# Patient Record
Sex: MALE | Race: BLACK OR AFRICAN AMERICAN | NOT HISPANIC OR LATINO | Employment: OTHER | URBAN - METROPOLITAN AREA
[De-identification: names, ages, dates, MRNs, and addresses within clinical notes are randomized per-mention and may not be internally consistent; named-entity substitution may affect disease eponyms.]

---

## 2017-09-15 ENCOUNTER — HISTORICAL (OUTPATIENT)
Dept: ADMINISTRATIVE | Facility: HOSPITAL | Age: 67
End: 2017-09-15

## 2017-09-15 LAB
ALBUMIN SERPL-MCNC: 4.2 G/DL (ref 3.1–4.7)
ALP SERPL-CCNC: 65 IU/L (ref 40–104)
ALT (SGPT): 25 IU/L (ref 3–33)
AST SERPL-CCNC: 30 IU/L (ref 10–40)
BASOPHILS NFR BLD: 0.1 K/UL (ref 0–0.2)
BASOPHILS NFR BLD: 0.9 %
BILIRUB SERPL-MCNC: 1 MG/DL (ref 0.3–1)
BUN SERPL-MCNC: 18 MG/DL (ref 8–20)
CALCIUM SERPL-MCNC: 9 MG/DL (ref 7.7–10.4)
CHLORIDE: 103 MMOL/L (ref 98–110)
CO2 SERPL-SCNC: 24.1 MMOL/L (ref 22.8–31.6)
CREATININE: 1.17 MG/DL (ref 0.6–1.4)
EOSINOPHIL NFR BLD: 0.4 K/UL (ref 0–0.7)
EOSINOPHIL NFR BLD: 4.7 %
ERYTHROCYTE [DISTWIDTH] IN BLOOD BY AUTOMATED COUNT: 14.4 % (ref 12.5–14.5)
GLUCOSE: 93 MG/DL (ref 70–99)
GRAN #: 5.9 K/UL (ref 1.4–6.5)
GRAN%: 62.3 %
HBA1C MFR BLD: 6.7 % (ref 3.1–6.5)
HCT VFR BLD AUTO: 35.2 % (ref 39–55)
HGB BLD-MCNC: 11.7 G/DL (ref 14–16)
IMMATURE GRANS (ABS): 0 K/UL (ref 0–1)
IMMATURE GRANULOCYTES: 0.4 %
LYMPH #: 2.2 K/UL (ref 1.2–3.4)
LYMPH%: 23.6 %
MCH RBC QN AUTO: 29.5 PG (ref 25–35)
MCHC RBC AUTO-ENTMCNC: 33.2 G/DL (ref 31–36)
MCV RBC AUTO: 88.7 FL (ref 80–100)
MONO #: 0.8 K/UL (ref 0.1–0.6)
MONO%: 8.1 %
NUCLEATED RBCS: 0 %
NUCLEATED RED BLOOD CELLS: 0 /100 WBC
PERFORMED BY:: ABNORMAL
PLATELET # BLD AUTO: 204 K/UL (ref 140–440)
PMV BLD AUTO: 10.5 FL (ref 8.8–12.7)
POTASSIUM SERPL-SCNC: 4.2 MMOL/L (ref 3.5–5)
PROT SERPL-MCNC: 8.4 G/DL (ref 6–8.2)
RBC # BLD AUTO: 3.97 M/UL (ref 4.3–5.9)
SODIUM: 136 MMOL/L (ref 134–144)
WBC # BLD: 9.4 K/UL (ref 5–10)

## 2017-09-27 LAB
BASOPHILS NFR BLD: 0.1 K/UL (ref 0–0.2)
BASOPHILS NFR BLD: 0.8 %
EOSINOPHIL NFR BLD: 0.3 K/UL (ref 0–0.7)
EOSINOPHIL NFR BLD: 4.2 %
ERYTHROCYTE [DISTWIDTH] IN BLOOD BY AUTOMATED COUNT: 14.3 % (ref 12.5–14.5)
GRAN #: 5.3 K/UL (ref 1.4–6.5)
GRAN%: 67.2 %
HCT VFR BLD AUTO: 30.5 % (ref 39–55)
HGB BLD-MCNC: 10.4 G/DL (ref 14–16)
IMMATURE GRANS (ABS): 0 K/UL (ref 0–1)
IMMATURE GRANULOCYTES: 0.3 %
LYMPH #: 1.6 K/UL (ref 1.2–3.4)
LYMPH%: 20.3 %
MCH RBC QN AUTO: 29.8 PG (ref 25–35)
MCHC RBC AUTO-ENTMCNC: 34.1 G/DL (ref 31–36)
MCV RBC AUTO: 87.4 FL (ref 80–100)
MONO #: 0.6 K/UL (ref 0.1–0.6)
MONO%: 7.2 %
NUCLEATED RBCS: 0 %
NUCLEATED RED BLOOD CELLS: 0 /100 WBC
PERFORMED BY:: ABNORMAL
PLATELET # BLD AUTO: 186 K/UL (ref 140–440)
PMV BLD AUTO: 9.8 FL (ref 8.8–12.7)
RBC # BLD AUTO: 3.49 M/UL (ref 4.3–5.9)
WBC # BLD: 7.9 K/UL (ref 5–10)

## 2017-09-28 LAB
ALBUMIN SERPL-MCNC: 3.5 G/DL (ref 2.9–4.4)
ALBUMIN/GLOB SERPL ELPH: 1.1 {RATIO} (ref 0.7–1.7)
ALPHA 1 GLOBULIN/PROTEIN TOTAL: 0.3 G/DL (ref 0–0.4)
ALPHA 2 GLOBULIN/PROTEIN TOTAL: 0.6 G/DL (ref 0.4–1)
B-GLOBULIN FLD ELPH-MCNC: 1 G/DL (ref 0.7–1.3)
GAMMA GLOB FLD ELPH-MCNC: 1.5 G/DL (ref 0.4–1.8)
GLOBULIN SER CALC-MCNC: 3.3 G/DL (ref 2.2–3.9)
KAPPA FREE LIGHT CHAINS: 37.5 MG/L (ref 3.3–19.4)
KAPPA/LAMBDA RATIO, S: 1.05 (ref 0.26–1.65)
LAMBDA FREE LIGHT CHAINS: 35.6 MG/L (ref 5.7–26.3)
Lab: NORMAL
M PROTEIN MFR UR ELPH: NORMAL G/DL
PROT SERPL-MCNC: 6.8 G/DL (ref 6–8.5)

## 2017-12-18 RX ORDER — POTASSIUM CHLORIDE 20 MEQ/1
20 TABLET, EXTENDED RELEASE ORAL DAILY
Qty: 90 TABLET | Refills: 0 | Status: SHIPPED | OUTPATIENT
Start: 2017-12-18 | End: 2018-08-25 | Stop reason: SDUPTHER

## 2017-12-18 RX ORDER — AMLODIPINE AND BENAZEPRIL HYDROCHLORIDE 5; 20 MG/1; MG/1
CAPSULE ORAL
Refills: 2 | COMMUNITY
Start: 2017-12-13 | End: 2018-03-07 | Stop reason: SDUPTHER

## 2017-12-18 RX ORDER — AZELASTINE 1 MG/ML
1 SPRAY, METERED NASAL 2 TIMES DAILY PRN
Refills: 0 | COMMUNITY
Start: 2017-11-28 | End: 2021-04-20 | Stop reason: SDUPTHER

## 2017-12-18 RX ORDER — METFORMIN HYDROCHLORIDE 1000 MG/1
1000 TABLET ORAL 2 TIMES DAILY WITH MEALS
Refills: 1 | COMMUNITY
Start: 2017-12-13 | End: 2018-06-11 | Stop reason: SDUPTHER

## 2017-12-18 RX ORDER — ALBUTEROL SULFATE 90 UG/1
AEROSOL, METERED RESPIRATORY (INHALATION)
Refills: 0 | COMMUNITY
Start: 2017-11-28 | End: 2018-11-21 | Stop reason: SDUPTHER

## 2017-12-18 RX ORDER — POTASSIUM CHLORIDE 20 MEQ/1
20 TABLET, EXTENDED RELEASE ORAL DAILY
COMMUNITY
Start: 2017-09-12 | End: 2017-12-18 | Stop reason: SDUPTHER

## 2017-12-18 RX ORDER — MONTELUKAST SODIUM 10 MG/1
10 TABLET ORAL NIGHTLY PRN
Refills: 0 | COMMUNITY
Start: 2017-12-08 | End: 2020-09-16

## 2017-12-18 NOTE — TELEPHONE ENCOUNTER
----- Message from Autumn Soni sent at 12/18/2017 10:31 AM CST -----  Contact: self  Pharmacy would not fill his potassium cl er.  Told him he had to speak to our office.  Has an appt in march.  Please refill as the patient is out.

## 2018-01-25 RX ORDER — MELOXICAM 7.5 MG/1
7.5 TABLET ORAL 2 TIMES DAILY
Qty: 180 TABLET | Refills: 1 | Status: SHIPPED | OUTPATIENT
Start: 2018-01-25 | End: 2018-09-26 | Stop reason: SDUPTHER

## 2018-01-25 RX ORDER — MELOXICAM 7.5 MG/1
7.5 TABLET ORAL 2 TIMES DAILY
COMMUNITY
Start: 2018-01-18 | End: 2018-01-25 | Stop reason: SDUPTHER

## 2018-01-25 NOTE — TELEPHONE ENCOUNTER
I do not see a code to use for this patient in regards as to why he is taking meloxicam. Please advise in regards to that.    Thank you!

## 2018-03-07 ENCOUNTER — OFFICE VISIT (OUTPATIENT)
Dept: INTERNAL MEDICINE | Facility: CLINIC | Age: 68
End: 2018-03-07
Payer: MEDICARE

## 2018-03-07 VITALS
HEART RATE: 74 BPM | RESPIRATION RATE: 20 BRPM | BODY MASS INDEX: 24.79 KG/M2 | TEMPERATURE: 98 F | WEIGHT: 183 LBS | SYSTOLIC BLOOD PRESSURE: 142 MMHG | OXYGEN SATURATION: 98 % | HEIGHT: 72 IN | DIASTOLIC BLOOD PRESSURE: 76 MMHG

## 2018-03-07 DIAGNOSIS — J45.909 BRONCHITIS WITH ASTHMA, SUBACUTE: ICD-10-CM

## 2018-03-07 DIAGNOSIS — J20.9 BRONCHITIS WITH ASTHMA, SUBACUTE: ICD-10-CM

## 2018-03-07 DIAGNOSIS — I10 ESSENTIAL HYPERTENSION: ICD-10-CM

## 2018-03-07 DIAGNOSIS — E11.65 TYPE 2 DIABETES MELLITUS WITH HYPERGLYCEMIA, WITHOUT LONG-TERM CURRENT USE OF INSULIN: Primary | ICD-10-CM

## 2018-03-07 DIAGNOSIS — I25.10 CORONARY ARTERY DISEASE, ANGINA PRESENCE UNSPECIFIED, UNSPECIFIED VESSEL OR LESION TYPE, UNSPECIFIED WHETHER NATIVE OR TRANSPLANTED HEART: ICD-10-CM

## 2018-03-07 DIAGNOSIS — N52.9 ERECTILE DYSFUNCTION, UNSPECIFIED ERECTILE DYSFUNCTION TYPE: ICD-10-CM

## 2018-03-07 DIAGNOSIS — Z12.11 SCREEN FOR COLON CANCER: ICD-10-CM

## 2018-03-07 DIAGNOSIS — Z12.5 SCREENING FOR PROSTATE CANCER: ICD-10-CM

## 2018-03-07 DIAGNOSIS — R97.20 ELEVATED PSA: ICD-10-CM

## 2018-03-07 DIAGNOSIS — E78.2 MIXED HYPERLIPIDEMIA: ICD-10-CM

## 2018-03-07 DIAGNOSIS — Z95.1 S/P CABG (CORONARY ARTERY BYPASS GRAFT): ICD-10-CM

## 2018-03-07 PROCEDURE — 99214 OFFICE O/P EST MOD 30 MIN: CPT | Mod: ,,, | Performed by: INTERNAL MEDICINE

## 2018-03-07 RX ORDER — SILDENAFIL 100 MG/1
100 TABLET, FILM COATED ORAL
Qty: 5 TABLET | Refills: 0 | Status: SHIPPED | OUTPATIENT
Start: 2018-03-07 | End: 2018-05-16

## 2018-03-07 RX ORDER — AMLODIPINE AND BENAZEPRIL HYDROCHLORIDE 5; 20 MG/1; MG/1
CAPSULE ORAL
Qty: 90 CAPSULE | Refills: 1 | Status: SHIPPED | OUTPATIENT
Start: 2018-03-07 | End: 2018-08-25 | Stop reason: SDUPTHER

## 2018-03-07 RX ORDER — DOXYCYCLINE 100 MG/1
100 CAPSULE ORAL 2 TIMES DAILY
Qty: 14 CAPSULE | Refills: 0 | Status: SHIPPED | OUTPATIENT
Start: 2018-03-07 | End: 2018-04-24

## 2018-03-07 RX ORDER — FLUTICASONE FUROATE AND VILANTEROL 100; 25 UG/1; UG/1
1 POWDER RESPIRATORY (INHALATION) DAILY
Qty: 2 EACH | COMMUNITY
Start: 2018-03-07 | End: 2020-09-16 | Stop reason: SDUPTHER

## 2018-03-07 RX ORDER — HYDROCODONE POLISTIREX AND CHLORPHENIRAMINE POLISTIREX 10; 8 MG/5ML; MG/5ML
5 SUSPENSION, EXTENDED RELEASE ORAL EVERY 12 HOURS PRN
Qty: 100 ML | Refills: 0 | Status: SHIPPED | OUTPATIENT
Start: 2018-03-07 | End: 2018-03-21 | Stop reason: SDUPTHER

## 2018-03-07 RX ORDER — LISINOPRIL AND HYDROCHLOROTHIAZIDE 10; 12.5 MG/1; MG/1
1 TABLET ORAL DAILY
COMMUNITY
Start: 2018-02-22 | End: 2018-03-07

## 2018-03-07 NOTE — PATIENT INSTRUCTIONS
Prostate-Specific Antigen (PSA)  Does this test have other names?  PSA  What is this test?  This test measures the level of prostate-specific antigen (PSA) in your blood.  The cells of the prostate gland make the protein called PSA. Men normally have low levels of PSA. If your PSA levels start to rise, it could mean you have prostate cancer, benign prostate conditions, inflammation, or an infection.  PSA testing is controversial because the U.S. Preventative Services Task Force discourages men who don't have any symptoms of prostate cancer from being screened. The task force says that PSA test results can lead to treating small cancers that would never become life-threatening.  But the American Cancer Society believes men should be told the risks and benefits of PSA testing and allowed to make their own decision about if and when to be screened.  The American Urologic Society says that PSA screening is most important between the ages of 55 and 69. If you have a brother or father with prostate cancer or you are , you should start testing at age 40.   Why do I need this test?  You may have this test if you are 50 or older and your healthcare provider wants to screen you for prostate cancer. Some providers recommend screening at age 40 or 45 if you have a family history of prostate cancer or other risk factors.  You may also have this test if you have already been diagnosed with prostate cancer, so that your healthcare provider can monitor your treatment and see whether your cancer has come back.  What other tests might I have along with this test?  Your healthcare provider may also do a digital rectal exam (NEHAL). A NEHAL is a physical exam of the prostate, not a lab test. For the exam, your provider will place a gloved finger in your rectum and feel the prostate to check for any bumps or abnormal areas. The FDA recommends that a NEHAL be done along with a PSA test.  What do my test results mean?  Many  things may affect your lab test results. These include the method each lab uses to do the test. Even if your test results are different from the normal value, you may not have a problem. To learn what the results mean for you, talk with your healthcare provider.  Results are given in nanograms per milliliter ng/mL. Normal results are below 4.0 ng/mL. A rising PSA may mean that you have cancer. But the PSA results alone won't tell your healthcare provider whether it's cancer or a benign prostate condition. If your provider suspects cancer, he or she will likely suggest that you have a biopsy of the prostate to make the diagnosis.  How is this test done?  The test requires a blood sample, which is drawn through a needle from a vein in your arm.  Does this test pose any risks?  Taking a blood sample with a needle carries risks that include bleeding, infection, bruising, or feeling dizzy. When the needle pricks your arm, you may feel a slight stinging sensation or pain. Afterward, the site may be slightly sore.  What might affect my test results?  An infection can affect your results, as can certain medicines. Riding a bicycle and ejaculation may also affect your results.  How do I get ready for this test?  You may need to abstain from sex and not ride a bicycle within 1 to 2 days of the test. In addition, be sure your healthcare provider knows about all medicines, herbs, vitamins, and supplements you are taking. This includes medicines that don't need a prescription and any illicit drugs you may use.     © 8725-6050 [employer]. [address] [place] [address], [place] [address]. All rights reserved. This information is not intended as a substitute for professional medical care. Always follow your healthcare professional's instructions.        PSA Test     PSA is a simple blood test.   The prostate specific antigen (PSA) test is a blood test. It can help find prostate cancer. PSA is a substance in semen. Its made  by the prostate. It is normal for some PSA to leak from the prostate into the bloodstream. But sometimes, more than a normal amount of PSA gets into the blood. The PSA test measures the amount of PSA in the blood. If the test shows high blood level of PSA, other tests are needed to help find the cause.  Possible causes of increased PSA  Several things can cause extra PSA to enter the blood, such as:  · Prostate cancer  · Prostate infection (prostatitis)  · Enlarged prostate not due to cancer (benign prostatic hyperplasia, or BPH)  · Prostate massage  · Prostate biopsy  Why a PSA test is done  A PSA test can be done to check for prostate cancer. But the PSA test by itself cant tell for sure whether or not a man has prostate cancer. And not all health care providers agree if men should have PSA tests to screen for prostate cancer. The American Cancer Society and many other organizations advise men talk with their health care provider about if prostate cancer screening is right for them. Talk with your health care provider about the PSA test beginning around age 50, or earlier if youre at higher risk.  A PSA test may also be done if a problem is found during a routine prostate exam. And it may be done if you have symptoms that suggest that you have a prostate problem. You may need a PSA if you have symptoms such as:  · Needing to urinate more often  · Waking often to urinate at night  · Having to strain when urinating  · Seeing blood in your urine  · Having pain when urinating  How a PSA test is done  Before a PSA test, you may have a digital rectal exam (NEHAL) of the prostate. For this exam, the health care provider inserts a lubricated, gloved finger into the rectum to feel the prostate. Then youll be sent to have your blood drawn for the PSA test. The test may be done in the health care providers office. Or it may be at a lab, clinic, or hospital. Blood is taken from your arm and sent to a lab to be  tested.  Getting your results  The time it takes to get your test results varies. Ask your health care provider when you can expect your results. You and your health care provider will discuss the results. A normal range for your PSA depends on a number of factors. These include your age and the size of your prostate. They also include your risk factors for cancer, your symptoms, and the results of any previous PSA tests youve had. All of these things are taken into account when your PSA tests numbers are assessed.  If you're at higher risk for prostate cancer  If you are -American or have a family history of prostate cancer, talk with your health care provider about PSA tests by age 40 to 45.   Date Last Reviewed: 3/24/2015  © 3745-5013 [employer]. [address] [place] [address], [place] [address]. All rights reserved. This information is not intended as a substitute for professional medical care. Always follow your healthcare professional's instructions.

## 2018-03-07 NOTE — PROGRESS NOTES
SUBJECTIVE:    Patient ID: Baldo Carney is a 67 y.o. male.    Chief Complaint: Follow-up (chest congestion, pt went to urgent care about 2 weeks ago and was given injections,antibiotics, and inhailer)    HPI     Pt in for recheck-4 days ago he caught a cold with productive cough and wheezing  In the chest-and shortness of breath-he had asthma as a child and outgrew it and came back to [place] and began again-He was treated but sx not gone-got two shots and a cough rx and an inhaler-cough still but it is dry-mucous at night greenish at times-he was on an antibiotic-sounds like zithromax    His old labs showed an elevated PSA but when he went there he could not afford the charge and left-PSA was 11.9--H/H was a little low but it was consistent with earlier one-A1C had come down from 7.5 to 6.7--blood sugars are in low 100's      He complains of erectile dysfunction -difficulty with hard erection    Past Medical History:   Diagnosis Date    Asthma     Cardiomyopathy 10/21/2014    Hyperlipidemia     Hypertension      Social History     Social History    Marital status:      Spouse name: N/A    Number of children: N/A    Years of education: N/A     Occupational History    Not on file.     Social History Main Topics    Smoking status: Former Smoker    Smokeless tobacco: Never Used    Alcohol use Yes      Comment: social    Drug use: No    Sexual activity: Not on file     Other Topics Concern    Not on file     Social History Narrative    No narrative on file     Past Surgical History:   Procedure Laterality Date    CARDIAC CATHETERIZATION      CARDIAC VALVE SURGERY      CORONARY ARTERY BYPASS GRAFT      SHOULDER ARTHROSCOPY  1985     Family History   Problem Relation Age of Onset    No Known Problems Mother     Diabetes Father     Hypertension Father     Heart attack Father     Heart disease Father     Diabetes Sister     Heart disease Brother     Diabetes Brother     No Known Problems  Maternal Grandmother     No Known Problems Maternal Grandfather     No Known Problems Paternal Grandmother     No Known Problems Paternal Grandfather     No Known Problems Maternal Aunt     No Known Problems Maternal Uncle     No Known Problems Paternal Aunt     No Known Problems Paternal Uncle     Anemia Neg Hx     Arrhythmia Neg Hx     Asthma Neg Hx     Clotting disorder Neg Hx     Fainting Neg Hx     Heart failure Neg Hx     Hyperlipidemia Neg Hx        Review of Systems   Constitutional: Negative for appetite change, chills, diaphoresis, fatigue, fever and unexpected weight change (4 pounds loss during flu).   HENT: Negative for congestion, ear pain, hearing loss, nosebleeds, postnasal drip, sinus pain, sinus pressure, sneezing, sore throat, tinnitus and trouble swallowing.    Eyes: Negative for photophobia, pain and visual disturbance.   Respiratory: Positive for cough, shortness of breath and wheezing. Negative for apnea, choking and chest tightness.    Cardiovascular: Negative for chest pain, palpitations and leg swelling.   Gastrointestinal: Negative for abdominal distention, abdominal pain, blood in stool, constipation, diarrhea, nausea and vomiting.   Endocrine: Negative for cold intolerance, heat intolerance, polydipsia and polyuria.   Genitourinary: Negative for difficulty urinating, dysuria, flank pain, frequency, hematuria and urgency.   Musculoskeletal: Negative for arthralgias, back pain, joint swelling, myalgias, neck pain and neck stiffness.   Skin: Negative for pallor and rash.   Allergic/Immunologic: Negative for environmental allergies and food allergies.   Neurological: Negative for dizziness, tremors, seizures, syncope, speech difficulty, weakness, light-headedness, numbness and headaches.   Hematological: Does not bruise/bleed easily.   Psychiatric/Behavioral: Positive for sleep disturbance (cough and chest congestion). Negative for agitation, confusion, decreased concentration  and suicidal ideas. The patient is not nervous/anxious.           Objective:      Physical Exam   Constitutional: He is oriented to person, place, and time. He appears well-developed and well-nourished. He is cooperative. No distress.   HENT:   Head: Normocephalic and atraumatic.   Nose: Nose normal.   Mouth/Throat: Uvula is midline, oropharynx is clear and moist and mucous membranes are normal.   Eyes: Conjunctivae, EOM and lids are normal. Pupils are equal, round, and reactive to light. Right pupil is round and reactive. Left pupil is round and reactive.   Neck: Normal range of motion. Neck supple. Carotid bruit is not present. No thyromegaly present.   Cardiovascular: Normal rate, regular rhythm, normal heart sounds and intact distal pulses.    Pulses:       Dorsalis pedis pulses are 2+ on the right side, and 2+ on the left side.        Posterior tibial pulses are 2+ on the right side, and 2+ on the left side.   1+ edema in the lower legs   Pulmonary/Chest: Effort normal and breath sounds normal.   Abdominal: Soft. Bowel sounds are normal. He exhibits no mass. There is no hepatosplenomegaly. There is no tenderness. There is no rigidity. No hernia.   Genitourinary: Prostate is enlarged.   Genitourinary Comments:  No firmness-soft but non tender   Musculoskeletal: Normal range of motion.   Lymphadenopathy:     He has no cervical adenopathy.     He has no axillary adenopathy.   Neurological: He is alert and oriented to person, place, and time.   Skin: Skin is warm and dry. No cyanosis. Nails show no clubbing.   Psychiatric: He has a normal mood and affect. His speech is normal.   Nursing note and vitals reviewed.          Assessment:       1. Type 2 diabetes mellitus with hyperglycemia, without long-term current use of insulin    2. Essential hypertension    3. Mixed hyperlipidemia    4. Coronary artery disease, angina presence unspecified, unspecified vessel or lesion type, unspecified whether native or  transplanted heart    5. S/P CABG (coronary artery bypass graft)    6. Screen for colon cancer    7. Screening for prostate cancer    8. Erectile dysfunction, unspecified erectile dysfunction type    9. Bronchitis with asthma, subacute    10. Elevated PSA         Plan:           Type 2 diabetes mellitus with hyperglycemia, without long-term current use of insulin  -     Microalbumin/creatinine urine ratio  -     Comprehensive metabolic panel; Future; Expected date: 10/07/2018  -     POCT Urinalysis, Dipstick, Automated, W/O Scope  -     Hemoglobin A1c; Future; Expected date: 04/05/2018    Essential hypertension  -     Comprehensive metabolic panel; Future; Expected date: 10/07/2018  -     CBC auto differential; Future; Expected date: 10/07/2018  -     POCT Urinalysis, Dipstick, Automated, W/O Scope    Mixed hyperlipidemia  -     Comprehensive metabolic panel; Future; Expected date: 10/07/2018  -     Lipid panel; Future; Expected date: 10/07/2018    Coronary artery disease, angina presence unspecified, unspecified vessel or lesion type, unspecified whether native or transplanted heart  -     Comprehensive metabolic panel; Future; Expected date: 10/07/2018  -     Lipid panel; Future; Expected date: 10/07/2018    S/P CABG (coronary artery bypass graft)  -     Comprehensive metabolic panel; Future; Expected date: 10/07/2018  -     Lipid panel; Future; Expected date: 10/07/2018    Screen for colon cancer  -     Occult blood x 1, stool; Future; Expected date: 10/07/2018    Screening for prostate cancer  -     PSA, Screening; Future; Expected date: 04/07/2018    Erectile dysfunction, unspecified erectile dysfunction type    Bronchitis with asthma, subacute    Elevated PSA    Other orders  -     Cancel: Hemoglobin A1C, POCT  -     amlodipine-benazepril 5-20 mg (LOTREL) 5-20 mg per capsule; TAKE 1 CAPSULE EVERYDAY AT BEDTIME  Dispense: 90 capsule; Refill: 1  -     doxycycline (MONODOX) 100 MG capsule; Take 1 capsule (100 mg  total) by mouth 2 (two) times daily.  Dispense: 14 capsule; Refill: 0  -     fluticasone-vilanterol (BREO ELLIPTA) 100-25 mcg/dose diskus inhaler; Inhale 1 puff into the lungs once daily. Controller  Dispense: 2 each; Refill: inh  -     sildenafil (VIAGRA) 100 MG tablet; Take 1 tablet (100 mg total) by mouth as needed.  Dispense: 5 tablet; Refill: 0  -     hydrocodone-chlorpheniramine (TUSSIONEX) 10-8 mg/5 mL suspension; Take 5 mLs by mouth every 12 (twelve) hours as needed for Cough.  Dispense: 100 mL; Refill: 0

## 2018-03-21 ENCOUNTER — OFFICE VISIT (OUTPATIENT)
Dept: INTERNAL MEDICINE | Facility: CLINIC | Age: 68
End: 2018-03-21
Payer: MEDICARE

## 2018-03-21 VITALS
SYSTOLIC BLOOD PRESSURE: 136 MMHG | DIASTOLIC BLOOD PRESSURE: 78 MMHG | RESPIRATION RATE: 16 BRPM | TEMPERATURE: 97 F | HEART RATE: 80 BPM | WEIGHT: 185.63 LBS | OXYGEN SATURATION: 99 % | BODY MASS INDEX: 25.14 KG/M2 | HEIGHT: 72 IN

## 2018-03-21 DIAGNOSIS — Z95.2 S/P AVR (AORTIC VALVE REPLACEMENT): ICD-10-CM

## 2018-03-21 DIAGNOSIS — R35.1 NOCTURIA: ICD-10-CM

## 2018-03-21 DIAGNOSIS — I10 ESSENTIAL HYPERTENSION: ICD-10-CM

## 2018-03-21 DIAGNOSIS — N52.9 ERECTILE DYSFUNCTION, UNSPECIFIED ERECTILE DYSFUNCTION TYPE: ICD-10-CM

## 2018-03-21 DIAGNOSIS — R39.11 URINARY HESITANCY: ICD-10-CM

## 2018-03-21 DIAGNOSIS — R39.12 WEAK URINARY STREAM: ICD-10-CM

## 2018-03-21 DIAGNOSIS — Z12.11 SCREEN FOR COLON CANCER: ICD-10-CM

## 2018-03-21 DIAGNOSIS — E11.9 TYPE 2 DIABETES MELLITUS WITHOUT COMPLICATION, WITHOUT LONG-TERM CURRENT USE OF INSULIN: ICD-10-CM

## 2018-03-21 DIAGNOSIS — E78.2 MIXED HYPERLIPIDEMIA: ICD-10-CM

## 2018-03-21 DIAGNOSIS — R05.9 COUGH: Primary | ICD-10-CM

## 2018-03-21 DIAGNOSIS — Z13.6 SCREENING FOR AAA (ABDOMINAL AORTIC ANEURYSM): ICD-10-CM

## 2018-03-21 DIAGNOSIS — I25.810 CORONARY ARTERY DISEASE INVOLVING CORONARY BYPASS GRAFT OF NATIVE HEART WITHOUT ANGINA PECTORIS: ICD-10-CM

## 2018-03-21 DIAGNOSIS — Z11.59 NEED FOR HEPATITIS C SCREENING TEST: ICD-10-CM

## 2018-03-21 PROCEDURE — 3075F SYST BP GE 130 - 139MM HG: CPT | Mod: ,,, | Performed by: NURSE PRACTITIONER

## 2018-03-21 PROCEDURE — 3044F HG A1C LEVEL LT 7.0%: CPT | Mod: ,,, | Performed by: NURSE PRACTITIONER

## 2018-03-21 PROCEDURE — 99214 OFFICE O/P EST MOD 30 MIN: CPT | Mod: ,,, | Performed by: NURSE PRACTITIONER

## 2018-03-21 PROCEDURE — 3078F DIAST BP <80 MM HG: CPT | Mod: ,,, | Performed by: NURSE PRACTITIONER

## 2018-03-21 RX ORDER — HYDROCODONE POLISTIREX AND CHLORPHENIRAMINE POLISTIREX 10; 8 MG/5ML; MG/5ML
5 SUSPENSION, EXTENDED RELEASE ORAL EVERY 12 HOURS PRN
Qty: 100 ML | Refills: 0 | Status: SHIPPED | OUTPATIENT
Start: 2018-03-21 | End: 2018-04-24

## 2018-03-21 NOTE — PROGRESS NOTES
SUBJECTIVE:      Patient ID: Baldo Carney is a 67 y.o. male.    Chief Complaint: Follow-up (DID NOT HAVE LAB WORK DRAWN, STILL COMPLAINING OF CHEST COUGHING AND WHEEZING.)    In for follow up, states he didn't know he was supposed to have labs done prior to this visit, will get them done tomorrow    Saw Dr. Murillo 3/7/18 after a visit to  for cough & cold symptoms, was prescribed cough medicine, ABX, inhalers, still c/o continued cough, worse with lying down, non-productive now, wheezing, chest soreness from coughing, SOB. States he had asthma as a child and outgrew it. When he returned to [place] from [place] a few years ago he started having breathing trouble again.    Saw Dr. Duffy last year for eye exam  States his erectile dysfunction is ongoing, as he can't afford the sildenafil.  Was seeing Dr. Rivas for cardiology, had to stop when he first got on Medicaid due to expense, needs to establish with cardiologist due to cardiac history of bypass and valve replacement    Referral needed for colonoscopy      Erectile Dysfunction   The current episode started more than 1 year ago. The problem is unchanged. The nature of his difficulty is maintaining erection and achieving erection. Non-physiologic factors contributing to erectile dysfunction are a decreased libido. He reports no anxiety or performance anxiety. He reports his erection duration to be less than 1 minute. Irritative symptoms include frequency, nocturia and urgency. Obstructive symptoms include dribbling and a weak stream. Obstructive symptoms do not include incomplete emptying, an intermittent stream, a slower stream or straining. Associated symptoms include chills (occasional at night) and hesitancy. Pertinent negatives include no dysuria, genital pain, hematuria or inability to urinate. The symptoms are aggravated by poor sleep. Past treatments include sildenafil. The treatment provided moderate relief. He has been using treatment for 2 or more years.  Risk factors include chronic cardiac disease, diabetes mellitus and hypertension.       Past Surgical History:   Procedure Laterality Date    CARDIAC CATHETERIZATION      CARDIAC VALVE SURGERY      CORONARY ARTERY BYPASS GRAFT      SHOULDER ARTHROSCOPY  1985     Family History   Problem Relation Age of Onset    No Known Problems Mother     Diabetes Father     Hypertension Father     Heart attack Father     Heart disease Father     Diabetes Sister     Heart disease Brother     Diabetes Brother     No Known Problems Maternal Grandmother     No Known Problems Maternal Grandfather     No Known Problems Paternal Grandmother     No Known Problems Paternal Grandfather     No Known Problems Maternal Aunt     No Known Problems Maternal Uncle     No Known Problems Paternal Aunt     No Known Problems Paternal Uncle     Anemia Neg Hx     Arrhythmia Neg Hx     Asthma Neg Hx     Clotting disorder Neg Hx     Fainting Neg Hx     Heart failure Neg Hx     Hyperlipidemia Neg Hx       Social History     Social History    Marital status:      Spouse name: N/A    Number of children: N/A    Years of education: N/A     Social History Main Topics    Smoking status: Former Smoker    Smokeless tobacco: Never Used    Alcohol use Yes      Comment: social    Drug use: No    Sexual activity: Not Asked     Other Topics Concern    None     Social History Narrative    None     Current Outpatient Prescriptions   Medication Sig Dispense Refill    amlodipine-benazepril 5-20 mg (LOTREL) 5-20 mg per capsule TAKE 1 CAPSULE EVERYDAY AT BEDTIME 90 capsule 1    aspirin (ECOTRIN) 325 MG EC tablet Take 1 tablet (325 mg total) by mouth once daily. 30 tablet 4    atorvastatin (LIPITOR) 80 MG tablet Take 80 mg by mouth once daily.  11    azelastine (ASTELIN) 137 mcg (0.1 %) nasal spray PLACE 1 SPRAY INTO EACH NOSTRL 2 X A DAY  0    meloxicam (MOBIC) 7.5 MG tablet Take 1 tablet (7.5 mg total) by mouth 2 (two) times  daily. 180 tablet 1    metFORMIN (GLUCOPHAGE) 1000 MG tablet Take 1,000 mg by mouth 2 (two) times daily with meals.  1    montelukast (SINGULAIR) 10 mg tablet Take 10 mg by mouth every evening.  0    potassium chloride SA (K-DUR,KLOR-CON) 20 MEQ tablet Take 1 tablet (20 mEq total) by mouth once daily. 90 tablet 0    sildenafil (VIAGRA) 100 MG tablet Take 1 tablet (100 mg total) by mouth as needed. 5 tablet 0    sitagliptin (JANUVIA) 100 MG Tab Take 1 tablet (100 mg total) by mouth once daily. 30 tablet 3    VENTOLIN HFA 90 mcg/actuation inhaler INHALE 2 PUFFS BY MOUTH EVERY 4-6HRS AS NEEDED  0    doxycycline (MONODOX) 100 MG capsule Take 1 capsule (100 mg total) by mouth 2 (two) times daily. 14 capsule 0    fluticasone-vilanterol (BREO ELLIPTA) 100-25 mcg/dose diskus inhaler Inhale 1 puff into the lungs once daily. Controller 2 each inh    hydrocodone-chlorpheniramine (TUSSIONEX) 10-8 mg/5 mL suspension Take 5 mLs by mouth every 12 (twelve) hours as needed for Cough. 100 mL 0     No current facility-administered medications for this visit.      Review of patient's allergies indicates:  No Known Allergies   Past Medical History:   Diagnosis Date    Asthma     Cardiomyopathy 10/21/2014    Hyperlipidemia     Hypertension      Past Surgical History:   Procedure Laterality Date    CARDIAC CATHETERIZATION      CARDIAC VALVE SURGERY      CORONARY ARTERY BYPASS GRAFT      SHOULDER ARTHROSCOPY  1985       Review of Systems   Constitutional: Positive for appetite change (hasn't felt like eating since he got sick beginning of this month, states food tastes different.), chills (occasional at night), fatigue (recently with illness) and unexpected weight change (loss). Negative for activity change and fever.   HENT: Positive for congestion, postnasal drip, rhinorrhea, sneezing and tinnitus. Negative for ear discharge, ear pain, facial swelling, hearing loss, sinus pain, sinus pressure and sore throat.         Ear  fullness/pressure   Eyes: Negative for photophobia, pain, discharge and itching.   Respiratory: Positive for cough, shortness of breath and wheezing. Negative for chest tightness.    Cardiovascular: Negative for chest pain, palpitations and leg swelling.   Gastrointestinal: Negative for abdominal distention, abdominal pain, blood in stool, constipation, diarrhea, nausea and vomiting.   Endocrine: Negative for cold intolerance, heat intolerance, polydipsia and polyuria.   Genitourinary: Positive for decreased libido, frequency, hesitancy, nocturia and urgency. Negative for decreased urine volume, difficulty urinating, dysuria, flank pain, hematuria, incomplete emptying and penile pain.        Occasional urinary hesitancy, weak stream at times, wakes about every 2 hours at night to urinate, post-void dribbling, notices he is urinating more frequently, has urgency at times    ED - was taking 100 mg sildenafil in [place] at no cost with his insurance prior to [facility], too expensive now   Musculoskeletal: Positive for myalgias. Negative for arthralgias, back pain, joint swelling and neck pain.        Soreness in chest area from cough   Skin: Negative for pallor and rash.   Allergic/Immunologic: Negative for environmental allergies and food allergies.   Neurological: Negative for dizziness, weakness, light-headedness and headaches.   Hematological: Does not bruise/bleed easily.   Psychiatric/Behavioral: Positive for sleep disturbance (cough & urinating). Negative for confusion and decreased concentration. The patient is not nervous/anxious.       OBJECTIVE:      Vitals:    03/21/18 1255   BP: 136/78   Pulse: 80   Resp: 16   Temp: 97.3 °F (36.3 °C)   TempSrc: Skin   SpO2: 99%   Weight: 84.2 kg (185 lb 9.6 oz)   Height: 6' (1.829 m)     Physical Exam   Constitutional: He is oriented to person, place, and time. He appears well-developed and well-nourished. He is cooperative. No distress.   HENT:   Head: Normocephalic and  atraumatic.   Right Ear: Tympanic membrane, external ear and ear canal normal. Tympanic membrane is not injected. No middle ear effusion.   Left Ear: Tympanic membrane, external ear and ear canal normal. Tympanic membrane is not injected.  No middle ear effusion.   Nose: Nose normal. No mucosal edema or rhinorrhea. Right sinus exhibits no maxillary sinus tenderness and no frontal sinus tenderness. Left sinus exhibits no maxillary sinus tenderness and no frontal sinus tenderness.   Mouth/Throat: Uvula is midline, oropharynx is clear and moist and mucous membranes are normal. No oropharyngeal exudate.   Eyes: Conjunctivae, EOM and lids are normal. Pupils are equal, round, and reactive to light. Right eye exhibits no discharge. Left eye exhibits no discharge. Right pupil is round and reactive. Left pupil is round and reactive.   Neck: Trachea normal. Neck supple. No JVD present. No tracheal tenderness present. No tracheal deviation present.   Cardiovascular: Normal rate, regular rhythm, S1 normal, S2 normal, normal heart sounds and intact distal pulses.  Exam reveals no gallop and no friction rub.    No murmur heard.  Pulses:       Radial pulses are 2+ on the right side, and 2+ on the left side.   Pulmonary/Chest: Effort normal and breath sounds normal. No stridor. No respiratory distress. He has no decreased breath sounds. He has no wheezes. He has no rhonchi. He has no rales. He exhibits no tenderness.   Abdominal: Soft. Normal appearance and bowel sounds are normal. He exhibits no distension and no abdominal bruit. There is no tenderness. There is no rigidity and no guarding.   Genitourinary: Prostate normal.   Musculoskeletal: Normal range of motion. He exhibits no edema.   Lymphadenopathy:     He has no cervical adenopathy.     He has no axillary adenopathy.   Neurological: He is alert and oriented to person, place, and time. He has normal strength. Coordination and gait normal. GCS eye subscore is 4. GCS verbal  subscore is 5. GCS motor subscore is 6.   Skin: Skin is warm and dry. Capillary refill takes less than 2 seconds. No rash noted. No erythema.   Psychiatric: He has a normal mood and affect. His speech is normal and behavior is normal. Judgment and thought content normal. Cognition and memory are normal.   Vitals reviewed.     Assessment:       1. Cough    2. Type 2 diabetes mellitus without complication, without long-term current use of insulin    3. Essential hypertension    4. Mixed hyperlipidemia    5. Coronary artery disease involving coronary bypass graft of native heart without angina pectoris    6. S/P AVR (aortic valve replacement)    7. Erectile dysfunction, unspecified erectile dysfunction type    8. Urinary hesitancy    9. Nocturia    10. Weak urinary stream    11. Screen for colon cancer    12. Need for hepatitis C screening test    13. Screening for AAA (abdominal aortic aneurysm)        Plan:       Cough  -     hydrocodone-chlorpheniramine (TUSSIONEX) 10-8 mg/5 mL suspension; Take 5 mLs by mouth every 12 (twelve) hours as needed for Cough.  Dispense: 100 mL; Refill: 0    Type 2 diabetes mellitus without complication, without long-term current use of insulin   -on meds   -awaiting results of CMP & A1C    Essential hypertension   -stable on meds    Mixed hyperlipidemia   -stable on meds    Coronary artery disease involving coronary bypass graft of native heart without angina pectoris  S/P AVR (aortic valve replacement)  -     Ambulatory referral to Cardiology  - was seeing Dr. Rivas, needs to re-establish    Erectile dysfunction, unspecified erectile dysfunction type  Urinary hesitancy  Nocturia  Weak urinary stream  -     Ambulatory referral to Urology    Screen for colon cancer  -     Ambulatory referral to Gastroenterology    Need for hepatitis C screening test  -     Hepatitis C antibody; Future; Expected date: 03/22/2018    Screening for AAA (abdominal aortic aneurysm)  -     US Abdominal Aorta;  Future; Expected date: 03/22/2018    Will FU re: labs    Follow-up in about 4 weeks (around 4/18/2018) for labs, referrals.      3/21/2018 KAMERON Rodriguez, FNP-C

## 2018-03-28 LAB
ALBUMIN SERPL-MCNC: 3.5 G/DL (ref 3.1–4.7)
ALP SERPL-CCNC: 64 IU/L (ref 40–104)
ALT (SGPT): 37 IU/L (ref 3–33)
AST SERPL-CCNC: 41 IU/L (ref 10–40)
BASOPHILS NFR BLD: 0.1 K/UL (ref 0–0.2)
BASOPHILS NFR BLD: 0.6 %
BILIRUB SERPL-MCNC: 1.1 MG/DL (ref 0.3–1)
BUN SERPL-MCNC: 17 MG/DL (ref 8–20)
CALCIUM SERPL-MCNC: 9 MG/DL (ref 7.7–10.4)
CHLORIDE: 105 MMOL/L (ref 98–110)
CO2 SERPL-SCNC: 23.2 MMOL/L (ref 22.8–31.6)
COMPLEXED PSA SERPL-MCNC: 36.9 NG/ML (ref 0–3)
CREATININE RANDOM URINE: 48 MG/DL
CREATININE: 1.14 MG/DL (ref 0.6–1.4)
EOSINOPHIL NFR BLD: 0.1 K/UL (ref 0–0.7)
EOSINOPHIL NFR BLD: 1.8 %
ERYTHROCYTE [DISTWIDTH] IN BLOOD BY AUTOMATED COUNT: 17.3 % (ref 11.7–14.9)
GLUCOSE: 97 MG/DL (ref 70–99)
GRAN #: 5.5 K/UL (ref 1.4–6.5)
GRAN%: 70.4 %
HBA1C MFR BLD: 6 % (ref 3.1–6.5)
HCT VFR BLD AUTO: 34.1 % (ref 39–55)
HGB BLD-MCNC: 11.1 G/DL (ref 14–16)
IMMATURE GRANS (ABS): 0 K/UL (ref 0–1)
IMMATURE GRANULOCYTES: 0.3 %
LYMPH #: 1.4 K/UL (ref 1.2–3.4)
LYMPH%: 18.3 %
MCH RBC QN AUTO: 28.1 PG (ref 25–35)
MCHC RBC AUTO-ENTMCNC: 32.6 G/DL (ref 31–36)
MCV RBC AUTO: 86.3 FL (ref 80–100)
MICROALBUM.,U,RANDOM: 698.4 MCG/ML (ref 0–19.9)
MICROALBUMIN/CREATININE RATIO: 1447 (ref 0–30)
MONO #: 0.7 K/UL (ref 0.1–0.6)
MONO%: 8.6 %
NUCLEATED RBCS: 0 %
PLATELET # BLD AUTO: 241 K/UL (ref 140–440)
PMV BLD AUTO: 11.9 FL (ref 8.8–12.7)
POTASSIUM SERPL-SCNC: 4.2 MMOL/L (ref 3.5–5)
PROT SERPL-MCNC: 6.7 G/DL (ref 6–8.2)
RBC # BLD AUTO: 3.95 M/UL (ref 4.3–5.9)
SODIUM: 137 MMOL/L (ref 134–144)
WBC # BLD AUTO: 7.8 K/UL (ref 5–10)

## 2018-03-29 ENCOUNTER — TELEPHONE (OUTPATIENT)
Dept: INTERNAL MEDICINE | Facility: CLINIC | Age: 68
End: 2018-03-29

## 2018-03-29 DIAGNOSIS — D64.9 ANEMIA, UNSPECIFIED TYPE: Primary | ICD-10-CM

## 2018-03-29 LAB — HCV AB SERPL QL IA: <0.1 S/CO RATIO (ref 0–0.9)

## 2018-03-29 NOTE — TELEPHONE ENCOUNTER
PSA - elevated, needs to see urology, consult placed at last visit, please FU with pt to make sure he schedules appt    CBC - RBC, Hgb & Hct low. Orders for iron, TIBC, & ferritin levels to be done now. Will FU re:results of new labs.    Microalbumin/creatinine urine ratio - elevated, already on ACEI, will monitor    CMP - mildly elevated liver enzymes, will monitor    eGFR - WNL    A1C - WNL    Lipid panel - WNL

## 2018-04-05 ENCOUNTER — TELEPHONE (OUTPATIENT)
Dept: INTERNAL MEDICINE | Facility: CLINIC | Age: 68
End: 2018-04-05

## 2018-04-05 RX ORDER — FUROSEMIDE 20 MG/1
20 TABLET ORAL DAILY
Qty: 30 TABLET | Refills: 2 | Status: SHIPPED | OUTPATIENT
Start: 2018-04-05 | End: 2018-05-22 | Stop reason: SDUPTHER

## 2018-04-05 NOTE — TELEPHONE ENCOUNTER
decrease meloxicam to once a day and start lasix 20 mg daily until edema clears then only as needed

## 2018-04-05 NOTE — TELEPHONE ENCOUNTER
----- Message from Alba Chawla sent at 4/4/2018  1:28 PM CDT -----  Contact: Baldo  Pt states both of his feet have been swollen for a few days and would like a rx

## 2018-04-24 ENCOUNTER — OFFICE VISIT (OUTPATIENT)
Dept: INTERNAL MEDICINE | Facility: CLINIC | Age: 68
End: 2018-04-24
Payer: MEDICARE

## 2018-04-24 VITALS
SYSTOLIC BLOOD PRESSURE: 146 MMHG | BODY MASS INDEX: 24.68 KG/M2 | HEIGHT: 72 IN | HEART RATE: 92 BPM | OXYGEN SATURATION: 99 % | TEMPERATURE: 97 F | WEIGHT: 182.19 LBS | RESPIRATION RATE: 18 BRPM | DIASTOLIC BLOOD PRESSURE: 88 MMHG

## 2018-04-24 DIAGNOSIS — R97.20 ELEVATED PSA, GREATER THAN OR EQUAL TO 20 NG/ML: ICD-10-CM

## 2018-04-24 DIAGNOSIS — E11.9 TYPE 2 DIABETES MELLITUS WITHOUT COMPLICATION, WITHOUT LONG-TERM CURRENT USE OF INSULIN: ICD-10-CM

## 2018-04-24 DIAGNOSIS — N52.9 ERECTILE DYSFUNCTION, UNSPECIFIED ERECTILE DYSFUNCTION TYPE: ICD-10-CM

## 2018-04-24 DIAGNOSIS — Z95.2 S/P AVR (AORTIC VALVE REPLACEMENT): ICD-10-CM

## 2018-04-24 DIAGNOSIS — R05.3 COUGH, PERSISTENT: ICD-10-CM

## 2018-04-24 DIAGNOSIS — I25.810 CORONARY ARTERY DISEASE INVOLVING CORONARY BYPASS GRAFT OF NATIVE HEART WITHOUT ANGINA PECTORIS: ICD-10-CM

## 2018-04-24 DIAGNOSIS — Z87.898 HISTORY OF PERIPHERAL EDEMA: ICD-10-CM

## 2018-04-24 DIAGNOSIS — Z95.1 S/P CABG (CORONARY ARTERY BYPASS GRAFT): ICD-10-CM

## 2018-04-24 DIAGNOSIS — I10 ESSENTIAL HYPERTENSION: Primary | ICD-10-CM

## 2018-04-24 DIAGNOSIS — R06.01 ORTHOPNEA: ICD-10-CM

## 2018-04-24 PROCEDURE — 3079F DIAST BP 80-89 MM HG: CPT | Mod: ,,, | Performed by: NURSE PRACTITIONER

## 2018-04-24 PROCEDURE — 99214 OFFICE O/P EST MOD 30 MIN: CPT | Mod: ,,, | Performed by: NURSE PRACTITIONER

## 2018-04-24 PROCEDURE — 3077F SYST BP >= 140 MM HG: CPT | Mod: ,,, | Performed by: NURSE PRACTITIONER

## 2018-04-24 PROCEDURE — 3044F HG A1C LEVEL LT 7.0%: CPT | Mod: ,,, | Performed by: NURSE PRACTITIONER

## 2018-04-24 NOTE — PROGRESS NOTES
SUBJECTIVE:      Patient ID: Baldo Carney is a 67 y.o. male.    Chief Complaint: Cough (still coughing, needs cardiology referral changed to different MD due to balance, still has to have labs drawn.)    Follow-up for ongoing cough, labs, referalls    States he missed yesterday's appt as he didn't get a reminder call, called into office end of March for leg swelling, states since his respiratory illness the end of February he can't get rid of his cough, cough medicine has helped somewhat, still using rescue inhaler every few days, cough & SOB worse with exertion, states he has to sleep propped up on 3-4 pillows at night due to SOB, unintentional 10 lb weight loss in last 3.5 years, needs to get iron studies completed, states he tried to re-establish with Dr. Rivas & Dr. Heath - was told he had outstanding balances which he was unable to pay at that time, no visit was scheduled    Leg swelling & cough could be related to amlodipine-benazepril, orthopnea is concerning in combination with BLE edema & cough - possible CHF, HO CABG & AVR    Upcoming appts: 5/24 with Uro, 8/16 with GI    BP elevated at this visit x 3 readings - admits to not taking HTN med this AM      Cough   This is a chronic problem. The current episode started more than 1 month ago (since end of February). The problem has been unchanged. The problem occurs every few hours. The cough is non-productive. Associated symptoms include shortness of breath, weight loss and wheezing. Pertinent negatives include no chest pain, chills, ear congestion, ear pain, fever, headaches, heartburn, hemoptysis, myalgias, nasal congestion, postnasal drip, rash, rhinorrhea, sore throat or sweats. The symptoms are aggravated by lying down and exercise. He has tried a beta-agonist inhaler, prescription cough suppressant, leukotriene antagonists and body position changes for the symptoms. The treatment provided mild relief. His past medical history is significant for  asthma, environmental allergies and pneumonia.       Past Surgical History:   Procedure Laterality Date    CARDIAC CATHETERIZATION      CARDIAC VALVE SURGERY      CORONARY ARTERY BYPASS GRAFT      SHOULDER ARTHROSCOPY  1985     Family History   Problem Relation Age of Onset    No Known Problems Mother     Diabetes Father     Hypertension Father     Heart attack Father     Heart disease Father     Diabetes Sister     Heart disease Brother     Diabetes Brother     No Known Problems Maternal Grandmother     No Known Problems Maternal Grandfather     No Known Problems Paternal Grandmother     No Known Problems Paternal Grandfather     No Known Problems Maternal Aunt     No Known Problems Maternal Uncle     No Known Problems Paternal Aunt     No Known Problems Paternal Uncle     Anemia Neg Hx     Arrhythmia Neg Hx     Asthma Neg Hx     Clotting disorder Neg Hx     Fainting Neg Hx     Heart failure Neg Hx     Hyperlipidemia Neg Hx       Social History     Social History    Marital status:      Spouse name: N/A    Number of children: N/A    Years of education: N/A     Social History Main Topics    Smoking status: Former Smoker    Smokeless tobacco: Never Used    Alcohol use Yes      Comment: social    Drug use: No    Sexual activity: Not Asked     Other Topics Concern    None     Social History Narrative    None     Current Outpatient Prescriptions   Medication Sig Dispense Refill    amlodipine-benazepril 5-20 mg (LOTREL) 5-20 mg per capsule TAKE 1 CAPSULE EVERYDAY AT BEDTIME 90 capsule 1    aspirin (ECOTRIN) 325 MG EC tablet Take 1 tablet (325 mg total) by mouth once daily. 30 tablet 4    atorvastatin (LIPITOR) 80 MG tablet Take 80 mg by mouth once daily.  11    azelastine (ASTELIN) 137 mcg (0.1 %) nasal spray PLACE 1 SPRAY INTO EACH NOSTRL 2 X A DAY  0    fluticasone-vilanterol (BREO ELLIPTA) 100-25 mcg/dose diskus inhaler Inhale 1 puff into the lungs once daily.  Controller 2 each inh    furosemide (LASIX) 20 MG tablet Take 1 tablet (20 mg total) by mouth once daily. 30 tablet 2    meloxicam (MOBIC) 7.5 MG tablet Take 1 tablet (7.5 mg total) by mouth 2 (two) times daily. 180 tablet 1    metFORMIN (GLUCOPHAGE) 1000 MG tablet Take 1,000 mg by mouth 2 (two) times daily with meals.  1    montelukast (SINGULAIR) 10 mg tablet Take 10 mg by mouth every evening.  0    potassium chloride SA (K-DUR,KLOR-CON) 20 MEQ tablet Take 1 tablet (20 mEq total) by mouth once daily. 90 tablet 0    sitagliptin (JANUVIA) 100 MG Tab Take 1 tablet (100 mg total) by mouth once daily. 30 tablet 3    VENTOLIN HFA 90 mcg/actuation inhaler INHALE 2 PUFFS BY MOUTH EVERY 4-6HRS AS NEEDED  0    sildenafil (VIAGRA) 100 MG tablet Take 1 tablet (100 mg total) by mouth as needed. 5 tablet 0     No current facility-administered medications for this visit.      Review of patient's allergies indicates:  No Known Allergies   Past Medical History:   Diagnosis Date    Asthma     Cardiomyopathy 10/21/2014    Hyperlipidemia     Hypertension      Past Surgical History:   Procedure Laterality Date    CARDIAC CATHETERIZATION      CARDIAC VALVE SURGERY      CORONARY ARTERY BYPASS GRAFT      SHOULDER ARTHROSCOPY  1985       Review of Systems   Constitutional: Positive for weight loss. Negative for activity change, appetite change, chills, fatigue and fever.   HENT: Negative for congestion, ear pain, hearing loss, postnasal drip, rhinorrhea, sinus pain, sinus pressure, sneezing and sore throat.    Eyes: Negative for photophobia and pain.   Respiratory: Positive for shortness of breath and wheezing. Negative for cough, hemoptysis and chest tightness.    Cardiovascular: Negative for chest pain, palpitations and leg swelling.        States he didn't take HTN med this AM   Gastrointestinal: Negative for abdominal distention, abdominal pain, blood in stool, constipation, diarrhea, heartburn, nausea and vomiting.    Endocrine: Negative for cold intolerance, heat intolerance, polydipsia and polyuria.   Genitourinary: Positive for difficulty urinating, frequency and urgency. Negative for dysuria, flank pain and hematuria.        Post-void dribbling, weak stream, hesitancy, nocturia, ED   Musculoskeletal: Negative for arthralgias, back pain, joint swelling, myalgias and neck pain.   Skin: Negative for pallor and rash.   Allergic/Immunologic: Positive for environmental allergies. Negative for food allergies.   Neurological: Negative for dizziness, weakness, light-headedness and headaches.   Hematological: Does not bruise/bleed easily.   Psychiatric/Behavioral: Positive for sleep disturbance. Negative for confusion and decreased concentration. The patient is not nervous/anxious.       OBJECTIVE:      Vitals:    04/24/18 0947 04/24/18 0953 04/24/18 0954   BP: (!) 144/90 (!) 140/94 (!) 146/88   Pulse: 92     Resp: 18     Temp: 97.2 °F (36.2 °C)     SpO2: 99%     Weight: 82.6 kg (182 lb 3.2 oz)     Height: 6' (1.829 m)       Physical Exam   Constitutional: He is oriented to person, place, and time. He appears well-developed and well-nourished. He is cooperative. No distress.   BP elevated   HENT:   Head: Normocephalic and atraumatic.   Nose: Nose normal. No rhinorrhea.   Mouth/Throat: Mucous membranes are normal.   Eyes: Conjunctivae, EOM and lids are normal. Right eye exhibits no discharge. Left eye exhibits no discharge. Right conjunctiva is not injected. Left conjunctiva is not injected. Right pupil is round and reactive. Left pupil is round and reactive.   Neck: Normal range of motion. Neck supple. No JVD present. Carotid bruit is not present. No tracheal deviation present.   Cardiovascular: Normal rate, regular rhythm, normal heart sounds and intact distal pulses.  Exam reveals no gallop and no friction rub.    No murmur heard.  Pulses:       Radial pulses are 2+ on the right side, and 2+ on the left side.        Posterior  tibial pulses are 2+ on the right side, and 2+ on the left side.   EKG - SR with PACs, possible L atrial enlargement, L axis deviation, R BBB, inferior & anterolateral infarct (age undetermined)   Pulmonary/Chest: Effort normal. No stridor. No respiratory distress. He has decreased breath sounds in the right lower field and the left lower field. He has no wheezes. He has no rhonchi. He has no rales.   Abdominal: Soft. Normal appearance and bowel sounds are normal. He exhibits no distension and no abdominal bruit. There is no hepatosplenomegaly. There is no tenderness. There is no rigidity and no guarding.   Musculoskeletal: Normal range of motion. He exhibits no edema.   Lymphadenopathy:     He has no cervical adenopathy.   Neurological: He is alert and oriented to person, place, and time. He has normal strength. He displays a negative Romberg sign. Coordination and gait normal. GCS eye subscore is 4. GCS verbal subscore is 5. GCS motor subscore is 6.   Skin: Skin is warm and dry. Capillary refill takes less than 2 seconds. No rash noted.   Psychiatric: He has a normal mood and affect. His speech is normal and behavior is normal. Judgment and thought content normal. Cognition and memory are normal. He is attentive.   Vitals reviewed.     Assessment:       1. Essential hypertension    2. Cough, persistent    3. Orthopnea    4. History of peripheral edema    5. S/P AVR (aortic valve replacement)    6. S/P CABG (coronary artery bypass graft)    7. Coronary artery disease involving coronary bypass graft of native heart without angina pectoris    8. Erectile dysfunction, unspecified erectile dysfunction type    9. Elevated PSA, greater than or equal to 20 ng/ml    10. Type 2 diabetes mellitus without complication, without long-term current use of insulin        Plan:       Essential hypertension  -     X-Ray Chest PA And Lateral  -     2D Echo Only; Future  -     Ambulatory referral to Cardiology    Cough,  persistent  Orthopnea  History of peripheral edema  -     IN OFFICE EKG 12-LEAD (to [facility])  -     X-Ray Chest PA And Lateral  -     2D Echo Only; Future  -     Ambulatory referral to Cardiology    S/P AVR (aortic valve replacement)  S/P CABG (coronary artery bypass graft)  Coronary artery disease involving coronary bypass graft of native heart without angina pectoris  -     Ambulatory referral to Cardiology    Erectile dysfunction, unspecified erectile dysfunction type  Elevated PSA, greater than or equal to 20 ng/ml   -[facility]    Type 2 diabetes mellitus without complication, without long-term current use of insulin  -     Ambulatory referral to Ophthalmology        Follow-up in about 4 weeks (around 5/22/2018) for check up.      4/25/2018 Millie Hayes, KAMERON, FNP-C

## 2018-04-25 NOTE — PATIENT INSTRUCTIONS

## 2018-05-09 ENCOUNTER — TELEPHONE (OUTPATIENT)
Dept: INTERNAL MEDICINE | Facility: CLINIC | Age: 68
End: 2018-05-09

## 2018-05-09 DIAGNOSIS — J18.1 LUNG CONSOLIDATION: Primary | ICD-10-CM

## 2018-05-09 DIAGNOSIS — J98.11 ATELECTASIS: ICD-10-CM

## 2018-05-09 NOTE — TELEPHONE ENCOUNTER
Consolidation & atelectasis on 4/24/18 CXR.    CT ordered & referral to pulmonology. Needs creatinine drawn prior to imaging. No food or drink 4 hours before exam.     Needs to be off metformin for 48 hours following contrast.

## 2018-05-09 NOTE — TELEPHONE ENCOUNTER
Spoke to patient and wife.  Gave telephone number for [facility] to schedule CT & lab, Dr. Harrington's telephone # for appt with pulmonology

## 2018-05-16 ENCOUNTER — OFFICE VISIT (OUTPATIENT)
Dept: OPTOMETRY | Facility: CLINIC | Age: 68
End: 2018-05-16
Payer: MEDICARE

## 2018-05-16 DIAGNOSIS — H52.7 REFRACTIVE ERROR: ICD-10-CM

## 2018-05-16 DIAGNOSIS — E11.3293 MILD NONPROLIFERATIVE DIABETIC RETINOPATHY OF BOTH EYES WITHOUT MACULAR EDEMA ASSOCIATED WITH TYPE 2 DIABETES MELLITUS: Primary | ICD-10-CM

## 2018-05-16 DIAGNOSIS — H25.13 NUCLEAR SCLEROSIS, BILATERAL: ICD-10-CM

## 2018-05-16 PROCEDURE — 92004 COMPRE OPH EXAM NEW PT 1/>: CPT | Mod: S$GLB,,, | Performed by: OPTOMETRIST

## 2018-05-16 PROCEDURE — 92250 FUNDUS PHOTOGRAPHY W/I&R: CPT | Mod: S$GLB,,, | Performed by: OPTOMETRIST

## 2018-05-16 PROCEDURE — 99999 PR PBB SHADOW E&M-EST. PATIENT-LVL II: CPT | Mod: PBBFAC,,, | Performed by: OPTOMETRIST

## 2018-05-16 NOTE — LETTER
May 16, 2018      Sunshine Murillo MD  [address] [facility]  [address]  [place] [address]           [facility] - Optometry  [address] [place] [address] Drive [address]  [place] [address]  Phone: [phone]          Patient: Baldo Carney   MR Number: [id number]   YOB: 1950   Date of Visit: 5/16/2018       Dear Dr. Sunshine Murillo:    Thank you for referring Baldo Carney to me for evaluation. Attached you will find relevant portions of my assessment and plan of care.    If you have questions, please do not hesitate to call me. I look forward to following Baldo Carney along with you.    Sincerely,    Kike Tijerina, OD    Enclosure  CC:  No Recipients    If you would like to receive this communication electronically, please contact [email or web address] or [phone] to request more information on [employer] Link access.    For providers and/or their staff who would like to refer a patient to [facility], please contact us through our one-stop-shop provider referral line, Yolanda Gay, at [phone].    If you feel you have received this communication in error or would no longer like to receive these types of communications, please e-mail [email or web address]

## 2018-05-16 NOTE — PROGRESS NOTES
HPI     Presenting Complaint: Pt here today for yearly diabetic eye exam.    Hemoglobin A1C       Date                     Value               Ref Range             Status                03/28/2018               6.0                 3.1 - 6.5 %                                 09/15/2017               6.7 (H)             3.1 - 6.5 %                                 10/30/2014               8.3 (H)             4.5 - 6.2 %           Final            ----------    Pt states distance vision has been stable. Pt uses OTC readers for small   print.     (+) No hx of eye sx or eye dx    (-) headaches  (-) diplopia   (-) flashes / (-) floaters      Last edited by Kike Tijerina, OD on 5/16/2018  8:41 AM. (History)            Assessment /Plan     For exam results, see Encounter Report.    Mild nonproliferative diabetic retinopathy of both eyes without macular edema associated with type 2 diabetes mellitus  -     Color Fundus Photography - OU - Both Eyes    Nuclear sclerosis, bilateral    Refractive error      Mild nonproliferative diabetic retinopathy of both eyes, no CSME. Photos in office today. Discussed possible ocular affects of uncontrolled blood sugar with patient. Recommended continued strong blood sugar control and continued care with PCP. Return in 6 months for DM DFE, sooner as needed.     Drance heme inferior temporal optic nerve OD, normal C:D ratio, normal IOP, neg fam hx of glc.     Mild cataracts in both eyes. Discussed possible ocular affects of cataracts. Acceptable BCVA OU. Discussed treatment options. Surgery not recommended at this time. Monitor yearly.     Patient doing well with uncorrected distance vision and OTC readers prn for near. Denies refraction. Return as needed for spec Rx.      RTC in 6 months for DM DFE, or sooner prn.                     PATIENTS WIFE STATES SHE BELIEVES HIS BLOOD SUGAR WAS VERY LOW WHICH LED TO HIS FALL. PATIENT HAS BRUISE ON THE BACK OF HIS RIGHT HEAD WITH MINOR SWELLING. STATES HIS HEAD HURTS, AND DENIES ANY HEADACHE. PATIENT DOESN'T REMEMBER WHAT HAPPENED.      Dang Armendariz RN  03/06/17 0032

## 2018-05-21 ENCOUNTER — TELEPHONE (OUTPATIENT)
Dept: INTERNAL MEDICINE | Facility: CLINIC | Age: 68
End: 2018-05-21

## 2018-05-22 ENCOUNTER — OFFICE VISIT (OUTPATIENT)
Dept: INTERNAL MEDICINE | Facility: CLINIC | Age: 68
End: 2018-05-22
Payer: MEDICARE

## 2018-05-22 VITALS
RESPIRATION RATE: 18 BRPM | HEART RATE: 85 BPM | HEIGHT: 72 IN | TEMPERATURE: 98 F | OXYGEN SATURATION: 99 % | BODY MASS INDEX: 24.24 KG/M2 | SYSTOLIC BLOOD PRESSURE: 138 MMHG | WEIGHT: 179 LBS | DIASTOLIC BLOOD PRESSURE: 76 MMHG

## 2018-05-22 DIAGNOSIS — R97.20 ELEVATED PSA, GREATER THAN OR EQUAL TO 20 NG/ML: ICD-10-CM

## 2018-05-22 DIAGNOSIS — R06.83 SNORES: ICD-10-CM

## 2018-05-22 DIAGNOSIS — R60.0 BILATERAL LOWER EXTREMITY EDEMA: Primary | ICD-10-CM

## 2018-05-22 DIAGNOSIS — G47.8 AWAKENS FROM SLEEP AT NIGHT: ICD-10-CM

## 2018-05-22 DIAGNOSIS — Z23 NEED FOR PNEUMOCOCCAL VACCINATION: ICD-10-CM

## 2018-05-22 DIAGNOSIS — R06.01 ORTHOPNEA: ICD-10-CM

## 2018-05-22 DIAGNOSIS — I10 ESSENTIAL HYPERTENSION: ICD-10-CM

## 2018-05-22 DIAGNOSIS — E11.9 TYPE 2 DIABETES MELLITUS WITHOUT COMPLICATION, WITHOUT LONG-TERM CURRENT USE OF INSULIN: ICD-10-CM

## 2018-05-22 PROCEDURE — 3044F HG A1C LEVEL LT 7.0%: CPT | Mod: ,,, | Performed by: NURSE PRACTITIONER

## 2018-05-22 PROCEDURE — 99214 OFFICE O/P EST MOD 30 MIN: CPT | Mod: ,,, | Performed by: NURSE PRACTITIONER

## 2018-05-22 PROCEDURE — 3078F DIAST BP <80 MM HG: CPT | Mod: ,,, | Performed by: NURSE PRACTITIONER

## 2018-05-22 PROCEDURE — 3075F SYST BP GE 130 - 139MM HG: CPT | Mod: ,,, | Performed by: NURSE PRACTITIONER

## 2018-05-22 RX ORDER — FUROSEMIDE 20 MG/1
20 TABLET ORAL 2 TIMES DAILY
Qty: 60 TABLET | Refills: 1 | Status: SHIPPED | OUTPATIENT
Start: 2018-05-22 | End: 2018-07-14 | Stop reason: SDUPTHER

## 2018-05-22 NOTE — PROGRESS NOTES
SUBJECTIVE:      Patient ID: Baldo Carney is a 67 y.o. male.    Chief Complaint: Follow-up (was in hospital 5/4/18, has not had sleep study per patient., wants to discuss increasing lasix)    FU for cough & referrals to pulm/cardiology/uro    [facility] hospital stay 5/3-5/4 for SOB - BNP 2080, diagnosed with CHF, severe L ventricle dysfunction, seen by Dr. Leonard during stay, rec'd IV diuretics, breathing improved by d/c, JOSE MARTIN outpatient with Aisha s/p discharge (record requested), wanted repeat aortic valve doppler study (unsure if completed). [facility] records reviewed & med reconciliation completed this visit.     stil c/o trouble breathing while lying down at night, BLE edema, sleeps on 3 pillows, worse when he lies on either side compared to back lying, has to get in recliner for a while before returning to bed    States BS running in low 100s first AM reading - this AM     C/o snoring & apnea per wife, multiple nightly wakenings, ongoing for several years, worsening as of late    Several upcoming specialty appts - pulmonology 5/31 (Len), urology (Oziel) 5/24, GI (Andrea) 8/16, optometry (Breezy) 10/18. Spoke with Aisha's office (stated pt never FU - appt made 5/30)        Past Surgical History:   Procedure Laterality Date    CARDIAC CATHETERIZATION      CARDIAC VALVE SURGERY      CORONARY ARTERY BYPASS GRAFT      SHOULDER ARTHROSCOPY  1985     Family History   Problem Relation Age of Onset    No Known Problems Mother     Diabetes Father     Hypertension Father     Heart attack Father     Heart disease Father     Diabetes Sister     Heart disease Brother     Diabetes Brother     No Known Problems Maternal Grandmother     No Known Problems Maternal Grandfather     No Known Problems Paternal Grandmother     No Known Problems Paternal Grandfather     No Known Problems Maternal Aunt     No Known Problems Maternal Uncle     No Known Problems Paternal Aunt     No Known Problems Paternal Uncle      Anemia Neg Hx     Arrhythmia Neg Hx     Asthma Neg Hx     Clotting disorder Neg Hx     Fainting Neg Hx     Heart failure Neg Hx     Hyperlipidemia Neg Hx     Glaucoma Neg Hx       Social History     Social History    Marital status:      Spouse name: N/A    Number of children: N/A    Years of education: N/A     Social History Main Topics    Smoking status: Former Smoker    Smokeless tobacco: Never Used    Alcohol use Yes      Comment: social    Drug use: No    Sexual activity: Not Asked     Other Topics Concern    None     Social History Narrative    None     Current Outpatient Prescriptions   Medication Sig Dispense Refill    amlodipine-benazepril 5-20 mg (LOTREL) 5-20 mg per capsule TAKE 1 CAPSULE EVERYDAY AT BEDTIME 90 capsule 1    aspirin (ECOTRIN) 325 MG EC tablet Take 1 tablet (325 mg total) by mouth once daily. 30 tablet 4    atorvastatin (LIPITOR) 80 MG tablet Take 80 mg by mouth once daily.  11    azelastine (ASTELIN) 137 mcg (0.1 %) nasal spray PLACE 1 SPRAY INTO EACH NOSTRL 2 X A DAY  0    fluticasone-vilanterol (BREO ELLIPTA) 100-25 mcg/dose diskus inhaler Inhale 1 puff into the lungs once daily. Controller 2 each inh    furosemide (LASIX) 20 MG tablet Take 1 tablet (20 mg total) by mouth 2 (two) times daily. 60 tablet 1    metFORMIN (GLUCOPHAGE) 1000 MG tablet Take 1,000 mg by mouth 2 (two) times daily with meals.  1    montelukast (SINGULAIR) 10 mg tablet Take 10 mg by mouth every evening.  0    potassium chloride SA (K-DUR,KLOR-CON) 20 MEQ tablet Take 1 tablet (20 mEq total) by mouth once daily. 90 tablet 0    sitagliptin (JANUVIA) 100 MG Tab Take 1 tablet (100 mg total) by mouth once daily. 30 tablet 3    VENTOLIN HFA 90 mcg/actuation inhaler INHALE 2 PUFFS BY MOUTH EVERY 4-6HRS AS NEEDED  0     No current facility-administered medications for this visit.      Review of patient's allergies indicates:  No Known Allergies   Past Medical History:   Diagnosis Date     Asthma     Cardiomyopathy 10/21/2014    Diabetes mellitus     Hyperlipidemia     Hypertension      Past Surgical History:   Procedure Laterality Date    CARDIAC CATHETERIZATION      CARDIAC VALVE SURGERY      CORONARY ARTERY BYPASS GRAFT      SHOULDER ARTHROSCOPY  1985       Review of Systems   Constitutional: Negative for activity change, appetite change, fatigue and fever.   HENT: Negative for congestion, ear pain, hearing loss, postnasal drip, sinus pain, sinus pressure, sneezing and sore throat.    Eyes: Negative for photophobia and pain.   Respiratory: Positive for cough (occasional). Negative for chest tightness, shortness of breath and wheezing.    Cardiovascular: Positive for leg swelling. Negative for chest pain.        Orthopnea   Gastrointestinal: Negative for abdominal distention, abdominal pain, blood in stool, constipation, diarrhea, nausea and vomiting.   Endocrine: Negative for cold intolerance, heat intolerance, polydipsia and polyuria.   Genitourinary: Positive for difficulty urinating, frequency and urgency. Negative for dysuria, flank pain and hematuria.   Musculoskeletal: Negative for arthralgias, back pain, joint swelling, myalgias and neck pain.   Skin: Negative for pallor and rash.   Allergic/Immunologic: Negative for environmental allergies and food allergies.   Neurological: Negative for dizziness, weakness, light-headedness and headaches.   Hematological: Does not bruise/bleed easily.   Psychiatric/Behavioral: Positive for sleep disturbance. Negative for confusion and decreased concentration. The patient is not nervous/anxious.       OBJECTIVE:      Vitals:    05/22/18 0957 05/22/18 1001   BP: (!) 144/84 138/76   Pulse: 85    Resp: 18    Temp: 98.3 °F (36.8 °C)    TempSrc: Oral    SpO2: 99%    Weight: 81.2 kg (179 lb)    Height: 6' (1.829 m)      Physical Exam   Constitutional: He is oriented to person, place, and time. Vital signs are normal. He appears well-developed and  well-nourished. No distress.   HENT:   Head: Normocephalic and atraumatic.   Right Ear: Hearing normal.   Left Ear: Hearing normal.   Nose: Nose normal. No rhinorrhea.   Mouth/Throat: Mucous membranes are normal.   Eyes: Conjunctivae and lids are normal. Pupils are equal, round, and reactive to light. Right eye exhibits no discharge. Left eye exhibits no discharge. Right conjunctiva is not injected. Left conjunctiva is not injected. Right pupil is round and reactive. Left pupil is round and reactive. Pupils are equal.   Neck: Trachea normal and normal range of motion. Neck supple. No JVD present. No tracheal deviation present. No thyromegaly present.   Cardiovascular: Normal rate, regular rhythm, normal heart sounds and intact distal pulses.  Exam reveals no gallop and no friction rub.    No murmur heard.  Pulses:       Radial pulses are 2+ on the right side, and 2+ on the left side.   Pulmonary/Chest: Effort normal and breath sounds normal. No stridor. No respiratory distress. He has no decreased breath sounds. He has no wheezes. He has no rhonchi. He has no rales.   Abdominal: Soft. Bowel sounds are normal. He exhibits no distension. There is no tenderness. There is no rigidity and no guarding.   Musculoskeletal: Normal range of motion. He exhibits edema (+2/4 pitting BLE).   Lymphadenopathy:     He has no cervical adenopathy.   Neurological: He is alert and oriented to person, place, and time. He has normal strength. He displays no atrophy. He displays a negative Romberg sign. Coordination and gait normal.   Skin: Skin is warm and dry. Capillary refill takes less than 2 seconds. No lesion and no rash noted. No cyanosis. No pallor.   Psychiatric: He has a normal mood and affect. His speech is normal and behavior is normal. Judgment and thought content normal. Cognition and memory are normal. He is attentive.      Assessment:       1. Bilateral lower extremity edema    2. Essential hypertension    3. Elevated PSA,  greater than or equal to 20 ng/ml    4. Type 2 diabetes mellitus without complication, without long-term current use of insulin    5. Orthopnea    6. Awakens from sleep at night    7. Snores    8. Need for pneumococcal vaccination        Plan:       Bilateral lower extremity edema  -     furosemide (LASIX) 20 MG tablet; Take 1 tablet (20 mg total) by mouth 2 (two) times daily.  Dispense: 60 tablet; Refill: 1    Essential hypertension   -stable    Elevated PSA, greater than or equal to 20 ng/ml   -upcoming referral to uro    Type 2 diabetes mellitus without complication, without long-term current use of insulin   -stable    Orthopnea  Awakens from sleep at night  Snores  -     Ambulatory consult to Sleep Disorders    Need for pneumococcal vaccination  -     pneumococcal vaccine injection 0.5 mL; Inject 0.5 mLs into the muscle vaccine x 1 dose for Meets Vaccination Criteria.    Spoke to Dr. Leonard's office to schedule upcoming appt & request JOSE MARTIN result. Spoke to Dr. Harrington's office to confirm upcoming appt.    Follow-up in about 4 weeks (around 6/19/2018) for cardio/pulm recheck.      5/22/2018 Millie Hayes, APRN, FNP-C

## 2018-05-22 NOTE — PATIENT INSTRUCTIONS

## 2018-05-24 ENCOUNTER — OFFICE VISIT (OUTPATIENT)
Dept: UROLOGY | Facility: CLINIC | Age: 68
End: 2018-05-24
Payer: MEDICARE

## 2018-05-24 VITALS
BODY MASS INDEX: 24.42 KG/M2 | HEART RATE: 88 BPM | SYSTOLIC BLOOD PRESSURE: 122 MMHG | DIASTOLIC BLOOD PRESSURE: 74 MMHG | RESPIRATION RATE: 18 BRPM | HEIGHT: 72 IN | WEIGHT: 180.31 LBS

## 2018-05-24 DIAGNOSIS — N40.0 BPH WITH ELEVATED PSA: Primary | ICD-10-CM

## 2018-05-24 DIAGNOSIS — R39.15 URINARY URGENCY: ICD-10-CM

## 2018-05-24 DIAGNOSIS — R97.20 BPH WITH ELEVATED PSA: Primary | ICD-10-CM

## 2018-05-24 DIAGNOSIS — R35.0 URINARY FREQUENCY: ICD-10-CM

## 2018-05-24 LAB
BILIRUB SERPL-MCNC: ABNORMAL MG/DL
BLOOD URINE, POC: ABNORMAL
COLOR, POC UA: CLEAR
GLUCOSE UR QL STRIP: ABNORMAL
KETONES UR QL STRIP: ABNORMAL
LEUKOCYTE ESTERASE URINE, POC: ABNORMAL
NITRITE, POC UA: ABNORMAL
PH, POC UA: 5
POC RESIDUAL URINE VOLUME: 178 ML (ref 0–100)
PROTEIN, POC: 500
SPECIFIC GRAVITY, POC UA: 1.01
UROBILINOGEN, POC UA: 4

## 2018-05-24 PROCEDURE — 81001 URINALYSIS AUTO W/SCOPE: CPT | Mod: S$GLB,,, | Performed by: NURSE PRACTITIONER

## 2018-05-24 PROCEDURE — 51798 US URINE CAPACITY MEASURE: CPT | Mod: S$GLB,,, | Performed by: NURSE PRACTITIONER

## 2018-05-24 PROCEDURE — 99999 PR PBB SHADOW E&M-EST. PATIENT-LVL IV: CPT | Mod: PBBFAC,,, | Performed by: NURSE PRACTITIONER

## 2018-05-24 PROCEDURE — 3078F DIAST BP <80 MM HG: CPT | Mod: CPTII,S$GLB,, | Performed by: NURSE PRACTITIONER

## 2018-05-24 PROCEDURE — 99204 OFFICE O/P NEW MOD 45 MIN: CPT | Mod: 25,S$GLB,, | Performed by: NURSE PRACTITIONER

## 2018-05-24 PROCEDURE — 3074F SYST BP LT 130 MM HG: CPT | Mod: CPTII,S$GLB,, | Performed by: NURSE PRACTITIONER

## 2018-05-24 RX ORDER — TAMSULOSIN HYDROCHLORIDE 0.4 MG/1
0.4 CAPSULE ORAL DAILY
Qty: 30 CAPSULE | Refills: 3 | Status: SHIPPED | OUTPATIENT
Start: 2018-05-24 | End: 2018-09-10 | Stop reason: SDUPTHER

## 2018-05-24 RX ORDER — SULFAMETHOXAZOLE AND TRIMETHOPRIM 800; 160 MG/1; MG/1
1 TABLET ORAL 2 TIMES DAILY
Qty: 42 TABLET | Refills: 0 | Status: SHIPPED | OUTPATIENT
Start: 2018-05-24 | End: 2018-07-17 | Stop reason: SDUPTHER

## 2018-05-24 NOTE — LETTER
May 24, 2018      Millie Hayes, NP  [address] [facility]  [address]  [facility]  [place] [address]           [place] - Urology  [address] [place] [address] Dr. Vo  [place] [address]  Phone: [phone]  Fax: [phone]          Patient: Baldo Carney   MR Number: [id number]   YOB: 1950   Date of Visit: 5/24/2018       Dear Millie Hayes:    Thank you for referring Baldo Carney to me for evaluation. Attached you will find relevant portions of my assessment and plan of care.    If you have questions, please do not hesitate to call me. I look forward to following Baldo Carney along with you.    Sincerely,    Miriam Ludwig, [employer]    Enclosure  CC:  No Recipients    If you would like to receive this communication electronically, please contact [email or web address] or [phone] to request more information on [employer] Link access.    For providers and/or their staff who would like to refer a patient to [facility], please contact us through our one-stop-shop provider referral line, [facility], at [phone].    If you feel you have received this communication in error or would no longer like to receive these types of communications, please e-mail [email or web address]

## 2018-05-24 NOTE — PROGRESS NOTES
[facility] Note  Staff: ZAC MolinaC    PCP: Millie Hayes NP    Chief Complaint: Elevated PSA level, BPH with LUTS    Subjective:        HPI: Baldo Carney is a 67 y.o. male NEW PATIENT to Urology clinic today presents for further evaluation of elevated PSA level of 36.9.  Pt saw his PCP and was c/o LUTS-urinary frequency, urgency and weak urine stream.  Pt has never seen a Urologist in the past or had problems up until a few months ago.    Questions asked the pt during office visit today:  Urgency: Yes, urge incontinence? None  NTF: 4-5x night, DTF: None  Dysuria: No  Gross Hematuria:No  Straining:No, Hesistancy:No, Intermittency:No, Weak stream:Yes - for few months    Last PSA Screening:   Lab Results   Component Value Date    PSA 36.9 (H) 03/28/2018    PSADIAG 13.9 (H) 09/30/2014     REVIEW OF SYSTEMS:  Review of Systems   Constitutional: Negative for chills, diaphoresis, fever and weight loss.   HENT: Negative for congestion, hearing loss, nosebleeds and sore throat.    Eyes: Negative for blurred vision and pain.   Respiratory: Negative for cough and wheezing.         Asthma   Cardiovascular: Negative for chest pain, palpitations and leg swelling.        Cardiomyopathy   Gastrointestinal: Negative for abdominal pain, heartburn, nausea and vomiting.   Genitourinary: Positive for frequency and urgency. Negative for dysuria, flank pain and hematuria.        +Elevated PSA level.   Musculoskeletal: Negative for back pain, joint pain, myalgias and neck pain.   Skin: Negative for itching and rash.   Neurological: Negative for dizziness, tremors, sensory change, seizures, loss of consciousness, weakness and headaches.   Endo/Heme/Allergies: Does not bruise/bleed easily.        Diabetes Mellitus   Psychiatric/Behavioral: Negative for depression and suicidal ideas. The patient is not nervous/anxious.      Physical Exam    PMHx:  Past Medical History:   Diagnosis Date    Asthma      "Cardiomyopathy 10/21/2014    Diabetes mellitus     Hyperlipidemia     Hypertension      Kidney stones: No  Cataracts? None    PSHx:  Past Surgical History:   Procedure Laterality Date    CARDIAC CATHETERIZATION      CARDIAC VALVE SURGERY      CORONARY ARTERY BYPASS GRAFT      SHOULDER ARTHROSCOPY  1985     Stents/Valves/Foreign Bodies: Yes - 2014 got a "pig valve"  Cardiac Evaluation: Yes - Was Dr. Rivas but now     Screening Studies  Colonoscopy: Never had done.    Fam Hx:   malignancies: No    kidney stones: No     Soc Hx:  No tobacco products    Allergies:  Patient has no known allergies.    Medications: reviewed   Anticoagulation: Yes - ASA 81 mg one tablet daily    Objective:     Vitals:    05/24/18 1315   BP: 122/74   Pulse: 88   Resp: 18     General:WDWN in NAD  Eyes: PERRLA, normal conjunctiva  Respiratory: no increased work on breathing, clear to auscultation  Cardiovascular: regular rate and rhythm. No obvious extremity edema.  GI: palpation of masses. No tenderness. No hepatosplenomegaly to palpation.  Musculoskeletal: normal range of motion of bilateral upper extremities. Normal muscle strength and tone.  Skin: no obvious rashes or lesions. No tightening of skin noted.  Neurologic: CN grossly normal. Normal sensation.   Psychiatric: awake, alert and oriented x 3. Mood and affect normal. Cooperative.    :  Inspection of anus and perineum normal  +Enlarged and dense prostate gland-no nodules, masses, slight tenderness verbalized from pt during exam.  SV not palpable. Normal sphincter tone.   PVR by bladder scan performed by MA today: 178 mL*    LABS REVIEW:  UA today:  Color, UA clear    Spec Grav UA 1.015    pH, UA 5.0    WBC, UA neg    Nitrite, UA neg    Protein 500    Glucose, UA neg    Ketones, UA neg    Urobilinogen, UA 4    Bilirubin neg    Blood, UA trace      Cr:   Lab Results   Component Value Date    CREATININE 1.14 03/28/2018     Assessment:       1. BPH with elevated PSA    2. Urinary " urgency    3. Urinary frequency          Plan:   BPH with elevated PSA level, prostatitis?:    1.  Tamsulosin 0.4 mg one tablet daily prescribed to pt today.  2.  Bactrim -160 mg 1 po BID x 21 days prescribed to pt today.  3.  Pt will repeat PSA level with total and free after finishing course of antibiotics.    F/u with me in 4-6 weeks.    [facility]: Pending    Miriam Ludwig, TEDP-C

## 2018-05-24 NOTE — PATIENT INSTRUCTIONS
Prostate Problems and Related Urinary Symptoms    Many men have problems with the prostate at some time in their lives. The prostate gland is part of the male reproductive system. Its located just below the bladder. The prostate surrounds the urethra (the tube that carries urine and semen out of the body). The main function of the prostate gland is to add fluid to the semen. When problems occur in the prostate, the bladder and urethra are often affected as well. The most common prostate problems are described below.  BPH  BPH (benign prostatic hyperplasia) develops when changing hormone levels cause the prostate to grow larger. This often starts around age 50. Excess tissue can block the urethra, making it harder for urine to flow. The enlarged prostate can also press on the bladder, so you may need to urinate more often. Other symptoms include straining during urination, a weak urine stream, urinating more at night, incontinence, dribbling at the end of urination, and feeling that the bladder isnt emptying all the way. Note that BPH is not cancer and does not cause cancer.  How BPH affects the bladder  Pushing to urinate through a narrowed urethra can cause the bladder walls to thicken or stretch out of shape. A stretched bladder may have problems emptying all the way. If urine stays in the bladder longer than it should, you may develop an infection or bladder stones. Also, the kidneys cant drain properly into a bladder that doesnt empty completely. This can lead to kidney failure. Pressure from urine buildup can also cause leaking of urine (called overflow incontinence).  Other prostate problems  · Prostatitis is an infection or inflammation that causes the prostate to become painful and swollen. The swelling narrows the urethra and can block the bladder neck. Prostatitis can cause a burning sensation during urination. You may also feel pressure or pain in the genital area. In some cases, prostatitis can  cause fever and chills, and can make you very sick.  · Cancer occurs when abnormal cells form a tumor (a lump of cells that grow uncontrolled). Some tumors can be felt during a physical exam, others cant. Prostate cancer often causes no symptoms at all, especially in its early stages. Prostate symptoms are more likely to be caused by a problem that is NOT cancer.   Date Last Reviewed: 1/1/2017 © 2000-2017 [employer]. [address] [place] [address], [place] [address]. All rights reserved. This information is not intended as a substitute for professional medical care. Always follow your healthcare professional's instructions.        BPH (Enlarged Prostate)  The prostate is a gland at the base of the bladder. As some men get older, the prostate may get bigger in size. This problem is called benign prostatic hyperplasia (BPH). BPH puts pressure on the urethra. This is the tube that carries urine from the bladder to the penis. It may interfere with the flow of urine. It may also keep the bladder from emptying fully.    Symptoms of BPH include trouble starting urination and feeling as though the bladder isnt emptying all the way. It also includes a weak urine stream, dribbling and leaking of urine, and frequent and urgent urination (especially at night). BPH can increase the risk of urinary infections. It can also block off urine flow completely. If this occurs, a thin tube (catheter) may be passed into the bladder to help drain urine.  If symptoms are mild, no treatment may be needed right now. If symptoms are more severe, treatment is likely needed. The goal of treatment is to improve urine flow and reduce symptoms. Treatments can include medicine and procedures. Your healthcare provider will discuss treatment options with you as needed.  Home care  The following guidelines will help you care for yourself at home:  · Urinate as soon as you feel the urge. Don't try to hold your urine.  · Don't limit your fluid  intake during the day. Drink 6 to 8 glasses of water or liquids a day. This prevents bacteria from building up in the bladder.  · Avoid drinking fluids after dinner to help reduce urination during the night.  · Avoid medicines that can worsen your symptoms. These include certain cold and allergy medicines and antidepressants. Diuretics used for high blood pressure can also worsen symptoms. Talk to your doctor about the medicines you take. Other choices may work better for you.  Prostate cancer screening  BPH does not increase the risk of prostate cancer. But because prostate cancer is a common cancer in men, screening is sometimes recommended. This may help detect the cancer in its early stages when treatment is most effective. Factors that can increase the risk of prostate cancer include being -American or having a father or brother who had prostate cancer. A high-fat diet may also increase the risk of prostate cancer. Talk to your healthcare provider to see whether you should be screened for prostate cancer.  Follow-up care  Follow up with your healthcare provider, or as advised  To learn more, go to:  · National Kidney & Urologic Diseases Information Clearinghouse  kidney.niddk.nih.gov, 530.860.3052  When to seek medical advice  Call your healthcare provider right away if any of these occur:  · Fever of 100.4°F (38.0°C) or higher, or as advised  · Unable to pass urine for 8 hours  · Increasing pressure or pain in your bladder (lower abdomen)  · Blood in the urine  · Increasing low back pain, not related to injury  · Symptoms of urinary infection (increased urge to urinate, burning when passing urine, foul-smelling urine)  Date Last Reviewed: 7/1/2016 © 2000-2017 [employer], [employer] [address] [employer] [address]. [address] [place] [address], [place] [address]. All rights reserved. This information is not intended as a substitute for professional medical care. Always follow your healthcare professional's instructions.

## 2018-05-30 ENCOUNTER — TELEPHONE (OUTPATIENT)
Dept: INTERNAL MEDICINE | Facility: CLINIC | Age: 68
End: 2018-05-30

## 2018-05-30 NOTE — TELEPHONE ENCOUNTER
PATIENT IS REQUESTING LISINOPRIL-HCTZ 10-12.5MG #90 LAST FILLED ON 2/22/18 WITH ONE REFILL M=BY DR TELLO NOT IN MEDICATION LIST.

## 2018-06-03 RX ORDER — LISINOPRIL AND HYDROCHLOROTHIAZIDE 10; 12.5 MG/1; MG/1
1 TABLET ORAL DAILY
Qty: 90 TABLET | Refills: 1 | Status: SHIPPED | OUTPATIENT
Start: 2018-06-03 | End: 2018-06-25 | Stop reason: SDDI

## 2018-06-06 NOTE — TELEPHONE ENCOUNTER
Joint Pain: Care Instructions  Your Care Instructions  Many people have small aches and pains from overuse or injury to muscles and joints. Joint injuries often happen during sports or recreation, work tasks, or projects around the home. An overuse injury can happen when you put too much stress on a joint or when you do an activity that stresses the joint over and over, such as using the computer or rowing a boat. You can take action at home to help your muscles and joints get better. You should feel better in 1 to 2 weeks, but it can take 3 months or more to heal completely. Follow-up care is a key part of your treatment and safety. Be sure to make and go to all appointments, and call your doctor if you are having problems. It's also a good idea to know your test results and keep a list of the medicines you take. How can you care for yourself at home? · Do not put weight on the injured joint for at least a day or two. · For the first day or two after an injury, do not take hot showers or baths, and do not use hot packs. The heat could make swelling worse. · Put ice or a cold pack on the sore joint for 10 to 20 minutes at a time. Try to do this every 1 to 2 hours for the next 3 days (when you are awake) or until the swelling goes down. Put a thin cloth between the ice and your skin. · Wrap the injury in an elastic bandage. Do not wrap it too tightly because this can cause more swelling. · Prop up the sore joint on a pillow when you ice it or anytime you sit or lie down during the next 3 days. Try to keep it above the level of your heart. This will help reduce swelling. · Take an over-the-counter pain medicine, such as acetaminophen (Tylenol), ibuprofen (Advil, Motrin), or naproxen (Aleve). Read and follow all instructions on the label. · After 1 or 2 days of rest, begin moving the joint gently.  While the joint is still healing, you can begin to exercise using activities that do not strain or hurt the MARILEE    painful joint. When should you call for help? Call your doctor now or seek immediate medical care if:  · You have signs of infection, such as:  ¨ Increased pain, swelling, warmth, and redness. ¨ Red streaks leading from the joint. ¨ A fever. Watch closely for changes in your health, and be sure to contact your doctor if:  · Your movement or symptoms are not getting better after 1 to 2 weeks of home treatment. Where can you learn more? Go to http://dio-mick.info/. Enter P205 in the search box to learn more about \"Joint Pain: Care Instructions. \"  Current as of: March 21, 2017  Content Version: 11.3  © 3163-7424 [employer]. Care instructions adapted under license by [employer] (which disclaims liability or warranty for this information). If you have questions about a medical condition or this instruction, always ask your healthcare professional. [address] any warranty or liability for your use of this information.

## 2018-06-12 RX ORDER — METFORMIN HYDROCHLORIDE 1000 MG/1
1000 TABLET ORAL 2 TIMES DAILY WITH MEALS
Qty: 180 TABLET | Refills: 1 | Status: SHIPPED | OUTPATIENT
Start: 2018-06-12 | End: 2018-11-27 | Stop reason: SDUPTHER

## 2018-06-13 LAB
% SATURATION: 13 %
CREATININE: 1.52 MG/DL (ref 0.6–1.4)
FERRITIN SERPL-MCNC: 96 NG/ML (ref 37–201)
IRON: 45 MCG/DL (ref 32–176)
TOTAL IRON BINDING CAPACITY: 342 MCG/DL (ref 177–435)

## 2018-06-14 ENCOUNTER — TELEPHONE (OUTPATIENT)
Dept: INTERNAL MEDICINE | Facility: CLINIC | Age: 68
End: 2018-06-14

## 2018-06-14 DIAGNOSIS — N28.9 FUNCTION KIDNEY DECREASED: Primary | ICD-10-CM

## 2018-06-25 ENCOUNTER — OFFICE VISIT (OUTPATIENT)
Dept: INTERNAL MEDICINE | Facility: CLINIC | Age: 68
End: 2018-06-25
Payer: MEDICARE

## 2018-06-25 VITALS
OXYGEN SATURATION: 99 % | HEIGHT: 72 IN | SYSTOLIC BLOOD PRESSURE: 122 MMHG | RESPIRATION RATE: 19 BRPM | TEMPERATURE: 97 F | HEART RATE: 81 BPM | BODY MASS INDEX: 23.57 KG/M2 | DIASTOLIC BLOOD PRESSURE: 62 MMHG | WEIGHT: 174 LBS

## 2018-06-25 DIAGNOSIS — E11.9 TYPE 2 DIABETES MELLITUS WITHOUT COMPLICATION, WITHOUT LONG-TERM CURRENT USE OF INSULIN: ICD-10-CM

## 2018-06-25 DIAGNOSIS — R60.0 BILATERAL LOWER EXTREMITY EDEMA: ICD-10-CM

## 2018-06-25 DIAGNOSIS — I10 ESSENTIAL HYPERTENSION: Primary | ICD-10-CM

## 2018-06-25 PROCEDURE — 99213 OFFICE O/P EST LOW 20 MIN: CPT | Mod: ,,, | Performed by: NURSE PRACTITIONER

## 2018-06-25 PROCEDURE — 3074F SYST BP LT 130 MM HG: CPT | Mod: ,,, | Performed by: NURSE PRACTITIONER

## 2018-06-25 PROCEDURE — 3078F DIAST BP <80 MM HG: CPT | Mod: ,,, | Performed by: NURSE PRACTITIONER

## 2018-06-25 PROCEDURE — 3044F HG A1C LEVEL LT 7.0%: CPT | Mod: ,,, | Performed by: NURSE PRACTITIONER

## 2018-06-25 RX ORDER — SULFAMETHOXAZOLE AND TRIMETHOPRIM 800; 160 MG/1; MG/1
1 TABLET ORAL 2 TIMES DAILY
COMMUNITY
Start: 2018-05-24 | End: 2018-07-05

## 2018-06-25 NOTE — PATIENT INSTRUCTIONS
Aerobic Exercise for a Healthy Heart  Exercise is a lot more than an energy booster and a stress reliever. It also strengthens your heart muscle, lowers your blood pressure and cholesterol, and burns calories. It can also improve your resting muscle tone, and your mood.     Remember, some activity is better than none.    Choose an aerobic activity  Choose an activity that makes your heart and lungs work harder than they do when you rest or walk normally. This aerobic exercise can improve the way your heart and other muscles use oxygen. Make it fun by exercising with a friend and choosing an activity you enjoy. Here are some ideas:  · Walking  · Swimming  · Bicycling  · Stair climbing  · Dancing  · Jogging  · Gardening  Exercise regularly  If you havent been exercising regularly,  get your doctors OK first. Then start slowly.  Here are some tips:  · Begin exercising 3 times a week for 5 to 10 minutes at a time.  · When you feel comfortable, add a few minutes each session.  · Slowly build up to exercising 3 to 4 times each week. Each session should last for 40 minutes, on average, and involve moderate- to vigorous-intensity physical activity.  · If you have been given nitroglycerin, be sure to carry it when you exercise.  · If you get chest pain (angina) when youre exercising, stop what youre doing, take your nitroglycerin, and call your doctor.  Date Last Reviewed: 6/2/2016  © 6242-2580 [employer]. [address] [place] [address]. All rights reserved. This information is not intended as a substitute for professional medical care. Always follow your healthcare professional's instructions.        Eating Heart-Healthy Foods  Eating has a big impact on your heart health. In fact, eating healthier can improve several of your heart risks at once. For instance, it helps you manage weight, cholesterol, and blood pressure. Here are ideas to help you make heart-healthy changes without giving up  all the foods and flavors you love.  Getting started  · Talk with your health care provider about eating plans, such as the DASH or Mediterranean diet. You may also be referred to a dietitian.  · Change a few things at a time. Give yourself time to get used to a few eating changes before adding more.  · Work to create a tasty, healthy eating plan that you can stick to for the rest of your life.    Goals for healthy eating  Below are some tips to improve your eating habits:  · Limit saturated fats and trans fats. Saturated fats raise your levels of cholesterol, so keep these fats to a minimum. They are found in foods such as fatty meats, whole milk, cheese, and palm and coconut oils. Avoid trans fats because they lower good cholesterol as well as raise bad cholesterol. Trans fats are most often found in processed foods.  · Reduce sodium (salt) intake. Eating too much salt may increase your blood pressure. Limit your sodium intake to 2,300 milligrams (mg) per day, or less if your health care provider recommends it. Dining out less often and eating fewer processed foods are two great ways to decrease the amount of salt you consume.  · Managing calories. A calorie is a unit of energy. Your body burns calories for fuel, but if you eat more calories than your body burns, the extras are stored as fat. Your health care provider can help you create a diet plan to manage your calories. This will likely include eating healthier foods as well as exercising regularly. To help you track your progress, keep a diary to record what you eat and how often you exercise.  Choose the right foods  Aim to make these foods staples of your diet. If you have diabetes, you may have different recommendations than what is listed here:  · Fruits and vegetable provide plenty of nutrients without a lot of calories. At meals, fill half your plate with these foods. Split the other half of your plate between whole grains and lean protein.  · Whole  grains are high in fiber and rich in vitamins and nutrients. Good choices include whole-wheat bread, pasta, and brown rice.  · Lean proteins give you nutrition with less fat. Good choices include fish, skinless chicken, and beans.  · Low-fat or nonfat dairy provides nutrients without a lot of fat. Try low-fat or nonfat milk, cheese, or yogurt.  · Healthy fats can be good for you in small amounts. These are unsaturated fats, such as olive oil, nuts, and fish. Try to have at least 2 servings per week of fatty fish such as salmon, sardines, mackerel, rainbow trout, and albacore tuna. These contain omega-3 fatty acids, which are good for your heart. Flaxseed is another source of a heart-healthy fat.  More on heart healthy eating    Read food labels  Healthy eating starts at the grocery store. Be sure to pay attention to food labels on packaged foods. Look for products that are high in fiber and protein, and low in saturated fat, cholesterol, and sodium. Avoid products that contain trans fat. And pay close attention to serving size. For instance, if you plan to eat two servings, double all the numbers on the label.  Prepare food right  A key part of healthy cooking is cutting down on added fat and salt. Look on the internet for lower-fat, lower-sodium recipes. Also, try these tips:  · Remove fat from meat and skin from poultry before cooking.  · Skim fat from the surface of soups and sauces.  · Broil, boil, bake, steam, grill, and microwave food without added fats.  · Choose ingredients that spice up your food without adding calories, fat, or sodium. Try these items: horseradish, hot sauce, lemon, mustard, nonfat salad dressings, and vinegar. For salt-free herbs and spices, try basil, cilantro, cinnamon, pepper, and rosemary.  Date Last Reviewed: 6/25/2015  © 3115-4710 [employer]. [address] [place] [address], [place] [address]. All rights reserved. This information is not intended as a substitute for  professional medical care. Always follow your healthcare professional's instructions.

## 2018-06-25 NOTE — PROGRESS NOTES
SUBJECTIVE:      Patient ID: Baldo Carney is a 67 y.o. male.    Chief Complaint: Follow-up (edema, htn - has been taking lotrel, has not been taking lisinopril-hctz (5/30 tel enc))    Here for BP FU - states edema BLE has resolved, rare chest tightness relieved with inhaler, mistakenly picked up old BP meds, reviewed current med list    Discussed outstanding health maintenance - colonoscopy scheduled with Dr. Mendez 8/16    Hypertension   This is a chronic problem. The current episode started more than 1 year ago. The problem is controlled. Pertinent negatives include no anxiety, blurred vision, chest pain, headaches, malaise/fatigue, neck pain, orthopnea, palpitations, peripheral edema, PND, shortness of breath or sweats. Risk factors for coronary artery disease include male gender, sedentary lifestyle, diabetes mellitus, dyslipidemia and family history. Past treatments include calcium channel blockers and ACE inhibitors. The current treatment provides moderate improvement. Compliance problems include diet and exercise.  Hypertensive end-organ damage includes CAD/MI.       Past Surgical History:   Procedure Laterality Date    CARDIAC CATHETERIZATION      CARDIAC VALVE SURGERY      CORONARY ARTERY BYPASS GRAFT      SHOULDER ARTHROSCOPY  1985     Family History   Problem Relation Age of Onset    No Known Problems Mother     Diabetes Father     Hypertension Father     Heart attack Father     Heart disease Father     Diabetes Sister     Heart disease Brother     Diabetes Brother     No Known Problems Maternal Grandmother     No Known Problems Maternal Grandfather     No Known Problems Paternal Grandmother     No Known Problems Paternal Grandfather     No Known Problems Maternal Aunt     No Known Problems Maternal Uncle     No Known Problems Paternal Aunt     No Known Problems Paternal Uncle     Anemia Neg Hx     Arrhythmia Neg Hx     Asthma Neg Hx     Clotting disorder Neg Hx      Fainting Neg Hx     Heart failure Neg Hx     Hyperlipidemia Neg Hx     Glaucoma Neg Hx       Social History     Social History    Marital status:      Spouse name: N/A    Number of children: N/A    Years of education: N/A     Social History Main Topics    Smoking status: Former Smoker    Smokeless tobacco: Never Used    Alcohol use Yes      Comment: social    Drug use: No    Sexual activity: Not Asked     Other Topics Concern    None     Social History Narrative    None     Current Outpatient Prescriptions   Medication Sig Dispense Refill    amlodipine-benazepril 5-20 mg (LOTREL) 5-20 mg per capsule TAKE 1 CAPSULE EVERYDAY AT BEDTIME 90 capsule 1    aspirin (ECOTRIN) 325 MG EC tablet Take 1 tablet (325 mg total) by mouth once daily. 30 tablet 4    atorvastatin (LIPITOR) 80 MG tablet Take 80 mg by mouth once daily.  11    azelastine (ASTELIN) 137 mcg (0.1 %) nasal spray PLACE 1 SPRAY INTO EACH NOSTRL 2 X A DAY  0    fluticasone-vilanterol (BREO ELLIPTA) 100-25 mcg/dose diskus inhaler Inhale 1 puff into the lungs once daily. Controller 2 each inh    furosemide (LASIX) 20 MG tablet Take 1 tablet (20 mg total) by mouth 2 (two) times daily. 60 tablet 1    metFORMIN (GLUCOPHAGE) 1000 MG tablet Take 1 tablet (1,000 mg total) by mouth 2 (two) times daily with meals. 180 tablet 1    montelukast (SINGULAIR) 10 mg tablet Take 10 mg by mouth every evening.  0    potassium chloride SA (K-DUR,KLOR-CON) 20 MEQ tablet Take 1 tablet (20 mEq total) by mouth once daily. 90 tablet 0    SITagliptin (JANUVIA) 100 MG Tab Take 1 tablet (100 mg total) by mouth once daily. 90 tablet 1    sulfamethoxazole-trimethoprim 800-160mg (BACTRIM DS) 800-160 mg Tab Take 1 tablet by mouth 2 (two) times daily.      tamsulosin (FLOMAX) 0.4 mg Cp24 Take 1 capsule (0.4 mg total) by mouth once daily. Take at bedtime 30 capsule 3    VENTOLIN HFA 90 mcg/actuation inhaler INHALE 2 PUFFS BY MOUTH EVERY 4-6HRS AS NEEDED  0     No  current facility-administered medications for this visit.      Review of patient's allergies indicates:  No Known Allergies   Past Medical History:   Diagnosis Date    Asthma     Cardiomyopathy 10/21/2014    Diabetes mellitus     Hyperlipidemia     Hypertension      Past Surgical History:   Procedure Laterality Date    CARDIAC CATHETERIZATION      CARDIAC VALVE SURGERY      CORONARY ARTERY BYPASS GRAFT      SHOULDER ARTHROSCOPY  1985       Review of Systems   Constitutional: Negative for activity change, appetite change, fatigue, fever and malaise/fatigue.   HENT: Negative for congestion, ear pain, hearing loss, postnasal drip, sinus pain, sinus pressure, sneezing and sore throat.    Eyes: Negative for blurred vision, photophobia and pain.   Respiratory: Positive for chest tightness (took inhaler last night with relief). Negative for cough, shortness of breath and wheezing.    Cardiovascular: Negative for chest pain, palpitations, orthopnea, leg swelling and PND.   Gastrointestinal: Negative for abdominal distention, abdominal pain, blood in stool, constipation, diarrhea, nausea and vomiting.   Endocrine: Negative for cold intolerance, heat intolerance, polydipsia and polyuria.   Genitourinary: Negative for difficulty urinating, dysuria, flank pain, frequency, hematuria and urgency.   Musculoskeletal: Negative for arthralgias, back pain, joint swelling, myalgias and neck pain.   Skin: Negative for pallor and rash.   Allergic/Immunologic: Negative for environmental allergies and food allergies.   Neurological: Negative for dizziness, weakness, light-headedness and headaches.   Hematological: Does not bruise/bleed easily.   Psychiatric/Behavioral: Negative for confusion, decreased concentration and sleep disturbance. The patient is not nervous/anxious.       OBJECTIVE:      Vitals:    06/25/18 0918   BP: 122/62   Pulse: 81   Resp: 19   Temp: 97.3 °F (36.3 °C)   SpO2: 99%   Weight: 78.9 kg (174 lb)   Height:  6' (1.829 m)     Physical Exam   Constitutional: He is oriented to person, place, and time. Vital signs are normal. He appears well-developed and well-nourished. No distress.   HENT:   Head: Normocephalic and atraumatic.   Right Ear: Hearing normal.   Left Ear: Hearing normal.   Nose: Nose normal. No rhinorrhea.   Mouth/Throat: Mucous membranes are normal.   Eyes: Conjunctivae and lids are normal. Pupils are equal, round, and reactive to light. Right eye exhibits no discharge. Left eye exhibits no discharge. Right conjunctiva is not injected. Left conjunctiva is not injected. Right pupil is round and reactive. Left pupil is round and reactive. Pupils are equal.   Neck: Trachea normal and normal range of motion. Neck supple. No JVD present. No tracheal deviation present. No thyromegaly present.   Cardiovascular: Normal rate, regular rhythm, normal heart sounds and intact distal pulses.  Exam reveals no gallop and no friction rub.    No murmur heard.  Pulses:       Radial pulses are 2+ on the right side, and 2+ on the left side.   Pulmonary/Chest: Effort normal and breath sounds normal. No stridor. No respiratory distress. He has no decreased breath sounds. He has no wheezes. He has no rhonchi. He has no rales.   Abdominal: Soft. Bowel sounds are normal. He exhibits no distension. There is no tenderness. There is no rigidity and no guarding.   Musculoskeletal: Normal range of motion. He exhibits no edema.   Lymphadenopathy:     He has no cervical adenopathy.   Neurological: He is alert and oriented to person, place, and time. He has normal strength. He displays no atrophy. He displays a negative Romberg sign. Coordination and gait normal.   Skin: Skin is warm and dry. Capillary refill takes less than 2 seconds. No lesion and no rash noted. No cyanosis. No pallor.   Psychiatric: He has a normal mood and affect. His speech is normal and behavior is normal. Judgment and thought content normal. Cognition and memory are  normal. He is attentive.   Nursing note and vitals reviewed.     Assessment:       1. Essential hypertension    2. Bilateral lower extremity edema    3. Type 2 diabetes mellitus without complication, without long-term current use of insulin        Plan:       Essential hypertension   -stable on meds    Bilateral lower extremity edema   -resolved    Type 2 diabetes mellitus without complication, without long-term current use of insulin   -stable    Follow-up in about 6 months (around 12/25/2018) for routine.      6/25/2018 Millie Hayes, KAMERON, FNP-C

## 2018-07-14 DIAGNOSIS — R60.0 BILATERAL LOWER EXTREMITY EDEMA: ICD-10-CM

## 2018-07-15 RX ORDER — FUROSEMIDE 20 MG/1
TABLET ORAL
Qty: 60 TABLET | Refills: 1 | Status: SHIPPED | OUTPATIENT
Start: 2018-07-15 | End: 2018-09-10 | Stop reason: SDUPTHER

## 2018-07-17 DIAGNOSIS — R39.15 URINARY URGENCY: ICD-10-CM

## 2018-07-17 DIAGNOSIS — R97.20 BPH WITH ELEVATED PSA: ICD-10-CM

## 2018-07-17 DIAGNOSIS — R35.0 URINARY FREQUENCY: ICD-10-CM

## 2018-07-17 DIAGNOSIS — N40.0 BPH WITH ELEVATED PSA: ICD-10-CM

## 2018-07-18 RX ORDER — SULFAMETHOXAZOLE AND TRIMETHOPRIM 800; 160 MG/1; MG/1
TABLET ORAL
Qty: 42 TABLET | Refills: 0 | Status: SHIPPED | OUTPATIENT
Start: 2018-07-18 | End: 2019-04-09

## 2018-07-31 DIAGNOSIS — E11.9 TYPE 2 DIABETES MELLITUS WITHOUT COMPLICATION, WITHOUT LONG-TERM CURRENT USE OF INSULIN: Primary | ICD-10-CM

## 2018-07-31 NOTE — TELEPHONE ENCOUNTER
----- Message from Alba Chawla sent at 7/31/2018  1:37 PM CDT -----  Contact: Baldo Multani is requesting a refill of prodroberto dang glucose test strips sent to [facility] at [address]

## 2018-08-02 RX ORDER — ATORVASTATIN CALCIUM 80 MG/1
TABLET, FILM COATED ORAL
Qty: 90 TABLET | Refills: 1 | Status: SHIPPED | OUTPATIENT
Start: 2018-08-02 | End: 2019-01-27 | Stop reason: SDUPTHER

## 2018-08-16 ENCOUNTER — OFFICE VISIT (OUTPATIENT)
Dept: GASTROENTEROLOGY | Facility: CLINIC | Age: 68
End: 2018-08-16
Payer: MEDICARE

## 2018-08-16 VITALS
RESPIRATION RATE: 16 BRPM | HEART RATE: 73 BPM | WEIGHT: 177 LBS | TEMPERATURE: 97 F | DIASTOLIC BLOOD PRESSURE: 74 MMHG | BODY MASS INDEX: 23.98 KG/M2 | SYSTOLIC BLOOD PRESSURE: 132 MMHG | HEIGHT: 72 IN

## 2018-08-16 DIAGNOSIS — Z86.010 PERSONAL HISTORY OF COLONIC POLYPS: Primary | ICD-10-CM

## 2018-08-16 DIAGNOSIS — R97.20 ELEVATED PSA, GREATER THAN OR EQUAL TO 20 NG/ML: ICD-10-CM

## 2018-08-16 DIAGNOSIS — Z95.1 S/P CABG (CORONARY ARTERY BYPASS GRAFT): ICD-10-CM

## 2018-08-16 DIAGNOSIS — E11.9 TYPE 2 DIABETES MELLITUS WITHOUT COMPLICATION, WITHOUT LONG-TERM CURRENT USE OF INSULIN: ICD-10-CM

## 2018-08-16 DIAGNOSIS — Z95.2 S/P AVR (AORTIC VALVE REPLACEMENT): ICD-10-CM

## 2018-08-16 DIAGNOSIS — I25.810 CORONARY ARTERY DISEASE INVOLVING CORONARY BYPASS GRAFT OF NATIVE HEART WITHOUT ANGINA PECTORIS: ICD-10-CM

## 2018-08-16 DIAGNOSIS — E78.2 MIXED HYPERLIPIDEMIA: ICD-10-CM

## 2018-08-16 DIAGNOSIS — I10 ESSENTIAL HYPERTENSION: ICD-10-CM

## 2018-08-16 PROCEDURE — 3075F SYST BP GE 130 - 139MM HG: CPT | Mod: ,,, | Performed by: INTERNAL MEDICINE

## 2018-08-16 PROCEDURE — 99205 OFFICE O/P NEW HI 60 MIN: CPT | Mod: ,,, | Performed by: INTERNAL MEDICINE

## 2018-08-16 PROCEDURE — 3044F HG A1C LEVEL LT 7.0%: CPT | Mod: ,,, | Performed by: INTERNAL MEDICINE

## 2018-08-16 PROCEDURE — 3078F DIAST BP <80 MM HG: CPT | Mod: ,,, | Performed by: INTERNAL MEDICINE

## 2018-08-16 RX ORDER — POLYETHYLENE GLYCOL 3350, SODIUM SULFATE ANHYDROUS, SODIUM BICARBONATE, SODIUM CHLORIDE, POTASSIUM CHLORIDE 236; 22.74; 6.74; 5.86; 2.97 G/4L; G/4L; G/4L; G/4L; G/4L
4 POWDER, FOR SOLUTION ORAL ONCE
Qty: 1 BOTTLE | Refills: 0 | Status: SHIPPED | OUTPATIENT
Start: 2018-08-16 | End: 2018-08-16

## 2018-08-16 RX ORDER — MELOXICAM 7.5 MG/1
7.5 TABLET ORAL 2 TIMES DAILY
COMMUNITY
End: 2018-09-26 | Stop reason: DRUGHIGH

## 2018-08-16 NOTE — LETTER
August 25, 2018      Millie Hayes, KAMERON,FNP-C  [address]  [address]  [facility]  [place] [address]           [facility]  [address]  [place] [address]  Phone: [phone]  Fax: [phone]          Patient: Baldo Carney   MR Number: [id number]   YOB: 1950   Date of Visit: 8/16/2018       Dear Millie Hayes:    Thank you for referring Baldo Carney to me for evaluation. Attached you will find relevant portions of my assessment and plan of care.    If you have questions, please do not hesitate to call me. I look forward to following Baldo Carney along with you.    Sincerely,    Hannah Mendez MD    Enclosure  CC:  No Recipients    If you would like to receive this communication electronically, please contact [email or web address] or [phone] to request more information on [facility] access.    For providers and/or their staff who would like to refer a patient to [facility], please contact us through our one-stop-shop provider referral line, [facility], at [phone].    If you feel you have received this communication in error or would no longer like to receive these types of communications, please e-mail [email or web address]

## 2018-08-16 NOTE — PROGRESS NOTES
[facility] New Patient Visit    Subjective:           PCP: Sunshine Murillo    Referring Provider: Millie Hayes AP*    Chief Complaint: Colonoscopy       HPI:  Baldo Carney is a 67 y.o. male here for evaluation of colonoscopy. Patient does have history of polyps of the colon in 2013, the old records of which are not available despite repeated attempts. He has history of aortic valve replacement, CABG, CAD,essential hypertension, and had 2 diabetes mellitus. Patient will be scheduled for a colonoscopy. The benefits and risks of procedure have been explained to patient in the great detail.    ROS:   Constitutional: No fevers, chills, weight loss  ENT: No allergies, sore throat, rhinorrhea  CV: No chest pain, palpitations, edema  Pulm: No cough, shortness of breath, wheezing  Ophtho: No vision changes  GI: No blood in stools, change in bowel habits, nausea/vomiting  Denies alternating diarrhea/constipation.   Derm: No rash  Heme: No easy bruising or lymphadenopathy  MSK: No arthralgias or myalgias  : No dysuria, hematuria, frequency, polyuria, or flank tenderness  Endo: No hot or cold intolerance  Neuro: No syncope or seizure, or dizziness  Psych: No hallucination, depression or suicidal ideation      Medical History:  has a past medical history of Asthma, Cardiomyopathy (10/21/2014), Diabetes mellitus, Hyperlipidemia, and Hypertension.      Surgical History:  has a past surgical history that includes Cardiac catheterization; Shoulder arthroscopy (1985); Cardiac valuve replacement; Coronary artery bypass graft; Breast surgery; Eye surgery; REPLACEMENT-VALVE-AORTIC (N/A, 11/3/2014); and AORTOCORONARY BYPASS-CABG (N/A, 11/3/2014).    Family History:   Family History   Problem Relation Age of Onset    No Known Problems Mother     Diabetes Father     Hypertension Father     Heart attack Father     Heart disease Father     Diabetes Sister     Heart disease Brother     Diabetes Brother     No  Known Problems Maternal Grandmother     No Known Problems Maternal Grandfather     No Known Problems Paternal Grandmother     No Known Problems Paternal Grandfather     No Known Problems Maternal Aunt     No Known Problems Maternal Uncle     No Known Problems Paternal Aunt     No Known Problems Paternal Uncle     Anemia Neg Hx     Arrhythmia Neg Hx     Asthma Neg Hx     Clotting disorder Neg Hx     Fainting Neg Hx     Heart failure Neg Hx     Hyperlipidemia Neg Hx     Glaucoma Neg Hx        Social History:   Social History     Tobacco Use    Smoking status: Never Smoker    Smokeless tobacco: Never Used   Substance Use Topics    Alcohol use: No     Frequency: Never     Comment: social    Drug use: No       The patient's social and family histories were reviewed by me and updated in the appropriate section of the electronic medical record.    Allergies: Review of patient's allergies indicates:  No Known Allergies    Medications:   Current Outpatient Medications   Medication Sig Dispense Refill    amlodipine-benazepril 5-20 mg (LOTREL) 5-20 mg per capsule TAKE 1 CAPSULE EVERYDAY AT BEDTIME 90 capsule 1    aspirin (ECOTRIN) 325 MG EC tablet Take 1 tablet (325 mg total) by mouth once daily. 30 tablet 4    atorvastatin (LIPITOR) 80 MG tablet TAKE 1 TABLET BY MOUTH ONCE A DAY 90 tablet 1    azelastine (ASTELIN) 137 mcg (0.1 %) nasal spray PLACE 1 SPRAY INTO EACH NOSTRL 2 X A DAY  0    blood sugar diagnostic (PRODIGY NO CODING) Strp 1 strip by Misc.(Non-Drug; Combo Route) route as needed. 100 each 2    fluticasone-vilanterol (BREO ELLIPTA) 100-25 mcg/dose diskus inhaler Inhale 1 puff into the lungs once daily. Controller 2 each inh    furosemide (LASIX) 20 MG tablet TAKE 1 TABLET BY MOUTH TWICE A DAY 60 tablet 1    meloxicam (MOBIC) 7.5 MG tablet Take 7.5 mg by mouth 2 (two) times daily.      metFORMIN (GLUCOPHAGE) 1000 MG tablet Take 1 tablet (1,000 mg total) by mouth 2 (two) times daily with  meals. 180 tablet 1    montelukast (SINGULAIR) 10 mg tablet Take 10 mg by mouth every evening.  0    potassium chloride SA (K-DUR,KLOR-CON) 20 MEQ tablet Take 1 tablet (20 mEq total) by mouth once daily. 90 tablet 0    SITagliptin (JANUVIA) 100 MG Tab Take 1 tablet (100 mg total) by mouth once daily. 90 tablet 1    sulfamethoxazole-trimethoprim 800-160mg (BACTRIM DS) 800-160 mg Tab TAKE 1 TABLET BY MOUTH TWICE A DAY 42 tablet 0    tamsulosin (FLOMAX) 0.4 mg Cp24 Take 1 capsule (0.4 mg total) by mouth once daily. Take at bedtime 30 capsule 3    VENTOLIN HFA 90 mcg/actuation inhaler INHALE 2 PUFFS BY MOUTH EVERY 4-6HRS AS NEEDED  0     No current facility-administered medications for this visit.      All medications and past history have been reviewed.        Objective:        Vital Signs:  Blood pressure 132/74, pulse 73, temperature 97.2 °F (36.2 °C), resp. rate 16, height 6' (1.829 m), weight 80.3 kg (177 lb). Body mass index is 24.01 kg/m².    Physical Exam:   General Appearance: Well appearing in no acute distress, well developed, well                 nourished  Head: Normocephalic, without obvious abnormality  Eyes:  Pupils equal, round and reactive to light  Throat: Lips, mucosa, and tongue normal; teeth and gums normal  Lungs: CTA bilaterally in anterior and posterior fields, no wheezes, no crackles  Heart:  Regular rate and rhythm, no murmurs heard  Abdomen: Soft, non tender, non distended with positive bowel sounds in all four quadrants. No hepatosplenomegaly, ascites, or mass. Negative for succusion splash  : male   Extremities: No cyanosis, edema or deformity  Skin: No rash  Neurologic: Normal exam      Labs/Imaging:  All lab results and imaging results have been reviewed and discussed with the patient.    Assessment/Plan:    Assessment:       1. Personal history of colonic polyps    2. Type 2 diabetes mellitus without complication, without long-term current use of insulin    3. Mixed  hyperlipidemia    4. Coronary artery disease involving coronary bypass graft of native heart without angina pectoris    5. S/P CABG (coronary artery bypass graft)    6. S/P AVR (aortic valve replacement)    7. Essential hypertension    8. Elevated PSA, greater than or equal to 20 ng/ml        Plan:       Baldo was seen today for colonoscopy.    Diagnoses and all orders for this visit:    Personal history of colonic polyps    Type 2 diabetes mellitus without complication, without long-term current use of insulin    Mixed hyperlipidemia    Coronary artery disease involving coronary bypass graft of native heart without angina pectoris    S/P CABG (coronary artery bypass graft)    S/P AVR (aortic valve replacement)    Essential hypertension    Elevated PSA, greater than or equal to 20 ng/ml        See HPI    No Follow-up on file.    The plan was discussed with the patient and all questions/concerns have been answered to the patient's satisfaction.    CC: Millie Hayes AP*    Electronically signed by Hannah Mendez MD    This note was dictated using voice recognition software and may contain grammatical errors.

## 2018-08-27 RX ORDER — POTASSIUM CHLORIDE 20 MEQ/1
TABLET, EXTENDED RELEASE ORAL
Qty: 90 TABLET | Refills: 1 | Status: SHIPPED | OUTPATIENT
Start: 2018-08-27 | End: 2018-11-21 | Stop reason: SDUPTHER

## 2018-08-27 RX ORDER — AMLODIPINE AND BENAZEPRIL HYDROCHLORIDE 5; 20 MG/1; MG/1
CAPSULE ORAL
Qty: 90 CAPSULE | Refills: 1 | Status: SHIPPED | OUTPATIENT
Start: 2018-08-27 | End: 2018-11-27 | Stop reason: SDUPTHER

## 2018-09-10 DIAGNOSIS — R60.0 BILATERAL LOWER EXTREMITY EDEMA: ICD-10-CM

## 2018-09-10 RX ORDER — FUROSEMIDE 20 MG/1
TABLET ORAL
Qty: 60 TABLET | Refills: 1 | Status: SHIPPED | OUTPATIENT
Start: 2018-09-10 | End: 2018-11-05 | Stop reason: SDUPTHER

## 2018-09-10 RX ORDER — TAMSULOSIN HYDROCHLORIDE 0.4 MG/1
0.4 CAPSULE ORAL DAILY
Qty: 30 CAPSULE | Refills: 3 | Status: SHIPPED | OUTPATIENT
Start: 2018-09-10 | End: 2018-11-27 | Stop reason: SDUPTHER

## 2018-09-26 RX ORDER — MELOXICAM 7.5 MG/1
TABLET ORAL
Qty: 90 TABLET | Refills: 0 | Status: SHIPPED | OUTPATIENT
Start: 2018-09-26 | End: 2018-11-27 | Stop reason: SDUPTHER

## 2018-10-25 ENCOUNTER — TELEPHONE (OUTPATIENT)
Dept: FAMILY MEDICINE | Facility: CLINIC | Age: 68
End: 2018-10-25

## 2018-10-25 DIAGNOSIS — E11.9 TYPE 2 DIABETES MELLITUS WITHOUT COMPLICATION, WITHOUT LONG-TERM CURRENT USE OF INSULIN: Primary | ICD-10-CM

## 2018-10-25 RX ORDER — LANCETS
EACH MISCELLANEOUS
Qty: 200 EACH | Refills: 1 | Status: SHIPPED | OUTPATIENT
Start: 2018-10-25 | End: 2018-11-21 | Stop reason: SDUPTHER

## 2018-10-25 RX ORDER — INSULIN PUMP SYRINGE, 3 ML
EACH MISCELLANEOUS
Qty: 1 EACH | Refills: 0 | Status: SHIPPED | OUTPATIENT
Start: 2018-10-25 | End: 2022-01-05

## 2018-10-25 NOTE — TELEPHONE ENCOUNTER
PRODIGY TALKING METER NOT COVERED BY INSURANCE DUE TO NO VISION PROBLEMS.  NEEDS NEW METER AND SUPPLIES SENT.

## 2018-10-30 ENCOUNTER — OFFICE VISIT (OUTPATIENT)
Dept: OPTOMETRY | Facility: CLINIC | Age: 68
End: 2018-10-30
Payer: MEDICARE

## 2018-10-30 DIAGNOSIS — E11.3293 MILD NONPROLIFERATIVE DIABETIC RETINOPATHY OF BOTH EYES WITHOUT MACULAR EDEMA ASSOCIATED WITH TYPE 2 DIABETES MELLITUS: Primary | ICD-10-CM

## 2018-10-30 PROCEDURE — 99999 PR PBB SHADOW E&M-EST. PATIENT-LVL II: CPT | Mod: PBBFAC,,, | Performed by: OPTOMETRIST

## 2018-10-30 PROCEDURE — 92014 COMPRE OPH EXAM EST PT 1/>: CPT | Mod: S$PBB,,, | Performed by: OPTOMETRIST

## 2018-10-30 PROCEDURE — 99212 OFFICE O/P EST SF 10 MIN: CPT | Mod: PBBFAC,PO | Performed by: OPTOMETRIST

## 2018-10-30 NOTE — PROGRESS NOTES
HPI     Presenting Complaint: Pt here toady for 6 month diabetic eye exam.    Hemoglobin A1C       Date                     Value               Ref Range             Status                03/28/2018               6.0                 3.1 - 6.5 %                                 09/15/2017               6.7 (H)             3.1 - 6.5 %                                 10/30/2014               8.3 (H)             4.5 - 6.2 %           Final            ----------    Pt sates distance vision has been stable. Pt uses OTC readers for small   print.     Ophthalmic medication / drops: None    (-) headaches  (-) diplopia   (-) flashes / (-) floaters      Last edited by Kike Tijerina, OD on 10/30/2018  8:39 AM. (History)            Assessment /Plan     For exam results, see Encounter Report.    Mild nonproliferative diabetic retinopathy of both eyes without macular edema associated with type 2 diabetes mellitus      Mild NPDR OU, no CSME. Improved from previous. No drance heme today. Small dot hemes OD, 1 dot heme OS. Discussed possible ocular affects of uncontrolled blood sugar with patient. Recommended continued strong blood sugar control and continued care with PCP. Return in 6 months for DM DFE, sooner prn.

## 2018-11-05 DIAGNOSIS — R60.0 BILATERAL LOWER EXTREMITY EDEMA: ICD-10-CM

## 2018-11-05 RX ORDER — FUROSEMIDE 20 MG/1
TABLET ORAL
Qty: 60 TABLET | Refills: 1 | Status: SHIPPED | OUTPATIENT
Start: 2018-11-05 | End: 2018-11-21 | Stop reason: SDUPTHER

## 2018-11-21 DIAGNOSIS — E11.9 TYPE 2 DIABETES MELLITUS WITHOUT COMPLICATION, WITHOUT LONG-TERM CURRENT USE OF INSULIN: ICD-10-CM

## 2018-11-21 DIAGNOSIS — R60.0 BILATERAL LOWER EXTREMITY EDEMA: ICD-10-CM

## 2018-11-21 RX ORDER — FUROSEMIDE 20 MG/1
20 TABLET ORAL 2 TIMES DAILY
Qty: 60 TABLET | Refills: 1 | Status: SHIPPED | OUTPATIENT
Start: 2018-11-21 | End: 2018-11-27 | Stop reason: SDUPTHER

## 2018-11-21 RX ORDER — ALBUTEROL SULFATE 90 UG/1
2 AEROSOL, METERED RESPIRATORY (INHALATION) EVERY 6 HOURS PRN
Qty: 1 INHALER | Refills: 3 | Status: SHIPPED | OUTPATIENT
Start: 2018-11-21 | End: 2019-02-25 | Stop reason: SDUPTHER

## 2018-11-21 RX ORDER — LANCETS
EACH MISCELLANEOUS
Qty: 200 EACH | Refills: 1 | Status: SHIPPED | OUTPATIENT
Start: 2018-11-21 | End: 2019-04-15

## 2018-11-21 RX ORDER — POTASSIUM CHLORIDE 20 MEQ/1
20 TABLET, EXTENDED RELEASE ORAL DAILY
Qty: 90 TABLET | Refills: 1 | Status: SHIPPED | OUTPATIENT
Start: 2018-11-21 | End: 2018-11-27 | Stop reason: SDUPTHER

## 2018-11-27 DIAGNOSIS — R60.0 BILATERAL LOWER EXTREMITY EDEMA: ICD-10-CM

## 2018-11-27 RX ORDER — AMLODIPINE AND BENAZEPRIL HYDROCHLORIDE 5; 20 MG/1; MG/1
CAPSULE ORAL
Qty: 90 CAPSULE | Refills: 1 | Status: SHIPPED | OUTPATIENT
Start: 2018-11-27 | End: 2019-10-10 | Stop reason: SDUPTHER

## 2018-11-27 RX ORDER — MELOXICAM 7.5 MG/1
TABLET ORAL
Qty: 90 TABLET | Refills: 0 | Status: SHIPPED | OUTPATIENT
Start: 2018-11-27 | End: 2019-01-31 | Stop reason: SDUPTHER

## 2018-11-27 RX ORDER — METFORMIN HYDROCHLORIDE 1000 MG/1
1000 TABLET ORAL 2 TIMES DAILY WITH MEALS
Qty: 180 TABLET | Refills: 1 | Status: SHIPPED | OUTPATIENT
Start: 2018-11-27 | End: 2019-04-15 | Stop reason: SDUPTHER

## 2018-11-27 RX ORDER — POTASSIUM CHLORIDE 20 MEQ/1
20 TABLET, EXTENDED RELEASE ORAL DAILY
Qty: 90 TABLET | Refills: 1 | Status: SHIPPED | OUTPATIENT
Start: 2018-11-27 | End: 2020-03-12 | Stop reason: SDUPTHER

## 2018-11-27 RX ORDER — FUROSEMIDE 20 MG/1
20 TABLET ORAL 2 TIMES DAILY
Qty: 60 TABLET | Refills: 1 | Status: SHIPPED | OUTPATIENT
Start: 2018-11-27 | End: 2019-02-18 | Stop reason: SDUPTHER

## 2018-11-27 RX ORDER — TAMSULOSIN HYDROCHLORIDE 0.4 MG/1
0.4 CAPSULE ORAL DAILY
Qty: 30 CAPSULE | Refills: 3 | Status: SHIPPED | OUTPATIENT
Start: 2018-11-27 | End: 2019-01-31 | Stop reason: SDUPTHER

## 2019-01-07 ENCOUNTER — OFFICE VISIT (OUTPATIENT)
Dept: FAMILY MEDICINE | Facility: CLINIC | Age: 69
End: 2019-01-07
Payer: MEDICARE

## 2019-01-07 VITALS
DIASTOLIC BLOOD PRESSURE: 66 MMHG | HEIGHT: 72 IN | RESPIRATION RATE: 14 BRPM | TEMPERATURE: 98 F | WEIGHT: 175.31 LBS | HEART RATE: 77 BPM | OXYGEN SATURATION: 98 % | SYSTOLIC BLOOD PRESSURE: 124 MMHG | BODY MASS INDEX: 23.75 KG/M2

## 2019-01-07 DIAGNOSIS — I10 ESSENTIAL HYPERTENSION: Primary | ICD-10-CM

## 2019-01-07 DIAGNOSIS — E11.9 TYPE 2 DIABETES MELLITUS WITHOUT COMPLICATION, WITHOUT LONG-TERM CURRENT USE OF INSULIN: ICD-10-CM

## 2019-01-07 DIAGNOSIS — Z12.5 PROSTATE CANCER SCREENING: ICD-10-CM

## 2019-01-07 DIAGNOSIS — B35.1 TOENAIL FUNGUS: ICD-10-CM

## 2019-01-07 LAB — HBA1C MFR BLD: 5.8 %

## 2019-01-07 PROCEDURE — 3078F DIAST BP <80 MM HG: CPT | Mod: ,,, | Performed by: NURSE PRACTITIONER

## 2019-01-07 PROCEDURE — 1101F PT FALLS ASSESS-DOCD LE1/YR: CPT | Mod: ,,, | Performed by: NURSE PRACTITIONER

## 2019-01-07 PROCEDURE — 3074F SYST BP LT 130 MM HG: CPT | Mod: ,,, | Performed by: NURSE PRACTITIONER

## 2019-01-07 PROCEDURE — 83036 POCT HEMOGLOBIN A1C: ICD-10-PCS | Mod: QW,,, | Performed by: NURSE PRACTITIONER

## 2019-01-07 PROCEDURE — 3074F PR MOST RECENT SYSTOLIC BLOOD PRESSURE < 130 MM HG: ICD-10-PCS | Mod: ,,, | Performed by: NURSE PRACTITIONER

## 2019-01-07 PROCEDURE — 99214 PR OFFICE/OUTPT VISIT, EST, LEVL IV, 30-39 MIN: ICD-10-PCS | Mod: ,,, | Performed by: NURSE PRACTITIONER

## 2019-01-07 PROCEDURE — 1101F PR PT FALLS ASSESS DOC 0-1 FALLS W/OUT INJ PAST YR: ICD-10-PCS | Mod: ,,, | Performed by: NURSE PRACTITIONER

## 2019-01-07 PROCEDURE — 3044F PR MOST RECENT HEMOGLOBIN A1C LEVEL <7.0%: ICD-10-PCS | Mod: ,,, | Performed by: NURSE PRACTITIONER

## 2019-01-07 PROCEDURE — 83036 HEMOGLOBIN GLYCOSYLATED A1C: CPT | Mod: QW,,, | Performed by: NURSE PRACTITIONER

## 2019-01-07 PROCEDURE — 3078F PR MOST RECENT DIASTOLIC BLOOD PRESSURE < 80 MM HG: ICD-10-PCS | Mod: ,,, | Performed by: NURSE PRACTITIONER

## 2019-01-07 PROCEDURE — 99214 OFFICE O/P EST MOD 30 MIN: CPT | Mod: ,,, | Performed by: NURSE PRACTITIONER

## 2019-01-07 PROCEDURE — 3044F HG A1C LEVEL LT 7.0%: CPT | Mod: ,,, | Performed by: NURSE PRACTITIONER

## 2019-01-07 RX ORDER — FLUTICASONE FUROATE 100 UG/1
1 POWDER RESPIRATORY (INHALATION) DAILY PRN
Refills: 6 | COMMUNITY
Start: 2018-11-10 | End: 2023-02-22

## 2019-01-07 NOTE — PATIENT INSTRUCTIONS
Diabetes: Activity Tips    Being more active can help you manage your diabetes. The tips on this sheet can help you get the most from your exercise. They can also help you stay safe.  Staying Active  Its important for adults to spend less time sitting and being inactive. This is especially true if you have type 2 diabetes. When you are sitting for long periods of time, get up for short sessions of light activity every 30 minutes.  You should aim for at least 150 minutes a week of exercise or physical activity. Dont let more than 2 days go by without being active.  Benefit from briskness  Brisk activity gets your heart beating faster. This can help you increase your fitness, lose extra weight, and manage your blood sugar level. Try brisk walking. Or, if you have foot or leg problems, you can try swimming or bike riding. You can break up your exercise into chunks throughout the day. Work up to at least 30 minutes of steady, brisk exercise on most days.  Warm up and cool down  Warming up and cooling down reduce your risk of injury. They also help limit muscle soreness. Do a mild version of your activity for 5 minutes before and after your routine. You can also learn stretches that will help keep your muscles loose. Your healthcare provider may show you good ways to warm up and stretch.  Do the talk-sing test  The talk-sing test is a simple way to tell how hard youre exercising. If you can talk while exercising, youre in a safe range. If youre out of breath, slow down. If you can carry a tune, its time to  the pace. Walk up a hill. Increase the resistance on your stationary bike. Or swim faster.  What about eating?  You may be told to plan your exercise for 1 to 2 hours after a meal. In most cases, you dont need to eat while being active. If you take insulin or medicine that can cause low blood sugar, test your blood sugar before exercising. And carry a fast-acting sugar that will raise your blood sugar  level quickly. This includes glucose tablets or hard candy. Use it if you feel low blood sugar symptoms.  Safety tips  These tips can help you stay safe as you become fit:  · Exercise with a friend or carry a cell phone if you have one.  · Carry or wear identification, such as a necklace or bracelet, that says you have diabetes.  · Use the proper footwear and safety equipment for your activity.  · Drink water before, during, and after exercise.  · Dress properly for the weather.  · Dont exercise in very hot or very cold weather.  · Dont exercise if you are sick.  · If you are instructed to do so, test your blood sugar before and after you exercise. Have a small carbohydrate snack if your blood sugar is low before you start exercising.   When to stop exercising and call your healthcare provider  Stop exercising and call your healthcare provider right away if you notice any of the following:  · Pain, pressure, tightness, or heaviness in the chest  · Pain or heaviness in the neck, shoulders, back, arms, legs, or feet  · Unusual shortness of breath  · Dizziness or lightheadedness  · Unusually rapid or slow pulse  · Increased joint or muscle pain  · Nausea or vomiting  Date Last Reviewed: 5/1/2016  © 2624-3425 [employer]. [address] [place] [address], [place] [address]. All rights reserved. This information is not intended as a substitute for professional medical care. Always follow your healthcare professional's instructions.

## 2019-01-07 NOTE — PROGRESS NOTES
SUBJECTIVE:      Patient ID: Baldo Carney is a 68 y.o. male.    Chief Complaint: No chief complaint on file.    Hypertension   This is a chronic problem. The current episode started more than 1 year ago. The problem is controlled. Pertinent negatives include no anxiety, blurred vision, chest pain, headaches, malaise/fatigue, neck pain, orthopnea, palpitations, peripheral edema, PND, shortness of breath or sweats. Risk factors for coronary artery disease include male gender, sedentary lifestyle, diabetes mellitus, dyslipidemia and family history. Past treatments include calcium channel blockers and ACE inhibitors. The current treatment provides moderate improvement. Compliance problems include diet and exercise.  Hypertensive end-organ damage includes CAD/MI.     Hypertension   This is a chronic problem. The current episode started more than 1 year ago. The problem is controlled. Associated symptoms include peripheral edema (rare). Pertinent negatives include no chest pain, headaches, neck pain or shortness of breath. Agents associated with hypertension include NSAIDs. Risk factors for coronary artery disease include male gender, sedentary lifestyle, diabetes mellitus, dyslipidemia and family history. Past treatments include calcium channel blockers and ACE inhibitors. The current treatment provides moderate improvement. Compliance problems include exercise.  Hypertensive end-organ damage includes CAD/MI.       Past Surgical History:   Procedure Laterality Date    AORTOCORONARY BYPASS-CABG N/A 11/3/2014    [facility] at [facility] OR 2ND FLR    BREAST SURGERY      CARDIAC CATHETERIZATION      CARDIAC VALVE SURGERY      CORONARY ARTERY BYPASS GRAFT      EYE SURGERY      REPLACEMENT-VALVE-AORTIC N/A 11/3/2014    [facility] OR 2ND [facility]LR    SHOULDER ARTHROSCOPY  1985     Family History   Problem Relation Age of Onset    No Known Problems Mother      Diabetes Father     Hypertension Father     Heart attack Father     Heart disease Father     Diabetes Sister     Heart disease Brother     Diabetes Brother     No Known Problems Maternal Grandmother     No Known Problems Maternal Grandfather     No Known Problems Paternal Grandmother     No Known Problems Paternal Grandfather     No Known Problems Maternal Aunt     No Known Problems Maternal Uncle     No Known Problems Paternal Aunt     No Known Problems Paternal Uncle     Anemia Neg Hx     Arrhythmia Neg Hx     Asthma Neg Hx     Clotting disorder Neg Hx     Fainting Neg Hx     Heart failure Neg Hx     Hyperlipidemia Neg Hx     Glaucoma Neg Hx       Social History     Socioeconomic History    Marital status:      Spouse name: None    Number of children: None    Years of education: None    Highest education level: None   Social Needs    Financial resource strain: None    Food insecurity - worry: None    Food insecurity - inability: None    Transportation needs - medical: None    Transportation needs - non-medical: None   Occupational History    None   Tobacco Use    Smoking status: Never Smoker    Smokeless tobacco: Never Used   Substance and Sexual Activity    Alcohol use: No     Frequency: Never     Comment: social    Drug use: No    Sexual activity: No   Other Topics Concern    None   Social History Narrative    None     Current Outpatient Medications   Medication Sig Dispense Refill    amlodipine-benazepril 5-20 mg (LOTREL) 5-20 mg per capsule TAKE 1 CAPSULE BY MOUTH EVERY NIGHT AT BEDTIME 90 capsule 1    ARNUITY ELLIPTA 100 mcg/actuation DsDv INHALE ONE BLISTER DAILY, RINSE MOUTH AFTER USE  6    aspirin (ECOTRIN) 325 MG EC tablet Take 1 tablet (325 mg total) by mouth once daily. 30 tablet 4    atorvastatin (LIPITOR) 80 MG tablet TAKE 1 TABLET BY MOUTH ONCE A DAY 90 tablet 1    azelastine (ASTELIN) 137 mcg (0.1 %) nasal spray PLACE 1 SPRAY INTO EACH NOSTRL 2 X A  DAY  0    blood sugar diagnostic Strp USE BRAND COVERED BY INSURANCE AND COMPATIBLE WITH METER TO CHECK GLUCOSE 2X  strip 1    blood-glucose meter kit USE BRAND AS COVERED BY INSURANCE AND COMPATIBLE WITH STRIPS TO CHECK GLUCOSE 2X DAY 1 each 0    fluticasone-vilanterol (BREO ELLIPTA) 100-25 mcg/dose diskus inhaler Inhale 1 puff into the lungs once daily. Controller 2 each inh    furosemide (LASIX) 20 MG tablet Take 1 tablet (20 mg total) by mouth 2 (two) times daily. 60 tablet 1    lancets Misc USE BRAND COVERED BY INSURANCE TO CHECK GLUCOSE 2X  each 1    meloxicam (MOBIC) 7.5 MG tablet Take ONE daily 90 tablet 0    metFORMIN (GLUCOPHAGE) 1000 MG tablet Take 1 tablet (1,000 mg total) by mouth 2 (two) times daily with meals. 180 tablet 1    montelukast (SINGULAIR) 10 mg tablet Take 10 mg by mouth every evening.  0    potassium chloride SA (K-DUR,KLOR-CON) 20 MEQ tablet Take 1 tablet (20 mEq total) by mouth once daily. 90 tablet 1    SITagliptin (JANUVIA) 100 MG Tab Take 1 tablet (100 mg total) by mouth once daily. 90 tablet 1    sulfamethoxazole-trimethoprim 800-160mg (BACTRIM DS) 800-160 mg Tab TAKE 1 TABLET BY MOUTH TWICE A DAY 42 tablet 0    tamsulosin (FLOMAX) 0.4 mg Cap Take 1 capsule (0.4 mg total) by mouth once daily. Take at bedtime 30 capsule 3    VENTOLIN HFA 90 mcg/actuation inhaler Inhale 2 puffs into the lungs every 6 (six) hours as needed for Wheezing. Rescue 1 Inhaler 3     No current facility-administered medications for this visit.      Review of patient's allergies indicates:  No Known Allergies   Past Medical History:   Diagnosis Date    Asthma     Cardiomyopathy 10/21/2014    Diabetes mellitus     Hyperlipidemia     Hypertension      Past Surgical History:   Procedure Laterality Date    AORTOCORONARY BYPASS-CABG N/A 11/3/2014    [facility] at [facility] OR [address] [place] [address]    BREAST SURGERY      CARDIAC CATHETERIZATION      CARDIAC VALVE SURGERY       CORONARY ARTERY BYPASS GRAFT      EYE SURGERY      REPLACEMENT-VALVE-AORTIC N/A 11/3/2014    [facility] at [facility] OR [address] [place] [address]    SHOULDER ARTHROSCOPY  1985       Review of Systems   Constitutional: Negative for activity change, appetite change, fatigue and fever.   HENT: Negative for congestion, ear pain, hearing loss, postnasal drip, sinus pressure, sinus pain, sneezing and sore throat.    Eyes: Negative for photophobia and pain.   Respiratory: Negative for cough, chest tightness, shortness of breath and wheezing.    Cardiovascular: Negative for chest pain and leg swelling.   Gastrointestinal: Negative for abdominal distention, abdominal pain, blood in stool, constipation, diarrhea, nausea and vomiting.   Endocrine: Negative for cold intolerance, heat intolerance, polydipsia and polyuria.   Genitourinary: Negative for difficulty urinating, dysuria, flank pain, frequency, hematuria and urgency.   Musculoskeletal: Negative for arthralgias, back pain, joint swelling, myalgias and neck pain.   Skin: Negative for pallor and rash.   Allergic/Immunologic: Negative for environmental allergies and food allergies.   Neurological: Negative for dizziness, weakness, light-headedness and headaches.   Hematological: Does not bruise/bleed easily.   Psychiatric/Behavioral: Negative for confusion, decreased concentration and sleep disturbance. The patient is not nervous/anxious.       OBJECTIVE:      Vitals:    01/07/19 0905   BP: 124/66   Pulse: 77   Resp: 14   Temp: 98.4 °F (36.9 °C)   SpO2: 98%   Weight: 79.5 kg (175 lb 4.8 oz)   Height: 6' (1.829 m)     Physical Exam   Constitutional: He is oriented to person, place, and time. Vital signs are normal. He appears well-developed and well-nourished. No distress.   HENT:   Head: Normocephalic and atraumatic.   Right Ear: Hearing normal.   Left Ear: Hearing normal.   Nose: Nose normal. No rhinorrhea.   Mouth/Throat: Mucous membranes are normal.   Eyes:  Conjunctivae and lids are normal. Pupils are equal, round, and reactive to light. Right eye exhibits no discharge. Left eye exhibits no discharge. Right conjunctiva is not injected. Left conjunctiva is not injected. Right pupil is round and reactive. Left pupil is round and reactive. Pupils are equal.   Neck: Trachea normal and normal range of motion. Neck supple. No JVD present. No tracheal deviation present. No thyromegaly present.   Cardiovascular: Normal rate, regular rhythm, normal heart sounds and intact distal pulses. Exam reveals no gallop and no friction rub.   No murmur heard.  Pulses:       Radial pulses are 2+ on the right side, and 2+ on the left side.   Pulmonary/Chest: Effort normal and breath sounds normal. No stridor. No respiratory distress. He has no decreased breath sounds. He has no wheezes. He has no rhonchi. He has no rales.   Abdominal: Soft. Bowel sounds are normal. He exhibits no distension. There is no tenderness. There is no rigidity and no guarding.   Musculoskeletal: Normal range of motion. He exhibits no edema.   Lymphadenopathy:     He has no cervical adenopathy.   Neurological: He is alert and oriented to person, place, and time. He has normal strength. He displays no atrophy. He displays a negative Romberg sign. Coordination and gait normal.   Skin: Skin is warm and dry. Capillary refill takes less than 2 seconds. No lesion and no rash noted. No cyanosis. No pallor.   R 2nd toenail fungus & overgrowth to underside of toe   Psychiatric: He has a normal mood and affect. His speech is normal and behavior is normal. Judgment and thought content normal. Cognition and memory are normal. He is attentive.   Nursing note and vitals reviewed.     Assessment:       1. Essential hypertension    2. Type 2 diabetes mellitus without complication, without long-term current use of insulin    3. Toenail fungus    4. Prostate cancer screening        Plan:       Essential hypertension  -     CBC auto  differential; Future; Expected date: 04/07/2019  -     Comprehensive metabolic panel; Future; Expected date: 04/07/2019  -     Lipid panel; Future; Expected date: 04/07/2019    Type 2 diabetes mellitus without complication, without long-term current use of insulin  -     POCT HEMOGLOBIN A1C (5.8)  -     Comprehensive metabolic panel; Future; Expected date: 04/07/2019  -     Hemoglobin A1c; Future; Expected date: 04/07/2019  -     Microalbumin/creatinine urine ratio; Future; Expected date: 04/07/2019  -     Ambulatory referral to Podiatry    Toenail fungus  -     Ambulatory referral to Podiatry    Prostate cancer screening  -     PSA, Screening; Future; Expected date: 04/07/2019            Follow-up in about 3 months (around 4/7/2019) for DM & HTN lab review.      1/9/2019 KAMERON Rodriguez, FNP-C

## 2019-01-08 ENCOUNTER — TELEPHONE (OUTPATIENT)
Dept: PODIATRY | Facility: CLINIC | Age: 69
End: 2019-01-08

## 2019-01-08 NOTE — TELEPHONE ENCOUNTER
----- Message from Marilou Alvarez MA sent at 1/7/2019  1:36 PM CST -----      ----- Message -----  From: Lillian Medellin  Sent: 1/7/2019   1:03 PM  To: Artem Chapman Staff Pool    Referral from PATRICIO Nelson 1/7/19

## 2019-01-10 LAB
ALBUMIN SERPL-MCNC: 3.8 G/DL (ref 3.1–4.7)
ALP SERPL-CCNC: 81 IU/L (ref 40–104)
ALT (SGPT): 15 IU/L (ref 3–33)
AST SERPL-CCNC: 22 IU/L (ref 10–40)
BASOPHILS NFR BLD: 0.1 K/UL (ref 0–0.2)
BASOPHILS NFR BLD: 0.7 %
BILIRUB SERPL-MCNC: 1.3 MG/DL (ref 0.3–1)
BUN SERPL-MCNC: 18 MG/DL (ref 8–20)
CALCIUM SERPL-MCNC: 9.2 MG/DL (ref 7.7–10.4)
CHLORIDE: 105 MMOL/L (ref 98–110)
CO2 SERPL-SCNC: 25.9 MMOL/L (ref 22.8–31.6)
COMPLEXED PSA SERPL-MCNC: 16.7 NG/ML (ref 0–3)
CREATININE: 1.36 MG/DL (ref 0.6–1.4)
EOSINOPHIL NFR BLD: 0.2 K/UL (ref 0–0.7)
EOSINOPHIL NFR BLD: 2.1 %
ERYTHROCYTE [DISTWIDTH] IN BLOOD BY AUTOMATED COUNT: 15 % (ref 11.7–14.9)
GLUCOSE: 93 MG/DL (ref 70–99)
GRAN #: 5 K/UL (ref 1.4–6.5)
GRAN%: 71.6 %
HBA1C MFR BLD: 5.9 % (ref 3.1–6.5)
HCT VFR BLD AUTO: 32.1 % (ref 39–55)
HGB BLD-MCNC: 10.2 G/DL (ref 14–16)
IMMATURE GRANS (ABS): 0 K/UL (ref 0–1)
IMMATURE GRANULOCYTES: 0.4 %
LYMPH #: 1.1 K/UL (ref 1.2–3.4)
LYMPH%: 15.9 %
MCH RBC QN AUTO: 28.7 PG (ref 25–35)
MCHC RBC AUTO-ENTMCNC: 31.8 G/DL (ref 31–36)
MCV RBC AUTO: 90.2 FL (ref 80–100)
MONO #: 0.7 K/UL (ref 0.1–0.6)
MONO%: 9.3 %
NUCLEATED RBCS: 0 %
PLATELET # BLD AUTO: 170 K/UL (ref 140–440)
PMV BLD AUTO: 11.6 FL (ref 8.8–12.7)
POTASSIUM SERPL-SCNC: 4.4 MMOL/L (ref 3.5–5)
PROT SERPL-MCNC: 7.5 G/DL (ref 6–8.2)
RBC # BLD AUTO: 3.56 M/UL (ref 4.3–5.9)
SODIUM: 141 MMOL/L (ref 134–144)
WBC # BLD AUTO: 7 K/UL (ref 5–10)

## 2019-01-27 RX ORDER — ATORVASTATIN CALCIUM 80 MG/1
TABLET, FILM COATED ORAL
Qty: 90 TABLET | Refills: 1 | Status: SHIPPED | OUTPATIENT
Start: 2019-01-27 | End: 2019-10-27 | Stop reason: SDUPTHER

## 2019-01-29 ENCOUNTER — OFFICE VISIT (OUTPATIENT)
Dept: PODIATRY | Facility: CLINIC | Age: 69
End: 2019-01-29
Payer: MEDICARE

## 2019-01-29 VITALS
BODY MASS INDEX: 23.7 KG/M2 | SYSTOLIC BLOOD PRESSURE: 136 MMHG | DIASTOLIC BLOOD PRESSURE: 81 MMHG | HEIGHT: 72 IN | TEMPERATURE: 98 F | WEIGHT: 175 LBS | HEART RATE: 76 BPM

## 2019-01-29 DIAGNOSIS — E11.9 TYPE 2 DIABETES MELLITUS WITHOUT COMPLICATION, WITHOUT LONG-TERM CURRENT USE OF INSULIN: Primary | ICD-10-CM

## 2019-01-29 PROCEDURE — 3079F DIAST BP 80-89 MM HG: CPT | Mod: ,,, | Performed by: PODIATRIST

## 2019-01-29 PROCEDURE — 3075F PR MOST RECENT SYSTOLIC BLOOD PRESS GE 130-139MM HG: ICD-10-PCS | Mod: ,,, | Performed by: PODIATRIST

## 2019-01-29 PROCEDURE — 3044F HG A1C LEVEL LT 7.0%: CPT | Mod: ,,, | Performed by: PODIATRIST

## 2019-01-29 PROCEDURE — 1101F PR PT FALLS ASSESS DOC 0-1 FALLS W/OUT INJ PAST YR: ICD-10-PCS | Mod: ,,, | Performed by: PODIATRIST

## 2019-01-29 PROCEDURE — 99204 OFFICE O/P NEW MOD 45 MIN: CPT | Mod: ,,, | Performed by: PODIATRIST

## 2019-01-29 PROCEDURE — 3075F SYST BP GE 130 - 139MM HG: CPT | Mod: ,,, | Performed by: PODIATRIST

## 2019-01-29 PROCEDURE — 1101F PT FALLS ASSESS-DOCD LE1/YR: CPT | Mod: ,,, | Performed by: PODIATRIST

## 2019-01-29 PROCEDURE — 3079F PR MOST RECENT DIASTOLIC BLOOD PRESSURE 80-89 MM HG: ICD-10-PCS | Mod: ,,, | Performed by: PODIATRIST

## 2019-01-29 PROCEDURE — 3044F PR MOST RECENT HEMOGLOBIN A1C LEVEL <7.0%: ICD-10-PCS | Mod: ,,, | Performed by: PODIATRIST

## 2019-01-29 PROCEDURE — 99204 PR OFFICE/OUTPT VISIT, NEW, LEVL IV, 45-59 MIN: ICD-10-PCS | Mod: ,,, | Performed by: PODIATRIST

## 2019-01-29 NOTE — PROGRESS NOTES
[address] [facility] [address]  [place] [address]  Phone: [phone]   [phone]    Patient's PCP:Sunshine Murillo MD  Referring Provider: Millie Hayes    Subjective:      Chief Complaint:: Nail Problem (Right 2nd nail)    JUAN Carney is a 68 y.o. male who presents with a complaint of black spot on right 2nd toenail lasting for about 1 year. Onset of the symptoms was random.  Current symptoms include discoloration,nail is curving in.  Symptoms have stayed the same.Treatment to date have included pedicures. Patients rates pain 0/10 on pain scale.    Blood Sugar: 98   Hemoglobin A1c: 5.9    Vitals:    01/29/19 0848   BP: 136/81   Pulse: 76   Temp: 97.7 °F (36.5 °C)     Shoe Size: 11.5    Past Surgical History:   Procedure Laterality Date    AORTOCORONARY BYPASS-CABG N/A 11/3/2014    Performed by [facility]ian [facility]claughlin [facility] at [facility] OR [address] [place] [address]    BREAST SURGERY      CARDIAC CATHETERIZATION      CARDIAC VALVE SURGERY      CORONARY ARTERY BYPASS GRAFT      EYE SURGERY      REPLACEMENT-VALVE-AORTIC N/A 11/3/2014    Performed by [facility]ian [facility]claughlin MD at [facility] OR [address] [place] [address]    SHOULDER ARTHROSCOPY  1985     Past Medical History:   Diagnosis Date    Asthma     Cardiomyopathy 10/21/2014    Diabetes mellitus     Hyperlipidemia     Hypertension      Family History   Problem Relation Age of Onset    No Known Problems Mother     Diabetes Father     Hypertension Father     Heart attack Father     Heart disease Father     Diabetes Sister     Heart disease Brother     Diabetes Brother     No Known Problems Maternal Grandmother     No Known Problems Maternal Grandfather     No Known Problems Paternal Grandmother     No Known Problems Paternal Grandfather     No Known Problems Maternal Aunt     No Known Problems Maternal Uncle     No Known Problems Paternal Aunt     No Known Problems Paternal Uncle     Anemia Neg Hx     Arrhythmia Neg Hx     Asthma Neg Hx     Clotting disorder Neg  Hx     Fainting Neg Hx     Heart failure Neg Hx     Hyperlipidemia Neg Hx     Glaucoma Neg Hx         Social History:   Marital Status:   Alcohol History:  reports that he does not drink alcohol.  Tobacco History:  reports that  has never smoked. he has never used smokeless tobacco.  Drug History:  reports that he does not use drugs.    Review of patient's allergies indicates:  No Known Allergies    Current Outpatient Medications   Medication Sig Dispense Refill    amlodipine-benazepril 5-20 mg (LOTREL) 5-20 mg per capsule TAKE 1 CAPSULE BY MOUTH EVERY NIGHT AT BEDTIME 90 capsule 1    ARNUITY ELLIPTA 100 mcg/actuation DsDv INHALE ONE BLISTER DAILY, RINSE MOUTH AFTER USE  6    aspirin (ECOTRIN) 325 MG EC tablet Take 1 tablet (325 mg total) by mouth once daily. 30 tablet 4    atorvastatin (LIPITOR) 80 MG tablet TAKE 1 TABLET BY MOUTH ONCE A DAY 90 tablet 1    azelastine (ASTELIN) 137 mcg (0.1 %) nasal spray PLACE 1 SPRAY INTO EACH NOSTRL 2 X A DAY  0    blood sugar diagnostic Strp USE BRAND COVERED BY INSURANCE AND COMPATIBLE WITH METER TO CHECK GLUCOSE 2X  strip 1    blood-glucose meter kit USE BRAND AS COVERED BY INSURANCE AND COMPATIBLE WITH STRIPS TO CHECK GLUCOSE 2X DAY 1 each 0    fluticasone-vilanterol (BREO ELLIPTA) 100-25 mcg/dose diskus inhaler Inhale 1 puff into the lungs once daily. Controller 2 each inh    furosemide (LASIX) 20 MG tablet Take 1 tablet (20 mg total) by mouth 2 (two) times daily. 60 tablet 1    lancets Misc USE BRAND COVERED BY INSURANCE TO CHECK GLUCOSE 2X  each 1    meloxicam (MOBIC) 7.5 MG tablet Take ONE daily 90 tablet 0    metFORMIN (GLUCOPHAGE) 1000 MG tablet Take 1 tablet (1,000 mg total) by mouth 2 (two) times daily with meals. 180 tablet 1    montelukast (SINGULAIR) 10 mg tablet Take 10 mg by mouth every evening.  0    potassium chloride SA (K-DUR,KLOR-CON) 20 MEQ tablet Take 1 tablet (20 mEq total) by mouth once daily. 90 tablet 1     SITagliptin (JANUVIA) 100 MG Tab Take 1 tablet (100 mg total) by mouth once daily. 90 tablet 1    sulfamethoxazole-trimethoprim 800-160mg (BACTRIM DS) 800-160 mg Tab TAKE 1 TABLET BY MOUTH TWICE A DAY 42 tablet 0    tamsulosin (FLOMAX) 0.4 mg Cap Take 1 capsule (0.4 mg total) by mouth once daily. Take at bedtime 30 capsule 3    VENTOLIN HFA 90 mcg/actuation inhaler Inhale 2 puffs into the lungs every 6 (six) hours as needed for Wheezing. Rescue 1 Inhaler 3     No current facility-administered medications for this visit.        Review of Systems   Constitutional: Negative for chills, fatigue, fever and unexpected weight change.   HENT: Negative for hearing loss and trouble swallowing.    Eyes: Negative for photophobia and visual disturbance.   Respiratory: Negative for cough, shortness of breath and wheezing.    Cardiovascular: Negative for chest pain, palpitations and leg swelling.   Gastrointestinal: Negative for abdominal pain and nausea.   Genitourinary: Negative for dysuria and frequency.   Musculoskeletal: Positive for back pain. Negative for arthralgias and joint swelling.   Skin: Negative for rash.   Neurological: Negative for tremors, seizures, weakness, numbness and headaches.   Hematological: Bruises/bleeds easily.         Objective:        Physical Exam:   Foot Exam    General  General Appearance: appears stated age and healthy   Orientation: alert and oriented to person, place, and time   Affect: appropriate   Gait: unimpaired       Right Foot/Ankle     Inspection and Palpation  Ecchymosis: none  Tenderness: none   Swelling: none   Arch: normal  Hammertoes: absent  Claw Toes: absent  Hallux valgus: no  Hallux limitus: no  Skin Exam: skin intact;     Neurovascular  Dorsalis pedis: 1+  Posterior tibial: 2+  Saphenous nerve sensation: normal  Tibial nerve sensation: normal  Superficial peroneal nerve sensation: normal  Deep peroneal nerve sensation: normal  Sural nerve sensation: normal  Achilles reflex:  2+  Babinski reflex: 2+    Muscle Strength  Ankle dorsiflexion: 5  Ankle plantar flexion: 5  Ankle inversion: 5  Ankle eversion: 5  Great toe extension: 5  Great toe flexion: 5    Range of Motion    Passive  Ankle dorsiflexion: 5    Active  Ankle dorsiflexion: 5    Comments  On the second toe there is a fungus toenail with no signs of infection or ulceration. The second toe is 4-5 mm longer than the first toe.    Left Foot/Ankle      Inspection and Palpation  Ecchymosis: none  Tenderness: none   Swelling: none   Arch: normal  Hammertoes: absent  Claw toes: absent  Hallux valgus: no  Hallux limitus: no  Skin Exam: skin intact;     Neurovascular  Dorsalis pedis: 1+  Posterior tibial: 2+  Saphenous nerve sensation: normal  Tibial nerve sensation: normal  Superficial peroneal nerve sensation: normal  Deep peroneal nerve sensation: normal  Sural nerve sensation: normal  Achilles reflex: 2+  Babinski reflex: 2+    Muscle Strength  Ankle dorsiflexion: 5  Ankle plantar flexion: 5  Ankle inversion: 5  Ankle eversion: 5  Great toe extension: 5  Great toe flexion: 5    Range of Motion    Passive  Ankle dorsiflexion: 5    Active  Ankle dorsiflexion: 5            Imaging:            Assessment:       1. Type 2 diabetes mellitus without complication, without long-term current use of insulin      Plan:   Type 2 diabetes mellitus without complication, without long-term current use of insulin    Counseled patient on the aspects of diabetes and how it pertains to the feet.  I explained the importance of proper diabetic foot care and how it is essential for the health of their feet.  No Follow-up on file.   Counseling/Education:  I provided patient education verbally regarding:   The aspects of diabetes and how it pertains to the feet. I explained the importance of proper diabetic foot care and how it is essential for the health of their feet.    I discussed the importance of knowing their Hemoglobin A1c and that the level needs to be  as close to 6 as possible. I discussed the increase complications of high blood sugar including stroke, blindness, heart attack, kidney failure and loss of limb secondary to neuropathy and PVD.                           Procedures - None    Counseling:     I provided patient education verbally regarding:   Patient diagnosis, treatment options, as well as alternatives, risks, and benefits.     This note was created using Dragon voice recognition software that occasionally misinterpreted phrases or words.

## 2019-01-29 NOTE — LETTER
January 29, 2019      Millie Hayes, KAMERON,FNP-C  [address] [facility]  [place] [address]           [facility]  [address]  [place] [address]  Phone: [phone]  Fax: [phone]          Patient: Baldo Carney   MR Number: [id number]   YOB: 1950   Date of Visit: 1/29/2019       Dear Millie Hayes:    Thank you for referring Baldo Carney to me for evaluation. Attached you will find relevant portions of my assessment and plan of care.    If you have questions, please do not hesitate to call me. I look forward to following Baldo Carney along with you.    Sincerely,    Artem Chapman, OBEY    Enclosure  CC:  No Recipients    If you would like to receive this communication electronically, please contact [email or web address] or [phone] to request more information on [facility] access.    For providers and/or their staff who would like to refer a patient to [facility], please contact us through our one-stop-shop provider referral line, [facility], at [phone].    If you feel you have received this communication in error or would no longer like to receive these types of communications, please e-mail [email or web address]

## 2019-01-31 RX ORDER — TAMSULOSIN HYDROCHLORIDE 0.4 MG/1
CAPSULE ORAL
Qty: 90 CAPSULE | Refills: 3 | Status: SHIPPED | OUTPATIENT
Start: 2019-01-31 | End: 2019-04-09 | Stop reason: SDUPTHER

## 2019-01-31 RX ORDER — MELOXICAM 7.5 MG/1
TABLET ORAL
Qty: 90 TABLET | Refills: 0 | Status: SHIPPED | OUTPATIENT
Start: 2019-01-31 | End: 2019-04-10 | Stop reason: SDUPTHER

## 2019-02-18 DIAGNOSIS — R60.0 BILATERAL LOWER EXTREMITY EDEMA: ICD-10-CM

## 2019-02-18 RX ORDER — FUROSEMIDE 20 MG/1
TABLET ORAL
Qty: 120 TABLET | Refills: 1 | Status: SHIPPED | OUTPATIENT
Start: 2019-02-18 | End: 2019-04-22 | Stop reason: SDUPTHER

## 2019-02-25 RX ORDER — ALBUTEROL SULFATE 90 UG/1
AEROSOL, METERED RESPIRATORY (INHALATION)
Qty: 18 G | Refills: 3 | Status: SHIPPED | OUTPATIENT
Start: 2019-02-25 | End: 2020-09-16 | Stop reason: SDUPTHER

## 2019-04-09 ENCOUNTER — OFFICE VISIT (OUTPATIENT)
Dept: FAMILY MEDICINE | Facility: CLINIC | Age: 69
End: 2019-04-09
Payer: MEDICARE

## 2019-04-09 VITALS
HEART RATE: 55 BPM | OXYGEN SATURATION: 99 % | WEIGHT: 172.63 LBS | HEIGHT: 72 IN | DIASTOLIC BLOOD PRESSURE: 68 MMHG | RESPIRATION RATE: 14 BRPM | TEMPERATURE: 98 F | BODY MASS INDEX: 23.38 KG/M2 | SYSTOLIC BLOOD PRESSURE: 138 MMHG

## 2019-04-09 DIAGNOSIS — I10 ESSENTIAL HYPERTENSION: Primary | ICD-10-CM

## 2019-04-09 DIAGNOSIS — E11.9 TYPE 2 DIABETES MELLITUS WITHOUT COMPLICATION, WITHOUT LONG-TERM CURRENT USE OF INSULIN: ICD-10-CM

## 2019-04-09 DIAGNOSIS — R97.20 ELEVATED PSA, BETWEEN 10 AND LESS THAN 20 NG/ML: ICD-10-CM

## 2019-04-09 DIAGNOSIS — Z12.11 COLON CANCER SCREENING: ICD-10-CM

## 2019-04-09 DIAGNOSIS — G62.9 PERIPHERAL POLYNEUROPATHY: ICD-10-CM

## 2019-04-09 DIAGNOSIS — E78.2 MIXED HYPERLIPIDEMIA: ICD-10-CM

## 2019-04-09 PROCEDURE — 1101F PR PT FALLS ASSESS DOC 0-1 FALLS W/OUT INJ PAST YR: ICD-10-PCS | Mod: ,,, | Performed by: NURSE PRACTITIONER

## 2019-04-09 PROCEDURE — 3075F SYST BP GE 130 - 139MM HG: CPT | Mod: ,,, | Performed by: NURSE PRACTITIONER

## 2019-04-09 PROCEDURE — 99214 OFFICE O/P EST MOD 30 MIN: CPT | Mod: ,,, | Performed by: NURSE PRACTITIONER

## 2019-04-09 PROCEDURE — 3078F DIAST BP <80 MM HG: CPT | Mod: ,,, | Performed by: NURSE PRACTITIONER

## 2019-04-09 PROCEDURE — 3078F PR MOST RECENT DIASTOLIC BLOOD PRESSURE < 80 MM HG: ICD-10-PCS | Mod: ,,, | Performed by: NURSE PRACTITIONER

## 2019-04-09 PROCEDURE — 3044F HG A1C LEVEL LT 7.0%: CPT | Mod: ,,, | Performed by: NURSE PRACTITIONER

## 2019-04-09 PROCEDURE — 3044F PR MOST RECENT HEMOGLOBIN A1C LEVEL <7.0%: ICD-10-PCS | Mod: ,,, | Performed by: NURSE PRACTITIONER

## 2019-04-09 PROCEDURE — 1101F PT FALLS ASSESS-DOCD LE1/YR: CPT | Mod: ,,, | Performed by: NURSE PRACTITIONER

## 2019-04-09 PROCEDURE — 99214 PR OFFICE/OUTPT VISIT, EST, LEVL IV, 30-39 MIN: ICD-10-PCS | Mod: ,,, | Performed by: NURSE PRACTITIONER

## 2019-04-09 PROCEDURE — 3075F PR MOST RECENT SYSTOLIC BLOOD PRESS GE 130-139MM HG: ICD-10-PCS | Mod: ,,, | Performed by: NURSE PRACTITIONER

## 2019-04-09 RX ORDER — GABAPENTIN 300 MG/1
300 CAPSULE ORAL NIGHTLY
Qty: 90 CAPSULE | Refills: 3 | Status: SHIPPED | OUTPATIENT
Start: 2019-04-09 | End: 2020-05-13

## 2019-04-09 RX ORDER — TAMSULOSIN HYDROCHLORIDE 0.4 MG/1
0.8 CAPSULE ORAL DAILY
Qty: 180 CAPSULE | Refills: 1 | Status: SHIPPED | OUTPATIENT
Start: 2019-04-09 | End: 2020-01-13 | Stop reason: ALTCHOICE

## 2019-04-11 PROBLEM — G62.9 PERIPHERAL POLYNEUROPATHY: Status: ACTIVE | Noted: 2019-04-11

## 2019-04-11 RX ORDER — MELOXICAM 7.5 MG/1
TABLET ORAL
Qty: 90 TABLET | Refills: 0 | Status: SHIPPED | OUTPATIENT
Start: 2019-04-11 | End: 2020-01-13 | Stop reason: ALTCHOICE

## 2019-04-11 NOTE — PROGRESS NOTES
SUBJECTIVE:      Patient ID: Baldo Carney is a 68 y.o. male.    Chief Complaint: Discuss labs    F/U for lab review - mild anemia, PSA 16 (down from 36), elev total bili, A1C 5.9    Discussed outstanding health maintenance    Hypertension   This is a chronic problem. The current episode started more than 1 year ago. The problem is controlled. Associated symptoms include peripheral edema (rare). Pertinent negatives include no chest pain, headaches, neck pain or shortness of breath. Agents associated with hypertension include NSAIDs. Risk factors for coronary artery disease include male gender, sedentary lifestyle, diabetes mellitus, dyslipidemia and family history. Past treatments include calcium channel blockers, ACE inhibitors and diuretics. The current treatment provides mild improvement. Compliance problems include exercise.  Hypertensive end-organ damage includes CAD/MI. Identifiable causes of hypertension include a hypertension causing med.   Diabetes   He presents for his follow-up diabetic visit. He has type 2 diabetes mellitus. No MedicAlert identification noted. His disease course has been stable. Pertinent negatives for hypoglycemia include no confusion, dizziness, headaches, nervousness/anxiousness or pallor. Associated symptoms include foot paresthesias. Pertinent negatives for diabetes include no chest pain, no fatigue, no polydipsia, no polyuria and no weakness. There are no hypoglycemic complications. Symptoms are stable. Diabetic complications include peripheral neuropathy. Risk factors for coronary artery disease include diabetes mellitus, dyslipidemia, family history, hypertension, male sex and sedentary lifestyle. Current diabetic treatment includes oral agent (dual therapy). He is compliant with treatment all of the time. His weight is fluctuating minimally. He is following a generally healthy diet. He has not had a previous visit with a dietitian. He rarely participates in exercise.  An ACE inhibitor/angiotensin II receptor blocker is being taken.   Hyperlipidemia   This is a chronic problem. The current episode started more than 1 year ago. The problem is controlled. Recent lipid tests were reviewed and are normal. Exacerbating diseases include diabetes. Pertinent negatives include no chest pain, myalgias or shortness of breath. Current antihyperlipidemic treatment includes statins. The current treatment provides moderate improvement of lipids. Compliance problems include adherence to exercise.  Risk factors for coronary artery disease include diabetes mellitus, dyslipidemia, family history, hypertension, male sex and a sedentary lifestyle.   Urinary Frequency    This is a chronic problem. The current episode started more than 1 month ago. The problem occurs intermittently. The problem has been gradually worsening. The patient is experiencing no pain. There has been no fever. Associated symptoms include frequency. Pertinent negatives include no flank pain, hematuria, nausea, urgency, vomiting, constipation or rash. Treatments tried: flomax. The treatment provided moderate relief. His past medical history is significant for diabetes mellitus and hypertension. BPH       Past Surgical History:   Procedure Laterality Date    AORTOCORONARY BYPASS-CABG N/A 11/3/2014    Per[facility] by [facility] at [facility] OR 2ND FLR    BREAST SURGERY      CARDIAC CATHETERIZATION      CARDIAC VALVE SURGERY      CORONARY ARTERY BYPASS GRAFT      EYE SURGERY      REPLACEMENT-VALVE-AORTIC N/A 11/3/2014    Performed by [facility]aughlin [facility] at [facility] OR 2ND FLR    SHOULDER ARTHROSCOPY  1985     Family History   Problem Relation Age of Onset    No Known Problems Mother     Diabetes Father     Hypertension Father     Heart attack Father     Heart disease Father     Diabetes Sister     Heart disease Brother     Diabetes Brother     No Known Problems Maternal Grandmother     No Known Problems Maternal  Grandfather     No Known Problems Paternal Grandmother     No Known Problems Paternal Grandfather     No Known Problems Maternal Aunt     No Known Problems Maternal Uncle     No Known Problems Paternal Aunt     No Known Problems Paternal Uncle     Anemia Neg Hx     Arrhythmia Neg Hx     Asthma Neg Hx     Clotting disorder Neg Hx     Fainting Neg Hx     Heart failure Neg Hx     Hyperlipidemia Neg Hx     Glaucoma Neg Hx       Social History     Socioeconomic History    Marital status:      Spouse name: Not on file    Number of children: Not on file    Years of education: Not on file    Highest education level: Not on file   Occupational History    Not on file   Social Needs    Financial resource strain: Not on file    Food insecurity:     Worry: Not on file     Inability: Not on file    Transportation needs:     Medical: Not on file     Non-medical: Not on file   Tobacco Use    Smoking status: Never Smoker    Smokeless tobacco: Never Used   Substance and Sexual Activity    Alcohol use: No     Frequency: Never     Comment: social    Drug use: No    Sexual activity: Never   Lifestyle    Physical activity:     Days per week: Not on file     Minutes per session: Not on file    Stress: Not on file   Relationships    Social connections:     Talks on phone: Not on file     Gets together: Not on file     Attends Hindu service: Not on file     Active member of club or organization: Not on file     Attends meetings of clubs or organizations: Not on file     Relationship status: Not on file   Other Topics Concern    Not on file   Social History Narrative    Not on file     Current Outpatient Medications   Medication Sig Dispense Refill    amlodipine-benazepril 5-20 mg (LOTREL) 5-20 mg per capsule TAKE 1 CAPSULE BY MOUTH EVERY NIGHT AT BEDTIME 90 capsule 1    ARNUITY ELLIPTA 100 mcg/actuation DsDv INHALE ONE BLISTER DAILY, RINSE MOUTH AFTER USE  6    aspirin (ECOTRIN) 325 MG EC tablet  Take 1 tablet (325 mg total) by mouth once daily. 30 tablet 4    atorvastatin (LIPITOR) 80 MG tablet TAKE 1 TABLET BY MOUTH ONCE A DAY 90 tablet 1    azelastine (ASTELIN) 137 mcg (0.1 %) nasal spray PLACE 1 SPRAY INTO EACH NOSTRL 2 X A DAY  0    blood sugar diagnostic Strp USE BRAND COVERED BY INSURANCE AND COMPATIBLE WITH METER TO CHECK GLUCOSE 2X  strip 1    blood-glucose meter kit USE BRAND AS COVERED BY INSURANCE AND COMPATIBLE WITH STRIPS TO CHECK GLUCOSE 2X DAY 1 each 0    fluticasone-vilanterol (BREO ELLIPTA) 100-25 mcg/dose diskus inhaler Inhale 1 puff into the lungs once daily. Controller 2 each inh    furosemide (LASIX) 20 MG tablet TAKE 1 TABLET TWICE DAILY 120 tablet 1    lancets Misc USE BRAND COVERED BY INSURANCE TO CHECK GLUCOSE 2X  each 1    metFORMIN (GLUCOPHAGE) 1000 MG tablet Take 1 tablet (1,000 mg total) by mouth 2 (two) times daily with meals. 180 tablet 1    montelukast (SINGULAIR) 10 mg tablet Take 10 mg by mouth every evening.  0    potassium chloride SA (K-DUR,KLOR-CON) 20 MEQ tablet Take 1 tablet (20 mEq total) by mouth once daily. 90 tablet 1    SITagliptin (JANUVIA) 100 MG Tab Take 1 tablet (100 mg total) by mouth once daily. 90 tablet 1    tamsulosin (FLOMAX) 0.4 mg Cap Take 2 capsules (0.8 mg total) by mouth once daily. 180 capsule 1    VENTOLIN HFA 90 mcg/actuation inhaler INHALE 2 PUFFS EVERY 6 HOURS AS NEEDED FOR WHEEZING  (RESCUE) 18 g 3    gabapentin (NEURONTIN) 300 MG capsule Take 1 capsule (300 mg total) by mouth every evening. 90 capsule 3    meloxicam (MOBIC) 7.5 MG tablet TAKE 1 TABLET EVERY DAY 90 tablet 0     No current facility-administered medications for this visit.      Review of patient's allergies indicates:  No Known Allergies   Past Medical History:   Diagnosis Date    Asthma     Cardiomyopathy 10/21/2014    Diabetes mellitus     Hyperlipidemia     Hypertension      Past Surgical History:   Procedure Laterality Date     AORTOCORONARY BYPASS-CABG N/A 11/3/2014    [facility]    BREAST SURGERY      CARDIAC CATHETERIZATION      CARDIAC VALVE SURGERY      CORONARY ARTERY BYPASS GRAFT      EYE SURGERY      REPLACEMENT-VALVE-AORTIC N/A 11/3/2014    Performed by [facility]ian Mclaughlin MD at [facility] OR 2ND FLR    SHOULDER ARTHROSCOPY  1985       Review of Systems   Constitutional: Negative for activity change, appetite change, fatigue and fever.   HENT: Negative for congestion, ear pain, hearing loss, postnasal drip, sinus pressure, sinus pain, sneezing and sore throat.    Eyes: Negative for photophobia and pain.   Respiratory: Negative for cough, chest tightness, shortness of breath and wheezing.    Cardiovascular: Negative for chest pain and leg swelling.   Gastrointestinal: Negative for abdominal distention, abdominal pain, blood in stool, constipation, diarrhea, nausea and vomiting.   Endocrine: Negative for cold intolerance, heat intolerance, polydipsia and polyuria.   Genitourinary: Positive for frequency. Negative for difficulty urinating, dysuria, flank pain, hematuria and urgency.        Nocturia   Musculoskeletal: Negative for arthralgias, back pain, joint swelling, myalgias and neck pain.   Skin: Negative for pallor and rash.   Allergic/Immunologic: Negative for environmental allergies and food allergies.   Neurological: Negative for dizziness, weakness, light-headedness and headaches.        BLE nueuropathy   Hematological: Does not bruise/bleed easily.   Psychiatric/Behavioral: Negative for confusion, decreased concentration and sleep disturbance. The patient is not nervous/anxious.       OBJECTIVE:      Vitals:    04/09/19 0855   BP: 138/68   Pulse: (!) 55   Resp: 14   Temp: 98.4 °F (36.9 °C)   SpO2: 99%   Weight: 78.3 kg (172 lb 9.6 oz)   Height: 6' (1.829 m)     Physical Exam   Constitutional: He is oriented to person, place, and time. Vital signs are normal. He appears  well-developed and well-nourished. No distress.   HENT:   Head: Normocephalic and atraumatic.   Right Ear: Hearing normal.   Left Ear: Hearing normal.   Nose: Nose normal. No rhinorrhea.   Mouth/Throat: Mucous membranes are normal.   Eyes: Pupils are equal, round, and reactive to light. Conjunctivae and lids are normal. Right eye exhibits no discharge. Left eye exhibits no discharge. Right conjunctiva is not injected. Left conjunctiva is not injected. Right pupil is round and reactive. Left pupil is round and reactive. Pupils are equal.   Neck: Trachea normal and normal range of motion. Neck supple. No JVD present. No tracheal deviation present. No thyromegaly present.   Cardiovascular: Regular rhythm, normal heart sounds and intact distal pulses. Bradycardia present. Exam reveals no gallop and no friction rub.   No murmur heard.  Pulses:       Radial pulses are 2+ on the right side, and 2+ on the left side.   Pulmonary/Chest: Effort normal and breath sounds normal. No stridor. No respiratory distress. He has no decreased breath sounds. He has no wheezes. He has no rhonchi. He has no rales.   Abdominal: Soft. Bowel sounds are normal. He exhibits no distension. There is no tenderness. There is no rigidity and no guarding.   Musculoskeletal: Normal range of motion. He exhibits no edema.   Lymphadenopathy:     He has no cervical adenopathy.   Neurological: He is alert and oriented to person, place, and time. He has normal strength. He displays no atrophy. He displays a negative Romberg sign. Coordination and gait normal.   Skin: Skin is warm and dry. Capillary refill takes less than 2 seconds. No lesion and no rash noted. No cyanosis. No pallor.   Psychiatric: He has a normal mood and affect. His speech is normal and behavior is normal. Judgment and thought content normal. Cognition and memory are normal. He is attentive.   Nursing note and vitals reviewed.     Assessment:       1. Essential hypertension    2. Type 2  diabetes mellitus without complication, without long-term current use of insulin    3. Mixed hyperlipidemia    4. Elevated PSA, between 10 and less than 20 ng/ml    5. Peripheral polyneuropathy    6. Colon cancer screening        Plan:       Essential hypertension   - stable on meds    Type 2 diabetes mellitus without complication, without long-term current use of insulin   - A1C shows good control   - worsening BLE neuropathy    Mixed hyperlipidemia   - stable on med    Elevated PSA, between 10 and less than 20 ng/ml  -     tamsulosin (FLOMAX) 0.4 mg Cap; Take 2 capsules (0.8 mg total) by mouth once daily.  Dispense: 180 capsule; Refill: 1 (doubled dose for now until uro eval)  -     Ambulatory referral to Urology (needs eval of worsening BPH symptoms)    Peripheral polyneuropathy  -     gabapentin (NEURONTIN) 300 MG capsule; Take 1 capsule (300 mg total) by mouth every evening.  Dispense: 90 capsule; Refill: 3    Colon cancer screening  -     Ambulatory referral to Gastroenterology        Follow up in about 3 months (around 7/9/2019) for DM recheck.      4/13/2019 Millie Hayes, KAMERON, FNP-C

## 2019-04-15 RX ORDER — LANCETS 28 GAUGE
EACH MISCELLANEOUS
Qty: 100 EACH | Refills: 1 | Status: ON HOLD | OUTPATIENT
Start: 2019-04-15 | End: 2020-08-31 | Stop reason: HOSPADM

## 2019-04-15 RX ORDER — SITAGLIPTIN 100 MG/1
TABLET, FILM COATED ORAL
Qty: 90 TABLET | Refills: 1 | Status: SHIPPED | OUTPATIENT
Start: 2019-04-15 | End: 2019-10-10

## 2019-04-15 RX ORDER — METFORMIN HYDROCHLORIDE 1000 MG/1
TABLET ORAL
Qty: 180 TABLET | Refills: 1 | Status: SHIPPED | OUTPATIENT
Start: 2019-04-15 | End: 2019-10-10 | Stop reason: SDUPTHER

## 2019-04-22 DIAGNOSIS — R60.0 BILATERAL LOWER EXTREMITY EDEMA: ICD-10-CM

## 2019-04-22 RX ORDER — FUROSEMIDE 20 MG/1
TABLET ORAL
Qty: 180 TABLET | Refills: 1 | Status: ON HOLD | OUTPATIENT
Start: 2019-04-22 | End: 2020-08-31 | Stop reason: SDUPTHER

## 2019-05-02 ENCOUNTER — TELEPHONE (OUTPATIENT)
Dept: UROLOGY | Facility: CLINIC | Age: 69
End: 2019-05-02

## 2019-05-02 NOTE — TELEPHONE ENCOUNTER
----- Message from Spencer Nguyen MD sent at 5/1/2019 11:16 PM CDT -----  Regarding: routine ep by end of month  Referral placed to me by np faucheaux on 4/9  Is ep of clinic - seen anyi  Book in to routine f/u appt before end of may

## 2019-05-02 NOTE — TELEPHONE ENCOUNTER
Spoke w pt he was informed of referral received from [facility] office. Pt was unsure of what the referral was in reference to he was informed that he was referred for elevated PSA pt voiced ok/understanding appt scheduled 5/21 appt slipped mailed to home.

## 2019-05-22 ENCOUNTER — TELEPHONE (OUTPATIENT)
Dept: UROLOGY | Facility: CLINIC | Age: 69
End: 2019-05-22

## 2019-05-22 NOTE — TELEPHONE ENCOUNTER
Spoke w pt regarding past urological history pt stated that he really doesn't remember if he has seen a urologist in the past or not pt states that Dr Murillo and her assistant are usually the ones checking his psa lab.

## 2019-05-24 ENCOUNTER — OFFICE VISIT (OUTPATIENT)
Dept: UROLOGY | Facility: CLINIC | Age: 69
End: 2019-05-24
Payer: MEDICARE

## 2019-05-24 VITALS
RESPIRATION RATE: 18 BRPM | SYSTOLIC BLOOD PRESSURE: 141 MMHG | BODY MASS INDEX: 21.5 KG/M2 | HEART RATE: 76 BPM | DIASTOLIC BLOOD PRESSURE: 71 MMHG | WEIGHT: 158.75 LBS | HEIGHT: 72 IN

## 2019-05-24 DIAGNOSIS — N40.1 BPH WITH OBSTRUCTION/LOWER URINARY TRACT SYMPTOMS: ICD-10-CM

## 2019-05-24 DIAGNOSIS — R97.20 ELEVATED PSA: Primary | ICD-10-CM

## 2019-05-24 DIAGNOSIS — N13.8 BPH WITH OBSTRUCTION/LOWER URINARY TRACT SYMPTOMS: ICD-10-CM

## 2019-05-24 LAB
BILIRUB SERPL-MCNC: ABNORMAL MG/DL
BLOOD URINE, POC: ABNORMAL
COLOR, POC UA: YELLOW
GLUCOSE UR QL STRIP: ABNORMAL
KETONES UR QL STRIP: ABNORMAL
LEUKOCYTE ESTERASE URINE, POC: ABNORMAL
NITRITE, POC UA: ABNORMAL
PH, POC UA: 5.5
PROTEIN, POC: 30
SPECIFIC GRAVITY, POC UA: 1.01
UROBILINOGEN, POC UA: 0.2

## 2019-05-24 PROCEDURE — 81002 POCT URINE DIPSTICK WITHOUT MICROSCOPE: ICD-10-PCS | Mod: S$GLB,,, | Performed by: UROLOGY

## 2019-05-24 PROCEDURE — 99999 PR PBB SHADOW E&M-EST. PATIENT-LVL V: ICD-10-PCS | Mod: PBBFAC,,, | Performed by: UROLOGY

## 2019-05-24 PROCEDURE — 99215 PR OFFICE/OUTPT VISIT, EST, LEVL V, 40-54 MIN: ICD-10-PCS | Mod: 25,S$GLB,, | Performed by: UROLOGY

## 2019-05-24 PROCEDURE — 99999 PR PBB SHADOW E&M-EST. PATIENT-LVL V: CPT | Mod: PBBFAC,,, | Performed by: UROLOGY

## 2019-05-24 PROCEDURE — 81002 URINALYSIS NONAUTO W/O SCOPE: CPT | Mod: S$GLB,,, | Performed by: UROLOGY

## 2019-05-24 PROCEDURE — 99215 OFFICE O/P EST HI 40 MIN: CPT | Mod: 25,S$GLB,, | Performed by: UROLOGY

## 2019-05-24 NOTE — Clinical Note
Cysto/prostate biopsy on 6/25This was gentleman with memory problems that wrap up note indicated discussing everything slow and clear and in writingAlso, patrick rxed cipro at time of check out and was sent to mail order pharmacy on file closing encounter.Should call pt for local pharmacy, send cipro 500mg po bid x3 days to local pharmacy, and when calling pt remind of use starting day prior to bx.Also need to fu on cards clear to hold his asa from dr barajas

## 2019-05-24 NOTE — PATIENT INSTRUCTIONS
Cystoscopy    Cystoscopy is a procedure that lets your doctor look directly inside your urethra and bladder. It can be used to:  · Help diagnose a problem with your urethra, bladder, or kidneys.  · Take a sample (biopsy) of bladder or urethral tissue.  Based on the findings, your doctor may recommend other tests or treatments.  What is a cystoscope?  A cystoscope is a telescope-like instrument that contains lenses and fiberoptics (small glass wires that make bright light). The cystoscope is flexible to bend around curves in the urethra. The doctor may look directly into the cystoscope, or project the image onto a monitor.  Getting ready  · Ask your doctor if you should stop taking any medicines before the procedure. - ASPIRIN  · Follow any other instructions your doctor gives you.  Tell your doctor before the exam if you:  · Take any medicines, such as aspirin or blood thinners  · Have allergies to any medicines  · Are pregnant   The procedure  Cystoscopy is done in the doctors office, surgery center, or hospital. The doctor and a nurse are present during the procedure. It takes only a few minutes, longer if a biopsy, X-ray, or treatment needs to be done.  During the procedure:  · You lie on an exam table on your back, knees bent and legs apart. You are covered with a drape.  · Your urethra and the area around it are washed. Anesthetic jelly will be applied to numb the urethra. Other pain medicine is usually not needed.   · The cystoscope is inserted. A sterile fluid is put into the bladder to expand it. You may feel pressure from this fluid.  · When the procedure is done, the cystoscope is removed.  After the procedure   Once youre home:  · Drink plenty of fluids.  · You may have burning or light bleeding when you urinate--this is normal.  · Medicines may be prescribed to ease any discomfort or prevent infection. Take these as directed.  · Call your doctor if you have heavy bleeding or blood clots, burning that  lasts more than a day, a fever over 100°F  (38° C), or trouble urinating.  Date Last Reviewed: 1/1/2017 © 2000-2017 [employer]. [address] [place] [address], [place] [address]. All rights reserved. This information is not intended as a substitute for professional medical care. Always follow your healthcare professional's instructions.        Transrectal Ultrasound and Biopsy    A transrectal ultrasound is an imaging test. It uses sound waves to create pictures of a mans prostate gland. Your prostate gland is in front of your rectum. For this test, a special probe (transducer) is placed directly into your rectum. During the test, tissue samples (a biopsy) may also be taken. The test is done by a specially trained technologist called a sonographer.  Getting ready for your test  · You may be asked to clear your bowel before the test. This will be done by injecting liquid into your rectum (an enema). .  · Tell your health care provider about any medicines, herbs, or supplements you are taking. This includes any over-the-counter medicines such as aspirin or ibuprofen. STOP mobic and aspirin 1 week prior and any other bloodthinners - see list  · Answer any questions your provider has about your medical history. This will help your provider tailor the test to your health needs.  During your test  · You may be asked to change into a gown. You will then lie on your side on an exam table, with your knees bent.  · The test is done with a hand-held probe. This is a short, slender heriberto. It has a sterile, disposable cover. It is also greased (lubricated) with some gel. It is then gently placed inside your rectum.  · You will feel pressure from the probe. If you feel pain, let your provider know.  · If a biopsy is taken, it is done using a small probe with a very tiny needle on the end. This needle enters your prostate and removes several tiny samples of tissue. These samples are then sent to a lab to be examined. Any  mild pain from the biopsy is usually minor.   · Local anesthesia will be used to numb prostate before biopsy needle  After your test  Before leaving, you may need to wait for a short time while the images are reviewed. In most cases, you can go back to your normal routine after the test. If you had a biopsy, you may see some blood in your urine, sperm, or stool. This is normal. Your health care provider will let you know when your test results are ready.  In some cases, a diagnosis cant be made from the tissue sample that was taken. If this happens, your provider will talk with you about whether you may need another biopsy. Or you may need a different procedure.  When to call your health care provider  Call your provider if you have:  · Very bloody urine or stool  · A fever lasting 24 to 48 hours  · Any other symptoms that your provider asks you to report, based on your medical condition   Date Last Reviewed: 6/19/2015  © 6764-1244 [employer]. [address] [place] [address], [place] [address]. All rights reserved. This information is not intended as a substitute for professional medical care. Always follow your healthcare professional's instructions.

## 2019-05-24 NOTE — PROGRESS NOTES
"[facility] Urology New Patient/H&P:    Baldo Carney is a 68 y.o. male who presents for evaluation of elevated psa at referral of FRANCOIS Hayes.    She last saw him in April 2019 HTN, HLD, and the following regarding urinary frequency:  This is a chronic problem. The current episode started more than 1 month ago. The problem occurs intermittently. The problem has been gradually worsening. The patient is experiencing no pain. There has been no fever. Associated symptoms include frequency. Pertinent negatives include no flank pain, hematuria, nausea, urgency, vomiting, constipation or rash. Treatments tried: flomax. The treatment provided moderate relief. His past medical history is significant for diabetes mellitus and hypertension. BPH   PSA noted to be 16.7 on 1/10/19, previously 36.9 on 3/28/18, and 13.9 in 12/2014    He does not recall any prior urologic workup for PSA elevation or any previous visits to urologic provider.    On chart review, however, he had workup in 2014 by Dr. Edwards and was last evaluated in 2018 by FRANCOIS Ludwig. He does not recall. Reports memory issues.  In 2014 visit with Dr Edwards noted:  Past psa elevations, and per Dr Hernandez's note his PSA is 27. Reported pending "3 heart valves and a stent". Had been on abx in the past but not sure why. Poor historian, Sounds like he was going to have a biopsy in [facility] a few months ago but patient canceled procedure.   He then had prostate biopsy planned 10/21/14 but was canceled due to severe blood pressure elevations and was advised to see primary care provider and return for biopsy but never did.  He did then see FRANCOIS Ludwig on 5/24/18 when psa reached 36.9 and c/o LUTS-urinary frequency, urgency and weak urine stream and NTF x4-5.   A postvoid residual at that time was found to be 178 cc.  And prostate was found to be enlarged and mildly tender and he was treated with antibiotics empirically as if he had prostatitis.  He was given 21 days of " Bactrim and started on Flomax 0.4 mg daily.    He reports AUA symptom score of 14/3 (3:  Frequency, straining, sleeping; 2:  Emptying; 1:  Intermittency, weak stream).  He reports the medications helped his symptoms 3/10.  He may have had a biopsy before at [place] in [facility] and believes prior PSA history would be located there.  Urinalysis dipstick is negative except for 30 of protein.  Postvoid residual by bladder scan is 116 cc.  He reports previously seeing Dr. Murillo and notes that she and another doctor also checked PSA and set it was high and he was given medication for it.  He asks today Am I on meds for my PSA ?  Very poor historian.   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  Denies dysuria, hematuria.  Denies perirectal, perineal, ejaculatory, testicular pain.    Past Medical History:   Diagnosis Date    Asthma     Cardiomyopathy 10/21/2014    Diabetes mellitus     Hyperlipidemia     Hypertension        Past Surgical History:   Procedure Laterality Date    AORTOCORONARY BYPASS-CABG N/A 11/3/2014    Performed by Fabian Mclaughlin MD at [facility] OR [address] [place] [address]    BREAST SURGERY      CARDIAC CATHETERIZATION      CARDIAC VALVE SURGERY      CORONARY ARTERY BYPASS GRAFT      EYE SURGERY      REPLACEMENT-VALVE-AORTIC N/A 11/3/2014    Performed by Fabian Mclaughlin MD at [facility] OR [address] [place] [address]    SHOULDER ARTHROSCOPY  1985       Family History   Problem Relation Age of Onset    No Known Problems Mother     Diabetes Father     Hypertension Father     Heart attack Father     Heart disease Father     Diabetes Sister     Heart disease Brother     Diabetes Brother        Social History     Socioeconomic History    Marital status:      Spouse name: Not on file    Number of children: Not on file    Years of education: Not on file    Highest education level: Not on file   Occupational History    Not on file   Social Needs    Financial  resource strain: Not on file    Food insecurity:     Worry: Not on file     Inability: Not on file    Transportation needs:     Medical: Not on file     Non-medical: Not on file   Tobacco Use    Smoking status: Never Smoker    Smokeless tobacco: Never Used   Substance and Sexual Activity    Alcohol use: No     Frequency: Never     Comment: social    Drug use: No    Sexual activity: Never   Lifestyle    Physical activity:     Days per week: Not on file     Minutes per session: Not on file    Stress: Not on file   Relationships    Social connections:     Talks on phone: Not on file     Gets together: Not on file     Attends Yarsanism service: Not on file     Active member of club or organization: Not on file     Attends meetings of clubs or organizations: Not on file     Relationship status: Not on file   Other Topics Concern    Not on file   Social History Narrative    Not on file       Review of patient's allergies indicates:  No Known Allergies    Medications Reviewed: see MAR    ROS:    Constitutional: denies fevers, chills, night sweats, fatigue, malaise  Respiratory: negative for cough, shortness of breath, wheezing, dyspnea.  Cardiovascular: + for high blood pressure, negative for chest pain, varicose veins, ankle swelling, palpitations, syncope.  GI: negative for abdominal pain, heartburn, indigestion, nausea, vomiting, constipation, diarrhea, blood in stool.   Urology: as noted above in HPI  Endocrinology: negative for cold intolerance, excessive thirst, not feeling tired/sluggish, no heat intolerance.   Hematology/Lymph: negative for easy bleeding, easy bruising, swollen glands.  Musculoskeletal: negative for back pain, joint pain, joint swelling, neck pain.  Allergy-Immunology: negative for seasonal allergies, negative for unusual infections.   Skin: negative for boils, breast lumps, hives, itching, rash.   Neurology: negative for, dizziness, headache, tingling/numbness, tremors.   Psych:  satisfied with life; negative for, anxiety, depression, suicidal thoughts.     PHYSICAL EXAM:    Vitals:    05/24/19 1033   BP: (!) 141/71   Pulse: 76   Resp: 18     Body mass index is 21.53 kg/m². Weight: 72 kg (158 lb 11.7 oz) Height: 6' (182.9 cm)       General: Alert, cooperative, no distress, appears stated age  Head: Normocephalic, without obvious abnormality, atraumatic  Neck: no masses, no thyromegaly, no lymphadenopathy  Eyes: PERRL, conjunctiva/corneas clear  Lungs: Respirations unlabored, normal effort, no accessory muscle use  CV: Warm and well perfused extremities  Abdomen: Soft, non-tender, no CVA tenderness, no hepatosplenomegaly  Penis: phallus normal, well cared for, no plaques or lesions.   Scrotum: no cysts, no lesions, no rash, no hydrocele.   Epididymes: normal, nontender, symmetrical, no masses or cysts.   Testes: normal, both descended, no masses.   Urethra: palpably normal with orthotopic meatus of normal size    NEHAL: normal sphincter tone, no masses, no hemmorrhoids   PROSTATE: 40g, no nodules, non-tender, symmetrical.   Extremities: Extremities normal, atraumatic, no cyanosis or edema  Skin: Normal color, texture, and turgor, no rashes or lesions  Psych: Appropriate, well oriented, normal affect, normal mood  Neuro: Non-focal    Lab Results   Component Value Date    PSA 16.7 (H) 01/10/2019    PSA 36.9 (H) 03/28/2018       LABS:    Recent Results (from the past 336 hour(s))   POCT URINE DIPSTICK WITHOUT MICROSCOPE    Collection Time: 05/24/19 10:35 AM   Result Value Ref Range    Color, UA yellow     Spec Grav UA 1.010     pH, UA 5.5     WBC, UA neg     Nitrite, UA neg     Protein 30     Glucose, UA neg     Ketones, UA neg     Urobilinogen, UA 0.2     Bilirubin neg     Blood, UA neg          Assessment/Diagnosis:    1. Elevated PSA  POCT URINE DIPSTICK WITHOUT MICROSCOPE    Case Request Operating Room: BIOPSY, PROSTATE, RECTAL APPROACH, WITH US GUIDANCE, CYSTOSCOPY   2. BPH with  obstruction/lower urinary tract symptoms  Case Request Operating Room: BIOPSY, PROSTATE, RECTAL APPROACH, WITH US GUIDANCE, CYSTOSCOPY       Plans:  I had a long discussion with the patient regarding the natural history of cancer in men as well as when diagnostics are indicated. We also discussed differential for elevated psa which also includes benign enlargement and prostatitis.  We did discuss that an elevated PSA is considered a PSA greater than 4 because statistically 20% of people in this value range are found to have prostate cancer, however we also discussed a bit about PSA velocity and trends and age specific psa elevations. Given his significant persistent PSA elevation, now in the absence of any lower tract infectious symptoms, I therefore recommended prostate biopsy to evaluate for underlying malignancy.  We discussed biopsy and in detail, including 1% risk of infectious complications including sepsis but that it is an otherwise safe diagnostic procedure with expected hematuria hematospermia after.      I went over the details of a transrectal ultrasound-guided biopsy of the prostate, and described the technique in detail.   The patient will be given local injection anesthetic to block the prostate so as to minimize any pain. 12-14 biopsy specimens will be taken. These will be sent for histopathology analysis.   Complications include bleeding, fever and chills. He was also instructed to watch for any signs of fever. If he does have any fever or chills after, he was advised to come to the emergency room right away for intravenous antibiotics and possible admission to the hospital. He is to refrain from any strenuous activity including sexual activity for the next 72 hours after biopsy. He was also advised that he may have blood while urinating, during bowel movements as well as during ejaculations. He was given a prebiopsy/postbiopsy instruction sheet was reminding him to avoid aspirin and blood thinners  for 7 days prior, take the Rxed antibiotics the day before, day of, day after biopsy, and perform a fleet enema at home morning of biopsy. All questions he had were answered in detail.     As well, given his long history of BPH with persistent moderate symptoms despite alpha blockers, and persistently documented incomplete emptying including today with a postvoid residual of 116 cc, I did advise concurrent cystoscopic lower tract evaluation to help determine level of obstruction and guide further recommendations of management of prostate depending on pathology of biopsy.  Procedure in detail diagnostic flexible cystourethroscopy was described to the patient including local instillation of xylocaine jelly per urethra and all questions answered.  He is agreeable to this procedure as well.     Cysto and TRUS/bx scheduled at [facility] on 6/25/19  Will attempt to get records from [facility].  Should continue Flomax in the interim.  All instructions and details of procedures were provided in riding and explicitly reviewed with the patient given his known memory issues.  As well, given his cardiac history, we will seek clearance from Dr. Leonard to hold aspirin prior    45 mins spent in encounter, over half in counseling

## 2019-06-01 DIAGNOSIS — R97.20 ELEVATED PSA: Primary | ICD-10-CM

## 2019-06-01 RX ORDER — LIDOCAINE HYDROCHLORIDE 20 MG/ML
JELLY TOPICAL ONCE
Status: CANCELLED | OUTPATIENT
Start: 2019-06-01 | End: 2019-06-01

## 2019-06-01 RX ORDER — GENTAMICIN SULFATE 40 MG/ML
160 INJECTION, SOLUTION INTRAMUSCULAR; INTRAVENOUS ONCE
Status: CANCELLED | OUTPATIENT
Start: 2019-06-25

## 2019-06-01 RX ORDER — LIDOCAINE HYDROCHLORIDE 10 MG/ML
10 INJECTION, SOLUTION EPIDURAL; INFILTRATION; INTRACAUDAL; PERINEURAL ONCE
Status: CANCELLED | OUTPATIENT
Start: 2019-06-01 | End: 2019-06-01

## 2019-06-01 RX ORDER — CIPROFLOXACIN 500 MG/1
500 TABLET ORAL 2 TIMES DAILY
Qty: 6 TABLET | Refills: 0 | Status: SHIPPED | OUTPATIENT
Start: 2019-06-01 | End: 2019-06-09 | Stop reason: SDUPTHER

## 2019-06-09 RX ORDER — CIPROFLOXACIN 500 MG/1
500 TABLET ORAL 2 TIMES DAILY
Qty: 6 TABLET | Refills: 0 | Status: ON HOLD | OUTPATIENT
Start: 2019-06-09 | End: 2019-07-30 | Stop reason: SDUPTHER

## 2019-06-24 ENCOUNTER — TELEPHONE (OUTPATIENT)
Dept: UROLOGY | Facility: CLINIC | Age: 69
End: 2019-06-24

## 2019-06-24 ENCOUNTER — TELEPHONE (OUTPATIENT)
Dept: SURGERY | Facility: AMBULARY SURGERY CENTER | Age: 69
End: 2019-06-24

## 2019-06-24 NOTE — TELEPHONE ENCOUNTER
Patient didn't stop his aspirin 325 mg.  I will move him to July 30th.  Dr Nguyen wants him to come in about a week Prior to review the instructions for the prostate again.  Please reschedule his follow up.  Thanks

## 2019-07-10 ENCOUNTER — OFFICE VISIT (OUTPATIENT)
Dept: FAMILY MEDICINE | Facility: CLINIC | Age: 69
End: 2019-07-10
Payer: MEDICARE

## 2019-07-10 VITALS
HEIGHT: 72 IN | DIASTOLIC BLOOD PRESSURE: 66 MMHG | HEART RATE: 68 BPM | OXYGEN SATURATION: 98 % | BODY MASS INDEX: 23.5 KG/M2 | SYSTOLIC BLOOD PRESSURE: 130 MMHG | WEIGHT: 173.5 LBS | RESPIRATION RATE: 16 BRPM

## 2019-07-10 DIAGNOSIS — E11.9 TYPE 2 DIABETES MELLITUS WITHOUT COMPLICATION, WITHOUT LONG-TERM CURRENT USE OF INSULIN: ICD-10-CM

## 2019-07-10 DIAGNOSIS — D64.9 ANEMIA, UNSPECIFIED TYPE: Primary | ICD-10-CM

## 2019-07-10 DIAGNOSIS — R17 SERUM TOTAL BILIRUBIN ELEVATED: ICD-10-CM

## 2019-07-10 PROCEDURE — 99214 OFFICE O/P EST MOD 30 MIN: CPT | Mod: ,,, | Performed by: NURSE PRACTITIONER

## 2019-07-10 PROCEDURE — 99214 PR OFFICE/OUTPT VISIT, EST, LEVL IV, 30-39 MIN: ICD-10-PCS | Mod: ,,, | Performed by: NURSE PRACTITIONER

## 2019-07-10 NOTE — PROGRESS NOTES
SUBJECTIVE:      Patient ID: Baldo Carney is a 68 y.o. male.    Chief Complaint: No chief complaint on file.    F/U for DM recheck - will redraw some abnormal labs from January r/t mild anemia & elev total bili in addition to A1c & urine microalbumin    Scheduled for prostate biopsy 7/30 with Dr. Nguyen, knows to stop metformin & ASA at least 1 week prior    Hypertension   This is a chronic problem. The current episode started more than 1 year ago. The problem is controlled. Pertinent negatives include no chest pain, headaches, neck pain, peripheral edema or shortness of breath. Agents associated with hypertension include NSAIDs. Risk factors for coronary artery disease include male gender, sedentary lifestyle, diabetes mellitus, dyslipidemia and family history. Past treatments include calcium channel blockers, ACE inhibitors and diuretics. The current treatment provides mild improvement. Compliance problems include exercise.  Hypertensive end-organ damage includes CAD/MI. Identifiable causes of hypertension include a hypertension causing med.   Diabetes   He presents for his follow-up diabetic visit. He has type 2 diabetes mellitus. No MedicAlert identification noted. His disease course has been stable. Pertinent negatives for hypoglycemia include no confusion, dizziness, headaches, nervousness/anxiousness or pallor. Associated symptoms include fatigue and foot paresthesias. Pertinent negatives for diabetes include no chest pain, no polydipsia, no polyuria and no weakness. There are no hypoglycemic complications. Symptoms are stable. Diabetic complications include peripheral neuropathy. Risk factors for coronary artery disease include diabetes mellitus, dyslipidemia, family history, hypertension, male sex and sedentary lifestyle. Current diabetic treatment includes oral agent (dual therapy). He is compliant with treatment all of the time. His weight is fluctuating minimally. He is following a generally  healthy diet. He has not had a previous visit with a dietitian. He rarely participates in exercise. An ACE inhibitor/angiotensin II receptor blocker is being taken.   Hyperlipidemia   This is a chronic problem. The current episode started more than 1 year ago. The problem is controlled. Recent lipid tests were reviewed and are normal. Exacerbating diseases include diabetes. Pertinent negatives include no chest pain, myalgias or shortness of breath. Current antihyperlipidemic treatment includes statins. The current treatment provides moderate improvement of lipids. Compliance problems include adherence to exercise.  Risk factors for coronary artery disease include diabetes mellitus, dyslipidemia, family history, hypertension, male sex and a sedentary lifestyle.   Fatigue   This is a new problem. The current episode started more than 1 month ago. The problem occurs daily. The problem has been unchanged. Associated symptoms include fatigue. Pertinent negatives include no abdominal pain, arthralgias, chest pain, congestion, coughing, fever, headaches, joint swelling, myalgias, nausea, neck pain, rash, sore throat, vomiting or weakness. He has tried nothing for the symptoms.       Past Surgical History:   Procedure Laterality Date    AORTOCORONARY BYPASS-CABG N/A 11/3/2014    Performed by [facility] at [facility] OR 2ND FLR    BREAST SURGERY      CARDIAC CATHETERIZATION      CARDIAC VALVE SURGERY      CORONARY ARTERY BYPASS GRAFT      EYE SURGERY      REPLACEMENT-VALVE-AORTIC N/A 11/3/2014    Performed by [facility]claughlin [facility] at [facility] OR 2ND FLR    SHOULDER ARTHROSCOPY  1985     Family History   Problem Relation Age of Onset    No Known Problems Mother     Diabetes Father     Hypertension Father     Heart attack Father     Heart disease Father     Diabetes Sister     Heart disease Brother     Diabetes Brother     No Known Problems Maternal Grandmother     No Known Problems Maternal Grandfather      No Known Problems Paternal Grandmother     No Known Problems Paternal Grandfather     No Known Problems Maternal Aunt     No Known Problems Maternal Uncle     No Known Problems Paternal Aunt     No Known Problems Paternal Uncle     Anemia Neg Hx     Arrhythmia Neg Hx     Asthma Neg Hx     Clotting disorder Neg Hx     Fainting Neg Hx     Heart failure Neg Hx     Hyperlipidemia Neg Hx     Glaucoma Neg Hx       Social History     Socioeconomic History    Marital status:      Spouse name: Not on file    Number of children: Not on file    Years of education: Not on file    Highest education level: Not on file   Occupational History    Not on file   Social Needs    Financial resource strain: Not on file    Food insecurity:     Worry: Not on file     Inability: Not on file    Transportation needs:     Medical: Not on file     Non-medical: Not on file   Tobacco Use    Smoking status: Never Smoker    Smokeless tobacco: Never Used   Substance and Sexual Activity    Alcohol use: No     Frequency: Never     Comment: social    Drug use: No    Sexual activity: Never   Lifestyle    Physical activity:     Days per week: Not on file     Minutes per session: Not on file    Stress: Not on file   Relationships    Social connections:     Talks on phone: Not on file     Gets together: Not on file     Attends Hindu service: Not on file     Active member of club or organization: Not on file     Attends meetings of clubs or organizations: Not on file     Relationship status: Not on file   Other Topics Concern    Not on file   Social History Narrative    Not on file     Current Outpatient Medications   Medication Sig Dispense Refill    amlodipine-benazepril 5-20 mg (LOTREL) 5-20 mg per capsule TAKE 1 CAPSULE BY MOUTH EVERY NIGHT AT BEDTIME 90 capsule 1    ARNUITY ELLIPTA 100 mcg/actuation DsDv INHALE ONE BLISTER DAILY, RINSE MOUTH AFTER USE  6    aspirin (ECOTRIN) 325 MG EC tablet Take 1  tablet (325 mg total) by mouth once daily. 30 tablet 4    atorvastatin (LIPITOR) 80 MG tablet TAKE 1 TABLET BY MOUTH ONCE A DAY 90 tablet 1    azelastine (ASTELIN) 137 mcg (0.1 %) nasal spray PLACE 1 SPRAY INTO EACH NOSTRL 2 X A DAY  0    blood sugar diagnostic Strp USE BRAND COVERED BY INSURANCE AND COMPATIBLE WITH METER TO CHECK GLUCOSE 2X  strip 1    blood-glucose meter kit USE BRAND AS COVERED BY INSURANCE AND COMPATIBLE WITH STRIPS TO CHECK GLUCOSE 2X DAY 1 each 0    ciprofloxacin HCl (CIPRO) 500 MG tablet Take 1 tablet (500 mg total) by mouth 2 (two) times daily. 6 tablet 0    fluticasone-vilanterol (BREO ELLIPTA) 100-25 mcg/dose diskus inhaler Inhale 1 puff into the lungs once daily. Controller 2 each inh    furosemide (LASIX) 20 MG tablet TAKE 1 TABLET TWICE DAILY 180 tablet 1    gabapentin (NEURONTIN) 300 MG capsule Take 1 capsule (300 mg total) by mouth every evening. 90 capsule 3    JANUVIA 100 mg Tab TAKE 1 TABLET ONE TIME DAILY 90 tablet 1    meloxicam (MOBIC) 7.5 MG tablet TAKE 1 TABLET EVERY DAY 90 tablet 0    metFORMIN (GLUCOPHAGE) 1000 MG tablet TAKE 1 TABLET TWICE DAILY WITH MEALS 180 tablet 1    montelukast (SINGULAIR) 10 mg tablet Take 10 mg by mouth every evening.  0    potassium chloride SA (K-DUR,KLOR-CON) 20 MEQ tablet Take 1 tablet (20 mEq total) by mouth once daily. 90 tablet 1    PRODIGY TWIST TOP LANCET 28 gauge Misc CHECK  GLUCOSE TWICE DAILY 100 each 1    tamsulosin (FLOMAX) 0.4 mg Cap Take 2 capsules (0.8 mg total) by mouth once daily. 180 capsule 1    VENTOLIN HFA 90 mcg/actuation inhaler INHALE 2 PUFFS EVERY 6 HOURS AS NEEDED FOR WHEEZING  (RESCUE) 18 g 3     No current facility-administered medications for this visit.      Review of patient's allergies indicates:  No Known Allergies   Past Medical History:   Diagnosis Date    Asthma     Cardiomyopathy 10/21/2014    Diabetes mellitus     Hyperlipidemia     Hypertension      Past Surgical History:    Procedure Laterality Date    AORTOCORONARY BYPASS-CABG N/A 11/3/2014    [facility] at [facility] 2ND FLR    BREAST SURGERY      CARDIAC CATHETERIZATION      CARDIAC VALVE SURGERY      CORONARY ARTERY BYPASS GRAFT      EYE SURGERY      REPLACEMENT-VALVE-AORTIC N/A 11/3/2014    Performed by [facility] at [facility] OR 2ND FLR    SHOULDER ARTHROSCOPY  1985       Review of Systems   Constitutional: Positive for fatigue. Negative for activity change, appetite change and fever.   HENT: Negative for congestion, ear pain, hearing loss, postnasal drip, sinus pressure, sinus pain, sneezing and sore throat.    Eyes: Negative for photophobia and pain.   Respiratory: Negative for cough, chest tightness, shortness of breath and wheezing.    Cardiovascular: Negative for chest pain and leg swelling.   Gastrointestinal: Negative for abdominal distention, abdominal pain, blood in stool, constipation, diarrhea, nausea and vomiting.   Endocrine: Negative for cold intolerance, heat intolerance, polydipsia and polyuria.   Genitourinary: Positive for frequency. Negative for difficulty urinating, dysuria, flank pain, hematuria and urgency.        Nocturia   Musculoskeletal: Negative for arthralgias, back pain, joint swelling, myalgias and neck pain.   Skin: Negative for pallor and rash.   Allergic/Immunologic: Negative for environmental allergies and food allergies.   Neurological: Negative for dizziness, weakness, light-headedness and headaches.        BLE neuropathy   Hematological: Does not bruise/bleed easily.   Psychiatric/Behavioral: Negative for confusion, decreased concentration and sleep disturbance. The patient is not nervous/anxious.       OBJECTIVE:      Vitals:    07/10/19 0911   BP: 130/66   Pulse: 68   Resp: 16   SpO2: 98%   Weight: 78.7 kg (173 lb 8 oz)   Height: 6' (1.829 m)     Physical Exam   Constitutional: He is oriented to person, place, and time. Vital signs are normal. He  appears well-developed and well-nourished. No distress.   HENT:   Head: Normocephalic and atraumatic.   Right Ear: Hearing normal.   Left Ear: Hearing normal.   Nose: Nose normal. No rhinorrhea.   Mouth/Throat: Mucous membranes are normal.   Eyes: Pupils are equal, round, and reactive to light. Conjunctivae and lids are normal. Right eye exhibits no discharge. Left eye exhibits no discharge. Right conjunctiva is not injected. Left conjunctiva is not injected. Right pupil is round and reactive. Left pupil is round and reactive. Pupils are equal.   Neck: Trachea normal and normal range of motion. Neck supple. No JVD present. No tracheal deviation present. No thyromegaly present.   Cardiovascular: Normal rate, regular rhythm, normal heart sounds and intact distal pulses. Exam reveals no gallop and no friction rub.   No murmur heard.  Pulses:       Radial pulses are 2+ on the right side, and 2+ on the left side.        Dorsalis pedis pulses are 2+ on the right side, and 2+ on the left side.        Posterior tibial pulses are 2+ on the right side, and 2+ on the left side.   Pulmonary/Chest: Effort normal and breath sounds normal. No stridor. No respiratory distress. He has no decreased breath sounds. He has no wheezes. He has no rhonchi. He has no rales.   Abdominal: Soft. Bowel sounds are normal. He exhibits no distension. There is no tenderness. There is no rigidity and no guarding.   Musculoskeletal: Normal range of motion. He exhibits no edema.        Right foot: There is normal range of motion and no deformity.        Left foot: There is normal range of motion and no deformity.   Feet:   Right Foot:   Protective Sensation: 8 sites tested. 8 sites sensed.   Skin Integrity: Negative for ulcer, blister, skin breakdown, erythema, warmth, callus or dry skin.   Left Foot:   Protective Sensation: 8 sites tested. 8 sites sensed.   Skin Integrity: Negative for ulcer, blister, skin breakdown, erythema, warmth, callus or dry  skin.   Lymphadenopathy:     He has no cervical adenopathy.   Neurological: He is alert and oriented to person, place, and time. He has normal strength. He displays no atrophy. He displays a negative Romberg sign. Coordination and gait normal.   Skin: Skin is warm and dry. Capillary refill takes less than 2 seconds. No lesion and no rash noted. No cyanosis. No pallor.   Hardened, thick yellow toenails on several toes cira   Psychiatric: He has a normal mood and affect. His speech is normal and behavior is normal. Judgment and thought content normal. Cognition and memory are normal. He is attentive.   Nursing note and vitals reviewed.     Assessment:       1. Anemia, unspecified type    2. Serum total bilirubin elevated    3. Type 2 diabetes mellitus without complication, without long-term current use of insulin        Plan:       Anemia, unspecified type  -     CBC auto differential; Future; Expected date: 07/10/2019  -     Ferritin; Future; Expected date: 07/10/2019  -     Iron and TIBC; Future; Expected date: 07/10/2019    Serum total bilirubin elevated  -     Bilirubin, total; Future; Expected date: 07/10/2019    Type 2 diabetes mellitus without complication, without long-term current use of insulin  -     Microalbumin/creatinine urine ratio; Future; Expected date: 07/10/2019  -     Hemoglobin A1c; Future; Expected date: 07/10/2019        - stable on meds        Follow up in about 4 years (around 3/10/2023) for HTN recheck.      7/10/2019 KAMERON Rodriguez, FNP-C

## 2019-07-17 ENCOUNTER — TELEPHONE (OUTPATIENT)
Dept: UROLOGY | Facility: CLINIC | Age: 69
End: 2019-07-17

## 2019-07-17 LAB
% SATURATION: 15 %
BASOPHILS NFR BLD: 0.1 K/UL (ref 0–0.2)
BASOPHILS NFR BLD: 0.6 %
BILIRUB SERPL-MCNC: 0.7 MG/DL (ref 0.3–1)
CREATININE RANDOM URINE: 81 MG/DL
EOSINOPHIL NFR BLD: 0.1 K/UL (ref 0–0.7)
EOSINOPHIL NFR BLD: 1.1 %
ERYTHROCYTE [DISTWIDTH] IN BLOOD BY AUTOMATED COUNT: 16 % (ref 11.7–14.9)
FERRITIN SERPL-MCNC: 70 NG/ML (ref 37–201)
GRAN #: 7.2 K/UL (ref 1.4–6.5)
GRAN%: 75 %
HBA1C MFR BLD: 6.4 % (ref 3.1–6.5)
HCT VFR BLD AUTO: 34.3 % (ref 39–55)
HGB BLD-MCNC: 11.2 G/DL (ref 14–16)
IMMATURE GRANS (ABS): 0 K/UL (ref 0–1)
IMMATURE GRANULOCYTES: 0.4 %
IRON: 46 MCG/DL (ref 32–176)
LYMPH #: 1.4 K/UL (ref 1.2–3.4)
LYMPH%: 14.5 %
MCH RBC QN AUTO: 29.6 PG (ref 25–35)
MCHC RBC AUTO-ENTMCNC: 32.7 G/DL (ref 31–36)
MCV RBC AUTO: 90.5 FL (ref 80–100)
MICROALBUM.,U,RANDOM: 725.3 MCG/ML (ref 0–19.9)
MICROALBUMIN/CREATININE RATIO: 895 (ref 0–30)
MONO #: 0.8 K/UL (ref 0.1–0.6)
MONO%: 8.4 %
NUCLEATED RBCS: 0 %
PLATELET # BLD AUTO: 175 K/UL (ref 140–440)
PMV BLD AUTO: 12 FL (ref 8.8–12.7)
RBC # BLD AUTO: 3.79 M/UL (ref 4.3–5.9)
TOTAL IRON BINDING CAPACITY: 312 MCG/DL (ref 177–435)
WBC # BLD AUTO: 9.6 K/UL (ref 5–10)

## 2019-07-17 NOTE — TELEPHONE ENCOUNTER
Patient came into the office today.    I reviewed the prostate bx instructions with him and gave f/u appt slip.  Patient aware to stop blood thinners (ASA and meloxicam) 7 days prior to procedure.

## 2019-07-17 NOTE — TELEPHONE ENCOUNTER
----- Message from Anny Carlson LPN sent at 2019  2:41 PM CDT -----  Regardin/19- remind of NV  Remind patient to come for NV

## 2019-07-30 ENCOUNTER — TELEPHONE (OUTPATIENT)
Dept: FAMILY MEDICINE | Facility: CLINIC | Age: 69
End: 2019-07-30

## 2019-07-30 ENCOUNTER — HOSPITAL ENCOUNTER (OUTPATIENT)
Facility: AMBULARY SURGERY CENTER | Age: 69
Discharge: HOME | End: 2019-07-30
Attending: UROLOGY | Admitting: UROLOGY
Payer: MEDICARE

## 2019-07-30 DIAGNOSIS — R97.20 ELEVATED PSA: ICD-10-CM

## 2019-07-30 PROCEDURE — 52000 PR CYSTOURETHROSCOPY: ICD-10-PCS | Mod: ,,, | Performed by: UROLOGY

## 2019-07-30 PROCEDURE — 76872 US TRANSRECTAL: CPT | Mod: 73 | Performed by: UROLOGY

## 2019-07-30 PROCEDURE — 76872 PR US TRANSRECTAL: ICD-10-PCS | Mod: 26,53,, | Performed by: UROLOGY

## 2019-07-30 PROCEDURE — 52000 CYSTOURETHROSCOPY: CPT | Mod: ,,, | Performed by: UROLOGY

## 2019-07-30 PROCEDURE — 52000 CYSTOURETHROSCOPY: CPT | Performed by: UROLOGY

## 2019-07-30 PROCEDURE — 76872 US TRANSRECTAL: CPT | Mod: 26,53,, | Performed by: UROLOGY

## 2019-07-30 RX ORDER — CIPROFLOXACIN 500 MG/1
500 TABLET ORAL 2 TIMES DAILY
Qty: 6 TABLET | Refills: 0 | Status: SHIPPED | OUTPATIENT
Start: 2019-07-30 | End: 2020-01-13 | Stop reason: ALTCHOICE

## 2019-07-30 RX ORDER — WATER 1 ML/ML
IRRIGANT IRRIGATION
Status: DISCONTINUED | OUTPATIENT
Start: 2019-07-30 | End: 2019-07-30 | Stop reason: HOSPADM

## 2019-07-30 RX ORDER — LIDOCAINE HYDROCHLORIDE 20 MG/ML
JELLY TOPICAL
Status: DISCONTINUED | OUTPATIENT
Start: 2019-07-30 | End: 2019-07-30 | Stop reason: HOSPADM

## 2019-07-30 RX ORDER — LIDOCAINE HYDROCHLORIDE 10 MG/ML
INJECTION INFILTRATION; PERINEURAL
Status: DISCONTINUED | OUTPATIENT
Start: 2019-07-30 | End: 2019-07-30 | Stop reason: HOSPADM

## 2019-07-30 RX ORDER — GENTAMICIN SULFATE 40 MG/ML
160 INJECTION, SOLUTION INTRAMUSCULAR; INTRAVENOUS ONCE
Status: COMPLETED | OUTPATIENT
Start: 2019-07-30 | End: 2019-07-30

## 2019-07-30 RX ORDER — LIDOCAINE HYDROCHLORIDE 20 MG/ML
JELLY TOPICAL ONCE
Status: DISCONTINUED | OUTPATIENT
Start: 2019-07-30 | End: 2019-07-30 | Stop reason: HOSPADM

## 2019-07-30 RX ORDER — LIDOCAINE HYDROCHLORIDE 10 MG/ML
10 INJECTION, SOLUTION EPIDURAL; INFILTRATION; INTRACAUDAL; PERINEURAL ONCE
Status: DISCONTINUED | OUTPATIENT
Start: 2019-07-30 | End: 2019-07-30 | Stop reason: HOSPADM

## 2019-07-30 RX ADMIN — GENTAMICIN SULFATE 160 MG: 40 INJECTION, SOLUTION INTRAMUSCULAR; INTRAVENOUS at 12:07

## 2019-07-30 NOTE — OP NOTE
[facility] Operative/Brief Discharge Note     Date: 7/30/19     Staff Surgeon: Spencer Nguyen MD     Pre-Op Diagnosis:   1. Elevated psa  2. BPH with LUTS     Post-Op Diagnosis: same     Procedure(s) Performed:   Cystoscopy (flexible)     INDICATION FOR PROCEDURE:   67 yo M with history of PSA elevations in the past (13.9 2014, 36.9 2018) and most recently 16.7 Jan 2019.  He did not recall past urologic evaluation though he did see our nurse practitioner in 2014 noting PSA came down from 29 to 13.9, and followed up with her when it was 36.9 and he had moderate obstructive lower urinary tract symptoms and a PVR of 178 cc.  After further discussion he may have been worked up in [place] at [place] in the past as well and possibly had a prostate biopsy but he has difficulty remembering.  On my evaluation he had an AUA symptom score 14/3, and a postvoid residual of 116 cc. Here for prostate biopsy and cystoscopy     ANESTHESIA: Local - urojet 2% xylocaine per urethra     PSA: 16.7     CYSTO FINDINGS:   Significant trilobar obstruction with significant intravesical extension with multilobed median lobe occupying majority of bladder lumen with kissing lateral lobe obstruction. Moderate bladder trabeculations.     CONFIRMED PATIENT TOOK ANTIBIOTICS: Yes     CONFIRMED PATIENT NOT TAKING ASPIRIN OR ANTICOAGULANTS: Yes     CONFIRMED PATIENT USED ENEMA: Yes     PROCEDURE IN DETAIL:  After informed consent, the patient was prepped and drapped in standard  cystoscopic fashion and 2% xylocaine jelly was instilled into the urethra. 80mg gentamicin injected IM.     First, a flexible cystoscope was passed into the bladder via the urethra.   Anterior urethra appeared normal without any lesions or narrowings.   The prostatic urethra demonstrated findings as above with significant enlargement with obstruction and significant obstructing intravesical middle lobe   The bladder was then systematically inspected. The ureteral  orifices were  in the orthotopic position bilaterally on the trigone, partially seen on retroflexion. The bladder mucosa, including the lateral walls, posterior wall, and dome were normal in appearance and free of any lesions or tumors. Did have moderate trabeculations. Majority of bladder lumen filled by middle lobe.      Patient voided into urinal, and was then turned to the left lateral position and TRUS probe attempted to pas into rectum.   Patient had extreme poor tolerance and ultrasound was not able to be successfully passed into the rectum.  A gloved finger was used to check for any strictures or stenosis and was able to passed to the prostate, there was significant discomfort and was noted to be quite deep.  A repeat attempt with the probe was again unsuccessful due to patient's poor tolerance and inability to pass probe into rectum so procedure was aborted at this time.     CONDITION: Stable    Disposition:   Patient was unable to tolerate transrectal ultrasound probe and therefore prostate biopsy was not performed nor do I have accurate volumetric measurement of his prostate.  He does have significant trilobar obstruction with significant intravesical median lobe occupying the majority of his bladder lumen with significant obstruction within this lobe as well as within the prostatic urethra of the lateral lobes.  Given the significant elevations in his PSA we did discuss it is prudent to perform prostate biopsy and rule out any malignancy before proceeding with any BPH procedures.   Prostate biopsies been scheduled in the operating room with anesthesia on 8/8/19.  I did advise that he will need to be cardiac cleared for anesthesia by his cardiologist, Dr. Leonard, whom he has seen recently in will therefore request clearance to proceed with anesthesia in any other preoperative recommendations.   I did again review all pre prostate biopsy instructions which he and his wife had with them and were addendd with  notes about holding aspirin and meloxicam 1 week prior to the procedure.   A new prescription for Cipro has been provided so he has the appropriate course of thony procedural antibiotics.    Discharge home today status post uncomplicated procedure as above  Diet - resume home diet  Follow up: 8/8/19 in OR for prostate biopsy  Instructions: follow prostate biopsy instruction sheet prior  Meds:     Medication List      CONTINUE taking these medications    amlodipine-benazepril 5-20 mg 5-20 mg per capsule  Commonly known as:  LOTREL  TAKE 1 CAPSULE BY MOUTH EVERY NIGHT AT BEDTIME     ARNUITY ELLIPTA 100 mcg/actuation Dsdv  Generic drug:  fluticasone furoate     aspirin 325 MG EC tablet  Commonly known as:  ECOTRIN  Take 1 tablet (325 mg total) by mouth once daily.     atorvastatin 80 MG tablet  Commonly known as:  LIPITOR  TAKE 1 TABLET BY MOUTH ONCE A DAY     azelastine 137 mcg (0.1 %) nasal spray  Commonly known as:  ASTELIN     blood sugar diagnostic Strp  USE BRAND COVERED BY INSURANCE AND COMPATIBLE WITH METER TO CHECK GLUCOSE 2X DAY     blood-glucose meter kit  USE BRAND AS COVERED BY INSURANCE AND COMPATIBLE WITH STRIPS TO CHECK GLUCOSE 2X DAY     ciprofloxacin HCl 500 MG tablet  Commonly known as:  CIPRO  Take 1 tablet (500 mg total) by mouth 2 (two) times daily.     fluticasone furoate-vilanterol 100-25 mcg/dose diskus inhaler  Commonly known as:  BREO ELLIPTA  Inhale 1 puff into the lungs once daily. Controller     furosemide 20 MG tablet  Commonly known as:  LASIX  TAKE 1 TABLET TWICE DAILY     gabapentin 300 MG capsule  Commonly known as:  NEURONTIN  Take 1 capsule (300 mg total) by mouth every evening.     JANUVIA 100 MG Tab  Generic drug:  SITagliptin  TAKE 1 TABLET ONE TIME DAILY     meloxicam 7.5 MG tablet  Commonly known as:  MOBIC  TAKE 1 TABLET EVERY DAY     metFORMIN 1000 MG tablet  Commonly known as:  GLUCOPHAGE  TAKE 1 TABLET TWICE DAILY WITH MEALS     montelukast 10 mg tablet  Commonly known as:   SINGULAIR     potassium chloride SA 20 MEQ tablet  Commonly known as:  K-DUR,KLOR-CON  Take 1 tablet (20 mEq total) by mouth once daily.     PRODIGY TWIST TOP LANCET 28 gauge Misc  Generic drug:  lancets  CHECK  GLUCOSE TWICE DAILY     tamsulosin 0.4 mg Cap  Commonly known as:  FLOMAX  Take 2 capsules (0.8 mg total) by mouth once daily.     VENTOLIN HFA 90 mcg/actuation inhaler  Generic drug:  albuterol  INHALE 2 PUFFS EVERY 6 HOURS AS NEEDED FOR WHEEZING  (RESCUE)           Where to Get Your Medications      These medications were sent to [facility] - [place], [place] - [address] [facility] [facility] [place]  [address] at [address] [facility] [place], [facility] [address]    Phone:  [phone]   · ciprofloxacin HCl 500 MG tablet

## 2019-07-30 NOTE — PLAN OF CARE
Stable, states ready to go home, michael po fluids, voided, ambulated to car with RN and wife to self care

## 2019-07-30 NOTE — H&P (VIEW-ONLY)
"[facility] Urology New Patient/H&P:     Baldo Carney is a 68 y.o. male who presents for evaluation of elevated psa at referral of FRANCOIS Hayes.     She last saw him in April 2019 HTN, HLD, and the following regarding urinary frequency:  This is a chronic problem. The current episode started more than 1 month ago. The problem occurs intermittently. The problem has been gradually worsening. The patient is experiencing no pain. There has been no fever. Associated symptoms include frequency. Pertinent negatives include no flank pain, hematuria, nausea, urgency, vomiting, constipation or rash. Treatments tried: flomax. The treatment provided moderate relief. His past medical history is significant for diabetes mellitus and hypertension. BPH   PSA noted to be 16.7 on 1/10/19, previously 36.9 on 3/28/18, and 13.9 in 12/2014     He does not recall any prior urologic workup for PSA elevation or any previous visits to urologic provider.    On chart review, however, he had workup in 2014 by Dr. Edwards and was last evaluated in 2018 by FRANCOIS Ludwig. He does not recall. Reports memory issues.  In 2014 visit with Dr Edwards noted:  Past psa elevations, and per Dr Hernandez's note his PSA is 27. Reported pending "3 heart valves and a stent". Had been on abx in the past but not sure why. Poor historian, Sounds like he was going to have a biopsy in [facility] a few months ago but patient canceled procedure.   He then had prostate biopsy planned 10/21/14 but was canceled due to severe blood pressure elevations and was advised to see primary care provider and return for biopsy but never did.  He did then see FRANCOIS Ludwig on 5/24/18 when psa reached 36.9 and c/o LUTS-urinary frequency, urgency and weak urine stream and NTF x4-5.   A postvoid residual at that time was found to be 178 cc.  And prostate was found to be enlarged and mildly tender and he was treated with antibiotics empirically as if he had prostatitis.  He was given 21 days of " Bactrim and started on Flomax 0.4 mg daily.     He reports AUA symptom score of 14/3 (3:  Frequency, straining, sleeping; 2:  Emptying; 1:  Intermittency, weak stream).  He reports the medications helped his symptoms 3/10.  He may have had a biopsy before at [facility] in [facility] and believes prior PSA history would be located there.  Urinalysis dipstick is negative except for 30 of protein.  Postvoid residual by bladder scan is 116 cc.  He reports previously seeing Dr. Murillo and notes that she and another doctor also checked PSA and set it was high and he was given medication for it.  He asks today Am I on meds for my PSA ?  Very poor historian.   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  Denies dysuria, hematuria.  Denies perirectal, perineal, ejaculatory, testicular pain.          Past Medical History:   Diagnosis Date    Asthma      Cardiomyopathy 10/21/2014    Diabetes mellitus      Hyperlipidemia      Hypertension                 Past Surgical History:   Procedure Laterality Date    AORTOCORONARY BYPASS-CABG N/A 11/3/2014     [facility]    BREAST SURGERY        CARDIAC CATHETERIZATION        CARDIAC VALVE SURGERY        CORONARY ARTERY BYPASS GRAFT        EYE SURGERY        REPLACEMENT-VALVE-AORTIC N/A 11/3/2014     Performed by [facility]ian [facility]claughlin [facility] at [facility] OR [address] [place] [address]    SHOULDER ARTHROSCOPY   1985               Family History   Problem Relation Age of Onset    No Known Problems Mother      Diabetes Father      Hypertension Father      Heart attack Father      Heart disease Father      Diabetes Sister      Heart disease Brother      Diabetes Brother           Social History               Socioeconomic History    Marital status:        Spouse name: Not on file    Number of children: Not on file    Years of education: Not on file    Highest education level: Not on file    Occupational History    Not on file   Social Needs    Financial resource strain: Not on file    Food insecurity:       Worry: Not on file       Inability: Not on file    Transportation needs:       Medical: Not on file       Non-medical: Not on file   Tobacco Use    Smoking status: Never Smoker    Smokeless tobacco: Never Used   Substance and Sexual Activity    Alcohol use: No       Frequency: Never       Comment: social    Drug use: No    Sexual activity: Never   Lifestyle    Physical activity:       Days per week: Not on file       Minutes per session: Not on file    Stress: Not on file   Relationships    Social connections:       Talks on phone: Not on file       Gets together: Not on file       Attends Sikh service: Not on file       Active member of club or organization: Not on file       Attends meetings of clubs or organizations: Not on file       Relationship status: Not on file   Other Topics Concern    Not on file   Social History Narrative    Not on file            Review of patient's allergies indicates:  No Known Allergies     Medications Reviewed: see MAR     ROS:     Constitutional: denies fevers, chills, night sweats, fatigue, malaise  Respiratory: negative for cough, shortness of breath, wheezing, dyspnea.  Cardiovascular: + for high blood pressure, negative for chest pain, varicose veins, ankle swelling, palpitations, syncope.  GI: negative for abdominal pain, heartburn, indigestion, nausea, vomiting, constipation, diarrhea, blood in stool.   Urology: as noted above in HPI  Endocrinology: negative for cold intolerance, excessive thirst, not feeling tired/sluggish, no heat intolerance.   Hematology/Lymph: negative for easy bleeding, easy bruising, swollen glands.  Musculoskeletal: negative for back pain, joint pain, joint swelling, neck pain.  Allergy-Immunology: negative for seasonal allergies, negative for unusual infections.   Skin: negative for boils, breast lumps, hives,  itching, rash.   Neurology: negative for, dizziness, headache, tingling/numbness, tremors.   Psych: satisfied with life; negative for, anxiety, depression, suicidal thoughts.      PHYSICAL EXAM:         Vitals:     05/24/19 1033   BP: (!) 141/71   Pulse: 76   Resp: 18      Body mass index is 21.53 kg/m². Weight: 72 kg (158 lb 11.7 oz) Height: 6' (182.9 cm)         General: Alert, cooperative, no distress, appears stated age  Head: Normocephalic, without obvious abnormality, atraumatic  Neck: no masses, no thyromegaly, no lymphadenopathy  Eyes: PERRL, conjunctiva/corneas clear  Lungs: Respirations unlabored, normal effort, no accessory muscle use  CV: Warm and well perfused extremities  Abdomen: Soft, non-tender, no CVA tenderness, no hepatosplenomegaly  Penis: phallus normal, well cared for, no plaques or lesions.   Scrotum: no cysts, no lesions, no rash, no hydrocele.   Epididymes: normal, nontender, symmetrical, no masses or cysts.   Testes: normal, both descended, no masses.   Urethra: palpably normal with orthotopic meatus of normal size    NEHAL: normal sphincter tone, no masses, no hemmorrhoids   PROSTATE: 40g, no nodules, non-tender, symmetrical.   Extremities: Extremities normal, atraumatic, no cyanosis or edema  Skin: Normal color, texture, and turgor, no rashes or lesions  Psych: Appropriate, well oriented, normal affect, normal mood  Neuro: Non-focal           Lab Results   Component Value Date     PSA 16.7 (H) 01/10/2019     PSA 36.9 (H) 03/28/2018         LABS:     Recent Results         Recent Results (from the past 336 hour(s))   POCT URINE DIPSTICK WITHOUT MICROSCOPE     Collection Time: 05/24/19 10:35 AM   Result Value Ref Range     Color, UA yellow       Spec Grav UA 1.010       pH, UA 5.5       WBC, UA neg       Nitrite, UA neg       Protein 30       Glucose, UA neg       Ketones, UA neg       Urobilinogen, UA 0.2       Bilirubin neg       Blood, UA neg                 Assessment/Diagnosis:     1.  Elevated PSA  POCT URINE DIPSTICK WITHOUT MICROSCOPE     Case Request Operating Room: BIOPSY, PROSTATE, RECTAL APPROACH, WITH US GUIDANCE, CYSTOSCOPY   2. BPH with obstruction/lower urinary tract symptoms  Case Request Operating Room: BIOPSY, PROSTATE, RECTAL APPROACH, WITH US GUIDANCE, CYSTOSCOPY         Plans:  I had a long discussion with the patient regarding the natural history of cancer in men as well as when diagnostics are indicated. We also discussed differential for elevated psa which also includes benign enlargement and prostatitis.  We did discuss that an elevated PSA is considered a PSA greater than 4 because statistically 20% of people in this value range are found to have prostate cancer, however we also discussed a bit about PSA velocity and trends and age specific psa elevations. Given his significant persistent PSA elevation, now in the absence of any lower tract infectious symptoms, I therefore recommended prostate biopsy to evaluate for underlying malignancy.  We discussed biopsy and in detail, including 1% risk of infectious complications including sepsis but that it is an otherwise safe diagnostic procedure with expected hematuria hematospermia after.      I went over the details of a transrectal ultrasound-guided biopsy of the prostate, and described the technique in detail.   The patient will be given local injection anesthetic to block the prostate so as to minimize any pain. 12-14 biopsy specimens will be taken. These will be sent for histopathology analysis.   Complications include bleeding, fever and chills. He was also instructed to watch for any signs of fever. If he does have any fever or chills after, he was advised to come to the emergency room right away for intravenous antibiotics and possible admission to the hospital. He is to refrain from any strenuous activity including sexual activity for the next 72 hours after biopsy. He was also advised that he may have blood while  urinating, during bowel movements as well as during ejaculations. He was given a prebiopsy/postbiopsy instruction sheet was reminding him to avoid aspirin and blood thinners for 7 days prior, take the Rxed antibiotics the day before, day of, day after biopsy, and perform a fleet enema at home morning of biopsy. All questions he had were answered in detail.      As well, given his long history of BPH with persistent moderate symptoms despite alpha blockers, and persistently documented incomplete emptying including today with a postvoid residual of 116 cc, I did advise concurrent cystoscopic lower tract evaluation to help determine level of obstruction and guide further recommendations of management of prostate depending on pathology of biopsy.  Procedure in detail diagnostic flexible cystourethroscopy was described to the patient including local instillation of xylocaine jelly per urethra and all questions answered.  He is agreeable to this procedure as well.     Cysto and TRUS/bx scheduled at [facility] on 6/25/19  Will attempt to get records from [facility].  Should continue Flomax in the interim.  All instructions and details of procedures were provided in riding and explicitly reviewed with the patient given his known memory issues.  As well, given his cardiac history, we will seek clearance from Dr. Leonard to hold aspirin prior     45 mins spent in encounter, over half in counseling      Pt is acceptable candidate for procedure at [facility]

## 2019-07-30 NOTE — TELEPHONE ENCOUNTER
----- Message from KAMERON Ann,FNP-C sent at 7/30/2019  8:32 AM CDT -----  Will discuss all labs at next visit - anemia improved, spilling protein into urine (be sure he's taking Lotrel for BP as it has benazepril which can protect kidneys)

## 2019-07-30 NOTE — H&P
"[facility] Urology New Patient/H&P:     Baldo Carney is a 68 y.o. male who presents for evaluation of elevated psa at referral of FRANCOIS Hayes.     She last saw him in April 2019 HTN, HLD, and the following regarding urinary frequency:  This is a chronic problem. The current episode started more than 1 month ago. The problem occurs intermittently. The problem has been gradually worsening. The patient is experiencing no pain. There has been no fever. Associated symptoms include frequency. Pertinent negatives include no flank pain, hematuria, nausea, urgency, vomiting, constipation or rash. Treatments tried: flomax. The treatment provided moderate relief. His past medical history is significant for diabetes mellitus and hypertension. BPH   PSA noted to be 16.7 on 1/10/19, previously 36.9 on 3/28/18, and 13.9 in 12/2014     He does not recall any prior urologic workup for PSA elevation or any previous visits to urologic provider.    On chart review, however, he had workup in 2014 by Dr. Edwards and was last evaluated in 2018 by FRANCOIS Ludwig. He does not recall. Reports memory issues.  In 2014 visit with Dr Edwards noted:  Past psa elevations, and per Dr Hernandez's note his PSA is 27. Reported pending "3 heart valves and a stent". Had been on abx in the past but not sure why. Poor historian, Sounds like he was going to have a biopsy in [facility] a few months ago but patient canceled procedure.   He then had prostate biopsy planned 10/21/14 but was canceled due to severe blood pressure elevations and was advised to see primary care provider and return for biopsy but never did.  He did then see FRANCOIS Ludwig on 5/24/18 when psa reached 36.9 and c/o LUTS-urinary frequency, urgency and weak urine stream and NTF x4-5.   A postvoid residual at that time was found to be 178 cc.  And prostate was found to be enlarged and mildly tender and he was treated with antibiotics empirically as if he had prostatitis.  He was given 21 days of " Bactrim and started on Flomax 0.4 mg daily.     He reports AUA symptom score of 14/3 (3:  Frequency, straining, sleeping; 2:  Emptying; 1:  Intermittency, weak stream).  He reports the medications helped his symptoms 3/10.  He may have had a biopsy before at [facility] in [facility] and believes prior PSA history would be located there.  Urinalysis dipstick is negative except for 30 of protein.  Postvoid residual by bladder scan is 116 cc.  He reports previously seeing Dr. Murillo and notes that she and another doctor also checked PSA and set it was high and he was given medication for it.  He asks today Am I on meds for my PSA ?  Very poor historian.   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  Denies dysuria, hematuria.  Denies perirectal, perineal, ejaculatory, testicular pain.          Past Medical History:   Diagnosis Date    Asthma      Cardiomyopathy 10/21/2014    Diabetes mellitus      Hyperlipidemia      Hypertension                 Past Surgical History:   Procedure Laterality Date    AORTOCORONARY BYPASS-CABG N/A 11/3/2014     [facility]    BREAST SURGERY        CARDIAC CATHETERIZATION        CARDIAC VALVE SURGERY        CORONARY ARTERY BYPASS GRAFT        EYE SURGERY        REPLACEMENT-VALVE-AORTIC N/A 11/3/2014     Performed by Fabian Mclaughlin MD at [facility] OR [address] [place] [address]    SHOULDER ARTHROSCOPY   1985               Family History   Problem Relation Age of Onset    No Known Problems Mother      Diabetes Father      Hypertension Father      Heart attack Father      Heart disease Father      Diabetes Sister      Heart disease Brother      Diabetes Brother           Social History               Socioeconomic History    Marital status:        Spouse name: Not on file    Number of children: Not on file    Years of education: Not on file    Highest education level: Not on file    Occupational History    Not on file   Social Needs    Financial resource strain: Not on file    Food insecurity:       Worry: Not on file       Inability: Not on file    Transportation needs:       Medical: Not on file       Non-medical: Not on file   Tobacco Use    Smoking status: Never Smoker    Smokeless tobacco: Never Used   Substance and Sexual Activity    Alcohol use: No       Frequency: Never       Comment: social    Drug use: No    Sexual activity: Never   Lifestyle    Physical activity:       Days per week: Not on file       Minutes per session: Not on file    Stress: Not on file   Relationships    Social connections:       Talks on phone: Not on file       Gets together: Not on file       Attends Denominational service: Not on file       Active member of club or organization: Not on file       Attends meetings of clubs or organizations: Not on file       Relationship status: Not on file   Other Topics Concern    Not on file   Social History Narrative    Not on file            Review of patient's allergies indicates:  No Known Allergies     Medications Reviewed: see MAR     ROS:     Constitutional: denies fevers, chills, night sweats, fatigue, malaise  Respiratory: negative for cough, shortness of breath, wheezing, dyspnea.  Cardiovascular: + for high blood pressure, negative for chest pain, varicose veins, ankle swelling, palpitations, syncope.  GI: negative for abdominal pain, heartburn, indigestion, nausea, vomiting, constipation, diarrhea, blood in stool.   Urology: as noted above in HPI  Endocrinology: negative for cold intolerance, excessive thirst, not feeling tired/sluggish, no heat intolerance.   Hematology/Lymph: negative for easy bleeding, easy bruising, swollen glands.  Musculoskeletal: negative for back pain, joint pain, joint swelling, neck pain.  Allergy-Immunology: negative for seasonal allergies, negative for unusual infections.   Skin: negative for boils, breast lumps, hives,  itching, rash.   Neurology: negative for, dizziness, headache, tingling/numbness, tremors.   Psych: satisfied with life; negative for, anxiety, depression, suicidal thoughts.      PHYSICAL EXAM:         Vitals:     05/24/19 1033   BP: (!) 141/71   Pulse: 76   Resp: 18      Body mass index is 21.53 kg/m². Weight: 72 kg (158 lb 11.7 oz) Height: 6' (182.9 cm)         General: Alert, cooperative, no distress, appears stated age  Head: Normocephalic, without obvious abnormality, atraumatic  Neck: no masses, no thyromegaly, no lymphadenopathy  Eyes: PERRL, conjunctiva/corneas clear  Lungs: Respirations unlabored, normal effort, no accessory muscle use  CV: Warm and well perfused extremities  Abdomen: Soft, non-tender, no CVA tenderness, no hepatosplenomegaly  Penis: phallus normal, well cared for, no plaques or lesions.   Scrotum: no cysts, no lesions, no rash, no hydrocele.   Epididymes: normal, nontender, symmetrical, no masses or cysts.   Testes: normal, both descended, no masses.   Urethra: palpably normal with orthotopic meatus of normal size    NEHAL: normal sphincter tone, no masses, no hemmorrhoids   PROSTATE: 40g, no nodules, non-tender, symmetrical.   Extremities: Extremities normal, atraumatic, no cyanosis or edema  Skin: Normal color, texture, and turgor, no rashes or lesions  Psych: Appropriate, well oriented, normal affect, normal mood  Neuro: Non-focal           Lab Results   Component Value Date     PSA 16.7 (H) 01/10/2019     PSA 36.9 (H) 03/28/2018         LABS:     Recent Results         Recent Results (from the past 336 hour(s))   POCT URINE DIPSTICK WITHOUT MICROSCOPE     Collection Time: 05/24/19 10:35 AM   Result Value Ref Range     Color, UA yellow       Spec Grav UA 1.010       pH, UA 5.5       WBC, UA neg       Nitrite, UA neg       Protein 30       Glucose, UA neg       Ketones, UA neg       Urobilinogen, UA 0.2       Bilirubin neg       Blood, UA neg                 Assessment/Diagnosis:     1.  Elevated PSA  POCT URINE DIPSTICK WITHOUT MICROSCOPE     Case Request Operating Room: BIOPSY, PROSTATE, RECTAL APPROACH, WITH US GUIDANCE, CYSTOSCOPY   2. BPH with obstruction/lower urinary tract symptoms  Case Request Operating Room: BIOPSY, PROSTATE, RECTAL APPROACH, WITH US GUIDANCE, CYSTOSCOPY         Plans:  I had a long discussion with the patient regarding the natural history of cancer in men as well as when diagnostics are indicated. We also discussed differential for elevated psa which also includes benign enlargement and prostatitis.  We did discuss that an elevated PSA is considered a PSA greater than 4 because statistically 20% of people in this value range are found to have prostate cancer, however we also discussed a bit about PSA velocity and trends and age specific psa elevations. Given his significant persistent PSA elevation, now in the absence of any lower tract infectious symptoms, I therefore recommended prostate biopsy to evaluate for underlying malignancy.  We discussed biopsy and in detail, including 1% risk of infectious complications including sepsis but that it is an otherwise safe diagnostic procedure with expected hematuria hematospermia after.      I went over the details of a transrectal ultrasound-guided biopsy of the prostate, and described the technique in detail.   The patient will be given local injection anesthetic to block the prostate so as to minimize any pain. 12-14 biopsy specimens will be taken. These will be sent for histopathology analysis.   Complications include bleeding, fever and chills. He was also instructed to watch for any signs of fever. If he does have any fever or chills after, he was advised to come to the emergency room right away for intravenous antibiotics and possible admission to the hospital. He is to refrain from any strenuous activity including sexual activity for the next 72 hours after biopsy. He was also advised that he may have blood while  urinating, during bowel movements as well as during ejaculations. He was given a prebiopsy/postbiopsy instruction sheet was reminding him to avoid aspirin and blood thinners for 7 days prior, take the Rxed antibiotics the day before, day of, day after biopsy, and perform a fleet enema at home morning of biopsy. All questions he had were answered in detail.      As well, given his long history of BPH with persistent moderate symptoms despite alpha blockers, and persistently documented incomplete emptying including today with a postvoid residual of 116 cc, I did advise concurrent cystoscopic lower tract evaluation to help determine level of obstruction and guide further recommendations of management of prostate depending on pathology of biopsy.  Procedure in detail diagnostic flexible cystourethroscopy was described to the patient including local instillation of xylocaine jelly per urethra and all questions answered.  He is agreeable to this procedure as well.     Cysto and TRUS/bx scheduled at [facility] on 6/25/19  Will attempt to get records from [facility].  Should continue Flomax in the interim.  All instructions and details of procedures were provided in riding and explicitly reviewed with the patient given his known memory issues.  As well, given his cardiac history, we will seek clearance from Dr. Leonard to hold aspirin prior     45 mins spent in encounter, over half in counseling      Pt is acceptable candidate for procedure at [facility]

## 2019-07-31 VITALS
RESPIRATION RATE: 18 BRPM | SYSTOLIC BLOOD PRESSURE: 147 MMHG | OXYGEN SATURATION: 100 % | DIASTOLIC BLOOD PRESSURE: 76 MMHG | HEIGHT: 72 IN | BODY MASS INDEX: 21.5 KG/M2 | HEART RATE: 58 BPM | TEMPERATURE: 98 F | WEIGHT: 158.75 LBS

## 2019-08-01 ENCOUNTER — TELEPHONE (OUTPATIENT)
Dept: UROLOGY | Facility: CLINIC | Age: 69
End: 2019-08-01

## 2019-08-01 DIAGNOSIS — R97.20 ELEVATED PSA: Primary | ICD-10-CM

## 2019-08-01 NOTE — TELEPHONE ENCOUNTER
----- Message from Halina Medrano sent at 8/1/2019 11:12 AM CDT -----  Contact: pt  Pt calling wanting to know what he has to take/get for procedure on 8/8/19....[phone]  
Spoke with patient reviewed biopsy instructions. Patient has antibiotics six tablets. Advised patient to start taking antibiotics the day before the procedure twice a day AM/PM. 1 fleet enema at home the morning of procedure. Patient verbally voiced understanding.  
no

## 2019-08-07 ENCOUNTER — ANESTHESIA EVENT (OUTPATIENT)
Dept: SURGERY | Facility: HOSPITAL | Age: 69
End: 2019-08-07
Payer: MEDICARE

## 2019-08-07 ENCOUNTER — TELEPHONE (OUTPATIENT)
Dept: UROLOGY | Facility: CLINIC | Age: 69
End: 2019-08-07

## 2019-08-07 NOTE — TELEPHONE ENCOUNTER
Clearance received from  to hold aspirin 5-7 days before prostate biopsy and restart 2-3 days after. Also received EKG from  last dated 5/3/2018.    Spoke with patient he states he has held aspirin and mobic for 7days.   Patient states he took cipro this morning and will take another this afternoon and again tomorrow morning with small sip of water. Informed patient to administer fleet enema before leaving home tomorrow. Patient verbally voiced understanding of all directions.

## 2019-08-07 NOTE — TELEPHONE ENCOUNTER
Patient having prostate biopsy under anesthesia in OR tomorrow.     Please make sure clearance has been received as below from Dr Leonard, with most recent EKG, and PAT has on his chat    I did advise that he will need to be cardiac cleared for anesthesia by his cardiologist, Dr. Leonard, whom he has seen recently in will therefore request clearance to proceed with anesthesia in any other preoperative recommendations.     Please ALSO call patient and confirm the following, and document answer/response to each  (as was r/s-ed at [facility] a few times for instructions)    1. He has held aspirin and mobic x1 week  2. He has 6 pills of cipro and should take one this morning, then this evening, then in AM with small sip of water (along with whatever other morning meds he was instructed to take from preop)  3. Aside from this sip with morning meds should have NO food or drink (inlcuding water) after midnight tonight  4. He should still do fleet enema prior to leaving house tomorrow    - he will get a call later with arrival time from surgery dept

## 2019-08-08 ENCOUNTER — HOSPITAL ENCOUNTER (OUTPATIENT)
Facility: HOSPITAL | Age: 69
Discharge: HOME | End: 2019-08-08
Attending: UROLOGY | Admitting: UROLOGY
Payer: MEDICARE

## 2019-08-08 ENCOUNTER — ANESTHESIA (OUTPATIENT)
Dept: SURGERY | Facility: HOSPITAL | Age: 69
End: 2019-08-08
Payer: MEDICARE

## 2019-08-08 VITALS
TEMPERATURE: 98 F | BODY MASS INDEX: 24.38 KG/M2 | OXYGEN SATURATION: 98 % | SYSTOLIC BLOOD PRESSURE: 182 MMHG | HEIGHT: 72 IN | DIASTOLIC BLOOD PRESSURE: 87 MMHG | HEART RATE: 70 BPM | RESPIRATION RATE: 18 BRPM | WEIGHT: 180 LBS

## 2019-08-08 DIAGNOSIS — R97.20 ELEVATED PSA: Primary | ICD-10-CM

## 2019-08-08 LAB
ANION GAP SERPL CALC-SCNC: 8 MMOL/L (ref 8–16)
BASOPHILS # BLD AUTO: 0.05 K/UL (ref 0–0.2)
BASOPHILS NFR BLD: 0.6 % (ref 0–1.9)
BUN SERPL-MCNC: 19 MG/DL (ref 8–23)
CALCIUM SERPL-MCNC: 10 MG/DL (ref 8.7–10.5)
CHLORIDE SERPL-SCNC: 104 MMOL/L (ref 95–110)
CO2 SERPL-SCNC: 25 MMOL/L (ref 23–29)
CREAT SERPL-MCNC: 1.3 MG/DL (ref 0.5–1.4)
DIFFERENTIAL METHOD: ABNORMAL
EOSINOPHIL # BLD AUTO: 0.2 K/UL (ref 0–0.5)
EOSINOPHIL NFR BLD: 2.9 % (ref 0–8)
ERYTHROCYTE [DISTWIDTH] IN BLOOD BY AUTOMATED COUNT: 14.7 % (ref 11.5–14.5)
EST. GFR  (AFRICAN AMERICAN): >60 ML/MIN/1.73 M^2
EST. GFR  (NON AFRICAN AMERICAN): 56 ML/MIN/1.73 M^2
GLUCOSE SERPL-MCNC: 97 MG/DL (ref 70–110)
HCT VFR BLD AUTO: 35.2 % (ref 40–54)
HGB BLD-MCNC: 11.6 G/DL (ref 14–18)
IMM GRANULOCYTES # BLD AUTO: 0.02 K/UL (ref 0–0.04)
INR PPP: 1.1 (ref 0.8–1.2)
LYMPHOCYTES # BLD AUTO: 1.7 K/UL (ref 1–4.8)
LYMPHOCYTES NFR BLD: 21.1 % (ref 18–48)
MCH RBC QN AUTO: 29 PG (ref 27–31)
MCHC RBC AUTO-ENTMCNC: 33 G/DL (ref 32–36)
MCV RBC AUTO: 88 FL (ref 82–98)
MONOCYTES # BLD AUTO: 0.6 K/UL (ref 0.3–1)
MONOCYTES NFR BLD: 7.4 % (ref 4–15)
NEUTROPHILS # BLD AUTO: 5.3 K/UL (ref 1.8–7.7)
NEUTROPHILS NFR BLD: 67.7 % (ref 38–73)
NRBC BLD-RTO: 0 /100 WBC
PLATELET # BLD AUTO: 199 K/UL (ref 150–350)
PMV BLD AUTO: 11.1 FL (ref 9.2–12.9)
POTASSIUM SERPL-SCNC: 4 MMOL/L (ref 3.5–5.1)
PROTHROMBIN TIME: 11.2 SEC (ref 9–12.5)
RBC # BLD AUTO: 4 M/UL (ref 4.6–6.2)
SODIUM SERPL-SCNC: 137 MMOL/L (ref 136–145)
WBC # BLD AUTO: 7.85 K/UL (ref 3.9–12.7)

## 2019-08-08 PROCEDURE — 88305 TISSUE SPECIMEN TO PATHOLOGY - SURGERY: ICD-10-PCS | Mod: 26,,, | Performed by: PATHOLOGY

## 2019-08-08 PROCEDURE — 76942 ECHO GUIDE FOR BIOPSY: CPT | Mod: 26,59,, | Performed by: UROLOGY

## 2019-08-08 PROCEDURE — 85025 COMPLETE CBC W/AUTO DIFF WBC: CPT

## 2019-08-08 PROCEDURE — 25000003 PHARM REV CODE 250: Performed by: ANESTHESIOLOGY

## 2019-08-08 PROCEDURE — 55700 PR BIOPSY OF PROSTATE,NEEDLE/PUNCH: ICD-10-PCS | Mod: ,,, | Performed by: UROLOGY

## 2019-08-08 PROCEDURE — 36415 COLL VENOUS BLD VENIPUNCTURE: CPT

## 2019-08-08 PROCEDURE — 63600175 PHARM REV CODE 636 W HCPCS: Performed by: ANESTHESIOLOGY

## 2019-08-08 PROCEDURE — D9220A PRA ANESTHESIA: Mod: CRNA,,, | Performed by: NURSE ANESTHETIST, CERTIFIED REGISTERED

## 2019-08-08 PROCEDURE — 63600175 PHARM REV CODE 636 W HCPCS: Performed by: UROLOGY

## 2019-08-08 PROCEDURE — D9220A PRA ANESTHESIA: Mod: ANES,,, | Performed by: ANESTHESIOLOGY

## 2019-08-08 PROCEDURE — 99900103 DSU ONLY-NO CHARGE-INITIAL HR (STAT): Performed by: UROLOGY

## 2019-08-08 PROCEDURE — D9220A PRA ANESTHESIA: ICD-10-PCS | Mod: ANES,,, | Performed by: ANESTHESIOLOGY

## 2019-08-08 PROCEDURE — 25000003 PHARM REV CODE 250: Performed by: NURSE ANESTHETIST, CERTIFIED REGISTERED

## 2019-08-08 PROCEDURE — 71000015 HC POSTOP RECOV 1ST HR: Performed by: UROLOGY

## 2019-08-08 PROCEDURE — 71000033 HC RECOVERY, INTIAL HOUR: Performed by: UROLOGY

## 2019-08-08 PROCEDURE — 37000008 HC ANESTHESIA 1ST 15 MINUTES: Performed by: UROLOGY

## 2019-08-08 PROCEDURE — 76872 US TRANSRECTAL: CPT | Mod: 26,,, | Performed by: UROLOGY

## 2019-08-08 PROCEDURE — 80048 BASIC METABOLIC PNL TOTAL CA: CPT

## 2019-08-08 PROCEDURE — 76942 PR U/S GUIDANCE FOR NEEDLE GUIDANCE: ICD-10-PCS | Mod: 26,59,, | Performed by: UROLOGY

## 2019-08-08 PROCEDURE — 63600175 PHARM REV CODE 636 W HCPCS: Performed by: NURSE ANESTHETIST, CERTIFIED REGISTERED

## 2019-08-08 PROCEDURE — 76872 PR US TRANSRECTAL: ICD-10-PCS | Mod: 26,,, | Performed by: UROLOGY

## 2019-08-08 PROCEDURE — 27200651 HC AIRWAY, LMA: Performed by: NURSE ANESTHETIST, CERTIFIED REGISTERED

## 2019-08-08 PROCEDURE — 36000705 HC OR TIME LEV I EA ADD 15 MIN: Performed by: UROLOGY

## 2019-08-08 PROCEDURE — 37000009 HC ANESTHESIA EA ADD 15 MINS: Performed by: UROLOGY

## 2019-08-08 PROCEDURE — D9220A PRA ANESTHESIA: ICD-10-PCS | Mod: CRNA,,, | Performed by: NURSE ANESTHETIST, CERTIFIED REGISTERED

## 2019-08-08 PROCEDURE — 99900104 DSU ONLY-NO CHARGE-EA ADD'L HR (STAT): Performed by: UROLOGY

## 2019-08-08 PROCEDURE — 85610 PROTHROMBIN TIME: CPT

## 2019-08-08 PROCEDURE — 88305 TISSUE EXAM BY PATHOLOGIST: CPT | Mod: 26,,, | Performed by: PATHOLOGY

## 2019-08-08 PROCEDURE — 36000704 HC OR TIME LEV I 1ST 15 MIN: Performed by: UROLOGY

## 2019-08-08 PROCEDURE — 88305 TISSUE EXAM BY PATHOLOGIST: CPT | Mod: 59 | Performed by: PATHOLOGY

## 2019-08-08 PROCEDURE — 55700 PR BIOPSY OF PROSTATE,NEEDLE/PUNCH: CPT | Mod: ,,, | Performed by: UROLOGY

## 2019-08-08 RX ORDER — SODIUM CHLORIDE, SODIUM LACTATE, POTASSIUM CHLORIDE, CALCIUM CHLORIDE 600; 310; 30; 20 MG/100ML; MG/100ML; MG/100ML; MG/100ML
INJECTION, SOLUTION INTRAVENOUS CONTINUOUS
Status: DISCONTINUED | OUTPATIENT
Start: 2019-08-08 | End: 2019-08-08 | Stop reason: HOSPADM

## 2019-08-08 RX ORDER — ONDANSETRON HYDROCHLORIDE 2 MG/ML
INJECTION, SOLUTION INTRAMUSCULAR; INTRAVENOUS
Status: DISCONTINUED | OUTPATIENT
Start: 2019-08-08 | End: 2019-08-08

## 2019-08-08 RX ORDER — DEXAMETHASONE SODIUM PHOSPHATE 4 MG/ML
INJECTION, SOLUTION INTRA-ARTICULAR; INTRALESIONAL; INTRAMUSCULAR; INTRAVENOUS; SOFT TISSUE
Status: DISCONTINUED | OUTPATIENT
Start: 2019-08-08 | End: 2019-08-08

## 2019-08-08 RX ORDER — HYDRALAZINE HYDROCHLORIDE 20 MG/ML
5 INJECTION INTRAMUSCULAR; INTRAVENOUS ONCE
Status: COMPLETED | OUTPATIENT
Start: 2019-08-08 | End: 2019-08-08

## 2019-08-08 RX ORDER — FENTANYL CITRATE 50 UG/ML
25 INJECTION, SOLUTION INTRAMUSCULAR; INTRAVENOUS EVERY 5 MIN PRN
Status: DISCONTINUED | OUTPATIENT
Start: 2019-08-08 | End: 2019-08-08 | Stop reason: HOSPADM

## 2019-08-08 RX ORDER — LIDOCAINE HCL/PF 100 MG/5ML
SYRINGE (ML) INTRAVENOUS
Status: DISCONTINUED | OUTPATIENT
Start: 2019-08-08 | End: 2019-08-08

## 2019-08-08 RX ORDER — LIDOCAINE HYDROCHLORIDE 10 MG/ML
0.5 INJECTION, SOLUTION EPIDURAL; INFILTRATION; INTRACAUDAL; PERINEURAL ONCE
Status: COMPLETED | OUTPATIENT
Start: 2019-08-08 | End: 2019-08-08

## 2019-08-08 RX ORDER — MIDAZOLAM HYDROCHLORIDE 1 MG/ML
INJECTION, SOLUTION INTRAMUSCULAR; INTRAVENOUS
Status: DISCONTINUED | OUTPATIENT
Start: 2019-08-08 | End: 2019-08-08

## 2019-08-08 RX ORDER — GENTAMICIN SULFATE 80 MG/100ML
80 INJECTION, SOLUTION INTRAVENOUS
Status: COMPLETED | OUTPATIENT
Start: 2019-08-08 | End: 2019-08-08

## 2019-08-08 RX ORDER — PHENYLEPHRINE HYDROCHLORIDE 10 MG/ML
INJECTION INTRAVENOUS
Status: DISCONTINUED | OUTPATIENT
Start: 2019-08-08 | End: 2019-08-08

## 2019-08-08 RX ORDER — FENTANYL CITRATE 50 UG/ML
INJECTION, SOLUTION INTRAMUSCULAR; INTRAVENOUS
Status: DISCONTINUED | OUTPATIENT
Start: 2019-08-08 | End: 2019-08-08

## 2019-08-08 RX ORDER — OXYCODONE HYDROCHLORIDE 5 MG/1
5 TABLET ORAL
Status: DISCONTINUED | OUTPATIENT
Start: 2019-08-08 | End: 2019-08-08 | Stop reason: HOSPADM

## 2019-08-08 RX ORDER — PROPOFOL 10 MG/ML
VIAL (ML) INTRAVENOUS
Status: DISCONTINUED | OUTPATIENT
Start: 2019-08-08 | End: 2019-08-08

## 2019-08-08 RX ORDER — ETOMIDATE 2 MG/ML
INJECTION INTRAVENOUS
Status: DISCONTINUED | OUTPATIENT
Start: 2019-08-08 | End: 2019-08-08

## 2019-08-08 RX ADMIN — CEFTRIAXONE 1 G: 1 INJECTION, SOLUTION INTRAVENOUS at 06:08

## 2019-08-08 RX ADMIN — ONDANSETRON 4 MG: 2 INJECTION, SOLUTION INTRAMUSCULAR; INTRAVENOUS at 06:08

## 2019-08-08 RX ADMIN — FENTANYL CITRATE 50 MCG: 50 INJECTION, SOLUTION INTRAMUSCULAR; INTRAVENOUS at 06:08

## 2019-08-08 RX ADMIN — PHENYLEPHRINE HYDROCHLORIDE 100 MCG: 10 INJECTION INTRAVENOUS at 06:08

## 2019-08-08 RX ADMIN — GENTAMICIN SULFATE 80 MG: 80 INJECTION, SOLUTION INTRAVENOUS at 03:08

## 2019-08-08 RX ADMIN — DEXAMETHASONE SODIUM PHOSPHATE 4 MG: 4 INJECTION, SOLUTION INTRAMUSCULAR; INTRAVENOUS at 06:08

## 2019-08-08 RX ADMIN — LIDOCAINE HYDROCHLORIDE: 10 INJECTION, SOLUTION EPIDURAL; INFILTRATION; INTRACAUDAL; PERINEURAL at 03:08

## 2019-08-08 RX ADMIN — MIDAZOLAM 1 MG: 1 INJECTION INTRAMUSCULAR; INTRAVENOUS at 06:08

## 2019-08-08 RX ADMIN — SODIUM CHLORIDE, SODIUM LACTATE, POTASSIUM CHLORIDE, AND CALCIUM CHLORIDE: .6; .31; .03; .02 INJECTION, SOLUTION INTRAVENOUS at 03:08

## 2019-08-08 RX ADMIN — PROPOFOL 80 MG: 10 INJECTION, EMULSION INTRAVENOUS at 06:08

## 2019-08-08 RX ADMIN — HYDRALAZINE HYDROCHLORIDE 5 MG: 20 INJECTION INTRAMUSCULAR; INTRAVENOUS at 07:08

## 2019-08-08 RX ADMIN — ETOMIDATE 8 MG: 2 INJECTION, SOLUTION INTRAVENOUS at 06:08

## 2019-08-08 RX ADMIN — LIDOCAINE HYDROCHLORIDE 100 MG: 20 INJECTION, SOLUTION INTRAVENOUS at 06:08

## 2019-08-08 NOTE — OP NOTE
[facility] Operative/Brief Discharge Note     Date: 8/8/19     Staff Surgeon: Spencer Nguyen MD     Pre-Op Diagnosis:   Elevated psa     Post-Op Diagnosis: same     Procedure(s) Performed:   Transrectal ultrasound guided prostate needle biopsy     INDICATION FOR PROCEDURE:   Elevated psa     ANESTHESIA: general LMA     PSA: 16.7      TRUS VOLUME: 180cm3  (W 73mm, H 59.5mm, L 79.1mm)     EBL: Minimal     SPECIMEN:   14 Prostate Biopsy Cores: right and left base, apex, and mid - medial and lateral of each  and bilateral transition zones     ULTRASOUND FINDINGS:   significant median lobe intravesical extension with mild PVR, scatterred hypodensities though more discreet at RA and LB medial, normal SVs     CONFIRMED PATIENT TOOK ANTIBIOTICS: Yes     CONFIRMED PATIENT NOT TAKING ASPIRIN OR ANTICOAGULANTS: Yes     CONFIRMED PATIENT USED ENEMA: Yes     PROCEDURE IN DETAIL:  After informed consent, the patient was placed in the left lateral position and TRUS probe inserted into rectum. Ultrasound measurements taken as above and ultrasound of prostate performed with findings as above. Saggital image captured.     Approximately 10cc of 1% lidocaine injected bilaterally in thony-prostatic block fashion, as well as at the apex.   14 core biopies taken in a sextant fashion, as described in specimens as above, ultrasound guided.  Standard 12 core biopsy template, with the addition of transition zones.    Pt tolerated procedure well without complication     CONDITION: Stable     DISHARGE:  Status post uncomplicated outpatient procedure as above.   Disposition: Home.  He will follow up in 2 weeks for biopsy results.    Resume regular diet  FU 2 weeks for biopsy results  Return to ER if temp >101, uncontrollable urethral or rectal bleeding, or inability to urinate/urinary retention  No sex/ejaculation x3 days, no riding mowers/tractors/bikes x2-4 weeks  Hold asa/bloodthinners x 3 days  Drink plenty of water may see blood in  urine  Follow instructions of biopsy sheet

## 2019-08-08 NOTE — TRANSFER OF CARE
Anesthesia Transfer of Care Note    Patient: Baldo Carney    Procedure(s) Performed: Procedure(s) (LRB):  BIOPSY, PROSTATE (N/A)    Patient location: PACU    Anesthesia Type: general    Transport from OR: Transported from OR on 2-3 L/min O2 by NC with adequate spontaneous ventilation    Post pain: adequate analgesia    Post assessment: tolerated procedure well and no apparent anesthetic complications    Post vital signs: stable    Level of consciousness: responds to stimulation and sedated    Nausea/Vomiting: no nausea/vomiting    Complications: none    Transfer of care protocol was followed      Last vitals:   Visit Vitals  BP (!) 180/88   Pulse 66   Temp 36.3 °C (97.3 °F) (Skin)   Resp 18   Ht 6' (1.829 m)   Wt 81.6 kg (180 lb)   SpO2 99%   BMI 24.41 kg/m²

## 2019-08-08 NOTE — ANESTHESIA PREPROCEDURE EVALUATION
08/08/2019  Baldo Carney is a 68 y.o., male.    Anesthesia Evaluation    I have reviewed the Patient Summary Reports.    I have reviewed the Nursing Notes.   I have reviewed the Medications.     Review of Systems  Anesthesia Hx:  Denies Family Hx of Anesthesia complications.   Denies Personal Hx of Anesthesia complications.   Cardiovascular:   Hypertension Valvular problems/Murmurs, AS CAD asymptomatic CABG/stent  ECG has been reviewed. EF 40% pre-AVR/CABG, porcine AVR in 2014   Pulmonary:   Asthma moderate    Neurological:   Peripheral Neuropathy    Endocrine:   Diabetes, type 2        Physical Exam  General:  Well nourished    Airway/Jaw/Neck:  Airway Findings: Mouth Opening: Normal Tongue: Normal  General Airway Assessment: Adult  Mallampati: II  TM Distance: Normal, at least 6 cm  Jaw/Neck Findings:  Neck ROM: Normal ROM      Dental:  Dental Findings: In tact   Chest/Lungs:  Chest/Lungs Findings: Clear to auscultation, Normal Respiratory Rate     Heart/Vascular:  Heart Findings: Rate: Normal  Rhythm: Regular Rhythm             Anesthesia Plan  Type of Anesthesia, risks & benefits discussed:  Anesthesia Type:  general  Patient's Preference:   Intra-op Monitoring Plan: standard ASA monitors  Intra-op Monitoring Plan Comments:   Post Op Pain Control Plan:   Post Op Pain Control Plan Comments:   Induction:   IV  Beta Blocker:  Patient is not currently on a Beta-Blocker (No further documentation required).       Informed Consent: Patient understands risks and agrees with Anesthesia plan.  Questions answered. Anesthesia consent signed with patient.  ASA Score: 3     Day of Surgery Review of History & Physical:    H&P update referred to the surgeon.         Ready For Surgery From Anesthesia Perspective.

## 2019-08-09 NOTE — DISCHARGE INSTRUCTIONS
"See accompanied Prostate Biopsy instruction sheet.    Discharge Instructions: After Your Surgery/Procedure  Youve just had surgery. During surgery you were given medicine called anesthesia to keep you relaxed and free of pain. After surgery you may have some pain or nausea. This is common. Here are some tips for feeling better and getting well after surgery.     Stay on schedule with your medication.   Going home  Your doctor or nurse will show you how to take care of yourself when you go home. He or she will also answer your questions. Have an adult family member or friend drive you home.      For your safety we recommend these precaution for the first 24 hours after your procedure:  · Do not drive or use heavy equipment.  · Do not make important decisions or sign legal papers.  · Do not drink alcohol.  · Have someone stay with you, if needed. He or she can watch for problems and help keep you safe.  · Your concentration, balance, coordination, and judgement may be impaired for many hours after anesthesia.  Use caution when ambulating or standing up.     · You may feel weak and "washed out" after anesthesia and surgery.      Subtle residual effects of general anesthesia or sedation with regional / local anesthesia can last more than 24 hours.  Rest for the remainder of the day or longer if your Doctor/Surgeon has advised you to do so.  Although you may feel normal within the first 24 hours, your reflexes and mental ability may be impaired without you realizing it.  You may feel dizzy, lightheaded or sleepy for 24 hours or longer.      Be sure to go to all follow-up visits with your doctor. And rest after your surgery for as long as your doctor tells you to.  Coping with pain  If you have pain after surgery, pain medicine will help you feel better. Take it as told, before pain becomes severe. Also, ask your doctor or pharmacist about other ways to control pain. This might be with heat, ice, or relaxation. And " follow any other instructions your surgeon or nurse gives you.  Tips for taking pain medicine  To get the best relief possible, remember these points:  · Pain medicines can upset your stomach. Taking them with a little food may help.  · Most pain relievers taken by mouth need at least 20 to 30 minutes to start to work.  · Taking medicine on a schedule can help you remember to take it. Try to time your medicine so that you can take it before starting an activity. This might be before you get dressed, go for a walk, or sit down for dinner.  · Constipation is a common side effect of pain medicines. Call your doctor before taking any medicines such as laxatives or stool softeners to help ease constipation. Also ask if you should skip any foods. Drinking lots of fluids and eating foods such as fruits and vegetables that are high in fiber can also help. Remember, do not take laxatives unless your surgeon has prescribed them.  · Drinking alcohol and taking pain medicine can cause dizziness and slow your breathing. It can even be deadly. Do not drink alcohol while taking pain medicine.  · Pain medicine can make you react more slowly to things. Do not drive or run machinery while taking pain medicine.  Your health care provider may tell you to take acetaminophen to help ease your pain. Ask him or her how much you are supposed to take each day. Acetaminophen or other pain relievers may interact with your prescription medicines or other over-the-counter (OTC) drugs. Some prescription medicines have acetaminophen and other ingredients. Using both prescription and OTC acetaminophen for pain can cause you to overdose. Read the labels on your OTC medicines with care. This will help you to clearly know the list of ingredients, how much to take, and any warnings. It may also help you not take too much acetaminophen. If you have questions or do not understand the information, ask your pharmacist or health care provider to explain it  to you before you take the OTC medicine.  Managing nausea  Some people have an upset stomach after surgery. This is often because of anesthesia, pain, or pain medicine, or the stress of surgery. These tips will help you handle nausea and eat healthy foods as you get better. If you were on a special food plan before surgery, ask your doctor if you should follow it while you get better. These tips may help:  · Do not push yourself to eat. Your body will tell you when to eat and how much.  · Start off with clear liquids and soup. They are easier to digest.  · Next try semi-solid foods, such as mashed potatoes, applesauce, and gelatin, as you feel ready.  · Slowly move to solid foods. Dont eat fatty, rich, or spicy foods at first.  · Do not force yourself to have 3 large meals a day. Instead eat smaller amounts more often.  · Take pain medicines with a small amount of solid food, such as crackers or toast, to avoid nausea.     Call your surgeon if  · You still have pain an hour after taking medicine. The medicine may not be strong enough.  · You feel too sleepy, dizzy, or groggy. The medicine may be too strong.  · You have side effects like nausea, vomiting, or skin changes, such as rash, itching, or hives.       If you have obstructive sleep apnea  You were given anesthesia medicine during surgery to keep you comfortable and free of pain. After surgery, you may have more apnea spells because of this medicine and other medicines you were given. The spells may last longer than usual.   At home:  · Keep using the continuous positive airway pressure (CPAP) device when you sleep. Unless your health care provider tells you not to, use it when you sleep, day or night. CPAP is a common device used to treat obstructive sleep apnea.  · Talk with your provider before taking any pain medicine, muscle relaxants, or sedatives. Your provider will tell you about the possible dangers of taking these medicines.  © 2481-9775 [employer]  [employer]. [address] [place] [id number]. All rights reserved. This information is not intended as a substitute for professional medical care. Always follow your healthcare professional's instructions.    Post op instructions for prevention of DVT  What is deep vein thrombosis?  Deep vein thrombosis (DVT) is the medical term for blood clots in the deep veins of the leg.  These blood clots can be dangerous.  A DVT can block a blood vessel and keep blood from getting where it needs to go.  Another problem is that the clot can travel to other parts of the body such as the lungs.  A clot that travels to the lungs is called a pulmonary embolus (PE) and can cause serious problems with breathing which can lead to death.  Am I at risk for DVT/PE?  If you are not very active, you are at risk of DVT.  Anyone confined to bed, sitting for long periods of time, recovering from surgery, etc. increases the risk of DVT.  Other risk factors are cancer diagnosis, certain medications, estrogen replacement in any form,older age, obesity, pregnancy, smoking, history of clotting disorders, and dehydration.  How will I know if I have a DVT?   Swelling in the lower leg   Pain   Warmth, redness, hardness or bulging of the vein  If you have any of these symptoms, call your doctors office right away.  Some people will not have any symptoms until the clot moves to the lungs.  What are the symptoms of a PE?   Panting, shortness of breath, or trouble breathing   Sharp, knife-like chest pain when you breathe   Coughing or coughing up blood   Rapid heartbeat  If you have any of these symptoms or get worse quickly, call 911 for emergency treatment.  How can I prevent a DVT?   Avoid long periods of inactivity and dont cross your legs--get up and walk around every hour or so.   Stay active--walking after surgery is highly encouraged.  This means you should get out of the house and walk in the neighborhood.  Going up  and down stairs will not impair healing (unless advised against such activity by your doctor).     Drink plenty of noncaffeinated, nonalcoholic fluids each day to prevent dehydration.   Wear special support stockings, if they have been advised by your doctor.   If you travel, stop at least once an hour and walk around.   Avoid smoking (assistance with stopping is available through your healthcare provider)  Always notify your doctor if you are not able to follow the post operative instructions that are given to you at the time of discharge.  It may be necessary to prescribe one of the medications available to prevent DVT.    We hope your stay was comfortable as you heal now, mend and rest.    For we have enjoyed taking care of you by giving your our best.    And as you get better, by regaining your health and strength;   We count it as a privilege to have served you and hope your time at [facility] was well spent.      Thank  You!!!

## 2019-08-09 NOTE — PLAN OF CARE
Pt tolertatin po and voided pink urine without difficulty.  VSS, Blood pressure to preop paramaters.  Denies pain, states that he is ready for discharge

## 2019-08-09 NOTE — ANESTHESIA POSTPROCEDURE EVALUATION
Anesthesia Post Evaluation    Patient: Baldo Carney    Procedure(s) Performed: Procedure(s) (LRB):  BIOPSY, PROSTATE (N/A)    Final Anesthesia Type: general  Patient location during evaluation: PACU  Patient participation: Yes- Able to Participate  Level of consciousness: awake and alert  Post-procedure vital signs: reviewed and stable  Pain management: adequate  Airway patency: patent  PONV status at discharge: No PONV  Anesthetic complications: no      Cardiovascular status: blood pressure returned to baseline  Respiratory status: unassisted  Hydration status: euvolemic  Follow-up not needed.          Vitals Value Taken Time   /72 8/8/2019  6:58 PM   Temp 36.7 °C (98.1 °F) 8/8/2019  6:45 PM   Pulse 60 8/8/2019  7:00 PM   Resp 16 8/8/2019  7:00 PM   SpO2 99 % 8/8/2019  7:00 PM   Vitals shown include unvalidated device data.      No case tracking events are documented in the log.      Pain/[place] Score: No data recorded

## 2019-08-25 NOTE — PROGRESS NOTES
[facility] Progress Note    Baldo Carney is a 68 y.o. male who presents for  follow up of elevated psa and BPH    He was referred by FRANCOIS Hayes for eval of urinary frequency with elevated psa. Also has DM/HTN.  PSA noted to be 16.7 on 1/10/19, previously 36.9 on 3/28/18, and 13.9 in 12/2014  Issues with memory, poor historian, did not recall prior urologic eval  2014 Dr Edwards: Past psa elevations, and per Dr Hernandez's note his PSA is 27. Had been on abx in the past but not sure why. Sounds like he was going to have a biopsy in [facility] a few months ago but patient canceled procedure. He then had prostate biopsy planned 10/21/14 but was canceled due to severe blood pressure elevations and was advised to see primary care provider and return for biopsy but never did. He did then see NP ALEIDA'Courtney on 5/24/18 when psa reached 36.9 and c/o LUTS-urinary frequency, urgency and weak urine stream and NTF x4-5. PVR 178cc.  He was given 21 days of Bactrim and started on Flomax 0.4 mg daily.    On my recent eval, had AUA SS 14/3 (3:  Frequency, straining, sleeping; 2:  Emptying; 1:  Intermittency, weak stream). . 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 No s/s UTI/prostatitis  Cysto 7/30/19: Significant trilobar obstruction with significant intravesical extension with multilobed median lobe occupying majority of bladder lumen with kissing lateral lobe obstruction. Moderate bladder trabeculations - couldn't tolerate passage of TRUS probe so taken to OR 8/8/19 for TRUS/prostate biopsy. TRUS volume 180cm3 (W 73mm, H 59.5mm, L 79.1mm)  Increased flomax to 0.8mg daily  PATHOLOGY: 14 cores BPH    He returns today noting that he did well after biopsy with only minimal discomfort, and minimal bleeding.  No fevers, no chills.  Since doubling his Flomax he has had slight improvement in his obstructive lower urinary tract  symptoms   AUA symptom score 10-11/3 (a 3:  Emptying; 2-3:  Sleeping; 2:  Frequency, weak stream; 1:  Intermittency)  Postvoid residual by bladder scan is 90 cc  Returns today to discuss pathology next steps      ROS: A comprehensive 10 system review was performed and is negative except as noted above in HPI    PHYSICAL EXAM:    Vitals:    08/26/19 1526   BP: (!) 157/83   Pulse: 66   Resp: 18     Body mass index is 23.26 kg/m². Weight: 77.8 kg (171 lb 8.3 oz) Height: 6' (182.9 cm)       General: Alert, cooperative, no distress, appears stated age   Head: Normocephalic, without obvious abnormality, atraumatic   Eyes: PERRL, conjunctiva/corneas clear   Lungs: Respirations unlabored   Heart: Warm and well perfused   Abdomen: soft NT ND  Extremities: Extremities normal, atraumatic, no cyanosis or edema   Skin: Skin color, texture, turgor normal, no rashes or lesions   Psych: Appropriate   Neurologic: Non-focal       Recent Results (from the past 336 hour(s))   POCT URINE DIPSTICK WITHOUT MICROSCOPE    Collection Time: 08/26/19  3:35 PM   Result Value Ref Range    Color, UA yellow     Spec Grav UA 1.005     pH, UA 5     WBC, UA neg     Nitrite, UA neg     Protein 100     Glucose, UA neg     Ketones, UA neg     Urobilinogen, UA 1.0     Bilirubin neg     Blood, UA small    POCT Bladder Scan    Collection Time: 08/26/19  4:21 PM   Result Value Ref Range    POC Residual Urine Volume 90 0 - 100 mL       ASSESSMENT   1. BPH with obstruction/lower urinary tract symptoms  POCT URINE DIPSTICK WITHOUT MICROSCOPE    POCT Bladder Scan   2. Elevated PSA  POCT URINE DIPSTICK WITHOUT MICROSCOPE       Plan    His prostate biopsy pathology is negative, and we did discuss that his PSA elevation to 16.7 actually represents a relatively normal PSA density with a prostate volume of 180 g.  We did discussion options for BPH management with significant gland of this size.    First, as his LUTS mildly improved, and his emptying improved on  increased dose of alpha blocker, we did discuss dual medical therapy with continued Flomax 0.8 mg with the addition of finasteride daily.  We did discuss gland shrinkage can be up to 20% in usually does not include the intravesical portion, and though this may control his LUTS, will still have persistent obstruction of which is bladders artery showing signs of.  Reasonable to start in this fashion and see how he does, though discussed recommendation for intervention.  He does have significant intravesical extension, and with 8 cm prostatic urethral length and gland of this size, transurethral resections or laser vaporization and are and option, though higher risk and less likely to be successful, and more likely to need a second-stage procedure.    Given the significant size of the gland as well as the significant amount of intravesical extension seen on cystoscopy, we did discuss the utility of simple/suprapubic prostatectomy, and the procedure was described in general.  We did also discuss open and minimally invasive/robotic approaches to this procedure.  He is interested in minimally invasive robotic simple prostatectomy.  We did briefly discuss that though his biopsy was negative, given significant gland size that there may be an incidental finding of malignancy in any of procedure as above, which he is familiar with and understands.  At this time, he would like to start finasteride in addition to his increased dose of Flomax, and have consultation with surgeon who performs min invasive suprapubic prostatectomy, so will arrange consultation.  We did also briefly discussed his ED, and given his cardiac history, would ask that Dr. Leonard provide cardiac clearance to proceed with medical treatment of ED.  30 min spent in encounter, and all questions patient and his wife had were answered in detail and they are agreeable to treatment plan.  Will follow up pending consultation at [facility]

## 2019-08-26 ENCOUNTER — OFFICE VISIT (OUTPATIENT)
Dept: UROLOGY | Facility: CLINIC | Age: 69
End: 2019-08-26
Payer: MEDICARE

## 2019-08-26 VITALS
DIASTOLIC BLOOD PRESSURE: 83 MMHG | HEART RATE: 66 BPM | BODY MASS INDEX: 23.23 KG/M2 | SYSTOLIC BLOOD PRESSURE: 157 MMHG | WEIGHT: 171.5 LBS | HEIGHT: 72 IN | RESPIRATION RATE: 18 BRPM

## 2019-08-26 DIAGNOSIS — N13.8 BPH WITH OBSTRUCTION/LOWER URINARY TRACT SYMPTOMS: Primary | ICD-10-CM

## 2019-08-26 DIAGNOSIS — R97.20 ELEVATED PSA: ICD-10-CM

## 2019-08-26 DIAGNOSIS — N40.1 BPH WITH OBSTRUCTION/LOWER URINARY TRACT SYMPTOMS: Primary | ICD-10-CM

## 2019-08-26 LAB
BILIRUB SERPL-MCNC: ABNORMAL MG/DL
BLOOD URINE, POC: ABNORMAL
COLOR, POC UA: YELLOW
GLUCOSE UR QL STRIP: ABNORMAL
KETONES UR QL STRIP: ABNORMAL
LEUKOCYTE ESTERASE URINE, POC: ABNORMAL
NITRITE, POC UA: ABNORMAL
PH, POC UA: 5
POC RESIDUAL URINE VOLUME: 90 ML (ref 0–100)
PROTEIN, POC: 100
SPECIFIC GRAVITY, POC UA: 1
UROBILINOGEN, POC UA: 1

## 2019-08-26 PROCEDURE — 51798 POCT BLADDER SCAN: ICD-10-PCS | Mod: S$GLB,,, | Performed by: UROLOGY

## 2019-08-26 PROCEDURE — 99214 PR OFFICE/OUTPT VISIT, EST, LEVL IV, 30-39 MIN: ICD-10-PCS | Mod: 25,S$GLB,, | Performed by: UROLOGY

## 2019-08-26 PROCEDURE — 99214 OFFICE O/P EST MOD 30 MIN: CPT | Mod: 25,S$GLB,, | Performed by: UROLOGY

## 2019-08-26 PROCEDURE — 99999 PR PBB SHADOW E&M-EST. PATIENT-LVL III: CPT | Mod: PBBFAC,,, | Performed by: UROLOGY

## 2019-08-26 PROCEDURE — 81002 URINALYSIS NONAUTO W/O SCOPE: CPT | Mod: S$GLB,,, | Performed by: UROLOGY

## 2019-08-26 PROCEDURE — 99999 PR PBB SHADOW E&M-EST. PATIENT-LVL III: ICD-10-PCS | Mod: PBBFAC,,, | Performed by: UROLOGY

## 2019-08-26 PROCEDURE — 81002 POCT URINE DIPSTICK WITHOUT MICROSCOPE: ICD-10-PCS | Mod: S$GLB,,, | Performed by: UROLOGY

## 2019-08-26 PROCEDURE — 51798 US URINE CAPACITY MEASURE: CPT | Mod: S$GLB,,, | Performed by: UROLOGY

## 2019-08-26 RX ORDER — FINASTERIDE 5 MG/1
5 TABLET, FILM COATED ORAL DAILY
Qty: 30 TABLET | Refills: 11 | Status: SHIPPED | OUTPATIENT
Start: 2019-08-26 | End: 2020-05-27 | Stop reason: SDUPTHER

## 2019-08-29 ENCOUNTER — TELEPHONE (OUTPATIENT)
Dept: UROLOGY | Facility: CLINIC | Age: 69
End: 2019-08-29

## 2019-09-05 NOTE — PATIENT INSTRUCTIONS
Understanding Erectile Dysfunction    Erectile dysfunction (ED) is a problem getting an erection firm enough or keeping it long enough for intercourse. The problem can happen to any man at any age. But health problems that can lead to ED become more common as a man ages. Up to half of men over age 40 experience ED at some point.  Causes of ED  ED can have many causes. Most are physical. Some are emotional issues. Often, a combination of causes is involved. Causes of ED may include:  · Medical conditions such as diabetes or depression  · Smoking tobacco or marijuana  · Drinking too much alcohol  · Side effects of medications  · Injury to nerves or blood vessels  · Emotional issues such as stress or relationship problems  ED can be treated  Prescription medications for ED are available. They help many men who try them. Depending upon the cause of the ED, though, medications may not be enough. In these cases, other treatment options are available. These include erectile aids and surgery. Your health care provider can tell you more about the treatment that is right for you. And new treatments for ED are being studied. No matter what the treatment you decide on, stay in touch with your doctor. If your symptoms persist, he or she may be able to adjust your current treatment or try something new.  Date Last Reviewed: 1/1/2017  © 1736-2791 [employer]. [address] [place] [address], [place] [address]. All rights reserved. This information is not intended as a substitute for professional medical care. Always follow your healthcare professional's instructions.        Oral Medicines for Erectile Dysfunction  You can use prescription oral medicine for ED. They often work well. But, like all medicines, they can have side effects. Also, you cant use them if you have certain health problems or take certain other medicines. Talk with your doctor about oral ED medicine. Ask whether it is right for you.  Types of oral ED  There is a starter pack for contrary that goes from one pill at night to 2 tabs twice a day over 4 weeks she should start on that starter pack and then give her 2 refills and then an appointment with me or her primary Kaushik Hook in 3 months medicines  There are three types of prescription oral ED medicines. Each one increases blood flow to the penis. When the penis is stimulated, an erection results. The types are:  · Sildenafil citrate   · Tadalafil   · Vardenafil  · Avanfil  What oral ED medicines dont do  There are some things ED medicines cant do.  · They dont cure the cause of your ED. Without the medicine, youll still have trouble getting an erection.  · They cant produce an erection without sexual stimulation.  · They wont increase sexual desire. They also wont solve any other sexual issues. Psychological, emotional, or relationship issues will not be fixed.  Taking oral ED medicines safely  · Do not combine ED medicines with other treatments unless your doctor tells you to.  · Dont take ED medicines more often or in larger doses than prescribed.  · Tell your doctor your health history. Mention all medicines you take. This includes over-the-counter drugs, supplements, and herbs.  · Ask your doctor about whether you can drink alcohol while taking ED medication.  Possible side effects of oral ED medicines  · Headache  · Facial flushing  · Runny or stuffy nose  · Indigestion  · Distortion of your color vision for a short time  · Sudden vision loss or hearing loss (rare)  · Dizziness  Risks of oral ED medicines  · Do not take ED medicines if you take medicines containing nitrates. The combination may drop your blood pressure to a dangerous level. Nitrates include nitroglycerin (a drug for angina or chest pain). They are also in poppers, an inhaled recreational drug. If youre not sure whether youre taking nitrates, ask your doctor or pharmacist.  · Medicines called alpha-blockers can interact with ED medicines. They can cause a sudden drop in blood pressure. Alpha-blockers are a common treatment for prostate problems. They also treat high blood pressure. Be sure your doctor knows if you take these medicines.  · If youve had a heart  attack or have heart disease and you have not had sex for a while, talk to your doctor. Having sex again can put extra strain on your heart. Your doctor can confirm that your heart is healthy enough for sex.  · It is rare, but some men taking ED medicines have had sudden vision loss. This may be more likely if other health problems are present. These include high blood pressure and diabetes. Ask your doctor if you are at risk for this type of vision loss.  · In rare cases, an erection may last too long. This can badly damage the blood vessels in your penis. If an erection lasts longer than 4 hours, go to the emergency room right away.   Date Last Reviewed: 1/1/2017  © 4824-0875 [employer]. [address] [place] [address], [place] [address]. All rights reserved. This information is not intended as a substitute for professional medical care. Always follow your healthcare professional's instructions.

## 2019-09-11 ENCOUNTER — OFFICE VISIT (OUTPATIENT)
Dept: UROLOGY | Facility: CLINIC | Age: 69
End: 2019-09-11
Payer: MEDICARE

## 2019-09-11 VITALS
WEIGHT: 171.94 LBS | HEART RATE: 67 BPM | BODY MASS INDEX: 23.32 KG/M2 | DIASTOLIC BLOOD PRESSURE: 74 MMHG | SYSTOLIC BLOOD PRESSURE: 164 MMHG

## 2019-09-11 DIAGNOSIS — N13.8 BPH WITH OBSTRUCTION/LOWER URINARY TRACT SYMPTOMS: Primary | ICD-10-CM

## 2019-09-11 DIAGNOSIS — N40.1 BPH WITH OBSTRUCTION/LOWER URINARY TRACT SYMPTOMS: Primary | ICD-10-CM

## 2019-09-11 PROCEDURE — 99999 PR PBB SHADOW E&M-EST. PATIENT-LVL III: CPT | Mod: PBBFAC,,, | Performed by: UROLOGY

## 2019-09-11 PROCEDURE — 99213 PR OFFICE/OUTPT VISIT, EST, LEVL III, 20-29 MIN: ICD-10-PCS | Mod: S$GLB,,, | Performed by: UROLOGY

## 2019-09-11 PROCEDURE — 99213 OFFICE O/P EST LOW 20 MIN: CPT | Mod: S$GLB,,, | Performed by: UROLOGY

## 2019-09-11 PROCEDURE — 99999 PR PBB SHADOW E&M-EST. PATIENT-LVL III: ICD-10-PCS | Mod: PBBFAC,,, | Performed by: UROLOGY

## 2019-09-11 NOTE — PROGRESS NOTES
Subjective:      Patient ID: Baldo Carney is a 68 y.o. male.    Chief Complaint: Follow-up    HPI  Patient is a 68 y.o. male who is established to our clinic and was initially referred by oren Nguyen for evaluation of BPH.     Patient with very large prostate, but he is not significantly bothered by this.  No hydro.  No bladder stones.  Not requiring nguyen catheter.  No renal faliure.  No recurrent UTI or gross hematuria.           Lab Results   Component Value Date    PSA 16.7 (H) 01/10/2019    PSA 36.9 (H) 03/28/2018    PSADIAG 13.9 (H) 09/30/2014         Review of Systems  All other systems reviewed and negative except pertinent positives noted in HPI.    Objective:     Physical Exam   Constitutional: He is oriented to person, place, and time. He appears well-developed and well-nourished. No distress.   HENT:   Head: Normocephalic and atraumatic.   Eyes: No scleral icterus.   Neck: No tracheal deviation present.   Pulmonary/Chest: Effort normal. No respiratory distress.   Neurological: He is alert and oriented to person, place, and time.   Psychiatric: He has a normal mood and affect. His behavior is normal. Judgment and thought content normal.     Assessment:     1. BPH with obstruction/lower urinary tract symptoms      Plan:     1. BPH with obstruction/lower urinary tract symptoms        No orders of the defined types were placed in this encounter.    1. BPH:  -discussed extensively surgical options and indications for BPH.  Patient is not interested in treatment at this time.  Can f/u with Dr. Nguyen.  If he changes his mind, would be happy to perform robotic simple prostatectomy.

## 2019-10-10 ENCOUNTER — OFFICE VISIT (OUTPATIENT)
Dept: FAMILY MEDICINE | Facility: CLINIC | Age: 69
End: 2019-10-10
Payer: MEDICARE

## 2019-10-10 VITALS
RESPIRATION RATE: 16 BRPM | HEART RATE: 84 BPM | SYSTOLIC BLOOD PRESSURE: 112 MMHG | DIASTOLIC BLOOD PRESSURE: 62 MMHG | WEIGHT: 173.63 LBS | BODY MASS INDEX: 23.52 KG/M2 | OXYGEN SATURATION: 98 % | HEIGHT: 72 IN

## 2019-10-10 DIAGNOSIS — Z12.5 PROSTATE CANCER SCREENING: ICD-10-CM

## 2019-10-10 DIAGNOSIS — E11.9 TYPE 2 DIABETES MELLITUS WITHOUT COMPLICATION, WITHOUT LONG-TERM CURRENT USE OF INSULIN: ICD-10-CM

## 2019-10-10 DIAGNOSIS — I10 ESSENTIAL HYPERTENSION: Primary | ICD-10-CM

## 2019-10-10 DIAGNOSIS — E78.2 MIXED HYPERLIPIDEMIA: ICD-10-CM

## 2019-10-10 PROCEDURE — 99214 OFFICE O/P EST MOD 30 MIN: CPT | Mod: S$GLB,,, | Performed by: NURSE PRACTITIONER

## 2019-10-10 PROCEDURE — 99214 PR OFFICE/OUTPT VISIT, EST, LEVL IV, 30-39 MIN: ICD-10-PCS | Mod: S$GLB,,, | Performed by: NURSE PRACTITIONER

## 2019-10-10 RX ORDER — METFORMIN HYDROCHLORIDE 1000 MG/1
1000 TABLET ORAL 2 TIMES DAILY WITH MEALS
Qty: 180 TABLET | Refills: 1 | Status: SHIPPED | OUTPATIENT
Start: 2019-10-10 | End: 2020-04-02

## 2019-10-10 RX ORDER — AMLODIPINE AND BENAZEPRIL HYDROCHLORIDE 5; 20 MG/1; MG/1
CAPSULE ORAL
Qty: 90 CAPSULE | Refills: 1 | Status: SHIPPED | OUTPATIENT
Start: 2019-10-10 | End: 2020-04-02

## 2019-10-16 ENCOUNTER — LAB VISIT (OUTPATIENT)
Dept: LAB | Facility: HOSPITAL | Age: 69
End: 2019-10-16
Attending: UROLOGY
Payer: MEDICARE

## 2019-10-16 DIAGNOSIS — E78.2 MIXED HYPERLIPIDEMIA: ICD-10-CM

## 2019-10-16 DIAGNOSIS — I10 ESSENTIAL HYPERTENSION: ICD-10-CM

## 2019-10-16 DIAGNOSIS — E11.9 TYPE 2 DIABETES MELLITUS WITHOUT COMPLICATION, WITHOUT LONG-TERM CURRENT USE OF INSULIN: ICD-10-CM

## 2019-10-16 DIAGNOSIS — Z12.5 PROSTATE CANCER SCREENING: ICD-10-CM

## 2019-10-16 DIAGNOSIS — R97.20 ELEVATED PSA: ICD-10-CM

## 2019-10-16 LAB
ALBUMIN SERPL BCP-MCNC: 4.1 G/DL (ref 3.5–5.2)
ALP SERPL-CCNC: 67 U/L (ref 55–135)
ALT SERPL W/O P-5'-P-CCNC: 19 U/L (ref 10–44)
ANION GAP SERPL CALC-SCNC: 10 MMOL/L (ref 8–16)
ANION GAP SERPL CALC-SCNC: 10 MMOL/L (ref 8–16)
AST SERPL-CCNC: 22 U/L (ref 10–40)
BASOPHILS # BLD AUTO: 0.09 K/UL (ref 0–0.2)
BASOPHILS # BLD AUTO: 0.09 K/UL (ref 0–0.2)
BASOPHILS NFR BLD: 1.1 % (ref 0–1.9)
BASOPHILS NFR BLD: 1.1 % (ref 0–1.9)
BILIRUB SERPL-MCNC: 0.7 MG/DL (ref 0.1–1)
BUN SERPL-MCNC: 19 MG/DL (ref 8–23)
BUN SERPL-MCNC: 19 MG/DL (ref 8–23)
CALCIUM SERPL-MCNC: 9.4 MG/DL (ref 8.7–10.5)
CALCIUM SERPL-MCNC: 9.4 MG/DL (ref 8.7–10.5)
CHLORIDE SERPL-SCNC: 100 MMOL/L (ref 95–110)
CHLORIDE SERPL-SCNC: 100 MMOL/L (ref 95–110)
CHOLEST SERPL-MCNC: 199 MG/DL (ref 120–199)
CHOLEST/HDLC SERPL: 3.1 {RATIO} (ref 2–5)
CO2 SERPL-SCNC: 27 MMOL/L (ref 23–29)
CO2 SERPL-SCNC: 27 MMOL/L (ref 23–29)
COMPLEXED PSA SERPL-MCNC: 18.2 NG/ML (ref 0–4)
CREAT SERPL-MCNC: 1.4 MG/DL (ref 0.5–1.4)
CREAT SERPL-MCNC: 1.4 MG/DL (ref 0.5–1.4)
DIFFERENTIAL METHOD: ABNORMAL
DIFFERENTIAL METHOD: ABNORMAL
EOSINOPHIL # BLD AUTO: 0.2 K/UL (ref 0–0.5)
EOSINOPHIL # BLD AUTO: 0.2 K/UL (ref 0–0.5)
EOSINOPHIL NFR BLD: 2.4 % (ref 0–8)
EOSINOPHIL NFR BLD: 2.4 % (ref 0–8)
ERYTHROCYTE [DISTWIDTH] IN BLOOD BY AUTOMATED COUNT: 14.6 % (ref 11.5–14.5)
ERYTHROCYTE [DISTWIDTH] IN BLOOD BY AUTOMATED COUNT: 14.6 % (ref 11.5–14.5)
EST. GFR  (AFRICAN AMERICAN): 58.8 ML/MIN/1.73 M^2
EST. GFR  (AFRICAN AMERICAN): 58.8 ML/MIN/1.73 M^2
EST. GFR  (NON AFRICAN AMERICAN): 50.9 ML/MIN/1.73 M^2
EST. GFR  (NON AFRICAN AMERICAN): 50.9 ML/MIN/1.73 M^2
ESTIMATED AVG GLUCOSE: 128 MG/DL (ref 68–131)
GLUCOSE SERPL-MCNC: 125 MG/DL (ref 70–110)
GLUCOSE SERPL-MCNC: 125 MG/DL (ref 70–110)
HBA1C MFR BLD HPLC: 6.1 % (ref 4.5–6.2)
HCT VFR BLD AUTO: 37.5 % (ref 40–54)
HCT VFR BLD AUTO: 37.5 % (ref 40–54)
HDLC SERPL-MCNC: 65 MG/DL (ref 40–75)
HDLC SERPL: 32.7 % (ref 20–50)
HGB BLD-MCNC: 12.3 G/DL (ref 14–18)
HGB BLD-MCNC: 12.3 G/DL (ref 14–18)
IMM GRANULOCYTES # BLD AUTO: 0.03 K/UL (ref 0–0.04)
IMM GRANULOCYTES # BLD AUTO: 0.03 K/UL (ref 0–0.04)
IMM GRANULOCYTES NFR BLD AUTO: 0.4 % (ref 0–0.5)
IMM GRANULOCYTES NFR BLD AUTO: 0.4 % (ref 0–0.5)
LDLC SERPL CALC-MCNC: 106.2 MG/DL (ref 63–159)
LYMPHOCYTES # BLD AUTO: 2 K/UL (ref 1–4.8)
LYMPHOCYTES # BLD AUTO: 2 K/UL (ref 1–4.8)
LYMPHOCYTES NFR BLD: 25.1 % (ref 18–48)
LYMPHOCYTES NFR BLD: 25.1 % (ref 18–48)
MCH RBC QN AUTO: 29.9 PG (ref 27–31)
MCH RBC QN AUTO: 29.9 PG (ref 27–31)
MCHC RBC AUTO-ENTMCNC: 32.8 G/DL (ref 32–36)
MCHC RBC AUTO-ENTMCNC: 32.8 G/DL (ref 32–36)
MCV RBC AUTO: 91 FL (ref 82–98)
MCV RBC AUTO: 91 FL (ref 82–98)
MONOCYTES # BLD AUTO: 0.6 K/UL (ref 0.3–1)
MONOCYTES # BLD AUTO: 0.6 K/UL (ref 0.3–1)
MONOCYTES NFR BLD: 8.2 % (ref 4–15)
MONOCYTES NFR BLD: 8.2 % (ref 4–15)
NEUTROPHILS # BLD AUTO: 4.9 K/UL (ref 1.8–7.7)
NEUTROPHILS # BLD AUTO: 4.9 K/UL (ref 1.8–7.7)
NEUTROPHILS NFR BLD: 62.8 % (ref 38–73)
NEUTROPHILS NFR BLD: 62.8 % (ref 38–73)
NONHDLC SERPL-MCNC: 134 MG/DL
NRBC BLD-RTO: 0 /100 WBC
NRBC BLD-RTO: 0 /100 WBC
PLATELET # BLD AUTO: 205 K/UL (ref 150–350)
PLATELET # BLD AUTO: 205 K/UL (ref 150–350)
PMV BLD AUTO: 11 FL (ref 9.2–12.9)
PMV BLD AUTO: 11 FL (ref 9.2–12.9)
POTASSIUM SERPL-SCNC: 4.5 MMOL/L (ref 3.5–5.1)
POTASSIUM SERPL-SCNC: 4.5 MMOL/L (ref 3.5–5.1)
PROT SERPL-MCNC: 8.5 G/DL (ref 6–8.4)
RBC # BLD AUTO: 4.12 M/UL (ref 4.6–6.2)
RBC # BLD AUTO: 4.12 M/UL (ref 4.6–6.2)
SODIUM SERPL-SCNC: 137 MMOL/L (ref 136–145)
SODIUM SERPL-SCNC: 137 MMOL/L (ref 136–145)
TRIGL SERPL-MCNC: 139 MG/DL (ref 30–150)
WBC # BLD AUTO: 7.84 K/UL (ref 3.9–12.7)
WBC # BLD AUTO: 7.84 K/UL (ref 3.9–12.7)

## 2019-10-16 PROCEDURE — 84153 ASSAY OF PSA TOTAL: CPT

## 2019-10-16 PROCEDURE — 85025 COMPLETE CBC W/AUTO DIFF WBC: CPT

## 2019-10-16 PROCEDURE — 80053 COMPREHEN METABOLIC PANEL: CPT

## 2019-10-16 PROCEDURE — 80061 LIPID PANEL: CPT

## 2019-10-16 PROCEDURE — 36415 COLL VENOUS BLD VENIPUNCTURE: CPT

## 2019-10-16 PROCEDURE — 83036 HEMOGLOBIN GLYCOSYLATED A1C: CPT

## 2019-10-27 RX ORDER — ATORVASTATIN CALCIUM 80 MG/1
TABLET, FILM COATED ORAL
Qty: 90 TABLET | Refills: 1 | Status: SHIPPED | OUTPATIENT
Start: 2019-10-27 | End: 2020-04-27

## 2019-11-07 ENCOUNTER — TELEPHONE (OUTPATIENT)
Dept: FAMILY MEDICINE | Facility: CLINIC | Age: 69
End: 2019-11-07

## 2019-11-08 NOTE — TELEPHONE ENCOUNTER
Anemia slightly improved from 3 months ago.    LDL still remains slightly elevated.  Be sure he is taking his atorvastatin daily and watching carb/sugar/saturated fat intake.    Diabetes appears well controlled with an A1c of 6.1.    PSA remains elevated, but patient is following with Urology.    Kidney function is slightly decreased.  Be sure he is staying well hydrated with water throughout the day.  If not, please increase water intake to at least 64 oz daily.

## 2020-01-13 ENCOUNTER — TELEPHONE (OUTPATIENT)
Dept: FAMILY MEDICINE | Facility: CLINIC | Age: 70
End: 2020-01-13

## 2020-01-13 ENCOUNTER — OFFICE VISIT (OUTPATIENT)
Dept: FAMILY MEDICINE | Facility: CLINIC | Age: 70
End: 2020-01-13
Payer: COMMERCIAL

## 2020-01-13 VITALS
WEIGHT: 180 LBS | OXYGEN SATURATION: 98 % | HEART RATE: 81 BPM | BODY MASS INDEX: 24.38 KG/M2 | RESPIRATION RATE: 18 BRPM | SYSTOLIC BLOOD PRESSURE: 130 MMHG | DIASTOLIC BLOOD PRESSURE: 78 MMHG | HEIGHT: 72 IN

## 2020-01-13 DIAGNOSIS — M25.521 RIGHT ELBOW PAIN: Primary | ICD-10-CM

## 2020-01-13 DIAGNOSIS — E11.9 TYPE 2 DIABETES MELLITUS WITHOUT COMPLICATION, WITHOUT LONG-TERM CURRENT USE OF INSULIN: Primary | ICD-10-CM

## 2020-01-13 DIAGNOSIS — M25.521 RIGHT ELBOW PAIN: ICD-10-CM

## 2020-01-13 LAB — HBA1C MFR BLD: 6.2 %

## 2020-01-13 PROCEDURE — 1170F FXNL STATUS ASSESSED: CPT | Mod: S$GLB,,, | Performed by: NURSE PRACTITIONER

## 2020-01-13 PROCEDURE — 1125F AMNT PAIN NOTED PAIN PRSNT: CPT | Mod: S$GLB,,, | Performed by: NURSE PRACTITIONER

## 2020-01-13 PROCEDURE — 99214 OFFICE O/P EST MOD 30 MIN: CPT | Mod: S$GLB,,, | Performed by: NURSE PRACTITIONER

## 2020-01-13 PROCEDURE — 1170F PR FUNCTIONAL STATUS ASSESSED: ICD-10-PCS | Mod: S$GLB,,, | Performed by: NURSE PRACTITIONER

## 2020-01-13 PROCEDURE — 99214 PR OFFICE/OUTPT VISIT, EST, LEVL IV, 30-39 MIN: ICD-10-PCS | Mod: S$GLB,,, | Performed by: NURSE PRACTITIONER

## 2020-01-13 PROCEDURE — 1159F MED LIST DOCD IN RCRD: CPT | Mod: S$GLB,,, | Performed by: NURSE PRACTITIONER

## 2020-01-13 PROCEDURE — 1159F PR MEDICATION LIST DOCUMENTED IN MEDICAL RECORD: ICD-10-PCS | Mod: S$GLB,,, | Performed by: NURSE PRACTITIONER

## 2020-01-13 PROCEDURE — 1125F PR PAIN SEVERITY QUANTIFIED, PAIN PRESENT: ICD-10-PCS | Mod: S$GLB,,, | Performed by: NURSE PRACTITIONER

## 2020-01-13 PROCEDURE — 83036 POCT HEMOGLOBIN A1C: ICD-10-PCS | Mod: QW,S$GLB,, | Performed by: NURSE PRACTITIONER

## 2020-01-13 PROCEDURE — 83036 HEMOGLOBIN GLYCOSYLATED A1C: CPT | Mod: QW,S$GLB,, | Performed by: NURSE PRACTITIONER

## 2020-01-13 NOTE — PROGRESS NOTES
SUBJECTIVE:      Patient ID: Baldo Carney is a 69 y.o. male.    Chief Complaint: Follow-up and Diabetes    F/U for DM recheck & lab review - today's A1c 6.2 from 6.1 in October, total cholesterol 199, trigs 139, HDL 65, , PSA increased from 16.7 January 2019 to 18.2 October 2019 (following with Dr. Nguyen for same)    Hypertension   This is a chronic problem. The current episode started more than 1 year ago. The problem is controlled. Pertinent negatives include no chest pain, headaches, neck pain, peripheral edema or shortness of breath. Risk factors for coronary artery disease include male gender, sedentary lifestyle, diabetes mellitus, dyslipidemia and family history. Past treatments include calcium channel blockers, ACE inhibitors and diuretics. The current treatment provides mild improvement. Compliance problems include exercise.  Hypertensive end-organ damage includes CAD/MI. Identifiable causes of hypertension include a hypertension causing med.   Diabetes   He presents for his follow-up diabetic visit. He has type 2 diabetes mellitus. No MedicAlert identification noted. His disease course has been stable. Pertinent negatives for hypoglycemia include no confusion, dizziness, headaches, nervousness/anxiousness or pallor. Associated symptoms include fatigue and foot paresthesias. Pertinent negatives for diabetes include no chest pain, no polydipsia, no polyuria and no weakness. There are no hypoglycemic complications. Symptoms are stable. Diabetic complications include peripheral neuropathy. Risk factors for coronary artery disease include diabetes mellitus, dyslipidemia, family history, hypertension, male sex and sedentary lifestyle. Current diabetic treatment includes oral agent (dual therapy). He is compliant with treatment most of the time (Not taking Jardiance regularly D/T cost). His weight is fluctuating minimally. He is following a generally healthy diet. He has not had a previous  visit with a dietitian. He rarely participates in exercise. An ACE inhibitor/angiotensin II receptor blocker is being taken. Eye exam is not current.   Hyperlipidemia   This is a chronic problem. The current episode started more than 1 year ago. The problem is controlled. Recent lipid tests were reviewed and are normal (LDL minimally elevated). Exacerbating diseases include diabetes. Factors aggravating his hyperlipidemia include fatty foods. Pertinent negatives include no chest pain, myalgias or shortness of breath. Current antihyperlipidemic treatment includes statins. Compliance problems include adherence to exercise.  Risk factors for coronary artery disease include diabetes mellitus, dyslipidemia, family history, hypertension, male sex and a sedentary lifestyle.   Elbow Injury   This is a new problem. The current episode started more than 1 month ago. The problem occurs constantly. The problem has been unchanged. Associated symptoms include arthralgias (R elbow 7/10), fatigue and joint swelling (R elbow). Pertinent negatives include no abdominal pain, chest pain, congestion, coughing, fever, headaches, myalgias, nausea, neck pain, rash, sore throat, vomiting or weakness. The symptoms are aggravated by bending and exertion. He has tried rest for the symptoms. The treatment provided moderate relief.       Past Surgical History:   Procedure Laterality Date    BREAST SURGERY      CARDIAC CATHETERIZATION      CARDIAC VALVE SURGERY      CORONARY ARTERY BYPASS GRAFT      CYSTOSCOPY N/A 7/30/2019    Procedure: CYSTOSCOPY;  Surgeon: Spencer Nguyen MD;  Location: [facility] OR;  Service: Urology;  Laterality: N/A;    EYE SURGERY      SHOULDER ARTHROSCOPY  1985    TRANSRECTAL BIOPSY OF PROSTATE WITH ULTRASOUND GUIDANCE N/A 7/30/2019    Procedure: BIOPSY, PROSTATE, RECTAL APPROACH, WITH US GUIDANCE;  Surgeon: Spencer Nguyen MD;  Location: [facility] OR;  Service: Urology;  Laterality: N/A;  procedure not performed, pt  unable to tolerate    TRANSRECTAL BIOPSY OF PROSTATE WITH ULTRASOUND GUIDANCE N/A 2019    Procedure: BIOPSY, PROSTATE, RECTAL APPROACH, WITH US GUIDANCE;  Surgeon: Spencer Nguyen MD;  Location: [facility];  Service: Urology;  Laterality: N/A;     Family History   Problem Relation Age of Onset    No Known Problems Mother     Diabetes Father     Hypertension Father     Heart attack Father     Heart disease Father     Diabetes Sister     Heart disease Brother     Diabetes Brother     No Known Problems Maternal Grandmother     No Known Problems Maternal Grandfather     No Known Problems Paternal Grandmother     No Known Problems Paternal Grandfather     No Known Problems Maternal Aunt     No Known Problems Maternal Uncle     No Known Problems Paternal Aunt     No Known Problems Paternal Uncle     Anemia Neg Hx     Arrhythmia Neg Hx     Asthma Neg Hx     Clotting disorder Neg Hx     Fainting Neg Hx     Heart failure Neg Hx     Hyperlipidemia Neg Hx     Glaucoma Neg Hx       Social History     Socioeconomic History    Marital status:      Spouse name: Not on file    Number of children: Not on file    Years of education: Not on file    Highest education level: Not on file   Occupational History    Not on file   Social Needs    Financial resource strain: Not on file    Food insecurity:     Worry: Not on file     Inability: Not on file    Transportation needs:     Medical: Not on file     Non-medical: Not on file   Tobacco Use    Smoking status: Former Smoker     Last attempt to quit: 2019     Years since quittin.5    Smokeless tobacco: Never Used   Substance and Sexual Activity    Alcohol use: Yes     Alcohol/week: 3.0 standard drinks     Types: 3 Cans of beer per week     Frequency: Never     Comment: social    Drug use: No    Sexual activity: Never   Lifestyle    Physical activity:     Days per week: Not on file     Minutes per session: Not on file    Stress:  Not on file   Relationships    Social connections:     Talks on phone: Not on file     Gets together: Not on file     Attends Che[employer] service: Not on file     Active member of club or organization: Not on file     Attends meetings of clubs or organizations: Not on file     Relationship status: Not on file   Other Topics Concern    Not on file   Social History Narrative    Not on file     Current Outpatient Medications   Medication Sig Dispense Refill    amlodipine-benazepril 5-20 mg (LOTREL) 5-20 mg per capsule TAKE 1 CAPSULE BY MOUTH EVERY NIGHT AT BEDTIME 90 capsule 1    ARNUITY ELLIPTA 100 mcg/actuation DsDv INHALE ONE BLISTER DAILY, RINSE MOUTH AFTER USE  6    aspirin (ECOTRIN) 325 MG EC tablet Take 1 tablet (325 mg total) by mouth once daily. 30 tablet 4    atorvastatin (LIPITOR) 80 MG tablet TAKE 1 TABLET BY MOUTH ONCE A DAY 90 tablet 1    azelastine (ASTELIN) 137 mcg (0.1 %) nasal spray PLACE 1 SPRAY INTO EACH NOSTRL 2 X A DAY  0    blood sugar diagnostic Strp USE BRAND COVERED BY INSURANCE AND COMPATIBLE WITH METER TO CHECK GLUCOSE 2X  strip 1    blood-glucose meter kit USE BRAND AS COVERED BY INSURANCE AND COMPATIBLE WITH STRIPS TO CHECK GLUCOSE 2X DAY 1 each 0    empagliflozin (JARDIANCE) 25 mg Tab Take 1 tablet by mouth once daily. 90 tablet 1    finasteride (PROSCAR) 5 mg tablet Take 1 tablet (5 mg total) by mouth once daily. 30 tablet 11    fluticasone-vilanterol (BREO ELLIPTA) 100-25 mcg/dose diskus inhaler Inhale 1 puff into the lungs once daily. Controller 2 each inh    furosemide (LASIX) 20 MG tablet TAKE 1 TABLET TWICE DAILY 180 tablet 1    gabapentin (NEURONTIN) 300 MG capsule Take 1 capsule (300 mg total) by mouth every evening. 90 capsule 3    metFORMIN (GLUCOPHAGE) 1000 MG tablet Take 1 tablet (1,000 mg total) by mouth 2 (two) times daily with meals. 180 tablet 1    montelukast (SINGULAIR) 10 mg tablet Take 10 mg by mouth every evening.  0    potassium chloride SA  (K-DUR,KLOR-CON) 20 MEQ tablet Take 1 tablet (20 mEq total) by mouth once daily. 90 tablet 1    PRODIGY TWIST TOP LANCET 28 gauge Misc CHECK  GLUCOSE TWICE DAILY 100 each 1    VENTOLIN HFA 90 mcg/actuation inhaler INHALE 2 PUFFS EVERY 6 HOURS AS NEEDED FOR WHEEZING  (RESCUE) 18 g 3     No current facility-administered medications for this visit.      Review of patient's allergies indicates:  No Known Allergies   Past Medical History:   Diagnosis Date    Asthma     Cardiomyopathy 10/21/2014    Diabetes mellitus     Hyperlipidemia     Hypertension      Past Surgical History:   Procedure Laterality Date    BREAST SURGERY      CARDIAC CATHETERIZATION      CARDIAC VALVE SURGERY      CORONARY ARTERY BYPASS GRAFT      CYSTOSCOPY N/A 7/30/2019    Procedure: CYSTOSCOPY;  Surgeon: Spencer Nguyen MD;  Location: [facility] OR;  Service: Urology;  Laterality: N/A;    EYE SURGERY      SHOULDER ARTHROSCOPY  1985    TRANSRECTAL BIOPSY OF PROSTATE WITH ULTRASOUND GUIDANCE N/A 7/30/2019    Procedure: BIOPSY, PROSTATE, RECTAL APPROACH, WITH US GUIDANCE;  Surgeon: Spencer Nguyen MD;  Location: [facility] OR;  Service: Urology;  Laterality: N/A;  procedure not performed, pt unable to tolerate    TRANSRECTAL BIOPSY OF PROSTATE WITH ULTRASOUND GUIDANCE N/A 8/8/2019    Procedure: BIOPSY, PROSTATE, RECTAL APPROACH, WITH US GUIDANCE;  Surgeon: Spencer Nguyen MD;  Location: [facility] OR;  Service: Urology;  Laterality: N/A;       Review of Systems   Constitutional: Positive for fatigue. Negative for activity change, appetite change and fever.   HENT: Negative for congestion, ear pain, hearing loss, postnasal drip, sinus pressure, sinus pain, sneezing and sore throat.    Eyes: Negative for photophobia and pain.   Respiratory: Negative for cough, chest tightness, shortness of breath and wheezing.    Cardiovascular: Negative for chest pain and leg swelling.   Gastrointestinal: Negative for abdominal distention, abdominal pain, blood  in stool, constipation, diarrhea, nausea and vomiting.   Endocrine: Negative for cold intolerance, heat intolerance, polydipsia and polyuria.   Genitourinary: Negative for difficulty urinating, dysuria, flank pain, frequency, hematuria and urgency.   Musculoskeletal: Positive for arthralgias (R elbow 7/10) and joint swelling (R elbow). Negative for back pain, myalgias and neck pain.   Skin: Negative for pallor and rash.   Allergic/Immunologic: Positive for environmental allergies. Negative for food allergies.   Neurological: Negative for dizziness, weakness, light-headedness and headaches.   Hematological: Does not bruise/bleed easily.   Psychiatric/Behavioral: Negative for confusion, decreased concentration and sleep disturbance. The patient is not nervous/anxious.       OBJECTIVE:      Vitals:    01/13/20 0928   BP: 130/78   Pulse: 81   Resp: 18   SpO2: 98%   Weight: 81.6 kg (180 lb)   Height: 6' (1.829 m)     Physical Exam   Constitutional: He is oriented to person, place, and time. Vital signs are normal. He appears well-developed and well-nourished. No distress.   HENT:   Head: Normocephalic and atraumatic.   Right Ear: Hearing normal.   Left Ear: Hearing normal.   Nose: Nose normal. No rhinorrhea.   Mouth/Throat: Mucous membranes are normal.   Eyes: Pupils are equal, round, and reactive to light. Conjunctivae and lids are normal. Right eye exhibits no discharge. Left eye exhibits no discharge. Right conjunctiva is not injected. Left conjunctiva is not injected. Right pupil is round and reactive. Left pupil is round and reactive. Pupils are equal.   Neck: Trachea normal and normal range of motion. Neck supple. No JVD present. No tracheal deviation present. No thyromegaly present.   Cardiovascular: Normal rate, regular rhythm, normal heart sounds and intact distal pulses. Exam reveals no gallop and no friction rub.   No murmur heard.  Pulses:       Radial pulses are 2+ on the right side, and 2+ on the left side.    Pulmonary/Chest: Effort normal and breath sounds normal. No stridor. No respiratory distress. He has no decreased breath sounds. He has no wheezes. He has no rhonchi. He has no rales.   Abdominal: Soft. Bowel sounds are normal. He exhibits no distension. There is no tenderness. There is no rigidity and no guarding.   Musculoskeletal: Normal range of motion. He exhibits no edema.        Right elbow: He exhibits deformity (pea-sized lump over lateral elbow, TTP). Tenderness found. Lateral epicondyle tenderness noted.   Lymphadenopathy:     He has no cervical adenopathy.   Neurological: He is alert and oriented to person, place, and time. He has normal strength. He displays no atrophy. He displays a negative Romberg sign. Coordination and gait normal.   Skin: Skin is warm and dry. Capillary refill takes less than 2 seconds. No lesion and no rash noted. No cyanosis. No pallor.   Psychiatric: He has a normal mood and affect. His speech is normal and behavior is normal. Judgment and thought content normal. Cognition and memory are normal. He is attentive.   Nursing note and vitals reviewed.     Assessment:       1. Type 2 diabetes mellitus without complication, without long-term current use of insulin    2. Right elbow pain        Plan:       Type 2 diabetes mellitus without complication, without long-term current use of insulin  -     POCT HEMOGLOBIN A1C  - encouraged to take Jardiance more consistently    Right elbow pain  -     X-Ray Arthrogram Elbow Right; Future; Expected date: 01/13/2020  - CT ordered in separate encounter  - use NSAIDs PRN for swelling/discomfort  - rest arm as much as possible        Follow up in about 4 months (around 5/13/2020) for DM recheck.      1/14/2020 Millie Hayes, KAMERON, FNP-C

## 2020-01-13 NOTE — TELEPHONE ENCOUNTER
----- Message from Bhavin Martinez sent at 1/13/2020 12:19 PM CST -----  Please put in an order for CT or MRI for pt to follow the Arthrogram ordered.  Per radiology, we cannot schedule just and XRAY Arthrogram, it needs to have either CT or MRI along with it.  Thanks!!

## 2020-01-13 NOTE — TELEPHONE ENCOUNTER
Per radiology. Have to have a CT of right elbow to go with the XRAY. Cannot do xray alone.      CT of right elbow pended below.    May need to open encounter to see order.

## 2020-01-23 ENCOUNTER — TELEPHONE (OUTPATIENT)
Dept: FAMILY MEDICINE | Facility: CLINIC | Age: 70
End: 2020-01-23

## 2020-01-23 ENCOUNTER — HOSPITAL ENCOUNTER (OUTPATIENT)
Dept: RADIOLOGY | Facility: HOSPITAL | Age: 70
Discharge: HOME | End: 2020-01-23
Attending: NURSE PRACTITIONER
Payer: COMMERCIAL

## 2020-01-23 DIAGNOSIS — M25.521 RIGHT ELBOW PAIN: Primary | ICD-10-CM

## 2020-01-23 DIAGNOSIS — M25.521 RIGHT ELBOW PAIN: ICD-10-CM

## 2020-01-23 PROCEDURE — 73070 X-RAY EXAM OF ELBOW: CPT | Mod: TC,PO,RT

## 2020-01-29 ENCOUNTER — TELEPHONE (OUTPATIENT)
Dept: FAMILY MEDICINE | Facility: CLINIC | Age: 70
End: 2020-01-29

## 2020-01-30 ENCOUNTER — CLINICAL SUPPORT (OUTPATIENT)
Dept: URGENT CARE | Facility: CLINIC | Age: 70
End: 2020-01-30
Payer: COMMERCIAL

## 2020-01-30 VITALS
HEART RATE: 85 BPM | RESPIRATION RATE: 22 BRPM | SYSTOLIC BLOOD PRESSURE: 176 MMHG | BODY MASS INDEX: 24.24 KG/M2 | DIASTOLIC BLOOD PRESSURE: 97 MMHG | OXYGEN SATURATION: 99 % | HEIGHT: 72 IN | TEMPERATURE: 97 F | WEIGHT: 179 LBS

## 2020-01-30 DIAGNOSIS — J45.909 ASTHMA, UNSPECIFIED ASTHMA SEVERITY, UNSPECIFIED WHETHER COMPLICATED, UNSPECIFIED WHETHER PERSISTENT: Primary | ICD-10-CM

## 2020-01-30 DIAGNOSIS — J98.01 COUGH DUE TO BRONCHOSPASM: ICD-10-CM

## 2020-01-30 DIAGNOSIS — R06.2 WHEEZING: ICD-10-CM

## 2020-01-30 PROCEDURE — 94640 AIRWAY INHALATION TREATMENT: CPT | Mod: S$GLB,,, | Performed by: NURSE PRACTITIONER

## 2020-01-30 PROCEDURE — 99204 PR OFFICE/OUTPT VISIT, NEW, LEVL IV, 45-59 MIN: ICD-10-PCS | Mod: 25,S$GLB,, | Performed by: NURSE PRACTITIONER

## 2020-01-30 PROCEDURE — 99204 OFFICE O/P NEW MOD 45 MIN: CPT | Mod: 25,S$GLB,, | Performed by: NURSE PRACTITIONER

## 2020-01-30 PROCEDURE — 94640 PR INHAL RX, AIRWAY OBST/DX SPUTUM INDUCT: ICD-10-PCS | Mod: S$GLB,,, | Performed by: NURSE PRACTITIONER

## 2020-01-30 RX ORDER — ALBUTEROL SULFATE 1.25 MG/3ML
1.25 SOLUTION RESPIRATORY (INHALATION) EVERY 6 HOURS PRN
Qty: 1 BOX | Refills: 0 | Status: SHIPPED | OUTPATIENT
Start: 2020-01-30 | End: 2021-01-29

## 2020-01-30 RX ORDER — IPRATROPIUM BROMIDE 0.5 MG/2.5ML
0.5 SOLUTION RESPIRATORY (INHALATION)
Status: COMPLETED | OUTPATIENT
Start: 2020-01-30 | End: 2020-01-30

## 2020-01-30 RX ORDER — FLUTICASONE PROPIONATE 50 MCG
2 SPRAY, SUSPENSION (ML) NASAL DAILY
Qty: 16 G | Refills: 0 | Status: SHIPPED | OUTPATIENT
Start: 2020-01-30 | End: 2020-09-16

## 2020-01-30 RX ORDER — CETIRIZINE HYDROCHLORIDE 10 MG/1
10 TABLET ORAL DAILY
Qty: 90 TABLET | Refills: 0 | Status: SHIPPED | OUTPATIENT
Start: 2020-01-30 | End: 2020-09-16

## 2020-01-30 RX ORDER — ALBUTEROL SULFATE 0.83 MG/ML
2.5 SOLUTION RESPIRATORY (INHALATION)
Status: COMPLETED | OUTPATIENT
Start: 2020-01-30 | End: 2020-01-30

## 2020-01-30 RX ADMIN — IPRATROPIUM BROMIDE 0.5 MG: 0.5 SOLUTION RESPIRATORY (INHALATION) at 09:01

## 2020-01-30 RX ADMIN — ALBUTEROL SULFATE 2.5 MG: 0.83 SOLUTION RESPIRATORY (INHALATION) at 09:01

## 2020-01-30 NOTE — PROGRESS NOTES
Subjective:       Patient ID: Baldo Carney is a 69 y.o. male.    Vitals:  vitals were not taken for this visit.     Chief Complaint: Asthma    Pt states he is out of his albuterol to do his breathing treatments.  He is complaining of a cough and some shortness of breath noted. He has a history of asthma and uses a daily inhaler.     Asthma   He complains of cough, difficulty breathing, shortness of breath and wheezing. There is no sputum production. This is a new problem. The current episode started in the past 7 days. The cough is non-productive (productive some times). Associated symptoms include PND and postnasal drip. Pertinent negatives include no chest pain, dyspnea on exertion, fever, headaches, heartburn, sneezing or sore throat. His symptoms are alleviated by beta-agonist. He reports moderate improvement on treatment. His past medical history is significant for asthma.       Constitution: Negative for chills, fatigue and fever.   HENT: Positive for postnasal drip. Negative for congestion and sore throat.    Cardiovascular: Negative for chest pain and sob on exertion.   Respiratory: Positive for cough, shortness of breath, wheezing and asthma. Negative for sputum production.    Gastrointestinal: Negative for heartburn.   Allergic/Immunologic: Positive for asthma. Negative for sneezing.   Neurological: Negative for dizziness, light-headedness and headaches.       Objective:      Physical Exam   Constitutional: He is oriented to person, place, and time. He appears well-developed and well-nourished. He is cooperative.  Non-toxic appearance. He does not have a sickly appearance. He does not appear ill. No distress.   HENT:   Head: Normocephalic and atraumatic.   Right Ear: Hearing, external ear and ear canal normal. A middle ear effusion is present.   Left Ear: Hearing, external ear and ear canal normal. A middle ear effusion is present.   Nose: Mucosal edema present. No rhinorrhea or nasal deformity. No  epistaxis. Right sinus exhibits no maxillary sinus tenderness and no frontal sinus tenderness. Left sinus exhibits no maxillary sinus tenderness and no frontal sinus tenderness.   Mouth/Throat: Uvula is midline, oropharynx is clear and moist and mucous membranes are normal. No trismus in the jaw. Normal dentition. No uvula swelling. Cobblestoning present. No oropharyngeal exudate, posterior oropharyngeal edema or posterior oropharyngeal erythema.   Eyes: Conjunctivae and lids are normal. No scleral icterus.   Neck: Trachea normal, full passive range of motion without pain and phonation normal. Neck supple. No neck rigidity. No edema and no erythema present.   Cardiovascular: Normal rate, regular rhythm, normal heart sounds, intact distal pulses and normal pulses.   Pulmonary/Chest: Effort normal. No stridor. No respiratory distress. He has no decreased breath sounds. He has wheezes in the right middle field. He has no rhonchi.   Abdominal: Normal appearance.   Musculoskeletal: Normal range of motion. He exhibits no edema or deformity.   Lymphadenopathy:     He has no cervical adenopathy.   Neurological: He is alert and oriented to person, place, and time. He exhibits normal muscle tone. Coordination normal.   Skin: Skin is warm, dry, intact, not diaphoretic and not pale.   Psychiatric: He has a normal mood and affect. His speech is normal and behavior is normal. Judgment and thought content normal. Cognition and memory are normal.   Nursing note and vitals reviewed.        Assessment:       No diagnosis found.    Plan:         There are no diagnoses linked to this encounter.

## 2020-02-16 NOTE — DISCHARGE INSTRUCTIONS
Before leaving, please make sure you have all your personal belongings such as glasses, purses, wallets, keys, cell phones, jewelry, jackets etc After your prostate biopsy         After the procedure    · Drink plenty of fluids.  · You may have burning or light bleeding when you urinate--this is normal.  · Medications may be prescribed to ease any discomfort or prevent infection. Take these as directed.  · Call your doctor if you have heavy bleeding or blood clots, burning that lasts more than a day, a fever over 100°F  (38° C), or trouble urinating.           Spontaneous, unlabored and symmetrical

## 2020-03-12 DIAGNOSIS — M77.8 ENTHESOPATHY OF ELBOW: ICD-10-CM

## 2020-03-12 DIAGNOSIS — I10 ESSENTIAL HYPERTENSION: Primary | ICD-10-CM

## 2020-03-12 RX ORDER — POTASSIUM CHLORIDE 20 MEQ/1
20 TABLET, EXTENDED RELEASE ORAL DAILY
Qty: 90 TABLET | Refills: 1 | Status: SHIPPED | OUTPATIENT
Start: 2020-03-12 | End: 2020-05-13 | Stop reason: SDUPTHER

## 2020-03-12 NOTE — TELEPHONE ENCOUNTER
Not sure why the follow-up on his elbow wasn't handled back in January, when I made a note for him to be called. Please see chart note below...    Notes recorded by KAMERON Ann FNP-LINA on 1/28/2020 at 9:56 AM CST  Please call the patient regarding his abnormal result - right elbow shows an enthesophyte which is responsible for his pain.  Please pend a referral to Dr. Cho for further evaluation and possible localized steroid shot.    I have taken care of the ortho referral in this encounter, since it wasn't done prior & handled the refill as well. Please call patient to give him ortho contact info.

## 2020-03-16 ENCOUNTER — TELEPHONE (OUTPATIENT)
Dept: UROLOGY | Facility: CLINIC | Age: 70
End: 2020-03-16

## 2020-03-16 NOTE — TELEPHONE ENCOUNTER
Called patient to reschedule his appointment at this time. He states that he has no current symptoms or problems and feels just fine aside from some elbow pain. Patient states that he can wait to have an appointment later. Appt rescheduled and mailed out per his request.

## 2020-03-23 ENCOUNTER — TELEPHONE (OUTPATIENT)
Dept: UROLOGY | Facility: CLINIC | Age: 70
End: 2020-03-23

## 2020-03-31 ENCOUNTER — OFFICE VISIT (OUTPATIENT)
Dept: ORTHOPEDICS | Facility: CLINIC | Age: 70
End: 2020-03-31
Payer: COMMERCIAL

## 2020-03-31 VITALS
BODY MASS INDEX: 24.41 KG/M2 | SYSTOLIC BLOOD PRESSURE: 123 MMHG | WEIGHT: 180 LBS | HEART RATE: 66 BPM | DIASTOLIC BLOOD PRESSURE: 72 MMHG

## 2020-03-31 DIAGNOSIS — M77.11 LATERAL EPICONDYLITIS OF RIGHT ELBOW: Primary | ICD-10-CM

## 2020-03-31 PROCEDURE — 20551 NJX 1 TENDON ORIGIN/INSJ: CPT | Mod: RT,S$GLB,, | Performed by: ORTHOPAEDIC SURGERY

## 2020-03-31 PROCEDURE — 99203 OFFICE O/P NEW LOW 30 MIN: CPT | Mod: 25,S$GLB,, | Performed by: ORTHOPAEDIC SURGERY

## 2020-03-31 PROCEDURE — 20551 TENDON ORIGIN: R ELBOW: ICD-10-PCS | Mod: RT,S$GLB,, | Performed by: ORTHOPAEDIC SURGERY

## 2020-03-31 PROCEDURE — 99203 PR OFFICE/OUTPT VISIT, NEW, LEVL III, 30-44 MIN: ICD-10-PCS | Mod: 25,S$GLB,, | Performed by: ORTHOPAEDIC SURGERY

## 2020-03-31 RX ORDER — METHYLPREDNISOLONE ACETATE 40 MG/ML
40 INJECTION, SUSPENSION INTRA-ARTICULAR; INTRALESIONAL; INTRAMUSCULAR; SOFT TISSUE
Status: DISCONTINUED | OUTPATIENT
Start: 2020-03-31 | End: 2020-03-31 | Stop reason: HOSPADM

## 2020-03-31 RX ORDER — HYDROCODONE BITARTRATE AND ACETAMINOPHEN 7.5; 325 MG/1; MG/1
TABLET ORAL
COMMUNITY
Start: 2020-01-31 | End: 2020-05-12

## 2020-03-31 RX ORDER — AMOXICILLIN 500 MG/1
CAPSULE ORAL
COMMUNITY
Start: 2020-01-27 | End: 2020-05-12

## 2020-03-31 RX ADMIN — METHYLPREDNISOLONE ACETATE 40 MG: 40 INJECTION, SUSPENSION INTRA-ARTICULAR; INTRALESIONAL; INTRAMUSCULAR; SOFT TISSUE at 09:03

## 2020-03-31 NOTE — PROGRESS NOTES
[facility]    Subjective:     Chief Complaint:   Chief Complaint   Patient presents with    Right Elbow - Pain     Pain going on for 5-6 weeks no trauma, aching like toothache, not radiating, no tx tried, taking Norco not helping, on blood thinners, no blood clots & is a diabetic.       Past Medical History:   Diagnosis Date    Asthma     Cardiomyopathy 10/21/2014    Diabetes mellitus     Hyperlipidemia     Hypertension        Past Surgical History:   Procedure Laterality Date    BREAST SURGERY      CARDIAC CATHETERIZATION      CARDIAC VALVE SURGERY      CORONARY ARTERY BYPASS GRAFT      CYSTOSCOPY N/A 7/30/2019    Procedure: CYSTOSCOPY;  Surgeon: Spencer Nguyen MD;  Location: [facility] OR;  Service: Urology;  Laterality: N/A;    EYE SURGERY      SHOULDER ARTHROSCOPY  1985    TRANSRECTAL BIOPSY OF PROSTATE WITH ULTRASOUND GUIDANCE N/A 7/30/2019    Procedure: BIOPSY, PROSTATE, RECTAL APPROACH, WITH US GUIDANCE;  Surgeon: Spencer Nguyen MD;  Location: [facility] OR;  Service: Urology;  Laterality: N/A;  procedure not performed, pt unable to tolerate    TRANSRECTAL BIOPSY OF PROSTATE WITH ULTRASOUND GUIDANCE N/A 8/8/2019    Procedure: BIOPSY, PROSTATE, RECTAL APPROACH, WITH US GUIDANCE;  Surgeon: Spencer Nguyen MD;  Location: [facility] OR;  Service: Urology;  Laterality: N/A;       Current Outpatient Medications   Medication Sig    albuterol (ACCUNEB) 1.25 mg/3 mL Nebu Take 3 mLs (1.25 mg total) by nebulization every 6 (six) hours as needed. Rescue    amlodipine-benazepril 5-20 mg (LOTREL) 5-20 mg per capsule TAKE 1 CAPSULE BY MOUTH EVERY NIGHT AT BEDTIME    amoxicillin (AMOXIL) 500 MG capsule     ARNUITY ELLIPTA 100 mcg/actuation DsDv INHALE ONE BLISTER DAILY, RINSE MOUTH AFTER USE    aspirin (ECOTRIN) 325 MG EC tablet Take 1 tablet (325 mg total) by mouth once daily.    atorvastatin (LIPITOR) 80 MG tablet TAKE 1 TABLET BY MOUTH ONCE A DAY    azelastine (ASTELIN) 137 mcg (0.1 %) nasal  spray PLACE 1 SPRAY INTO EACH NOSTRL 2 X A DAY    blood sugar diagnostic Strp USE BRAND COVERED BY INSURANCE AND COMPATIBLE WITH METER TO CHECK GLUCOSE 2X DAY    cetirizine (ZYRTEC) 10 MG tablet Take 1 tablet (10 mg total) by mouth once daily.    empagliflozin (JARDIANCE) 25 mg Tab Take 1 tablet by mouth once daily.    finasteride (PROSCAR) 5 mg tablet Take 1 tablet (5 mg total) by mouth once daily.    fluticasone propionate (FLONASE) 50 mcg/actuation nasal spray 2 sprays (100 mcg total) by Each Nostril route once daily.    fluticasone-vilanterol (BREO ELLIPTA) 100-25 mcg/dose diskus inhaler Inhale 1 puff into the lungs once daily. Controller    furosemide (LASIX) 20 MG tablet TAKE 1 TABLET TWICE DAILY    gabapentin (NEURONTIN) 300 MG capsule Take 1 capsule (300 mg total) by mouth every evening.    HYDROcodone-acetaminophen (NORCO) 7.5-325 mg per tablet     metFORMIN (GLUCOPHAGE) 1000 MG tablet Take 1 tablet (1,000 mg total) by mouth 2 (two) times daily with meals.    montelukast (SINGULAIR) 10 mg tablet Take 10 mg by mouth every evening.    potassium chloride SA (K-DUR,KLOR-CON) 20 MEQ tablet Take 1 tablet (20 mEq total) by mouth once daily.    PRODIGY TWIST TOP LANCET 28 gauge Misc CHECK  GLUCOSE TWICE DAILY    VENTOLIN HFA 90 mcg/actuation inhaler INHALE 2 PUFFS EVERY 6 HOURS AS NEEDED FOR WHEEZING  (RESCUE)    blood-glucose meter kit USE BRAND AS COVERED BY INSURANCE AND COMPATIBLE WITH STRIPS TO CHECK GLUCOSE 2X DAY     No current facility-administered medications for this visit.        Review of patient's allergies indicates:  No Known Allergies    Family History   Problem Relation Age of Onset    No Known Problems Mother     Diabetes Father     Hypertension Father     Heart attack Father     Heart disease Father     Diabetes Sister     Heart disease Brother     Diabetes Brother     No Known Problems Maternal Grandmother     No Known Problems Maternal Grandfather     No Known Problems  Paternal Grandmother     No Known Problems Paternal Grandfather     No Known Problems Maternal Aunt     No Known Problems Maternal Uncle     No Known Problems Paternal Aunt     No Known Problems Paternal Uncle     Anemia Neg Hx     Arrhythmia Neg Hx     Asthma Neg Hx     Clotting disorder Neg Hx     Fainting Neg Hx     Heart failure Neg Hx     Hyperlipidemia Neg Hx     Glaucoma Neg Hx        Social History     Socioeconomic History    Marital status:      Spouse name: Not on file    Number of children: Not on file    Years of education: Not on file    Highest education level: Not on file   Occupational History    Not on file   Social Needs    Financial resource strain: Not on file    Food insecurity:     Worry: Not on file     Inability: Not on file    Transportation needs:     Medical: Not on file     Non-medical: Not on file   Tobacco Use    Smoking status: Former Smoker     Last attempt to quit: 2019     Years since quittin.7    Smokeless tobacco: Never Used   Substance and Sexual Activity    Alcohol use: Yes     Alcohol/week: 3.0 standard drinks     Types: 3 Cans of beer per week     Frequency: Never     Comment: social    Drug use: No    Sexual activity: Never   Lifestyle    Physical activity:     Days per week: Not on file     Minutes per session: Not on file    Stress: Not on file   Relationships    Social connections:     Talks on phone: Not on file     Gets together: Not on file     Attends [employer] service: Not on file     Active member of club or organization: Not on file     Attends meetings of clubs or organizations: Not on file     Relationship status: Not on file   Other Topics Concern    Not on file   Social History Narrative    Not on file       History of present illness:  Patient comes in today for the right elbow.  He has had right elbow pain for several weeks.  He is not sure how it started.  He denies any trauma.  The pain is over the lateral  epicondylar region.  Denies any neck pain.      Review of Systems:    Constitution: Negative for chills, fever, and sweats.  Negative for unexplained weight loss.    HENT:  Negative for headaches and blurry vision.    Cardiovascular:Negative for chest pain or irregular heart beat. Negative for hypertension.    Respiratory:  Negative for cough and shortness of breath.    Gastrointestinal: Negative for abdominal pain, heartburn, melena, nausea, and vomitting.    Genitourinary:  Negative bladder incontinence and dysuria.    Musculoskeletal:  See HPI for details.     Neurological: Negative for numbness.    Psychiatric/Behavioral: Negative for depression.  The patient is not nervous/anxious.      Endocrine: Negative for polyuria    Hematologic/Lymphatic: Negative for bleeding problem.  Does not bruise/bleed easily.    Skin: Negative for poor would healing and rash    Objective:      Physical Examination:    Vital Signs:    Vitals:    03/31/20 0931   BP: 123/72   Pulse: 66       Body mass index is 24.41 kg/m².    This a well-developed, well nourished patient in no acute distress.  They are alert and oriented and cooperative to examination.        Patient has full range of motion of the right elbow.  Tender over the lateral epicondylar region.  Pain with resisted extension of the fingers and wrist.  No pain with flexion of the fingers and wrist.  For full range of motion of the right elbow without difficulty.  Full range of motion of the cervical spine without difficulty.  Spurling sign is negative  Pertinent New Results:    XRAY Report / Interpretation:   AP and lateral of the right elbow demonstrates normal bony anatomy.    Assessment/Plan:    Lateral epicondylitis right elbow.  I injected with Depo-Medrol and lidocaine.  I started him on a strengthening program.  Follow-up in 6 weeks    This note was created using Dragon voice recognition software that occasionally misinterpreted phrases or words.

## 2020-03-31 NOTE — LETTER
March 31, 2020      Millie Hayes, KAMERON,FNP-C  [address] [facility] [address]           [facility]  [address]   [facility] [address]  Phone: [phone]  Fax: [phone]          Patient: Baldo Carney   MR Number: [id number]   YOB: 1950   Date of Visit: 3/31/2020       Dear Millie Hayes:    Thank you for referring Baldo Carney to me for evaluation. Attached you will find relevant portions of my assessment and plan of care.    If you have questions, please do not hesitate to call me. I look forward to following Baldo Carney along with you.    Sincerely,    Joseph Cho MD    Enclosure  CC:  No Recipients    If you would like to receive this communication electronically, please contact [email or web address] or [phone] to request more information on [employer] access.    For providers and/or their staff who would like to refer a patient to [facility], please contact us through our one-stop-shop provider referral line, [facility], at [phone].    If you feel you have received this communication in error or would no longer like to receive these types of communications, please e-mail [email or web address]

## 2020-03-31 NOTE — PROCEDURES
Tendon Origin: R elbow  Date/Time: 3/31/2020 9:30 AM  Performed by: Joseph Cho MD  Authorized by: Joseph Cho MD     Consent Done?:  Yes (Verbal)  Timeout: prior to procedure the correct patient, procedure, and site was verified    Indications:  Pain  Site marked: the procedure site was marked    Timeout: prior to procedure the correct patient, procedure, and site was verified    Location:  Elbow  Site:  R elbow  Prep: patient was prepped and draped in usual sterile fashion    Needle size:  25 G  Medications:  40 mg methylPREDNISolone acetate 40 mg/mL  Patient tolerance:  Patient tolerated the procedure well with no immediate complications

## 2020-04-02 DIAGNOSIS — E11.9 TYPE 2 DIABETES MELLITUS WITHOUT COMPLICATION, WITHOUT LONG-TERM CURRENT USE OF INSULIN: ICD-10-CM

## 2020-04-02 DIAGNOSIS — I10 ESSENTIAL HYPERTENSION: ICD-10-CM

## 2020-04-02 RX ORDER — AMLODIPINE AND BENAZEPRIL HYDROCHLORIDE 5; 20 MG/1; MG/1
CAPSULE ORAL
Qty: 90 CAPSULE | Refills: 1 | Status: SHIPPED | OUTPATIENT
Start: 2020-04-02 | End: 2020-09-16 | Stop reason: SDUPTHER

## 2020-04-02 RX ORDER — METFORMIN HYDROCHLORIDE 1000 MG/1
TABLET ORAL
Qty: 180 TABLET | Refills: 1 | Status: SHIPPED | OUTPATIENT
Start: 2020-04-02 | End: 2020-09-16 | Stop reason: SDUPTHER

## 2020-04-27 RX ORDER — ATORVASTATIN CALCIUM 80 MG/1
TABLET, FILM COATED ORAL
Qty: 90 TABLET | Refills: 1 | Status: SHIPPED | OUTPATIENT
Start: 2020-04-27 | End: 2020-11-11 | Stop reason: SDUPTHER

## 2020-05-12 ENCOUNTER — OFFICE VISIT (OUTPATIENT)
Dept: ORTHOPEDICS | Facility: CLINIC | Age: 70
End: 2020-05-12
Payer: COMMERCIAL

## 2020-05-12 VITALS — BODY MASS INDEX: 23.33 KG/M2 | DIASTOLIC BLOOD PRESSURE: 68 MMHG | WEIGHT: 172 LBS | SYSTOLIC BLOOD PRESSURE: 142 MMHG

## 2020-05-12 DIAGNOSIS — M77.11 LATERAL EPICONDYLITIS OF RIGHT ELBOW: Primary | ICD-10-CM

## 2020-05-12 PROCEDURE — 99213 PR OFFICE/OUTPT VISIT, EST, LEVL III, 20-29 MIN: ICD-10-PCS | Mod: 25,S$GLB,, | Performed by: ORTHOPAEDIC SURGERY

## 2020-05-12 PROCEDURE — 99213 OFFICE O/P EST LOW 20 MIN: CPT | Mod: 25,S$GLB,, | Performed by: ORTHOPAEDIC SURGERY

## 2020-05-12 PROCEDURE — 20551 NJX 1 TENDON ORIGIN/INSJ: CPT | Mod: RT,S$GLB,, | Performed by: ORTHOPAEDIC SURGERY

## 2020-05-12 PROCEDURE — 20551 TENDON ORIGIN: R ELBOW: ICD-10-PCS | Mod: RT,S$GLB,, | Performed by: ORTHOPAEDIC SURGERY

## 2020-05-12 RX ORDER — METHYLPREDNISOLONE ACETATE 40 MG/ML
40 INJECTION, SUSPENSION INTRA-ARTICULAR; INTRALESIONAL; INTRAMUSCULAR; SOFT TISSUE
Status: DISCONTINUED | OUTPATIENT
Start: 2020-05-12 | End: 2020-05-12 | Stop reason: HOSPADM

## 2020-05-12 RX ADMIN — METHYLPREDNISOLONE ACETATE 40 MG: 40 INJECTION, SUSPENSION INTRA-ARTICULAR; INTRALESIONAL; INTRAMUSCULAR; SOFT TISSUE at 11:05

## 2020-05-12 NOTE — PROCEDURES
Tendon Origin: R elbow  Date/Time: 5/12/2020 11:00 AM  Performed by: Joseph Cho MD  Authorized by: Joseph Cho MD     Consent Done?:  Yes (Verbal)  Timeout: prior to procedure the correct patient, procedure, and site was verified    Indications:  Pain  Site marked: the procedure site was marked    Timeout: prior to procedure the correct patient, procedure, and site was verified    Location:  Elbow  Site:  R elbow  Prep: patient was prepped and draped in usual sterile fashion    Needle size:  25 G  Medications:  40 mg methylPREDNISolone acetate 40 mg/mL  Patient tolerance:  Patient tolerated the procedure well with no immediate complications

## 2020-05-12 NOTE — PROGRESS NOTES
[facility]    Subjective:     Chief Complaint:   Chief Complaint   Patient presents with    Right Elbow - Pain     F/U from  3.31.20 for RT elbow inj.  Injection helped 3-4 days but it is hurting again       Past Medical History:   Diagnosis Date    Asthma     Cardiomyopathy 10/21/2014    Diabetes mellitus     Hyperlipidemia     Hypertension        Past Surgical History:   Procedure Laterality Date    BREAST SURGERY      CARDIAC CATHETERIZATION      CARDIAC VALVE SURGERY      CORONARY ARTERY BYPASS GRAFT      CYSTOSCOPY N/A 7/30/2019    Procedure: CYSTOSCOPY;  Surgeon: Spencer Nguyen MD;  Location: [facility] OR;  Service: Urology;  Laterality: N/A;    EYE SURGERY      SHOULDER ARTHROSCOPY  1985    TRANSRECTAL BIOPSY OF PROSTATE WITH ULTRASOUND GUIDANCE N/A 7/30/2019    Procedure: BIOPSY, PROSTATE, RECTAL APPROACH, WITH US GUIDANCE;  Surgeon: Spencer Nguyen MD;  Location: [facility] OR;  Service: Urology;  Laterality: N/A;  procedure not performed, pt unable to tolerate    TRANSRECTAL BIOPSY OF PROSTATE WITH ULTRASOUND GUIDANCE N/A 8/8/2019    Procedure: BIOPSY, PROSTATE, RECTAL APPROACH, WITH US GUIDANCE;  Surgeon: Spencer Nguyen MD;  Location: [facility] OR;  Service: Urology;  Laterality: N/A;       Current Outpatient Medications   Medication Sig    albuterol (ACCUNEB) 1.25 mg/3 mL Nebu Take 3 mLs (1.25 mg total) by nebulization every 6 (six) hours as needed. Rescue    amlodipine-benazepril 5-20 mg (LOTREL) 5-20 mg per capsule TAKE 1 CAPSULE BY MOUTH EVERY NIGHT AT BEDTIME    ARNUITY ELLIPTA 100 mcg/actuation DsDv INHALE ONE BLISTER DAILY, RINSE MOUTH AFTER USE    aspirin (ECOTRIN) 325 MG EC tablet Take 1 tablet (325 mg total) by mouth once daily.    atorvastatin (LIPITOR) 80 MG tablet TAKE 1 TABLET BY MOUTH ONCE A DAY    azelastine (ASTELIN) 137 mcg (0.1 %) nasal spray PLACE 1 SPRAY INTO EACH NOSTRL 2 X A DAY    blood sugar diagnostic Strp USE BRAND COVERED BY INSURANCE AND COMPATIBLE  WITH METER TO CHECK GLUCOSE 2X DAY    cetirizine (ZYRTEC) 10 MG tablet Take 1 tablet (10 mg total) by mouth once daily.    empagliflozin (JARDIANCE) 25 mg Tab Take 1 tablet by mouth once daily.    finasteride (PROSCAR) 5 mg tablet Take 1 tablet (5 mg total) by mouth once daily.    fluticasone propionate (FLONASE) 50 mcg/actuation nasal spray 2 sprays (100 mcg total) by Each Nostril route once daily.    fluticasone-vilanterol (BREO ELLIPTA) 100-25 mcg/dose diskus inhaler Inhale 1 puff into the lungs once daily. Controller    furosemide (LASIX) 20 MG tablet TAKE 1 TABLET TWICE DAILY    metFORMIN (GLUCOPHAGE) 1000 MG tablet TAKE 1 TABLET BY MOUTH TWICE A DAY WITH MEALS    montelukast (SINGULAIR) 10 mg tablet Take 10 mg by mouth every evening.    potassium chloride SA (K-DUR,KLOR-CON) 20 MEQ tablet Take 1 tablet (20 mEq total) by mouth once daily.    PRODIGY TWIST TOP LANCET 28 gauge Misc CHECK  GLUCOSE TWICE DAILY    VENTOLIN HFA 90 mcg/actuation inhaler INHALE 2 PUFFS EVERY 6 HOURS AS NEEDED FOR WHEEZING  (RESCUE)    blood-glucose meter kit USE BRAND AS COVERED BY INSURANCE AND COMPATIBLE WITH STRIPS TO CHECK GLUCOSE 2X DAY    gabapentin (NEURONTIN) 300 MG capsule Take 1 capsule (300 mg total) by mouth every evening.     No current facility-administered medications for this visit.        Review of patient's allergies indicates:  No Known Allergies    Family History   Problem Relation Age of Onset    No Known Problems Mother     Diabetes Father     Hypertension Father     Heart attack Father     Heart disease Father     Diabetes Sister     Heart disease Brother     Diabetes Brother     No Known Problems Maternal Grandmother     No Known Problems Maternal Grandfather     No Known Problems Paternal Grandmother     No Known Problems Paternal Grandfather     No Known Problems Maternal Aunt     No Known Problems Maternal Uncle     No Known Problems Paternal Aunt     No Known Problems Paternal  Uncle     Anemia Neg Hx     Arrhythmia Neg Hx     Asthma Neg Hx     Clotting disorder Neg Hx     Fainting Neg Hx     Heart failure Neg Hx     Hyperlipidemia Neg Hx     Glaucoma Neg Hx        Social History     Socioeconomic History    Marital status:      Spouse name: Not on file    Number of children: Not on file    Years of education: Not on file    Highest education level: Not on file   Occupational History    Not on file   Social Needs    Financial resource strain: Not on file    Food insecurity:     Worry: Not on file     Inability: Not on file    Transportation needs:     Medical: Not on file     Non-medical: Not on file   Tobacco Use    Smoking status: Former Smoker     Last attempt to quit: 2019     Years since quittin.8    Smokeless tobacco: Never Used   Substance and Sexual Activity    Alcohol use: Yes     Alcohol/week: 3.0 standard drinks     Types: 3 Cans of beer per week     Frequency: Never     Comment: social    Drug use: No    Sexual activity: Never   Lifestyle    Physical activity:     Days per week: Not on file     Minutes per session: Not on file    Stress: Not on file   Relationships    Social connections:     Talks on phone: Not on file     Gets together: Not on file     Attends [employer] service: Not on file     Active member of club or organization: Not on file     Attends meetings of clubs or organizations: Not on file     Relationship status: Not on file   Other Topics Concern    Not on file   Social History Narrative    Not on file       History of present illness:  Patient comes in today for the right elbow.  He continues to have elbow pain around the lateral epicondylar region.  He has had some relief from the Depo-Medrol injection but it is really back to where was.      Review of Systems:    Constitution: Negative for chills, fever, and sweats.  Negative for unexplained weight loss.    HENT:  Negative for headaches and blurry  vision.    Cardiovascular:Negative for chest pain or irregular heart beat. Negative for hypertension.    Respiratory:  Negative for cough and shortness of breath.    Gastrointestinal: Negative for abdominal pain, heartburn, melena, nausea, and vomitting.    Genitourinary:  Negative bladder incontinence and dysuria.    Musculoskeletal:  See HPI for details.     Neurological: Negative for numbness.    Psychiatric/Behavioral: Negative for depression.  The patient is not nervous/anxious.      Endocrine: Negative for polyuria    Hematologic/Lymphatic: Negative for bleeding problem.  Does not bruise/bleed easily.    Skin: Negative for poor would healing and rash    Objective:      Physical Examination:    Vital Signs:    Vitals:    05/12/20 1040   BP: (!) 142/68       Body mass index is 23.33 kg/m².    This a well-developed, well nourished patient in no acute distress.  They are alert and oriented and cooperative to examination.        Patient has full range of motion of the right elbow.  Tender over the lateral epicondylar region.  Pain with extension of the fingers and wrist.  No pain with flexion of the fingers and wrist.  Full range of motion of the left elbow without difficulty.  Spurling sign is negative.  Pertinent New Results:    XRAY Report / Interpretation:       Assessment/Plan:      Lateral epicondylitis recurrent.  I injected again with Depo-Medrol and lidocaine.  I started him on physical therapy.  He will      This note was created using Dragon voice recognition software that occasionally misinterpreted phrases or words.

## 2020-05-13 ENCOUNTER — OFFICE VISIT (OUTPATIENT)
Dept: FAMILY MEDICINE | Facility: CLINIC | Age: 70
End: 2020-05-13
Payer: COMMERCIAL

## 2020-05-13 VITALS
HEART RATE: 72 BPM | HEIGHT: 72 IN | RESPIRATION RATE: 16 BRPM | BODY MASS INDEX: 23.84 KG/M2 | OXYGEN SATURATION: 98 % | WEIGHT: 176 LBS | DIASTOLIC BLOOD PRESSURE: 70 MMHG | TEMPERATURE: 98 F | SYSTOLIC BLOOD PRESSURE: 130 MMHG

## 2020-05-13 DIAGNOSIS — E78.2 MIXED HYPERLIPIDEMIA: ICD-10-CM

## 2020-05-13 DIAGNOSIS — E11.9 TYPE 2 DIABETES MELLITUS WITHOUT COMPLICATION, WITHOUT LONG-TERM CURRENT USE OF INSULIN: Primary | ICD-10-CM

## 2020-05-13 DIAGNOSIS — I10 ESSENTIAL HYPERTENSION: ICD-10-CM

## 2020-05-13 DIAGNOSIS — M15.9 GENERALIZED OA: ICD-10-CM

## 2020-05-13 DIAGNOSIS — R97.20 ELEVATED PSA: ICD-10-CM

## 2020-05-13 LAB — HBA1C MFR BLD: 6.2 %

## 2020-05-13 PROCEDURE — 83036 HEMOGLOBIN GLYCOSYLATED A1C: CPT | Mod: QW,S$GLB,, | Performed by: NURSE PRACTITIONER

## 2020-05-13 PROCEDURE — 83036 POCT HEMOGLOBIN A1C: ICD-10-PCS | Mod: QW,S$GLB,, | Performed by: NURSE PRACTITIONER

## 2020-05-13 PROCEDURE — 99214 OFFICE O/P EST MOD 30 MIN: CPT | Mod: S$GLB,,, | Performed by: NURSE PRACTITIONER

## 2020-05-13 PROCEDURE — 99214 PR OFFICE/OUTPT VISIT, EST, LEVL IV, 30-39 MIN: ICD-10-PCS | Mod: S$GLB,,, | Performed by: NURSE PRACTITIONER

## 2020-05-13 RX ORDER — MELOXICAM 7.5 MG/1
7.5 TABLET ORAL DAILY
Qty: 90 TABLET | Refills: 3 | Status: ON HOLD | OUTPATIENT
Start: 2020-05-13 | End: 2020-08-31 | Stop reason: HOSPADM

## 2020-05-13 RX ORDER — POTASSIUM CHLORIDE 750 MG/1
10 TABLET, EXTENDED RELEASE ORAL DAILY
Qty: 30 TABLET | Refills: 5 | Status: ON HOLD | OUTPATIENT
Start: 2020-05-13 | End: 2020-08-31 | Stop reason: HOSPADM

## 2020-05-13 NOTE — PROGRESS NOTES
SUBJECTIVE:      Patient ID: Baldo Carney is a 69 y.o. male.    Chief Complaint: Follow-up (4 month f/u) and Diabetes    F/U for DM recheck    Discussed outstanding health maintenance     Diabetes   He presents for his follow-up diabetic visit. He has type 2 diabetes mellitus. No MedicAlert identification noted. His disease course has been stable. Pertinent negatives for hypoglycemia include no confusion, dizziness, headaches, nervousness/anxiousness or pallor. Associated symptoms include fatigue and foot paresthesias. Pertinent negatives for diabetes include no chest pain, no polydipsia, no polyuria and no weakness. There are no hypoglycemic complications. Symptoms are stable. Diabetic complications include peripheral neuropathy. Risk factors for coronary artery disease include diabetes mellitus, dyslipidemia, family history, hypertension, male sex, sedentary lifestyle and stress. Current diabetic treatment includes oral agent (dual therapy). He is compliant with treatment most of the time (Not taking Jardiance regularly D/T cost). His weight is fluctuating minimally. He is following a generally healthy diet. He has not had a previous visit with a dietitian. He rarely participates in exercise. An ACE inhibitor/angiotensin II receptor blocker is being taken. Eye exam is not current.   Hypertension   This is a chronic problem. The current episode started more than 1 year ago. The problem is controlled. Pertinent negatives include no chest pain, headaches, neck pain, peripheral edema or shortness of breath. Risk factors for coronary artery disease include male gender, sedentary lifestyle, diabetes mellitus, dyslipidemia, family history and stress. Past treatments include calcium channel blockers, ACE inhibitors and diuretics. The current treatment provides mild improvement. Compliance problems include exercise.  Hypertensive end-organ damage includes CAD/MI. Identifiable causes of hypertension include a  hypertension causing med.   Arthritis   Presents for follow-up visit. He complains of pain and stiffness. He reports no joint swelling. Affected location: generalized. His pain is at a severity of 6/10. Associated symptoms include fatigue and pain while resting. Pertinent negatives include no diarrhea, dysuria, fever or rash. Compliance with medications is %.       Past Surgical History:   Procedure Laterality Date    BREAST SURGERY      CARDIAC CATHETERIZATION      CARDIAC VALVE SURGERY      CORONARY ARTERY BYPASS GRAFT      CYSTOSCOPY N/A 7/30/2019    Procedure: CYSTOSCOPY;  Surgeon: Spencer Nguyen MD;  Location: [facility] OR;  Service: Urology;  Laterality: N/A;    EYE SURGERY      SHOULDER ARTHROSCOPY  1985    TRANSRECTAL BIOPSY OF PROSTATE WITH ULTRASOUND GUIDANCE N/A 7/30/2019    Procedure: BIOPSY, PROSTATE, RECTAL APPROACH, WITH US GUIDANCE;  Surgeon: Spencer Nguyen MD;  Location: [facility] OR;  Service: Urology;  Laterality: N/A;  procedure not performed, pt unable to tolerate    TRANSRECTAL BIOPSY OF PROSTATE WITH ULTRASOUND GUIDANCE N/A 8/8/2019    Procedure: BIOPSY, PROSTATE, RECTAL APPROACH, WITH US GUIDANCE;  Surgeon: Spencer Nguyen MD;  Location: [facility] OR;  Service: Urology;  Laterality: N/A;     Family History   Problem Relation Age of Onset    No Known Problems Mother     Diabetes Father     Hypertension Father     Heart attack Father     Heart disease Father     Diabetes Sister     Heart disease Brother     Diabetes Brother     No Known Problems Maternal Grandmother     No Known Problems Maternal Grandfather     No Known Problems Paternal Grandmother     No Known Problems Paternal Grandfather     No Known Problems Maternal Aunt     No Known Problems Maternal Uncle     No Known Problems Paternal Aunt     No Known Problems Paternal Uncle     Anemia Neg Hx     Arrhythmia Neg Hx     Asthma Neg Hx     Clotting disorder Neg Hx     Fainting Neg Hx     Heart failure Neg  Hx     Hyperlipidemia Neg Hx     Glaucoma Neg Hx       Social History     Socioeconomic History    Marital status:      Spouse name: Not on file    Number of children: Not on file    Years of education: Not on file    Highest education level: Not on file   Occupational History    Not on file   Social Needs    Financial resource strain: Not on file    Food insecurity:     Worry: Not on file     Inability: Not on file    Transportation needs:     Medical: Not on file     Non-medical: Not on file   Tobacco Use    Smoking status: Former Smoker     Last attempt to quit: 2019     Years since quittin.9    Smokeless tobacco: Never Used   Substance and Sexual Activity    Alcohol use: Yes     Alcohol/week: 3.0 standard drinks     Types: 3 Cans of beer per week     Frequency: Never     Comment: social    Drug use: No    Sexual activity: Never   Lifestyle    Physical activity:     Days per week: Not on file     Minutes per session: Not on file    Stress: Not on file   Relationships    Social connections:     Talks on phone: Not on file     Gets together: Not on file     Attends [employer] service: Not on file     Active member of club or organization: Not on file     Attends meetings of clubs or organizations: Not on file     Relationship status: Not on file   Other Topics Concern    Not on file   Social History Narrative    Not on file     Current Outpatient Medications   Medication Sig Dispense Refill    albuterol (ACCUNEB) 1.25 mg/3 mL Nebu Take 3 mLs (1.25 mg total) by nebulization every 6 (six) hours as needed. Rescue 1 Box 0    amlodipine-benazepril 5-20 mg (LOTREL) 5-20 mg per capsule TAKE 1 CAPSULE BY MOUTH EVERY NIGHT AT BEDTIME 90 capsule 1    ARNUITY ELLIPTA 100 mcg/actuation DsDv INHALE ONE BLISTER DAILY, RINSE MOUTH AFTER USE  6    aspirin (ECOTRIN) 325 MG EC tablet Take 1 tablet (325 mg total) by mouth once daily. 30 tablet 4    atorvastatin (LIPITOR) 80 MG tablet TAKE 1  TABLET BY MOUTH ONCE A DAY 90 tablet 1    azelastine (ASTELIN) 137 mcg (0.1 %) nasal spray PLACE 1 SPRAY INTO EACH NOSTRL 2 X A DAY  0    blood sugar diagnostic Strp USE BRAND COVERED BY INSURANCE AND COMPATIBLE WITH METER TO CHECK GLUCOSE 2X  strip 1    cetirizine (ZYRTEC) 10 MG tablet Take 1 tablet (10 mg total) by mouth once daily. 90 tablet 0    empagliflozin (JARDIANCE) 25 mg Tab Take 1 tablet by mouth once daily. 90 tablet 1    finasteride (PROSCAR) 5 mg tablet Take 1 tablet (5 mg total) by mouth once daily. 30 tablet 11    fluticasone propionate (FLONASE) 50 mcg/actuation nasal spray 2 sprays (100 mcg total) by Each Nostril route once daily. 16 g 0    fluticasone-vilanterol (BREO ELLIPTA) 100-25 mcg/dose diskus inhaler Inhale 1 puff into the lungs once daily. Controller 2 each inh    furosemide (LASIX) 20 MG tablet TAKE 1 TABLET TWICE DAILY 180 tablet 1    metFORMIN (GLUCOPHAGE) 1000 MG tablet TAKE 1 TABLET BY MOUTH TWICE A DAY WITH MEALS 180 tablet 1    potassium chloride SA (K-DUR,KLOR-CON) 10 MEQ tablet Take 1 tablet (10 mEq total) by mouth once daily. 30 tablet 5    PRODIGY TWIST TOP LANCET 28 gauge Misc CHECK  GLUCOSE TWICE DAILY 100 each 1    VENTOLIN HFA 90 mcg/actuation inhaler INHALE 2 PUFFS EVERY 6 HOURS AS NEEDED FOR WHEEZING  (RESCUE) 18 g 3    blood-glucose meter kit USE BRAND AS COVERED BY INSURANCE AND COMPATIBLE WITH STRIPS TO CHECK GLUCOSE 2X DAY 1 each 0    meloxicam (MOBIC) 7.5 MG tablet Take 1 tablet (7.5 mg total) by mouth once daily. 90 tablet 3    montelukast (SINGULAIR) 10 mg tablet Take 10 mg by mouth every evening.  0     No current facility-administered medications for this visit.      Review of patient's allergies indicates:  No Known Allergies   Past Medical History:   Diagnosis Date    Asthma     Cardiomyopathy 10/21/2014    Diabetes mellitus     Hyperlipidemia     Hypertension      Past Surgical History:   Procedure Laterality Date    BREAST SURGERY       CARDIAC CATHETERIZATION      CARDIAC VALVE SURGERY      CORONARY ARTERY BYPASS GRAFT      CYSTOSCOPY N/A 7/30/2019    Procedure: CYSTOSCOPY;  Surgeon: Spencer Nguyen MD;  Location: [facility] OR;  Service: Urology;  Laterality: N/A;    EYE SURGERY      SHOULDER ARTHROSCOPY  1985    TRANSRECTAL BIOPSY OF PROSTATE WITH ULTRASOUND GUIDANCE N/A 7/30/2019    Procedure: BIOPSY, PROSTATE, RECTAL APPROACH, WITH US GUIDANCE;  Surgeon: Spencer Nguyen MD;  Location: [facility] OR;  Service: Urology;  Laterality: N/A;  procedure not performed, pt unable to tolerate    TRANSRECTAL BIOPSY OF PROSTATE WITH ULTRASOUND GUIDANCE N/A 8/8/2019    Procedure: BIOPSY, PROSTATE, RECTAL APPROACH, WITH US GUIDANCE;  Surgeon: Spencer Nguyen MD;  Location: [facility] OR;  Service: Urology;  Laterality: N/A;       Review of Systems   Constitutional: Positive for fatigue. Negative for activity change, appetite change and fever.   HENT: Negative for congestion, ear pain, hearing loss, postnasal drip, sinus pressure, sinus pain, sneezing and sore throat.    Eyes: Negative for photophobia and pain.   Respiratory: Negative for cough, chest tightness, shortness of breath and wheezing.    Cardiovascular: Negative for chest pain and leg swelling.   Gastrointestinal: Negative for abdominal distention, abdominal pain, blood in stool, constipation, diarrhea, nausea and vomiting.   Endocrine: Negative for cold intolerance, heat intolerance, polydipsia and polyuria.   Genitourinary: Negative for difficulty urinating, dysuria, flank pain, frequency, hematuria and urgency.   Musculoskeletal: Positive for arthralgias (following with Dr. Cho for R elbow injections), arthritis and stiffness. Negative for back pain, joint swelling, myalgias and neck pain.   Skin: Negative for pallor and rash.   Allergic/Immunologic: Positive for environmental allergies. Negative for food allergies.   Neurological: Negative for dizziness, weakness, light-headedness and  headaches.   Hematological: Does not bruise/bleed easily.   Psychiatric/Behavioral: Negative for confusion, decreased concentration and sleep disturbance. The patient is not nervous/anxious.       OBJECTIVE:      Vitals:    05/13/20 0944   BP: 130/70   BP Location: Left arm   Patient Position: Sitting   Pulse: 72   Resp: 16   Temp: 97.7 °F (36.5 °C)   SpO2: 98%   Weight: 79.8 kg (176 lb)   Height: 6' (1.829 m)     Physical Exam   Constitutional: He is oriented to person, place, and time. Vital signs are normal. He appears well-developed and well-nourished. No distress.   HENT:   Head: Normocephalic and atraumatic.   Right Ear: Hearing normal.   Left Ear: Hearing normal.   Nose: Nose normal. No rhinorrhea.   Mouth/Throat: Mucous membranes are normal.   Eyes: Pupils are equal, round, and reactive to light. Conjunctivae and lids are normal. Right eye exhibits no discharge. Left eye exhibits no discharge. Right conjunctiva is not injected. Left conjunctiva is not injected. Right pupil is round and reactive. Left pupil is round and reactive. Pupils are equal.   Neck: Trachea normal and normal range of motion. Neck supple. No JVD present. No tracheal deviation present. No thyromegaly present.   Cardiovascular: Normal rate, regular rhythm, normal heart sounds and intact distal pulses. Exam reveals no gallop and no friction rub.   No murmur heard.  Pulses:       Radial pulses are 2+ on the right side, and 2+ on the left side.   Pulmonary/Chest: Effort normal and breath sounds normal. No stridor. No respiratory distress. He has no decreased breath sounds. He has no wheezes. He has no rhonchi. He has no rales.   Abdominal: Soft. Bowel sounds are normal. He exhibits no distension. There is no tenderness. There is no rigidity and no guarding.   Musculoskeletal: Normal range of motion. He exhibits no edema.        Right elbow: He exhibits deformity (pea-sized lump over lateral elbow, TTP). Tenderness found. Lateral epicondyle  tenderness noted.   Lymphadenopathy:     He has no cervical adenopathy.   Neurological: He is alert and oriented to person, place, and time. He has normal strength. He displays no atrophy. He displays a negative Romberg sign. Coordination and gait normal.   Skin: Skin is warm and dry. Capillary refill takes less than 2 seconds. No lesion and no rash noted. No cyanosis. No pallor.   Psychiatric: He has a normal mood and affect. His speech is normal and behavior is normal. Judgment and thought content normal. Cognition and memory are normal. He is attentive.   Nursing note and vitals reviewed.     Assessment:       1. Type 2 diabetes mellitus without complication, without long-term current use of insulin    2. Essential hypertension    3. Generalized OA    4. Mixed hyperlipidemia    5. Elevated PSA        Plan:       Type 2 diabetes mellitus without complication, without long-term current use of insulin  -     Hemoglobin A1C, POCT  -     Comprehensive metabolic panel; Future; Expected date: 05/13/2020  -     Microalbumin/creatinine urine ratio; Future; Expected date: 05/13/2020  -     Hemoglobin A1C; Future; Expected date: 05/13/2020    Essential hypertension  -     potassium chloride SA (K-DUR,KLOR-CON) 10 MEQ tablet; Take 1 tablet (10 mEq total) by mouth once daily.  Dispense: 30 tablet; Refill: 5  -     CBC auto differential; Future; Expected date: 05/20/2020  -     Comprehensive metabolic panel; Future; Expected date: 05/13/2020  -     Lipid Panel; Future; Expected date: 05/13/2020    Generalized OA  -     meloxicam (MOBIC) 7.5 MG tablet; Take 1 tablet (7.5 mg total) by mouth once daily.  Dispense: 90 tablet; Refill: 3    Mixed hyperlipidemia  -     Comprehensive metabolic panel; Future; Expected date: 05/13/2020  -     Lipid Panel; Future; Expected date: 05/13/2020    Elevated PSA  -     PSA, Screening; Future; Expected date: 05/13/2020        Follow up in about 6 months (around 11/13/2020) for DM recheck.       5/13/2020 KAMERON Rodriguez, FNP-C

## 2020-05-18 ENCOUNTER — TELEPHONE (OUTPATIENT)
Dept: ORTHOPEDICS | Facility: CLINIC | Age: 70
End: 2020-05-18

## 2020-05-18 DIAGNOSIS — M77.11 LATERAL EPICONDYLITIS, RIGHT ELBOW: Primary | ICD-10-CM

## 2020-05-26 NOTE — PROGRESS NOTES
[facility] Note  Staff: ANNIE Molina    PCP: Millie Hayes    Chief Complaint: BPH, routine recheck    Subjective:        HPI: Baldo Carney is a 69 y.o. male presents today for routine recheck.  Pt has hx of BPH with elevated PSA levels.    This pt [facility]/2019 as a referral by Dr. Spencer Nguyen for BPH, for possible robotic simple prostatectomy.    Patient is a 69 y.o. male with very large prostate, but he is not significantly bothered by this.  No hydro.  No bladder stones.  Not requiring nguyen catheter.  No renal faliure.  No recurrent UTI or gross hematuria at this time.    AUA SS Today:  4/2  PVR by bladder scan performed by MA today:  96 mL  *Pt was unable to urinate during ov today for urine sample.    Current  meds:  Finasteride 5 mg one tablet daily pt tolerates with no problems at this time.    Last PSA Screening:   Lab Results   Component Value Date    PSA 18.2 (H) 10/16/2019    PSA 16.7 (H) 01/10/2019    PSA 36.9 (H) 03/28/2018    PSADIAG 13.9 (H) 09/30/2014     REVIEW OF SYSTEMS:  A comprehensive 10 system review was performed and is negative except as noted above in HPI    PMHx:  Past Medical History:   Diagnosis Date    Asthma     Cardiomyopathy 10/21/2014    Diabetes mellitus     Hyperlipidemia     Hypertension      PSHx:  Past Surgical History:   Procedure Laterality Date    BREAST SURGERY      CARDIAC CATHETERIZATION      CARDIAC VALVE SURGERY      CORONARY ARTERY BYPASS GRAFT      CYSTOSCOPY N/A 7/30/2019    Procedure: CYSTOSCOPY;  Surgeon: Spencer Nguyen MD;  [facility]  Service: Urology;  Laterality: N/A;    EYE SURGERY      SHOULDER ARTHROSCOPY  1985    TRANSRECTAL BIOPSY OF PROSTATE WITH ULTRASOUND GUIDANCE N/A 7/30/2019    Procedure: BIOPSY, PROSTATE, RECTAL APPROACH, WITH US GUIDANCE;  Surgeon: Spencer Nguyen MD;  [facility];  Service: Urology;   Laterality: N/A;  procedure not performed, pt unable to tolerate    TRANSRECTAL BIOPSY OF PROSTATE WITH ULTRASOUND GUIDANCE N/A 8/8/2019    Procedure: BIOPSY, PROSTATE, RECTAL APPROACH, WITH US GUIDANCE;  Surgeon: Spencer Nguyen MD;  Location: [facility];  Service: Urology;  Laterality: N/A;     Allergies:  Patient has no known allergies.    Medications: reviewed   Anticoagulation: Yes - Mobic 7.5 mg daily, Ecotrin 325 mg daily    Objective:     Vitals:    05/27/20 1452   BP: 136/67   Pulse: 68   Resp: 18     General:WDWN in NAD  Eyes: PERRLA, normal conjunctiva  Respiratory: no increased work on breathing, clear to auscultation  Cardiovascular: regular rate and rhythm. No obvious extremity edema.  GI: palpation of masses. No tenderness. No hepatosplenomegaly to palpation.  Musculoskeletal: normal range of motion of bilateral upper extremities. Normal muscle strength and tone.  Skin: no obvious rashes or lesions. No tightening of skin noted.  Neurologic: CN grossly normal. Normal sensation.   Psychiatric: awake, alert and oriented x 3. Mood and affect normal. Cooperative.    :  Inspection of anus and perineum normal  NEHAL: +++Enlarged but smooth prostate gland without masses, tenderness. SV not palpable. Normal sphincter tone. No hemhorroids.  No bilateral inguinal hernias noted     Assessment:       1. BPH with obstruction/lower urinary tract symptoms    2. Elevated PSA          Plan:   BPH with hx of elevated PSA levels and LUTS:    PSA level already ordered per PCP office, due in October of this year.  Pt doing well, no further treatment at this time, per pt's choice.    F/u in one year or sooner if symptoms worsen.    [facility]: Declined    Miriam Ludwig, FNP-C

## 2020-05-27 ENCOUNTER — OFFICE VISIT (OUTPATIENT)
Dept: UROLOGY | Facility: CLINIC | Age: 70
End: 2020-05-27
Payer: COMMERCIAL

## 2020-05-27 VITALS
WEIGHT: 176.38 LBS | RESPIRATION RATE: 18 BRPM | SYSTOLIC BLOOD PRESSURE: 136 MMHG | DIASTOLIC BLOOD PRESSURE: 67 MMHG | HEIGHT: 72 IN | HEART RATE: 68 BPM | BODY MASS INDEX: 23.89 KG/M2

## 2020-05-27 DIAGNOSIS — R97.20 ELEVATED PSA: ICD-10-CM

## 2020-05-27 DIAGNOSIS — N40.1 BPH WITH OBSTRUCTION/LOWER URINARY TRACT SYMPTOMS: Primary | ICD-10-CM

## 2020-05-27 DIAGNOSIS — N13.8 BPH WITH OBSTRUCTION/LOWER URINARY TRACT SYMPTOMS: Primary | ICD-10-CM

## 2020-05-27 LAB — POC RESIDUAL URINE VOLUME: 96 ML (ref 0–100)

## 2020-05-27 PROCEDURE — 99999 PR PBB SHADOW E&M-EST. PATIENT-LVL V: CPT | Mod: PBBFAC,,, | Performed by: NURSE PRACTITIONER

## 2020-05-27 PROCEDURE — 99999 PR PBB SHADOW E&M-EST. PATIENT-LVL V: ICD-10-PCS | Mod: PBBFAC,,, | Performed by: NURSE PRACTITIONER

## 2020-05-27 PROCEDURE — 99214 PR OFFICE/OUTPT VISIT, EST, LEVL IV, 30-39 MIN: ICD-10-PCS | Mod: 25,S$GLB,, | Performed by: NURSE PRACTITIONER

## 2020-05-27 PROCEDURE — 51798 POCT BLADDER SCAN: ICD-10-PCS | Mod: S$GLB,,, | Performed by: NURSE PRACTITIONER

## 2020-05-27 PROCEDURE — 99214 OFFICE O/P EST MOD 30 MIN: CPT | Mod: 25,S$GLB,, | Performed by: NURSE PRACTITIONER

## 2020-05-27 PROCEDURE — 51798 US URINE CAPACITY MEASURE: CPT | Mod: S$GLB,,, | Performed by: NURSE PRACTITIONER

## 2020-05-27 RX ORDER — FINASTERIDE 5 MG/1
5 TABLET, FILM COATED ORAL DAILY
Qty: 90 TABLET | Refills: 3 | Status: SHIPPED | OUTPATIENT
Start: 2020-05-27 | End: 2020-11-11 | Stop reason: SDUPTHER

## 2020-05-27 NOTE — PATIENT INSTRUCTIONS
BPH (Enlarged Prostate)  The prostate is a gland at the base of the bladder. As some men get older, the prostate may get bigger in size. This problem is called benign prostatic hyperplasia (BPH). BPH puts pressure on the urethra. This is the tube that carries urine from the bladder to the penis. It may interfere with the flow of urine. It may also keep the bladder from emptying fully.    Symptoms of BPH include trouble starting urination and feeling as though the bladder isnt emptying all the way. It also includes a weak urine stream, dribbling and leaking of urine, and frequent and urgent urination (especially at night). BPH can increase the risk of urinary infections. It can also block off urine flow completely. If this occurs, a thin tube (catheter) may be passed into the bladder to help drain urine.  If symptoms are mild, no treatment may be needed right now. If symptoms are more severe, treatment is likely needed. The goal of treatment is to improve urine flow and reduce symptoms. Treatments can include medicine and procedures. Your healthcare provider will discuss treatment options with you as needed.  Home care  The following guidelines will help you care for yourself at home:  · Urinate as soon as you feel the urge. Don't try to hold your urine.  · Don't limit your fluid intake during the day. Drink 6 to 8 glasses of water or liquids a day. This prevents bacteria from building up in the bladder.  · Avoid drinking fluids after dinner to help reduce urination during the night.  · Avoid medicines that can worsen your symptoms. These include certain cold and allergy medicines and antidepressants. Diuretics used for high blood pressure can also worsen symptoms. Talk to your doctor about the medicines you take. Other choices may work better for you.  Prostate cancer screening  BPH does not increase the risk of prostate cancer. But because prostate cancer is a common cancer in men, screening is sometimes  recommended. This may help detect the cancer in its early stages when treatment is most effective. Factors that can increase the risk of prostate cancer include being -American or having a father or brother who had prostate cancer. A high-fat diet may also increase the risk of prostate cancer. Talk to your healthcare provider to see whether you should be screened for prostate cancer.  Follow-up care  Follow up with your healthcare provider, or as advised  To learn more, go to:  · National Kidney & Urologic Diseases Information Clearinghouse  kidney.niddk.nih.gov, 720.911.8270  When to seek medical advice  Call your healthcare provider right away if any of these occur:  · Fever of 100.4°F (38.0°C) or higher, or as advised  · Unable to pass urine for 8 hours  · Increasing pressure or pain in your bladder (lower abdomen)  · Blood in the urine  · Increasing low back pain, not related to injury  · Symptoms of urinary infection (increased urge to urinate, burning when passing urine, foul-smelling urine)  Date Last Reviewed: 7/1/2016 © 2000-2017 [employer]. [address] [place] [address], [place] [address]. All rights reserved. This information is not intended as a substitute for professional medical care. Always follow your healthcare professional's instructions.

## 2020-08-28 ENCOUNTER — CLINICAL SUPPORT (OUTPATIENT)
Dept: CARDIOLOGY | Facility: HOSPITAL | Age: 70
DRG: 177 | End: 2020-08-28
Attending: INTERNAL MEDICINE
Payer: MEDICARE

## 2020-08-28 ENCOUNTER — HOSPITAL ENCOUNTER (INPATIENT)
Facility: HOSPITAL | Age: 70
LOS: 3 days | Discharge: HOME | DRG: 177 | End: 2020-08-31
Attending: EMERGENCY MEDICINE | Admitting: INTERNAL MEDICINE
Payer: MEDICARE

## 2020-08-28 VITALS — BODY MASS INDEX: 24.24 KG/M2 | HEIGHT: 72 IN | WEIGHT: 179 LBS

## 2020-08-28 DIAGNOSIS — U07.1 COVID-19 VIRUS DETECTED: ICD-10-CM

## 2020-08-28 DIAGNOSIS — Z20.822 SUSPECTED COVID-19 VIRUS INFECTION: ICD-10-CM

## 2020-08-28 DIAGNOSIS — R94.31 QT PROLONGATION: ICD-10-CM

## 2020-08-28 DIAGNOSIS — R60.0 BILATERAL LOWER EXTREMITY EDEMA: ICD-10-CM

## 2020-08-28 DIAGNOSIS — I42.9 CARDIOMYOPATHY, UNSPECIFIED TYPE: ICD-10-CM

## 2020-08-28 DIAGNOSIS — N17.9 AKI (ACUTE KIDNEY INJURY): ICD-10-CM

## 2020-08-28 DIAGNOSIS — I50.9 CHF (CONGESTIVE HEART FAILURE): ICD-10-CM

## 2020-08-28 DIAGNOSIS — E11.9 TYPE 2 DIABETES MELLITUS WITHOUT COMPLICATION, WITHOUT LONG-TERM CURRENT USE OF INSULIN: ICD-10-CM

## 2020-08-28 PROBLEM — J12.82 PNEUMONIA DUE TO COVID-19 VIRUS: Status: ACTIVE | Noted: 2020-08-28

## 2020-08-28 LAB
ABO + RH BLD: NORMAL
ALBUMIN SERPL BCP-MCNC: 3.4 G/DL (ref 3.5–5.2)
ALLENS TEST: ABNORMAL
ALP SERPL-CCNC: 60 U/L (ref 55–135)
ALT SERPL W/O P-5'-P-CCNC: 18 U/L (ref 10–44)
ANION GAP SERPL CALC-SCNC: 12 MMOL/L (ref 8–16)
AST SERPL-CCNC: 36 U/L (ref 10–40)
BASOPHILS # BLD AUTO: 0.02 K/UL (ref 0–0.2)
BASOPHILS NFR BLD: 0.2 % (ref 0–1.9)
BILIRUB SERPL-MCNC: 1.1 MG/DL (ref 0.1–1)
BLD GP AB SCN CELLS X3 SERPL QL: NORMAL
BNP SERPL-MCNC: 1471 PG/ML (ref 0–99)
BUN SERPL-MCNC: 51 MG/DL (ref 8–23)
CALCIUM SERPL-MCNC: 8.6 MG/DL (ref 8.7–10.5)
CHLORIDE SERPL-SCNC: 103 MMOL/L (ref 95–110)
CK SERPL-CCNC: 213 U/L (ref 20–200)
CO2 SERPL-SCNC: 23 MMOL/L (ref 23–29)
CREAT SERPL-MCNC: 2.3 MG/DL (ref 0.5–1.4)
CRP SERPL-MCNC: 3.27 MG/DL
DELSYS: ABNORMAL
DIFFERENTIAL METHOD: ABNORMAL
EOSINOPHIL # BLD AUTO: 0 K/UL (ref 0–0.5)
EOSINOPHIL NFR BLD: 0 % (ref 0–8)
ERYTHROCYTE [DISTWIDTH] IN BLOOD BY AUTOMATED COUNT: 14.1 % (ref 11.5–14.5)
EST. GFR  (AFRICAN AMERICAN): 32.3 ML/MIN/1.73 M^2
EST. GFR  (NON AFRICAN AMERICAN): 27.9 ML/MIN/1.73 M^2
FERRITIN SERPL-MCNC: 608 NG/ML (ref 20–300)
GLUCOSE SERPL-MCNC: 110 MG/DL (ref 70–110)
GLUCOSE SERPL-MCNC: 118 MG/DL (ref 70–110)
GLUCOSE SERPL-MCNC: 132 MG/DL (ref 70–110)
HCO3 UR-SCNC: 18.2 MMOL/L (ref 24–28)
HCT VFR BLD AUTO: 37.3 % (ref 40–54)
HGB BLD-MCNC: 12.3 G/DL (ref 14–18)
IMM GRANULOCYTES # BLD AUTO: 0.06 K/UL (ref 0–0.04)
IMM GRANULOCYTES NFR BLD AUTO: 0.6 % (ref 0–0.5)
LACTATE SERPL-SCNC: 1.7 MMOL/L (ref 0.5–1.9)
LDH SERPL L TO P-CCNC: 352 U/L (ref 110–260)
LYMPHOCYTES # BLD AUTO: 0.8 K/UL (ref 1–4.8)
LYMPHOCYTES NFR BLD: 8.2 % (ref 18–48)
MCH RBC QN AUTO: 29.5 PG (ref 27–31)
MCHC RBC AUTO-ENTMCNC: 33 G/DL (ref 32–36)
MCV RBC AUTO: 89 FL (ref 82–98)
MONOCYTES # BLD AUTO: 0.7 K/UL (ref 0.3–1)
MONOCYTES NFR BLD: 6.6 % (ref 4–15)
NEUTROPHILS # BLD AUTO: 8.3 K/UL (ref 1.8–7.7)
NEUTROPHILS NFR BLD: 84.4 % (ref 38–73)
NRBC BLD-RTO: 0 /100 WBC
PCO2 BLDA: 26.4 MMHG (ref 35–45)
PH SMN: 7.45 [PH] (ref 7.35–7.45)
PLATELET # BLD AUTO: 206 K/UL (ref 150–350)
PMV BLD AUTO: 11 FL (ref 9.2–12.9)
PO2 BLDA: 94 MMHG (ref 80–100)
POC BE: -6 MMOL/L
POC SATURATED O2: 98 % (ref 95–100)
POC TCO2: 19 MMOL/L (ref 23–27)
POTASSIUM SERPL-SCNC: 4 MMOL/L (ref 3.5–5.1)
PROCALCITONIN SERPL IA-MCNC: 0.17 NG/ML (ref 0–0.5)
PROT SERPL-MCNC: 7.7 G/DL (ref 6–8.4)
RBC # BLD AUTO: 4.17 M/UL (ref 4.6–6.2)
SAMPLE: ABNORMAL
SARS-COV-2 RDRP RESP QL NAA+PROBE: POSITIVE
SITE: ABNORMAL
SODIUM SERPL-SCNC: 138 MMOL/L (ref 136–145)
TROPONIN I SERPL DL<=0.01 NG/ML-MCNC: 0.05 NG/ML
TROPONIN I SERPL DL<=0.01 NG/ML-MCNC: 0.06 NG/ML
TROPONIN I SERPL DL<=0.01 NG/ML-MCNC: 0.07 NG/ML
WBC # BLD AUTO: 9.78 K/UL (ref 3.9–12.7)

## 2020-08-28 PROCEDURE — 99285 EMERGENCY DEPT VISIT HI MDM: CPT | Mod: 25

## 2020-08-28 PROCEDURE — 99900035 HC TECH TIME PER 15 MIN (STAT)

## 2020-08-28 PROCEDURE — 83605 ASSAY OF LACTIC ACID: CPT

## 2020-08-28 PROCEDURE — 83615 LACTATE (LD) (LDH) ENZYME: CPT

## 2020-08-28 PROCEDURE — 83880 ASSAY OF NATRIURETIC PEPTIDE: CPT

## 2020-08-28 PROCEDURE — 93306 TTE W/DOPPLER COMPLETE: CPT

## 2020-08-28 PROCEDURE — 93005 ELECTROCARDIOGRAM TRACING: CPT | Performed by: INTERNAL MEDICINE

## 2020-08-28 PROCEDURE — 82728 ASSAY OF FERRITIN: CPT

## 2020-08-28 PROCEDURE — 82962 GLUCOSE BLOOD TEST: CPT

## 2020-08-28 PROCEDURE — 36600 WITHDRAWAL OF ARTERIAL BLOOD: CPT

## 2020-08-28 PROCEDURE — 21400001 HC TELEMETRY ROOM

## 2020-08-28 PROCEDURE — 85025 COMPLETE CBC W/AUTO DIFF WBC: CPT

## 2020-08-28 PROCEDURE — 99223 1ST HOSP IP/OBS HIGH 75: CPT | Mod: ,,, | Performed by: INTERNAL MEDICINE

## 2020-08-28 PROCEDURE — 36415 COLL VENOUS BLD VENIPUNCTURE: CPT

## 2020-08-28 PROCEDURE — 82550 ASSAY OF CK (CPK): CPT

## 2020-08-28 PROCEDURE — 80053 COMPREHEN METABOLIC PANEL: CPT

## 2020-08-28 PROCEDURE — 63600175 PHARM REV CODE 636 W HCPCS: Performed by: INTERNAL MEDICINE

## 2020-08-28 PROCEDURE — U0002 COVID-19 LAB TEST NON-CDC: HCPCS

## 2020-08-28 PROCEDURE — 84484 ASSAY OF TROPONIN QUANT: CPT | Mod: 91

## 2020-08-28 PROCEDURE — 96367 TX/PROPH/DG ADDL SEQ IV INF: CPT

## 2020-08-28 PROCEDURE — 96365 THER/PROPH/DIAG IV INF INIT: CPT

## 2020-08-28 PROCEDURE — 96375 TX/PRO/DX INJ NEW DRUG ADDON: CPT

## 2020-08-28 PROCEDURE — 63600175 PHARM REV CODE 636 W HCPCS: Performed by: EMERGENCY MEDICINE

## 2020-08-28 PROCEDURE — 25000003 PHARM REV CODE 250: Performed by: EMERGENCY MEDICINE

## 2020-08-28 PROCEDURE — 84484 ASSAY OF TROPONIN QUANT: CPT

## 2020-08-28 PROCEDURE — 86140 C-REACTIVE PROTEIN: CPT

## 2020-08-28 PROCEDURE — 84145 PROCALCITONIN (PCT): CPT

## 2020-08-28 PROCEDURE — 83036 HEMOGLOBIN GLYCOSYLATED A1C: CPT

## 2020-08-28 PROCEDURE — 87040 BLOOD CULTURE FOR BACTERIA: CPT | Mod: 59

## 2020-08-28 PROCEDURE — 86901 BLOOD TYPING SEROLOGIC RH(D): CPT

## 2020-08-28 PROCEDURE — 99223 PR INITIAL HOSPITAL CARE,LEVL III: ICD-10-PCS | Mod: ,,, | Performed by: INTERNAL MEDICINE

## 2020-08-28 PROCEDURE — 82803 BLOOD GASES ANY COMBINATION: CPT

## 2020-08-28 RX ORDER — ALBUTEROL SULFATE 90 UG/1
2 AEROSOL, METERED RESPIRATORY (INHALATION) EVERY 8 HOURS
Status: CANCELLED | OUTPATIENT
Start: 2020-08-28

## 2020-08-28 RX ORDER — ASPIRIN 325 MG
325 TABLET ORAL
Status: COMPLETED | OUTPATIENT
Start: 2020-08-28 | End: 2020-08-28

## 2020-08-28 RX ORDER — FUROSEMIDE 10 MG/ML
20 INJECTION INTRAMUSCULAR; INTRAVENOUS DAILY
Status: CANCELLED | OUTPATIENT
Start: 2020-08-28

## 2020-08-28 RX ORDER — ASPIRIN 325 MG
325 TABLET, DELAYED RELEASE (ENTERIC COATED) ORAL DAILY
Status: CANCELLED | OUTPATIENT
Start: 2020-08-28

## 2020-08-28 RX ORDER — ENOXAPARIN SODIUM 100 MG/ML
40 INJECTION SUBCUTANEOUS
Status: DISCONTINUED | OUTPATIENT
Start: 2020-08-28 | End: 2020-08-29

## 2020-08-28 RX ORDER — LANOLIN ALCOHOL/MO/W.PET/CERES
800 CREAM (GRAM) TOPICAL
Status: DISCONTINUED | OUTPATIENT
Start: 2020-08-28 | End: 2020-08-31 | Stop reason: HOSPADM

## 2020-08-28 RX ORDER — POTASSIUM CHLORIDE 20 MEQ/1
40 TABLET, EXTENDED RELEASE ORAL
Status: DISCONTINUED | OUTPATIENT
Start: 2020-08-28 | End: 2020-08-31 | Stop reason: HOSPADM

## 2020-08-28 RX ORDER — POTASSIUM CHLORIDE 7.45 MG/ML
40 INJECTION INTRAVENOUS
Status: DISCONTINUED | OUTPATIENT
Start: 2020-08-28 | End: 2020-08-31 | Stop reason: HOSPADM

## 2020-08-28 RX ORDER — MAGNESIUM SULFATE HEPTAHYDRATE 40 MG/ML
2 INJECTION, SOLUTION INTRAVENOUS
Status: DISCONTINUED | OUTPATIENT
Start: 2020-08-28 | End: 2020-08-31 | Stop reason: HOSPADM

## 2020-08-28 RX ORDER — MAGNESIUM SULFATE HEPTAHYDRATE 40 MG/ML
4 INJECTION, SOLUTION INTRAVENOUS
Status: DISCONTINUED | OUTPATIENT
Start: 2020-08-28 | End: 2020-08-31 | Stop reason: HOSPADM

## 2020-08-28 RX ORDER — POTASSIUM CHLORIDE 20 MEQ/1
20 TABLET, EXTENDED RELEASE ORAL
Status: DISCONTINUED | OUTPATIENT
Start: 2020-08-28 | End: 2020-08-31 | Stop reason: HOSPADM

## 2020-08-28 RX ORDER — ATORVASTATIN CALCIUM 40 MG/1
80 TABLET, FILM COATED ORAL DAILY
Status: CANCELLED | OUTPATIENT
Start: 2020-08-28

## 2020-08-28 RX ORDER — FINASTERIDE 5 MG/1
5 TABLET, FILM COATED ORAL DAILY
Status: CANCELLED | OUTPATIENT
Start: 2020-08-28

## 2020-08-28 RX ORDER — POTASSIUM CHLORIDE 7.45 MG/ML
20 INJECTION INTRAVENOUS
Status: DISCONTINUED | OUTPATIENT
Start: 2020-08-28 | End: 2020-08-31 | Stop reason: HOSPADM

## 2020-08-28 RX ORDER — FLUTICASONE FUROATE AND VILANTEROL 100; 25 UG/1; UG/1
1 POWDER RESPIRATORY (INHALATION) DAILY PRN
Status: CANCELLED | OUTPATIENT
Start: 2020-08-28

## 2020-08-28 RX ORDER — SODIUM CHLORIDE 0.9 % (FLUSH) 0.9 %
10 SYRINGE (ML) INJECTION
Status: CANCELLED | OUTPATIENT
Start: 2020-08-28

## 2020-08-28 RX ORDER — IBUPROFEN 200 MG
16 TABLET ORAL
Status: DISCONTINUED | OUTPATIENT
Start: 2020-08-28 | End: 2020-08-31 | Stop reason: HOSPADM

## 2020-08-28 RX ORDER — AMLODIPINE BESYLATE 5 MG/1
5 TABLET ORAL DAILY
Status: CANCELLED | OUTPATIENT
Start: 2020-08-28

## 2020-08-28 RX ORDER — ACETAMINOPHEN 325 MG/1
650 TABLET ORAL EVERY 6 HOURS PRN
Status: DISCONTINUED | OUTPATIENT
Start: 2020-08-29 | End: 2020-08-31 | Stop reason: HOSPADM

## 2020-08-28 RX ORDER — GLUCAGON 1 MG
1 KIT INJECTION
Status: DISCONTINUED | OUTPATIENT
Start: 2020-08-28 | End: 2020-08-31 | Stop reason: HOSPADM

## 2020-08-28 RX ORDER — IBUPROFEN 200 MG
24 TABLET ORAL
Status: DISCONTINUED | OUTPATIENT
Start: 2020-08-28 | End: 2020-08-31 | Stop reason: HOSPADM

## 2020-08-28 RX ORDER — MAGNESIUM SULFATE 1 G/100ML
1 INJECTION INTRAVENOUS
Status: DISCONTINUED | OUTPATIENT
Start: 2020-08-28 | End: 2020-08-31 | Stop reason: HOSPADM

## 2020-08-28 RX ORDER — INSULIN ASPART 100 [IU]/ML
1-10 INJECTION, SOLUTION INTRAVENOUS; SUBCUTANEOUS
Status: CANCELLED | OUTPATIENT
Start: 2020-08-28

## 2020-08-28 RX ORDER — IBUPROFEN 200 MG
24 TABLET ORAL
Status: CANCELLED | OUTPATIENT
Start: 2020-08-28

## 2020-08-28 RX ORDER — GLUCAGON 1 MG
1 KIT INJECTION
Status: CANCELLED | OUTPATIENT
Start: 2020-08-28

## 2020-08-28 RX ORDER — INSULIN ASPART 100 [IU]/ML
1-10 INJECTION, SOLUTION INTRAVENOUS; SUBCUTANEOUS
Status: DISCONTINUED | OUTPATIENT
Start: 2020-08-28 | End: 2020-08-31 | Stop reason: HOSPADM

## 2020-08-28 RX ORDER — FUROSEMIDE 10 MG/ML
20 INJECTION INTRAMUSCULAR; INTRAVENOUS DAILY
Status: DISCONTINUED | OUTPATIENT
Start: 2020-08-28 | End: 2020-08-29

## 2020-08-28 RX ORDER — ACETAMINOPHEN 325 MG/1
650 TABLET ORAL EVERY 4 HOURS PRN
Status: CANCELLED | OUTPATIENT
Start: 2020-08-28

## 2020-08-28 RX ORDER — IBUPROFEN 200 MG
16 TABLET ORAL
Status: CANCELLED | OUTPATIENT
Start: 2020-08-28

## 2020-08-28 RX ADMIN — CEFTRIAXONE 2 G: 2 INJECTION, SOLUTION INTRAVENOUS at 12:08

## 2020-08-28 RX ADMIN — AZITHROMYCIN MONOHYDRATE 500 MG: 500 INJECTION, POWDER, LYOPHILIZED, FOR SOLUTION INTRAVENOUS at 01:08

## 2020-08-28 RX ADMIN — FUROSEMIDE 20 MG: 10 INJECTION, SOLUTION INTRAMUSCULAR; INTRAVENOUS at 03:08

## 2020-08-28 RX ADMIN — ASPIRIN 325 MG ORAL TABLET 325 MG: 325 PILL ORAL at 12:08

## 2020-08-28 RX ADMIN — ENOXAPARIN SODIUM 40 MG: 100 INJECTION SUBCUTANEOUS at 07:08

## 2020-08-28 NOTE — SUBJECTIVE & OBJECTIVE
Past Medical History:   Diagnosis Date    Asthma     Cardiomyopathy 10/21/2014    Diabetes mellitus     Hyperlipidemia     Hypertension        Past Surgical History:   Procedure Laterality Date    BREAST SURGERY      CARDIAC CATHETERIZATION      CARDIAC VALVE SURGERY      CORONARY ARTERY BYPASS GRAFT      CYSTOSCOPY N/A 7/30/2019    Procedure: CYSTOSCOPY;  Surgeon: Spencer Nguyen MD;  Location: [facility];  Service: Urology;  Laterality: N/A;    EYE SURGERY      SHOULDER ARTHROSCOPY  1985    TRANSRECTAL BIOPSY OF PROSTATE WITH ULTRASOUND GUIDANCE N/A 7/30/2019    Procedure: BIOPSY, PROSTATE, RECTAL APPROACH, WITH US GUIDANCE;  Surgeon: Spencer Nguyen MD;  Location: [facility] OR;  Service: Urology;  Laterality: N/A;  procedure not performed, pt unable to tolerate    TRANSRECTAL BIOPSY OF PROSTATE WITH ULTRASOUND GUIDANCE N/A 8/8/2019    Procedure: BIOPSY, PROSTATE, RECTAL APPROACH, WITH US GUIDANCE;  Surgeon: Spencer Nguyen MD;  Location: [facility] OR;  Service: Urology;  Laterality: N/A;       Review of patient's allergies indicates:  No Known Allergies    No current facility-administered medications on file prior to encounter.      Current Outpatient Medications on File Prior to Encounter   Medication Sig    albuterol (ACCUNEB) 1.25 mg/3 mL Nebu Take 3 mLs (1.25 mg total) by nebulization every 6 (six) hours as needed. Rescue    amlodipine-benazepril 5-20 mg (LOTREL) 5-20 mg per capsule TAKE 1 CAPSULE BY MOUTH EVERY NIGHT AT BEDTIME (Patient taking differently: Take 1 capsule by mouth every evening. )    ARNUITY ELLIPTA 100 mcg/actuation DsDv Use as directed 1 puff in the mouth or throat daily as needed.     aspirin (ECOTRIN) 325 MG EC tablet Take 1 tablet (325 mg total) by mouth once daily.    atorvastatin (LIPITOR) 80 MG tablet TAKE 1 TABLET BY MOUTH ONCE A DAY (Patient taking differently: Take 80 mg by mouth once daily. )    azelastine (ASTELIN) 137 mcg (0.1 %) nasal spray 1 spray by Nasal  route 2 (two) times daily as needed.     cetirizine (ZYRTEC) 10 MG tablet Take 1 tablet (10 mg total) by mouth once daily. (Patient taking differently: Take 10 mg by mouth daily as needed. )    empagliflozin (JARDIANCE) 25 mg Tab Take 1 tablet by mouth once daily. (Patient taking differently: Take 25 mg by mouth once daily. )    finasteride (PROSCAR) 5 mg tablet Take 1 tablet (5 mg total) by mouth once daily.    fluticasone propionate (FLONASE) 50 mcg/actuation nasal spray 2 sprays (100 mcg total) by Each Nostril route once daily. (Patient taking differently: 2 sprays by Each Nostril route daily as needed. )    fluticasone-vilanterol (BREO ELLIPTA) 100-25 mcg/dose diskus inhaler Inhale 1 puff into the lungs once daily. Controller (Patient taking differently: Inhale 1 puff into the lungs daily as needed. Controller)    furosemide (LASIX) 20 MG tablet TAKE 1 TABLET TWICE DAILY (Patient taking differently: Take 20 mg by mouth 2 (two) times a day. )    meloxicam (MOBIC) 7.5 MG tablet Take 1 tablet (7.5 mg total) by mouth once daily. (Patient taking differently: Take 7.5 mg by mouth daily as needed. )    metFORMIN (GLUCOPHAGE) 1000 MG tablet TAKE 1 TABLET BY MOUTH TWICE A DAY WITH MEALS (Patient taking differently: Take 1,000 mg by mouth 2 (two) times a day. )    montelukast (SINGULAIR) 10 mg tablet Take 10 mg by mouth nightly as needed.     potassium chloride SA (K-DUR,KLOR-CON) 10 MEQ tablet Take 1 tablet (10 mEq total) by mouth once daily.    VENTOLIN HFA 90 mcg/actuation inhaler INHALE 2 PUFFS EVERY 6 HOURS AS NEEDED FOR WHEEZING  (RESCUE) (Patient taking differently: Inhale 2 puffs into the lungs every 6 (six) hours as needed. )    blood sugar diagnostic Strp USE BRAND COVERED BY INSURANCE AND COMPATIBLE WITH METER TO CHECK GLUCOSE 2X DAY    blood-glucose meter kit USE BRAND AS COVERED BY INSURANCE AND COMPATIBLE WITH STRIPS TO CHECK GLUCOSE 2X DAY    PRODIGY TWIST TOP LANCET 28 gauge Misc CHECK   GLUCOSE TWICE DAILY     Family History     Problem Relation (Age of Onset)    Diabetes Father, Sister, Brother    Heart attack Father    Heart disease Father, Brother    Hypertension Father    No Known Problems Mother, Maternal Grandmother, Maternal Grandfather, Paternal Grandmother, Paternal Grandfather, Maternal Aunt, Maternal Uncle, Paternal Aunt, Paternal Uncle        Tobacco Use    Smoking status: Former Smoker     Quit date: 2019     Years since quittin.1    Smokeless tobacco: Never Used   Substance and Sexual Activity    Alcohol use: Yes     Alcohol/week: 3.0 standard drinks     Types: 3 Cans of beer per week     Frequency: Never     Comment: social    Drug use: No    Sexual activity: Never     Review of Systems   Constitutional: Negative for activity change, chills and diaphoresis.   HENT: Negative for congestion.    Eyes: Negative for discharge.   Respiratory: Negative for chest tightness.    Cardiovascular: Negative for chest pain.   Gastrointestinal: Negative for abdominal distention.   Genitourinary: Negative for difficulty urinating.   Musculoskeletal: Negative for arthralgias.   Neurological: Negative for dizziness.   Hematological: Negative for adenopathy.   Psychiatric/Behavioral: Negative for agitation.     Objective:     Vital Signs (Most Recent):  Temp: 99 °F (37.2 °C) (20 1400)  Pulse: 60 (20 1400)  Resp: 19 (20 1400)  BP: (!) 150/79 (20 1400)  SpO2: 99 % (20 1400) Vital Signs (24h Range):  Temp:  [98.3 °F (36.8 °C)-99 °F (37.2 °C)] 99 °F (37.2 °C)  Pulse:  [58-66] 60  Resp:  [17-19] 19  SpO2:  [97 %-100 %] 99 %  BP: (120-183)/(65-83) 150/79     Weight: 81.2 kg (179 lb)  Body mass index is 24.28 kg/m².    Physical Exam  Constitutional:       General: He is not in acute distress.     Appearance: He is well-developed.   HENT:      Head: Normocephalic.   Neck:      Musculoskeletal: Neck supple.   Cardiovascular:      Rate and Rhythm: Normal rate and  regular rhythm.   Pulmonary:      Effort: No respiratory distress.      Breath sounds: Rales present.   Abdominal:      General: There is no distension.      Tenderness: There is no abdominal tenderness.   Musculoskeletal: Normal range of motion.   Skin:     Findings: No rash.   Neurological:      General: No focal deficit present.      Mental Status: He is alert and oriented to person, place, and time.   Psychiatric:         Mood and Affect: Mood normal.             Significant Labs:   CBC:   Recent Labs   Lab 08/28/20  1139   WBC 9.78   HGB 12.3*   HCT 37.3*        CMP:   Recent Labs   Lab 08/28/20  1139      K 4.0      CO2 23   *   BUN 51*   CREATININE 2.3*   CALCIUM 8.6*   PROT 7.7   ALBUMIN 3.4*   BILITOT 1.1*   ALKPHOS 60   AST 36   ALT 18   ANIONGAP 12   EGFRNONAA 27.9*     Cardiac Markers:   Recent Labs   Lab 08/28/20  1139   BNP 1,471*       Significant Imaging: I have reviewed all pertinent imaging results/findings within the past 24 hours.     CXR today  FINDINGS:  Small faint interstitial opacity at the right costophrenic sulcus.  Costophrenic angles are seen without effusion. No pneumothorax is  identified. The heart is normal in size. Median sternotomy wires are  unchanged.  Osseous structures show degenerative disc disease and  degenerative changes in the shoulders. The visualized upper abdomen is  unremarkable.     IMPRESSION:  Interstitial opacities right lung base suggesting mild atypical  infection/pneumonia given the clinical history (and less likely  atelectasis, edema).

## 2020-08-28 NOTE — ASSESSMENT & PLAN NOTE
With previous ejection fraction about 40-50%    Plan   Repeat echo since patient BNP severely elevated

## 2020-08-28 NOTE — CONSULTS
Consult Note  Infectious Disease    Reason for Consult:  COVID    HPI: Baldo Carney is a   69 y.o. male with a complex medical history presented to the emergency room today with general fatigue, weakness,   poor oral intake due to anosmia and ageusia of 2 weeks duration. He continued to take his oral diuretics despite his decreased oral intake. He was described to have bronchospasm, elevated BNP(1471), elevated BUN of 51, elevated creatinine 2.3 (baseline 1.4) normal oxygen sat, temp 99. Because of elevated BNP, lasix was ordered. Because he was not hypoxemic (98% on room air), dexamethasone was not prescribed. He was tested and found to have a positive COVID pcr. He does relate exposure to his daughter in law at a birthday party, 2 weeks ago, who was subsequently diagnosed with COVID.His symptoms began 1 day later.   White blood cells were normal.  ABG was 7.44/26/94/18.  Chest x-ray has prominent perihilar markings but the faintest of infiltrates in the right base.  He was given azithromycin and Rocephin in the emergency room    COVID 19:8/28  Procalcitonin:  0.17  Troponin:  Normal  BNP :  1471  D-dimer:  Pending  LDH:  352  Triglycerides:  Ferritin:  608  azithromycin :   Remdesivir :  Convalescent plasma:  Blood type:  Pending, but previously measured as A negative  IL-6:  Tocilizumab:  Recent Labs   Lab 08/28/20  1139   CRP 3.27*         Review of patient's allergies indicates:  No Known Allergies  Past Medical History:   Diagnosis Date    Asthma     Cardiomyopathy 10/21/2014    Diabetes mellitus     Hyperlipidemia     Hypertension    history of aortic stenosis and cardiomyopathy, s/p bovine aortic tory 2014  CAD, s/p CABG  BPH , elevated PSA    Past Surgical History:   Procedure Laterality Date    BREAST SURGERY      CARDIAC CATHETERIZATION      CARDIAC VALVE SURGERY      CORONARY ARTERY BYPASS GRAFT      CYSTOSCOPY N/A 7/30/2019    Procedure: CYSTOSCOPY;  Surgeon: Spencer Nguyen MD;   Location: [facility] OR;  Service: Urology;  Laterality: N/A;    EYE SURGERY      SHOULDER ARTHROSCOPY  1985    TRANSRECTAL BIOPSY OF PROSTATE WITH ULTRASOUND GUIDANCE N/A 2019    Procedure: BIOPSY, PROSTATE, RECTAL APPROACH, WITH US GUIDANCE;  Surgeon: Spencer Nguyen MD;  Location: [facility] OR;  Service: Urology;  Laterality: N/A;  procedure not performed, pt unable to tolerate    TRANSRECTAL BIOPSY OF PROSTATE WITH ULTRASOUND GUIDANCE N/A 2019    Procedure: BIOPSY, PROSTATE, RECTAL APPROACH, WITH US GUIDANCE;  Surgeon: Spencer Nguyen MD;  Location: [facility] OR;  Service: Urology;  Laterality: N/A;     Social History     Socioeconomic History    Marital status:      Spouse name: Not on file    Number of children: Not on file    Years of education: Not on file    Highest education level: Not on file   Occupational History    Not on file   Social Needs    Financial resource strain: Not on file    Food insecurity     Worry: Not on file     Inability: Not on file    Transportation needs     Medical: Not on file     Non-medical: Not on file   Tobacco Use    Smoking status: Former Smoker     Quit date: 2019     Years since quittin.1    Smokeless tobacco: Never Used   Substance and Sexual Activity    Alcohol use: Yes     Alcohol/week: 3.0 standard drinks     Types: 3 Cans of beer per week     Frequency: Never     Comment: social    Drug use: No    Sexual activity: Never   Lifestyle    Physical activity     Days per week: Not on file     Minutes per session: Not on file    Stress: Not on file   Relationships    Social connections     Talks on phone: Not on file     Gets together: Not on file     Attends [employer] service: Not on file     Active member of club or organization: Not on file     Attends meetings of clubs or organizations: Not on file     Relationship status: Not on file   Other Topics Concern    Not on file   Social History Narrative    Not on file     Family History    Problem Relation Age of Onset    No Known Problems Mother     Diabetes Father     Hypertension Father     Heart attack Father     Heart disease Father     Diabetes Sister     Heart disease Brother     Diabetes Brother     No Known Problems Maternal Grandmother     No Known Problems Maternal Grandfather     No Known Problems Paternal Grandmother     No Known Problems Paternal Grandfather     No Known Problems Maternal Aunt     No Known Problems Maternal Uncle     No Known Problems Paternal Aunt     No Known Problems Paternal Uncle     Anemia Neg Hx     Arrhythmia Neg Hx     Asthma Neg Hx     Clotting disorder Neg Hx     Fainting Neg Hx     Heart failure Neg Hx     Hyperlipidemia Neg Hx     Glaucoma Neg Hx        Pertinent medications noted:     Review of Systems:   No chills, fever, sweats,    No change in vision, loss of vision or diplopia  No sinus congestion, purulent nasal discharge, post nasal drip or facial pain  No pain in mouth or throat. No problems with teeth, gums.  No chest pain, palpitations, syncope  No cough, sputum production, shortness of breath, dyspnea on exertion, pleurisy, hemoptysis  No nausea, vomiting, but had very mild diarrhea, no focal abd pain,  No dysphagia, odynophagia  No dysuria, hematuria,       No swelling of joints, redness of joints, injuries, or new focal pain  No unusual headaches, dizziness, vertigo, numbness, paresthesias, neuropathy, falls  No anxiety, depression, substance abuse, sleep disturbance  Diabetes with most recent hemoglobin A1c of 6.2%  No bleeding, lymphadenopathy, anemia, malignancy, unusual bruising  No new rashes, lesions, or wounds  No TB exposure  No recent/prior steroids  Outdoor activities:  Travel:   Implants: bioprosthetic AV  Antibiotic History:     EXAM & DIAGNOSTICS REVIEWED:   Vitals:     Temp:  [98.3 °F (36.8 °C)-99.5 °F (37.5 °C)]   Temp: 99.5 °F (37.5 °C) (08/28/20 1547)  Pulse: (!) 57 (08/28/20 1700)  Resp: (!) 24  (08/28/20 1700)  BP: 119/70 (08/28/20 1700)  SpO2: 98 % (08/28/20 1700)    Intake/Output Summary (Last 24 hours) at 8/28/2020 1711  Last data filed at 8/28/2020 1459  Gross per 24 hour   Intake 300 ml   Output --   Net 300 ml       General:  In NAD. Alert and attentive, cooperative, comfortable, completely non toxic  Eyes:  Anicteric, PERRL, EOMI  ENT:  No ulcers, exudates, thrush, nares patent, dentition is  Neck:  supple, no masses or adenopathy appreciated  Lungs: Clear, no consolidation, rales, wheezes, rub (perhaps extremely faint crackles RLL)  Heart:  RRR, no gallop with 1/6 systolic aortic murmur, no AR  Abd:  Soft, NT, ND, normal BS, no masses or organomegaly appreciated.  :  Voids , urine clear, no flank tenderness  Musc:  Joints without effusion, swelling, erythema, synovitis, muscle wasting.   Skin:  No rashes. No palmar or plantar lesions. No subungual petechiae  Wound:   Neuro:              Alert, attentive, speech fluent, face symmetric, moves all extremities, no focal weakness. Ambulatory  Psych:  Calm, cooperative  Lymphatic:     No cervical, supraclavicular, axillary, or  nodes  Extrem: No edema, erythema, phlebitis, cellulitis, warm and well perfused  VAD:       Isolation:  COVID    Lines/Tubes/Drains:    General Labs reviewed:  Recent Labs   Lab 08/28/20  1139   WBC 9.78   HGB 12.3*   HCT 37.3*          Recent Labs   Lab 08/28/20  1139      K 4.0      CO2 23   BUN 51*   CREATININE 2.3*   CALCIUM 8.6*   PROT 7.7   BILITOT 1.1*   ALKPHOS 60   ALT 18   AST 36           Micro:  Microbiology Results (last 7 days)     Procedure Component Value Units Date/Time    Blood culture x two cultures. Draw prior to antibiotics. [[id number]] Collected: 08/28/20 1135    Order Status: Sent Specimen: Blood from Peripheral, Antecubital, Left Updated: 08/28/20 1148    Blood culture x two cultures. Draw prior to antibiotics. [[id number]] Collected: 08/28/20 1125    Order Status: Sent Specimen: Blood  "from Peripheral, Hand, Right Updated: 08/28/20 1148        Imaging Reviewed:   CXR      Cardiology:    IMPRESSION & PLAN   1. COVID positive   Anosmia/ageusia   Extremely subtle RLL abnormality which may not be new, comparing imaging from 5/15/18    2. Acute kidney injury, likely volume depleted, with  BNP greater than 1000  3. COPD, cardiomyopathy, diabetes, hypertension, aortic valve replacement(bovine, 2014)      Recommendations:  At this time his only indication for COVID treatment would be plasma, because he is   "at increased risk of severe disease"  - given that he is receiving diuretics for elevated BNP, I would defer plasma for today.  -we will continue to assess daily and follow with you  - would trend ferritin, CRP, D-dimer, LDH, troponin daily    Medical Decision Making during this encounter was  [_] Low Complexity  [_] Moderate Complexity  [ x] High Complexity      "

## 2020-08-28 NOTE — ASSESSMENT & PLAN NOTE
Patient was on Lasix 20 mg p.o. start IV Lasix equivalent dose  Repeat echocardiogram  Close monitor the volume status  Consult cardiology

## 2020-08-28 NOTE — ASSESSMENT & PLAN NOTE
Plan     Patient does not have hypoxia at this time and will hold  back Decadron  Close monitoring of oxygen level  Antibiotics started in emergency room and continued  Monitor inflammatory markers  Consult ID  Advised to call family members and get tested for COVID-19  Type and screen  Because of complicated case, consult Infectious Disease  Follow cultures  Procalcitonin

## 2020-08-28 NOTE — H&P
[facility] Medicine  History & Physical    Patient Name: Baldo Carney  MRN: [id number]  Admission Date: 8/28/2020  Attending Physician: Yasmin Travis MD   Primary Care Provider: KAMERON oRdriguez FNP-C         Patient information was obtained from patient and ER records.     Subjective:     Principal Problem:<principal problem not specified>    Chief Complaint:   Chief Complaint   Patient presents with    Nausea     x 3 weeks. decrease of taste. no fever        HPI: 69-year-old male with past medical history of Asthma/copd, Cardiomyopathy (10/21/2014), Diabetes mellitus, Hyperlipidemia, and Hypertension, status post aortic valve repair came with general fatigue, weakness and  shortness of breath.  he was not able to eat and drink because he lost the taste and smell about a week or 2 ago.  His daughter in-law or had a birthday party and later a lot people are positive for COVID including this patient.  Chest x-ray was done with diffuse obesity possible COVID pneumonia.  Also found to severely elevated BNP more than 1000 with acute renal failure which is new.  He was last seen by cardiologist Dr. Leonard about 6 months ago and was doing well and also he saw primary care doctor and doing well.  On examination he does not have  hypoxia or acute shortness of breath and does not seem to be in volume overload.  On examination no leg swelling, no abdominal tenderness and denied having chest pain, orthopnea, production of sputum and no fever.  Lung examination with generally clear lungs but rales and expiratory wheezing bilateral lungs at the bases.  His inflammatory markers are elevated with elevated ferritin, CRP and D-dimer.  His last echocardiogram 2014 with aortic stenosis 0.96 centimeters sq before the procedure and heart failure ejection fraction 40-50%.  No new echo seen since then.    Past Medical History:   Diagnosis Date    Asthma     Cardiomyopathy 10/21/2014    Diabetes  mellitus     Hyperlipidemia     Hypertension        Past Surgical History:   Procedure Laterality Date    BREAST SURGERY      CARDIAC CATHETERIZATION      CARDIAC VALVE SURGERY      CORONARY ARTERY BYPASS GRAFT      CYSTOSCOPY N/A 7/30/2019    Procedure: CYSTOSCOPY;  Surgeon: Spencer Nguyen MD;  Location: [facility] OR;  Service: Urology;  Laterality: N/A;    EYE SURGERY      SHOULDER ARTHROSCOPY  1985    TRANSRECTAL BIOPSY OF PROSTATE WITH ULTRASOUND GUIDANCE N/A 7/30/2019    Procedure: BIOPSY, PROSTATE, RECTAL APPROACH, WITH US GUIDANCE;  Surgeon: Spencer Nguyen MD;  Location: [facility] OR;  Service: Urology;  Laterality: N/A;  procedure not performed, pt unable to tolerate    TRANSRECTAL BIOPSY OF PROSTATE WITH ULTRASOUND GUIDANCE N/A 8/8/2019    Procedure: BIOPSY, PROSTATE, RECTAL APPROACH, WITH US GUIDANCE;  Surgeon: Spencer Nguyen MD;  Location: [facility] OR;  Service: Urology;  Laterality: N/A;       Review of patient's allergies indicates:  No Known Allergies    No current facility-administered medications on file prior to encounter.      Current Outpatient Medications on File Prior to Encounter   Medication Sig    albuterol (ACCUNEB) 1.25 mg/3 mL Nebu Take 3 mLs (1.25 mg total) by nebulization every 6 (six) hours as needed. Rescue    amlodipine-benazepril 5-20 mg (LOTREL) 5-20 mg per capsule TAKE 1 CAPSULE BY MOUTH EVERY NIGHT AT BEDTIME (Patient taking differently: Take 1 capsule by mouth every evening. )    ARNUITY ELLIPTA 100 mcg/actuation DsDv Use as directed 1 puff in the mouth or throat daily as needed.     aspirin (ECOTRIN) 325 MG EC tablet Take 1 tablet (325 mg total) by mouth once daily.    atorvastatin (LIPITOR) 80 MG tablet TAKE 1 TABLET BY MOUTH ONCE A DAY (Patient taking differently: Take 80 mg by mouth once daily. )    azelastine (ASTELIN) 137 mcg (0.1 %) nasal spray 1 spray by Nasal route 2 (two) times daily as needed.     cetirizine (ZYRTEC) 10 MG tablet Take 1 tablet (10 mg  total) by mouth once daily. (Patient taking differently: Take 10 mg by mouth daily as needed. )    empagliflozin (JARDIANCE) 25 mg Tab Take 1 tablet by mouth once daily. (Patient taking differently: Take 25 mg by mouth once daily. )    finasteride (PROSCAR) 5 mg tablet Take 1 tablet (5 mg total) by mouth once daily.    fluticasone propionate (FLONASE) 50 mcg/actuation nasal spray 2 sprays (100 mcg total) by Each Nostril route once daily. (Patient taking differently: 2 sprays by Each Nostril route daily as needed. )    fluticasone-vilanterol (BREO ELLIPTA) 100-25 mcg/dose diskus inhaler Inhale 1 puff into the lungs once daily. Controller (Patient taking differently: Inhale 1 puff into the lungs daily as needed. Controller)    furosemide (LASIX) 20 MG tablet TAKE 1 TABLET TWICE DAILY (Patient taking differently: Take 20 mg by mouth 2 (two) times a day. )    meloxicam (MOBIC) 7.5 MG tablet Take 1 tablet (7.5 mg total) by mouth once daily. (Patient taking differently: Take 7.5 mg by mouth daily as needed. )    metFORMIN (GLUCOPHAGE) 1000 MG tablet TAKE 1 TABLET BY MOUTH TWICE A DAY WITH MEALS (Patient taking differently: Take 1,000 mg by mouth 2 (two) times a day. )    montelukast (SINGULAIR) 10 mg tablet Take 10 mg by mouth nightly as needed.     potassium chloride SA (K-DUR,KLOR-CON) 10 MEQ tablet Take 1 tablet (10 mEq total) by mouth once daily.    VENTOLIN HFA 90 mcg/actuation inhaler INHALE 2 PUFFS EVERY 6 HOURS AS NEEDED FOR WHEEZING  (RESCUE) (Patient taking differently: Inhale 2 puffs into the lungs every 6 (six) hours as needed. )    blood sugar diagnostic Strp USE BRAND COVERED BY INSURANCE AND COMPATIBLE WITH METER TO CHECK GLUCOSE 2X DAY    blood-glucose meter kit USE BRAND AS COVERED BY INSURANCE AND COMPATIBLE WITH STRIPS TO CHECK GLUCOSE 2X DAY    PRODIGY TWIST TOP LANCET 28 gauge Misc CHECK  GLUCOSE TWICE DAILY     Family History     Problem Relation (Age of Onset)    Diabetes Father,  Sister, Brother    Heart attack Father    Heart disease Father, Brother    Hypertension Father    No Known Problems Mother, Maternal Grandmother, Maternal Grandfather, Paternal Grandmother, Paternal Grandfather, Maternal Aunt, Maternal Uncle, Paternal Aunt, Paternal Uncle        Tobacco Use    Smoking status: Former Smoker     Quit date: 2019     Years since quittin.1    Smokeless tobacco: Never Used   Substance and Sexual Activity    Alcohol use: Yes     Alcohol/week: 3.0 standard drinks     Types: 3 Cans of beer per week     Frequency: Never     Comment: social    Drug use: No    Sexual activity: Never     Review of Systems   Constitutional: Negative for activity change, chills and diaphoresis.   HENT: Negative for congestion.    Eyes: Negative for discharge.   Respiratory: Negative for chest tightness.    Cardiovascular: Negative for chest pain.   Gastrointestinal: Negative for abdominal distention.   Genitourinary: Negative for difficulty urinating.   Musculoskeletal: Negative for arthralgias.   Neurological: Negative for dizziness.   Hematological: Negative for adenopathy.   Psychiatric/Behavioral: Negative for agitation.     Objective:     Vital Signs (Most Recent):  Temp: 99 °F (37.2 °C) (20 1400)  Pulse: 60 (20 1400)  Resp: 19 (20 1400)  BP: (!) 150/79 (20 1400)  SpO2: 99 % (20 1400) Vital Signs (24h Range):  Temp:  [98.3 °F (36.8 °C)-99 °F (37.2 °C)] 99 °F (37.2 °C)  Pulse:  [58-66] 60  Resp:  [17-19] 19  SpO2:  [97 %-100 %] 99 %  BP: (120-183)/(65-83) 150/79     Weight: 81.2 kg (179 lb)  Body mass index is 24.28 kg/m².    Physical Exam  Constitutional:       General: He is not in acute distress.     Appearance: He is well-developed.   HENT:      Head: Normocephalic.   Neck:      Musculoskeletal: Neck supple.   Cardiovascular:      Rate and Rhythm: Normal rate and regular rhythm.   Pulmonary:      Effort: No respiratory distress.      Breath sounds: Rales present.    Abdominal:      General: There is no distension.      Tenderness: There is no abdominal tenderness.   Musculoskeletal: Normal range of motion.   Skin:     Findings: No rash.   Neurological:      General: No focal deficit present.      Mental Status: He is alert and oriented to person, place, and time.   Psychiatric:         Mood and Affect: Mood normal.             Significant Labs:   CBC:   Recent Labs   Lab 08/28/20  1139   WBC 9.78   HGB 12.3*   HCT 37.3*        CMP:   Recent Labs   Lab 08/28/20  1139      K 4.0      CO2 23   *   BUN 51*   CREATININE 2.3*   CALCIUM 8.6*   PROT 7.7   ALBUMIN 3.4*   BILITOT 1.1*   ALKPHOS 60   AST 36   ALT 18   ANIONGAP 12   EGFRNONAA 27.9*     Cardiac Markers:   Recent Labs   Lab 08/28/20  1139   BNP 1,471*       Significant Imaging: I have reviewed all pertinent imaging results/findings within the past 24 hours.     CXR today  FINDINGS:  Small faint interstitial opacity at the right costophrenic sulcus.  Costophrenic angles are seen without effusion. No pneumothorax is  identified. The heart is normal in size. Median sternotomy wires are  unchanged.  Osseous structures show degenerative disc disease and  degenerative changes in the shoulders. The visualized upper abdomen is  unremarkable.     IMPRESSION:  Interstitial opacities right lung base suggesting mild atypical  infection/pneumonia given the clinical history (and less likely  atelectasis, edema).         Assessment/Plan:     Acute exacerbation of CHF (congestive heart failure)  Patient was on Lasix 20 mg p.o. start IV Lasix equivalent dose  Repeat echocardiogram  Close monitor the volume status  Consult cardiology  Close monitor renal function given ERICA      Pneumonia due to COVID-19 virus    Plan     Patient does not have hypoxia at this time and will hold  back Decadron  Close monitoring of oxygen level  Antibiotics started in emergency room and continued  Monitor inflammatory markers  Advised  to call family members and get tested for COVID-19  Type and screen  Because of complicated case, consult Infectious Disease  Follow cultures  Procalcitonin      S/P CABG (coronary artery bypass graft)  aware      S/P AVR (aortic valve replacement)  aware      CAD (coronary artery disease)  Aware. No chest pain , no ischemic changes in EKG   Stable . Home meds  Will trend cardiac enzymes       Cardiomyopathy  With previous ejection fraction about 40-50%    Plan   Repeat echo since patient BNP severely elevated      Aortic stenosis, severe  S/p AVR  Aware        ERICA  Possible secondary to CHF /unable to give much fluid   Close monitor         Type 2 diabetes mellitus without complication, without long-term current use of insulin  Stable . Home meds      Hyperlipidemia  Stable . Home meds      Hypertension  Stable . Home meds        VTE Risk Mitigation (From admission, onward)    None             Seng Alejandra MD  Department of Hospital Medicine   [facility]

## 2020-08-28 NOTE — ED NOTES
Pt denies any headache, abdominal pain, nausea, vomiting. States just loss taste several weeks ago.

## 2020-08-28 NOTE — HPI
HPI as per MD Alejandra:  69-year-old male with past medical history of Asthma/copd, Cardiomyopathy (10/21/2014), Diabetes mellitus, Hyperlipidemia, and Hypertension, status post aortic valve repair came with general fatigue, weakness and  shortness of breath.  he was not able to eat and drink because he lost the taste and smell about a week or 2 ago.  His daughter in-law or had a birthday party and later a lot people are positive for COVID including this patient.  Chest x-ray was done with diffuse obesity possible COVID pneumonia.  Also found to severely elevated BNP more than 1000 with acute renal failure which is new.  He was last seen by cardiologist Dr. Leonard about 6 months ago and was doing well and also he saw primary care doctor and doing well.  On examination he does not have  hypoxia or acute shortness of breath and does not seem to be in volume overload.  On examination no leg swelling, no abdominal tenderness and denied having chest pain, orthopnea, production of sputum and no fever.  Lung examination with generally clear lungs but rales and expiratory wheezing bilateral lungs at the bases.  His inflammatory markers are elevated with elevated ferritin, CRP and D-dimer.  His last echocardiogram 2014 with aortic stenosis 0.96 centimeters sq before the procedure and heart failure ejection fraction 40-50%.  No new echo seen since then.

## 2020-08-29 PROBLEM — J44.9 COPD (CHRONIC OBSTRUCTIVE PULMONARY DISEASE): Chronic | Status: ACTIVE | Noted: 2020-08-29

## 2020-08-29 PROBLEM — U07.1 PNEUMONIA DUE TO COVID-19 VIRUS: Status: RESOLVED | Noted: 2020-08-28 | Resolved: 2020-08-29

## 2020-08-29 PROBLEM — D64.9 ANEMIA: Chronic | Status: ACTIVE | Noted: 2020-08-29

## 2020-08-29 PROBLEM — D72.810 LYMPHOPENIA: Status: ACTIVE | Noted: 2020-08-29

## 2020-08-29 PROBLEM — I50.22 CHRONIC SYSTOLIC HEART FAILURE: Chronic | Status: ACTIVE | Noted: 2020-08-29

## 2020-08-29 PROBLEM — J12.82 PNEUMONIA DUE TO COVID-19 VIRUS: Status: RESOLVED | Noted: 2020-08-28 | Resolved: 2020-08-29

## 2020-08-29 PROBLEM — R79.89 TROPONIN I ABOVE REFERENCE RANGE: Status: ACTIVE | Noted: 2020-08-29

## 2020-08-29 PROBLEM — N40.0 BPH (BENIGN PROSTATIC HYPERPLASIA): Chronic | Status: ACTIVE | Noted: 2020-08-29

## 2020-08-29 PROBLEM — I50.9 ACUTE EXACERBATION OF CHF (CONGESTIVE HEART FAILURE): Status: RESOLVED | Noted: 2020-08-28 | Resolved: 2020-08-29

## 2020-08-29 LAB
ANION GAP SERPL CALC-SCNC: 12 MMOL/L (ref 8–16)
AORTIC ROOT ANNULUS: 3.12 CM
AORTIC VALVE CUSP SEPERATION: 1.04 CM
AV INDEX (PROSTH): 0.35
AV MEAN GRADIENT: 15 MMHG
AV PEAK GRADIENT: 26 MMHG
AV VALVE AREA: 1.64 CM2
AV VELOCITY RATIO: 27.99
BACTERIA #/AREA URNS HPF: NEGATIVE /HPF
BASOPHILS # BLD AUTO: 0.03 K/UL (ref 0–0.2)
BASOPHILS NFR BLD: 0.4 % (ref 0–1.9)
BILIRUB UR QL STRIP: NEGATIVE
BLD PROD TYP BPU: NORMAL
BLD PROD TYP BPU: NORMAL
BLOOD UNIT EXPIRATION DATE: NORMAL
BLOOD UNIT EXPIRATION DATE: NORMAL
BLOOD UNIT TYPE CODE: 600
BLOOD UNIT TYPE CODE: 600
BLOOD UNIT TYPE: NORMAL
BLOOD UNIT TYPE: NORMAL
BSA FOR ECHO PROCEDURE: 2.03 M2
BUN SERPL-MCNC: 51 MG/DL (ref 8–23)
CALCIUM SERPL-MCNC: 8.3 MG/DL (ref 8.7–10.5)
CHLORIDE SERPL-SCNC: 103 MMOL/L (ref 95–110)
CK SERPL-CCNC: 208 U/L (ref 20–200)
CLARITY UR: CLEAR
CO2 SERPL-SCNC: 20 MMOL/L (ref 23–29)
CODING SYSTEM: NORMAL
CODING SYSTEM: NORMAL
COLOR UR: YELLOW
CREAT SERPL-MCNC: 2.2 MG/DL (ref 0.5–1.4)
CRP SERPL-MCNC: 3.11 MG/DL
CV ECHO LV RWT: 0.34 CM
D DIMER PPP IA.FEU-MCNC: 1.72 UG/ML FEU
DIFFERENTIAL METHOD: ABNORMAL
DISPENSE STATUS: NORMAL
DISPENSE STATUS: NORMAL
DOP CALC AO PEAK VEL: 2.55 M/S
DOP CALC AO VTI: 45.76 CM
DOP CALC LVOT AREA: 4.7 CM2
DOP CALC LVOT DIAMETER: 2.44 CM
DOP CALC LVOT PEAK VEL: 71.38 M/S
DOP CALC LVOT STROKE VOLUME: 75.01 CM3
DOP CALCLVOT PEAK VEL VTI: 16.05 CM
E WAVE DECELERATION TIME: 326.46 MSEC
E/A RATIO: 0.85
E/E' RATIO: 21.11 M/S
ECHO LV POSTERIOR WALL: 0.97 CM (ref 0.6–1.1)
EOSINOPHIL # BLD AUTO: 0 K/UL (ref 0–0.5)
EOSINOPHIL NFR BLD: 0.1 % (ref 0–8)
ERYTHROCYTE [DISTWIDTH] IN BLOOD BY AUTOMATED COUNT: 14.1 % (ref 11.5–14.5)
EST. GFR  (AFRICAN AMERICAN): 34.1 ML/MIN/1.73 M^2
EST. GFR  (NON AFRICAN AMERICAN): 29.5 ML/MIN/1.73 M^2
ESTIMATED AVG GLUCOSE: 143 MG/DL (ref 68–131)
FERRITIN SERPL-MCNC: 497 NG/ML (ref 20–300)
FRACTIONAL SHORTENING: 15 % (ref 28–44)
GLUCOSE SERPL-MCNC: 108 MG/DL (ref 70–110)
GLUCOSE SERPL-MCNC: 182 MG/DL (ref 70–110)
GLUCOSE SERPL-MCNC: 88 MG/DL (ref 70–110)
GLUCOSE SERPL-MCNC: 93 MG/DL (ref 70–110)
GLUCOSE SERPL-MCNC: 94 MG/DL (ref 70–110)
GLUCOSE SERPL-MCNC: 99 MG/DL (ref 70–110)
GLUCOSE UR QL STRIP: NEGATIVE
HBA1C MFR BLD HPLC: 6.6 % (ref 4.5–6.2)
HCT VFR BLD AUTO: 36.6 % (ref 40–54)
HGB BLD-MCNC: 12.1 G/DL (ref 14–18)
HGB UR QL STRIP: ABNORMAL
HYALINE CASTS #/AREA URNS LPF: 7 /LPF
IMM GRANULOCYTES # BLD AUTO: 0.07 K/UL (ref 0–0.04)
IMM GRANULOCYTES NFR BLD AUTO: 0.9 % (ref 0–0.5)
INTERVENTRICULAR SEPTUM: 0.97 CM (ref 0.6–1.1)
IVRT: 120.17 MSEC
KETONES UR QL STRIP: NEGATIVE
LDH SERPL L TO P-CCNC: 357 U/L (ref 110–260)
LEFT ATRIUM SIZE: 4.48 CM
LEFT INTERNAL DIMENSION IN SYSTOLE: 4.83 CM (ref 2.1–4)
LEFT VENTRICLE MASS INDEX: 106 G/M2
LEFT VENTRICULAR INTERNAL DIMENSION IN DIASTOLE: 5.68 CM (ref 3.5–6)
LEFT VENTRICULAR MASS: 216.25 G
LEUKOCYTE ESTERASE UR QL STRIP: NEGATIVE
LV LATERAL E/E' RATIO: 23.75 M/S
LV SEPTAL E/E' RATIO: 19 M/S
LYMPHOCYTES # BLD AUTO: 0.8 K/UL (ref 1–4.8)
LYMPHOCYTES NFR BLD: 10.2 % (ref 18–48)
MCH RBC QN AUTO: 30 PG (ref 27–31)
MCHC RBC AUTO-ENTMCNC: 33.1 G/DL (ref 32–36)
MCV RBC AUTO: 91 FL (ref 82–98)
MICROSCOPIC COMMENT: ABNORMAL
MONOCYTES # BLD AUTO: 0.6 K/UL (ref 0.3–1)
MONOCYTES NFR BLD: 6.9 % (ref 4–15)
MV PEAK A VEL: 1.12 M/S
MV PEAK E VEL: 0.95 M/S
NEUTROPHILS # BLD AUTO: 6.7 K/UL (ref 1.8–7.7)
NEUTROPHILS NFR BLD: 81.5 % (ref 38–73)
NITRITE UR QL STRIP: NEGATIVE
NRBC BLD-RTO: 0 /100 WBC
PH UR STRIP: 6 [PH] (ref 5–8)
PISA TR MAX VEL: 2.02 M/S
PLATELET # BLD AUTO: 224 K/UL (ref 150–350)
PMV BLD AUTO: 10.9 FL (ref 9.2–12.9)
POTASSIUM SERPL-SCNC: 3.9 MMOL/L (ref 3.5–5.1)
PROCALCITONIN SERPL IA-MCNC: 0.13 NG/ML (ref 0–0.5)
PROT UR QL STRIP: ABNORMAL
PV PEAK VELOCITY: 76.13 CM/S
RA PRESSURE: 3 MMHG
RBC # BLD AUTO: 4.04 M/UL (ref 4.6–6.2)
RBC #/AREA URNS HPF: 5 /HPF (ref 0–4)
RIGHT VENTRICULAR END-DIASTOLIC DIMENSION: 215 CM
SODIUM SERPL-SCNC: 135 MMOL/L (ref 136–145)
SP GR UR STRIP: 1.01 (ref 1–1.03)
SQUAMOUS #/AREA URNS HPF: 1 /HPF
TDI LATERAL: 0.04 M/S
TDI SEPTAL: 0.05 M/S
TDI: 0.05 M/S
TR MAX PG: 16 MMHG
TV REST PULMONARY ARTERY PRESSURE: 19 MMHG
UNIT NUMBER: NORMAL
UNIT NUMBER: NORMAL
URN SPEC COLLECT METH UR: ABNORMAL
UROBILINOGEN UR STRIP-ACNC: NEGATIVE EU/DL
WBC # BLD AUTO: 8.23 K/UL (ref 3.9–12.7)
WBC #/AREA URNS HPF: 2 /HPF (ref 0–5)

## 2020-08-29 PROCEDURE — 99233 PR SUBSEQUENT HOSPITAL CARE,LEVL III: ICD-10-PCS | Mod: ,,, | Performed by: INTERNAL MEDICINE

## 2020-08-29 PROCEDURE — 36415 COLL VENOUS BLD VENIPUNCTURE: CPT

## 2020-08-29 PROCEDURE — 80048 BASIC METABOLIC PNL TOTAL CA: CPT

## 2020-08-29 PROCEDURE — P9017 PLASMA 1 DONOR FRZ W/IN 8 HR: HCPCS | Mod: 91

## 2020-08-29 PROCEDURE — 25000003 PHARM REV CODE 250: Performed by: INTERNAL MEDICINE

## 2020-08-29 PROCEDURE — 99900035 HC TECH TIME PER 15 MIN (STAT)

## 2020-08-29 PROCEDURE — 82728 ASSAY OF FERRITIN: CPT

## 2020-08-29 PROCEDURE — 94640 AIRWAY INHALATION TREATMENT: CPT

## 2020-08-29 PROCEDURE — 63600175 PHARM REV CODE 636 W HCPCS: Performed by: INTERNAL MEDICINE

## 2020-08-29 PROCEDURE — 85025 COMPLETE CBC W/AUTO DIFF WBC: CPT

## 2020-08-29 PROCEDURE — 83615 LACTATE (LD) (LDH) ENZYME: CPT

## 2020-08-29 PROCEDURE — 25000242 PHARM REV CODE 250 ALT 637 W/ HCPCS: Performed by: INTERNAL MEDICINE

## 2020-08-29 PROCEDURE — 82550 ASSAY OF CK (CPK): CPT

## 2020-08-29 PROCEDURE — 25000003 PHARM REV CODE 250: Performed by: SPECIALIST

## 2020-08-29 PROCEDURE — 84145 PROCALCITONIN (PCT): CPT

## 2020-08-29 PROCEDURE — 25000003 PHARM REV CODE 250: Performed by: FAMILY MEDICINE

## 2020-08-29 PROCEDURE — 94761 N-INVAS EAR/PLS OXIMETRY MLT: CPT

## 2020-08-29 PROCEDURE — 81001 URINALYSIS AUTO W/SCOPE: CPT

## 2020-08-29 PROCEDURE — 94799 UNLISTED PULMONARY SVC/PX: CPT

## 2020-08-29 PROCEDURE — 99233 SBSQ HOSP IP/OBS HIGH 50: CPT | Mod: ,,, | Performed by: INTERNAL MEDICINE

## 2020-08-29 PROCEDURE — 85379 FIBRIN DEGRADATION QUANT: CPT

## 2020-08-29 PROCEDURE — 21400001 HC TELEMETRY ROOM

## 2020-08-29 PROCEDURE — 86140 C-REACTIVE PROTEIN: CPT

## 2020-08-29 RX ORDER — ASPIRIN 325 MG
325 TABLET, DELAYED RELEASE (ENTERIC COATED) ORAL DAILY
Status: DISCONTINUED | OUTPATIENT
Start: 2020-08-29 | End: 2020-08-31 | Stop reason: HOSPADM

## 2020-08-29 RX ORDER — SODIUM CHLORIDE, SODIUM LACTATE, POTASSIUM CHLORIDE, CALCIUM CHLORIDE 600; 310; 30; 20 MG/100ML; MG/100ML; MG/100ML; MG/100ML
INJECTION, SOLUTION INTRAVENOUS CONTINUOUS
Status: DISCONTINUED | OUTPATIENT
Start: 2020-08-29 | End: 2020-08-29

## 2020-08-29 RX ORDER — MONTELUKAST SODIUM 10 MG/1
10 TABLET ORAL NIGHTLY
Status: DISCONTINUED | OUTPATIENT
Start: 2020-08-29 | End: 2020-08-31 | Stop reason: HOSPADM

## 2020-08-29 RX ORDER — ENOXAPARIN SODIUM 100 MG/ML
40 INJECTION SUBCUTANEOUS
Status: DISCONTINUED | OUTPATIENT
Start: 2020-08-29 | End: 2020-08-29

## 2020-08-29 RX ORDER — SODIUM CHLORIDE 9 MG/ML
INJECTION, SOLUTION INTRAVENOUS CONTINUOUS
Status: ACTIVE | OUTPATIENT
Start: 2020-08-29 | End: 2020-08-30

## 2020-08-29 RX ORDER — ATORVASTATIN CALCIUM 40 MG/1
80 TABLET, FILM COATED ORAL NIGHTLY
Status: DISCONTINUED | OUTPATIENT
Start: 2020-08-29 | End: 2020-08-31 | Stop reason: HOSPADM

## 2020-08-29 RX ORDER — HEPARIN SODIUM 5000 [USP'U]/ML
5000 INJECTION, SOLUTION INTRAVENOUS; SUBCUTANEOUS EVERY 8 HOURS
Status: DISCONTINUED | OUTPATIENT
Start: 2020-08-29 | End: 2020-08-31 | Stop reason: HOSPADM

## 2020-08-29 RX ORDER — AMLODIPINE BESYLATE 5 MG/1
5 TABLET ORAL DAILY
Status: DISCONTINUED | OUTPATIENT
Start: 2020-08-29 | End: 2020-08-31 | Stop reason: HOSPADM

## 2020-08-29 RX ORDER — FLUTICASONE FUROATE AND VILANTEROL 100; 25 UG/1; UG/1
1 POWDER RESPIRATORY (INHALATION) DAILY
Status: DISCONTINUED | OUTPATIENT
Start: 2020-08-29 | End: 2020-08-31 | Stop reason: HOSPADM

## 2020-08-29 RX ORDER — ALBUTEROL SULFATE 90 UG/1
2 AEROSOL, METERED RESPIRATORY (INHALATION) EVERY 8 HOURS
Status: DISCONTINUED | OUTPATIENT
Start: 2020-08-29 | End: 2020-08-31 | Stop reason: HOSPADM

## 2020-08-29 RX ORDER — HYDROCODONE BITARTRATE AND ACETAMINOPHEN 500; 5 MG/1; MG/1
TABLET ORAL
Status: DISCONTINUED | OUTPATIENT
Start: 2020-08-29 | End: 2020-08-30

## 2020-08-29 RX ORDER — FINASTERIDE 5 MG/1
5 TABLET, FILM COATED ORAL DAILY
Status: DISCONTINUED | OUTPATIENT
Start: 2020-08-29 | End: 2020-08-31 | Stop reason: HOSPADM

## 2020-08-29 RX ADMIN — ENOXAPARIN SODIUM 40 MG: 100 INJECTION SUBCUTANEOUS at 11:08

## 2020-08-29 RX ADMIN — AMLODIPINE BESYLATE 5 MG: 5 TABLET ORAL at 02:08

## 2020-08-29 RX ADMIN — FUROSEMIDE 20 MG: 10 INJECTION, SOLUTION INTRAMUSCULAR; INTRAVENOUS at 08:08

## 2020-08-29 RX ADMIN — SODIUM CHLORIDE: 0.9 INJECTION, SOLUTION INTRAVENOUS at 08:08

## 2020-08-29 RX ADMIN — ACETAMINOPHEN 650 MG: 325 TABLET, FILM COATED ORAL at 12:08

## 2020-08-29 RX ADMIN — ATORVASTATIN CALCIUM 80 MG: 40 TABLET, FILM COATED ORAL at 08:08

## 2020-08-29 RX ADMIN — ASPIRIN 325 MG: 325 TABLET, COATED ORAL at 02:08

## 2020-08-29 RX ADMIN — MONTELUKAST 10 MG: 10 TABLET, FILM COATED ORAL at 08:08

## 2020-08-29 RX ADMIN — ALBUTEROL SULFATE 2 PUFF: 90 AEROSOL, METERED RESPIRATORY (INHALATION) at 10:08

## 2020-08-29 RX ADMIN — HEPARIN SODIUM 5000 UNITS: 5000 INJECTION INTRAVENOUS; SUBCUTANEOUS at 02:08

## 2020-08-29 RX ADMIN — FLUTICASONE FUROATE AND VILANTEROL TRIFENATATE 1 PUFF: 100; 25 POWDER RESPIRATORY (INHALATION) at 10:08

## 2020-08-29 RX ADMIN — ALBUTEROL SULFATE 2 PUFF: 90 AEROSOL, METERED RESPIRATORY (INHALATION) at 03:08

## 2020-08-29 RX ADMIN — FINASTERIDE 5 MG: 5 TABLET, FILM COATED ORAL at 02:08

## 2020-08-29 RX ADMIN — HEPARIN SODIUM 5000 UNITS: 5000 INJECTION INTRAVENOUS; SUBCUTANEOUS at 08:08

## 2020-08-29 NOTE — SUBJECTIVE & OBJECTIVE
Interval History:  Patient reports over the preceding weeks low appetite with loss of sense of taste and smell.  States a relative recently had a birthday party, attended [employer] without a mass, feels this is how he got COVID-19.  Continues to deny any shortness of breath, productive cough, nausea, diarrhea.  States he is trying to eat more today.  States his cardiologist in the past was Dr. Leonard.  States he has been urinating well.  T-max last 24 hours 100.6, saturating well on room air.  Labs with lymphopenia, hemoglobin 12.3, creatinine 2.3, D-dimer 1.72, ferritin 608, CRP 3.27, BNP 1 471, troponin 0.063.  Chest x-ray with faint interstitial opacity right lung base.  EKG sinus rhythm.  Echo with EF of 31-38% with grade 1 diastolic.  Discussed plan of care with patient.  Discussed with Infectious Disease as well as nursing.  Called and updated patient's wife.      Review of Systems   Constitutional: Positive for appetite change. Negative for chills and fever.   HENT: Negative for trouble swallowing.    Respiratory: Negative for cough and shortness of breath.    Cardiovascular: Negative for chest pain.   Gastrointestinal: Negative for abdominal pain, diarrhea, nausea and vomiting.   Genitourinary: Negative for difficulty urinating.   Musculoskeletal: Negative for back pain.   Skin: Negative for wound.   Allergic/Immunologic: Negative for immunocompromised state.   Neurological: Positive for weakness.   Psychiatric/Behavioral: Negative for confusion.     Objective:     Vital Signs (Most Recent):  Temp: 98.4 °F (36.9 °C) (08/29/20 1053)  Pulse: 64 (08/29/20 1053)  Resp: 18 (08/29/20 1053)  BP: (!) 156/74 (08/29/20 1053)  SpO2: 97 % (08/29/20 1053) Vital Signs (24h Range):  Temp:  [98.4 °F (36.9 °C)-100.6 °F (38.1 °C)] 98.4 °F (36.9 °C)  Pulse:  [57-72] 64  Resp:  [18-24] 18  SpO2:  [95 %-100 %] 97 %  BP: (119-167)/(67-90) 156/74     Weight: 73.2 kg (161 lb 6 oz)  Body mass index is 21.89 kg/m².    Intake/Output  Summary (Last 24 hours) at 8/29/2020 1209  Last data filed at 8/29/2020 1053  Gross per 24 hour   Intake 300 ml   Output 800 ml   Net -500 ml      Physical Exam  Vitals signs and nursing note reviewed.   Constitutional:       General: He is not in acute distress.     Appearance: Normal appearance. He is normal weight. He is not ill-appearing, toxic-appearing or diaphoretic.   HENT:      Head: Normocephalic and atraumatic.      Mouth/Throat:      Mouth: Mucous membranes are moist.   Eyes:      General:         Right eye: No discharge.         Left eye: No discharge.   Neck:      Musculoskeletal: Neck supple.   Cardiovascular:      Rate and Rhythm: Normal rate and regular rhythm.      Heart sounds: Murmur present.      Comments: No lower extremity edema.  Palpable peripheral pulses  Pulmonary:      Effort: No respiratory distress.      Breath sounds: Normal breath sounds. No stridor. No wheezing or rhonchi.      Comments: Not on supplemental oxygen.  Speaking in full sentences.  Not using accessory muscles of respiration.  Abdominal:      General: Bowel sounds are normal. There is no distension.      Palpations: Abdomen is soft.      Tenderness: There is no abdominal tenderness. There is no guarding.   Genitourinary:     Comments: No suprapubic fullness or tenderness  Neurological:      General: No focal deficit present.      Mental Status: He is oriented to person, place, and time.   Psychiatric:         Mood and Affect: Mood normal.         Significant Labs:   Blood Culture:   Recent Labs   Lab 08/28/20  1125 08/28/20  1135   LABBLOO No Growth to date No Growth to date     BMP:   Recent Labs   Lab 08/28/20  1139   *      K 4.0      CO2 23   BUN 51*   CREATININE 2.3*   CALCIUM 8.6*     CBC:   Recent Labs   Lab 08/28/20  1139   WBC 9.78   HGB 12.3*   HCT 37.3*        CMP:   Recent Labs   Lab 08/28/20  1139      K 4.0      CO2 23   *   BUN 51*   CREATININE 2.3*   CALCIUM  8.6*   PROT 7.7   ALBUMIN 3.4*   BILITOT 1.1*   ALKPHOS 60   AST 36   ALT 18   ANIONGAP 12   EGFRNONAA 27.9*     Cardiac Markers:   Recent Labs   Lab 08/28/20  1139   BNP 1,471*     Lactic Acid:   Recent Labs   Lab 08/28/20  1139   LACTATE 1.7     Magnesium: No results for input(s): MG in the last 48 hours.  POCT Glucose: No results for input(s): POCTGLUCOSE in the last 48 hours.  Troponin:   Recent Labs   Lab 08/28/20  1139 08/28/20  1647 08/28/20  2046   TROPONINI 0.050* 0.065* 0.063*     TSH: No results for input(s): TSH in the last 4320 hours.  Urine Culture: No results for input(s): LABURIN in the last 48 hours.  Urine Studies: No results for input(s): COLORU, APPEARANCEUA, PHUR, SPECGRAV, PROTEINUA, GLUCUA, KETONESU, BILIRUBINUA, OCCULTUA, NITRITE, UROBILINOGEN, LEUKOCYTESUR, RBCUA, WBCUA, BACTERIA, SQUAMEPITHEL, HYALINECASTS in the last 48 hours.    Invalid input(s): WRIGHTSUR  All pertinent labs within the past 24 hours have been reviewed.    Significant Imaging: I have reviewed all pertinent imaging results/findings within the past 24 hours.     X-ray Chest Ap Portable    Result Date: 8/28/2020  CLINICAL HISTORY: 69 years (1950) Male covid covid TECHNIQUE: Portable AP radiograph the chest. COMPARISON: Most recent radiograph from May 3, 2018 FINDINGS: Small faint interstitial opacity at the right costophrenic sulcus. Costophrenic angles are seen without effusion. No pneumothorax is identified. The heart is normal in size. Median sternotomy wires are unchanged.  Osseous structures show degenerative disc disease and degenerative changes in the shoulders. The visualized upper abdomen is unremarkable. IMPRESSION: Interstitial opacities right lung base suggesting mild atypical infection/pneumonia given the clinical history (and less likely atelectasis, edema). . Electronically Signed by CLARK Gonzalez on 8/28/2020 11:27 AM  ECG Results          EKG 12-lead (Final result)  Result time 08/28/20  13:28:36    Final result by Interface, Lab In [facility] (08/28/20 13:28:36)                 Narrative:    Test Reason : R68.89,    Vent. Rate : 061 BPM     Atrial Rate : 061 BPM     P-R Int : 208 ms          QRS Dur : 152 ms      QT Int : 482 ms       P-R-T Axes : 048 270 097 degrees     QTc Int : 485 ms    Sinus rhythm with Premature atrial complexes  Left axis deviation  Nonspecific intraventricular block  Lateral infarct (cited on or before 03-NOV-2014)  Inferior infarct (cited on or before 03-NOV-2014)  Abnormal ECG  When compared with ECG of 25-NOV-2014 08:53,  Premature atrial complexes are now Present  Questionable change in QRS duration  Questionable change in initial forces of Lateral leads  Questionable change in initial forces of Inferior leads  Confirmed by Sunday Saha MD ([id number]) on 8/28/2020 1:28:26 PM    Referred By: [email or web address]   SELF           Confirmed By:Sunday Saha MD                            Echocardiogram  · The estimated PA systolic pressure is 19 mmHg.  · Moderately decreased left ventricular systolic function. The estimated ejection fraction is 31 - 38 %.  · Grade I (mild) left ventricular diastolic dysfunction consistent with impaired relaxation.  · Mild left ventricular enlargement.  · Mildly to moderately reduced right ventricular systolic function.  · Mild left atrial enlargement.  · There is a bioprosthetic aortic valve present. Peak velocity 2.55 M/S; peak gradient 26 mm Hg, mean gradient 1mm Hg  · Mild mitral regurgitation.  · Local segmental wall motion abnormalities. Global left ventricular hypokinesia  · No evidence of endocarditis

## 2020-08-29 NOTE — ASSESSMENT & PLAN NOTE
Echo with EF of 31-38% with grade 1 diastolic.  Elevated BMP however seems baseline chronically elevated.  Clinically appears to be dehydrated versus near euvolemic.  Discontinue IV diuresis.  Monitoring.

## 2020-08-29 NOTE — ASSESSMENT & PLAN NOTE
Type 2 non-insulin-dependent diabetes mellitus, last HbA1c 6.6% on admission.  Continue to hold oral hypoglycemics.  Low-dose insulin sliding scale t.i.d. with meals and Accu-Cheks.  As needed hypoglycemic measures

## 2020-08-29 NOTE — ASSESSMENT & PLAN NOTE
Not well controlled.  Restart amlodipine 5 mg daily.  Continue to hold benazepril given kidney dysfunction.  Monitor and adjust as needed

## 2020-08-29 NOTE — ASSESSMENT & PLAN NOTE
Since admission labs with BUN/creatinine 51/2.3 in the setting of reduced oral intake.  As per chart review previous baseline serum creatinine around 1.3.  Known history of significant BPH with enlarged prostate, denies any current urinary symptoms.  On admission impression of decompensated heart failure and he was started on IV diuresis however today he appears clinically euvolemic.  Check bladder scan to ensure patient is not retaining and then bladder scan p.r.n.  Check UA with reflex urine culture given fever and acute kidney injury  Discontinue IV Lasix  Continue to hold benazepril, Mobic and oral hypoglycemics  Ordered stat BMP now and then daily  Renally dosing all medications and avoid nephrotoxin drugs  Close monitoring of BMP

## 2020-08-29 NOTE — ASSESSMENT & PLAN NOTE
Echocardiogram with EF of 31-38% with grade 1 diastolic dysfunction.  Ischemic cardiomyopathy.  Seen in the past by Dr. Leonard

## 2020-08-29 NOTE — PROGRESS NOTES
I discussed the Emergency Use Authorization by FDA for COVID convalescent plasma, provided the FDA approved patient fact sheet , answered her questions and acquired her consent on an [facility] blood consent form  .

## 2020-08-29 NOTE — PROGRESS NOTES
Pending sale to [facility]  Progress Note    Patient Name: Baldo Carney  MRN: [id number]  Patient Class: IP- Inpatient   Admission Date: 8/28/2020  Length of Stay: 1 days  Attending Physician: Tiffanie Zavala MD  Primary Care Provider: KAMERON Rodriguez,FNP-C        Subjective:     Principal Problem:ERICA (acute kidney injury)        HPI:  HPI as per MD Thant:  69-year-old male with past medical history of Asthma/copd, Cardiomyopathy (10/21/2014), Diabetes mellitus, Hyperlipidemia, and Hypertension, status post aortic valve repair came with general fatigue, weakness and  shortness of breath.  he was not able to eat and drink because he lost the taste and smell about a week or 2 ago.  His daughter in-law or had a birthday party and later a lot people are positive for COVID including this patient.  Chest x-ray was done with diffuse obesity possible COVID pneumonia.  Also found to severely elevated BNP more than 1000 with acute renal failure which is new.  He was last seen by cardiologist Dr. Leonard about 6 months ago and was doing well and also he saw primary care doctor and doing well.  On examination he does not have  hypoxia or acute shortness of breath and does not seem to be in volume overload.  On examination no leg swelling, no abdominal tenderness and denied having chest pain, orthopnea, production of sputum and no fever.  Lung examination with generally clear lungs but rales and expiratory wheezing bilateral lungs at the bases.  His inflammatory markers are elevated with elevated ferritin, CRP and D-dimer.  His last echocardiogram 2014 with aortic stenosis 0.96 centimeters sq before the procedure and heart failure ejection fraction 40-50%.  No new echo seen since then.    Overview/Hospital Course:  I, Dr Zavala, assumed care of this patient on 8/29/20.  Patient presented with fatigue, weakness, decreased oral intake with anosmia and ageusia.  He states he relative recently was  also tested positive for COVID-19 and believes this is how he acquired COVID-19.  Significant comorbidities include coronary artery disease, COPD, not on home oxygen, non-insulin-dependent diabetes, hypertension, cardiomyopathy with EF of 31-38%, history of AVR, BPH with significantly enlarged prostate, followed by Dr. Nguyen.  On presentation blood pressure was not well controlled, saturating well on room air, elevated inflammatory markers with creatinine 2.3 which is higher than his baseline (creatinine 1.4), elevated BNP, he received Rocephin and azithromycin in the ED, impression of decompensated heart failure and he was admitted with COVID-19 isolation with IV diuresis.  On 08/29 no respiratory symptoms, saturating well on room air, no labs this morning (ordered stat), discussed with Infectious Disease, ordered convalescent plasma.    Interval History:  Patient reports over the preceding weeks low appetite with loss of sense of taste and smell.  States a relative recently had a birthday party, attended [employer] without a mass, feels this is how he got COVID-19.  Continues to deny any shortness of breath, productive cough, nausea, diarrhea.  States he is trying to eat more today.  States his cardiologist in the past was Dr. Leonard.  States he has been urinating well.  T-max last 24 hours 100.6, saturating well on room air.  Labs with lymphopenia, hemoglobin 12.3, creatinine 2.3, D-dimer 1.72, ferritin 608, CRP 3.27, BNP 1 471, troponin 0.063.  Chest x-ray with faint interstitial opacity right lung base.  EKG sinus rhythm.  Echo with EF of 31-38% with grade 1 diastolic.  Discussed plan of care with patient.  Discussed with Infectious Disease as well as nursing.  Called and updated patient's wife.      Review of Systems   Constitutional: Positive for appetite change. Negative for chills and fever.   HENT: Negative for trouble swallowing.    Respiratory: Negative for cough and shortness of breath.    Cardiovascular:  Negative for chest pain.   Gastrointestinal: Negative for abdominal pain, diarrhea, nausea and vomiting.   Genitourinary: Negative for difficulty urinating.   Musculoskeletal: Negative for back pain.   Skin: Negative for wound.   Allergic/Immunologic: Negative for immunocompromised state.   Neurological: Positive for weakness.   Psychiatric/Behavioral: Negative for confusion.     Objective:     Vital Signs (Most Recent):  Temp: 98.4 °F (36.9 °C) (08/29/20 1053)  Pulse: 64 (08/29/20 1053)  Resp: 18 (08/29/20 1053)  BP: (!) 156/74 (08/29/20 1053)  SpO2: 97 % (08/29/20 1053) Vital Signs (24h Range):  Temp:  [98.4 °F (36.9 °C)-100.6 °F (38.1 °C)] 98.4 °F (36.9 °C)  Pulse:  [57-72] 64  Resp:  [18-24] 18  SpO2:  [95 %-100 %] 97 %  BP: (119-167)/(67-90) 156/74     Weight: 73.2 kg (161 lb 6 oz)  Body mass index is 21.89 kg/m².    Intake/Output Summary (Last 24 hours) at 8/29/2020 1209  Last data filed at 8/29/2020 1053  Gross per 24 hour   Intake 300 ml   Output 800 ml   Net -500 ml      Physical Exam  Vitals signs and nursing note reviewed.   Constitutional:       General: He is not in acute distress.     Appearance: Normal appearance. He is normal weight. He is not ill-appearing, toxic-appearing or diaphoretic.   HENT:      Head: Normocephalic and atraumatic.      Mouth/Throat:      Mouth: Mucous membranes are moist.   Eyes:      General:         Right eye: No discharge.         Left eye: No discharge.   Neck:      Musculoskeletal: Neck supple.   Cardiovascular:      Rate and Rhythm: Normal rate and regular rhythm.      Heart sounds: Murmur present.      Comments: No lower extremity edema.  Palpable peripheral pulses  Pulmonary:      Effort: No respiratory distress.      Breath sounds: Normal breath sounds. No stridor. No wheezing or rhonchi.      Comments: Not on supplemental oxygen.  Speaking in full sentences.  Not using accessory muscles of respiration.  Abdominal:      General: Bowel sounds are normal. There is no  distension.      Palpations: Abdomen is soft.      Tenderness: There is no abdominal tenderness. There is no guarding.   Genitourinary:     Comments: No suprapubic fullness or tenderness  Neurological:      General: No focal deficit present.      Mental Status: He is oriented to person, place, and time.   Psychiatric:         Mood and Affect: Mood normal.         Significant Labs:   Blood Culture:   Recent Labs   Lab 08/28/20  1125 08/28/20  1135   LABBLOO No Growth to date No Growth to date     BMP:   Recent Labs   Lab 08/28/20  1139   *      K 4.0      CO2 23   BUN 51*   CREATININE 2.3*   CALCIUM 8.6*     CBC:   Recent Labs   Lab 08/28/20  1139   WBC 9.78   HGB 12.3*   HCT 37.3*        CMP:   Recent Labs   Lab 08/28/20  1139      K 4.0      CO2 23   *   BUN 51*   CREATININE 2.3*   CALCIUM 8.6*   PROT 7.7   ALBUMIN 3.4*   BILITOT 1.1*   ALKPHOS 60   AST 36   ALT 18   ANIONGAP 12   EGFRNONAA 27.9*     Cardiac Markers:   Recent Labs   Lab 08/28/20  1139   BNP 1,471*     Lactic Acid:   Recent Labs   Lab 08/28/20  1139   LACTATE 1.7     Magnesium: No results for input(s): MG in the last 48 hours.  POCT Glucose: No results for input(s): POCTGLUCOSE in the last 48 hours.  Troponin:   Recent Labs   Lab 08/28/20  1139 08/28/20  1647 08/28/20  2046   TROPONINI 0.050* 0.065* 0.063*     TSH: No results for input(s): TSH in the last 4320 hours.  Urine Culture: No results for input(s): LABURIN in the last 48 hours.  Urine Studies: No results for input(s): COLORU, APPEARANCEUA, PHUR, SPECGRAV, PROTEINUA, GLUCUA, KETONESU, BILIRUBINUA, OCCULTUA, NITRITE, UROBILINOGEN, LEUKOCYTESUR, RBCUA, WBCUA, BACTERIA, SQUAMEPITHEL, HYALINECASTS in the last 48 hours.    Invalid input(s): WRIGHTSUR  All pertinent labs within the past 24 hours have been reviewed.    Significant Imaging: I have reviewed all pertinent imaging results/findings within the past 24 hours.     X-ray Chest Ap  Portable    Result Date: 8/28/2020  CLINICAL HISTORY: 69 years (1950) Male covid covid TECHNIQUE: Portable AP radiograph the chest. COMPARISON: Most recent radiograph from May 3, 2018 FINDINGS: Small faint interstitial opacity at the right costophrenic sulcus. Costophrenic angles are seen without effusion. No pneumothorax is identified. The heart is normal in size. Median sternotomy wires are unchanged.  Osseous structures show degenerative disc disease and degenerative changes in the shoulders. The visualized upper abdomen is unremarkable. IMPRESSION: Interstitial opacities right lung base suggesting mild atypical infection/pneumonia given the clinical history (and less likely atelectasis, edema). . Electronically Signed by CLARK Gonzalez on 8/28/2020 11:27 AM  ECG Results          EKG 12-lead (Final result)  Result time 08/28/20 13:28:36    Final result by Interface, Lab In [facility] (08/28/20 13:28:36)                 Narrative:    Test Reason : R68.89,    Vent. Rate : 061 BPM     Atrial Rate : 061 BPM     P-R Int : 208 ms          QRS Dur : 152 ms      QT Int : 482 ms       P-R-T Axes : 048 270 097 degrees     QTc Int : 485 ms    Sinus rhythm with Premature atrial complexes  Left axis deviation  Nonspecific intraventricular block  Lateral infarct (cited on or before 03-NOV-2014)  Inferior infarct (cited on or before 03-NOV-2014)  Abnormal ECG  When compared with ECG of 25-NOV-2014 08:53,  Premature atrial complexes are now Present  Questionable change in QRS duration  Questionable change in initial forces of Lateral leads  Questionable change in initial forces of Inferior leads  Confirmed by Sunday Saha MD ([id number]) on 8/28/2020 1:28:26 PM    Referred By: [email or web address]   SELF           Confirmed By:Sunday Saha MD                            Echocardiogram  · The estimated PA systolic pressure is 19 mmHg.  · Moderately decreased left ventricular systolic function. The estimated ejection fraction  is 31 - 38 %.  · Grade I (mild) left ventricular diastolic dysfunction consistent with impaired relaxation.  · Mild left ventricular enlargement.  · Mildly to moderately reduced right ventricular systolic function.  · Mild left atrial enlargement.  · There is a bioprosthetic aortic valve present. Peak velocity 2.55 M/S; peak gradient 26 mm Hg, mean gradient 1mm Hg  · Mild mitral regurgitation.  · Local segmental wall motion abnormalities. Global left ventricular hypokinesia  · No evidence of endocarditis      Assessment/Plan:      * ERICA on CKD III  Since admission labs with BUN/creatinine 51/2.3 in the setting of reduced oral intake.  As per chart review previous baseline serum creatinine around 1.3.  Known history of significant BPH with enlarged prostate, denies any current urinary symptoms.  On admission impression of decompensated heart failure and he was started on IV diuresis however today he appears clinically euvolemic.  Check bladder scan to ensure patient is not retaining and then bladder scan p.r.n.  Check UA with reflex urine culture given fever and acute kidney injury  Discontinue IV Lasix  Continue to hold benazepril, Mobic and oral hypoglycemics  Ordered stat BMP now and then daily  Renally dosing all medications and avoid nephrotoxin drugs  Close monitoring of BMP      COVID-19 virus detected  COVID-19 positive.  Respiratory status appears to be stable, faint interstitial opacity right lung base, breathing comfortably on room air.  History of cardiomyopathy with COPD.  Ferritin 608--497  D-dimer 1.72  CRP 3.27--3.11  Discussed with Infectious Disease, convalescent plasma ordered 8/2 9  Not hypoxic and therefore not a candidate for dexamethasone  Trending inflammatory markers  Change heparin 5000 units Q 8 given kidney dysfunction, monitor closely and adjust as needed  Continue isolation precautions  Appreciate infectious disease input      Troponin I above reference range  Elevated troponin to 0.063.   EKG reviewed, no ST elevation.  Possibly in the setting of acute kidney injury.  Continue to monitor clinically.  Repeat 12 lead EKG in a.m..      Lymphopenia  Likely due to COVID-19 infection      Hypertension  Not well controlled.  Restart amlodipine 5 mg daily.  Continue to hold benazepril given kidney dysfunction.  Monitor and adjust as needed      Anemia  Monitoring      COPD (chronic obstructive pulmonary disease)  No evidence of acute exacerbation.  Resume home Breo with albuterol.      BPH (benign prostatic hyperplasia)  History of BPH with enlarged prostate and elevated PSA, followed by Dr. Nguyen.  Now with superimposed acute kidney injury.  Denies any current urinary symptoms.  Check bladder scan.  Restart home finasteride.      Chronic systolic heart failure  Echo with EF of 31-38% with grade 1 diastolic.  Elevated BMP however seems baseline chronically elevated.  Clinically appears to be dehydrated versus near euvolemic.  Discontinue IV diuresis.  Monitoring.      S/P CABG (coronary artery bypass graft)        S/P AVR (aortic valve replacement)        CAD (coronary artery disease)  Monitoring      Cardiomyopathy  Echocardiogram with EF of 31-38% with grade 1 diastolic dysfunction.  Ischemic cardiomyopathy.  Seen in the past by Dr. Leonard    Aortic stenosis status post AVR  Murmur heard on examination      Type 2 diabetes mellitus without complication, without long-term current use of insulin  Type 2 non-insulin-dependent diabetes mellitus, last HbA1c 6.6% on admission.  Continue to hold oral hypoglycemics.  Low-dose insulin sliding scale t.i.d. with meals and Accu-Cheks.  As needed hypoglycemic measures      Hyperlipidemia  Home Lipitor restarted        VTE Risk Mitigation (From admission, onward)         Ordered     heparin (porcine) injection 5,000 Units  Every 8 hours      08/29/20 1213                Discharge Planning   PAM:      Code Status: Not on file   Is the patient medically ready for  discharge?:     Reason for patient still in hospital (select all that apply): Treatment                     Tiffanie Zavala MD  Department of Hospital Medicine   formerly [facility]

## 2020-08-29 NOTE — ASSESSMENT & PLAN NOTE
COVID-19 positive.  Respiratory status appears to be stable, faint interstitial opacity right lung base, breathing comfortably on room air.  History of cardiomyopathy with COPD.  Ferritin 608--497  D-dimer 1.72  CRP 3.27--3.11  Discussed with Infectious Disease, convalescent plasma ordered 8/2 9  Not hypoxic and therefore not a candidate for dexamethasone  Trending inflammatory markers  Change heparin 5000 units Q 8 given kidney dysfunction, monitor closely and adjust as needed  Continue isolation precautions  Appreciate infectious disease input

## 2020-08-29 NOTE — ASSESSMENT & PLAN NOTE
History of BPH with enlarged prostate and elevated PSA, followed by Dr. Nguyen.  Now with superimposed acute kidney injury.  Denies any current urinary symptoms.  Check bladder scan.  Restart home finasteride.

## 2020-08-29 NOTE — ASSESSMENT & PLAN NOTE
Elevated troponin to 0.063.  EKG reviewed, no ST elevation.  Possibly in the setting of acute kidney injury.  Continue to monitor clinically.  Repeat 12 lead EKG in a.m..

## 2020-08-29 NOTE — HOSPITAL COURSE
I, Dr Zavala, assumed care of this patient on 8/29/20.  Patient presented with fatigue, weakness, decreased oral intake with anosmia and ageusia.  He states his relative recently was also tested positive for COVID-19 and believes this is how he acquired COVID-19.  Significant comorbidities include coronary artery disease, COPD, not on home oxygen, non-insulin-dependent diabetes, hypertension, cardiomyopathy with EF of 31-38%, history of AVR, BPH with significantly enlarged prostate, followed by Dr. Nguyen.  On presentation blood pressure was not well controlled, saturating well on room air, elevated inflammatory markers with creatinine 2.3 which is higher than his baseline (creatinine 1.4), elevated BNP, he received Rocephin and azithromycin in the ED, impression of decompensated heart failure and he was admitted with COVID-19 isolation with IV diuresis.  On 08/29 no respiratory symptoms, saturating well on room air, no labs this morning (ordered stat), discussed with Infectious Disease, ordered convalescent plasma.  Impression of patient likely dehydrated rather than volume overloaded, diuresis discontinued, trial of IV fluid hydration in keeping with Cardiology recommendations. On 08/30, respiratory status stable, renal function still elevated with creatinine 2, ultrasound as outlined with lobulated abnormality seen in the bladder, patient denies any difficulty with urination or urinary symptoms, appetite slowly improving.  Eager for discharge home.  On 8/31, stable from respiratory standpoint, seen by infectious disease and cleared for discharge, recommendations for Eliquis for DVT prophylaxis, serum creatinine down to 1.8, appetite continues to slowly increased, communicated with Dr. Nguyen about laboratory and radiological findings, agreed with current plan of care without any further recommendations, appears medically stable for discharge in patient states he is ready to go.  Discharge plan including  medications and discontinuation of meloxicam, holding diuresis for few days, Eliquis, isolation precautions, monitoring O2 sats if possible, follow-up as well as return precautions were explained.  Also called and discussed discharge plan of care with wife over the phone, all questions answered.    Discharge examination  Ambulating in room, no apparent distress, common cooperative  Alert and oriented x3, not on supplemental oxygen with good air entry  Regular heart rhythm, no lower extremity edema

## 2020-08-29 NOTE — ED PROVIDER NOTES
Encounter Date: 8/28/2020   Time: 10:00 am       History     Chief Complaint   Patient presents with    Nausea     x 3 weeks. decrease of taste. no fever     This is a 69-year-old male who presents complaining of a decreased sense of taste and smell that have been present over the past 1 or 2 weeks.  He has had a decreased oral intake because of the lack of ability to taste or smell.  He denies fever, chills, chest pain, coughing or shortness of breath.  He denies any weakness dizziness or lightheadedness but has had some fatigue and malaise.  He denies any abdominal pain nausea vomiting or diarrhea.  Triage note reported nausea but he states that he does not feel nauseated he actually just is not as hungry is normal.  He denies any vomiting.  He denies any focal weakness numbness tingling or paresthesias.  He denies any headaches.  He states he is urinating normally.  He denies any lower extremity pain or swelling.  He feels well otherwise and denies any other problems or complaints.        Review of patient's allergies indicates:  No Known Allergies  Past Medical History:   Diagnosis Date    Asthma     Cardiomyopathy 10/21/2014    Diabetes mellitus     Hyperlipidemia     Hypertension      Past Surgical History:   Procedure Laterality Date    BREAST SURGERY      CARDIAC CATHETERIZATION      CARDIAC VALVE SURGERY      CORONARY ARTERY BYPASS GRAFT      CYSTOSCOPY N/A 7/30/2019    Procedure: CYSTOSCOPY;  Surgeon: Spencer Nguyen MD;  Location: [facility] OR;  Service: Urology;  Laterality: N/A;    EYE SURGERY      SHOULDER ARTHROSCOPY  1985    TRANSRECTAL BIOPSY OF PROSTATE WITH ULTRASOUND GUIDANCE N/A 7/30/2019    Procedure: BIOPSY, PROSTATE, RECTAL APPROACH, WITH US GUIDANCE;  Surgeon: Spencer Nguyen MD;  Location: [facility] OR;  Service: Urology;  Laterality: N/A;  procedure not performed, pt unable to tolerate    TRANSRECTAL BIOPSY OF PROSTATE WITH ULTRASOUND GUIDANCE N/A 8/8/2019    Procedure: BIOPSY,  PROSTATE, RECTAL APPROACH, WITH US GUIDANCE;  Surgeon: Spencer Nguyen MD;  Location: [facility];  Service: Urology;  Laterality: N/A;     Family History   Problem Relation Age of Onset    No Known Problems Mother     Diabetes Father     Hypertension Father     Heart attack Father     Heart disease Father     Diabetes Sister     Heart disease Brother     Diabetes Brother     No Known Problems Maternal Grandmother     No Known Problems Maternal Grandfather     No Known Problems Paternal Grandmother     No Known Problems Paternal Grandfather     No Known Problems Maternal Aunt     No Known Problems Maternal Uncle     No Known Problems Paternal Aunt     No Known Problems Paternal Uncle     Anemia Neg Hx     Arrhythmia Neg Hx     Asthma Neg Hx     Clotting disorder Neg Hx     Fainting Neg Hx     Heart failure Neg Hx     Hyperlipidemia Neg Hx     Glaucoma Neg Hx      Social History     Tobacco Use    Smoking status: Former Smoker     Quit date: 2019     Years since quittin.1    Smokeless tobacco: Never Used   Substance Use Topics    Alcohol use: Yes     Alcohol/week: 3.0 standard drinks     Types: 3 Cans of beer per week     Frequency: Never     Comment: social    Drug use: No     Review of Systems   Constitutional: Positive for fatigue. Negative for activity change, appetite change, chills, diaphoresis, fever and unexpected weight change.   HENT: Negative.  Negative for congestion, dental problem, ear pain, nosebleeds, postnasal drip, rhinorrhea, sinus pressure, sinus pain, sore throat, tinnitus, trouble swallowing and voice change.    Eyes: Negative.  Negative for pain and visual disturbance.   Respiratory: Negative.  Negative for cough, chest tightness, shortness of breath and wheezing.    Cardiovascular: Negative.  Negative for chest pain, palpitations and leg swelling.   Gastrointestinal: Negative.  Negative for abdominal distention, abdominal pain, anal bleeding, blood in stool,  constipation, diarrhea, nausea, rectal pain and vomiting.   Endocrine: Negative.    Genitourinary: Negative.  Negative for difficulty urinating, dysuria, flank pain, frequency, penile pain, scrotal swelling, testicular pain and urgency.   Musculoskeletal: Negative.  Negative for arthralgias, back pain, gait problem, joint swelling, myalgias, neck pain and neck stiffness.   Skin: Negative.  Negative for color change, pallor and rash.   Neurological: Negative.  Negative for dizziness, seizures, syncope, facial asymmetry, speech difficulty, weakness, light-headedness, numbness and headaches.   Hematological: Negative.  Does not bruise/bleed easily.   Psychiatric/Behavioral: Negative.  Negative for confusion.   All other systems reviewed and are negative.      Physical Exam     Initial Vitals [08/28/20 0852]   BP Pulse Resp Temp SpO2   120/65 66 17 98.3 °F (36.8 °C) 98 %      MAP       --         Physical Exam    Nursing note and vitals reviewed.  Constitutional: He appears well-developed and well-nourished. He is active.  Non-toxic appearance. He does not have a sickly appearance. He does not appear ill. No distress.   HENT:   Head: Normocephalic and atraumatic.   Nose: Nose normal.   Mouth/Throat: Oropharynx is clear and moist.   Eyes: Conjunctivae, EOM and lids are normal. Pupils are equal, round, and reactive to light.   Neck: Normal range of motion and full passive range of motion without pain. Neck supple. No thyromegaly present. No spinous process tenderness and no muscular tenderness present. No tracheal deviation, no edema, no erythema and normal range of motion present. No neck rigidity. No JVD present.   Cardiovascular: Normal rate, regular rhythm, normal heart sounds, intact distal pulses and normal pulses. Exam reveals no gallop and no friction rub.    No murmur heard.  Pulmonary/Chest: Effort normal and breath sounds normal. No stridor. No respiratory distress. He has no wheezes. He has no rhonchi. He has  no rales.   Abdominal: Soft. Normal appearance and bowel sounds are normal. He exhibits no distension and no mass. There is no abdominal tenderness. There is no rebound and no guarding. No hernia.   Musculoskeletal: No tenderness.      Cervical back: Normal. He exhibits normal range of motion, no tenderness, no bony tenderness and no swelling.      Thoracic back: He exhibits normal range of motion, no tenderness, no bony tenderness and no swelling.      Lumbar back: Normal. He exhibits normal range of motion, no tenderness, no bony tenderness, no swelling and no edema.      Comments: Pulses are 2+ throughout, cap refill is less than 2 sec throughout, no edema noted, extremities are nontender throughout with full range of motion   Lymphadenopathy:     He has no cervical adenopathy.   Neurological: He is alert and oriented to person, place, and time. He has normal strength. No cranial nerve deficit or sensory deficit. Coordination normal.   Skin: Skin is warm and dry. Capillary refill takes less than 2 seconds. No ecchymosis, no petechiae and no rash noted. No cyanosis or erythema. No pallor.   Psychiatric: He has a normal mood and affect. His speech is normal and behavior is normal. Judgment and thought content normal. Cognition and memory are normal.         ED Course   Procedures  Labs Reviewed   SARS-COV-2 RNA AMPLIFICATION, QUAL - Abnormal; Notable for the following components:       Result Value    SARS-CoV-2 RNA, Amplification, Qual Positive (*)     All other components within normal limits   CBC W/ AUTO DIFFERENTIAL - Abnormal; Notable for the following components:    RBC 4.17 (*)     Hemoglobin 12.3 (*)     Hematocrit 37.3 (*)     Immature Granulocytes 0.6 (*)     Gran # (ANC) 8.3 (*)     Immature Grans (Abs) 0.06 (*)     Lymph # 0.8 (*)     Gran% 84.4 (*)     Lymph% 8.2 (*)     All other components within normal limits   COMPREHENSIVE METABOLIC PANEL - Abnormal; Notable for the following components:     Glucose 118 (*)     BUN, Bld 51 (*)     Creatinine 2.3 (*)     Calcium 8.6 (*)     Albumin 3.4 (*)     Total Bilirubin 1.1 (*)     eGFR if  32.3 (*)     eGFR if non  27.9 (*)     All other components within normal limits   C-REACTIVE PROTEIN - Abnormal; Notable for the following components:    CRP 3.27 (*)     All other components within normal limits   FERRITIN - Abnormal; Notable for the following components:    Ferritin 608 (*)     All other components within normal limits   LACTATE DEHYDROGENASE - Abnormal; Notable for the following components:     (*)     All other components within normal limits   CK - Abnormal; Notable for the following components:     (*)     All other components within normal limits   TROPONIN I - Abnormal; Notable for the following components:    Troponin I 0.050 (*)     All other components within normal limits   B-TYPE NATRIURETIC PEPTIDE - Abnormal; Notable for the following components:    BNP 1,471 (*)     All other components within normal limits   ISTAT PROCEDURE - Abnormal; Notable for the following components:    POC PCO2 26.4 (*)     POC HCO3 18.2 (*)     POC TCO2 19 (*)     All other components within normal limits   POCT GLUCOSE - Abnormal; Notable for the following components:    POC Glucose 132 (*)     All other components within normal limits   LACTIC ACID, PLASMA   PROCALCITONIN   POCT GLUCOSE MONITORING CONTINUOUS        ECG Results          EKG 12-lead (Final result)  Result time 08/28/20 13:28:36    Final result by Interface, Lab In [facility] (08/28/20 13:28:36)                 Narrative:    Test Reason : R68.89,    Vent. Rate : 061 BPM     Atrial Rate : 061 BPM     P-R Int : 208 ms          QRS Dur : 152 ms      QT Int : 482 ms       P-R-T Axes : 048 270 097 degrees     QTc Int : 485 ms    Sinus rhythm with Premature atrial complexes  Left axis deviation  Nonspecific intraventricular block  Lateral infarct (cited on or before  03-NOV-2014)  Inferior infarct (cited on or before 03-NOV-2014)  Abnormal ECG  When compared with ECG of 25-NOV-2014 08:53,  Premature atrial complexes are now Present  Questionable change in QRS duration  Questionable change in initial forces of Lateral leads  Questionable change in initial forces of Inferior leads  Confirmed by Sunday Saha MD ([id number]) on 8/28/2020 1:28:26 PM    Referred By: [email or web address]   SELF           Confirmed By:Sunday Saha MD                            Imaging Results          X-Ray Chest AP Portable (Final result)  Result time 08/28/20 11:24:20    Final result by Artem Tinajero MD (08/28/20 11:24:20)                 Narrative:    CLINICAL HISTORY:  69 years (1950) Male covid covid    TECHNIQUE:  Portable AP radiograph the chest.    COMPARISON:  Most recent radiograph from May 3, 2018    FINDINGS:  Small faint interstitial opacity at the right costophrenic sulcus.  Costophrenic angles are seen without effusion. No pneumothorax is  identified. The heart is normal in size. Median sternotomy wires are  unchanged.  Osseous structures show degenerative disc disease and  degenerative changes in the shoulders. The visualized upper abdomen is  unremarkable.    IMPRESSION:  Interstitial opacities right lung base suggesting mild atypical  infection/pneumonia given the clinical history (and less likely  atelectasis, edema).                  .            Electronically Signed by CLARK Gonzalez on 8/28/2020 11:27 AM                               Medical Decision Making:   Clinical Tests:   Lab Tests: Reviewed  Radiological Study: Reviewed  Medical Tests: Reviewed  ED Management:  Patient is well-appearing however has multiple lab abnormalities.  BNP is elevated although so are BUN creatinine but clinically correlating patient appears euvolemic.  He does not appear dehydrated and lungs are clear with no crackles or rales.  Pulse ox is 96% on room air.  Patient is COVID-19  positive.  X-ray demonstrates opacities consistent with possible infiltrate.  He is well-appearing and in no acute distress.  He is relatively asymptomatic other than loss of sense of taste and smell.  Secondary to multiple lab abnormalities I have discussed the case with the hospitalist and the patient will be admitted for further care.  Troponin is elevated although he denies any chest pain and EKG is negative for evidence of acute ischemia.  I have discussed the case with the hospitalist physician who has assumed care of the patient                                 Clinical Impression:       ICD-10-CM ICD-9-CM   1. Suspected Covid-19 Virus Infection  R68.89    2. COVID-19 virus detected  U07.1    3. CHF (congestive heart failure)  I50.9 428.0   4. ERICA (acute kidney injury)  N17.9 584.9   5. Cardiomyopathy, unspecified type  I42.9 425.4   6. Type 2 diabetes mellitus without complication, without long-term current use of insulin  E11.9 250.00   7. QT prolongation  R94.31 794.31             ED Disposition Condition    Admit                           Yasmin Travis MD  08/29/20 1217

## 2020-08-29 NOTE — CONSULTS
Formerly [facility], [facility]  Cardiology consultation  08/29/2020       Patient Name: Baldo Carney  MRN: [id number]  Admission Date: 8/28/2020  Attending Physician: Yasmin Travis MD   Primary Care Provider: KAMERON Rodriguez,TEDP-C           Patient information was obtained from patient and ER records.      Subjective:      Principal Problem:<principal problem not specified>     Chief Complaint:        Chief Complaint   Patient presents with    Nausea       x 3 weeks. decrease of taste. no fever         HPI: 69-year-old male with past medical history of Asthma/copd, Cardiomyopathy (10/21/2014), Diabetes mellitus, Hyperlipidemia, and Hypertension, status post aortic valve repair and SVG to the distal RCA in 2014, came with general fatigue, weakness and  shortness of breath.  he was not able to eat and drink because he lost the taste and smell about a week or 2 ago.  His daughter in-law or had a birthday party and later a lot people are positive for COVID including this patient.  Chest x-ray was done with diffuse obesity possible COVID pneumonia.  Also found to severely elevated BNP more than 1000 with acute renal failure which is new.  He was last seen by cardiologist Dr. Leonard about 6 months ago and was doing well and also he saw primary care doctor and doing well.  BUN creatinine on 10/16/2019 when 19 and 1.4 respectively.  The patient was admitted in May 2018 with CHF.  BUN creatinine were 16 and 1.18 respectively.  Echocardiogram 05/04/2018 revealed a peak velocity of 2.67 M/S with a peak gradient of 28 and mean gradient of 16 mmHg across aortic valve.  There was no aortic regurgitation.  LV dimensions a month earlier where 54/46 mm, normal wall thickness, EF 38 %, left atrium 51 mm.  There was mild-to-moderate mitral regurgitation.  Prior to AVR in 2014 EF was 40-45% with LV dimensions  50/37 mm.  The apex was hypokinetic.  Peak velocity was 3.25 M/S with a peak gradient of 42 mmHg and  mean gradient of 24 mmHg.  There was mild-to-moderate aortic regurgitation.  His inflammatory markers are elevated with elevated ferritin, CRP and D-dimer.  His last echocardiogram 2014 with aortic stenosis 0.96 centimeters sq before the procedure and heart failure ejection fraction 40-50%.  No new echo seen since then.          Past Medical History:   Diagnosis Date    Asthma      Cardiomyopathy 10/21/2014    Diabetes mellitus      Hyperlipidemia      Hypertension                 Past Surgical History:   Procedure Laterality Date    BREAST SURGERY        CARDIAC CATHETERIZATION        CARDIAC VALVE SURGERY        CORONARY ARTERY BYPASS GRAFT        CYSTOSCOPY N/A 7/30/2019     Procedure: CYSTOSCOPY;  Surgeon: Spencer Nguyen MD;  Location: [facility] OR;  Service: Urology;  Laterality: N/A;    EYE SURGERY        SHOULDER ARTHROSCOPY   1985    TRANSRECTAL BIOPSY OF PROSTATE WITH ULTRASOUND GUIDANCE N/A 7/30/2019     Procedure: BIOPSY, PROSTATE, RECTAL APPROACH, WITH US GUIDANCE;  Surgeon: Spencer Nguyen MD;  Location: [facility] OR;  Service: Urology;  Laterality: N/A;  procedure not performed, pt unable to tolerate    TRANSRECTAL BIOPSY OF PROSTATE WITH ULTRASOUND GUIDANCE N/A 8/8/2019     Procedure: BIOPSY, PROSTATE, RECTAL APPROACH, WITH US GUIDANCE;  Surgeon: Spencer Nguyen MD;  Location: [facility] OR;  Service: Urology;  Laterality: N/A;         Review of patient's allergies indicates:  No Known Allergies     No current facility-administered medications on file prior to encounter.       Current Outpatient Medications on File Prior to Encounter   Medication Sig    albuterol (ACCUNEB) 1.25 mg/3 mL Nebu Take 3 mLs (1.25 mg total) by nebulization every 6 (six) hours as needed. Rescue    amlodipine-benazepril 5-20 mg (LOTREL) 5-20 mg per capsule TAKE 1 CAPSULE BY MOUTH EVERY NIGHT AT BEDTIME (Patient taking differently: Take 1 capsule by mouth every evening. )    ARNUITY ELLIPTA 100 mcg/actuation DsDv Use  as directed 1 puff in the mouth or throat daily as needed.     aspirin (ECOTRIN) 325 MG EC tablet Take 1 tablet (325 mg total) by mouth once daily.    atorvastatin (LIPITOR) 80 MG tablet TAKE 1 TABLET BY MOUTH ONCE A DAY (Patient taking differently: Take 80 mg by mouth once daily. )    azelastine (ASTELIN) 137 mcg (0.1 %) nasal spray 1 spray by Nasal route 2 (two) times daily as needed.     cetirizine (ZYRTEC) 10 MG tablet Take 1 tablet (10 mg total) by mouth once daily. (Patient taking differently: Take 10 mg by mouth daily as needed. )    empagliflozin (JARDIANCE) 25 mg Tab Take 1 tablet by mouth once daily. (Patient taking differently: Take 25 mg by mouth once daily. )    finasteride (PROSCAR) 5 mg tablet Take 1 tablet (5 mg total) by mouth once daily.    fluticasone propionate (FLONASE) 50 mcg/actuation nasal spray 2 sprays (100 mcg total) by Each Nostril route once daily. (Patient taking differently: 2 sprays by Each Nostril route daily as needed. )    fluticasone-vilanterol (BREO ELLIPTA) 100-25 mcg/dose diskus inhaler Inhale 1 puff into the lungs once daily. Controller (Patient taking differently: Inhale 1 puff into the lungs daily as needed. Controller)    furosemide (LASIX) 20 MG tablet TAKE 1 TABLET TWICE DAILY (Patient taking differently: Take 20 mg by mouth 2 (two) times a day. )    meloxicam (MOBIC) 7.5 MG tablet Take 1 tablet (7.5 mg total) by mouth once daily. (Patient taking differently: Take 7.5 mg by mouth daily as needed. )    metFORMIN (GLUCOPHAGE) 1000 MG tablet TAKE 1 TABLET BY MOUTH TWICE A DAY WITH MEALS (Patient taking differently: Take 1,000 mg by mouth 2 (two) times a day. )    montelukast (SINGULAIR) 10 mg tablet Take 10 mg by mouth nightly as needed.     potassium chloride SA (K-DUR,KLOR-CON) 10 MEQ tablet Take 1 tablet (10 mEq total) by mouth once daily.    VENTOLIN HFA 90 mcg/actuation inhaler INHALE 2 PUFFS EVERY 6 HOURS AS NEEDED FOR WHEEZING  (RESCUE) (Patient taking  differently: Inhale 2 puffs into the lungs every 6 (six) hours as needed. )    blood sugar diagnostic Strp USE BRAND COVERED BY INSURANCE AND COMPATIBLE WITH METER TO CHECK GLUCOSE 2X DAY    blood-glucose meter kit USE BRAND AS COVERED BY INSURANCE AND COMPATIBLE WITH STRIPS TO CHECK GLUCOSE 2X DAY    PRODIGY TWIST TOP LANCET 28 gauge Misc CHECK  GLUCOSE TWICE DAILY           Family History      Problem Relation (Age of Onset)     Diabetes Father, Sister, Brother     Heart attack Father     Heart disease Father, Brother     Hypertension Father     No Known Problems Mother, Maternal Grandmother, Maternal Grandfather, Paternal Grandmother, Paternal Grandfather, Maternal Aunt, Maternal Uncle, Paternal Aunt, Paternal Uncle                Tobacco Use    Smoking status: Former Smoker       Quit date: 2019       Years since quittin.1    Smokeless tobacco: Never Used   Substance and Sexual Activity    Alcohol use: Yes       Alcohol/week: 3.0 standard drinks       Types: 3 Cans of beer per week       Frequency: Never       Comment: social    Drug use: No    Sexual activity: Never      Review of Systems   Constitutional: Negative for activity change, chills and diaphoresis.   HENT: Negative for congestion.    Eyes: Negative for discharge.   Respiratory: Negative for chest tightness.    Cardiovascular: Negative for chest pain.   Gastrointestinal: Negative for abdominal distention.   Genitourinary: Negative for difficulty urinating.   Musculoskeletal: Negative for arthralgias.   Neurological: Negative for dizziness.   Hematological: Negative for adenopathy.   Psychiatric/Behavioral: Negative for agitation.      Objective:      Vital Signs (Most Recent):  Temp: 99 °F (37.2 °C) (20 1400)  Pulse: 60 (20 1400)  Resp: 19 (20 1400)  BP: (!) 150/79 (20 1400)  SpO2: 99 % (20 1400) Vital Signs (24h Range):  Temp:  [98.3 °F (36.8 °C)-99 °F (37.2 °C)] 99 °F (37.2 °C)  Pulse:  [58-66]  60  Resp:  [17-19] 19  SpO2:  [97 %-100 %] 99 %  BP: (120-183)/(65-83) 150/79      Weight: 81.2 kg (179 lb)  Body mass index is 24.28 kg/m².     Physical Exam  Constitutional:       General: He is not in acute distress.     Appearance: He is well-developed.   HENT:      Head: Normocephalic.  Peter in fair repair.  Neck:      Musculoskeletal: Neck supple.   Cardiovascular:      Rate and Rhythm: Normal rate and regular rhythm.  No no murmur gallop or rub.  Pulmonary:      Effort: No respiratory distress.      Breath sounds:  No rales or rhonchi.    Abdominal:      General: There is no distension.      Tenderness: There is no abdominal tenderness.   Musculoskeletal: Normal range of motion.   Skin:     Findings: No rash.   Neurological:      General: No focal deficit present.      Mental Status: He is alert and oriented to person, place, and time.   Psychiatric:         Mood and Affect: Mood normal.               Significant Labs:   CBC:       Recent Labs   Lab 08/28/20  1139   WBC 9.78   HGB 12.3*   HCT 37.3*         CMP:       Recent Labs   Lab 08/28/20  1139      K 4.0      CO2 23   *   BUN 51*   CREATININE 2.3*   CALCIUM 8.6*   PROT 7.7   ALBUMIN 3.4*   BILITOT 1.1*   ALKPHOS 60   AST 36   ALT 18   ANIONGAP 12   EGFRNONAA 27.9*      Cardiac Markers:       Recent Labs   Lab 08/28/20  1139   BNP 1,471*                Conclusion echocardiogram 08/28/2020:    · The estimated PA systolic pressure is 19 mmHg.  · Moderately decreased left ventricular systolic function. The estimated ejection fraction is 31 - 38 %.  · Grade I (mild) left ventricular diastolic dysfunction consistent with impaired relaxation.  · Mild left ventricular enlargement.  · Mildly to moderately reduced right ventricular systolic function.  · Mild left atrial enlargement.  · There is a bioprosthetic aortic valve present. Peak velocity 2.55 M/S; peak gradient 26 mm Hg, mean gradient 1mm Hg  · Mild mitral  regurgitation.  · Local segmental wall motion abnormalities. Global left ventricular hypokinesia  · No evidence of endocarditis      Study Details    A complete echo was performed using complete 2D, color flow Doppler and spectral Doppler. This was a portable study performed at the patient's bedside.   Echocardiography Findings    Left Ventricle Moderate decreased ejection fraction at 31%.  Mild left ventricular enlargement. Normal wall thickness observed. Grade I (mild) left ventricular diastolic dysfunction consistent with impaired relaxation. Normal left atrial pressure. There are segmental wall motion abnormalities (see wall scoring diagram).   Right Ventricle Normal cavity size. The systolic function is mildly to moderately reduced.   Left Atrium There is mild left atrial enlargement.   Aortic Valve No regurgitation. There is a bioprosthetic valve present.   Mitral Valve The following structural abnormalities were observed: leaflets appear myxomatous. There is moderate mitral annular calcification. Mild regurgitation.   Tricuspid Valve Trace regurgitation. The estimated PA systolic pressure is 19 mmHg.   Pericardium No pericardial effusion.   2D Measurements    Dimensions   LVIDD 5.68 cm      LVIDS 4.83 cm      IVS 0.97 cm      PW 0.97 cm      FS 15 %      LA size 4.48 cm      LV mass 216.25 g       Dimensions   RVDD 215 cm       Aortic Root - End Diastolic   Ao root annulus 3.12 cm           Doppler Measurements - Diastolic Filling    Diastolic Filling   E/A ratio 0.85       IVRT 120.17 msec      Mean e' 0.05 m/s       E wave decelartion time 326.46 msec      E/E' ratio 21.11 m/s         Doppler Measurements - Aortic Valve    Stenosis   LVOT diameter 2.44 cm      LVOT area 4.7 cm2      LVOT peak asa 71.38 m/s      LVOT peak VTI 16.05 cm      Ao peak asa 2.55 m/s      Ao VTI 45.76 cm      AV valve area 1.64 cm2      AV mean gradient 15 mmHg      AV peak gradient 26 mmHg      AV Velocity Ratio 27.99       AV  index (prosthetic) 0.35             Doppler Measurements - Mitral Valve    PISA-MS   MV Peak E Efrain 0.95 m/s             Doppler Measurements - Shunt Ratio    Shunt Ratio   LVOT stroke volume 75.01 cm3          Doppler Measurements - Tricuspid Valve    PISA   TR Max Efrain 2.02 m/s       Stress Evaluation   TV rest pulmonary artery pressure 19 mmHg           Wall Scoring    Resting Score Index: 2.00              The following segments are hypokinetic: mid anterolateral, apical inferior and apex.  Other segments could not be evaluated.                        Assessment/Plan:      Acute exacerbation of CHF (congestive heart failure)  Patient was on Lasix 20 mg p.o. start IV Lasix equivalent dose  Repeat echocardiogram  Close monitor the volume status  Consult cardiology  Close monitor renal function given ERICA        Pneumonia due to COVID-19 virus     Plan      Patient does not have hypoxia at this time and will hold  back Decadron  Close monitoring of oxygen level  Antibiotics started in emergency room and continued  Monitor inflammatory markers  Advised to call family members and get tested for COVID-19  Type and screen  Because of complicated case, consult Infectious Disease  Follow cultures  Procalcitonin        S/P CABG (coronary artery bypass graft)  aware        S/P AVR (aortic valve replacement)  aware        CAD (coronary artery disease); SVG to the distal RCA 2014  Aware. No chest pain , no ischemic changes in EKG   Stable . Home meds  Will trend cardiac enzymes         Cardiomyopathy  With previous ejection fraction about 40-50%             Bioprosthetic AVR for severe aortic stenosis 2014  S/p AVR  Aware           ERICA  Possible secondary to CHF /unable to give much fluid .  Suspec renal failure secondary to meloxicam.  Close monitor   Will start patient on intravenous fluids judiciously and assess renal function.  The patient clinically is not in CHF; BNP however is elevated.  The patient is on Lasix 20 mg  b.i.d. and empagliflozin 25 mg daily as outpatient.           Type 2 diabetes mellitus without complication, without long-term current use of insulin  Stable . Home meds        Hyperlipidemia  Stable . Home meds        Hypertension  Stable . Home meds               VTE Risk Mitigation (From admission, onward)     None        Monitor renal function closely and watch for CHF.        SYD Shipman MD formerly [facility]  Cardiology  [facility]      Revision History

## 2020-08-30 LAB
ANION GAP SERPL CALC-SCNC: 10 MMOL/L (ref 8–16)
BASOPHILS # BLD AUTO: 0.02 K/UL (ref 0–0.2)
BASOPHILS NFR BLD: 0.2 % (ref 0–1.9)
BNP SERPL-MCNC: 1157 PG/ML (ref 0–99)
BUN SERPL-MCNC: 52 MG/DL (ref 8–23)
CALCIUM SERPL-MCNC: 7.7 MG/DL (ref 8.7–10.5)
CHLORIDE SERPL-SCNC: 106 MMOL/L (ref 95–110)
CO2 SERPL-SCNC: 20 MMOL/L (ref 23–29)
CREAT SERPL-MCNC: 2 MG/DL (ref 0.5–1.4)
CRP SERPL-MCNC: 3.15 MG/DL
D DIMER PPP IA.FEU-MCNC: 1.39 UG/ML FEU
DIFFERENTIAL METHOD: ABNORMAL
EOSINOPHIL # BLD AUTO: 0 K/UL (ref 0–0.5)
EOSINOPHIL NFR BLD: 0.2 % (ref 0–8)
ERYTHROCYTE [DISTWIDTH] IN BLOOD BY AUTOMATED COUNT: 14 % (ref 11.5–14.5)
EST. GFR  (AFRICAN AMERICAN): 38.2 ML/MIN/1.73 M^2
EST. GFR  (NON AFRICAN AMERICAN): 33.1 ML/MIN/1.73 M^2
FERRITIN SERPL-MCNC: 623 NG/ML (ref 20–300)
GLUCOSE SERPL-MCNC: 77 MG/DL (ref 70–110)
GLUCOSE SERPL-MCNC: 82 MG/DL (ref 70–110)
GLUCOSE SERPL-MCNC: 86 MG/DL (ref 70–110)
HCT VFR BLD AUTO: 32 % (ref 40–54)
HGB BLD-MCNC: 10.7 G/DL (ref 14–18)
IMM GRANULOCYTES # BLD AUTO: 0.06 K/UL (ref 0–0.04)
IMM GRANULOCYTES NFR BLD AUTO: 0.7 % (ref 0–0.5)
LYMPHOCYTES # BLD AUTO: 0.9 K/UL (ref 1–4.8)
LYMPHOCYTES NFR BLD: 11 % (ref 18–48)
MCH RBC QN AUTO: 30.1 PG (ref 27–31)
MCHC RBC AUTO-ENTMCNC: 33.4 G/DL (ref 32–36)
MCV RBC AUTO: 90 FL (ref 82–98)
MONOCYTES # BLD AUTO: 0.7 K/UL (ref 0.3–1)
MONOCYTES NFR BLD: 8.3 % (ref 4–15)
NEUTROPHILS # BLD AUTO: 6.8 K/UL (ref 1.8–7.7)
NEUTROPHILS NFR BLD: 79.6 % (ref 38–73)
NRBC BLD-RTO: 0 /100 WBC
PLATELET # BLD AUTO: 222 K/UL (ref 150–350)
PMV BLD AUTO: 10.8 FL (ref 9.2–12.9)
POTASSIUM SERPL-SCNC: 3.7 MMOL/L (ref 3.5–5.1)
RBC # BLD AUTO: 3.56 M/UL (ref 4.6–6.2)
SODIUM SERPL-SCNC: 136 MMOL/L (ref 136–145)
WBC # BLD AUTO: 8.51 K/UL (ref 3.9–12.7)

## 2020-08-30 PROCEDURE — 80048 BASIC METABOLIC PNL TOTAL CA: CPT

## 2020-08-30 PROCEDURE — 94640 AIRWAY INHALATION TREATMENT: CPT

## 2020-08-30 PROCEDURE — 85379 FIBRIN DEGRADATION QUANT: CPT

## 2020-08-30 PROCEDURE — 86140 C-REACTIVE PROTEIN: CPT

## 2020-08-30 PROCEDURE — 85025 COMPLETE CBC W/AUTO DIFF WBC: CPT

## 2020-08-30 PROCEDURE — 36415 COLL VENOUS BLD VENIPUNCTURE: CPT

## 2020-08-30 PROCEDURE — 99900035 HC TECH TIME PER 15 MIN (STAT)

## 2020-08-30 PROCEDURE — 25000003 PHARM REV CODE 250: Performed by: SPECIALIST

## 2020-08-30 PROCEDURE — 93005 ELECTROCARDIOGRAM TRACING: CPT | Performed by: INTERNAL MEDICINE

## 2020-08-30 PROCEDURE — 94761 N-INVAS EAR/PLS OXIMETRY MLT: CPT

## 2020-08-30 PROCEDURE — 21400001 HC TELEMETRY ROOM

## 2020-08-30 PROCEDURE — 99231 SBSQ HOSP IP/OBS SF/LOW 25: CPT | Mod: ,,, | Performed by: INTERNAL MEDICINE

## 2020-08-30 PROCEDURE — 63600175 PHARM REV CODE 636 W HCPCS: Performed by: INTERNAL MEDICINE

## 2020-08-30 PROCEDURE — 99231 PR SUBSEQUENT HOSPITAL CARE,LEVL I: ICD-10-PCS | Mod: ,,, | Performed by: INTERNAL MEDICINE

## 2020-08-30 PROCEDURE — 82728 ASSAY OF FERRITIN: CPT

## 2020-08-30 PROCEDURE — 83880 ASSAY OF NATRIURETIC PEPTIDE: CPT

## 2020-08-30 PROCEDURE — 25000003 PHARM REV CODE 250: Performed by: INTERNAL MEDICINE

## 2020-08-30 PROCEDURE — 94799 UNLISTED PULMONARY SVC/PX: CPT

## 2020-08-30 RX ORDER — SODIUM CHLORIDE 9 MG/ML
INJECTION, SOLUTION INTRAVENOUS CONTINUOUS
Status: ACTIVE | OUTPATIENT
Start: 2020-08-30 | End: 2020-08-31

## 2020-08-30 RX ADMIN — HEPARIN SODIUM 5000 UNITS: 5000 INJECTION INTRAVENOUS; SUBCUTANEOUS at 04:08

## 2020-08-30 RX ADMIN — AMLODIPINE BESYLATE 5 MG: 5 TABLET ORAL at 09:08

## 2020-08-30 RX ADMIN — ALBUTEROL SULFATE 2 PUFF: 90 AEROSOL, METERED RESPIRATORY (INHALATION) at 03:08

## 2020-08-30 RX ADMIN — HEPARIN SODIUM 5000 UNITS: 5000 INJECTION INTRAVENOUS; SUBCUTANEOUS at 01:08

## 2020-08-30 RX ADMIN — SODIUM CHLORIDE: 0.9 INJECTION, SOLUTION INTRAVENOUS at 05:08

## 2020-08-30 RX ADMIN — ATORVASTATIN CALCIUM 80 MG: 40 TABLET, FILM COATED ORAL at 08:08

## 2020-08-30 RX ADMIN — FLUTICASONE FUROATE AND VILANTEROL TRIFENATATE 1 PUFF: 100; 25 POWDER RESPIRATORY (INHALATION) at 07:08

## 2020-08-30 RX ADMIN — ALBUTEROL SULFATE 2 PUFF: 90 AEROSOL, METERED RESPIRATORY (INHALATION) at 07:08

## 2020-08-30 RX ADMIN — ALBUTEROL SULFATE 2 PUFF: 90 AEROSOL, METERED RESPIRATORY (INHALATION) at 11:08

## 2020-08-30 RX ADMIN — MONTELUKAST 10 MG: 10 TABLET, FILM COATED ORAL at 08:08

## 2020-08-30 RX ADMIN — HEPARIN SODIUM 5000 UNITS: 5000 INJECTION INTRAVENOUS; SUBCUTANEOUS at 10:08

## 2020-08-30 RX ADMIN — FINASTERIDE 5 MG: 5 TABLET, FILM COATED ORAL at 09:08

## 2020-08-30 RX ADMIN — ASPIRIN 325 MG: 325 TABLET, COATED ORAL at 09:08

## 2020-08-30 NOTE — PROGRESS NOTES
Progress Note  Cardiology    Admit Date: 8/28/2020   LOS: 2 days     Follow-up For:  AVR, single-vessel CABG ; depressed LVEF; acute renal failure    Scheduled Meds:   albuterol  2 puff Inhalation Q8H    amLODIPine  5 mg Oral Daily    aspirin  325 mg Oral Daily    atorvastatin  80 mg Oral QHS    finasteride  5 mg Oral Daily    fluticasone furoate-vilanteroL  1 puff Inhalation Daily    heparin (porcine)  5,000 Units Subcutaneous Q8H    montelukast  10 mg Oral QHS     Continuous Infusions:  PRN Meds:acetaminophen, calcium chloride IVPB, calcium chloride IVPB, calcium chloride IVPB, dextrose 50%, dextrose 50%, glucagon (human recombinant), glucose, glucose, insulin aspart U-100, magnesium oxide, magnesium sulfate IVPB, magnesium sulfate IVPB, magnesium sulfate IVPB, magnesium sulfate IVPB, potassium chloride in water, potassium chloride in water, potassium chloride in water, potassium chloride in water, potassium chloride, potassium chloride, potassium chloride, potassium chloride, sodium phosphate IVPB, sodium phosphate IVPB, sodium phosphate IVPB, sodium phosphate IVPB, sodium phosphate IVPB    Review of patient's allergies indicates:  No Known Allergies    SUBJECTIVE:     Interval History: Patient has no complaint of chest pain tightness or shortness of breath.  No PND or orthopnea.    Review of Systems  Respiratory: negative for cough, hemoptysis, sputum and wheezing  Cardiovascular: negative for chest pressure/discomfort, dyspnea, orthopnea, palpitations and paroxysmal nocturnal dyspnea    OBJECTIVE:     Vital Signs (Most Recent)  Temp: 100 °F (37.8 °C) (08/30/20 1100)  Pulse: 61 (08/30/20 1503)  Resp: 18 (08/30/20 1503)  BP: (!) 144/79 (08/30/20 1100)  SpO2: 98 % (08/30/20 1503)    Vital Signs Range (Last 24H):  Temp:  [97.4 °F (36.3 °C)-100.5 °F (38.1 °C)]   Pulse:  [61-78]   Resp:  [15-20]   BP: (130-155)/(66-79)   SpO2:  [95 %-98 %]       Physical Exam:  Neck: no carotid bruit, no JVD and supple,  symmetrical, trachea midline  Lungs: clear to auscultation bilaterally, normal respiratory effort  Heart: regular rate and rhythm, S1, S2 normal, no murmur, click, rub or gallop  Abdomen: soft, non-tender; bowel sounds normal; no masses,  no organomegaly  Extremities: Extremities normal, atraumatic, no cyanosis, clubbing, or edema    Recent Results (from the past 24 hour(s))   POCT glucose    Collection Time: 08/29/20  4:10 PM   Result Value Ref Range    POC Glucose 88 70 - 110   POCT glucose    Collection Time: 08/29/20  8:15 PM   Result Value Ref Range    POC Glucose 99 70 - 110   Ferritin    Collection Time: 08/30/20  5:03 AM   Result Value Ref Range    Ferritin 623 (H) 20.0 - 300.0 ng/mL   C-Reactive Protein    Collection Time: 08/30/20  5:03 AM   Result Value Ref Range    CRP 3.15 (H) <0.76 mg/dL   D dimer, quantitative    Collection Time: 08/30/20  5:03 AM   Result Value Ref Range    D-Dimer 1.39 (H) <0.50 ug/mL FEU   Brain natriuretic peptide    Collection Time: 08/30/20  5:04 AM   Result Value Ref Range    BNP 1,157 (H) 0 - 99 pg/mL   Basic metabolic panel    Collection Time: 08/30/20  7:04 AM   Result Value Ref Range    Sodium 136 136 - 145 mmol/L    Potassium 3.7 3.5 - 5.1 mmol/L    Chloride 106 95 - 110 mmol/L    CO2 20 (L) 23 - 29 mmol/L    Glucose 82 70 - 110 mg/dL    BUN, Bld 52 (H) 8 - 23 mg/dL    Creatinine 2.0 (H) 0.5 - 1.4 mg/dL    Calcium 7.7 (L) 8.7 - 10.5 mg/dL    Anion Gap 10 8 - 16 mmol/L    eGFR if African American 38.2 (A) >60 mL/min/1.73 m^2    eGFR if non  33.1 (A) >60 mL/min/1.73 m^2   CBC auto differential    Collection Time: 08/30/20  7:04 AM   Result Value Ref Range    WBC 8.51 3.90 - 12.70 K/uL    RBC 3.56 (L) 4.60 - 6.20 M/uL    Hemoglobin 10.7 (L) 14.0 - 18.0 g/dL    Hematocrit 32.0 (L) 40.0 - 54.0 %    Mean Corpuscular Volume 90 82 - 98 fL    Mean Corpuscular Hemoglobin 30.1 27.0 - 31.0 pg    Mean Corpuscular Hemoglobin Conc 33.4 32.0 - 36.0 g/dL    RDW 14.0 11.5 -  14.5 %    Platelets 222 150 - 350 K/uL    MPV 10.8 9.2 - 12.9 fL    Immature Granulocytes 0.7 (H) 0.0 - 0.5 %    Gran # (ANC) 6.8 1.8 - 7.7 K/uL    Immature Grans (Abs) 0.06 (H) 0.00 - 0.04 K/uL    Lymph # 0.9 (L) 1.0 - 4.8 K/uL    Mono # 0.7 0.3 - 1.0 K/uL    Eos # 0.0 0.0 - 0.5 K/uL    Baso # 0.02 0.00 - 0.20 K/uL    nRBC 0 0 /100 WBC    Gran% 79.6 (H) 38.0 - 73.0 %    Lymph% 11.0 (L) 18.0 - 48.0 %    Mono% 8.3 4.0 - 15.0 %    Eosinophil% 0.2 0.0 - 8.0 %    Basophil% 0.2 0.0 - 1.9 %    Differential Method Automated    POCT glucose    Collection Time: 08/30/20  9:16 AM   Result Value Ref Range    POC Glucose 86 70 - 110   POCT glucose    Collection Time: 08/30/20 11:55 AM   Result Value Ref Range    POC Glucose 82 70 - 110       Diagnostic Results:  Labs: Reviewed  ECG: Reviewed    ASSESSMENT/PLAN:     The patient tolerated intravenous saline overnight.  No significant change in BUN or creatinine.  Will administer saline 1 L and reassess in the morning.  ARF may be secondary to meloxicam use.  He was advised to stop NSAID by Dr. MARQUES; fortunately he has continued

## 2020-08-30 NOTE — ASSESSMENT & PLAN NOTE
History of BPH with enlarged prostate and elevated PSA, followed by Dr. Nguyen.  Now with superimposed acute kidney injury.  Denies any current urinary symptoms.  Checking renal scan.  P.r.n. bladder scan

## 2020-08-30 NOTE — ASSESSMENT & PLAN NOTE
Echocardiogram with EF of 31-38% with grade 1 diastolic dysfunction.  Ischemic cardiomyopathy.  Dr. Shipman following.

## 2020-08-30 NOTE — ASSESSMENT & PLAN NOTE
Admission labs with BUN/creatinine 51/2.3 in the setting of reduced oral intake.  Today BUN/creatinine 52/2 despite trial of IV fluids.  As per chart review previous baseline serum creatinine around 1.3.  Known history of significant BPH with enlarged prostate, denies any current urinary symptoms.  On admission impression of decompensated heart failure and he was started on IV diuresis however today he appears clinically euvolemic.  Check renal ultrasound  P.r.n. bladder scan  Discontinue IV Lasix  Continue to hold benazepril, Mobic and oral hypoglycemics  Daily BMP  Renally dosing all medications and avoid nephrotoxin drugs  Further plan as per clinical course

## 2020-08-30 NOTE — SUBJECTIVE & OBJECTIVE
Interval History:  Patient continues to decline any complaint.  Has been ambulating in his room.  States still with low appetite, sense of taste has not yet returned, sense of smell slightly better, states he has been trying to push fluids, still not eating much of his food.  Continues to deny any shortness of breath.  Continues to deny any difficulty with urination.  Did have fever overnight with T-max 100.5°.  Remains on room air.  Labs with BUN/creatinine 52/2, ferritin 623, CRP 3.15, D-dimer 1.39.  EKG sinus rhythm with first-degree.  Discussed with nursing.     Review of Systems   Constitutional: Positive for appetite change, chills and fever.   HENT:        Sense of smell slowly returning, still lack of sense of taste   Respiratory: Negative for cough and shortness of breath.    Gastrointestinal: Negative for abdominal pain, diarrhea, nausea and vomiting.   Genitourinary: Negative for difficulty urinating.   Neurological: Negative for weakness.   Psychiatric/Behavioral: Negative for confusion.     Objective:     Vital Signs (Most Recent):  Temp: 100 °F (37.8 °C) (08/30/20 1100)  Pulse: 73 (08/30/20 1314)  Resp: 18 (08/30/20 1314)  BP: (!) 144/79 (08/30/20 1100)  SpO2: 98 % (08/30/20 1314) Vital Signs (24h Range):  Temp:  [97.4 °F (36.3 °C)-100.5 °F (38.1 °C)] 100 °F (37.8 °C)  Pulse:  [63-78] 73  Resp:  [15-20] 18  SpO2:  [95 %-98 %] 98 %  BP: (130-160)/(66-79) 144/79     Weight: 73.2 kg (161 lb 6 oz)  Body mass index is 21.89 kg/m².    Intake/Output Summary (Last 24 hours) at 8/30/2020 1406  Last data filed at 8/30/2020 1300  Gross per 24 hour   Intake 1639 ml   Output 700 ml   Net 939 ml      Physical Exam  Vitals signs and nursing note reviewed.   Constitutional:       General: He is not in acute distress.     Appearance: Normal appearance. He is normal weight. He is not ill-appearing, toxic-appearing or diaphoretic.   HENT:      Head: Normocephalic and atraumatic.      Mouth/Throat:      Mouth: Mucous  membranes are moist.   Eyes:      General:         Right eye: No discharge.         Left eye: No discharge.   Neck:      Musculoskeletal: Neck supple.   Cardiovascular:      Rate and Rhythm: Normal rate and regular rhythm.      Heart sounds: Murmur present.      Comments: No lower extremity edema.  Palpable peripheral pulses  Pulmonary:      Effort: No respiratory distress.      Breath sounds: Normal breath sounds. No stridor. No wheezing or rhonchi.      Comments: Not on supplemental oxygen.  Speaking in full sentences.  Not using accessory muscles of respiration.  Diminished breath sounds however clear.  Abdominal:      General: Bowel sounds are normal. There is no distension.      Palpations: Abdomen is soft.      Tenderness: There is no abdominal tenderness. There is no guarding.   Genitourinary:     Comments: No suprapubic fullness or tenderness  Neurological:      General: No focal deficit present.      Mental Status: He is oriented to person, place, and time.   Psychiatric:         Mood and Affect: Mood normal.         Significant Labs:   Bilirubin:   Recent Labs   Lab 08/28/20  1139   BILITOT 1.1*     Blood Culture: No results for input(s): LABBLOO in the last 48 hours.  BMP:   Recent Labs   Lab 08/30/20  0704   GLU 82      K 3.7      CO2 20*   BUN 52*   CREATININE 2.0*   CALCIUM 7.7*     CBC:   Recent Labs   Lab 08/29/20  1222 08/30/20  0704   WBC 8.23 8.51   HGB 12.1* 10.7*   HCT 36.6* 32.0*    222     CMP:   Recent Labs   Lab 08/29/20  1222 08/30/20  0704   * 136   K 3.9 3.7    106   CO2 20* 20*   GLU 94 82   BUN 51* 52*   CREATININE 2.2* 2.0*   CALCIUM 8.3* 7.7*   ANIONGAP 12 10   EGFRNONAA 29.5* 33.1*     Cardiac Markers:   Recent Labs   Lab 08/30/20  0504   BNP 1,157*     Lactic Acid: No results for input(s): LACTATE in the last 48 hours.  POCT Glucose: No results for input(s): POCTGLUCOSE in the last 48 hours.  Prealbumin: No results for input(s): PREALBUMIN in the last  48 hours.  Respiratory Culture: No results for input(s): GSRESP, RESPIRATORYC in the last 48 hours.  TSH: No results for input(s): TSH in the last 4320 hours.  All pertinent labs within the past 24 hours have been reviewed.    Significant Imaging: I have reviewed all pertinent imaging results/findings within the past 24 hours.

## 2020-08-30 NOTE — PLAN OF CARE
08/30/20 0714   Patient Assessment/Suction   Level of Consciousness (AVPU) alert   Respiratory Effort Unlabored   All Lung Fields Breath Sounds   (fairly clear, dimiinshed)   Cough Type nonproductive   PRE-TX-O2   O2 Device (Oxygen Therapy) room air   SpO2 97 %   Pulse Oximetry Type Continuous   $ Pulse Oximetry - Multiple Charge Pulse Oximetry - Multiple   Pulse 63   Resp 18   Inhaler   $ Inhaler Charges MDI (Metered Dose Inahler) Treatment;Given With Spacer   Respiratory Treatment Status (Inhaler) given   Treatment Route (Inhaler) spacer/holding chamber   Patient Position (Inhaler) semi-Cheng's   Signs of Intolerance (Inhaler) none   Respiratory Evaluation   $ Care Plan Tech Time 15 min

## 2020-08-30 NOTE — PLAN OF CARE
Plan of care discussed with patient. Patient is free of fall/trauma/injury. Denies CP, SOB, or pain/discomfort. NS at 75 ml/hr for 1L. All questions addressed. Will continue to monitor.

## 2020-08-30 NOTE — ASSESSMENT & PLAN NOTE
COVID-19 positive.  Respiratory status appears to be stable, faint interstitial opacity right lung base, breathing comfortably on room air.  History of cardiomyopathy with COPD.  Ferritin [phone]  D-dimer 1.72--1.39  CRP 3.27--3.11--3.15  Discussed with Infectious Disease, convalescent plasma ordered 8/29  Not hypoxic and therefore not a candidate for dexamethasone  Trending inflammatory markers  Continue heparin for DVT prophylaxis  Continue isolation precautions  Appreciate infectious disease input

## 2020-08-30 NOTE — ASSESSMENT & PLAN NOTE
Echo with EF of 31-38% with grade 1 diastolic.  Elevated BMP however seems baseline chronically elevated.  Clinically appears to be dehydrated versus near euvolemic.  Holding IV diuresis/Lasix.  Monitoring

## 2020-08-30 NOTE — CARE UPDATE
08/29/20 2220   Patient Assessment/Suction   Level of Consciousness (AVPU) alert   Respiratory Effort Unlabored   Expansion/Accessory Muscles/Retractions no use of accessory muscles   All Lung Fields Breath Sounds diminished   Rhythm/Pattern, Respiratory no shortness of breath reported   Cough Frequency no cough   PRE-TX-O2   O2 Device (Oxygen Therapy) room air   SpO2 98 %   Pulse Oximetry Type Continuous   $ Pulse Oximetry - Multiple Charge Pulse Oximetry - Multiple   Pulse 77   Resp 18   Positioning   Head of Bed (HOB) HOB elevated   Inhaler   $ Inhaler Charges MDI (Metered Dose Inahler) Treatment   Daily Review of Necessity (Inhaler) completed   Respiratory Treatment Status (Inhaler) given   Treatment Route (Inhaler) spacer/holding chamber   Patient Position (Inhaler) Cheng's   Post Treatment Assessment (Inhaler) breath sounds unchanged   Signs of Intolerance (Inhaler) none   Breath Sounds Post-Respiratory Treatment   Throughout All Fields Post-Treatment All Fields   Throughout All Fields Post-Treatment no change   Post-treatment Heart Rate (beats/min) 75   Post-treatment Resp Rate (breaths/min) 20   Respiratory Evaluation   $ Care Plan Tech Time 15 min

## 2020-08-30 NOTE — PROGRESS NOTES
[facility]  Progress Note    Patient Name: Baldo Carney  MRN: [id number]  Patient Class: IP- Inpatient   Admission Date: 8/28/2020  Length of Stay: 2 days  Attending Physician: Tiffanie Zavala MD  Primary Care Provider: KAMERON Rodriguez,FNP-C        Subjective:     Principal Problem:ERICA (acute kidney injury)        HPI:  HPI as per MD Thant:  69-year-old male with past medical history of Asthma/copd, Cardiomyopathy (10/21/2014), Diabetes mellitus, Hyperlipidemia, and Hypertension, status post aortic valve repair came with general fatigue, weakness and  shortness of breath.  he was not able to eat and drink because he lost the taste and smell about a week or 2 ago.  His daughter in-law or had a birthday party and later a lot people are positive for COVID including this patient.  Chest x-ray was done with diffuse obesity possible COVID pneumonia.  Also found to severely elevated BNP more than 1000 with acute renal failure which is new.  He was last seen by cardiologist Dr. Leonard about 6 months ago and was doing well and also he saw primary care doctor and doing well.  On examination he does not have  hypoxia or acute shortness of breath and does not seem to be in volume overload.  On examination no leg swelling, no abdominal tenderness and denied having chest pain, orthopnea, production of sputum and no fever.  Lung examination with generally clear lungs but rales and expiratory wheezing bilateral lungs at the bases.  His inflammatory markers are elevated with elevated ferritin, CRP and D-dimer.  His last echocardiogram 2014 with aortic stenosis 0.96 centimeters sq before the procedure and heart failure ejection fraction 40-50%.  No new echo seen since then.    Overview/Hospital Course:  I, Dr Zavala, assumed care of this patient on 8/29/20.  Patient presented with fatigue, weakness, decreased oral intake with anosmia and ageusia.  He states he relative recently was  also tested positive for COVID-19 and believes this is how he acquired COVID-19.  Significant comorbidities include coronary artery disease, COPD, not on home oxygen, non-insulin-dependent diabetes, hypertension, cardiomyopathy with EF of 31-38%, history of AVR, BPH with significantly enlarged prostate, followed by Dr. Nguyen.  On presentation blood pressure was not well controlled, saturating well on room air, elevated inflammatory markers with creatinine 2.3 which is higher than his baseline (creatinine 1.4), elevated BNP, he received Rocephin and azithromycin in the ED, impression of decompensated heart failure and he was admitted with COVID-19 isolation with IV diuresis.  On 08/29 no respiratory symptoms, saturating well on room air, no labs this morning (ordered stat), discussed with Infectious Disease, ordered convalescent plasma.  On 08/30, respiratory status stable, renal function still elevated with creatinine 2 despite trial of IV fluids.    Interval History:  Patient continues to decline any complaint.  Has been ambulating in his room.  States still with low appetite, sense of taste has not yet returned, sense of smell slightly better, states he has been trying to push fluids, still not eating much of his food.  Continues to deny any shortness of breath.  Continues to deny any difficulty with urination.  Did have fever overnight with T-max 100.5°.  Remains on room air.  Labs with BUN/creatinine 52/2, ferritin 623, CRP 3.15, D-dimer 1.39.  EKG sinus rhythm with first-degree.  Discussed with nursing.     Review of Systems   Constitutional: Positive for appetite change, chills and fever.   HENT:        Sense of smell slowly returning, still lack of sense of taste   Respiratory: Negative for cough and shortness of breath.    Gastrointestinal: Negative for abdominal pain, diarrhea, nausea and vomiting.   Genitourinary: Negative for difficulty urinating.   Neurological: Negative for weakness.    Psychiatric/Behavioral: Negative for confusion.     Objective:     Vital Signs (Most Recent):  Temp: 100 °F (37.8 °C) (08/30/20 1100)  Pulse: 73 (08/30/20 1314)  Resp: 18 (08/30/20 1314)  BP: (!) 144/79 (08/30/20 1100)  SpO2: 98 % (08/30/20 1314) Vital Signs (24h Range):  Temp:  [97.4 °F (36.3 °C)-100.5 °F (38.1 °C)] 100 °F (37.8 °C)  Pulse:  [63-78] 73  Resp:  [15-20] 18  SpO2:  [95 %-98 %] 98 %  BP: (130-160)/(66-79) 144/79     Weight: 73.2 kg (161 lb 6 oz)  Body mass index is 21.89 kg/m².    Intake/Output Summary (Last 24 hours) at 8/30/2020 1406  Last data filed at 8/30/2020 1300  Gross per 24 hour   Intake 1639 ml   Output 700 ml   Net 939 ml      Physical Exam  Vitals signs and nursing note reviewed.   Constitutional:       General: He is not in acute distress.     Appearance: Normal appearance. He is normal weight. He is not ill-appearing, toxic-appearing or diaphoretic.   HENT:      Head: Normocephalic and atraumatic.      Mouth/Throat:      Mouth: Mucous membranes are moist.   Eyes:      General:         Right eye: No discharge.         Left eye: No discharge.   Neck:      Musculoskeletal: Neck supple.   Cardiovascular:      Rate and Rhythm: Normal rate and regular rhythm.      Heart sounds: Murmur present.      Comments: No lower extremity edema.  Palpable peripheral pulses  Pulmonary:      Effort: No respiratory distress.      Breath sounds: Normal breath sounds. No stridor. No wheezing or rhonchi.      Comments: Not on supplemental oxygen.  Speaking in full sentences.  Not using accessory muscles of respiration.  Diminished breath sounds however clear.  Abdominal:      General: Bowel sounds are normal. There is no distension.      Palpations: Abdomen is soft.      Tenderness: There is no abdominal tenderness. There is no guarding.   Genitourinary:     Comments: No suprapubic fullness or tenderness  Neurological:      General: No focal deficit present.      Mental Status: He is oriented to person,  place, and time.   Psychiatric:         Mood and Affect: Mood normal.         Significant Labs:   Bilirubin:   Recent Labs   Lab 08/28/20  1139   BILITOT 1.1*     Blood Culture: No results for input(s): LABBLOO in the last 48 hours.  BMP:   Recent Labs   Lab 08/30/20  0704   GLU 82      K 3.7      CO2 20*   BUN 52*   CREATININE 2.0*   CALCIUM 7.7*     CBC:   Recent Labs   Lab 08/29/20  1222 08/30/20  0704   WBC 8.23 8.51   HGB 12.1* 10.7*   HCT 36.6* 32.0*    222     CMP:   Recent Labs   Lab 08/29/20  1222 08/30/20  0704   * 136   K 3.9 3.7    106   CO2 20* 20*   GLU 94 82   BUN 51* 52*   CREATININE 2.2* 2.0*   CALCIUM 8.3* 7.7*   ANIONGAP 12 10   EGFRNONAA 29.5* 33.1*     Cardiac Markers:   Recent Labs   Lab 08/30/20  0504   BNP 1,157*     Lactic Acid: No results for input(s): LACTATE in the last 48 hours.  POCT Glucose: No results for input(s): POCTGLUCOSE in the last 48 hours.  Prealbumin: No results for input(s): PREALBUMIN in the last 48 hours.  Respiratory Culture: No results for input(s): GSRESP, RESPIRATORYC in the last 48 hours.  TSH: No results for input(s): TSH in the last 4320 hours.  All pertinent labs within the past 24 hours have been reviewed.    Significant Imaging: I have reviewed all pertinent imaging results/findings within the past 24 hours.      Assessment/Plan:      * ERICA on CKD III  Admission labs with BUN/creatinine 51/2.3 in the setting of reduced oral intake.  Today BUN/creatinine 52/2 despite trial of IV fluids.  As per chart review previous baseline serum creatinine around 1.3.  Known history of significant BPH with enlarged prostate, denies any current urinary symptoms.  On admission impression of decompensated heart failure and he was started on IV diuresis however today he appears clinically euvolemic.  Check renal ultrasound  P.r.n. bladder scan  Discontinue IV Lasix  Continue to hold benazepril, Mobic and oral hypoglycemics  Daily BMP  Renally dosing  all medications and avoid nephrotoxin drugs  Further plan as per clinical course      COVID-19 virus detected  COVID-19 positive.  Respiratory status appears to be stable, faint interstitial opacity right lung base, breathing comfortably on room air.  History of cardiomyopathy with COPD.  Ferritin [phone]  D-dimer 1.72--1.39  CRP 3.27--3.11--3.15  Discussed with Infectious Disease, convalescent plasma ordered 8/29  Not hypoxic and therefore not a candidate for dexamethasone  Trending inflammatory markers  Continue heparin for DVT prophylaxis  Continue isolation precautions  Appreciate infectious disease input      Troponin I above reference range  Low suspicion ACS.  Cardiology following      Lymphopenia  Likely due to COVID-19 infection      Hypertension  Continue amlodipine, continue to hold benazepril, monitor and adjust as needed      Anemia  Monitoring      COPD (chronic obstructive pulmonary disease)  No evidence of acute exacerbation.  Continue home Breo and albuterol    BPH (benign prostatic hyperplasia)  History of BPH with enlarged prostate and elevated PSA, followed by Dr. Nguyen.  Now with superimposed acute kidney injury.  Denies any current urinary symptoms.  Checking renal scan.  P.r.n. bladder scan      Chronic systolic heart failure  Echo with EF of 31-38% with grade 1 diastolic.  Elevated BMP however seems baseline chronically elevated.  Clinically appears to be dehydrated versus near euvolemic.  Holding IV diuresis/Lasix.  Monitoring      S/P CABG (coronary artery bypass graft)  Repeat echocardiogram reviewed      S/P AVR (aortic valve replacement)        CAD (coronary artery disease)  Monitoring      Cardiomyopathy  Echocardiogram with EF of 31-38% with grade 1 diastolic dysfunction.  Ischemic cardiomyopathy.  Dr. Shipman following.    Aortic stenosis status post AVR  Murmur heard on examination      Type 2 diabetes mellitus without complication, without long-term current use of  insulin  Type 2 non-insulin-dependent diabetes mellitus, last HbA1c 6.6% on admission.  Continue to hold oral hypoglycemics.  Low-dose insulin sliding scale t.i.d. with meals and Accu-Cheks.  As needed hypoglycemic measures      Hyperlipidemia  Continue home Lipitor        VTE Risk Mitigation (From admission, onward)         Ordered     heparin (porcine) injection 5,000 Units  Every 8 hours      08/29/20 1213                Discharge Planning   PAM:      Code Status: Not on file   Is the patient medically ready for discharge?:     Reason for patient still in hospital (select all that apply): Treatment                     Tiffanie Zavala MD  Department of Hospital Medicine   [facility]

## 2020-08-31 VITALS
SYSTOLIC BLOOD PRESSURE: 168 MMHG | WEIGHT: 161.38 LBS | RESPIRATION RATE: 18 BRPM | HEIGHT: 72 IN | BODY MASS INDEX: 21.86 KG/M2 | OXYGEN SATURATION: 99 % | TEMPERATURE: 98 F | HEART RATE: 69 BPM | DIASTOLIC BLOOD PRESSURE: 86 MMHG

## 2020-08-31 DIAGNOSIS — U07.1 COVID-19 VIRUS DETECTED: ICD-10-CM

## 2020-08-31 LAB
ANION GAP SERPL CALC-SCNC: 10 MMOL/L (ref 8–16)
BUN SERPL-MCNC: 49 MG/DL (ref 8–23)
CALCIUM SERPL-MCNC: 7.6 MG/DL (ref 8.7–10.5)
CHLORIDE SERPL-SCNC: 108 MMOL/L (ref 95–110)
CO2 SERPL-SCNC: 19 MMOL/L (ref 23–29)
CREAT SERPL-MCNC: 1.8 MG/DL (ref 0.5–1.4)
CRP SERPL-MCNC: 3.25 MG/DL
D DIMER PPP IA.FEU-MCNC: 1.39 UG/ML FEU
ERYTHROCYTE [DISTWIDTH] IN BLOOD BY AUTOMATED COUNT: 13.8 % (ref 11.5–14.5)
EST. GFR  (AFRICAN AMERICAN): 43.4 ML/MIN/1.73 M^2
EST. GFR  (NON AFRICAN AMERICAN): 37.6 ML/MIN/1.73 M^2
FERRITIN SERPL-MCNC: 621 NG/ML (ref 20–300)
GLUCOSE SERPL-MCNC: 74 MG/DL (ref 70–110)
GLUCOSE SERPL-MCNC: 78 MG/DL (ref 70–110)
HCT VFR BLD AUTO: 29.8 % (ref 40–54)
HGB BLD-MCNC: 10.1 G/DL (ref 14–18)
MAGNESIUM SERPL-MCNC: 2.2 MG/DL (ref 1.6–2.6)
MCH RBC QN AUTO: 30.6 PG (ref 27–31)
MCHC RBC AUTO-ENTMCNC: 33.9 G/DL (ref 32–36)
MCV RBC AUTO: 90 FL (ref 82–98)
PHOSPHATE SERPL-MCNC: 3.9 MG/DL (ref 2.7–4.5)
PLATELET # BLD AUTO: 231 K/UL (ref 150–350)
PMV BLD AUTO: 10.5 FL (ref 9.2–12.9)
POTASSIUM SERPL-SCNC: 3.5 MMOL/L (ref 3.5–5.1)
RBC # BLD AUTO: 3.3 M/UL (ref 4.6–6.2)
SODIUM SERPL-SCNC: 137 MMOL/L (ref 136–145)
WBC # BLD AUTO: 7.06 K/UL (ref 3.9–12.7)

## 2020-08-31 PROCEDURE — 99900035 HC TECH TIME PER 15 MIN (STAT)

## 2020-08-31 PROCEDURE — 99231 SBSQ HOSP IP/OBS SF/LOW 25: CPT | Mod: ,,, | Performed by: INTERNAL MEDICINE

## 2020-08-31 PROCEDURE — 36415 COLL VENOUS BLD VENIPUNCTURE: CPT

## 2020-08-31 PROCEDURE — 94761 N-INVAS EAR/PLS OXIMETRY MLT: CPT

## 2020-08-31 PROCEDURE — 84100 ASSAY OF PHOSPHORUS: CPT

## 2020-08-31 PROCEDURE — 25000003 PHARM REV CODE 250: Performed by: INTERNAL MEDICINE

## 2020-08-31 PROCEDURE — 83735 ASSAY OF MAGNESIUM: CPT

## 2020-08-31 PROCEDURE — 63600175 PHARM REV CODE 636 W HCPCS: Performed by: INTERNAL MEDICINE

## 2020-08-31 PROCEDURE — 85027 COMPLETE CBC AUTOMATED: CPT

## 2020-08-31 PROCEDURE — 99231 PR SUBSEQUENT HOSPITAL CARE,LEVL I: ICD-10-PCS | Mod: ,,, | Performed by: INTERNAL MEDICINE

## 2020-08-31 PROCEDURE — 86140 C-REACTIVE PROTEIN: CPT

## 2020-08-31 PROCEDURE — 82728 ASSAY OF FERRITIN: CPT

## 2020-08-31 PROCEDURE — 80048 BASIC METABOLIC PNL TOTAL CA: CPT

## 2020-08-31 PROCEDURE — 85379 FIBRIN DEGRADATION QUANT: CPT

## 2020-08-31 PROCEDURE — 94640 AIRWAY INHALATION TREATMENT: CPT

## 2020-08-31 RX ORDER — SODIUM CHLORIDE 9 MG/ML
INJECTION, SOLUTION INTRAVENOUS CONTINUOUS
Status: DISCONTINUED | OUTPATIENT
Start: 2020-08-31 | End: 2020-08-31 | Stop reason: HOSPADM

## 2020-08-31 RX ORDER — FUROSEMIDE 20 MG/1
TABLET ORAL
Start: 2020-08-31 | End: 2020-09-16

## 2020-08-31 RX ADMIN — SODIUM CHLORIDE: 0.9 INJECTION, SOLUTION INTRAVENOUS at 08:08

## 2020-08-31 RX ADMIN — ASPIRIN 325 MG: 325 TABLET, COATED ORAL at 08:08

## 2020-08-31 RX ADMIN — AMLODIPINE BESYLATE 5 MG: 5 TABLET ORAL at 08:08

## 2020-08-31 RX ADMIN — ALBUTEROL SULFATE 2 PUFF: 90 AEROSOL, METERED RESPIRATORY (INHALATION) at 07:08

## 2020-08-31 RX ADMIN — HEPARIN SODIUM 5000 UNITS: 5000 INJECTION INTRAVENOUS; SUBCUTANEOUS at 05:08

## 2020-08-31 RX ADMIN — FINASTERIDE 5 MG: 5 TABLET, FILM COATED ORAL at 08:08

## 2020-08-31 RX ADMIN — FLUTICASONE FUROATE AND VILANTEROL TRIFENATATE 1 PUFF: 100; 25 POWDER RESPIRATORY (INHALATION) at 07:08

## 2020-08-31 RX ADMIN — POTASSIUM CHLORIDE 20 MEQ: 20 TABLET, EXTENDED RELEASE ORAL at 05:08

## 2020-08-31 NOTE — NURSING
Dc instructions given and reviewed with pt. Pt verbalized understanding. Coupon given for eliquis per MD. Pt dc home via w/c to private vehicle with spouse.

## 2020-08-31 NOTE — DISCHARGE SUMMARY
[facility] Medicine  Discharge Summary      Patient Name: Baldo Carney  MRN: [id number]  Admission Date: 8/28/2020  Hospital Length of Stay: 3 days  Discharge Date and Time:  08/31/2020 12:36 PM  Attending Physician: Tiffanie Zavala MD   Discharging Provider: Tiffanie Zavala MD  Primary Care Provider: KAMERON Rodriguez,FNP-C      HPI:   HPI as per MD Thant:  69-year-old male with past medical history of Asthma/copd, Cardiomyopathy (10/21/2014), Diabetes mellitus, Hyperlipidemia, and Hypertension, status post aortic valve repair came with general fatigue, weakness and  shortness of breath.  he was not able to eat and drink because he lost the taste and smell about a week or 2 ago.  His daughter in-law or had a birthday party and later a lot people are positive for COVID including this patient.  Chest x-ray was done with diffuse obesity possible COVID pneumonia.  Also found to severely elevated BNP more than 1000 with acute renal failure which is new.  He was last seen by cardiologist Dr. Leonard about 6 months ago and was doing well and also he saw primary care doctor and doing well.  On examination he does not have  hypoxia or acute shortness of breath and does not seem to be in volume overload.  On examination no leg swelling, no abdominal tenderness and denied having chest pain, orthopnea, production of sputum and no fever.  Lung examination with generally clear lungs but rales and expiratory wheezing bilateral lungs at the bases.  His inflammatory markers are elevated with elevated ferritin, CRP and D-dimer.  His last echocardiogram 2014 with aortic stenosis 0.96 centimeters sq before the procedure and heart failure ejection fraction 40-50%.  No new echo seen since then.    * No surgery found *      Hospital Course:   I, Dr Zavala, assumed care of this patient on 8/29/20.  Patient presented with fatigue, weakness, decreased oral intake with anosmia and ageusia.  He states  his relative recently was also tested positive for COVID-19 and believes this is how he acquired COVID-19.  Significant comorbidities include coronary artery disease, COPD, not on home oxygen, non-insulin-dependent diabetes, hypertension, cardiomyopathy with EF of 31-38%, history of AVR, BPH with significantly enlarged prostate, followed by Dr. Nguyen.  On presentation blood pressure was not well controlled, saturating well on room air, elevated inflammatory markers with creatinine 2.3 which is higher than his baseline (creatinine 1.4), elevated BNP, he received Rocephin and azithromycin in the ED, impression of decompensated heart failure and he was admitted with COVID-19 isolation with IV diuresis.  On 08/29 no respiratory symptoms, saturating well on room air, no labs this morning (ordered stat), discussed with Infectious Disease, ordered convalescent plasma.  Impression of patient likely dehydrated rather than volume overloaded, diuresis discontinued, trial of IV fluid hydration in keeping with Cardiology recommendations. On 08/30, respiratory status stable, renal function still elevated with creatinine 2, ultrasound as outlined with lobulated abnormality seen in the bladder, patient denies any difficulty with urination or urinary symptoms, appetite slowly improving.  Eager for discharge home.  On 8/31, stable from respiratory standpoint, seen by infectious disease and cleared for discharge, recommendations for Eliquis for DVT prophylaxis, serum creatinine down to 1.8, appetite continues to slowly increased, communicated with Dr. Nguyen about laboratory and radiological findings, agreed with current plan of care without any further recommendations, appears medically stable for discharge in patient states he is ready to go.  Discharge plan including medications and discontinuation of meloxicam, holding diuresis for few days, Eliquis, isolation precautions, monitoring O2 sats if possible, follow-up as well as  return precautions were explained.  Also called and discussed discharge plan of care with wife over the phone, all questions answered.    Discharge examination  Ambulating in room, no apparent distress, common cooperative  Alert and oriented x3, not on supplemental oxygen with good air entry  Regular heart rhythm, no lower extremity edema     Consults:   Consults (From admission, onward)        Status Ordering Provider     Inpatient consult to Cardiology  Once     Provider:  Enrique Shipman MD    Acknowledged JAROD ALEJANDRA     Inpatient consult to Hospitalist  Once     Provider:  Jarod Alejandra MD    Acknowledged DEVAUGHN NUÑEZ     Inpatient consult to Infectious Diseases  Once     Provider:  Yamila Garza MD    Completed JAROD ALEJANDRA          No new Assessment & Plan notes have been filed under this hospital service since the last note was generated.  Service: Hospital Medicine    Final Active Diagnoses:    Diagnosis Date Noted POA    PRINCIPAL PROBLEM:  ERICA on CKD III [N17.9]  Yes    COVID-19 virus detected [U07.1]  Yes    Troponin I above reference range [R79.89] 08/29/2020 Yes    Lymphopenia [D72.810] 08/29/2020 Yes    Hypertension [I10] 09/19/2014 Yes    Chronic systolic heart failure [I50.22] 08/29/2020 Yes     Chronic    BPH (benign prostatic hyperplasia) [N40.0] 08/29/2020 Yes     Chronic    COPD (chronic obstructive pulmonary disease) [J44.9] 08/29/2020 Yes     Chronic    Anemia [D64.9] 08/29/2020 Yes     Chronic    CAD (coronary artery disease) [I25.10] 11/03/2014 Yes    S/P AVR (aortic valve replacement) [Z95.2] 11/03/2014 Not Applicable    S/P CABG (coronary artery bypass graft) [Z95.1] 11/03/2014 Not Applicable    Aortic stenosis status post AVR [I35.0] 10/21/2014 Yes     Chronic    Cardiomyopathy [I42.9] 10/21/2014 Yes    Hyperlipidemia [E78.5] 09/19/2014 Yes    Type 2 diabetes mellitus without complication, without long-term current use of insulin [E11.9] 09/19/2014 Yes       Problems Resolved During this Admission:    Diagnosis Date Noted Date Resolved POA    Pneumonia due to COVID-19 virus [U07.1, J12.89] 08/28/2020 08/29/2020 Yes    Acute exacerbation of CHF (congestive heart failure) [I50.9] 08/28/2020 08/29/2020 Yes       Discharged Condition: good    Disposition:     Follow Up:    Patient Instructions:   No discharge procedures on file.    Significant Diagnostic Studies: Labs:   BMP:   Recent Labs   Lab 08/30/20  0704 08/31/20  0327   GLU 82 74    137   K 3.7 3.5    108   CO2 20* 19*   BUN 52* 49*   CREATININE 2.0* 1.8*   CALCIUM 7.7* 7.6*   MG  --  2.2   , CMP   Recent Labs   Lab 08/30/20  0704 08/31/20  0327    137   K 3.7 3.5    108   CO2 20* 19*   GLU 82 74   BUN 52* 49*   CREATININE 2.0* 1.8*   CALCIUM 7.7* 7.6*   ANIONGAP 10 10   ESTGFRAFRICA 38.2* 43.4*   EGFRNONAA 33.1* 37.6*   , CBC   Recent Labs   Lab 08/30/20  0704 08/31/20 0327   WBC 8.51 7.06   HGB 10.7* 10.1*   HCT 32.0* 29.8*    231   , INR   Lab Results   Component Value Date    INR 1.1 08/08/2019    INR 1.0 11/06/2014    INR 1.0 11/06/2014   , Lipid Panel   Lab Results   Component Value Date    CHOL 199 10/16/2019    HDL 65 10/16/2019    LDLCALC 106.2 10/16/2019    TRIG 139 10/16/2019    CHOLHDL 32.7 10/16/2019   , Troponin   Recent Labs   Lab 08/28/20  2046   TROPONINI 0.063*   , A1C:   Recent Labs   Lab 05/13/20  0958 08/28/20  1647   HGBA1C 6.2 6.6*    and All labs within the past 24 hours have been reviewed    Pending Diagnostic Studies:     None       Us Retroperitoneal Complete    Result Date: 8/30/2020  REASON: ERICA TECHNIQUE: Grayscale and color Doppler ultrasound of the kidneys and bladder. COMPARISON: None. FINDINGS: Right kidney measures 10.6 x 5.2 x 5.4 cm. Left kidney measures 10.9 x 5.7 x 3.6 cm. No hydronephrosis or nephrolithiasis. No renal cyst or mass. Lobulated structure measuring approximately 6.9 x 4.4 cm identified along the posterior bladder. It is unclear  if this lesion arises from the bladder wall or represents an enlarged lobulated prostate gland. IMPRESSION: Lobulated structure identified along the posterior bladder may reflect a bladder wall mass or enlarged prostate gland. Correlate. Electronically Signed by Alexei Klein on 8/30/2020 2:22 PM    X-ray Chest Ap Portable    Result Date: 8/28/2020  CLINICAL HISTORY: 69 years (1950) Male covid covid TECHNIQUE: Portable AP radiograph the chest. COMPARISON: Most recent radiograph from May 3, 2018 FINDINGS: Small faint interstitial opacity at the right costophrenic sulcus. Costophrenic angles are seen without effusion. No pneumothorax is identified. The heart is normal in size. Median sternotomy wires are unchanged.  Osseous structures show degenerative disc disease and degenerative changes in the shoulders. The visualized upper abdomen is unremarkable. IMPRESSION: Interstitial opacities right lung base suggesting mild atypical infection/pneumonia given the clinical history (and less likely atelectasis, edema). . Electronically Signed by CLARK Gonzalez on 8/28/2020 11:27 AM    Us Lower Extremity Veins Bilateral    Result Date: 8/29/2020  Reason: Elevated d-dimer Comparison: None Findings: Grayscale, color Doppler, and spectral Doppler analysis was performed. Bilateral  common femoral, femoral, popliteal, and superior great saphenous veins show normal compressibility, augmentation, and color Doppler flow. Bilateral  posterior tibial, anterior tibial, and peroneal veins are unremarkable. Impression: Negative for DVT throughout bilateral lower extremities. Electronically Signed by Dot Jesus M.D. on 8/29/2020 12:59 PM    Medications:  Reconciled Home Medications:      Medication List      START taking these medications    apixaban 2.5 mg Tab  Commonly known as: ELIQUIS  Take 1 tablet (2.5 mg total) by mouth once daily.        CHANGE how you take these medications    amlodipine-benazepril 5-20 mg 5-20 mg  per capsule  Commonly known as: LOTREL  TAKE 1 CAPSULE BY MOUTH EVERY NIGHT AT BEDTIME  What changed: when to take this     cetirizine 10 MG tablet  Commonly known as: ZYRTEC  Take 1 tablet (10 mg total) by mouth once daily.  What changed:   · when to take this  · reasons to take this     fluticasone furoate-vilanteroL 100-25 mcg/dose diskus inhaler  Commonly known as: BREO ELLIPTA  Inhale 1 puff into the lungs once daily. Controller  What changed:   · when to take this  · reasons to take this     fluticasone propionate 50 mcg/actuation nasal spray  Commonly known as: FLONASE  2 sprays (100 mcg total) by Each Nostril route once daily.  What changed:   · when to take this  · reasons to take this     furosemide 20 MG tablet  Commonly known as: LASIX  Please hold on 09/01 and 09/02, then resume on 09/03  What changed:   · how much to take  · how to take this  · when to take this  · additional instructions     metFORMIN 1000 MG tablet  Commonly known as: GLUCOPHAGE  TAKE 1 TABLET BY MOUTH TWICE A DAY WITH MEALS  What changed: when to take this     * VENTOLIN HFA 90 mcg/actuation inhaler  Generic drug: albuterol  INHALE 2 PUFFS EVERY 6 HOURS AS NEEDED FOR WHEEZING  (RESCUE)  What changed: See the new instructions.     * albuterol 1.25 mg/3 mL Nebu  Commonly known as: ACCUNEB  Take 3 mLs (1.25 mg total) by nebulization every 6 (six) hours as needed. Rescue  What changed: Another medication with the same name was changed. Make sure you understand how and when to take each.         * This list has 2 medication(s) that are the same as other medications prescribed for you. Read the directions carefully, and ask your doctor or other care provider to review them with you.            CONTINUE taking these medications    ARNUITY ELLIPTA 100 mcg/actuation Dsdv  Generic drug: fluticasone furoate  Use as directed 1 puff in the mouth or throat daily as needed.     aspirin 325 MG EC tablet  Commonly known as: ECOTRIN  Take 1 tablet  (325 mg total) by mouth once daily.     atorvastatin 80 MG tablet  Commonly known as: LIPITOR  TAKE 1 TABLET BY MOUTH ONCE A DAY     azelastine 137 mcg (0.1 %) nasal spray  Commonly known as: ASTELIN  1 spray by Nasal route 2 (two) times daily as needed.     blood sugar diagnostic Strp  USE BRAND COVERED BY INSURANCE AND COMPATIBLE WITH METER TO CHECK GLUCOSE 2X DAY     blood-glucose meter kit  USE BRAND AS COVERED BY INSURANCE AND COMPATIBLE WITH STRIPS TO CHECK GLUCOSE 2X DAY     empagliflozin 25 mg tablet  Commonly known as: JARDIANCE  Take 1 tablet by mouth once daily.     finasteride 5 mg tablet  Commonly known as: PROSCAR  Take 1 tablet (5 mg total) by mouth once daily.     montelukast 10 mg tablet  Commonly known as: SINGULAIR  Take 10 mg by mouth nightly as needed.        STOP taking these medications    meloxicam 7.5 MG tablet  Commonly known as: MOBIC     potassium chloride SA 10 MEQ tablet  Commonly known as: K-DUR,KLOR-CON     PRODIGY TWIST TOP LANCET 28 gauge Misc  Generic drug: lancets            Indwelling Lines/Drains at time of discharge:   Lines/Drains/Airways     None                 Time spent on the discharge of patient: 36 minutes  Patient was seen and examined on the date of discharge and determined to be suitable for discharge.         Tiffanie Zavala MD  Department of Hospital Medicine  [facility]

## 2020-08-31 NOTE — PLAN OF CARE
08/31/20 1248   Final Note   Assessment Type Final Discharge Note   Anticipated Discharge Disposition Home   Post-Acute Status   Post-Acute Authorization Other   Other Status No Post-Acute Service Needs   Discharge Delays None known at this time

## 2020-08-31 NOTE — PLAN OF CARE
08/30/20 6567   Patient Assessment/Suction   Level of Consciousness (AVPU) alert   Respiratory Effort Normal;Unlabored   Expansion/Accessory Muscles/Retractions expansion symmetric;no retractions;no use of accessory muscles   All Lung Fields Breath Sounds clear;diminished;equal bilaterally   PRE-TX-O2   O2 Device (Oxygen Therapy) room air   SpO2 99 %   Pulse Oximetry Type Continuous   $ Pulse Oximetry - Multiple Charge Pulse Oximetry - Multiple   Pulse 67   Resp 20   Inhaler   $ Inhaler Charges MDI (Metered Dose Inahler) Treatment   Daily Review of Necessity (Inhaler) completed   Respiratory Treatment Status (Inhaler) given   Treatment Route (Inhaler) spacer/holding chamber   Patient Position (Inhaler) semi-Cheng's   Post Treatment Assessment (Inhaler) breath sounds unchanged   Signs of Intolerance (Inhaler) none   Breath Sounds Post-Respiratory Treatment   Post-treatment Heart Rate (beats/min) 75   Post-treatment Resp Rate (breaths/min) 18   Respiratory Evaluation   $ Care Plan Tech Time 15 min

## 2020-08-31 NOTE — PROGRESS NOTES
Progress Note  Infectious Disease    Reason for Consult:  COVID    HPI: Baldo Carney is a   69 y.o. male with a complex medical history presented to the emergency room today with general fatigue, weakness,   poor oral intake due to anosmia and ageusia of 2 weeks duration. He continued to take his oral diuretics despite his decreased oral intake. He was described to have bronchospasm, elevated BNP(1471), elevated BUN of 51, elevated creatinine 2.3 (baseline 1.4) normal oxygen sat, temp 99. Because of elevated BNP, lasix was ordered. Because he was not hypoxemic (98% on room air), dexamethasone was not prescribed. He was tested and found to have a positive COVID pcr. He does relate exposure to his daughter in law at a birthday party, 2 weeks ago, who was subsequently diagnosed with COVID.His symptoms began 1 day later.   White blood cells were normal.  ABG was 7.44/26/94/18.  Chest x-ray has prominent perihilar markings but the faintest of infiltrates in the right base.  He was given azithromycin and Rocephin in the emergency room    08/29/2020.  T-max 100.6°.  I discussed COVID convalescent plasma and RDV with patient.  Risks and benefits etc.  I reviewed his prior blood work.  Creatinine is obviously worse recently.  BUN is also elevated,??dehydration?  Patient states he feels better compared to yesterday although I wonder if he can give a proper history.  When asked why he is here he says the wife insisted he goes to emergency room because he was so exhausted.  He did not feel feverish but he had a T-max of 100.6°.  I reviewed records.  BNP in 2018 was more than 2000.  Probably a BNP of a 1471 is his baseline.  08/30/2020.  T-max 100°.  CRP about the same.  Ferritin still about 600.  D-dimer 1.3 received COVID convalescent plasma     COVID 19:8/28  Procalcitonin:  0.17  Troponin:  Normal  BNP :  1471  D-dimer:  1.72 -- 1.3.  LDH:  352--357  Triglycerides:  Ferritin:  608--497  azithromycin :   Remdesivir  :  Convalescent plasma:  Blood type:  Pending, but previously measured as A negative  IL-6:  Tocilizumab:  Recent Labs   Lab 08/28/20  1139 08/29/20  0331 08/30/20  0503   CRP 3.27* 3.11* 3.15*           EXAM & DIAGNOSTICS REVIEWED:   Vitals:     Temp:  [97.4 °F (36.3 °C)-100 °F (37.8 °C)]   Temp: 98.2 °F (36.8 °C) (08/30/20 1714)  Pulse: 63 (08/30/20 1714)  Resp: 16 (08/30/20 1714)  BP: 127/70 (08/30/20 1714)  SpO2: 97 % (08/30/20 1714)    Intake/Output Summary (Last 24 hours) at 8/30/2020 1945  Last data filed at 8/30/2020 1300  Gross per 24 hour   Intake 425 ml   Output 400 ml   Net 25 ml     General:  In NAD. Alert and attentive, cooperative, comfortable, completely non toxic  Eyes:  Anicteric, PERRL, EOMI  ENT:  No ulcers, exudates, thrush, nares patent, dentition is  Neck:  supple, no masses or adenopathy appreciated  Lungs: crackles RLL, improved  Heart:  RRR, no gallop with 1/6 systolic aortic murmur, no AR  Abd:  Soft, NT, ND, normal BS, no masses or organomegaly appreciated.  :  Voids , urine clear, no flank tenderness  Musc:  Joints without effusion, swelling, erythema, synovitis, muscle wasting.   Skin:  No rashes. No palmar or plantar lesions. No subungual petechiae  Wound:   Neuro:   Alert, attentive, speech fluent, face symmetric, moves all extremities, no focal weakness. Ambulatory  Psych:  Calm, cooperative  Lymphatic:     No cervical, supraclavicular, axillary, or  nodes  Extrem: No edema, erythema, phlebitis, cellulitis, warm and well perfused  VAD:       Isolation:  COVID    Lines/Tubes/Drains:    General Labs reviewed:  Recent Labs   Lab 08/28/20  1139 08/29/20  1222 08/30/20  0704   WBC 9.78 8.23 8.51   HGB 12.3* 12.1* 10.7*   HCT 37.3* 36.6* 32.0*    224 222       Recent Labs   Lab 08/28/20  1139 08/29/20  1222 08/30/20  0704    135* 136   K 4.0 3.9 3.7    103 106   CO2 23 20* 20*   BUN 51* 51* 52*   CREATININE 2.3* 2.2* 2.0*   CALCIUM 8.6* 8.3* 7.7*   PROT 7.7  --   --     BILITOT 1.1*  --   --    ALKPHOS 60  --   --    ALT 18  --   --    AST 36  --   --        Micro:  Microbiology Results (last 7 days)     Procedure Component Value Units Date/Time    Blood culture x two cultures. Draw prior to antibiotics. [[id number]] Collected: 08/28/20 1135    Order Status: Completed Specimen: Blood from Peripheral, Antecubital, Left Updated: 08/30/20 1232     Blood Culture, Routine No Growth to date      No Growth to date      No Growth to date    Narrative:      Aerobic and anaerobic    Blood culture x two cultures. Draw prior to antibiotics. [[id number]] Collected: 08/28/20 1125    Order Status: Completed Specimen: Blood from Peripheral, Hand, Right Updated: 08/30/20 1232     Blood Culture, Routine No Growth to date      No Growth to date      No Growth to date    Narrative:      Aerobic and anaerobic        Imaging Reviewed:   CXRInterstitial opacities right lung base suggesting mild atypical  infection/pneumonia given the clinical history (and less likely  atelectasis, edema).          Cardiology:    IMPRESSION & PLAN   1. COVID positive   Anosmia/ageusia, improving   Extremely subtle RLL abnormality which may not be new, comparing imaging from 5/15/18  2. Acute kidney injury, likely volume depleted, with  BNP greater than 1000.  BNP in 2018 was more than 2000.  3. COPD, cardiomyopathy, diabetes, hypertension, aortic valve replacement(bovine, 2014)  CAD, s/p CABG aortic stenosis and cardiomyopathy, s/p bovine aortic tory 2014, HTN, D LP, asthma, BPH , elevated PSA  This patient is high risk for life-threatening deterioration and death secondary to above comorbidities and need for IV treatment     Recommendations:  -  CCP 08/29/2020  - RDV was discontinued because of low good oxygenation.  -anticoagulation is changed to heparin as per hospitalist.  -- Lasix is on hold due to recent dehydration  - would trend ferritin, CRP, D-dimer, LDH, troponin daily

## 2020-08-31 NOTE — PROGRESS NOTES
Progress Note  Infectious Disease    Reason for Consult:  COVID    HPI: Baldo Carney is a   69 y.o. male with a complex medical history presented to the emergency room today with general fatigue, weakness,   poor oral intake due to anosmia and ageusia of 2 weeks duration. He continued to take his oral diuretics despite his decreased oral intake. He was described to have bronchospasm, elevated BNP(1471), elevated BUN of 51, elevated creatinine 2.3 (baseline 1.4) normal oxygen sat, temp 99. Because of elevated BNP, lasix was ordered. Because he was not hypoxemic (98% on room air), dexamethasone was not prescribed. He was tested and found to have a positive COVID pcr. He does relate exposure to his daughter in law at a birthday party, 2 weeks ago, who was subsequently diagnosed with COVID.His symptoms began 1 day later.   White blood cells were normal.  ABG was 7.44/26/94/18.  Chest x-ray has prominent perihilar markings but the faintest of infiltrates in the right base.  He was given azithromycin and Rocephin in the emergency room    08/29/2020.  T-max 100.6°.  I discussed COVID convalescent plasma and RDV with patient.  Risks and benefits etc.  I reviewed his prior blood work.  Creatinine is obviously worse recently.  BUN is also elevated,??dehydration?  Patient states he feels better compared to yesterday although I wonder if he can give a proper history.  When asked why he is here he says the wife insisted he goes to emergency room because he was so exhausted.  He did not feel feverish but he had a T-max of 100.6°.  I reviewed records.  BNP in 2018 was more than 2000.  Probably a BNP of a 1471 is his baseline.  08/30/2020.  T-max 100°.  CRP about the same.  Ferritin still about 600.  D-dimer 1.3 received COVID convalescent plasma   08/31/2020.  Afebrile.  T-max a 100°.  His oxygenation is 97% on room air.  He is laying flat and has no issues.  He feels great and would like to go home.  CRP, D-dimer,  ferritin are about the same.  Postvoid residual looks about 200 but nurse will measure it again, because patient had urinated about an hour ago..  Ultrasound of the bladder picked up either an enlarged prostate or a bladder mass.  Patient has a urologist but does not remember his name.    COVID 19:8/28  Procalcitonin:  0.17  Troponin:  Normal  BNP :  1471  D-dimer:  1.72 -- 1.3.  LDH:  352--357  Triglycerides:  Ferritin:  608--497, 623, 621  azithromycin :   Remdesivir :  Convalescent plasma:  Blood type:  Pending, but previously measured as A negative  IL-6:  Tocilizumab:  Recent Labs   Lab 08/29/20  0331 08/30/20  0503 08/31/20  0327   CRP 3.11* 3.15* 3.25*       EXAM & DIAGNOSTICS REVIEWED:   Vitals:     Temp:  [98 °F (36.7 °C)-100 °F (37.8 °C)]   Temp: 98.3 °F (36.8 °C) (08/31/20 0720)  Pulse: 68 (08/31/20 0720)  Resp: 19 (08/31/20 0720)  BP: (!) 149/72 (08/31/20 0720)  SpO2: 97 % (08/31/20 0720)    Intake/Output Summary (Last 24 hours) at 8/31/2020 0925  Last data filed at 8/30/2020 1300  Gross per 24 hour   Intake 225 ml   Output --   Net 225 ml      Patient is breathing room air and doing 97%  General:  In NAD. Alert and attentive, cooperative, comfortable, completely non toxic  Eyes:  Anicteric, PERRL, EOMI  ENT:  No ulcers, exudates, thrush, nares patent, dentition is  Neck:  supple, no masses or adenopathy appreciated  Lungs: crackles RLL, improved  Heart:  RRR, no gallop with 1/6 systolic aortic murmur, no AR  Abd:  Soft, NT, ND, normal BS, no masses or organomegaly appreciated.  :  Voids , urine clear, no flank tenderness  Musc:  Joints without effusion, swelling, erythema, synovitis, muscle wasting.   Skin:  No rashes. No palmar or plantar lesions. No subungual petechiae  Wound:   Neuro:   Alert, attentive, speech fluent, face symmetric, moves all extremities, no focal weakness. Ambulatory  Psych:  Calm, cooperative  Lymphatic:     No cervical, supraclavicular, axillary, or  nodes  Extrem: No edema,  erythema, phlebitis, cellulitis, warm and well perfused  VAD:       Isolation:  COVID    Lines/Tubes/Drains:    General Labs reviewed:  Recent Labs   Lab 08/29/20  1222 08/30/20  0704 08/31/20  0327   WBC 8.23 8.51 7.06   HGB 12.1* 10.7* 10.1*   HCT 36.6* 32.0* 29.8*    222 231       Recent Labs   Lab 08/28/20  1139 08/29/20  1222 08/30/20  0704 08/31/20  0327    135* 136 137   K 4.0 3.9 3.7 3.5    103 106 108   CO2 23 20* 20* 19*   BUN 51* 51* 52* 49*   CREATININE 2.3* 2.2* 2.0* 1.8*   CALCIUM 8.6* 8.3* 7.7* 7.6*   PROT 7.7  --   --   --    BILITOT 1.1*  --   --   --    ALKPHOS 60  --   --   --    ALT 18  --   --   --    AST 36  --   --   --        Micro:  Microbiology Results (last 7 days)     Procedure Component Value Units Date/Time    Blood culture x two cultures. Draw prior to antibiotics. [[id number]] Collected: 08/28/20 1135    Order Status: Completed Specimen: Blood from Peripheral, Antecubital, Left Updated: 08/30/20 1232     Blood Culture, Routine No Growth to date      No Growth to date      No Growth to date    Narrative:      Aerobic and anaerobic    Blood culture x two cultures. Draw prior to antibiotics. [[id number]] Collected: 08/28/20 1125    Order Status: Completed Specimen: Blood from Peripheral, Hand, Right Updated: 08/30/20 1232     Blood Culture, Routine No Growth to date      No Growth to date      No Growth to date    Narrative:      Aerobic and anaerobic        Imaging Reviewed:   CXRInterstitial opacities right lung base suggesting mild atypical  nfection/pneumonia given the clinical history (and less likely  atelectasis, edema).          Cardiology:    IMPRESSION & PLAN   1. COVID positive   Anosmia/ageusia, improving   Extremely subtle RLL abnormality which may not be new, comparing imaging from 5/15/18  2. Acute kidney injury, likely volume depleted, with  BNP greater than 1000.  BNP in 2018 was more than 2000.  3. COPD,,DM,   CAD, s/p CABG aortic stenosis and CM, s/p  bovine aortic tory 2014, HTN, D LP, asthma, BPH , elevated PSA       Recommendations:  -  CCP 08/29/2020  - RDV was discontinued because of low good oxygenation.  -  no objections to discharge.  Please give:   -instructions how to measure pulse oximeter.  I explained to him but may need to repeat explanations.  If pulse ox is 94 less he needs to come back immediately.  -DVT prophylaxis for the next 3 months.  Can choose Eliquis 2.5 mg daily  - follow-up with urologist regarding bladder issues.    Will sign off

## 2020-09-02 LAB
BACTERIA BLD CULT: NORMAL
BACTERIA BLD CULT: NORMAL

## 2020-09-16 ENCOUNTER — OFFICE VISIT (OUTPATIENT)
Dept: FAMILY MEDICINE | Facility: CLINIC | Age: 70
End: 2020-09-16
Payer: COMMERCIAL

## 2020-09-16 VITALS
OXYGEN SATURATION: 97 % | TEMPERATURE: 99 F | HEIGHT: 72 IN | BODY MASS INDEX: 23.42 KG/M2 | DIASTOLIC BLOOD PRESSURE: 64 MMHG | SYSTOLIC BLOOD PRESSURE: 128 MMHG | WEIGHT: 172.88 LBS | HEART RATE: 89 BPM

## 2020-09-16 DIAGNOSIS — E11.9 TYPE 2 DIABETES MELLITUS WITHOUT COMPLICATION, WITHOUT LONG-TERM CURRENT USE OF INSULIN: ICD-10-CM

## 2020-09-16 DIAGNOSIS — D64.9 ANEMIA, UNSPECIFIED TYPE: ICD-10-CM

## 2020-09-16 DIAGNOSIS — J44.9 CHRONIC OBSTRUCTIVE PULMONARY DISEASE, UNSPECIFIED COPD TYPE: Chronic | ICD-10-CM

## 2020-09-16 DIAGNOSIS — I10 ESSENTIAL HYPERTENSION: ICD-10-CM

## 2020-09-16 DIAGNOSIS — I50.22 CHRONIC SYSTOLIC HEART FAILURE: Chronic | ICD-10-CM

## 2020-09-16 DIAGNOSIS — Z09 HOSPITAL DISCHARGE FOLLOW-UP: Primary | ICD-10-CM

## 2020-09-16 DIAGNOSIS — Z12.5 PROSTATE CANCER SCREENING: ICD-10-CM

## 2020-09-16 DIAGNOSIS — R79.89 LOW SERUM CALCIUM: ICD-10-CM

## 2020-09-16 DIAGNOSIS — H66.001 NON-RECURRENT ACUTE SUPPURATIVE OTITIS MEDIA OF RIGHT EAR WITHOUT SPONTANEOUS RUPTURE OF TYMPANIC MEMBRANE: ICD-10-CM

## 2020-09-16 DIAGNOSIS — R94.4 DECREASED GFR: ICD-10-CM

## 2020-09-16 PROCEDURE — 99214 PR OFFICE/OUTPT VISIT, EST, LEVL IV, 30-39 MIN: ICD-10-PCS | Mod: S$GLB,,, | Performed by: NURSE PRACTITIONER

## 2020-09-16 PROCEDURE — 99214 OFFICE O/P EST MOD 30 MIN: CPT | Mod: S$GLB,,, | Performed by: NURSE PRACTITIONER

## 2020-09-16 RX ORDER — ALBUTEROL SULFATE 90 UG/1
1-2 AEROSOL, METERED RESPIRATORY (INHALATION) EVERY 6 HOURS PRN
Qty: 18 G | Refills: 3 | Status: SHIPPED | OUTPATIENT
Start: 2020-09-16 | End: 2021-04-20 | Stop reason: SDUPTHER

## 2020-09-16 RX ORDER — FLUTICASONE PROPIONATE 50 MCG
1 SPRAY, SUSPENSION (ML) NASAL DAILY
Qty: 15.8 ML | Refills: 1 | Status: SHIPPED | OUTPATIENT
Start: 2020-09-16 | End: 2021-04-20 | Stop reason: SDUPTHER

## 2020-09-16 RX ORDER — INFLUENZA A VIRUS A/MICHIGAN/45/2015 X-275 (H1N1) ANTIGEN (FORMALDEHYDE INACTIVATED), INFLUENZA A VIRUS A/SINGAPORE/INFIMH-16-0019/2016 IVR-186 (H3N2) ANTIGEN (FORMALDEHYDE INACTIVATED), INFLUENZA B VIRUS B/PHUKET/3073/2013 ANTIGEN (FORMALDEHYDE INACTIVATED), AND INFLUENZA B VIRUS B/MARYLAND/15/2016 BX-69A ANTIGEN (FORMALDEHYDE INACTIVATED) 60; 60; 60; 60 UG/.7ML; UG/.7ML; UG/.7ML; UG/.7ML
INJECTION, SUSPENSION INTRAMUSCULAR
COMMUNITY
Start: 2020-09-03 | End: 2021-08-17 | Stop reason: ALTCHOICE

## 2020-09-16 RX ORDER — AMOXICILLIN 500 MG/1
500 TABLET, FILM COATED ORAL EVERY 12 HOURS
Qty: 10 TABLET | Refills: 0 | Status: SHIPPED | OUTPATIENT
Start: 2020-09-16 | End: 2020-09-21

## 2020-09-16 RX ORDER — GABAPENTIN 300 MG/1
300 CAPSULE ORAL DAILY
COMMUNITY
End: 2020-09-16 | Stop reason: SDUPTHER

## 2020-09-16 RX ORDER — GABAPENTIN 300 MG/1
300 CAPSULE ORAL NIGHTLY
Qty: 90 CAPSULE | Refills: 1 | Status: SHIPPED | OUTPATIENT
Start: 2020-09-16 | End: 2021-02-04 | Stop reason: SDUPTHER

## 2020-09-16 RX ORDER — AMLODIPINE AND BENAZEPRIL HYDROCHLORIDE 5; 20 MG/1; MG/1
1 CAPSULE ORAL NIGHTLY
Qty: 90 CAPSULE | Refills: 3 | Status: SHIPPED | OUTPATIENT
Start: 2020-09-16 | End: 2021-04-20 | Stop reason: SDUPTHER

## 2020-09-16 RX ORDER — METFORMIN HYDROCHLORIDE 1000 MG/1
1000 TABLET ORAL 2 TIMES DAILY WITH MEALS
Qty: 180 TABLET | Refills: 3 | Status: SHIPPED | OUTPATIENT
Start: 2020-09-16 | End: 2021-04-20 | Stop reason: SDUPTHER

## 2020-09-16 RX ORDER — FLUTICASONE FUROATE AND VILANTEROL TRIFENATATE 100; 25 UG/1; UG/1
1 POWDER RESPIRATORY (INHALATION) DAILY
Qty: 3 EACH | Refills: 3 | Status: SHIPPED | OUTPATIENT
Start: 2020-09-16 | End: 2020-11-11 | Stop reason: SDUPTHER

## 2020-09-16 NOTE — PROGRESS NOTES
SUBJECTIVE:      Patient ID: Baldo Carney is a 69 y.o. male.    Chief Complaint: Hospital Follow Up (DC'D 8/31/20 )    Presents for hospital discharge follow-up - 3-night [facility] hospital stay for Covid-19, labs reveal RBC 3.3, H & H 10.1 & 29.8, estimated GFR 43.4, BUN 49, Cr 1.8, Ca 7.6, A1c 6.6, kidney US shows lobulated structure measuring approximately 6.9 x 4.4 cm identified along the posterior bladder, CXR significant for PNA    Discussed outstanding health maintenance - recently had flu through local [facility], declines shingles vaccine at this visit    Hypertension  This is a chronic problem. The current episode started more than 1 year ago. The problem is controlled. Pertinent negatives include no chest pain, headaches, neck pain, peripheral edema or shortness of breath. Risk factors for coronary artery disease include male gender, sedentary lifestyle, diabetes mellitus, dyslipidemia, family history and stress. Past treatments include calcium channel blockers, ACE inhibitors and diuretics. The current treatment provides mild improvement. Compliance problems include exercise.  Hypertensive end-organ damage includes CAD/MI. Identifiable causes of hypertension include a hypertension causing med.   Diabetes  He has type 2 diabetes mellitus. No MedicAlert identification noted. His disease course has been stable. Hypoglycemia symptoms include dizziness. Pertinent negatives for hypoglycemia include no confusion, headaches, nervousness/anxiousness or pallor. Associated symptoms include foot paresthesias. Pertinent negatives for diabetes include no chest pain, no fatigue, no polydipsia, no polyuria and no weakness. There are no hypoglycemic complications. Symptoms are stable. Diabetic complications include peripheral neuropathy. Risk factors for coronary artery disease include diabetes mellitus, dyslipidemia, family history, hypertension, male sex, sedentary lifestyle and stress. Current diabetic treatment  includes oral agent (monotherapy). He is compliant with treatment all of the time. His weight is fluctuating minimally. He is following a generally healthy diet. He has not had a previous visit with a dietitian. He rarely participates in exercise. An ACE inhibitor/angiotensin II receptor blocker is being taken.   Dizziness:   Chronicity:  New  Onset:  1 to 4 weeks ago  Progression since onset:  Unchanged  Frequency - weeks/days included: daily.  Severity:  Moderate  Dizziness characteristics:  Off-balance   Associated symptoms: ear congestion.no hearing loss, no ear pain, no fever, no headaches, no nausea, no vomiting, no weakness, no light-headedness, no facial weakness, no slurred speech, no numbness in extremities and no chest pain.  Aggravated by:  Bending, getting up and position changes  Treatments tried:  Rest  Improvements on treatment:  Moderate   PMH includes: cardiac surgery and environmental allergies.no head trauma, no ear trauma and no head trauma.      Past Surgical History:   Procedure Laterality Date    BREAST SURGERY      CARDIAC CATHETERIZATION      CARDIAC VALVE SURGERY      CORONARY ARTERY BYPASS GRAFT      CYSTOSCOPY N/A 7/30/2019    Procedure: CYSTOSCOPY;  Surgeon: Spencer Nguyen MD;  Location: [facility] OR;  Service: Urology;  Laterality: N/A;    EYE SURGERY      SHOULDER ARTHROSCOPY  1985    TRANSRECTAL BIOPSY OF PROSTATE WITH ULTRASOUND GUIDANCE N/A 7/30/2019    Procedure: BIOPSY, PROSTATE, RECTAL APPROACH, WITH US GUIDANCE;  Surgeon: Spencer Nguyen MD;  Location: [facility] OR;  Service: Urology;  Laterality: N/A;  procedure not performed, pt unable to tolerate    TRANSRECTAL BIOPSY OF PROSTATE WITH ULTRASOUND GUIDANCE N/A 8/8/2019    Procedure: BIOPSY, PROSTATE, RECTAL APPROACH, WITH US GUIDANCE;  Surgeon: Spencer Nguyen MD;  Location: [facility] OR;  Service: Urology;  Laterality: N/A;     Family History   Problem Relation Age of Onset    No Known Problems Mother     Diabetes  Father     Hypertension Father     Heart attack Father     Heart disease Father     Diabetes Sister     Heart disease Brother     Diabetes Brother     No Known Problems Maternal Grandmother     No Known Problems Maternal Grandfather     No Known Problems Paternal Grandmother     No Known Problems Paternal Grandfather     No Known Problems Maternal Aunt     No Known Problems Maternal Uncle     No Known Problems Paternal Aunt     No Known Problems Paternal Uncle     Anemia Neg Hx     Arrhythmia Neg Hx     Asthma Neg Hx     Clotting disorder Neg Hx     Fainting Neg Hx     Heart failure Neg Hx     Hyperlipidemia Neg Hx     Glaucoma Neg Hx       Social History     Socioeconomic History    Marital status:      Spouse name: Not on file    Number of children: Not on file    Years of education: Not on file    Highest education level: Not on file   Occupational History    Not on file   Social Needs    Financial resource strain: Not on file    Food insecurity     Worry: Not on file     Inability: Not on file    Transportation needs     Medical: Not on file     Non-medical: Not on file   Tobacco Use    Smoking status: Former Smoker     Quit date: 2019     Years since quittin.2    Smokeless tobacco: Never Used   Substance and Sexual Activity    Alcohol use: Yes     Alcohol/week: 3.0 standard drinks     Types: 3 Cans of beer per week     Frequency: Never     Comment: social    Drug use: No    Sexual activity: Never   Lifestyle    Physical activity     Days per week: Not on file     Minutes per session: Not on file    Stress: Not on file   Relationships    Social connections     Talks on phone: Not on file     Gets together: Not on file     Attends [employer]ism service: Not on file     Active member of club or organization: Not on file     Attends meetings of clubs or organizations: Not on file     Relationship status: Not on file   Other Topics Concern    Not on file   Social  History Narrative    Not on file     Current Outpatient Medications   Medication Sig Dispense Refill    albuterol (ACCUNEB) 1.25 mg/3 mL Nebu Take 3 mLs (1.25 mg total) by nebulization every 6 (six) hours as needed. Rescue 1 Box 0    amlodipine-benazepril 5-20 mg (LOTREL) 5-20 mg per capsule Take 1 capsule by mouth nightly. 90 capsule 3    apixaban (ELIQUIS) 2.5 mg Tab Take 1 tablet (2.5 mg total) by mouth once daily. 90 tablet 3    ARNUITY ELLIPTA 100 mcg/actuation DsDv Use as directed 1 puff in the mouth or throat daily as needed.   6    aspirin (ECOTRIN) 325 MG EC tablet Take 1 tablet (325 mg total) by mouth once daily. 30 tablet 4    atorvastatin (LIPITOR) 80 MG tablet TAKE 1 TABLET BY MOUTH ONCE A DAY (Patient taking differently: Take 80 mg by mouth once daily. ) 90 tablet 1    azelastine (ASTELIN) 137 mcg (0.1 %) nasal spray 1 spray by Nasal route 2 (two) times daily as needed.   0    blood sugar diagnostic Strp USE BRAND COVERED BY INSURANCE AND COMPATIBLE WITH METER TO CHECK GLUCOSE 2X  strip 1    finasteride (PROSCAR) 5 mg tablet Take 1 tablet (5 mg total) by mouth once daily. 90 tablet 3    fluticasone furoate-vilanteroL (BREO ELLIPTA) 100-25 mcg/dose diskus inhaler Inhale 1 puff into the lungs once daily. (DAILY CONTROLLER) 3 each 3    FLUZONE HIGHDOSE QUAD 20-21  mcg/0.7 mL Syrg PHARMACY ADMINISTERED      gabapentin (NEURONTIN) 300 MG capsule Take 1 capsule (300 mg total) by mouth every evening. 90 capsule 1    metFORMIN (GLUCOPHAGE) 1000 MG tablet Take 1 tablet (1,000 mg total) by mouth 2 (two) times daily with meals. 180 tablet 3    VENTOLIN HFA 90 mcg/actuation inhaler Inhale 1-2 puffs into the lungs every 6 (six) hours as needed for Wheezing or Shortness of Breath. (RESCUE) 18 g 3    amoxicillin (AMOXIL) 500 MG Tab Take 1 tablet (500 mg total) by mouth every 12 (twelve) hours. for 5 days 10 tablet 0    apixaban (ELIQUIS) 2.5 mg Tab Take 1 tablet (2.5 mg total) by mouth  once daily. 70 tablet 0    blood-glucose meter kit USE BRAND AS COVERED BY INSURANCE AND COMPATIBLE WITH STRIPS TO CHECK GLUCOSE 2X DAY 1 each 0    fluticasone propionate (FLONASE) 50 mcg/actuation nasal spray 1 spray (50 mcg total) by Each Nostril route once daily. 15.8 mL 1     No current facility-administered medications for this visit.      Review of patient's allergies indicates:  No Known Allergies   Past Medical History:   Diagnosis Date    Asthma     Cardiomyopathy 10/21/2014    Diabetes mellitus     Hyperlipidemia     Hypertension      Past Surgical History:   Procedure Laterality Date    BREAST SURGERY      CARDIAC CATHETERIZATION      CARDIAC VALVE SURGERY      CORONARY ARTERY BYPASS GRAFT      CYSTOSCOPY N/A 7/30/2019    Procedure: CYSTOSCOPY;  Surgeon: Spencer Nguyen MD;  Location: [facility] OR;  Service: Urology;  Laterality: N/A;    EYE SURGERY      SHOULDER ARTHROSCOPY  1985    TRANSRECTAL BIOPSY OF PROSTATE WITH ULTRASOUND GUIDANCE N/A 7/30/2019    Procedure: BIOPSY, PROSTATE, RECTAL APPROACH, WITH US GUIDANCE;  Surgeon: Spencer Nguyen MD;  Location: [facility] OR;  Service: Urology;  Laterality: N/A;  procedure not performed, pt unable to tolerate    TRANSRECTAL BIOPSY OF PROSTATE WITH ULTRASOUND GUIDANCE N/A 8/8/2019    Procedure: BIOPSY, PROSTATE, RECTAL APPROACH, WITH US GUIDANCE;  Surgeon: Spencer Nguyen MD;  Location: [facility] OR;  Service: Urology;  Laterality: N/A;       Review of Systems   Constitutional: Negative for activity change, appetite change, fatigue and fever.   HENT: Negative for congestion, ear pain, hearing loss, postnasal drip, sinus pressure, sinus pain, sneezing and sore throat.    Eyes: Negative for photophobia and pain.   Respiratory: Negative for cough, chest tightness, shortness of breath and wheezing.    Cardiovascular: Negative for chest pain and leg swelling.   Gastrointestinal: Negative for abdominal distention, abdominal pain, blood in stool,  constipation, diarrhea, nausea and vomiting.   Endocrine: Negative for cold intolerance, heat intolerance, polydipsia and polyuria.   Genitourinary: Negative for difficulty urinating, dysuria, flank pain, frequency, hematuria and urgency.   Musculoskeletal: Positive for arthritis and stiffness. Negative for arthralgias, back pain, joint swelling, myalgias and neck pain.   Skin: Negative for pallor and rash.   Allergic/Immunologic: Positive for environmental allergies. Negative for food allergies.   Neurological: Positive for dizziness. Negative for weakness, light-headedness and headaches.   Hematological: Does not bruise/bleed easily.   Psychiatric/Behavioral: Negative for confusion, decreased concentration and sleep disturbance. The patient is not nervous/anxious.       OBJECTIVE:      Vitals:    09/16/20 0956   BP: 128/64   BP Location: Right arm   Patient Position: Sitting   BP Method: Large (Manual)   Pulse: 89   Temp: 98.6 °F (37 °C)   TempSrc: Oral   SpO2: 97%   Weight: 78.4 kg (172 lb 14.4 oz)   Height: 6' (1.829 m)     Physical Exam  Vitals signs and nursing note reviewed.   Constitutional:       General: He is not in acute distress.     Appearance: Normal appearance. He is well-developed and normal weight.   HENT:      Head: Normocephalic and atraumatic.      Right Ear: Hearing, ear canal and external ear normal. A middle ear effusion (suppurative) is present.      Left Ear: Hearing, ear canal and external ear normal. A middle ear effusion (serous) is present.      Nose: Nose normal. No rhinorrhea.   Eyes:      General: Lids are normal.         Right eye: No discharge.         Left eye: No discharge.      Conjunctiva/sclera: Conjunctivae normal.      Right eye: Right conjunctiva is not injected.      Left eye: Left conjunctiva is not injected.      Pupils: Pupils are equal, round, and reactive to light. Pupils are equal.      Right eye: Pupil is round and reactive.      Left eye: Pupil is round and  reactive.   Neck:      Musculoskeletal: Normal range of motion and neck supple.      Thyroid: No thyromegaly.      Vascular: No JVD.      Trachea: Trachea normal. No tracheal deviation.   Cardiovascular:      Rate and Rhythm: Normal rate and regular rhythm.      Pulses:           Radial pulses are 2+ on the right side and 2+ on the left side.        Dorsalis pedis pulses are 2+ on the right side and 2+ on the left side.        Posterior tibial pulses are 2+ on the right side.      Heart sounds: Normal heart sounds. No murmur. No friction rub. No gallop.    Pulmonary:      Effort: Pulmonary effort is normal. No respiratory distress.      Breath sounds: Normal breath sounds. No stridor. No decreased breath sounds, wheezing, rhonchi or rales.   Abdominal:      General: Bowel sounds are normal. There is no distension.      Palpations: Abdomen is soft. Abdomen is not rigid.      Tenderness: There is no abdominal tenderness. There is no guarding.   Musculoskeletal: Normal range of motion.      Right foot: Normal range of motion. No foot drop.      Left foot: Normal range of motion. No foot drop.   Feet:      Right foot:      Protective Sensation: 8 sites tested. 8 sites sensed.      Skin integrity: Skin integrity normal.      Toenail Condition: Right toenails are normal.      Left foot:      Protective Sensation: 8 sites tested. 8 sites sensed.      Skin integrity: Skin integrity normal.      Toenail Condition: Left toenails are normal.   Lymphadenopathy:      Cervical: No cervical adenopathy.   Skin:     General: Skin is warm and dry.      Capillary Refill: Capillary refill takes less than 2 seconds.      Coloration: Skin is not pale.      Findings: No lesion or rash.   Neurological:      Mental Status: He is alert and oriented to person, place, and time.      Motor: No atrophy.      Coordination: Coordination normal.      Gait: Gait normal.   Psychiatric:         Attention and Perception: He is attentive.          Speech: Speech normal.         Behavior: Behavior normal.         Thought Content: Thought content normal.         Judgment: Judgment normal.        Assessment:       1. Hospital discharge follow-up    2. Non-recurrent acute suppurative otitis media of right ear without spontaneous rupture of tympanic membrane    3. Anemia, unspecified type    4. Decreased GFR    5. Low serum calcium    6. Essential hypertension    7. Type 2 diabetes mellitus without complication, without long-term current use of insulin    8. Chronic systolic heart failure    9. Chronic obstructive pulmonary disease, unspecified COPD type    10. Prostate cancer screening        Plan:       Hospital discharge follow-up  -     CBC auto differential; Future; Expected date: 09/23/2020  -     Comprehensive metabolic panel; Future; Expected date: 09/16/2020  -     X-Ray Chest PA And Lateral; Future; Expected date: 09/16/2020  - started on Eliquis 2.5 mg daily upon discharge    Non-recurrent acute suppurative otitis media of right ear without spontaneous rupture of tympanic membrane  -     amoxicillin (AMOXIL) 500 MG Tab; Take 1 tablet (500 mg total) by mouth every 12 (twelve) hours. for 5 days  Dispense: 10 tablet; Refill: 0  -     fluticasone propionate (FLONASE) 50 mcg/actuation nasal spray; 1 spray (50 mcg total) by Each Nostril route once daily.  Dispense: 15.8 mL; Refill: 1    Anemia, unspecified type  -     CBC auto differential; Future; Expected date: 09/23/2020    Decreased GFR  -     Comprehensive metabolic panel; Future; Expected date: 09/16/2020    Low serum calcium  -     Comprehensive metabolic panel; Future; Expected date: 09/16/2020  -     PTH, intact; Future; Expected date: 09/16/2020    Essential hypertension  -     amlodipine-benazepril 5-20 mg (LOTREL) 5-20 mg per capsule; Take 1 capsule by mouth nightly.  Dispense: 90 capsule; Refill: 3  -     Lipid Panel; Future; Expected date: 09/16/2020    Type 2 diabetes mellitus without  complication, without long-term current use of insulin  -     metFORMIN (GLUCOPHAGE) 1000 MG tablet; Take 1 tablet (1,000 mg total) by mouth 2 (two) times daily with meals.  Dispense: 180 tablet; Refill: 3  -     gabapentin (NEURONTIN) 300 MG capsule; Take 1 capsule (300 mg total) by mouth every evening.  Dispense: 90 capsule; Refill: 1  -     Microalbumin/creatinine urine ratio; Future; Expected date: 09/16/2020  -     Lipid Panel; Future; Expected date: 09/16/2020  -     Hemoglobin A1C; Future; Expected date: 09/16/2020    Chronic systolic heart failure  -     apixaban (ELIQUIS) 2.5 mg Tab; Take 1 tablet (2.5 mg total) by mouth once daily.  Dispense: 90 tablet; Refill: 3  -     apixaban (ELIQUIS) 2.5 mg Tab; Take 1 tablet (2.5 mg total) by mouth once daily.  Dispense: 70 tablet; Refill: 0    Chronic obstructive pulmonary disease, unspecified COPD type  -     fluticasone furoate-vilanteroL (BREO ELLIPTA) 100-25 mcg/dose diskus inhaler; Inhale 1 puff into the lungs once daily. (DAILY CONTROLLER)  Dispense: 3 each; Refill: 3  -     VENTOLIN HFA 90 mcg/actuation inhaler; Inhale 1-2 puffs into the lungs every 6 (six) hours as needed for Wheezing or Shortness of Breath. (RESCUE)  Dispense: 18 g; Refill: 3    Prostate cancer screening  -     PSA, Screening; Future; Expected date: 09/16/2020      Message sent to Dr. Nguyen re: abnormal kidney US. Will await response for further testing.      Follow up if symptoms worsen or fail to improve.      9/16/2020 KAMERON Rodriguez, FNP-C

## 2020-09-16 NOTE — PATIENT INSTRUCTIONS
Step-by-Step  Using Nasal Spray    Date Last Reviewed: 5/27/2015  © 2186-8472 [employer]. [address] [place] [address], [place] [address]. All rights reserved. This information is not intended as a substitute for professional medical care. Always follow your healthcare professional's instructions.

## 2020-09-18 ENCOUNTER — HOSPITAL ENCOUNTER (OUTPATIENT)
Dept: RADIOLOGY | Facility: HOSPITAL | Age: 70
Discharge: HOME | End: 2020-09-18
Attending: NURSE PRACTITIONER
Payer: COMMERCIAL

## 2020-09-18 PROCEDURE — 71046 X-RAY EXAM CHEST 2 VIEWS: CPT | Mod: TC,PO

## 2020-09-22 ENCOUNTER — TELEPHONE (OUTPATIENT)
Dept: FAMILY MEDICINE | Facility: CLINIC | Age: 70
End: 2020-09-22

## 2020-09-22 NOTE — TELEPHONE ENCOUNTER
----- Message from KAMERON Ann,FNP-C sent at 9/21/2020  2:28 PM CDT -----  Aside from COPD, chest XR looks fine

## 2020-09-29 ENCOUNTER — TELEPHONE (OUTPATIENT)
Dept: FAMILY MEDICINE | Facility: CLINIC | Age: 70
End: 2020-09-29

## 2020-09-29 DIAGNOSIS — D64.9 ANEMIA, UNSPECIFIED TYPE: Primary | ICD-10-CM

## 2020-09-29 NOTE — TELEPHONE ENCOUNTER
Called and informed Mr Carney of Anemia, and referral to Hematology, also Kidneys spilling protein and HgbA1c good control, and PSA elevated , need to see his urologist for eval, Dr Nguyen      Mr Carney is asking for a handicapped license form due to his COPD , he gets SOB when walking long distances.

## 2020-09-29 NOTE — TELEPHONE ENCOUNTER
Anemia appears to be worsening.  Referral placed to Hematology for further evaluation.    Kidneys appear to be spilling some protein into the urine due to elevated sugar, despite A1c showing good control of diabetes.    PSA has increased from last year.  Be sure to continue to follow up with urology.

## 2020-11-11 ENCOUNTER — OFFICE VISIT (OUTPATIENT)
Dept: FAMILY MEDICINE | Facility: CLINIC | Age: 70
End: 2020-11-11
Payer: COMMERCIAL

## 2020-11-11 VITALS
WEIGHT: 177.38 LBS | OXYGEN SATURATION: 99 % | HEIGHT: 72 IN | BODY MASS INDEX: 24.03 KG/M2 | RESPIRATION RATE: 16 BRPM | TEMPERATURE: 97 F | HEART RATE: 80 BPM | SYSTOLIC BLOOD PRESSURE: 130 MMHG | DIASTOLIC BLOOD PRESSURE: 66 MMHG

## 2020-11-11 DIAGNOSIS — E11.9 TYPE 2 DIABETES MELLITUS WITHOUT COMPLICATION, WITHOUT LONG-TERM CURRENT USE OF INSULIN: Primary | ICD-10-CM

## 2020-11-11 DIAGNOSIS — I10 ESSENTIAL HYPERTENSION: ICD-10-CM

## 2020-11-11 DIAGNOSIS — R97.20 ELEVATED PSA, GREATER THAN OR EQUAL TO 20 NG/ML: ICD-10-CM

## 2020-11-11 DIAGNOSIS — J44.9 CHRONIC OBSTRUCTIVE PULMONARY DISEASE, UNSPECIFIED COPD TYPE: Chronic | ICD-10-CM

## 2020-11-11 DIAGNOSIS — E78.2 MIXED HYPERLIPIDEMIA: ICD-10-CM

## 2020-11-11 DIAGNOSIS — N40.0 BENIGN PROSTATIC HYPERPLASIA, UNSPECIFIED WHETHER LOWER URINARY TRACT SYMPTOMS PRESENT: Chronic | ICD-10-CM

## 2020-11-11 PROCEDURE — 99214 PR OFFICE/OUTPT VISIT, EST, LEVL IV, 30-39 MIN: ICD-10-PCS | Mod: S$GLB,,, | Performed by: NURSE PRACTITIONER

## 2020-11-11 PROCEDURE — 99214 OFFICE O/P EST MOD 30 MIN: CPT | Mod: S$GLB,,, | Performed by: NURSE PRACTITIONER

## 2020-11-11 RX ORDER — ATORVASTATIN CALCIUM 40 MG/1
40 TABLET, FILM COATED ORAL DAILY
Qty: 90 TABLET | Refills: 3 | Status: SHIPPED | OUTPATIENT
Start: 2020-11-11 | End: 2021-04-20 | Stop reason: SDUPTHER

## 2020-11-11 RX ORDER — FINASTERIDE 5 MG/1
5 TABLET, FILM COATED ORAL DAILY
Qty: 90 TABLET | Refills: 3 | Status: SHIPPED | OUTPATIENT
Start: 2020-11-11 | End: 2021-04-20 | Stop reason: SDUPTHER

## 2020-11-11 RX ORDER — FLUTICASONE FUROATE AND VILANTEROL TRIFENATATE 100; 25 UG/1; UG/1
1 POWDER RESPIRATORY (INHALATION) DAILY
Qty: 3 EACH | Refills: 3 | Status: SHIPPED | OUTPATIENT
Start: 2020-11-11 | End: 2021-04-20 | Stop reason: SDUPTHER

## 2020-11-11 NOTE — PROGRESS NOTES
SUBJECTIVE:      Patient ID: Baldo Carney is a 70 y.o. male.    Chief Complaint: Follow-up (6 month DM )    Patient presents for lab review and med refills, fasting glucose 133, A1c 6.4, anemia has worsened (patient did not follow-up with Hematology/Oncology, he states he never received a call about a scheduled appointment), H&H 9.6 and 29.9, RBCs 3.26 in September from 10.1 and 29.8, 3.3 respectively in August; PSA 24.3 from 18.2 October 2019, patient cannot recall the last time he followed up with urology    Discussed outstanding health maintenance - states he just had his eye exam with [facility] (will request record)    Diabetes  He presents for his follow-up diabetic visit. He has type 2 diabetes mellitus. No MedicAlert identification noted. His disease course has been stable. Pertinent negatives for hypoglycemia include no confusion, dizziness, headaches, nervousness/anxiousness or pallor. Associated symptoms include foot paresthesias (At times). Pertinent negatives for diabetes include no chest pain, no fatigue, no polydipsia, no polyuria and no weakness. There are no hypoglycemic complications. Symptoms are stable. Diabetic complications include peripheral neuropathy. Risk factors for coronary artery disease include diabetes mellitus, dyslipidemia, family history, hypertension, male sex, sedentary lifestyle and stress. Current diabetic treatment includes oral agent (monotherapy). He is compliant with treatment all of the time. His weight is fluctuating minimally. He is following a generally healthy diet. He has not had a previous visit with a dietitian. He rarely participates in exercise. An ACE inhibitor/angiotensin II receptor blocker is being taken. Eye exam is current.   Hypertension  This is a chronic problem. The current episode started more than 1 year ago. The problem is controlled. Pertinent negatives include no chest pain, headaches, neck pain, peripheral edema or shortness of breath.  Risk factors for coronary artery disease include male gender, sedentary lifestyle, diabetes mellitus, dyslipidemia, family history and stress. Past treatments include calcium channel blockers and ACE inhibitors. The current treatment provides mild improvement. Compliance problems include exercise.  Hypertensive end-organ damage includes CAD/MI. Identifiable causes of hypertension include a hypertension causing med.       Past Surgical History:   Procedure Laterality Date    BREAST SURGERY      CARDIAC CATHETERIZATION      CARDIAC VALVE SURGERY      CORONARY ARTERY BYPASS GRAFT      CYSTOSCOPY N/A 7/30/2019    Procedure: CYSTOSCOPY;  Surgeon: Spencer Nguyen MD;  Location: [facility] OR;  Service: Urology;  Laterality: N/A;    EYE SURGERY      SHOULDER ARTHROSCOPY  1985    TRANSRECTAL BIOPSY OF PROSTATE WITH ULTRASOUND GUIDANCE N/A 7/30/2019    Procedure: BIOPSY, PROSTATE, RECTAL APPROACH, WITH US GUIDANCE;  Surgeon: Spencer Nguyen MD;  Location: [facility] OR;  Service: Urology;  Laterality: N/A;  procedure not performed, pt unable to tolerate    TRANSRECTAL BIOPSY OF PROSTATE WITH ULTRASOUND GUIDANCE N/A 8/8/2019    Procedure: BIOPSY, PROSTATE, RECTAL APPROACH, WITH US GUIDANCE;  Surgeon: Spencer Nguyen MD;  Location: [facility] OR;  Service: Urology;  Laterality: N/A;     Family History   Problem Relation Age of Onset    No Known Problems Mother     Diabetes Father     Hypertension Father     Heart attack Father     Heart disease Father     Diabetes Sister     Heart disease Brother     Diabetes Brother     No Known Problems Maternal Grandmother     No Known Problems Maternal Grandfather     No Known Problems Paternal Grandmother     No Known Problems Paternal Grandfather     No Known Problems Maternal Aunt     No Known Problems Maternal Uncle     No Known Problems Paternal Aunt     No Known Problems Paternal Uncle     Anemia Neg Hx     Arrhythmia Neg Hx     Asthma Neg Hx     Clotting disorder  Neg Hx     Fainting Neg Hx     Heart failure Neg Hx     Hyperlipidemia Neg Hx     Glaucoma Neg Hx       Social History     Socioeconomic History    Marital status:      Spouse name: Not on file    Number of children: Not on file    Years of education: Not on file    Highest education level: Not on file   Occupational History    Not on file   Social Needs    Financial resource strain: Not on file    Food insecurity     Worry: Not on file     Inability: Not on file    Transportation needs     Medical: Not on file     Non-medical: Not on file   Tobacco Use    Smoking status: Former Smoker     Quit date: 2019     Years since quittin.4    Smokeless tobacco: Never Used   Substance and Sexual Activity    Alcohol use: Yes     Alcohol/week: 3.0 standard drinks     Types: 3 Cans of beer per week     Frequency: Never     Comment: social    Drug use: No    Sexual activity: Never   Lifestyle    Physical activity     Days per week: Not on file     Minutes per session: Not on file    Stress: Not on file   Relationships    Social connections     Talks on phone: Not on file     Gets together: Not on file     Attends [employer] service: Not on file     Active member of club or organization: Not on file     Attends meetings of clubs or organizations: Not on file     Relationship status: Not on file   Other Topics Concern    Not on file   Social History Narrative    Not on file     Current Outpatient Medications   Medication Sig Dispense Refill    albuterol (ACCUNEB) 1.25 mg/3 mL Nebu Take 3 mLs (1.25 mg total) by nebulization every 6 (six) hours as needed. Rescue 1 Box 0    amlodipine-benazepril 5-20 mg (LOTREL) 5-20 mg per capsule Take 1 capsule by mouth nightly. 90 capsule 3    apixaban (ELIQUIS) 2.5 mg Tab Take 1 tablet (2.5 mg total) by mouth once daily. 90 tablet 3    apixaban (ELIQUIS) 2.5 mg Tab Take 1 tablet (2.5 mg total) by mouth once daily. 70 tablet 0    ARNUITY ELLIPTA 100  mcg/actuation DsDv Use as directed 1 puff in the mouth or throat daily as needed.   6    aspirin (ECOTRIN) 325 MG EC tablet Take 1 tablet (325 mg total) by mouth once daily. 30 tablet 4    atorvastatin (LIPITOR) 40 MG tablet Take 1 tablet (40 mg total) by mouth once daily. 90 tablet 3    azelastine (ASTELIN) 137 mcg (0.1 %) nasal spray 1 spray by Nasal route 2 (two) times daily as needed.   0    blood sugar diagnostic Strp USE BRAND COVERED BY INSURANCE AND COMPATIBLE WITH METER TO CHECK GLUCOSE 2X  strip 1    blood-glucose meter kit USE BRAND AS COVERED BY INSURANCE AND COMPATIBLE WITH STRIPS TO CHECK GLUCOSE 2X DAY 1 each 0    finasteride (PROSCAR) 5 mg tablet Take 1 tablet (5 mg total) by mouth once daily. 90 tablet 3    fluticasone propionate (FLONASE) 50 mcg/actuation nasal spray 1 spray (50 mcg total) by Each Nostril route once daily. 15.8 mL 1    FLUZONE HIGHDOSE QUAD 20-21  mcg/0.7 mL Syrg PHARMACY ADMINISTERED      gabapentin (NEURONTIN) 300 MG capsule Take 1 capsule (300 mg total) by mouth every evening. 90 capsule 1    metFORMIN (GLUCOPHAGE) 1000 MG tablet Take 1 tablet (1,000 mg total) by mouth 2 (two) times daily with meals. 180 tablet 3    VENTOLIN HFA 90 mcg/actuation inhaler Inhale 1-2 puffs into the lungs every 6 (six) hours as needed for Wheezing or Shortness of Breath. (RESCUE) 18 g 3    fluticasone furoate-vilanteroL (BREO ELLIPTA) 100-25 mcg/dose diskus inhaler Inhale 1 puff into the lungs once daily. (DAILY CONTROLLER) 3 each 3     No current facility-administered medications for this visit.      Review of patient's allergies indicates:  No Known Allergies   Past Medical History:   Diagnosis Date    Asthma     Cardiomyopathy 10/21/2014    Diabetes mellitus     Hyperlipidemia     Hypertension      Past Surgical History:   Procedure Laterality Date    BREAST SURGERY      CARDIAC CATHETERIZATION      CARDIAC VALVE SURGERY      CORONARY ARTERY BYPASS GRAFT       CYSTOSCOPY N/A 7/30/2019    Procedure: CYSTOSCOPY;  Surgeon: Spencer Nguyen MD;  [facility]  Service: Urology;  Laterality: N/A;    EYE SURGERY      SHOULDER ARTHROSCOPY  1985    TRANSRECTAL BIOPSY OF PROSTATE WITH ULTRASOUND GUIDANCE N/A 7/30/2019    Procedure: BIOPSY, PROSTATE, RECTAL APPROACH, WITH US GUIDANCE;  Surgeon: Spencer Nguyen MD;  [facility];  Service: Urology;  Laterality: N/A;  procedure not performed, pt unable to tolerate    TRANSRECTAL BIOPSY OF PROSTATE WITH ULTRASOUND GUIDANCE N/A 8/8/2019    Procedure: BIOPSY, PROSTATE, RECTAL APPROACH, WITH US GUIDANCE;  Surgeon: Spencer Nguyen MD;  Location: [facility] OR;  Service: Urology;  Laterality: N/A;       Review of Systems   Constitutional: Negative for activity change, appetite change, fatigue and fever.        States his wife was recently diagnosed with breast cancer and plans to move back to [place] to be closer to her family, he is looking to downsize his house   HENT: Negative for congestion, ear pain, hearing loss, postnasal drip, sinus pressure, sinus pain, sneezing and sore throat.    Eyes: Negative for photophobia and pain.   Respiratory: Negative for cough, chest tightness, shortness of breath and wheezing.    Cardiovascular: Negative for chest pain and leg swelling.   Gastrointestinal: Negative for abdominal distention, abdominal pain, blood in stool, constipation, diarrhea, nausea and vomiting.   Endocrine: Negative for cold intolerance, heat intolerance, polydipsia and polyuria.   Genitourinary: Negative for difficulty urinating, dysuria, flank pain, frequency, hematuria and urgency.   Musculoskeletal: Positive for arthralgias (following with Dr. Cho for R elbow injections), arthritis and stiffness. Negative for back pain, joint swelling, myalgias and neck pain.   Skin: Negative for pallor and rash.   Allergic/Immunologic: Positive for environmental allergies. Negative for food allergies.   Neurological:  Negative for dizziness, weakness, light-headedness and headaches.   Hematological: Does not bruise/bleed easily.   Psychiatric/Behavioral: Negative for confusion, decreased concentration and sleep disturbance. The patient is not nervous/anxious.       OBJECTIVE:      Vitals:    11/11/20 1025   BP: 130/66   BP Location: Right arm   Patient Position: Sitting   BP Method: Medium (Manual)   Pulse: 80   Resp: 16   Temp: 97.3 °F (36.3 °C)   TempSrc: Oral   SpO2: 99%   Weight: 80.5 kg (177 lb 6.4 oz)   Height: 6' (1.829 m)     Physical Exam  Vitals signs and nursing note reviewed.   Constitutional:       General: He is not in acute distress.     Appearance: Normal appearance. He is well-developed and normal weight.      Comments: 5 lb loss since September visit   HENT:      Head: Normocephalic and atraumatic.      Right Ear: Hearing normal.      Left Ear: Hearing normal.      Nose: Nose normal. No rhinorrhea.   Eyes:      General: Lids are normal.         Right eye: No discharge.         Left eye: No discharge.      Conjunctiva/sclera: Conjunctivae normal.      Right eye: Right conjunctiva is not injected.      Left eye: Left conjunctiva is not injected.      Pupils: Pupils are equal, round, and reactive to light. Pupils are equal.      Right eye: Pupil is round and reactive.      Left eye: Pupil is round and reactive.   Neck:      Musculoskeletal: Normal range of motion and neck supple.      Thyroid: No thyromegaly.      Vascular: No JVD.      Trachea: Trachea normal. No tracheal deviation.   Cardiovascular:      Rate and Rhythm: Normal rate and regular rhythm.      Pulses:           Radial pulses are 2+ on the right side and 2+ on the left side.      Heart sounds: Normal heart sounds. No murmur. No friction rub. No gallop.    Pulmonary:      Effort: Pulmonary effort is normal. No respiratory distress.      Breath sounds: Normal breath sounds. No stridor. No decreased breath sounds, wheezing, rhonchi or rales.    Abdominal:      General: Bowel sounds are normal. There is no distension.      Palpations: Abdomen is soft. Abdomen is not rigid.      Tenderness: There is no abdominal tenderness. There is no guarding.   Musculoskeletal: Normal range of motion.   Lymphadenopathy:      Cervical: No cervical adenopathy.   Skin:     General: Skin is warm and dry.      Capillary Refill: Capillary refill takes less than 2 seconds.      Coloration: Skin is not pale.      Findings: No lesion or rash.   Neurological:      Mental Status: He is alert and oriented to person, place, and time.      Motor: No atrophy.      Coordination: Coordination normal.      Gait: Gait normal.   Psychiatric:         Attention and Perception: He is attentive.         Speech: Speech normal.         Behavior: Behavior normal.         Thought Content: Thought content normal.         Judgment: Judgment normal.        Assessment:       1. Type 2 diabetes mellitus without complication, without long-term current use of insulin    2. Essential hypertension    3. Mixed hyperlipidemia    4. Chronic obstructive pulmonary disease, unspecified COPD type    5. Benign prostatic hyperplasia, unspecified whether lower urinary tract symptoms present    6. Elevated PSA, greater than or equal to 20 ng/ml        Plan:       Type 2 diabetes mellitus without complication, without long-term current use of insulin        - stable on current med regimen    Essential hypertension        - stable on current med regimen    Mixed hyperlipidemia  -     atorvastatin (LIPITOR) 40 MG tablet; Take 1 tablet (40 mg total) by mouth once daily.  Dispense: 90 tablet; Refill: 3    Chronic obstructive pulmonary disease, unspecified COPD type  -     fluticasone furoate-vilanteroL (BREO ELLIPTA) 100-25 mcg/dose diskus inhaler; Inhale 1 puff into the lungs once daily. (DAILY CONTROLLER)  Dispense: 3 each; Refill: 3    Benign prostatic hyperplasia, unspecified whether lower urinary tract symptoms  present  -     finasteride (PROSCAR) 5 mg tablet; Take 1 tablet (5 mg total) by mouth once daily.  Dispense: 90 tablet; Refill: 3    Elevated PSA, greater than or equal to 20 ng/ml  -     Ambulatory referral/consult to Urology; Future; Expected date: 11/18/2020      Reprinted referral to Hematology/Oncology - strongly encouraged to follow-up.      Follow up in about 6 months (around 5/11/2021) for DM recheck.      11/11/2020 KAMERON Rodriguez, FNP-C

## 2020-12-17 RX ORDER — WARFARIN 2.5 MG/1
2.5 TABLET ORAL DAILY
Qty: 30 TABLET | Refills: 0 | OUTPATIENT
Start: 2020-12-17 | End: 2021-12-17

## 2020-12-17 NOTE — TELEPHONE ENCOUNTER
Pharmacy called and said patient cannot afford Eliquis. Alternate is Jantoven 2.5 mg.    I spoke to patient and explained that with the alternate drug he will have to have his blood checked weekly. He states he will take the alternate but he will not go have his blood levels checked.

## 2021-01-12 ENCOUNTER — TELEPHONE (OUTPATIENT)
Dept: FAMILY MEDICINE | Facility: CLINIC | Age: 71
End: 2021-01-12

## 2021-01-12 DIAGNOSIS — N40.0 BENIGN PROSTATIC HYPERPLASIA, UNSPECIFIED WHETHER LOWER URINARY TRACT SYMPTOMS PRESENT: Chronic | ICD-10-CM

## 2021-01-12 DIAGNOSIS — E78.2 MIXED HYPERLIPIDEMIA: ICD-10-CM

## 2021-01-12 DIAGNOSIS — I10 ESSENTIAL HYPERTENSION: Primary | ICD-10-CM

## 2021-01-12 DIAGNOSIS — R25.2 LEG CRAMPING: ICD-10-CM

## 2021-01-12 DIAGNOSIS — E11.9 TYPE 2 DIABETES MELLITUS WITHOUT COMPLICATION, WITHOUT LONG-TERM CURRENT USE OF INSULIN: ICD-10-CM

## 2021-02-03 ENCOUNTER — TELEPHONE (OUTPATIENT)
Dept: FAMILY MEDICINE | Facility: CLINIC | Age: 71
End: 2021-02-03

## 2021-02-04 ENCOUNTER — LAB VISIT (OUTPATIENT)
Dept: LAB | Facility: HOSPITAL | Age: 71
End: 2021-02-04
Attending: NURSE PRACTITIONER
Payer: COMMERCIAL

## 2021-02-04 ENCOUNTER — OFFICE VISIT (OUTPATIENT)
Dept: FAMILY MEDICINE | Facility: CLINIC | Age: 71
End: 2021-02-04
Payer: COMMERCIAL

## 2021-02-04 VITALS
OXYGEN SATURATION: 98 % | HEIGHT: 72 IN | WEIGHT: 175.19 LBS | BODY MASS INDEX: 23.73 KG/M2 | TEMPERATURE: 98 F | HEART RATE: 74 BPM

## 2021-02-04 DIAGNOSIS — N40.0 BENIGN PROSTATIC HYPERPLASIA, UNSPECIFIED WHETHER LOWER URINARY TRACT SYMPTOMS PRESENT: Chronic | ICD-10-CM

## 2021-02-04 DIAGNOSIS — I10 ESSENTIAL HYPERTENSION: ICD-10-CM

## 2021-02-04 DIAGNOSIS — E11.9 TYPE 2 DIABETES MELLITUS WITHOUT COMPLICATION, WITHOUT LONG-TERM CURRENT USE OF INSULIN: Primary | ICD-10-CM

## 2021-02-04 DIAGNOSIS — E78.2 MIXED HYPERLIPIDEMIA: ICD-10-CM

## 2021-02-04 DIAGNOSIS — R60.0 BILATERAL LEG EDEMA: ICD-10-CM

## 2021-02-04 DIAGNOSIS — R25.2 LEG CRAMPING: ICD-10-CM

## 2021-02-04 DIAGNOSIS — R25.2 BILATERAL LEG CRAMPS: ICD-10-CM

## 2021-02-04 DIAGNOSIS — E11.9 TYPE 2 DIABETES MELLITUS WITHOUT COMPLICATION, WITHOUT LONG-TERM CURRENT USE OF INSULIN: ICD-10-CM

## 2021-02-04 LAB
ALBUMIN SERPL BCP-MCNC: 3.8 G/DL (ref 3.5–5.2)
ALP SERPL-CCNC: 57 U/L (ref 55–135)
ALT SERPL W/O P-5'-P-CCNC: 25 U/L (ref 10–44)
ANION GAP SERPL CALC-SCNC: 10 MMOL/L (ref 8–16)
AST SERPL-CCNC: 27 U/L (ref 10–40)
BASOPHILS # BLD AUTO: 0.06 K/UL (ref 0–0.2)
BASOPHILS NFR BLD: 1 % (ref 0–1.9)
BILIRUB SERPL-MCNC: 1.1 MG/DL (ref 0.1–1)
BUN SERPL-MCNC: 22 MG/DL (ref 8–23)
CALCIUM SERPL-MCNC: 9 MG/DL (ref 8.7–10.5)
CHLORIDE SERPL-SCNC: 104 MMOL/L (ref 95–110)
CHOLEST SERPL-MCNC: 132 MG/DL (ref 120–199)
CHOLEST/HDLC SERPL: 2.2 {RATIO} (ref 2–5)
CO2 SERPL-SCNC: 25 MMOL/L (ref 23–29)
COMPLEXED PSA SERPL-MCNC: 11 NG/ML (ref 0–4)
CREAT SERPL-MCNC: 1.5 MG/DL (ref 0.5–1.4)
DIFFERENTIAL METHOD: ABNORMAL
EOSINOPHIL # BLD AUTO: 0.1 K/UL (ref 0–0.5)
EOSINOPHIL NFR BLD: 1.4 % (ref 0–8)
ERYTHROCYTE [DISTWIDTH] IN BLOOD BY AUTOMATED COUNT: 17.4 % (ref 11.5–14.5)
EST. GFR  (AFRICAN AMERICAN): 53.8 ML/MIN/1.73 M^2
EST. GFR  (NON AFRICAN AMERICAN): 46.5 ML/MIN/1.73 M^2
ESTIMATED AVG GLUCOSE: 143 MG/DL (ref 68–131)
GLUCOSE SERPL-MCNC: 114 MG/DL (ref 70–110)
HBA1C MFR BLD: 6.6 % (ref 4.5–6.2)
HCT VFR BLD AUTO: 32.8 % (ref 40–54)
HDLC SERPL-MCNC: 60 MG/DL (ref 40–75)
HDLC SERPL: 45.5 % (ref 20–50)
HGB BLD-MCNC: 10.4 G/DL (ref 14–18)
IMM GRANULOCYTES # BLD AUTO: 0.02 K/UL (ref 0–0.04)
IMM GRANULOCYTES NFR BLD AUTO: 0.3 % (ref 0–0.5)
LDLC SERPL CALC-MCNC: 63.8 MG/DL (ref 63–159)
LYMPHOCYTES # BLD AUTO: 1 K/UL (ref 1–4.8)
LYMPHOCYTES NFR BLD: 16.4 % (ref 18–48)
MAGNESIUM SERPL-MCNC: 1.8 MG/DL (ref 1.6–2.6)
MCH RBC QN AUTO: 28.6 PG (ref 27–31)
MCHC RBC AUTO-ENTMCNC: 31.7 G/DL (ref 32–36)
MCV RBC AUTO: 90 FL (ref 82–98)
MONOCYTES # BLD AUTO: 0.4 K/UL (ref 0.3–1)
MONOCYTES NFR BLD: 7.4 % (ref 4–15)
NEUTROPHILS # BLD AUTO: 4.3 K/UL (ref 1.8–7.7)
NEUTROPHILS NFR BLD: 73.5 % (ref 38–73)
NONHDLC SERPL-MCNC: 72 MG/DL
NRBC BLD-RTO: 0 /100 WBC
PLATELET # BLD AUTO: 198 K/UL (ref 150–350)
PMV BLD AUTO: 11.4 FL (ref 9.2–12.9)
POTASSIUM SERPL-SCNC: 4.4 MMOL/L (ref 3.5–5.1)
PROT SERPL-MCNC: 7.1 G/DL (ref 6–8.4)
RBC # BLD AUTO: 3.64 M/UL (ref 4.6–6.2)
SODIUM SERPL-SCNC: 139 MMOL/L (ref 136–145)
TRIGL SERPL-MCNC: 41 MG/DL (ref 30–150)
WBC # BLD AUTO: 5.84 K/UL (ref 3.9–12.7)

## 2021-02-04 PROCEDURE — 84153 ASSAY OF PSA TOTAL: CPT | Mod: GZ

## 2021-02-04 PROCEDURE — 80053 COMPREHEN METABOLIC PANEL: CPT

## 2021-02-04 PROCEDURE — 99214 OFFICE O/P EST MOD 30 MIN: CPT | Mod: S$GLB,,, | Performed by: NURSE PRACTITIONER

## 2021-02-04 PROCEDURE — 99214 PR OFFICE/OUTPT VISIT, EST, LEVL IV, 30-39 MIN: ICD-10-PCS | Mod: S$GLB,,, | Performed by: NURSE PRACTITIONER

## 2021-02-04 PROCEDURE — 36415 COLL VENOUS BLD VENIPUNCTURE: CPT

## 2021-02-04 PROCEDURE — 83036 HEMOGLOBIN GLYCOSYLATED A1C: CPT

## 2021-02-04 PROCEDURE — 80061 LIPID PANEL: CPT

## 2021-02-04 PROCEDURE — 83735 ASSAY OF MAGNESIUM: CPT

## 2021-02-04 PROCEDURE — 85025 COMPLETE CBC W/AUTO DIFF WBC: CPT

## 2021-02-04 RX ORDER — LANOLIN ALCOHOL/MO/W.PET/CERES
400 CREAM (GRAM) TOPICAL DAILY
Refills: 0 | COMMUNITY
Start: 2021-02-04 | End: 2021-08-17 | Stop reason: ALTCHOICE

## 2021-02-04 RX ORDER — TAMSULOSIN HYDROCHLORIDE 0.4 MG/1
0.4 CAPSULE ORAL DAILY
COMMUNITY
End: 2021-04-20 | Stop reason: SDUPTHER

## 2021-02-04 RX ORDER — GABAPENTIN 300 MG/1
300 CAPSULE ORAL NIGHTLY
Qty: 90 CAPSULE | Refills: 1 | Status: SHIPPED | OUTPATIENT
Start: 2021-02-04 | End: 2021-04-20 | Stop reason: SDUPTHER

## 2021-02-04 RX ORDER — FUROSEMIDE 20 MG/1
20 TABLET ORAL 2 TIMES DAILY
COMMUNITY
End: 2021-04-20 | Stop reason: SDUPTHER

## 2021-02-05 ENCOUNTER — IMMUNIZATION (OUTPATIENT)
Dept: FAMILY MEDICINE | Facility: CLINIC | Age: 71
End: 2021-02-05
Payer: COMMERCIAL

## 2021-02-05 DIAGNOSIS — Z23 NEED FOR VACCINATION: Primary | ICD-10-CM

## 2021-02-05 PROCEDURE — 91300 COVID-19, MRNA, LNP-S, PF, 30 MCG/0.3 ML DOSE VACCINE: CPT | Mod: ,,, | Performed by: FAMILY MEDICINE

## 2021-02-05 PROCEDURE — 91300 COVID-19, MRNA, LNP-S, PF, 30 MCG/0.3 ML DOSE VACCINE: ICD-10-PCS | Mod: ,,, | Performed by: FAMILY MEDICINE

## 2021-02-05 PROCEDURE — 0001A COVID-19, MRNA, LNP-S, PF, 30 MCG/0.3 ML DOSE VACCINE: CPT | Mod: CV19,,, | Performed by: FAMILY MEDICINE

## 2021-02-05 PROCEDURE — 0001A COVID-19, MRNA, LNP-S, PF, 30 MCG/0.3 ML DOSE VACCINE: ICD-10-PCS | Mod: CV19,,, | Performed by: FAMILY MEDICINE

## 2021-02-17 ENCOUNTER — OFFICE VISIT (OUTPATIENT)
Dept: FAMILY MEDICINE | Facility: CLINIC | Age: 71
End: 2021-02-17
Payer: COMMERCIAL

## 2021-02-17 VITALS
TEMPERATURE: 97 F | WEIGHT: 177 LBS | DIASTOLIC BLOOD PRESSURE: 68 MMHG | HEIGHT: 72 IN | SYSTOLIC BLOOD PRESSURE: 128 MMHG | BODY MASS INDEX: 23.98 KG/M2

## 2021-02-17 DIAGNOSIS — I10 ESSENTIAL HYPERTENSION: ICD-10-CM

## 2021-02-17 DIAGNOSIS — R25.2 BILATERAL LEG CRAMPS: Primary | ICD-10-CM

## 2021-02-17 PROCEDURE — 99213 PR OFFICE/OUTPT VISIT, EST, LEVL III, 20-29 MIN: ICD-10-PCS | Mod: S$GLB,,, | Performed by: NURSE PRACTITIONER

## 2021-02-17 PROCEDURE — 99213 OFFICE O/P EST LOW 20 MIN: CPT | Mod: S$GLB,,, | Performed by: NURSE PRACTITIONER

## 2021-02-17 RX ORDER — PEN NEEDLE, DIABETIC 31 GX5/16"
NEEDLE, DISPOSABLE MISCELLANEOUS
COMMUNITY
Start: 2021-01-26 | End: 2021-04-20 | Stop reason: SDUPTHER

## 2021-02-17 RX ORDER — LANCING DEVICE
EACH MISCELLANEOUS
COMMUNITY
Start: 2021-01-26 | End: 2022-01-05

## 2021-02-18 ENCOUNTER — HOSPITAL ENCOUNTER (OUTPATIENT)
Dept: RADIOLOGY | Facility: HOSPITAL | Age: 71
Discharge: HOME | End: 2021-02-18
Attending: NURSE PRACTITIONER
Payer: COMMERCIAL

## 2021-02-18 DIAGNOSIS — R25.2 BILATERAL LEG CRAMPS: ICD-10-CM

## 2021-02-18 DIAGNOSIS — R60.0 BILATERAL LEG EDEMA: ICD-10-CM

## 2021-02-18 PROCEDURE — 93925 LOWER EXTREMITY STUDY: CPT | Mod: TC,PO

## 2021-02-18 PROCEDURE — 93970 EXTREMITY STUDY: CPT | Mod: TC,PO

## 2021-02-26 ENCOUNTER — IMMUNIZATION (OUTPATIENT)
Dept: FAMILY MEDICINE | Facility: CLINIC | Age: 71
End: 2021-02-26
Payer: COMMERCIAL

## 2021-02-26 DIAGNOSIS — Z23 NEED FOR VACCINATION: Primary | ICD-10-CM

## 2021-02-26 PROCEDURE — 91300 COVID-19, MRNA, LNP-S, PF, 30 MCG/0.3 ML DOSE VACCINE: ICD-10-PCS | Mod: ,,, | Performed by: FAMILY MEDICINE

## 2021-02-26 PROCEDURE — 0002A COVID-19, MRNA, LNP-S, PF, 30 MCG/0.3 ML DOSE VACCINE: ICD-10-PCS | Mod: CV19,,, | Performed by: FAMILY MEDICINE

## 2021-02-26 PROCEDURE — 91300 COVID-19, MRNA, LNP-S, PF, 30 MCG/0.3 ML DOSE VACCINE: CPT | Mod: ,,, | Performed by: FAMILY MEDICINE

## 2021-02-26 PROCEDURE — 0002A COVID-19, MRNA, LNP-S, PF, 30 MCG/0.3 ML DOSE VACCINE: CPT | Mod: CV19,,, | Performed by: FAMILY MEDICINE

## 2021-03-02 ENCOUNTER — TELEPHONE (OUTPATIENT)
Dept: FAMILY MEDICINE | Facility: CLINIC | Age: 71
End: 2021-03-02

## 2021-03-15 ENCOUNTER — TELEPHONE (OUTPATIENT)
Dept: FAMILY MEDICINE | Facility: CLINIC | Age: 71
End: 2021-03-15

## 2021-03-15 DIAGNOSIS — I73.9 PERIPHERAL ARTERIAL DISEASE: Primary | ICD-10-CM

## 2021-04-20 DIAGNOSIS — J44.9 CHRONIC OBSTRUCTIVE PULMONARY DISEASE, UNSPECIFIED COPD TYPE: Chronic | ICD-10-CM

## 2021-04-20 DIAGNOSIS — H66.001 NON-RECURRENT ACUTE SUPPURATIVE OTITIS MEDIA OF RIGHT EAR WITHOUT SPONTANEOUS RUPTURE OF TYMPANIC MEMBRANE: ICD-10-CM

## 2021-04-20 DIAGNOSIS — N40.0 BENIGN PROSTATIC HYPERPLASIA, UNSPECIFIED WHETHER LOWER URINARY TRACT SYMPTOMS PRESENT: Chronic | ICD-10-CM

## 2021-04-20 DIAGNOSIS — I10 ESSENTIAL HYPERTENSION: ICD-10-CM

## 2021-04-20 DIAGNOSIS — E78.2 MIXED HYPERLIPIDEMIA: ICD-10-CM

## 2021-04-20 DIAGNOSIS — I50.22 CHRONIC SYSTOLIC HEART FAILURE: Chronic | ICD-10-CM

## 2021-04-20 DIAGNOSIS — Z91.09 ENVIRONMENTAL ALLERGIES: Primary | ICD-10-CM

## 2021-04-20 DIAGNOSIS — R25.2 BILATERAL LEG CRAMPS: ICD-10-CM

## 2021-04-20 DIAGNOSIS — E11.9 TYPE 2 DIABETES MELLITUS WITHOUT COMPLICATION, WITHOUT LONG-TERM CURRENT USE OF INSULIN: ICD-10-CM

## 2021-04-20 RX ORDER — PEN NEEDLE, DIABETIC 31 GX5/16"
1 NEEDLE, DISPOSABLE MISCELLANEOUS 2 TIMES DAILY
Qty: 100 EACH | Refills: 2 | Status: SHIPPED | OUTPATIENT
Start: 2021-04-20 | End: 2022-01-05

## 2021-04-20 RX ORDER — METFORMIN HYDROCHLORIDE 1000 MG/1
1000 TABLET ORAL 2 TIMES DAILY WITH MEALS
Qty: 180 TABLET | Refills: 3 | Status: SHIPPED | OUTPATIENT
Start: 2021-04-20 | End: 2021-04-22 | Stop reason: SDUPTHER

## 2021-04-20 RX ORDER — FLUTICASONE FUROATE 100 UG/1
1 POWDER RESPIRATORY (INHALATION) DAILY
Qty: 90 EACH | Refills: 0 | OUTPATIENT
Start: 2021-04-20

## 2021-04-20 RX ORDER — ATORVASTATIN CALCIUM 40 MG/1
40 TABLET, FILM COATED ORAL DAILY
Qty: 90 TABLET | Refills: 3 | Status: SHIPPED | OUTPATIENT
Start: 2021-04-20 | End: 2021-12-23 | Stop reason: SDUPTHER

## 2021-04-20 RX ORDER — FINASTERIDE 5 MG/1
5 TABLET, FILM COATED ORAL DAILY
Qty: 90 TABLET | Refills: 3 | Status: SHIPPED | OUTPATIENT
Start: 2021-04-20 | End: 2022-01-05 | Stop reason: ALTCHOICE

## 2021-04-20 RX ORDER — TAMSULOSIN HYDROCHLORIDE 0.4 MG/1
0.4 CAPSULE ORAL DAILY
Qty: 90 CAPSULE | Refills: 1 | Status: SHIPPED | OUTPATIENT
Start: 2021-04-20 | End: 2021-04-22 | Stop reason: SDUPTHER

## 2021-04-20 RX ORDER — AZELASTINE 1 MG/ML
1 SPRAY, METERED NASAL 2 TIMES DAILY PRN
Qty: 30 ML | Refills: 1 | Status: SHIPPED | OUTPATIENT
Start: 2021-04-20 | End: 2023-02-22

## 2021-04-20 RX ORDER — FLUTICASONE FUROATE AND VILANTEROL TRIFENATATE 100; 25 UG/1; UG/1
1 POWDER RESPIRATORY (INHALATION) DAILY
Qty: 3 EACH | Refills: 3 | Status: SHIPPED | OUTPATIENT
Start: 2021-04-20 | End: 2021-10-20 | Stop reason: SDUPTHER

## 2021-04-20 RX ORDER — ALBUTEROL SULFATE 90 UG/1
1-2 AEROSOL, METERED RESPIRATORY (INHALATION) EVERY 6 HOURS PRN
Qty: 18 G | Refills: 3 | Status: SHIPPED | OUTPATIENT
Start: 2021-04-20 | End: 2021-12-23 | Stop reason: SDUPTHER

## 2021-04-20 RX ORDER — FLUTICASONE PROPIONATE 50 MCG
1 SPRAY, SUSPENSION (ML) NASAL DAILY
Qty: 15.8 ML | Refills: 1 | Status: SHIPPED | OUTPATIENT
Start: 2021-04-20 | End: 2022-11-08

## 2021-04-20 RX ORDER — GABAPENTIN 300 MG/1
300 CAPSULE ORAL NIGHTLY
Qty: 90 CAPSULE | Refills: 1 | Status: SHIPPED | OUTPATIENT
Start: 2021-04-20 | End: 2021-04-22 | Stop reason: SDUPTHER

## 2021-04-20 RX ORDER — AMLODIPINE AND BENAZEPRIL HYDROCHLORIDE 5; 20 MG/1; MG/1
1 CAPSULE ORAL NIGHTLY
Qty: 90 CAPSULE | Refills: 3 | Status: SHIPPED | OUTPATIENT
Start: 2021-04-20 | End: 2021-04-22 | Stop reason: SDUPTHER

## 2021-04-20 RX ORDER — FUROSEMIDE 20 MG/1
20 TABLET ORAL 2 TIMES DAILY
Qty: 180 TABLET | Refills: 1 | Status: SHIPPED | OUTPATIENT
Start: 2021-04-20 | End: 2021-04-22 | Stop reason: SDUPTHER

## 2021-04-20 RX ORDER — LANOLIN ALCOHOL/MO/W.PET/CERES
400 CREAM (GRAM) TOPICAL DAILY
Refills: 0 | COMMUNITY
Start: 2021-04-20

## 2021-04-22 DIAGNOSIS — I10 ESSENTIAL HYPERTENSION: ICD-10-CM

## 2021-04-22 DIAGNOSIS — E11.9 TYPE 2 DIABETES MELLITUS WITHOUT COMPLICATION, WITHOUT LONG-TERM CURRENT USE OF INSULIN: ICD-10-CM

## 2021-04-26 RX ORDER — AMLODIPINE AND BENAZEPRIL HYDROCHLORIDE 5; 20 MG/1; MG/1
1 CAPSULE ORAL NIGHTLY
Qty: 90 CAPSULE | Refills: 3 | Status: ON HOLD | OUTPATIENT
Start: 2021-04-26 | End: 2021-12-28 | Stop reason: HOSPADM

## 2021-04-26 RX ORDER — TAMSULOSIN HYDROCHLORIDE 0.4 MG/1
0.4 CAPSULE ORAL DAILY
Qty: 90 CAPSULE | Refills: 1 | Status: SHIPPED | OUTPATIENT
Start: 2021-04-26 | End: 2021-08-17 | Stop reason: SDUPTHER

## 2021-04-26 RX ORDER — METFORMIN HYDROCHLORIDE 1000 MG/1
1000 TABLET ORAL 2 TIMES DAILY WITH MEALS
Qty: 180 TABLET | Refills: 3 | Status: SHIPPED | OUTPATIENT
Start: 2021-04-26 | End: 2021-11-23

## 2021-04-26 RX ORDER — FUROSEMIDE 20 MG/1
20 TABLET ORAL 2 TIMES DAILY
Qty: 180 TABLET | Refills: 1 | Status: SHIPPED | OUTPATIENT
Start: 2021-04-26 | End: 2021-11-23

## 2021-04-26 RX ORDER — GABAPENTIN 300 MG/1
300 CAPSULE ORAL NIGHTLY
Qty: 90 CAPSULE | Refills: 1 | Status: SHIPPED | OUTPATIENT
Start: 2021-04-26 | End: 2021-08-17 | Stop reason: SDUPTHER

## 2021-06-09 ENCOUNTER — OFFICE VISIT (OUTPATIENT)
Dept: FAMILY MEDICINE | Facility: CLINIC | Age: 71
End: 2021-06-09
Payer: COMMERCIAL

## 2021-06-09 DIAGNOSIS — R53.81 MALAISE: ICD-10-CM

## 2021-06-09 DIAGNOSIS — I10 ESSENTIAL HYPERTENSION: ICD-10-CM

## 2021-06-09 DIAGNOSIS — R40.0 HAS DAYTIME DROWSINESS: ICD-10-CM

## 2021-06-09 DIAGNOSIS — D64.9 ANEMIA, UNSPECIFIED TYPE: Primary | ICD-10-CM

## 2021-06-09 PROCEDURE — 99214 OFFICE O/P EST MOD 30 MIN: CPT | Mod: S$GLB,,, | Performed by: NURSE PRACTITIONER

## 2021-06-09 PROCEDURE — 99214 PR OFFICE/OUTPT VISIT, EST, LEVL IV, 30-39 MIN: ICD-10-PCS | Mod: S$GLB,,, | Performed by: NURSE PRACTITIONER

## 2021-06-10 VITALS
HEIGHT: 72 IN | WEIGHT: 175 LBS | OXYGEN SATURATION: 98 % | RESPIRATION RATE: 19 BRPM | BODY MASS INDEX: 23.7 KG/M2 | HEART RATE: 78 BPM | DIASTOLIC BLOOD PRESSURE: 72 MMHG | SYSTOLIC BLOOD PRESSURE: 136 MMHG

## 2021-06-11 ENCOUNTER — LAB VISIT (OUTPATIENT)
Dept: LAB | Facility: HOSPITAL | Age: 71
End: 2021-06-11
Attending: NURSE PRACTITIONER
Payer: COMMERCIAL

## 2021-06-11 DIAGNOSIS — R53.81 MALAISE: ICD-10-CM

## 2021-06-11 DIAGNOSIS — D64.9 ANEMIA, UNSPECIFIED TYPE: ICD-10-CM

## 2021-06-11 DIAGNOSIS — R40.0 HAS DAYTIME DROWSINESS: ICD-10-CM

## 2021-06-11 LAB
25(OH)D3+25(OH)D2 SERPL-MCNC: 68 NG/ML (ref 30–96)
ALBUMIN SERPL BCP-MCNC: 3.4 G/DL (ref 3.5–5.2)
ALP SERPL-CCNC: 64 U/L (ref 55–135)
ALT SERPL W/O P-5'-P-CCNC: 16 U/L (ref 10–44)
ANION GAP SERPL CALC-SCNC: 9 MMOL/L (ref 8–16)
AST SERPL-CCNC: 23 U/L (ref 10–40)
BASOPHILS # BLD AUTO: 0.06 K/UL (ref 0–0.2)
BASOPHILS NFR BLD: 1 % (ref 0–1.9)
BILIRUB SERPL-MCNC: 0.9 MG/DL (ref 0.1–1)
BUN SERPL-MCNC: 26 MG/DL (ref 8–23)
CALCIUM SERPL-MCNC: 8.9 MG/DL (ref 8.7–10.5)
CHLORIDE SERPL-SCNC: 107 MMOL/L (ref 95–110)
CO2 SERPL-SCNC: 23 MMOL/L (ref 23–29)
CREAT SERPL-MCNC: 1.5 MG/DL (ref 0.5–1.4)
DIFFERENTIAL METHOD: ABNORMAL
EOSINOPHIL # BLD AUTO: 0.3 K/UL (ref 0–0.5)
EOSINOPHIL NFR BLD: 5.3 % (ref 0–8)
ERYTHROCYTE [DISTWIDTH] IN BLOOD BY AUTOMATED COUNT: 17 % (ref 11.5–14.5)
EST. GFR  (AFRICAN AMERICAN): 53.8 ML/MIN/1.73 M^2
EST. GFR  (NON AFRICAN AMERICAN): 46.5 ML/MIN/1.73 M^2
FERRITIN SERPL-MCNC: 49 NG/ML (ref 20–300)
FOLATE SERPL-MCNC: 10.8 NG/ML (ref 4–24)
GLUCOSE SERPL-MCNC: 103 MG/DL (ref 70–110)
HCT VFR BLD AUTO: 31.4 % (ref 40–54)
HGB BLD-MCNC: 10.2 G/DL (ref 14–18)
IMM GRANULOCYTES # BLD AUTO: 0.02 K/UL (ref 0–0.04)
IMM GRANULOCYTES NFR BLD AUTO: 0.3 % (ref 0–0.5)
IRON SERPL-MCNC: 34 UG/DL (ref 45–160)
LYMPHOCYTES # BLD AUTO: 1.2 K/UL (ref 1–4.8)
LYMPHOCYTES NFR BLD: 21.2 % (ref 18–48)
MCH RBC QN AUTO: 27.5 PG (ref 27–31)
MCHC RBC AUTO-ENTMCNC: 32.5 G/DL (ref 32–36)
MCV RBC AUTO: 85 FL (ref 82–98)
MONOCYTES # BLD AUTO: 0.5 K/UL (ref 0.3–1)
MONOCYTES NFR BLD: 8.9 % (ref 4–15)
NEUTROPHILS # BLD AUTO: 3.7 K/UL (ref 1.8–7.7)
NEUTROPHILS NFR BLD: 63.3 % (ref 38–73)
NRBC BLD-RTO: 0 /100 WBC
PLATELET # BLD AUTO: 174 K/UL (ref 150–450)
PMV BLD AUTO: 10.9 FL (ref 9.2–12.9)
POTASSIUM SERPL-SCNC: 4.2 MMOL/L (ref 3.5–5.1)
PROT SERPL-MCNC: 7.1 G/DL (ref 6–8.4)
RBC # BLD AUTO: 3.71 M/UL (ref 4.6–6.2)
SATURATED IRON: 9 % (ref 20–50)
SODIUM SERPL-SCNC: 139 MMOL/L (ref 136–145)
TOTAL IRON BINDING CAPACITY: 365 UG/DL (ref 250–450)
TRANSFERRIN SERPL-MCNC: 261 MG/DL (ref 200–375)
TSH SERPL DL<=0.005 MIU/L-ACNC: 2.55 UIU/ML (ref 0.34–5.6)
VIT B12 SERPL-MCNC: 251 PG/ML (ref 210–950)
WBC # BLD AUTO: 5.86 K/UL (ref 3.9–12.7)

## 2021-06-11 PROCEDURE — 80053 COMPREHEN METABOLIC PANEL: CPT | Performed by: NURSE PRACTITIONER

## 2021-06-11 PROCEDURE — 83540 ASSAY OF IRON: CPT | Performed by: NURSE PRACTITIONER

## 2021-06-11 PROCEDURE — 36415 COLL VENOUS BLD VENIPUNCTURE: CPT | Performed by: NURSE PRACTITIONER

## 2021-06-11 PROCEDURE — 82746 ASSAY OF FOLIC ACID SERUM: CPT | Performed by: NURSE PRACTITIONER

## 2021-06-11 PROCEDURE — 84402 ASSAY OF FREE TESTOSTERONE: CPT | Performed by: NURSE PRACTITIONER

## 2021-06-11 PROCEDURE — 82306 VITAMIN D 25 HYDROXY: CPT | Performed by: NURSE PRACTITIONER

## 2021-06-11 PROCEDURE — 82607 VITAMIN B-12: CPT | Performed by: NURSE PRACTITIONER

## 2021-06-11 PROCEDURE — 85025 COMPLETE CBC W/AUTO DIFF WBC: CPT | Performed by: NURSE PRACTITIONER

## 2021-06-11 PROCEDURE — 82728 ASSAY OF FERRITIN: CPT | Performed by: NURSE PRACTITIONER

## 2021-06-11 PROCEDURE — 84443 ASSAY THYROID STIM HORMONE: CPT | Performed by: NURSE PRACTITIONER

## 2021-06-16 LAB — TESTOST FREE SERPL-MCNC: 10.5 PG/ML (ref 6.6–18.1)

## 2021-06-18 ENCOUNTER — TELEPHONE (OUTPATIENT)
Dept: FAMILY MEDICINE | Facility: CLINIC | Age: 71
End: 2021-06-18

## 2021-06-18 DIAGNOSIS — E11.9 TYPE 2 DIABETES MELLITUS WITHOUT COMPLICATION, WITHOUT LONG-TERM CURRENT USE OF INSULIN: ICD-10-CM

## 2021-06-18 DIAGNOSIS — R97.20 ELEVATED PSA, BETWEEN 10 AND LESS THAN 20 NG/ML: Primary | ICD-10-CM

## 2021-06-22 ENCOUNTER — TELEPHONE (OUTPATIENT)
Dept: UROLOGY | Facility: CLINIC | Age: 71
End: 2021-06-22

## 2021-06-22 ENCOUNTER — TELEPHONE (OUTPATIENT)
Dept: FAMILY MEDICINE | Facility: CLINIC | Age: 71
End: 2021-06-22

## 2021-07-07 ENCOUNTER — TELEPHONE (OUTPATIENT)
Dept: FAMILY MEDICINE | Facility: CLINIC | Age: 71
End: 2021-07-07

## 2021-07-20 ENCOUNTER — OFFICE VISIT (OUTPATIENT)
Dept: OPHTHALMOLOGY | Facility: CLINIC | Age: 71
End: 2021-07-20
Payer: COMMERCIAL

## 2021-07-20 DIAGNOSIS — H25.13 NUCLEAR SCLEROTIC CATARACT, BILATERAL: ICD-10-CM

## 2021-07-20 DIAGNOSIS — E08.3213 DIABETES MELLITUS DUE TO UNDERLYING CONDITION WITH BOTH EYES AFFECTED BY MILD NONPROLIFERATIVE RETINOPATHY AND MACULAR EDEMA, WITHOUT LONG-TERM CURRENT USE OF INSULIN: ICD-10-CM

## 2021-07-20 DIAGNOSIS — H40.023 OAG (OPEN ANGLE GLAUCOMA) SUSPECT, HIGH RISK, BILATERAL: Primary | ICD-10-CM

## 2021-07-20 PROCEDURE — 92133 POSTERIOR SEGMENT OCT OPTIC NERVE(OCULAR COHERENCE TOMOGRAPHY) - OU - BOTH EYES: ICD-10-PCS | Mod: S$GLB,,, | Performed by: OPHTHALMOLOGY

## 2021-07-20 PROCEDURE — 92133 CPTRZD OPH DX IMG PST SGM ON: CPT | Mod: S$GLB,,, | Performed by: OPHTHALMOLOGY

## 2021-07-20 PROCEDURE — 92004 PR EYE EXAM, NEW PATIENT,COMPREHESV: ICD-10-PCS | Mod: S$GLB,,, | Performed by: OPHTHALMOLOGY

## 2021-07-20 PROCEDURE — 99999 PR PBB SHADOW E&M-EST. PATIENT-LVL III: CPT | Mod: PBBFAC,,, | Performed by: OPHTHALMOLOGY

## 2021-07-20 PROCEDURE — 92015 PR REFRACTION: ICD-10-PCS | Mod: S$GLB,,, | Performed by: OPHTHALMOLOGY

## 2021-07-20 PROCEDURE — 92004 COMPRE OPH EXAM NEW PT 1/>: CPT | Mod: S$GLB,,, | Performed by: OPHTHALMOLOGY

## 2021-07-20 PROCEDURE — 99999 PR PBB SHADOW E&M-EST. PATIENT-LVL III: ICD-10-PCS | Mod: PBBFAC,,, | Performed by: OPHTHALMOLOGY

## 2021-07-20 PROCEDURE — 92015 DETERMINE REFRACTIVE STATE: CPT | Mod: S$GLB,,, | Performed by: OPHTHALMOLOGY

## 2021-08-17 ENCOUNTER — OFFICE VISIT (OUTPATIENT)
Dept: FAMILY MEDICINE | Facility: CLINIC | Age: 71
End: 2021-08-17
Payer: COMMERCIAL

## 2021-08-17 VITALS
BODY MASS INDEX: 23.77 KG/M2 | HEART RATE: 65 BPM | WEIGHT: 175.31 LBS | DIASTOLIC BLOOD PRESSURE: 62 MMHG | TEMPERATURE: 98 F | OXYGEN SATURATION: 99 % | SYSTOLIC BLOOD PRESSURE: 140 MMHG

## 2021-08-17 DIAGNOSIS — I10 ESSENTIAL HYPERTENSION: ICD-10-CM

## 2021-08-17 DIAGNOSIS — I35.0 AORTIC STENOSIS, SEVERE: Chronic | ICD-10-CM

## 2021-08-17 DIAGNOSIS — I25.810 CORONARY ARTERY DISEASE INVOLVING CORONARY BYPASS GRAFT OF NATIVE HEART WITHOUT ANGINA PECTORIS: ICD-10-CM

## 2021-08-17 DIAGNOSIS — E11.9 TYPE 2 DIABETES MELLITUS WITHOUT COMPLICATION, WITHOUT LONG-TERM CURRENT USE OF INSULIN: Primary | ICD-10-CM

## 2021-08-17 DIAGNOSIS — N40.0 BENIGN PROSTATIC HYPERPLASIA, UNSPECIFIED WHETHER LOWER URINARY TRACT SYMPTOMS PRESENT: Chronic | ICD-10-CM

## 2021-08-17 LAB
ALBUMIN CREATININE RATIO: >300 MG/G
ALBUMIN: ABNORMAL
CLARITY, FLUID: CLEAR
COLOR: ABNORMAL
CREAT FLD-MCNC: ABNORMAL MG/DL
HBA1C MFR BLD: 5.5 %
POC MICROALBUMIN URINE: ABNORMAL

## 2021-08-17 PROCEDURE — 99214 OFFICE O/P EST MOD 30 MIN: CPT | Mod: S$GLB,,, | Performed by: NURSE PRACTITIONER

## 2021-08-17 PROCEDURE — 82044 POCT MICROALBUMIN: ICD-10-PCS | Mod: QW,S$GLB,, | Performed by: NURSE PRACTITIONER

## 2021-08-17 PROCEDURE — 83036 POCT HEMOGLOBIN A1C: ICD-10-PCS | Mod: QW,S$GLB,, | Performed by: NURSE PRACTITIONER

## 2021-08-17 PROCEDURE — 99214 PR OFFICE/OUTPT VISIT, EST, LEVL IV, 30-39 MIN: ICD-10-PCS | Mod: S$GLB,,, | Performed by: NURSE PRACTITIONER

## 2021-08-17 PROCEDURE — 83036 HEMOGLOBIN GLYCOSYLATED A1C: CPT | Mod: QW,S$GLB,, | Performed by: NURSE PRACTITIONER

## 2021-08-17 PROCEDURE — 82044 UR ALBUMIN SEMIQUANTITATIVE: CPT | Mod: QW,S$GLB,, | Performed by: NURSE PRACTITIONER

## 2021-08-17 RX ORDER — TAMSULOSIN HYDROCHLORIDE 0.4 MG/1
0.4 CAPSULE ORAL DAILY
Qty: 90 CAPSULE | Refills: 1 | Status: SHIPPED | OUTPATIENT
Start: 2021-08-17 | End: 2021-12-23 | Stop reason: SDUPTHER

## 2021-08-17 RX ORDER — GABAPENTIN 300 MG/1
300 CAPSULE ORAL NIGHTLY
Qty: 90 CAPSULE | Refills: 1 | Status: SHIPPED | OUTPATIENT
Start: 2021-08-17 | End: 2021-12-23 | Stop reason: SDUPTHER

## 2021-10-20 DIAGNOSIS — J44.9 CHRONIC OBSTRUCTIVE PULMONARY DISEASE, UNSPECIFIED COPD TYPE: Chronic | ICD-10-CM

## 2021-10-20 RX ORDER — FLUTICASONE FUROATE AND VILANTEROL TRIFENATATE 100; 25 UG/1; UG/1
1 POWDER RESPIRATORY (INHALATION) DAILY
Qty: 3 EACH | Refills: 3 | Status: SHIPPED | OUTPATIENT
Start: 2021-10-20 | End: 2023-08-29 | Stop reason: SDUPTHER

## 2021-10-20 RX ORDER — FLUTICASONE FUROATE 100 UG/1
1 POWDER RESPIRATORY (INHALATION) DAILY PRN
Refills: 6 | OUTPATIENT
Start: 2021-10-20

## 2021-10-20 RX ORDER — PREDNISONE 10 MG/1
TABLET ORAL
Qty: 20 TABLET | Refills: 0 | Status: SHIPPED | OUTPATIENT
Start: 2021-10-20 | End: 2021-10-28

## 2021-10-26 ENCOUNTER — HOSPITAL ENCOUNTER (INPATIENT)
Facility: HOSPITAL | Age: 71
LOS: 2 days | Discharge: HOME | DRG: 291 | End: 2021-10-28
Attending: EMERGENCY MEDICINE | Admitting: INTERNAL MEDICINE
Payer: COMMERCIAL

## 2021-10-26 DIAGNOSIS — R06.02 SOB (SHORTNESS OF BREATH): ICD-10-CM

## 2021-10-26 DIAGNOSIS — R60.0 PERIPHERAL EDEMA: ICD-10-CM

## 2021-10-26 DIAGNOSIS — R06.02 SHORTNESS OF BREATH: ICD-10-CM

## 2021-10-26 DIAGNOSIS — I50.43 ACUTE ON CHRONIC HEART FAILURE WITH REDUCED EJECTION FRACTION AND DIASTOLIC DYSFUNCTION: ICD-10-CM

## 2021-10-26 DIAGNOSIS — I50.9 ACUTE ON CHRONIC CONGESTIVE HEART FAILURE, UNSPECIFIED HEART FAILURE TYPE: Primary | ICD-10-CM

## 2021-10-26 LAB
ALBUMIN SERPL BCP-MCNC: 3.5 G/DL (ref 3.5–5.2)
ALP SERPL-CCNC: 75 U/L (ref 55–135)
ALT SERPL W/O P-5'-P-CCNC: 39 U/L (ref 10–44)
ANION GAP SERPL CALC-SCNC: 8 MMOL/L (ref 8–16)
AST SERPL-CCNC: 37 U/L (ref 10–40)
BASOPHILS # BLD AUTO: 0.05 K/UL (ref 0–0.2)
BASOPHILS NFR BLD: 0.7 % (ref 0–1.9)
BILIRUB SERPL-MCNC: 1.5 MG/DL (ref 0.1–1)
BNP SERPL-MCNC: >4500 PG/ML (ref 0–99)
BUN SERPL-MCNC: 23 MG/DL (ref 8–23)
CALCIUM SERPL-MCNC: 9 MG/DL (ref 8.7–10.5)
CHLORIDE SERPL-SCNC: 107 MMOL/L (ref 95–110)
CO2 SERPL-SCNC: 24 MMOL/L (ref 23–29)
CREAT SERPL-MCNC: 1.8 MG/DL (ref 0.5–1.4)
DIFFERENTIAL METHOD: ABNORMAL
EOSINOPHIL # BLD AUTO: 0.1 K/UL (ref 0–0.5)
EOSINOPHIL NFR BLD: 0.9 % (ref 0–8)
ERYTHROCYTE [DISTWIDTH] IN BLOOD BY AUTOMATED COUNT: 17.8 % (ref 11.5–14.5)
EST. GFR  (AFRICAN AMERICAN): 42.8 ML/MIN/1.73 M^2
EST. GFR  (NON AFRICAN AMERICAN): 37 ML/MIN/1.73 M^2
GLUCOSE SERPL-MCNC: 146 MG/DL (ref 70–110)
HCT VFR BLD AUTO: 33.7 % (ref 40–54)
HGB BLD-MCNC: 10.9 G/DL (ref 14–18)
IMM GRANULOCYTES # BLD AUTO: 0.03 K/UL (ref 0–0.04)
IMM GRANULOCYTES NFR BLD AUTO: 0.4 % (ref 0–0.5)
LYMPHOCYTES # BLD AUTO: 1 K/UL (ref 1–4.8)
LYMPHOCYTES NFR BLD: 15 % (ref 18–48)
MCH RBC QN AUTO: 28.8 PG (ref 27–31)
MCHC RBC AUTO-ENTMCNC: 32.3 G/DL (ref 32–36)
MCV RBC AUTO: 89 FL (ref 82–98)
MONOCYTES # BLD AUTO: 0.4 K/UL (ref 0.3–1)
MONOCYTES NFR BLD: 5.9 % (ref 4–15)
NEUTROPHILS # BLD AUTO: 5.3 K/UL (ref 1.8–7.7)
NEUTROPHILS NFR BLD: 77.1 % (ref 38–73)
NRBC BLD-RTO: 0 /100 WBC
PLATELET # BLD AUTO: 204 K/UL (ref 150–450)
PMV BLD AUTO: 10.9 FL (ref 9.2–12.9)
POTASSIUM SERPL-SCNC: 4.4 MMOL/L (ref 3.5–5.1)
PROT SERPL-MCNC: 7.3 G/DL (ref 6–8.4)
RBC # BLD AUTO: 3.79 M/UL (ref 4.6–6.2)
SARS-COV-2 RDRP RESP QL NAA+PROBE: NEGATIVE
SODIUM SERPL-SCNC: 139 MMOL/L (ref 136–145)
TROPONIN I SERPL DL<=0.01 NG/ML-MCNC: 0.03 NG/ML
TROPONIN I SERPL DL<=0.01 NG/ML-MCNC: <0.03 NG/ML
WBC # BLD AUTO: 6.82 K/UL (ref 3.9–12.7)

## 2021-10-26 PROCEDURE — 85025 COMPLETE CBC W/AUTO DIFF WBC: CPT | Performed by: PHYSICIAN ASSISTANT

## 2021-10-26 PROCEDURE — 96374 THER/PROPH/DIAG INJ IV PUSH: CPT

## 2021-10-26 PROCEDURE — 25000003 PHARM REV CODE 250: Performed by: NURSE PRACTITIONER

## 2021-10-26 PROCEDURE — 93010 EKG 12-LEAD: ICD-10-PCS | Mod: ,,, | Performed by: INTERNAL MEDICINE

## 2021-10-26 PROCEDURE — 36415 COLL VENOUS BLD VENIPUNCTURE: CPT | Performed by: NURSE PRACTITIONER

## 2021-10-26 PROCEDURE — 83880 ASSAY OF NATRIURETIC PEPTIDE: CPT | Performed by: PHYSICIAN ASSISTANT

## 2021-10-26 PROCEDURE — 21400001 HC TELEMETRY ROOM

## 2021-10-26 PROCEDURE — 80053 COMPREHEN METABOLIC PANEL: CPT | Performed by: PHYSICIAN ASSISTANT

## 2021-10-26 PROCEDURE — 93010 ELECTROCARDIOGRAM REPORT: CPT | Mod: ,,, | Performed by: INTERNAL MEDICINE

## 2021-10-26 PROCEDURE — 84484 ASSAY OF TROPONIN QUANT: CPT | Performed by: PHYSICIAN ASSISTANT

## 2021-10-26 PROCEDURE — 93005 ELECTROCARDIOGRAM TRACING: CPT | Performed by: INTERNAL MEDICINE

## 2021-10-26 PROCEDURE — U0002 COVID-19 LAB TEST NON-CDC: HCPCS | Performed by: EMERGENCY MEDICINE

## 2021-10-26 PROCEDURE — 84484 ASSAY OF TROPONIN QUANT: CPT | Mod: 91 | Performed by: NURSE PRACTITIONER

## 2021-10-26 PROCEDURE — 36415 COLL VENOUS BLD VENIPUNCTURE: CPT | Performed by: PHYSICIAN ASSISTANT

## 2021-10-26 PROCEDURE — 25000003 PHARM REV CODE 250: Performed by: INTERNAL MEDICINE

## 2021-10-26 PROCEDURE — 63600175 PHARM REV CODE 636 W HCPCS: Performed by: EMERGENCY MEDICINE

## 2021-10-26 PROCEDURE — 99285 EMERGENCY DEPT VISIT HI MDM: CPT | Mod: 25

## 2021-10-26 RX ORDER — SODIUM CHLORIDE 0.9 % (FLUSH) 0.9 %
10 SYRINGE (ML) INJECTION
Status: DISCONTINUED | OUTPATIENT
Start: 2021-10-26 | End: 2021-10-28 | Stop reason: HOSPADM

## 2021-10-26 RX ORDER — FUROSEMIDE 10 MG/ML
80 INJECTION INTRAMUSCULAR; INTRAVENOUS
Status: COMPLETED | OUTPATIENT
Start: 2021-10-26 | End: 2021-10-26

## 2021-10-26 RX ORDER — FLUTICASONE PROPIONATE 50 MCG
1 SPRAY, SUSPENSION (ML) NASAL DAILY
Status: DISCONTINUED | OUTPATIENT
Start: 2021-10-27 | End: 2021-10-28 | Stop reason: HOSPADM

## 2021-10-26 RX ORDER — FUROSEMIDE 40 MG/1
40 TABLET ORAL DAILY
Status: DISCONTINUED | OUTPATIENT
Start: 2021-10-27 | End: 2021-10-27

## 2021-10-26 RX ORDER — METOPROLOL SUCCINATE 25 MG/1
25 TABLET, EXTENDED RELEASE ORAL DAILY
COMMUNITY
Start: 2021-09-24 | End: 2021-12-23 | Stop reason: SDUPTHER

## 2021-10-26 RX ORDER — FINASTERIDE 5 MG/1
5 TABLET, FILM COATED ORAL DAILY
Status: DISCONTINUED | OUTPATIENT
Start: 2021-10-27 | End: 2021-10-28 | Stop reason: HOSPADM

## 2021-10-26 RX ORDER — IBUPROFEN 200 MG
16 TABLET ORAL
Status: DISCONTINUED | OUTPATIENT
Start: 2021-10-26 | End: 2021-10-28 | Stop reason: HOSPADM

## 2021-10-26 RX ORDER — GLUCAGON 1 MG
1 KIT INJECTION
Status: DISCONTINUED | OUTPATIENT
Start: 2021-10-26 | End: 2021-10-28 | Stop reason: HOSPADM

## 2021-10-26 RX ORDER — METOPROLOL SUCCINATE 25 MG/1
25 TABLET, EXTENDED RELEASE ORAL DAILY
Status: DISCONTINUED | OUTPATIENT
Start: 2021-10-27 | End: 2021-10-28 | Stop reason: HOSPADM

## 2021-10-26 RX ORDER — INSULIN ASPART 100 [IU]/ML
0-5 INJECTION, SOLUTION INTRAVENOUS; SUBCUTANEOUS
Status: DISCONTINUED | OUTPATIENT
Start: 2021-10-26 | End: 2021-10-28 | Stop reason: HOSPADM

## 2021-10-26 RX ORDER — LISINOPRIL 20 MG/1
20 TABLET ORAL NIGHTLY
Status: DISCONTINUED | OUTPATIENT
Start: 2021-10-26 | End: 2021-10-28 | Stop reason: HOSPADM

## 2021-10-26 RX ORDER — SILDENAFIL 50 MG/1
50 TABLET, FILM COATED ORAL DAILY PRN
Status: ON HOLD | COMMUNITY
Start: 2021-09-24 | End: 2021-12-28 | Stop reason: HOSPADM

## 2021-10-26 RX ORDER — HYDROCODONE BITARTRATE AND ACETAMINOPHEN 7.5; 325 MG/1; MG/1
TABLET ORAL
Status: ON HOLD | COMMUNITY
Start: 2021-10-12 | End: 2021-12-28 | Stop reason: HOSPADM

## 2021-10-26 RX ORDER — TAMSULOSIN HYDROCHLORIDE 0.4 MG/1
0.4 CAPSULE ORAL DAILY
Status: DISCONTINUED | OUTPATIENT
Start: 2021-10-27 | End: 2021-10-28 | Stop reason: HOSPADM

## 2021-10-26 RX ORDER — HYDROCODONE BITARTRATE AND ACETAMINOPHEN 5; 325 MG/1; MG/1
1 TABLET ORAL EVERY 8 HOURS PRN
Status: DISCONTINUED | OUTPATIENT
Start: 2021-10-26 | End: 2021-10-28 | Stop reason: HOSPADM

## 2021-10-26 RX ORDER — AMLODIPINE AND BENAZEPRIL HYDROCHLORIDE 5; 20 MG/1; MG/1
1 CAPSULE ORAL NIGHTLY
Status: DISCONTINUED | OUTPATIENT
Start: 2021-10-26 | End: 2021-10-26

## 2021-10-26 RX ORDER — ONDANSETRON 2 MG/ML
4 INJECTION INTRAMUSCULAR; INTRAVENOUS EVERY 8 HOURS PRN
Status: DISCONTINUED | OUTPATIENT
Start: 2021-10-26 | End: 2021-10-28 | Stop reason: HOSPADM

## 2021-10-26 RX ORDER — ATORVASTATIN CALCIUM 40 MG/1
40 TABLET, FILM COATED ORAL DAILY
Status: DISCONTINUED | OUTPATIENT
Start: 2021-10-27 | End: 2021-10-28 | Stop reason: HOSPADM

## 2021-10-26 RX ORDER — GABAPENTIN 300 MG/1
300 CAPSULE ORAL NIGHTLY
Status: DISCONTINUED | OUTPATIENT
Start: 2021-10-26 | End: 2021-10-28 | Stop reason: HOSPADM

## 2021-10-26 RX ORDER — AMLODIPINE BESYLATE 5 MG/1
5 TABLET ORAL NIGHTLY
Status: DISCONTINUED | OUTPATIENT
Start: 2021-10-26 | End: 2021-10-28 | Stop reason: HOSPADM

## 2021-10-26 RX ORDER — HYDRALAZINE HYDROCHLORIDE 25 MG/1
25 TABLET, FILM COATED ORAL EVERY 8 HOURS PRN
Status: DISCONTINUED | OUTPATIENT
Start: 2021-10-26 | End: 2021-10-28 | Stop reason: HOSPADM

## 2021-10-26 RX ORDER — IBUPROFEN 200 MG
24 TABLET ORAL
Status: DISCONTINUED | OUTPATIENT
Start: 2021-10-26 | End: 2021-10-28 | Stop reason: HOSPADM

## 2021-10-26 RX ADMIN — HYDRALAZINE HYDROCHLORIDE 25 MG: 25 TABLET, FILM COATED ORAL at 09:10

## 2021-10-26 RX ADMIN — FUROSEMIDE 80 MG: 20 INJECTION, SOLUTION INTRAMUSCULAR; INTRAVENOUS at 06:10

## 2021-10-26 RX ADMIN — AMLODIPINE BESYLATE 5 MG: 5 TABLET ORAL at 10:10

## 2021-10-26 RX ADMIN — GABAPENTIN 300 MG: 300 CAPSULE ORAL at 09:10

## 2021-10-26 RX ADMIN — LISINOPRIL 20 MG: 20 TABLET ORAL at 10:10

## 2021-10-27 ENCOUNTER — CLINICAL SUPPORT (OUTPATIENT)
Dept: CARDIOLOGY | Facility: HOSPITAL | Age: 71
DRG: 291 | End: 2021-10-27
Attending: EMERGENCY MEDICINE
Payer: COMMERCIAL

## 2021-10-27 VITALS — WEIGHT: 175.94 LBS | BODY MASS INDEX: 23.83 KG/M2 | HEIGHT: 72 IN

## 2021-10-27 LAB
ALBUMIN SERPL BCP-MCNC: 3.2 G/DL (ref 3.5–5.2)
ALP SERPL-CCNC: 65 U/L (ref 55–135)
ALT SERPL W/O P-5'-P-CCNC: 35 U/L (ref 10–44)
ANION GAP SERPL CALC-SCNC: 10 MMOL/L (ref 8–16)
AST SERPL-CCNC: 30 U/L (ref 10–40)
BASOPHILS # BLD AUTO: 0.05 K/UL (ref 0–0.2)
BASOPHILS NFR BLD: 0.8 % (ref 0–1.9)
BILIRUB SERPL-MCNC: 1.2 MG/DL (ref 0.1–1)
BUN SERPL-MCNC: 29 MG/DL (ref 8–23)
CALCIUM SERPL-MCNC: 8.4 MG/DL (ref 8.7–10.5)
CHLORIDE SERPL-SCNC: 104 MMOL/L (ref 95–110)
CO2 SERPL-SCNC: 24 MMOL/L (ref 23–29)
CREAT SERPL-MCNC: 1.8 MG/DL (ref 0.5–1.4)
DIFFERENTIAL METHOD: ABNORMAL
EOSINOPHIL # BLD AUTO: 0.1 K/UL (ref 0–0.5)
EOSINOPHIL NFR BLD: 2.1 % (ref 0–8)
ERYTHROCYTE [DISTWIDTH] IN BLOOD BY AUTOMATED COUNT: 17.8 % (ref 11.5–14.5)
EST. GFR  (AFRICAN AMERICAN): 42.8 ML/MIN/1.73 M^2
EST. GFR  (NON AFRICAN AMERICAN): 37 ML/MIN/1.73 M^2
GLUCOSE SERPL-MCNC: 125 MG/DL (ref 70–110)
GLUCOSE SERPL-MCNC: 149 MG/DL (ref 70–110)
GLUCOSE SERPL-MCNC: 180 MG/DL (ref 70–110)
GLUCOSE SERPL-MCNC: 85 MG/DL (ref 70–110)
GLUCOSE SERPL-MCNC: 87 MG/DL (ref 70–110)
HCT VFR BLD AUTO: 30.8 % (ref 40–54)
HGB BLD-MCNC: 10.3 G/DL (ref 14–18)
IMM GRANULOCYTES # BLD AUTO: 0.01 K/UL (ref 0–0.04)
IMM GRANULOCYTES NFR BLD AUTO: 0.2 % (ref 0–0.5)
LYMPHOCYTES # BLD AUTO: 1.1 K/UL (ref 1–4.8)
LYMPHOCYTES NFR BLD: 18.1 % (ref 18–48)
MAGNESIUM SERPL-MCNC: 1.8 MG/DL (ref 1.6–2.6)
MCH RBC QN AUTO: 29.3 PG (ref 27–31)
MCHC RBC AUTO-ENTMCNC: 33.4 G/DL (ref 32–36)
MCV RBC AUTO: 88 FL (ref 82–98)
MONOCYTES # BLD AUTO: 0.5 K/UL (ref 0.3–1)
MONOCYTES NFR BLD: 8.3 % (ref 4–15)
NEUTROPHILS # BLD AUTO: 4.5 K/UL (ref 1.8–7.7)
NEUTROPHILS NFR BLD: 70.5 % (ref 38–73)
NRBC BLD-RTO: 0 /100 WBC
PHOSPHATE SERPL-MCNC: 3.9 MG/DL (ref 2.7–4.5)
PLATELET # BLD AUTO: 193 K/UL (ref 150–450)
PMV BLD AUTO: 10.9 FL (ref 9.2–12.9)
POTASSIUM SERPL-SCNC: 3.8 MMOL/L (ref 3.5–5.1)
PROT SERPL-MCNC: 6.4 G/DL (ref 6–8.4)
RBC # BLD AUTO: 3.51 M/UL (ref 4.6–6.2)
SODIUM SERPL-SCNC: 138 MMOL/L (ref 136–145)
WBC # BLD AUTO: 6.3 K/UL (ref 3.9–12.7)

## 2021-10-27 PROCEDURE — 25000242 PHARM REV CODE 250 ALT 637 W/ HCPCS: Performed by: NURSE PRACTITIONER

## 2021-10-27 PROCEDURE — 99223 1ST HOSP IP/OBS HIGH 75: CPT | Mod: ,,, | Performed by: INTERNAL MEDICINE

## 2021-10-27 PROCEDURE — 80053 COMPREHEN METABOLIC PANEL: CPT | Performed by: NURSE PRACTITIONER

## 2021-10-27 PROCEDURE — 21400001 HC TELEMETRY ROOM

## 2021-10-27 PROCEDURE — 99223 PR INITIAL HOSPITAL CARE,LEVL III: ICD-10-PCS | Mod: ,,, | Performed by: INTERNAL MEDICINE

## 2021-10-27 PROCEDURE — 85025 COMPLETE CBC W/AUTO DIFF WBC: CPT | Performed by: NURSE PRACTITIONER

## 2021-10-27 PROCEDURE — 25000003 PHARM REV CODE 250: Performed by: INTERNAL MEDICINE

## 2021-10-27 PROCEDURE — 83735 ASSAY OF MAGNESIUM: CPT | Performed by: NURSE PRACTITIONER

## 2021-10-27 PROCEDURE — 36415 COLL VENOUS BLD VENIPUNCTURE: CPT | Performed by: NURSE PRACTITIONER

## 2021-10-27 PROCEDURE — 93306 TTE W/DOPPLER COMPLETE: CPT

## 2021-10-27 PROCEDURE — 25000003 PHARM REV CODE 250: Performed by: NURSE PRACTITIONER

## 2021-10-27 PROCEDURE — 84100 ASSAY OF PHOSPHORUS: CPT | Performed by: NURSE PRACTITIONER

## 2021-10-27 RX ORDER — FUROSEMIDE 10 MG/ML
40 INJECTION INTRAMUSCULAR; INTRAVENOUS DAILY
Status: DISCONTINUED | OUTPATIENT
Start: 2021-10-28 | End: 2021-10-28 | Stop reason: HOSPADM

## 2021-10-27 RX ADMIN — AMLODIPINE BESYLATE 5 MG: 5 TABLET ORAL at 09:10

## 2021-10-27 RX ADMIN — GABAPENTIN 300 MG: 300 CAPSULE ORAL at 09:10

## 2021-10-27 RX ADMIN — FLUTICASONE PROPIONATE 50 MCG: 50 SPRAY, METERED NASAL at 09:10

## 2021-10-27 RX ADMIN — FUROSEMIDE 40 MG: 40 TABLET ORAL at 09:10

## 2021-10-27 RX ADMIN — TAMSULOSIN HYDROCHLORIDE 0.4 MG: 0.4 CAPSULE ORAL at 09:10

## 2021-10-27 RX ADMIN — LISINOPRIL 20 MG: 20 TABLET ORAL at 09:10

## 2021-10-27 RX ADMIN — APIXABAN 2.5 MG: 2.5 TABLET, FILM COATED ORAL at 09:10

## 2021-10-27 RX ADMIN — FINASTERIDE 5 MG: 5 TABLET, FILM COATED ORAL at 09:10

## 2021-10-27 RX ADMIN — ATORVASTATIN CALCIUM 40 MG: 40 TABLET, FILM COATED ORAL at 09:10

## 2021-10-28 VITALS
OXYGEN SATURATION: 98 % | BODY MASS INDEX: 23.83 KG/M2 | SYSTOLIC BLOOD PRESSURE: 134 MMHG | RESPIRATION RATE: 18 BRPM | HEIGHT: 72 IN | HEART RATE: 63 BPM | TEMPERATURE: 98 F | WEIGHT: 175.94 LBS | DIASTOLIC BLOOD PRESSURE: 63 MMHG

## 2021-10-28 LAB
ANION GAP SERPL CALC-SCNC: 10 MMOL/L (ref 8–16)
AORTIC ROOT ANNULUS: 3.4 CM
AORTIC VALVE CUSP SEPERATION: 1.4 CM
AV INDEX (PROSTH): 0.29
AV MEAN GRADIENT: 9 MMHG
AV PEAK GRADIENT: 18 MMHG
AV VALVE AREA: 0.92 CM2
AV VELOCITY RATIO: 30.96
BNP SERPL-MCNC: 3213 PG/ML (ref 0–99)
BSA FOR ECHO PROCEDURE: 2.01 M2
BUN SERPL-MCNC: 25 MG/DL (ref 8–23)
CALCIUM SERPL-MCNC: 9 MG/DL (ref 8.7–10.5)
CHLORIDE SERPL-SCNC: 107 MMOL/L (ref 95–110)
CO2 SERPL-SCNC: 26 MMOL/L (ref 23–29)
CREAT SERPL-MCNC: 1.8 MG/DL (ref 0.5–1.4)
CV ECHO LV RWT: 0.34 CM
DOP CALC AO PEAK VEL: 2.1 M/S
DOP CALC AO VTI: 49.48 CM
DOP CALC LVOT AREA: 3.1 CM2
DOP CALC LVOT DIAMETER: 2 CM
DOP CALC LVOT PEAK VEL: 65.02 M/S
DOP CALC LVOT STROKE VOLUME: 45.72 CM3
DOP CALCLVOT PEAK VEL VTI: 14.56 CM
E WAVE DECELERATION TIME: 284.66 MSEC
E/A RATIO: 2.32
E/E' RATIO: 24.91 M/S
ECHO LV POSTERIOR WALL: 0.92 CM (ref 0.6–1.1)
EJECTION FRACTION: 20 %
EST. GFR  (AFRICAN AMERICAN): 42.8 ML/MIN/1.73 M^2
EST. GFR  (NON AFRICAN AMERICAN): 37 ML/MIN/1.73 M^2
FRACTIONAL SHORTENING: 15 % (ref 28–44)
GLUCOSE SERPL-MCNC: 100 MG/DL (ref 70–110)
GLUCOSE SERPL-MCNC: 103 MG/DL (ref 70–110)
GLUCOSE SERPL-MCNC: 113 MG/DL (ref 70–110)
INTERVENTRICULAR SEPTUM: 0.9 CM (ref 0.6–1.1)
LEFT ATRIUM SIZE: 3.48 CM
LEFT INTERNAL DIMENSION IN SYSTOLE: 4.69 CM (ref 2.1–4)
LEFT VENTRICLE DIASTOLIC VOLUME INDEX: 87.4 ML/M2
LEFT VENTRICLE DIASTOLIC VOLUME: 176.55 ML
LEFT VENTRICLE MASS INDEX: 93 G/M2
LEFT VENTRICLE SYSTOLIC VOLUME INDEX: 47.7 ML/M2
LEFT VENTRICLE SYSTOLIC VOLUME: 96.28 ML
LEFT VENTRICULAR INTERNAL DIMENSION IN DIASTOLE: 5.49 CM (ref 3.5–6)
LEFT VENTRICULAR MASS: 187.92 G
LV LATERAL E/E' RATIO: 27.4 M/S
LV SEPTAL E/E' RATIO: 22.83 M/S
MV PEAK A VEL: 0.59 M/S
MV PEAK E VEL: 1.37 M/S
PISA TR MAX VEL: 3.29 M/S
POTASSIUM SERPL-SCNC: 4.2 MMOL/L (ref 3.5–5.1)
PV PEAK VELOCITY: 66.81 CM/S
RA PRESSURE: 15 MMHG
RIGHT VENTRICULAR END-DIASTOLIC DIMENSION: 301 CM
SODIUM SERPL-SCNC: 143 MMOL/L (ref 136–145)
TDI LATERAL: 0.05 M/S
TDI SEPTAL: 0.06 M/S
TDI: 0.06 M/S
TR MAX PG: 43 MMHG
TV REST PULMONARY ARTERY PRESSURE: 58 MMHG

## 2021-10-28 PROCEDURE — 83880 ASSAY OF NATRIURETIC PEPTIDE: CPT | Performed by: INTERNAL MEDICINE

## 2021-10-28 PROCEDURE — 63600175 PHARM REV CODE 636 W HCPCS: Performed by: NURSE PRACTITIONER

## 2021-10-28 PROCEDURE — 25000003 PHARM REV CODE 250: Performed by: NURSE PRACTITIONER

## 2021-10-28 PROCEDURE — 36415 COLL VENOUS BLD VENIPUNCTURE: CPT | Performed by: INTERNAL MEDICINE

## 2021-10-28 PROCEDURE — 80048 BASIC METABOLIC PNL TOTAL CA: CPT | Performed by: INTERNAL MEDICINE

## 2021-10-28 RX ORDER — FUROSEMIDE 40 MG/1
40 TABLET ORAL 2 TIMES DAILY
Qty: 6 TABLET | Refills: 0 | Status: SHIPPED | OUTPATIENT
Start: 2021-10-28 | End: 2021-10-31

## 2021-10-28 RX ADMIN — TAMSULOSIN HYDROCHLORIDE 0.4 MG: 0.4 CAPSULE ORAL at 08:10

## 2021-10-28 RX ADMIN — APIXABAN 2.5 MG: 2.5 TABLET, FILM COATED ORAL at 08:10

## 2021-10-28 RX ADMIN — ATORVASTATIN CALCIUM 40 MG: 40 TABLET, FILM COATED ORAL at 08:10

## 2021-10-28 RX ADMIN — FUROSEMIDE 40 MG: 10 INJECTION, SOLUTION INTRAMUSCULAR; INTRAVENOUS at 08:10

## 2021-10-28 RX ADMIN — FLUTICASONE PROPIONATE 50 MCG: 50 SPRAY, METERED NASAL at 08:10

## 2021-10-28 RX ADMIN — FINASTERIDE 5 MG: 5 TABLET, FILM COATED ORAL at 08:10

## 2021-10-28 RX ADMIN — METOPROLOL SUCCINATE 25 MG: 25 TABLET, FILM COATED, EXTENDED RELEASE ORAL at 08:10

## 2021-11-18 ENCOUNTER — TELEPHONE (OUTPATIENT)
Dept: FAMILY MEDICINE | Facility: CLINIC | Age: 71
End: 2021-11-18
Payer: COMMERCIAL

## 2021-11-23 ENCOUNTER — OFFICE VISIT (OUTPATIENT)
Dept: FAMILY MEDICINE | Facility: CLINIC | Age: 71
End: 2021-11-23
Payer: COMMERCIAL

## 2021-11-23 VITALS
OXYGEN SATURATION: 99 % | HEIGHT: 72 IN | WEIGHT: 182.38 LBS | TEMPERATURE: 97 F | BODY MASS INDEX: 24.7 KG/M2 | SYSTOLIC BLOOD PRESSURE: 126 MMHG | HEART RATE: 62 BPM | DIASTOLIC BLOOD PRESSURE: 72 MMHG

## 2021-11-23 DIAGNOSIS — D50.9 IRON DEFICIENCY ANEMIA, UNSPECIFIED IRON DEFICIENCY ANEMIA TYPE: Primary | ICD-10-CM

## 2021-11-23 DIAGNOSIS — N18.32 STAGE 3B CHRONIC KIDNEY DISEASE: ICD-10-CM

## 2021-11-23 DIAGNOSIS — E11.9 TYPE 2 DIABETES MELLITUS WITHOUT COMPLICATION, WITHOUT LONG-TERM CURRENT USE OF INSULIN: ICD-10-CM

## 2021-11-23 DIAGNOSIS — I50.9 ACUTE ON CHRONIC CONGESTIVE HEART FAILURE, UNSPECIFIED HEART FAILURE TYPE: ICD-10-CM

## 2021-11-23 PROCEDURE — 99214 OFFICE O/P EST MOD 30 MIN: CPT | Mod: S$GLB,,, | Performed by: NURSE PRACTITIONER

## 2021-11-23 PROCEDURE — 99214 PR OFFICE/OUTPT VISIT, EST, LEVL IV, 30-39 MIN: ICD-10-PCS | Mod: S$GLB,,, | Performed by: NURSE PRACTITIONER

## 2021-11-23 RX ORDER — AMOXICILLIN 500 MG/1
CAPSULE ORAL
COMMUNITY
Start: 2021-10-12 | End: 2021-12-26

## 2021-11-23 RX ORDER — LANOLIN ALCOHOL/MO/W.PET/CERES
1 CREAM (GRAM) TOPICAL
Qty: 90 TABLET | Refills: 0 | Status: SHIPPED | OUTPATIENT
Start: 2021-11-23 | End: 2022-01-27

## 2021-12-13 ENCOUNTER — TELEPHONE (OUTPATIENT)
Dept: FAMILY MEDICINE | Facility: CLINIC | Age: 71
End: 2021-12-13
Payer: COMMERCIAL

## 2021-12-13 DIAGNOSIS — J45.901 ASTHMA EXACERBATION IN COPD: ICD-10-CM

## 2021-12-13 DIAGNOSIS — J44.9 CHRONIC OBSTRUCTIVE PULMONARY DISEASE, UNSPECIFIED COPD TYPE: Primary | ICD-10-CM

## 2021-12-13 DIAGNOSIS — J44.1 ASTHMA EXACERBATION IN COPD: ICD-10-CM

## 2021-12-13 RX ORDER — LEVALBUTEROL INHALATION SOLUTION 0.63 MG/3ML
1 SOLUTION RESPIRATORY (INHALATION)
Qty: 45 ML | Refills: 0 | Status: SHIPPED | OUTPATIENT
Start: 2021-12-13 | End: 2021-12-23 | Stop reason: SDUPTHER

## 2021-12-23 ENCOUNTER — TELEPHONE (OUTPATIENT)
Dept: FAMILY MEDICINE | Facility: CLINIC | Age: 71
End: 2021-12-23
Payer: COMMERCIAL

## 2021-12-23 DIAGNOSIS — E78.2 MIXED HYPERLIPIDEMIA: ICD-10-CM

## 2021-12-23 DIAGNOSIS — J44.9 CHRONIC OBSTRUCTIVE PULMONARY DISEASE, UNSPECIFIED COPD TYPE: Chronic | ICD-10-CM

## 2021-12-23 DIAGNOSIS — E11.9 TYPE 2 DIABETES MELLITUS WITHOUT COMPLICATION, WITHOUT LONG-TERM CURRENT USE OF INSULIN: ICD-10-CM

## 2021-12-23 DIAGNOSIS — J44.1 ASTHMA EXACERBATION IN COPD: ICD-10-CM

## 2021-12-23 DIAGNOSIS — I10 PRIMARY HYPERTENSION: Primary | ICD-10-CM

## 2021-12-23 DIAGNOSIS — J45.901 ASTHMA EXACERBATION IN COPD: ICD-10-CM

## 2021-12-23 DIAGNOSIS — N40.0 BENIGN PROSTATIC HYPERPLASIA, UNSPECIFIED WHETHER LOWER URINARY TRACT SYMPTOMS PRESENT: Chronic | ICD-10-CM

## 2021-12-23 DIAGNOSIS — I50.22 CHRONIC SYSTOLIC HEART FAILURE: Chronic | ICD-10-CM

## 2021-12-23 RX ORDER — GABAPENTIN 300 MG/1
300 CAPSULE ORAL NIGHTLY
Qty: 90 CAPSULE | Refills: 1 | Status: SHIPPED | OUTPATIENT
Start: 2021-12-23 | End: 2022-04-19 | Stop reason: SDUPTHER

## 2021-12-23 RX ORDER — ALBUTEROL SULFATE 90 UG/1
1-2 AEROSOL, METERED RESPIRATORY (INHALATION)
Qty: 18 G | Refills: 3 | Status: SHIPPED | OUTPATIENT
Start: 2021-12-23 | End: 2023-04-06

## 2021-12-23 RX ORDER — ATORVASTATIN CALCIUM 40 MG/1
40 TABLET, FILM COATED ORAL DAILY
Qty: 90 TABLET | Refills: 3 | Status: SHIPPED | OUTPATIENT
Start: 2021-12-23 | End: 2022-04-04 | Stop reason: SDUPTHER

## 2021-12-23 RX ORDER — FUROSEMIDE 20 MG/1
20 TABLET ORAL 2 TIMES DAILY
Qty: 180 TABLET | Refills: 0 | Status: ON HOLD | OUTPATIENT
Start: 2021-12-23 | End: 2021-12-28 | Stop reason: HOSPADM

## 2021-12-23 RX ORDER — TAMSULOSIN HYDROCHLORIDE 0.4 MG/1
0.4 CAPSULE ORAL DAILY
Qty: 90 CAPSULE | Refills: 1 | Status: SHIPPED | OUTPATIENT
Start: 2021-12-23 | End: 2022-03-03

## 2021-12-23 RX ORDER — LEVALBUTEROL INHALATION SOLUTION 0.63 MG/3ML
1 SOLUTION RESPIRATORY (INHALATION)
Qty: 45 ML | Refills: 0 | Status: SHIPPED | OUTPATIENT
Start: 2021-12-23 | End: 2023-02-22

## 2021-12-23 RX ORDER — FLUTICASONE FUROATE 100 UG/1
1 POWDER RESPIRATORY (INHALATION) DAILY
Qty: 90 EACH | Refills: 3 | OUTPATIENT
Start: 2021-12-23

## 2021-12-23 RX ORDER — METOPROLOL SUCCINATE 25 MG/1
25 TABLET, EXTENDED RELEASE ORAL DAILY
Qty: 90 TABLET | Refills: 1 | Status: SHIPPED | OUTPATIENT
Start: 2021-12-23 | End: 2023-02-01

## 2021-12-26 ENCOUNTER — HOSPITAL ENCOUNTER (INPATIENT)
Facility: HOSPITAL | Age: 71
LOS: 2 days | Discharge: HOME | DRG: 280 | End: 2021-12-28
Attending: EMERGENCY MEDICINE
Payer: COMMERCIAL

## 2021-12-26 DIAGNOSIS — R07.9 CHEST PAIN: ICD-10-CM

## 2021-12-26 DIAGNOSIS — N28.9 RENAL INSUFFICIENCY: ICD-10-CM

## 2021-12-26 DIAGNOSIS — I50.9 ACUTE ON CHRONIC CONGESTIVE HEART FAILURE, UNSPECIFIED HEART FAILURE TYPE: Primary | ICD-10-CM

## 2021-12-26 DIAGNOSIS — I50.43 ACUTE ON CHRONIC COMBINED SYSTOLIC AND DIASTOLIC CONGESTIVE HEART FAILURE: ICD-10-CM

## 2021-12-26 LAB
ALBUMIN SERPL BCP-MCNC: 3.4 G/DL (ref 3.5–5.2)
ALP SERPL-CCNC: 71 U/L (ref 55–135)
ALT SERPL W/O P-5'-P-CCNC: 137 U/L (ref 10–44)
ANION GAP SERPL CALC-SCNC: 17 MMOL/L (ref 8–16)
AST SERPL-CCNC: 159 U/L (ref 10–40)
BASOPHILS # BLD AUTO: 0.05 K/UL (ref 0–0.2)
BASOPHILS NFR BLD: 0.5 % (ref 0–1.9)
BILIRUB SERPL-MCNC: 3.8 MG/DL (ref 0.1–1)
BNP SERPL-MCNC: >4500 PG/ML (ref 0–99)
BUN SERPL-MCNC: 47 MG/DL (ref 8–23)
CALCIUM SERPL-MCNC: 8.9 MG/DL (ref 8.7–10.5)
CHLORIDE SERPL-SCNC: 102 MMOL/L (ref 95–110)
CO2 SERPL-SCNC: 14 MMOL/L (ref 23–29)
CREAT SERPL-MCNC: 2.5 MG/DL (ref 0.5–1.4)
DIFFERENTIAL METHOD: ABNORMAL
EOSINOPHIL # BLD AUTO: 0.1 K/UL (ref 0–0.5)
EOSINOPHIL NFR BLD: 0.5 % (ref 0–8)
ERYTHROCYTE [DISTWIDTH] IN BLOOD BY AUTOMATED COUNT: 19.2 % (ref 11.5–14.5)
EST. GFR  (AFRICAN AMERICAN): 28.8 ML/MIN/1.73 M^2
EST. GFR  (NON AFRICAN AMERICAN): 24.9 ML/MIN/1.73 M^2
GLUCOSE SERPL-MCNC: 129 MG/DL (ref 70–110)
GLUCOSE SERPL-MCNC: 133 MG/DL (ref 70–110)
GLUCOSE SERPL-MCNC: 134 MG/DL (ref 70–110)
HCT VFR BLD AUTO: 36.3 % (ref 40–54)
HGB BLD-MCNC: 12 G/DL (ref 14–18)
IMM GRANULOCYTES # BLD AUTO: 0.03 K/UL (ref 0–0.04)
IMM GRANULOCYTES NFR BLD AUTO: 0.3 % (ref 0–0.5)
INR PPP: 2.3
LYMPHOCYTES # BLD AUTO: 1.5 K/UL (ref 1–4.8)
LYMPHOCYTES NFR BLD: 15.3 % (ref 18–48)
MAGNESIUM SERPL-MCNC: 2 MG/DL (ref 1.6–2.6)
MCH RBC QN AUTO: 28.3 PG (ref 27–31)
MCHC RBC AUTO-ENTMCNC: 33.1 G/DL (ref 32–36)
MCV RBC AUTO: 86 FL (ref 82–98)
MONOCYTES # BLD AUTO: 0.7 K/UL (ref 0.3–1)
MONOCYTES NFR BLD: 7.8 % (ref 4–15)
NEUTROPHILS # BLD AUTO: 7.2 K/UL (ref 1.8–7.7)
NEUTROPHILS NFR BLD: 75.6 % (ref 38–73)
NRBC BLD-RTO: 0 /100 WBC
PLATELET # BLD AUTO: 166 K/UL (ref 150–450)
PMV BLD AUTO: 10.7 FL (ref 9.2–12.9)
POTASSIUM SERPL-SCNC: 4.9 MMOL/L (ref 3.5–5.1)
PROT SERPL-MCNC: 6.7 G/DL (ref 6–8.4)
PROTHROMBIN TIME: 23.6 SEC (ref 11.4–13.7)
RBC # BLD AUTO: 4.24 M/UL (ref 4.6–6.2)
SARS-COV-2 RDRP RESP QL NAA+PROBE: NEGATIVE
SODIUM SERPL-SCNC: 133 MMOL/L (ref 136–145)
TROPONIN I SERPL DL<=0.01 NG/ML-MCNC: 0.07 NG/ML
TROPONIN I SERPL DL<=0.01 NG/ML-MCNC: 0.07 NG/ML
TROPONIN I SERPL DL<=0.01 NG/ML-MCNC: 0.08 NG/ML
WBC # BLD AUTO: 9.5 K/UL (ref 3.9–12.7)

## 2021-12-26 PROCEDURE — 96374 THER/PROPH/DIAG INJ IV PUSH: CPT

## 2021-12-26 PROCEDURE — 85025 COMPLETE CBC W/AUTO DIFF WBC: CPT | Performed by: EMERGENCY MEDICINE

## 2021-12-26 PROCEDURE — 25000003 PHARM REV CODE 250: Performed by: NURSE PRACTITIONER

## 2021-12-26 PROCEDURE — 83735 ASSAY OF MAGNESIUM: CPT | Performed by: EMERGENCY MEDICINE

## 2021-12-26 PROCEDURE — 83880 ASSAY OF NATRIURETIC PEPTIDE: CPT | Performed by: EMERGENCY MEDICINE

## 2021-12-26 PROCEDURE — U0002 COVID-19 LAB TEST NON-CDC: HCPCS | Performed by: EMERGENCY MEDICINE

## 2021-12-26 PROCEDURE — 84484 ASSAY OF TROPONIN QUANT: CPT | Performed by: EMERGENCY MEDICINE

## 2021-12-26 PROCEDURE — 25000242 PHARM REV CODE 250 ALT 637 W/ HCPCS: Performed by: NURSE PRACTITIONER

## 2021-12-26 PROCEDURE — 85610 PROTHROMBIN TIME: CPT | Performed by: EMERGENCY MEDICINE

## 2021-12-26 PROCEDURE — 63600175 PHARM REV CODE 636 W HCPCS: Performed by: EMERGENCY MEDICINE

## 2021-12-26 PROCEDURE — 25000003 PHARM REV CODE 250: Performed by: EMERGENCY MEDICINE

## 2021-12-26 PROCEDURE — 84484 ASSAY OF TROPONIN QUANT: CPT | Mod: 91 | Performed by: NURSE PRACTITIONER

## 2021-12-26 PROCEDURE — 36415 COLL VENOUS BLD VENIPUNCTURE: CPT | Performed by: NURSE PRACTITIONER

## 2021-12-26 PROCEDURE — 63600175 PHARM REV CODE 636 W HCPCS: Performed by: NURSE PRACTITIONER

## 2021-12-26 PROCEDURE — 83036 HEMOGLOBIN GLYCOSYLATED A1C: CPT | Performed by: NURSE PRACTITIONER

## 2021-12-26 PROCEDURE — 93010 ELECTROCARDIOGRAM REPORT: CPT | Mod: ,,, | Performed by: SPECIALIST

## 2021-12-26 PROCEDURE — 99285 EMERGENCY DEPT VISIT HI MDM: CPT | Mod: 25

## 2021-12-26 PROCEDURE — 93010 EKG 12-LEAD: ICD-10-PCS | Mod: ,,, | Performed by: SPECIALIST

## 2021-12-26 PROCEDURE — 21400001 HC TELEMETRY ROOM

## 2021-12-26 PROCEDURE — 80053 COMPREHEN METABOLIC PANEL: CPT | Performed by: EMERGENCY MEDICINE

## 2021-12-26 PROCEDURE — 93005 ELECTROCARDIOGRAM TRACING: CPT | Performed by: SPECIALIST

## 2021-12-26 RX ORDER — TAMSULOSIN HYDROCHLORIDE 0.4 MG/1
0.4 CAPSULE ORAL DAILY
Status: DISCONTINUED | OUTPATIENT
Start: 2021-12-26 | End: 2021-12-28 | Stop reason: HOSPADM

## 2021-12-26 RX ORDER — FUROSEMIDE 10 MG/ML
40 INJECTION INTRAMUSCULAR; INTRAVENOUS DAILY
Status: DISCONTINUED | OUTPATIENT
Start: 2021-12-26 | End: 2021-12-27

## 2021-12-26 RX ORDER — FUROSEMIDE 10 MG/ML
40 INJECTION INTRAMUSCULAR; INTRAVENOUS
Status: COMPLETED | OUTPATIENT
Start: 2021-12-26 | End: 2021-12-26

## 2021-12-26 RX ORDER — CHOLECALCIFEROL (VITAMIN D3) 25 MCG
1000 TABLET ORAL DAILY
Status: DISCONTINUED | OUTPATIENT
Start: 2021-12-26 | End: 2021-12-28 | Stop reason: HOSPADM

## 2021-12-26 RX ORDER — TALC
6 POWDER (GRAM) TOPICAL NIGHTLY PRN
Status: DISCONTINUED | OUTPATIENT
Start: 2021-12-26 | End: 2021-12-28 | Stop reason: HOSPADM

## 2021-12-26 RX ORDER — FLUTICASONE PROPIONATE 50 MCG
1 SPRAY, SUSPENSION (ML) NASAL DAILY
Status: DISCONTINUED | OUTPATIENT
Start: 2021-12-26 | End: 2021-12-28 | Stop reason: HOSPADM

## 2021-12-26 RX ORDER — ATORVASTATIN CALCIUM 40 MG/1
40 TABLET, FILM COATED ORAL DAILY
Status: DISCONTINUED | OUTPATIENT
Start: 2021-12-26 | End: 2021-12-28 | Stop reason: HOSPADM

## 2021-12-26 RX ORDER — GABAPENTIN 300 MG/1
300 CAPSULE ORAL NIGHTLY
Status: DISCONTINUED | OUTPATIENT
Start: 2021-12-26 | End: 2021-12-28 | Stop reason: HOSPADM

## 2021-12-26 RX ORDER — LANOLIN ALCOHOL/MO/W.PET/CERES
1 CREAM (GRAM) TOPICAL
Status: DISCONTINUED | OUTPATIENT
Start: 2021-12-27 | End: 2021-12-28 | Stop reason: HOSPADM

## 2021-12-26 RX ORDER — HYDROCODONE BITARTRATE AND ACETAMINOPHEN 7.5; 325 MG/1; MG/1
1 TABLET ORAL EVERY 6 HOURS PRN
Status: DISCONTINUED | OUTPATIENT
Start: 2021-12-26 | End: 2021-12-28 | Stop reason: HOSPADM

## 2021-12-26 RX ORDER — ONDANSETRON 2 MG/ML
4 INJECTION INTRAMUSCULAR; INTRAVENOUS EVERY 8 HOURS PRN
Status: DISCONTINUED | OUTPATIENT
Start: 2021-12-26 | End: 2021-12-28 | Stop reason: HOSPADM

## 2021-12-26 RX ORDER — ASPIRIN 325 MG
325 TABLET ORAL
Status: DISCONTINUED | OUTPATIENT
Start: 2021-12-26 | End: 2021-12-26 | Stop reason: SDUPTHER

## 2021-12-26 RX ORDER — IBUPROFEN 200 MG
24 TABLET ORAL
Status: DISCONTINUED | OUTPATIENT
Start: 2021-12-26 | End: 2021-12-28 | Stop reason: HOSPADM

## 2021-12-26 RX ORDER — METOPROLOL SUCCINATE 25 MG/1
25 TABLET, EXTENDED RELEASE ORAL DAILY
Status: DISCONTINUED | OUTPATIENT
Start: 2021-12-26 | End: 2021-12-28 | Stop reason: HOSPADM

## 2021-12-26 RX ORDER — SODIUM CHLORIDE 0.9 % (FLUSH) 0.9 %
10 SYRINGE (ML) INJECTION
Status: DISCONTINUED | OUTPATIENT
Start: 2021-12-26 | End: 2021-12-28 | Stop reason: HOSPADM

## 2021-12-26 RX ORDER — GLUCAGON 1 MG
1 KIT INJECTION
Status: DISCONTINUED | OUTPATIENT
Start: 2021-12-26 | End: 2021-12-28 | Stop reason: HOSPADM

## 2021-12-26 RX ORDER — ASPIRIN 325 MG
325 TABLET ORAL
Status: COMPLETED | OUTPATIENT
Start: 2021-12-26 | End: 2021-12-26

## 2021-12-26 RX ORDER — FINASTERIDE 5 MG/1
5 TABLET, FILM COATED ORAL DAILY
Status: DISCONTINUED | OUTPATIENT
Start: 2021-12-26 | End: 2021-12-28 | Stop reason: HOSPADM

## 2021-12-26 RX ORDER — ASPIRIN 325 MG
325 TABLET, DELAYED RELEASE (ENTERIC COATED) ORAL DAILY
Status: DISCONTINUED | OUTPATIENT
Start: 2021-12-26 | End: 2021-12-28 | Stop reason: HOSPADM

## 2021-12-26 RX ORDER — IBUPROFEN 200 MG
16 TABLET ORAL
Status: DISCONTINUED | OUTPATIENT
Start: 2021-12-26 | End: 2021-12-28 | Stop reason: HOSPADM

## 2021-12-26 RX ORDER — INSULIN ASPART 100 [IU]/ML
0-5 INJECTION, SOLUTION INTRAVENOUS; SUBCUTANEOUS
Status: DISCONTINUED | OUTPATIENT
Start: 2021-12-26 | End: 2021-12-28 | Stop reason: HOSPADM

## 2021-12-26 RX ADMIN — TAMSULOSIN HYDROCHLORIDE 0.4 MG: 0.4 CAPSULE ORAL at 05:12

## 2021-12-26 RX ADMIN — ATORVASTATIN CALCIUM 40 MG: 40 TABLET, FILM COATED ORAL at 05:12

## 2021-12-26 RX ADMIN — GABAPENTIN 300 MG: 300 CAPSULE ORAL at 09:12

## 2021-12-26 RX ADMIN — APIXABAN 2.5 MG: 2.5 TABLET, FILM COATED ORAL at 05:12

## 2021-12-26 RX ADMIN — FUROSEMIDE 40 MG: 10 INJECTION, SOLUTION INTRAVENOUS at 10:12

## 2021-12-26 RX ADMIN — ASPIRIN 325 MG: 325 TABLET, COATED ORAL at 05:12

## 2021-12-26 RX ADMIN — DOXYCYCLINE 100 MG: 100 INJECTION, POWDER, LYOPHILIZED, FOR SOLUTION INTRAVENOUS at 10:12

## 2021-12-26 RX ADMIN — FLUTICASONE PROPIONATE 50 MCG: 50 SPRAY, METERED NASAL at 05:12

## 2021-12-26 RX ADMIN — Medication 1000 UNITS: at 05:12

## 2021-12-26 RX ADMIN — NITROGLYCERIN 1 INCH: 20 OINTMENT TOPICAL at 09:12

## 2021-12-26 RX ADMIN — FINASTERIDE 5 MG: 5 TABLET, FILM COATED ORAL at 05:12

## 2021-12-26 RX ADMIN — METOPROLOL SUCCINATE 25 MG: 25 TABLET, EXTENDED RELEASE ORAL at 05:12

## 2021-12-26 RX ADMIN — FUROSEMIDE 40 MG: 10 INJECTION, SOLUTION INTRAVENOUS at 05:12

## 2021-12-26 RX ADMIN — ASPIRIN 325 MG ORAL TABLET 325 MG: 325 PILL ORAL at 09:12

## 2021-12-26 RX ADMIN — MELATONIN 6 MG: at 09:12

## 2021-12-26 NOTE — H&P
[facility] Medicine History & Physical Examination   Patient Name: Baldo Carney  MRN: [id number]  Patient Class: IP- Inpatient   Admission Date: 12/26/2021  7:10 AM  Length of Stay: 0  Attending Physician:   Primary Care Provider: KAMERON Rodriguez FNP-C  Face-to-Face encounter date: 12/26/2021  Code Status: Full Code  MPOA:  Chief Complaint: Shortness of Breath (X 4 weeks), Chest Pain, and Cough        Patient information was obtained from patient, past medical records and ER records.   HISTORY OF PRESENT ILLNESS:   Baldo Carney is a 71 y.o. old male who  has a past medical history of Asthma, Cardiomyopathy (10/21/2014), CHF (congestive heart failure), Coronary artery disease, Diabetes mellitus, Hyperlipidemia, and Hypertension.. The patient presented to [facility] on 12/26/2021 with a primary complaint of Shortness of Breath (X 4 weeks), Chest Pain, and Cough      71 year old  male with a known history chronic systolic diastolic heart failure last EF in the 20s and severe aortic stenosis status post aortic valve replacement presents to the emergency room with worsening shortness of breath and episodes of chest pain.  Patient states since he was last discharged from the hospital about 3 weeks ago his symptoms have gotten progressively worse.  His chest pain is mid sternum is nonradiating is associated with mild nausea.  He rates the pain as 7/10 intensity at its worst.  He also endorses a productive cough that he describes as green in color and thick in consistency he endorses lower extremity edema.    He denies fever, chills, black or bloody stools no hematemesis or hemoptysis.      Other Past medical history significant for coronary artery disease status post CABG, cardiomyopathy with EF in the 20s, severe aortic stenosis status post aortic valve for placement, asthma, COPD, hypertension hyperlipidemia and type 2 diabetes    REVIEW OF  SYSTEMS:   10 Point Review of System was performed and was found to be negative except for that mentioned already in the HPI and   Review of Systems (Negative unless checked off)  Review of Systems   Constitutional: Positive for malaise/fatigue.   HENT: Negative.    Eyes: Negative.    Respiratory: Positive for cough and shortness of breath.    Cardiovascular: Positive for palpitations.   Gastrointestinal: Positive for nausea.   Genitourinary: Negative.    Musculoskeletal: Negative.    Skin: Negative.    Neurological: Positive for weakness.   Endo/Heme/Allergies: Negative.    Psychiatric/Behavioral: Negative.            PAST MEDICAL HISTORY:     Past Medical History:   Diagnosis Date    Asthma     Cardiomyopathy 10/21/2014    CHF (congestive heart failure)     Coronary artery disease     Diabetes mellitus     Hyperlipidemia     Hypertension        PAST SURGICAL HISTORY:     Past Surgical History:   Procedure Laterality Date    BREAST SURGERY      CARDIAC CATHETERIZATION      CARDIAC VALVE SURGERY      CORONARY ARTERY BYPASS GRAFT      CYSTOSCOPY N/A 7/30/2019    Procedure: CYSTOSCOPY;  Surgeon: Spencer Nguyen MD;  Location: [facility] OR;  Service: Urology;  Laterality: N/A;    EYE SURGERY      SHOULDER ARTHROSCOPY  1985    TRANSRECTAL BIOPSY OF PROSTATE WITH ULTRASOUND GUIDANCE N/A 7/30/2019    Procedure: BIOPSY, PROSTATE, RECTAL APPROACH, WITH US GUIDANCE;  Surgeon: Spencer Nguyen MD;  Location: [facility] OR;  Service: Urology;  Laterality: N/A;  procedure not performed, pt unable to tolerate    TRANSRECTAL BIOPSY OF PROSTATE WITH ULTRASOUND GUIDANCE N/A 8/8/2019    Procedure: BIOPSY, PROSTATE, RECTAL APPROACH, WITH US GUIDANCE;  Surgeon: Spencer Nguyen MD;  Location: [facility] OR;  Service: Urology;  Laterality: N/A;       ALLERGIES:   Invokana  [canagliflozin]    FAMILY HISTORY:     Family History   Problem Relation Age of Onset    No Known Problems Mother     Diabetes Father     Hypertension  Father     Heart attack Father     Heart disease Father     Diabetes Sister     Heart disease Brother     Diabetes Brother     No Known Problems Maternal Grandmother     No Known Problems Maternal Grandfather     No Known Problems Paternal Grandmother     No Known Problems Paternal Grandfather     No Known Problems Maternal Aunt     No Known Problems Maternal Uncle     No Known Problems Paternal Aunt     No Known Problems Paternal Uncle     Anemia Neg Hx     Arrhythmia Neg Hx     Asthma Neg Hx     Clotting disorder Neg Hx     Fainting Neg Hx     Heart failure Neg Hx     Hyperlipidemia Neg Hx     Glaucoma Neg Hx        SOCIAL HISTORY:     Social History     Tobacco Use    Smoking status: Former Smoker     Quit date: 1970     Years since quittin.5    Smokeless tobacco: Never Used   Substance Use Topics    Alcohol use: Not Currently     Alcohol/week: 3.0 standard drinks     Types: 3 Cans of beer per week     Comment: social        Social History     Substance and Sexual Activity   Sexual Activity Never        HOME MEDICATIONS:     Prior to Admission medications    Medication Sig Start Date End Date Taking? Authorizing Provider   amlodipine-benazepril 5-20 mg (LOTREL) 5-20 mg per capsule Take 1 capsule by mouth nightly. 21  Yes KAMERON Ann FNP-C   apixaban (ELIQUIS) 2.5 mg Tab Take 1 tablet (2.5 mg total) by mouth once daily. 21  Yes KAMERON Ann FNP-C   ARNUITY ELLIPTA 100 mcg/actuation DsDv Use as directed 1 puff in the mouth or throat daily as needed.  11/10/18  Yes Historical Provider   aspirin (ECOTRIN) 325 MG EC tablet Take 1 tablet (325 mg total) by mouth once daily. 11/6/14 3/7/26 Yes Maria Guadalupe Barrett, FRANCOIS   atorvastatin (LIPITOR) 40 MG tablet Take 1 tablet (40 mg total) by mouth once daily. 21  Yes KAMERON Ann FNP-C   azelastine (ASTELIN) 137 mcg (0.1 %) nasal spray 1 spray (137 mcg total) by Nasal route 2 (two) times  daily as needed for Rhinitis. 4/20/21  Yes KAMERON Ann FNP-C   ergocalciferol, vitamin D2, (VITAMIN D ORAL) Take 1 capsule by mouth once a week. WEDNESDAYS   Yes Historical Provider   ferrous sulfate (FEOSOL) Tab tablet Take 1 tablet (1 each total) by mouth daily with breakfast. To be taken with vitamin C (orange juice) for best absorption 11/23/21  Yes KAMERON Ann FNP-C   furosemide (LASIX) 20 MG tablet Take 1 tablet (20 mg total) by mouth 2 (two) times daily. 12/23/21  Yes KAMERON Ann FNP-C   gabapentin (NEURONTIN) 300 MG capsule Take 1 capsule (300 mg total) by mouth every evening. 12/23/21  Yes KAMERON Ann FNP-C   levalbuterol (XOPENEX) 0.63 mg/3 mL nebulizer solution Take 3 mLs (0.63 mg total) by nebulization every 4 to 6 hours as needed for Wheezing or Shortness of Breath. Rescue 12/23/21  Yes KAMERON Ann FNP-C   metoprolol succinate (TOPROL-XL) 25 MG 24 hr tablet Take 1 tablet (25 mg total) by mouth once daily. 12/23/21  Yes KAMERON Ann FNP-C   sildenafiL (VIAGRA) 50 MG tablet Take 50 mg by mouth daily as needed. 9/24/21  Yes Historical Provider   tamsulosin (FLOMAX) 0.4 mg Cap Take 1 capsule (0.4 mg total) by mouth once daily. 12/23/21  Yes KAMERON Ann FNP-C   VENTOLIN HFA 90 mcg/actuation inhaler Inhale 1-2 puffs into the lungs every 4 to 6 hours as needed for Wheezing or Shortness of Breath. (RESCUE) 12/23/21  Yes KAMERON Ann FNP-C   blood sugar diagnostic (ONETOUCH ULTRA TEST) Strp TEST TWICE A DAY 10/26/21   KAMERON Ann FNP-C   blood-glucose meter kit USE BRAND AS COVERED BY INSURANCE AND COMPATIBLE WITH STRIPS TO CHECK GLUCOSE 2X DAY 10/25/18 2/4/21  Sunshine Murillo MD   finasteride (PROSCAR) 5 mg tablet Take 1 tablet (5 mg total) by mouth once daily. 4/20/21   KAMERON Ann FNP-C   fluticasone furoate-vilanteroL (BREO ELLIPTA) 100-25 mcg/dose diskus inhaler Inhale 1 puff into  the lungs once daily. (DAILY CONTROLLER) 10/20/21   Millie ObrienfernandolarsKAMERON,FNP-C   fluticasone propionate (FLONASE) 50 mcg/actuation nasal spray 1 spray (50 mcg total) by Each Nostril route once daily. 4/20/21   Millie Obrienfernandolars KAMERON,FNP-C   HYDROcodone-acetaminophen (NORCO) 7.5-325 mg per tablet  10/12/21   Historical Provider   lancing device Misc TEST 2 TIMES DAILY 1/26/21   Historical Provider   ULTRA THIN LANCETS 31 gauge Misc Inject 1 lancet into the skin 2 (two) times a day. 4/20/21   Millie Hayes KAMERON,FNP-C   amoxicillin (AMOXIL) 500 MG capsule  10/12/21 12/26/21  Historical Provider         PHYSICAL EXAM:   BP (!) 148/85 (BP Location: Right arm, Patient Position: Lying)   Pulse 70   Temp 96.4 °F (35.8 °C) (Axillary)   Resp 19   Ht 6' (1.829 m)   Wt 82.8 kg (182 lb 8.7 oz)   SpO2 99%   BMI 24.76 kg/m²   Vitals Reviewed  General appearance: Well-developed, well-nourished male in no apparent distress.  Skin: No Rash.   Neuro: Motor and sensory exams grossly intact. Good tone. Power in all 4 extremities 5/5.   HENT: Atraumatic head. Moist mucous membranes of oral cavity.  Eyes: Normal extraocular movements.   Neck: Supple. No evidence of lymphadenopathy. No thyroidomegaly.  Lungs: Clear to auscultation bilaterally. No wheezing present.   Heart: Regular rate and rhythm. S1 and S2 present with no murmurs/gallop/rub. No pedal edema. No JVD present.   Abdomen: Soft, non-distended, non-tender. No rebound tenderness/guarding. No masses or organomegaly. Bowel sounds are normal. Bladder is not palpable.   Extremities: No cyanosis, clubbing, or edema.  Psych/mental status: Alert and oriented. Cooperative. Responds appropriately to questions.   EMERGENCY DEPARTMENT LABS AND IMAGING:   Following labs were Reviewed   Recent Labs   Lab 12/26/21  0607 12/26/21  0853   WBC  --  9.50   HGB  --  12.0*   HCT  --  36.3*   PLT  --  166   CALCIUM 8.9  --    ALBUMIN 3.4*  --    PROT 6.7  --    *  --    K 4.9   --    CO2 14*  --      --    BUN 47*  --    CREATININE 2.5*  --    ALKPHOS 71  --    *  --    *  --    BILITOT 3.8*  --          BMP:   Recent Labs   Lab 12/26/21  0757   *   *   K 4.9      CO2 14*   BUN 47*   CREATININE 2.5*   CALCIUM 8.9   MG 2.0   , CMP   Recent Labs   Lab 12/26/21  0757   *   K 4.9      CO2 14*   *   BUN 47*   CREATININE 2.5*   CALCIUM 8.9   PROT 6.7   ALBUMIN 3.4*   BILITOT 3.8*   ALKPHOS 71   *   *   ANIONGAP 17*   ESTGFRAFRICA 28.8*   EGFRNONAA 24.9*   , CBC   Recent Labs   Lab 12/26/21  0853   WBC 9.50   HGB 12.0*   HCT 36.3*      , INR   Lab Results   Component Value Date    INR 2.3 12/26/2021    INR 1.1 08/08/2019    INR 1.0 11/06/2014   , Lipid Panel   Lab Results   Component Value Date    CHOL 132 02/04/2021    HDL 60 02/04/2021    LDLCALC 63.8 02/04/2021    TRIG 41 02/04/2021    CHOLHDL 45.5 02/04/2021   , Troponin   Recent Labs   Lab 12/26/21  0757 12/26/21  1153 12/26/21  1446   TROPONINI 0.071* 0.072* 0.082*   , A1C:   Recent Labs   Lab 08/17/21  1118   HGBA1C 5.5    and All labs within the past 24 hours have been reviewed  Microbiology Results (last 7 days)     ** No results found for the last 168 hours. **        X-Ray Chest AP Portable   Final Result        X-Ray Chest AP Portable    Result Date: 12/26/2021  Chest single view CLINICAL DATA: Cough, shortness of breath FINDINGS: Comparison to October 26. Mild cardiomegaly is stable. There has been previous median sternotomy. No infiltrate or effusion is identified. IMPRESSION: 1. No acute radiographic abnormalities. Electronically signed by:  Daren Altman MD  12/26/2021 7:54 AM CST Workstation: [id number]    I personally reviewed and agree with the radiologist's findings    12 lead EKG reveals a sinus rhythm with left axis deviation this is a left bundle branch block this ST depression T-wave inversion in the lateral leads this by atrial  enlargement there is Q-waves in the inferior leads rate 82  millisecond  ASSESSMENT & PLAN:   Baldo Carney is a 71 y.o. male admitted for      1. Acute on Chronic Heart failure  -given IV Lasix in the ED  -caution with Lasix as patient has valvular heart disease  -IV Lasix 40 mg daily  -trend cardiac enzymes and troponin  -consult cardiologist        2. S/P CABG (coronary artery bypass graft)  -currently chest pain-free  -trend cardiac enzymes and troponin        3. S/P AVR (aortic valve replacement)  -caution with Lasix and diuretics   -S/p by prostatic AVR  -last echo showed moderate to severe aortic stenosis.  SHI equals 0.926 cm       4. CAD (coronary artery disease)  - No chest pain , no ischemic changes in EKG   -Will trend cardiac enzymes         5.Cardiomyopathy  -previous ejection fraction about 20%(10/27/2021)  -global left ventricular hypokinesis         6. Type II diabetes mellitus without complication, without long-term current use of insulin  -low dose NovoLog insulin sliding scale  -sliding scale protocol  -diabetic cardiac diet        7. Hyperlipidemia  -LFT stable continue statin        8. Hypertension   - continue home medications  -p.r.n. hydralazine for elevated BP     9.  Acute on chronic renal failure  -baseline CKD stage III  -most likely secondary to hypoperfusion  -caution with aggressive diuresing  -avoid nephrotoxic medications    10.  Troponin elevation with chest pain  -trend cardiac enzymes and troponin  -consult cardiology  -further cardiac testing the cardiologist's discretion  -continue cardioprotective medications    11.  Bronchitis  -green productive sputum  -IV doxycycline for now  -sputum cultures    DVT Prophylaxis: will be placed on Eliquis for DVT prophylaxis and will be advised to be as mobile as possible and sit in a chair as tolerated.   ____________________________________________________________  Face-to-Face encounter date: 12/26/2021  Encounter included  review of the medical records, interviewing and examining the patient face-to-face, discussion with family and other health care providers including emergency medicine physician, admission orders, interpreting lab/test results and formulating a plan of care.   Medical Decision Making during this encounter was  [_] Low Complexity  [_] Moderate Complexity  [x] High Complexity  _________________________________________________________________________________    INPATIENT LIST OF MEDICATIONS     Current Facility-Administered Medications:     apixaban tablet 2.5 mg, 2.5 mg, Oral, Daily, Belen Levine NP, 2.5 mg at 12/26/21 1751    aspirin EC tablet 325 mg, 325 mg, Oral, Daily, Belen Levine NP, 325 mg at 12/26/21 1751    atorvastatin tablet 40 mg, 40 mg, Oral, Daily, Belen Levine NP, 40 mg at 12/26/21 1751    dextrose 50% injection 12.5 g, 12.5 g, Intravenous, PRN, Belen Levine NP    dextrose 50% injection 25 g, 25 g, Intravenous, PRN, Belen Levine NP    [START ON 12/27/2021] ferrous sulfate tablet 1 each, 1 tablet, Oral, Daily with breakfast, Belen Levine NP    finasteride tablet 5 mg, 5 mg, Oral, Daily, Belen Levine NP, 5 mg at 12/26/21 1751    fluticasone propionate 50 mcg/actuation nasal spray 50 mcg, 1 spray, Each Nostril, Daily, Belen Levine NP, 50 mcg at 12/26/21 1759    furosemide injection 40 mg, 40 mg, Intravenous, Daily, Belen Levine NP, 40 mg at 12/26/21 1752    gabapentin capsule 300 mg, 300 mg, Oral, QHS, Belen Levine NP, 300 mg at 12/26/21 2106    glucagon (human recombinant) injection 1 mg, 1 mg, Intramuscular, PRN, Belen Levine NP    glucose chewable tablet 16 g, 16 g, Oral, PRN, Belen Levine, NP    glucose chewable tablet 24 g, 24 g, Oral, PRN, Belen Levine NP    HYDROcodone-acetaminophen 7.5-325 mg per tablet 1 tablet, 1 tablet, Oral, Q6H PRN, Belne Levine, NP    insulin aspart U-100 pen 0-5 Units, 0-5 Units, Subcutaneous, QID (AC + HS) PRN, Belen Levine NP     melatonin tablet 6 mg, 6 mg, Oral, Nightly PRN, Belen Levine NP    metoprolol succinate (TOPROL-XL) 24 hr tablet 25 mg, 25 mg, Oral, Daily, Belen Levine NP, 25 mg at 12/26/21 1751    ondansetron injection 4 mg, 4 mg, Intravenous, Q8H PRN, Belen Levine NP    sodium chloride 0.9% flush 10 mL, 10 mL, Intravenous, PRN, Belen Levine NP    tamsulosin 24 hr capsule 0.4 mg, 0.4 mg, Oral, Daily, Belen Levine NP, 0.4 mg at 12/26/21 1751    vitamin D 1000 units tablet 1,000 Units, 1,000 Units, Oral, Daily, Belen Levine NP, 1,000 Units at 12/26/21 1751      Scheduled Meds:  Continuous Infusions:  PRN Meds:.      Belen Levine  [facility] Hospitalist NP  12/26/2021

## 2021-12-26 NOTE — ED PROVIDER NOTES
Encounter Date: 12/26/2021       History     Chief Complaint   Patient presents with    Shortness of Breath     X 4 weeks    Chest Pain    Cough     Patient here with reported shortness of breath onset over last 4 weeks with associated chest pain severe dyspnea on exertion states can not walk more than a few steps with out becoming significantly dyspneic patient denies any fever chills cough is occasionally productive he does report some lower extremity edema he was recently admitted for diuresis secondary to congestive heart failure he denies any nausea vomiting or diarrhea no abdominal pain states his appetite has been okay he does not wear oxygen at home no sick contacts at home he has vaccinated for COVID        Review of patient's allergies indicates:   Allergen Reactions    Invokana  [canagliflozin]      Other reaction(s): been very dizzy     Past Medical History:   Diagnosis Date    Asthma     Cardiomyopathy 10/21/2014    CHF (congestive heart failure)     Coronary artery disease     Diabetes mellitus     Hyperlipidemia     Hypertension      Past Surgical History:   Procedure Laterality Date    BREAST SURGERY      CARDIAC CATHETERIZATION      CARDIAC VALVE SURGERY      CORONARY ARTERY BYPASS GRAFT      CYSTOSCOPY N/A 7/30/2019    Procedure: CYSTOSCOPY;  Surgeon: Spencer Nguyen MD;  Location: [facility] OR;  Service: Urology;  Laterality: N/A;    EYE SURGERY      SHOULDER ARTHROSCOPY  1985    TRANSRECTAL BIOPSY OF PROSTATE WITH ULTRASOUND GUIDANCE N/A 7/30/2019    Procedure: BIOPSY, PROSTATE, RECTAL APPROACH, WITH US GUIDANCE;  Surgeon: Spencer Nguyen MD;  Location: [facility] OR;  Service: Urology;  Laterality: N/A;  procedure not performed, pt unable to tolerate    TRANSRECTAL BIOPSY OF PROSTATE WITH ULTRASOUND GUIDANCE N/A 8/8/2019    Procedure: BIOPSY, PROSTATE, RECTAL APPROACH, WITH US GUIDANCE;  Surgeon: Spencer Nguyen MD;  Location: [facility] OR;  Service: Urology;  Laterality: N/A;      Family History   Problem Relation Age of Onset    No Known Problems Mother     Diabetes Father     Hypertension Father     Heart attack Father     Heart disease Father     Diabetes Sister     Heart disease Brother     Diabetes Brother     No Known Problems Maternal Grandmother     No Known Problems Maternal Grandfather     No Known Problems Paternal Grandmother     No Known Problems Paternal Grandfather     No Known Problems Maternal Aunt     No Known Problems Maternal Uncle     No Known Problems Paternal Aunt     No Known Problems Paternal Uncle     Anemia Neg Hx     Arrhythmia Neg Hx     Asthma Neg Hx     Clotting disorder Neg Hx     Fainting Neg Hx     Heart failure Neg Hx     Hyperlipidemia Neg Hx     Glaucoma Neg Hx      Social History     Tobacco Use    Smoking status: Former Smoker     Quit date: 1970     Years since quittin.5    Smokeless tobacco: Never Used   Substance Use Topics    Alcohol use: Not Currently     Alcohol/week: 3.0 standard drinks     Types: 3 Cans of beer per week     Comment: social    Drug use: No     Review of Systems   Constitutional: Positive for fatigue. Negative for chills, diaphoresis and fever.   HENT: Negative for congestion and nosebleeds.    Respiratory: Positive for cough and shortness of breath.    Cardiovascular: Positive for chest pain and leg swelling. Negative for palpitations.   Gastrointestinal: Negative for abdominal pain, diarrhea, nausea and vomiting.   All other systems reviewed and are negative.      Physical Exam     Initial Vitals [21 0716]   BP Pulse Resp Temp SpO2   (!) 162/90 85 (!) 22 97.3 °F (36.3 °C) 100 %      MAP       --         Physical Exam    Constitutional: He appears well-developed and well-nourished. He appears distressed.   HENT:   Head: Normocephalic and atraumatic.   Right Ear: External ear normal.   Left Ear: External ear normal.   Mouth/Throat: Oropharynx is clear and moist.   Eyes: EOM are  normal. Pupils are equal, round, and reactive to light.   Neck: Neck supple.   Normal range of motion.  Cardiovascular: Normal rate, regular rhythm, S1 normal, S2 normal and intact distal pulses.   Pulmonary/Chest: He is in respiratory distress. He has rales.   Abdominal: Abdomen is soft. Normal appearance and bowel sounds are normal. There is no abdominal tenderness.   Musculoskeletal:         General: Edema present. No tenderness. Normal range of motion.      Cervical back: Normal range of motion and neck supple.     Neurological: He is alert and oriented to person, place, and time. He has normal strength. GCS score is 15. GCS eye subscore is 4. GCS verbal subscore is 5. GCS motor subscore is 6.   Skin: Skin is warm and dry. Capillary refill takes less than 2 seconds. No rash noted.   Psychiatric: He has a normal mood and affect. His behavior is normal.         ED Course   Procedures  Labs Reviewed   PROTIME-INR - Abnormal; Notable for the following components:       Result Value    PT 23.6 (*)     All other components within normal limits    Narrative:     Recoll. [id number] by [facility] at 12/26/2021 08:25, reason:   clotted--talked to alyce   CBC W/ AUTO DIFFERENTIAL - Abnormal; Notable for the following components:    RBC 4.24 (*)     Hemoglobin 12.0 (*)     Hematocrit 36.3 (*)     RDW 19.2 (*)     Gran % 75.6 (*)     Lymph % 15.3 (*)     All other components within normal limits    Narrative:     Recoll. [id number] by [facility] at 12/26/2021 08:16, reason:   clotted--talked to benito/alyce   COMPREHENSIVE METABOLIC PANEL - Abnormal; Notable for the following components:    Sodium 133 (*)     CO2 14 (*)     Glucose 133 (*)     BUN 47 (*)     Creatinine 2.5 (*)     Albumin 3.4 (*)     Total Bilirubin 3.8 (*)      (*)      (*)     Anion Gap 17 (*)     eGFR if  28.8 (*)     eGFR if non  24.9 (*)     All other components within normal limits   TROPONIN I - Abnormal; Notable for  the following components:    Troponin I 0.071 (*)     All other components within normal limits   B-TYPE NATRIURETIC PEPTIDE - Abnormal; Notable for the following components:    BNP >4,500 (*)     All other components within normal limits    Narrative:     Recoll. [id number] by [facility] at 12/26/2021 08:16, reason:   clotted--talked to benito/alyce   SARS-COV-2 RNA AMPLIFICATION, QUAL   MAGNESIUM   TROPONIN I        ECG Results          EKG 12-lead (In process)  Result time 12/26/21 10:00:14    In process by Interface, Lab In [facility] (12/26/21 10:00:14)                 Narrative:    Test Reason : R07.9,    Vent. Rate : 082 BPM     Atrial Rate : 082 BPM     P-R Int : 194 ms          QRS Dur : 166 ms      QT Int : 488 ms       P-R-T Axes : 081 -83 091 degrees     QTc Int : 570 ms    Normal sinus rhythm  Left axis deviation  Nonspecific intraventricular block  Inferior infarct (cited on or before 03-NOV-2014)  Anterolateral infarct (cited on or before 03-NOV-2014)  Abnormal ECG  When compared with ECG of 26-OCT-2021 15:01,  Nonspecific intraventricular block has replaced Right bundle branch block  Questionable change in initial forces of Anterior leads    Referred By: [email or web address]   SELF           Confirmed By:                   In process by Interface, Lab In [facility] (12/26/21 08:50:16)                 Narrative:    Test Reason : R07.9,    Vent. Rate : 082 BPM     Atrial Rate : 082 BPM     P-R Int : 194 ms          QRS Dur : 166 ms      QT Int : 488 ms       P-R-T Axes : 081 -83 091 degrees     QTc Int : 570 ms    Normal sinus rhythm  Left axis deviation  Nonspecific intraventricular block  Inferior infarct (cited on or before 03-NOV-2014)  Anterolateral infarct (cited on or before 03-NOV-2014)  Abnormal ECG  When compared with ECG of 26-OCT-2021 15:01,  Nonspecific intraventricular block has replaced Right bundle branch block  Questionable change in initial forces of Anterior leads    Referred By: [email or web address]   SELF            Confirmed By:                             Imaging Results          X-Ray Chest AP Portable (Final result)  Result time 12/26/21 07:54:40    Final result by Daren Altman MD (12/26/21 07:54:40)                 Narrative:    Chest single view    CLINICAL DATA: Cough, shortness of breath    FINDINGS: Comparison to October 26. Mild cardiomegaly is stable. There has been previous median sternotomy. No infiltrate or effusion is identified.    IMPRESSION:  1. No acute radiographic abnormalities.    Electronically signed by:  Daren Altman MD  12/26/2021 7:54 AM [facility] Workstation: [id number]                               Medications   aspirin tablet 325 mg (325 mg Oral Given 12/26/21 0954)   nitroGLYCERIN 2% TD oint ointment 1 inch (1 inch Topical (Top) Given 12/26/21 0954)   furosemide injection 40 mg (40 mg Intravenous Given 12/26/21 1029)     Medical Decision Making:   ED Management:  Patient's BNP is markedly elevated at 4500 consistent with CHF exacerbation he does have some worsening of his renal insufficiency I have administered aspirin nitro RN and Lasix in the emergency department I have discussed patient's findings with the hospitalist who will admit patient to the emergency department for further evaluation                      Clinical Impression:   Final diagnoses:  [R07.9] Chest pain  [I50.9] Acute on chronic congestive heart failure, unspecified heart failure type (Primary)  [N28.9] Renal insufficiency          ED Disposition Condition    Admit               Heri Cuevas MD  12/26/21 9526

## 2021-12-26 NOTE — ED TRIAGE NOTES
Sob w/ chest pains x 3 weeks getting worse.  midsternal chest pains, non-radiating.   + productive cough - green.  + nausea, vomiting, and diarrhea x 3 days.    Denies fever.  + leg edema getting worse.

## 2021-12-27 LAB
ALBUMIN SERPL BCP-MCNC: 3.2 G/DL (ref 3.5–5.2)
ALP SERPL-CCNC: 66 U/L (ref 55–135)
ALT SERPL W/O P-5'-P-CCNC: 265 U/L (ref 10–44)
ANION GAP SERPL CALC-SCNC: 12 MMOL/L (ref 8–16)
AST SERPL-CCNC: 294 U/L (ref 10–40)
BILIRUB SERPL-MCNC: 3.7 MG/DL (ref 0.1–1)
BUN SERPL-MCNC: 59 MG/DL (ref 8–23)
CALCIUM SERPL-MCNC: 8.5 MG/DL (ref 8.7–10.5)
CHLORIDE SERPL-SCNC: 102 MMOL/L (ref 95–110)
CO2 SERPL-SCNC: 19 MMOL/L (ref 23–29)
CREAT SERPL-MCNC: 2.8 MG/DL (ref 0.5–1.4)
EST. GFR  (AFRICAN AMERICAN): 25.1 ML/MIN/1.73 M^2
EST. GFR  (NON AFRICAN AMERICAN): 21.7 ML/MIN/1.73 M^2
ESTIMATED AVG GLUCOSE: 143 MG/DL (ref 68–131)
GLUCOSE SERPL-MCNC: 134 MG/DL (ref 70–110)
GLUCOSE SERPL-MCNC: 138 MG/DL (ref 70–110)
GLUCOSE SERPL-MCNC: 146 MG/DL (ref 70–110)
GLUCOSE SERPL-MCNC: 154 MG/DL (ref 70–110)
GLUCOSE SERPL-MCNC: 162 MG/DL (ref 70–110)
HBA1C MFR BLD: 6.6 % (ref 4.5–6.2)
MAGNESIUM SERPL-MCNC: 2.1 MG/DL (ref 1.6–2.6)
PHOSPHATE SERPL-MCNC: 5.7 MG/DL (ref 2.7–4.5)
POTASSIUM SERPL-SCNC: 4.6 MMOL/L (ref 3.5–5.1)
PROT SERPL-MCNC: 6.3 G/DL (ref 6–8.4)
SODIUM SERPL-SCNC: 133 MMOL/L (ref 136–145)

## 2021-12-27 PROCEDURE — 25000003 PHARM REV CODE 250: Performed by: NURSE PRACTITIONER

## 2021-12-27 PROCEDURE — 36415 COLL VENOUS BLD VENIPUNCTURE: CPT | Performed by: NURSE PRACTITIONER

## 2021-12-27 PROCEDURE — 21400001 HC TELEMETRY ROOM

## 2021-12-27 PROCEDURE — 80053 COMPREHEN METABOLIC PANEL: CPT | Performed by: NURSE PRACTITIONER

## 2021-12-27 PROCEDURE — 63600175 PHARM REV CODE 636 W HCPCS: Performed by: INTERNAL MEDICINE

## 2021-12-27 PROCEDURE — 99222 PR INITIAL HOSPITAL CARE,LEVL II: ICD-10-PCS | Mod: ,,, | Performed by: INTERNAL MEDICINE

## 2021-12-27 PROCEDURE — 84100 ASSAY OF PHOSPHORUS: CPT | Performed by: NURSE PRACTITIONER

## 2021-12-27 PROCEDURE — 83735 ASSAY OF MAGNESIUM: CPT | Performed by: NURSE PRACTITIONER

## 2021-12-27 PROCEDURE — 25000003 PHARM REV CODE 250: Performed by: INTERNAL MEDICINE

## 2021-12-27 PROCEDURE — 99222 1ST HOSP IP/OBS MODERATE 55: CPT | Mod: ,,, | Performed by: INTERNAL MEDICINE

## 2021-12-27 RX ORDER — FUROSEMIDE 10 MG/ML
60 INJECTION INTRAMUSCULAR; INTRAVENOUS
Status: DISCONTINUED | OUTPATIENT
Start: 2021-12-27 | End: 2021-12-28 | Stop reason: HOSPADM

## 2021-12-27 RX ORDER — HYDRALAZINE HYDROCHLORIDE 25 MG/1
50 TABLET, FILM COATED ORAL EVERY 8 HOURS
Status: DISCONTINUED | OUTPATIENT
Start: 2021-12-27 | End: 2021-12-28 | Stop reason: HOSPADM

## 2021-12-27 RX ORDER — ISOSORBIDE DINITRATE 10 MG/1
20 TABLET ORAL 3 TIMES DAILY
Status: DISCONTINUED | OUTPATIENT
Start: 2021-12-27 | End: 2021-12-28 | Stop reason: HOSPADM

## 2021-12-27 RX ADMIN — ISOSORBIDE DINITRATE 20 MG: 10 TABLET ORAL at 09:12

## 2021-12-27 RX ADMIN — METOPROLOL SUCCINATE 25 MG: 25 TABLET, EXTENDED RELEASE ORAL at 09:12

## 2021-12-27 RX ADMIN — FUROSEMIDE 60 MG: 10 INJECTION, SOLUTION INTRAVENOUS at 09:12

## 2021-12-27 RX ADMIN — TAMSULOSIN HYDROCHLORIDE 0.4 MG: 0.4 CAPSULE ORAL at 09:12

## 2021-12-27 RX ADMIN — ASPIRIN 325 MG: 325 TABLET, COATED ORAL at 09:12

## 2021-12-27 RX ADMIN — HYDRALAZINE HYDROCHLORIDE 50 MG: 25 TABLET ORAL at 03:12

## 2021-12-27 RX ADMIN — APIXABAN 2.5 MG: 2.5 TABLET, FILM COATED ORAL at 09:12

## 2021-12-27 RX ADMIN — Medication 1000 UNITS: at 09:12

## 2021-12-27 RX ADMIN — FERROUS SULFATE TAB 325 MG (65 MG ELEMENTAL FE) 1 EACH: 325 (65 FE) TAB at 09:12

## 2021-12-27 RX ADMIN — ISOSORBIDE DINITRATE 20 MG: 10 TABLET ORAL at 03:12

## 2021-12-27 RX ADMIN — FLUTICASONE PROPIONATE 50 MCG: 50 SPRAY, METERED NASAL at 09:12

## 2021-12-27 RX ADMIN — ATORVASTATIN CALCIUM 40 MG: 40 TABLET, FILM COATED ORAL at 09:12

## 2021-12-27 RX ADMIN — HYDRALAZINE HYDROCHLORIDE 50 MG: 25 TABLET ORAL at 09:12

## 2021-12-27 RX ADMIN — FINASTERIDE 5 MG: 5 TABLET, FILM COATED ORAL at 09:12

## 2021-12-27 RX ADMIN — GABAPENTIN 300 MG: 300 CAPSULE ORAL at 09:12

## 2021-12-27 NOTE — PLAN OF CARE
Problem: Adult Inpatient Plan of Care  Goal: Plan of Care Review  Outcome: Ongoing, Progressing  Goal: Patient-Specific Goal (Individualized)  Outcome: Ongoing, Progressing  Goal: Absence of Hospital-Acquired Illness or Injury  Outcome: Ongoing, Progressing  Goal: Optimal Comfort and Wellbeing  Outcome: Ongoing, Progressing  Goal: Readiness for Transition of Care  Outcome: Ongoing, Progressing     Problem: Diabetes Comorbidity  Goal: Blood Glucose Level Within Targeted Range  Outcome: Ongoing, Progressing     Problem: Fluid and Electrolyte Imbalance (Acute Kidney Injury/Impairment)  Goal: Fluid and Electrolyte Balance  Outcome: Ongoing, Progressing     Problem: Oral Intake Inadequate (Acute Kidney Injury/Impairment)  Goal: Optimal Nutrition Intake  Outcome: Ongoing, Progressing     Problem: Renal Function Impairment (Acute Kidney Injury/Impairment)  Goal: Effective Renal Function  Outcome: Ongoing, Progressing     Problem: Fall Injury Risk  Goal: Absence of Fall and Fall-Related Injury  Outcome: Ongoing, Progressing

## 2021-12-27 NOTE — CONSULTS
[facility]  Adult Nutrition   Consult Note (Initial Assessment)     SUMMARY     Recommendations  Recommendation/Intervention:   1) Add extra seasoning to be added to patient's tray in [employer] d/t food tasting bland.  2) Patient declined diet education.   3) RD will continue to monitor.    Goals: Po intake >75% of meals  Nutrition Goal Status: new    Dietitian Rounds Brief  Patient reports tolerating diet, good appetite but food is bland. No c/o N/V. Weight gain d/t fluid retention. Patient reports not adding salt to diet and declined diet education.      Reason for Assessment  Reason For Assessment: consult  Diagnosis:  (Acute on chronic combined systolic and diastolic heart failure)  Relevant Medical History: Asthma, Cardiomyopathy (10/21/2014), CHF (congestive heart failure), Coronary artery disease, Diabetes mellitus, Hyperlipidemia, and Hypertension    Nutrition Risk Screen  Nutrition Risk Screen: no indicators present     MST Score: 0  Have you recently lost weight without trying?: No  Weight loss score: 0  Have you been eating poorly because of a decreased appetite?: No  Appetite score: 0       Nutrition/Diet History  Food Allergies: NKFA  Factors Affecting Nutritional Intake:  (dislikes hospital food d/t being bland)    Anthropometrics  Temp: 96.6 °F (35.9 °C)  Height Method: Stated  Height: 6' (182.9 cm)  Height (inches): 72 in  Weight Method: Bed Scale  Weight: 82.8 kg (182 lb 8.7 oz)  Weight (lb): 182.54 lb  Ideal Body Weight (IBW), Male: 178 lb  % Ideal Body Weight, Male (lb): 102.55 %  BMI (Calculated): 24.8  BMI Grade: 18.5-24.9 - normal       Weight History:  Wt Readings from Last 10 Encounters:   12/26/21 82.8 kg (182 lb 8.7 oz)   11/23/21 82.7 kg (182 lb 6.4 oz)   10/27/21 79.8 kg (175 lb 14.8 oz)   10/27/21 79.8 kg (175 lb 14.8 oz)   08/17/21 79.5 kg (175 lb 4.8 oz)   06/09/21 79.4 kg (175 lb)   02/17/21 80.3 kg (177 lb)   02/04/21 79.5 kg (175 lb 3.2 oz)   11/11/20 80.5 kg (177  lb 6.4 oz)   09/16/20 78.4 kg (172 lb 14.4 oz)       Lab/Procedures/Meds: Pertinent Labs Reviewed    Clinical Chemistry:  Recent Labs   Lab 12/27/21  0526   *   K 4.6      CO2 19*   *   BUN 59*   CREATININE 2.8*   CALCIUM 8.5*   PROT 6.3   ALBUMIN 3.2*   BILITOT 3.7*   ALKPHOS 66   *   *   ANIONGAP 12   ESTGFRAFRICA 25.1*   EGFRNONAA 21.7*   MG 2.1   PHOS 5.7*       CBC:   Recent Labs   Lab 12/26/21  0853   WBC 9.50   RBC 4.24*   HGB 12.0*   HCT 36.3*      MCV 86   MCH 28.3   MCHC 33.1       Cardiac Profile:  Recent Labs   Lab 12/26/21  0757 12/26/21  0853 12/26/21  1153 12/26/21  1446   BNP  --  >4,500*  --   --    TROPONINI 0.071*  --  0.072* 0.082*       Diabetes:  Recent Labs   Lab 12/26/21  1446   HGBA1C 6.6*       Medications: Pertinent Medications reviewed    Scheduled Meds:   apixaban  2.5 mg Oral Daily    aspirin  325 mg Oral Daily    atorvastatin  40 mg Oral Daily    ferrous sulfate  1 tablet Oral Daily with breakfast    finasteride  5 mg Oral Daily    fluticasone propionate  1 spray Each Nostril Daily    furosemide (LASIX) injection  60 mg Intravenous Q12H    gabapentin  300 mg Oral QHS    hydrALAZINE  50 mg Oral Q8H    isosorbide dinitrate  20 mg Oral TID    metoprolol succinate  25 mg Oral Daily    tamsulosin  0.4 mg Oral Daily    vitamin D  1,000 Units Oral Daily       Continuous Infusions:    PRN Meds:.dextrose 50%, dextrose 50%, glucagon (human recombinant), glucose, glucose, HYDROcodone-acetaminophen, insulin aspart U-100, melatonin, ondansetron, sodium chloride 0.9%    Estimated/Assessed Needs  Weight Used For Calorie Calculations: 83 kg (182 lb 15.7 oz)  Energy Calorie Requirements (kcal): 5467-5268 (25-30)  Energy Need Method: Kcal/kg  Protein Requirements: 50-67gm (0.6-0.8gm/kg)  Weight Used For Protein Calculations: 83 kg (182 lb 15.7 oz)     Estimated Fluid Requirement Method: RDA Method  RDA Method (mL): 2075       Nutrition Prescription  Ordered  Current Diet Order: Diabetic    Evaluation of Received Nutrient/Fluid Intake  Energy Calories Required: not meeting needs  Protein Required: meeting needs  Tolerance: tolerating     Intake/Output Summary (Last 24 hours) at 12/27/2021 1400  Last data filed at 12/27/2021 1200  Gross per 24 hour   Intake 240 ml   Output 800 ml   Net -560 ml      % Intake of Estimated Energy Needs: 50 - 75 %  % Meal Intake: 50 - 75 %    Nutrition Risk  Level of Risk/Frequency of Follow-up: high     Monitor and Evaluation  Food and Nutrient Intake: energy intake  Food and Nutrient Adminstration: diet order  Physical Activity and Function: nutrition-related ADLs and IADLs  Anthropometric Measurements: weight change  Biochemical Data, Medical Tests and Procedures: electrolyte and renal panel,gastrointestinal profile,glucose/endocrine profile  Nutrition-Focused Physical Findings: overall appearance     Nutrition Follow-Up  RD Follow-up?: Yes

## 2021-12-27 NOTE — CONSULTS
[facility]  Cardiology  Consult Note    Patient Name: Baldo Carney  MRN: [id number]  Admission Date: 12/26/2021  Hospital Length of Stay: 1 days  Code Status: Full Code   Attending Provider: Harish Rendon MD   Consulting Provider: Osvaldo Cerda MD  Primary Care Physician: KAMERON Rodriguez,FNP-C  Principal Problem:Acute on chronic combined systolic and diastolic heart failure    Patient information was obtained from ER records.     Inpatient consult to Cardiology  Consult performed by: Osvaldo Cerda MD  Consult ordered by: Belen Levine NP  Reason for consult: heart failure        Subjective:   71-year-old male with history of heart failure with reduced EF, coronary artery disease status post CABG, aortic stenosis status post AVR admitted with chest pain, shortness of breath and lower extremity edema.  BNP and chest x-ray consistent with heart failure decompensation.  Blood pressure is severely elevated on admission.      Past Medical History:   Diagnosis Date    Asthma     Cardiomyopathy 10/21/2014    CHF (congestive heart failure)     Coronary artery disease     Diabetes mellitus     Hyperlipidemia     Hypertension        Past Surgical History:   Procedure Laterality Date    BREAST SURGERY      CARDIAC CATHETERIZATION      CARDIAC VALVE SURGERY      CORONARY ARTERY BYPASS GRAFT      CYSTOSCOPY N/A 7/30/2019    Procedure: CYSTOSCOPY;  Surgeon: Spencer Nguyen MD;  Location: [facility] OR;  Service: Urology;  Laterality: N/A;    EYE SURGERY      SHOULDER ARTHROSCOPY  1985    TRANSRECTAL BIOPSY OF PROSTATE WITH ULTRASOUND GUIDANCE N/A 7/30/2019    Procedure: BIOPSY, PROSTATE, RECTAL APPROACH, WITH US GUIDANCE;  Surgeon: Spencer Nguyen MD;  Location: [facility] OR;  Service: Urology;  Laterality: N/A;  procedure not performed, pt unable to tolerate    TRANSRECTAL BIOPSY OF PROSTATE WITH ULTRASOUND GUIDANCE N/A 8/8/2019    Procedure: BIOPSY, PROSTATE, RECTAL APPROACH, WITH  US GUIDANCE;  Surgeon: Spencer Nguyen MD;  Location: [facility];  Service: Urology;  Laterality: N/A;       Review of patient's allergies indicates:   Allergen Reactions    Invokana  [canagliflozin]      Other reaction(s): been very dizzy       No current facility-administered medications on file prior to encounter.     Current Outpatient Medications on File Prior to Encounter   Medication Sig    amlodipine-benazepril 5-20 mg (LOTREL) 5-20 mg per capsule Take 1 capsule by mouth nightly.    apixaban (ELIQUIS) 2.5 mg Tab Take 1 tablet (2.5 mg total) by mouth once daily.    ARNUITY ELLIPTA 100 mcg/actuation DsDv Use as directed 1 puff in the mouth or throat daily as needed.     aspirin (ECOTRIN) 325 MG EC tablet Take 1 tablet (325 mg total) by mouth once daily.    atorvastatin (LIPITOR) 40 MG tablet Take 1 tablet (40 mg total) by mouth once daily.    azelastine (ASTELIN) 137 mcg (0.1 %) nasal spray 1 spray (137 mcg total) by Nasal route 2 (two) times daily as needed for Rhinitis.    ergocalciferol, vitamin D2, (VITAMIN D ORAL) Take 1 capsule by mouth once a week. WEDNESDAYS    ferrous sulfate (FEOSOL) Tab tablet Take 1 tablet (1 each total) by mouth daily with breakfast. To be taken with vitamin C (orange juice) for best absorption    furosemide (LASIX) 20 MG tablet Take 1 tablet (20 mg total) by mouth 2 (two) times daily.    gabapentin (NEURONTIN) 300 MG capsule Take 1 capsule (300 mg total) by mouth every evening.    levalbuterol (XOPENEX) 0.63 mg/3 mL nebulizer solution Take 3 mLs (0.63 mg total) by nebulization every 4 to 6 hours as needed for Wheezing or Shortness of Breath. Rescue    metoprolol succinate (TOPROL-XL) 25 MG 24 hr tablet Take 1 tablet (25 mg total) by mouth once daily.    sildenafiL (VIAGRA) 50 MG tablet Take 50 mg by mouth daily as needed.    tamsulosin (FLOMAX) 0.4 mg Cap Take 1 capsule (0.4 mg total) by mouth once daily.    VENTOLIN HFA 90 mcg/actuation inhaler Inhale 1-2 puffs  into the lungs every 4 to 6 hours as needed for Wheezing or Shortness of Breath. (RESCUE)    blood sugar diagnostic (ONETOUCH ULTRA TEST) Strp TEST TWICE A DAY    blood-glucose meter kit USE BRAND AS COVERED BY INSURANCE AND COMPATIBLE WITH STRIPS TO CHECK GLUCOSE 2X DAY    finasteride (PROSCAR) 5 mg tablet Take 1 tablet (5 mg total) by mouth once daily.    fluticasone furoate-vilanteroL (BREO ELLIPTA) 100-25 mcg/dose diskus inhaler Inhale 1 puff into the lungs once daily. (DAILY CONTROLLER)    fluticasone propionate (FLONASE) 50 mcg/actuation nasal spray 1 spray (50 mcg total) by Each Nostril route once daily.    HYDROcodone-acetaminophen (NORCO) 7.5-325 mg per tablet     lancing device Misc TEST 2 TIMES DAILY    ULTRA THIN LANCETS 31 gauge Misc Inject 1 lancet into the skin 2 (two) times a day.     Family History     Problem Relation (Age of Onset)    Diabetes Father, Sister, Brother    Heart attack Father    Heart disease Father, Brother    Hypertension Father    No Known Problems Mother, Maternal Grandmother, Maternal Grandfather, Paternal Grandmother, Paternal Grandfather, Maternal Aunt, Maternal Uncle, Paternal Aunt, Paternal Uncle        Tobacco Use    Smoking status: Former Smoker     Quit date: 1970     Years since quittin.5    Smokeless tobacco: Never Used   Substance and Sexual Activity    Alcohol use: Not Currently     Alcohol/week: 3.0 standard drinks     Types: 3 Cans of beer per week     Comment: social    Drug use: No    Sexual activity: Never     Review of Systems   All other systems reviewed and are negative.    Objective:     Vital Signs (Most Recent):  Temp: 96.6 °F (35.9 °C) (21)  Pulse: 68 (21)  Resp: 19 (21)  BP: (!) 150/80 (21)  SpO2: (!) 94 % (21) Vital Signs (24h Range):  Temp:  [96.4 °F (35.8 °C)-98.6 °F (37 °C)] 96.6 °F (35.9 °C)  Pulse:  [68-89] 68  Resp:  [18-31] 19  SpO2:  [94 %-100 %] 94 %  BP:  (148-177)/() 150/80     Weight: 82.8 kg (182 lb 8.7 oz)  Body mass index is 24.76 kg/m².    SpO2: (!) 94 %  O2 Device (Oxygen Therapy): room air      Intake/Output Summary (Last 24 hours) at 12/27/2021 1109  Last data filed at 12/27/2021 0752  Gross per 24 hour   Intake 240 ml   Output 575 ml   Net -335 ml       Lines/Drains/Airways     Peripheral Intravenous Line                 Peripheral IV - Single Lumen 12/26/21 0758 20 G Left Antecubital 1 day                Physical Exam  Vitals reviewed.   Constitutional:       Appearance: Normal appearance.   HENT:      Mouth/Throat:      Mouth: Mucous membranes are moist.   Eyes:      Extraocular Movements: Extraocular movements intact.      Pupils: Pupils are equal, round, and reactive to light.   Cardiovascular:      Rate and Rhythm: Normal rate and regular rhythm.      Heart sounds: No murmur heard.  No gallop.    Pulmonary:      Effort: Pulmonary effort is normal.      Comments: Poor air entry bilaterally  Abdominal:      General: Abdomen is flat.      Palpations: Abdomen is soft.   Musculoskeletal:      Cervical back: Normal range of motion.   Skin:     General: Skin is warm and dry.   Neurological:      General: No focal deficit present.      Mental Status: He is alert and oriented to person, place, and time.   Psychiatric:         Mood and Affect: Mood normal.         Significant Labs:   BMP:   Recent Labs   Lab 12/26/21  0757 12/27/21  0526   * 138*   * 133*   K 4.9 4.6    102   CO2 14* 19*   BUN 47* 59*   CREATININE 2.5* 2.8*   CALCIUM 8.9 8.5*   MG 2.0 2.1   , CBC   Recent Labs   Lab 12/26/21  0853   WBC 9.50   HGB 12.0*   HCT 36.3*       and Troponin   Recent Labs   Lab 12/26/21  0757 12/26/21  1153 12/26/21  1446   TROPONINI 0.071* 0.072* 0.082*       Significant Imaging: Echocardiogram:   Transthoracic echo (TTE) complete (Cupid Only):   Results for orders placed or performed during the hospital encounter of 10/26/21   Echo  Saline Bubble? No   Result Value Ref Range    BSA 2.01 m2    TDI SEPTAL 0.06 m/s    LV LATERAL E/E' RATIO 27.40 m/s    LV SEPTAL E/E' RATIO 22.83 m/s    Right Atrial Pressure (from IVC) 15 mmHg    AORTIC VALVE CUSP SEPERATION 1.40 cm    TDI LATERAL 0.05 m/s    PV PEAK VELOCITY 66.81 cm/s    LVIDd 5.49 3.5 - 6.0 cm    IVS 0.90 0.6 - 1.1 cm    Posterior Wall 0.92 0.6 - 1.1 cm    Ao root annulus 3.40 cm    LVIDs 4.69 (A) 2.1 - 4.0 cm    FS 15 28 - 44 %    LV mass 187.92 g    LA size 3.48 cm    RVDD 301.00 cm    Left Ventricle Relative Wall Thickness 0.34 cm    AV mean gradient 9 mmHg    AV valve area 0.92 cm2    AV Velocity Ratio 30.96     AV index (prosthetic) 0.29     E/A ratio 2.32     Mean e' 0.06 m/s    E wave deceleration time 284.66 msec    LVOT diameter 2.00 cm    LVOT area 3.1 cm2    LVOT peak efrain 65.02 m/s    LVOT peak VTI 14.56 cm    Ao peak efrain 2.10 m/s    Ao VTI 49.48 cm    LVOT stroke volume 45.72 cm3    AV peak gradient 18 mmHg    TV rest pulmonary artery pressure 58 mmHg    E/E' ratio 24.91 m/s    MV Peak E Efrain 1.37 m/s    TR Max Efrain 3.29 m/s    MV Peak A Efrain 0.59 m/s    LV Systolic Volume 96.28 mL    LV Systolic Volume Index 47.7 mL/m2    LV Diastolic Volume 176.55 mL    LV Diastolic Volume Index 87.40 mL/m2    LV Mass Index 93 g/m2    Triscuspid Valve Regurgitation Peak Gradient 43 mmHg    EF 20 %    Narrative    · The estimated PA systolic pressure is 58 mmHg.  · The left ventricle is mildly enlarged with eccentric hypertrophy and   severely decreased systolic function.  · The estimated ejection fraction is 20%.  · Grade III left ventricular diastolic dysfunction.  · There is severe left ventricular global hypokinesis.  · Normal right ventricular size with moderately reduced right ventricular   systolic function.  · Mild left atrial enlargement.  · Moderate right atrial enlargement.  · The aortic valve is bileaflet.  · Moderate aortic regurgitation.  · There is mild-to-moderate aortic valve  stenosis.  · Aortic valve area is 0.92 cm2; peak velocity is 2.10 m/s; mean gradient   is 9 mmHg.  · Moderate-to-severe mitral regurgitation.  · There is mild mitral stenosis.  · There is severe mitral leaflet calcification.  · Severe tricuspid regurgitation.  · Moderate pulmonic regurgitation.  · There is moderate to severe pulmonary hypertension.        Assessment and Plan:     Active Diagnoses:    Diagnosis Date Noted POA    PRINCIPAL PROBLEM:  Acute on chronic combined systolic and diastolic heart failure [I50.43] 10/26/2021 Yes    Aortic stenosis status post AVR [I35.0] 10/21/2014 Yes     Chronic      Problems Resolved During this Admission:       VTE Risk Mitigation (From admission, onward)         Ordered     apixaban tablet 2.5 mg  Daily         12/26/21 1235     IP VTE LOW RISK PATIENT  Once         12/26/21 1235     Place sequential compression device  Until discontinued         12/26/21 1235            Likely has a type 2 MI. Continue with aspirin and statin.  Continue with metoprolol.    He is decompensated from a heart failure standpoint, continue with high-dose Lasix given elevated creatinine. Will initiate hydralazine and isosorbide for afterload reduction and blood pressure management. Will avoid Ace/Arb given chronic kidney disease versus ERICA on CKD.     Thank you for your consult. I will follow-up with patient. Please contact us if you have any additional questions.    Osvaldo Cerda MD  Cardiology   [facility]

## 2021-12-27 NOTE — PLAN OF CARE
"[facility]  Initial Discharge Assessment       Primary Care Provider: KAMERON Rodriguez,FNP-C    Admission Diagnosis: Acute on chronic congestive heart failure, unspecified heart failure type [I50.9]    Admission Date: 12/26/2021  Expected Discharge Date:     Discharge Barriers Identified: None    Payor: [employer] / Plan: [employer] MEDICARE HMO / Product Type: Medicare Advantage /     Extended Emergency Contact Information  Primary Emergency Contact: Baldo Carney Jr.  Mobile Phone: [phone]  Relation: Son  Preferred language: English   needed? No    Discharge Plan A: Home  Discharge Plan B: Home      [id number] - [place], [place] - [address]  [address]  [place] [address]  Phone: [phone] Fax: [phone]    [facility] - [place], [place] - [address] AT [facility]  [address]  [facility] [address]  Phone: [phone] Fax: [phone]    [facility] [address] [address]  Phone: [phone] Fax: [phone]    Assessment completed: at bedside with Pt.  Advance Directives: Patient has not addressed.  Discharge Plan: Home, lives alone.  However, reports that he has family that will assist him if needed.  Needs: None identified at this time.    Case Management to continue to follow.    Initial Assessment (most recent)     Adult Discharge Assessment - 12/27/21 1447        Discharge Assessment    Assessment Type Discharge Planning Assessment     Confirmed/corrected address, phone number and insurance Yes     Confirmed Demographics Correct on Facesheet     Source of Information patient;health record     When was your last doctors appointment? --   Pt reports, "about 3 weeks ago."    Communicated PAM with patient/caregiver Date not available/Unable to determine     Lives With alone     Facility Arrived From: Home     Do you expect to return to " your current living situation? Yes     Do you have help at home or someone to help you manage your care at home? Yes     Who are your caregiver(s) and their phone number(s)? Baldo Carney, son ([phone])     Prior to hospitilization cognitive status: Alert/Oriented     Current cognitive status: Alert/Oriented     Walking or Climbing Stairs Difficulty none     Dressing/Bathing Difficulty none     Home Accessibility wheelchair accessible     Home Layout Able to live on 1st floor     Equipment Currently Used at Home nebulizer     Readmission within 30 days? No     Patient currently being followed by outpatient case management? No     Do you currently have service(s) that help you manage your care at home? No     Do you take prescription medications? Yes     Do you have prescription coverage? Yes     Do you have any problems affording any of your prescribed medications? No     Is the patient taking medications as prescribed? yes     Who is going to help you get home at discharge? Baldo Carney, son ([phone])     How do you get to doctors appointments? car, drives self     Are you on dialysis? No     Do you take coumadin? No     Discharge Plan A Home     Discharge Plan B Home     DME Needed Upon Discharge  none     Discharge Plan discussed with: Patient     Discharge Barriers Identified None

## 2021-12-28 VITALS
HEART RATE: 65 BPM | TEMPERATURE: 98 F | SYSTOLIC BLOOD PRESSURE: 116 MMHG | BODY MASS INDEX: 24.91 KG/M2 | WEIGHT: 183.88 LBS | OXYGEN SATURATION: 99 % | RESPIRATION RATE: 18 BRPM | HEIGHT: 72 IN | DIASTOLIC BLOOD PRESSURE: 58 MMHG

## 2021-12-28 PROBLEM — I50.43 ACUTE ON CHRONIC COMBINED SYSTOLIC AND DIASTOLIC HEART FAILURE: Status: RESOLVED | Noted: 2021-10-26 | Resolved: 2021-12-28

## 2021-12-28 LAB
ALBUMIN SERPL BCP-MCNC: 3 G/DL (ref 3.5–5.2)
ALP SERPL-CCNC: 69 U/L (ref 55–135)
ALT SERPL W/O P-5'-P-CCNC: 202 U/L (ref 10–44)
ANION GAP SERPL CALC-SCNC: 10 MMOL/L (ref 8–16)
AST SERPL-CCNC: 165 U/L (ref 10–40)
BILIRUB SERPL-MCNC: 2.7 MG/DL (ref 0.1–1)
BUN SERPL-MCNC: 62 MG/DL (ref 8–23)
CALCIUM SERPL-MCNC: 8.5 MG/DL (ref 8.7–10.5)
CHLORIDE SERPL-SCNC: 103 MMOL/L (ref 95–110)
CO2 SERPL-SCNC: 22 MMOL/L (ref 23–29)
CREAT SERPL-MCNC: 2.4 MG/DL (ref 0.5–1.4)
EST. GFR  (AFRICAN AMERICAN): 30.2 ML/MIN/1.73 M^2
EST. GFR  (NON AFRICAN AMERICAN): 26.2 ML/MIN/1.73 M^2
GLUCOSE SERPL-MCNC: 121 MG/DL (ref 70–110)
GLUCOSE SERPL-MCNC: 134 MG/DL (ref 70–110)
GLUCOSE SERPL-MCNC: 167 MG/DL (ref 70–110)
POTASSIUM SERPL-SCNC: 3.4 MMOL/L (ref 3.5–5.1)
PROT SERPL-MCNC: 6.1 G/DL (ref 6–8.4)
SODIUM SERPL-SCNC: 135 MMOL/L (ref 136–145)

## 2021-12-28 PROCEDURE — 25000003 PHARM REV CODE 250: Performed by: INTERNAL MEDICINE

## 2021-12-28 PROCEDURE — 25000003 PHARM REV CODE 250: Performed by: NURSE PRACTITIONER

## 2021-12-28 PROCEDURE — 63600175 PHARM REV CODE 636 W HCPCS: Performed by: INTERNAL MEDICINE

## 2021-12-28 PROCEDURE — 36415 COLL VENOUS BLD VENIPUNCTURE: CPT | Performed by: INTERNAL MEDICINE

## 2021-12-28 PROCEDURE — 80053 COMPREHEN METABOLIC PANEL: CPT | Performed by: INTERNAL MEDICINE

## 2021-12-28 RX ORDER — POTASSIUM CHLORIDE 7.45 MG/ML
20 INJECTION INTRAVENOUS
Status: DISCONTINUED | OUTPATIENT
Start: 2021-12-28 | End: 2021-12-28 | Stop reason: HOSPADM

## 2021-12-28 RX ORDER — MAGNESIUM SULFATE HEPTAHYDRATE 40 MG/ML
4 INJECTION, SOLUTION INTRAVENOUS
Status: DISCONTINUED | OUTPATIENT
Start: 2021-12-28 | End: 2021-12-28 | Stop reason: HOSPADM

## 2021-12-28 RX ORDER — HYDRALAZINE HYDROCHLORIDE 50 MG/1
50 TABLET, FILM COATED ORAL EVERY 8 HOURS
Qty: 90 TABLET | Refills: 0 | Status: ON HOLD | OUTPATIENT
Start: 2021-12-28 | End: 2023-09-15

## 2021-12-28 RX ORDER — POTASSIUM CHLORIDE 7.45 MG/ML
40 INJECTION INTRAVENOUS
Status: DISCONTINUED | OUTPATIENT
Start: 2021-12-28 | End: 2021-12-28 | Stop reason: HOSPADM

## 2021-12-28 RX ORDER — MAGNESIUM SULFATE HEPTAHYDRATE 40 MG/ML
2 INJECTION, SOLUTION INTRAVENOUS
Status: DISCONTINUED | OUTPATIENT
Start: 2021-12-28 | End: 2021-12-28 | Stop reason: HOSPADM

## 2021-12-28 RX ORDER — POTASSIUM CHLORIDE 20 MEQ/1
20 TABLET, EXTENDED RELEASE ORAL
Status: DISCONTINUED | OUTPATIENT
Start: 2021-12-28 | End: 2021-12-28 | Stop reason: HOSPADM

## 2021-12-28 RX ORDER — MAGNESIUM SULFATE 1 G/100ML
1 INJECTION INTRAVENOUS
Status: DISCONTINUED | OUTPATIENT
Start: 2021-12-28 | End: 2021-12-28 | Stop reason: HOSPADM

## 2021-12-28 RX ORDER — LANOLIN ALCOHOL/MO/W.PET/CERES
800 CREAM (GRAM) TOPICAL
Status: DISCONTINUED | OUTPATIENT
Start: 2021-12-28 | End: 2021-12-28 | Stop reason: HOSPADM

## 2021-12-28 RX ORDER — POTASSIUM CHLORIDE 20 MEQ/1
40 TABLET, EXTENDED RELEASE ORAL
Status: DISCONTINUED | OUTPATIENT
Start: 2021-12-28 | End: 2021-12-28 | Stop reason: HOSPADM

## 2021-12-28 RX ORDER — FUROSEMIDE 20 MG/1
TABLET ORAL
Qty: 80 TABLET | Refills: 0 | Status: SHIPPED | OUTPATIENT
Start: 2021-12-28 | End: 2022-04-19

## 2021-12-28 RX ORDER — ISOSORBIDE DINITRATE 20 MG/1
20 TABLET ORAL 3 TIMES DAILY
Qty: 90 TABLET | Refills: 0 | Status: SHIPPED | OUTPATIENT
Start: 2021-12-28 | End: 2024-02-29

## 2021-12-28 RX ADMIN — HYDRALAZINE HYDROCHLORIDE 50 MG: 25 TABLET ORAL at 02:12

## 2021-12-28 RX ADMIN — POTASSIUM CHLORIDE 40 MEQ: 20 TABLET, EXTENDED RELEASE ORAL at 11:12

## 2021-12-28 RX ADMIN — FUROSEMIDE 60 MG: 10 INJECTION, SOLUTION INTRAVENOUS at 09:12

## 2021-12-28 RX ADMIN — ATORVASTATIN CALCIUM 40 MG: 40 TABLET, FILM COATED ORAL at 09:12

## 2021-12-28 RX ADMIN — TAMSULOSIN HYDROCHLORIDE 0.4 MG: 0.4 CAPSULE ORAL at 09:12

## 2021-12-28 RX ADMIN — Medication 1000 UNITS: at 09:12

## 2021-12-28 RX ADMIN — ASPIRIN 325 MG: 325 TABLET, COATED ORAL at 09:12

## 2021-12-28 RX ADMIN — METOPROLOL SUCCINATE 25 MG: 25 TABLET, EXTENDED RELEASE ORAL at 09:12

## 2021-12-28 RX ADMIN — FINASTERIDE 5 MG: 5 TABLET, FILM COATED ORAL at 09:12

## 2021-12-28 RX ADMIN — APIXABAN 2.5 MG: 2.5 TABLET, FILM COATED ORAL at 09:12

## 2021-12-28 RX ADMIN — FERROUS SULFATE TAB 325 MG (65 MG ELEMENTAL FE) 1 EACH: 325 (65 FE) TAB at 09:12

## 2021-12-28 RX ADMIN — ISOSORBIDE DINITRATE 20 MG: 10 TABLET ORAL at 02:12

## 2021-12-28 RX ADMIN — FLUTICASONE PROPIONATE 50 MCG: 50 SPRAY, METERED NASAL at 09:12

## 2021-12-28 RX ADMIN — HYDRALAZINE HYDROCHLORIDE 50 MG: 25 TABLET ORAL at 05:12

## 2021-12-28 RX ADMIN — ISOSORBIDE DINITRATE 20 MG: 10 TABLET ORAL at 09:12

## 2021-12-28 NOTE — PROGRESS NOTES
[facility] Medicine  Progress Note    Patient name: Baldo Carney  MRN: [id number]  Admit Date: 12/26/2021   LOS: 2 days     SUBJECTIVE:     Principal problem: Acute on chronic combined systolic and diastolic heart failure    Interval History:    Patient feels better, but continued to have orthopnea  Blood pressure control improved    Scheduled Meds:   apixaban  2.5 mg Oral Daily    aspirin  325 mg Oral Daily    atorvastatin  40 mg Oral Daily    ferrous sulfate  1 tablet Oral Daily with breakfast    finasteride  5 mg Oral Daily    fluticasone propionate  1 spray Each Nostril Daily    furosemide (LASIX) injection  60 mg Intravenous Q12H    gabapentin  300 mg Oral QHS    hydrALAZINE  50 mg Oral Q8H    isosorbide dinitrate  20 mg Oral TID    metoprolol succinate  25 mg Oral Daily    tamsulosin  0.4 mg Oral Daily    vitamin D  1,000 Units Oral Daily     Continuous Infusions:  PRN Meds:dextrose 50%, dextrose 50%, glucagon (human recombinant), glucose, glucose, HYDROcodone-acetaminophen, insulin aspart U-100, melatonin, ondansetron, sodium chloride 0.9%    Review of patient's allergies indicates:   Allergen Reactions    Invokana  [canagliflozin]      Other reaction(s): been very dizzy       Review of Systems: As per interval history    OBJECTIVE:     Vital Signs (Most Recent)  Temp: 96.8 °F (36 °C) (12/27/21 2300)  Pulse: 67 (12/27/21 2300)  Resp: 16 (12/27/21 2300)  BP: 102/60 (12/27/21 2300)  SpO2: 96 % (12/27/21 2300)    Vital Signs Range (Last 24H):  Temp:  [96.6 °F (35.9 °C)-98.3 °F (36.8 °C)]   Pulse:  [63-70]   Resp:  [16-19]   BP: (102-158)/(60-87)   SpO2:  [94 %-100 %]     I & O (Last 24H):    Intake/Output Summary (Last 24 hours) at 12/28/2021 0132  Last data filed at 12/27/2021 2300  Gross per 24 hour   Intake 240 ml   Output 1525 ml   Net -1285 ml       Physical Exam:  General: Patient resting comfortably in no acute distress. Appears as stated age. Calm  Eyes: No  conjunctival injection. No scleral icterus.  ENT: Hearing grossly intact. No discharge from ears. No nasal discharge.   Neck: Supple, trachea midline. No JVD  CVS: RRR. No LE edema BL  Lungs: CTA BL, no wheezing or crackles.  Decreased breath sounds. No accessory muscle use. No acute respiratory distress  Abdomen:  Soft, nontender and nondistended.  No organomegaly, bilateral lower extremity edema+  Neuro: AOx3. Moves all extremities. Follows commands. Responds appropriately   Skin:  No rash or erythema noted    Laboratory:  I have reviewed all pertinent lab results within the past 24 hours.  CBC:   Recent Labs   Lab 12/26/21  0853   WBC 9.50   RBC 4.24*   HGB 12.0*   HCT 36.3*      MCV 86   MCH 28.3   MCHC 33.1     BMP:   Recent Labs   Lab 12/27/21  0526   *   *   K 4.6      CO2 19*   BUN 59*   CREATININE 2.8*   CALCIUM 8.5*   MG 2.1     Cardiac markers:   Recent Labs   Lab 12/26/21  1446   TROPONINI 0.082*       Diagnostic Results:  X-Ray: Reviewed  No pulmonary edema    ASSESSMENT/PLAN:       1. Acute on Chronic Heart failure  -given IV Lasix in the ED  -IV Lasix 60 mg daily due to CKD  -trend cardiac enzymes and troponin  -consult cardiologist appreciated        2. S/P CABG (coronary artery bypass graft)  -currently chest pain-free  -trend cardiac enzymes and troponin        3. S/P AVR (aortic valve replacement)     -S/p by prostatic AVR  -last echo showed moderate to severe aortic stenosis.  SHI equals 0.926 cm        4. CAD (coronary artery disease)  - No chest pain , no ischemic changes in EKG   -Will trend cardiac enzymes         5.Cardiomyopathy  -previous ejection fraction about 20%(10/27/2021)  -global left ventricular hypokinesis         6. Type II diabetes mellitus without complication, without long-term current use of insulin-controlled  -low dose NovoLog insulin sliding scale  -sliding scale protocol  -diabetic cardiac diet   -A1c 6.6     7. Hyperlipidemia  -LFT stable  continue statin        8. Hypertension-uncontrolled   - continue home medications  -hydralazine and isosorbide     9.  Acute on chronic renal failure  -baseline CKD stage III  -most likely secondary to hypoperfusion  -caution with aggressive diuresing  -avoid nephrotoxic medications -ACE inhibitors     10.  Troponin elevation with chest pain  -trend cardiac enzymes and troponin  -consult cardiology  -possible demand ischemia  -continue cardioprotective medications     11.  Bronchitis  -green productive sputum  -IV doxycycline for now  -sputum cultures     DVT Prophylaxis: will be placed on Eliquis for DVT prophylaxis and will be advised to be as mobile as possible and sit in a chair as tolerated.   ____________________________________________________________  Face-to-Face encounter date: 12/26/2021  Encounter included review of the medical records, interviewing and examining the patient face-to-face, discussion with family and other health care providers including emergency medicine physician, admission orders, interpreting lab/test results and formulating a plan of care.   Medical Decision Making during this encounter was  [_] Low Complexity  [_] Moderate Complexity  [x] High Complexity    VTE Risk Mitigation (From admission, onward)         Ordered     apixaban tablet 2.5 mg  Daily         12/26/21 1235     IP VTE LOW RISK PATIENT  Once         12/26/21 1235     Place sequential compression device  Until discontinued         12/26/21 1235                  Department Hospital Medicine  [facility]  Yaneli Black MD  Date of service: 12/28/2021

## 2021-12-28 NOTE — PLAN OF CARE
12/28/21 1242   Final Note   Assessment Type Final Discharge Note   Anticipated Discharge Disposition Home   Post-Acute Status   Discharge Delays None known at this time   Orders reviewed - pt discharging home with no case management needs noted.

## 2021-12-28 NOTE — PLAN OF CARE
Problem: Adult Inpatient Plan of Care  Goal: Plan of Care Review  Outcome: Met  Goal: Patient-Specific Goal (Individualized)  Outcome: Met  Goal: Absence of Hospital-Acquired Illness or Injury  Outcome: Met  Goal: Optimal Comfort and Wellbeing  Outcome: Met  Goal: Readiness for Transition of Care  Outcome: Met     Problem: Diabetes Comorbidity  Goal: Blood Glucose Level Within Targeted Range  Outcome: Met     Problem: Fluid and Electrolyte Imbalance (Acute Kidney Injury/Impairment)  Goal: Fluid and Electrolyte Balance  Outcome: Met     Problem: Oral Intake Inadequate (Acute Kidney Injury/Impairment)  Goal: Optimal Nutrition Intake  Outcome: Met     Problem: Renal Function Impairment (Acute Kidney Injury/Impairment)  Goal: Effective Renal Function  Outcome: Met     Problem: Fall Injury Risk  Goal: Absence of Fall and Fall-Related Injury  Outcome: Met

## 2021-12-28 NOTE — PLAN OF CARE
Problem: Adult Inpatient Plan of Care  Goal: Plan of Care Review  Outcome: Ongoing, Progressing     Problem: Fluid and Electrolyte Imbalance (Acute Kidney Injury/Impairment)  Goal: Fluid and Electrolyte Balance  Outcome: Ongoing, Progressing     Problem: Renal Function Impairment (Acute Kidney Injury/Impairment)  Goal: Effective Renal Function  Outcome: Ongoing, Progressing     Problem: Fall Injury Risk  Goal: Absence of Fall and Fall-Related Injury  Outcome: Ongoing, Progressing

## 2021-12-28 NOTE — NURSING
Pt discharged to home. AAOx4, skin warm and dry to touch, denies any shortness of breath or pain/discomfort. AVS and discharge teaching provided. Denies any further questions or concerns at this time. PIV and telemetry monitor removed from pt. Wheeled to lobby in wheelchair.

## 2021-12-29 ENCOUNTER — PATIENT OUTREACH (OUTPATIENT)
Dept: FAMILY MEDICINE | Facility: CLINIC | Age: 71
End: 2021-12-29
Payer: COMMERCIAL

## 2021-12-29 NOTE — HOSPITAL COURSE
Patient admitted with Acute on chronic combined systolic and diastolic heart failure and associated ERICA from Hypervolemia  Pt was treated with iv diuretics and was later discharged back to home  Pt will follow up with Cardiology/Nephrology [facility] as an OP

## 2021-12-29 NOTE — DISCHARGE SUMMARY
[facility] Medicine  Discharge Summary      Patient Name: Baldo Carney  MRN: [id number]  Patient Class: IP- Inpatient  Admission Date: 12/26/2021  Hospital Length of Stay: 2 days  Discharge Date and Time:  12/28/2021 7:56 PM  Attending Physician: No att. providers found   Discharging Provider: Gregory Riggs MD  Primary Care Provider: KAMERON Rodriguez FNP-LINA      HPI:   No notes on file    * No surgery found *      Hospital Course:   Patient admitted with Acute on chronic combined systolic and diastolic heart failure and associated ERICA from Hypervolemia  Pt was treated with iv diuretics and was later discharged back to home  Pt will follow up with Cardiology/Nephrology [facility] as an OP        Goals of Care Treatment Preferences:  Code Status: Full Code    Living Will: Yes              Consults:   Consults (From admission, onward)        Status Ordering Provider     Inpatient consult to Cardiology  Once        Provider:  Maldonado Heath MD    Completed MICHI SWENSON     Inpatient consult to Registered Dietitian/Nutritionist  Once        Provider:  (Not yet assigned)    Completed MICHI SWENSON          No new Assessment & Plan notes have been filed under this hospital service since the last note was generated.  Service: Hospital Medicine    Final Active Diagnoses:    Diagnosis Date Noted POA    Aortic stenosis status post AVR [I35.0] 10/21/2014 Yes     Chronic      Problems Resolved During this Admission:    Diagnosis Date Noted Date Resolved POA    PRINCIPAL PROBLEM:  Acute on chronic combined systolic and diastolic heart failure [I50.43] 10/26/2021 12/28/2021 Yes       Discharged Condition: good    Disposition: Home or Self Care    Follow Up:   Follow-up Information     Maldonado Heath MD In 1 week.    Specialties: INTERVENTIONAL CARDIOLOGY, Cardiology, Cardiovascular Disease  Contact information:  [address] [facility]  [address]  [facility] [address]  [phone]                        Patient Instructions:      Diet Cardiac       Significant Diagnostic Studies: Labs:   CMP   Recent Labs   Lab 12/27/21  0526 12/28/21  0937   * 135*   K 4.6 3.4*    103   CO2 19* 22*   * 134*   BUN 59* 62*   CREATININE 2.8* 2.4*   CALCIUM 8.5* 8.5*   PROT 6.3 6.1   ALBUMIN 3.2* 3.0*   BILITOT 3.7* 2.7*   ALKPHOS 66 69   * 165*   * 202*   ANIONGAP 12 10   ESTGFRAFRICA 25.1* 30.2*   EGFRNONAA 21.7* 26.2*    and CBC No results for input(s): WBC, HGB, HCT, PLT in the last 48 hours.    Pending Diagnostic Studies:     None         Medications:  Reconciled Home Medications:      Medication List      START taking these medications    hydrALAZINE 50 MG tablet  Commonly known as: APRESOLINE  Take 1 tablet (50 mg total) by mouth every 8 (eight) hours.     isosorbide dinitrate 20 MG tablet  Commonly known as: ISORDIL  Take 1 tablet (20 mg total) by mouth 3 (three) times daily.        CHANGE how you take these medications    furosemide 20 MG tablet  Commonly known as: LASIX  Take 2 tablets (40 mg total) by mouth 2 (two) times daily for 5 days, THEN 1 tablet (20 mg total) 2 (two) times daily.  Start taking on: December 28, 2021  What changed: See the new instructions.        CONTINUE taking these medications    apixaban 2.5 mg Tab  Commonly known as: ELIQUIS  Take 1 tablet (2.5 mg total) by mouth once daily.     ARNUITY ELLIPTA 100 mcg/actuation inhaler  Generic drug: fluticasone furoate  Use as directed 1 puff in the mouth or throat daily as needed.     aspirin 325 MG EC tablet  Commonly known as: ECOTRIN  Take 1 tablet (325 mg total) by mouth once daily.     atorvastatin 40 MG tablet  Commonly known as: LIPITOR  Take 1 tablet (40 mg total) by mouth once daily.     azelastine 137 mcg (0.1 %) nasal spray  Commonly known as: ASTELIN  1 spray (137 mcg total) by Nasal route 2 (two) times daily as needed for Rhinitis.     blood sugar diagnostic Strp  Commonly known as: ONETOUCH ULTRA TEST  TEST  TWICE A DAY     blood-glucose meter kit  USE BRAND AS COVERED BY INSURANCE AND COMPATIBLE WITH STRIPS TO CHECK GLUCOSE 2X DAY     BREO ELLIPTA 100-25 mcg/dose diskus inhaler  Generic drug: fluticasone furoate-vilanteroL  Inhale 1 puff into the lungs once daily. (DAILY CONTROLLER)     ferrous sulfate Tab tablet  Commonly known as: FEOSOL  Take 1 tablet (1 each total) by mouth daily with breakfast. To be taken with vitamin C (orange juice) for best absorption     finasteride 5 mg tablet  Commonly known as: PROSCAR  Take 1 tablet (5 mg total) by mouth once daily.     fluticasone propionate 50 mcg/actuation nasal spray  Commonly known as: FLONASE  1 spray (50 mcg total) by Each Nostril route once daily.     gabapentin 300 MG capsule  Commonly known as: NEURONTIN  Take 1 capsule (300 mg total) by mouth every evening.     lancing device Misc  TEST 2 TIMES DAILY     levalbuterol 0.63 mg/3 mL nebulizer solution  Commonly known as: XOPENEX  Take 3 mLs (0.63 mg total) by nebulization every 4 to 6 hours as needed for Wheezing or Shortness of Breath. Rescue     metoprolol succinate 25 MG 24 hr tablet  Commonly known as: TOPROL-XL  Take 1 tablet (25 mg total) by mouth once daily.     tamsulosin 0.4 mg Cap  Commonly known as: FLOMAX  Take 1 capsule (0.4 mg total) by mouth once daily.     ULTRA THIN LANCETS 31 gauge Misc  Generic drug: lancets  Inject 1 lancet into the skin 2 (two) times a day.     VENTOLIN HFA 90 mcg/actuation inhaler  Generic drug: albuterol  Inhale 1-2 puffs into the lungs every 4 to 6 hours as needed for Wheezing or Shortness of Breath. (RESCUE)     VITAMIN D ORAL  Take 1 capsule by mouth once a week. WEDNESDAYS        STOP taking these medications    amlodipine-benazepril 5-20 mg 5-20 mg per capsule  Commonly known as: LOTREL     HYDROcodone-acetaminophen 7.5-325 mg per tablet  Commonly known as: NORCO     sildenafiL 50 MG tablet  Commonly known as: VIAGRA            Indwelling Lines/Drains at time of  discharge:   Lines/Drains/Airways     None               Physical Exam  Cardiovascular:      Rate and Rhythm: Normal rate.   Neurological:      Mental Status: He is alert and oriented to person, place, and time.       Time spent on the discharge of patient: 45  minutes         Gregory Riggs MD  Department of Hospital Medicine  [facility]

## 2022-01-04 ENCOUNTER — LAB VISIT (OUTPATIENT)
Dept: LAB | Facility: HOSPITAL | Age: 72
End: 2022-01-04
Attending: NURSE PRACTITIONER
Payer: COMMERCIAL

## 2022-01-04 DIAGNOSIS — D50.9 IRON DEFICIENCY ANEMIA, UNSPECIFIED IRON DEFICIENCY ANEMIA TYPE: ICD-10-CM

## 2022-01-04 DIAGNOSIS — I50.9 ACUTE ON CHRONIC CONGESTIVE HEART FAILURE, UNSPECIFIED HEART FAILURE TYPE: ICD-10-CM

## 2022-01-04 LAB
BASOPHILS # BLD AUTO: 0.04 K/UL (ref 0–0.2)
BASOPHILS NFR BLD: 0.6 % (ref 0–1.9)
BNP SERPL-MCNC: 2861 PG/ML (ref 0–99)
DIFFERENTIAL METHOD: ABNORMAL
EOSINOPHIL # BLD AUTO: 0.1 K/UL (ref 0–0.5)
EOSINOPHIL NFR BLD: 1.3 % (ref 0–8)
ERYTHROCYTE [DISTWIDTH] IN BLOOD BY AUTOMATED COUNT: 19.1 % (ref 11.5–14.5)
FERRITIN SERPL-MCNC: 89 NG/ML (ref 20–300)
HCT VFR BLD AUTO: 33.4 % (ref 40–54)
HGB BLD-MCNC: 11.2 G/DL (ref 14–18)
IMM GRANULOCYTES # BLD AUTO: 0.03 K/UL (ref 0–0.04)
IMM GRANULOCYTES NFR BLD AUTO: 0.4 % (ref 0–0.5)
IRON SERPL-MCNC: 53 UG/DL (ref 45–160)
LYMPHOCYTES # BLD AUTO: 1.5 K/UL (ref 1–4.8)
LYMPHOCYTES NFR BLD: 21.1 % (ref 18–48)
MCH RBC QN AUTO: 27.9 PG (ref 27–31)
MCHC RBC AUTO-ENTMCNC: 33.5 G/DL (ref 32–36)
MCV RBC AUTO: 83 FL (ref 82–98)
MONOCYTES # BLD AUTO: 0.6 K/UL (ref 0.3–1)
MONOCYTES NFR BLD: 8.8 % (ref 4–15)
NEUTROPHILS # BLD AUTO: 4.9 K/UL (ref 1.8–7.7)
NEUTROPHILS NFR BLD: 67.8 % (ref 38–73)
NRBC BLD-RTO: 0 /100 WBC
PLATELET # BLD AUTO: 106 K/UL (ref 150–450)
PMV BLD AUTO: ABNORMAL FL (ref 9.2–12.9)
RBC # BLD AUTO: 4.01 M/UL (ref 4.6–6.2)
SATURATED IRON: 14 % (ref 20–50)
TOTAL IRON BINDING CAPACITY: 391 UG/DL (ref 250–450)
TRANSFERRIN SERPL-MCNC: 279 MG/DL (ref 200–375)
WBC # BLD AUTO: 7.17 K/UL (ref 3.9–12.7)

## 2022-01-04 PROCEDURE — 83540 ASSAY OF IRON: CPT | Performed by: NURSE PRACTITIONER

## 2022-01-04 PROCEDURE — 83880 ASSAY OF NATRIURETIC PEPTIDE: CPT | Performed by: NURSE PRACTITIONER

## 2022-01-04 PROCEDURE — 85025 COMPLETE CBC W/AUTO DIFF WBC: CPT | Performed by: NURSE PRACTITIONER

## 2022-01-04 PROCEDURE — 82728 ASSAY OF FERRITIN: CPT | Performed by: NURSE PRACTITIONER

## 2022-01-04 PROCEDURE — 36415 COLL VENOUS BLD VENIPUNCTURE: CPT | Performed by: NURSE PRACTITIONER

## 2022-01-05 ENCOUNTER — OFFICE VISIT (OUTPATIENT)
Dept: FAMILY MEDICINE | Facility: CLINIC | Age: 72
End: 2022-01-05
Payer: COMMERCIAL

## 2022-01-05 VITALS
HEIGHT: 72 IN | WEIGHT: 180.69 LBS | TEMPERATURE: 98 F | HEART RATE: 73 BPM | OXYGEN SATURATION: 99 % | SYSTOLIC BLOOD PRESSURE: 130 MMHG | DIASTOLIC BLOOD PRESSURE: 76 MMHG | BODY MASS INDEX: 24.47 KG/M2

## 2022-01-05 DIAGNOSIS — E11.9 TYPE 2 DIABETES MELLITUS WITHOUT COMPLICATION, WITHOUT LONG-TERM CURRENT USE OF INSULIN: ICD-10-CM

## 2022-01-05 DIAGNOSIS — N17.9 AKI (ACUTE KIDNEY INJURY): ICD-10-CM

## 2022-01-05 DIAGNOSIS — Z09 HOSPITAL DISCHARGE FOLLOW-UP: Primary | ICD-10-CM

## 2022-01-05 DIAGNOSIS — I50.22 CHRONIC SYSTOLIC HEART FAILURE: Chronic | ICD-10-CM

## 2022-01-05 PROCEDURE — 3008F BODY MASS INDEX DOCD: CPT | Mod: S$GLB,,, | Performed by: NURSE PRACTITIONER

## 2022-01-05 PROCEDURE — 3066F PR DOCUMENTATION OF TREATMENT FOR NEPHROPATHY: ICD-10-PCS | Mod: S$GLB,,, | Performed by: NURSE PRACTITIONER

## 2022-01-05 PROCEDURE — 1126F PR PAIN SEVERITY QUANTIFIED, NO PAIN PRESENT: ICD-10-PCS | Mod: S$GLB,,, | Performed by: NURSE PRACTITIONER

## 2022-01-05 PROCEDURE — 1126F AMNT PAIN NOTED NONE PRSNT: CPT | Mod: S$GLB,,, | Performed by: NURSE PRACTITIONER

## 2022-01-05 PROCEDURE — 1157F PR ADVANCE CARE PLAN OR EQUIV PRESENT IN MEDICAL RECORD: ICD-10-PCS | Mod: S$GLB,,, | Performed by: NURSE PRACTITIONER

## 2022-01-05 PROCEDURE — 3062F PR POS MACROALBUMINURIA RESULT DOCUMENTED/REVIEW: ICD-10-PCS | Mod: S$GLB,,, | Performed by: NURSE PRACTITIONER

## 2022-01-05 PROCEDURE — 1101F PT FALLS ASSESS-DOCD LE1/YR: CPT | Mod: S$GLB,,, | Performed by: NURSE PRACTITIONER

## 2022-01-05 PROCEDURE — 4010F ACE/ARB THERAPY RXD/TAKEN: CPT | Mod: S$GLB,,, | Performed by: NURSE PRACTITIONER

## 2022-01-05 PROCEDURE — 3288F FALL RISK ASSESSMENT DOCD: CPT | Mod: S$GLB,,, | Performed by: NURSE PRACTITIONER

## 2022-01-05 PROCEDURE — 99495 TRANSJ CARE MGMT MOD F2F 14D: CPT | Mod: S$GLB,,, | Performed by: NURSE PRACTITIONER

## 2022-01-05 PROCEDURE — 3288F PR FALLS RISK ASSESSMENT DOCUMENTED: ICD-10-PCS | Mod: S$GLB,,, | Performed by: NURSE PRACTITIONER

## 2022-01-05 PROCEDURE — 1160F RVW MEDS BY RX/DR IN RCRD: CPT | Mod: S$GLB,,, | Performed by: NURSE PRACTITIONER

## 2022-01-05 PROCEDURE — 1159F MED LIST DOCD IN RCRD: CPT | Mod: S$GLB,,, | Performed by: NURSE PRACTITIONER

## 2022-01-05 PROCEDURE — 1157F ADVNC CARE PLAN IN RCRD: CPT | Mod: S$GLB,,, | Performed by: NURSE PRACTITIONER

## 2022-01-05 PROCEDURE — 1159F PR MEDICATION LIST DOCUMENTED IN MEDICAL RECORD: ICD-10-PCS | Mod: S$GLB,,, | Performed by: NURSE PRACTITIONER

## 2022-01-05 PROCEDURE — 1101F PR PT FALLS ASSESS DOC 0-1 FALLS W/OUT INJ PAST YR: ICD-10-PCS | Mod: S$GLB,,, | Performed by: NURSE PRACTITIONER

## 2022-01-05 PROCEDURE — 3066F NEPHROPATHY DOC TX: CPT | Mod: S$GLB,,, | Performed by: NURSE PRACTITIONER

## 2022-01-05 PROCEDURE — 3008F PR BODY MASS INDEX (BMI) DOCUMENTED: ICD-10-PCS | Mod: S$GLB,,, | Performed by: NURSE PRACTITIONER

## 2022-01-05 PROCEDURE — 99495 TCM SERVICES (MODERATE COMPLEXITY): ICD-10-PCS | Mod: S$GLB,,, | Performed by: NURSE PRACTITIONER

## 2022-01-05 PROCEDURE — 3062F POS MACROALBUMINURIA REV: CPT | Mod: S$GLB,,, | Performed by: NURSE PRACTITIONER

## 2022-01-05 PROCEDURE — 1160F PR REVIEW ALL MEDS BY PRESCRIBER/CLIN PHARMACIST DOCUMENTED: ICD-10-PCS | Mod: S$GLB,,, | Performed by: NURSE PRACTITIONER

## 2022-01-05 PROCEDURE — 4010F PR ACE/ARB THEARPY RXD/TAKEN: ICD-10-PCS | Mod: S$GLB,,, | Performed by: NURSE PRACTITIONER

## 2022-01-05 RX ORDER — PREDNISONE 10 MG/1
10 TABLET ORAL DAILY
COMMUNITY
End: 2022-03-08 | Stop reason: CLARIF

## 2022-01-05 RX ORDER — VERICIGUAT 5 MG/1
5 TABLET, FILM COATED ORAL DAILY
COMMUNITY
End: 2022-06-01

## 2022-01-05 RX ORDER — DAPAGLIFLOZIN 5 MG/1
5 TABLET, FILM COATED ORAL DAILY
Qty: 90 TABLET | Refills: 3 | Status: ON HOLD | OUTPATIENT
Start: 2022-01-05 | End: 2022-03-11 | Stop reason: HOSPADM

## 2022-01-05 RX ORDER — DAPAGLIFLOZIN 5 MG/1
TABLET, FILM COATED ORAL
COMMUNITY
Start: 2022-01-01 | End: 2022-01-05 | Stop reason: SDUPTHER

## 2022-01-05 RX ORDER — SACUBITRIL AND VALSARTAN 24; 26 MG/1; MG/1
1 TABLET, FILM COATED ORAL 2 TIMES DAILY
COMMUNITY
End: 2023-05-29 | Stop reason: SDUPTHER

## 2022-01-05 NOTE — PROGRESS NOTES
"  SUBJECTIVE:    Patient ID: Baldo Carney is a 71 y.o. male.    Chief Complaint: Hospital Follow Up ([facility]U dc 12/28/2021)    Presents for hospital d/c follow-up visit for CHF exacerbation with ERICA, BNP is trending down, A1c in hospital 6.6, anemia stable      Admit Date: 12/26/21   Discharge Date: 12/28/21  Discharge Facility: Hospital    Medication Reconciliation:  Medications changed/added/deleted. Lasix dosing changed, started hydralazine & isosorbide dinitrate  New Prescriptions filled after discharge: yes  Discharge summary reviewed:  yes  Pending test results at discharge reviewed:   not applicable  Follow up appointments scheduled:  yes   with Cardiology   Follow up labs/tests ordered:   no (will defer to cards)  Home Health ordered on discharge:  No (but pt states cardiology "will be sending a nurse to my home")  Home Health company name: n/a  DME ordered at discharge:   not applicable  How patient is feeling since discharge from the hospital?  Still fatigued, but feeling better overall     Patient follow up phone call documented on separate encounter.    Lab Visit on 01/04/2022   Component Date Value Ref Range Status    Microalbumin, Urine 01/04/2022 432.8* <19.9 ug/mL Final    Creatinine, Urine 01/04/2022 85.0  23.0 - 375.0 mg/dL Final    Microalb/Creat Ratio 01/04/2022 509.2* 0.0 - 30.0 ug/mg Final   Lab Visit on 01/04/2022   Component Date Value Ref Range Status    WBC 01/04/2022 7.17  3.90 - 12.70 K/uL Final    RBC 01/04/2022 4.01* 4.60 - 6.20 M/uL Final    Hemoglobin 01/04/2022 11.2* 14.0 - 18.0 g/dL Final    Hematocrit 01/04/2022 33.4* 40.0 - 54.0 % Final    MCV 01/04/2022 83  82 - 98 fL Final    MCH 01/04/2022 27.9  27.0 - 31.0 pg Final    MCHC 01/04/2022 33.5  32.0 - 36.0 g/dL Final    RDW 01/04/2022 19.1* 11.5 - 14.5 % Final    Platelets 01/04/2022 106* 150 - 450 K/uL Final    MPV 01/04/2022 SEE COMMENT  9.2 - 12.9 fL Final    Immature Granulocytes 01/04/2022 0.4  0.0 - 0.5 % " Final    Gran # (ANC) 01/04/2022 4.9  1.8 - 7.7 K/uL Final    Immature Grans (Abs) 01/04/2022 0.03  0.00 - 0.04 K/uL Final    Lymph # 01/04/2022 1.5  1.0 - 4.8 K/uL Final    Mono # 01/04/2022 0.6  0.3 - 1.0 K/uL Final    Eos # 01/04/2022 0.1  0.0 - 0.5 K/uL Final    Baso # 01/04/2022 0.04  0.00 - 0.20 K/uL Final    nRBC 01/04/2022 0  0 /100 WBC Final    Gran % 01/04/2022 67.8  38.0 - 73.0 % Final    Lymph % 01/04/2022 21.1  18.0 - 48.0 % Final    Mono % 01/04/2022 8.8  4.0 - 15.0 % Final    Eosinophil % 01/04/2022 1.3  0.0 - 8.0 % Final    Basophil % 01/04/2022 0.6  0.0 - 1.9 % Final    Differential Method 01/04/2022 Automated   Final    Ferritin 01/04/2022 89  20.0 - 300.0 ng/mL Final    Iron 01/04/2022 53  45 - 160 ug/dL Final    Transferrin 01/04/2022 279  200 - 375 mg/dL Final    TIBC 01/04/2022 391  250 - 450 ug/dL Final    Saturated Iron 01/04/2022 14* 20 - 50 % Final    BNP 01/04/2022 2,861* 0 - 99 pg/mL Final   Admission on 12/26/2021, Discharged on 12/28/2021   Component Date Value Ref Range Status    PT 12/26/2021 23.6* 11.4 - 13.7 sec Final    INR 12/26/2021 2.3   Final    SARS-CoV-2 RNA, Amplification, Qual 12/26/2021 Negative  Negative Final    WBC 12/26/2021 9.50  3.90 - 12.70 K/uL Final    RBC 12/26/2021 4.24* 4.60 - 6.20 M/uL Final    Hemoglobin 12/26/2021 12.0* 14.0 - 18.0 g/dL Final    Hematocrit 12/26/2021 36.3* 40.0 - 54.0 % Final    MCV 12/26/2021 86  82 - 98 fL Final    MCH 12/26/2021 28.3  27.0 - 31.0 pg Final    MCHC 12/26/2021 33.1  32.0 - 36.0 g/dL Final    RDW 12/26/2021 19.2* 11.5 - 14.5 % Final    Platelets 12/26/2021 166  150 - 450 K/uL Final    MPV 12/26/2021 10.7  9.2 - 12.9 fL Final    Immature Granulocytes 12/26/2021 0.3  0.0 - 0.5 % Final    Gran # (ANC) 12/26/2021 7.2  1.8 - 7.7 K/uL Final    Immature Grans (Abs) 12/26/2021 0.03  0.00 - 0.04 K/uL Final    Lymph # 12/26/2021 1.5  1.0 - 4.8 K/uL Final    Mono # 12/26/2021 0.7  0.3 - 1.0 K/uL  Final    Eos # 12/26/2021 0.1  0.0 - 0.5 K/uL Final    Baso # 12/26/2021 0.05  0.00 - 0.20 K/uL Final    nRBC 12/26/2021 0  0 /100 WBC Final    Gran % 12/26/2021 75.6* 38.0 - 73.0 % Final    Lymph % 12/26/2021 15.3* 18.0 - 48.0 % Final    Mono % 12/26/2021 7.8  4.0 - 15.0 % Final    Eosinophil % 12/26/2021 0.5  0.0 - 8.0 % Final    Basophil % 12/26/2021 0.5  0.0 - 1.9 % Final    Differential Method 12/26/2021 Automated   Final    Sodium 12/26/2021 133* 136 - 145 mmol/L Final    Potassium 12/26/2021 4.9  3.5 - 5.1 mmol/L Final    Chloride 12/26/2021 102  95 - 110 mmol/L Final    CO2 12/26/2021 14* 23 - 29 mmol/L Final    Glucose 12/26/2021 133* 70 - 110 mg/dL Final    BUN 12/26/2021 47* 8 - 23 mg/dL Final    Creatinine 12/26/2021 2.5* 0.5 - 1.4 mg/dL Final    Calcium 12/26/2021 8.9  8.7 - 10.5 mg/dL Final    Total Protein 12/26/2021 6.7  6.0 - 8.4 g/dL Final    Albumin 12/26/2021 3.4* 3.5 - 5.2 g/dL Final    Total Bilirubin 12/26/2021 3.8* 0.1 - 1.0 mg/dL Final    Alkaline Phosphatase 12/26/2021 71  55 - 135 U/L Final    AST 12/26/2021 159* 10 - 40 U/L Final    ALT 12/26/2021 137* 10 - 44 U/L Final    Anion Gap 12/26/2021 17* 8 - 16 mmol/L Final    eGFR if  12/26/2021 28.8* >60 mL/min/1.73 m^2 Final    eGFR if non  12/26/2021 24.9* >60 mL/min/1.73 m^2 Final    Troponin I 12/26/2021 0.071* <=0.040 ng/mL Final    BNP 12/26/2021 >4,500* 0 - 99 pg/mL Final    Magnesium 12/26/2021 2.0  1.6 - 2.6 mg/dL Final    Troponin I 12/26/2021 0.072* <=0.040 ng/mL Final    Troponin I 12/26/2021 0.082* <=0.040 ng/mL Final    Hemoglobin A1C 12/26/2021 6.6* 4.5 - 6.2 % Final    Estimated Avg Glucose 12/26/2021 143* 68 - 131 mg/dL Final    POC Glucose 12/26/2021 129* 70 - 110 Final    POC Glucose 12/26/2021 134* 70 - 110 Final    Sodium 12/27/2021 133* 136 - 145 mmol/L Final    Potassium 12/27/2021 4.6  3.5 - 5.1 mmol/L Final    Chloride 12/27/2021 102  95 - 110 mmol/L  Final    CO2 12/27/2021 19* 23 - 29 mmol/L Final    Glucose 12/27/2021 138* 70 - 110 mg/dL Final    BUN 12/27/2021 59* 8 - 23 mg/dL Final    Creatinine 12/27/2021 2.8* 0.5 - 1.4 mg/dL Final    Calcium 12/27/2021 8.5* 8.7 - 10.5 mg/dL Final    Total Protein 12/27/2021 6.3  6.0 - 8.4 g/dL Final    Albumin 12/27/2021 3.2* 3.5 - 5.2 g/dL Final    Total Bilirubin 12/27/2021 3.7* 0.1 - 1.0 mg/dL Final    Alkaline Phosphatase 12/27/2021 66  55 - 135 U/L Final    AST 12/27/2021 294* 10 - 40 U/L Final    ALT 12/27/2021 265* 10 - 44 U/L Final    Anion Gap 12/27/2021 12  8 - 16 mmol/L Final    eGFR if  12/27/2021 25.1* >60 mL/min/1.73 m^2 Final    eGFR if non  12/27/2021 21.7* >60 mL/min/1.73 m^2 Final    Magnesium 12/27/2021 2.1  1.6 - 2.6 mg/dL Final    Phosphorus 12/27/2021 5.7* 2.7 - 4.5 mg/dL Final    POC Glucose 12/27/2021 154* 70 - 110 Final    POC Glucose 12/27/2021 146* 70 - 110 Final    POC Glucose 12/27/2021 162* 70 - 110 Final    POC Glucose 12/27/2021 134* 70 - 110 Final    POC Glucose 12/28/2021 121* 70 - 110 Final    Sodium 12/28/2021 135* 136 - 145 mmol/L Final    Potassium 12/28/2021 3.4* 3.5 - 5.1 mmol/L Final    Chloride 12/28/2021 103  95 - 110 mmol/L Final    CO2 12/28/2021 22* 23 - 29 mmol/L Final    Glucose 12/28/2021 134* 70 - 110 mg/dL Final    BUN 12/28/2021 62* 8 - 23 mg/dL Final    Creatinine 12/28/2021 2.4* 0.5 - 1.4 mg/dL Final    Calcium 12/28/2021 8.5* 8.7 - 10.5 mg/dL Final    Total Protein 12/28/2021 6.1  6.0 - 8.4 g/dL Final    Albumin 12/28/2021 3.0* 3.5 - 5.2 g/dL Final    Total Bilirubin 12/28/2021 2.7* 0.1 - 1.0 mg/dL Final    Alkaline Phosphatase 12/28/2021 69  55 - 135 U/L Final    AST 12/28/2021 165* 10 - 40 U/L Final    ALT 12/28/2021 202* 10 - 44 U/L Final    Anion Gap 12/28/2021 10  8 - 16 mmol/L Final    eGFR if  12/28/2021 30.2* >60 mL/min/1.73 m^2 Final    eGFR if non  12/28/2021  26.2* >60 mL/min/1.73 m^2 Final    POC Glucose 12/28/2021 167* 70 - 110 Final   Admission on 10/26/2021, Discharged on 10/28/2021   Component Date Value Ref Range Status    WBC 10/26/2021 6.82  3.90 - 12.70 K/uL Final    RBC 10/26/2021 3.79* 4.60 - 6.20 M/uL Final    Hemoglobin 10/26/2021 10.9* 14.0 - 18.0 g/dL Final    Hematocrit 10/26/2021 33.7* 40.0 - 54.0 % Final    MCV 10/26/2021 89  82 - 98 fL Final    MCH 10/26/2021 28.8  27.0 - 31.0 pg Final    MCHC 10/26/2021 32.3  32.0 - 36.0 g/dL Final    RDW 10/26/2021 17.8* 11.5 - 14.5 % Final    Platelets 10/26/2021 204  150 - 450 K/uL Final    MPV 10/26/2021 10.9  9.2 - 12.9 fL Final    Immature Granulocytes 10/26/2021 0.4  0.0 - 0.5 % Final    Gran # (ANC) 10/26/2021 5.3  1.8 - 7.7 K/uL Final    Immature Grans (Abs) 10/26/2021 0.03  0.00 - 0.04 K/uL Final    Lymph # 10/26/2021 1.0  1.0 - 4.8 K/uL Final    Mono # 10/26/2021 0.4  0.3 - 1.0 K/uL Final    Eos # 10/26/2021 0.1  0.0 - 0.5 K/uL Final    Baso # 10/26/2021 0.05  0.00 - 0.20 K/uL Final    nRBC 10/26/2021 0  0 /100 WBC Final    Gran % 10/26/2021 77.1* 38.0 - 73.0 % Final    Lymph % 10/26/2021 15.0* 18.0 - 48.0 % Final    Mono % 10/26/2021 5.9  4.0 - 15.0 % Final    Eosinophil % 10/26/2021 0.9  0.0 - 8.0 % Final    Basophil % 10/26/2021 0.7  0.0 - 1.9 % Final    Differential Method 10/26/2021 Automated   Final    Sodium 10/26/2021 139  136 - 145 mmol/L Final    Potassium 10/26/2021 4.4  3.5 - 5.1 mmol/L Final    Chloride 10/26/2021 107  95 - 110 mmol/L Final    CO2 10/26/2021 24  23 - 29 mmol/L Final    Glucose 10/26/2021 146* 70 - 110 mg/dL Final    BUN 10/26/2021 23  8 - 23 mg/dL Final    Creatinine 10/26/2021 1.8* 0.5 - 1.4 mg/dL Final    Calcium 10/26/2021 9.0  8.7 - 10.5 mg/dL Final    Total Protein 10/26/2021 7.3  6.0 - 8.4 g/dL Final    Albumin 10/26/2021 3.5  3.5 - 5.2 g/dL Final    Total Bilirubin 10/26/2021 1.5* 0.1 - 1.0 mg/dL Final    Alkaline Phosphatase  10/26/2021 75  55 - 135 U/L Final    AST 10/26/2021 37  10 - 40 U/L Final    ALT 10/26/2021 39  10 - 44 U/L Final    Anion Gap 10/26/2021 8  8 - 16 mmol/L Final    eGFR if  10/26/2021 42.8* >60 mL/min/1.73 m^2 Final    eGFR if non  10/26/2021 37.0* >60 mL/min/1.73 m^2 Final    BNP 10/26/2021 >4,500* 0 - 99 pg/mL Final    Troponin I 10/26/2021 0.031  <=0.040 ng/mL Final    SARS-CoV-2 RNA, Amplification, Qual 10/26/2021 Negative  Negative Final    BSA 10/27/2021 2.01  m2 Final    TDI SEPTAL 10/27/2021 0.06  m/s Final    LV LATERAL E/E' RATIO 10/27/2021 27.40  m/s Final    LV SEPTAL E/E' RATIO 10/27/2021 22.83  m/s Final    Right Atrial Pressure (from IVC) 10/27/2021 15  mmHg Final    AORTIC VALVE CUSP SEPERATION 10/27/2021 1.40  cm Final    TDI LATERAL 10/27/2021 0.05  m/s Final    PV PEAK VELOCITY 10/27/2021 66.81  cm/s Final    LVIDd 10/27/2021 5.49  3.5 - 6.0 cm Final    IVS 10/27/2021 0.90  0.6 - 1.1 cm Final    Posterior Wall 10/27/2021 0.92  0.6 - 1.1 cm Final    Ao root annulus 10/27/2021 3.40  cm Final    LVIDs 10/27/2021 4.69* 2.1 - 4.0 cm Final    FS 10/27/2021 15  28 - 44 % Final    LV mass 10/27/2021 187.92  g Final    LA size 10/27/2021 3.48  cm Final    RVDD 10/27/2021 301.00  cm Final    Left Ventricle Relative Wall Thick* 10/27/2021 0.34  cm Final    AV mean gradient 10/27/2021 9  mmHg Final    AV valve area 10/27/2021 0.92  cm2 Final    AV Velocity Ratio 10/27/2021 30.96   Final    AV index (prosthetic) 10/27/2021 0.29   Final    E/A ratio 10/27/2021 2.32   Final    Mean e' 10/27/2021 0.06  m/s Final    E wave deceleration time 10/27/2021 284.66  msec Final    LVOT diameter 10/27/2021 2.00  cm Final    LVOT area 10/27/2021 3.1  cm2 Final    LVOT peak asa 10/27/2021 65.02  m/s Final    LVOT peak VTI 10/27/2021 14.56  cm Final    Ao peak asa 10/27/2021 2.10  m/s Final    Ao VTI 10/27/2021 49.48  cm Final    LVOT stroke volume  10/27/2021 45.72  cm3 Final    AV peak gradient 10/27/2021 18  mmHg Final    TV rest pulmonary artery pressure 10/27/2021 58  mmHg Final    E/E' ratio 10/27/2021 24.91  m/s Final    MV Peak E Efrain 10/27/2021 1.37  m/s Final    TR Max Efrain 10/27/2021 3.29  m/s Final    MV Peak A Ferain 10/27/2021 0.59  m/s Final    LV Systolic Volume 10/27/2021 96.28  mL Final    LV Systolic Volume Index 10/27/2021 47.7  mL/m2 Final    LV Diastolic Volume 10/27/2021 176.55  mL Final    LV Diastolic Volume Index 10/27/2021 87.40  mL/m2 Final    LV Mass Index 10/27/2021 93  g/m2 Final    Triscuspid Valve Regurgitation Pea* 10/27/2021 43  mmHg Final    EF 10/27/2021 20  % Final    Troponin I 10/26/2021 <0.030  <=0.040 ng/mL Final    Sodium 10/27/2021 138  136 - 145 mmol/L Final    Potassium 10/27/2021 3.8  3.5 - 5.1 mmol/L Final    Chloride 10/27/2021 104  95 - 110 mmol/L Final    CO2 10/27/2021 24  23 - 29 mmol/L Final    Glucose 10/27/2021 85  70 - 110 mg/dL Final    BUN 10/27/2021 29* 8 - 23 mg/dL Final    Creatinine 10/27/2021 1.8* 0.5 - 1.4 mg/dL Final    Calcium 10/27/2021 8.4* 8.7 - 10.5 mg/dL Final    Total Protein 10/27/2021 6.4  6.0 - 8.4 g/dL Final    Albumin 10/27/2021 3.2* 3.5 - 5.2 g/dL Final    Total Bilirubin 10/27/2021 1.2* 0.1 - 1.0 mg/dL Final    Alkaline Phosphatase 10/27/2021 65  55 - 135 U/L Final    AST 10/27/2021 30  10 - 40 U/L Final    ALT 10/27/2021 35  10 - 44 U/L Final    Anion Gap 10/27/2021 10  8 - 16 mmol/L Final    eGFR if  10/27/2021 42.8* >60 mL/min/1.73 m^2 Final    eGFR if non  10/27/2021 37.0* >60 mL/min/1.73 m^2 Final    Magnesium 10/27/2021 1.8  1.6 - 2.6 mg/dL Final    Phosphorus 10/27/2021 3.9  2.7 - 4.5 mg/dL Final    WBC 10/27/2021 6.30  3.90 - 12.70 K/uL Final    RBC 10/27/2021 3.51* 4.60 - 6.20 M/uL Final    Hemoglobin 10/27/2021 10.3* 14.0 - 18.0 g/dL Final    Hematocrit 10/27/2021 30.8* 40.0 - 54.0 % Final    MCV 10/27/2021  88  82 - 98 fL Final    MCH 10/27/2021 29.3  27.0 - 31.0 pg Final    MCHC 10/27/2021 33.4  32.0 - 36.0 g/dL Final    RDW 10/27/2021 17.8* 11.5 - 14.5 % Final    Platelets 10/27/2021 193  150 - 450 K/uL Final    MPV 10/27/2021 10.9  9.2 - 12.9 fL Final    Immature Granulocytes 10/27/2021 0.2  0.0 - 0.5 % Final    Gran # (ANC) 10/27/2021 4.5  1.8 - 7.7 K/uL Final    Immature Grans (Abs) 10/27/2021 0.01  0.00 - 0.04 K/uL Final    Lymph # 10/27/2021 1.1  1.0 - 4.8 K/uL Final    Mono # 10/27/2021 0.5  0.3 - 1.0 K/uL Final    Eos # 10/27/2021 0.1  0.0 - 0.5 K/uL Final    Baso # 10/27/2021 0.05  0.00 - 0.20 K/uL Final    nRBC 10/27/2021 0  0 /100 WBC Final    Gran % 10/27/2021 70.5  38.0 - 73.0 % Final    Lymph % 10/27/2021 18.1  18.0 - 48.0 % Final    Mono % 10/27/2021 8.3  4.0 - 15.0 % Final    Eosinophil % 10/27/2021 2.1  0.0 - 8.0 % Final    Basophil % 10/27/2021 0.8  0.0 - 1.9 % Final    Differential Method 10/27/2021 Automated   Final    POC Glucose 10/27/2021 87  70 - 110 Final    POC Glucose 10/27/2021 125* 70 - 110 Final    POC Glucose 10/27/2021 149* 70 - 110 Final    POC Glucose 10/27/2021 180* 70 - 110 Final    POC Glucose 10/28/2021 100  70 - 110 Final    Sodium 10/28/2021 143  136 - 145 mmol/L Final    Potassium 10/28/2021 4.2  3.5 - 5.1 mmol/L Final    Chloride 10/28/2021 107  95 - 110 mmol/L Final    CO2 10/28/2021 26  23 - 29 mmol/L Final    Glucose 10/28/2021 103  70 - 110 mg/dL Final    BUN 10/28/2021 25* 8 - 23 mg/dL Final    Creatinine 10/28/2021 1.8* 0.5 - 1.4 mg/dL Final    Calcium 10/28/2021 9.0  8.7 - 10.5 mg/dL Final    Anion Gap 10/28/2021 10  8 - 16 mmol/L Final    eGFR if  10/28/2021 42.8* >60 mL/min/1.73 m^2 Final    eGFR if non  10/28/2021 37.0* >60 mL/min/1.73 m^2 Final    BNP 10/28/2021 3,213* 0 - 99 pg/mL Final    POC Glucose 10/28/2021 113* 70 - 110 Final   Office Visit on 08/17/2021   Component Date Value Ref Range  Status    Microalbumin, POC 08/17/2021 pos   Final    Albumin 08/17/2021 pos   Final    Creat, Fluid 08/17/2021 pos   Final    Albumin Creatinine Ratio 08/17/2021 >300  mg/g Final    Color 08/17/2021 lt yello   Final    Clarity, Fluid 08/17/2021 clear   Final    Hemoglobin A1C 08/17/2021 5.5  % Final       Past Medical History:   Diagnosis Date    Asthma     Cardiomyopathy 10/21/2014    CHF (congestive heart failure)     Coronary artery disease     Diabetes mellitus     Hyperlipidemia     Hypertension      Past Surgical History:   Procedure Laterality Date    BREAST SURGERY      CARDIAC CATHETERIZATION      CARDIAC VALVE SURGERY      CORONARY ARTERY BYPASS GRAFT      CYSTOSCOPY N/A 7/30/2019    Procedure: CYSTOSCOPY;  Surgeon: Spencer Nguyen MD;  [facility];  Service: Urology;  Laterality: N/A;    EYE SURGERY      SHOULDER ARTHROSCOPY  1985    TRANSRECTAL BIOPSY OF PROSTATE WITH ULTRASOUND GUIDANCE N/A 7/30/2019    Procedure: BIOPSY, PROSTATE, RECTAL APPROACH, WITH US GUIDANCE;  Surgeon: Spencer Nguyen MD;  [facility];  Service: Urology;  Laterality: N/A;  procedure not performed, pt unable to tolerate    TRANSRECTAL BIOPSY OF PROSTATE WITH ULTRASOUND GUIDANCE N/A 8/8/2019    Procedure: BIOPSY, PROSTATE, RECTAL APPROACH, WITH US GUIDANCE;  Surgeon: Spencer Nguyen MD;  [facility];  Service: Urology;  Laterality: N/A;     Family History   Problem Relation Age of Onset    No Known Problems Mother     Diabetes Father     Hypertension Father     Heart attack Father     Heart disease Father     Diabetes Sister     Heart disease Brother     Diabetes Brother     No Known Problems Maternal Grandmother     No Known Problems Maternal Grandfather     No Known Problems Paternal Grandmother     No Known Problems Paternal Grandfather     No Known Problems Maternal Aunt     No Known Problems Maternal Uncle     No Known Problems Paternal Aunt     No Known  Problems Paternal Uncle     Anemia Neg Hx     Arrhythmia Neg Hx     Asthma Neg Hx     Clotting disorder Neg Hx     Fainting Neg Hx     Heart failure Neg Hx     Hyperlipidemia Neg Hx     Glaucoma Neg Hx        Marital Status: Legally   Alcohol History:  reports previous alcohol use of about 3.0 standard drinks of alcohol per week.  Tobacco History:  reports that he quit smoking about 51 years ago. He has never used smokeless tobacco.  Drug History:  reports no history of drug use.    Review of patient's allergies indicates:   Allergen Reactions    Invokana  [canagliflozin]      Other reaction(s): been very dizzy       Current Outpatient Medications:     ARNUITY ELLIPTA 100 mcg/actuation DsDv, Use as directed 1 puff in the mouth or throat daily as needed. , Disp: , Rfl: 6    aspirin (ECOTRIN) 325 MG EC tablet, Take 1 tablet (325 mg total) by mouth once daily., Disp: 30 tablet, Rfl: 4    atorvastatin (LIPITOR) 40 MG tablet, Take 1 tablet (40 mg total) by mouth once daily., Disp: 90 tablet, Rfl: 3    azelastine (ASTELIN) 137 mcg (0.1 %) nasal spray, 1 spray (137 mcg total) by Nasal route 2 (two) times daily as needed for Rhinitis., Disp: 30 mL, Rfl: 1    ergocalciferol, vitamin D2, (VITAMIN D ORAL), Take 1 capsule by mouth once a week. WEDNESDAYS, Disp: , Rfl:     ferrous sulfate (FEOSOL) Tab tablet, Take 1 tablet (1 each total) by mouth daily with breakfast. To be taken with vitamin C (orange juice) for best absorption, Disp: 90 tablet, Rfl: 0    fluticasone furoate-vilanteroL (BREO ELLIPTA) 100-25 mcg/dose diskus inhaler, Inhale 1 puff into the lungs once daily. (DAILY CONTROLLER), Disp: 3 each, Rfl: 3    fluticasone propionate (FLONASE) 50 mcg/actuation nasal spray, 1 spray (50 mcg total) by Each Nostril route once daily., Disp: 15.8 mL, Rfl: 1    furosemide (LASIX) 20 MG tablet, Take 2 tablets (40 mg total) by mouth 2 (two) times daily for 5 days, THEN 1 tablet (20 mg total) 2 (two) times  daily., Disp: 80 tablet, Rfl: 0    gabapentin (NEURONTIN) 300 MG capsule, Take 1 capsule (300 mg total) by mouth every evening., Disp: 90 capsule, Rfl: 1    hydrALAZINE (APRESOLINE) 50 MG tablet, Take 1 tablet (50 mg total) by mouth every 8 (eight) hours., Disp: 90 tablet, Rfl: 0    isosorbide dinitrate (ISORDIL) 20 MG tablet, Take 1 tablet (20 mg total) by mouth 3 (three) times daily., Disp: 90 tablet, Rfl: 0    levalbuterol (XOPENEX) 0.63 mg/3 mL nebulizer solution, Take 3 mLs (0.63 mg total) by nebulization every 4 to 6 hours as needed for Wheezing or Shortness of Breath. Rescue, Disp: 45 mL, Rfl: 0    metoprolol succinate (TOPROL-XL) 25 MG 24 hr tablet, Take 1 tablet (25 mg total) by mouth once daily., Disp: 90 tablet, Rfl: 1    predniSONE (DELTASONE) 10 MG tablet, Take 10 mg by mouth once daily., Disp: , Rfl:     sacubitriL-valsartan (ENTRESTO) 24-26 mg per tablet, Take 1 tablet by mouth 2 (two) times daily., Disp: , Rfl:     tamsulosin (FLOMAX) 0.4 mg Cap, Take 1 capsule (0.4 mg total) by mouth once daily., Disp: 90 capsule, Rfl: 1    VENTOLIN HFA 90 mcg/actuation inhaler, Inhale 1-2 puffs into the lungs every 4 to 6 hours as needed for Wheezing or Shortness of Breath. (RESCUE), Disp: 18 g, Rfl: 3    vericiguat (VERQUVO) 5 mg Tab, Take by mouth., Disp: , Rfl:     FARXIGA 5 mg Tab tablet, Take 1 tablet (5 mg total) by mouth once daily., Disp: 90 tablet, Rfl: 3    Review of Systems   Constitutional: Positive for fatigue. Negative for activity change, appetite change and fever.        States he was unsure what caused his CHF exacerbation as he doesn't drink or eat salt, yet he admits to an increased intake of fried chicken & hot dogs over the holidays   HENT: Negative for congestion, ear pain, hearing loss, postnasal drip, sinus pressure, sinus pain, sneezing and sore throat.    Eyes: Negative for photophobia and pain.   Respiratory: Positive for shortness of breath. Negative for cough, chest tightness  and wheezing.    Cardiovascular: Positive for leg swelling (intermittent). Negative for chest pain.   Gastrointestinal: Negative for abdominal distention, abdominal pain, blood in stool, constipation, diarrhea, nausea and vomiting.   Endocrine: Negative for cold intolerance, heat intolerance, polydipsia and polyuria.   Genitourinary: Negative for difficulty urinating, dysuria, flank pain, frequency, hematuria and urgency.   Musculoskeletal: Negative for arthralgias, back pain, joint swelling, myalgias and neck pain.   Skin: Negative for pallor and rash.   Allergic/Immunologic: Negative for environmental allergies and food allergies.   Neurological: Negative for dizziness, weakness, light-headedness, numbness and headaches.        Peripheral neuropathy - BLE   Hematological: Does not bruise/bleed easily.   Psychiatric/Behavioral: Negative for confusion, decreased concentration and sleep disturbance. The patient is not nervous/anxious.         Objective:      Vitals:    01/05/22 1045   BP: 130/76   Pulse: 73   Temp: 98.4 °F (36.9 °C)   TempSrc: Oral   SpO2: 99%   Weight: 82 kg (180 lb 11.2 oz)   Height: 6' (1.829 m)     Physical Exam  Vitals and nursing note reviewed.   Constitutional:       General: He is not in acute distress.Vital signs are normal.      Appearance: Normal appearance. He is well-developed, normal weight and well-nourished.      Comments: 2# loss since November visit   HENT:      Head: Normocephalic and atraumatic.      Right Ear: Hearing normal.      Left Ear: Hearing normal.      Nose: Nose normal. No rhinorrhea.      Mouth/Throat:      Mouth: Mucous membranes are normal.   Eyes:      General: Lids are normal.         Right eye: No discharge.         Left eye: No discharge.      Conjunctiva/sclera: Conjunctivae normal.      Right eye: Right conjunctiva is not injected.      Left eye: Left conjunctiva is not injected.      Pupils: Pupils are equal, round, and reactive to light. Pupils are equal.       Right eye: Pupil is round and reactive.      Left eye: Pupil is round and reactive.   Neck:      Thyroid: No thyromegaly.      Vascular: No JVD.      Trachea: Trachea normal. No tracheal deviation.   Cardiovascular:      Rate and Rhythm: Normal rate and regular rhythm.      Pulses: Intact distal pulses.           Radial pulses are 2+ on the right side and 2+ on the left side.      Heart sounds: Normal heart sounds. No murmur heard.  No friction rub. No gallop.    Pulmonary:      Effort: Pulmonary effort is normal. No respiratory distress.      Breath sounds: Normal breath sounds. No stridor. No decreased breath sounds, wheezing, rhonchi or rales.   Abdominal:      General: Bowel sounds are normal. There is no distension.      Palpations: Abdomen is soft. Abdomen is not rigid.      Tenderness: There is no abdominal tenderness. There is no guarding.   Musculoskeletal:         General: No edema. Normal range of motion.      Cervical back: Normal range of motion and neck supple.   Lymphadenopathy:      Cervical: No cervical adenopathy.   Skin:     General: Skin is warm and dry.      Capillary Refill: Capillary refill takes less than 2 seconds.      Coloration: Skin is not pale.      Findings: No lesion or rash.      Nails: There is no cyanosis.   Neurological:      Mental Status: He is alert and oriented to person, place, and time.      Motor: No atrophy.      Coordination: He displays a negative Romberg sign. Coordination normal.      Gait: Gait normal.      Deep Tendon Reflexes: Strength normal.   Psychiatric:         Attention and Perception: He is attentive.         Mood and Affect: Mood and affect normal.         Speech: Speech normal.         Behavior: Behavior normal.         Thought Content: Thought content normal.         Cognition and Memory: Cognition and memory normal.         Judgment: Judgment normal.         Assessment:       1. Hospital discharge follow-up    2. Chronic systolic heart failure    3. ERICA  on CKD III    4. Type 2 diabetes mellitus without complication, without long-term current use of insulin         Plan:       Hospital discharge follow-up  -     Ambulatory referral/consult to Nephrology; Future; Expected date: 01/12/2022  -     Ambulatory referral/consult to Nutrition Services; Future; Expected date: 01/12/2022 (spoke with BARBRA Mckenzie RD to notify of need for dietary counseling r/t CHF)  - extensive med counseling & organization of current med bottles performed at this visit  - discussed s/s of worsening HF & importance of daily weighing (doesn't have scale at home at this time)  - already scheduled for cards follow-up later this week & spoke to Dr. Lujan by phone yesterday    Chronic systolic heart failure  -     Ambulatory referral/consult to Nephrology; Future; Expected date: 01/12/2022  -     Ambulatory referral/consult to Nutrition Services; Future; Expected date: 01/12/2022    ERICA on CKD III  -     Ambulatory referral/consult to Nephrology; Future; Expected date: 01/12/2022    Type 2 diabetes mellitus without complication, without long-term current use of insulin  -     FARXIGA 5 mg Tab tablet; Take 1 tablet (5 mg total) by mouth once daily.  Dispense: 90 tablet; Refill: 3          Follow up if symptoms worsen or fail to improve. Already scheduled for visit in February.

## 2022-01-11 ENCOUNTER — NUTRITION (OUTPATIENT)
Dept: NUTRITION | Facility: HOSPITAL | Age: 72
End: 2022-01-11
Payer: COMMERCIAL

## 2022-01-11 DIAGNOSIS — N18.30 ACUTE RENAL FAILURE SUPERIMPOSED ON STAGE 3 CHRONIC KIDNEY DISEASE, UNSPECIFIED ACUTE RENAL FAILURE TYPE, UNSPECIFIED WHETHER STAGE 3A OR 3B CKD: ICD-10-CM

## 2022-01-11 DIAGNOSIS — N17.9 ACUTE RENAL FAILURE SUPERIMPOSED ON STAGE 3 CHRONIC KIDNEY DISEASE, UNSPECIFIED ACUTE RENAL FAILURE TYPE, UNSPECIFIED WHETHER STAGE 3A OR 3B CKD: ICD-10-CM

## 2022-01-11 PROCEDURE — 97802 MEDICAL NUTRITION INDIV IN: CPT | Mod: GZ

## 2022-01-11 NOTE — PROGRESS NOTES
Diagnosis/Reason for Referral: CKD III  Medical Nutrition Prescription: Medical Nutrition Therapy      Ht: 72in   Weight: 181#   BMI: 24.5    Weight Hx: weight fluctuates d/t fluid retention      Reviewed:    ? Nutrition and meal planning  ? Food label reading  ? Plate method  ? Low sodium nutrition therapy    Comments:  Patient reports not adding any salt to his meals and reading food labels. Patient does use high sodium foods when cooking even though he reports not eating the food itself. I reviewed the label and patient did not know what to look for. He cooks for his family and we discussed choosing foods that say no salt added or low sodium and let family add more salt if they need to.     Recommended 2000mg or less of sodium per day. Encouraged patient to read label for all foods especially his seasonings since patient did not realize that it may contain sodium and canned foods like pickled beets. He does use Mrs. Talley. We also discussed adding a vegetable and fruit with meals to help him feel full.       Handouts on the above information were given and contact information provided.      Goals: 1. Read food labels   2. Keep sodium <2000mg/day     Thank you for the referral.     Valentina Mckenzie, MS, RD/LDN

## 2022-01-14 ENCOUNTER — TELEPHONE (OUTPATIENT)
Dept: FAMILY MEDICINE | Facility: CLINIC | Age: 72
End: 2022-01-14
Payer: COMMERCIAL

## 2022-01-14 NOTE — TELEPHONE ENCOUNTER
Spoke with [facility] nurse.    She states patient is complaining of leg cramps.  Patient is on Lasix 20 mg one tablet  twice day Checking about patient needing Potassium  She was also verifying medication Eliquis and Prednisone have been discontinued.   He is currently not taking them.  He is complaining of cough.   Requesting to take Mucinex DM    Please advise

## 2022-01-24 DIAGNOSIS — J06.9 URI WITH COUGH AND CONGESTION: Primary | ICD-10-CM

## 2022-01-24 NOTE — TELEPHONE ENCOUNTER
Patient left message on machine   Requesting medication for cough and congestion for 3 weeks.   Over the counter medication are not helping.

## 2022-01-25 ENCOUNTER — TELEPHONE (OUTPATIENT)
Dept: FAMILY MEDICINE | Facility: CLINIC | Age: 72
End: 2022-01-25
Payer: COMMERCIAL

## 2022-01-25 RX ORDER — PROMETHAZINE HYDROCHLORIDE AND DEXTROMETHORPHAN HYDROBROMIDE 6.25; 15 MG/5ML; MG/5ML
5 SYRUP ORAL EVERY 6 HOURS PRN
Qty: 100 ML | Refills: 0 | Status: SHIPPED | OUTPATIENT
Start: 2022-01-25 | End: 2023-02-22

## 2022-01-25 RX ORDER — POTASSIUM CHLORIDE 750 MG/1
10 CAPSULE, EXTENDED RELEASE ORAL ONCE
Qty: 1 CAPSULE | Refills: 0 | Status: CANCELLED | OUTPATIENT
Start: 2022-01-25 | End: 2022-01-25

## 2022-03-08 ENCOUNTER — HOSPITAL ENCOUNTER (OUTPATIENT)
Dept: PREADMISSION TESTING | Facility: HOSPITAL | Age: 72
Discharge: HOME | End: 2022-03-08
Attending: INTERNAL MEDICINE
Payer: COMMERCIAL

## 2022-03-08 ENCOUNTER — HOSPITAL ENCOUNTER (OUTPATIENT)
Dept: RADIOLOGY | Facility: HOSPITAL | Age: 72
Discharge: HOME | End: 2022-03-08
Attending: INTERNAL MEDICINE
Payer: COMMERCIAL

## 2022-03-08 VITALS — HEIGHT: 72 IN | BODY MASS INDEX: 25.06 KG/M2 | WEIGHT: 185 LBS

## 2022-03-08 DIAGNOSIS — R94.39 ABNORMAL BALLISTOCARDIOGRAM: ICD-10-CM

## 2022-03-08 DIAGNOSIS — I25.10 CORONARY ATHEROSCLEROSIS OF NATIVE CORONARY ARTERY: ICD-10-CM

## 2022-03-08 DIAGNOSIS — Z01.810 PREOP CARDIOVASCULAR EXAM: Primary | ICD-10-CM

## 2022-03-08 LAB
ALBUMIN SERPL BCP-MCNC: 3.3 G/DL (ref 3.5–5.2)
ALP SERPL-CCNC: 70 U/L (ref 55–135)
ALT SERPL W/O P-5'-P-CCNC: 12 U/L (ref 10–44)
ANION GAP SERPL CALC-SCNC: 8 MMOL/L (ref 8–16)
APTT PPP: 39.2 SEC (ref 23.3–35.1)
AST SERPL-CCNC: 14 U/L (ref 10–40)
BASOPHILS # BLD AUTO: 0.07 K/UL (ref 0–0.2)
BASOPHILS NFR BLD: 1 % (ref 0–1.9)
BILIRUB SERPL-MCNC: 2.1 MG/DL (ref 0.1–1)
BUN SERPL-MCNC: 28 MG/DL (ref 8–23)
CALCIUM SERPL-MCNC: 8.6 MG/DL (ref 8.7–10.5)
CHLORIDE SERPL-SCNC: 103 MMOL/L (ref 95–110)
CO2 SERPL-SCNC: 24 MMOL/L (ref 23–29)
CREAT SERPL-MCNC: 1.9 MG/DL (ref 0.5–1.4)
DIFFERENTIAL METHOD: ABNORMAL
EOSINOPHIL # BLD AUTO: 0.2 K/UL (ref 0–0.5)
EOSINOPHIL NFR BLD: 2.5 % (ref 0–8)
ERYTHROCYTE [DISTWIDTH] IN BLOOD BY AUTOMATED COUNT: 19.8 % (ref 11.5–14.5)
EST. GFR  (AFRICAN AMERICAN): 40.1 ML/MIN/1.73 M^2
EST. GFR  (NON AFRICAN AMERICAN): 34.7 ML/MIN/1.73 M^2
GLUCOSE SERPL-MCNC: 114 MG/DL (ref 70–110)
HCT VFR BLD AUTO: 32.7 % (ref 40–54)
HGB BLD-MCNC: 10.8 G/DL (ref 14–18)
IMM GRANULOCYTES # BLD AUTO: 0.03 K/UL (ref 0–0.04)
IMM GRANULOCYTES NFR BLD AUTO: 0.4 % (ref 0–0.5)
INR PPP: 1.6
LYMPHOCYTES # BLD AUTO: 1 K/UL (ref 1–4.8)
LYMPHOCYTES NFR BLD: 13.6 % (ref 18–48)
MAGNESIUM SERPL-MCNC: 1.9 MG/DL (ref 1.6–2.6)
MCH RBC QN AUTO: 28.3 PG (ref 27–31)
MCHC RBC AUTO-ENTMCNC: 33 G/DL (ref 32–36)
MCV RBC AUTO: 86 FL (ref 82–98)
MONOCYTES # BLD AUTO: 0.6 K/UL (ref 0.3–1)
MONOCYTES NFR BLD: 8.2 % (ref 4–15)
NEUTROPHILS # BLD AUTO: 5.4 K/UL (ref 1.8–7.7)
NEUTROPHILS NFR BLD: 74.3 % (ref 38–73)
NRBC BLD-RTO: 0 /100 WBC
PLATELET # BLD AUTO: 176 K/UL (ref 150–450)
PMV BLD AUTO: 10.5 FL (ref 9.2–12.9)
POTASSIUM SERPL-SCNC: 3.6 MMOL/L (ref 3.5–5.1)
PROT SERPL-MCNC: 7.2 G/DL (ref 6–8.4)
PROTHROMBIN TIME: 17.7 SEC (ref 11.4–13.7)
RBC # BLD AUTO: 3.81 M/UL (ref 4.6–6.2)
SARS-COV-2 RDRP RESP QL NAA+PROBE: NEGATIVE
SODIUM SERPL-SCNC: 135 MMOL/L (ref 136–145)
WBC # BLD AUTO: 7.29 K/UL (ref 3.9–12.7)

## 2022-03-08 PROCEDURE — 85610 PROTHROMBIN TIME: CPT | Performed by: INTERNAL MEDICINE

## 2022-03-08 PROCEDURE — 93010 ELECTROCARDIOGRAM REPORT: CPT | Mod: ,,, | Performed by: SPECIALIST

## 2022-03-08 PROCEDURE — 36415 COLL VENOUS BLD VENIPUNCTURE: CPT | Performed by: INTERNAL MEDICINE

## 2022-03-08 PROCEDURE — 71046 X-RAY EXAM CHEST 2 VIEWS: CPT | Mod: TC

## 2022-03-08 PROCEDURE — 85025 COMPLETE CBC W/AUTO DIFF WBC: CPT | Performed by: INTERNAL MEDICINE

## 2022-03-08 PROCEDURE — 93010 EKG 12-LEAD: ICD-10-PCS | Mod: ,,, | Performed by: SPECIALIST

## 2022-03-08 PROCEDURE — U0002 COVID-19 LAB TEST NON-CDC: HCPCS | Performed by: INTERNAL MEDICINE

## 2022-03-08 PROCEDURE — 83735 ASSAY OF MAGNESIUM: CPT | Performed by: INTERNAL MEDICINE

## 2022-03-08 PROCEDURE — 80053 COMPREHEN METABOLIC PANEL: CPT | Performed by: INTERNAL MEDICINE

## 2022-03-08 PROCEDURE — 93005 ELECTROCARDIOGRAM TRACING: CPT | Performed by: SPECIALIST

## 2022-03-08 PROCEDURE — 85730 THROMBOPLASTIN TIME PARTIAL: CPT | Performed by: INTERNAL MEDICINE

## 2022-03-08 RX ORDER — BUDESONIDE, GLYCOPYRROLATE, AND FORMOTEROL FUMARATE 160; 9; 4.8 UG/1; UG/1; UG/1
AEROSOL, METERED RESPIRATORY (INHALATION) DAILY PRN
COMMUNITY

## 2022-03-08 RX ORDER — POTASSIUM CHLORIDE 20 MEQ/1
20 TABLET, EXTENDED RELEASE ORAL
Status: CANCELLED | OUTPATIENT
Start: 2022-03-08

## 2022-03-08 RX ORDER — SODIUM CHLORIDE 9 MG/ML
INJECTION, SOLUTION INTRAVENOUS ONCE
Status: CANCELLED | OUTPATIENT
Start: 2022-03-08 | End: 2022-03-08

## 2022-03-08 RX ORDER — ASPIRIN 81 MG/1
81 TABLET ORAL DAILY
COMMUNITY

## 2022-03-08 RX ORDER — FINASTERIDE 5 MG/1
5 TABLET, FILM COATED ORAL DAILY
COMMUNITY
End: 2023-06-09 | Stop reason: SDUPTHER

## 2022-03-08 NOTE — DISCHARGE INSTRUCTIONS
Nothing to eat or drink after midnight the night before your procedure.  Do not take any medications the morning of your procedure  Bring all your medications with you in the original pill bottles from pharmacy.  If you take blood thinners, ask your doctor if you should stop taking them.  Do not take metformin 24 hours prior to your procedure.  Adjust your insulin or other diabetes medications if needed.   Do your chlorhexidine wash the night before and morning of your procedure.  If you use a CPAP or BiPAP at home, please bring it with you the day of your procedure.  Make arrangements for someone you know to drive you home after your procedure. Taxi and Uber are not acceptable.

## 2022-03-11 ENCOUNTER — HOSPITAL ENCOUNTER (OUTPATIENT)
Facility: HOSPITAL | Age: 72
Discharge: HOME | End: 2022-03-11
Attending: INTERNAL MEDICINE | Admitting: INTERNAL MEDICINE
Payer: COMMERCIAL

## 2022-03-11 VITALS
TEMPERATURE: 98 F | DIASTOLIC BLOOD PRESSURE: 91 MMHG | OXYGEN SATURATION: 98 % | RESPIRATION RATE: 24 BRPM | WEIGHT: 190 LBS | BODY MASS INDEX: 25.73 KG/M2 | HEIGHT: 72 IN | HEART RATE: 64 BPM | SYSTOLIC BLOOD PRESSURE: 161 MMHG

## 2022-03-11 DIAGNOSIS — I27.22 PULMONARY HYPERTENSION DUE TO LEFT HEART DISEASE: ICD-10-CM

## 2022-03-11 DIAGNOSIS — I25.10 ATHEROSCLEROSIS OF NATIVE CORONARY ARTERY OF NATIVE HEART WITHOUT ANGINA PECTORIS: ICD-10-CM

## 2022-03-11 DIAGNOSIS — Z01.810 PREOP CARDIOVASCULAR EXAM: ICD-10-CM

## 2022-03-11 DIAGNOSIS — I35.2 AORTIC VALVE STENOSIS WITH INSUFFICIENCY, UNSPECIFIED ETIOLOGY: ICD-10-CM

## 2022-03-11 PROCEDURE — 27201423 OPTIME MED/SURG SUP & DEVICES STERILE SUPPLY: Performed by: INTERNAL MEDICINE

## 2022-03-11 PROCEDURE — C1769 GUIDE WIRE: HCPCS | Performed by: INTERNAL MEDICINE

## 2022-03-11 PROCEDURE — C1887 CATHETER, GUIDING: HCPCS | Performed by: INTERNAL MEDICINE

## 2022-03-11 PROCEDURE — 63600175 PHARM REV CODE 636 W HCPCS: Performed by: INTERNAL MEDICINE

## 2022-03-11 PROCEDURE — C1894 INTRO/SHEATH, NON-LASER: HCPCS | Performed by: INTERNAL MEDICINE

## 2022-03-11 PROCEDURE — 99223 1ST HOSP IP/OBS HIGH 75: CPT | Mod: ,,, | Performed by: THORACIC SURGERY (CARDIOTHORACIC VASCULAR SURGERY)

## 2022-03-11 PROCEDURE — 25500020 PHARM REV CODE 255: Performed by: INTERNAL MEDICINE

## 2022-03-11 PROCEDURE — C1760 CLOSURE DEV, VASC: HCPCS | Performed by: INTERNAL MEDICINE

## 2022-03-11 PROCEDURE — 99223 PR INITIAL HOSPITAL CARE,LEVL III: ICD-10-PCS | Mod: ,,, | Performed by: THORACIC SURGERY (CARDIOTHORACIC VASCULAR SURGERY)

## 2022-03-11 PROCEDURE — 25000003 PHARM REV CODE 250: Performed by: INTERNAL MEDICINE

## 2022-03-11 PROCEDURE — C1751 CATH, INF, PER/CENT/MIDLINE: HCPCS | Performed by: INTERNAL MEDICINE

## 2022-03-11 PROCEDURE — 93461 R&L HRT ART/VENTRICLE ANGIO: CPT | Performed by: INTERNAL MEDICINE

## 2022-03-11 PROCEDURE — 99153 MOD SED SAME PHYS/QHP EA: CPT | Performed by: INTERNAL MEDICINE

## 2022-03-11 PROCEDURE — 99152 MOD SED SAME PHYS/QHP 5/>YRS: CPT | Performed by: INTERNAL MEDICINE

## 2022-03-11 DEVICE — DEVICE CLOSURE  ANGIO-SEAL 6FR 610130: Type: IMPLANTABLE DEVICE | Site: GROIN | Status: FUNCTIONAL

## 2022-03-11 RX ORDER — MIDAZOLAM HYDROCHLORIDE 1 MG/ML
INJECTION INTRAMUSCULAR; INTRAVENOUS
Status: DISCONTINUED | OUTPATIENT
Start: 2022-03-11 | End: 2022-03-11 | Stop reason: HOSPADM

## 2022-03-11 RX ORDER — POTASSIUM CHLORIDE 20 MEQ/1
20 TABLET, EXTENDED RELEASE ORAL
Status: DISCONTINUED | OUTPATIENT
Start: 2022-03-11 | End: 2022-03-11 | Stop reason: HOSPADM

## 2022-03-11 RX ORDER — IODIXANOL 320 MG/ML
INJECTION, SOLUTION INTRAVASCULAR
Status: DISCONTINUED | OUTPATIENT
Start: 2022-03-11 | End: 2022-03-11 | Stop reason: HOSPADM

## 2022-03-11 RX ORDER — SODIUM CHLORIDE 9 MG/ML
INJECTION, SOLUTION INTRAVENOUS CONTINUOUS
Status: ACTIVE | OUTPATIENT
Start: 2022-03-11 | End: 2022-03-11

## 2022-03-11 RX ORDER — LIDOCAINE HYDROCHLORIDE 10 MG/ML
INJECTION, SOLUTION EPIDURAL; INFILTRATION; INTRACAUDAL; PERINEURAL
Status: DISCONTINUED | OUTPATIENT
Start: 2022-03-11 | End: 2022-03-11 | Stop reason: HOSPADM

## 2022-03-11 RX ORDER — ONDANSETRON 4 MG/1
8 TABLET, ORALLY DISINTEGRATING ORAL EVERY 8 HOURS PRN
Status: DISCONTINUED | OUTPATIENT
Start: 2022-03-11 | End: 2022-03-11 | Stop reason: HOSPADM

## 2022-03-11 RX ORDER — ACETAMINOPHEN 325 MG/1
650 TABLET ORAL EVERY 4 HOURS PRN
Status: DISCONTINUED | OUTPATIENT
Start: 2022-03-11 | End: 2022-03-11 | Stop reason: HOSPADM

## 2022-03-11 RX ORDER — FENTANYL CITRATE 50 UG/ML
INJECTION, SOLUTION INTRAMUSCULAR; INTRAVENOUS
Status: DISCONTINUED | OUTPATIENT
Start: 2022-03-11 | End: 2022-03-11 | Stop reason: HOSPADM

## 2022-03-11 RX ORDER — SODIUM CHLORIDE 9 MG/ML
INJECTION, SOLUTION INTRAVENOUS ONCE
Status: COMPLETED | OUTPATIENT
Start: 2022-03-11 | End: 2022-03-11

## 2022-03-11 RX ADMIN — POTASSIUM CHLORIDE 20 MEQ: 1500 TABLET, EXTENDED RELEASE ORAL at 06:03

## 2022-03-11 RX ADMIN — SODIUM CHLORIDE: 0.9 INJECTION, SOLUTION INTRAVENOUS at 06:03

## 2022-03-11 NOTE — DISCHARGE SUMMARY
[facility]  Cardiology  Discharge Summary      Patient Name: Baldo Carney  MRN: [id number]  Admission Date: 3/11/2022  Hospital Length of Stay: 0 days  Discharge Date and Time:  03/11/2022 8:48 AM  Attending Physician: Hank Lujan MD  Discharging Provider: Hank Lujan MD  Primary Care Physician: Millie Hayes, APRN,FNP-C    HPI:  Patient stated clinic complaining of dyspnea and underwent stress test that was abnormal.  His echo showed a moderate cardiomyopathy with ejection fraction around 30-35%.  Two status post CABG x1 and AVR.  2014. The saphenous vein graft to RCA.  Because of the new cardiomyopathy and the whole history of CAD we elected to proceed with diagnostic left and right heart catheterization.    Procedure(s) (LRB):  CATHETERIZATION, HEART, BOTH LEFT AND RIGHT (Left)  ANGIOGRAM, CORONARY ARTERY (N/A)  Bypass graft study  Angiogram Internal Mammary (N/A)  Ventriculogram, Left     Indwelling Lines/Drains at time of discharge:none  Lines/Drains/Airways     None                 Hospital Course (synopsis of major diagnoses, care, treatment, and services provided during the course of the hospital stay):  Patient underwent left and right heart catheterization without complication.  He had a high-grade ostial LAD and circumflex disease with diffuse disease of the distal LAD and the mid circumflex.  The saphenous vein graft to PDA was patent but he had high-grade disease of the mid PDA after the graft anastomosis.  His ejection fraction was around 20%.  He had moderate pulmonary hypertension and right-sided heart failure with diminished cardiac index of less than 2. He had a native LIMA that was a large palpable bilateral.  He had elevated wedge pressure consistent with diastolic dysfunction.  He tolerated the procedure well with no complications.  He was stable and ready for discharge home after 4 [facility]    Consults:  Dr. Wolf    Significant  Diagnostic Studies: Angiography:  See op note    Pending Diagnostic Studies:     None          There are no hospital problems to display for this patient.      Discharged Condition: good    Follow Up: 1 week    Patient Instructions:      Diet general     Call MD for:  severe uncontrolled pain     Call MD for:  redness, tenderness, or signs of infection (pain, swelling, redness, odor or green/yellow discharge around incision site)     Remove dressing in 24 hours     Activity as tolerated     Medications:  Reconciled Home Medications:      Medication List      CHANGE how you take these medications    hydrALAZINE 50 MG tablet  Commonly known as: APRESOLINE  Take 1 tablet (50 mg total) by mouth every 8 (eight) hours.  What changed: when to take this        CONTINUE taking these medications    ARNUITY ELLIPTA 100 mcg/actuation inhaler  Generic drug: fluticasone furoate  Use as directed 1 puff in the mouth or throat daily as needed.     aspirin 81 MG EC tablet  Commonly known as: ECOTRIN  Take 81 mg by mouth once daily.     atorvastatin 40 MG tablet  Commonly known as: LIPITOR  Take 1 tablet (40 mg total) by mouth once daily.     azelastine 137 mcg (0.1 %) nasal spray  Commonly known as: ASTELIN  1 spray (137 mcg total) by Nasal route 2 (two) times daily as needed for Rhinitis.     BREO ELLIPTA 100-25 mcg/dose diskus inhaler  Generic drug: fluticasone furoate-vilanteroL  Inhale 1 puff into the lungs once daily. (DAILY CONTROLLER)     BREZTRI AEROSPHERE 160-9-4.8 mcg/actuation Hfaa  Generic drug: budesonide-glycopyr-formoterol  Inhale into the lungs daily as needed.     ENTRESTO 24-26 mg per tablet  Generic drug: sacubitriL-valsartan  Take 1 tablet by mouth 2 (two) times daily.     ferrous sulfate 325 mg (65 mg iron) Tab tablet  Commonly known as: FEOSOL  TAKE 1 TABLET (1 EACH TOTAL) BY MOUTH DAILY WITH BREAKFAST. TO BE TAKEN WITH VITAMIN C (ORANGE JUICE) FOR BEST ABSORPTION     finasteride 5 mg tablet  Commonly known as:  PROSCAR  Take 5 mg by mouth once daily.     fluticasone propionate 50 mcg/actuation nasal spray  Commonly known as: FLONASE  1 spray (50 mcg total) by Each Nostril route once daily.     furosemide 20 MG tablet  Commonly known as: LASIX  Take 2 tablets (40 mg total) by mouth 2 (two) times daily for 5 days, THEN 1 tablet (20 mg total) 2 (two) times daily.  Start taking on: December 28, 2021     gabapentin 300 MG capsule  Commonly known as: NEURONTIN  Take 1 capsule (300 mg total) by mouth every evening.     isosorbide dinitrate 20 MG tablet  Commonly known as: ISORDIL  Take 1 tablet (20 mg total) by mouth 3 (three) times daily.     levalbuterol 0.63 mg/3 mL nebulizer solution  Commonly known as: XOPENEX  Take 3 mLs (0.63 mg total) by nebulization every 4 to 6 hours as needed for Wheezing or Shortness of Breath. Rescue     metoprolol succinate 25 MG 24 hr tablet  Commonly known as: TOPROL-XL  Take 1 tablet (25 mg total) by mouth once daily.     promethazine-dextromethorphan 6.25-15 mg/5 mL Syrp  Commonly known as: PROMETHAZINE-DM  Take 5 mLs by mouth every 6 (six) hours as needed (cough and congestion).     tamsulosin 0.4 mg Cap  Commonly known as: FLOMAX  TAKE 1 CAPSULE BY MOUTH EVERY DAY     VENTOLIN HFA 90 mcg/actuation inhaler  Generic drug: albuterol  Inhale 1-2 puffs into the lungs every 4 to 6 hours as needed for Wheezing or Shortness of Breath. (RESCUE)     VERQUVO 5 mg Tab  Generic drug: vericiguat  Take 5 mg by mouth once daily.     VITAMIN D ORAL  Take 1 capsule by mouth once a week. WEDNESDAYS        STOP taking these medications    FARXIGA 5 mg Tab tablet  Generic drug: dapagliflozin            Time spent on the discharge of patient: <30 minutes    Hank Lujan MD  Cardiology  [facility]

## 2022-03-11 NOTE — Clinical Note
The PA catheter was inserted into the right atrium. Hemodynamics were performed. removed could not advance. will proceed to 7.5fr swan

## 2022-03-11 NOTE — Clinical Note
The catheter was inserted into the ostium   right coronary artery. An angiography was performed of the right coronary arteries. Multiple views were taken. The angiography was performed via power injection.

## 2022-03-11 NOTE — Clinical Note
The catheter was inserted into the ostial SVG graft. An angiography was performed of the right coronary arteries. Multiple views were taken. The angiography was performed via power injection.

## 2022-03-11 NOTE — Clinical Note
The PA catheter was inserted into the right atrium. Hemodynamics were performed. rhc pressures taken

## 2022-03-11 NOTE — Clinical Note
The catheter was repositioned into the ostium   left main. An angiography was performed of the left coronary arteries. The catheter was unable to engage the area..

## 2022-03-11 NOTE — DISCHARGE INSTRUCTIONS
Post angiogram discharge care  You have a puncture site in your right groin  You can remove your current dressing in 24 hours and take a shower. Showers only for the next week. Do not sit in a bathtub or pool of water 7 days until the puncture site is healed.   Gently clean the puncture site daily using soap and water while showering, dry thoroughly and cover with a Band-Aid. Make sure the Band-Aid and puncture site stay clean and dry.  Inspect the site daily for tenderness, discharge or signs of infection.   Observe the puncture site for signs of bleeding, obvious oozing, formation on knot under skin, or bruising. If any of these were to occur you should immediately lie down flat and apply firm pressure at the insertion site for 10 minutes. If bleeding or swelling does not stop, call 911! Do NOT try to drive yourself to the hospital.   Drink at least 6-8 glasses of clear liquids durin the next 24 hours to flush out the dye.  You must not be alone for the next 24 hours.   You may experience soreness or tenderness that my last for 1 week.   You may have mild oozing from the puncture site and/or possible bruising that could last up to 2 weeks.  You may also have a small lump form that may last up to 6 weeks.       Activity  Do not drive or operate an automobile or hazardous machinery for 24 hours  Do note engage in sports for 1 week.  Limit lifting over 10 pounds for the nest week, or until puncture site heals.  Limit you activities for the next 48 hours. Avoid excessive bending or squatting.  You may resume normal activity in 2-3 days, let pain be your guide.    Call your Doctor immediately if you experience any of the following:  Chest pain, palpitations, shortness of breath, excessive dizziness, fainting, nausea or vomiting.  Any signs of infection: redness, warmth, swelling, pain, drainage (other than a little blood on Band-Aid), chills, poorly healing puncture site, fever greater than 101.0 F  Change in color,  coolness, swelling, numbness, or pain to the involved extremity  Significant bleeding from the puncture site.

## 2022-03-11 NOTE — Clinical Note
The catheter was repositioned into the left subclavian artery. An angiography was performed of the lima. The angiography was performed via power injection.

## 2022-03-14 LAB — CATH EF QUANTITATIVE: 20 %

## 2022-03-24 ENCOUNTER — DOCUMENT SCAN (OUTPATIENT)
Dept: HOME HEALTH SERVICES | Facility: HOSPITAL | Age: 72
End: 2022-03-24
Payer: COMMERCIAL

## 2022-04-04 DIAGNOSIS — E78.2 MIXED HYPERLIPIDEMIA: ICD-10-CM

## 2022-04-04 RX ORDER — ATORVASTATIN CALCIUM 40 MG/1
40 TABLET, FILM COATED ORAL DAILY
Qty: 90 TABLET | Refills: 0 | Status: SHIPPED | OUTPATIENT
Start: 2022-04-04 | End: 2022-04-19 | Stop reason: SDUPTHER

## 2022-04-04 NOTE — TELEPHONE ENCOUNTER
Requesting 90 day prescription for Atorvastatin   Last office visit 01-05-22  No follow up scheduled   Last Lipid panel   Component      Latest Ref Rng & Units 2/4/2021   Cholesterol      120 - 199 mg/dL 132   Triglycerides      30 - 150 mg/dL 41   HDL      40 - 75 mg/dL 60   LDL Cholesterol External      63.0 - 159.0 mg/dL 63.8   HDL/Cholesterol Ratio      20.0 - 50.0 % 45.5   Total Cholesterol/HDL Ratio      2.0 - 5.0 2.2   Non-HDL Cholesterol      mg/dL 72

## 2022-04-19 ENCOUNTER — OFFICE VISIT (OUTPATIENT)
Dept: FAMILY MEDICINE | Facility: CLINIC | Age: 72
End: 2022-04-19
Payer: MEDICARE

## 2022-04-19 VITALS
DIASTOLIC BLOOD PRESSURE: 70 MMHG | BODY MASS INDEX: 25.21 KG/M2 | WEIGHT: 186.13 LBS | SYSTOLIC BLOOD PRESSURE: 128 MMHG | HEIGHT: 72 IN | TEMPERATURE: 98 F | HEART RATE: 69 BPM | OXYGEN SATURATION: 99 %

## 2022-04-19 DIAGNOSIS — Z12.5 PROSTATE CANCER SCREENING: ICD-10-CM

## 2022-04-19 DIAGNOSIS — N18.32 ANEMIA DUE TO STAGE 3B CHRONIC KIDNEY DISEASE: ICD-10-CM

## 2022-04-19 DIAGNOSIS — E78.2 MIXED HYPERLIPIDEMIA: ICD-10-CM

## 2022-04-19 DIAGNOSIS — K59.03 DRUG-INDUCED CONSTIPATION: ICD-10-CM

## 2022-04-19 DIAGNOSIS — I10 PRIMARY HYPERTENSION: ICD-10-CM

## 2022-04-19 DIAGNOSIS — E11.9 TYPE 2 DIABETES MELLITUS WITHOUT COMPLICATION, WITHOUT LONG-TERM CURRENT USE OF INSULIN: Primary | ICD-10-CM

## 2022-04-19 DIAGNOSIS — D63.1 ANEMIA DUE TO STAGE 3B CHRONIC KIDNEY DISEASE: ICD-10-CM

## 2022-04-19 PROCEDURE — 4010F PR ACE/ARB THEARPY RXD/TAKEN: ICD-10-PCS | Mod: S$GLB,,, | Performed by: NURSE PRACTITIONER

## 2022-04-19 PROCEDURE — 3078F PR MOST RECENT DIASTOLIC BLOOD PRESSURE < 80 MM HG: ICD-10-PCS | Mod: S$GLB,,, | Performed by: NURSE PRACTITIONER

## 2022-04-19 PROCEDURE — 3062F PR POS MACROALBUMINURIA RESULT DOCUMENTED/REVIEW: ICD-10-PCS | Mod: S$GLB,,, | Performed by: NURSE PRACTITIONER

## 2022-04-19 PROCEDURE — 1157F PR ADVANCE CARE PLAN OR EQUIV PRESENT IN MEDICAL RECORD: ICD-10-PCS | Mod: S$GLB,,, | Performed by: NURSE PRACTITIONER

## 2022-04-19 PROCEDURE — 1101F PT FALLS ASSESS-DOCD LE1/YR: CPT | Mod: S$GLB,,, | Performed by: NURSE PRACTITIONER

## 2022-04-19 PROCEDURE — 1101F PR PT FALLS ASSESS DOC 0-1 FALLS W/OUT INJ PAST YR: ICD-10-PCS | Mod: S$GLB,,, | Performed by: NURSE PRACTITIONER

## 2022-04-19 PROCEDURE — 1160F RVW MEDS BY RX/DR IN RCRD: CPT | Mod: S$GLB,,, | Performed by: NURSE PRACTITIONER

## 2022-04-19 PROCEDURE — 3074F PR MOST RECENT SYSTOLIC BLOOD PRESSURE < 130 MM HG: ICD-10-PCS | Mod: S$GLB,,, | Performed by: NURSE PRACTITIONER

## 2022-04-19 PROCEDURE — 1159F PR MEDICATION LIST DOCUMENTED IN MEDICAL RECORD: ICD-10-PCS | Mod: S$GLB,,, | Performed by: NURSE PRACTITIONER

## 2022-04-19 PROCEDURE — 3078F DIAST BP <80 MM HG: CPT | Mod: S$GLB,,, | Performed by: NURSE PRACTITIONER

## 2022-04-19 PROCEDURE — 99214 OFFICE O/P EST MOD 30 MIN: CPT | Mod: S$GLB,,, | Performed by: NURSE PRACTITIONER

## 2022-04-19 PROCEDURE — 3008F BODY MASS INDEX DOCD: CPT | Mod: S$GLB,,, | Performed by: NURSE PRACTITIONER

## 2022-04-19 PROCEDURE — 1160F PR REVIEW ALL MEDS BY PRESCRIBER/CLIN PHARMACIST DOCUMENTED: ICD-10-PCS | Mod: S$GLB,,, | Performed by: NURSE PRACTITIONER

## 2022-04-19 PROCEDURE — 3066F PR DOCUMENTATION OF TREATMENT FOR NEPHROPATHY: ICD-10-PCS | Mod: S$GLB,,, | Performed by: NURSE PRACTITIONER

## 2022-04-19 PROCEDURE — 1125F PR PAIN SEVERITY QUANTIFIED, PAIN PRESENT: ICD-10-PCS | Mod: S$GLB,,, | Performed by: NURSE PRACTITIONER

## 2022-04-19 PROCEDURE — 99214 PR OFFICE/OUTPT VISIT, EST, LEVL IV, 30-39 MIN: ICD-10-PCS | Mod: S$GLB,,, | Performed by: NURSE PRACTITIONER

## 2022-04-19 PROCEDURE — 3288F FALL RISK ASSESSMENT DOCD: CPT | Mod: S$GLB,,, | Performed by: NURSE PRACTITIONER

## 2022-04-19 PROCEDURE — 4010F ACE/ARB THERAPY RXD/TAKEN: CPT | Mod: S$GLB,,, | Performed by: NURSE PRACTITIONER

## 2022-04-19 PROCEDURE — 1125F AMNT PAIN NOTED PAIN PRSNT: CPT | Mod: S$GLB,,, | Performed by: NURSE PRACTITIONER

## 2022-04-19 PROCEDURE — 3008F PR BODY MASS INDEX (BMI) DOCUMENTED: ICD-10-PCS | Mod: S$GLB,,, | Performed by: NURSE PRACTITIONER

## 2022-04-19 PROCEDURE — 1157F ADVNC CARE PLAN IN RCRD: CPT | Mod: S$GLB,,, | Performed by: NURSE PRACTITIONER

## 2022-04-19 PROCEDURE — 3074F SYST BP LT 130 MM HG: CPT | Mod: S$GLB,,, | Performed by: NURSE PRACTITIONER

## 2022-04-19 PROCEDURE — 1159F MED LIST DOCD IN RCRD: CPT | Mod: S$GLB,,, | Performed by: NURSE PRACTITIONER

## 2022-04-19 PROCEDURE — 3066F NEPHROPATHY DOC TX: CPT | Mod: S$GLB,,, | Performed by: NURSE PRACTITIONER

## 2022-04-19 PROCEDURE — 3288F PR FALLS RISK ASSESSMENT DOCUMENTED: ICD-10-PCS | Mod: S$GLB,,, | Performed by: NURSE PRACTITIONER

## 2022-04-19 PROCEDURE — 3062F POS MACROALBUMINURIA REV: CPT | Mod: S$GLB,,, | Performed by: NURSE PRACTITIONER

## 2022-04-19 RX ORDER — FUROSEMIDE 40 MG/1
TABLET ORAL DAILY
COMMUNITY
Start: 2022-04-18 | End: 2024-02-29

## 2022-04-19 RX ORDER — DAPAGLIFLOZIN 5 MG/1
5 TABLET, FILM COATED ORAL DAILY
Qty: 90 TABLET | Refills: 1 | Status: SHIPPED | OUTPATIENT
Start: 2022-04-19 | End: 2022-06-14

## 2022-04-19 RX ORDER — CLOPIDOGREL BISULFATE 75 MG/1
75 TABLET ORAL DAILY
Status: ON HOLD | COMMUNITY
Start: 2022-03-24 | End: 2022-09-01 | Stop reason: HOSPADM

## 2022-04-19 RX ORDER — GABAPENTIN 300 MG/1
300 CAPSULE ORAL NIGHTLY
Qty: 90 CAPSULE | Refills: 1 | Status: SHIPPED | OUTPATIENT
Start: 2022-04-19 | End: 2023-02-22

## 2022-04-19 RX ORDER — DAPAGLIFLOZIN 5 MG/1
5 TABLET, FILM COATED ORAL DAILY
COMMUNITY
Start: 2022-03-24 | End: 2022-04-19 | Stop reason: SDUPTHER

## 2022-04-19 RX ORDER — ATORVASTATIN CALCIUM 40 MG/1
40 TABLET, FILM COATED ORAL DAILY
Qty: 90 TABLET | Refills: 1 | Status: SHIPPED | OUTPATIENT
Start: 2022-04-19 | End: 2022-08-16

## 2022-04-19 RX ORDER — SPIRONOLACTONE 25 MG/1
12.5 TABLET ORAL DAILY
COMMUNITY
Start: 2022-03-24 | End: 2023-02-22

## 2022-04-19 NOTE — PROGRESS NOTES
SUBJECTIVE:      Patient ID: Baldo Carney is a 71 y.o. male.    Chief Complaint: Diabetes (6 month f/u DM )    Presents for problem visit    Discussed outstanding health maintenance     Diabetes  He presents for his follow-up diabetic visit. He has type 2 diabetes mellitus. No MedicAlert identification noted. His disease course has been stable. There are no hypoglycemic associated symptoms. Pertinent negatives for hypoglycemia include no confusion, dizziness, headaches, nervousness/anxiousness or pallor. Pertinent negatives for diabetes include no chest pain, no fatigue, no foot paresthesias, no polydipsia, no polyuria and no weakness. There are no hypoglycemic complications. Symptoms are stable. Diabetic complications include impotence and peripheral neuropathy. Risk factors for coronary artery disease include diabetes mellitus, dyslipidemia, family history, hypertension, male sex and sedentary lifestyle. Current diabetic treatment includes oral agent (monotherapy). He is compliant with treatment all of the time. His weight is fluctuating minimally. He is following a generally healthy diet. He has not had a previous visit with a dietitian. He rarely participates in exercise. An ACE inhibitor/angiotensin II receptor blocker is being taken.   Hypertension  This is a chronic problem. The current episode started more than 1 year ago. The problem is controlled. Pertinent negatives include no chest pain, headaches, neck pain, peripheral edema or shortness of breath. Risk factors for coronary artery disease include male gender, sedentary lifestyle, diabetes mellitus, dyslipidemia, family history and stress. Past treatments include calcium channel blockers, ACE inhibitors and diuretics. The current treatment provides moderate improvement. Compliance problems include exercise and diet.  Hypertensive end-organ damage includes CAD/MI. Identifiable causes of hypertension include chronic renal disease, a  hypertension causing med and renovascular disease.   Abdominal Pain  This is a new problem. The current episode started more than 1 month ago. The onset quality is gradual. The problem occurs daily. The problem has been waxing and waning. The pain is located in the RLQ and LLQ. The pain is at a severity of 10/10. The pain is severe. The quality of the pain is aching, cramping and dull. The abdominal pain does not radiate. Associated symptoms include anorexia, arthralgias, constipation and myalgias (R groin tenderness s/p recent heart cath procedure). Pertinent negatives include no diarrhea, dysuria, fever, frequency, headaches, hematuria, nausea or vomiting. The pain is aggravated by certain positions. The pain is relieved by bowel movements, certain positions, standing, passing flatus, recumbency and being still. Treatments tried: more fiber, stopped oral iron supplement. The treatment provided mild relief.       Past Surgical History:   Procedure Laterality Date    ANGIOGRAPHY OF INTERNAL MAMMARY VESSEL N/A 3/11/2022    Procedure: Angiogram Internal Mammary;  Surgeon: Hank Lujan MD;  Location: [facility] CATH/EP LAB;  Service: Cardiology;  Laterality: N/A;    CARDIAC CATHETERIZATION      CARDIAC VALVE SURGERY      CATHETERIZATION OF BOTH LEFT AND RIGHT HEART Left 3/11/2022    Procedure: CATHETERIZATION, HEART, BOTH LEFT AND RIGHT;  Surgeon: Hank Lujan MD;  Location: [facility] CATH/EP LAB;  Service: Cardiology;  Laterality: Left;    CORONARY ANGIOGRAPHY N/A 3/11/2022    Procedure: ANGIOGRAM, CORONARY ARTERY;  Surgeon: Hank Lujan MD;  Location: [facility] CATH/EP LAB;  Service: Cardiology;  Laterality: N/A;    CORONARY BYPASS GRAFT ANGIOGRAPHY  3/11/2022    Procedure: Bypass graft study;  Surgeon: Hank Lujan MD;  Location: [facility] CATH/EP LAB;  Service: Cardiology;;    CYSTOSCOPY N/A 7/30/2019    Procedure: CYSTOSCOPY;  Surgeon: Spencer Nguyen MD;  Location: [facility] OR;  Service: Urology;  Laterality:  N/A;    SHOULDER ARTHROSCOPY  1985    TRANSRECTAL BIOPSY OF PROSTATE WITH ULTRASOUND GUIDANCE N/A 2019    Procedure: BIOPSY, PROSTATE, RECTAL APPROACH, WITH US GUIDANCE;  Surgeon: Spencer Nguyen MD;  Location: [facility] OR;  Service: Urology;  Laterality: N/A;  procedure not performed, pt unable to tolerate    TRANSRECTAL BIOPSY OF PROSTATE WITH ULTRASOUND GUIDANCE N/A 2019    Procedure: BIOPSY, PROSTATE, RECTAL APPROACH, WITH US GUIDANCE;  Surgeon: Spencer Nguyen MD;  Location: [facility] OR;  Service: Urology;  Laterality: N/A;     Family History   Problem Relation Age of Onset    No Known Problems Mother     Diabetes Father     Hypertension Father     Heart attack Father     Heart disease Father     Diabetes Sister     Heart disease Brother     Diabetes Brother     No Known Problems Maternal Grandmother     No Known Problems Maternal Grandfather     No Known Problems Paternal Grandmother     No Known Problems Paternal Grandfather     No Known Problems Maternal Aunt     No Known Problems Maternal Uncle     No Known Problems Paternal Aunt     No Known Problems Paternal Uncle     Anemia Neg Hx     Arrhythmia Neg Hx     Asthma Neg Hx     Clotting disorder Neg Hx     Fainting Neg Hx     Heart failure Neg Hx     Hyperlipidemia Neg Hx     Glaucoma Neg Hx       Social History     Socioeconomic History    Marital status: Legally    Tobacco Use    Smoking status: Former Smoker     Quit date: 1970     Years since quittin.8    Smokeless tobacco: Never Used   Substance and Sexual Activity    Alcohol use: Not Currently     Alcohol/week: 3.0 standard drinks     Types: 3 Cans of beer per week     Comment: social    Drug use: No    Sexual activity: Never     Current Outpatient Medications   Medication Sig Dispense Refill    ARNUITY ELLIPTA 100 mcg/actuation DsDv Use as directed 1 puff in the mouth or throat daily as needed.   6    aspirin (ECOTRIN) 81 MG EC tablet Take  81 mg by mouth once daily.      azelastine (ASTELIN) 137 mcg (0.1 %) nasal spray 1 spray (137 mcg total) by Nasal route 2 (two) times daily as needed for Rhinitis. 30 mL 1    budesonide-glycopyr-formoterol (BREZTRI AEROSPHERE) 160-9-4.8 mcg/actuation HFAA Inhale into the lungs daily as needed.      clopidogreL (PLAVIX) 75 mg tablet Take 75 mg by mouth once daily.      ergocalciferol, vitamin D2, (VITAMIN D ORAL) Take 1 capsule by mouth once a week. WEDNESDAYS      ferrous sulfate (FEOSOL) 325 mg (65 mg iron) Tab tablet TAKE 1 TABLET (1 EACH TOTAL) BY MOUTH DAILY WITH BREAKFAST. TO BE TAKEN WITH VITAMIN C (ORANGE JUICE) FOR BEST ABSORPTION 90 tablet 0    finasteride (PROSCAR) 5 mg tablet Take 5 mg by mouth once daily.      fluticasone furoate-vilanteroL (BREO ELLIPTA) 100-25 mcg/dose diskus inhaler Inhale 1 puff into the lungs once daily. (DAILY CONTROLLER) 3 each 3    fluticasone propionate (FLONASE) 50 mcg/actuation nasal spray 1 spray (50 mcg total) by Each Nostril route once daily. 15.8 mL 1    furosemide (LASIX) 40 MG tablet       levalbuterol (XOPENEX) 0.63 mg/3 mL nebulizer solution Take 3 mLs (0.63 mg total) by nebulization every 4 to 6 hours as needed for Wheezing or Shortness of Breath. Rescue 45 mL 0    metoprolol succinate (TOPROL-XL) 25 MG 24 hr tablet Take 1 tablet (25 mg total) by mouth once daily. 90 tablet 1    promethazine-dextromethorphan (PROMETHAZINE-DM) 6.25-15 mg/5 mL Syrp Take 5 mLs by mouth every 6 (six) hours as needed (cough and congestion). 100 mL 0    sacubitriL-valsartan (ENTRESTO) 24-26 mg per tablet Take 1 tablet by mouth 2 (two) times daily.      spironolactone (ALDACTONE) 25 MG tablet Take 12.5 mg by mouth once daily.      tamsulosin (FLOMAX) 0.4 mg Cap TAKE 1 CAPSULE BY MOUTH EVERY DAY 90 capsule 1    VENTOLIN HFA 90 mcg/actuation inhaler Inhale 1-2 puffs into the lungs every 4 to 6 hours as needed for Wheezing or Shortness of Breath. (RESCUE) 18 g 3    vericiguat  (VERQUVO) 5 mg Tab Take 5 mg by mouth once daily.      atorvastatin (LIPITOR) 40 MG tablet Take 1 tablet (40 mg total) by mouth once daily. 90 tablet 1    FARXIGA 5 mg Tab tablet Take 1 tablet (5 mg total) by mouth once daily. 90 tablet 1    gabapentin (NEURONTIN) 300 MG capsule Take 1 capsule (300 mg total) by mouth every evening. 90 capsule 1    hydrALAZINE (APRESOLINE) 50 MG tablet Take 1 tablet (50 mg total) by mouth every 8 (eight) hours. (Patient taking differently: Take 50 mg by mouth every 12 (twelve) hours.) 90 tablet 0    isosorbide dinitrate (ISORDIL) 20 MG tablet Take 1 tablet (20 mg total) by mouth 3 (three) times daily. 90 tablet 0    linaCLOtide (LINZESS) 72 mcg Cap capsule Take 1 capsule (72 mcg total) by mouth before breakfast. 4 capsule 0     No current facility-administered medications for this visit.     Review of patient's allergies indicates:  No Known Allergies   Past Medical History:   Diagnosis Date    Asthma     Cardiomyopathy 10/21/2014    CHF (congestive heart failure)     Coronary artery disease     CRF (chronic renal failure)     Diabetes mellitus     Enlarged prostate     Hyperlipidemia     Hypertension     Neuropathy      Past Surgical History:   Procedure Laterality Date    ANGIOGRAPHY OF INTERNAL MAMMARY VESSEL N/A 3/11/2022    Procedure: Angiogram Internal Mammary;  Surgeon: Hank Lujan MD;  Location: [facility] CATH/EP LAB;  Service: Cardiology;  Laterality: N/A;    CARDIAC CATHETERIZATION      CARDIAC VALVE SURGERY      CATHETERIZATION OF BOTH LEFT AND RIGHT HEART Left 3/11/2022    Procedure: CATHETERIZATION, HEART, BOTH LEFT AND RIGHT;  Surgeon: Hank Lujan MD;  Location: [facility] CATH/EP LAB;  Service: Cardiology;  Laterality: Left;    CORONARY ANGIOGRAPHY N/A 3/11/2022    Procedure: ANGIOGRAM, CORONARY ARTERY;  Surgeon: Hank Lujan MD;  Location: [facility] CATH/EP LAB;  Service: Cardiology;  Laterality: N/A;    CORONARY BYPASS GRAFT ANGIOGRAPHY  3/11/2022     Procedure: Bypass graft study;  Surgeon: Hank Lujan MD;  Location: [facility] CATH/EP LAB;  Service: Cardiology;;    CYSTOSCOPY N/A 7/30/2019    Procedure: CYSTOSCOPY;  Surgeon: Spencer Nguyen MD;  Location[facility];  Service: Urology;  Laterality: N/A;    SHOULDER ARTHROSCOPY  1985    TRANSRECTAL BIOPSY OF PROSTATE WITH ULTRASOUND GUIDANCE N/A 7/30/2019    Procedure: BIOPSY, PROSTATE, RECTAL APPROACH, WITH US GUIDANCE;  Surgeon: Spencer Nguyen MD;  Location: [facility] OR;  Service: Urology;  Laterality: N/A;  procedure not performed, pt unable to tolerate    TRANSRECTAL BIOPSY OF PROSTATE WITH ULTRASOUND GUIDANCE N/A 8/8/2019    Procedure: BIOPSY, PROSTATE, RECTAL APPROACH, WITH US GUIDANCE;  Surgeon: Spencer Nguyen MD;  Location: [facility];  Service: Urology;  Laterality: N/A;       Review of Systems   Constitutional: Negative for activity change, appetite change, fatigue and fever.   HENT: Negative for congestion, ear pain, hearing loss, postnasal drip, sinus pressure, sinus pain, sneezing and sore throat.    Eyes: Negative for photophobia and pain.   Respiratory: Negative for cough, chest tightness, shortness of breath and wheezing.    Cardiovascular: Positive for leg swelling (intermittent). Negative for chest pain.        Follows with Dr. Lujan   Gastrointestinal: Positive for abdominal distention, abdominal pain, anorexia and constipation. Negative for blood in stool, diarrhea, nausea and vomiting.   Endocrine: Negative for cold intolerance, heat intolerance, polydipsia and polyuria.   Genitourinary: Positive for impotence. Negative for difficulty urinating, dysuria, flank pain, frequency, hematuria and urgency.   Musculoskeletal: Positive for arthralgias and myalgias (R groin tenderness s/p recent heart cath procedure). Negative for back pain, joint swelling and neck pain.   Skin: Negative for pallor and rash.   Allergic/Immunologic: Positive for environmental allergies. Negative for food  allergies.   Neurological: Negative for dizziness, weakness, light-headedness and headaches.   Hematological: Does not bruise/bleed easily.   Psychiatric/Behavioral: Negative for confusion, decreased concentration, dysphoric mood and sleep disturbance. The patient is not nervous/anxious.       OBJECTIVE:      Vitals:    04/19/22 1002   BP: 128/70   BP Location: Right arm   Patient Position: Sitting   BP Method: Large (Manual)   Pulse: 69   Temp: 97.9 °F (36.6 °C)   TempSrc: Oral   SpO2: 99%   Weight: 84.4 kg (186 lb 1.6 oz)   Height: 6' (1.829 m)     Physical Exam  Vitals and nursing note reviewed.   Constitutional:       General: He is not in acute distress.     Appearance: Normal appearance. He is well-developed, well-groomed and normal weight.   HENT:      Head: Normocephalic and atraumatic.      Right Ear: Hearing normal.      Left Ear: Hearing normal.      Nose: Nose normal. No rhinorrhea.   Eyes:      General: Lids are normal.         Right eye: No discharge.         Left eye: No discharge.      Conjunctiva/sclera: Conjunctivae normal.      Right eye: Right conjunctiva is not injected.      Left eye: Left conjunctiva is not injected.      Pupils: Pupils are equal, round, and reactive to light. Pupils are equal.      Right eye: Pupil is round and reactive.      Left eye: Pupil is round and reactive.   Neck:      Thyroid: No thyromegaly.      Vascular: No JVD.      Trachea: Trachea normal. No tracheal deviation.   Cardiovascular:      Rate and Rhythm: Normal rate and regular rhythm.      Pulses:           Radial pulses are 2+ on the right side and 2+ on the left side.      Heart sounds: Normal heart sounds. No murmur heard.    No friction rub. No gallop.   Pulmonary:      Effort: Pulmonary effort is normal. No respiratory distress.      Breath sounds: Normal breath sounds. No stridor. No decreased breath sounds, wheezing, rhonchi or rales.   Abdominal:      General: Abdomen is protuberant. Bowel sounds are  normal. There is no distension.      Palpations: Abdomen is soft. Abdomen is not rigid.      Tenderness: There is no abdominal tenderness. There is no guarding.   Musculoskeletal:         General: Normal range of motion.      Cervical back: Normal range of motion and neck supple.   Lymphadenopathy:      Cervical: No cervical adenopathy.   Skin:     General: Skin is warm and dry.      Capillary Refill: Capillary refill takes less than 2 seconds.      Coloration: Skin is not pale.      Findings: No lesion or rash.   Neurological:      Mental Status: He is alert and oriented to person, place, and time.      Motor: No atrophy.      Coordination: Coordination normal.      Gait: Gait normal.   Psychiatric:         Attention and Perception: He is attentive.         Speech: Speech normal.         Behavior: Behavior normal.         Thought Content: Thought content normal.         Judgment: Judgment normal.        Assessment:       1. Type 2 diabetes mellitus without complication, without long-term current use of insulin    2. Primary hypertension    3. Mixed hyperlipidemia    4. Anemia due to stage 3b chronic kidney disease    5. Drug-induced constipation    6. Prostate cancer screening        Plan:       Type 2 diabetes mellitus without complication, without long-term current use of insulin  -     Hemoglobin A1C; Future; Expected date: 04/19/2022  -     Comprehensive Metabolic Panel; Future; Expected date: 04/19/2022  -     gabapentin (NEURONTIN) 300 MG capsule; Take 1 capsule (300 mg total) by mouth every evening.  Dispense: 90 capsule; Refill: 1  -     FARXIGA 5 mg Tab tablet; Take 1 tablet (5 mg total) by mouth once daily.  Dispense: 90 tablet; Refill: 1    Primary hypertension  -     Lipid Panel; Future; Expected date: 04/19/2022  -     CBC Auto Differential; Future; Expected date: 04/19/2022  -     Comprehensive Metabolic Panel; Future; Expected date: 04/19/2022  -     FARXIGA 5 mg Tab tablet; Take 1 tablet (5 mg  total) by mouth once daily.  Dispense: 90 tablet; Refill: 1    Mixed hyperlipidemia  -     Lipid Panel; Future; Expected date: 04/19/2022  -     atorvastatin (LIPITOR) 40 MG tablet; Take 1 tablet (40 mg total) by mouth once daily.  Dispense: 90 tablet; Refill: 1    Anemia due to stage 3b chronic kidney disease  -     CBC Auto Differential; Future; Expected date: 04/19/2022  -     Ferritin; Future; Expected date: 04/19/2022  -     Iron and TIBC; Future; Expected date: 04/19/2022    Drug-induced constipation  -     linaCLOtide (LINZESS) 72 mcg Cap capsule; Take 1 capsule (72 mcg total) by mouth before breakfast.  Dispense: 4 capsule; Refill: 0  - FDGard samples provided at today's visit  - discussed symptoms are likely r/t PO iron intake    Prostate cancer screening  -     PSA, Screening; Future; Expected date: 04/19/2022          Follow up in about 1 month (around 5/19/2022) for DM lab review.         4/19/2022 KAMERON Rodriguez, FNP-C

## 2022-04-19 NOTE — ASSESSMENT & PLAN NOTE
Infusion Nursing Note:  Dimitrios Goldberg presents today for C3D1 Keytruda.  Patient seen by provider today: Yes: Seen by Dr. Campos today prior to infusion   present during visit today: Not Applicable.    Note: Patient reports to tolerating Keytruda well without issues.  Creatinine elevated at 2.22 today and TSH elevated at 50.63.  Dr. Campos paged to update him on current lab levels.    TORB: Dr. Campos/ ZOEY Leary RN on 4/19/22 @ 1350:  - OK to proceed with Keytruda today with creat of 2.22 and TSH of 50.   - Give 1 L IVF for elevated creatinine.    Patient updated on new orders and verbalizes understanding of the plan.        Intravenous Access  Peripheral IV placed in lab    Treatment Conditions:  Lab Results   Component Value Date     04/19/2022    POTASSIUM 3.6 04/19/2022    MAG 2.1 08/17/2021    CR 2.22 (H) 04/19/2022    BETSY 9.7 04/19/2022    BILITOTAL 0.4 04/19/2022    ALBUMIN 4.1 04/19/2022    ALT 43 04/19/2022    AST 43 04/19/2022     Results reviewed, labs MET treatment parameters, ok to proceed with treatment.      Post Infusion Assessment:  Patient tolerated infusion without incident.  Blood return noted pre and post infusion.  Site patent and intact, free from redness, edema or discomfort.  No evidence of extravasations.  Access discontinued per protocol.       Discharge Plan:   Patient declined prescription refills.  Discharge instructions reviewed with: Patient.  Patient and/or family verbalized understanding of discharge instructions and all questions answered.  AVS to patient via [employer].  Patient to be scheduled for future appointments. Scheduling to contact patient.  Patient discharged in stable condition accompanied by: self.  Departure Mode: Ambulatory.      Lulu Leary, RN                       Stable . Home meds

## 2022-04-20 ENCOUNTER — DOCUMENT SCAN (OUTPATIENT)
Dept: HOME HEALTH SERVICES | Facility: HOSPITAL | Age: 72
End: 2022-04-20
Payer: MEDICARE

## 2022-04-26 DIAGNOSIS — I97.622 SEROMA OF CIRCULATORY SYSTEM AFTER NON-CIRCULATORY SYSTEM PROCEDURE: Primary | ICD-10-CM

## 2022-05-05 ENCOUNTER — HOSPITAL ENCOUNTER (OUTPATIENT)
Dept: RADIOLOGY | Facility: HOSPITAL | Age: 72
Discharge: HOME | End: 2022-05-05
Attending: INTERNAL MEDICINE
Payer: MEDICARE

## 2022-05-05 DIAGNOSIS — I97.622 SEROMA OF CIRCULATORY SYSTEM AFTER NON-CIRCULATORY SYSTEM PROCEDURE: ICD-10-CM

## 2022-05-05 PROCEDURE — 74176 CT ABD & PELVIS W/O CONTRAST: CPT | Mod: TC,PO

## 2022-05-10 DIAGNOSIS — I25.10 CORONARY ARTERY DISEASE INVOLVING NATIVE CORONARY ARTERY OF NATIVE HEART WITHOUT ANGINA PECTORIS: Primary | ICD-10-CM

## 2022-05-11 DIAGNOSIS — N18.9 CHRONIC KIDNEY DISEASE, UNSPECIFIED CKD STAGE: ICD-10-CM

## 2022-05-11 DIAGNOSIS — I25.10 CORONARY ARTERY DISEASE INVOLVING NATIVE HEART, UNSPECIFIED VESSEL OR LESION TYPE, UNSPECIFIED WHETHER ANGINA PRESENT: Primary | ICD-10-CM

## 2022-05-11 RX ORDER — SODIUM CHLORIDE 9 MG/ML
INJECTION, SOLUTION INTRAVENOUS CONTINUOUS
Status: CANCELLED | OUTPATIENT
Start: 2022-05-11 | End: 2022-05-11

## 2022-05-11 RX ORDER — DIPHENHYDRAMINE HCL 50 MG
50 CAPSULE ORAL ONCE
Status: CANCELLED | OUTPATIENT
Start: 2022-05-11 | End: 2022-05-11

## 2022-05-12 ENCOUNTER — OFFICE VISIT (OUTPATIENT)
Dept: CARDIOLOGY | Facility: CLINIC | Age: 72
End: 2022-05-12
Payer: MEDICARE

## 2022-05-12 VITALS
WEIGHT: 183.44 LBS | SYSTOLIC BLOOD PRESSURE: 150 MMHG | OXYGEN SATURATION: 99 % | HEART RATE: 73 BPM | DIASTOLIC BLOOD PRESSURE: 84 MMHG | BODY MASS INDEX: 24.85 KG/M2 | HEIGHT: 72 IN

## 2022-05-12 DIAGNOSIS — I10 PRIMARY HYPERTENSION: ICD-10-CM

## 2022-05-12 DIAGNOSIS — I25.5 ISCHEMIC CARDIOMYOPATHY: ICD-10-CM

## 2022-05-12 DIAGNOSIS — I25.5 CARDIOMYOPATHY, ISCHEMIC: ICD-10-CM

## 2022-05-12 DIAGNOSIS — T80.1XXA VASCULAR COMPLICATIONS FOLLOWING INFUSION, TRANSFUSION AND THERAPEUTIC INJECTION, INITIAL ENCOUNTER: ICD-10-CM

## 2022-05-12 DIAGNOSIS — I50.22 CHRONIC SYSTOLIC HEART FAILURE: Chronic | ICD-10-CM

## 2022-05-12 DIAGNOSIS — Z95.1 S/P CABG (CORONARY ARTERY BYPASS GRAFT): ICD-10-CM

## 2022-05-12 DIAGNOSIS — E11.9 TYPE 2 DIABETES MELLITUS WITHOUT COMPLICATION, WITHOUT LONG-TERM CURRENT USE OF INSULIN: ICD-10-CM

## 2022-05-12 DIAGNOSIS — D64.9 ANEMIA, UNSPECIFIED TYPE: Chronic | ICD-10-CM

## 2022-05-12 DIAGNOSIS — I25.10 CORONARY ARTERY DISEASE INVOLVING NATIVE HEART, UNSPECIFIED VESSEL OR LESION TYPE, UNSPECIFIED WHETHER ANGINA PRESENT: ICD-10-CM

## 2022-05-12 DIAGNOSIS — I35.0 AORTIC STENOSIS, SEVERE: Chronic | ICD-10-CM

## 2022-05-12 DIAGNOSIS — R10.31 RIGHT GROIN PAIN: ICD-10-CM

## 2022-05-12 DIAGNOSIS — J44.9 CHRONIC OBSTRUCTIVE PULMONARY DISEASE, UNSPECIFIED COPD TYPE: Chronic | ICD-10-CM

## 2022-05-12 DIAGNOSIS — Z98.890 S/P CARDIAC CATH: Primary | ICD-10-CM

## 2022-05-12 DIAGNOSIS — E78.2 MIXED HYPERLIPIDEMIA: ICD-10-CM

## 2022-05-12 PROCEDURE — 3008F BODY MASS INDEX DOCD: CPT | Mod: CPTII,S$GLB,, | Performed by: INTERNAL MEDICINE

## 2022-05-12 PROCEDURE — 3008F PR BODY MASS INDEX (BMI) DOCUMENTED: ICD-10-PCS | Mod: CPTII,S$GLB,, | Performed by: INTERNAL MEDICINE

## 2022-05-12 PROCEDURE — 3077F PR MOST RECENT SYSTOLIC BLOOD PRESSURE >= 140 MM HG: ICD-10-PCS | Mod: CPTII,S$GLB,, | Performed by: INTERNAL MEDICINE

## 2022-05-12 PROCEDURE — 3079F PR MOST RECENT DIASTOLIC BLOOD PRESSURE 80-89 MM HG: ICD-10-PCS | Mod: CPTII,S$GLB,, | Performed by: INTERNAL MEDICINE

## 2022-05-12 PROCEDURE — 3062F POS MACROALBUMINURIA REV: CPT | Mod: CPTII,S$GLB,, | Performed by: INTERNAL MEDICINE

## 2022-05-12 PROCEDURE — 99999 PR PBB SHADOW E&M-EST. PATIENT-LVL V: CPT | Mod: PBBFAC,,, | Performed by: INTERNAL MEDICINE

## 2022-05-12 PROCEDURE — 1157F PR ADVANCE CARE PLAN OR EQUIV PRESENT IN MEDICAL RECORD: ICD-10-PCS | Mod: CPTII,S$GLB,, | Performed by: INTERNAL MEDICINE

## 2022-05-12 PROCEDURE — 1160F RVW MEDS BY RX/DR IN RCRD: CPT | Mod: CPTII,S$GLB,, | Performed by: INTERNAL MEDICINE

## 2022-05-12 PROCEDURE — 1157F ADVNC CARE PLAN IN RCRD: CPT | Mod: CPTII,S$GLB,, | Performed by: INTERNAL MEDICINE

## 2022-05-12 PROCEDURE — 3066F NEPHROPATHY DOC TX: CPT | Mod: CPTII,S$GLB,, | Performed by: INTERNAL MEDICINE

## 2022-05-12 PROCEDURE — 4010F PR ACE/ARB THEARPY RXD/TAKEN: ICD-10-PCS | Mod: CPTII,S$GLB,, | Performed by: INTERNAL MEDICINE

## 2022-05-12 PROCEDURE — 99214 PR OFFICE/OUTPT VISIT, EST, LEVL IV, 30-39 MIN: ICD-10-PCS | Mod: S$GLB,,, | Performed by: INTERNAL MEDICINE

## 2022-05-12 PROCEDURE — 1160F PR REVIEW ALL MEDS BY PRESCRIBER/CLIN PHARMACIST DOCUMENTED: ICD-10-PCS | Mod: CPTII,S$GLB,, | Performed by: INTERNAL MEDICINE

## 2022-05-12 PROCEDURE — 1125F PR PAIN SEVERITY QUANTIFIED, PAIN PRESENT: ICD-10-PCS | Mod: CPTII,S$GLB,, | Performed by: INTERNAL MEDICINE

## 2022-05-12 PROCEDURE — 1159F MED LIST DOCD IN RCRD: CPT | Mod: CPTII,S$GLB,, | Performed by: INTERNAL MEDICINE

## 2022-05-12 PROCEDURE — 99214 OFFICE O/P EST MOD 30 MIN: CPT | Mod: S$GLB,,, | Performed by: INTERNAL MEDICINE

## 2022-05-12 PROCEDURE — 4010F ACE/ARB THERAPY RXD/TAKEN: CPT | Mod: CPTII,S$GLB,, | Performed by: INTERNAL MEDICINE

## 2022-05-12 PROCEDURE — 99999 PR PBB SHADOW E&M-EST. PATIENT-LVL V: ICD-10-PCS | Mod: PBBFAC,,, | Performed by: INTERNAL MEDICINE

## 2022-05-12 PROCEDURE — 1125F AMNT PAIN NOTED PAIN PRSNT: CPT | Mod: CPTII,S$GLB,, | Performed by: INTERNAL MEDICINE

## 2022-05-12 PROCEDURE — 3066F PR DOCUMENTATION OF TREATMENT FOR NEPHROPATHY: ICD-10-PCS | Mod: CPTII,S$GLB,, | Performed by: INTERNAL MEDICINE

## 2022-05-12 PROCEDURE — 3062F PR POS MACROALBUMINURIA RESULT DOCUMENTED/REVIEW: ICD-10-PCS | Mod: CPTII,S$GLB,, | Performed by: INTERNAL MEDICINE

## 2022-05-12 PROCEDURE — 3077F SYST BP >= 140 MM HG: CPT | Mod: CPTII,S$GLB,, | Performed by: INTERNAL MEDICINE

## 2022-05-12 PROCEDURE — 1159F PR MEDICATION LIST DOCUMENTED IN MEDICAL RECORD: ICD-10-PCS | Mod: CPTII,S$GLB,, | Performed by: INTERNAL MEDICINE

## 2022-05-12 PROCEDURE — 3079F DIAST BP 80-89 MM HG: CPT | Mod: CPTII,S$GLB,, | Performed by: INTERNAL MEDICINE

## 2022-05-12 NOTE — PROGRESS NOTES
Subjective:    Patient ID:  Baldo Carney is a 71 y.o. male who presents for evaluation of Coronary Artery Disease    Referring cardiologist: Dr. Tai MARTINEZ  Mr. Carney is a 70 y/o gentleman with HTN, dyslipidemia, and an ICM (10/16/21 TTE demonstrated an LVEF=20%) who presents for evaluation of PCI.  The patient underwent cardiac catheterization at [facility] on 2/18/22 that demonstrated multi-vessel CAD (angiogram not available).  Today the patient denies chest pain. He reports shortness of breath upon walking 100 feet. The patient reports he can no longer take out his garbage due to GOMEZ and fatigue.  He reports that these symptoms have been present since last Thanksgiving.  The patient also reports dependent lwoer extremity edema, but denies PND or orthopnea.  He reports PND twice a week on average.  He denies palpitations, syncope, or near syncope.  Of note the patient reports abdominal bloating since his discharge from his 2/18/22 angiogram.  He also reports noticing a mass I his right groin.    Past Medical History:   Diagnosis Date    Asthma     Cardiomyopathy 10/21/2014    CHF (congestive heart failure)     Coronary artery disease     CRF (chronic renal failure)     Diabetes mellitus     Enlarged prostate     Hyperlipidemia     Hypertension     Neuropathy      Past Surgical History:   Procedure Laterality Date    ANGIOGRAPHY OF INTERNAL MAMMARY VESSEL N/A 3/11/2022    Procedure: Angiogram Internal Mammary;  Surgeon: Hank Lujan MD;  Location: [facility] CATH/EP LAB;  Service: Cardiology;  Laterality: N/A;    CARDIAC CATHETERIZATION      CARDIAC VALVE SURGERY      CATHETERIZATION OF BOTH LEFT AND RIGHT HEART Left 3/11/2022    Procedure: CATHETERIZATION, HEART, BOTH LEFT AND RIGHT;  Surgeon: Hank Lujan MD;  Location: [facility] CATH/EP LAB;  Service: Cardiology;  Laterality: Left;    CORONARY ANGIOGRAPHY N/A 3/11/2022    Procedure: ANGIOGRAM, CORONARY ARTERY;  Surgeon: Hank Lujan MD;   Location: [facility] CATH/EP LAB;  Service: Cardiology;  Laterality: N/A;    CORONARY BYPASS GRAFT ANGIOGRAPHY  3/11/2022    Procedure: Bypass graft study;  Surgeon: Hank Lujan MD;  Location: [facility] CATH/EP LAB;  Service: Cardiology;;    CYSTOSCOPY N/A 7/30/2019    Procedure: CYSTOSCOPY;  Surgeon: Spencer Nguyen MD;  Location: [facility] OR;  Service: Urology;  Laterality: N/A;    SHOULDER ARTHROSCOPY  1985    TRANSRECTAL BIOPSY OF PROSTATE WITH ULTRASOUND GUIDANCE N/A 7/30/2019    Procedure: BIOPSY, PROSTATE, RECTAL APPROACH, WITH US GUIDANCE;  Surgeon: Spencer Nguyen MD;  Location: [facility] OR;  Service: Urology;  Laterality: N/A;  procedure not performed, pt unable to tolerate    TRANSRECTAL BIOPSY OF PROSTATE WITH ULTRASOUND GUIDANCE N/A 8/8/2019    Procedure: BIOPSY, PROSTATE, RECTAL APPROACH, WITH US GUIDANCE;  Surgeon: Spencer Nguyen MD;  Location: [facility] OR;  Service: Urology;  Laterality: N/A;     Current Outpatient Medications on File Prior to Visit   Medication Sig Dispense Refill    ARNUITY ELLIPTA 100 mcg/actuation DsDv Use as directed 1 puff in the mouth or throat daily as needed.   6    aspirin (ECOTRIN) 81 MG EC tablet Take 81 mg by mouth once daily.      atorvastatin (LIPITOR) 40 MG tablet Take 1 tablet (40 mg total) by mouth once daily. 90 tablet 1    azelastine (ASTELIN) 137 mcg (0.1 %) nasal spray 1 spray (137 mcg total) by Nasal route 2 (two) times daily as needed for Rhinitis. 30 mL 1    budesonide-glycopyr-formoterol (BREZTRI AEROSPHERE) 160-9-4.8 mcg/actuation HFAA Inhale into the lungs daily as needed.      clopidogreL (PLAVIX) 75 mg tablet Take 75 mg by mouth once daily.      ergocalciferol, vitamin D2, (VITAMIN D ORAL) Take 1 capsule by mouth once a week. WEDNESDAYS      FARXIGA 5 mg Tab tablet Take 1 tablet (5 mg total) by mouth once daily. 90 tablet 1    finasteride (PROSCAR) 5 mg tablet Take 5 mg by mouth once daily.      fluticasone furoate-vilanteroL (BREO ELLIPTA) 100-25  mcg/dose diskus inhaler Inhale 1 puff into the lungs once daily. (DAILY CONTROLLER) 3 each 3    fluticasone propionate (FLONASE) 50 mcg/actuation nasal spray 1 spray (50 mcg total) by Each Nostril route once daily. 15.8 mL 1    furosemide (LASIX) 40 MG tablet       gabapentin (NEURONTIN) 300 MG capsule Take 1 capsule (300 mg total) by mouth every evening. 90 capsule 1    hydrALAZINE (APRESOLINE) 50 MG tablet Take 1 tablet (50 mg total) by mouth every 8 (eight) hours. (Patient taking differently: Take 50 mg by mouth every 12 (twelve) hours.) 90 tablet 0    isosorbide dinitrate (ISORDIL) 20 MG tablet Take 1 tablet (20 mg total) by mouth 3 (three) times daily. 90 tablet 0    levalbuterol (XOPENEX) 0.63 mg/3 mL nebulizer solution Take 3 mLs (0.63 mg total) by nebulization every 4 to 6 hours as needed for Wheezing or Shortness of Breath. Rescue 45 mL 0    linaCLOtide (LINZESS) 72 mcg Cap capsule Take 1 capsule (72 mcg total) by mouth before breakfast. 4 capsule 0    metoprolol succinate (TOPROL-XL) 25 MG 24 hr tablet Take 1 tablet (25 mg total) by mouth once daily. 90 tablet 1    sacubitriL-valsartan (ENTRESTO) 24-26 mg per tablet Take 1 tablet by mouth 2 (two) times daily.      spironolactone (ALDACTONE) 25 MG tablet Take 12.5 mg by mouth once daily.      tamsulosin (FLOMAX) 0.4 mg Cap TAKE 1 CAPSULE BY MOUTH EVERY DAY 90 capsule 1    VENTOLIN HFA 90 mcg/actuation inhaler Inhale 1-2 puffs into the lungs every 4 to 6 hours as needed for Wheezing or Shortness of Breath. (RESCUE) 18 g 3    vericiguat (VERQUVO) 5 mg Tab Take 5 mg by mouth once daily.      ferrous sulfate (FEOSOL) 325 mg (65 mg iron) Tab tablet TAKE 1 TABLET (1 EACH TOTAL) BY MOUTH DAILY WITH BREAKFAST. TO BE TAKEN WITH VITAMIN C (ORANGE JUICE) FOR BEST ABSORPTION (Patient not taking: Reported on 5/12/2022) 90 tablet 0    promethazine-dextromethorphan (PROMETHAZINE-DM) 6.25-15 mg/5 mL Syrp Take 5 mLs by mouth every 6 (six) hours as needed  (cough and congestion). (Patient not taking: Reported on 5/12/2022) 100 mL 0     No current facility-administered medications on file prior to visit.     Review of patient's allergies indicates:   Allergen Reactions    Invokana  [canagliflozin]      Other reaction(s): been very dizzy     .socj  Family History   Problem Relation Age of Onset    No Known Problems Mother     Diabetes Father     Hypertension Father     Heart attack Father     Heart disease Father     Diabetes Sister     Heart disease Brother     Diabetes Brother     No Known Problems Maternal Grandmother     No Known Problems Maternal Grandfather     No Known Problems Paternal Grandmother     No Known Problems Paternal Grandfather     No Known Problems Maternal Aunt     No Known Problems Maternal Uncle     No Known Problems Paternal Aunt     No Known Problems Paternal Uncle     Anemia Neg Hx     Arrhythmia Neg Hx     Asthma Neg Hx     Clotting disorder Neg Hx     Fainting Neg Hx     Heart failure Neg Hx     Hyperlipidemia Neg Hx     Glaucoma Neg Hx      Review of Systems   Constitutional: Negative for decreased appetite, diaphoresis, fever, malaise/fatigue, weight gain and weight loss.   HENT: Negative for congestion, nosebleeds and sore throat.    Eyes: Negative for blurred vision, vision loss in left eye, vision loss in right eye and visual disturbance.   Cardiovascular: Positive for dyspnea on exertion and leg swelling. Negative for chest pain, claudication, near-syncope, orthopnea, palpitations, paroxysmal nocturnal dyspnea and syncope.   Respiratory: Negative for cough, hemoptysis, shortness of breath and wheezing.    Endocrine: Negative for polyuria.   Hematologic/Lymphatic: Does not bruise/bleed easily.   Skin: Negative for nail changes and rash.   Musculoskeletal: Negative for back pain, muscle cramps and myalgias.   Gastrointestinal: Negative for abdominal pain, change in bowel habit, diarrhea, heartburn, hematemesis,  hematochezia, melena, nausea and vomiting.   Genitourinary: Negative for bladder incontinence, dysuria, frequency and hematuria.   Psychiatric/Behavioral: Negative for depression.   Allergic/Immunologic: Negative for hives.        Objective:  Vitals:    05/12/22 1435 05/12/22 1438   BP: (!) 148/79 (!) 150/84   BP Location: Right arm Left arm   Patient Position: Sitting Sitting   BP Method: Large (Automatic) Large (Automatic)   Pulse: 73 73   SpO2: 99%    Weight: 83.2 kg (183 lb 6.8 oz)    Height: 6' (1.829 m)          Physical Exam  Constitutional:       Appearance: Normal appearance. He is well-developed.   HENT:      Head: Normocephalic and atraumatic.   Eyes:      Pupils: Pupils are equal, round, and reactive to light.   Neck:      Thyroid: No thyromegaly.      Vascular: No JVD.   Cardiovascular:      Rate and Rhythm: Normal rate and regular rhythm.      Chest Wall: PMI is displaced.      Pulses:           Carotid pulses are 2+ on the right side and 2+ on the left side.       Radial pulses are 2+ on the right side and 2+ on the left side.        Femoral pulses are 2+ on the right side and 2+ on the left side.       Dorsalis pedis pulses are 2+ on the right side and 0 on the left side.        Posterior tibial pulses are 1+ on the right side and 0 on the left side.      Heart sounds: Normal heart sounds. No murmur heard.    No friction rub. No gallop.      Comments: Bilateral dp and pt present by doppler  Pulmonary:      Effort: Pulmonary effort is normal.      Breath sounds: Normal breath sounds. No wheezing, rhonchi or rales.   Abdominal:      General: Bowel sounds are normal. There is distension.      Palpations: Abdomen is soft.      Tenderness: There is no abdominal tenderness. There is no guarding.   Musculoskeletal:      Cervical back: Neck supple.   Skin:     General: Skin is warm and dry.      Findings: No erythema.   Neurological:      Mental Status: He is alert and oriented to person, place, and time.       Gait: Gait normal.           Assessment:       1. S/P cardiac cath    2. Coronary artery disease involving native heart, unspecified vessel or lesion type, unspecified whether angina present    3. Cardiomyopathy, ischemic    4. Right groin pain    5. Vascular complications following infusion, transfusion and therapeutic injection, initial encounter     6. Chronic obstructive pulmonary disease, unspecified COPD type    7. Chronic systolic heart failure    8. Aortic stenosis status post AVR    9. Ischemic cardiomyopathy    10. Primary hypertension    11. Mixed hyperlipidemia    12. S/P CABG (coronary artery bypass graft)    13. Anemia, unspecified type    14. Type 2 diabetes mellitus without complication, without long-term current use of insulin         Plan:       1).  CAD.  The patient has an ICM and by reports multi-vessel CAD.  I discussed multi-vessel PCI with the patient with RHC and MCS, but explained that I need to review his actual angiogram from [facility].  The patient agreed to [place] [facility]  I also discussed the need for long term DAPT after mult-vessel PCI and the associated bleeding risk. The patient agreed to proceed. I discussed the Protect 4 trial and the patient declined enrollment in the trial.  -continue EC ASA 81mg po qday  -continue Plavix 75mg po qday  -continue Toprol XL 25mg po qday  -bilateral CFA access and R IJV access  The risks, benefits, and alternatives of cardiac catheterization and possible intervention were discussed with the patient.  All questions were answered and informed consent was obtained.    2) ICM.  The patient reports NYHA class 3 symnptoms; his last TTE was on 10/27/21; will repeat TTE  -continue Toprol Xl 25mg po qday  -continue Entresto 24-26mg po BID  -continue spironolactone 25mg po qday  -continue isordil 20mg po TID  -continue hydralazine 50mg po TID  -continue Farxiga 5mg po qday  -continue lasix 40mg po qday    3) HTN. Blood pressure inadequately controlled in  clinic today  -reasess during subsequent clinic visits    4) Dyslipidemia. Continue Lipitor 40mg po qday    5) Anemia. 3/8/22 Hgb =10.8; MCV=86  -reasses priro to cardiac cath    6) DM2. rec tight glycemic control    7) PAD.  The patient has an abnormal vascualr examl suspect right groin massin a hernia; will order duplex to eval for pseudoaneurysm as paitient had cath a [facility] and reports groin mass occurred after cath; no bruit detected over right CFA    8) s.p AVR. TTE as above    9) Transaminates. Noted on reviewing labs going back to 12/26/21; differential diagnosis includes hepatic congestion  -check CMP; if LFT's remain elevated then will refer to hepatology    6)CKD3a; check Cr prior to cath; rec oral hydration the day prior to cath and IV hydration on the day o fcath        -

## 2022-05-17 ENCOUNTER — TELEPHONE (OUTPATIENT)
Dept: CARDIOLOGY | Facility: CLINIC | Age: 72
End: 2022-05-17
Payer: MEDICARE

## 2022-05-17 ENCOUNTER — LAB VISIT (OUTPATIENT)
Dept: LAB | Facility: HOSPITAL | Age: 72
End: 2022-05-17
Attending: NURSE PRACTITIONER
Payer: MEDICARE

## 2022-05-17 DIAGNOSIS — N18.32 ANEMIA DUE TO STAGE 3B CHRONIC KIDNEY DISEASE: ICD-10-CM

## 2022-05-17 DIAGNOSIS — Z12.5 PROSTATE CANCER SCREENING: ICD-10-CM

## 2022-05-17 DIAGNOSIS — I10 PRIMARY HYPERTENSION: ICD-10-CM

## 2022-05-17 DIAGNOSIS — I25.10 CORONARY ARTERY DISEASE INVOLVING NATIVE CORONARY ARTERY OF NATIVE HEART WITHOUT ANGINA PECTORIS: ICD-10-CM

## 2022-05-17 DIAGNOSIS — E11.9 TYPE 2 DIABETES MELLITUS WITHOUT COMPLICATION, WITHOUT LONG-TERM CURRENT USE OF INSULIN: ICD-10-CM

## 2022-05-17 DIAGNOSIS — D63.1 ANEMIA DUE TO STAGE 3B CHRONIC KIDNEY DISEASE: ICD-10-CM

## 2022-05-17 DIAGNOSIS — E78.2 MIXED HYPERLIPIDEMIA: ICD-10-CM

## 2022-05-17 LAB
ALBUMIN SERPL BCP-MCNC: 2.7 G/DL (ref 3.5–5.2)
ALP SERPL-CCNC: 76 U/L (ref 55–135)
ALT SERPL W/O P-5'-P-CCNC: 7 U/L (ref 10–44)
ANION GAP SERPL CALC-SCNC: 10 MMOL/L (ref 8–16)
ANION GAP SERPL CALC-SCNC: 10 MMOL/L (ref 8–16)
AST SERPL-CCNC: 16 U/L (ref 10–40)
BASOPHILS # BLD AUTO: 0.05 K/UL (ref 0–0.2)
BASOPHILS # BLD AUTO: 0.05 K/UL (ref 0–0.2)
BASOPHILS NFR BLD: 0.8 % (ref 0–1.9)
BASOPHILS NFR BLD: 0.8 % (ref 0–1.9)
BILIRUB SERPL-MCNC: 2.4 MG/DL (ref 0.1–1)
BUN SERPL-MCNC: 21 MG/DL (ref 8–23)
BUN SERPL-MCNC: 21 MG/DL (ref 8–23)
CALCIUM SERPL-MCNC: 8.5 MG/DL (ref 8.7–10.5)
CALCIUM SERPL-MCNC: 8.5 MG/DL (ref 8.7–10.5)
CHLORIDE SERPL-SCNC: 102 MMOL/L (ref 95–110)
CHLORIDE SERPL-SCNC: 102 MMOL/L (ref 95–110)
CHOLEST SERPL-MCNC: 81 MG/DL (ref 120–199)
CHOLEST/HDLC SERPL: 2.5 {RATIO} (ref 2–5)
CO2 SERPL-SCNC: 24 MMOL/L (ref 23–29)
CO2 SERPL-SCNC: 24 MMOL/L (ref 23–29)
COMPLEXED PSA SERPL-MCNC: 8.7 NG/ML (ref 0–4)
CREAT SERPL-MCNC: 1.7 MG/DL (ref 0.5–1.4)
CREAT SERPL-MCNC: 1.7 MG/DL (ref 0.5–1.4)
DIFFERENTIAL METHOD: ABNORMAL
DIFFERENTIAL METHOD: ABNORMAL
EOSINOPHIL # BLD AUTO: 0.1 K/UL (ref 0–0.5)
EOSINOPHIL # BLD AUTO: 0.1 K/UL (ref 0–0.5)
EOSINOPHIL NFR BLD: 1.2 % (ref 0–8)
EOSINOPHIL NFR BLD: 1.2 % (ref 0–8)
ERYTHROCYTE [DISTWIDTH] IN BLOOD BY AUTOMATED COUNT: 18.1 % (ref 11.5–14.5)
ERYTHROCYTE [DISTWIDTH] IN BLOOD BY AUTOMATED COUNT: 18.1 % (ref 11.5–14.5)
EST. GFR  (AFRICAN AMERICAN): 45.9 ML/MIN/1.73 M^2
EST. GFR  (AFRICAN AMERICAN): 45.9 ML/MIN/1.73 M^2
EST. GFR  (NON AFRICAN AMERICAN): 39.7 ML/MIN/1.73 M^2
EST. GFR  (NON AFRICAN AMERICAN): 39.7 ML/MIN/1.73 M^2
ESTIMATED AVG GLUCOSE: 120 MG/DL (ref 68–131)
FERRITIN SERPL-MCNC: 240 NG/ML (ref 20–300)
GLUCOSE SERPL-MCNC: 83 MG/DL (ref 70–110)
GLUCOSE SERPL-MCNC: 83 MG/DL (ref 70–110)
HBA1C MFR BLD: 5.8 % (ref 4–5.6)
HCT VFR BLD AUTO: 29.7 % (ref 40–54)
HCT VFR BLD AUTO: 29.7 % (ref 40–54)
HDLC SERPL-MCNC: 33 MG/DL (ref 40–75)
HDLC SERPL: 40.7 % (ref 20–50)
HGB BLD-MCNC: 10.1 G/DL (ref 14–18)
HGB BLD-MCNC: 10.1 G/DL (ref 14–18)
IMM GRANULOCYTES # BLD AUTO: 0.01 K/UL (ref 0–0.04)
IMM GRANULOCYTES # BLD AUTO: 0.01 K/UL (ref 0–0.04)
IMM GRANULOCYTES NFR BLD AUTO: 0.2 % (ref 0–0.5)
IMM GRANULOCYTES NFR BLD AUTO: 0.2 % (ref 0–0.5)
IRON SERPL-MCNC: 46 UG/DL (ref 45–160)
LDLC SERPL CALC-MCNC: 36.8 MG/DL (ref 63–159)
LYMPHOCYTES # BLD AUTO: 0.9 K/UL (ref 1–4.8)
LYMPHOCYTES # BLD AUTO: 0.9 K/UL (ref 1–4.8)
LYMPHOCYTES NFR BLD: 14.5 % (ref 18–48)
LYMPHOCYTES NFR BLD: 14.5 % (ref 18–48)
MCH RBC QN AUTO: 28.3 PG (ref 27–31)
MCH RBC QN AUTO: 28.3 PG (ref 27–31)
MCHC RBC AUTO-ENTMCNC: 34 G/DL (ref 32–36)
MCHC RBC AUTO-ENTMCNC: 34 G/DL (ref 32–36)
MCV RBC AUTO: 83 FL (ref 82–98)
MCV RBC AUTO: 83 FL (ref 82–98)
MONOCYTES # BLD AUTO: 0.6 K/UL (ref 0.3–1)
MONOCYTES # BLD AUTO: 0.6 K/UL (ref 0.3–1)
MONOCYTES NFR BLD: 9.6 % (ref 4–15)
MONOCYTES NFR BLD: 9.6 % (ref 4–15)
NEUTROPHILS # BLD AUTO: 4.4 K/UL (ref 1.8–7.7)
NEUTROPHILS # BLD AUTO: 4.4 K/UL (ref 1.8–7.7)
NEUTROPHILS NFR BLD: 73.7 % (ref 38–73)
NEUTROPHILS NFR BLD: 73.7 % (ref 38–73)
NONHDLC SERPL-MCNC: 48 MG/DL
NRBC BLD-RTO: 0 /100 WBC
NRBC BLD-RTO: 0 /100 WBC
PLATELET # BLD AUTO: 207 K/UL (ref 150–450)
PLATELET # BLD AUTO: 207 K/UL (ref 150–450)
PMV BLD AUTO: 11.2 FL (ref 9.2–12.9)
PMV BLD AUTO: 11.2 FL (ref 9.2–12.9)
POTASSIUM SERPL-SCNC: 3.8 MMOL/L (ref 3.5–5.1)
POTASSIUM SERPL-SCNC: 3.8 MMOL/L (ref 3.5–5.1)
PROT SERPL-MCNC: 7.2 G/DL (ref 6–8.4)
RBC # BLD AUTO: 3.57 M/UL (ref 4.6–6.2)
RBC # BLD AUTO: 3.57 M/UL (ref 4.6–6.2)
SATURATED IRON: 16 % (ref 20–50)
SODIUM SERPL-SCNC: 136 MMOL/L (ref 136–145)
SODIUM SERPL-SCNC: 136 MMOL/L (ref 136–145)
TOTAL IRON BINDING CAPACITY: 283 UG/DL (ref 250–450)
TRANSFERRIN SERPL-MCNC: 191 MG/DL (ref 200–375)
TRIGL SERPL-MCNC: 56 MG/DL (ref 30–150)
WBC # BLD AUTO: 5.93 K/UL (ref 3.9–12.7)
WBC # BLD AUTO: 5.93 K/UL (ref 3.9–12.7)

## 2022-05-17 PROCEDURE — 84466 ASSAY OF TRANSFERRIN: CPT | Performed by: NURSE PRACTITIONER

## 2022-05-17 PROCEDURE — 82728 ASSAY OF FERRITIN: CPT | Performed by: NURSE PRACTITIONER

## 2022-05-17 PROCEDURE — 80061 LIPID PANEL: CPT | Performed by: NURSE PRACTITIONER

## 2022-05-17 PROCEDURE — 36415 COLL VENOUS BLD VENIPUNCTURE: CPT | Mod: PO | Performed by: NURSE PRACTITIONER

## 2022-05-17 PROCEDURE — 85025 COMPLETE CBC W/AUTO DIFF WBC: CPT | Performed by: NURSE PRACTITIONER

## 2022-05-17 PROCEDURE — 84153 ASSAY OF PSA TOTAL: CPT | Performed by: NURSE PRACTITIONER

## 2022-05-17 PROCEDURE — 80053 COMPREHEN METABOLIC PANEL: CPT | Performed by: NURSE PRACTITIONER

## 2022-05-17 PROCEDURE — 83036 HEMOGLOBIN GLYCOSYLATED A1C: CPT | Performed by: NURSE PRACTITIONER

## 2022-05-17 NOTE — TELEPHONE ENCOUNTER
Left message with patient letting him know that his transaminitis has resolved. Also left message stating that we need a copy of the disc of his previous angiogram before we can schedule his cath with Dr. Jensen. Instructed patient to call back. Called patient's son about angiogram disc, as he said he would bring it. He was incoherent and then hung up the phone. Will attempt call again at another time.

## 2022-05-18 NOTE — PROGRESS NOTES
Chronic anemia -no change-ferritin good-PSA 8,7 -up but lower than prior-A1C much improved-lipids good

## 2022-05-24 ENCOUNTER — HOSPITAL ENCOUNTER (OUTPATIENT)
Dept: CARDIOLOGY | Facility: HOSPITAL | Age: 72
Discharge: HOME | End: 2022-05-24
Attending: INTERNAL MEDICINE
Payer: MEDICARE

## 2022-05-24 VITALS
SYSTOLIC BLOOD PRESSURE: 145 MMHG | BODY MASS INDEX: 24.25 KG/M2 | HEIGHT: 73 IN | DIASTOLIC BLOOD PRESSURE: 80 MMHG | WEIGHT: 183 LBS | HEART RATE: 70 BPM

## 2022-05-24 DIAGNOSIS — T80.1XXA VASCULAR COMPLICATIONS FOLLOWING INFUSION, TRANSFUSION AND THERAPEUTIC INJECTION, INITIAL ENCOUNTER: ICD-10-CM

## 2022-05-24 DIAGNOSIS — Z98.890 S/P CARDIAC CATH: ICD-10-CM

## 2022-05-24 DIAGNOSIS — R10.31 RIGHT GROIN PAIN: ICD-10-CM

## 2022-05-24 DIAGNOSIS — I25.5 CARDIOMYOPATHY, ISCHEMIC: ICD-10-CM

## 2022-05-24 LAB
ASCENDING AORTA: 3.36 CM
AV INDEX (PROSTH): 0.31
AV MEAN GRADIENT: 9 MMHG
AV PEAK GRADIENT: 22 MMHG
AV VALVE AREA: 1.3 CM2
AV VELOCITY RATIO: 0.29
BSA FOR ECHO PROCEDURE: 2.07 M2
CV ECHO LV RWT: 0.27 CM
DOP CALC AO PEAK VEL: 2.32 M/S
DOP CALC AO VTI: 40.31 CM
DOP CALC LVOT AREA: 4.2 CM2
DOP CALC LVOT DIAMETER: 2.3 CM
DOP CALC LVOT PEAK VEL: 0.67 M/S
DOP CALC LVOT STROKE VOLUME: 52.24 CM3
DOP CALCLVOT PEAK VEL VTI: 12.58 CM
E WAVE DECELERATION TIME: 131.16 MSEC
E/A RATIO: 2.34
E/E' RATIO: 35.75 M/S
ECHO LV POSTERIOR WALL: 0.8 CM (ref 0.6–1.1)
EJECTION FRACTION: 20 %
FRACTIONAL SHORTENING: 14 % (ref 28–44)
INTERVENTRICULAR SEPTUM: 0.83 CM (ref 0.6–1.1)
LA MAJOR: 7.08 CM
LA MINOR: 6.22 CM
LA WIDTH: 4.85 CM
LEFT ATRIUM SIZE: 5.28 CM
LEFT ATRIUM VOLUME INDEX MOD: 55.3 ML/M2
LEFT ATRIUM VOLUME INDEX: 69.6 ML/M2
LEFT ATRIUM VOLUME MOD: 114.4 CM3
LEFT ATRIUM VOLUME: 144.14 CM3
LEFT INTERNAL DIMENSION IN SYSTOLE: 5.1 CM (ref 2.1–4)
LEFT VENTRICLE DIASTOLIC VOLUME INDEX: 82.53 ML/M2
LEFT VENTRICLE DIASTOLIC VOLUME: 170.83 ML
LEFT VENTRICLE MASS INDEX: 89 G/M2
LEFT VENTRICLE SYSTOLIC VOLUME INDEX: 55.2 ML/M2
LEFT VENTRICLE SYSTOLIC VOLUME: 114.26 ML
LEFT VENTRICULAR INTERNAL DIMENSION IN DIASTOLE: 5.9 CM (ref 3.5–6)
LEFT VENTRICULAR MASS: 184.95 G
LV LATERAL E/E' RATIO: 35.75 M/S
LV SEPTAL E/E' RATIO: 35.75 M/S
MV A" WAVE DURATION": 8.56 MSEC
MV PEAK A VEL: 0.61 M/S
MV PEAK E VEL: 1.43 M/S
MV STENOSIS PRESSURE HALF TIME: 38.04 MS
MV VALVE AREA P 1/2 METHOD: 5.78 CM2
PISA TR MAX VEL: 3.67 M/S
PULM VEIN S/D RATIO: 3.15
PV PEAK D VEL: 0.2 M/S
PV PEAK S VEL: 0.63 M/S
RA MAJOR: 6.47 CM
RA PRESSURE: 15 MMHG
RA WIDTH: 5.65 CM
RIGHT VENTRICULAR END-DIASTOLIC DIMENSION: 5.69 CM
RV TISSUE DOPPLER FREE WALL SYSTOLIC VELOCITY 1 (APICAL 4 CHAMBER VIEW): 4.38 CM/S
SINUS: 3.03 CM
STJ: 2.45 CM
TDI LATERAL: 0.04 M/S
TDI SEPTAL: 0.04 M/S
TDI: 0.04 M/S
TR MAX PG: 54 MMHG
TRICUSPID ANNULAR PLANE SYSTOLIC EXCURSION: 1.03 CM
TV REST PULMONARY ARTERY PRESSURE: 69 MMHG

## 2022-05-24 PROCEDURE — 93971 CV US PSEUDOANEURYSM EVALUATION/TREATMENT: ICD-10-PCS | Mod: 26,,, | Performed by: INTERNAL MEDICINE

## 2022-05-24 PROCEDURE — 93306 ECHO (CUPID ONLY): ICD-10-PCS | Mod: 26,,, | Performed by: INTERNAL MEDICINE

## 2022-05-24 PROCEDURE — 25500020 PHARM REV CODE 255: Performed by: INTERNAL MEDICINE

## 2022-05-24 PROCEDURE — 93926 CV US PSEUDOANEURYSM EVALUATION/TREATMENT: ICD-10-PCS | Mod: 26,,, | Performed by: INTERNAL MEDICINE

## 2022-05-24 PROCEDURE — 93926 LOWER EXTREMITY STUDY: CPT | Mod: 26,,, | Performed by: INTERNAL MEDICINE

## 2022-05-24 PROCEDURE — 93306 TTE W/DOPPLER COMPLETE: CPT | Mod: 26,,, | Performed by: INTERNAL MEDICINE

## 2022-05-24 PROCEDURE — C8929 TTE W OR WO FOL WCON,DOPPLER: HCPCS

## 2022-05-24 PROCEDURE — 93971 EXTREMITY STUDY: CPT | Mod: 26,,, | Performed by: INTERNAL MEDICINE

## 2022-05-24 PROCEDURE — 93926 LOWER EXTREMITY STUDY: CPT

## 2022-05-24 RX ADMIN — HUMAN ALBUMIN MICROSPHERES AND PERFLUTREN 0.66 MG: 10; .22 INJECTION, SOLUTION INTRAVENOUS at 04:05

## 2022-05-24 NOTE — PROGRESS NOTES
Procedure explained. 22 g sl started in left forearm for optison use. optison given ivp via sl for imaging by christopher alexander [facility]. Tolerated well. Sl d/jeremiah after. Pressure applied.

## 2022-05-30 ENCOUNTER — TELEPHONE (OUTPATIENT)
Dept: CARDIOLOGY | Facility: CLINIC | Age: 72
End: 2022-05-30
Payer: MEDICARE

## 2022-05-30 NOTE — TELEPHONE ENCOUNTER
Notified patient of results of groin ultrasound and echocardiogram. Notified patient that his groin pain is due to a hernia. Instructed patient to call his PCP and/or a surgeon to see about having it evaluated. Patient seemed upset about the hernia. Says he has been in pain. Asked patient if he wanted me to make a surgical appointment for him and he refused, stating that he will call his PCP.

## 2022-05-30 NOTE — TELEPHONE ENCOUNTER
----- Message from Zach Jensen MD sent at 5/29/2022  9:17 AM CDT -----  Please call Mr. Carney and let him know that his duplex study of his right groin show no pseudoaneurysm.  The mass we examined in clinic is a hernia.  Also his TTE demonstrates an LVEF=20% which is unchanged from 10/2021.  Thanks

## 2022-06-01 ENCOUNTER — OFFICE VISIT (OUTPATIENT)
Dept: VASCULAR SURGERY | Facility: CLINIC | Age: 72
End: 2022-06-01
Payer: MEDICARE

## 2022-06-01 VITALS
SYSTOLIC BLOOD PRESSURE: 155 MMHG | HEART RATE: 68 BPM | HEIGHT: 73 IN | BODY MASS INDEX: 24.41 KG/M2 | DIASTOLIC BLOOD PRESSURE: 75 MMHG | WEIGHT: 184.19 LBS

## 2022-06-01 DIAGNOSIS — I25.810 CORONARY ARTERY DISEASE INVOLVING CORONARY BYPASS GRAFT OF NATIVE HEART WITHOUT ANGINA PECTORIS: Primary | ICD-10-CM

## 2022-06-01 PROCEDURE — 3066F PR DOCUMENTATION OF TREATMENT FOR NEPHROPATHY: ICD-10-PCS | Mod: CPTII,S$GLB,, | Performed by: THORACIC SURGERY (CARDIOTHORACIC VASCULAR SURGERY)

## 2022-06-01 PROCEDURE — 3288F PR FALLS RISK ASSESSMENT DOCUMENTED: ICD-10-PCS | Mod: CPTII,S$GLB,, | Performed by: THORACIC SURGERY (CARDIOTHORACIC VASCULAR SURGERY)

## 2022-06-01 PROCEDURE — 4010F ACE/ARB THERAPY RXD/TAKEN: CPT | Mod: CPTII,S$GLB,, | Performed by: THORACIC SURGERY (CARDIOTHORACIC VASCULAR SURGERY)

## 2022-06-01 PROCEDURE — 99214 PR OFFICE/OUTPT VISIT, EST, LEVL IV, 30-39 MIN: ICD-10-PCS | Mod: S$GLB,,, | Performed by: THORACIC SURGERY (CARDIOTHORACIC VASCULAR SURGERY)

## 2022-06-01 PROCEDURE — 1159F PR MEDICATION LIST DOCUMENTED IN MEDICAL RECORD: ICD-10-PCS | Mod: CPTII,S$GLB,, | Performed by: THORACIC SURGERY (CARDIOTHORACIC VASCULAR SURGERY)

## 2022-06-01 PROCEDURE — 1159F MED LIST DOCD IN RCRD: CPT | Mod: CPTII,S$GLB,, | Performed by: THORACIC SURGERY (CARDIOTHORACIC VASCULAR SURGERY)

## 2022-06-01 PROCEDURE — 3044F HG A1C LEVEL LT 7.0%: CPT | Mod: CPTII,S$GLB,, | Performed by: THORACIC SURGERY (CARDIOTHORACIC VASCULAR SURGERY)

## 2022-06-01 PROCEDURE — 1157F ADVNC CARE PLAN IN RCRD: CPT | Mod: CPTII,S$GLB,, | Performed by: THORACIC SURGERY (CARDIOTHORACIC VASCULAR SURGERY)

## 2022-06-01 PROCEDURE — 1125F PR PAIN SEVERITY QUANTIFIED, PAIN PRESENT: ICD-10-PCS | Mod: CPTII,S$GLB,, | Performed by: THORACIC SURGERY (CARDIOTHORACIC VASCULAR SURGERY)

## 2022-06-01 PROCEDURE — 1160F RVW MEDS BY RX/DR IN RCRD: CPT | Mod: CPTII,S$GLB,, | Performed by: THORACIC SURGERY (CARDIOTHORACIC VASCULAR SURGERY)

## 2022-06-01 PROCEDURE — 3288F FALL RISK ASSESSMENT DOCD: CPT | Mod: CPTII,S$GLB,, | Performed by: THORACIC SURGERY (CARDIOTHORACIC VASCULAR SURGERY)

## 2022-06-01 PROCEDURE — 1101F PR PT FALLS ASSESS DOC 0-1 FALLS W/OUT INJ PAST YR: ICD-10-PCS | Mod: CPTII,S$GLB,, | Performed by: THORACIC SURGERY (CARDIOTHORACIC VASCULAR SURGERY)

## 2022-06-01 PROCEDURE — 1101F PT FALLS ASSESS-DOCD LE1/YR: CPT | Mod: CPTII,S$GLB,, | Performed by: THORACIC SURGERY (CARDIOTHORACIC VASCULAR SURGERY)

## 2022-06-01 PROCEDURE — 4010F PR ACE/ARB THEARPY RXD/TAKEN: ICD-10-PCS | Mod: CPTII,S$GLB,, | Performed by: THORACIC SURGERY (CARDIOTHORACIC VASCULAR SURGERY)

## 2022-06-01 PROCEDURE — 3062F PR POS MACROALBUMINURIA RESULT DOCUMENTED/REVIEW: ICD-10-PCS | Mod: CPTII,S$GLB,, | Performed by: THORACIC SURGERY (CARDIOTHORACIC VASCULAR SURGERY)

## 2022-06-01 PROCEDURE — 3078F PR MOST RECENT DIASTOLIC BLOOD PRESSURE < 80 MM HG: ICD-10-PCS | Mod: CPTII,S$GLB,, | Performed by: THORACIC SURGERY (CARDIOTHORACIC VASCULAR SURGERY)

## 2022-06-01 PROCEDURE — 3008F BODY MASS INDEX DOCD: CPT | Mod: CPTII,S$GLB,, | Performed by: THORACIC SURGERY (CARDIOTHORACIC VASCULAR SURGERY)

## 2022-06-01 PROCEDURE — 3077F SYST BP >= 140 MM HG: CPT | Mod: CPTII,S$GLB,, | Performed by: THORACIC SURGERY (CARDIOTHORACIC VASCULAR SURGERY)

## 2022-06-01 PROCEDURE — 1160F PR REVIEW ALL MEDS BY PRESCRIBER/CLIN PHARMACIST DOCUMENTED: ICD-10-PCS | Mod: CPTII,S$GLB,, | Performed by: THORACIC SURGERY (CARDIOTHORACIC VASCULAR SURGERY)

## 2022-06-01 PROCEDURE — 99214 OFFICE O/P EST MOD 30 MIN: CPT | Mod: S$GLB,,, | Performed by: THORACIC SURGERY (CARDIOTHORACIC VASCULAR SURGERY)

## 2022-06-01 PROCEDURE — 3008F PR BODY MASS INDEX (BMI) DOCUMENTED: ICD-10-PCS | Mod: CPTII,S$GLB,, | Performed by: THORACIC SURGERY (CARDIOTHORACIC VASCULAR SURGERY)

## 2022-06-01 PROCEDURE — 3077F PR MOST RECENT SYSTOLIC BLOOD PRESSURE >= 140 MM HG: ICD-10-PCS | Mod: CPTII,S$GLB,, | Performed by: THORACIC SURGERY (CARDIOTHORACIC VASCULAR SURGERY)

## 2022-06-01 PROCEDURE — 3044F PR MOST RECENT HEMOGLOBIN A1C LEVEL <7.0%: ICD-10-PCS | Mod: CPTII,S$GLB,, | Performed by: THORACIC SURGERY (CARDIOTHORACIC VASCULAR SURGERY)

## 2022-06-01 PROCEDURE — 3066F NEPHROPATHY DOC TX: CPT | Mod: CPTII,S$GLB,, | Performed by: THORACIC SURGERY (CARDIOTHORACIC VASCULAR SURGERY)

## 2022-06-01 PROCEDURE — 3062F POS MACROALBUMINURIA REV: CPT | Mod: CPTII,S$GLB,, | Performed by: THORACIC SURGERY (CARDIOTHORACIC VASCULAR SURGERY)

## 2022-06-01 PROCEDURE — 1157F PR ADVANCE CARE PLAN OR EQUIV PRESENT IN MEDICAL RECORD: ICD-10-PCS | Mod: CPTII,S$GLB,, | Performed by: THORACIC SURGERY (CARDIOTHORACIC VASCULAR SURGERY)

## 2022-06-01 PROCEDURE — 1125F AMNT PAIN NOTED PAIN PRSNT: CPT | Mod: CPTII,S$GLB,, | Performed by: THORACIC SURGERY (CARDIOTHORACIC VASCULAR SURGERY)

## 2022-06-01 PROCEDURE — 3078F DIAST BP <80 MM HG: CPT | Mod: CPTII,S$GLB,, | Performed by: THORACIC SURGERY (CARDIOTHORACIC VASCULAR SURGERY)

## 2022-06-01 RX ORDER — VORAPAXAR 2.08 MG/1
1 TABLET, FILM COATED ORAL DAILY
COMMUNITY
Start: 2022-03-24 | End: 2024-02-29

## 2022-06-01 NOTE — PROGRESS NOTES
This patient has severe coronary artery disease and had undergone previous aortic valve replacement and coronary artery bypass grafting.  He has had progressive shortness of breath and on recent angiography multivessel coronary artery disease.  He was referred for consideration of redo coronary artery bypass grafting.  I had seen him previously and felt that he may be a better candidate for percutaneous coronary intervention.  He has actually seen physicians at the [facility] and was told that stenting could be done.  However this was not scheduled.  He was referred back to my office.  On exam vital signs are stable.  Pupils are equal and round reactive to light.  Neck is supple.  Chest has equal breath sounds.  Heart is in a regular rate and rhythm.  Abdomen is benign.  Perfusion to the legs and feet is satisfactory.  The patient has recurrent multivessel coronary artery disease.  He has had previous bypass with aortic valve replacement.  His left ventricular function is markedly reduced.  I will review the angiograms done in [place].  If I feel he is not a surgical candidate he will need percutaneous coronary intervention.

## 2022-06-06 DIAGNOSIS — E11.9 TYPE 2 DIABETES MELLITUS WITHOUT COMPLICATION, WITHOUT LONG-TERM CURRENT USE OF INSULIN: Primary | ICD-10-CM

## 2022-06-07 ENCOUNTER — TELEPHONE (OUTPATIENT)
Dept: HEPATOLOGY | Facility: CLINIC | Age: 72
End: 2022-06-07

## 2022-06-07 ENCOUNTER — OFFICE VISIT (OUTPATIENT)
Dept: HEPATOLOGY | Facility: CLINIC | Age: 72
End: 2022-06-07
Attending: INTERNAL MEDICINE
Payer: MEDICARE

## 2022-06-07 ENCOUNTER — LAB VISIT (OUTPATIENT)
Dept: LAB | Facility: HOSPITAL | Age: 72
End: 2022-06-07
Attending: INTERNAL MEDICINE
Payer: MEDICARE

## 2022-06-07 VITALS
HEART RATE: 85 BPM | BODY MASS INDEX: 23.9 KG/M2 | RESPIRATION RATE: 18 BRPM | WEIGHT: 180.31 LBS | HEIGHT: 73 IN | SYSTOLIC BLOOD PRESSURE: 153 MMHG | TEMPERATURE: 97 F | OXYGEN SATURATION: 99 % | DIASTOLIC BLOOD PRESSURE: 74 MMHG

## 2022-06-07 DIAGNOSIS — R18.8 OTHER ASCITES: Chronic | ICD-10-CM

## 2022-06-07 DIAGNOSIS — R18.8 OTHER ASCITES: Primary | ICD-10-CM

## 2022-06-07 DIAGNOSIS — I25.5 ISCHEMIC CARDIOMYOPATHY: ICD-10-CM

## 2022-06-07 DIAGNOSIS — I25.5 ISCHEMIC CARDIOMYOPATHY: Primary | ICD-10-CM

## 2022-06-07 LAB
ALBUMIN SERPL BCP-MCNC: 2.7 G/DL (ref 3.5–5.2)
ALP SERPL-CCNC: 74 U/L (ref 55–135)
ALT SERPL W/O P-5'-P-CCNC: <5 U/L (ref 10–44)
ANION GAP SERPL CALC-SCNC: 11 MMOL/L (ref 8–16)
AST SERPL-CCNC: 15 U/L (ref 10–40)
BILIRUB SERPL-MCNC: 2.1 MG/DL (ref 0.1–1)
BUN SERPL-MCNC: 15 MG/DL (ref 8–23)
CALCIUM SERPL-MCNC: 8.7 MG/DL (ref 8.7–10.5)
CHLORIDE SERPL-SCNC: 106 MMOL/L (ref 95–110)
CO2 SERPL-SCNC: 20 MMOL/L (ref 23–29)
CREAT SERPL-MCNC: 1.5 MG/DL (ref 0.5–1.4)
EST. GFR  (AFRICAN AMERICAN): 53.4 ML/MIN/1.73 M^2
EST. GFR  (NON AFRICAN AMERICAN): 46.2 ML/MIN/1.73 M^2
GLUCOSE SERPL-MCNC: 88 MG/DL (ref 70–110)
POTASSIUM SERPL-SCNC: 4.1 MMOL/L (ref 3.5–5.1)
PROT SERPL-MCNC: 7.6 G/DL (ref 6–8.4)
SODIUM SERPL-SCNC: 137 MMOL/L (ref 136–145)

## 2022-06-07 PROCEDURE — 99999 PR PBB SHADOW E&M-EST. PATIENT-LVL V: CPT | Mod: PBBFAC,,, | Performed by: INTERNAL MEDICINE

## 2022-06-07 PROCEDURE — 3008F BODY MASS INDEX DOCD: CPT | Mod: CPTII,S$GLB,, | Performed by: INTERNAL MEDICINE

## 2022-06-07 PROCEDURE — 1159F PR MEDICATION LIST DOCUMENTED IN MEDICAL RECORD: ICD-10-PCS | Mod: CPTII,S$GLB,, | Performed by: INTERNAL MEDICINE

## 2022-06-07 PROCEDURE — 3066F PR DOCUMENTATION OF TREATMENT FOR NEPHROPATHY: ICD-10-PCS | Mod: CPTII,S$GLB,, | Performed by: INTERNAL MEDICINE

## 2022-06-07 PROCEDURE — 3077F SYST BP >= 140 MM HG: CPT | Mod: CPTII,S$GLB,, | Performed by: INTERNAL MEDICINE

## 2022-06-07 PROCEDURE — 3062F PR POS MACROALBUMINURIA RESULT DOCUMENTED/REVIEW: ICD-10-PCS | Mod: CPTII,S$GLB,, | Performed by: INTERNAL MEDICINE

## 2022-06-07 PROCEDURE — 1126F PR PAIN SEVERITY QUANTIFIED, NO PAIN PRESENT: ICD-10-PCS | Mod: CPTII,S$GLB,, | Performed by: INTERNAL MEDICINE

## 2022-06-07 PROCEDURE — 1160F PR REVIEW ALL MEDS BY PRESCRIBER/CLIN PHARMACIST DOCUMENTED: ICD-10-PCS | Mod: CPTII,S$GLB,, | Performed by: INTERNAL MEDICINE

## 2022-06-07 PROCEDURE — 80053 COMPREHEN METABOLIC PANEL: CPT | Performed by: INTERNAL MEDICINE

## 2022-06-07 PROCEDURE — 3078F PR MOST RECENT DIASTOLIC BLOOD PRESSURE < 80 MM HG: ICD-10-PCS | Mod: CPTII,S$GLB,, | Performed by: INTERNAL MEDICINE

## 2022-06-07 PROCEDURE — 1101F PT FALLS ASSESS-DOCD LE1/YR: CPT | Mod: CPTII,S$GLB,, | Performed by: INTERNAL MEDICINE

## 2022-06-07 PROCEDURE — 3044F HG A1C LEVEL LT 7.0%: CPT | Mod: CPTII,S$GLB,, | Performed by: INTERNAL MEDICINE

## 2022-06-07 PROCEDURE — 99204 PR OFFICE/OUTPT VISIT, NEW, LEVL IV, 45-59 MIN: ICD-10-PCS | Mod: S$GLB,,, | Performed by: INTERNAL MEDICINE

## 2022-06-07 PROCEDURE — 3078F DIAST BP <80 MM HG: CPT | Mod: CPTII,S$GLB,, | Performed by: INTERNAL MEDICINE

## 2022-06-07 PROCEDURE — 1126F AMNT PAIN NOTED NONE PRSNT: CPT | Mod: CPTII,S$GLB,, | Performed by: INTERNAL MEDICINE

## 2022-06-07 PROCEDURE — 1157F ADVNC CARE PLAN IN RCRD: CPT | Mod: CPTII,S$GLB,, | Performed by: INTERNAL MEDICINE

## 2022-06-07 PROCEDURE — 4010F PR ACE/ARB THEARPY RXD/TAKEN: ICD-10-PCS | Mod: CPTII,S$GLB,, | Performed by: INTERNAL MEDICINE

## 2022-06-07 PROCEDURE — 99204 OFFICE O/P NEW MOD 45 MIN: CPT | Mod: S$GLB,,, | Performed by: INTERNAL MEDICINE

## 2022-06-07 PROCEDURE — 1157F PR ADVANCE CARE PLAN OR EQUIV PRESENT IN MEDICAL RECORD: ICD-10-PCS | Mod: CPTII,S$GLB,, | Performed by: INTERNAL MEDICINE

## 2022-06-07 PROCEDURE — 3044F PR MOST RECENT HEMOGLOBIN A1C LEVEL <7.0%: ICD-10-PCS | Mod: CPTII,S$GLB,, | Performed by: INTERNAL MEDICINE

## 2022-06-07 PROCEDURE — 3077F PR MOST RECENT SYSTOLIC BLOOD PRESSURE >= 140 MM HG: ICD-10-PCS | Mod: CPTII,S$GLB,, | Performed by: INTERNAL MEDICINE

## 2022-06-07 PROCEDURE — 3066F NEPHROPATHY DOC TX: CPT | Mod: CPTII,S$GLB,, | Performed by: INTERNAL MEDICINE

## 2022-06-07 PROCEDURE — 99999 PR PBB SHADOW E&M-EST. PATIENT-LVL V: ICD-10-PCS | Mod: PBBFAC,,, | Performed by: INTERNAL MEDICINE

## 2022-06-07 PROCEDURE — 3288F PR FALLS RISK ASSESSMENT DOCUMENTED: ICD-10-PCS | Mod: CPTII,S$GLB,, | Performed by: INTERNAL MEDICINE

## 2022-06-07 PROCEDURE — 3008F PR BODY MASS INDEX (BMI) DOCUMENTED: ICD-10-PCS | Mod: CPTII,S$GLB,, | Performed by: INTERNAL MEDICINE

## 2022-06-07 PROCEDURE — 36415 COLL VENOUS BLD VENIPUNCTURE: CPT | Performed by: INTERNAL MEDICINE

## 2022-06-07 PROCEDURE — 3288F FALL RISK ASSESSMENT DOCD: CPT | Mod: CPTII,S$GLB,, | Performed by: INTERNAL MEDICINE

## 2022-06-07 PROCEDURE — 4010F ACE/ARB THERAPY RXD/TAKEN: CPT | Mod: CPTII,S$GLB,, | Performed by: INTERNAL MEDICINE

## 2022-06-07 PROCEDURE — 3062F POS MACROALBUMINURIA REV: CPT | Mod: CPTII,S$GLB,, | Performed by: INTERNAL MEDICINE

## 2022-06-07 PROCEDURE — 1160F RVW MEDS BY RX/DR IN RCRD: CPT | Mod: CPTII,S$GLB,, | Performed by: INTERNAL MEDICINE

## 2022-06-07 PROCEDURE — 1101F PR PT FALLS ASSESS DOC 0-1 FALLS W/OUT INJ PAST YR: ICD-10-PCS | Mod: CPTII,S$GLB,, | Performed by: INTERNAL MEDICINE

## 2022-06-07 PROCEDURE — 1159F MED LIST DOCD IN RCRD: CPT | Mod: CPTII,S$GLB,, | Performed by: INTERNAL MEDICINE

## 2022-06-07 NOTE — TELEPHONE ENCOUNTER
Patient seen in clinic today 6/7/22 with Dr Wynne. The patient will need a TJ Liver Biopsy with Pressure Measurements and staging.    Pre and post procedure teaching done with the patient. The patient is on ASA and Plavix.  Copy of the Instructions given to the patient also.   Voiced understanding.   Will send message to IR.

## 2022-06-07 NOTE — PROGRESS NOTES
HEPATOLOGY CONSULTATION    Referring Physician: Dr. Zach Jensen  Current Corresponding Physician: Millie Hayes    Reason for Consultation: Consultation for evaluation of Ascites    History of Present Illness: Baldo Carney is a 71 y.o. malewho presents for evaluation of   Chief Complaint   Patient presents with    Ascites   This 71-year-old gentleman was referred for evaluation and management of ascites and possible cirrhosis.  He has a longstanding history of ischemic cardiomyopathy and chronic congestive heart failure.  In December he had a short admission for acute on chronic congestive heart failure.  With the past several months he has developed progressive ascites.  A CT scan suggested a cirrhotic appearing liver.  He is hepatitis-C antibody negative.  He has a remote history of fairly heavy alcohol consumption but has been abstinent for the last 4 years.  He has no symptoms of GI bleeding or hepatic encephalopathy.  He is a type 2 diabetic.    Past Medical History:   Diagnosis Date    Asthma     Cardiomyopathy 10/21/2014    CHF (congestive heart failure)     Coronary artery disease     CRF (chronic renal failure)     Diabetes mellitus     Enlarged prostate     Hyperlipidemia     Hypertension     Neuropathy      Outpatient Encounter Medications as of 6/7/2022   Medication Sig Dispense Refill    ARNUITY ELLIPTA 100 mcg/actuation DsDv Use as directed 1 puff in the mouth or throat daily as needed.   6    aspirin (ECOTRIN) 81 MG EC tablet Take 81 mg by mouth once daily.      atorvastatin (LIPITOR) 40 MG tablet Take 1 tablet (40 mg total) by mouth once daily. 90 tablet 1    azelastine (ASTELIN) 137 mcg (0.1 %) nasal spray 1 spray (137 mcg total) by Nasal route 2 (two) times daily as needed for Rhinitis. 30 mL 1    budesonide-glycopyr-formoterol (BREZTRI AEROSPHERE) 160-9-4.8 mcg/actuation HFAA Inhale into the lungs daily as needed.      clopidogreL (PLAVIX) 75 mg tablet Take 75  mg by mouth once daily.      ergocalciferol, vitamin D2, (VITAMIN D ORAL) Take 1 capsule by mouth once a week. WEDNESDAYS      FARXIGA 5 mg Tab tablet Take 1 tablet (5 mg total) by mouth once daily. 90 tablet 1    ferrous sulfate (FEOSOL) 325 mg (65 mg iron) Tab tablet TAKE 1 TABLET (1 EACH TOTAL) BY MOUTH DAILY WITH BREAKFAST. TO BE TAKEN WITH VITAMIN C (ORANGE JUICE) FOR BEST ABSORPTION 90 tablet 0    finasteride (PROSCAR) 5 mg tablet Take 5 mg by mouth once daily.      fluticasone furoate-vilanteroL (BREO ELLIPTA) 100-25 mcg/dose diskus inhaler Inhale 1 puff into the lungs once daily. (DAILY CONTROLLER) 3 each 3    fluticasone propionate (FLONASE) 50 mcg/actuation nasal spray 1 spray (50 mcg total) by Each Nostril route once daily. 15.8 mL 1    furosemide (LASIX) 40 MG tablet       gabapentin (NEURONTIN) 300 MG capsule Take 1 capsule (300 mg total) by mouth every evening. 90 capsule 1    isosorbide dinitrate (ISORDIL) 20 MG tablet Take 1 tablet (20 mg total) by mouth 3 (three) times daily. 90 tablet 0    levalbuterol (XOPENEX) 0.63 mg/3 mL nebulizer solution Take 3 mLs (0.63 mg total) by nebulization every 4 to 6 hours as needed for Wheezing or Shortness of Breath. Rescue 45 mL 0    linaCLOtide (LINZESS) 72 mcg Cap capsule Take 1 capsule (72 mcg total) by mouth before breakfast. 4 capsule 0    metoprolol succinate (TOPROL-XL) 25 MG 24 hr tablet Take 1 tablet (25 mg total) by mouth once daily. 90 tablet 1    promethazine-dextromethorphan (PROMETHAZINE-DM) 6.25-15 mg/5 mL Syrp Take 5 mLs by mouth every 6 (six) hours as needed (cough and congestion). 100 mL 0    sacubitriL-valsartan (ENTRESTO) 24-26 mg per tablet Take 1 tablet by mouth 2 (two) times daily.      spironolactone (ALDACTONE) 25 MG tablet Take 12.5 mg by mouth once daily.      tamsulosin (FLOMAX) 0.4 mg Cap TAKE 1 CAPSULE BY MOUTH EVERY DAY 90 capsule 1    VENTOLIN HFA 90 mcg/actuation inhaler Inhale 1-2 puffs into the lungs every 4 to 6  hours as needed for Wheezing or Shortness of Breath. (RESCUE) 18 g 3    ZONTIVITY 2.08 mg Tab Take 1 tablet by mouth once daily.      hydrALAZINE (APRESOLINE) 50 MG tablet Take 1 tablet (50 mg total) by mouth every 8 (eight) hours. (Patient taking differently: Take 50 mg by mouth every 12 (twelve) hours.) 90 tablet 0     No facility-administered encounter medications on file as of 2022.     Review of patient's allergies indicates:   Allergen Reactions    Invokana  [canagliflozin]      Other reaction(s): been very dizzy     Family History   Problem Relation Age of Onset    No Known Problems Mother     Diabetes Father     Hypertension Father     Heart attack Father     Heart disease Father     Diabetes Sister     Heart disease Brother     Diabetes Brother     No Known Problems Maternal Grandmother     No Known Problems Maternal Grandfather     No Known Problems Paternal Grandmother     No Known Problems Paternal Grandfather     No Known Problems Maternal Aunt     No Known Problems Maternal Uncle     No Known Problems Paternal Aunt     No Known Problems Paternal Uncle     Anemia Neg Hx     Arrhythmia Neg Hx     Asthma Neg Hx     Clotting disorder Neg Hx     Fainting Neg Hx     Heart failure Neg Hx     Hyperlipidemia Neg Hx     Glaucoma Neg Hx        Social History     Socioeconomic History    Marital status: Legally    Tobacco Use    Smoking status: Former Smoker     Quit date: 1970     Years since quittin.0    Smokeless tobacco: Never Used   Substance and Sexual Activity    Alcohol use: Not Currently     Alcohol/week: 3.0 standard drinks     Types: 3 Cans of beer per week     Comment: social    Drug use: No    Sexual activity: Never     Review of Systems   Constitutional: Negative for activity change, appetite change, chills, fatigue and unexpected weight change.   HENT: Negative for congestion, facial swelling and tinnitus.    Eyes: Negative for visual  disturbance.   Respiratory: Positive for shortness of breath. Negative for cough and wheezing.    Cardiovascular: Positive for leg swelling. Negative for chest pain and palpitations.   Gastrointestinal: Positive for abdominal distention.   Genitourinary: Negative for dysuria.   Musculoskeletal: Negative for arthralgias, joint swelling and myalgias.   Neurological: Negative for syncope and headaches.   Hematological: Does not bruise/bleed easily.   Psychiatric/Behavioral: Negative for confusion.     Vitals:    06/07/22 0822   BP: (!) 153/74   Pulse: 85   Resp: 18   Temp: 97 °F (36.1 °C)       Physical Exam  Constitutional:       Appearance: He is well-developed.   Eyes:      General: No scleral icterus.  Cardiovascular:      Rate and Rhythm: Normal rate and regular rhythm.      Heart sounds: Normal heart sounds.   Pulmonary:      Effort: Pulmonary effort is normal. No respiratory distress.      Breath sounds: Normal breath sounds. No wheezing.   Chest:       Abdominal:      General: Bowel sounds are normal. There is no distension.      Palpations: Abdomen is soft. There is shifting dullness and fluid wave. There is no mass.      Tenderness: There is no abdominal tenderness. There is no rebound.   Musculoskeletal:         General: Normal range of motion.      Right lower leg: Edema present.      Left lower leg: Edema present.   Lymphadenopathy:      Cervical: No cervical adenopathy.   Skin:     General: Skin is warm and dry.   Neurological:      Mental Status: He is alert and oriented to person, place, and time.         MELD-Na score: 22 at 3/8/2022 10:09 AM  MELD score: 21 at 3/8/2022 10:09 AM  Calculated from:  Serum Creatinine: 1.9 mg/dL at 3/8/2022 10:09 AM  Serum Sodium: 135 mmol/L at 3/8/2022 10:09 AM  Total Bilirubin: 2.1 mg/dL at 3/8/2022 10:09 AM  INR(ratio): 1.6 at 3/8/2022 10:09 AM  Age: 71 years    Lab Results   Component Value Date    GLU 83 05/17/2022    GLU 83 05/17/2022    GLU 93 01/10/2019    BUN 21  05/17/2022    BUN 21 05/17/2022    CREATININE 1.7 (H) 05/17/2022    CREATININE 1.7 (H) 05/17/2022    CREATININE 1.36 01/10/2019    CALCIUM 8.5 (L) 05/17/2022    CALCIUM 8.5 (L) 05/17/2022     05/17/2022     05/17/2022     01/10/2019    K 3.8 05/17/2022    K 3.8 05/17/2022     05/17/2022     05/17/2022     01/10/2019    PROT 7.2 05/17/2022    CO2 24 05/17/2022    CO2 24 05/17/2022    ANIONGAP 10 05/17/2022    ANIONGAP 10 05/17/2022    WBC 5.93 05/17/2022    WBC 5.93 05/17/2022    WBC 7.9 09/27/2017    RBC 3.57 (L) 05/17/2022    RBC 3.57 (L) 05/17/2022    HGB 10.1 (L) 05/17/2022    HGB 10.1 (L) 05/17/2022    HCT 29.7 (L) 05/17/2022    HCT 29.7 (L) 05/17/2022    HCT 26 (L) 11/03/2014    MCV 83 05/17/2022    MCV 83 05/17/2022    MCV 87.4 09/27/2017    MCH 28.3 05/17/2022    MCH 28.3 05/17/2022    MCHC 34.0 05/17/2022    MCHC 34.0 05/17/2022     Lab Results   Component Value Date    RDW 18.1 (H) 05/17/2022    RDW 18.1 (H) 05/17/2022     05/17/2022     05/17/2022    MPV 11.2 05/17/2022    MPV 11.2 05/17/2022    GRAN 4.4 05/17/2022    GRAN 73.7 (H) 05/17/2022    GRAN 4.4 05/17/2022    GRAN 73.7 (H) 05/17/2022    LYMPH 0.9 (L) 05/17/2022    LYMPH 14.5 (L) 05/17/2022    LYMPH 0.9 (L) 05/17/2022    LYMPH 14.5 (L) 05/17/2022    MONO 0.6 05/17/2022    MONO 9.6 05/17/2022    MONO 0.6 05/17/2022    MONO 9.6 05/17/2022    EOSINOPHIL 1.2 05/17/2022    EOSINOPHIL 1.2 05/17/2022    BASOPHIL 0.8 05/17/2022    BASOPHIL 0.8 05/17/2022    EOS 0.1 05/17/2022    EOS 0.1 05/17/2022    BASO 0.05 05/17/2022    BASO 0.05 05/17/2022    APTT 39.2 (H) 03/08/2022    GROUPTRH A NEG 08/28/2020    BNP 2,861 (H) 01/04/2022    CHOL 81 (L) 05/17/2022    TRIG 56 05/17/2022    HDL 33 (L) 05/17/2022    CHOLHDL 40.7 05/17/2022    TOTALCHOLEST 2.5 05/17/2022    ALBUMIN 2.7 (L) 05/17/2022    ALBUMIN pos 08/17/2021    AST 16 05/17/2022    ALT 7 (L) 05/17/2022    ALKPHOS 76 05/17/2022    MG 1.9 03/08/2022     LABPROT 11.2 08/08/2019    INR 1.6 03/08/2022    INR 1.1 08/08/2019    PSA 8.7 (H) 05/17/2022       Assessment and Plan:  Patient Active Problem List   Diagnosis    Hypertension    Hyperlipidemia    Type 2 diabetes mellitus without complication, without long-term current use of insulin    Aortic stenosis status post AVR    Cardiomyopathy    CAD (coronary artery disease)    S/P AVR (aortic valve replacement)    S/P CABG (coronary artery bypass graft)    Erectile dysfunction    Bilateral lower extremity edema    Orthopnea    Peripheral polyneuropathy    Elevated PSA, greater than or equal to 20 ng/ml    Generalized OA    COVID-19 virus detected    ERICA on CKD III    Troponin I above reference range    Chronic systolic heart failure    BPH (benign prostatic hyperplasia)    Lymphopenia    COPD (chronic obstructive pulmonary disease)    Anemia    Acute on chronic congestive heart failure    Other ascites     Baldo Carney is a 71 y.o. male withAscites    Impression:  Ascites, congestive hepatopathy versus cirrhotic    Plan:  I plan to evaluate his ascites by doing a diagnostic and therapeutic paracentesis.  I will also be arranging a transjugular liver biopsy with pressure measurements to stage his liver disease and try and find an etiology.  He will return to clinic in 3 months time.

## 2022-06-07 NOTE — TELEPHONE ENCOUNTER
Patient seen in clinic today 6/7/22 with Dr Wynne. The patient will need an appointment for a Paracentesis.    Call placed to [facility].  First available will be on Thurs 6/16/22 with arrival for 10 am. The IR nurse will follow up with the patient.    Call placed to the patient [phone]. No Answer. Left VM message.

## 2022-06-08 ENCOUNTER — TELEPHONE (OUTPATIENT)
Dept: INTERVENTIONAL RADIOLOGY/VASCULAR | Facility: CLINIC | Age: 72
End: 2022-06-08
Payer: MEDICARE

## 2022-06-08 NOTE — TELEPHONE ENCOUNTER
Left message for pt to return my call. Need to schedule IR procedure. Please forward call to [phone]. Thanks

## 2022-06-09 ENCOUNTER — TELEPHONE (OUTPATIENT)
Dept: INTERVENTIONAL RADIOLOGY/VASCULAR | Facility: HOSPITAL | Age: 72
End: 2022-06-09
Payer: MEDICARE

## 2022-06-09 DIAGNOSIS — R18.8 OTHER ASCITES: ICD-10-CM

## 2022-06-09 DIAGNOSIS — I25.5 ISCHEMIC CARDIOMYOPATHY: Primary | ICD-10-CM

## 2022-06-09 NOTE — NURSING
Radiology Pre-Procedural Instructions- [facility]    Radiology Nurse contacted patient to provide instructions for scheduled Paracentesis on 6/16/22  at  1100.   Patient instructed to arrive no later than 0930 am to outpatient registration.   Registration will direct patient to outpatient lab for pre-op lab work.  Patient educated on all additional pre-op instructions per policy and verbalized understanding.

## 2022-06-09 NOTE — TELEPHONE ENCOUNTER
----- Message from Marilou Quintana RN sent at 6/8/2022 10:37 AM CDT -----  Regarding: RE: IR transjugular liver BX and IR paracentesis  Contact: [phone]  Sam Amanda,  Please schedule paracentesis.  TJ biopsy will be done at [facility].  Thank you.    ----- Message -----  From: Shankar Henriquez MA  Sent: 6/8/2022  10:26 AM CDT  To: Marilou Quintana RN  Subject: FW: IR transjugular liver BX and IR paracent#      ----- Message -----  From: Treasure Virgen RN  Sent: 6/8/2022  10:13 AM CDT  To: Jarod Wynne MD, Celine Lopez RN, #  Subject: IR transjugular liver BX and IR paracentesis     Good morning,    We received a request for IR Paracentesis for this Pt. When reviewing pt's chart, we noticed an order for an IR transjugular liver BX. We do not preform IR transjugular liver biopsies at this facility, is the Pt going to [facility] for this procedure? If the paint will be going to [facility], would you like for the paracentesis to also be done at that time?    Please advise.    Thanks,  Treasure Virgen

## 2022-06-14 RX ORDER — EMPAGLIFLOZIN 25 MG/1
25 TABLET, FILM COATED ORAL DAILY
OUTPATIENT
Start: 2022-06-14

## 2022-06-14 RX ORDER — EMPAGLIFLOZIN 10 MG/1
10 TABLET, FILM COATED ORAL DAILY
Qty: 90 TABLET | Refills: 1 | Status: SHIPPED | OUTPATIENT
Start: 2022-06-14 | End: 2023-02-22

## 2022-06-15 ENCOUNTER — TELEPHONE (OUTPATIENT)
Dept: SURGERY | Facility: CLINIC | Age: 72
End: 2022-06-15
Payer: MEDICARE

## 2022-06-15 ENCOUNTER — TELEPHONE (OUTPATIENT)
Dept: FAMILY MEDICINE | Facility: CLINIC | Age: 72
End: 2022-06-15

## 2022-06-15 DIAGNOSIS — K40.90 INGUINAL HERNIA OF RIGHT SIDE WITHOUT OBSTRUCTION OR GANGRENE: ICD-10-CM

## 2022-06-15 DIAGNOSIS — N40.0 ENLARGED PROSTATE: Primary | ICD-10-CM

## 2022-06-15 DIAGNOSIS — N40.0 BENIGN PROSTATIC HYPERPLASIA, UNSPECIFIED WHETHER LOWER URINARY TRACT SYMPTOMS PRESENT: ICD-10-CM

## 2022-06-15 DIAGNOSIS — R97.20 ELEVATED PSA, LESS THAN 10 NG/ML: ICD-10-CM

## 2022-06-15 NOTE — TELEPHONE ENCOUNTER
----- Message from Narcisa Suazo sent at 6/15/2022  1:39 PM CDT -----  Who Called: Patient    What is the reqeust in detail: Requesting call back to schedule NP appointment to be seen for hernia surgery as recommended by Millie Hayes. Please advise.     Can the clinic reply by [facility]? No    Best Call Back Number: [phone]    Additional Information:

## 2022-06-16 ENCOUNTER — HOSPITAL ENCOUNTER (OUTPATIENT)
Dept: INTERVENTIONAL RADIOLOGY/VASCULAR | Facility: HOSPITAL | Age: 72
Discharge: HOME | End: 2022-06-16
Attending: INTERNAL MEDICINE
Payer: MEDICARE

## 2022-06-16 VITALS — SYSTOLIC BLOOD PRESSURE: 150 MMHG | HEART RATE: 77 BPM | DIASTOLIC BLOOD PRESSURE: 82 MMHG

## 2022-06-16 DIAGNOSIS — I25.5 ISCHEMIC CARDIOMYOPATHY: ICD-10-CM

## 2022-06-16 DIAGNOSIS — R18.8 OTHER ASCITES: ICD-10-CM

## 2022-06-16 LAB
APPEARANCE FLD: CLEAR
BODY FLD TYPE: NORMAL
COLOR FLD: YELLOW
LYMPHOCYTES NFR FLD MANUAL: 7 %
MESOTHL CELL NFR FLD MANUAL: 9 %
MONOS+MACROS NFR FLD MANUAL: 76 %
NEUTROPHILS NFR FLD MANUAL: 8 %
WBC # FLD: 199 /CU MM

## 2022-06-16 PROCEDURE — 49083 IR PARACENTESIS WITH IMAGING: ICD-10-PCS | Mod: ,,, | Performed by: RADIOLOGY

## 2022-06-16 PROCEDURE — C1729 CATH, DRAINAGE: HCPCS

## 2022-06-16 PROCEDURE — 49083 ABD PARACENTESIS W/IMAGING: CPT | Performed by: RADIOLOGY

## 2022-06-16 PROCEDURE — 63600175 PHARM REV CODE 636 W HCPCS: Performed by: INTERNAL MEDICINE

## 2022-06-16 PROCEDURE — P9047 ALBUMIN (HUMAN), 25%, 50ML: HCPCS | Performed by: INTERNAL MEDICINE

## 2022-06-16 PROCEDURE — 89051 BODY FLUID CELL COUNT: CPT | Performed by: INTERNAL MEDICINE

## 2022-06-16 RX ORDER — ALBUMIN HUMAN 250 G/1000ML
25 SOLUTION INTRAVENOUS
Status: DISCONTINUED | OUTPATIENT
Start: 2022-06-16 | End: 2022-06-17 | Stop reason: HOSPADM

## 2022-06-16 RX ADMIN — ALBUMIN (HUMAN) 12.5 G: 12.5 SOLUTION INTRAVENOUS at 12:06

## 2022-06-16 NOTE — NURSING
IR Ultrasound guided paracentesis performed by Dr. Rodriguez; procedure explained and consent obtained by Radiologist. Time out performed 8 L removed- Patient received 25 g of albumin IV- VS remained stable for duration of procedure. Specimens collected and sent to lab if ordered. Para and IV d/c'd, with tip intact. Access site cleaned with peroxide, derma bond and steri-strips applied, then covered with sterile Band-Aid. Pt discharge home in stable condition.

## 2022-06-23 ENCOUNTER — TELEPHONE (OUTPATIENT)
Dept: UROLOGY | Facility: CLINIC | Age: 72
End: 2022-06-23
Payer: MEDICARE

## 2022-06-23 ENCOUNTER — TELEPHONE (OUTPATIENT)
Dept: SURGERY | Facility: CLINIC | Age: 72
End: 2022-06-23
Payer: MEDICARE

## 2022-06-23 NOTE — TELEPHONE ENCOUNTER
----- Message from Erica Soto sent at 6/23/2022  8:57 AM CDT -----  Type:  Patient Call Back    Who Called: Pt    What is the reqeust in detail: Pt is requesting a call back to schedule an appt, pt states that he has a hernia and his pcp recommended he see uro. Please Advise    Can the clinic reply by [facility]?    Best Call Back Number: Click to [phone]

## 2022-06-23 NOTE — TELEPHONE ENCOUNTER
Spoke w pt dr brown does not perform hernia surgery and pt will need to see general surgery informed pt Dr Dunlap office is trying to reach him will send petros dunlap nurse who has tried calling pt.

## 2022-06-23 NOTE — TELEPHONE ENCOUNTER
----- Message from Summer Garrido sent at 6/23/2022  9:16 AM CDT -----  .Type:  Patient Returning Call    Who Called: PT     Who Left Message for Patient: ROSA ISELA      Does the patient know what this is regarding?: SCHEDULING WITH DR. TORRES     Would the patient rather a call back YES      Best Call Back Number:  [phone]    Additional Information:  HERNIA

## 2022-06-28 ENCOUNTER — TELEPHONE (OUTPATIENT)
Dept: FAMILY MEDICINE | Facility: CLINIC | Age: 72
End: 2022-06-28

## 2022-06-28 NOTE — TELEPHONE ENCOUNTER
Patient left voice mail   He is asking for replacement for Jardiance   He cannot afford the medication.   He states he has not taking medication for 9 days

## 2022-06-29 RX ORDER — MIDAZOLAM HYDROCHLORIDE 1 MG/ML
2 INJECTION INTRAMUSCULAR; INTRAVENOUS ONCE
Status: CANCELLED | OUTPATIENT
Start: 2022-06-29 | End: 2022-06-29

## 2022-06-29 RX ORDER — FENTANYL CITRATE 50 UG/ML
100 INJECTION, SOLUTION INTRAMUSCULAR; INTRAVENOUS ONCE
Status: CANCELLED | OUTPATIENT
Start: 2022-06-29 | End: 2022-06-29

## 2022-06-29 NOTE — TELEPHONE ENCOUNTER
Spoke with patient this morning, he stated that he can't afford the medication stating that the medicine is costing him $141 and that it's the 10th day without the medication. Mentioned that Aunmonse is willing to give him samples of the medication. Patient stated that he will come by some time around 9:30 this morning to get some samples from you.

## 2022-07-06 ENCOUNTER — TELEPHONE (OUTPATIENT)
Dept: INTERVENTIONAL RADIOLOGY/VASCULAR | Facility: HOSPITAL | Age: 72
End: 2022-07-06
Payer: MEDICARE

## 2022-07-07 ENCOUNTER — HOSPITAL ENCOUNTER (OUTPATIENT)
Dept: INTERVENTIONAL RADIOLOGY/VASCULAR | Facility: HOSPITAL | Age: 72
Discharge: HOME | End: 2022-07-07
Attending: INTERNAL MEDICINE
Payer: MEDICARE

## 2022-07-07 VITALS
TEMPERATURE: 98 F | SYSTOLIC BLOOD PRESSURE: 170 MMHG | WEIGHT: 184 LBS | DIASTOLIC BLOOD PRESSURE: 96 MMHG | OXYGEN SATURATION: 98 % | BODY MASS INDEX: 24.92 KG/M2 | HEART RATE: 72 BPM | RESPIRATION RATE: 20 BRPM | HEIGHT: 72 IN

## 2022-07-07 DIAGNOSIS — I25.5 ISCHEMIC CARDIOMYOPATHY: ICD-10-CM

## 2022-07-07 DIAGNOSIS — R18.8 OTHER ASCITES: ICD-10-CM

## 2022-07-07 LAB — POCT GLUCOSE: 83 MG/DL (ref 70–110)

## 2022-07-07 PROCEDURE — 36011 IR TRANSJUGULAR LIVER BIOPSY: ICD-10-PCS | Mod: LT,,, | Performed by: STUDENT IN AN ORGANIZED HEALTH CARE EDUCATION/TRAINING PROGRAM

## 2022-07-07 PROCEDURE — 88313 PR  SPECIAL STAINS,GROUP II: ICD-10-PCS | Mod: 26,,, | Performed by: PATHOLOGY

## 2022-07-07 PROCEDURE — 76937 US GUIDE VASCULAR ACCESS: CPT | Mod: 26,,, | Performed by: STUDENT IN AN ORGANIZED HEALTH CARE EDUCATION/TRAINING PROGRAM

## 2022-07-07 PROCEDURE — 99152 PR MOD CONSCIOUS SEDATION, SAME PHYS, 5+ YRS, FIRST 15 MIN: ICD-10-PCS | Mod: ,,, | Performed by: STUDENT IN AN ORGANIZED HEALTH CARE EDUCATION/TRAINING PROGRAM

## 2022-07-07 PROCEDURE — 99153 MOD SED SAME PHYS/QHP EA: CPT | Performed by: STUDENT IN AN ORGANIZED HEALTH CARE EDUCATION/TRAINING PROGRAM

## 2022-07-07 PROCEDURE — 25500020 PHARM REV CODE 255: Performed by: INTERNAL MEDICINE

## 2022-07-07 PROCEDURE — C1769 GUIDE WIRE: HCPCS

## 2022-07-07 PROCEDURE — 88313 SPECIAL STAINS GROUP 2: CPT | Mod: 59 | Performed by: PATHOLOGY

## 2022-07-07 PROCEDURE — 88313 SPECIAL STAINS GROUP 2: CPT | Mod: 26,,, | Performed by: PATHOLOGY

## 2022-07-07 PROCEDURE — 75889 IR TRANSJUGULAR LIVER BIOPSY: ICD-10-PCS | Mod: 26,59,, | Performed by: STUDENT IN AN ORGANIZED HEALTH CARE EDUCATION/TRAINING PROGRAM

## 2022-07-07 PROCEDURE — 63600175 PHARM REV CODE 636 W HCPCS: Performed by: STUDENT IN AN ORGANIZED HEALTH CARE EDUCATION/TRAINING PROGRAM

## 2022-07-07 PROCEDURE — 36011 PLACE CATHETER IN VEIN: CPT | Mod: LT,,, | Performed by: STUDENT IN AN ORGANIZED HEALTH CARE EDUCATION/TRAINING PROGRAM

## 2022-07-07 PROCEDURE — 99152 MOD SED SAME PHYS/QHP 5/>YRS: CPT | Performed by: STUDENT IN AN ORGANIZED HEALTH CARE EDUCATION/TRAINING PROGRAM

## 2022-07-07 PROCEDURE — 75889 VEIN X-RAY LIVER W/HEMODYNAM: CPT | Mod: 26,59,, | Performed by: STUDENT IN AN ORGANIZED HEALTH CARE EDUCATION/TRAINING PROGRAM

## 2022-07-07 PROCEDURE — 75825 IR TRANSJUGULAR LIVER BIOPSY: ICD-10-PCS | Mod: 26,59,, | Performed by: STUDENT IN AN ORGANIZED HEALTH CARE EDUCATION/TRAINING PROGRAM

## 2022-07-07 PROCEDURE — 88307 TISSUE EXAM BY PATHOLOGIST: CPT | Performed by: PATHOLOGY

## 2022-07-07 PROCEDURE — 88307 TISSUE EXAM BY PATHOLOGIST: CPT | Mod: 26,,, | Performed by: PATHOLOGY

## 2022-07-07 PROCEDURE — C1894 INTRO/SHEATH, NON-LASER: HCPCS

## 2022-07-07 PROCEDURE — 37200 IR TRANSJUGULAR LIVER BIOPSY: ICD-10-PCS | Mod: ,,, | Performed by: STUDENT IN AN ORGANIZED HEALTH CARE EDUCATION/TRAINING PROGRAM

## 2022-07-07 PROCEDURE — 75825 VEIN X-RAY TRUNK: CPT | Mod: TC,59 | Performed by: STUDENT IN AN ORGANIZED HEALTH CARE EDUCATION/TRAINING PROGRAM

## 2022-07-07 PROCEDURE — 75970 IR TRANSJUGULAR LIVER BIOPSY: ICD-10-PCS | Mod: 26,,, | Performed by: STUDENT IN AN ORGANIZED HEALTH CARE EDUCATION/TRAINING PROGRAM

## 2022-07-07 PROCEDURE — 88307 PR  SURG PATH,LEVEL V: ICD-10-PCS | Mod: 26,,, | Performed by: PATHOLOGY

## 2022-07-07 PROCEDURE — 76937 IR TRANSJUGULAR LIVER BIOPSY: ICD-10-PCS | Mod: 26,,, | Performed by: STUDENT IN AN ORGANIZED HEALTH CARE EDUCATION/TRAINING PROGRAM

## 2022-07-07 PROCEDURE — 99152 MOD SED SAME PHYS/QHP 5/>YRS: CPT | Mod: ,,, | Performed by: STUDENT IN AN ORGANIZED HEALTH CARE EDUCATION/TRAINING PROGRAM

## 2022-07-07 PROCEDURE — 37200 TRANSCATHETER BIOPSY: CPT | Mod: ,,, | Performed by: STUDENT IN AN ORGANIZED HEALTH CARE EDUCATION/TRAINING PROGRAM

## 2022-07-07 PROCEDURE — 75970 VASCULAR BIOPSY: CPT | Mod: 26,,, | Performed by: STUDENT IN AN ORGANIZED HEALTH CARE EDUCATION/TRAINING PROGRAM

## 2022-07-07 RX ORDER — ONDANSETRON 2 MG/ML
4 INJECTION INTRAMUSCULAR; INTRAVENOUS EVERY 6 HOURS PRN
Status: DISCONTINUED | OUTPATIENT
Start: 2022-07-07 | End: 2022-07-08 | Stop reason: HOSPADM

## 2022-07-07 RX ORDER — LIDOCAINE HYDROCHLORIDE 10 MG/ML
1 INJECTION, SOLUTION EPIDURAL; INFILTRATION; INTRACAUDAL; PERINEURAL ONCE
Status: DISCONTINUED | OUTPATIENT
Start: 2022-07-07 | End: 2022-07-08 | Stop reason: HOSPADM

## 2022-07-07 RX ORDER — FENTANYL CITRATE 50 UG/ML
INJECTION, SOLUTION INTRAMUSCULAR; INTRAVENOUS CODE/TRAUMA/SEDATION MEDICATION
Status: COMPLETED | OUTPATIENT
Start: 2022-07-07 | End: 2022-07-07

## 2022-07-07 RX ORDER — SODIUM CHLORIDE 9 MG/ML
INJECTION, SOLUTION INTRAVENOUS CONTINUOUS
Status: DISCONTINUED | OUTPATIENT
Start: 2022-07-07 | End: 2022-07-08 | Stop reason: HOSPADM

## 2022-07-07 RX ORDER — MIDAZOLAM HYDROCHLORIDE 1 MG/ML
INJECTION INTRAMUSCULAR; INTRAVENOUS CODE/TRAUMA/SEDATION MEDICATION
Status: COMPLETED | OUTPATIENT
Start: 2022-07-07 | End: 2022-07-07

## 2022-07-07 RX ADMIN — MIDAZOLAM HYDROCHLORIDE 1.5 MG: 1 INJECTION INTRAMUSCULAR; INTRAVENOUS at 01:07

## 2022-07-07 RX ADMIN — FENTANYL CITRATE 75 MCG: 50 INJECTION, SOLUTION INTRAMUSCULAR; INTRAVENOUS at 01:07

## 2022-07-07 RX ADMIN — IOHEXOL 20 ML: 300 INJECTION, SOLUTION INTRAVENOUS at 02:07

## 2022-07-07 NOTE — PROCEDURES
"  Pre Op Diagnosis: liver dysfunction  Post Op Diagnosis: Same    Procedure: Xjug liver biopsy    Procedure performed by: Berenice    Written Informed Consent Obtained: Yes  Specimen Removed: YES 3 19g cores  Estimated Blood Loss: Minimal    Findings:   Successful Xjug liver biopsy. Pressures obtained as follows:    RA-16  Free- 19   Wedge- 22    Patient tolerated procedure well.    Jeremy Ng MD ([facility])  Interventional Radiology  [phone]        "

## 2022-07-07 NOTE — H&P
Inpatient Radiology Pre-procedure Note    History of Present Illness:  Baldo Carney is a 71 y.o. male who presents for transjugular liver biopsy.  Admission H&P reviewed.  Past Medical History:   Diagnosis Date    Asthma     Cardiomyopathy 10/21/2014    CHF (congestive heart failure)     Coronary artery disease     CRF (chronic renal failure)     Diabetes mellitus     Enlarged prostate     Hyperlipidemia     Hypertension     Neuropathy      Past Surgical History:   Procedure Laterality Date    ANGIOGRAPHY OF INTERNAL MAMMARY VESSEL N/A 3/11/2022    Procedure: Angiogram Internal Mammary;  Surgeon: Hank Lujan MD;  Location: [facility] CATH/EP LAB;  Service: Cardiology;  Laterality: N/A;    CARDIAC CATHETERIZATION      CARDIAC VALVE SURGERY      CATHETERIZATION OF BOTH LEFT AND RIGHT HEART Left 3/11/2022    Procedure: CATHETERIZATION, HEART, BOTH LEFT AND RIGHT;  Surgeon: Hank Lujan MD;  Location: [facility] CATH/EP LAB;  Service: Cardiology;  Laterality: Left;    CORONARY ANGIOGRAPHY N/A 3/11/2022    Procedure: ANGIOGRAM, CORONARY ARTERY;  Surgeon: Hank Lujan MD;  Location: [facility] CATH/EP LAB;  Service: Cardiology;  Laterality: N/A;    CORONARY BYPASS GRAFT ANGIOGRAPHY  3/11/2022    Procedure: Bypass graft study;  Surgeon: Hank Lujan MD;  Location: [facility] CATH/EP LAB;  Service: Cardiology;;    CYSTOSCOPY N/A 7/30/2019    Procedure: CYSTOSCOPY;  Surgeon: Spencer Nguyen MD;  Location: Replaced by [facility];  Service: Urology;  Laterality: N/A;    SHOULDER ARTHROSCOPY  1985    TRANSRECTAL BIOPSY OF PROSTATE WITH ULTRASOUND GUIDANCE N/A 7/30/2019    Procedure: BIOPSY, PROSTATE, RECTAL APPROACH, WITH US GUIDANCE;  Surgeon: Spencer Nguyen MD;  Location: Replaced by [facility];  Service: Urology;  Laterality: N/A;  procedure not performed, pt unable to tolerate    TRANSRECTAL BIOPSY OF PROSTATE WITH ULTRASOUND GUIDANCE N/A 8/8/2019    Procedure: BIOPSY, PROSTATE, RECTAL APPROACH, WITH US GUIDANCE;  Surgeon:  Spencer Nguyen MD;  [facility];  Service: Urology;  Laterality: N/A;       Review of Systems:   As documented in primary team H&P    Home Meds:   Prior to Admission medications    Medication Sig Start Date End Date Taking? Authorizing Provider   aspirin (ECOTRIN) 81 MG EC tablet Take 81 mg by mouth once daily.   Yes Historical Provider   atorvastatin (LIPITOR) 40 MG tablet Take 1 tablet (40 mg total) by mouth once daily. 4/19/22  Yes KAMERON Ann FNP-C   ergocalciferol, vitamin D2, (VITAMIN D ORAL) Take 1 capsule by mouth once a week. WEDNESDAYS   Yes Historical Provider   VENTOLIN HFA 90 mcg/actuation inhaler Inhale 1-2 puffs into the lungs every 4 to 6 hours as needed for Wheezing or Shortness of Breath. (RESCUE) 12/23/21  Yes KAMERON Ann FNP-C   ARNUITY ELLIPTA 100 mcg/actuation DsDv Use as directed 1 puff in the mouth or throat daily as needed.  11/10/18   Historical Provider   azelastine (ASTELIN) 137 mcg (0.1 %) nasal spray 1 spray (137 mcg total) by Nasal route 2 (two) times daily as needed for Rhinitis. 4/20/21   KAMERON Ann FNP-C   budesonide-glycopyr-formoterol (BREZTRI AEROSPHERE) 160-9-4.8 mcg/actuation HFAA Inhale into the lungs daily as needed.    Historical Provider   clopidogreL (PLAVIX) 75 mg tablet Take 75 mg by mouth once daily. 3/24/22   Historical Provider   empagliflozin (JARDIANCE) 10 mg tablet Take 1 tablet (10 mg total) by mouth once daily. 6/14/22   KAMERON Ann FNP-C   ferrous sulfate (FEOSOL) 325 mg (65 mg iron) Tab tablet TAKE 1 TABLET (1 EACH TOTAL) BY MOUTH DAILY WITH BREAKFAST. TO BE TAKEN WITH VITAMIN C (ORANGE JUICE) FOR BEST ABSORPTION  Patient not taking: Reported on 7/7/2022 1/27/22   KAMERON Ann FNP-C   finasteride (PROSCAR) 5 mg tablet Take 5 mg by mouth once daily.    Historical Provider   fluticasone furoate-vilanteroL (BREO ELLIPTA) 100-25 mcg/dose diskus inhaler Inhale 1 puff into the lungs once  daily. (DAILY CONTROLLER) 10/20/21   KAMERON Ann FNP-C   fluticasone propionate (FLONASE) 50 mcg/actuation nasal spray 1 spray (50 mcg total) by Each Nostril route once daily. 4/20/21   KAMERON Ann FNP-C   furosemide (LASIX) 40 MG tablet Take by mouth once daily. 4/18/22   Historical Provider   gabapentin (NEURONTIN) 300 MG capsule Take 1 capsule (300 mg total) by mouth every evening. 4/19/22   KAMERON Ann FNP-C   hydrALAZINE (APRESOLINE) 50 MG tablet Take 1 tablet (50 mg total) by mouth every 8 (eight) hours.  Patient taking differently: Take 50 mg by mouth every 12 (twelve) hours. 12/28/21 5/12/22  Gregory Riggs MD   isosorbide dinitrate (ISORDIL) 20 MG tablet Take 1 tablet (20 mg total) by mouth 3 (three) times daily. 12/28/21 6/7/22  Gregory Riggs MD   levalbuterol (XOPENEX) 0.63 mg/3 mL nebulizer solution Take 3 mLs (0.63 mg total) by nebulization every 4 to 6 hours as needed for Wheezing or Shortness of Breath. Rescue 12/23/21   KAMERON Ann FNP-C   linaCLOtide (LINZESS) 72 mcg Cap capsule Take 1 capsule (72 mcg total) by mouth before breakfast. 4/19/22   KAMERON Ann FNP-C   metoprolol succinate (TOPROL-XL) 25 MG 24 hr tablet Take 1 tablet (25 mg total) by mouth once daily. 12/23/21   KAMERON Ann FNP-C   promethazine-dextromethorphan (PROMETHAZINE-DM) 6.25-15 mg/5 mL Syrp Take 5 mLs by mouth every 6 (six) hours as needed (cough and congestion). 1/25/22   KAMERON Ann FNP-C   sacubitriL-valsartan (ENTRESTO) 24-26 mg per tablet Take 1 tablet by mouth 2 (two) times daily.    Historical Provider   spironolactone (ALDACTONE) 25 MG tablet Take 12.5 mg by mouth once daily. 3/24/22   Historical Provider   tamsulosin (FLOMAX) 0.4 mg Cap TAKE 1 CAPSULE BY MOUTH EVERY DAY 6/14/22   KAMERON Ann,FNP-C   ZONTIVITY 2.08 mg Tab Take 1 tablet by mouth once daily. 3/24/22   Historical Provider     Scheduled Meds:    LIDOcaine (PF)  10 mg/ml (1%)  1 mL Other Once     Continuous Infusions:    sodium chloride 0.9%       PRN Meds:  Anticoagulants/Antiplatelets: no anticoagulation    Allergies:   Review of patient's allergies indicates:   Allergen Reactions    Invokana  [canagliflozin]      Other reaction(s): been very dizzy     Sedation Hx: have not been any systemic reactions    Labs:  No results for input(s): INR in the last 168 hours.    Invalid input(s):  PT,  PTT    Recent Labs   Lab 07/07/22  1000   WBC 6.99   HGB 10.6*   HCT 31.9*   MCV 88       No results for input(s): GLU, NA, K, CL, CO2, BUN, CREATININE, CALCIUM, MG, ALT, AST, ALBUMIN, BILITOT, BILIDIR in the last 168 hours.      Vitals:  Temp: 98.1 °F (36.7 °C) (07/07/22 1208)  Pulse: 76 (07/07/22 1339)  Resp: (!) 22 (07/07/22 1339)  BP: (!) 177/94 (07/07/22 1339)  SpO2: 100 % (07/07/22 1339)     Physical Exam:  ASA: 3  Mallampati: 3    General: no acute distress  Mental Status: alert and oriented to person, place and time  HEENT: normocephalic, atraumatic  Chest: unlabored breathing  Heart: regular heart rate  Abdomen: nondistended  Extremity: moves all extremities    Plan: plan for IR transjugular liver biopsy on 7/7/22  Sedation Plan: lurdes Lou MD PhD  Interventional Radiology  PGY-2

## 2022-07-07 NOTE — DISCHARGE INSTRUCTIONS
[facility] [phone] (MON-FRI 8 AM- 5PM). Radiology Resident on call [phone].      Will physical activity be limited?   You may have to limit your activity for 7 to 10 days. Talk to your doctor about the right amount of activity for you.  Ask your doctor about driving.  What changes to diet are needed?   Your doctor may suggest you eat a low protein, low salt diet. Talk to your doctor about the best diet for you.  Avoid beer, wine, or mixed drinks (alcohol).  What problems could happen?   Infection  Bleeding  Damage to blood vessels  Nerve damage  Kidney damage  When do I need to call the doctor?   Signs of infection. These include a fever of 100.4°F (38°C) or higher, chills, or pain.  Throwing up blood or blood in your stools  Changes in thinking, confusion, or more sleepy  Trouble breathing or swelling in your hands or feet  You have less urine

## 2022-07-07 NOTE — PLAN OF CARE
Recovery completed, pt tolerated well. No apparent distress noted. Dressing CDI.  Discharge instructions reviewed and acknowledged. Pt discharged via wheelchair and private vehicle.

## 2022-07-11 LAB
COMMENT: NORMAL
FINAL PATHOLOGIC DIAGNOSIS: NORMAL
GROSS: NORMAL
Lab: NORMAL
MICROSCOPIC EXAM: NORMAL
SUPPLEMENTAL DIAGNOSIS: NORMAL

## 2022-07-12 ENCOUNTER — OFFICE VISIT (OUTPATIENT)
Dept: SURGERY | Facility: CLINIC | Age: 72
End: 2022-07-12
Payer: MEDICARE

## 2022-07-12 VITALS
BODY MASS INDEX: 24.93 KG/M2 | HEIGHT: 72 IN | TEMPERATURE: 99 F | WEIGHT: 184.06 LBS | DIASTOLIC BLOOD PRESSURE: 64 MMHG | SYSTOLIC BLOOD PRESSURE: 117 MMHG | RESPIRATION RATE: 16 BRPM | HEART RATE: 75 BPM

## 2022-07-12 DIAGNOSIS — K40.90 INGUINAL HERNIA OF RIGHT SIDE WITHOUT OBSTRUCTION OR GANGRENE: ICD-10-CM

## 2022-07-12 DIAGNOSIS — R18.8 OTHER ASCITES: Primary | ICD-10-CM

## 2022-07-12 PROCEDURE — 99204 OFFICE O/P NEW MOD 45 MIN: CPT | Mod: S$GLB,,, | Performed by: SURGERY

## 2022-07-12 PROCEDURE — 1157F PR ADVANCE CARE PLAN OR EQUIV PRESENT IN MEDICAL RECORD: ICD-10-PCS | Mod: CPTII,S$GLB,, | Performed by: SURGERY

## 2022-07-12 PROCEDURE — 99204 PR OFFICE/OUTPT VISIT, NEW, LEVL IV, 45-59 MIN: ICD-10-PCS | Mod: S$GLB,,, | Performed by: SURGERY

## 2022-07-12 PROCEDURE — 3078F PR MOST RECENT DIASTOLIC BLOOD PRESSURE < 80 MM HG: ICD-10-PCS | Mod: CPTII,S$GLB,, | Performed by: SURGERY

## 2022-07-12 PROCEDURE — 3066F PR DOCUMENTATION OF TREATMENT FOR NEPHROPATHY: ICD-10-PCS | Mod: CPTII,S$GLB,, | Performed by: SURGERY

## 2022-07-12 PROCEDURE — 4010F ACE/ARB THERAPY RXD/TAKEN: CPT | Mod: CPTII,S$GLB,, | Performed by: SURGERY

## 2022-07-12 PROCEDURE — 1159F PR MEDICATION LIST DOCUMENTED IN MEDICAL RECORD: ICD-10-PCS | Mod: CPTII,S$GLB,, | Performed by: SURGERY

## 2022-07-12 PROCEDURE — 3044F HG A1C LEVEL LT 7.0%: CPT | Mod: CPTII,S$GLB,, | Performed by: SURGERY

## 2022-07-12 PROCEDURE — 3062F PR POS MACROALBUMINURIA RESULT DOCUMENTED/REVIEW: ICD-10-PCS | Mod: CPTII,S$GLB,, | Performed by: SURGERY

## 2022-07-12 PROCEDURE — 3062F POS MACROALBUMINURIA REV: CPT | Mod: CPTII,S$GLB,, | Performed by: SURGERY

## 2022-07-12 PROCEDURE — 1125F PR PAIN SEVERITY QUANTIFIED, PAIN PRESENT: ICD-10-PCS | Mod: CPTII,S$GLB,, | Performed by: SURGERY

## 2022-07-12 PROCEDURE — 3074F SYST BP LT 130 MM HG: CPT | Mod: CPTII,S$GLB,, | Performed by: SURGERY

## 2022-07-12 PROCEDURE — 1160F RVW MEDS BY RX/DR IN RCRD: CPT | Mod: CPTII,S$GLB,, | Performed by: SURGERY

## 2022-07-12 PROCEDURE — 3078F DIAST BP <80 MM HG: CPT | Mod: CPTII,S$GLB,, | Performed by: SURGERY

## 2022-07-12 PROCEDURE — 3288F FALL RISK ASSESSMENT DOCD: CPT | Mod: CPTII,S$GLB,, | Performed by: SURGERY

## 2022-07-12 PROCEDURE — 3044F PR MOST RECENT HEMOGLOBIN A1C LEVEL <7.0%: ICD-10-PCS | Mod: CPTII,S$GLB,, | Performed by: SURGERY

## 2022-07-12 PROCEDURE — 1101F PR PT FALLS ASSESS DOC 0-1 FALLS W/OUT INJ PAST YR: ICD-10-PCS | Mod: CPTII,S$GLB,, | Performed by: SURGERY

## 2022-07-12 PROCEDURE — 1157F ADVNC CARE PLAN IN RCRD: CPT | Mod: CPTII,S$GLB,, | Performed by: SURGERY

## 2022-07-12 PROCEDURE — 1125F AMNT PAIN NOTED PAIN PRSNT: CPT | Mod: CPTII,S$GLB,, | Performed by: SURGERY

## 2022-07-12 PROCEDURE — 4010F PR ACE/ARB THEARPY RXD/TAKEN: ICD-10-PCS | Mod: CPTII,S$GLB,, | Performed by: SURGERY

## 2022-07-12 PROCEDURE — 1159F MED LIST DOCD IN RCRD: CPT | Mod: CPTII,S$GLB,, | Performed by: SURGERY

## 2022-07-12 PROCEDURE — 3074F PR MOST RECENT SYSTOLIC BLOOD PRESSURE < 130 MM HG: ICD-10-PCS | Mod: CPTII,S$GLB,, | Performed by: SURGERY

## 2022-07-12 PROCEDURE — 1101F PT FALLS ASSESS-DOCD LE1/YR: CPT | Mod: CPTII,S$GLB,, | Performed by: SURGERY

## 2022-07-12 PROCEDURE — 3066F NEPHROPATHY DOC TX: CPT | Mod: CPTII,S$GLB,, | Performed by: SURGERY

## 2022-07-12 PROCEDURE — 3008F BODY MASS INDEX DOCD: CPT | Mod: CPTII,S$GLB,, | Performed by: SURGERY

## 2022-07-12 PROCEDURE — 1160F PR REVIEW ALL MEDS BY PRESCRIBER/CLIN PHARMACIST DOCUMENTED: ICD-10-PCS | Mod: CPTII,S$GLB,, | Performed by: SURGERY

## 2022-07-12 PROCEDURE — 3008F PR BODY MASS INDEX (BMI) DOCUMENTED: ICD-10-PCS | Mod: CPTII,S$GLB,, | Performed by: SURGERY

## 2022-07-12 PROCEDURE — 3288F PR FALLS RISK ASSESSMENT DOCUMENTED: ICD-10-PCS | Mod: CPTII,S$GLB,, | Performed by: SURGERY

## 2022-07-12 NOTE — PROGRESS NOTES
Subjective:       Patient ID: Baldo Carney is a 71 y.o. male.    Chief Complaint:  Right inguinal hernia    HPI:  71 year old male referred to the office with an uncomfortable right inguinal hernia. He has multiple issues currently being further worked up. He has significant heart disease with EF of 20% and significant CAD. He had aortic valve replacement and CABG in 2014. He has notes in the chart recommending further endovascular revascularization.  Patient also has significant ascites from heart disease and liver disease.  He had paracentesis removing 8 L of fluid about 4-5 weeks ago.  Ascites reaccumulated fairly quickly.  He has no obstructive symptoms.  Hernia is uncomfortable with movement.  It is filled with ascites.       Past Medical History:   Diagnosis Date    Asthma     Cardiomyopathy 10/21/2014    CHF (congestive heart failure)     Coronary artery disease     CRF (chronic renal failure)     Diabetes mellitus     Enlarged prostate     Hyperlipidemia     Hypertension     Neuropathy      Past Surgical History:   Procedure Laterality Date    ANGIOGRAPHY OF INTERNAL MAMMARY VESSEL N/A 3/11/2022    Procedure: Angiogram Internal Mammary;  Surgeon: Hank Lujan MD;  Location: [facility] CATH/EP LAB;  Service: Cardiology;  Laterality: N/A;    CARDIAC CATHETERIZATION      CARDIAC VALVE SURGERY      CATHETERIZATION OF BOTH LEFT AND RIGHT HEART Left 3/11/2022    Procedure: CATHETERIZATION, HEART, BOTH LEFT AND RIGHT;  Surgeon: Hank Lujan MD;  Location: [facility] CATH/EP LAB;  Service: Cardiology;  Laterality: Left;    CORONARY ANGIOGRAPHY N/A 3/11/2022    Procedure: ANGIOGRAM, CORONARY ARTERY;  Surgeon: Hank Lujan MD;  Location: [facility] CATH/EP LAB;  Service: Cardiology;  Laterality: N/A;    CORONARY BYPASS GRAFT ANGIOGRAPHY  3/11/2022    Procedure: Bypass graft study;  Surgeon: Hank Lujan MD;  Location: [facility] CATH/EP LAB;  Service: Cardiology;;    CYSTOSCOPY N/A 7/30/2019     Procedure: CYSTOSCOPY;  Surgeon: Spencer Nguyen MD;  Location: [facility] OR;  Service: Urology;  Laterality: N/A;    SHOULDER ARTHROSCOPY  1985    TRANSRECTAL BIOPSY OF PROSTATE WITH ULTRASOUND GUIDANCE N/A 7/30/2019    Procedure: BIOPSY, PROSTATE, RECTAL APPROACH, WITH US GUIDANCE;  Surgeon: Spencer Nguyen MD;  Location: [facility] OR;  Service: Urology;  Laterality: N/A;  procedure not performed, pt unable to tolerate    TRANSRECTAL BIOPSY OF PROSTATE WITH ULTRASOUND GUIDANCE N/A 8/8/2019    Procedure: BIOPSY, PROSTATE, RECTAL APPROACH, WITH US GUIDANCE;  Surgeon: Spencer Nguyen MD;  Location[facility];  Service: Urology;  Laterality: N/A;     Review of patient's allergies indicates:   Allergen Reactions    Invokana  [canagliflozin]      Other reaction(s): been very dizzy     Medication List with Changes/Refills   Current Medications    ARNUITY ELLIPTA 100 MCG/ACTUATION DSDV    Use as directed 1 puff in the mouth or throat daily as needed.     ASPIRIN (ECOTRIN) 81 MG EC TABLET    Take 81 mg by mouth once daily.    ATORVASTATIN (LIPITOR) 40 MG TABLET    Take 1 tablet (40 mg total) by mouth once daily.    AZELASTINE (ASTELIN) 137 MCG (0.1 %) NASAL SPRAY    1 spray (137 mcg total) by Nasal route 2 (two) times daily as needed for Rhinitis.    BUDESONIDE-GLYCOPYR-FORMOTEROL (BREZTRI AEROSPHERE) 160-9-4.8 MCG/ACTUATION HFAA    Inhale into the lungs daily as needed.    CLOPIDOGREL (PLAVIX) 75 MG TABLET    Take 75 mg by mouth once daily.    EMPAGLIFLOZIN (JARDIANCE) 10 MG TABLET    Take 1 tablet (10 mg total) by mouth once daily.    ERGOCALCIFEROL, VITAMIN D2, (VITAMIN D ORAL)    Take 1 capsule by mouth once a week. WEDNESDAYS    FERROUS SULFATE (FEOSOL) 325 MG (65 MG IRON) TAB TABLET    TAKE 1 TABLET (1 EACH TOTAL) BY MOUTH DAILY WITH BREAKFAST. TO BE TAKEN WITH VITAMIN C (ORANGE JUICE) FOR BEST ABSORPTION    FINASTERIDE (PROSCAR) 5 MG TABLET    Take 5 mg by mouth once daily.    FLUTICASONE FUROATE-VILANTEROL (BREO  ELLIPTA) 100-25 MCG/DOSE DISKUS INHALER    Inhale 1 puff into the lungs once daily. (DAILY CONTROLLER)    FLUTICASONE PROPIONATE (FLONASE) 50 MCG/ACTUATION NASAL SPRAY    1 spray (50 mcg total) by Each Nostril route once daily.    FUROSEMIDE (LASIX) 40 MG TABLET    Take by mouth once daily.    GABAPENTIN (NEURONTIN) 300 MG CAPSULE    Take 1 capsule (300 mg total) by mouth every evening.    HYDRALAZINE (APRESOLINE) 50 MG TABLET    Take 1 tablet (50 mg total) by mouth every 8 (eight) hours.    ISOSORBIDE DINITRATE (ISORDIL) 20 MG TABLET    Take 1 tablet (20 mg total) by mouth 3 (three) times daily.    LEVALBUTEROL (XOPENEX) 0.63 MG/3 ML NEBULIZER SOLUTION    Take 3 mLs (0.63 mg total) by nebulization every 4 to 6 hours as needed for Wheezing or Shortness of Breath. Rescue    LINACLOTIDE (LINZESS) 72 MCG CAP CAPSULE    Take 1 capsule (72 mcg total) by mouth before breakfast.    METOPROLOL SUCCINATE (TOPROL-XL) 25 MG 24 HR TABLET    Take 1 tablet (25 mg total) by mouth once daily.    PROMETHAZINE-DEXTROMETHORPHAN (PROMETHAZINE-DM) 6.25-15 MG/5 ML SYRP    Take 5 mLs by mouth every 6 (six) hours as needed (cough and congestion).    SACUBITRIL-VALSARTAN (ENTRESTO) 24-26 MG PER TABLET    Take 1 tablet by mouth 2 (two) times daily.    SPIRONOLACTONE (ALDACTONE) 25 MG TABLET    Take 12.5 mg by mouth once daily.    TAMSULOSIN (FLOMAX) 0.4 MG CAP    TAKE 1 CAPSULE BY MOUTH EVERY DAY    VENTOLIN HFA 90 MCG/ACTUATION INHALER    Inhale 1-2 puffs into the lungs every 4 to 6 hours as needed for Wheezing or Shortness of Breath. (RESCUE)    ZONTIVITY 2.08 MG TAB    Take 1 tablet by mouth once daily.     Family History   Problem Relation Age of Onset    No Known Problems Mother     Diabetes Father     Hypertension Father     Heart attack Father     Heart disease Father     Diabetes Sister     Heart disease Brother     Diabetes Brother     No Known Problems Maternal Grandmother     No Known Problems Maternal Grandfather      No Known Problems Paternal Grandmother     No Known Problems Paternal Grandfather     No Known Problems Maternal Aunt     No Known Problems Maternal Uncle     No Known Problems Paternal Aunt     No Known Problems Paternal Uncle     Anemia Neg Hx     Arrhythmia Neg Hx     Asthma Neg Hx     Clotting disorder Neg Hx     Fainting Neg Hx     Heart failure Neg Hx     Hyperlipidemia Neg Hx     Glaucoma Neg Hx      Social History     Socioeconomic History    Marital status: Legally    Tobacco Use    Smoking status: Former Smoker     Quit date: 1970     Years since quittin.0    Smokeless tobacco: Never Used   Substance and Sexual Activity    Alcohol use: Not Currently     Alcohol/week: 3.0 standard drinks     Types: 3 Cans of beer per week     Comment: social    Drug use: No    Sexual activity: Never         Review of Systems   Constitutional: Negative for appetite change, chills, fever and unexpected weight change.   HENT: Negative for hearing loss, rhinorrhea, sore throat and voice change.    Eyes: Negative for photophobia and visual disturbance.   Respiratory: Positive for shortness of breath. Negative for cough and choking.    Cardiovascular: Negative for chest pain, palpitations and leg swelling.   Gastrointestinal: Positive for abdominal distention and abdominal pain. Negative for blood in stool, constipation, diarrhea, nausea and vomiting.   Endocrine: Negative for cold intolerance, heat intolerance, polydipsia and polyuria.   Musculoskeletal: Negative for arthralgias, back pain, joint swelling and neck stiffness.   Skin: Negative for color change, pallor and rash.   Neurological: Negative for dizziness, seizures, syncope and headaches.   Hematological: Negative for adenopathy. Does not bruise/bleed easily.   Psychiatric/Behavioral: Negative for agitation, behavioral problems and confusion.       Objective:      Physical Exam  Constitutional:       General: He is awake. He is not  in acute distress.  HENT:      Head: Normocephalic and atraumatic.   Pulmonary:      Effort: Pulmonary effort is normal. No tachypnea, bradypnea, accessory muscle usage or respiratory distress.   Abdominal:      General: Abdomen is protuberant. There is distension.      Palpations: There is fluid wave.      Tenderness: There is no abdominal tenderness.      Hernia: A hernia is present.          Comments: Moderate size hernia.  Hernia sac filled with ascites.  Abdomen distended with ascites.    Musculoskeletal:      Cervical back: Neck supple.   Neurological:      Mental Status: He is alert and oriented to person, place, and time.   Psychiatric:         Behavior: Behavior is cooperative.         Assessment/Plan:   Inguinal hernia of right side without obstruction or gangrene  -     Ambulatory referral/consult to General Surgery    Patient has symptomatic right inguinal hernia.  Symptoms now worse due to the tense ascites.  He unfortunately has multiple serious medical issues that make him a very poor surgical candidate.  It appears he has plan for coronary revascularization in the upcoming weeks.  Will defer surgery based on his cardiac clearance.  He would probably benefit from repeat paracentesis to decrease pressure on the belly and hernia.  He has hepatology follow-up.  Follow up with me after cardiology in hepatology visits.

## 2022-07-14 DIAGNOSIS — R18.8 OTHER ASCITES: Primary | ICD-10-CM

## 2022-07-28 ENCOUNTER — DOCUMENTATION ONLY (OUTPATIENT)
Dept: CARDIOLOGY | Facility: CLINIC | Age: 72
End: 2022-07-28
Payer: MEDICARE

## 2022-07-28 ENCOUNTER — OFFICE VISIT (OUTPATIENT)
Dept: CARDIOLOGY | Facility: CLINIC | Age: 72
End: 2022-07-28
Payer: MEDICARE

## 2022-07-28 VITALS
WEIGHT: 186.5 LBS | HEART RATE: 73 BPM | BODY MASS INDEX: 25.26 KG/M2 | OXYGEN SATURATION: 99 % | DIASTOLIC BLOOD PRESSURE: 94 MMHG | HEIGHT: 72 IN | SYSTOLIC BLOOD PRESSURE: 173 MMHG

## 2022-07-28 DIAGNOSIS — I50.22 CHRONIC SYSTOLIC HEART FAILURE: Chronic | ICD-10-CM

## 2022-07-28 DIAGNOSIS — I25.10 CORONARY ARTERY DISEASE INVOLVING NATIVE CORONARY ARTERY OF NATIVE HEART WITHOUT ANGINA PECTORIS: Primary | ICD-10-CM

## 2022-07-28 DIAGNOSIS — Z95.1 S/P CABG (CORONARY ARTERY BYPASS GRAFT): ICD-10-CM

## 2022-07-28 DIAGNOSIS — I35.0 AORTIC STENOSIS, SEVERE: Chronic | ICD-10-CM

## 2022-07-28 DIAGNOSIS — I25.708 CORONARY ARTERY DISEASE OF BYPASS GRAFT OF NATIVE HEART WITH STABLE ANGINA PECTORIS: ICD-10-CM

## 2022-07-28 DIAGNOSIS — Z95.2 S/P AVR (AORTIC VALVE REPLACEMENT): ICD-10-CM

## 2022-07-28 DIAGNOSIS — E78.2 MIXED HYPERLIPIDEMIA: ICD-10-CM

## 2022-07-28 DIAGNOSIS — I10 PRIMARY HYPERTENSION: ICD-10-CM

## 2022-07-28 DIAGNOSIS — I25.5 ISCHEMIC CARDIOMYOPATHY: ICD-10-CM

## 2022-07-28 DIAGNOSIS — I25.5 CARDIOMYOPATHY, ISCHEMIC: Primary | ICD-10-CM

## 2022-07-28 DIAGNOSIS — E11.9 TYPE 2 DIABETES MELLITUS WITHOUT COMPLICATION, WITHOUT LONG-TERM CURRENT USE OF INSULIN: ICD-10-CM

## 2022-07-28 PROCEDURE — 3080F DIAST BP >= 90 MM HG: CPT | Mod: CPTII,S$GLB,, | Performed by: INTERNAL MEDICINE

## 2022-07-28 PROCEDURE — 3062F PR POS MACROALBUMINURIA RESULT DOCUMENTED/REVIEW: ICD-10-PCS | Mod: CPTII,S$GLB,, | Performed by: INTERNAL MEDICINE

## 2022-07-28 PROCEDURE — 99214 OFFICE O/P EST MOD 30 MIN: CPT | Mod: S$GLB,,, | Performed by: INTERNAL MEDICINE

## 2022-07-28 PROCEDURE — 1157F PR ADVANCE CARE PLAN OR EQUIV PRESENT IN MEDICAL RECORD: ICD-10-PCS | Mod: CPTII,S$GLB,, | Performed by: INTERNAL MEDICINE

## 2022-07-28 PROCEDURE — 3044F HG A1C LEVEL LT 7.0%: CPT | Mod: CPTII,S$GLB,, | Performed by: INTERNAL MEDICINE

## 2022-07-28 PROCEDURE — 3008F BODY MASS INDEX DOCD: CPT | Mod: CPTII,S$GLB,, | Performed by: INTERNAL MEDICINE

## 2022-07-28 PROCEDURE — 3077F SYST BP >= 140 MM HG: CPT | Mod: CPTII,S$GLB,, | Performed by: INTERNAL MEDICINE

## 2022-07-28 PROCEDURE — 4010F ACE/ARB THERAPY RXD/TAKEN: CPT | Mod: CPTII,S$GLB,, | Performed by: INTERNAL MEDICINE

## 2022-07-28 PROCEDURE — 1126F PR PAIN SEVERITY QUANTIFIED, NO PAIN PRESENT: ICD-10-PCS | Mod: CPTII,S$GLB,, | Performed by: INTERNAL MEDICINE

## 2022-07-28 PROCEDURE — 99999 PR PBB SHADOW E&M-EST. PATIENT-LVL IV: ICD-10-PCS | Mod: PBBFAC,,, | Performed by: INTERNAL MEDICINE

## 2022-07-28 PROCEDURE — 99999 PR PBB SHADOW E&M-EST. PATIENT-LVL IV: CPT | Mod: PBBFAC,,, | Performed by: INTERNAL MEDICINE

## 2022-07-28 PROCEDURE — 4010F PR ACE/ARB THEARPY RXD/TAKEN: ICD-10-PCS | Mod: CPTII,S$GLB,, | Performed by: INTERNAL MEDICINE

## 2022-07-28 PROCEDURE — 1157F ADVNC CARE PLAN IN RCRD: CPT | Mod: CPTII,S$GLB,, | Performed by: INTERNAL MEDICINE

## 2022-07-28 PROCEDURE — 3008F PR BODY MASS INDEX (BMI) DOCUMENTED: ICD-10-PCS | Mod: CPTII,S$GLB,, | Performed by: INTERNAL MEDICINE

## 2022-07-28 PROCEDURE — 3080F PR MOST RECENT DIASTOLIC BLOOD PRESSURE >= 90 MM HG: ICD-10-PCS | Mod: CPTII,S$GLB,, | Performed by: INTERNAL MEDICINE

## 2022-07-28 PROCEDURE — 99214 PR OFFICE/OUTPT VISIT, EST, LEVL IV, 30-39 MIN: ICD-10-PCS | Mod: S$GLB,,, | Performed by: INTERNAL MEDICINE

## 2022-07-28 PROCEDURE — 1159F MED LIST DOCD IN RCRD: CPT | Mod: CPTII,S$GLB,, | Performed by: INTERNAL MEDICINE

## 2022-07-28 PROCEDURE — 1126F AMNT PAIN NOTED NONE PRSNT: CPT | Mod: CPTII,S$GLB,, | Performed by: INTERNAL MEDICINE

## 2022-07-28 PROCEDURE — 3066F PR DOCUMENTATION OF TREATMENT FOR NEPHROPATHY: ICD-10-PCS | Mod: CPTII,S$GLB,, | Performed by: INTERNAL MEDICINE

## 2022-07-28 PROCEDURE — 3077F PR MOST RECENT SYSTOLIC BLOOD PRESSURE >= 140 MM HG: ICD-10-PCS | Mod: CPTII,S$GLB,, | Performed by: INTERNAL MEDICINE

## 2022-07-28 PROCEDURE — 3044F PR MOST RECENT HEMOGLOBIN A1C LEVEL <7.0%: ICD-10-PCS | Mod: CPTII,S$GLB,, | Performed by: INTERNAL MEDICINE

## 2022-07-28 PROCEDURE — 3062F POS MACROALBUMINURIA REV: CPT | Mod: CPTII,S$GLB,, | Performed by: INTERNAL MEDICINE

## 2022-07-28 PROCEDURE — 3066F NEPHROPATHY DOC TX: CPT | Mod: CPTII,S$GLB,, | Performed by: INTERNAL MEDICINE

## 2022-07-28 PROCEDURE — 1159F PR MEDICATION LIST DOCUMENTED IN MEDICAL RECORD: ICD-10-PCS | Mod: CPTII,S$GLB,, | Performed by: INTERNAL MEDICINE

## 2022-07-28 RX ORDER — DIPHENHYDRAMINE HCL 50 MG
50 CAPSULE ORAL ONCE
Status: CANCELLED | OUTPATIENT
Start: 2022-07-28 | End: 2022-07-28

## 2022-07-28 RX ORDER — SODIUM CHLORIDE 9 MG/ML
INJECTION, SOLUTION INTRAVENOUS CONTINUOUS
Status: CANCELLED | OUTPATIENT
Start: 2022-07-28 | End: 2022-07-28

## 2022-07-28 NOTE — PROGRESS NOTES
Subjective:    Patient ID:  Baldo Carney is a 71 y.o. male who presents for evaluation of   Referring cardiologist: Dr. Lujan      Mr. Carney is a 70 y/o gentleman with HTN, dyslipidemia, and an ICM (10/16/21 TTE demonstrated an LVEF=20%) who presents for evaluation of PCI.  The patient underwent cardiac catheterization at [facility] on 3/11/22 that demonstrated multi-vessel CAD (angiogram on disc).  He reports shortness of breath upon walking 100 feet. The patient reports he can no longer take out his garbage due to GOMEZ and fatigue.  He reports that these symptoms have been present since last Thanksgiving.  The patient also reports dependent lower extremity edema, but denies PND or orthopnea.  He reports PND twice a week on average.  He denies palpitations, syncope, or near syncope.  Of note the patient reports abdominal bloating since his discharge from his 2/18/22 angiogram.     LHC 3/11/2022 [facility]  · There was three vessel coronary artery disease. There was left main disease. There was diffuse calcification with 4 mm left main that bifurcated to LAD and left circumflex. There was distal left main disease extending into the ostium of both the LAD and circumflex. There was also a 30-35% mid left main stenosis.  · The LAD was a large caliber vessel proximally with 80% ostial and proximal stenosis followed by an aneurysmal segment. The mid LAD had no significant disease. The LAD distal to the 2nd diagonal branch was diffusely diseased to the apex with diffuse 80% stenosis. The diagonal branches were free of significant stenosis.  · Left circumflex was a large caliber vessel that gave off 4 obtuse marginal branches before eventually tapering to a small vessel in the AV groove. There was a 90% ostial and proximal stenosis than the normal segment from the mid to distal circumflex. The obtuse marginal branches are all medium to small in size and free of disease. There is a high-grade distal 80% circumflex stenosis at  the takeoff of the 4th OM branch.  · The RCA is diffusely calcified with high-grade proximal stenosis and then is totally occluded. There is some collateral filling of the string like segment in the mid RCA. The distal RCA PDA filled from the saphenous vein graft which is patent. There is a long 80% proximal to mid stenosis in the PDA after the graft anastomosis.  · Moderate pulmonary hypertension with elevated right-sided filling pressures and diminished cardiac output consistent with biventricular failure and severe ischemic cardiomyopathy with a cardiac index of 1.0  · Patent saphenous vein graft to PDA. Lima was medium and slightly tortuous and native and was not used for bypass graft  · Bioprosthetic aortic valve is normal function with no gradient on pullback        Past Medical History:   Diagnosis Date    Asthma     Cardiomyopathy 10/21/2014    CHF (congestive heart failure)     Coronary artery disease     CRF (chronic renal failure)     Diabetes mellitus     Enlarged prostate     Hyperlipidemia     Hypertension     Neuropathy      Past Surgical History:   Procedure Laterality Date    ANGIOGRAPHY OF INTERNAL MAMMARY VESSEL N/A 3/11/2022    Procedure: Angiogram Internal Mammary;  Surgeon: Hank Lujan MD;  Location: [facility] CATH/EP LAB;  Service: Cardiology;  Laterality: N/A;    CARDIAC CATHETERIZATION      CARDIAC VALVE SURGERY      CATHETERIZATION OF BOTH LEFT AND RIGHT HEART Left 3/11/2022    Procedure: CATHETERIZATION, HEART, BOTH LEFT AND RIGHT;  Surgeon: Hank Lujan MD;  Location: [facility] CATH/EP LAB;  Service: Cardiology;  Laterality: Left;    CORONARY ANGIOGRAPHY N/A 3/11/2022    Procedure: ANGIOGRAM, CORONARY ARTERY;  Surgeon: Hank Lujan MD;  Location: [facility] CATH/EP LAB;  Service: Cardiology;  Laterality: N/A;    CORONARY BYPASS GRAFT ANGIOGRAPHY  3/11/2022    Procedure: Bypass graft study;  Surgeon: Hank Lujan MD;  Location: [facility] CATH/EP LAB;  Service: Cardiology;;     CYSTOSCOPY N/A 7/30/2019    Procedure: CYSTOSCOPY;  Surgeon: Spencer Nguyen MD;  Location: [facility] OR;  Service: Urology;  Laterality: N/A;    SHOULDER ARTHROSCOPY  1985    TRANSRECTAL BIOPSY OF PROSTATE WITH ULTRASOUND GUIDANCE N/A 7/30/2019    Procedure: BIOPSY, PROSTATE, RECTAL APPROACH, WITH US GUIDANCE;  Surgeon: Spencer Nguyen MD;  Location: [facility] OR;  Service: Urology;  Laterality: N/A;  procedure not performed, pt unable to tolerate    TRANSRECTAL BIOPSY OF PROSTATE WITH ULTRASOUND GUIDANCE N/A 8/8/2019    Procedure: BIOPSY, PROSTATE, RECTAL APPROACH, WITH US GUIDANCE;  Surgeon: Spencer Nguyen MD;  Location: [facility] OR;  Service: Urology;  Laterality: N/A;     Current Outpatient Medications on File Prior to Visit   Medication Sig Dispense Refill    ARNUITY ELLIPTA 100 mcg/actuation DsDv Use as directed 1 puff in the mouth or throat daily as needed.   6    aspirin (ECOTRIN) 81 MG EC tablet Take 81 mg by mouth once daily.      atorvastatin (LIPITOR) 40 MG tablet Take 1 tablet (40 mg total) by mouth once daily. 90 tablet 1    azelastine (ASTELIN) 137 mcg (0.1 %) nasal spray 1 spray (137 mcg total) by Nasal route 2 (two) times daily as needed for Rhinitis. 30 mL 1    budesonide-glycopyr-formoterol (BREZTRI AEROSPHERE) 160-9-4.8 mcg/actuation HFAA Inhale into the lungs daily as needed.      clopidogreL (PLAVIX) 75 mg tablet Take 75 mg by mouth once daily.      empagliflozin (JARDIANCE) 10 mg tablet Take 1 tablet (10 mg total) by mouth once daily. 90 tablet 1    ergocalciferol, vitamin D2, (VITAMIN D ORAL) Take 1 capsule by mouth once a week. WEDNESDAYS      ferrous sulfate (FEOSOL) 325 mg (65 mg iron) Tab tablet TAKE 1 TABLET (1 EACH TOTAL) BY MOUTH DAILY WITH BREAKFAST. TO BE TAKEN WITH VITAMIN C (ORANGE JUICE) FOR BEST ABSORPTION 90 tablet 0    finasteride (PROSCAR) 5 mg tablet Take 5 mg by mouth once daily.      fluticasone furoate-vilanteroL (BREO ELLIPTA) 100-25 mcg/dose diskus inhaler  Inhale 1 puff into the lungs once daily. (DAILY CONTROLLER) 3 each 3    fluticasone propionate (FLONASE) 50 mcg/actuation nasal spray 1 spray (50 mcg total) by Each Nostril route once daily. 15.8 mL 1    furosemide (LASIX) 40 MG tablet Take by mouth once daily.      gabapentin (NEURONTIN) 300 MG capsule Take 1 capsule (300 mg total) by mouth every evening. 90 capsule 1    hydrALAZINE (APRESOLINE) 50 MG tablet Take 1 tablet (50 mg total) by mouth every 8 (eight) hours. (Patient taking differently: Take 50 mg by mouth every 12 (twelve) hours.) 90 tablet 0    isosorbide dinitrate (ISORDIL) 20 MG tablet Take 1 tablet (20 mg total) by mouth 3 (three) times daily. 90 tablet 0    levalbuterol (XOPENEX) 0.63 mg/3 mL nebulizer solution Take 3 mLs (0.63 mg total) by nebulization every 4 to 6 hours as needed for Wheezing or Shortness of Breath. Rescue 45 mL 0    linaCLOtide (LINZESS) 72 mcg Cap capsule Take 1 capsule (72 mcg total) by mouth before breakfast. 4 capsule 0    metoprolol succinate (TOPROL-XL) 25 MG 24 hr tablet Take 1 tablet (25 mg total) by mouth once daily. 90 tablet 1    sacubitriL-valsartan (ENTRESTO) 24-26 mg per tablet Take 1 tablet by mouth 2 (two) times daily.      spironolactone (ALDACTONE) 25 MG tablet Take 12.5 mg by mouth once daily.      tamsulosin (FLOMAX) 0.4 mg Cap TAKE 1 CAPSULE BY MOUTH EVERY DAY 90 capsule 1    VENTOLIN HFA 90 mcg/actuation inhaler Inhale 1-2 puffs into the lungs every 4 to 6 hours as needed for Wheezing or Shortness of Breath. (RESCUE) 18 g 3    ZONTIVITY 2.08 mg Tab Take 1 tablet by mouth once daily.      promethazine-dextromethorphan (PROMETHAZINE-DM) 6.25-15 mg/5 mL Syrp Take 5 mLs by mouth every 6 (six) hours as needed (cough and congestion). (Patient not taking: Reported on 7/28/2022) 100 mL 0     No current facility-administered medications on file prior to visit.     Review of patient's allergies indicates:   Allergen Reactions    Invokana  [canagliflozin]       Other reaction(s): been very dizzy     .socj  Family History   Problem Relation Age of Onset    No Known Problems Mother     Diabetes Father     Hypertension Father     Heart attack Father     Heart disease Father     Diabetes Sister     Heart disease Brother     Diabetes Brother     No Known Problems Maternal Grandmother     No Known Problems Maternal Grandfather     No Known Problems Paternal Grandmother     No Known Problems Paternal Grandfather     No Known Problems Maternal Aunt     No Known Problems Maternal Uncle     No Known Problems Paternal Aunt     No Known Problems Paternal Uncle     Anemia Neg Hx     Arrhythmia Neg Hx     Asthma Neg Hx     Clotting disorder Neg Hx     Fainting Neg Hx     Heart failure Neg Hx     Hyperlipidemia Neg Hx     Glaucoma Neg Hx      Review of Systems   Constitutional: Negative for decreased appetite, diaphoresis, fever, malaise/fatigue, weight gain and weight loss.   HENT: Negative for congestion, nosebleeds and sore throat.    Eyes: Negative for blurred vision, vision loss in left eye, vision loss in right eye and visual disturbance.   Cardiovascular: Positive for dyspnea on exertion and leg swelling. Negative for chest pain, claudication, near-syncope, orthopnea, palpitations, paroxysmal nocturnal dyspnea and syncope.   Respiratory: Negative for cough, hemoptysis, shortness of breath and wheezing.    Endocrine: Negative for polyuria.   Hematologic/Lymphatic: Does not bruise/bleed easily.   Skin: Negative for nail changes and rash.   Musculoskeletal: Negative for back pain, muscle cramps and myalgias.   Gastrointestinal: Negative for abdominal pain, change in bowel habit, diarrhea, heartburn, hematemesis, hematochezia, melena, nausea and vomiting.   Genitourinary: Negative for bladder incontinence, dysuria, frequency and hematuria.   Psychiatric/Behavioral: Negative for depression.   Allergic/Immunologic: Negative for hives.         Objective:  Vitals:    07/28/22 1309 07/28/22 1312   BP: (!) 177/93 (!) 173/94   BP Location: Right arm Left arm   Patient Position: Sitting Sitting   BP Method: Large (Automatic) Large (Automatic)   Pulse: 73 73   SpO2: 99%    Weight: 84.6 kg (186 lb 8.2 oz)    Height: 6' (1.829 m)          Physical Exam  Constitutional:       Appearance: Normal appearance. He is well-developed.   HENT:      Head: Normocephalic and atraumatic.   Eyes:      Pupils: Pupils are equal, round, and reactive to light.   Neck:      Thyroid: No thyromegaly.      Vascular: No JVD.   Cardiovascular:      Rate and Rhythm: Normal rate and regular rhythm.      Chest Wall: PMI is displaced.      Pulses:           Carotid pulses are 2+ on the right side and 2+ on the left side.       Radial pulses are 2+ on the right side and 2+ on the left side.        Femoral pulses are 2+ on the right side and 2+ on the left side.       Dorsalis pedis pulses are 2+ on the right side and 0 on the left side.        Posterior tibial pulses are 1+ on the right side and 0 on the left side.      Heart sounds: Normal heart sounds. No murmur heard.    No friction rub. No gallop.      Comments: Bilateral dp and pt present by doppler  Pulmonary:      Effort: Pulmonary effort is normal.      Breath sounds: Normal breath sounds. No wheezing, rhonchi or rales.   Abdominal:      General: Bowel sounds are normal. There is distension.      Palpations: Abdomen is soft.      Tenderness: There is no abdominal tenderness. There is no guarding.   Musculoskeletal:      Cervical back: Neck supple.   Skin:     General: Skin is warm and dry.      Findings: No erythema.   Neurological:      Mental Status: He is alert and oriented to person, place, and time.      Gait: Gait normal.           Assessment:       1. Cardiomyopathy, ischemic    2. Coronary artery disease of bypass graft of native heart with stable angina pectoris    3. Aortic stenosis status post AVR    4. Ischemic  cardiomyopathy    5. Chronic systolic heart failure    6. Mixed hyperlipidemia    7. Primary hypertension    8. S/P AVR (aortic valve replacement)    9. S/P CABG (coronary artery bypass graft)    10. Type 2 diabetes mellitus without complication, without long-term current use of insulin         Plan:       1).  CAD.  The patient has an ICM and by reports multi-vessel CAD.  I discussed multi-vessel PCI including LM rota-PCI with the patient with RHC and MCS, but explained that I need to review his actual angiogram from [facility] [place].  [place] [facility] [place]  I also discussed the need for long term DAPT after mult-vessel PCI and the associated bleeding risk. The patient agreed to proceed. I discussed the Protect 4 trial and the patient declined enrollment in the trial.  -continue EC ASA 81mg po qday  -continue Plavix 75mg po qday  -continue Toprol XL 25mg po qday  -bilateral CFA access and R IJV access  The risks, benefits, and alternatives of cardiac catheterization and possible intervention were discussed with the patient.  All questions were answered and informed consent was obtained.    2) ICM.  The patient reports NYHA class 3 symptoms;5/24/22 TTE reveiwed; LVEF=20% with moderate RV dysfunction  -continue Toprol Xl 25mg po qday  -continue Entresto 24-26mg po BID  -continue spironolactone 25mg po qday  -continue isordil 20mg po TID  -continue hydralazine 50mg po TID  -continue Farxiga 5mg po qday  -continue lasix 40mg po qday    3) HTN. Blood pressure inadequately controlled in clinic today  -reasess during subsequent clinic visits    4) Dyslipidemia. Continue Lipitor 40mg po qday    5) Anemia. 3/8/22 Hgb =10.8; MCV=86  -reasses priro to cardiac cath    6) DM2. rec tight glycemic control    7) PAD.  The patient has an abnormal vascualr examl suspect right groin massin a hernia; 5/24/22 right groin duplex reveiwed;     8) s.p AVR. TTE as above    9) Transaminates. Noted on reviewing labs going back to  12/26/21; differential diagnosis includes hepatic congestion  -check CMP; if LFT's remain elevated then will refer to hepatology    6)CKD3a; check Cr prior to cath; rec oral hydration the day prior to cath and IV hydration on the day of cath    All of the patient's questions were answered.          -

## 2022-08-05 ENCOUNTER — HOSPITAL ENCOUNTER (OUTPATIENT)
Dept: INTERVENTIONAL RADIOLOGY/VASCULAR | Facility: HOSPITAL | Age: 72
Discharge: HOME | End: 2022-08-05
Attending: INTERNAL MEDICINE
Payer: MEDICARE

## 2022-08-05 VITALS
HEART RATE: 62 BPM | SYSTOLIC BLOOD PRESSURE: 150 MMHG | OXYGEN SATURATION: 100 % | DIASTOLIC BLOOD PRESSURE: 78 MMHG | RESPIRATION RATE: 16 BRPM

## 2022-08-05 DIAGNOSIS — R18.8 OTHER ASCITES: ICD-10-CM

## 2022-08-05 LAB
ALBUMIN FLD-MCNC: 1.6 G/DL
APPEARANCE FLD: CLEAR
BODY FLD TYPE: NORMAL
COLOR FLD: YELLOW
LYMPHOCYTES NFR FLD MANUAL: 80 %
MONOS+MACROS NFR FLD MANUAL: 13 %
NEUTROPHILS NFR FLD MANUAL: 7 %
PROT FLD-MCNC: 3.7 G/DL
SPECIMEN SOURCE: NORMAL
SPECIMEN SOURCE: NORMAL
WBC # FLD: 135 /CU MM

## 2022-08-05 PROCEDURE — 82042 OTHER SOURCE ALBUMIN QUAN EA: CPT | Performed by: INTERNAL MEDICINE

## 2022-08-05 PROCEDURE — 63600175 PHARM REV CODE 636 W HCPCS: Performed by: RADIOLOGY

## 2022-08-05 PROCEDURE — C1729 CATH, DRAINAGE: HCPCS

## 2022-08-05 PROCEDURE — P9047 ALBUMIN (HUMAN), 25%, 50ML: HCPCS | Performed by: RADIOLOGY

## 2022-08-05 PROCEDURE — 49083 IR PARACENTESIS WITH IMAGING: ICD-10-PCS | Mod: ,,, | Performed by: RADIOLOGY

## 2022-08-05 PROCEDURE — 89051 BODY FLUID CELL COUNT: CPT | Performed by: INTERNAL MEDICINE

## 2022-08-05 PROCEDURE — 84157 ASSAY OF PROTEIN OTHER: CPT | Performed by: INTERNAL MEDICINE

## 2022-08-05 PROCEDURE — 49083 ABD PARACENTESIS W/IMAGING: CPT | Performed by: RADIOLOGY

## 2022-08-05 RX ORDER — ALBUMIN HUMAN 250 G/1000ML
25 SOLUTION INTRAVENOUS ONCE
Status: COMPLETED | OUTPATIENT
Start: 2022-08-05 | End: 2022-08-05

## 2022-08-05 RX ADMIN — ALBUMIN (HUMAN) 25 G: 12.5 SOLUTION INTRAVENOUS at 10:08

## 2022-08-05 NOTE — NURSING
Ultrasound guided paracentesis performed by Dr. Ferro; procedure explained and consent obtained by Radiologist. Time out performed 11 L removed- Patient received 50g of albumin IV- VS remained stable for duration of procedure. Specimens collected and sent to lab if ordered. Para and IV d/c'd, with tip intact. Access site cleaned with peroxide, derma bond and steri-strips applied, then covered with sterile Band-Aid. Pt discharge home in stable condition.

## 2022-08-16 ENCOUNTER — TELEPHONE (OUTPATIENT)
Dept: FAMILY MEDICINE | Facility: CLINIC | Age: 72
End: 2022-08-16

## 2022-08-16 NOTE — TELEPHONE ENCOUNTER
Patient left a message for pain medication due to right hernia.   Please see Dr Damon office visit note

## 2022-08-17 NOTE — TELEPHONE ENCOUNTER
Spoke to patient.   Per Millie Hayes He is currently being worked up by Cardiology for some chronic and severe conditions.  I do not feel comfortable prescribing narcotics at this time.  Verbalizes understanding

## 2022-08-22 ENCOUNTER — OFFICE VISIT (OUTPATIENT)
Dept: FAMILY MEDICINE | Facility: CLINIC | Age: 72
End: 2022-08-22
Payer: MEDICARE

## 2022-08-22 VITALS
BODY MASS INDEX: 24.43 KG/M2 | WEIGHT: 180.38 LBS | SYSTOLIC BLOOD PRESSURE: 132 MMHG | DIASTOLIC BLOOD PRESSURE: 72 MMHG | HEART RATE: 70 BPM | OXYGEN SATURATION: 99 % | HEIGHT: 72 IN

## 2022-08-22 DIAGNOSIS — E11.9 TYPE 2 DIABETES MELLITUS WITHOUT COMPLICATION, WITHOUT LONG-TERM CURRENT USE OF INSULIN: Primary | ICD-10-CM

## 2022-08-22 DIAGNOSIS — K40.90 RIGHT INGUINAL HERNIA: ICD-10-CM

## 2022-08-22 DIAGNOSIS — I10 PRIMARY HYPERTENSION: ICD-10-CM

## 2022-08-22 LAB — HBA1C MFR BLD: 5.3 %

## 2022-08-22 PROCEDURE — 3288F PR FALLS RISK ASSESSMENT DOCUMENTED: ICD-10-PCS | Mod: CPTII,S$GLB,, | Performed by: NURSE PRACTITIONER

## 2022-08-22 PROCEDURE — 1160F RVW MEDS BY RX/DR IN RCRD: CPT | Mod: CPTII,S$GLB,, | Performed by: NURSE PRACTITIONER

## 2022-08-22 PROCEDURE — 4010F PR ACE/ARB THEARPY RXD/TAKEN: ICD-10-PCS | Mod: CPTII,S$GLB,, | Performed by: NURSE PRACTITIONER

## 2022-08-22 PROCEDURE — 4010F ACE/ARB THERAPY RXD/TAKEN: CPT | Mod: CPTII,S$GLB,, | Performed by: NURSE PRACTITIONER

## 2022-08-22 PROCEDURE — 3075F SYST BP GE 130 - 139MM HG: CPT | Mod: CPTII,S$GLB,, | Performed by: NURSE PRACTITIONER

## 2022-08-22 PROCEDURE — 1125F PR PAIN SEVERITY QUANTIFIED, PAIN PRESENT: ICD-10-PCS | Mod: CPTII,S$GLB,, | Performed by: NURSE PRACTITIONER

## 2022-08-22 PROCEDURE — 3078F DIAST BP <80 MM HG: CPT | Mod: CPTII,S$GLB,, | Performed by: NURSE PRACTITIONER

## 2022-08-22 PROCEDURE — 83036 HEMOGLOBIN GLYCOSYLATED A1C: CPT | Mod: QW,S$GLB,, | Performed by: NURSE PRACTITIONER

## 2022-08-22 PROCEDURE — 3044F HG A1C LEVEL LT 7.0%: CPT | Mod: CPTII,S$GLB,, | Performed by: NURSE PRACTITIONER

## 2022-08-22 PROCEDURE — 1125F AMNT PAIN NOTED PAIN PRSNT: CPT | Mod: CPTII,S$GLB,, | Performed by: NURSE PRACTITIONER

## 2022-08-22 PROCEDURE — 83036 POCT HEMOGLOBIN A1C: ICD-10-PCS | Mod: QW,S$GLB,, | Performed by: NURSE PRACTITIONER

## 2022-08-22 PROCEDURE — 1159F PR MEDICATION LIST DOCUMENTED IN MEDICAL RECORD: ICD-10-PCS | Mod: CPTII,S$GLB,, | Performed by: NURSE PRACTITIONER

## 2022-08-22 PROCEDURE — 3008F PR BODY MASS INDEX (BMI) DOCUMENTED: ICD-10-PCS | Mod: CPTII,S$GLB,, | Performed by: NURSE PRACTITIONER

## 2022-08-22 PROCEDURE — 1101F PR PT FALLS ASSESS DOC 0-1 FALLS W/OUT INJ PAST YR: ICD-10-PCS | Mod: CPTII,S$GLB,, | Performed by: NURSE PRACTITIONER

## 2022-08-22 PROCEDURE — 99214 PR OFFICE/OUTPT VISIT, EST, LEVL IV, 30-39 MIN: ICD-10-PCS | Mod: S$GLB,,, | Performed by: NURSE PRACTITIONER

## 2022-08-22 PROCEDURE — 99214 OFFICE O/P EST MOD 30 MIN: CPT | Mod: S$GLB,,, | Performed by: NURSE PRACTITIONER

## 2022-08-22 PROCEDURE — 3075F PR MOST RECENT SYSTOLIC BLOOD PRESS GE 130-139MM HG: ICD-10-PCS | Mod: CPTII,S$GLB,, | Performed by: NURSE PRACTITIONER

## 2022-08-22 PROCEDURE — 1101F PT FALLS ASSESS-DOCD LE1/YR: CPT | Mod: CPTII,S$GLB,, | Performed by: NURSE PRACTITIONER

## 2022-08-22 PROCEDURE — 3288F FALL RISK ASSESSMENT DOCD: CPT | Mod: CPTII,S$GLB,, | Performed by: NURSE PRACTITIONER

## 2022-08-22 PROCEDURE — 3044F PR MOST RECENT HEMOGLOBIN A1C LEVEL <7.0%: ICD-10-PCS | Mod: CPTII,S$GLB,, | Performed by: NURSE PRACTITIONER

## 2022-08-22 PROCEDURE — 1157F PR ADVANCE CARE PLAN OR EQUIV PRESENT IN MEDICAL RECORD: ICD-10-PCS | Mod: CPTII,S$GLB,, | Performed by: NURSE PRACTITIONER

## 2022-08-22 PROCEDURE — 3078F PR MOST RECENT DIASTOLIC BLOOD PRESSURE < 80 MM HG: ICD-10-PCS | Mod: CPTII,S$GLB,, | Performed by: NURSE PRACTITIONER

## 2022-08-22 PROCEDURE — 1159F MED LIST DOCD IN RCRD: CPT | Mod: CPTII,S$GLB,, | Performed by: NURSE PRACTITIONER

## 2022-08-22 PROCEDURE — 1157F ADVNC CARE PLAN IN RCRD: CPT | Mod: CPTII,S$GLB,, | Performed by: NURSE PRACTITIONER

## 2022-08-22 PROCEDURE — 3062F PR POS MACROALBUMINURIA RESULT DOCUMENTED/REVIEW: ICD-10-PCS | Mod: CPTII,S$GLB,, | Performed by: NURSE PRACTITIONER

## 2022-08-22 PROCEDURE — 3062F POS MACROALBUMINURIA REV: CPT | Mod: CPTII,S$GLB,, | Performed by: NURSE PRACTITIONER

## 2022-08-22 PROCEDURE — 3066F NEPHROPATHY DOC TX: CPT | Mod: CPTII,S$GLB,, | Performed by: NURSE PRACTITIONER

## 2022-08-22 PROCEDURE — 1160F PR REVIEW ALL MEDS BY PRESCRIBER/CLIN PHARMACIST DOCUMENTED: ICD-10-PCS | Mod: CPTII,S$GLB,, | Performed by: NURSE PRACTITIONER

## 2022-08-22 PROCEDURE — 3008F BODY MASS INDEX DOCD: CPT | Mod: CPTII,S$GLB,, | Performed by: NURSE PRACTITIONER

## 2022-08-22 PROCEDURE — 3066F PR DOCUMENTATION OF TREATMENT FOR NEPHROPATHY: ICD-10-PCS | Mod: CPTII,S$GLB,, | Performed by: NURSE PRACTITIONER

## 2022-08-22 RX ORDER — TRAMADOL HYDROCHLORIDE 50 MG/1
50 TABLET ORAL EVERY 8 HOURS PRN
Qty: 21 TABLET | Refills: 0 | Status: ON HOLD | OUTPATIENT
Start: 2022-08-22 | End: 2023-12-08 | Stop reason: HOSPADM

## 2022-08-22 RX ORDER — DAPAGLIFLOZIN 5 MG/1
5 TABLET, FILM COATED ORAL DAILY
COMMUNITY
Start: 2022-06-17 | End: 2023-12-26

## 2022-08-23 ENCOUNTER — TELEPHONE (OUTPATIENT)
Dept: FAMILY MEDICINE | Facility: CLINIC | Age: 72
End: 2022-08-23
Payer: MEDICARE

## 2022-08-23 NOTE — PROGRESS NOTES
SUBJECTIVE:      Patient ID: Baldo Carney is a 71 y.o. male.    Chief Complaint: Diabetes (4 month f/u DM and Lab Review)    Presents for problem visit    Discussed outstanding health maintenance     Diabetes  He presents for his follow-up diabetic visit. He has type 2 diabetes mellitus. No MedicAlert identification noted. His disease course has been stable. There are no hypoglycemic associated symptoms. Pertinent negatives for hypoglycemia include no confusion, dizziness, headaches, nervousness/anxiousness or pallor. Pertinent negatives for diabetes include no chest pain, no fatigue, no foot paresthesias, no polydipsia, no polyuria and no weakness. There are no hypoglycemic complications. Symptoms are stable. Diabetic complications include heart disease, impotence, peripheral neuropathy and retinopathy. Risk factors for coronary artery disease include diabetes mellitus, dyslipidemia, family history, hypertension, male sex, sedentary lifestyle and stress. Current diabetic treatment includes oral agent (monotherapy). He is compliant with treatment all of the time. His weight is fluctuating minimally. He is following a generally healthy diet. He has not had a previous visit with a dietitian. He never participates in exercise. An ACE inhibitor/angiotensin II receptor blocker is being taken. He does not see a podiatrist.Eye exam is not current.   Hypertension  This is a chronic problem. The current episode started more than 1 year ago. The problem is controlled. Associated symptoms include shortness of breath (at times). Pertinent negatives include no chest pain, headaches, neck pain or peripheral edema. Risk factors for coronary artery disease include male gender, sedentary lifestyle, diabetes mellitus, dyslipidemia, family history and stress. Past treatments include calcium channel blockers, diuretics, direct vasodilators and angiotensin blockers. The current treatment provides mild improvement. Compliance  problems include exercise and diet.  Hypertensive end-organ damage includes CAD/MI and retinopathy. Identifiable causes of hypertension include chronic renal disease, a hypertension causing med and renovascular disease.   Groin Pain  The patient's primary symptoms include pelvic pain. This is a chronic problem. The current episode started more than 1 month ago. The problem occurs constantly. The problem has been unchanged. The pain is severe. Associated symptoms include abdominal pain, anorexia, nausea and shortness of breath (at times). Pertinent negatives include no chest pain, constipation, coughing, diarrhea, dysuria, fever, flank pain, frequency, headaches, rash, sore throat, urgency or vomiting. The symptoms are aggravated by activity, heavy lifting and tactile pressure. He has tried rest and prescription analgesics for the symptoms. The treatment provided mild relief. He is not sexually active. His past medical history is significant for BPH, erectile dysfunction and an inguinal hernia.       Past Surgical History:   Procedure Laterality Date    ANGIOGRAPHY OF INTERNAL MAMMARY VESSEL N/A 3/11/2022    Procedure: Angiogram Internal Mammary;  Surgeon: Hank Lujan MD;  Location: [facility] CATH/EP LAB;  Service: Cardiology;  Laterality: N/A;    CARDIAC CATHETERIZATION      CARDIAC VALVE SURGERY      CATHETERIZATION OF BOTH LEFT AND RIGHT HEART Left 3/11/2022    Procedure: CATHETERIZATION, HEART, BOTH LEFT AND RIGHT;  Surgeon: Hank Lujan MD;  Location: [facility] CATH/EP LAB;  Service: Cardiology;  Laterality: Left;    CORONARY ANGIOGRAPHY N/A 3/11/2022    Procedure: ANGIOGRAM, CORONARY ARTERY;  Surgeon: Hank Lujan MD;  Location: [facility] CATH/EP LAB;  Service: Cardiology;  Laterality: N/A;    CORONARY BYPASS GRAFT ANGIOGRAPHY  3/11/2022    Procedure: Bypass graft study;  Surgeon: Hank Lujan MD;  Location: [facility] CATH/EP LAB;  Service: Cardiology;;    CYSTOSCOPY N/A 7/30/2019    Procedure: CYSTOSCOPY;   Surgeon: Spencer Nguyen MD;  Location: [facility] OR;  Service: Urology;  Laterality: N/A;    SHOULDER ARTHROSCOPY  1985    TRANSRECTAL BIOPSY OF PROSTATE WITH ULTRASOUND GUIDANCE N/A 2019    Procedure: BIOPSY, PROSTATE, RECTAL APPROACH, WITH US GUIDANCE;  Surgeon: Spencer Nguyen MD;  Location: [facility] OR;  Service: Urology;  Laterality: N/A;  procedure not performed, pt unable to tolerate    TRANSRECTAL BIOPSY OF PROSTATE WITH ULTRASOUND GUIDANCE N/A 2019    Procedure: BIOPSY, PROSTATE, RECTAL APPROACH, WITH US GUIDANCE;  Surgeon: Spencer Nguyen MD;  Location: [facility] OR;  Service: Urology;  Laterality: N/A;     Family History   Problem Relation Age of Onset    No Known Problems Mother     Diabetes Father     Hypertension Father     Heart attack Father     Heart disease Father     Diabetes Sister     Heart disease Brother     Diabetes Brother     No Known Problems Maternal Grandmother     No Known Problems Maternal Grandfather     No Known Problems Paternal Grandmother     No Known Problems Paternal Grandfather     No Known Problems Maternal Aunt     No Known Problems Maternal Uncle     No Known Problems Paternal Aunt     No Known Problems Paternal Uncle     Anemia Neg Hx     Arrhythmia Neg Hx     Asthma Neg Hx     Clotting disorder Neg Hx     Fainting Neg Hx     Heart failure Neg Hx     Hyperlipidemia Neg Hx     Glaucoma Neg Hx       Social History     Socioeconomic History    Marital status: Legally    Tobacco Use    Smoking status: Former Smoker     Quit date: 1970     Years since quittin.2    Smokeless tobacco: Never Used   Substance and Sexual Activity    Alcohol use: Not Currently     Alcohol/week: 3.0 standard drinks     Types: 3 Cans of beer per week     Comment: social    Drug use: No    Sexual activity: Never     Current Outpatient Medications   Medication Sig Dispense Refill    ARNUITY ELLIPTA 100 mcg/actuation DsDv Use as directed 1 puff in  the mouth or throat daily as needed.   6    aspirin (ECOTRIN) 81 MG EC tablet Take 81 mg by mouth once daily.      atorvastatin (LIPITOR) 40 MG tablet TAKE 1 TABLET BY MOUTH EVERY DAY 90 tablet 0    azelastine (ASTELIN) 137 mcg (0.1 %) nasal spray 1 spray (137 mcg total) by Nasal route 2 (two) times daily as needed for Rhinitis. 30 mL 1    budesonide-glycopyr-formoterol (BREZTRI AEROSPHERE) 160-9-4.8 mcg/actuation HFAA Inhale into the lungs daily as needed.      clopidogreL (PLAVIX) 75 mg tablet Take 75 mg by mouth once daily.      empagliflozin (JARDIANCE) 10 mg tablet Take 1 tablet (10 mg total) by mouth once daily. 90 tablet 1    ergocalciferol, vitamin D2, (VITAMIN D ORAL) Take 1 capsule by mouth once a week. WEDNESDAYS      FARXIGA 5 mg Tab tablet Take 5 mg by mouth once daily.      ferrous sulfate (FEOSOL) 325 mg (65 mg iron) Tab tablet TAKE 1 TABLET (1 EACH TOTAL) BY MOUTH DAILY WITH BREAKFAST. TO BE TAKEN WITH VITAMIN C (ORANGE JUICE) FOR BEST ABSORPTION 90 tablet 0    finasteride (PROSCAR) 5 mg tablet Take 5 mg by mouth once daily.      fluticasone furoate-vilanteroL (BREO ELLIPTA) 100-25 mcg/dose diskus inhaler Inhale 1 puff into the lungs once daily. (DAILY CONTROLLER) 3 each 3    fluticasone propionate (FLONASE) 50 mcg/actuation nasal spray 1 spray (50 mcg total) by Each Nostril route once daily. 15.8 mL 1    furosemide (LASIX) 40 MG tablet Take by mouth once daily.      gabapentin (NEURONTIN) 300 MG capsule Take 1 capsule (300 mg total) by mouth every evening. 90 capsule 1    levalbuterol (XOPENEX) 0.63 mg/3 mL nebulizer solution Take 3 mLs (0.63 mg total) by nebulization every 4 to 6 hours as needed for Wheezing or Shortness of Breath. Rescue 45 mL 0    linaCLOtide (LINZESS) 72 mcg Cap capsule Take 1 capsule (72 mcg total) by mouth before breakfast. 4 capsule 0    metoprolol succinate (TOPROL-XL) 25 MG 24 hr tablet Take 1 tablet (25 mg total) by mouth once daily. 90 tablet 1     promethazine-dextromethorphan (PROMETHAZINE-DM) 6.25-15 mg/5 mL Syrp Take 5 mLs by mouth every 6 (six) hours as needed (cough and congestion). 100 mL 0    sacubitriL-valsartan (ENTRESTO) 24-26 mg per tablet Take 1 tablet by mouth 2 (two) times daily.      spironolactone (ALDACTONE) 25 MG tablet Take 12.5 mg by mouth once daily.      tamsulosin (FLOMAX) 0.4 mg Cap TAKE 1 CAPSULE BY MOUTH EVERY DAY 90 capsule 1    VENTOLIN HFA 90 mcg/actuation inhaler Inhale 1-2 puffs into the lungs every 4 to 6 hours as needed for Wheezing or Shortness of Breath. (RESCUE) 18 g 3    ZONTIVITY 2.08 mg Tab Take 1 tablet by mouth once daily.      hydrALAZINE (APRESOLINE) 50 MG tablet Take 1 tablet (50 mg total) by mouth every 8 (eight) hours. (Patient taking differently: Take 50 mg by mouth every 12 (twelve) hours.) 90 tablet 0    isosorbide dinitrate (ISORDIL) 20 MG tablet Take 1 tablet (20 mg total) by mouth 3 (three) times daily. 90 tablet 0    traMADoL (ULTRAM) 50 mg tablet Take 1 tablet (50 mg total) by mouth every 8 (eight) hours as needed for Pain. 21 tablet 0     No current facility-administered medications for this visit.     Review of patient's allergies indicates:  No Known Allergies   Past Medical History:   Diagnosis Date    Asthma     Cardiomyopathy 10/21/2014    CHF (congestive heart failure)     Coronary artery disease     CRF (chronic renal failure)     Diabetes mellitus     Enlarged prostate     Hyperlipidemia     Hypertension     Neuropathy      Past Surgical History:   Procedure Laterality Date    ANGIOGRAPHY OF INTERNAL MAMMARY VESSEL N/A 3/11/2022    Procedure: Angiogram Internal Mammary;  Surgeon: Hank Lujan MD;  Location: [facility] CATH/EP LAB;  Service: Cardiology;  Laterality: N/A;    CARDIAC CATHETERIZATION      CARDIAC VALVE SURGERY      CATHETERIZATION OF BOTH LEFT AND RIGHT HEART Left 3/11/2022    Procedure: CATHETERIZATION, HEART, BOTH LEFT AND RIGHT;  Surgeon: Hank Lujan MD;   "Location: [facility] CATH/EP LAB;  Service: Cardiology;  Laterality: Left;    CORONARY ANGIOGRAPHY N/A 3/11/2022    Procedure: ANGIOGRAM, CORONARY ARTERY;  Surgeon: Hank Lujan MD;  Location: [facility] CATH/EP LAB;  Service: Cardiology;  Laterality: N/A;    CORONARY BYPASS GRAFT ANGIOGRAPHY  3/11/2022    Procedure: Bypass graft study;  Surgeon: Hank Lujan MD;  Location: [facility] CATH/EP LAB;  Service: Cardiology;;    CYSTOSCOPY N/A 7/30/2019    Procedure: CYSTOSCOPY;  Surgeon: Spencer Nguyen MD;  Location: [facility];  Service: Urology;  Laterality: N/A;    SHOULDER ARTHROSCOPY  1985    TRANSRECTAL BIOPSY OF PROSTATE WITH ULTRASOUND GUIDANCE N/A 7/30/2019    Procedure: BIOPSY, PROSTATE, RECTAL APPROACH, WITH US GUIDANCE;  Surgeon: Spencer Nguyen MD;  Location: [facility];  Service: Urology;  Laterality: N/A;  procedure not performed, pt unable to tolerate    TRANSRECTAL BIOPSY OF PROSTATE WITH ULTRASOUND GUIDANCE N/A 8/8/2019    Procedure: BIOPSY, PROSTATE, RECTAL APPROACH, WITH US GUIDANCE;  Surgeon: Spencer Nguyen MD;  Location: [facility] OR;  Service: Urology;  Laterality: N/A;       Review of Systems   Constitutional: Positive for activity change. Negative for appetite change, fatigue and fever.   HENT: Negative for congestion, ear pain, hearing loss, postnasal drip, sinus pressure, sinus pain, sneezing and sore throat.    Eyes: Negative for photophobia and pain.   Respiratory: Positive for shortness of breath (at times). Negative for cough, chest tightness and wheezing.    Cardiovascular: Negative for chest pain and leg swelling.        Follows with Dr. Lujan & Dr. Jensen, scheduled for PCI   Gastrointestinal: Positive for abdominal distention (has been getting "fluid drained" off stomach), abdominal pain, anorexia and nausea. Negative for blood in stool, constipation, diarrhea and vomiting.        Following with Dr. Ferro for ascites   Endocrine: Negative for cold intolerance, heat intolerance, " polydipsia and polyuria.   Genitourinary: Positive for impotence and pelvic pain. Negative for difficulty urinating, dysuria, flank pain, frequency, hematuria and urgency.   Musculoskeletal: Positive for myalgias (R groin tenderness, unable to have hernia surgery with heart & ascites issues). Negative for arthralgias, back pain, joint swelling and neck pain.   Skin: Negative for pallor and rash.   Allergic/Immunologic: Positive for environmental allergies. Negative for food allergies.   Neurological: Negative for dizziness, weakness, light-headedness and headaches.   Hematological: Does not bruise/bleed easily.   Psychiatric/Behavioral: Positive for dysphoric mood and sleep disturbance (d/t pain). Negative for confusion and decreased concentration. The patient is not nervous/anxious.       OBJECTIVE:      Vitals:    08/22/22 1001   BP: 132/72   BP Location: Right arm   Patient Position: Sitting   BP Method: Medium (Manual)   Pulse: 70   SpO2: 99%   Weight: 81.8 kg (180 lb 6.4 oz)   Height: 6' (1.829 m)     Physical Exam  Vitals and nursing note reviewed.   Constitutional:       General: He is not in acute distress.     Appearance: Normal appearance. He is well-developed, well-groomed and normal weight.      Comments: 6# loss since April visit   HENT:      Head: Normocephalic and atraumatic.      Right Ear: Hearing normal.      Left Ear: Hearing normal.      Nose: Nose normal. No rhinorrhea.   Eyes:      General: Lids are normal.         Right eye: No discharge.         Left eye: No discharge.      Conjunctiva/sclera: Conjunctivae normal.      Right eye: Right conjunctiva is not injected.      Left eye: Left conjunctiva is not injected.      Pupils: Pupils are equal, round, and reactive to light. Pupils are equal.      Right eye: Pupil is round and reactive.      Left eye: Pupil is round and reactive.   Neck:      Thyroid: No thyromegaly.      Vascular: No JVD.      Trachea: Trachea normal. No tracheal deviation.    Cardiovascular:      Rate and Rhythm: Normal rate and regular rhythm.      Pulses:           Radial pulses are 2+ on the right side and 2+ on the left side.        Dorsalis pedis pulses are 2+ on the right side and 2+ on the left side.        Posterior tibial pulses are 2+ on the right side and 2+ on the left side.      Heart sounds: Normal heart sounds. No murmur heard.    No friction rub. No gallop.   Pulmonary:      Effort: Pulmonary effort is normal. No respiratory distress.      Breath sounds: Normal breath sounds. No stridor. No decreased breath sounds, wheezing, rhonchi or rales.   Abdominal:      General: Abdomen is protuberant. Bowel sounds are normal. There is distension.      Palpations: Abdomen is soft. Abdomen is not rigid.      Tenderness: There is no abdominal tenderness. There is no guarding.      Hernia: A hernia is present. Hernia is present in the right inguinal area.   Musculoskeletal:         General: Normal range of motion.      Cervical back: Normal range of motion and neck supple.   Feet:      Right foot:      Protective Sensation: 8 sites tested. 8 sites sensed.      Skin integrity: Skin integrity normal.      Left foot:      Protective Sensation: 8 sites tested. 8 sites sensed.      Skin integrity: Skin integrity normal.   Lymphadenopathy:      Cervical: No cervical adenopathy.   Skin:     General: Skin is warm and dry.      Capillary Refill: Capillary refill takes less than 2 seconds.      Coloration: Skin is not pale.      Findings: No lesion or rash.   Neurological:      Mental Status: He is alert and oriented to person, place, and time.      Motor: No atrophy.      Coordination: Coordination normal.      Gait: Gait normal.   Psychiatric:         Attention and Perception: He is attentive.         Speech: Speech normal.         Behavior: Behavior normal.         Thought Content: Thought content normal.         Judgment: Judgment normal.        Assessment:       1. Type 2 diabetes  mellitus without complication, without long-term current use of insulin    2. Right inguinal hernia    3. Primary hypertension        Plan:       Type 2 diabetes mellitus without complication, without long-term current use of insulin  -     POCT HEMOGLOBIN A1C  -     Ambulatory referral/consult to Ophthalmology; Future; Expected date: 08/30/2022  - stable on current med regimen    Right inguinal hernia  -     traMADoL (ULTRAM) 50 mg tablet; Take 1 tablet (50 mg total) by mouth every 8 (eight) hours as needed for Pain.  Dispense: 21 tablet; Refill: 0    Primary hypertension        - follows with cards        - stable on current med regimen            Follow up in about 6 months (around 2/22/2023) for DM.         8/23/2022 KAMERON Rodriguez, FNP-C

## 2022-08-24 ENCOUNTER — HOSPITAL ENCOUNTER (OUTPATIENT)
Dept: INTERVENTIONAL RADIOLOGY/VASCULAR | Facility: HOSPITAL | Age: 72
Discharge: HOME | End: 2022-08-24
Attending: INTERNAL MEDICINE
Payer: MEDICARE

## 2022-08-24 VITALS — DIASTOLIC BLOOD PRESSURE: 83 MMHG | HEART RATE: 71 BPM | OXYGEN SATURATION: 98 % | SYSTOLIC BLOOD PRESSURE: 157 MMHG

## 2022-08-24 DIAGNOSIS — R18.8 OTHER ASCITES: ICD-10-CM

## 2022-08-24 LAB
APPEARANCE FLD: CLEAR
BODY FLD TYPE: NORMAL
COLOR FLD: YELLOW
LYMPHOCYTES NFR FLD MANUAL: 42 %
MONOS+MACROS NFR FLD MANUAL: 46 %
NEUTROPHILS NFR FLD MANUAL: 12 %
WBC # FLD: 139 /CU MM

## 2022-08-24 PROCEDURE — 63600175 PHARM REV CODE 636 W HCPCS: Performed by: RADIOLOGY

## 2022-08-24 PROCEDURE — 25000003 PHARM REV CODE 250: Performed by: RADIOLOGY

## 2022-08-24 PROCEDURE — P9047 ALBUMIN (HUMAN), 25%, 50ML: HCPCS | Performed by: RADIOLOGY

## 2022-08-24 PROCEDURE — 49083 IR PARACENTESIS WITH IMAGING: ICD-10-PCS | Mod: ,,, | Performed by: RADIOLOGY

## 2022-08-24 PROCEDURE — 89051 BODY FLUID CELL COUNT: CPT | Performed by: INTERNAL MEDICINE

## 2022-08-24 PROCEDURE — 49083 ABD PARACENTESIS W/IMAGING: CPT | Performed by: RADIOLOGY

## 2022-08-24 RX ORDER — ALBUMIN HUMAN 250 G/1000ML
25 SOLUTION INTRAVENOUS
Status: DISCONTINUED | OUTPATIENT
Start: 2022-08-24 | End: 2022-08-25 | Stop reason: HOSPADM

## 2022-08-24 RX ORDER — LIDOCAINE HYDROCHLORIDE 10 MG/ML
INJECTION INFILTRATION; PERINEURAL CODE/TRAUMA/SEDATION MEDICATION
Status: COMPLETED | OUTPATIENT
Start: 2022-08-24 | End: 2022-08-24

## 2022-08-24 RX ADMIN — ALBUMIN (HUMAN) 25 G: 12.5 SOLUTION INTRAVENOUS at 10:08

## 2022-08-24 RX ADMIN — LIDOCAINE HYDROCHLORIDE 5 ML: 10 INJECTION, SOLUTION INFILTRATION; PERINEURAL at 10:08

## 2022-08-24 NOTE — NURSING
Ultrasound guided paracentesis performed by Dr. Crowell; procedure explained and consent obtained by Radiologist. Time out performed 9750 mLs removed- Patient received 25g of albumin IV- VS remained stable for duration of procedure. Specimens collected and sent to lab if ordered. Para and IV d/c'd, with tip intact. Access site cleaned with peroxide, derma bond and steri-strips applied, then covered with sterile Band-Aid. Pt discharge home in stable condition.

## 2022-08-31 ENCOUNTER — TELEPHONE (OUTPATIENT)
Dept: CARDIAC REHAB | Facility: CLINIC | Age: 72
End: 2022-08-31
Payer: MEDICARE

## 2022-08-31 ENCOUNTER — HOSPITAL ENCOUNTER (OUTPATIENT)
Facility: HOSPITAL | Age: 72
Discharge: HOME | End: 2022-09-01
Attending: INTERNAL MEDICINE | Admitting: INTERNAL MEDICINE
Payer: MEDICARE

## 2022-08-31 DIAGNOSIS — I25.10 CORONARY ARTERY DISEASE, UNSPECIFIED VESSEL OR LESION TYPE, UNSPECIFIED WHETHER ANGINA PRESENT, UNSPECIFIED WHETHER NATIVE OR TRANSPLANTED HEART: ICD-10-CM

## 2022-08-31 DIAGNOSIS — I25.5 CARDIOMYOPATHY, ISCHEMIC: ICD-10-CM

## 2022-08-31 DIAGNOSIS — Z98.61 POSTSURGICAL PERCUTANEOUS TRANSLUMINAL CORONARY ANGIOPLASTY STATUS: Primary | ICD-10-CM

## 2022-08-31 DIAGNOSIS — I50.9 HEART FAILURE: ICD-10-CM

## 2022-08-31 DIAGNOSIS — Z95.5 S/P DRUG ELUTING CORONARY STENT PLACEMENT: ICD-10-CM

## 2022-08-31 LAB
ABO + RH BLD: NORMAL
ALBUMIN SERPL BCP-MCNC: 2.2 G/DL (ref 3.5–5.2)
ALLENS TEST: ABNORMAL
ALP SERPL-CCNC: 73 U/L (ref 55–135)
ALT SERPL W/O P-5'-P-CCNC: 6 U/L (ref 10–44)
ANION GAP SERPL CALC-SCNC: 6 MMOL/L (ref 8–16)
ANION GAP SERPL CALC-SCNC: 8 MMOL/L (ref 8–16)
AST SERPL-CCNC: 18 U/L (ref 10–40)
BASOPHILS # BLD AUTO: 0.03 K/UL (ref 0–0.2)
BASOPHILS NFR BLD: 0.6 % (ref 0–1.9)
BILIRUB SERPL-MCNC: 1.5 MG/DL (ref 0.1–1)
BLD GP AB SCN CELLS X3 SERPL QL: NORMAL
BUN SERPL-MCNC: 19 MG/DL (ref 8–23)
BUN SERPL-MCNC: 19 MG/DL (ref 8–23)
CALCIUM SERPL-MCNC: 8.1 MG/DL (ref 8.7–10.5)
CALCIUM SERPL-MCNC: 8.6 MG/DL (ref 8.7–10.5)
CHLORIDE SERPL-SCNC: 103 MMOL/L (ref 95–110)
CHLORIDE SERPL-SCNC: 104 MMOL/L (ref 95–110)
CO2 SERPL-SCNC: 24 MMOL/L (ref 23–29)
CO2 SERPL-SCNC: 26 MMOL/L (ref 23–29)
CREAT SERPL-MCNC: 1.6 MG/DL (ref 0.5–1.4)
CREAT SERPL-MCNC: 1.7 MG/DL (ref 0.5–1.4)
DELSYS: ABNORMAL
DIFFERENTIAL METHOD: ABNORMAL
EOSINOPHIL # BLD AUTO: 0.1 K/UL (ref 0–0.5)
EOSINOPHIL NFR BLD: 1.3 % (ref 0–8)
ERYTHROCYTE [DISTWIDTH] IN BLOOD BY AUTOMATED COUNT: 15.9 % (ref 11.5–14.5)
EST. GFR  (NO RACE VARIABLE): 42.6 ML/MIN/1.73 M^2
EST. GFR  (NO RACE VARIABLE): 45.8 ML/MIN/1.73 M^2
GLUCOSE SERPL-MCNC: 81 MG/DL (ref 70–110)
GLUCOSE SERPL-MCNC: 83 MG/DL (ref 70–110)
HCO3 UR-SCNC: 25.7 MMOL/L (ref 24–28)
HCT VFR BLD AUTO: 31.5 % (ref 40–54)
HGB BLD-MCNC: 10.9 G/DL (ref 14–18)
IMM GRANULOCYTES # BLD AUTO: 0.01 K/UL (ref 0–0.04)
IMM GRANULOCYTES NFR BLD AUTO: 0.2 % (ref 0–0.5)
LYMPHOCYTES # BLD AUTO: 0.8 K/UL (ref 1–4.8)
LYMPHOCYTES NFR BLD: 17.6 % (ref 18–48)
MCH RBC QN AUTO: 29.9 PG (ref 27–31)
MCHC RBC AUTO-ENTMCNC: 34.6 G/DL (ref 32–36)
MCV RBC AUTO: 86 FL (ref 82–98)
MONOCYTES # BLD AUTO: 0.4 K/UL (ref 0.3–1)
MONOCYTES NFR BLD: 9 % (ref 4–15)
NEUTROPHILS # BLD AUTO: 3.4 K/UL (ref 1.8–7.7)
NEUTROPHILS NFR BLD: 71.3 % (ref 38–73)
NRBC BLD-RTO: 0 /100 WBC
PCO2 BLDA: 42.3 MMHG (ref 35–45)
PH SMN: 7.39 [PH] (ref 7.35–7.45)
PLATELET # BLD AUTO: 174 K/UL (ref 150–450)
PLATELET RESPONSE PLAVIX: 266 PRU (ref 194–418)
PMV BLD AUTO: 9.8 FL (ref 9.2–12.9)
PO2 BLDA: 27 MMHG (ref 40–60)
POC BE: 1 MMOL/L
POC SATURATED O2: 50 % (ref 95–100)
POC TCO2: 27 MMOL/L (ref 24–29)
POCT GLUCOSE: 170 MG/DL (ref 70–110)
POTASSIUM SERPL-SCNC: 3.7 MMOL/L (ref 3.5–5.1)
POTASSIUM SERPL-SCNC: 3.7 MMOL/L (ref 3.5–5.1)
PROT SERPL-MCNC: 5.8 G/DL (ref 6–8.4)
RBC # BLD AUTO: 3.65 M/UL (ref 4.6–6.2)
SAMPLE: ABNORMAL
SITE: ABNORMAL
SODIUM SERPL-SCNC: 135 MMOL/L (ref 136–145)
SODIUM SERPL-SCNC: 136 MMOL/L (ref 136–145)
WBC # BLD AUTO: 4.77 K/UL (ref 3.9–12.7)

## 2022-08-31 PROCEDURE — 99152 PR MOD CONSCIOUS SEDATION, SAME PHYS, 5+ YRS, FIRST 15 MIN: ICD-10-PCS | Mod: ,,, | Performed by: INTERNAL MEDICINE

## 2022-08-31 PROCEDURE — 99152 MOD SED SAME PHYS/QHP 5/>YRS: CPT | Performed by: INTERNAL MEDICINE

## 2022-08-31 PROCEDURE — 25000003 PHARM REV CODE 250: Performed by: PHYSICIAN ASSISTANT

## 2022-08-31 PROCEDURE — 80053 COMPREHEN METABOLIC PANEL: CPT | Performed by: STUDENT IN AN ORGANIZED HEALTH CARE EDUCATION/TRAINING PROGRAM

## 2022-08-31 PROCEDURE — 99900035 HC TECH TIME PER 15 MIN (STAT)

## 2022-08-31 PROCEDURE — 92979 ENDOLUMINL IVUS OCT C EA: CPT | Performed by: INTERNAL MEDICINE

## 2022-08-31 PROCEDURE — 63600175 PHARM REV CODE 636 W HCPCS: Performed by: STUDENT IN AN ORGANIZED HEALTH CARE EDUCATION/TRAINING PROGRAM

## 2022-08-31 PROCEDURE — 33990 INSJ PERQ VAD L HRT ARTERIAL: CPT | Mod: ,,, | Performed by: INTERNAL MEDICINE

## 2022-08-31 PROCEDURE — 92979 ENDOLUMINL IVUS OCT C EA: CPT | Mod: 26,,, | Performed by: INTERNAL MEDICINE

## 2022-08-31 PROCEDURE — C1894 INTRO/SHEATH, NON-LASER: HCPCS | Performed by: INTERNAL MEDICINE

## 2022-08-31 PROCEDURE — 86901 BLOOD TYPING SEROLOGIC RH(D): CPT | Performed by: INTERNAL MEDICINE

## 2022-08-31 PROCEDURE — C9602 PERC D-E COR STENT ATHER S: HCPCS | Performed by: INTERNAL MEDICINE

## 2022-08-31 PROCEDURE — 99153 MOD SED SAME PHYS/QHP EA: CPT | Performed by: INTERNAL MEDICINE

## 2022-08-31 PROCEDURE — 93460 R&L HRT ART/VENTRICLE ANGIO: CPT | Mod: 26,59,51, | Performed by: INTERNAL MEDICINE

## 2022-08-31 PROCEDURE — 92978 ENDOLUMINL IVUS OCT C 1ST: CPT | Performed by: INTERNAL MEDICINE

## 2022-08-31 PROCEDURE — 27201423 OPTIME MED/SURG SUP & DEVICES STERILE SUPPLY: Performed by: INTERNAL MEDICINE

## 2022-08-31 PROCEDURE — C1769 GUIDE WIRE: HCPCS | Performed by: INTERNAL MEDICINE

## 2022-08-31 PROCEDURE — C1874 STENT, COATED/COV W/DEL SYS: HCPCS | Performed by: INTERNAL MEDICINE

## 2022-08-31 PROCEDURE — 93460 R&L HRT ART/VENTRICLE ANGIO: CPT | Performed by: INTERNAL MEDICINE

## 2022-08-31 PROCEDURE — 99152 MOD SED SAME PHYS/QHP 5/>YRS: CPT | Mod: ,,, | Performed by: INTERNAL MEDICINE

## 2022-08-31 PROCEDURE — 92978 PR IVUS, CORONARY, 1ST VESSEL: ICD-10-PCS | Mod: 26,LC,, | Performed by: INTERNAL MEDICINE

## 2022-08-31 PROCEDURE — 85576 BLOOD PLATELET AGGREGATION: CPT | Performed by: STUDENT IN AN ORGANIZED HEALTH CARE EDUCATION/TRAINING PROGRAM

## 2022-08-31 PROCEDURE — C1751 CATH, INF, PER/CENT/MIDLINE: HCPCS | Performed by: INTERNAL MEDICINE

## 2022-08-31 PROCEDURE — 25000003 PHARM REV CODE 250: Performed by: HOSPITALIST

## 2022-08-31 PROCEDURE — 25000003 PHARM REV CODE 250: Performed by: INTERNAL MEDICINE

## 2022-08-31 PROCEDURE — 85025 COMPLETE CBC W/AUTO DIFF WBC: CPT | Performed by: INTERNAL MEDICINE

## 2022-08-31 PROCEDURE — 20000000 HC ICU ROOM

## 2022-08-31 PROCEDURE — 27801859 OPTIME CATHETER, IMPELLA: Performed by: INTERNAL MEDICINE

## 2022-08-31 PROCEDURE — 94761 N-INVAS EAR/PLS OXIMETRY MLT: CPT | Mod: 59

## 2022-08-31 PROCEDURE — 92979 PR INTRAVASC CORONARY SO2,ADDN VESSEL: ICD-10-PCS | Mod: 26,,, | Performed by: INTERNAL MEDICINE

## 2022-08-31 PROCEDURE — 92933 PRQ TRLML C ATHRC ST ANGIOP1: CPT | Mod: LC,,, | Performed by: INTERNAL MEDICINE

## 2022-08-31 PROCEDURE — 33990 PR INSERT, VAD, PERCUT, LT HEART, ART ACCESS ONLY: ICD-10-PCS | Mod: ,,, | Performed by: INTERNAL MEDICINE

## 2022-08-31 PROCEDURE — C1760 CLOSURE DEV, VASC: HCPCS | Performed by: INTERNAL MEDICINE

## 2022-08-31 PROCEDURE — 33990 INSJ PERQ VAD L HRT ARTERIAL: CPT | Performed by: INTERNAL MEDICINE

## 2022-08-31 PROCEDURE — C1887 CATHETER, GUIDING: HCPCS | Performed by: INTERNAL MEDICINE

## 2022-08-31 PROCEDURE — 25500020 PHARM REV CODE 255: Performed by: INTERNAL MEDICINE

## 2022-08-31 PROCEDURE — C1753 CATH, INTRAVAS ULTRASOUND: HCPCS | Performed by: INTERNAL MEDICINE

## 2022-08-31 PROCEDURE — 92978 ENDOLUMINL IVUS OCT C 1ST: CPT | Mod: 26,LC,, | Performed by: INTERNAL MEDICINE

## 2022-08-31 PROCEDURE — 85347 COAGULATION TIME ACTIVATED: CPT | Performed by: INTERNAL MEDICINE

## 2022-08-31 PROCEDURE — 80048 BASIC METABOLIC PNL TOTAL CA: CPT | Mod: XB | Performed by: INTERNAL MEDICINE

## 2022-08-31 PROCEDURE — 93460 PR CATH PLACE/CORON ANGIO, IMG SUPER/INTERP,R&L HRT CATH, L HRT VENTRIC: ICD-10-PCS | Mod: 26,59,51, | Performed by: INTERNAL MEDICINE

## 2022-08-31 PROCEDURE — C1724 CATH, TRANS ATHEREC,ROTATION: HCPCS | Performed by: INTERNAL MEDICINE

## 2022-08-31 PROCEDURE — 25000003 PHARM REV CODE 250: Performed by: STUDENT IN AN ORGANIZED HEALTH CARE EDUCATION/TRAINING PROGRAM

## 2022-08-31 PROCEDURE — 92933 PR STENT & ATHERECTOMY: ICD-10-PCS | Mod: LC,,, | Performed by: INTERNAL MEDICINE

## 2022-08-31 PROCEDURE — 63600175 PHARM REV CODE 636 W HCPCS: Performed by: INTERNAL MEDICINE

## 2022-08-31 PROCEDURE — C1725 CATH, TRANSLUMIN NON-LASER: HCPCS | Performed by: INTERNAL MEDICINE

## 2022-08-31 DEVICE — ANGIO-SEAL VIP VASCULAR CLOSURE DEVICE
Type: IMPLANTABLE DEVICE | Site: GROIN | Status: FUNCTIONAL
Brand: ANGIO-SEAL

## 2022-08-31 DEVICE — STENT RONYX35026UX RESOLUTE ONYX 3.50X26
Type: IMPLANTABLE DEVICE | Site: CORONARY | Status: FUNCTIONAL
Brand: RESOLUTE ONYX™

## 2022-08-31 DEVICE — STENT RONYX35018UX RESOLUTE ONYX 3.50X18
Type: IMPLANTABLE DEVICE | Site: CORONARY | Status: FUNCTIONAL
Brand: RESOLUTE ONYX™

## 2022-08-31 RX ORDER — INSULIN ASPART 100 [IU]/ML
0-5 INJECTION, SOLUTION INTRAVENOUS; SUBCUTANEOUS
Status: DISCONTINUED | OUTPATIENT
Start: 2022-08-31 | End: 2022-09-01 | Stop reason: HOSPADM

## 2022-08-31 RX ORDER — FUROSEMIDE 10 MG/ML
40 INJECTION INTRAMUSCULAR; INTRAVENOUS
Status: DISCONTINUED | OUTPATIENT
Start: 2022-08-31 | End: 2022-09-01 | Stop reason: HOSPADM

## 2022-08-31 RX ORDER — HEPARIN SODIUM 1000 [USP'U]/ML
INJECTION, SOLUTION INTRAVENOUS; SUBCUTANEOUS
Status: DISCONTINUED | OUTPATIENT
Start: 2022-08-31 | End: 2022-08-31 | Stop reason: HOSPADM

## 2022-08-31 RX ORDER — HYDRALAZINE HYDROCHLORIDE 25 MG/1
25 TABLET, FILM COATED ORAL ONCE
Status: DISCONTINUED | OUTPATIENT
Start: 2022-08-31 | End: 2022-08-31

## 2022-08-31 RX ORDER — IBUPROFEN 200 MG
16 TABLET ORAL
Status: DISCONTINUED | OUTPATIENT
Start: 2022-08-31 | End: 2022-09-01 | Stop reason: HOSPADM

## 2022-08-31 RX ORDER — ATORVASTATIN CALCIUM 40 MG/1
40 TABLET, FILM COATED ORAL NIGHTLY
Status: DISCONTINUED | OUTPATIENT
Start: 2022-08-31 | End: 2022-09-01 | Stop reason: HOSPADM

## 2022-08-31 RX ORDER — MIDAZOLAM HYDROCHLORIDE 1 MG/ML
INJECTION, SOLUTION INTRAMUSCULAR; INTRAVENOUS
Status: DISCONTINUED | OUTPATIENT
Start: 2022-08-31 | End: 2022-08-31 | Stop reason: HOSPADM

## 2022-08-31 RX ORDER — DIPHENHYDRAMINE HCL 50 MG
50 CAPSULE ORAL ONCE
Status: COMPLETED | OUTPATIENT
Start: 2022-08-31 | End: 2022-08-31

## 2022-08-31 RX ORDER — FENTANYL CITRATE 50 UG/ML
INJECTION, SOLUTION INTRAMUSCULAR; INTRAVENOUS
Status: DISCONTINUED | OUTPATIENT
Start: 2022-08-31 | End: 2022-08-31 | Stop reason: HOSPADM

## 2022-08-31 RX ORDER — SODIUM CHLORIDE 9 MG/ML
INJECTION, SOLUTION INTRAVENOUS CONTINUOUS
Status: DISCONTINUED | OUTPATIENT
Start: 2022-08-31 | End: 2022-09-01 | Stop reason: HOSPADM

## 2022-08-31 RX ORDER — ISOSORBIDE DINITRATE 20 MG/1
20 TABLET ORAL ONCE
Status: DISCONTINUED | OUTPATIENT
Start: 2022-08-31 | End: 2022-08-31

## 2022-08-31 RX ORDER — PRASUGREL 10 MG/1
10 TABLET, FILM COATED ORAL DAILY
Status: DISCONTINUED | OUTPATIENT
Start: 2022-09-01 | End: 2022-09-01 | Stop reason: HOSPADM

## 2022-08-31 RX ORDER — ISOSORBIDE DINITRATE 20 MG/1
40 TABLET ORAL 3 TIMES DAILY
Status: DISCONTINUED | OUTPATIENT
Start: 2022-09-01 | End: 2022-08-31

## 2022-08-31 RX ORDER — HYDRALAZINE HYDROCHLORIDE 50 MG/1
50 TABLET, FILM COATED ORAL EVERY 8 HOURS
Status: DISCONTINUED | OUTPATIENT
Start: 2022-09-01 | End: 2022-09-01

## 2022-08-31 RX ORDER — PRASUGREL 10 MG/1
60 TABLET, FILM COATED ORAL ONCE
Status: COMPLETED | OUTPATIENT
Start: 2022-08-31 | End: 2022-08-31

## 2022-08-31 RX ORDER — GLUCAGON 1 MG
1 KIT INJECTION
Status: DISCONTINUED | OUTPATIENT
Start: 2022-08-31 | End: 2022-09-01 | Stop reason: HOSPADM

## 2022-08-31 RX ORDER — CEFAZOLIN SODIUM 1 G/3ML
INJECTION, POWDER, FOR SOLUTION INTRAMUSCULAR; INTRAVENOUS
Status: DISCONTINUED | OUTPATIENT
Start: 2022-08-31 | End: 2022-08-31 | Stop reason: HOSPADM

## 2022-08-31 RX ORDER — GABAPENTIN 300 MG/1
300 CAPSULE ORAL NIGHTLY
Status: DISCONTINUED | OUTPATIENT
Start: 2022-08-31 | End: 2022-09-01 | Stop reason: HOSPADM

## 2022-08-31 RX ORDER — TAMSULOSIN HYDROCHLORIDE 0.4 MG/1
1 CAPSULE ORAL NIGHTLY
Status: DISCONTINUED | OUTPATIENT
Start: 2022-08-31 | End: 2022-09-01 | Stop reason: HOSPADM

## 2022-08-31 RX ORDER — ACETAMINOPHEN 325 MG/1
650 TABLET ORAL EVERY 6 HOURS PRN
Status: DISCONTINUED | OUTPATIENT
Start: 2022-08-31 | End: 2022-09-01 | Stop reason: HOSPADM

## 2022-08-31 RX ORDER — NAPROXEN SODIUM 220 MG/1
81 TABLET, FILM COATED ORAL DAILY
Status: DISCONTINUED | OUTPATIENT
Start: 2022-08-31 | End: 2022-09-01 | Stop reason: HOSPADM

## 2022-08-31 RX ORDER — CLOPIDOGREL BISULFATE 75 MG/1
75 TABLET ORAL DAILY
Status: DISCONTINUED | OUTPATIENT
Start: 2022-08-31 | End: 2022-08-31

## 2022-08-31 RX ORDER — IBUPROFEN 200 MG
24 TABLET ORAL
Status: DISCONTINUED | OUTPATIENT
Start: 2022-08-31 | End: 2022-09-01 | Stop reason: HOSPADM

## 2022-08-31 RX ORDER — ISOSORBIDE DINITRATE 20 MG/1
20 TABLET ORAL 3 TIMES DAILY
Status: DISCONTINUED | OUTPATIENT
Start: 2022-09-01 | End: 2022-09-01 | Stop reason: HOSPADM

## 2022-08-31 RX ORDER — ISOSORBIDE DINITRATE 20 MG/1
20 TABLET ORAL 3 TIMES DAILY
Status: DISCONTINUED | OUTPATIENT
Start: 2022-08-31 | End: 2022-08-31

## 2022-08-31 RX ORDER — SODIUM CHLORIDE 9 MG/ML
INJECTION, SOLUTION INTRAVENOUS CONTINUOUS
Status: ACTIVE | OUTPATIENT
Start: 2022-08-31 | End: 2022-08-31

## 2022-08-31 RX ORDER — HYDRALAZINE HYDROCHLORIDE 50 MG/1
50 TABLET, FILM COATED ORAL EVERY 8 HOURS
Status: DISCONTINUED | OUTPATIENT
Start: 2022-08-31 | End: 2022-08-31

## 2022-08-31 RX ORDER — METOPROLOL SUCCINATE 25 MG/1
25 TABLET, EXTENDED RELEASE ORAL DAILY
Status: DISCONTINUED | OUTPATIENT
Start: 2022-08-31 | End: 2022-08-31

## 2022-08-31 RX ORDER — FINASTERIDE 5 MG/1
5 TABLET, FILM COATED ORAL NIGHTLY
Status: DISCONTINUED | OUTPATIENT
Start: 2022-08-31 | End: 2022-09-01 | Stop reason: HOSPADM

## 2022-08-31 RX ORDER — LIDOCAINE HYDROCHLORIDE 10 MG/ML
INJECTION INFILTRATION; PERINEURAL
Status: DISCONTINUED | OUTPATIENT
Start: 2022-08-31 | End: 2022-08-31 | Stop reason: HOSPADM

## 2022-08-31 RX ADMIN — ISOSORBIDE DINITRATE 20 MG: 20 TABLET ORAL at 05:08

## 2022-08-31 RX ADMIN — DIPHENHYDRAMINE HYDROCHLORIDE 50 MG: 50 CAPSULE ORAL at 07:08

## 2022-08-31 RX ADMIN — ISOSORBIDE DINITRATE 20 MG: 20 TABLET ORAL at 09:08

## 2022-08-31 RX ADMIN — SODIUM CHLORIDE: 0.9 INJECTION, SOLUTION INTRAVENOUS at 07:08

## 2022-08-31 RX ADMIN — CLOPIDOGREL 75 MG: 75 TABLET, FILM COATED ORAL at 07:08

## 2022-08-31 RX ADMIN — FINASTERIDE 5 MG: 5 TABLET, FILM COATED ORAL at 09:08

## 2022-08-31 RX ADMIN — HYDRALAZINE HYDROCHLORIDE 50 MG: 50 TABLET ORAL at 09:08

## 2022-08-31 RX ADMIN — FUROSEMIDE 40 MG: 10 INJECTION, SOLUTION INTRAMUSCULAR; INTRAVENOUS at 04:08

## 2022-08-31 RX ADMIN — ATORVASTATIN CALCIUM 40 MG: 40 TABLET, FILM COATED ORAL at 09:08

## 2022-08-31 RX ADMIN — ACETAMINOPHEN 650 MG: 325 TABLET ORAL at 08:08

## 2022-08-31 RX ADMIN — TAMSULOSIN HYDROCHLORIDE 0.4 MG: 0.4 CAPSULE ORAL at 09:08

## 2022-08-31 RX ADMIN — GABAPENTIN 300 MG: 300 CAPSULE ORAL at 09:08

## 2022-08-31 RX ADMIN — PRASUGREL 60 MG: 10 TABLET, FILM COATED ORAL at 10:08

## 2022-08-31 RX ADMIN — SODIUM CHLORIDE: 0.9 INJECTION, SOLUTION INTRAVENOUS at 09:08

## 2022-08-31 RX ADMIN — ASPIRIN 81 MG CHEWABLE TABLET 81 MG: 81 TABLET CHEWABLE at 07:08

## 2022-08-31 RX ADMIN — SACUBITRIL AND VALSARTAN 1 TABLET: 24; 26 TABLET, FILM COATED ORAL at 09:08

## 2022-08-31 NOTE — PLAN OF CARE
Received pt to floor from home accompanied by son.  AAO x 4. Denies pain or discomfort. Respirations even and unlabored. No distress noted. Pt stable.  Admit assessment complete. IV x 2 placed.  Pt oriented to room and call bell placed within reach.  Will continue to monitor.

## 2022-08-31 NOTE — SUBJECTIVE & OBJECTIVE
Past Medical History:   Diagnosis Date    Asthma     Cardiomyopathy 10/21/2014    CHF (congestive heart failure)     Coronary artery disease     CRF (chronic renal failure)     Diabetes mellitus     Enlarged prostate     Hyperlipidemia     Hypertension     Neuropathy        Past Surgical History:   Procedure Laterality Date    ANGIOGRAPHY OF INTERNAL MAMMARY VESSEL N/A 3/11/2022    Procedure: Angiogram Internal Mammary;  Surgeon: Hank Lujan MD;  Location: [facility] CATH/EP LAB;  Service: Cardiology;  Laterality: N/A;    CARDIAC CATHETERIZATION      CARDIAC VALVE SURGERY      CATHETERIZATION OF BOTH LEFT AND RIGHT HEART Left 3/11/2022    Procedure: CATHETERIZATION, HEART, BOTH LEFT AND RIGHT;  Surgeon: Hank Lujan MD;  Location: [facility] CATH/EP LAB;  Service: Cardiology;  Laterality: Left;    CORONARY ANGIOGRAPHY N/A 3/11/2022    Procedure: ANGIOGRAM, CORONARY ARTERY;  Surgeon: Hank Lujan MD;  Location: [facility] CATH/EP LAB;  Service: Cardiology;  Laterality: N/A;    CORONARY BYPASS GRAFT ANGIOGRAPHY  3/11/2022    Procedure: Bypass graft study;  Surgeon: Hank Lujan MD;  Location: [facility] CATH/EP LAB;  Service: Cardiology;;    CYSTOSCOPY N/A 7/30/2019    Procedure: CYSTOSCOPY;  Surgeon: Spencer Nguyen MD;  Location: [facility];  Service: Urology;  Laterality: N/A;    SHOULDER ARTHROSCOPY  1985    TRANSRECTAL BIOPSY OF PROSTATE WITH ULTRASOUND GUIDANCE N/A 7/30/2019    Procedure: BIOPSY, PROSTATE, RECTAL APPROACH, WITH US GUIDANCE;  Surgeon: Spencer Nguyen MD;  Location: [facility];  Service: Urology;  Laterality: N/A;  procedure not performed, pt unable to tolerate    TRANSRECTAL BIOPSY OF PROSTATE WITH ULTRASOUND GUIDANCE N/A 8/8/2019    Procedure: BIOPSY, PROSTATE, RECTAL APPROACH, WITH US GUIDANCE;  Surgeon: Spencer Nguyen MD;  Location: [facility];  Service: Urology;  Laterality: N/A;       Review of patient's allergies indicates:   Allergen Reactions    Invokana  [canagliflozin]      Other reaction(s):  been very dizzy       Current Facility-Administered Medications   Medication    aspirin chewable tablet 81 mg    atorvastatin tablet 40 mg    ceFAZolin injection    clopidogreL tablet 75 mg    dextrose 10% bolus 125 mL    dextrose 10% bolus 250 mL    fentaNYL 50 mcg/mL injection    finasteride tablet 5 mg    fluticasone furoate 100 mcg/actuation inhaler 1 puff    furosemide injection 40 mg    gabapentin capsule 300 mg    glucagon (human recombinant) injection 1 mg    glucose chewable tablet 16 g    glucose chewable tablet 24 g    heparin (porcine) injection    hydrALAZINE tablet 50 mg    insulin aspart U-100 pen 0-5 Units    iohexoL (OMNIPAQUE 350) injection    isosorbide dinitrate tablet 20 mg    LIDOcaine HCL 10 mg/ml (1%) injection    midazolam injection    nitroglycerin 200 mcg/mL syringe    sacubitriL-valsartan 24-26 mg per tablet 1 tablet    [START ON 2022] spironolactone split tablet 12.5 mg    tamsulosin 24 hr capsule 0.4 mg     Family History       Problem Relation (Age of Onset)    Diabetes Father, Sister, Brother    Heart attack Father    Heart disease Father, Brother    Hypertension Father    No Known Problems Mother, Maternal Grandmother, Maternal Grandfather, Paternal Grandmother, Paternal Grandfather, Maternal Aunt, Maternal Uncle, Paternal Aunt, Paternal Uncle          Tobacco Use    Smoking status: Former     Types: Cigarettes     Quit date: 1970     Years since quittin.2    Smokeless tobacco: Never   Substance and Sexual Activity    Alcohol use: Not Currently     Alcohol/week: 3.0 standard drinks     Types: 3 Cans of beer per week     Comment: social    Drug use: No    Sexual activity: Never     Review of Systems   Constitutional:  Positive for activity change.   HENT: Negative.     Eyes: Negative.    Respiratory:  Positive for shortness of breath. Negative for chest tightness.    Cardiovascular:  Positive for leg swelling.   Gastrointestinal:  Positive for abdominal distention.    Endocrine: Negative.    Genitourinary: Negative.    Musculoskeletal: Negative.    Skin: Negative.    Allergic/Immunologic: Negative.    Neurological: Negative.    Hematological: Negative.    Psychiatric/Behavioral: Negative.     Objective:     Vital Signs (Most Recent):  Temp: 98.7 °F (37.1 °C) (08/31/22 1200)  Pulse: 66 (08/31/22 1400)  Resp: 16 (08/31/22 1400)  BP: (!) 152/79 (08/31/22 1400)  SpO2: 99 % (08/31/22 1400)   Vital Signs (24h Range):  Temp:  [98.6 °F (37 °C)-98.7 °F (37.1 °C)] 98.7 °F (37.1 °C)  Pulse:  [58-75] 66  Resp:  [15-23] 16  SpO2:  [91 %-100 %] 99 %  BP: (143-172)/(73-92) 152/79     Patient Vitals for the past 72 hrs (Last 3 readings):   Weight   08/31/22 0632 81.2 kg (179 lb)     Body mass index is 24.28 kg/m².    No intake or output data in the 24 hours ending 08/31/22 1449    Physical Exam  Constitutional:       Appearance: Normal appearance.   HENT:      Head: Normocephalic and atraumatic.   Eyes:      Extraocular Movements: Extraocular movements intact.      Pupils: Pupils are equal, round, and reactive to light.   Neck:      Comments: PA catheter in place R IJ  Cardiovascular:      Rate and Rhythm: Normal rate and regular rhythm.      Pulses: Normal pulses.      Heart sounds: Murmur heard.   Pulmonary:      Effort: Pulmonary effort is normal.      Breath sounds: Normal breath sounds.   Abdominal:      General: Bowel sounds are normal. There is no distension.      Palpations: Abdomen is soft.   Musculoskeletal:      Right lower leg: No edema.      Left lower leg: No edema.   Skin:     General: Skin is warm.      Capillary Refill: Capillary refill takes 2 to 3 seconds.   Neurological:      Mental Status: He is alert.       Significant Labs:  CBC:  Recent Labs   Lab 08/31/22  0553   WBC 4.77   RBC 3.65*   HGB 10.9*   HCT 31.5*      MCV 86   MCH 29.9   MCHC 34.6     BNP:  No results for input(s): BNP in the last 168 hours.    Invalid input(s): BNPTRIAGELBLO  CMP:  Recent Labs   Lab  08/31/22  0553 08/31/22  1305   GLU 81 83   CALCIUM 8.6* 8.1*   ALBUMIN  --  2.2*   PROT  --  5.8*    135*   K 3.7 3.7   CO2 26 24    103   BUN 19 19   CREATININE 1.7* 1.6*   ALKPHOS  --  73   ALT  --  6*   AST  --  18   BILITOT  --  1.5*      Coagulation:   No results for input(s): PT, INR, APTT in the last 168 hours.  LDH:  No results for input(s): LDH in the last 72 hours.  Microbiology:  Microbiology Results (last 7 days)       ** No results found for the last 168 hours. **            I have reviewed all pertinent labs within the past 24 hours.    Diagnostic Results:  I have reviewed all pertinent imaging results/findings within the past 24 hours.

## 2022-08-31 NOTE — HPI
71 y.o. male who presented for angiogram with PCI for multivessel coronary artery disease; RHC during procedure showed elevated filling pressures, so he is being admitted for optimization post procedure.  Patient reports he feels well post procedure and has no new complaints. He has PMHx: HTN, dyslipidemia, and an ICM (10/16/21 TTE demonstrated an LVEF=20%).  The patient underwent cardiac catheterization at [facility] on 3/11/22 that demonstrated multi-vessel CAD.     Patient states prior to his PCI he was not feeling well.  He had significant SOB and GOMEZ, he reports he could walk about 50 feet then have to stop and rest for 10-15 minutes prior to walking again.  He denied associated chest pain, dizziness, diaphroesis.  He also reports both abdominal and lower extremity edema.  He states he has multiple paracentesis over the last couple months, some of which >5L removed.  He had a liver biospsy 7/7 which showed moderate multifocal sinusoidal dilatation and congestion with focal bridging fibrosis.  He also reports PND stating he sleeps on 2 pillows but regularly wakes up around 2-3 am and has to sit on the side of the bed to breath     He is  and lives alone, but reports he has family near by.

## 2022-08-31 NOTE — Clinical Note
The DP pulses were detected with doppler bilaterally. The PT pulses were detected with doppler bilaterally.

## 2022-08-31 NOTE — Clinical Note
The PA catheter was repositioned to the main pulmonary artery. Hemodynamics were performed. Cardiac output was obtained at 5 L/min. O2 saturation was measured at 60%. CO = 5.36   CI = 2.64

## 2022-08-31 NOTE — NURSING
Pt left site oozing pressure held for 3 mins. Md karl santacruz informed.  Md now at bedside holding pressure.     1230- hematasis of left groin. Site redressed with quickclot and tegaderm. Site soft will continue monitor for bleeding

## 2022-08-31 NOTE — Clinical Note
main pulmonary artery. Hemodynamics were performed. Cardiac output was obtained at 5 L/min. O2 saturation was measured at 58%. CO = 5.07   CI = 2.49

## 2022-08-31 NOTE — TELEPHONE ENCOUNTER
"Information packet sent to patient, which includes "Your guide to living with heart disease". Letter was also sent to patient.  Order entered for [facility] patient lives in [place], [place].    Costa Chin RN  Cardiac Rehab  "

## 2022-08-31 NOTE — H&P
[facility] Lab  Heart Transplant  H&P    Patient Name: Baldo Carney  MRN: [id number]  Admission Date: 8/31/2022  Attending Physician: Zach Jensen MD  Primary Care Provider: KAMERON Rodriguez,FNP-C  Principal Problem:<principal problem not specified>    Subjective:     History of Present Illness:  71 y.o. male who presented for angiogram with PCI for multivessel coronary artery disease; RHC during procedure showed elevated filling pressures, so he is being admitted for optimization post procedure.  Patient reports he feels well post procedure and has no new complaints. He has PMHx: HTN, dyslipidemia, and an ICM (10/16/21 TTE demonstrated an LVEF=20%).  The patient underwent cardiac catheterization at [facility] on 3/11/22 that demonstrated multi-vessel CAD.     Patient states prior to his PCI he was not feeling well.  He had significant SOB and GOMEZ, he reports he could walk about 50 feet then have to stop and rest for 10-15 minutes prior to walking again.  He denied associated chest pain, dizziness, diaphroesis.  He also reports both abdominal and lower extremity edema.  He states he has multiple paracentesis over the last couple months, some of which >5L removed.  He had a liver biospsy 7/7 which showed moderate multifocal sinusoidal dilatation and congestion with focal bridging fibrosis.  He also reports PND stating he sleeps on 2 pillows but regularly wakes up around 2-3 am and has to sit on the side of the bed to breath     He is  and lives alone, but reports he has family near by.       Past Medical History:   Diagnosis Date    Asthma     Cardiomyopathy 10/21/2014    CHF (congestive heart failure)     Coronary artery disease     CRF (chronic renal failure)     Diabetes mellitus     Enlarged prostate     Hyperlipidemia     Hypertension     Neuropathy        Past Surgical History:   Procedure Laterality Date    ANGIOGRAPHY OF INTERNAL MAMMARY VESSEL N/A 3/11/2022    Procedure:  Angiogram Internal Mammary;  Surgeon: Hank Lujan MD;  Location: [facility] CATH/EP LAB;  Service: Cardiology;  Laterality: N/A;    CARDIAC CATHETERIZATION      CARDIAC VALVE SURGERY      CATHETERIZATION OF BOTH LEFT AND RIGHT HEART Left 3/11/2022    Procedure: CATHETERIZATION, HEART, BOTH LEFT AND RIGHT;  Surgeon: Hank Lujan MD;  Location: [facility] CATH/EP LAB;  Service: Cardiology;  Laterality: Left;    CORONARY ANGIOGRAPHY N/A 3/11/2022    Procedure: ANGIOGRAM, CORONARY ARTERY;  Surgeon: Hank Lujan MD;  Location: [facility] CATH/EP LAB;  Service: Cardiology;  Laterality: N/A;    CORONARY BYPASS GRAFT ANGIOGRAPHY  3/11/2022    Procedure: Bypass graft study;  Surgeon: Hank Lujan MD;  Location: [facility] CATH/EP LAB;  Service: Cardiology;;    CYSTOSCOPY N/A 7/30/2019    Procedure: CYSTOSCOPY;  Surgeon: Spencer Nguyen MD;  Location: [facility];  Service: Urology;  Laterality: N/A;    SHOULDER ARTHROSCOPY  1985    TRANSRECTAL BIOPSY OF PROSTATE WITH ULTRASOUND GUIDANCE N/A 7/30/2019    Procedure: BIOPSY, PROSTATE, RECTAL APPROACH, WITH US GUIDANCE;  Surgeon: Spencer Nguyen MD;  Location: [facility];  Service: Urology;  Laterality: N/A;  procedure not performed, pt unable to tolerate    TRANSRECTAL BIOPSY OF PROSTATE WITH ULTRASOUND GUIDANCE N/A 8/8/2019    Procedure: BIOPSY, PROSTATE, RECTAL APPROACH, WITH US GUIDANCE;  Surgeon: Spencer Nguyen MD;  Location: [facility] OR;  Service: Urology;  Laterality: N/A;       Review of patient's allergies indicates:   Allergen Reactions    Invokana  [canagliflozin]      Other reaction(s): been very dizzy       Current Facility-Administered Medications   Medication    aspirin chewable tablet 81 mg    atorvastatin tablet 40 mg    ceFAZolin injection    clopidogreL tablet 75 mg    dextrose 10% bolus 125 mL    dextrose 10% bolus 250 mL    fentaNYL 50 mcg/mL injection    finasteride tablet 5 mg    fluticasone furoate 100 mcg/actuation inhaler 1 puff     furosemide injection 40 mg    gabapentin capsule 300 mg    glucagon (human recombinant) injection 1 mg    glucose chewable tablet 16 g    glucose chewable tablet 24 g    heparin (porcine) injection    hydrALAZINE tablet 50 mg    insulin aspart U-100 pen 0-5 Units    iohexoL (OMNIPAQUE 350) injection    isosorbide dinitrate tablet 20 mg    LIDOcaine HCL 10 mg/ml (1%) injection    midazolam injection    nitroglycerin 200 mcg/mL syringe    sacubitriL-valsartan 24-26 mg per tablet 1 tablet    [START ON 2022] spironolactone split tablet 12.5 mg    tamsulosin 24 hr capsule 0.4 mg     Family History       Problem Relation (Age of Onset)    Diabetes Father, Sister, Brother    Heart attack Father    Heart disease Father, Brother    Hypertension Father    No Known Problems Mother, Maternal Grandmother, Maternal Grandfather, Paternal Grandmother, Paternal Grandfather, Maternal Aunt, Maternal Uncle, Paternal Aunt, Paternal Uncle          Tobacco Use    Smoking status: Former     Types: Cigarettes     Quit date: 1970     Years since quittin.2    Smokeless tobacco: Never   Substance and Sexual Activity    Alcohol use: Not Currently     Alcohol/week: 3.0 standard drinks     Types: 3 Cans of beer per week     Comment: social    Drug use: No    Sexual activity: Never     Review of Systems   Constitutional:  Positive for activity change.   HENT: Negative.     Eyes: Negative.    Respiratory:  Positive for shortness of breath. Negative for chest tightness.    Cardiovascular:  Positive for leg swelling.   Gastrointestinal:  Positive for abdominal distention.   Endocrine: Negative.    Genitourinary: Negative.    Musculoskeletal: Negative.    Skin: Negative.    Allergic/Immunologic: Negative.    Neurological: Negative.    Hematological: Negative.    Psychiatric/Behavioral: Negative.     Objective:     Vital Signs (Most Recent):  Temp: 98.7 °F (37.1 °C) (22 1200)  Pulse: 66 (22 1400)  Resp: 16  (08/31/22 1400)  BP: (!) 152/79 (08/31/22 1400)  SpO2: 99 % (08/31/22 1400)   Vital Signs (24h Range):  Temp:  [98.6 °F (37 °C)-98.7 °F (37.1 °C)] 98.7 °F (37.1 °C)  Pulse:  [58-75] 66  Resp:  [15-23] 16  SpO2:  [91 %-100 %] 99 %  BP: (143-172)/(73-92) 152/79     Patient Vitals for the past 72 hrs (Last 3 readings):   Weight   08/31/22 0632 81.2 kg (179 lb)     Body mass index is 24.28 kg/m².    No intake or output data in the 24 hours ending 08/31/22 1449    Physical Exam  Constitutional:       Appearance: Normal appearance.   HENT:      Head: Normocephalic and atraumatic.   Eyes:      Extraocular Movements: Extraocular movements intact.      Pupils: Pupils are equal, round, and reactive to light.   Neck:      Comments: PA catheter in place R IJ  Cardiovascular:      Rate and Rhythm: Normal rate and regular rhythm.      Pulses: Normal pulses.      Heart sounds: Murmur heard.   Pulmonary:      Effort: Pulmonary effort is normal.      Breath sounds: Normal breath sounds.   Abdominal:      General: Bowel sounds are normal. There is no distension.      Palpations: Abdomen is soft.   Musculoskeletal:      Right lower leg: No edema.      Left lower leg: No edema.   Skin:     General: Skin is warm.      Capillary Refill: Capillary refill takes 2 to 3 seconds.   Neurological:      Mental Status: He is alert.       Significant Labs:  CBC:  Recent Labs   Lab 08/31/22  0553   WBC 4.77   RBC 3.65*   HGB 10.9*   HCT 31.5*      MCV 86   MCH 29.9   MCHC 34.6     BNP:  No results for input(s): BNP in the last 168 hours.    Invalid input(s): BNPTRIAGELBLO  CMP:  Recent Labs   Lab 08/31/22  0553 08/31/22  1305   GLU 81 83   CALCIUM 8.6* 8.1*   ALBUMIN  --  2.2*   PROT  --  5.8*    135*   K 3.7 3.7   CO2 26 24    103   BUN 19 19   CREATININE 1.7* 1.6*   ALKPHOS  --  73   ALT  --  6*   AST  --  18   BILITOT  --  1.5*      Coagulation:   No results for input(s): PT, INR, APTT in the last 168 hours.  LDH:  No results  for input(s): LDH in the last 72 hours.  Microbiology:  Microbiology Results (last 7 days)       ** No results found for the last 168 hours. **            I have reviewed all pertinent labs within the past 24 hours.    Diagnostic Results:  I have reviewed all pertinent imaging results/findings within the past 24 hours.    Assessment/Plan:     Acute on chronic congestive heart failure  -ICM  -Last 2D Echo 5/24/22: LVEF 20%, LVEDD 5.9cm  -RHC prior to PCI: RA: 13, RV: 80/4 (13), PWP: 33/38 (31), PA: 78/28 (46), CO: 4.05, CI: 1.99  -PA catheter still in place.  He was started on Lasix 40mg IV BID post procedure. Will get repeat PA numbers when he gets a bed and adjust diuretics if needed. His previous home diuretic dose was Lasix 40mg po Qdaily  -GDMT with home dose of Hydralazine, Isordil, Entresto, Aldactone.  Will hold Toprol for now given recent PCI/myocardial stunning. Will resume prior to discharge.  He is on Farxiga at home but not on formulary, will restart on discharge.   -Will get repeat echo once volume status is optimized   -2g Na dietary restriction, 1500 mL fluid restriction, strict I/Os  -May consider cardiomems evaluation. Stage C HFpEF/HFrEF with NYHA class III symptoms, elevated BNP and/or recent HF admission despite optimal HF regimen.  Pt is at a high risk for HF readmissions.        CAD (coronary artery disease)  -Admit for unprotected LM PCI with impella support.   -Successful rota-PCI of LM-->LCx and LM--><LAD using DK Crush technique with 3.5X26 ROGER placed from LM-->lCx and 3.5X18 ROGER plaed from LM-->LAD.  LM segment post-dilated with 4mm balloon.  -continue ASA, Plavix, Lipitor    Hypertension  -Continue home regimen and will adjust as needed     Hyperlipidemia  -continue home dose of statin    Other ascites  -admitted last December with ADHF and since then has developed progressive ascites.    -A CT scan suggested a cirrhotic appearing liver.  He is hepatitis-C antibody negative.  He has a  remote history of fairly heavy alcohol consumption but has been abstinent for the last 4 years.  He has no symptoms of GI bleeding or hepatic encephalopathy. -Has had multiple paracentesis in [place] some with 8L of fluid removed  -Transjugular liver biopsy done 7/7: Moderate multifocal sinusoidal dilatation and congestion with focal bridging fibrosis   -Abdomen does not appear distended today on exam.  -Will monitor     S/P AVR (aortic valve replacement)  -Plan for repeat echo once patient is euvolemic   -last echo done at outside facility 5/24/22    Type 2 diabetes mellitus without complication, without long-term current use of insulin  -Will hold patients home dose of Jardisona caraballo inpatient and cover with ISS        REGINE Vail  Heart Transplant  [facility] - Cath Lab

## 2022-08-31 NOTE — TELEPHONE ENCOUNTER
"Information packet sent to patient, which includes "Your guide to living with heart disease". Letter was also sent to patient.      Costa Chin RN  Cardiac Rehab Nurse  "

## 2022-08-31 NOTE — Clinical Note
The catheter was repositioned into the mid   left anterior descending. IVUS peformed of Prox LAD - Ost LAD.

## 2022-08-31 NOTE — Clinical Note
A percutaneous stick to the right internal jugular vein was performed. Ultrasound guidance was used to obtain access.

## 2022-08-31 NOTE — PROCEDURES
Brief Operative Note:    : Zach Jensen MD     Referring Physician: Zach Jensen     All Operators: Surgeon(s):  MD Shine Heck MD Stephanie Maria Madonis, MD     Preoperative Diagnosis: Cardiomyopathy, ischemic [I25.5]     Postop Diagnosis: Cardiomyopathy, ischemic [I25.5]    Treatments/Procedures: Procedure(s) (LRB):  Stent, Coronary, Multi Vessel (Bilateral)  Atherectomy-coronary (N/A)  IVUS, Coronary  INSERTION, CATHETER, SWAN-SEE, WITH IMAGING GUIDANCE  INSERTION, IMPELLA (N/A)  Impella, Removal    Access: Right CFA, Left CFA    Findings:Severe coronary artery disease is present.     See catheterization report for full details.    Intervention: S/P Impella supported rota-PCI of LM into LAD and LM into Lcx using DK crush technique with good angiographic result. IVUS utilized for stent sizing. Ethel See catheter placed via R IJ for close hemodynamic monitoring. Imeplla CP successfully weaned and removed post procedure.     See catheterization report for full details.    Closure device: Perclose, Angioseal       Plan:  - Post cath protocol   - Bed rest x 6 hours   - Continue aspirin 81 mg daily indefinitely  - Continue Plavix 75 mg daily, will check PRU (ordered)  - Continue high intensity statin therapy (LDL goal < 70)  - Risk factor reduction (BP <130/80 mmHg, glycemic control, etc)  - Cardiac rehab referral  - Transfer to ICU for close hemodynamic monitoring    Estimated Blood loss: 20 cc    Specimens removed: No    Salena Davenport MD

## 2022-08-31 NOTE — Clinical Note
65 ml of contrast were injected throughout the case. 135 mL of contrast was the total wasted during the case. 200 mL was the total amount used during the case.

## 2022-08-31 NOTE — Clinical Note
An angiography of the  left femoral artery was performed to evaluate for the placement of a closure device.

## 2022-08-31 NOTE — H&P
[facility] H&  Interventional Cardiology      Baldo Carney  YOB: 1950  Medical Record Number:  [id number]  Attending Physician:  Zach Jensen MD   Date of Admission: 8/31/2022       Hospital Day:  0  Current Principal Problem:  <principal problem not specified>      History     Cc: GOMEZ    HPI  Baldo Carney is a 71 y.o. male who presents for angiogram with PCI for his multivessel coronary artery disease.  He feels well this morning has no complaints.  Please see last clinic note from 07/28/2022 for full detailed history.          Mr. Carney is a 72 y/o gentleman with HTN, dyslipidemia, and an ICM (10/16/21 TTE demonstrated an LVEF=20%) who presents for evaluation of PCI.  The patient underwent cardiac catheterization at [facility] on 3/11/22 that demonstrated multi-vessel CAD (angiogram on disc).  He reports shortness of breath upon walking 100 feet. The patient reports he can no longer take out his garbage due to GOMEZ and fatigue.  He reports that these symptoms have been present since last Thanksgiving.  The patient also reports dependent lower extremity edema, but denies PND or orthopnea.  He reports PND twice a week on average.  He denies palpitations, syncope, or near syncope.  Of note the patient reports abdominal bloating since his discharge from his 2/18/22 angiogram.      [facility] 3/11/2022 [facility]  There was three vessel coronary artery disease. There was left main disease. There was diffuse calcification with 4 mm left main that bifurcated to LAD and left circumflex. There was distal left main disease extending into the ostium of both the LAD and circumflex. There was also a 30-35% mid left main stenosis.  The LAD was a large caliber vessel proximally with 80% ostial and proximal stenosis followed by an aneurysmal segment. The mid LAD had no significant disease. The LAD distal to the 2nd diagonal branch was diffusely diseased to the apex with diffuse 80%  stenosis. The diagonal branches were free of significant stenosis.  Left circumflex was a large caliber vessel that gave off 4 obtuse marginal branches before eventually tapering to a small vessel in the AV groove. There was a 90% ostial and proximal stenosis than the normal segment from the mid to distal circumflex. The obtuse marginal branches are all medium to small in size and free of disease. There is a high-grade distal 80% circumflex stenosis at the takeoff of the 4th OM branch.  The RCA is diffusely calcified with high-grade proximal stenosis and then is totally occluded. There is some collateral filling of the string like segment in the mid RCA. The distal RCA PDA filled from the saphenous vein graft which is patent. There is a long 80% proximal to mid stenosis in the PDA after the graft anastomosis.  Moderate pulmonary hypertension with elevated right-sided filling pressures and diminished cardiac output consistent with biventricular failure and severe ischemic cardiomyopathy with a cardiac index of 1.0  Patent saphenous vein graft to PDA. Lima was medium and slightly tortuous and native and was not used for bypass graft  Bioprosthetic aortic valve is normal function with no gradient on pullback    Medications - Outpatient  Prior to Admission medications    Medication Sig Start Date End Date Taking? Authorizing Provider   SHANTEL ELLIPTA 100 mcg/actuation DsDv Use as directed 1 puff in the mouth or throat daily as needed.  11/10/18  Yes Historical Provider   aspirin (ECOTRIN) 81 MG EC tablet Take 81 mg by mouth once daily.   Yes Historical Provider   atorvastatin (LIPITOR) 40 MG tablet TAKE 1 TABLET BY MOUTH EVERY DAY 8/16/22  Yes KAMERON Ann,TEDP-C   azelastine (ASTELIN) 137 mcg (0.1 %) nasal spray 1 spray (137 mcg total) by Nasal route 2 (two) times daily as needed for Rhinitis. 4/20/21  Yes KAMERON Ann,PATRICIO-C   budesonide-glycopyr-formoterol (BREZTRI AEROSPHERE) 160-9-4.8  mcg/actuation HFAA Inhale into the lungs daily as needed.   Yes Historical Provider   clopidogreL (PLAVIX) 75 mg tablet Take 75 mg by mouth once daily. 3/24/22  Yes Historical Provider   empagliflozin (JARDIANCE) 10 mg tablet Take 1 tablet (10 mg total) by mouth once daily. 6/14/22  Yes KAMERON Ann FNP-C   FARXIGA 5 mg Tab tablet Take 5 mg by mouth once daily. 6/17/22  Yes Historical Provider   finasteride (PROSCAR) 5 mg tablet Take 5 mg by mouth once daily.   Yes Historical Provider   furosemide (LASIX) 40 MG tablet Take by mouth once daily. 4/18/22  Yes Historical Provider   gabapentin (NEURONTIN) 300 MG capsule Take 1 capsule (300 mg total) by mouth every evening. 4/19/22  Yes KAMERON Ann FNP-C   hydrALAZINE (APRESOLINE) 50 MG tablet Take 1 tablet (50 mg total) by mouth every 8 (eight) hours.  Patient taking differently: Take 50 mg by mouth every 12 (twelve) hours. 12/28/21 8/31/22 Yes Gregory Riggs MD   isosorbide dinitrate (ISORDIL) 20 MG tablet Take 1 tablet (20 mg total) by mouth 3 (three) times daily. 12/28/21 8/31/22 Yes Gregory Riggs MD   levalbuterol (XOPENEX) 0.63 mg/3 mL nebulizer solution Take 3 mLs (0.63 mg total) by nebulization every 4 to 6 hours as needed for Wheezing or Shortness of Breath. Rescue 12/23/21  Yes KAMERON Ann FNP-C   linaCLOtide (LINZESS) 72 mcg Cap capsule Take 1 capsule (72 mcg total) by mouth before breakfast. 4/19/22  Yes KAMERON Ann FNP-C   metoprolol succinate (TOPROL-XL) 25 MG 24 hr tablet Take 1 tablet (25 mg total) by mouth once daily. 12/23/21  Yes KAMERON Ann FNP-C   sacubitriL-valsartan (ENTRESTO) 24-26 mg per tablet Take 1 tablet by mouth 2 (two) times daily.   Yes Historical Provider   spironolactone (ALDACTONE) 25 MG tablet Take 12.5 mg by mouth once daily. 3/24/22  Yes Historical Provider   tamsulosin (FLOMAX) 0.4 mg Cap TAKE 1 CAPSULE BY MOUTH EVERY DAY 6/14/22  Yes KAMERON Ann,FNP-C   traMADoL  (ULTRAM) 50 mg tablet Take 1 tablet (50 mg total) by mouth every 8 (eight) hours as needed for Pain. 8/22/22  Yes KAMERON Ann FNP-C   VENTOLIN HFA 90 mcg/actuation inhaler Inhale 1-2 puffs into the lungs every 4 to 6 hours as needed for Wheezing or Shortness of Breath. (RESCUE) 12/23/21  Yes KAMERON Ann FNP-C   ergocalciferol, vitamin D2, (VITAMIN D ORAL) Take 1 capsule by mouth once a week. WEDNESDAYS    Historical Provider   ferrous sulfate (FEOSOL) 325 mg (65 mg iron) Tab tablet TAKE 1 TABLET (1 EACH TOTAL) BY MOUTH DAILY WITH BREAKFAST. TO BE TAKEN WITH VITAMIN C (ORANGE JUICE) FOR BEST ABSORPTION 1/27/22   KAMERON Ann FNP-C   fluticasone furoate-vilanteroL (BREO ELLIPTA) 100-25 mcg/dose diskus inhaler Inhale 1 puff into the lungs once daily. (DAILY CONTROLLER) 10/20/21   KAMERON Ann FNP-C   fluticasone propionate (FLONASE) 50 mcg/actuation nasal spray 1 spray (50 mcg total) by Each Nostril route once daily. 4/20/21   KAMEORN Ann FNP-C   promethazine-dextromethorphan (PROMETHAZINE-DM) 6.25-15 mg/5 mL Syrp Take 5 mLs by mouth every 6 (six) hours as needed (cough and congestion). 1/25/22   KAMERON Ann FNP-C   ZONTIVITY 2.08 mg Tab Take 1 tablet by mouth once daily. 3/24/22   Historical Provider         Medications - Current  Scheduled Meds:   diphenhydrAMINE  50 mg Oral Once     Continuous Infusions:   sodium chloride 0.9%       PRN Meds:.      Allergies  Review of patient's allergies indicates:   Allergen Reactions    Invokana  [canagliflozin]      Other reaction(s): been very dizzy         Past Medical History  Past Medical History:   Diagnosis Date    Asthma     Cardiomyopathy 10/21/2014    CHF (congestive heart failure)     Coronary artery disease     CRF (chronic renal failure)     Diabetes mellitus     Enlarged prostate     Hyperlipidemia     Hypertension     Neuropathy          Past Surgical History  Past Surgical History:    Procedure Laterality Date    ANGIOGRAPHY OF INTERNAL MAMMARY VESSEL N/A 3/11/2022    Procedure: Angiogram Internal Mammary;  Surgeon: Hank Lujan MD;  Location: [facility] CATH/EP LAB;  Service: Cardiology;  Laterality: N/A;    CARDIAC CATHETERIZATION      CARDIAC VALVE SURGERY      CATHETERIZATION OF BOTH LEFT AND RIGHT HEART Left 3/11/2022    Procedure: CATHETERIZATION, HEART, BOTH LEFT AND RIGHT;  Surgeon: Hank Lujan MD;  Location: [facility] CATH/EP LAB;  Service: Cardiology;  Laterality: Left;    CORONARY ANGIOGRAPHY N/A 3/11/2022    Procedure: ANGIOGRAM, CORONARY ARTERY;  Surgeon: Hank Lujan MD;  Location: [facility] CATH/EP LAB;  Service: Cardiology;  Laterality: N/A;    CORONARY BYPASS GRAFT ANGIOGRAPHY  3/11/2022    Procedure: Bypass graft study;  Surgeon: Hank Lujan MD;  Location: [facility] CATH/EP LAB;  Service: Cardiology;;    CYSTOSCOPY N/A 2019    Procedure: CYSTOSCOPY;  Surgeon: Spencer Nguyen MD;  Location: [facility];  Service: Urology;  Laterality: N/A;    SHOULDER ARTHROSCOPY  1985    TRANSRECTAL BIOPSY OF PROSTATE WITH ULTRASOUND GUIDANCE N/A 2019    Procedure: BIOPSY, PROSTATE, RECTAL APPROACH, WITH US GUIDANCE;  Surgeon: Spencer Nguyen MD;  Location: [facility];  Service: Urology;  Laterality: N/A;  procedure not performed, pt unable to tolerate    TRANSRECTAL BIOPSY OF PROSTATE WITH ULTRASOUND GUIDANCE N/A 2019    Procedure: BIOPSY, PROSTATE, RECTAL APPROACH, WITH US GUIDANCE;  Surgeon: Spencer Nguyen MD;  Location: [facility];  Service: Urology;  Laterality: N/A;         Social History  Social History     Socioeconomic History    Marital status: Legally    Tobacco Use    Smoking status: Former     Types: Cigarettes     Quit date: 1970     Years since quittin.2    Smokeless tobacco: Never   Substance and Sexual Activity    Alcohol use: Not Currently     Alcohol/week: 3.0 standard drinks     Types: 3 Cans of beer per week     Comment: social    Drug use: No     Sexual activity: Never         ROS   Admits Denies   Constitutional  Chills, diaphoresis, malaise   Eyes  Visual changes   ENMT  Dysphagia, Epistaxis, nasal congestion, hearing loss   Cardiovascular  Chest pain, palpitations, edema   Respiratory Dyspnea Cough, wheezing   Gastrointestinal  Nausea, vomiting, constipation, diarrhea, anorexia.     Genitourinary  Dysuria, incontinence   Musculoskeletal  Myalgias, joint pain, joint swelling   Integumentary  Rash, inflammation, burning   Neruo-Psychiatric  Anxiety, insomnia.  Changes in speech, strength, sensation.     Endocrine     Hematologic  Abnormal bruising, bleeding   Immunologic  Inflammation, pain at IV sites.  Pruritis.         Physical Examination         Vital Signs  Vitals  Temp: 98.6 °F (37 °C)  Temp src: Temporal  Pulse: 75  Heart Rate Source: Monitor  Resp: 16  SpO2: 99 %  O2 Device (Oxygen Therapy): room air  BP: (!) 159/92  BP Location: Right arm  BP Method: Automatic  Patient Position: Lying            24 Hour VS Range    Temp:  [98.6 °F (37 °C)]   Pulse:  [75]   Resp:  [16]   BP: (159-172)/(79-92)   SpO2:  [99 %]   No intake or output data in the 24 hours ending 08/31/22 0634      General:  Lying in bed no acute distress  Head: NCAT  Eyes: conjunctivae and lids normal, no scleral icterus, EOMI.  ENMT:  no gingival bleeding, normal oral mucosa without pallor or cyanosis.   Neck:  JVP normal.  Trachea non-displaced.     Chest:  Normal respiratory effort.  Chest clear to auscultation.  No wheezes, rales, or rhonchi.  Heart:  Normal PMI, S1 S2 normal quality, splitting.  No murmurs rubs or gallops.  Peripheral pulses 2+ bilaterally in carotids, radials and femoral.  Doppler pulses in PT/DP..  Abdomen:  Non-distended, normal bowel sounds, non-tender.  No hepato-jugular reflux.  Extremities:  No edema. Normal capillary refill.    Skin:  Warm and dry.  No cyanosis or pallor.  No ulcers, stasis.  IV sites without tenderness or inflammation.    Neurological /  Psychiatric:  Oriented to person, time, and place.  No facial asymmetry, drift.  Fluent without dysarthria.  Mood euthymic, affect normal.         Assessment & Plan     Multivessel Coronary Artery Disease:  --+/- PCI, patient is a ROGER candidate  - Anti-platelet Therapy: ASA/Plavix  - Access: bilateral CFA access and R IJV access  - Access closure:TBD  - Creatinine/CrCl:   CrCl cannot be calculated (Patient's most recent lab result is older than the maximum 7 days allowed.).  - Allergies:   - No shellfish / Iodine allergy  - No Latex allergy   - No Aspirin allergy    - No history of HIT  - Pre-Hydration: NS 3cc/kg x 1 hour   - Pre-Op Med: Bendaryl 50mg pO   - All patient's questions were answered.  -The risks, benefits and alternatives of the procedure were explained to the patient.   -The risks of coronary angiography include but are not limited to: bleeding, infection, heart rhythm abnormalities, allergic reactions, kidney injury and potential need for dialysis, stroke and death.   - Should stenting be indicated, the patient has agreed to dual anti-platelet therapy for 1-consecutive year with a drug-eluting stent and a minimum of 1-month with the use of a bare metal stent  - Additionally, pt is aware that non-compliance is likely to result in stent clotting with heart attack, heart failure, and/or death  -The risks of moderate sedation include hypotension, respiratory depression, arrhythmias, bronchospasm, and death.   - Informed consent was obtained and the  patient is agreeable to proceed with the procedure.    Ischemic Cardiomyopathy EF- 20%  The patient reports NYHA class 3 symptoms;5/24/22 TTE reveiwed; LVEF=20% with moderate RV dysfunction  -continue Toprol Xl 25mg po qday  -continue Entresto 24-26mg po BID  -continue spironolactone 25mg po qday  -continue isordil 20mg po TID  -continue hydralazine 50mg po TID  -continue Farxiga 5mg po qday  -continue lasix 40mg po qday     HTN.   Blood pressure inadequately  controlled in clinic today  -reasess during subsequent clinic visits     Dyslipidemia.   Continue Lipitor 40mg po qday     Anemia.   3/8/22 Hgb =10.8; MCV=86  -repeat pending     DM2.   rec tight glycemic control      PAD.    The patient has an abnormal vascualr examl suspect right groin massin a hernia; 5/24/22 right groin duplex reveiwed;      s.p AVR. TTE as above     Transaminitis  Noted on reviewing labs going back to 12/26/21; differential diagnosis includes hepatic congestion  -check CMP; if LFT's remain elevated then will refer to hepatology     CKD3a;   Repeat BMP pending. Baseline Cr-around 1.5    Signed:  Shine Gee MD  Page # [phone]  Cardiovascular Fellow  [facility]

## 2022-08-31 NOTE — Clinical Note
A percutaneous stick to the left femoral artery was performed. Ultrasound guidance was used to obtain access.

## 2022-08-31 NOTE — ASSESSMENT & PLAN NOTE
-Admit for unprotected LM PCI with impella support.   -Successful rota-PCI of LM-->LCx and LM--><LAD using DK Crush technique with 3.5X26 ROGER placed from LM-->lCx and 3.5X18 ROGER plaed from LM-->LAD.  LM segment post-dilated with 4mm balloon.  -continue ASA, Plavix, Lipitor

## 2022-08-31 NOTE — Clinical Note
main pulmonary artery. Hemodynamics were performed. Cardiac output was obtained at 4 L/min. O2 saturation was measured at 49%. CO = 4.05   CI = 1.99

## 2022-08-31 NOTE — ASSESSMENT & PLAN NOTE
-ICM  -Last 2D Echo 5/24/22: LVEF 20%, LVEDD 5.9cm  -RHC prior to PCI: RA: 13, RV: 80/4 (13), PWP: 33/38 (31), PA: 78/28 (46), CO: 4.05, CI: 1.99  -PA catheter still in place.  He was started on Lasix 40mg IV BID post procedure. Will get repeat PA numbers when he gets a bed and adjust diuretics if needed. His previous home diuretic dose was Lasix 40mg po Qdaily  -GDMT with home dose of Hydralazine, Isordil, Entresto, Aldactone.  Will hold Toprol for now given recent PCI/myocardial stunning. Will resume prior to discharge.  He is on Farxiga at home but not on formulary, will restart on discharge.   -Will get repeat echo once volume status is optimized   -2g Na dietary restriction, 1500 mL fluid restriction, strict I/Os  -May consider cardiomems evaluation. Stage C HFpEF/HFrEF with NYHA class III symptoms, elevated BNP and/or recent HF admission despite optimal HF regimen.  Pt is at a high risk for HF readmissions.

## 2022-08-31 NOTE — ASSESSMENT & PLAN NOTE
-admitted last December with ADHF and since then has developed progressive ascites.    -A CT scan suggested a cirrhotic appearing liver.  He is hepatitis-C antibody negative.  He has a remote history of fairly heavy alcohol consumption but has been abstinent for the last 4 years.  He has no symptoms of GI bleeding or hepatic encephalopathy. -Has had multiple paracentesis in [place] some with 8L of fluid removed  -Transjugular liver biopsy done 7/7: Moderate multifocal sinusoidal dilatation and congestion with focal bridging fibrosis   -Abdomen does not appear distended today on exam.  -Will monitor

## 2022-09-01 VITALS
DIASTOLIC BLOOD PRESSURE: 56 MMHG | BODY MASS INDEX: 24.24 KG/M2 | WEIGHT: 179 LBS | SYSTOLIC BLOOD PRESSURE: 103 MMHG | RESPIRATION RATE: 23 BRPM | TEMPERATURE: 98 F | OXYGEN SATURATION: 100 % | HEIGHT: 72 IN | HEART RATE: 68 BPM

## 2022-09-01 PROBLEM — R18.8 OTHER ASCITES: Chronic | Status: RESOLVED | Noted: 2022-06-07 | Resolved: 2022-09-01

## 2022-09-01 LAB
ALLENS TEST: ABNORMAL
ALLENS TEST: ABNORMAL
ANION GAP SERPL CALC-SCNC: 8 MMOL/L (ref 8–16)
ASCENDING AORTA: 3.25 CM
AV INDEX (PROSTH): 0.27
AV MEAN GRADIENT: 18 MMHG
AV PEAK GRADIENT: 31 MMHG
AV VALVE AREA: 0.97 CM2
AV VELOCITY RATIO: 0.26
BASOPHILS # BLD AUTO: 0.02 K/UL (ref 0–0.2)
BASOPHILS NFR BLD: 0.4 % (ref 0–1.9)
BNP SERPL-MCNC: 3592 PG/ML (ref 0–99)
BSA FOR ECHO PROCEDURE: 2.03 M2
BUN SERPL-MCNC: 20 MG/DL (ref 8–23)
CALCIUM SERPL-MCNC: 7.7 MG/DL (ref 8.7–10.5)
CHLORIDE SERPL-SCNC: 105 MMOL/L (ref 95–110)
CO2 SERPL-SCNC: 22 MMOL/L (ref 23–29)
CREAT SERPL-MCNC: 1.4 MG/DL (ref 0.5–1.4)
CV ECHO LV RWT: 0.32 CM
DELSYS: ABNORMAL
DELSYS: ABNORMAL
DIFFERENTIAL METHOD: ABNORMAL
DOP CALC AO PEAK VEL: 2.8 M/S
DOP CALC AO VTI: 61.33 CM
DOP CALC LVOT AREA: 3.5 CM2
DOP CALC LVOT DIAMETER: 2.12 CM
DOP CALC LVOT PEAK VEL: 0.73 M/S
DOP CALC LVOT STROKE VOLUME: 59.45 CM3
DOP CALCLVOT PEAK VEL VTI: 16.85 CM
E WAVE DECELERATION TIME: 177.32 MSEC
E/A RATIO: 1.31
E/E' RATIO: 34 M/S
ECHO LV POSTERIOR WALL: 0.82 CM (ref 0.6–1.1)
EJECTION FRACTION: 15 %
EOSINOPHIL # BLD AUTO: 0 K/UL (ref 0–0.5)
EOSINOPHIL NFR BLD: 0.9 % (ref 0–8)
ERYTHROCYTE [DISTWIDTH] IN BLOOD BY AUTOMATED COUNT: 15.9 % (ref 11.5–14.5)
EST. GFR  (NO RACE VARIABLE): 53.7 ML/MIN/1.73 M^2
FIO2: 21
FRACTIONAL SHORTENING: 14 % (ref 28–44)
GLUCOSE SERPL-MCNC: 93 MG/DL (ref 70–110)
HCO3 UR-SCNC: 25 MMOL/L (ref 24–28)
HCO3 UR-SCNC: 25.1 MMOL/L (ref 24–28)
HCT VFR BLD AUTO: 25.5 % (ref 40–54)
HGB BLD-MCNC: 8.9 G/DL (ref 14–18)
IMM GRANULOCYTES # BLD AUTO: 0.01 K/UL (ref 0–0.04)
IMM GRANULOCYTES NFR BLD AUTO: 0.2 % (ref 0–0.5)
INTERVENTRICULAR SEPTUM: 0.93 CM (ref 0.6–1.1)
LA MAJOR: 5.8 CM
LA MINOR: 5.84 CM
LA WIDTH: 4.79 CM
LEFT ATRIUM SIZE: 4.19 CM
LEFT ATRIUM VOLUME INDEX MOD: 35.4 ML/M2
LEFT ATRIUM VOLUME INDEX: 48.9 ML/M2
LEFT ATRIUM VOLUME MOD: 71.94 CM3
LEFT ATRIUM VOLUME: 99.29 CM3
LEFT INTERNAL DIMENSION IN SYSTOLE: 4.46 CM (ref 2.1–4)
LEFT VENTRICLE DIASTOLIC VOLUME INDEX: 63.61 ML/M2
LEFT VENTRICLE DIASTOLIC VOLUME: 129.13 ML
LEFT VENTRICLE MASS INDEX: 80 G/M2
LEFT VENTRICLE SYSTOLIC VOLUME INDEX: 44.6 ML/M2
LEFT VENTRICLE SYSTOLIC VOLUME: 90.5 ML
LEFT VENTRICULAR INTERNAL DIMENSION IN DIASTOLE: 5.19 CM (ref 3.5–6)
LEFT VENTRICULAR MASS: 162.39 G
LV LATERAL E/E' RATIO: 27.2 M/S
LV SEPTAL E/E' RATIO: 45.33 M/S
LYMPHOCYTES # BLD AUTO: 0.6 K/UL (ref 1–4.8)
LYMPHOCYTES NFR BLD: 12 % (ref 18–48)
MAGNESIUM SERPL-MCNC: 1.7 MG/DL (ref 1.6–2.6)
MCH RBC QN AUTO: 30 PG (ref 27–31)
MCHC RBC AUTO-ENTMCNC: 34.9 G/DL (ref 32–36)
MCV RBC AUTO: 86 FL (ref 82–98)
MODE: ABNORMAL
MONOCYTES # BLD AUTO: 0.4 K/UL (ref 0.3–1)
MONOCYTES NFR BLD: 7.7 % (ref 4–15)
MV A" WAVE DURATION": 8.28 MSEC
MV PEAK A VEL: 1.04 M/S
MV PEAK E VEL: 1.36 M/S
MV STENOSIS PRESSURE HALF TIME: 51.42 MS
MV VALVE AREA P 1/2 METHOD: 4.28 CM2
NEUTROPHILS # BLD AUTO: 3.7 K/UL (ref 1.8–7.7)
NEUTROPHILS NFR BLD: 78.8 % (ref 38–73)
NRBC BLD-RTO: 0 /100 WBC
PCO2 BLDA: 36.3 MMHG (ref 35–45)
PCO2 BLDA: 36.9 MMHG (ref 35–45)
PH SMN: 7.44 [PH] (ref 7.35–7.45)
PH SMN: 7.45 [PH] (ref 7.35–7.45)
PISA TR MAX VEL: 2.76 M/S
PLATELET # BLD AUTO: 141 K/UL (ref 150–450)
PMV BLD AUTO: 10.4 FL (ref 9.2–12.9)
PO2 BLDA: 32 MMHG (ref 40–60)
PO2 BLDA: 33 MMHG (ref 40–60)
POC ACTIVATED CLOTTING TIME K: 214 SEC (ref 74–137)
POC ACTIVATED CLOTTING TIME K: 242 SEC (ref 74–137)
POC ACTIVATED CLOTTING TIME K: 254 SEC (ref 74–137)
POC BE: 1 MMOL/L
POC BE: 1 MMOL/L
POC SATURATED O2: 65 % (ref 95–100)
POC SATURATED O2: 66 % (ref 95–100)
POC TCO2: 26 MMOL/L (ref 24–29)
POC TCO2: 26 MMOL/L (ref 24–29)
POCT GLUCOSE: 91 MG/DL (ref 70–110)
POTASSIUM SERPL-SCNC: 3.1 MMOL/L (ref 3.5–5.1)
PULM VEIN S/D RATIO: 0.85
PV PEAK D VEL: 0.4 M/S
PV PEAK S VEL: 0.34 M/S
QEF: 17 %
RA MAJOR: 6.08 CM
RA WIDTH: 4.25 CM
RBC # BLD AUTO: 2.97 M/UL (ref 4.6–6.2)
RIGHT VENTRICULAR END-DIASTOLIC DIMENSION: 4.48 CM
RV TISSUE DOPPLER FREE WALL SYSTOLIC VELOCITY 1 (APICAL 4 CHAMBER VIEW): 4.57 CM/S
SAMPLE: ABNORMAL
SINUS: 2.89 CM
SITE: ABNORMAL
SITE: ABNORMAL
SODIUM SERPL-SCNC: 135 MMOL/L (ref 136–145)
SP02: 98
STJ: 2.42 CM
TDI LATERAL: 0.05 M/S
TDI SEPTAL: 0.03 M/S
TDI: 0.04 M/S
TR MAX PG: 30 MMHG
TRICUSPID ANNULAR PLANE SYSTOLIC EXCURSION: 1.47 CM
WBC # BLD AUTO: 4.66 K/UL (ref 3.9–12.7)

## 2022-09-01 PROCEDURE — 80048 BASIC METABOLIC PNL TOTAL CA: CPT | Performed by: STUDENT IN AN ORGANIZED HEALTH CARE EDUCATION/TRAINING PROGRAM

## 2022-09-01 PROCEDURE — 85025 COMPLETE CBC W/AUTO DIFF WBC: CPT | Performed by: STUDENT IN AN ORGANIZED HEALTH CARE EDUCATION/TRAINING PROGRAM

## 2022-09-01 PROCEDURE — 83735 ASSAY OF MAGNESIUM: CPT | Performed by: HOSPITALIST

## 2022-09-01 PROCEDURE — 25000003 PHARM REV CODE 250: Performed by: STUDENT IN AN ORGANIZED HEALTH CARE EDUCATION/TRAINING PROGRAM

## 2022-09-01 PROCEDURE — 25500020 PHARM REV CODE 255: Performed by: INTERNAL MEDICINE

## 2022-09-01 PROCEDURE — 83880 ASSAY OF NATRIURETIC PEPTIDE: CPT | Performed by: PHYSICIAN ASSISTANT

## 2022-09-01 PROCEDURE — 94761 N-INVAS EAR/PLS OXIMETRY MLT: CPT

## 2022-09-01 PROCEDURE — 99217 PR OBSERVATION CARE DISCHARGE: CPT | Mod: ,,, | Performed by: INTERNAL MEDICINE

## 2022-09-01 PROCEDURE — 25000003 PHARM REV CODE 250: Performed by: INTERNAL MEDICINE

## 2022-09-01 PROCEDURE — 99217 PR OBSERVATION CARE DISCHARGE: ICD-10-PCS | Mod: ,,, | Performed by: INTERNAL MEDICINE

## 2022-09-01 PROCEDURE — 99900035 HC TECH TIME PER 15 MIN (STAT)

## 2022-09-01 PROCEDURE — 25000003 PHARM REV CODE 250: Performed by: HOSPITALIST

## 2022-09-01 PROCEDURE — 82803 BLOOD GASES ANY COMBINATION: CPT

## 2022-09-01 PROCEDURE — 63600175 PHARM REV CODE 636 W HCPCS: Performed by: STUDENT IN AN ORGANIZED HEALTH CARE EDUCATION/TRAINING PROGRAM

## 2022-09-01 RX ORDER — PRASUGREL 10 MG/1
10 TABLET, FILM COATED ORAL DAILY
Qty: 30 TABLET | Refills: 11 | Status: SHIPPED | OUTPATIENT
Start: 2022-09-02 | End: 2024-02-29

## 2022-09-01 RX ORDER — HYDRALAZINE HYDROCHLORIDE 25 MG/1
25 TABLET, FILM COATED ORAL EVERY 8 HOURS
Status: DISCONTINUED | OUTPATIENT
Start: 2022-09-01 | End: 2022-09-01 | Stop reason: HOSPADM

## 2022-09-01 RX ORDER — LANOLIN ALCOHOL/MO/W.PET/CERES
400 CREAM (GRAM) TOPICAL ONCE
Status: COMPLETED | OUTPATIENT
Start: 2022-09-01 | End: 2022-09-01

## 2022-09-01 RX ORDER — METOPROLOL SUCCINATE 25 MG/1
25 TABLET, EXTENDED RELEASE ORAL DAILY
Status: DISCONTINUED | OUTPATIENT
Start: 2022-09-01 | End: 2022-09-01 | Stop reason: HOSPADM

## 2022-09-01 RX ORDER — POTASSIUM CHLORIDE 20 MEQ/1
40 TABLET, EXTENDED RELEASE ORAL ONCE
Status: COMPLETED | OUTPATIENT
Start: 2022-09-01 | End: 2022-09-01

## 2022-09-01 RX ADMIN — SACUBITRIL AND VALSARTAN 1 TABLET: 24; 26 TABLET, FILM COATED ORAL at 09:09

## 2022-09-01 RX ADMIN — ISOSORBIDE DINITRATE 20 MG: 20 TABLET ORAL at 09:09

## 2022-09-01 RX ADMIN — POTASSIUM CHLORIDE 40 MEQ: 1500 TABLET, EXTENDED RELEASE ORAL at 06:09

## 2022-09-01 RX ADMIN — FUROSEMIDE 40 MG: 10 INJECTION, SOLUTION INTRAMUSCULAR; INTRAVENOUS at 04:09

## 2022-09-01 RX ADMIN — HUMAN ALBUMIN MICROSPHERES AND PERFLUTREN 0.11 MG: 10; .22 INJECTION, SOLUTION INTRAVENOUS at 04:09

## 2022-09-01 RX ADMIN — PRASUGREL 10 MG: 10 TABLET, FILM COATED ORAL at 09:09

## 2022-09-01 RX ADMIN — Medication 400 MG: at 06:09

## 2022-09-01 RX ADMIN — ASPIRIN 81 MG CHEWABLE TABLET 81 MG: 81 TABLET CHEWABLE at 09:09

## 2022-09-01 RX ADMIN — METOPROLOL SUCCINATE 25 MG: 25 TABLET, EXTENDED RELEASE ORAL at 09:09

## 2022-09-01 RX ADMIN — FUROSEMIDE 40 MG: 10 INJECTION, SOLUTION INTRAMUSCULAR; INTRAVENOUS at 03:09

## 2022-09-01 RX ADMIN — SPIRONOLACTONE 12.5 MG: 25 TABLET, FILM COATED ORAL at 09:09

## 2022-09-01 RX ADMIN — POTASSIUM CHLORIDE 40 MEQ: 1500 TABLET, EXTENDED RELEASE ORAL at 07:09

## 2022-09-01 NOTE — ASSESSMENT & PLAN NOTE
-Admit for unprotected LM PCI with impella support.   -Successful rota-PCI of LM-->LCx and LM--><LAD using DK Crush technique with 3.5X26 ROGER placed from LM-->lCx and 3.5X18 ROGER plaed from LM-->LAD.  LM segment post-dilated with 4mm balloon.  -continue ASA, prasugrel, statin, restart BB

## 2022-09-01 NOTE — ASSESSMENT & PLAN NOTE
-Admit for unprotected LM PCI with impella support.   -Successful rota-PCI of LM-->LCx and LM--><LAD using DK Crush technique with 3.5X26 ROGER placed from LM-->lCx and 3.5X18 ROGER plaed from LM-->LAD.  LM segment post-dilated with 4mm balloon.  ASA, prasugrel, statin, BB

## 2022-09-01 NOTE — SUBJECTIVE & OBJECTIVE
Interval History: No acute events overnight. Patient feels well and has no new complaints. Fluid balance -1L. UOP 2L. CVP 5. CI 2.7. PWP 16.He is feeling well and eager to go home.    Continuous Infusions:   sodium chloride 0.9% 10 mL/hr at 09/01/22 0601    sodium chloride 0.9% 5 mL/hr at 09/01/22 0601     Scheduled Meds:   aspirin  81 mg Oral Daily    atorvastatin  40 mg Oral QHS    finasteride  5 mg Oral QHS    furosemide (LASIX) injection  40 mg Intravenous Q12H    gabapentin  300 mg Oral QHS    hydrALAZINE  25 mg Oral Q8H    isosorbide dinitrate  20 mg Oral TID    metoprolol succinate  25 mg Oral Daily    prasugreL  10 mg Oral Daily    sacubitriL-valsartan  1 tablet Oral BID    spironolactone  12.5 mg Oral Daily    tamsulosin  1 capsule Oral QHS     PRN Meds:acetaminophen, dextrose 10%, dextrose 10%, fluticasone furoate, glucagon (human recombinant), glucose, glucose, insulin aspart U-100    Review of patient's allergies indicates:   Allergen Reactions    Invokana  [canagliflozin]      Other reaction(s): been very dizzy     Objective:     Vital Signs (Most Recent):  Temp: 96.8 °F (36 °C) (09/01/22 0700)  Pulse: 84 (09/01/22 0800)  Resp: (!) 27 (09/01/22 0800)  BP: (!) 94/57 (09/01/22 0800)  SpO2: 98 % (09/01/22 0800)   Vital Signs (24h Range):  Temp:  [96.8 °F (36 °C)-98.7 °F (37.1 °C)] 96.8 °F (36 °C)  Pulse:  [58-84] 84  Resp:  [14-27] 27  SpO2:  [91 %-100 %] 98 %  BP: ()/(49-87) 94/57     Patient Vitals for the past 72 hrs (Last 3 readings):   Weight   08/31/22 0632 81.2 kg (179 lb)     Body mass index is 24.28 kg/m².      Intake/Output Summary (Last 24 hours) at 9/1/2022 0844  Last data filed at 9/1/2022 0700  Gross per 24 hour   Intake 960.33 ml   Output 2250 ml   Net -1289.67 ml       Hemodynamic Parameters:  PAP: (33-69)/(9-27) 36/12  PAP (Mean):  [21 mmHg-42 mmHg] 33 mmHg  CVP:  [5 mmHg-8 mmHg] 5 mmHg  PCWP:  [16 mmHg] 16 mmHg      Physical Exam  Constitutional:       Appearance: Normal appearance.    HENT:      Head: Normocephalic and atraumatic.   Eyes:      Extraocular Movements: Extraocular movements intact.      Pupils: Pupils are equal, round, and reactive to light.   Neck:      Comments: PA catheter in place R IJ  Cardiovascular:      Rate and Rhythm: Normal rate and regular rhythm.      Pulses: Normal pulses.      Heart sounds: Murmur heard.   Pulmonary:      Effort: Pulmonary effort is normal.      Breath sounds: Normal breath sounds.   Abdominal:      General: Bowel sounds are normal. There is no distension.      Palpations: Abdomen is soft.   Genitourinary:     Comments: Pelvic hernia  Musculoskeletal:      Right lower leg: No edema.      Left lower leg: No edema.   Skin:     General: Skin is warm.      Capillary Refill: Capillary refill takes 2 to 3 seconds.      Comments: Left and right groin access no hematoma no bleeding.   Neurological:      Mental Status: He is alert.       Significant Labs:  CBC:  Recent Labs   Lab 08/31/22  0553 09/01/22  0210   WBC 4.77 4.66   RBC 3.65* 2.97*   HGB 10.9* 8.9*   HCT 31.5* 25.5*    141*   MCV 86 86   MCH 29.9 30.0   MCHC 34.6 34.9     BNP:  Recent Labs   Lab 09/01/22  0210   BNP 3,592*     CMP:  Recent Labs   Lab 08/31/22  0553 08/31/22  1305 09/01/22  0210   GLU 81 83 93   CALCIUM 8.6* 8.1* 7.7*   ALBUMIN  --  2.2*  --    PROT  --  5.8*  --     135* 135*   K 3.7 3.7 3.1*   CO2 26 24 22*    103 105   BUN 19 19 20   CREATININE 1.7* 1.6* 1.4   ALKPHOS  --  73  --    ALT  --  6*  --    AST  --  18  --    BILITOT  --  1.5*  --       Coagulation:   No results for input(s): PT, INR, APTT in the last 168 hours.  LDH:  No results for input(s): LDH in the last 72 hours.  Microbiology:  Microbiology Results (last 7 days)       ** No results found for the last 168 hours. **            I have reviewed all pertinent labs within the past 24 hours.    Estimated Creatinine Clearance: 53.1 mL/min (based on SCr of 1.4 mg/dL).    Diagnostic Results:  I have  reviewed and interpreted all pertinent imaging results/findings within the past 24 hours.

## 2022-09-01 NOTE — PLAN OF CARE
CVP 13, PA 60/40, SvO2 50, cardiac output 4.2, cardiac index 2.0,, SVR 1444.  He is currently on Lasix 40 mg b.i.d.    Afterload reduction with Entresto, hydralazine, Isordil. Given hsi elevated blood pressures will increase the dose of Hydralazine and Isordil

## 2022-09-01 NOTE — ASSESSMENT & PLAN NOTE
-ICM  -Last 2D Echo 5/24/22: LVEF 20%, LVEDD 5.9cm  -RHC prior to PCI: RA: 13, RV: 80/4 (13), PWP: 33/38 (31), PA: 78/28 (46), CO: 4.05, CI: 1.99  -PA catheter still in place.  He was started on Lasix 40mg IV BID post procedure. Will get repeat PA numbers when he gets a bed and adjust diuretics if needed. His previous home diuretic dose was Lasix 40mg po Qdaily  -GDMT with home dose of Hydralazine, Isordil, Entresto, Aldactone. He is on Farxiga at home but not on formulary, will restart on discharge.  -2g Na dietary restriction, 1500 mL fluid restriction, strict I/Os  -May consider cardiomems evaluation. 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 Pt is at a high risk for HF readmissions.

## 2022-09-01 NOTE — ASSESSMENT & PLAN NOTE
-ICM  -Last 2D Echo 5/24/22: LVEF 20%, LVEDD 5.9cm  -RHC prior to PCI: RA: 13, RV: 80/4 (13), PWP: 33/38 (31), PA: 78/28 (46), CO: 4.05, CI: 1.99  -PA catheter still in place.  He was started on Lasix 40mg IV BID post procedure. Will get repeat PA numbers when he gets a bed and adjust diuretics if needed. His previous home diuretic dose was Lasix 40mg po Qdaily  -GDMT with home dose of Hydralazine, Isordil, Entresto, Aldactone. He is on Farxiga at home but not on formulary, will restart on discharge.  -2g Na dietary restriction, 1500 mL fluid restriction, strict I/Os  -May consider cardiomems evaluation. 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 Pt is at a high risk for HF readmissions.  -repeat echo  -restart toprol 25mg daily  -possible discharge today

## 2022-09-01 NOTE — DISCHARGE SUMMARY
[facility]  Heart Transplant  Discharge Summary      Patient Name: Baldo Carney  MRN: [id number]  Admission Date: 8/31/2022  Hospital Length of Stay: 1 days  Discharge Date and Time: 09/01/2022 2:55 PM  Attending Physician: Arin Skaggs MD   Discharging Provider: Aisha Gan MD  Primary Care Provider: KAMERON Rodriguez,FNP-C     HPI: 71 y.o. male who presented for angiogram with PCI for multivessel coronary artery disease; RHC during procedure showed elevated filling pressures, so he is being admitted for optimization post procedure.  Patient reports he feels well post procedure and has no new complaints. He has PMHx: HTN, dyslipidemia, and an ICM (10/16/21 TTE demonstrated an LVEF=20%).  The patient underwent cardiac catheterization at [facility] on 3/11/22 that demonstrated multi-vessel CAD.     Patient states prior to his PCI he was not feeling well.  He had significant SOB and GOMEZ, he reports he could walk about 50 feet then have to stop and rest for 10-15 minutes prior to walking again.  He denied associated chest pain, dizziness, diaphroesis.  He also reports both abdominal and lower extremity edema.  He states he has multiple paracentesis over the last couple months, some of which >5L removed.  He had a liver biospsy 7/7 which showed moderate multifocal sinusoidal dilatation and congestion with focal bridging fibrosis.  He also reports PND stating he sleeps on 2 pillows but regularly wakes up around 2-3 am and has to sit on the side of the bed to breath     He is  and lives alone, but reports he has family near by.       Procedure(s) (LRB):  Stent, Coronary, Multi Vessel (Bilateral)  Atherectomy-coronary (N/A)  IVUS, Coronary  INSERTION, CATHETER, SWAN-SEE, WITH IMAGING GUIDANCE  INSERTION, IMPELLA (N/A)  Impella, Removal     Hospital Course: Patient was admitted after PCI to LM-LAD and LM-Lcx with impella support after RHC revealed volume overload. He was admitted  for outpatient recovery and diuresed and monitored overnight. In the morning, his PWP was 16, CVP 5, CI 2.7, SVR 1047. He was counseled on considering the CardioMEMS which may benefit him. He was instructed to follow up with his Cardiologist in [place] in one week. He was counseled on the importance of taking his aspirin and prasugrel. He was instructed that if his symptoms recurred, to report to the ED.      Goals of Care Treatment Preferences:  Code Status: Full Code    Living Will: Yes                  Significant Diagnostic Studies: Successful rota-PCI of LM-->LCx and LM--><LAD using DK Crush technique with 3.5X26 ROGER placed from LM-->lCx and 3.5X18 ROGER plaed from LM-->LAD.  LM segment post-dilated with 4mm balloon.    Pending Diagnostic Studies:     None        Final Active Diagnoses:    Diagnosis Date Noted POA    Acute on chronic congestive heart failure [I50.9] 10/26/2021 Yes    CAD (coronary artery disease) [I25.10] 11/03/2014 Yes    S/P AVR (aortic valve replacement) [Z95.2] 11/03/2014 Not Applicable    Hypertension [I10] 09/19/2014 Yes    Hyperlipidemia [E78.5] 09/19/2014 Yes    Type 2 diabetes mellitus without complication, without long-term current use of insulin [E11.9] 09/19/2014 Yes      Problems Resolved During this Admission:    Diagnosis Date Noted Date Resolved POA    Other ascites [R18.8] 06/07/2022 09/01/2022 Yes     Chronic      Discharged Condition: good    Disposition:     Follow Up:   Follow-up Information     Hank Lujan MD Follow up in 1 week(s).    Specialties: Cardiology, Interventional Cardiology  Contact information:  [address]  [address]  [facility] [id number]  [phone]                       Patient Instructions:   No discharge procedures on file.  Medications:  Reconciled Home Medications:      Medication List      START taking these medications    prasugreL 10 mg Tab  Commonly known as: EFFIENT  Take 1 tablet (10 mg total) by mouth once  daily.  Start taking on: September 2, 2022        CHANGE how you take these medications    hydrALAZINE 50 MG tablet  Commonly known as: APRESOLINE  Take 1 tablet (50 mg total) by mouth every 8 (eight) hours.  What changed: when to take this        CONTINUE taking these medications    ARNUITY ELLIPTA 100 mcg/actuation inhaler  Generic drug: fluticasone furoate  Use as directed 1 puff in the mouth or throat daily as needed.     aspirin 81 MG EC tablet  Commonly known as: ECOTRIN  Take 81 mg by mouth once daily.     atorvastatin 40 MG tablet  Commonly known as: LIPITOR  TAKE 1 TABLET BY MOUTH EVERY DAY     azelastine 137 mcg (0.1 %) nasal spray  Commonly known as: ASTELIN  1 spray (137 mcg total) by Nasal route 2 (two) times daily as needed for Rhinitis.     BREO ELLIPTA 100-25 mcg/dose diskus inhaler  Generic drug: fluticasone furoate-vilanteroL  Inhale 1 puff into the lungs once daily. (DAILY CONTROLLER)     BREZTRI AEROSPHERE 160-9-4.8 mcg/actuation Hfaa  Generic drug: budesonide-glycopyr-formoterol  Inhale into the lungs daily as needed.     ENTRESTO 24-26 mg per tablet  Generic drug: sacubitriL-valsartan  Take 1 tablet by mouth 2 (two) times daily.     FARXIGA 5 mg Tab tablet  Generic drug: dapagliflozin  Take 5 mg by mouth once daily.     ferrous sulfate 325 mg (65 mg iron) Tab tablet  Commonly known as: FEOSOL  TAKE 1 TABLET (1 EACH TOTAL) BY MOUTH DAILY WITH BREAKFAST. TO BE TAKEN WITH VITAMIN C (ORANGE JUICE) FOR BEST ABSORPTION     finasteride 5 mg tablet  Commonly known as: PROSCAR  Take 5 mg by mouth once daily.     fluticasone propionate 50 mcg/actuation nasal spray  Commonly known as: FLONASE  1 spray (50 mcg total) by Each Nostril route once daily.     furosemide 40 MG tablet  Commonly known as: LASIX  Take by mouth once daily.     gabapentin 300 MG capsule  Commonly known as: NEURONTIN  Take 1 capsule (300 mg total) by mouth every evening.     isosorbide dinitrate 20 MG tablet  Commonly known as:  ISORDIL  Take 1 tablet (20 mg total) by mouth 3 (three) times daily.     JARDIANCE 10 mg tablet  Generic drug: empagliflozin  Take 1 tablet (10 mg total) by mouth once daily.     levalbuterol 0.63 mg/3 mL nebulizer solution  Commonly known as: XOPENEX  Take 3 mLs (0.63 mg total) by nebulization every 4 to 6 hours as needed for Wheezing or Shortness of Breath. Rescue     linaCLOtide 72 mcg Cap capsule  Commonly known as: LINZESS  Take 1 capsule (72 mcg total) by mouth before breakfast.     metoprolol succinate 25 MG 24 hr tablet  Commonly known as: TOPROL-XL  Take 1 tablet (25 mg total) by mouth once daily.     promethazine-dextromethorphan 6.25-15 mg/5 mL Syrp  Commonly known as: PROMETHAZINE-DM  Take 5 mLs by mouth every 6 (six) hours as needed (cough and congestion).     spironolactone 25 MG tablet  Commonly known as: ALDACTONE  Take 12.5 mg by mouth once daily.     tamsulosin 0.4 mg Cap  Commonly known as: FLOMAX  TAKE 1 CAPSULE BY MOUTH EVERY DAY     traMADoL 50 mg tablet  Commonly known as: ULTRAM  Take 1 tablet (50 mg total) by mouth every 8 (eight) hours as needed for Pain.     VENTOLIN HFA 90 mcg/actuation inhaler  Generic drug: albuterol  Inhale 1-2 puffs into the lungs every 4 to 6 hours as needed for Wheezing or Shortness of Breath. (RESCUE)     VITAMIN D ORAL  Take 1 capsule by mouth once a week. WEDNESDAYS     ZONTIVITY 2.08 mg Tab  Generic drug: vorapaxar  Take 1 tablet by mouth once daily.        STOP taking these medications    clopidogreL 75 mg tablet  Commonly known as: PLAVIX            Aisha Gan MD  Heart Transplant  [facility] - [facility]

## 2022-09-01 NOTE — HOSPITAL COURSE
Patient was admitted after PCI to LM-LAD and LM-Lcx with impella support after RHC revealed volume overload. He was admitted for outpatient recovery and diuresed and monitored overnight. In the morning, his PWP was 16, CVP 5, CI 2.7, SVR 1047. He was counseled on considering the CardioMEMS which may benefit him. He was instructed to follow up with his Cardiologist in [place] in one week. He was counseled on the importance of taking his aspirin and prasugrel. He was instructed that if his symptoms recurred, to report to the ED.

## 2022-09-01 NOTE — PROGRESS NOTES
MD Anil, notified of K of 3.1 and Mg of 1.7. Verbal order to replace with 40mEq of PO K now and another 40mEq of PO K in one hour as well as 400mg PO Mg once

## 2022-09-01 NOTE — PLAN OF CARE
POC reviewed with Baldo Daren Rommel and family at 0300. Pt verbalized understanding. JORDEN khanna in place measuring at 52cm. Groin sites CDI overnight, no hematoma, no bleeding. Questions and concerns addressed. No acute events overnight. Pt progressing toward goals. Will continue to monitor. See below and flowsheets for full assessment and VS info.       Neuro:  [place] Coma Scale  Best Eye Response: 4-->(E4) spontaneous  Best Motor Response: 6-->(M6) obeys commands  Best Verbal Response: 5-->(V5) oriented  [place] Coma Scale Score: 15  Assessment Qualifiers: no eye obstruction present, patient not sedated/intubated  Pupil PERRLA: yes     24hr Temp:  [98 °F (36.7 °C)-98.7 °F (37.1 °C)]     CV:   Rhythm: normal sinus rhythm  BP goals:   SBP < 160  MAP > 65    Resp:   O2 Device (Oxygen Therapy): room air       Plan: N/A    GI/:     Diet/Nutrition Received: low saturated fat/low cholesterol  Last Bowel Movement: 08/30/22  Voiding Characteristics: voids spontaneously without difficulty    Intake/Output Summary (Last 24 hours) at 9/1/2022 0444  Last data filed at 9/1/2022 0401  Gross per 24 hour   Intake 780.46 ml   Output 1950 ml   Net -1169.54 ml     Unmeasured Output  Urine Occurrence: 1    Labs/Accuchecks:  Recent Labs   Lab 09/01/22  0210   WBC 4.66   RBC 2.97*   HGB 8.9*   HCT 25.5*   *      Recent Labs   Lab 08/31/22  1305 09/01/22  0210   * 135*   K 3.7 3.1*   CO2 24 22*    105   BUN 19 20   CREATININE 1.6* 1.4   ALKPHOS 73  --    ALT 6*  --    AST 18  --    BILITOT 1.5*  --     No results for input(s): PROTIME, INR, APTT, HEPANTIXA in the last 168 hours. No results for input(s): CPK, CPKMB, TROPONINI, MB in the last 168 hours.    Electrolytes: Electrolytes replaced  Accuchecks: ACHS    Gtts:   sodium chloride 0.9% 10 mL/hr at 09/01/22 0401    sodium chloride 0.9% 5 mL/hr at 09/01/22 0401       LDA/Wounds:  Lines/Drains/Airways       Central Venous Catheter Line  Duration              Pulmonary Artery Catheter Assessment  08/31/22 0900 right internal jugular <1 day              Peripheral Intravenous Line  Duration                  Peripheral IV - Single Lumen 08/31/22 0712 20 G Left Antecubital <1 day         Peripheral IV - Single Lumen 08/31/22 0712 20 G Left;Posterior Hand <1 day                  Wounds: No  Wound care consulted: No

## 2022-09-01 NOTE — PROGRESS NOTES
[facility]  Heart Transplant  Progress Note    Patient Name: Baldo Carney  MRN: [id number]  Admission Date: 8/31/2022  Hospital Length of Stay: 1 days  Attending Physician: Eris Hines Jr.,*  Primary Care Provider: Millie Hayes, KAMERON,FNP-C  Principal Problem:<principal problem not specified>    Subjective:     Interval History: No acute events overnight. Patient feels well and has no new complaints. Fluid balance -1L. UOP 2L. CVP 5. CI 2.7. PWP 16.He is feeling well and eager to go home.    Continuous Infusions:   sodium chloride 0.9% 10 mL/hr at 09/01/22 0601    sodium chloride 0.9% 5 mL/hr at 09/01/22 0601     Scheduled Meds:   aspirin  81 mg Oral Daily    atorvastatin  40 mg Oral QHS    finasteride  5 mg Oral QHS    furosemide (LASIX) injection  40 mg Intravenous Q12H    gabapentin  300 mg Oral QHS    hydrALAZINE  25 mg Oral Q8H    isosorbide dinitrate  20 mg Oral TID    metoprolol succinate  25 mg Oral Daily    prasugreL  10 mg Oral Daily    sacubitriL-valsartan  1 tablet Oral BID    spironolactone  12.5 mg Oral Daily    tamsulosin  1 capsule Oral QHS     PRN Meds:acetaminophen, dextrose 10%, dextrose 10%, fluticasone furoate, glucagon (human recombinant), glucose, glucose, insulin aspart U-100    Review of patient's allergies indicates:   Allergen Reactions    Invokana  [canagliflozin]      Other reaction(s): been very dizzy     Objective:     Vital Signs (Most Recent):  Temp: 96.8 °F (36 °C) (09/01/22 0700)  Pulse: 84 (09/01/22 0800)  Resp: (!) 27 (09/01/22 0800)  BP: (!) 94/57 (09/01/22 0800)  SpO2: 98 % (09/01/22 0800)   Vital Signs (24h Range):  Temp:  [96.8 °F (36 °C)-98.7 °F (37.1 °C)] 96.8 °F (36 °C)  Pulse:  [58-84] 84  Resp:  [14-27] 27  SpO2:  [91 %-100 %] 98 %  BP: ()/(49-87) 94/57     Patient Vitals for the past 72 hrs (Last 3 readings):   Weight   08/31/22 0632 81.2 kg (179 lb)     Body mass index is 24.28 kg/m².      Intake/Output  Summary (Last 24 hours) at 9/1/2022 0844  Last data filed at 9/1/2022 0700  Gross per 24 hour   Intake 960.33 ml   Output 2250 ml   Net -1289.67 ml       Hemodynamic Parameters:  PAP: (33-69)/(9-27) 36/12  PAP (Mean):  [21 mmHg-42 mmHg] 33 mmHg  CVP:  [5 mmHg-8 mmHg] 5 mmHg  PCWP:  [16 mmHg] 16 mmHg      Physical Exam  Constitutional:       Appearance: Normal appearance.   HENT:      Head: Normocephalic and atraumatic.   Eyes:      Extraocular Movements: Extraocular movements intact.      Pupils: Pupils are equal, round, and reactive to light.   Neck:      Comments: PA catheter in place R IJ  Cardiovascular:      Rate and Rhythm: Normal rate and regular rhythm.      Pulses: Normal pulses.      Heart sounds: Murmur heard.   Pulmonary:      Effort: Pulmonary effort is normal.      Breath sounds: Normal breath sounds.   Abdominal:      General: Bowel sounds are normal. There is no distension.      Palpations: Abdomen is soft.   Genitourinary:     Comments: Pelvic hernia  Musculoskeletal:      Right lower leg: No edema.      Left lower leg: No edema.   Skin:     General: Skin is warm.      Capillary Refill: Capillary refill takes 2 to 3 seconds.      Comments: Left and right groin access no hematoma no bleeding.   Neurological:      Mental Status: He is alert.       Significant Labs:  CBC:  Recent Labs   Lab 08/31/22  0553 09/01/22 0210   WBC 4.77 4.66   RBC 3.65* 2.97*   HGB 10.9* 8.9*   HCT 31.5* 25.5*    141*   MCV 86 86   MCH 29.9 30.0   MCHC 34.6 34.9     BNP:  Recent Labs   Lab 09/01/22 0210   BNP 3,592*     CMP:  Recent Labs   Lab 08/31/22  0553 08/31/22  1305 09/01/22 0210   GLU 81 83 93   CALCIUM 8.6* 8.1* 7.7*   ALBUMIN  --  2.2*  --    PROT  --  5.8*  --     135* 135*   K 3.7 3.7 3.1*   CO2 26 24 22*    103 105   BUN 19 19 20   CREATININE 1.7* 1.6* 1.4   ALKPHOS  --  73  --    ALT  --  6*  --    AST  --  18  --    BILITOT  --  1.5*  --       Coagulation:   No results for input(s): PT,  INR, APTT in the last 168 hours.  LDH:  No results for input(s): LDH in the last 72 hours.  Microbiology:  Microbiology Results (last 7 days)       ** No results found for the last 168 hours. **            I have reviewed all pertinent labs within the past 24 hours.    Estimated Creatinine Clearance: 53.1 mL/min (based on SCr of 1.4 mg/dL).    Diagnostic Results:  I have reviewed and interpreted all pertinent imaging results/findings within the past 24 hours.    Assessment and Plan:     71 y.o. male who presented for angiogram with PCI for multivessel coronary artery disease; RHC during procedure showed elevated filling pressures, so he is being admitted for optimization post procedure.  Patient reports he feels well post procedure and has no new complaints. He has PMHx: HTN, dyslipidemia, and an ICM (10/16/21 TTE demonstrated an LVEF=20%).  The patient underwent cardiac catheterization at [facility] on 3/11/22 that demonstrated multi-vessel CAD.     Patient states prior to his PCI he was not feeling well.  He had significant SOB and GOMEZ, he reports he could walk about 50 feet then have to stop and rest for 10-15 minutes prior to walking again.  He denied associated chest pain, dizziness, diaphroesis.  He also reports both abdominal and lower extremity edema.  He states he has multiple paracentesis over the last couple months, some of which >5L removed.  He had a liver biospsy 7/7 which showed moderate multifocal sinusoidal dilatation and congestion with focal bridging fibrosis.  He also reports PND stating he sleeps on 2 pillows but regularly wakes up around 2-3 am and has to sit on the side of the bed to breath     He is  and lives alone, but reports he has family near by.           Acute on chronic congestive heart failure  -ICM  -Last 2D Echo 5/24/22: LVEF 20%, LVEDD 5.9cm  -RHC prior to PCI: RA: 13, RV: 80/4 (13), PWP: 33/38 (31), PA: 78/28 (46), CO: 4.05, CI: 1.99  -PA catheter still in place.  He was started  on Lasix 40mg IV BID post procedure. Will get repeat PA numbers when he gets a bed and adjust diuretics if needed. His previous home diuretic dose was Lasix 40mg po Qdaily  -GDMT with home dose of Hydralazine, Isordil, Entresto, Aldactone. He is on Farxiga at home but not on formulary, will restart on discharge.  -2g Na dietary restriction, 1500 mL fluid restriction, strict I/Os  -May consider cardiomems evaluation. 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 Pt is at a high risk for HF readmissions.  -repeat echo  -restart toprol 25mg daily  -possible discharge today    Other ascites  -admitted last December with ADHF and since then has developed progressive ascites.    -A CT scan suggested a cirrhotic appearing liver.  He is hepatitis-C antibody negative.  He has a remote history of fairly heavy alcohol consumption but has been abstinent for the last 4 years.  He has no symptoms of GI bleeding or hepatic encephalopathy. -Has had multiple paracentesis in [place] some with 8L of fluid removed  -Transjugular liver biopsy done 7/7: Moderate multifocal sinusoidal dilatation and congestion with focal bridging fibrosis   -Abdomen does not appear distended today on exam.    S/P AVR (aortic valve replacement)  -last echo done at outside facility 5/24/22  -repeat echo pending    CAD (coronary artery disease)  -Admit for unprotected LM PCI with impella support.   -Successful rota-PCI of LM-->LCx and LM--><LAD using DK Crush technique with 3.5X26 ROGER placed from LM-->lCx and 3.5X18 ROGER plaed from LM-->LAD.  LM segment post-dilated with 4mm balloon.  -continue ASA, prasugrel, statin, restart BB    Type 2 diabetes mellitus without complication, without long-term current use of insulin  -Will hold patients home dose of Jardiance wile inpatient and cover with ISS    Hyperlipidemia  -continue home dose of statin        Aisha  JACKIE Gan MD  Heart Transplant  [facility]

## 2022-09-03 LAB — POCT GLUCOSE: 121 MG/DL (ref 70–110)

## 2022-09-06 ENCOUNTER — HOSPITAL ENCOUNTER (OUTPATIENT)
Dept: INTERVENTIONAL RADIOLOGY/VASCULAR | Facility: HOSPITAL | Age: 72
Discharge: HOME | End: 2022-09-06
Attending: INTERNAL MEDICINE
Payer: MEDICARE

## 2022-09-06 VITALS — DIASTOLIC BLOOD PRESSURE: 63 MMHG | SYSTOLIC BLOOD PRESSURE: 139 MMHG | HEART RATE: 82 BPM

## 2022-09-06 DIAGNOSIS — R18.8 OTHER ASCITES: ICD-10-CM

## 2022-09-06 LAB
APPEARANCE FLD: NORMAL
BODY FLD TYPE: NORMAL
COLOR FLD: YELLOW
LYMPHOCYTES NFR FLD MANUAL: 37 %
MESOTHL CELL NFR FLD MANUAL: 20 %
MONOS+MACROS NFR FLD MANUAL: 39 %
NEUTROPHILS NFR FLD MANUAL: 4 %
WBC # FLD: 181 /CU MM

## 2022-09-06 PROCEDURE — 89051 BODY FLUID CELL COUNT: CPT | Performed by: INTERNAL MEDICINE

## 2022-09-06 PROCEDURE — 27202032 IR PARACENTESIS WITH IMAGING

## 2022-09-06 PROCEDURE — 49083 ABD PARACENTESIS W/IMAGING: CPT | Performed by: RADIOLOGY

## 2022-09-06 PROCEDURE — 49083 IR PARACENTESIS WITH IMAGING: ICD-10-PCS | Mod: ,,, | Performed by: RADIOLOGY

## 2022-09-06 NOTE — NURSING
IR ultrasound guided paracentesis performed by Dr. Marshall; procedure explained and consent obtained by Radiologist. Time out performed 6 L removed-  VS remained stable for duration of procedure. Specimens collected and sent to lab if ordered. Para d/c'd, with tip intact. Access site cleaned with peroxide, derma bond and steri-strips applied, then covered with sterile Band-Aid. Pt discharge home in stable condition.

## 2022-09-13 ENCOUNTER — OFFICE VISIT (OUTPATIENT)
Dept: HEPATOLOGY | Facility: CLINIC | Age: 72
End: 2022-09-13
Attending: INTERNAL MEDICINE
Payer: MEDICARE

## 2022-09-13 ENCOUNTER — TELEPHONE (OUTPATIENT)
Dept: FAMILY MEDICINE | Facility: CLINIC | Age: 72
End: 2022-09-13

## 2022-09-13 ENCOUNTER — LAB VISIT (OUTPATIENT)
Dept: LAB | Facility: HOSPITAL | Age: 72
End: 2022-09-13
Payer: MEDICARE

## 2022-09-13 VITALS
BODY MASS INDEX: 21.26 KG/M2 | SYSTOLIC BLOOD PRESSURE: 141 MMHG | DIASTOLIC BLOOD PRESSURE: 61 MMHG | RESPIRATION RATE: 18 BRPM | TEMPERATURE: 96 F | HEART RATE: 75 BPM | OXYGEN SATURATION: 97 % | WEIGHT: 156.94 LBS | HEIGHT: 72 IN

## 2022-09-13 DIAGNOSIS — I25.5 ISCHEMIC CARDIOMYOPATHY: Primary | ICD-10-CM

## 2022-09-13 DIAGNOSIS — I25.5 ISCHEMIC CARDIOMYOPATHY: ICD-10-CM

## 2022-09-13 LAB
ALBUMIN SERPL BCP-MCNC: 2.6 G/DL (ref 3.5–5.2)
ALP SERPL-CCNC: 93 U/L (ref 55–135)
ALT SERPL W/O P-5'-P-CCNC: 8 U/L (ref 10–44)
ANION GAP SERPL CALC-SCNC: 8 MMOL/L (ref 8–16)
AST SERPL-CCNC: 20 U/L (ref 10–40)
BASOPHILS # BLD AUTO: 0.04 K/UL (ref 0–0.2)
BASOPHILS NFR BLD: 0.6 % (ref 0–1.9)
BILIRUB SERPL-MCNC: 0.8 MG/DL (ref 0.1–1)
BUN SERPL-MCNC: 24 MG/DL (ref 8–23)
CALCIUM SERPL-MCNC: 8.7 MG/DL (ref 8.7–10.5)
CHLORIDE SERPL-SCNC: 102 MMOL/L (ref 95–110)
CO2 SERPL-SCNC: 25 MMOL/L (ref 23–29)
CREAT SERPL-MCNC: 1.7 MG/DL (ref 0.5–1.4)
DIFFERENTIAL METHOD: ABNORMAL
EOSINOPHIL # BLD AUTO: 0.1 K/UL (ref 0–0.5)
EOSINOPHIL NFR BLD: 0.9 % (ref 0–8)
ERYTHROCYTE [DISTWIDTH] IN BLOOD BY AUTOMATED COUNT: 16.6 % (ref 11.5–14.5)
EST. GFR  (NO RACE VARIABLE): 42.6 ML/MIN/1.73 M^2
GLUCOSE SERPL-MCNC: 112 MG/DL (ref 70–110)
HCT VFR BLD AUTO: 30.7 % (ref 40–54)
HGB BLD-MCNC: 10.4 G/DL (ref 14–18)
IMM GRANULOCYTES # BLD AUTO: 0.04 K/UL (ref 0–0.04)
IMM GRANULOCYTES NFR BLD AUTO: 0.6 % (ref 0–0.5)
INR PPP: 1.1 (ref 0.8–1.2)
LYMPHOCYTES # BLD AUTO: 0.6 K/UL (ref 1–4.8)
LYMPHOCYTES NFR BLD: 8.7 % (ref 18–48)
MCH RBC QN AUTO: 29.4 PG (ref 27–31)
MCHC RBC AUTO-ENTMCNC: 33.9 G/DL (ref 32–36)
MCV RBC AUTO: 87 FL (ref 82–98)
MONOCYTES # BLD AUTO: 0.4 K/UL (ref 0.3–1)
MONOCYTES NFR BLD: 6.3 % (ref 4–15)
NEUTROPHILS # BLD AUTO: 5.7 K/UL (ref 1.8–7.7)
NEUTROPHILS NFR BLD: 82.9 % (ref 38–73)
NRBC BLD-RTO: 0 /100 WBC
PLATELET # BLD AUTO: 246 K/UL (ref 150–450)
PMV BLD AUTO: 9.8 FL (ref 9.2–12.9)
POTASSIUM SERPL-SCNC: 3.8 MMOL/L (ref 3.5–5.1)
PROT SERPL-MCNC: 7 G/DL (ref 6–8.4)
PROTHROMBIN TIME: 11.4 SEC (ref 9–12.5)
RBC # BLD AUTO: 3.54 M/UL (ref 4.6–6.2)
SODIUM SERPL-SCNC: 135 MMOL/L (ref 136–145)
WBC # BLD AUTO: 6.88 K/UL (ref 3.9–12.7)

## 2022-09-13 PROCEDURE — 99999 PR PBB SHADOW E&M-EST. PATIENT-LVL III: ICD-10-PCS | Mod: PBBFAC,,, | Performed by: INTERNAL MEDICINE

## 2022-09-13 PROCEDURE — 1160F PR REVIEW ALL MEDS BY PRESCRIBER/CLIN PHARMACIST DOCUMENTED: ICD-10-PCS | Mod: CPTII,S$GLB,, | Performed by: INTERNAL MEDICINE

## 2022-09-13 PROCEDURE — 1159F MED LIST DOCD IN RCRD: CPT | Mod: CPTII,S$GLB,, | Performed by: INTERNAL MEDICINE

## 2022-09-13 PROCEDURE — 3008F BODY MASS INDEX DOCD: CPT | Mod: CPTII,S$GLB,, | Performed by: INTERNAL MEDICINE

## 2022-09-13 PROCEDURE — 85610 PROTHROMBIN TIME: CPT | Performed by: INTERNAL MEDICINE

## 2022-09-13 PROCEDURE — 3008F PR BODY MASS INDEX (BMI) DOCUMENTED: ICD-10-PCS | Mod: CPTII,S$GLB,, | Performed by: INTERNAL MEDICINE

## 2022-09-13 PROCEDURE — 1101F PT FALLS ASSESS-DOCD LE1/YR: CPT | Mod: CPTII,S$GLB,, | Performed by: INTERNAL MEDICINE

## 2022-09-13 PROCEDURE — 1126F AMNT PAIN NOTED NONE PRSNT: CPT | Mod: CPTII,S$GLB,, | Performed by: INTERNAL MEDICINE

## 2022-09-13 PROCEDURE — 1159F PR MEDICATION LIST DOCUMENTED IN MEDICAL RECORD: ICD-10-PCS | Mod: CPTII,S$GLB,, | Performed by: INTERNAL MEDICINE

## 2022-09-13 PROCEDURE — 3044F PR MOST RECENT HEMOGLOBIN A1C LEVEL <7.0%: ICD-10-PCS | Mod: CPTII,S$GLB,, | Performed by: INTERNAL MEDICINE

## 2022-09-13 PROCEDURE — 3062F POS MACROALBUMINURIA REV: CPT | Mod: CPTII,S$GLB,, | Performed by: INTERNAL MEDICINE

## 2022-09-13 PROCEDURE — 36415 COLL VENOUS BLD VENIPUNCTURE: CPT | Performed by: INTERNAL MEDICINE

## 2022-09-13 PROCEDURE — 4010F ACE/ARB THERAPY RXD/TAKEN: CPT | Mod: CPTII,S$GLB,, | Performed by: INTERNAL MEDICINE

## 2022-09-13 PROCEDURE — 99999 PR PBB SHADOW E&M-EST. PATIENT-LVL III: CPT | Mod: PBBFAC,,, | Performed by: INTERNAL MEDICINE

## 2022-09-13 PROCEDURE — 1101F PR PT FALLS ASSESS DOC 0-1 FALLS W/OUT INJ PAST YR: ICD-10-PCS | Mod: CPTII,S$GLB,, | Performed by: INTERNAL MEDICINE

## 2022-09-13 PROCEDURE — 1126F PR PAIN SEVERITY QUANTIFIED, NO PAIN PRESENT: ICD-10-PCS | Mod: CPTII,S$GLB,, | Performed by: INTERNAL MEDICINE

## 2022-09-13 PROCEDURE — 80053 COMPREHEN METABOLIC PANEL: CPT | Performed by: INTERNAL MEDICINE

## 2022-09-13 PROCEDURE — 3288F PR FALLS RISK ASSESSMENT DOCUMENTED: ICD-10-PCS | Mod: CPTII,S$GLB,, | Performed by: INTERNAL MEDICINE

## 2022-09-13 PROCEDURE — 3078F DIAST BP <80 MM HG: CPT | Mod: CPTII,S$GLB,, | Performed by: INTERNAL MEDICINE

## 2022-09-13 PROCEDURE — 3077F PR MOST RECENT SYSTOLIC BLOOD PRESSURE >= 140 MM HG: ICD-10-PCS | Mod: CPTII,S$GLB,, | Performed by: INTERNAL MEDICINE

## 2022-09-13 PROCEDURE — 99213 PR OFFICE/OUTPT VISIT, EST, LEVL III, 20-29 MIN: ICD-10-PCS | Mod: S$GLB,,, | Performed by: INTERNAL MEDICINE

## 2022-09-13 PROCEDURE — 3066F PR DOCUMENTATION OF TREATMENT FOR NEPHROPATHY: ICD-10-PCS | Mod: CPTII,S$GLB,, | Performed by: INTERNAL MEDICINE

## 2022-09-13 PROCEDURE — 1160F RVW MEDS BY RX/DR IN RCRD: CPT | Mod: CPTII,S$GLB,, | Performed by: INTERNAL MEDICINE

## 2022-09-13 PROCEDURE — 85025 COMPLETE CBC W/AUTO DIFF WBC: CPT | Performed by: INTERNAL MEDICINE

## 2022-09-13 PROCEDURE — 3044F HG A1C LEVEL LT 7.0%: CPT | Mod: CPTII,S$GLB,, | Performed by: INTERNAL MEDICINE

## 2022-09-13 PROCEDURE — 3078F PR MOST RECENT DIASTOLIC BLOOD PRESSURE < 80 MM HG: ICD-10-PCS | Mod: CPTII,S$GLB,, | Performed by: INTERNAL MEDICINE

## 2022-09-13 PROCEDURE — 99213 OFFICE O/P EST LOW 20 MIN: CPT | Mod: S$GLB,,, | Performed by: INTERNAL MEDICINE

## 2022-09-13 PROCEDURE — 4010F PR ACE/ARB THEARPY RXD/TAKEN: ICD-10-PCS | Mod: CPTII,S$GLB,, | Performed by: INTERNAL MEDICINE

## 2022-09-13 PROCEDURE — 3077F SYST BP >= 140 MM HG: CPT | Mod: CPTII,S$GLB,, | Performed by: INTERNAL MEDICINE

## 2022-09-13 PROCEDURE — 1157F PR ADVANCE CARE PLAN OR EQUIV PRESENT IN MEDICAL RECORD: ICD-10-PCS | Mod: CPTII,S$GLB,, | Performed by: INTERNAL MEDICINE

## 2022-09-13 PROCEDURE — 1157F ADVNC CARE PLAN IN RCRD: CPT | Mod: CPTII,S$GLB,, | Performed by: INTERNAL MEDICINE

## 2022-09-13 PROCEDURE — 3066F NEPHROPATHY DOC TX: CPT | Mod: CPTII,S$GLB,, | Performed by: INTERNAL MEDICINE

## 2022-09-13 PROCEDURE — 3062F PR POS MACROALBUMINURIA RESULT DOCUMENTED/REVIEW: ICD-10-PCS | Mod: CPTII,S$GLB,, | Performed by: INTERNAL MEDICINE

## 2022-09-13 PROCEDURE — 3288F FALL RISK ASSESSMENT DOCD: CPT | Mod: CPTII,S$GLB,, | Performed by: INTERNAL MEDICINE

## 2022-09-13 RX ORDER — SEMAGLUTIDE 1.34 MG/ML
INJECTION, SOLUTION SUBCUTANEOUS
COMMUNITY
Start: 2022-09-12 | End: 2023-02-22

## 2022-09-13 NOTE — PROGRESS NOTES
HEPATOLOGY FOLLOW UP    Referring Physician:   Current Corresponding Physician: Dr. MIGDALIA Hayes    Baldo Carney is here for follow up of Ascites      HPI  This 71-year-old gentleman was seen in consultation a few months by myself for evaluation of ascites.  He has a longstanding history of ischemic cardiomyopathy and chronic congestive heart failure.  I had a trans jugular liver biopsy arrange which did not suggest portal hypertension.  The histology was consistent with congestive hepatopathy with stage III fibrosis.  He has been getting regular large volume paracenteses.  He has no other symptoms of portal hypertension.    Outpatient Encounter Medications as of 9/13/2022   Medication Sig Dispense Refill    ARNUITY ELLIPTA 100 mcg/actuation DsDv Use as directed 1 puff in the mouth or throat daily as needed.   6    aspirin (ECOTRIN) 81 MG EC tablet Take 81 mg by mouth once daily.      atorvastatin (LIPITOR) 40 MG tablet TAKE 1 TABLET BY MOUTH EVERY DAY 90 tablet 0    azelastine (ASTELIN) 137 mcg (0.1 %) nasal spray 1 spray (137 mcg total) by Nasal route 2 (two) times daily as needed for Rhinitis. 30 mL 1    budesonide-glycopyr-formoterol (BREZTRI AEROSPHERE) 160-9-4.8 mcg/actuation HFAA Inhale into the lungs daily as needed.      empagliflozin (JARDIANCE) 10 mg tablet Take 1 tablet (10 mg total) by mouth once daily. 90 tablet 1    ergocalciferol, vitamin D2, (VITAMIN D ORAL) Take 1 capsule by mouth once a week. WEDNESDAYS      FARXIGA 5 mg Tab tablet Take 5 mg by mouth once daily.      ferrous sulfate (FEOSOL) 325 mg (65 mg iron) Tab tablet TAKE 1 TABLET (1 EACH TOTAL) BY MOUTH DAILY WITH BREAKFAST. TO BE TAKEN WITH VITAMIN C (ORANGE JUICE) FOR BEST ABSORPTION 90 tablet 0    finasteride (PROSCAR) 5 mg tablet Take 5 mg by mouth once daily.      fluticasone furoate-vilanteroL (BREO ELLIPTA) 100-25 mcg/dose diskus inhaler Inhale 1 puff into the lungs once daily. (DAILY CONTROLLER) 3 each 3    fluticasone  propionate (FLONASE) 50 mcg/actuation nasal spray 1 spray (50 mcg total) by Each Nostril route once daily. 15.8 mL 1    furosemide (LASIX) 40 MG tablet Take by mouth once daily.      gabapentin (NEURONTIN) 300 MG capsule Take 1 capsule (300 mg total) by mouth every evening. 90 capsule 1    levalbuterol (XOPENEX) 0.63 mg/3 mL nebulizer solution Take 3 mLs (0.63 mg total) by nebulization every 4 to 6 hours as needed for Wheezing or Shortness of Breath. Rescue 45 mL 0    linaCLOtide (LINZESS) 72 mcg Cap capsule Take 1 capsule (72 mcg total) by mouth before breakfast. 4 capsule 0    metoprolol succinate (TOPROL-XL) 25 MG 24 hr tablet Take 1 tablet (25 mg total) by mouth once daily. 90 tablet 1    OZEMPIC 0.25 mg or 0.5 mg(2 mg/1.5 mL) pen injector Inject into the skin.      prasugreL (EFFIENT) 10 mg Tab Take 1 tablet (10 mg total) by mouth once daily. 30 tablet 11    promethazine-dextromethorphan (PROMETHAZINE-DM) 6.25-15 mg/5 mL Syrp Take 5 mLs by mouth every 6 (six) hours as needed (cough and congestion). 100 mL 0    sacubitriL-valsartan (ENTRESTO) 24-26 mg per tablet Take 1 tablet by mouth 2 (two) times daily.      spironolactone (ALDACTONE) 25 MG tablet Take 12.5 mg by mouth once daily.      tamsulosin (FLOMAX) 0.4 mg Cap TAKE 1 CAPSULE BY MOUTH EVERY DAY 90 capsule 1    traMADoL (ULTRAM) 50 mg tablet Take 1 tablet (50 mg total) by mouth every 8 (eight) hours as needed for Pain. 21 tablet 0    VENTOLIN HFA 90 mcg/actuation inhaler Inhale 1-2 puffs into the lungs every 4 to 6 hours as needed for Wheezing or Shortness of Breath. (RESCUE) 18 g 3    ZONTIVITY 2.08 mg Tab Take 1 tablet by mouth once daily.      hydrALAZINE (APRESOLINE) 50 MG tablet Take 1 tablet (50 mg total) by mouth every 8 (eight) hours. (Patient taking differently: Take 50 mg by mouth every 12 (twelve) hours.) 90 tablet 0    isosorbide dinitrate (ISORDIL) 20 MG tablet Take 1 tablet (20 mg total) by mouth 3 (three) times daily. 90 tablet 0     No  facility-administered encounter medications on file as of 9/13/2022.     Review of patient's allergies indicates:   Allergen Reactions    Invokana  [canagliflozin]      Other reaction(s): been very dizzy     Past Medical History:   Diagnosis Date    Asthma     Cardiomyopathy 10/21/2014    CHF (congestive heart failure)     Coronary artery disease     CRF (chronic renal failure)     Diabetes mellitus     Enlarged prostate     Hyperlipidemia     Hypertension     Neuropathy        Review of Systems   Constitutional:  Positive for fatigue. Negative for activity change, appetite change, chills and unexpected weight change.   HENT:  Negative for congestion, facial swelling and tinnitus.    Eyes:  Negative for visual disturbance.   Respiratory:  Negative for cough, shortness of breath and wheezing.    Cardiovascular:  Negative for chest pain and palpitations.   Gastrointestinal:  Positive for abdominal distention.   Genitourinary:  Negative for dysuria.   Musculoskeletal:  Negative for arthralgias, joint swelling and myalgias.   Neurological:  Negative for syncope and headaches.   Hematological:  Does not bruise/bleed easily.   Psychiatric/Behavioral:  Negative for confusion.    Vitals:    09/13/22 1409   BP: (!) 141/61   Pulse: 75   Resp: 18   Temp: 96.2 °F (35.7 °C)       Physical Exam  Constitutional:       Appearance: He is well-developed.   Eyes:      General: No scleral icterus.  Cardiovascular:      Rate and Rhythm: Normal rate and regular rhythm.      Heart sounds: Normal heart sounds.   Pulmonary:      Effort: Pulmonary effort is normal. No respiratory distress.      Breath sounds: Normal breath sounds. No wheezing.   Chest:       Abdominal:      General: Bowel sounds are normal. There is no distension.      Palpations: Abdomen is soft. There is shifting dullness and fluid wave. There is no mass.      Tenderness: There is no abdominal tenderness. There is no rebound.   Musculoskeletal:         General: Normal  range of motion.   Lymphadenopathy:      Cervical: No cervical adenopathy.   Skin:     General: Skin is warm and dry.   Neurological:      Mental Status: He is alert and oriented to person, place, and time.       MELD-Na score: 22 at 3/8/2022 10:09 AM  MELD score: 21 at 3/8/2022 10:09 AM  Calculated from:  Serum Creatinine: 1.9 mg/dL at 3/8/2022 10:09 AM  Serum Sodium: 135 mmol/L at 3/8/2022 10:09 AM  Total Bilirubin: 2.1 mg/dL at 3/8/2022 10:09 AM  INR(ratio): 1.6 at 3/8/2022 10:09 AM  Age: 71 years    Lab Results   Component Value Date    GLU 93 09/01/2022    GLU 93 01/10/2019    BUN 20 09/01/2022    CREATININE 1.4 09/01/2022    CREATININE 1.36 01/10/2019    CALCIUM 7.7 (L) 09/01/2022     (L) 09/01/2022     01/10/2019    K 3.1 (L) 09/01/2022     09/01/2022     01/10/2019    PROT 5.8 (L) 08/31/2022    CO2 22 (L) 09/01/2022    ANIONGAP 8 09/01/2022    WBC 4.66 09/01/2022    WBC 7.9 09/27/2017    RBC 2.97 (L) 09/01/2022    HGB 8.9 (L) 09/01/2022    HCT 25.5 (L) 09/01/2022    HCT 26 (L) 11/03/2014    MCV 86 09/01/2022    MCV 87.4 09/27/2017    MCH 30.0 09/01/2022    MCHC 34.9 09/01/2022     Lab Results   Component Value Date    RDW 15.9 (H) 09/01/2022     (L) 09/01/2022    MPV 10.4 09/01/2022    GRAN 3.7 09/01/2022    GRAN 78.8 (H) 09/01/2022    LYMPH 0.6 (L) 09/01/2022    LYMPH 12.0 (L) 09/01/2022    MONO 0.4 09/01/2022    MONO 7.7 09/01/2022    EOSINOPHIL 0.9 09/01/2022    BASOPHIL 0.4 09/01/2022    EOS 0.0 09/01/2022    BASO 0.02 09/01/2022    APTT 39.2 (H) 03/08/2022    GROUPTRH A NEG 08/31/2022    BNP 3,592 (H) 09/01/2022    CHOL 81 (L) 05/17/2022    TRIG 56 05/17/2022    HDL 33 (L) 05/17/2022    CHOLHDL 40.7 05/17/2022    TOTALCHOLEST 2.5 05/17/2022    ALBUMIN 2.2 (L) 08/31/2022    ALBUMIN pos 08/17/2021    AST 18 08/31/2022    ALT 6 (L) 08/31/2022    ALKPHOS 73 08/31/2022    MG 1.7 09/01/2022    LABPROT 13.2 (H) 08/05/2022    INR 1.3 (H) 08/05/2022    PSA 8.7 (H) 05/17/2022        Assessment and Plan:  Patient Active Problem List   Diagnosis    Hypertension    Hyperlipidemia    Type 2 diabetes mellitus without complication, without long-term current use of insulin    Aortic stenosis status post AVR    Cardiomyopathy    CAD (coronary artery disease)    S/P AVR (aortic valve replacement)    S/P CABG (coronary artery bypass graft)    Erectile dysfunction    Bilateral lower extremity edema    Orthopnea    Peripheral polyneuropathy    Elevated PSA, greater than or equal to 20 ng/ml    Generalized OA    COVID-19 virus detected    ERICA on CKD III    Troponin I above reference range    Chronic systolic heart failure    BPH (benign prostatic hyperplasia)    Lymphopenia    COPD (chronic obstructive pulmonary disease)    Anemia    Acute on chronic congestive heart failure    Other ascites     Baldo Carney is a 71 y.o. male withAscites    Impression:  Ascites secondary to congestive hepatopathy.    Plan:  Because of his stage III fibrosis I would like to arrange surveillance imaging by ultrasound every 6 months.  He will continue with large volume paracenteses.  He will return to clinic in 6 months time.

## 2022-09-22 ENCOUNTER — HOSPITAL ENCOUNTER (OUTPATIENT)
Dept: INTERVENTIONAL RADIOLOGY/VASCULAR | Facility: HOSPITAL | Age: 72
Discharge: HOME | End: 2022-09-22
Attending: INTERNAL MEDICINE
Payer: MEDICARE

## 2022-09-22 VITALS
OXYGEN SATURATION: 99 % | SYSTOLIC BLOOD PRESSURE: 155 MMHG | HEART RATE: 88 BPM | DIASTOLIC BLOOD PRESSURE: 87 MMHG | RESPIRATION RATE: 16 BRPM

## 2022-09-22 DIAGNOSIS — R18.8 OTHER ASCITES: ICD-10-CM

## 2022-09-22 LAB
APPEARANCE FLD: NORMAL
BODY FLD TYPE: NORMAL
COLOR FLD: YELLOW
LYMPHOCYTES NFR FLD MANUAL: 44 %
MESOTHL CELL NFR FLD MANUAL: 18 %
MONOS+MACROS NFR FLD MANUAL: 24 %
NEUTROPHILS NFR FLD MANUAL: 14 %
WBC # FLD: 195 /CU MM

## 2022-09-22 PROCEDURE — 89051 BODY FLUID CELL COUNT: CPT | Performed by: INTERNAL MEDICINE

## 2022-09-22 PROCEDURE — C1729 CATH, DRAINAGE: HCPCS

## 2022-09-22 PROCEDURE — 49083 IR PARACENTESIS WITH IMAGING: ICD-10-PCS | Mod: ,,, | Performed by: RADIOLOGY

## 2022-09-22 PROCEDURE — 49083 ABD PARACENTESIS W/IMAGING: CPT | Performed by: RADIOLOGY

## 2022-09-22 RX ORDER — ALBUMIN HUMAN 250 G/1000ML
25 SOLUTION INTRAVENOUS
Status: DISCONTINUED | OUTPATIENT
Start: 2022-09-22 | End: 2022-09-23 | Stop reason: HOSPADM

## 2022-09-22 NOTE — NURSING
Ultrasound guided paracentesis performed by Dr. Rodriguez& elizabeth WEINER; procedure explained and consent obtained by Radiologist. Time out performed 5 L removed- VS remained stable for duration of procedure. Specimens collected and sent to lab if ordered. Para catheter d/c'd, with tip intact. Access site cleaned with peroxide, derma bond and steri-strips applied, then covered with sterile Band-Aid. Pt discharge home in stable condition.

## 2022-09-30 ENCOUNTER — TELEPHONE (OUTPATIENT)
Dept: TRANSPLANT | Facility: CLINIC | Age: 72
End: 2022-09-30
Payer: MEDICARE

## 2022-09-30 DIAGNOSIS — I50.9 CONGESTIVE HEART FAILURE, UNSPECIFIED HF CHRONICITY, UNSPECIFIED HEART FAILURE TYPE: Primary | ICD-10-CM

## 2022-10-05 ENCOUNTER — HOSPITAL ENCOUNTER (OUTPATIENT)
Dept: INTERVENTIONAL RADIOLOGY/VASCULAR | Facility: HOSPITAL | Age: 72
Discharge: HOME | End: 2022-10-05
Attending: INTERNAL MEDICINE
Payer: MEDICARE

## 2022-10-05 VITALS
OXYGEN SATURATION: 99 % | DIASTOLIC BLOOD PRESSURE: 81 MMHG | HEART RATE: 80 BPM | SYSTOLIC BLOOD PRESSURE: 145 MMHG | RESPIRATION RATE: 18 BRPM

## 2022-10-05 DIAGNOSIS — R18.8 OTHER ASCITES: ICD-10-CM

## 2022-10-05 LAB
APPEARANCE FLD: NORMAL
BODY FLD TYPE: NORMAL
COLOR FLD: YELLOW
LYMPHOCYTES NFR FLD MANUAL: 69 %
MONOS+MACROS NFR FLD MANUAL: 24 %
NEUTROPHILS NFR FLD MANUAL: 7 %
WBC # FLD: 185 /CU MM

## 2022-10-05 PROCEDURE — 25000003 PHARM REV CODE 250: Performed by: RADIOLOGY

## 2022-10-05 PROCEDURE — 89051 BODY FLUID CELL COUNT: CPT | Performed by: INTERNAL MEDICINE

## 2022-10-05 PROCEDURE — C1729 CATH, DRAINAGE: HCPCS

## 2022-10-05 PROCEDURE — 49083 IR PARACENTESIS WITH IMAGING: ICD-10-PCS | Mod: ,,, | Performed by: RADIOLOGY

## 2022-10-05 PROCEDURE — 49083 ABD PARACENTESIS W/IMAGING: CPT | Performed by: RADIOLOGY

## 2022-10-05 RX ORDER — LIDOCAINE HYDROCHLORIDE 10 MG/ML
INJECTION INFILTRATION; PERINEURAL
Status: COMPLETED | OUTPATIENT
Start: 2022-10-05 | End: 2022-10-05

## 2022-10-05 RX ADMIN — LIDOCAINE HYDROCHLORIDE 5 ML: 10 INJECTION, SOLUTION INFILTRATION; PERINEURAL at 01:10

## 2022-10-05 NOTE — NURSING
Ultrasound guided paracentesis performed by Heather WEINER & Dr. Ferro; procedure explained and consent obtained by Radiologist. Time out performed 5 L removed- VS remained stable for duration of procedure. Specimens collected and sent to lab if ordered. Para catheter d/c'd, with tip intact. Access site cleaned with peroxide, derma bond and steri-strips applied, then covered with sterile Band-Aid. Pt discharge home in stable condition.

## 2022-10-07 DIAGNOSIS — R18.8 OTHER ASCITES: Primary | ICD-10-CM

## 2022-10-13 ENCOUNTER — HOSPITAL ENCOUNTER (OUTPATIENT)
Dept: RADIOLOGY | Facility: HOSPITAL | Age: 72
Discharge: HOME | End: 2022-10-13
Attending: INTERNAL MEDICINE
Payer: MEDICARE

## 2022-10-13 DIAGNOSIS — I25.5 ISCHEMIC CARDIOMYOPATHY: ICD-10-CM

## 2022-10-13 PROCEDURE — 76700 US EXAM ABDOM COMPLETE: CPT | Mod: 26,,, | Performed by: RADIOLOGY

## 2022-10-13 PROCEDURE — 76700 US EXAM ABDOM COMPLETE: CPT | Mod: TC

## 2022-10-13 PROCEDURE — 76700 US ABDOMEN COMPLETE: ICD-10-PCS | Mod: 26,,, | Performed by: RADIOLOGY

## 2022-10-17 ENCOUNTER — OFFICE VISIT (OUTPATIENT)
Dept: OPHTHALMOLOGY | Facility: CLINIC | Age: 72
End: 2022-10-17
Payer: MEDICARE

## 2022-10-17 DIAGNOSIS — E11.9 DIABETES MELLITUS TYPE 2 WITHOUT RETINOPATHY: ICD-10-CM

## 2022-10-17 DIAGNOSIS — H43.11 VITREOUS HEMORRHAGE, RIGHT EYE: Primary | ICD-10-CM

## 2022-10-17 DIAGNOSIS — H40.023 OAG (OPEN ANGLE GLAUCOMA) SUSPECT, HIGH RISK, BILATERAL: ICD-10-CM

## 2022-10-17 DIAGNOSIS — H25.13 NUCLEAR SCLEROTIC CATARACT, BILATERAL: ICD-10-CM

## 2022-10-17 PROCEDURE — 1101F PR PT FALLS ASSESS DOC 0-1 FALLS W/OUT INJ PAST YR: ICD-10-PCS | Mod: CPTII,S$GLB,, | Performed by: OPHTHALMOLOGY

## 2022-10-17 PROCEDURE — 3066F NEPHROPATHY DOC TX: CPT | Mod: CPTII,S$GLB,, | Performed by: OPHTHALMOLOGY

## 2022-10-17 PROCEDURE — 99214 PR OFFICE/OUTPT VISIT, EST, LEVL IV, 30-39 MIN: ICD-10-PCS | Mod: S$GLB,,, | Performed by: OPHTHALMOLOGY

## 2022-10-17 PROCEDURE — 1126F AMNT PAIN NOTED NONE PRSNT: CPT | Mod: CPTII,S$GLB,, | Performed by: OPHTHALMOLOGY

## 2022-10-17 PROCEDURE — 1126F PR PAIN SEVERITY QUANTIFIED, NO PAIN PRESENT: ICD-10-PCS | Mod: CPTII,S$GLB,, | Performed by: OPHTHALMOLOGY

## 2022-10-17 PROCEDURE — 3044F PR MOST RECENT HEMOGLOBIN A1C LEVEL <7.0%: ICD-10-PCS | Mod: CPTII,S$GLB,, | Performed by: OPHTHALMOLOGY

## 2022-10-17 PROCEDURE — 2023F DILAT RTA XM W/O RTNOPTHY: CPT | Mod: CPTII,S$GLB,, | Performed by: OPHTHALMOLOGY

## 2022-10-17 PROCEDURE — 1159F PR MEDICATION LIST DOCUMENTED IN MEDICAL RECORD: ICD-10-PCS | Mod: CPTII,S$GLB,, | Performed by: OPHTHALMOLOGY

## 2022-10-17 PROCEDURE — 99999 PR PBB SHADOW E&M-EST. PATIENT-LVL II: ICD-10-PCS | Mod: PBBFAC,,, | Performed by: OPHTHALMOLOGY

## 2022-10-17 PROCEDURE — 3288F FALL RISK ASSESSMENT DOCD: CPT | Mod: CPTII,S$GLB,, | Performed by: OPHTHALMOLOGY

## 2022-10-17 PROCEDURE — 2023F PR DILATED RETINAL EXAM W/O EVID OF RETINOPATHY: ICD-10-PCS | Mod: CPTII,S$GLB,, | Performed by: OPHTHALMOLOGY

## 2022-10-17 PROCEDURE — 1101F PT FALLS ASSESS-DOCD LE1/YR: CPT | Mod: CPTII,S$GLB,, | Performed by: OPHTHALMOLOGY

## 2022-10-17 PROCEDURE — 1157F PR ADVANCE CARE PLAN OR EQUIV PRESENT IN MEDICAL RECORD: ICD-10-PCS | Mod: CPTII,S$GLB,, | Performed by: OPHTHALMOLOGY

## 2022-10-17 PROCEDURE — 1160F PR REVIEW ALL MEDS BY PRESCRIBER/CLIN PHARMACIST DOCUMENTED: ICD-10-PCS | Mod: CPTII,S$GLB,, | Performed by: OPHTHALMOLOGY

## 2022-10-17 PROCEDURE — 99214 OFFICE O/P EST MOD 30 MIN: CPT | Mod: S$GLB,,, | Performed by: OPHTHALMOLOGY

## 2022-10-17 PROCEDURE — 3066F PR DOCUMENTATION OF TREATMENT FOR NEPHROPATHY: ICD-10-PCS | Mod: CPTII,S$GLB,, | Performed by: OPHTHALMOLOGY

## 2022-10-17 PROCEDURE — 3062F POS MACROALBUMINURIA REV: CPT | Mod: CPTII,S$GLB,, | Performed by: OPHTHALMOLOGY

## 2022-10-17 PROCEDURE — 3288F PR FALLS RISK ASSESSMENT DOCUMENTED: ICD-10-PCS | Mod: CPTII,S$GLB,, | Performed by: OPHTHALMOLOGY

## 2022-10-17 PROCEDURE — 99999 PR PBB SHADOW E&M-EST. PATIENT-LVL II: CPT | Mod: PBBFAC,,, | Performed by: OPHTHALMOLOGY

## 2022-10-17 PROCEDURE — 1159F MED LIST DOCD IN RCRD: CPT | Mod: CPTII,S$GLB,, | Performed by: OPHTHALMOLOGY

## 2022-10-17 PROCEDURE — 1157F ADVNC CARE PLAN IN RCRD: CPT | Mod: CPTII,S$GLB,, | Performed by: OPHTHALMOLOGY

## 2022-10-17 PROCEDURE — 3062F PR POS MACROALBUMINURIA RESULT DOCUMENTED/REVIEW: ICD-10-PCS | Mod: CPTII,S$GLB,, | Performed by: OPHTHALMOLOGY

## 2022-10-17 PROCEDURE — 4010F ACE/ARB THERAPY RXD/TAKEN: CPT | Mod: CPTII,S$GLB,, | Performed by: OPHTHALMOLOGY

## 2022-10-17 PROCEDURE — 3044F HG A1C LEVEL LT 7.0%: CPT | Mod: CPTII,S$GLB,, | Performed by: OPHTHALMOLOGY

## 2022-10-17 PROCEDURE — 1160F RVW MEDS BY RX/DR IN RCRD: CPT | Mod: CPTII,S$GLB,, | Performed by: OPHTHALMOLOGY

## 2022-10-17 PROCEDURE — 4010F PR ACE/ARB THEARPY RXD/TAKEN: ICD-10-PCS | Mod: CPTII,S$GLB,, | Performed by: OPHTHALMOLOGY

## 2022-10-17 RX ORDER — CLOPIDOGREL BISULFATE 75 MG/1
TABLET ORAL
COMMUNITY
Start: 2022-10-06

## 2022-10-17 NOTE — PATIENT INSTRUCTIONS
What are floaters?     Floaters look like small specks, dots, circles, lines or cobwebs in your field of vision. While they seem to be in front of your eye, they are floating inside. Floaters are tiny clumps of gel or cells inside the vitreous that fills your eye. What you see are the shadows these clumps cast on your retina.    You usually notice floaters when looking  at something plain, like a blank wall or a blue brianna.    As we age, our vitreous starts to thicken or shrink. Sometimes clumps or strands form in the vitreous. If the vitreous pulls away from the back of the eye, it is called posterior vitreous detachment. Floaters usually happen with posterior vitreous detachment. They are not serious, and they tend to fade or go away over time. Severe floaters can be removed by surgery, but this has risks and is seldom necessary.    You are more likely to get floaters if you:    are nearsighted (you need glasses to see far away)    have had surgery for cataracts    have had inflammation (swelling) inside the eye      What are flashes?     Flashes can look like flashing lights or lightning streaks in your field of vision. Some people compare them to seeing stars after being hit on the head. You might see flashes on and off for weeks, or even months. Flashes happen when the vitreous rubs or pulls on your retina.    As people age, it is common to see flashes occasionally.       When floaters and flashes are serious     Most floaters and flashes are not a problem. However, there are times when they can be signs of a serious condition. Here is when you should call an ophthalmologist right away:    you notice a lot of new floaters    you have a lot of flashes    a shadow appears in your peripheral (side) vision    a gray curtain covers part of  your vision    These floaters and flashes could be symptoms of a torn or detached retina. This is when the retina pulls away from the back of your eye. This is a serious  condition that needs to be treated.    Summary    Floaters are dark specks or dots in your field of vision. They are shadows you see from clumps of vitreous gel in your eye. Flashes are flashes of light that look like lightning streaks in your field of vision. Flashes occur when the vitreous gel rubs or pulls on your retina.    Floaters and flashes are quite common as people age. However, they can be signs of a retinal detachment, which is a serious problem. If you suddenly have a lot of floaters and see flashes, and you notice changes in your vision, call your ophthalmologist right away.

## 2022-10-17 NOTE — PROGRESS NOTES
HPI    DLS: 7/20/2021- Urgent appt.     States starting 3 days ago he started seeing what looks likek little   butterflies in OD. States has gotten worse. Denies pain/ FOL/ floaters.         Hemoglobin A1C       Date                     Value               Ref Range             Status                08/22/2022               5.3                 %                     Final                 05/17/2022               5.8 (H)             4.0 - 5.6 %           Final                 12/26/2021               6.6 (H)             4.5 - 6.2 %           Final                Last edited by Emeli Benitez on 10/17/2022  1:50 PM.            Assessment /Plan     For exam results, see Encounter Report.    Vitreous hemorrhage, right eye  -     Ambulatory referral/consult to Ophthalmology    OAG (open angle glaucoma) suspect, high risk, bilateral    Diabetes mellitus type 2 without retinopathy    Nuclear sclerotic cataract, bilateral    1. Vitreous hemorrhage, right eye  Suspected hemorrhagic PVD  Pt on multiple blood thinners, recent heart stent  RD precautions reviewed  VH precautions reviewed    Refer to Dr. Wagner    2. OAG (open angle glaucoma) suspect, high risk, bilateral  LTG suspect  Neg famhx  ?pachy    Stanislav heme inf OD 7/2021  ONH suspicious OD>OS  OCT NFL mild thinning OD, normal OS  IOP is low-normal    Followup after VH resolved    3. Diabetes mellitus type 2 without retinopathy  Diabetes without retinopathy, discussed with patient importance of glucose control and follow up.  Patient voices understanding.    4. Nuclear sclerotic cataract, bilateral  Observe

## 2022-10-21 ENCOUNTER — HOSPITAL ENCOUNTER (OUTPATIENT)
Dept: INTERVENTIONAL RADIOLOGY/VASCULAR | Facility: HOSPITAL | Age: 72
Discharge: HOME | End: 2022-10-21
Attending: INTERNAL MEDICINE
Payer: MEDICARE

## 2022-10-21 VITALS — HEART RATE: 57 BPM | SYSTOLIC BLOOD PRESSURE: 159 MMHG | OXYGEN SATURATION: 100 % | DIASTOLIC BLOOD PRESSURE: 83 MMHG

## 2022-10-21 DIAGNOSIS — R18.8 OTHER ASCITES: ICD-10-CM

## 2022-10-21 LAB
APPEARANCE FLD: NORMAL
BODY FLD TYPE: NORMAL
COLOR FLD: YELLOW
LYMPHOCYTES NFR FLD MANUAL: 16 %
MESOTHL CELL NFR FLD MANUAL: 3 %
MONOS+MACROS NFR FLD MANUAL: 77 %
NEUTROPHILS NFR FLD MANUAL: 4 %
WBC # FLD: 875 /CU MM

## 2022-10-21 PROCEDURE — 49083 ABD PARACENTESIS W/IMAGING: CPT | Performed by: RADIOLOGY

## 2022-10-21 PROCEDURE — 49083 IR PARACENTESIS WITH IMAGING: ICD-10-PCS | Mod: ,,, | Performed by: RADIOLOGY

## 2022-10-21 PROCEDURE — C1729 CATH, DRAINAGE: HCPCS

## 2022-10-21 PROCEDURE — 89051 BODY FLUID CELL COUNT: CPT | Performed by: INTERNAL MEDICINE

## 2022-10-21 PROCEDURE — 25000003 PHARM REV CODE 250: Performed by: RADIOLOGY

## 2022-10-21 RX ORDER — LIDOCAINE HYDROCHLORIDE 10 MG/ML
INJECTION INFILTRATION; PERINEURAL
Status: COMPLETED | OUTPATIENT
Start: 2022-10-21 | End: 2022-10-21

## 2022-10-21 RX ADMIN — LIDOCAINE HYDROCHLORIDE 10 ML: 10 INJECTION, SOLUTION INFILTRATION; PERINEURAL at 10:10

## 2022-10-21 NOTE — NURSING
Ultrasound guided paracentesis performed by Dr. Marshall; procedure explained and consent obtained by Radiologist. Time out performed 5 L removed-  VS remained stable for duration of procedure. Specimens collected and sent to lab if ordered. Para d/c'd, with tip intact. Access site cleaned with peroxide, derma bond and steri-strips applied, then covered with sterile Band-Aid. Pt discharge home in stable condition.

## 2022-11-02 ENCOUNTER — HOSPITAL ENCOUNTER (OUTPATIENT)
Dept: INTERVENTIONAL RADIOLOGY/VASCULAR | Facility: HOSPITAL | Age: 72
Discharge: HOME | End: 2022-11-02
Attending: INTERNAL MEDICINE
Payer: MEDICARE

## 2022-11-02 VITALS — SYSTOLIC BLOOD PRESSURE: 171 MMHG | HEART RATE: 75 BPM | DIASTOLIC BLOOD PRESSURE: 89 MMHG

## 2022-11-02 DIAGNOSIS — R18.8 OTHER ASCITES: ICD-10-CM

## 2022-11-02 LAB
APPEARANCE FLD: CLEAR
BODY FLD TYPE: NORMAL
COLOR FLD: YELLOW
LYMPHOCYTES NFR FLD MANUAL: 72 %
MESOTHL CELL NFR FLD MANUAL: 2 %
MONOS+MACROS NFR FLD MANUAL: 23 %
NEUTROPHILS NFR FLD MANUAL: 3 %
WBC # FLD: 172 /CU MM

## 2022-11-02 PROCEDURE — 89051 BODY FLUID CELL COUNT: CPT | Performed by: INTERNAL MEDICINE

## 2022-11-02 PROCEDURE — P9047 ALBUMIN (HUMAN), 25%, 50ML: HCPCS | Performed by: RADIOLOGY

## 2022-11-02 PROCEDURE — 63600175 PHARM REV CODE 636 W HCPCS: Performed by: RADIOLOGY

## 2022-11-02 PROCEDURE — C1729 CATH, DRAINAGE: HCPCS

## 2022-11-02 PROCEDURE — 49083 ABD PARACENTESIS W/IMAGING: CPT | Mod: ,,, | Performed by: PHYSICIAN ASSISTANT

## 2022-11-02 PROCEDURE — 25000003 PHARM REV CODE 250: Performed by: RADIOLOGY

## 2022-11-02 PROCEDURE — 49083 IR PARACENTESIS WITH IMAGING: ICD-10-PCS | Mod: ,,, | Performed by: PHYSICIAN ASSISTANT

## 2022-11-02 PROCEDURE — 49083 ABD PARACENTESIS W/IMAGING: CPT | Performed by: RADIOLOGY

## 2022-11-02 RX ORDER — LIDOCAINE HYDROCHLORIDE 10 MG/ML
INJECTION INFILTRATION; PERINEURAL
Status: DISCONTINUED | OUTPATIENT
Start: 2022-11-02 | End: 2022-11-03 | Stop reason: HOSPADM

## 2022-11-02 RX ORDER — ALBUMIN HUMAN 250 G/1000ML
25 SOLUTION INTRAVENOUS
Status: DISCONTINUED | OUTPATIENT
Start: 2022-11-02 | End: 2022-11-03 | Stop reason: HOSPADM

## 2022-11-02 RX ADMIN — ALBUMIN (HUMAN) 25 G: 12.5 SOLUTION INTRAVENOUS at 12:11

## 2022-11-02 RX ADMIN — LIDOCAINE HYDROCHLORIDE 10 ML: 10 INJECTION, SOLUTION INFILTRATION; PERINEURAL at 11:11

## 2022-11-02 NOTE — NURSING
Ultrasound guided paracentesis performed by Jaimee WEINER & Dr. Ferro; procedure explained and consent obtained by Radiologist. Time out performed 7500 mLs removed- Patient received 25 g of albumin IV- VS remained stable for duration of procedure. Specimens collected and sent to lab if ordered. Para and IV d/c'd, with tip intact. Access site cleaned with peroxide, derma bond and steri-strips applied, then covered with sterile Band-Aid. Pt discharge home in stable condition.

## 2022-11-04 ENCOUNTER — LAB VISIT (OUTPATIENT)
Dept: LAB | Facility: HOSPITAL | Age: 72
End: 2022-11-04
Attending: INTERNAL MEDICINE
Payer: MEDICARE

## 2022-11-04 ENCOUNTER — OFFICE VISIT (OUTPATIENT)
Dept: TRANSPLANT | Facility: CLINIC | Age: 72
End: 2022-11-04
Payer: MEDICARE

## 2022-11-04 VITALS
HEIGHT: 72 IN | BODY MASS INDEX: 22.9 KG/M2 | DIASTOLIC BLOOD PRESSURE: 71 MMHG | HEART RATE: 71 BPM | WEIGHT: 169.06 LBS | SYSTOLIC BLOOD PRESSURE: 136 MMHG

## 2022-11-04 DIAGNOSIS — E11.9 TYPE 2 DIABETES MELLITUS WITHOUT COMPLICATION, WITHOUT LONG-TERM CURRENT USE OF INSULIN: ICD-10-CM

## 2022-11-04 DIAGNOSIS — I25.118 CORONARY ARTERY DISEASE OF NATIVE ARTERY OF NATIVE HEART WITH STABLE ANGINA PECTORIS: ICD-10-CM

## 2022-11-04 DIAGNOSIS — I10 PRIMARY HYPERTENSION: ICD-10-CM

## 2022-11-04 DIAGNOSIS — E78.2 MIXED HYPERLIPIDEMIA: ICD-10-CM

## 2022-11-04 DIAGNOSIS — I50.9 CONGESTIVE HEART FAILURE, UNSPECIFIED HF CHRONICITY, UNSPECIFIED HEART FAILURE TYPE: ICD-10-CM

## 2022-11-04 DIAGNOSIS — Z95.1 S/P CABG (CORONARY ARTERY BYPASS GRAFT): ICD-10-CM

## 2022-11-04 DIAGNOSIS — I50.42 CHRONIC COMBINED SYSTOLIC AND DIASTOLIC HEART FAILURE: Primary | ICD-10-CM

## 2022-11-04 LAB
ALBUMIN SERPL BCP-MCNC: 2.5 G/DL (ref 3.5–5.2)
ALP SERPL-CCNC: 84 U/L (ref 55–135)
ALT SERPL W/O P-5'-P-CCNC: 5 U/L (ref 10–44)
ANION GAP SERPL CALC-SCNC: 10 MMOL/L (ref 8–16)
AST SERPL-CCNC: 16 U/L (ref 10–40)
BILIRUB SERPL-MCNC: 1 MG/DL (ref 0.1–1)
BNP SERPL-MCNC: 2043 PG/ML (ref 0–99)
BUN SERPL-MCNC: 19 MG/DL (ref 8–23)
CALCIUM SERPL-MCNC: 8.2 MG/DL (ref 8.7–10.5)
CHLORIDE SERPL-SCNC: 104 MMOL/L (ref 95–110)
CO2 SERPL-SCNC: 21 MMOL/L (ref 23–29)
CREAT SERPL-MCNC: 1.7 MG/DL (ref 0.5–1.4)
EST. GFR  (NO RACE VARIABLE): 42.3 ML/MIN/1.73 M^2
GLUCOSE SERPL-MCNC: 97 MG/DL (ref 70–110)
MAGNESIUM SERPL-MCNC: 1.8 MG/DL (ref 1.6–2.6)
POTASSIUM SERPL-SCNC: 3.8 MMOL/L (ref 3.5–5.1)
PROT SERPL-MCNC: 6.1 G/DL (ref 6–8.4)
SODIUM SERPL-SCNC: 135 MMOL/L (ref 136–145)
T4 FREE SERPL-MCNC: 1.02 NG/DL (ref 0.71–1.51)
TSH SERPL DL<=0.005 MIU/L-ACNC: 4.86 UIU/ML (ref 0.4–4)

## 2022-11-04 PROCEDURE — 1160F PR REVIEW ALL MEDS BY PRESCRIBER/CLIN PHARMACIST DOCUMENTED: ICD-10-PCS | Mod: CPTII,S$GLB,, | Performed by: INTERNAL MEDICINE

## 2022-11-04 PROCEDURE — 84439 ASSAY OF FREE THYROXINE: CPT | Performed by: INTERNAL MEDICINE

## 2022-11-04 PROCEDURE — 1157F ADVNC CARE PLAN IN RCRD: CPT | Mod: CPTII,S$GLB,, | Performed by: INTERNAL MEDICINE

## 2022-11-04 PROCEDURE — 4010F PR ACE/ARB THEARPY RXD/TAKEN: ICD-10-PCS | Mod: CPTII,S$GLB,, | Performed by: INTERNAL MEDICINE

## 2022-11-04 PROCEDURE — 4010F ACE/ARB THERAPY RXD/TAKEN: CPT | Mod: CPTII,S$GLB,, | Performed by: INTERNAL MEDICINE

## 2022-11-04 PROCEDURE — 36415 COLL VENOUS BLD VENIPUNCTURE: CPT | Performed by: INTERNAL MEDICINE

## 2022-11-04 PROCEDURE — 3044F PR MOST RECENT HEMOGLOBIN A1C LEVEL <7.0%: ICD-10-PCS | Mod: CPTII,S$GLB,, | Performed by: INTERNAL MEDICINE

## 2022-11-04 PROCEDURE — 1160F RVW MEDS BY RX/DR IN RCRD: CPT | Mod: CPTII,S$GLB,, | Performed by: INTERNAL MEDICINE

## 2022-11-04 PROCEDURE — 3066F NEPHROPATHY DOC TX: CPT | Mod: CPTII,S$GLB,, | Performed by: INTERNAL MEDICINE

## 2022-11-04 PROCEDURE — 3078F DIAST BP <80 MM HG: CPT | Mod: CPTII,S$GLB,, | Performed by: INTERNAL MEDICINE

## 2022-11-04 PROCEDURE — 3288F FALL RISK ASSESSMENT DOCD: CPT | Mod: CPTII,S$GLB,, | Performed by: INTERNAL MEDICINE

## 2022-11-04 PROCEDURE — 1157F PR ADVANCE CARE PLAN OR EQUIV PRESENT IN MEDICAL RECORD: ICD-10-PCS | Mod: CPTII,S$GLB,, | Performed by: INTERNAL MEDICINE

## 2022-11-04 PROCEDURE — 3008F PR BODY MASS INDEX (BMI) DOCUMENTED: ICD-10-PCS | Mod: CPTII,S$GLB,, | Performed by: INTERNAL MEDICINE

## 2022-11-04 PROCEDURE — 83880 ASSAY OF NATRIURETIC PEPTIDE: CPT | Mod: 91 | Performed by: INTERNAL MEDICINE

## 2022-11-04 PROCEDURE — 1101F PT FALLS ASSESS-DOCD LE1/YR: CPT | Mod: CPTII,S$GLB,, | Performed by: INTERNAL MEDICINE

## 2022-11-04 PROCEDURE — 3044F HG A1C LEVEL LT 7.0%: CPT | Mod: CPTII,S$GLB,, | Performed by: INTERNAL MEDICINE

## 2022-11-04 PROCEDURE — 3062F POS MACROALBUMINURIA REV: CPT | Mod: CPTII,S$GLB,, | Performed by: INTERNAL MEDICINE

## 2022-11-04 PROCEDURE — 1101F PR PT FALLS ASSESS DOC 0-1 FALLS W/OUT INJ PAST YR: ICD-10-PCS | Mod: CPTII,S$GLB,, | Performed by: INTERNAL MEDICINE

## 2022-11-04 PROCEDURE — 3078F PR MOST RECENT DIASTOLIC BLOOD PRESSURE < 80 MM HG: ICD-10-PCS | Mod: CPTII,S$GLB,, | Performed by: INTERNAL MEDICINE

## 2022-11-04 PROCEDURE — 99999 PR PBB SHADOW E&M-EST. PATIENT-LVL III: ICD-10-PCS | Mod: PBBFAC,,, | Performed by: INTERNAL MEDICINE

## 2022-11-04 PROCEDURE — 1159F PR MEDICATION LIST DOCUMENTED IN MEDICAL RECORD: ICD-10-PCS | Mod: CPTII,S$GLB,, | Performed by: INTERNAL MEDICINE

## 2022-11-04 PROCEDURE — 3075F SYST BP GE 130 - 139MM HG: CPT | Mod: CPTII,S$GLB,, | Performed by: INTERNAL MEDICINE

## 2022-11-04 PROCEDURE — 99205 PR OFFICE/OUTPT VISIT, NEW, LEVL V, 60-74 MIN: ICD-10-PCS | Mod: S$GLB,,, | Performed by: INTERNAL MEDICINE

## 2022-11-04 PROCEDURE — 3075F PR MOST RECENT SYSTOLIC BLOOD PRESS GE 130-139MM HG: ICD-10-PCS | Mod: CPTII,S$GLB,, | Performed by: INTERNAL MEDICINE

## 2022-11-04 PROCEDURE — 3288F PR FALLS RISK ASSESSMENT DOCUMENTED: ICD-10-PCS | Mod: CPTII,S$GLB,, | Performed by: INTERNAL MEDICINE

## 2022-11-04 PROCEDURE — 99205 OFFICE O/P NEW HI 60 MIN: CPT | Mod: S$GLB,,, | Performed by: INTERNAL MEDICINE

## 2022-11-04 PROCEDURE — 3062F PR POS MACROALBUMINURIA RESULT DOCUMENTED/REVIEW: ICD-10-PCS | Mod: CPTII,S$GLB,, | Performed by: INTERNAL MEDICINE

## 2022-11-04 PROCEDURE — 80053 COMPREHEN METABOLIC PANEL: CPT | Performed by: INTERNAL MEDICINE

## 2022-11-04 PROCEDURE — 83735 ASSAY OF MAGNESIUM: CPT | Performed by: INTERNAL MEDICINE

## 2022-11-04 PROCEDURE — 3066F PR DOCUMENTATION OF TREATMENT FOR NEPHROPATHY: ICD-10-PCS | Mod: CPTII,S$GLB,, | Performed by: INTERNAL MEDICINE

## 2022-11-04 PROCEDURE — 84443 ASSAY THYROID STIM HORMONE: CPT | Performed by: INTERNAL MEDICINE

## 2022-11-04 PROCEDURE — 83880 ASSAY OF NATRIURETIC PEPTIDE: CPT | Performed by: INTERNAL MEDICINE

## 2022-11-04 PROCEDURE — 1159F MED LIST DOCD IN RCRD: CPT | Mod: CPTII,S$GLB,, | Performed by: INTERNAL MEDICINE

## 2022-11-04 PROCEDURE — 99999 PR PBB SHADOW E&M-EST. PATIENT-LVL III: CPT | Mod: PBBFAC,,, | Performed by: INTERNAL MEDICINE

## 2022-11-04 PROCEDURE — 3008F BODY MASS INDEX DOCD: CPT | Mod: CPTII,S$GLB,, | Performed by: INTERNAL MEDICINE

## 2022-11-04 NOTE — PROGRESS NOTES
Subjective:       HPI:  Mr. Carney is a very pleasant 71 yo black male with stage C HFrEF, ICMP s/p PCI who was recently discharged from the hospital after being treated of ADHF. He is referred for evaluation the CardioMEMS device. Clinically reports NYHA class III symptoms. PND and orthopnea. He has been very complaint with his HF regimen.     Past Medical History:   Diagnosis Date    Asthma     Cardiomyopathy 10/21/2014    CHF (congestive heart failure)     Coronary artery disease     CRF (chronic renal failure)     Diabetes mellitus     Enlarged prostate     Hyperlipidemia     Hypertension     Neuropathy      Past Surgical History:   Procedure Laterality Date    ANGIOGRAPHY OF INTERNAL MAMMARY VESSEL N/A 3/11/2022    Procedure: Angiogram Internal Mammary;  Surgeon: Hank Lujan MD;  Location: [facility] CATH/EP LAB;  Service: Cardiology;  Laterality: N/A;    CARDIAC CATHETERIZATION      CARDIAC VALVE SURGERY      CATHETERIZATION OF BOTH LEFT AND RIGHT HEART Left 3/11/2022    Procedure: CATHETERIZATION, HEART, BOTH LEFT AND RIGHT;  Surgeon: Hank Lujan MD;  Location: [facility] CATH/EP LAB;  Service: Cardiology;  Laterality: Left;    CORONARY ANGIOGRAPHY N/A 3/11/2022    Procedure: ANGIOGRAM, CORONARY ARTERY;  Surgeon: Hank Lujan MD;  Location: [facility] CATH/EP LAB;  Service: Cardiology;  Laterality: N/A;    CORONARY BYPASS GRAFT ANGIOGRAPHY  3/11/2022    Procedure: Bypass graft study;  Surgeon: Hank Lujan MD;  Location: [facility] CATH/EP LAB;  Service: Cardiology;;    CYSTOSCOPY N/A 7/30/2019    Procedure: CYSTOSCOPY;  Surgeon: Spencer Nguyen MD;  Location: [facility] OR;  Service: Urology;  Laterality: N/A;    INSERTION OF INTRAVASCULAR MICROAXIAL BLOOD PUMP N/A 8/31/2022    Procedure: INSERTION, IMPELLA;  Surgeon: Zach Jensen MD;  Location: [facility] CATH LAB;  Service: Cardiology;  Laterality: N/A;    PLACEMENT OF SWAN SEE CATHETER WITH IMAGING GUIDANCE  8/31/2022    Procedure: INSERTION, CATHETER,  GREGOR, WITH IMAGING GUIDANCE;  Surgeon: Zach Jensen MD;  Location: [facility] CATH LAB;  Service: Cardiology;;    SHOULDER ARTHROSCOPY  1985    TRANSRECTAL BIOPSY OF PROSTATE WITH ULTRASOUND GUIDANCE N/A 7/30/2019    Procedure: BIOPSY, PROSTATE, RECTAL APPROACH, WITH US GUIDANCE;  Surgeon: Spencer Nguyen MD;  Location: [facility] OR;  Service: Urology;  Laterality: N/A;  procedure not performed, pt unable to tolerate    TRANSRECTAL BIOPSY OF PROSTATE WITH ULTRASOUND GUIDANCE N/A 8/8/2019    Procedure: BIOPSY, PROSTATE, RECTAL APPROACH, WITH US GUIDANCE;  Surgeon: Spencer Nguyen MD;  Location: [facility] OR;  Service: Urology;  Laterality: N/A;       Review of Systems   Constitutional: Negative. Negative for chills, decreased appetite, diaphoresis, fever, malaise/fatigue, night sweats, weight gain and weight loss.   Eyes: Negative.    Cardiovascular:  Positive for dyspnea on exertion and orthopnea. Negative for chest pain, claudication, cyanosis, irregular heartbeat, leg swelling, near-syncope, palpitations, paroxysmal nocturnal dyspnea and syncope.   Respiratory:  Negative for cough, hemoptysis and shortness of breath.    Endocrine: Negative.    Hematologic/Lymphatic: Negative.    Skin:  Negative for color change, dry skin and nail changes.   Musculoskeletal: Negative.    Gastrointestinal: Negative.    Genitourinary: Negative.    Neurological:  Negative for weakness.     Objective:   Blood pressure 136/71, pulse 71, height 6' (1.829 m), weight 76.7 kg (169 lb 1.5 oz).body mass index is 22.93 kg/m².  Physical Exam  Vitals reviewed.   Constitutional:       Appearance: He is well-developed.      Comments: /71   Pulse 71   Ht 6' (1.829 m)   Wt 76.7 kg (169 lb 1.5 oz)   BMI 22.93 kg/m²      HENT:      Head: Normocephalic.   Neck:      Vascular: No carotid bruit or JVD.   Cardiovascular:      Rate and Rhythm: Regular rhythm.      Chest Wall: PMI is displaced.      Pulses: Normal pulses.      Heart sounds:  Normal heart sounds. No murmur heard.  Pulmonary:      Effort: Pulmonary effort is normal.      Breath sounds: Normal breath sounds.   Abdominal:      General: Bowel sounds are normal.      Palpations: Abdomen is soft.   Skin:     General: Skin is warm.   Neurological:      Mental Status: He is alert.       Labs:    Chemistry        Component Value Date/Time     (L) 11/04/2022 0655     01/10/2019 0945    K 3.8 11/04/2022 0655     11/04/2022 0655     01/10/2019 0945    CO2 21 (L) 11/04/2022 0655    BUN 19 11/04/2022 0655    CREATININE 1.7 (H) 11/04/2022 0655    CREATININE 1.36 01/10/2019 0945    GLU 97 11/04/2022 0655    GLU 93 01/10/2019 0945        Component Value Date/Time    CALCIUM 8.2 (L) 11/04/2022 0655    ALKPHOS 84 11/04/2022 0655    AST 16 11/04/2022 0655    ALT 5 (L) 11/04/2022 0655    BILITOT 1.0 11/04/2022 0655    ESTGFRAFRICA 53.4 (A) 06/07/2022 0934    EGFRNONAA 46.2 (A) 06/07/2022 0934          Magnesium   Date Value Ref Range Status   11/04/2022 1.8 1.6 - 2.6 mg/dL Final     Lab Results   Component Value Date    WBC 5.83 11/02/2022    HGB 10.3 (L) 11/02/2022    HCT 31.5 (L) 11/02/2022     11/02/2022     Lab Results   Component Value Date    INR 1.2 11/02/2022    INR 1.1 09/13/2022    INR 1.3 (H) 08/05/2022     BNP   Date Value Ref Range Status   11/04/2022 2,043 (H) 0 - 99 pg/mL Final     Comment:     Values of less than 100 pg/ml are consistent with non-CHF populations.   09/01/2022 3,592 (H) 0 - 99 pg/mL Final     Comment:     Values of less than 100 pg/ml are consistent with non-CHF populations.   01/04/2022 2,861 (H) 0 - 99 pg/mL Final     Comment:     Values of less than 100 pg/ml are consistent with non-CHF populations.     LD   Date Value Ref Range Status   08/29/2020 357 (H) 110 - 260 U/L Final     Comment:     Results are increased in hemolyzed samples.   08/28/2020 352 (H) 110 - 260 U/L Final     Comment:     Results are increased in hemolyzed samples.          Assessment:      1. Chronic combined systolic and diastolic heart failure    2. Coronary artery disease of native artery of native heart with stable angina pectoris    3. S/P CABG (coronary artery bypass graft)    4. Primary hypertension    5. Mixed hyperlipidemia    6. Type 2 diabetes mellitus without complication, without long-term current use of insulin        Plan:   Stage C HFrEF with NYHA class III symptoms and recent HF admission despite optimal HF regimen. He is at a high risk for HF readmissions.  I have discussed with her the risks and benefits of the CardioMEMS device. This device has shown to decrease HF readmissions by 50%.   Risks and benefits of the procedure were explained. All consents were signed and scanned in Media.  Recommend 2 gram sodium restriction and 1500cc fluid restriction.  Encourage physical activity with graded exercise program.  Requested patient to weigh themselves daily, and to notify us if their weight increases by more than 3 lbs in 1 day or 5 lbs in 1 week.   Thank you for allowing me to participate in the care of this very pleasant patient. Do not hesitate to contact me should you have any questions.       Vesta Mireles MD

## 2022-11-05 LAB — NT-PROBNP SERPL IA-MCNC: ABNORMAL PG/ML

## 2022-11-09 ENCOUNTER — OFFICE VISIT (OUTPATIENT)
Dept: OPHTHALMOLOGY | Facility: CLINIC | Age: 72
End: 2022-11-09
Payer: MEDICARE

## 2022-11-09 DIAGNOSIS — H43.813 VITREOUS DEGENERATION OF BOTH EYES: ICD-10-CM

## 2022-11-09 DIAGNOSIS — E11.9 DIABETES MELLITUS TYPE 2 WITHOUT RETINOPATHY: ICD-10-CM

## 2022-11-09 DIAGNOSIS — H43.11 VITREOUS HEMORRHAGE, RIGHT EYE: Primary | ICD-10-CM

## 2022-11-09 DIAGNOSIS — H40.023 OAG (OPEN ANGLE GLAUCOMA) SUSPECT, HIGH RISK, BILATERAL: ICD-10-CM

## 2022-11-09 DIAGNOSIS — H25.13 AGE-RELATED NUCLEAR CATARACT OF BOTH EYES: ICD-10-CM

## 2022-11-09 PROCEDURE — 1157F ADVNC CARE PLAN IN RCRD: CPT | Mod: CPTII,S$GLB,, | Performed by: OPHTHALMOLOGY

## 2022-11-09 PROCEDURE — 99999 PR PBB SHADOW E&M-EST. PATIENT-LVL I: ICD-10-PCS | Mod: PBBFAC,,, | Performed by: OPHTHALMOLOGY

## 2022-11-09 PROCEDURE — 4010F PR ACE/ARB THEARPY RXD/TAKEN: ICD-10-PCS | Mod: CPTII,S$GLB,, | Performed by: OPHTHALMOLOGY

## 2022-11-09 PROCEDURE — 1159F PR MEDICATION LIST DOCUMENTED IN MEDICAL RECORD: ICD-10-PCS | Mod: CPTII,S$GLB,, | Performed by: OPHTHALMOLOGY

## 2022-11-09 PROCEDURE — 3044F PR MOST RECENT HEMOGLOBIN A1C LEVEL <7.0%: ICD-10-PCS | Mod: CPTII,S$GLB,, | Performed by: OPHTHALMOLOGY

## 2022-11-09 PROCEDURE — 3062F POS MACROALBUMINURIA REV: CPT | Mod: CPTII,S$GLB,, | Performed by: OPHTHALMOLOGY

## 2022-11-09 PROCEDURE — 1157F PR ADVANCE CARE PLAN OR EQUIV PRESENT IN MEDICAL RECORD: ICD-10-PCS | Mod: CPTII,S$GLB,, | Performed by: OPHTHALMOLOGY

## 2022-11-09 PROCEDURE — 3066F PR DOCUMENTATION OF TREATMENT FOR NEPHROPATHY: ICD-10-PCS | Mod: CPTII,S$GLB,, | Performed by: OPHTHALMOLOGY

## 2022-11-09 PROCEDURE — 3288F PR FALLS RISK ASSESSMENT DOCUMENTED: ICD-10-PCS | Mod: CPTII,S$GLB,, | Performed by: OPHTHALMOLOGY

## 2022-11-09 PROCEDURE — 3288F FALL RISK ASSESSMENT DOCD: CPT | Mod: CPTII,S$GLB,, | Performed by: OPHTHALMOLOGY

## 2022-11-09 PROCEDURE — 92134 OCT, RETINA - OU - BOTH EYES: ICD-10-PCS | Mod: S$GLB,,, | Performed by: OPHTHALMOLOGY

## 2022-11-09 PROCEDURE — 99999 PR PBB SHADOW E&M-EST. PATIENT-LVL I: CPT | Mod: PBBFAC,,, | Performed by: OPHTHALMOLOGY

## 2022-11-09 PROCEDURE — 4010F ACE/ARB THERAPY RXD/TAKEN: CPT | Mod: CPTII,S$GLB,, | Performed by: OPHTHALMOLOGY

## 2022-11-09 PROCEDURE — 92134 CPTRZ OPH DX IMG PST SGM RTA: CPT | Mod: S$GLB,,, | Performed by: OPHTHALMOLOGY

## 2022-11-09 PROCEDURE — 99214 PR OFFICE/OUTPT VISIT, EST, LEVL IV, 30-39 MIN: ICD-10-PCS | Mod: S$GLB,,, | Performed by: OPHTHALMOLOGY

## 2022-11-09 PROCEDURE — 3044F HG A1C LEVEL LT 7.0%: CPT | Mod: CPTII,S$GLB,, | Performed by: OPHTHALMOLOGY

## 2022-11-09 PROCEDURE — 1160F PR REVIEW ALL MEDS BY PRESCRIBER/CLIN PHARMACIST DOCUMENTED: ICD-10-PCS | Mod: CPTII,S$GLB,, | Performed by: OPHTHALMOLOGY

## 2022-11-09 PROCEDURE — 1159F MED LIST DOCD IN RCRD: CPT | Mod: CPTII,S$GLB,, | Performed by: OPHTHALMOLOGY

## 2022-11-09 PROCEDURE — 1160F RVW MEDS BY RX/DR IN RCRD: CPT | Mod: CPTII,S$GLB,, | Performed by: OPHTHALMOLOGY

## 2022-11-09 PROCEDURE — 3066F NEPHROPATHY DOC TX: CPT | Mod: CPTII,S$GLB,, | Performed by: OPHTHALMOLOGY

## 2022-11-09 PROCEDURE — 99214 OFFICE O/P EST MOD 30 MIN: CPT | Mod: S$GLB,,, | Performed by: OPHTHALMOLOGY

## 2022-11-09 PROCEDURE — 3062F PR POS MACROALBUMINURIA RESULT DOCUMENTED/REVIEW: ICD-10-PCS | Mod: CPTII,S$GLB,, | Performed by: OPHTHALMOLOGY

## 2022-11-09 PROCEDURE — 1101F PT FALLS ASSESS-DOCD LE1/YR: CPT | Mod: CPTII,S$GLB,, | Performed by: OPHTHALMOLOGY

## 2022-11-09 PROCEDURE — 1101F PR PT FALLS ASSESS DOC 0-1 FALLS W/OUT INJ PAST YR: ICD-10-PCS | Mod: CPTII,S$GLB,, | Performed by: OPHTHALMOLOGY

## 2022-11-09 NOTE — PROGRESS NOTES
HPI    DLS: 10/17/2022 VH OD     Pt states feels like OD is about the same. Still seeing lots of squiggly   lines in vision OD. Denies pain/ FOL/ floaters.   Last edited by Emeli Benitez on 11/9/2022  9:56 AM.            A/P    ICD-10-CM ICD-9-CM   1. Vitreous hemorrhage, right eye  H43.11 379.23   2. Vitreous degeneration of both eyes  H43.813 379.21   3. OAG (open angle glaucoma) suspect, high risk, bilateral  H40.023 365.05   4. Diabetes mellitus type 2 without retinopathy  E11.9 250.00   5. Age-related nuclear cataract of both eyes  H25.13 366.16       1. Vitreous hemorrhage, right eye  2. Vitreous degeneration of both eyes  Referral from Dr. Hdz for retina check  Hx new red floaters, VH back in Oct, on blood thinners,heart stent     Exam notable for PVD OD with VH, VMA OS, no RT/RD with  OD  Plan: Observation  Likely hemorrhagic PVD in setting of blood thinenrs  Pathology of PVD, Retinal Tear, Retinal Detachment reviewed in great detail  RD precautions discussed in detail, patient expressed understanding  RTC immediately PRN (especially ANY change flashes, floaters, vision, visual field)    Head up positioning     3. OAG (open angle glaucoma) suspect, high risk, bilateral  F/b Dr. Hdz  IOP 14/13  Plan: Continue Present Management     4. Diabetes mellitus type 2 without retinopathy  PCP Millie Hayes, APRN,FNP-C   08/22/2022  5.3  A1C   No DME, rare IRH   Plan: Observation    5. Age-related nuclear cataract of both eyes  Mild NS, NVS  Plan: Observation     RTC 1 month DFE/OCTm OU     I saw and examined the patient and reviewed in detail the findings documented. The final examination findings, image interpretations, and plan as documented in the record represent my personal judgment and conclusions.    Richie Wagner MD  Vitreoretinal Surgery   [facility]

## 2022-11-16 ENCOUNTER — HOSPITAL ENCOUNTER (OUTPATIENT)
Dept: INTERVENTIONAL RADIOLOGY/VASCULAR | Facility: HOSPITAL | Age: 72
Discharge: HOME | End: 2022-11-16
Attending: INTERNAL MEDICINE
Payer: MEDICARE

## 2022-11-16 VITALS — SYSTOLIC BLOOD PRESSURE: 147 MMHG | HEART RATE: 62 BPM | DIASTOLIC BLOOD PRESSURE: 72 MMHG

## 2022-11-16 DIAGNOSIS — R18.8 OTHER ASCITES: ICD-10-CM

## 2022-11-16 LAB
APPEARANCE FLD: NORMAL
BODY FLD TYPE: NORMAL
COLOR FLD: YELLOW
LYMPHOCYTES NFR FLD MANUAL: 54 %
MESOTHL CELL NFR FLD MANUAL: 24 %
MONOS+MACROS NFR FLD MANUAL: 17 %
NEUTROPHILS NFR FLD MANUAL: 5 %
WBC # FLD: 156 /CU MM

## 2022-11-16 PROCEDURE — 25000003 PHARM REV CODE 250: Performed by: RADIOLOGY

## 2022-11-16 PROCEDURE — 49083 IR PARACENTESIS WITH IMAGING: ICD-10-PCS | Mod: ,,, | Performed by: RADIOLOGY

## 2022-11-16 PROCEDURE — C1729 CATH, DRAINAGE: HCPCS

## 2022-11-16 PROCEDURE — 49083 ABD PARACENTESIS W/IMAGING: CPT | Performed by: RADIOLOGY

## 2022-11-16 PROCEDURE — 89051 BODY FLUID CELL COUNT: CPT | Performed by: INTERNAL MEDICINE

## 2022-11-16 RX ORDER — LIDOCAINE HYDROCHLORIDE 10 MG/ML
INJECTION INFILTRATION; PERINEURAL
Status: COMPLETED | OUTPATIENT
Start: 2022-11-16 | End: 2022-11-16

## 2022-11-16 RX ADMIN — LIDOCAINE HYDROCHLORIDE 10 ML: 10 INJECTION, SOLUTION INFILTRATION; PERINEURAL at 11:11

## 2022-11-16 NOTE — NURSING
Ultrasound guided paracentesis performed by Dr. Raines & REGINE Baker; procedure explained and consent obtained by Radiologist. Time out performed 5 L removed- VS remained stable for duration of procedure. Specimens collected and sent to lab if ordered. Para catheter d/c'd, with tip intact. Access site cleaned with peroxide, derma bond and steri-strips applied, then covered with sterile Band-Aid. Pt discharge home in stable condition.

## 2022-11-30 ENCOUNTER — HOSPITAL ENCOUNTER (OUTPATIENT)
Dept: INTERVENTIONAL RADIOLOGY/VASCULAR | Facility: HOSPITAL | Age: 72
Discharge: HOME | End: 2022-11-30
Attending: INTERNAL MEDICINE
Payer: MEDICARE

## 2022-11-30 VITALS — OXYGEN SATURATION: 99 % | HEART RATE: 71 BPM | DIASTOLIC BLOOD PRESSURE: 79 MMHG | SYSTOLIC BLOOD PRESSURE: 156 MMHG

## 2022-11-30 DIAGNOSIS — R18.8 OTHER ASCITES: ICD-10-CM

## 2022-11-30 PROCEDURE — 63600175 PHARM REV CODE 636 W HCPCS: Performed by: RADIOLOGY

## 2022-11-30 PROCEDURE — 49083 ABD PARACENTESIS W/IMAGING: CPT | Mod: ,,, | Performed by: PHYSICIAN ASSISTANT

## 2022-11-30 PROCEDURE — 49083 ABD PARACENTESIS W/IMAGING: CPT | Performed by: RADIOLOGY

## 2022-11-30 PROCEDURE — 49083 IR PARACENTESIS WITH IMAGING: ICD-10-PCS | Mod: ,,, | Performed by: PHYSICIAN ASSISTANT

## 2022-11-30 PROCEDURE — P9047 ALBUMIN (HUMAN), 25%, 50ML: HCPCS | Performed by: RADIOLOGY

## 2022-11-30 PROCEDURE — C1729 CATH, DRAINAGE: HCPCS

## 2022-11-30 RX ORDER — ALBUMIN HUMAN 250 G/1000ML
50 SOLUTION INTRAVENOUS ONCE
Status: COMPLETED | OUTPATIENT
Start: 2022-11-30 | End: 2022-11-30

## 2022-11-30 RX ORDER — ALBUMIN HUMAN 250 G/1000ML
25 SOLUTION INTRAVENOUS ONCE
Status: DISCONTINUED | OUTPATIENT
Start: 2022-11-30 | End: 2022-11-30

## 2022-11-30 RX ADMIN — ALBUMIN (HUMAN) 50 G: 12.5 SOLUTION INTRAVENOUS at 12:11

## 2022-11-30 NOTE — NURSING
IR ultrasound guided paracentesis performed  Procedure consent verified  Time out performed   8.5 L removed-   Patient received 50 g of albumin IV-   VS remained stable for duration of procedure.   Specimens collected and sent to lab if ordered.   Para and IV d/c'd, with tip intact.   Access site cleaned with peroxide, derma bond and steri-strips applied, then covered with sterile Band-Aid.   Pt discharge home in stable condition.

## 2022-11-30 NOTE — PROCEDURES
Preprocedure ultrasound imaging was performed of the abdomen and appropriate site was marked at the right lower abdominal quadrant.  Informed consent was obtained. Patient was prepped and draped in the usual sterile fashion. Local anesthesia was administered.  A catheter drainage device was then advanced into the abdomen. Stylette was removed and catheter left in place.  Initial fluid was straw-colored.  A total of 8.5 liters was removed.  The patient tolerated the procedure well without immediate complication. Blood loss was negligible.      If analyses were ordered, a sample of fluid was collected and sent to lab.  IV albumin administered per protocol if necessary.

## 2022-12-06 ENCOUNTER — DOCUMENTATION ONLY (OUTPATIENT)
Dept: TRANSPLANT | Facility: CLINIC | Age: 72
End: 2022-12-06
Payer: MEDICARE

## 2022-12-06 NOTE — PROGRESS NOTES
RN spoke with the patient and informed him that unfortunately we will have to cancel his CardioMEMS implantation scheduled for 12/9. The insurance is denying the approval and we will submit a letter of medical necessity. If approved we will call to reschedule. Patient verbalized understanding and all questions answered.

## 2022-12-14 ENCOUNTER — OFFICE VISIT (OUTPATIENT)
Dept: OPHTHALMOLOGY | Facility: CLINIC | Age: 72
End: 2022-12-14
Payer: MEDICARE

## 2022-12-14 DIAGNOSIS — H40.023 OAG (OPEN ANGLE GLAUCOMA) SUSPECT, HIGH RISK, BILATERAL: ICD-10-CM

## 2022-12-14 DIAGNOSIS — H25.13 AGE-RELATED NUCLEAR CATARACT OF BOTH EYES: ICD-10-CM

## 2022-12-14 DIAGNOSIS — E11.9 DIABETES MELLITUS TYPE 2 WITHOUT RETINOPATHY: ICD-10-CM

## 2022-12-14 DIAGNOSIS — H43.11 VITREOUS HEMORRHAGE, RIGHT EYE: Primary | ICD-10-CM

## 2022-12-14 DIAGNOSIS — H43.813 VITREOUS DEGENERATION OF BOTH EYES: ICD-10-CM

## 2022-12-14 PROCEDURE — 1159F PR MEDICATION LIST DOCUMENTED IN MEDICAL RECORD: ICD-10-PCS | Mod: CPTII,S$GLB,, | Performed by: OPHTHALMOLOGY

## 2022-12-14 PROCEDURE — 99214 PR OFFICE/OUTPT VISIT, EST, LEVL IV, 30-39 MIN: ICD-10-PCS | Mod: S$GLB,,, | Performed by: OPHTHALMOLOGY

## 2022-12-14 PROCEDURE — 4010F ACE/ARB THERAPY RXD/TAKEN: CPT | Mod: CPTII,S$GLB,, | Performed by: OPHTHALMOLOGY

## 2022-12-14 PROCEDURE — 1126F AMNT PAIN NOTED NONE PRSNT: CPT | Mod: CPTII,S$GLB,, | Performed by: OPHTHALMOLOGY

## 2022-12-14 PROCEDURE — 1157F PR ADVANCE CARE PLAN OR EQUIV PRESENT IN MEDICAL RECORD: ICD-10-PCS | Mod: CPTII,S$GLB,, | Performed by: OPHTHALMOLOGY

## 2022-12-14 PROCEDURE — 99214 OFFICE O/P EST MOD 30 MIN: CPT | Mod: S$GLB,,, | Performed by: OPHTHALMOLOGY

## 2022-12-14 PROCEDURE — 99999 PR PBB SHADOW E&M-EST. PATIENT-LVL II: ICD-10-PCS | Mod: PBBFAC,,, | Performed by: OPHTHALMOLOGY

## 2022-12-14 PROCEDURE — 3062F PR POS MACROALBUMINURIA RESULT DOCUMENTED/REVIEW: ICD-10-PCS | Mod: CPTII,S$GLB,, | Performed by: OPHTHALMOLOGY

## 2022-12-14 PROCEDURE — 1160F RVW MEDS BY RX/DR IN RCRD: CPT | Mod: CPTII,S$GLB,, | Performed by: OPHTHALMOLOGY

## 2022-12-14 PROCEDURE — 1160F PR REVIEW ALL MEDS BY PRESCRIBER/CLIN PHARMACIST DOCUMENTED: ICD-10-PCS | Mod: CPTII,S$GLB,, | Performed by: OPHTHALMOLOGY

## 2022-12-14 PROCEDURE — 1101F PR PT FALLS ASSESS DOC 0-1 FALLS W/OUT INJ PAST YR: ICD-10-PCS | Mod: CPTII,S$GLB,, | Performed by: OPHTHALMOLOGY

## 2022-12-14 PROCEDURE — 3066F PR DOCUMENTATION OF TREATMENT FOR NEPHROPATHY: ICD-10-PCS | Mod: CPTII,S$GLB,, | Performed by: OPHTHALMOLOGY

## 2022-12-14 PROCEDURE — 3062F POS MACROALBUMINURIA REV: CPT | Mod: CPTII,S$GLB,, | Performed by: OPHTHALMOLOGY

## 2022-12-14 PROCEDURE — 1101F PT FALLS ASSESS-DOCD LE1/YR: CPT | Mod: CPTII,S$GLB,, | Performed by: OPHTHALMOLOGY

## 2022-12-14 PROCEDURE — 4010F PR ACE/ARB THEARPY RXD/TAKEN: ICD-10-PCS | Mod: CPTII,S$GLB,, | Performed by: OPHTHALMOLOGY

## 2022-12-14 PROCEDURE — 3066F NEPHROPATHY DOC TX: CPT | Mod: CPTII,S$GLB,, | Performed by: OPHTHALMOLOGY

## 2022-12-14 PROCEDURE — 3288F PR FALLS RISK ASSESSMENT DOCUMENTED: ICD-10-PCS | Mod: CPTII,S$GLB,, | Performed by: OPHTHALMOLOGY

## 2022-12-14 PROCEDURE — 3044F PR MOST RECENT HEMOGLOBIN A1C LEVEL <7.0%: ICD-10-PCS | Mod: CPTII,S$GLB,, | Performed by: OPHTHALMOLOGY

## 2022-12-14 PROCEDURE — 92134 CPTRZ OPH DX IMG PST SGM RTA: CPT | Mod: S$GLB,,, | Performed by: OPHTHALMOLOGY

## 2022-12-14 PROCEDURE — 1157F ADVNC CARE PLAN IN RCRD: CPT | Mod: CPTII,S$GLB,, | Performed by: OPHTHALMOLOGY

## 2022-12-14 PROCEDURE — 3288F FALL RISK ASSESSMENT DOCD: CPT | Mod: CPTII,S$GLB,, | Performed by: OPHTHALMOLOGY

## 2022-12-14 PROCEDURE — 92134 OCT, RETINA - OU - BOTH EYES: ICD-10-PCS | Mod: S$GLB,,, | Performed by: OPHTHALMOLOGY

## 2022-12-14 PROCEDURE — 1159F MED LIST DOCD IN RCRD: CPT | Mod: CPTII,S$GLB,, | Performed by: OPHTHALMOLOGY

## 2022-12-14 PROCEDURE — 3044F HG A1C LEVEL LT 7.0%: CPT | Mod: CPTII,S$GLB,, | Performed by: OPHTHALMOLOGY

## 2022-12-14 PROCEDURE — 1126F PR PAIN SEVERITY QUANTIFIED, NO PAIN PRESENT: ICD-10-PCS | Mod: CPTII,S$GLB,, | Performed by: OPHTHALMOLOGY

## 2022-12-14 PROCEDURE — 99999 PR PBB SHADOW E&M-EST. PATIENT-LVL II: CPT | Mod: PBBFAC,,, | Performed by: OPHTHALMOLOGY

## 2022-12-14 NOTE — PROGRESS NOTES
HPI    Pt presents for one month DFE/OCT OU     Complains of flashes and floaters OD that have not improved.     No ocular meds       Last edited by Alba Fields on 12/14/2022 10:38 AM.            A/P    ICD-10-CM ICD-9-CM   1. Vitreous hemorrhage, right eye  H43.11 379.23   2. Vitreous degeneration of both eyes  H43.813 379.21   3. OAG (open angle glaucoma) suspect, high risk, bilateral  H40.023 365.05   4. Diabetes mellitus type 2 without retinopathy  E11.9 250.00   5. Age-related nuclear cataract of both eyes  H25.13 366.16       1. Vitreous hemorrhage, right eye  2. Vitreous degeneration of both eyes  F/u for retina check  Hx new red floaters, VH back in Oct, on blood thinners,heart stent     Exam notable for PVD OD with VH much better, VMA OS, no RT/RD    Plan: Observation  Likely hemorrhagic PVD in setting of blood thinenrs  Pathology of PVD, Retinal Tear, Retinal Detachment reviewed in great detail  RD precautions discussed in detail, patient expressed understanding  RTC immediately PRN (especially ANY change flashes, floaters, vision, visual field)    Head up positioning     3. OAG (open angle glaucoma) suspect, high risk, bilateral  F/b Dr. Hdz  IOP 17/15  Plan: Continue Present Management     4. Diabetes mellitus type 2 without retinopathy  PCP Millie Hayes, APRN,FNP-C   08/22/2022  5.3  A1C   No DME, rare IRH   Plan: Observation    5. Age-related nuclear cataract of both eyes  Mild NS, NVS  Plan: Observation       RTC 3 month DFE/OCTm OU     I saw and examined the patient and reviewed in detail the findings documented. The final examination findings, image interpretations, and plan as documented in the record represent my personal judgment and conclusions.    Richie Wagner MD  Vitreoretinal Surgery   [facility]

## 2022-12-16 ENCOUNTER — HOSPITAL ENCOUNTER (OUTPATIENT)
Dept: INTERVENTIONAL RADIOLOGY/VASCULAR | Facility: HOSPITAL | Age: 72
Discharge: HOME | End: 2022-12-16
Attending: INTERNAL MEDICINE
Payer: MEDICARE

## 2022-12-16 VITALS — HEART RATE: 72 BPM | DIASTOLIC BLOOD PRESSURE: 92 MMHG | SYSTOLIC BLOOD PRESSURE: 185 MMHG

## 2022-12-16 DIAGNOSIS — R18.8 OTHER ASCITES: ICD-10-CM

## 2022-12-16 DIAGNOSIS — R18.8 OTHER ASCITES: Primary | ICD-10-CM

## 2022-12-16 LAB
APPEARANCE FLD: CLEAR
BODY FLD TYPE: NORMAL
COLOR FLD: YELLOW
LYMPHOCYTES NFR FLD MANUAL: 58 %
MONOS+MACROS NFR FLD MANUAL: 37 %
NEUTROPHILS NFR FLD MANUAL: 5 %
WBC # FLD: 127 /CU MM

## 2022-12-16 PROCEDURE — 49083 IR PARACENTESIS WITH IMAGING: ICD-10-PCS | Mod: ,,, | Performed by: RADIOLOGY

## 2022-12-16 PROCEDURE — 89051 BODY FLUID CELL COUNT: CPT | Performed by: INTERNAL MEDICINE

## 2022-12-16 PROCEDURE — 49083 ABD PARACENTESIS W/IMAGING: CPT | Performed by: RADIOLOGY

## 2022-12-16 NOTE — NURSING
IR ultrasound guided paracentesis performed by Dr Marshall  Consent verified - current until 9.22.23  Procedure explained and consent obtained by Radiologist.   Time out performed   5 L ascites removed-   Pt refused IV and Albumin- MD aware instructed to only remove 5L of ascites.    VS remained stable for duration of procedure.- pt hypertensive- (nervous of needle stick)   Specimens collected and sent to lab if ordered.   Para d/c'd, with tip intact.   Access site cleaned with peroxide, derma bond and steri-strips applied, then covered with sterile Band-Aid.   Pt discharge home in stable condition.

## 2023-01-06 DIAGNOSIS — N40.0 BENIGN PROSTATIC HYPERPLASIA, UNSPECIFIED WHETHER LOWER URINARY TRACT SYMPTOMS PRESENT: Chronic | ICD-10-CM

## 2023-01-09 RX ORDER — TAMSULOSIN HYDROCHLORIDE 0.4 MG/1
1 CAPSULE ORAL DAILY
Qty: 90 CAPSULE | Refills: 0 | Status: SHIPPED | OUTPATIENT
Start: 2023-01-09 | End: 2023-04-18

## 2023-01-13 ENCOUNTER — HOSPITAL ENCOUNTER (OUTPATIENT)
Dept: INTERVENTIONAL RADIOLOGY/VASCULAR | Facility: HOSPITAL | Age: 73
Discharge: HOME | End: 2023-01-13
Attending: INTERNAL MEDICINE
Payer: MEDICARE

## 2023-01-13 VITALS
DIASTOLIC BLOOD PRESSURE: 80 MMHG | OXYGEN SATURATION: 99 % | SYSTOLIC BLOOD PRESSURE: 164 MMHG | HEART RATE: 79 BPM | RESPIRATION RATE: 17 BRPM

## 2023-01-13 DIAGNOSIS — R18.8 OTHER ASCITES: ICD-10-CM

## 2023-01-13 LAB
APPEARANCE FLD: CLEAR
BODY FLD TYPE: NORMAL
COLOR FLD: YELLOW
LYMPHOCYTES NFR FLD MANUAL: 57 %
MONOS+MACROS NFR FLD MANUAL: 39 %
NEUTROPHILS NFR FLD MANUAL: 4 %
WBC # FLD: 166 /CU MM

## 2023-01-13 PROCEDURE — 89051 BODY FLUID CELL COUNT: CPT | Performed by: INTERNAL MEDICINE

## 2023-01-13 PROCEDURE — 63600175 PHARM REV CODE 636 W HCPCS: Performed by: RADIOLOGY

## 2023-01-13 PROCEDURE — P9047 ALBUMIN (HUMAN), 25%, 50ML: HCPCS | Performed by: RADIOLOGY

## 2023-01-13 PROCEDURE — 49083 IR PARACENTESIS WITH IMAGING: ICD-10-PCS | Mod: ,,, | Performed by: RADIOLOGY

## 2023-01-13 PROCEDURE — C1729 CATH, DRAINAGE: HCPCS

## 2023-01-13 PROCEDURE — 49083 ABD PARACENTESIS W/IMAGING: CPT | Performed by: RADIOLOGY

## 2023-01-13 RX ORDER — ALBUMIN HUMAN 250 G/1000ML
25 SOLUTION INTRAVENOUS
Status: DISCONTINUED | OUTPATIENT
Start: 2023-01-13 | End: 2023-01-14 | Stop reason: HOSPADM

## 2023-01-13 RX ADMIN — ALBUMIN (HUMAN) 25 G: 12.5 SOLUTION INTRAVENOUS at 03:01

## 2023-01-13 NOTE — NURSING
IR ultrasound guided paracentesis performed by Pillo   Procedure explained and consent verified. Time out performed   9 L removed-   Patient received 25 g of albumin dose (25g/5 liters per dr feng) IV-  VS remained stable for duration of procedure.  Specimens collected and sent to lab if ordered.   Para and IV d/c'd, with tip intact.   Access site cleaned with peroxide, derma bond and steri-strips applied, then covered with sterile Band-Aid.   Pt discharge home in stable condition.

## 2023-01-26 ENCOUNTER — LAB VISIT (OUTPATIENT)
Dept: LAB | Facility: HOSPITAL | Age: 73
End: 2023-01-26
Attending: RADIOLOGY
Payer: MEDICARE

## 2023-01-26 DIAGNOSIS — R18.8 OTHER ASCITES: Chronic | ICD-10-CM

## 2023-01-26 LAB
BASOPHILS # BLD AUTO: 0.06 K/UL (ref 0–0.2)
BASOPHILS NFR BLD: 1.1 % (ref 0–1.9)
DIFFERENTIAL METHOD: ABNORMAL
EOSINOPHIL # BLD AUTO: 0.2 K/UL (ref 0–0.5)
EOSINOPHIL NFR BLD: 3 % (ref 0–8)
ERYTHROCYTE [DISTWIDTH] IN BLOOD BY AUTOMATED COUNT: 18.7 % (ref 11.5–14.5)
HCT VFR BLD AUTO: 30.6 % (ref 40–54)
HGB BLD-MCNC: 9.8 G/DL (ref 14–18)
IMM GRANULOCYTES # BLD AUTO: 0.02 K/UL (ref 0–0.04)
IMM GRANULOCYTES NFR BLD AUTO: 0.4 % (ref 0–0.5)
INR PPP: 1.1 (ref 0.8–1.2)
LYMPHOCYTES # BLD AUTO: 0.7 K/UL (ref 1–4.8)
LYMPHOCYTES NFR BLD: 13 % (ref 18–48)
MCH RBC QN AUTO: 28.6 PG (ref 27–31)
MCHC RBC AUTO-ENTMCNC: 32 G/DL (ref 32–36)
MCV RBC AUTO: 89 FL (ref 82–98)
MONOCYTES # BLD AUTO: 0.6 K/UL (ref 0.3–1)
MONOCYTES NFR BLD: 10.8 % (ref 4–15)
NEUTROPHILS # BLD AUTO: 3.8 K/UL (ref 1.8–7.7)
NEUTROPHILS NFR BLD: 71.7 % (ref 38–73)
NRBC BLD-RTO: 0 /100 WBC
PLATELET # BLD AUTO: 229 K/UL (ref 150–450)
PMV BLD AUTO: 10.1 FL (ref 9.2–12.9)
PROTHROMBIN TIME: 12.3 SEC (ref 9–12.5)
RBC # BLD AUTO: 3.43 M/UL (ref 4.6–6.2)
WBC # BLD AUTO: 5.29 K/UL (ref 3.9–12.7)

## 2023-01-26 PROCEDURE — 36415 COLL VENOUS BLD VENIPUNCTURE: CPT | Performed by: RADIOLOGY

## 2023-01-26 PROCEDURE — 85610 PROTHROMBIN TIME: CPT | Performed by: RADIOLOGY

## 2023-01-26 PROCEDURE — 85025 COMPLETE CBC W/AUTO DIFF WBC: CPT | Performed by: RADIOLOGY

## 2023-01-27 ENCOUNTER — HOSPITAL ENCOUNTER (OUTPATIENT)
Dept: INTERVENTIONAL RADIOLOGY/VASCULAR | Facility: HOSPITAL | Age: 73
Discharge: HOME | End: 2023-01-27
Attending: INTERNAL MEDICINE
Payer: MEDICARE

## 2023-01-27 VITALS
DIASTOLIC BLOOD PRESSURE: 65 MMHG | OXYGEN SATURATION: 100 % | RESPIRATION RATE: 16 BRPM | HEART RATE: 62 BPM | SYSTOLIC BLOOD PRESSURE: 146 MMHG

## 2023-01-27 DIAGNOSIS — R18.8 OTHER ASCITES: Primary | ICD-10-CM

## 2023-01-27 PROCEDURE — 27202032 IR PARACENTESIS WITH IMAGING

## 2023-01-27 PROCEDURE — P9047 ALBUMIN (HUMAN), 25%, 50ML: HCPCS | Performed by: RADIOLOGY

## 2023-01-27 PROCEDURE — 63600175 PHARM REV CODE 636 W HCPCS: Performed by: RADIOLOGY

## 2023-01-27 PROCEDURE — 49083 IR PARACENTESIS WITH IMAGING: ICD-10-PCS | Mod: ,,, | Performed by: RADIOLOGY

## 2023-01-27 PROCEDURE — 49083 ABD PARACENTESIS W/IMAGING: CPT | Performed by: RADIOLOGY

## 2023-01-27 RX ORDER — ALBUMIN HUMAN 250 G/1000ML
SOLUTION INTRAVENOUS
Status: COMPLETED | OUTPATIENT
Start: 2023-01-27 | End: 2023-01-27

## 2023-01-27 RX ADMIN — ALBUMIN (HUMAN) 50 G: 25 SOLUTION INTRAVENOUS at 12:01

## 2023-01-27 NOTE — NURSING
PARACENTESIS    IR ultrasound guided paracentesis performed by provider.  Procedure explained by radiologist and standing consent verified.  Time out performed.  8.75 L removed-   Patient received 50 g of albumin IV.  VS remained stable for duration of procedure.  Para and IV d/c'd, with tip intact.   Access site cleaned with peroxide, derma bond and steri-strips applied, then covered with sterile Band-Aid.   Pt discharge home in stable condition.

## 2023-02-14 ENCOUNTER — HOSPITAL ENCOUNTER (OUTPATIENT)
Dept: INTERVENTIONAL RADIOLOGY/VASCULAR | Facility: HOSPITAL | Age: 73
Discharge: HOME | End: 2023-02-14
Attending: INTERNAL MEDICINE
Payer: MEDICARE

## 2023-02-14 VITALS
RESPIRATION RATE: 18 BRPM | DIASTOLIC BLOOD PRESSURE: 67 MMHG | HEART RATE: 83 BPM | OXYGEN SATURATION: 97 % | SYSTOLIC BLOOD PRESSURE: 124 MMHG

## 2023-02-14 DIAGNOSIS — R18.8 OTHER ASCITES: Primary | ICD-10-CM

## 2023-02-14 DIAGNOSIS — R18.8 OTHER ASCITES: ICD-10-CM

## 2023-02-14 LAB
APPEARANCE FLD: CLEAR
BODY FLD TYPE: NORMAL
COLOR FLD: YELLOW
LYMPHOCYTES NFR FLD MANUAL: 97 %
NEUTROPHILS NFR FLD MANUAL: 3 %
WBC # FLD: 109 /CU MM

## 2023-02-14 PROCEDURE — 49083 ABD PARACENTESIS W/IMAGING: CPT | Performed by: RADIOLOGY

## 2023-02-14 PROCEDURE — 89051 BODY FLUID CELL COUNT: CPT | Performed by: INTERNAL MEDICINE

## 2023-02-14 PROCEDURE — 49083 IR PARACENTESIS WITH IMAGING: ICD-10-PCS | Mod: ,,, | Performed by: RADIOLOGY

## 2023-02-14 PROCEDURE — P9047 ALBUMIN (HUMAN), 25%, 50ML: HCPCS | Performed by: PHYSICIAN ASSISTANT

## 2023-02-14 PROCEDURE — 63600175 PHARM REV CODE 636 W HCPCS: Performed by: PHYSICIAN ASSISTANT

## 2023-02-14 RX ORDER — ALBUMIN HUMAN 250 G/1000ML
25 SOLUTION INTRAVENOUS
Status: DISCONTINUED | OUTPATIENT
Start: 2023-02-14 | End: 2023-02-15 | Stop reason: HOSPADM

## 2023-02-14 RX ADMIN — ALBUMIN (HUMAN) 75 G: 12.5 SOLUTION INTRAVENOUS at 11:02

## 2023-02-14 NOTE — NURSING
PARACENTESIS    IR ultrasound guided paracentesis performed by provider.  Procedure explained by radiologist and standing consent verified.  Time out performed.  9.5 L removed-   Patient received 75 g of albumin IV.  VS remained stable for duration of procedure.  Specimens collected and sent to lab if ordered.   Para and IV d/c'd, with tip intact.   Access site cleaned with peroxide, derma bond and steri-strips applied, then covered with sterile Band-Aid.   Pt discharge home in stable condition.

## 2023-02-22 ENCOUNTER — OFFICE VISIT (OUTPATIENT)
Dept: FAMILY MEDICINE | Facility: CLINIC | Age: 73
End: 2023-02-22
Payer: MEDICARE

## 2023-02-22 VITALS
WEIGHT: 180.5 LBS | OXYGEN SATURATION: 99 % | SYSTOLIC BLOOD PRESSURE: 136 MMHG | HEIGHT: 72 IN | DIASTOLIC BLOOD PRESSURE: 64 MMHG | BODY MASS INDEX: 24.45 KG/M2 | HEART RATE: 64 BPM

## 2023-02-22 DIAGNOSIS — E78.2 MIXED HYPERLIPIDEMIA: ICD-10-CM

## 2023-02-22 DIAGNOSIS — I50.22 CHRONIC SYSTOLIC HEART FAILURE: Chronic | ICD-10-CM

## 2023-02-22 DIAGNOSIS — D64.9 MILD ANEMIA: ICD-10-CM

## 2023-02-22 DIAGNOSIS — Z12.5 PROSTATE CANCER SCREENING: ICD-10-CM

## 2023-02-22 DIAGNOSIS — D53.9 NUTRITIONAL ANEMIA, UNSPECIFIED: ICD-10-CM

## 2023-02-22 DIAGNOSIS — E11.9 TYPE 2 DIABETES MELLITUS WITHOUT COMPLICATION, WITHOUT LONG-TERM CURRENT USE OF INSULIN: Primary | ICD-10-CM

## 2023-02-22 DIAGNOSIS — I10 PRIMARY HYPERTENSION: ICD-10-CM

## 2023-02-22 DIAGNOSIS — R79.89 ELEVATED TSH: ICD-10-CM

## 2023-02-22 LAB — HBA1C MFR BLD: 5.4 %

## 2023-02-22 PROCEDURE — 1159F PR MEDICATION LIST DOCUMENTED IN MEDICAL RECORD: ICD-10-PCS | Mod: CPTII,S$GLB,, | Performed by: NURSE PRACTITIONER

## 2023-02-22 PROCEDURE — 1126F PR PAIN SEVERITY QUANTIFIED, NO PAIN PRESENT: ICD-10-PCS | Mod: CPTII,S$GLB,, | Performed by: NURSE PRACTITIONER

## 2023-02-22 PROCEDURE — 3075F SYST BP GE 130 - 139MM HG: CPT | Mod: CPTII,S$GLB,, | Performed by: NURSE PRACTITIONER

## 2023-02-22 PROCEDURE — 1101F PR PT FALLS ASSESS DOC 0-1 FALLS W/OUT INJ PAST YR: ICD-10-PCS | Mod: CPTII,S$GLB,, | Performed by: NURSE PRACTITIONER

## 2023-02-22 PROCEDURE — 3008F PR BODY MASS INDEX (BMI) DOCUMENTED: ICD-10-PCS | Mod: CPTII,S$GLB,, | Performed by: NURSE PRACTITIONER

## 2023-02-22 PROCEDURE — 1160F RVW MEDS BY RX/DR IN RCRD: CPT | Mod: CPTII,S$GLB,, | Performed by: NURSE PRACTITIONER

## 2023-02-22 PROCEDURE — 1159F MED LIST DOCD IN RCRD: CPT | Mod: CPTII,S$GLB,, | Performed by: NURSE PRACTITIONER

## 2023-02-22 PROCEDURE — 3072F PR LOW RISK FOR RETINOPATHY: ICD-10-PCS | Mod: CPTII,S$GLB,, | Performed by: NURSE PRACTITIONER

## 2023-02-22 PROCEDURE — 3288F FALL RISK ASSESSMENT DOCD: CPT | Mod: CPTII,S$GLB,, | Performed by: NURSE PRACTITIONER

## 2023-02-22 PROCEDURE — 1157F ADVNC CARE PLAN IN RCRD: CPT | Mod: CPTII,S$GLB,, | Performed by: NURSE PRACTITIONER

## 2023-02-22 PROCEDURE — 99214 OFFICE O/P EST MOD 30 MIN: CPT | Mod: S$GLB,,, | Performed by: NURSE PRACTITIONER

## 2023-02-22 PROCEDURE — 3044F HG A1C LEVEL LT 7.0%: CPT | Mod: CPTII,S$GLB,, | Performed by: NURSE PRACTITIONER

## 2023-02-22 PROCEDURE — 99214 PR OFFICE/OUTPT VISIT, EST, LEVL IV, 30-39 MIN: ICD-10-PCS | Mod: S$GLB,,, | Performed by: NURSE PRACTITIONER

## 2023-02-22 PROCEDURE — 1101F PT FALLS ASSESS-DOCD LE1/YR: CPT | Mod: CPTII,S$GLB,, | Performed by: NURSE PRACTITIONER

## 2023-02-22 PROCEDURE — 1157F PR ADVANCE CARE PLAN OR EQUIV PRESENT IN MEDICAL RECORD: ICD-10-PCS | Mod: CPTII,S$GLB,, | Performed by: NURSE PRACTITIONER

## 2023-02-22 PROCEDURE — 3288F PR FALLS RISK ASSESSMENT DOCUMENTED: ICD-10-PCS | Mod: CPTII,S$GLB,, | Performed by: NURSE PRACTITIONER

## 2023-02-22 PROCEDURE — 1126F AMNT PAIN NOTED NONE PRSNT: CPT | Mod: CPTII,S$GLB,, | Performed by: NURSE PRACTITIONER

## 2023-02-22 PROCEDURE — 3008F BODY MASS INDEX DOCD: CPT | Mod: CPTII,S$GLB,, | Performed by: NURSE PRACTITIONER

## 2023-02-22 PROCEDURE — 3078F PR MOST RECENT DIASTOLIC BLOOD PRESSURE < 80 MM HG: ICD-10-PCS | Mod: CPTII,S$GLB,, | Performed by: NURSE PRACTITIONER

## 2023-02-22 PROCEDURE — 1160F PR REVIEW ALL MEDS BY PRESCRIBER/CLIN PHARMACIST DOCUMENTED: ICD-10-PCS | Mod: CPTII,S$GLB,, | Performed by: NURSE PRACTITIONER

## 2023-02-22 PROCEDURE — 3075F PR MOST RECENT SYSTOLIC BLOOD PRESS GE 130-139MM HG: ICD-10-PCS | Mod: CPTII,S$GLB,, | Performed by: NURSE PRACTITIONER

## 2023-02-22 PROCEDURE — 3044F PR MOST RECENT HEMOGLOBIN A1C LEVEL <7.0%: ICD-10-PCS | Mod: CPTII,S$GLB,, | Performed by: NURSE PRACTITIONER

## 2023-02-22 PROCEDURE — 83036 HEMOGLOBIN GLYCOSYLATED A1C: CPT | Mod: QW,S$GLB,, | Performed by: NURSE PRACTITIONER

## 2023-02-22 PROCEDURE — 83036 POCT HEMOGLOBIN A1C: ICD-10-PCS | Mod: QW,S$GLB,, | Performed by: NURSE PRACTITIONER

## 2023-02-22 PROCEDURE — 3072F LOW RISK FOR RETINOPATHY: CPT | Mod: CPTII,S$GLB,, | Performed by: NURSE PRACTITIONER

## 2023-02-22 PROCEDURE — 3078F DIAST BP <80 MM HG: CPT | Mod: CPTII,S$GLB,, | Performed by: NURSE PRACTITIONER

## 2023-02-23 NOTE — PROGRESS NOTES
SUBJECTIVE:      Patient ID: Baldo Carney is a 72 y.o. male.    Chief Complaint: Diabetes (6 month f/u DM)    Presents for DM recheck - A1c 5.4 from 5.3 in August 2022    Discussed outstanding health maintenance     Diabetes  He presents for his follow-up diabetic visit. He has type 2 diabetes mellitus. No MedicAlert identification noted. His disease course has been stable. There are no hypoglycemic associated symptoms. Pertinent negatives for hypoglycemia include no confusion, dizziness, headaches, nervousness/anxiousness or pallor. Pertinent negatives for diabetes include no chest pain, no fatigue, no foot paresthesias, no polydipsia, no polyuria and no weakness. There are no hypoglycemic complications. Symptoms are stable. Diabetic complications include heart disease, impotence, peripheral neuropathy and retinopathy. Risk factors for coronary artery disease include diabetes mellitus, dyslipidemia, family history, hypertension, male sex, sedentary lifestyle and stress. Current diabetic treatment includes oral agent (monotherapy). He is compliant with treatment all of the time. His weight is fluctuating minimally. He is following a generally healthy diet. He has not had a previous visit with a dietitian. He rarely participates in exercise. An ACE inhibitor/angiotensin II receptor blocker is being taken. He does not see a podiatrist.Eye exam is not current.   Hypertension  This is a chronic problem. The current episode started more than 1 year ago. The problem is controlled. Pertinent negatives include no chest pain, headaches, neck pain, peripheral edema or shortness of breath. Risk factors for coronary artery disease include male gender, sedentary lifestyle, diabetes mellitus, dyslipidemia, family history and stress. Past treatments include calcium channel blockers, diuretics, direct vasodilators and angiotensin blockers. The current treatment provides mild improvement. Compliance problems include  exercise and diet.  Hypertensive end-organ damage includes CAD/MI and retinopathy. Identifiable causes of hypertension include chronic renal disease, a hypertension causing med and renovascular disease.     Past Surgical History:   Procedure Laterality Date    ANGIOGRAPHY OF INTERNAL MAMMARY VESSEL N/A 3/11/2022    Procedure: Angiogram Internal Mammary;  Surgeon: Hank Lujan MD;  Location: [facility] CATH/EP LAB;  Service: Cardiology;  Laterality: N/A;    CARDIAC CATHETERIZATION      CARDIAC VALVE SURGERY      CATHETERIZATION OF BOTH LEFT AND RIGHT HEART Left 3/11/2022    Procedure: CATHETERIZATION, HEART, BOTH LEFT AND RIGHT;  Surgeon: Hank Lujan MD;  Location: [facility] CATH/EP LAB;  Service: Cardiology;  Laterality: Left;    CORONARY ANGIOGRAPHY N/A 3/11/2022    Procedure: ANGIOGRAM, CORONARY ARTERY;  Surgeon: Hank Lujan MD;  Location: [facility] CATH/EP LAB;  Service: Cardiology;  Laterality: N/A;    CORONARY BYPASS GRAFT ANGIOGRAPHY  3/11/2022    Procedure: Bypass graft study;  Surgeon: Hank Lujan MD;  Location: [facility] CATH/EP LAB;  Service: Cardiology;;    CYSTOSCOPY N/A 7/30/2019    Procedure: CYSTOSCOPY;  Surgeon: Spencer Nguyen MD;  Location: [facility] OR;  Service: Urology;  Laterality: N/A;    INSERTION OF INTRAVASCULAR MICROAXIAL BLOOD PUMP N/A 8/31/2022    Procedure: INSERTION, IMPELLA;  Surgeon: Zach Jensen MD;  Location: [facility] CATH LAB;  Service: Cardiology;  Laterality: N/A;    PLACEMENT OF SWAN SEE CATHETER WITH IMAGING GUIDANCE  8/31/2022    Procedure: INSERTION, CATHETER, SWAN-SEE, WITH IMAGING GUIDANCE;  Surgeon: Zach Jensen MD;  Location: [facility] CATH LAB;  Service: Cardiology;;    SHOULDER ARTHROSCOPY  1985    TRANSRECTAL BIOPSY OF PROSTATE WITH ULTRASOUND GUIDANCE N/A 7/30/2019    Procedure: BIOPSY, PROSTATE, RECTAL APPROACH, WITH US GUIDANCE;  Surgeon: Spencer Nguyen MD;  Location: [facility] OR;  Service: Urology;  Laterality: N/A;  procedure not performed, pt unable to  tolerate    TRANSRECTAL BIOPSY OF PROSTATE WITH ULTRASOUND GUIDANCE N/A 2019    Procedure: BIOPSY, PROSTATE, RECTAL APPROACH, WITH US GUIDANCE;  Surgeon: Spencer Nguyen MD;  Location: [facility];  Service: Urology;  Laterality: N/A;     Family History   Problem Relation Age of Onset    No Known Problems Mother     Diabetes Father     Hypertension Father     Heart attack Father     Heart disease Father     Diabetes Sister     Heart disease Brother     Diabetes Brother     No Known Problems Maternal Grandmother     No Known Problems Maternal Grandfather     No Known Problems Paternal Grandmother     No Known Problems Paternal Grandfather     No Known Problems Maternal Aunt     No Known Problems Maternal Uncle     No Known Problems Paternal Aunt     No Known Problems Paternal Uncle     Anemia Neg Hx     Arrhythmia Neg Hx     Asthma Neg Hx     Clotting disorder Neg Hx     Fainting Neg Hx     Heart failure Neg Hx     Hyperlipidemia Neg Hx     Glaucoma Neg Hx       Social History     Socioeconomic History    Marital status: Legally    Tobacco Use    Smoking status: Former     Types: Cigarettes     Quit date: 1970     Years since quittin.7    Smokeless tobacco: Never   Substance and Sexual Activity    Alcohol use: Not Currently     Alcohol/week: 3.0 standard drinks     Types: 3 Cans of beer per week     Comment: social    Drug use: No    Sexual activity: Never     Current Outpatient Medications   Medication Sig Dispense Refill    aspirin (ECOTRIN) 81 MG EC tablet Take 81 mg by mouth once daily.      atorvastatin (LIPITOR) 40 MG tablet TAKE 1 TABLET BY MOUTH EVERY DAY 90 tablet 0    budesonide-glycopyr-formoterol (BREZTRI AEROSPHERE) 160-9-4.8 mcg/actuation HFAA Inhale into the lungs daily as needed.      clopidogreL (PLAVIX) 75 mg tablet       FARXIGA 5 mg Tab tablet Take 5 mg by mouth once daily.      finasteride (PROSCAR) 5 mg tablet Take 5 mg by mouth once daily.      fluticasone  furoate-vilanteroL (BREO ELLIPTA) 100-25 mcg/dose diskus inhaler Inhale 1 puff into the lungs once daily. (DAILY CONTROLLER) 3 each 3    fluticasone propionate (FLONASE) 50 mcg/actuation nasal spray 1 SPRAY (50 MCG TOTAL) BY EACH NOSTRIL ROUTE ONCE DAILY. 16 mL 1    furosemide (LASIX) 40 MG tablet Take by mouth once daily.      metoprolol succinate (TOPROL-XL) 25 MG 24 hr tablet TAKE ONE TABLET BY MOUTH DAILY AT 9 AM 90 tablet 0    prasugreL (EFFIENT) 10 mg Tab Take 1 tablet (10 mg total) by mouth once daily. 30 tablet 11    sacubitriL-valsartan (ENTRESTO) 24-26 mg per tablet Take 1 tablet by mouth 2 (two) times daily.      tamsulosin (FLOMAX) 0.4 mg Cap Take 1 capsule (0.4 mg total) by mouth once daily. 90 capsule 0    traMADoL (ULTRAM) 50 mg tablet Take 1 tablet (50 mg total) by mouth every 8 (eight) hours as needed for Pain. 21 tablet 0    VENTOLIN HFA 90 mcg/actuation inhaler Inhale 1-2 puffs into the lungs every 4 to 6 hours as needed for Wheezing or Shortness of Breath. (RESCUE) 18 g 3    ZONTIVITY 2.08 mg Tab Take 1 tablet by mouth once daily.      gabapentin (NEURONTIN) 300 MG capsule TAKE 1 CAPSULE BY MOUTH EVERY EVENING 90 capsule 1    hydrALAZINE (APRESOLINE) 50 MG tablet Take 1 tablet (50 mg total) by mouth every 8 (eight) hours. (Patient taking differently: Take 50 mg by mouth every 12 (twelve) hours.) 90 tablet 0    isosorbide dinitrate (ISORDIL) 20 MG tablet Take 1 tablet (20 mg total) by mouth 3 (three) times daily. 90 tablet 0     No current facility-administered medications for this visit.     Review of patient's allergies indicates:  No Known Allergies   Past Medical History:   Diagnosis Date    Asthma     Cardiomyopathy 10/21/2014    CHF (congestive heart failure)     Coronary artery disease     CRF (chronic renal failure)     Diabetes mellitus     Enlarged prostate     Hyperlipidemia     Hypertension     Neuropathy      Past Surgical History:   Procedure Laterality Date    ANGIOGRAPHY OF  INTERNAL MAMMARY VESSEL N/A 3/11/2022    Procedure: Angiogram Internal Mammary;  Surgeon: Hank Lujan MD;  Location: [facility] CATH/EP LAB;  Service: Cardiology;  Laterality: N/A;    CARDIAC CATHETERIZATION      CARDIAC VALVE SURGERY      CATHETERIZATION OF BOTH LEFT AND RIGHT HEART Left 3/11/2022    Procedure: CATHETERIZATION, HEART, BOTH LEFT AND RIGHT;  Surgeon: Hank Lujan MD;  Location: [facility] CATH/EP LAB;  Service: Cardiology;  Laterality: Left;    CORONARY ANGIOGRAPHY N/A 3/11/2022    Procedure: ANGIOGRAM, CORONARY ARTERY;  Surgeon: Hank Lujan MD;  Location: [facility] CATH/EP LAB;  Service: Cardiology;  Laterality: N/A;    CORONARY BYPASS GRAFT ANGIOGRAPHY  3/11/2022    Procedure: Bypass graft study;  Surgeon: Hank Lujan MD;  Location: [facility] CATH/EP LAB;  Service: Cardiology;;    CYSTOSCOPY N/A 7/30/2019    Procedure: CYSTOSCOPY;  Surgeon: Spencer Nguyen MD;  Location: [facility];  Service: Urology;  Laterality: N/A;    INSERTION OF INTRAVASCULAR MICROAXIAL BLOOD PUMP N/A 8/31/2022    Procedure: INSERTION, IMPELLA;  Surgeon: Zach Jensen MD;  Location: [facility] CATH LAB;  Service: Cardiology;  Laterality: N/A;    PLACEMENT OF SWAN SEE CATHETER WITH IMAGING GUIDANCE  8/31/2022    Procedure: INSERTION, CATHETER, SWAN-SEE, WITH IMAGING GUIDANCE;  Surgeon: Zach Jensen MD;  Location: [facility] CATH LAB;  Service: Cardiology;;    SHOULDER ARTHROSCOPY  1985    TRANSRECTAL BIOPSY OF PROSTATE WITH ULTRASOUND GUIDANCE N/A 7/30/2019    Procedure: BIOPSY, PROSTATE, RECTAL APPROACH, WITH US GUIDANCE;  Surgeon: Spencer Nguyen MD;  Location: [facility] OR;  Service: Urology;  Laterality: N/A;  procedure not performed, pt unable to tolerate    TRANSRECTAL BIOPSY OF PROSTATE WITH ULTRASOUND GUIDANCE N/A 8/8/2019    Procedure: BIOPSY, PROSTATE, RECTAL APPROACH, WITH US GUIDANCE;  Surgeon: Spencer Nguyen MD;  Location: [facility] OR;  Service: Urology;  Laterality: N/A;       Review of Systems   Constitutional:   "Negative for activity change, appetite change, fatigue and fever.   HENT:  Negative for congestion, ear pain, hearing loss, postnasal drip, sinus pressure, sinus pain, sneezing and sore throat.    Eyes:  Negative for photophobia and pain.   Respiratory:  Negative for cough, chest tightness, shortness of breath and wheezing.    Cardiovascular:  Negative for chest pain and leg swelling.        Follows with cardiology   Gastrointestinal:  Positive for abdominal distention (has been getting "fluid drained" off stomach). Negative for abdominal pain, blood in stool, constipation, diarrhea, nausea and vomiting.        Following with Dr. Ferro for ascites   Endocrine: Negative for cold intolerance, heat intolerance, polydipsia and polyuria.   Genitourinary:  Positive for impotence. Negative for difficulty urinating, dysuria, flank pain, frequency, hematuria and urgency.   Musculoskeletal:  Positive for myalgias (can't have hernia repair until ascites in under control). Negative for arthralgias, back pain, joint swelling and neck pain.   Skin:  Negative for pallor and rash.   Allergic/Immunologic: Positive for environmental allergies. Negative for food allergies.   Neurological:  Negative for dizziness, weakness, light-headedness and headaches.   Hematological:  Does not bruise/bleed easily.   Psychiatric/Behavioral:  Negative for confusion, decreased concentration, dysphoric mood, hallucinations and sleep disturbance. The patient is not nervous/anxious.     OBJECTIVE:      Vitals:    02/22/23 0948 02/22/23 1038   BP: (!) 144/64 136/64   BP Location: Right arm Right arm   Patient Position: Sitting Sitting   BP Method: Large (Manual) Large (Manual)   Pulse: 64    SpO2: 99%    Weight: 81.9 kg (180 lb 8 oz)    Height: 6' (1.829 m)      Physical Exam  Vitals and nursing note reviewed.   Constitutional:       General: He is not in acute distress.     Appearance: Normal appearance. He is well-developed, well-groomed and normal " weight.   HENT:      Head: Normocephalic and atraumatic.      Right Ear: Hearing normal.      Left Ear: Hearing normal.      Nose: Nose normal. No rhinorrhea.   Eyes:      General: Lids are normal.         Right eye: No discharge.         Left eye: No discharge.      Conjunctiva/sclera: Conjunctivae normal.      Right eye: Right conjunctiva is not injected.      Left eye: Left conjunctiva is not injected.      Pupils: Pupils are equal, round, and reactive to light. Pupils are equal.      Right eye: Pupil is round and reactive.      Left eye: Pupil is round and reactive.   Neck:      Thyroid: No thyromegaly.      Vascular: No JVD.      Trachea: Trachea normal. No tracheal deviation.   Cardiovascular:      Rate and Rhythm: Normal rate and regular rhythm.      Pulses:           Radial pulses are 2+ on the right side and 2+ on the left side.      Heart sounds: Normal heart sounds. No murmur heard.    No friction rub. No gallop.   Pulmonary:      Effort: Pulmonary effort is normal. No respiratory distress.      Breath sounds: Normal breath sounds. No stridor. No decreased breath sounds, wheezing, rhonchi or rales.   Abdominal:      General: Abdomen is protuberant. Bowel sounds are normal. There is distension.      Palpations: Abdomen is soft. Abdomen is not rigid.      Tenderness: There is no abdominal tenderness. There is no guarding.      Hernia: A hernia is present. Hernia is present in the right inguinal area.   Musculoskeletal:         General: Normal range of motion.      Cervical back: Normal range of motion and neck supple.   Lymphadenopathy:      Cervical: No cervical adenopathy.   Skin:     General: Skin is warm and dry.      Capillary Refill: Capillary refill takes less than 2 seconds.      Coloration: Skin is not pale.      Findings: No lesion or rash.   Neurological:      Mental Status: He is alert and oriented to person, place, and time.      Motor: No atrophy.      Coordination: Coordination normal.       Gait: Gait normal.   Psychiatric:         Attention and Perception: He is attentive.         Speech: Speech normal.         Behavior: Behavior normal.         Thought Content: Thought content normal.         Judgment: Judgment normal.      Assessment:       1. Type 2 diabetes mellitus without complication, without long-term current use of insulin    2. Primary hypertension    3. Chronic systolic heart failure    4. Mixed hyperlipidemia    5. Elevated TSH    6. Mild anemia    7. Nutritional anemia, unspecified    8. Prostate cancer screening        Plan:       Type 2 diabetes mellitus without complication, without long-term current use of insulin  -     POCT HEMOGLOBIN A1C  -     Comprehensive Metabolic Panel; Future; Expected date: 02/22/2023  -     Microalbumin/Creatinine Ratio, Urine; Future; Expected date: 02/22/2023    Primary hypertension  -     TSH; Future; Expected date: 02/22/2023  -     CBC Auto Differential; Future; Expected date: 03/01/2023    Chronic systolic heart failure  -     BNP; Future; Expected date: 02/22/2023    Mixed hyperlipidemia  -     Lipid Panel; Future; Expected date: 02/22/2023    Elevated TSH  -     TSH; Future; Expected date: 02/22/2023    Mild anemia  -     CBC Auto Differential; Future; Expected date: 03/01/2023  -     Vitamin B12; Future; Expected date: 02/22/2023  -     Iron; Future; Expected date: 02/22/2023  -     Ferritin; Future; Expected date: 02/22/2023    Nutritional anemia, unspecified  -     Vitamin B12; Future; Expected date: 02/22/2023    Prostate cancer screening  -     PSA, Screening; Future; Expected date: 02/22/2023            Follow up in about 3 months (around 5/22/2023) for lab review.         2/23/2023 KAMERON Rodriguez, FNP-C

## 2023-02-27 DIAGNOSIS — R18.8 OTHER ASCITES: Primary | ICD-10-CM

## 2023-02-28 ENCOUNTER — HOSPITAL ENCOUNTER (OUTPATIENT)
Dept: INTERVENTIONAL RADIOLOGY/VASCULAR | Facility: HOSPITAL | Age: 73
Discharge: HOME | End: 2023-02-28
Attending: INTERNAL MEDICINE
Payer: MEDICARE

## 2023-02-28 VITALS
RESPIRATION RATE: 18 BRPM | OXYGEN SATURATION: 98 % | SYSTOLIC BLOOD PRESSURE: 125 MMHG | HEART RATE: 51 BPM | DIASTOLIC BLOOD PRESSURE: 63 MMHG

## 2023-02-28 DIAGNOSIS — R18.8 OTHER ASCITES: ICD-10-CM

## 2023-02-28 LAB
APPEARANCE FLD: CLEAR
BODY FLD TYPE: NORMAL
COLOR FLD: YELLOW
LYMPHOCYTES NFR FLD MANUAL: 79 %
MONOS+MACROS NFR FLD MANUAL: 17 %
NEUTROPHILS NFR FLD MANUAL: 4 %
WBC # FLD: 127 /CU MM

## 2023-02-28 PROCEDURE — 49083 ABD PARACENTESIS W/IMAGING: CPT | Performed by: RADIOLOGY

## 2023-02-28 PROCEDURE — 63600175 PHARM REV CODE 636 W HCPCS: Performed by: PHYSICIAN ASSISTANT

## 2023-02-28 PROCEDURE — 49083 IR PARACENTESIS WITH IMAGING: ICD-10-PCS | Mod: ,,, | Performed by: RADIOLOGY

## 2023-02-28 PROCEDURE — P9047 ALBUMIN (HUMAN), 25%, 50ML: HCPCS | Performed by: PHYSICIAN ASSISTANT

## 2023-02-28 PROCEDURE — 89051 BODY FLUID CELL COUNT: CPT | Performed by: INTERNAL MEDICINE

## 2023-02-28 RX ORDER — ALBUMIN HUMAN 250 G/1000ML
25 SOLUTION INTRAVENOUS
Status: DISCONTINUED | OUTPATIENT
Start: 2023-02-28 | End: 2023-03-01 | Stop reason: HOSPADM

## 2023-02-28 RX ADMIN — ALBUMIN (HUMAN) 50 G: 12.5 SOLUTION INTRAVENOUS at 11:02

## 2023-02-28 RX ADMIN — ALBUMIN (HUMAN) 25 G: 12.5 SOLUTION INTRAVENOUS at 12:02

## 2023-02-28 NOTE — NURSING
Pt HR 50's, asymptomatic, blood pressure stable. pt reports he took both of his b/p and heart medications this AM and he generally takes one in the morning and one at night, notified REGINE capone no new orders as long as pt remains asymptomatic.

## 2023-02-28 NOTE — NURSING
PARACENTESIS    IR ultrasound guided paracentesis performed by provider.  Procedure explained by radiologist and standing consent verified.  Time out performed.  9 L removed-   Patient received 75 g of albumin IV.  VS remained stable for duration of procedure.  Specimens collected and sent to lab if ordered.   Para and IV d/c'd, with tip intact.   Access site cleaned with peroxide, derma bond and steri-strips applied, then covered with sterile Band-Aid.   Pt discharge home in stable condition.

## 2023-02-28 NOTE — PROCEDURES
Preprocedure ultrasound imaging was performed of the abdomen and appropriate site was marked at the left lower abdominal quadrant. Informed consent was obtained. Patient was prepped and draped in the usual sterile fashion. Local anesthesia was administered. A catheter drainage device was then advanced into the abdomen. Stylette was removed and catheter left in place.  Initial fluid was straw-colored. The patient tolerated the procedure well without immediate complication. Blood loss was negligible.   The procedure was performed with physician supervision.     If analyses were ordered, a sample of fluid was collected and sent to lab.   IV albumin administered per protocol.

## 2023-03-09 DIAGNOSIS — R18.8 OTHER ASCITES: Primary | ICD-10-CM

## 2023-03-13 ENCOUNTER — HOSPITAL ENCOUNTER (OUTPATIENT)
Dept: INTERVENTIONAL RADIOLOGY/VASCULAR | Facility: HOSPITAL | Age: 73
Discharge: HOME | End: 2023-03-13
Attending: INTERNAL MEDICINE
Payer: MEDICARE

## 2023-03-13 VITALS
RESPIRATION RATE: 18 BRPM | OXYGEN SATURATION: 100 % | SYSTOLIC BLOOD PRESSURE: 151 MMHG | HEART RATE: 70 BPM | DIASTOLIC BLOOD PRESSURE: 78 MMHG

## 2023-03-13 DIAGNOSIS — R18.8 OTHER ASCITES: ICD-10-CM

## 2023-03-13 LAB
APPEARANCE FLD: CLEAR
BODY FLD TYPE: NORMAL
COLOR FLD: YELLOW
LYMPHOCYTES NFR FLD MANUAL: 84 %
MONOS+MACROS NFR FLD MANUAL: 14 %
NEUTROPHILS NFR FLD MANUAL: 2 %
WBC # FLD: 137 /CU MM

## 2023-03-13 PROCEDURE — P9047 ALBUMIN (HUMAN), 25%, 50ML: HCPCS | Mod: JZ | Performed by: RADIOLOGY

## 2023-03-13 PROCEDURE — C1729 CATH, DRAINAGE: HCPCS

## 2023-03-13 PROCEDURE — 49083 ABD PARACENTESIS W/IMAGING: CPT | Performed by: RADIOLOGY

## 2023-03-13 PROCEDURE — 63600175 PHARM REV CODE 636 W HCPCS: Mod: JZ | Performed by: RADIOLOGY

## 2023-03-13 PROCEDURE — 89051 BODY FLUID CELL COUNT: CPT | Performed by: INTERNAL MEDICINE

## 2023-03-13 PROCEDURE — 49083 IR PARACENTESIS WITH IMAGING: ICD-10-PCS | Mod: ,,, | Performed by: RADIOLOGY

## 2023-03-13 RX ORDER — ALBUMIN HUMAN 250 G/1000ML
25 SOLUTION INTRAVENOUS
Status: DISCONTINUED | OUTPATIENT
Start: 2023-03-13 | End: 2023-03-14 | Stop reason: HOSPADM

## 2023-03-13 RX ORDER — LIDOCAINE HYDROCHLORIDE 10 MG/ML
INJECTION INFILTRATION; PERINEURAL
Status: DISCONTINUED
Start: 2023-03-13 | End: 2023-03-14 | Stop reason: HOSPADM

## 2023-03-13 RX ADMIN — ALBUMIN (HUMAN) 75 G: 12.5 SOLUTION INTRAVENOUS at 02:03

## 2023-03-13 NOTE — NURSING
PARACENTESIS    IR ultrasound guided paracentesis performed by provider.  Procedure explained by radiologist and standing consent verified.  Time out performed.  10.5 L removed-   Patient received 75 g of albumin IV.  VS remained stable for duration of procedure.  Specimens collected and sent to lab if ordered.   Para and IV d/c'd, with tip intact.   Access site cleaned with peroxide, derma bond and steri-strips applied, then covered with sterile Band-Aid.   Pt discharge home in stable condition.

## 2023-03-15 ENCOUNTER — OFFICE VISIT (OUTPATIENT)
Dept: OPHTHALMOLOGY | Facility: CLINIC | Age: 73
End: 2023-03-15
Payer: MEDICARE

## 2023-03-15 DIAGNOSIS — H43.813 VITREOUS DEGENERATION OF BOTH EYES: ICD-10-CM

## 2023-03-15 DIAGNOSIS — H25.13 AGE-RELATED NUCLEAR CATARACT OF BOTH EYES: ICD-10-CM

## 2023-03-15 DIAGNOSIS — H40.023 OAG (OPEN ANGLE GLAUCOMA) SUSPECT, HIGH RISK, BILATERAL: ICD-10-CM

## 2023-03-15 DIAGNOSIS — E11.3293 MILD NONPROLIFERATIVE DIABETIC RETINOPATHY OF BOTH EYES WITHOUT MACULAR EDEMA ASSOCIATED WITH TYPE 2 DIABETES MELLITUS: ICD-10-CM

## 2023-03-15 DIAGNOSIS — H43.11 VITREOUS HEMORRHAGE, RIGHT EYE: Primary | ICD-10-CM

## 2023-03-15 PROCEDURE — 99214 OFFICE O/P EST MOD 30 MIN: CPT | Mod: S$GLB,,, | Performed by: OPHTHALMOLOGY

## 2023-03-15 PROCEDURE — 3044F HG A1C LEVEL LT 7.0%: CPT | Mod: CPTII,S$GLB,, | Performed by: OPHTHALMOLOGY

## 2023-03-15 PROCEDURE — 3288F FALL RISK ASSESSMENT DOCD: CPT | Mod: CPTII,S$GLB,, | Performed by: OPHTHALMOLOGY

## 2023-03-15 PROCEDURE — 3288F PR FALLS RISK ASSESSMENT DOCUMENTED: ICD-10-PCS | Mod: CPTII,S$GLB,, | Performed by: OPHTHALMOLOGY

## 2023-03-15 PROCEDURE — 1101F PR PT FALLS ASSESS DOC 0-1 FALLS W/OUT INJ PAST YR: ICD-10-PCS | Mod: CPTII,S$GLB,, | Performed by: OPHTHALMOLOGY

## 2023-03-15 PROCEDURE — 1159F MED LIST DOCD IN RCRD: CPT | Mod: CPTII,S$GLB,, | Performed by: OPHTHALMOLOGY

## 2023-03-15 PROCEDURE — 1159F PR MEDICATION LIST DOCUMENTED IN MEDICAL RECORD: ICD-10-PCS | Mod: CPTII,S$GLB,, | Performed by: OPHTHALMOLOGY

## 2023-03-15 PROCEDURE — 1126F PR PAIN SEVERITY QUANTIFIED, NO PAIN PRESENT: ICD-10-PCS | Mod: CPTII,S$GLB,, | Performed by: OPHTHALMOLOGY

## 2023-03-15 PROCEDURE — 1101F PT FALLS ASSESS-DOCD LE1/YR: CPT | Mod: CPTII,S$GLB,, | Performed by: OPHTHALMOLOGY

## 2023-03-15 PROCEDURE — 99999 PR PBB SHADOW E&M-EST. PATIENT-LVL III: ICD-10-PCS | Mod: PBBFAC,,, | Performed by: OPHTHALMOLOGY

## 2023-03-15 PROCEDURE — 92134 OCT, RETINA - OU - BOTH EYES: ICD-10-PCS | Mod: S$GLB,,, | Performed by: OPHTHALMOLOGY

## 2023-03-15 PROCEDURE — 99999 PR PBB SHADOW E&M-EST. PATIENT-LVL III: CPT | Mod: PBBFAC,,, | Performed by: OPHTHALMOLOGY

## 2023-03-15 PROCEDURE — 1157F ADVNC CARE PLAN IN RCRD: CPT | Mod: CPTII,S$GLB,, | Performed by: OPHTHALMOLOGY

## 2023-03-15 PROCEDURE — 92201 PR OPHTHALMOSCOPY, EXT, W/RET DRAW/SCLERAL DEPR, I&R, UNI/BI: ICD-10-PCS | Mod: S$GLB,,, | Performed by: OPHTHALMOLOGY

## 2023-03-15 PROCEDURE — 99214 PR OFFICE/OUTPT VISIT, EST, LEVL IV, 30-39 MIN: ICD-10-PCS | Mod: S$GLB,,, | Performed by: OPHTHALMOLOGY

## 2023-03-15 PROCEDURE — 92134 CPTRZ OPH DX IMG PST SGM RTA: CPT | Mod: S$GLB,,, | Performed by: OPHTHALMOLOGY

## 2023-03-15 PROCEDURE — 1157F PR ADVANCE CARE PLAN OR EQUIV PRESENT IN MEDICAL RECORD: ICD-10-PCS | Mod: CPTII,S$GLB,, | Performed by: OPHTHALMOLOGY

## 2023-03-15 PROCEDURE — 3044F PR MOST RECENT HEMOGLOBIN A1C LEVEL <7.0%: ICD-10-PCS | Mod: CPTII,S$GLB,, | Performed by: OPHTHALMOLOGY

## 2023-03-15 PROCEDURE — 92201 OPSCPY EXTND RTA DRAW UNI/BI: CPT | Mod: S$GLB,,, | Performed by: OPHTHALMOLOGY

## 2023-03-15 PROCEDURE — 1126F AMNT PAIN NOTED NONE PRSNT: CPT | Mod: CPTII,S$GLB,, | Performed by: OPHTHALMOLOGY

## 2023-03-15 NOTE — PROGRESS NOTES
HPI    Pt presents for 3 mo dfe and oct     States floaters OD no flashes of light of blurred vision     No ocular meds    Last edited by Alba Fields on 3/15/2023  9:22 AM.            A/P    ICD-10-CM ICD-9-CM   1. Vitreous hemorrhage, right eye  H43.11 379.23   2. Vitreous degeneration of both eyes  H43.813 379.21   3. 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 OAG (open angle glaucoma) suspect, high risk, bilateral  H40.023 365.05   5. Age-related nuclear cataract of both eyes  H25.13 366.16       1. Vitreous hemorrhage, right eye  2. Vitreous degeneration of both eyes  F/u for retina check  Hx new red floaters, VH back in Oct, on blood thinners,heart stent     Today, symptoms improved per pt, PVD OD no RT/RD with , VMA OS      Plan: Observation  Likely hemorrhagic PVD in setting of blood thinenrs  Pathology of PVD, Retinal Tear, Retinal Detachment reviewed in great detail  RD precautions discussed in detail, patient expressed understanding  RTC immediately PRN (especially ANY change flashes, floaters, vision, visual field)         3. Mild nonproliferative diabetic retinopathy of both eyes without macular edema associated with type 2 diabetes mellitus  PCP Millie Hayes, APRN,FNP-C  02/22/2023  5.4  A1C   No DME, rare IRH   Plan: Observation    4. OAG (open angle glaucoma) suspect, high risk, bilateral  F/b Dr. Hdz  IOP 15/14  Plan: Continue Present Management     5. Age-related nuclear cataract of both eyes  Mild NS, NVS  Plan: Observation       RTC 12 month DFE/OCTm OU     I saw and examined the patient and reviewed in detail the findings documented. The final examination findings, image interpretations, and plan as documented in the record represent my personal judgment and conclusions.    Richie Wagner MD  Vitreoretinal Surgery   [facility]

## 2023-03-27 ENCOUNTER — HOSPITAL ENCOUNTER (OUTPATIENT)
Dept: INTERVENTIONAL RADIOLOGY/VASCULAR | Facility: HOSPITAL | Age: 73
Discharge: HOME | End: 2023-03-27
Attending: INTERNAL MEDICINE
Payer: MEDICARE

## 2023-03-27 VITALS
OXYGEN SATURATION: 100 % | SYSTOLIC BLOOD PRESSURE: 169 MMHG | RESPIRATION RATE: 15 BRPM | HEART RATE: 62 BPM | DIASTOLIC BLOOD PRESSURE: 77 MMHG

## 2023-03-27 DIAGNOSIS — R18.8 OTHER ASCITES: Primary | ICD-10-CM

## 2023-03-27 LAB
APPEARANCE FLD: CLEAR
BODY FLD TYPE: NORMAL
COLOR FLD: YELLOW
LYMPHOCYTES NFR FLD MANUAL: 47 %
MONOS+MACROS NFR FLD MANUAL: 47 %
NEUTROPHILS NFR FLD MANUAL: 6 %
WBC # FLD: 172 /CU MM

## 2023-03-27 PROCEDURE — P9047 ALBUMIN (HUMAN), 25%, 50ML: HCPCS | Mod: JZ | Performed by: INTERNAL MEDICINE

## 2023-03-27 PROCEDURE — 49083 IR PARACENTESIS WITH IMAGING: ICD-10-PCS | Mod: ,,, | Performed by: RADIOLOGY

## 2023-03-27 PROCEDURE — 63600175 PHARM REV CODE 636 W HCPCS: Mod: JZ | Performed by: INTERNAL MEDICINE

## 2023-03-27 PROCEDURE — 49083 ABD PARACENTESIS W/IMAGING: CPT | Performed by: RADIOLOGY

## 2023-03-27 PROCEDURE — 89051 BODY FLUID CELL COUNT: CPT | Performed by: INTERNAL MEDICINE

## 2023-03-27 RX ORDER — ALBUMIN HUMAN 250 G/1000ML
25 SOLUTION INTRAVENOUS
Status: DISCONTINUED | OUTPATIENT
Start: 2023-03-27 | End: 2023-03-28 | Stop reason: HOSPADM

## 2023-03-27 RX ADMIN — ALBUMIN (HUMAN) 75 G: 12.5 SOLUTION INTRAVENOUS at 12:03

## 2023-03-27 NOTE — NURSING
PARACENTESIS    IR ultrasound guided paracentesis performed by provider.  Procedure explained by radiologist and standing consent verified.  Time out performed.  10.25 L removed-   Patient received 75 g of albumin IV.  VS remained stable for duration of procedure.  Specimens collected and sent to lab if ordered.   Para and IV d/c'd, with tip intact.   Access site cleaned with peroxide, derma bond and steri-strips applied, then covered with sterile Band-Aid.   Pt discharge home in stable condition.     alert

## 2023-04-10 ENCOUNTER — HOSPITAL ENCOUNTER (OUTPATIENT)
Dept: INTERVENTIONAL RADIOLOGY/VASCULAR | Facility: HOSPITAL | Age: 73
Discharge: HOME | End: 2023-04-10
Attending: INTERNAL MEDICINE
Payer: MEDICARE

## 2023-04-10 VITALS
HEART RATE: 66 BPM | SYSTOLIC BLOOD PRESSURE: 132 MMHG | OXYGEN SATURATION: 98 % | DIASTOLIC BLOOD PRESSURE: 64 MMHG | RESPIRATION RATE: 16 BRPM

## 2023-04-10 DIAGNOSIS — R18.8 OTHER ASCITES: ICD-10-CM

## 2023-04-10 LAB
APPEARANCE FLD: CLEAR
BODY FLD TYPE: NORMAL
COLOR FLD: YELLOW
LYMPHOCYTES NFR FLD MANUAL: 50 %
MESOTHL CELL NFR FLD MANUAL: 4 %
MONOS+MACROS NFR FLD MANUAL: 34 %
NEUTROPHILS NFR FLD MANUAL: 12 %
WBC # FLD: 143 /CU MM

## 2023-04-10 PROCEDURE — 63600175 PHARM REV CODE 636 W HCPCS: Mod: JZ | Performed by: RADIOLOGY

## 2023-04-10 PROCEDURE — 49083 ABD PARACENTESIS W/IMAGING: CPT | Performed by: RADIOLOGY

## 2023-04-10 PROCEDURE — 49083 IR PARACENTESIS WITH IMAGING: ICD-10-PCS | Mod: ,,, | Performed by: RADIOLOGY

## 2023-04-10 PROCEDURE — 89051 BODY FLUID CELL COUNT: CPT | Performed by: INTERNAL MEDICINE

## 2023-04-10 PROCEDURE — P9047 ALBUMIN (HUMAN), 25%, 50ML: HCPCS | Mod: JZ | Performed by: RADIOLOGY

## 2023-04-10 RX ORDER — ALBUMIN HUMAN 250 G/1000ML
25 SOLUTION INTRAVENOUS
Status: DISCONTINUED | OUTPATIENT
Start: 2023-04-10 | End: 2023-04-11 | Stop reason: HOSPADM

## 2023-04-10 RX ADMIN — ALBUMIN (HUMAN) 75 G: 12.5 SOLUTION INTRAVENOUS at 11:04

## 2023-04-10 NOTE — NURSING
PARACENTESIS    IR ultrasound guided paracentesis performed by provider.  Procedure explained by radiologist and standing consent verified.  Time out performed.  10 L removed-   Patient received 75 g of albumin IV.  VS remained stable for duration of procedure.  Specimens collected and sent to lab if ordered.   Para and IV d/c'd, with tip intact.   Access site cleaned with peroxide, derma bond and steri-strips applied, then covered with sterile Band-Aid.   Pt discharge home in stable condition.

## 2023-04-24 ENCOUNTER — HOSPITAL ENCOUNTER (OUTPATIENT)
Dept: INTERVENTIONAL RADIOLOGY/VASCULAR | Facility: HOSPITAL | Age: 73
Discharge: HOME | End: 2023-04-24
Attending: INTERNAL MEDICINE
Payer: MEDICARE

## 2023-04-24 VITALS — HEART RATE: 70 BPM | DIASTOLIC BLOOD PRESSURE: 70 MMHG | OXYGEN SATURATION: 97 % | SYSTOLIC BLOOD PRESSURE: 157 MMHG

## 2023-04-24 DIAGNOSIS — R18.8 OTHER ASCITES: ICD-10-CM

## 2023-04-24 LAB
APPEARANCE FLD: CLEAR
BASOPHILS # BLD AUTO: 0.05 K/UL (ref 0–0.2)
BASOPHILS NFR BLD: 0.8 % (ref 0–1.9)
BODY FLD TYPE: NORMAL
COLOR FLD: YELLOW
DIFFERENTIAL METHOD: ABNORMAL
EOSINOPHIL # BLD AUTO: 0.1 K/UL (ref 0–0.5)
EOSINOPHIL NFR BLD: 1.4 % (ref 0–8)
ERYTHROCYTE [DISTWIDTH] IN BLOOD BY AUTOMATED COUNT: 15.9 % (ref 11.5–14.5)
HCT VFR BLD AUTO: 31.6 % (ref 40–54)
HGB BLD-MCNC: 10.5 G/DL (ref 14–18)
IMM GRANULOCYTES # BLD AUTO: 0.03 K/UL (ref 0–0.04)
IMM GRANULOCYTES NFR BLD AUTO: 0.5 % (ref 0–0.5)
INR PPP: 1 (ref 0.8–1.2)
LYMPHOCYTES # BLD AUTO: 0.8 K/UL (ref 1–4.8)
LYMPHOCYTES NFR BLD: 11.6 % (ref 18–48)
LYMPHOCYTES NFR FLD MANUAL: 31 %
MCH RBC QN AUTO: 28.9 PG (ref 27–31)
MCHC RBC AUTO-ENTMCNC: 33.2 G/DL (ref 32–36)
MCV RBC AUTO: 87 FL (ref 82–98)
MESOTHL CELL NFR FLD MANUAL: 13 %
MONOCYTES # BLD AUTO: 0.4 K/UL (ref 0.3–1)
MONOCYTES NFR BLD: 6.3 % (ref 4–15)
MONOS+MACROS NFR FLD MANUAL: 3 %
NEUTROPHILS # BLD AUTO: 5.2 K/UL (ref 1.8–7.7)
NEUTROPHILS NFR BLD: 79.4 % (ref 38–73)
NEUTROPHILS NFR FLD MANUAL: 53 %
NRBC BLD-RTO: 0 /100 WBC
PLATELET # BLD AUTO: 290 K/UL (ref 150–450)
PMV BLD AUTO: 11.3 FL (ref 9.2–12.9)
PROTHROMBIN TIME: 11.8 SEC (ref 9–12.5)
RBC # BLD AUTO: 3.63 M/UL (ref 4.6–6.2)
WBC # BLD AUTO: 6.48 K/UL (ref 3.9–12.7)
WBC # FLD: 161 /CU MM

## 2023-04-24 PROCEDURE — 85610 PROTHROMBIN TIME: CPT | Performed by: RADIOLOGY

## 2023-04-24 PROCEDURE — P9047 ALBUMIN (HUMAN), 25%, 50ML: HCPCS | Mod: JZ | Performed by: RADIOLOGY

## 2023-04-24 PROCEDURE — 85025 COMPLETE CBC W/AUTO DIFF WBC: CPT | Performed by: RADIOLOGY

## 2023-04-24 PROCEDURE — 49083 ABD PARACENTESIS W/IMAGING: CPT | Performed by: RADIOLOGY

## 2023-04-24 PROCEDURE — 36415 COLL VENOUS BLD VENIPUNCTURE: CPT | Performed by: RADIOLOGY

## 2023-04-24 PROCEDURE — 25000003 PHARM REV CODE 250: Performed by: RADIOLOGY

## 2023-04-24 PROCEDURE — 89051 BODY FLUID CELL COUNT: CPT | Performed by: INTERNAL MEDICINE

## 2023-04-24 PROCEDURE — 49083 IR PARACENTESIS WITH IMAGING: ICD-10-PCS | Mod: ,,, | Performed by: RADIOLOGY

## 2023-04-24 PROCEDURE — 63600175 PHARM REV CODE 636 W HCPCS: Mod: JZ | Performed by: RADIOLOGY

## 2023-04-24 RX ORDER — LIDOCAINE HYDROCHLORIDE 10 MG/ML
1 INJECTION, SOLUTION EPIDURAL; INFILTRATION; INTRACAUDAL; PERINEURAL ONCE
Status: DISCONTINUED | OUTPATIENT
Start: 2023-04-24 | End: 2023-04-24

## 2023-04-24 RX ORDER — LIDOCAINE HYDROCHLORIDE 10 MG/ML
10 INJECTION INFILTRATION; PERINEURAL ONCE
Status: COMPLETED | OUTPATIENT
Start: 2023-04-24 | End: 2023-04-24

## 2023-04-24 RX ORDER — LIDOCAINE HYDROCHLORIDE 10 MG/ML
30 INJECTION, SOLUTION EPIDURAL; INFILTRATION; INTRACAUDAL; PERINEURAL ONCE
Status: DISCONTINUED | OUTPATIENT
Start: 2023-04-24 | End: 2023-04-24

## 2023-04-24 RX ORDER — ALBUMIN HUMAN 250 G/1000ML
25 SOLUTION INTRAVENOUS
Status: DISCONTINUED | OUTPATIENT
Start: 2023-04-24 | End: 2023-04-25 | Stop reason: HOSPADM

## 2023-04-24 RX ADMIN — LIDOCAINE HYDROCHLORIDE 10 ML: 10 INJECTION, SOLUTION INFILTRATION; PERINEURAL at 01:04

## 2023-04-24 RX ADMIN — ALBUMIN (HUMAN) 50 G: 12.5 SOLUTION INTRAVENOUS at 01:04

## 2023-04-24 NOTE — NURSING
PARACENTESIS    IR ultrasound guided paracentesis performed by provider.  Procedure explained by radiologist and standing consent verified.  Time out performed.  8 L removed-   Patient received 50 g of albumin IV.  VS remained stable for duration of procedure.  Specimens collected and sent to lab if ordered.   Para and IV d/c'd, with tip intact.   Access site cleaned with peroxide, derma bond and steri-strips applied, then covered with sterile Band-Aid.   Pt discharge home in stable condition.

## 2023-05-08 ENCOUNTER — HOSPITAL ENCOUNTER (OUTPATIENT)
Dept: INTERVENTIONAL RADIOLOGY/VASCULAR | Facility: HOSPITAL | Age: 73
Discharge: HOME | End: 2023-05-08
Attending: INTERNAL MEDICINE
Payer: MEDICARE

## 2023-05-08 VITALS
RESPIRATION RATE: 18 BRPM | HEART RATE: 69 BPM | SYSTOLIC BLOOD PRESSURE: 129 MMHG | DIASTOLIC BLOOD PRESSURE: 75 MMHG | OXYGEN SATURATION: 100 %

## 2023-05-08 DIAGNOSIS — E78.2 MIXED HYPERLIPIDEMIA: ICD-10-CM

## 2023-05-08 DIAGNOSIS — R18.8 OTHER ASCITES: ICD-10-CM

## 2023-05-08 LAB
APPEARANCE FLD: NORMAL
BODY FLD TYPE: NORMAL
COLOR FLD: YELLOW
LYMPHOCYTES NFR FLD MANUAL: 44 %
MESOTHL CELL NFR FLD MANUAL: 13 %
MONOS+MACROS NFR FLD MANUAL: 32 %
NEUTROPHILS NFR FLD MANUAL: 11 %
WBC # FLD: 165 /CU MM

## 2023-05-08 PROCEDURE — 89051 BODY FLUID CELL COUNT: CPT | Performed by: INTERNAL MEDICINE

## 2023-05-08 PROCEDURE — 63600175 PHARM REV CODE 636 W HCPCS: Mod: JZ | Performed by: PHYSICIAN ASSISTANT

## 2023-05-08 PROCEDURE — 49083 IR PARACENTESIS WITH IMAGING: ICD-10-PCS | Mod: ,,, | Performed by: RADIOLOGY

## 2023-05-08 PROCEDURE — P9047 ALBUMIN (HUMAN), 25%, 50ML: HCPCS | Mod: JZ | Performed by: PHYSICIAN ASSISTANT

## 2023-05-08 PROCEDURE — 49083 ABD PARACENTESIS W/IMAGING: CPT | Performed by: RADIOLOGY

## 2023-05-08 PROCEDURE — 49083 ABD PARACENTESIS W/IMAGING: CPT | Mod: ,,, | Performed by: RADIOLOGY

## 2023-05-08 RX ORDER — ALBUMIN HUMAN 250 G/1000ML
25 SOLUTION INTRAVENOUS
Status: DISCONTINUED | OUTPATIENT
Start: 2023-05-08 | End: 2023-05-09 | Stop reason: HOSPADM

## 2023-05-08 RX ADMIN — ALBUMIN (HUMAN) 25 G: 12.5 SOLUTION INTRAVENOUS at 12:05

## 2023-05-08 RX ADMIN — ALBUMIN (HUMAN) 25 G: 12.5 SOLUTION INTRAVENOUS at 11:05

## 2023-05-08 NOTE — NURSING
PARACENTESIS    IR ultrasound guided paracentesis performed by provider.  Procedure explained by radiologist and standing consent verified.  Time out performed.  9.2 L removed-   Patient received 75 g of albumin IV.  VS remained stable for duration of procedure.  Specimens collected and sent to lab if ordered.   Para and IV d/c'd, with tip intact.   Access site cleaned with peroxide, derma bond and steri-strips applied, then covered with sterile Band-Aid.   Pt discharge home in stable condition.

## 2023-05-09 ENCOUNTER — TELEPHONE (OUTPATIENT)
Dept: TRANSPLANT | Facility: CLINIC | Age: 73
End: 2023-05-09
Payer: MEDICARE

## 2023-05-09 DIAGNOSIS — R06.82 TACHYPNEA: Primary | ICD-10-CM

## 2023-05-09 DIAGNOSIS — Z79.899 POLYPHARMACY: ICD-10-CM

## 2023-05-09 DIAGNOSIS — I27.9 CHRONIC PULMONARY HEART DISEASE: ICD-10-CM

## 2023-05-10 RX ORDER — ATORVASTATIN CALCIUM 40 MG/1
TABLET, FILM COATED ORAL
Qty: 90 TABLET | Refills: 0 | Status: SHIPPED | OUTPATIENT
Start: 2023-05-10 | End: 2023-05-29 | Stop reason: SDUPTHER

## 2023-05-16 RX ORDER — IBUPROFEN 800 MG/1
800 TABLET ORAL EVERY 6 HOURS PRN
Status: ON HOLD | COMMUNITY
Start: 2023-04-27 | End: 2023-10-13 | Stop reason: HOSPADM

## 2023-05-19 ENCOUNTER — LAB VISIT (OUTPATIENT)
Dept: LAB | Facility: HOSPITAL | Age: 73
End: 2023-05-19
Attending: INTERNAL MEDICINE
Payer: MEDICARE

## 2023-05-19 ENCOUNTER — HOSPITAL ENCOUNTER (OUTPATIENT)
Dept: PULMONOLOGY | Facility: CLINIC | Age: 73
Discharge: HOME | End: 2023-05-19
Payer: MEDICARE

## 2023-05-19 ENCOUNTER — OFFICE VISIT (OUTPATIENT)
Dept: TRANSPLANT | Facility: CLINIC | Age: 73
End: 2023-05-19
Payer: MEDICARE

## 2023-05-19 VITALS
WEIGHT: 179 LBS | SYSTOLIC BLOOD PRESSURE: 145 MMHG | BODY MASS INDEX: 24.24 KG/M2 | HEIGHT: 72 IN | DIASTOLIC BLOOD PRESSURE: 65 MMHG | OXYGEN SATURATION: 99 % | BODY MASS INDEX: 24.24 KG/M2 | HEART RATE: 63 BPM | HEIGHT: 72 IN | WEIGHT: 179 LBS

## 2023-05-19 DIAGNOSIS — R06.82 TACHYPNEA: ICD-10-CM

## 2023-05-19 DIAGNOSIS — I50.22 CHRONIC SYSTOLIC HEART FAILURE: Chronic | ICD-10-CM

## 2023-05-19 DIAGNOSIS — I35.0 AORTIC STENOSIS, SEVERE: Chronic | ICD-10-CM

## 2023-05-19 DIAGNOSIS — E78.2 MIXED HYPERLIPIDEMIA: ICD-10-CM

## 2023-05-19 DIAGNOSIS — Z95.2 S/P AVR (AORTIC VALVE REPLACEMENT): ICD-10-CM

## 2023-05-19 DIAGNOSIS — I10 PRIMARY HYPERTENSION: ICD-10-CM

## 2023-05-19 DIAGNOSIS — Z95.1 S/P CABG (CORONARY ARTERY BYPASS GRAFT): ICD-10-CM

## 2023-05-19 DIAGNOSIS — Z79.899 POLYPHARMACY: ICD-10-CM

## 2023-05-19 DIAGNOSIS — J44.9 CHRONIC OBSTRUCTIVE PULMONARY DISEASE, UNSPECIFIED COPD TYPE: Primary | Chronic | ICD-10-CM

## 2023-05-19 DIAGNOSIS — I25.5 ISCHEMIC CARDIOMYOPATHY: ICD-10-CM

## 2023-05-19 DIAGNOSIS — I27.9 CHRONIC PULMONARY HEART DISEASE: ICD-10-CM

## 2023-05-19 DIAGNOSIS — I25.118 CORONARY ARTERY DISEASE OF NATIVE ARTERY OF NATIVE HEART WITH STABLE ANGINA PECTORIS: ICD-10-CM

## 2023-05-19 LAB
ALBUMIN SERPL BCP-MCNC: 2.8 G/DL (ref 3.5–5.2)
ALP SERPL-CCNC: 68 U/L (ref 55–135)
ALT SERPL W/O P-5'-P-CCNC: 9 U/L (ref 10–44)
ANION GAP SERPL CALC-SCNC: 9 MMOL/L (ref 8–16)
AST SERPL-CCNC: 19 U/L (ref 10–40)
BASOPHILS # BLD AUTO: 0.08 K/UL (ref 0–0.2)
BASOPHILS NFR BLD: 1.5 % (ref 0–1.9)
BILIRUB SERPL-MCNC: 0.9 MG/DL (ref 0.1–1)
BNP SERPL-MCNC: 2750 PG/ML (ref 0–99)
BUN SERPL-MCNC: 28 MG/DL (ref 8–23)
CALCIUM SERPL-MCNC: 8.6 MG/DL (ref 8.7–10.5)
CHLORIDE SERPL-SCNC: 110 MMOL/L (ref 95–110)
CO2 SERPL-SCNC: 22 MMOL/L (ref 23–29)
CREAT SERPL-MCNC: 1.8 MG/DL (ref 0.5–1.4)
DIFFERENTIAL METHOD: ABNORMAL
EOSINOPHIL # BLD AUTO: 0.2 K/UL (ref 0–0.5)
EOSINOPHIL NFR BLD: 2.8 % (ref 0–8)
ERYTHROCYTE [DISTWIDTH] IN BLOOD BY AUTOMATED COUNT: 17.2 % (ref 11.5–14.5)
EST. GFR  (NO RACE VARIABLE): 39.5 ML/MIN/1.73 M^2
GLUCOSE SERPL-MCNC: 74 MG/DL (ref 70–110)
HCT VFR BLD AUTO: 31.4 % (ref 40–54)
HGB BLD-MCNC: 9.9 G/DL (ref 14–18)
IMM GRANULOCYTES # BLD AUTO: 0.02 K/UL (ref 0–0.04)
IMM GRANULOCYTES NFR BLD AUTO: 0.4 % (ref 0–0.5)
LYMPHOCYTES # BLD AUTO: 0.7 K/UL (ref 1–4.8)
LYMPHOCYTES NFR BLD: 12.9 % (ref 18–48)
MAGNESIUM SERPL-MCNC: 2.1 MG/DL (ref 1.6–2.6)
MCH RBC QN AUTO: 28.8 PG (ref 27–31)
MCHC RBC AUTO-ENTMCNC: 31.5 G/DL (ref 32–36)
MCV RBC AUTO: 91 FL (ref 82–98)
MONOCYTES # BLD AUTO: 0.5 K/UL (ref 0.3–1)
MONOCYTES NFR BLD: 9.3 % (ref 4–15)
NEUTROPHILS # BLD AUTO: 3.9 K/UL (ref 1.8–7.7)
NEUTROPHILS NFR BLD: 73.1 % (ref 38–73)
NRBC BLD-RTO: 0 /100 WBC
PLATELET # BLD AUTO: 237 K/UL (ref 150–450)
PMV BLD AUTO: 10.5 FL (ref 9.2–12.9)
POTASSIUM SERPL-SCNC: 4.2 MMOL/L (ref 3.5–5.1)
PROT SERPL-MCNC: 6.1 G/DL (ref 6–8.4)
RBC # BLD AUTO: 3.44 M/UL (ref 4.6–6.2)
SODIUM SERPL-SCNC: 141 MMOL/L (ref 136–145)
WBC # BLD AUTO: 5.28 K/UL (ref 3.9–12.7)

## 2023-05-19 PROCEDURE — 3044F HG A1C LEVEL LT 7.0%: CPT | Mod: CPTII,,, | Performed by: INTERNAL MEDICINE

## 2023-05-19 PROCEDURE — 3077F PR MOST RECENT SYSTOLIC BLOOD PRESSURE >= 140 MM HG: ICD-10-PCS | Mod: CPTII,,, | Performed by: INTERNAL MEDICINE

## 2023-05-19 PROCEDURE — 36415 COLL VENOUS BLD VENIPUNCTURE: CPT | Performed by: INTERNAL MEDICINE

## 2023-05-19 PROCEDURE — 3008F BODY MASS INDEX DOCD: CPT | Mod: CPTII,,, | Performed by: INTERNAL MEDICINE

## 2023-05-19 PROCEDURE — 3078F DIAST BP <80 MM HG: CPT | Mod: CPTII,,, | Performed by: INTERNAL MEDICINE

## 2023-05-19 PROCEDURE — 83880 ASSAY OF NATRIURETIC PEPTIDE: CPT | Performed by: INTERNAL MEDICINE

## 2023-05-19 PROCEDURE — 1101F PT FALLS ASSESS-DOCD LE1/YR: CPT | Mod: CPTII,,, | Performed by: INTERNAL MEDICINE

## 2023-05-19 PROCEDURE — 94618 PULMONARY STRESS TESTING: ICD-10-PCS | Mod: ,,, | Performed by: INTERNAL MEDICINE

## 2023-05-19 PROCEDURE — 3288F FALL RISK ASSESSMENT DOCD: CPT | Mod: CPTII,,, | Performed by: INTERNAL MEDICINE

## 2023-05-19 PROCEDURE — 99214 OFFICE O/P EST MOD 30 MIN: CPT | Mod: ,,, | Performed by: INTERNAL MEDICINE

## 2023-05-19 PROCEDURE — 85025 COMPLETE CBC W/AUTO DIFF WBC: CPT | Performed by: INTERNAL MEDICINE

## 2023-05-19 PROCEDURE — 99213 OFFICE O/P EST LOW 20 MIN: CPT | Performed by: INTERNAL MEDICINE

## 2023-05-19 PROCEDURE — 1157F PR ADVANCE CARE PLAN OR EQUIV PRESENT IN MEDICAL RECORD: ICD-10-PCS | Mod: CPTII,,, | Performed by: INTERNAL MEDICINE

## 2023-05-19 PROCEDURE — 3008F PR BODY MASS INDEX (BMI) DOCUMENTED: ICD-10-PCS | Mod: CPTII,,, | Performed by: INTERNAL MEDICINE

## 2023-05-19 PROCEDURE — 3072F LOW RISK FOR RETINOPATHY: CPT | Mod: CPTII,,, | Performed by: INTERNAL MEDICINE

## 2023-05-19 PROCEDURE — 1126F AMNT PAIN NOTED NONE PRSNT: CPT | Mod: CPTII,,, | Performed by: INTERNAL MEDICINE

## 2023-05-19 PROCEDURE — 3077F SYST BP >= 140 MM HG: CPT | Mod: CPTII,,, | Performed by: INTERNAL MEDICINE

## 2023-05-19 PROCEDURE — 3078F PR MOST RECENT DIASTOLIC BLOOD PRESSURE < 80 MM HG: ICD-10-PCS | Mod: CPTII,,, | Performed by: INTERNAL MEDICINE

## 2023-05-19 PROCEDURE — 99999 PR PBB SHADOW E&M-EST. PATIENT-LVL III: CPT | Mod: PBBFAC,,, | Performed by: INTERNAL MEDICINE

## 2023-05-19 PROCEDURE — 3044F PR MOST RECENT HEMOGLOBIN A1C LEVEL <7.0%: ICD-10-PCS | Mod: CPTII,,, | Performed by: INTERNAL MEDICINE

## 2023-05-19 PROCEDURE — 94618 PULMONARY STRESS TESTING: CPT | Mod: ,,, | Performed by: INTERNAL MEDICINE

## 2023-05-19 PROCEDURE — 3288F PR FALLS RISK ASSESSMENT DOCUMENTED: ICD-10-PCS | Mod: CPTII,,, | Performed by: INTERNAL MEDICINE

## 2023-05-19 PROCEDURE — 1126F PR PAIN SEVERITY QUANTIFIED, NO PAIN PRESENT: ICD-10-PCS | Mod: CPTII,,, | Performed by: INTERNAL MEDICINE

## 2023-05-19 PROCEDURE — 83735 ASSAY OF MAGNESIUM: CPT | Performed by: INTERNAL MEDICINE

## 2023-05-19 PROCEDURE — 3072F PR LOW RISK FOR RETINOPATHY: ICD-10-PCS | Mod: CPTII,,, | Performed by: INTERNAL MEDICINE

## 2023-05-19 PROCEDURE — 80053 COMPREHEN METABOLIC PANEL: CPT | Performed by: INTERNAL MEDICINE

## 2023-05-19 PROCEDURE — 1159F MED LIST DOCD IN RCRD: CPT | Mod: CPTII,,, | Performed by: INTERNAL MEDICINE

## 2023-05-19 PROCEDURE — 99214 PR OFFICE/OUTPT VISIT, EST, LEVL IV, 30-39 MIN: ICD-10-PCS | Mod: ,,, | Performed by: INTERNAL MEDICINE

## 2023-05-19 PROCEDURE — 1159F PR MEDICATION LIST DOCUMENTED IN MEDICAL RECORD: ICD-10-PCS | Mod: CPTII,,, | Performed by: INTERNAL MEDICINE

## 2023-05-19 PROCEDURE — 1157F ADVNC CARE PLAN IN RCRD: CPT | Mod: CPTII,,, | Performed by: INTERNAL MEDICINE

## 2023-05-19 PROCEDURE — 99999 PR PBB SHADOW E&M-EST. PATIENT-LVL III: ICD-10-PCS | Mod: PBBFAC,,, | Performed by: INTERNAL MEDICINE

## 2023-05-19 PROCEDURE — 1101F PR PT FALLS ASSESS DOC 0-1 FALLS W/OUT INJ PAST YR: ICD-10-PCS | Mod: CPTII,,, | Performed by: INTERNAL MEDICINE

## 2023-05-19 NOTE — PROGRESS NOTES
Subjective:   Patient ID:  Baldo Carney is a 72 y.o. male who presents for evaluation of CHF.    73 YO M w/ PMH of ICM, HFrEF, LVEF 20-25%, LVEDD 5.5 cm, s/p bioprosthetic AVR + sing vessel bypass with VG to PDA, subsequent impella protected high risk PCI of LM to LAD + LM to Lcx in 8/31/22, HTN, HLD, stage III liver fibrosis/cirrhosis who presents for evaluation of his CHF and PH.    He is seen by Dr Wei Mendez MD at [place] for his general cardiology care. In addition he has seen Dr Mireles and Dr Cj Jensen in the past here at [facility]. His high risk PCI was also done here. These records are available to me.    He has a history of aortic valve disease and underwent bioprosthetic aortic valve replacement in 2014.  At the same time he underwent single-vessel bypass with vein graft to PDA.  He subsequently had ischemic cardiomyopathy, and most recently underwent high-risk PCI of the left main into both the LAD and circumflex in August of 2022 here.  Has followed as an outpatient and has been on appropriate guideline directed medical therapy.  However due to some functional decline and drop in ejection fraction he was referred to us for evaluation of this as well as pulmonary hypertension noted on his echocardiogram.    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  He currently says that he is not very active but he is primarily limited not by cardiac symptoms but more by symptoms related to his cirrhosis.  He has significant abdominal swelling and fluid collection as a consequence of his cirrhosis.  He undergoes paracentesis once every 2 weeks and they drained between 8 and 10 L of fluid.  He tries to stay active by taking care of some work around the house.  With day-to-day activities he denies chest discomfort.  Notes some occasional shortness of breath.  He says that when walking on level ground he does okay, but when walking up flights of steps or has to do above-average levels of exertion  such as lifting something heavy he has some shortness of breath.  Denies any palpitations.  Has chronic bilateral lower extremity swelling.  Denies any episodes of presyncope, syncope, PND, or orthopnea.    When asked, he says that he rates his quality of life overall as being 8/10.  The primary issues that he experiences that are impacting his quality of life are related to the recurrent paracentesis where he has to have needle drainage, and limitation of mobility when he has accumulation of fluid in his belly.    Review of Systems   Constitutional: Positive for malaise/fatigue. Negative for chills and fever.   HENT:  Negative for hearing loss.    Eyes:  Negative for visual disturbance.   Cardiovascular:  Positive for dyspnea on exertion and leg swelling. Negative for chest pain, irregular heartbeat, orthopnea, palpitations, paroxysmal nocturnal dyspnea and syncope.   Respiratory:  Positive for shortness of breath. Negative for cough.    Musculoskeletal:  Negative for muscle weakness.   Gastrointestinal:  Negative for diarrhea, nausea and vomiting.   Neurological:  Negative for focal weakness.   Psychiatric/Behavioral:  Negative for memory loss.      Objective:     Vitals:    05/19/23 1052   BP: (!) 145/65   Pulse: 63     Physical Exam  Vitals reviewed.   HENT:      Head: Atraumatic.   Eyes:      Extraocular Movements: Extraocular movements intact.   Cardiovascular:      Rate and Rhythm: Normal rate and regular rhythm.      Heart sounds: Normal heart sounds.      Comments: JVP not visible at 45 degrees.  Pulmonary:      Breath sounds: Normal breath sounds.   Abdominal:      General: There is distension.      Tenderness: There is no abdominal tenderness.      Comments: Shifting dullness noted on percussion.   Musculoskeletal:         General: Normal range of motion.      Right lower leg: Edema present.      Left lower leg: Edema present.   Neurological:      General: No focal deficit present.      Mental Status: He  is alert and oriented to person, place, and time.       Assessment:      1. Chronic obstructive pulmonary disease, unspecified COPD type    2. Aortic stenosis status post AVR    3. Chronic systolic heart failure    4. Primary hypertension    5. Mixed hyperlipidemia    6. Ischemic cardiomyopathy    7. S/P AVR (aortic valve replacement)    8. Coronary artery disease of native artery of native heart with stable angina pectoris    9. S/P CABG (coronary artery bypass graft)        Plan:     - he presents for evaluation of his ischemic cardiomyopathy.  Other comorbidities include single-vessel bypass, high-risk PCI in the past with Impella support, and stage III liver fibrosis/cirrhosis with the need for recurrent paracentesis once every 2 weeks.  - when talking with him, he states that the primary limiting factor to his quality of life and day-to-day activities is the paracentesis that has to be done once every 2 weeks as a consequence of his cirrhosis, and the abdominal distention when he does accumulate fluid in his abdomen.  He notes some exertional shortness of breath when he has above-average levels of exertion, but otherwise denies any other major cardiac symptoms to me.  - he is on appropriate guideline directed medical therapy at present and is on metoprolol succinate, spironolactone, Entresto, and Farxiga.  In addition he is also on vericiguat.  I recommended that he continue these medications unchanged and he continued to follow with his primary cardiologist, Dr. Andrea GOMES.  - with regards to his pulmonary hypertension, this is who group 2 related to his left-sided heart disease and as such would not be a candidate for any pulmonary hypertension specific therapies as this would likely precipitate pulmonary edema, elevated pulmonary capillary wedge pressure, and worsening respiratory symptoms.  - with regards to advanced heart failure therapies, his age would preclude consideration for transplantation.  LVAD  also would not be a consideration for him as he has fairly advanced liver disease, with recurrent ascites needing paracentesis.  - this was explained to Mr. Carney during today's visit he was understanding of his disease process and prognosis.  - I did recommend that he speak with his hepatologist about considering placement of a catheter for his paracentesis so this way he can avoid the need for needle guided paracentesis during every visit.

## 2023-05-19 NOTE — PROCEDURES
Baldo Carney is a 72 y.o.  male patient, who presents for a 6 minute walk test ordered by MD Dave.  The diagnosis is Cardiomyopathy.  The patient's BMI is 24.3 kg/m2.  Predicted distance (lower limit of normal) is 351 meters.      Test Results:    The test was completed without stopping.  The total time walked was 360 seconds.  During walking, the patient reported:  No complaints.  The patient used no assistive devices during testing.     05/19/2023---------Distance: 304.8 meters (1000 feet)     O2 Sat % Supplemental Oxygen Heart Rate Blood Pressure Roshan Scale   Pre-exercise  (Resting) 100 % Room Air 63 bpm 140/63 mmHg 2   During Exercise 99 % Room Air 70 bpm 128/59 mmHg 2   Post-exercise  (Recovery) 99 % Room Air  65 bpm       Recovery Time: 53 seconds    Performing nurse/tech: Hussain WHITMAN      PREVIOUS STUDY:   The patient has not had a previous study.      CLINICAL INTERPRETATION:  Six minute walk distance is 304.8 meters (1000 feet) with light dyspnea.  During exercise, there was no significant desaturation while breathing room air.  Both blood pressure and heart rate remained stable with walking.  The patient did not report non-pulmonary symptoms during exercise.  No previous study performed.  Based upon age and body mass index, exercise capacity is less than predicted.

## 2023-05-22 ENCOUNTER — HOSPITAL ENCOUNTER (OUTPATIENT)
Dept: INTERVENTIONAL RADIOLOGY/VASCULAR | Facility: HOSPITAL | Age: 73
Discharge: HOME | End: 2023-05-22
Attending: INTERNAL MEDICINE
Payer: MEDICARE

## 2023-05-22 VITALS
HEART RATE: 66 BPM | DIASTOLIC BLOOD PRESSURE: 60 MMHG | SYSTOLIC BLOOD PRESSURE: 132 MMHG | OXYGEN SATURATION: 100 % | RESPIRATION RATE: 18 BRPM

## 2023-05-22 DIAGNOSIS — R18.8 OTHER ASCITES: ICD-10-CM

## 2023-05-22 LAB
APPEARANCE FLD: NORMAL
BASOPHILS # BLD AUTO: 0.05 K/UL (ref 0–0.2)
BASOPHILS NFR BLD: 0.9 % (ref 0–1.9)
BODY FLD TYPE: NORMAL
COLOR FLD: YELLOW
DIFFERENTIAL METHOD: ABNORMAL
EOSINOPHIL # BLD AUTO: 0.1 K/UL (ref 0–0.5)
EOSINOPHIL NFR BLD: 2.4 % (ref 0–8)
ERYTHROCYTE [DISTWIDTH] IN BLOOD BY AUTOMATED COUNT: 17.2 % (ref 11.5–14.5)
HCT VFR BLD AUTO: 27.7 % (ref 40–54)
HGB BLD-MCNC: 9 G/DL (ref 14–18)
IMM GRANULOCYTES # BLD AUTO: 0.01 K/UL (ref 0–0.04)
IMM GRANULOCYTES NFR BLD AUTO: 0.2 % (ref 0–0.5)
INR PPP: 1.1 (ref 0.8–1.2)
LYMPHOCYTES # BLD AUTO: 0.6 K/UL (ref 1–4.8)
LYMPHOCYTES NFR BLD: 11.7 % (ref 18–48)
LYMPHOCYTES NFR FLD MANUAL: 50 %
MCH RBC QN AUTO: 29.2 PG (ref 27–31)
MCHC RBC AUTO-ENTMCNC: 32.5 G/DL (ref 32–36)
MCV RBC AUTO: 90 FL (ref 82–98)
MESOTHL CELL NFR FLD MANUAL: 12 %
MONOCYTES # BLD AUTO: 0.4 K/UL (ref 0.3–1)
MONOCYTES NFR BLD: 7 % (ref 4–15)
MONOS+MACROS NFR FLD MANUAL: 29 %
NEUTROPHILS # BLD AUTO: 4.3 K/UL (ref 1.8–7.7)
NEUTROPHILS NFR BLD: 77.8 % (ref 38–73)
NEUTROPHILS NFR FLD MANUAL: 9 %
NRBC BLD-RTO: 0 /100 WBC
PLATELET # BLD AUTO: 225 K/UL (ref 150–450)
PMV BLD AUTO: 10.9 FL (ref 9.2–12.9)
PROTHROMBIN TIME: 11.4 SEC (ref 9–12.5)
RBC # BLD AUTO: 3.08 M/UL (ref 4.6–6.2)
WBC # BLD AUTO: 5.46 K/UL (ref 3.9–12.7)
WBC # FLD: 148 /CU MM

## 2023-05-22 PROCEDURE — P9047 ALBUMIN (HUMAN), 25%, 50ML: HCPCS | Mod: JZ | Performed by: RADIOLOGY

## 2023-05-22 PROCEDURE — 89051 BODY FLUID CELL COUNT: CPT | Performed by: INTERNAL MEDICINE

## 2023-05-22 PROCEDURE — 49083 ABD PARACENTESIS W/IMAGING: CPT | Performed by: RADIOLOGY

## 2023-05-22 PROCEDURE — 49083 IR PARACENTESIS WITH IMAGING: ICD-10-PCS | Mod: ,,, | Performed by: RADIOLOGY

## 2023-05-22 PROCEDURE — 63600175 PHARM REV CODE 636 W HCPCS: Mod: JZ | Performed by: RADIOLOGY

## 2023-05-22 PROCEDURE — 85610 PROTHROMBIN TIME: CPT | Performed by: PHYSICIAN ASSISTANT

## 2023-05-22 PROCEDURE — 36415 COLL VENOUS BLD VENIPUNCTURE: CPT | Performed by: PHYSICIAN ASSISTANT

## 2023-05-22 PROCEDURE — 85025 COMPLETE CBC W/AUTO DIFF WBC: CPT | Performed by: PHYSICIAN ASSISTANT

## 2023-05-22 RX ORDER — ALBUMIN HUMAN 250 G/1000ML
50 SOLUTION INTRAVENOUS ONCE
Status: COMPLETED | OUTPATIENT
Start: 2023-05-22 | End: 2023-05-22

## 2023-05-22 RX ADMIN — ALBUMIN (HUMAN) 75 G: 12.5 SOLUTION INTRAVENOUS at 11:05

## 2023-05-22 NOTE — NURSING
IR ultrasound guided paracentesis performed by Dr Crowell  Procedure explained and consent obtained by Radiologist.   Time out performed   10.1 L removed-   Patient received 75 g of albumin IV-   VS remained stable for duration of procedure.   Specimens collected and sent to lab if ordered.   Para and IV d/c'd, with tip intact.   Access site cleaned with peroxide, derma bond and steri-strips applied, then covered with sterile Band-Aid.   Pt discharge home in stable condition.

## 2023-05-26 ENCOUNTER — LAB VISIT (OUTPATIENT)
Dept: LAB | Facility: HOSPITAL | Age: 73
End: 2023-05-26
Attending: NURSE PRACTITIONER
Payer: MEDICARE

## 2023-05-26 DIAGNOSIS — Z12.5 PROSTATE CANCER SCREENING: ICD-10-CM

## 2023-05-26 DIAGNOSIS — D53.9 NUTRITIONAL ANEMIA, UNSPECIFIED: ICD-10-CM

## 2023-05-26 DIAGNOSIS — I10 PRIMARY HYPERTENSION: ICD-10-CM

## 2023-05-26 DIAGNOSIS — E78.2 MIXED HYPERLIPIDEMIA: ICD-10-CM

## 2023-05-26 DIAGNOSIS — E11.9 TYPE 2 DIABETES MELLITUS WITHOUT COMPLICATION, WITHOUT LONG-TERM CURRENT USE OF INSULIN: ICD-10-CM

## 2023-05-26 DIAGNOSIS — I50.22 CHRONIC SYSTOLIC HEART FAILURE: Chronic | ICD-10-CM

## 2023-05-26 DIAGNOSIS — D64.9 MILD ANEMIA: ICD-10-CM

## 2023-05-26 DIAGNOSIS — R79.89 ELEVATED TSH: ICD-10-CM

## 2023-05-26 LAB
ALBUMIN SERPL BCP-MCNC: 2.9 G/DL (ref 3.5–5.2)
ALP SERPL-CCNC: 57 U/L (ref 55–135)
ALT SERPL W/O P-5'-P-CCNC: 14 U/L (ref 10–44)
ANION GAP SERPL CALC-SCNC: 5 MMOL/L (ref 8–16)
AST SERPL-CCNC: 20 U/L (ref 10–40)
BASOPHILS # BLD AUTO: 0.06 K/UL (ref 0–0.2)
BASOPHILS NFR BLD: 1.1 % (ref 0–1.9)
BILIRUB SERPL-MCNC: 0.7 MG/DL (ref 0.1–1)
BNP SERPL-MCNC: 3230 PG/ML (ref 0–99)
BUN SERPL-MCNC: 25 MG/DL (ref 8–23)
CALCIUM SERPL-MCNC: 8.3 MG/DL (ref 8.7–10.5)
CHLORIDE SERPL-SCNC: 109 MMOL/L (ref 95–110)
CHOLEST SERPL-MCNC: 79 MG/DL (ref 120–199)
CHOLEST/HDLC SERPL: 2.1 {RATIO} (ref 2–5)
CO2 SERPL-SCNC: 23 MMOL/L (ref 23–29)
COMPLEXED PSA SERPL-MCNC: 10 NG/ML (ref 0–4)
CREAT SERPL-MCNC: 1.9 MG/DL (ref 0.5–1.4)
DIFFERENTIAL METHOD: ABNORMAL
EOSINOPHIL # BLD AUTO: 0.2 K/UL (ref 0–0.5)
EOSINOPHIL NFR BLD: 2.6 % (ref 0–8)
ERYTHROCYTE [DISTWIDTH] IN BLOOD BY AUTOMATED COUNT: 17.2 % (ref 11.5–14.5)
EST. GFR  (NO RACE VARIABLE): 37 ML/MIN/1.73 M^2
FERRITIN SERPL-MCNC: 88 NG/ML (ref 20–300)
GLUCOSE SERPL-MCNC: 81 MG/DL (ref 70–110)
HCT VFR BLD AUTO: 30.3 % (ref 40–54)
HDLC SERPL-MCNC: 38 MG/DL (ref 40–75)
HDLC SERPL: 48.1 % (ref 20–50)
HGB BLD-MCNC: 9.5 G/DL (ref 14–18)
IMM GRANULOCYTES # BLD AUTO: 0.03 K/UL (ref 0–0.04)
IMM GRANULOCYTES NFR BLD AUTO: 0.5 % (ref 0–0.5)
IRON SERPL-MCNC: 27 UG/DL (ref 45–160)
LDLC SERPL CALC-MCNC: 34.8 MG/DL (ref 63–159)
LYMPHOCYTES # BLD AUTO: 0.8 K/UL (ref 1–4.8)
LYMPHOCYTES NFR BLD: 13.8 % (ref 18–48)
MCH RBC QN AUTO: 28.5 PG (ref 27–31)
MCHC RBC AUTO-ENTMCNC: 31.4 G/DL (ref 32–36)
MCV RBC AUTO: 91 FL (ref 82–98)
MONOCYTES # BLD AUTO: 0.4 K/UL (ref 0.3–1)
MONOCYTES NFR BLD: 6.7 % (ref 4–15)
NEUTROPHILS # BLD AUTO: 4.3 K/UL (ref 1.8–7.7)
NEUTROPHILS NFR BLD: 75.3 % (ref 38–73)
NONHDLC SERPL-MCNC: 41 MG/DL
NRBC BLD-RTO: 0 /100 WBC
PLATELET # BLD AUTO: 213 K/UL (ref 150–450)
PMV BLD AUTO: 11 FL (ref 9.2–12.9)
POTASSIUM SERPL-SCNC: 4.2 MMOL/L (ref 3.5–5.1)
PROT SERPL-MCNC: 6 G/DL (ref 6–8.4)
RBC # BLD AUTO: 3.33 M/UL (ref 4.6–6.2)
SODIUM SERPL-SCNC: 137 MMOL/L (ref 136–145)
T4 FREE SERPL-MCNC: 0.89 NG/DL (ref 0.71–1.51)
TRIGL SERPL-MCNC: 31 MG/DL (ref 30–150)
TSH SERPL DL<=0.005 MIU/L-ACNC: 5.91 UIU/ML (ref 0.34–5.6)
VIT B12 SERPL-MCNC: 261 PG/ML (ref 210–950)
WBC # BLD AUTO: 5.67 K/UL (ref 3.9–12.7)

## 2023-05-26 PROCEDURE — 80061 LIPID PANEL: CPT | Performed by: NURSE PRACTITIONER

## 2023-05-26 PROCEDURE — 80053 COMPREHEN METABOLIC PANEL: CPT | Performed by: NURSE PRACTITIONER

## 2023-05-26 PROCEDURE — 82728 ASSAY OF FERRITIN: CPT | Performed by: NURSE PRACTITIONER

## 2023-05-26 PROCEDURE — 85025 COMPLETE CBC W/AUTO DIFF WBC: CPT | Performed by: NURSE PRACTITIONER

## 2023-05-26 PROCEDURE — 83540 ASSAY OF IRON: CPT | Performed by: NURSE PRACTITIONER

## 2023-05-26 PROCEDURE — 84153 ASSAY OF PSA TOTAL: CPT | Performed by: NURSE PRACTITIONER

## 2023-05-26 PROCEDURE — 83880 ASSAY OF NATRIURETIC PEPTIDE: CPT | Performed by: NURSE PRACTITIONER

## 2023-05-26 PROCEDURE — 82607 VITAMIN B-12: CPT | Performed by: NURSE PRACTITIONER

## 2023-05-26 PROCEDURE — 36415 COLL VENOUS BLD VENIPUNCTURE: CPT | Performed by: NURSE PRACTITIONER

## 2023-05-26 PROCEDURE — 84439 ASSAY OF FREE THYROXINE: CPT | Performed by: NURSE PRACTITIONER

## 2023-05-26 PROCEDURE — 84443 ASSAY THYROID STIM HORMONE: CPT | Performed by: NURSE PRACTITIONER

## 2023-05-29 ENCOUNTER — OFFICE VISIT (OUTPATIENT)
Dept: FAMILY MEDICINE | Facility: CLINIC | Age: 73
End: 2023-05-29
Payer: MEDICARE

## 2023-05-29 VITALS
OXYGEN SATURATION: 99 % | SYSTOLIC BLOOD PRESSURE: 126 MMHG | HEART RATE: 61 BPM | DIASTOLIC BLOOD PRESSURE: 62 MMHG | WEIGHT: 172.88 LBS | HEIGHT: 72 IN | BODY MASS INDEX: 23.42 KG/M2

## 2023-05-29 DIAGNOSIS — E11.9 TYPE 2 DIABETES MELLITUS WITHOUT COMPLICATION, WITHOUT LONG-TERM CURRENT USE OF INSULIN: Primary | ICD-10-CM

## 2023-05-29 DIAGNOSIS — R97.20 ELEVATED PSA, BETWEEN 10 AND LESS THAN 20 NG/ML: ICD-10-CM

## 2023-05-29 DIAGNOSIS — E78.2 MIXED HYPERLIPIDEMIA: ICD-10-CM

## 2023-05-29 DIAGNOSIS — N18.32 STAGE 3B CHRONIC KIDNEY DISEASE: ICD-10-CM

## 2023-05-29 DIAGNOSIS — I50.22 CHRONIC SYSTOLIC HEART FAILURE: Chronic | ICD-10-CM

## 2023-05-29 DIAGNOSIS — E03.9 HYPOTHYROIDISM, UNSPECIFIED TYPE: ICD-10-CM

## 2023-05-29 DIAGNOSIS — R79.89 LOW VITAMIN B12 LEVEL: ICD-10-CM

## 2023-05-29 DIAGNOSIS — I10 PRIMARY HYPERTENSION: ICD-10-CM

## 2023-05-29 PROCEDURE — 3078F DIAST BP <80 MM HG: CPT | Mod: CPTII,S$GLB,, | Performed by: NURSE PRACTITIONER

## 2023-05-29 PROCEDURE — 1160F RVW MEDS BY RX/DR IN RCRD: CPT | Mod: CPTII,S$GLB,, | Performed by: NURSE PRACTITIONER

## 2023-05-29 PROCEDURE — 3074F SYST BP LT 130 MM HG: CPT | Mod: CPTII,S$GLB,, | Performed by: NURSE PRACTITIONER

## 2023-05-29 PROCEDURE — 99214 OFFICE O/P EST MOD 30 MIN: CPT | Mod: S$GLB,,, | Performed by: NURSE PRACTITIONER

## 2023-05-29 PROCEDURE — 3044F HG A1C LEVEL LT 7.0%: CPT | Mod: CPTII,S$GLB,, | Performed by: NURSE PRACTITIONER

## 2023-05-29 PROCEDURE — 3061F PR NEG MICROALBUMINURIA RESULT DOCUMENTED/REVIEW: ICD-10-PCS | Mod: CPTII,S$GLB,, | Performed by: NURSE PRACTITIONER

## 2023-05-29 PROCEDURE — 3288F PR FALLS RISK ASSESSMENT DOCUMENTED: ICD-10-PCS | Mod: CPTII,S$GLB,, | Performed by: NURSE PRACTITIONER

## 2023-05-29 PROCEDURE — 3072F PR LOW RISK FOR RETINOPATHY: ICD-10-PCS | Mod: CPTII,S$GLB,, | Performed by: NURSE PRACTITIONER

## 2023-05-29 PROCEDURE — 3008F PR BODY MASS INDEX (BMI) DOCUMENTED: ICD-10-PCS | Mod: CPTII,S$GLB,, | Performed by: NURSE PRACTITIONER

## 2023-05-29 PROCEDURE — 1101F PT FALLS ASSESS-DOCD LE1/YR: CPT | Mod: CPTII,S$GLB,, | Performed by: NURSE PRACTITIONER

## 2023-05-29 PROCEDURE — 3066F NEPHROPATHY DOC TX: CPT | Mod: CPTII,S$GLB,, | Performed by: NURSE PRACTITIONER

## 2023-05-29 PROCEDURE — 3008F BODY MASS INDEX DOCD: CPT | Mod: CPTII,S$GLB,, | Performed by: NURSE PRACTITIONER

## 2023-05-29 PROCEDURE — 1157F PR ADVANCE CARE PLAN OR EQUIV PRESENT IN MEDICAL RECORD: ICD-10-PCS | Mod: CPTII,S$GLB,, | Performed by: NURSE PRACTITIONER

## 2023-05-29 PROCEDURE — 1159F MED LIST DOCD IN RCRD: CPT | Mod: CPTII,S$GLB,, | Performed by: NURSE PRACTITIONER

## 2023-05-29 PROCEDURE — 1101F PR PT FALLS ASSESS DOC 0-1 FALLS W/OUT INJ PAST YR: ICD-10-PCS | Mod: CPTII,S$GLB,, | Performed by: NURSE PRACTITIONER

## 2023-05-29 PROCEDURE — 4010F PR ACE/ARB THEARPY RXD/TAKEN: ICD-10-PCS | Mod: CPTII,S$GLB,, | Performed by: NURSE PRACTITIONER

## 2023-05-29 PROCEDURE — 3061F NEG MICROALBUMINURIA REV: CPT | Mod: CPTII,S$GLB,, | Performed by: NURSE PRACTITIONER

## 2023-05-29 PROCEDURE — 1126F PR PAIN SEVERITY QUANTIFIED, NO PAIN PRESENT: ICD-10-PCS | Mod: CPTII,S$GLB,, | Performed by: NURSE PRACTITIONER

## 2023-05-29 PROCEDURE — 99214 PR OFFICE/OUTPT VISIT, EST, LEVL IV, 30-39 MIN: ICD-10-PCS | Mod: S$GLB,,, | Performed by: NURSE PRACTITIONER

## 2023-05-29 PROCEDURE — 3288F FALL RISK ASSESSMENT DOCD: CPT | Mod: CPTII,S$GLB,, | Performed by: NURSE PRACTITIONER

## 2023-05-29 PROCEDURE — 1160F PR REVIEW ALL MEDS BY PRESCRIBER/CLIN PHARMACIST DOCUMENTED: ICD-10-PCS | Mod: CPTII,S$GLB,, | Performed by: NURSE PRACTITIONER

## 2023-05-29 PROCEDURE — 3078F PR MOST RECENT DIASTOLIC BLOOD PRESSURE < 80 MM HG: ICD-10-PCS | Mod: CPTII,S$GLB,, | Performed by: NURSE PRACTITIONER

## 2023-05-29 PROCEDURE — 1126F AMNT PAIN NOTED NONE PRSNT: CPT | Mod: CPTII,S$GLB,, | Performed by: NURSE PRACTITIONER

## 2023-05-29 PROCEDURE — 3066F PR DOCUMENTATION OF TREATMENT FOR NEPHROPATHY: ICD-10-PCS | Mod: CPTII,S$GLB,, | Performed by: NURSE PRACTITIONER

## 2023-05-29 PROCEDURE — 4010F ACE/ARB THERAPY RXD/TAKEN: CPT | Mod: CPTII,S$GLB,, | Performed by: NURSE PRACTITIONER

## 2023-05-29 PROCEDURE — 3072F LOW RISK FOR RETINOPATHY: CPT | Mod: CPTII,S$GLB,, | Performed by: NURSE PRACTITIONER

## 2023-05-29 PROCEDURE — 3044F PR MOST RECENT HEMOGLOBIN A1C LEVEL <7.0%: ICD-10-PCS | Mod: CPTII,S$GLB,, | Performed by: NURSE PRACTITIONER

## 2023-05-29 PROCEDURE — 3074F PR MOST RECENT SYSTOLIC BLOOD PRESSURE < 130 MM HG: ICD-10-PCS | Mod: CPTII,S$GLB,, | Performed by: NURSE PRACTITIONER

## 2023-05-29 PROCEDURE — 1159F PR MEDICATION LIST DOCUMENTED IN MEDICAL RECORD: ICD-10-PCS | Mod: CPTII,S$GLB,, | Performed by: NURSE PRACTITIONER

## 2023-05-29 PROCEDURE — 1157F ADVNC CARE PLAN IN RCRD: CPT | Mod: CPTII,S$GLB,, | Performed by: NURSE PRACTITIONER

## 2023-05-29 RX ORDER — METOPROLOL SUCCINATE 25 MG/1
25 TABLET, EXTENDED RELEASE ORAL DAILY
Qty: 90 TABLET | Refills: 0 | Status: SHIPPED | OUTPATIENT
Start: 2023-05-29 | End: 2023-08-29 | Stop reason: SDUPTHER

## 2023-05-29 RX ORDER — ATORVASTATIN CALCIUM 40 MG/1
40 TABLET, FILM COATED ORAL DAILY
Qty: 90 TABLET | Refills: 0 | Status: SHIPPED | OUTPATIENT
Start: 2023-05-29 | End: 2023-08-29 | Stop reason: SDUPTHER

## 2023-05-29 RX ORDER — LEVOTHYROXINE SODIUM 100 UG/1
100 TABLET ORAL
Qty: 30 TABLET | Refills: 5 | Status: SHIPPED | OUTPATIENT
Start: 2023-05-29 | End: 2023-11-29

## 2023-05-29 RX ORDER — CYANOCOBALAMIN (VITAMIN B-12) 2000 MCG
1 TABLET ORAL DAILY
Qty: 90 TABLET | Refills: 0 | Status: SHIPPED | OUTPATIENT
Start: 2023-05-29 | End: 2023-08-29 | Stop reason: ALTCHOICE

## 2023-05-29 RX ORDER — GABAPENTIN 300 MG/1
300 CAPSULE ORAL NIGHTLY
Qty: 90 CAPSULE | Refills: 1 | Status: SHIPPED | OUTPATIENT
Start: 2023-05-29 | End: 2024-02-29 | Stop reason: SDUPTHER

## 2023-05-29 RX ORDER — SACUBITRIL AND VALSARTAN 24; 26 MG/1; MG/1
1 TABLET, FILM COATED ORAL 2 TIMES DAILY
Qty: 180 TABLET | Refills: 0 | Status: SHIPPED | OUTPATIENT
Start: 2023-05-29 | End: 2023-08-29 | Stop reason: SDUPTHER

## 2023-05-29 NOTE — PROGRESS NOTES
SUBJECTIVE:      Patient ID: Baldo Carney is a 72 y.o. male.    Chief Complaint: Follow-up (3 month f/u Lab Review)    Presents for lab review - RBC 3.33, H&H 9.5 and 30.3, iron 27, ferritin 88, BUN 25, creatinine 1.9, estimated GFR 37, calcium 8.3, albumin 2.9, TSH 5.91, BNP 3230, PSA 10 (up from 8.7 May of last year), B12 261    Follows with Dr. Mendez (cards), ophthalmology, [facility] cardiology for cirrhosis/paracentesis, planning to establish with Dr. Maurice (discuss port placement for ascites)    will place referrals to nephro & uro at today's visit    Discussed outstanding health maintenance     Diabetes  He presents for his follow-up diabetic visit. He has type 2 diabetes mellitus. No MedicAlert identification noted. His disease course has been stable. There are no hypoglycemic associated symptoms. Pertinent negatives for hypoglycemia include no confusion, dizziness, headaches, nervousness/anxiousness or pallor. Associated symptoms include fatigue and weight loss. Pertinent negatives for diabetes include no chest pain, no foot paresthesias, no polydipsia, no polyuria and no weakness. There are no hypoglycemic complications. Symptoms are stable. Diabetic complications include heart disease, impotence, nephropathy, peripheral neuropathy, PVD and retinopathy. Risk factors for coronary artery disease include diabetes mellitus, dyslipidemia, family history, hypertension, male sex, sedentary lifestyle and stress. Current diabetic treatment includes oral agent (monotherapy). He is compliant with treatment all of the time. His weight is fluctuating minimally. He is following a generally healthy diet. He has not had a previous visit with a dietitian. He never participates in exercise. An ACE inhibitor/angiotensin II receptor blocker is being taken. He does not see a podiatrist.Eye exam is current.   Hypertension  This is a chronic problem. The current episode started more than 1 year ago. The problem has  been gradually improving since onset. The problem is controlled. Associated symptoms include malaise/fatigue and peripheral edema. Pertinent negatives include no chest pain, headaches, neck pain or shortness of breath. Risk factors for coronary artery disease include male gender, sedentary lifestyle, diabetes mellitus, dyslipidemia, family history and stress. Past treatments include calcium channel blockers, diuretics, direct vasodilators and angiotensin blockers. The current treatment provides moderate improvement. Compliance problems include exercise and diet.  Hypertensive end-organ damage includes kidney disease, CAD/MI, heart failure, PVD and retinopathy. Identifiable causes of hypertension include chronic renal disease, a hypertension causing med, renovascular disease and a thyroid problem.   Hyperlipidemia  This is a chronic problem. The current episode started more than 1 year ago. The problem is controlled. Recent lipid tests were reviewed and are normal. Exacerbating diseases include chronic renal disease, diabetes, hypothyroidism and liver disease. Associated symptoms include myalgias (can't have hernia repair at present d/t comorbid conditions). Pertinent negatives include no chest pain or shortness of breath. Current antihyperlipidemic treatment includes statins. The current treatment provides significant improvement of lipids. Compliance problems include adherence to diet and adherence to exercise.  Risk factors for coronary artery disease include dyslipidemia, diabetes mellitus, hypertension, male sex, a sedentary lifestyle, stress and family history.   Thyroid Problem  Presents for initial visit. Symptoms include dry skin, fatigue, hair loss and weight loss. Patient reports no anxiety, cold intolerance, constipation, diarrhea or heat intolerance. Past treatments include nothing. His past medical history is significant for diabetes, heart failure, hyperlipidemia and neuropathy.   Fatigue  This is a  recurrent problem. The current episode started more than 1 month ago. The problem occurs constantly. The problem has been unchanged. Associated symptoms include arthralgias, fatigue and myalgias (can't have hernia repair at present d/t comorbid conditions). Pertinent negatives include no abdominal pain, chest pain, congestion, coughing, fever, headaches, joint swelling, nausea, neck pain, rash, sore throat, vomiting or weakness. The symptoms are aggravated by exertion and stress. He has tried rest, sleep, position changes, relaxation and lying down for the symptoms. The treatment provided mild relief.     Past Surgical History:   Procedure Laterality Date    ANGIOGRAPHY OF INTERNAL MAMMARY VESSEL N/A 3/11/2022    Procedure: Angiogram Internal Mammary;  Surgeon: Hank Lujan MD;  Location: [facility] CATH/EP LAB;  Service: Cardiology;  Laterality: N/A;    CARDIAC CATHETERIZATION      CARDIAC VALVE SURGERY      CATHETERIZATION OF BOTH LEFT AND RIGHT HEART Left 3/11/2022    Procedure: CATHETERIZATION, HEART, BOTH LEFT AND RIGHT;  Surgeon: Hank Lujan MD;  Location: [facility] CATH/EP LAB;  Service: Cardiology;  Laterality: Left;    CORONARY ANGIOGRAPHY N/A 3/11/2022    Procedure: ANGIOGRAM, CORONARY ARTERY;  Surgeon: Hank Lujan MD;  Location: [facility] CATH/EP LAB;  Service: Cardiology;  Laterality: N/A;    CORONARY BYPASS GRAFT ANGIOGRAPHY  3/11/2022    Procedure: Bypass graft study;  Surgeon: Hank Lujan MD;  Location: [facility] CATH/EP LAB;  Service: Cardiology;;    CYSTOSCOPY N/A 7/30/2019    Procedure: CYSTOSCOPY;  Surgeon: Spencer Nguyen MD;  Location: [facility] OR;  Service: Urology;  Laterality: N/A;    INSERTION OF INTRAVASCULAR MICROAXIAL BLOOD PUMP N/A 8/31/2022    Procedure: INSERTION, IMPELLA;  Surgeon: Zach Jensen MD;  Location: [facility] CATH LAB;  Service: Cardiology;  Laterality: N/A;    PLACEMENT OF SWAN SEE CATHETER WITH IMAGING GUIDANCE  8/31/2022    Procedure: INSERTION, CATHETER, SWAN-SEE, WITH  IMAGING GUIDANCE;  Surgeon: Zach Jensen MD;  Location: [facility] CATH LAB;  Service: Cardiology;;    SHOULDER ARTHROSCOPY  1985    TRANSRECTAL BIOPSY OF PROSTATE WITH ULTRASOUND GUIDANCE N/A 2019    Procedure: BIOPSY, PROSTATE, RECTAL APPROACH, WITH US GUIDANCE;  Surgeon: Spencer Nguyen MD;  Location: [facility] OR;  Service: Urology;  Laterality: N/A;  procedure not performed, pt unable to tolerate    TRANSRECTAL BIOPSY OF PROSTATE WITH ULTRASOUND GUIDANCE N/A 2019    Procedure: BIOPSY, PROSTATE, RECTAL APPROACH, WITH US GUIDANCE;  Surgeon: Spencer Nguyen MD;  Location: [facility] OR;  Service: Urology;  Laterality: N/A;     Family History   Problem Relation Age of Onset    No Known Problems Mother     Diabetes Father     Hypertension Father     Heart attack Father     Heart disease Father     Diabetes Sister     Heart disease Brother     Diabetes Brother     No Known Problems Maternal Grandmother     No Known Problems Maternal Grandfather     No Known Problems Paternal Grandmother     No Known Problems Paternal Grandfather     No Known Problems Maternal Aunt     No Known Problems Maternal Uncle     No Known Problems Paternal Aunt     No Known Problems Paternal Uncle     Anemia Neg Hx     Arrhythmia Neg Hx     Asthma Neg Hx     Clotting disorder Neg Hx     Fainting Neg Hx     Heart failure Neg Hx     Hyperlipidemia Neg Hx     Glaucoma Neg Hx       Social History     Socioeconomic History    Marital status: Legally    Tobacco Use    Smoking status: Former     Types: Cigarettes     Quit date: 1970     Years since quittin.9    Smokeless tobacco: Never   Substance and Sexual Activity    Alcohol use: Not Currently     Alcohol/week: 3.0 standard drinks     Types: 3 Cans of beer per week     Comment: social    Drug use: No    Sexual activity: Never     Current Outpatient Medications   Medication Sig Dispense Refill    aspirin (ECOTRIN) 81 MG EC tablet Take 81 mg by mouth once daily.       budesonide-glycopyr-formoterol (BREZTRI AEROSPHERE) 160-9-4.8 mcg/actuation HFAA Inhale into the lungs daily as needed.      clopidogreL (PLAVIX) 75 mg tablet       FARXIGA 5 mg Tab tablet Take 5 mg by mouth once daily.      finasteride (PROSCAR) 5 mg tablet Take 5 mg by mouth once daily.      fluticasone furoate-vilanteroL (BREO ELLIPTA) 100-25 mcg/dose diskus inhaler Inhale 1 puff into the lungs once daily. (DAILY CONTROLLER) 3 each 3    fluticasone propionate (FLONASE) 50 mcg/actuation nasal spray 1 SPRAY (50 MCG TOTAL) BY EACH NOSTRIL ROUTE ONCE DAILY. 16 mL 1    furosemide (LASIX) 40 MG tablet Take by mouth once daily.      ibuprofen (ADVIL,MOTRIN) 800 MG tablet Take 800 mg by mouth every 6 (six) hours as needed.      prasugreL (EFFIENT) 10 mg Tab Take 1 tablet (10 mg total) by mouth once daily. 30 tablet 11    tamsulosin (FLOMAX) 0.4 mg Cap TAKE ONE CAPSULE BY MOUTH DAILY AT 5 PM 90 capsule 11    traMADoL (ULTRAM) 50 mg tablet Take 1 tablet (50 mg total) by mouth every 8 (eight) hours as needed for Pain. 21 tablet 0    VENTOLIN HFA 90 mcg/actuation inhaler INHALE 1-2 PUFFS IN TO THE LUNGS EVERY 4 TO 6 HOURS AS NEEDED FOR WHEEZING & SHORTNESS OF BREATH (BULK) (RESCUE) 18 g 1    ZONTIVITY 2.08 mg Tab Take 1 tablet by mouth once daily.      atorvastatin (LIPITOR) 40 MG tablet Take 1 tablet (40 mg total) by mouth once daily. 90 tablet 0    cyanocobalamin, vitamin B-12, 2,000 mcg Tab Take 1 tablet by mouth once daily. 90 tablet 0    gabapentin (NEURONTIN) 300 MG capsule Take 1 capsule (300 mg total) by mouth every evening. 90 capsule 1    hydrALAZINE (APRESOLINE) 50 MG tablet Take 1 tablet (50 mg total) by mouth every 8 (eight) hours. (Patient taking differently: Take 50 mg by mouth every 12 (twelve) hours.) 90 tablet 0    isosorbide dinitrate (ISORDIL) 20 MG tablet Take 1 tablet (20 mg total) by mouth 3 (three) times daily. 90 tablet 0    levothyroxine (SYNTHROID) 100 MCG tablet Take 1 tablet (100 mcg total) by  mouth before breakfast. With no other meds or food 30 tablet 5    metoprolol succinate (TOPROL-XL) 25 MG 24 hr tablet Take 1 tablet (25 mg total) by mouth once daily. 90 tablet 0    sacubitriL-valsartan (ENTRESTO) 24-26 mg per tablet Take 1 tablet by mouth 2 (two) times daily. 180 tablet 0     No current facility-administered medications for this visit.     Review of patient's allergies indicates:  No Known Allergies   Past Medical History:   Diagnosis Date    Asthma     Cardiomyopathy 10/21/2014    CHF (congestive heart failure)     Coronary artery disease     CRF (chronic renal failure)     Diabetes mellitus     Enlarged prostate     Hyperlipidemia     Hypertension     Neuropathy      Past Surgical History:   Procedure Laterality Date    ANGIOGRAPHY OF INTERNAL MAMMARY VESSEL N/A 3/11/2022    Procedure: Angiogram Internal Mammary;  Surgeon: Hank Lujan MD;  Location: [facility] CATH/EP LAB;  Service: Cardiology;  Laterality: N/A;    CARDIAC CATHETERIZATION      CARDIAC VALVE SURGERY      CATHETERIZATION OF BOTH LEFT AND RIGHT HEART Left 3/11/2022    Procedure: CATHETERIZATION, HEART, BOTH LEFT AND RIGHT;  Surgeon: Hank Lujan MD;  Location: [facility] CATH/EP LAB;  Service: Cardiology;  Laterality: Left;    CORONARY ANGIOGRAPHY N/A 3/11/2022    Procedure: ANGIOGRAM, CORONARY ARTERY;  Surgeon: Hank Lujan MD;  Location: [facility] CATH/EP LAB;  Service: Cardiology;  Laterality: N/A;    CORONARY BYPASS GRAFT ANGIOGRAPHY  3/11/2022    Procedure: Bypass graft study;  Surgeon: Hank Lujan MD;  Location: [facility] CATH/EP LAB;  Service: Cardiology;;    CYSTOSCOPY N/A 7/30/2019    Procedure: CYSTOSCOPY;  Surgeon: Spencer Nguyen MD;  Location: [facility] OR;  Service: Urology;  Laterality: N/A;    INSERTION OF INTRAVASCULAR MICROAXIAL BLOOD PUMP N/A 8/31/2022    Procedure: INSERTION, IMPELLA;  Surgeon: Zach Jensen MD;  Location: [facility] CATH LAB;  Service: Cardiology;  Laterality: N/A;    PLACEMENT OF SWAN SEE CATHETER  "WITH IMAGING GUIDANCE  8/31/2022    Procedure: INSERTION, CATHETER, SWAN-SEE, WITH IMAGING GUIDANCE;  Surgeon: Zach Jensen MD;  Location: [facility] CATH LAB;  Service: Cardiology;;    SHOULDER ARTHROSCOPY  1985    TRANSRECTAL BIOPSY OF PROSTATE WITH ULTRASOUND GUIDANCE N/A 7/30/2019    Procedure: BIOPSY, PROSTATE, RECTAL APPROACH, WITH US GUIDANCE;  Surgeon: Spencer gNuyen MD;  Location: [facility] OR;  Service: Urology;  Laterality: N/A;  procedure not performed, pt unable to tolerate    TRANSRECTAL BIOPSY OF PROSTATE WITH ULTRASOUND GUIDANCE N/A 8/8/2019    Procedure: BIOPSY, PROSTATE, RECTAL APPROACH, WITH US GUIDANCE;  Surgeon: Spencer Nguyen MD;  Location: [facility] OR;  Service: Urology;  Laterality: N/A;       Review of Systems   Constitutional:  Positive for fatigue, malaise/fatigue and weight loss. Negative for activity change, appetite change and fever.   HENT:  Negative for congestion, ear pain, hearing loss, postnasal drip, sinus pressure, sinus pain, sneezing and sore throat.    Eyes:  Negative for photophobia and pain.   Respiratory:  Negative for cough, chest tightness, shortness of breath and wheezing.    Cardiovascular:  Negative for chest pain and leg swelling.        Follows with cardiology   Gastrointestinal:  Positive for abdominal distention (has been getting "fluid drained" off stomach every 2 weeks). Negative for abdominal pain, blood in stool, constipation, diarrhea, nausea and vomiting.        Following with specialist for cirrhosis/paracentesis   Endocrine: Negative for cold intolerance, heat intolerance, polydipsia and polyuria.   Genitourinary:  Positive for impotence. Negative for difficulty urinating, dysuria, flank pain, frequency, hematuria and urgency.   Musculoskeletal:  Positive for arthralgias and myalgias (can't have hernia repair at present d/t comorbid conditions). Negative for back pain, joint swelling and neck pain.   Skin:  Negative for pallor and rash. "   Allergic/Immunologic: Positive for environmental allergies. Negative for food allergies.   Neurological:  Negative for dizziness, weakness, light-headedness and headaches.   Hematological:  Does not bruise/bleed easily.   Psychiatric/Behavioral:  Negative for confusion, decreased concentration, dysphoric mood, hallucinations and sleep disturbance. The patient is not nervous/anxious.     OBJECTIVE:      Vitals:    05/29/23 1003   BP: 126/62   BP Location: Right arm   Patient Position: Sitting   BP Method: Large (Manual)   Pulse: 61   SpO2: 99%   Weight: 78.4 kg (172 lb 14.4 oz)   Height: 6' (1.829 m)     Physical Exam  Vitals and nursing note reviewed.   Constitutional:       General: He is not in acute distress.     Appearance: Normal appearance. He is well-developed, well-groomed and normal weight.      Comments: 8# loss since February visit   HENT:      Head: Normocephalic and atraumatic.      Right Ear: Hearing normal.      Left Ear: Hearing normal.      Nose: Nose normal. No rhinorrhea.   Eyes:      General: Lids are normal.         Right eye: No discharge.         Left eye: No discharge.      Conjunctiva/sclera: Conjunctivae normal.      Right eye: Right conjunctiva is not injected.      Left eye: Left conjunctiva is not injected.      Pupils: Pupils are equal, round, and reactive to light. Pupils are equal.      Right eye: Pupil is round and reactive.      Left eye: Pupil is round and reactive.   Neck:      Thyroid: No thyromegaly.      Vascular: No JVD.      Trachea: Trachea normal. No tracheal deviation.   Cardiovascular:      Rate and Rhythm: Normal rate and regular rhythm.      Pulses:           Radial pulses are 2+ on the right side and 2+ on the left side.      Heart sounds: Normal heart sounds. No murmur heard.    No friction rub. No gallop.   Pulmonary:      Effort: Pulmonary effort is normal. No respiratory distress.      Breath sounds: Normal breath sounds. No stridor. No decreased breath sounds,  wheezing, rhonchi or rales.   Abdominal:      General: Abdomen is protuberant. Bowel sounds are normal. There is distension.      Palpations: Abdomen is soft. Abdomen is not rigid.      Tenderness: There is no abdominal tenderness. There is no guarding.      Hernia: A hernia is present. Hernia is present in the right inguinal area.   Musculoskeletal:         General: Normal range of motion.      Cervical back: Normal range of motion and neck supple.   Lymphadenopathy:      Cervical: No cervical adenopathy.   Skin:     General: Skin is warm and dry.      Capillary Refill: Capillary refill takes less than 2 seconds.      Coloration: Skin is not pale.      Findings: No lesion or rash.   Neurological:      Mental Status: He is alert and oriented to person, place, and time.      GCS: GCS eye subscore is 4. GCS verbal subscore is 5. GCS motor subscore is 6.      Cranial Nerves: Cranial nerves 2-12 are intact.      Sensory: Sensation is intact.      Motor: Motor function is intact. No atrophy.      Coordination: Coordination is intact. Coordination normal.      Gait: Gait is intact. Gait normal.   Psychiatric:         Attention and Perception: Attention normal. He is attentive.         Mood and Affect: Mood and affect normal.         Speech: Speech normal.         Behavior: Behavior normal. Behavior is cooperative.         Thought Content: Thought content normal.         Cognition and Memory: Cognition normal.         Judgment: Judgment normal.      Assessment:       1. Type 2 diabetes mellitus without complication, without long-term current use of insulin    2. Primary hypertension    3. Chronic systolic heart failure    4. Mixed hyperlipidemia    5. Stage 3b chronic kidney disease    6. Low vitamin B12 level    7. Hypothyroidism, unspecified type    8. Elevated PSA, between 10 and less than 20 ng/ml        Plan:       Type 2 diabetes mellitus without complication, without long-term current use of insulin  -      gabapentin (NEURONTIN) 300 MG capsule; Take 1 capsule (300 mg total) by mouth every evening.  Dispense: 90 capsule; Refill: 1    Primary hypertension  -     metoprolol succinate (TOPROL-XL) 25 MG 24 hr tablet; Take 1 tablet (25 mg total) by mouth once daily.  Dispense: 90 tablet; Refill: 0  -     sacubitriL-valsartan (ENTRESTO) 24-26 mg per tablet; Take 1 tablet by mouth 2 (two) times daily.  Dispense: 180 tablet; Refill: 0    Chronic systolic heart failure  -     sacubitriL-valsartan (ENTRESTO) 24-26 mg per tablet; Take 1 tablet by mouth 2 (two) times daily.  Dispense: 180 tablet; Refill: 0    Mixed hyperlipidemia  -     atorvastatin (LIPITOR) 40 MG tablet; Take 1 tablet (40 mg total) by mouth once daily.  Dispense: 90 tablet; Refill: 0    Stage 3b chronic kidney disease  -     Ambulatory referral/consult to Nephrology; Future; Expected date: 06/05/2023    Low vitamin B12 level  -     cyanocobalamin, vitamin B-12, 2,000 mcg Tab; Take 1 tablet by mouth once daily.  Dispense: 90 tablet; Refill: 0  -     Vitamin B12; Future; Expected date: 08/29/2023    Hypothyroidism, unspecified type  -     levothyroxine (SYNTHROID) 100 MCG tablet; Take 1 tablet (100 mcg total) by mouth before breakfast. With no other meds or food  Dispense: 30 tablet; Refill: 5  -     TSH; Future; Expected date: 08/29/2023    Elevated PSA, between 10 and less than 20 ng/ml  -     Ambulatory referral/consult to Urology; Future; Expected date: 06/05/2023            Follow up in about 3 months (around 8/29/2023) for TSH.         5/29/2023 KAMERON Rodriguez, FNP-C

## 2023-06-05 ENCOUNTER — HOSPITAL ENCOUNTER (OUTPATIENT)
Dept: INTERVENTIONAL RADIOLOGY/VASCULAR | Facility: HOSPITAL | Age: 73
Discharge: HOME | End: 2023-06-05
Attending: INTERNAL MEDICINE
Payer: MEDICARE

## 2023-06-05 VITALS
OXYGEN SATURATION: 100 % | RESPIRATION RATE: 18 BRPM | DIASTOLIC BLOOD PRESSURE: 60 MMHG | HEART RATE: 63 BPM | SYSTOLIC BLOOD PRESSURE: 121 MMHG

## 2023-06-05 DIAGNOSIS — R18.8 OTHER ASCITES: ICD-10-CM

## 2023-06-05 LAB
APPEARANCE FLD: NORMAL
BODY FLD TYPE: NORMAL
COLOR FLD: YELLOW
LYMPHOCYTES NFR FLD MANUAL: 46 %
MESOTHL CELL NFR FLD MANUAL: 13 %
MONOS+MACROS NFR FLD MANUAL: 33 %
NEUTROPHILS NFR FLD MANUAL: 8 %
WBC # FLD: 139 /CU MM

## 2023-06-05 PROCEDURE — 49083 ABD PARACENTESIS W/IMAGING: CPT | Performed by: RADIOLOGY

## 2023-06-05 PROCEDURE — 49083 IR PARACENTESIS WITH IMAGING: ICD-10-PCS | Mod: ,,, | Performed by: RADIOLOGY

## 2023-06-05 PROCEDURE — 63600175 PHARM REV CODE 636 W HCPCS: Mod: JZ | Performed by: RADIOLOGY

## 2023-06-05 PROCEDURE — P9047 ALBUMIN (HUMAN), 25%, 50ML: HCPCS | Mod: JZ | Performed by: RADIOLOGY

## 2023-06-05 PROCEDURE — 89051 BODY FLUID CELL COUNT: CPT | Performed by: INTERNAL MEDICINE

## 2023-06-05 RX ORDER — ALBUMIN HUMAN 250 G/1000ML
50 SOLUTION INTRAVENOUS ONCE
Status: COMPLETED | OUTPATIENT
Start: 2023-06-05 | End: 2023-06-05

## 2023-06-05 RX ADMIN — ALBUMIN (HUMAN) 75 G: 12.5 SOLUTION INTRAVENOUS at 11:06

## 2023-06-05 NOTE — NURSING
PARACENTESIS    IR ultrasound guided paracentesis performed by Dr. Crowell.  Procedure explained by radiologist and standing consent verified.  Time out performed.  9 L removed-   Patient received 75 g of albumin IV.  VS remained stable for duration of procedure.  Specimens collected and sent to lab if ordered.   Para and IV d/c'd, with tip intact.   Access site cleaned with peroxide, derma bond and steri-strips applied, then covered with sterile Band-Aid.   Pt discharge home in stable condition.

## 2023-06-08 NOTE — PROGRESS NOTES
[facility] Urology New Patient/H&P:     Baldo Carney is a 72 y.o. male who presents for evaluation of elevated psa at referral of FRANCOIS Hayes    Initially referred by FRANCOIS Hayes 2019 for eval of urinary frequency with elevated psa. Also has DM/HTN. PSA noted to be 16.7 on 1/10/19, previously 36.9 on 3/28/18, and 13.9 in 12/2014  Issues with memory, poor historian, did not recall prior urologic eval  2014 Dr Edwards: Past psa elevations, and per Dr Hernandez's note his PSA is 27. Had been on abx in the past but not sure why. Sounds like he was going to have a biopsy in [place] a few months ago but patient canceled procedure. He then had prostate biopsy planned 10/21/14 but was canceled due to severe blood pressure elevations and was advised to see primary care provider and return for biopsy but never did. He did then see NP O'Courtney on 5/24/18 when psa reached 36.9 and c/o LUTS-urinary frequency, urgency and weak urine stream and NTF x4-5. PVR 178cc.  He was given 21 days of Bactrim and started on Flomax 0.4 mg daily.     On my initial eval, had AUA SS 14/3 (3:  Frequency, straining, sleeping; 2:  Emptying; 1:  Intermittency, weak stream). . 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 No s/s UTI/prostatitis  Cysto 7/30/19: Significant trilobar obstruction with significant intravesical extension with multilobed median lobe occupying majority of bladder lumen with kissing lateral lobe obstruction. Moderate bladder trabeculations - couldn't tolerate passage of TRUS probe so taken to OR 8/8/19 for TRUS/prostate biopsy. TRUS volume 180cm3 (W 73mm, H 59.5mm, L 79.1mm)  Increased flomax to 0.8mg daily  PATHOLOGY: 14 cores BPH     8/26/19: Since doubling his Flomax he has had slight improvement in his obstructive lower urinary tract symptoms   AUA symptom score 10-11/3 (a 3:  Emptying; 2-3:  Sleeping; 2:  Frequency,  weak stream; 1:  Intermittency). Postvoid residual by bladder scan is 90 cc  Saw Dr. Liang to discuss suprapubic prostatectomy but advise he was not too bothered by his symptoms.  Emptying was reasonable and no hydronephrosis.  5/26/20 last visit with NP noted to have AUA symptom score 4/1 on finasteride, unable to void at visit but only had a bladder scan of 96 cc    He returns today noting   PSA hx   Reference Range 03/28/18 07:32 01/10/19 09:45 10/16/19 09:41 09/17/20 08:27 02/04/21 09:40 05/17/22 07:48 05/26/23 11:27   PSA, Screen 0.00 - 4.00  36.9 (H) 16.7 (H) 18.2 (H) 24.3 (H) 11.0 (H) 8.7 (H) 10.0 (H)   AUA SS: 4/1 (2: Sleeping; 1: Emptying, intermittency, 0 all other symptoms)  PVR noted to be 199 cc by bladder scan today, however on review he has been dealing with ascites and has paracentesis every 2 weeks, with liver biopsy demonstrating stage III fibrosis and followed by hepatology.  He is also dealing with ischemic cardiomyopathy and also had cardiac stents 2 weeks ago.  He reports compliance with Flomax, which was recently refilled by primary care.  He is unsure if he is taking finasteride.  It is listed on chart with old fill, and he is unsure.    Past Medical History:   Diagnosis Date    Asthma     Cardiomyopathy 10/21/2014    CHF (congestive heart failure)     Coronary artery disease     CRF (chronic renal failure)     Diabetes mellitus     Enlarged prostate     Hyperlipidemia     Hypertension     Neuropathy        Past Surgical History:   Procedure Laterality Date    ANGIOGRAPHY OF INTERNAL MAMMARY VESSEL N/A 3/11/2022    Procedure: Angiogram Internal Mammary;  Surgeon: Hank Lujan MD;  Location: [facility] CATH/EP LAB;  Service: Cardiology;  Laterality: N/A;    CARDIAC CATHETERIZATION      CARDIAC VALVE SURGERY      CATHETERIZATION OF BOTH LEFT AND RIGHT HEART Left 3/11/2022    Procedure: CATHETERIZATION, HEART, BOTH LEFT AND RIGHT;  Surgeon: Hank Lujan MD;  Location: [facility] CATH/EP LAB;   Service: Cardiology;  Laterality: Left;    CORONARY ANGIOGRAPHY N/A 3/11/2022    Procedure: ANGIOGRAM, CORONARY ARTERY;  Surgeon: Hank Lujan MD;  Location: [facility] CATH/EP LAB;  Service: Cardiology;  Laterality: N/A;    CORONARY BYPASS GRAFT ANGIOGRAPHY  3/11/2022    Procedure: Bypass graft study;  Surgeon: Hank Lujan MD;  Location: [facility] CATH/EP LAB;  Service: Cardiology;;    CYSTOSCOPY N/A 7/30/2019    Procedure: CYSTOSCOPY;  Surgeon: Spencer Nguyen MD;  Location: [facility];  Service: Urology;  Laterality: N/A;    INSERTION OF INTRAVASCULAR MICROAXIAL BLOOD PUMP N/A 8/31/2022    Procedure: INSERTION, IMPELLA;  Surgeon: Zach Jensen MD;  Location: [facility] CATH LAB;  Service: Cardiology;  Laterality: N/A;    PLACEMENT OF SWAN SEE CATHETER WITH IMAGING GUIDANCE  8/31/2022    Procedure: INSERTION, CATHETER, SWAN-SEE, WITH IMAGING GUIDANCE;  Surgeon: Zach Jensen MD;  Location: [facility] CATH LAB;  Service: Cardiology;;    SHOULDER ARTHROSCOPY  1985    TRANSRECTAL BIOPSY OF PROSTATE WITH ULTRASOUND GUIDANCE N/A 7/30/2019    Procedure: BIOPSY, PROSTATE, RECTAL APPROACH, WITH US GUIDANCE;  Surgeon: Spencer Ngueyn MD;  Location: [facility];  Service: Urology;  Laterality: N/A;  procedure not performed, pt unable to tolerate    TRANSRECTAL BIOPSY OF PROSTATE WITH ULTRASOUND GUIDANCE N/A 8/8/2019    Procedure: BIOPSY, PROSTATE, RECTAL APPROACH, WITH US GUIDANCE;  Surgeon: Spencer Nguyen MD;  Location: [facility];  Service: Urology;  Laterality: N/A;       Family History   Problem Relation Age of Onset    No Known Problems Mother     Diabetes Father     Hypertension Father     Heart attack Father     Heart disease Father     Diabetes Sister     Heart disease Brother     Diabetes Brother     No Known Problems Maternal Grandmother     No Known Problems Maternal Grandfather     No Known Problems Paternal Grandmother     No Known Problems Paternal Grandfather     No Known Problems Maternal Aunt     No Known  Problems Maternal Uncle     No Known Problems Paternal Aunt     No Known Problems Paternal Uncle     Anemia Neg Hx     Arrhythmia Neg Hx     Asthma Neg Hx     Clotting disorder Neg Hx     Fainting Neg Hx     Heart failure Neg Hx     Hyperlipidemia Neg Hx     Glaucoma Neg Hx        Social History     Socioeconomic History    Marital status: Legally    Tobacco Use    Smoking status: Former     Types: Cigarettes     Quit date: 1970     Years since quittin.0    Smokeless tobacco: Never   Substance and Sexual Activity    Alcohol use: Not Currently     Alcohol/week: 3.0 standard drinks     Types: 3 Cans of beer per week     Comment: social    Drug use: No    Sexual activity: Never       Review of patient's allergies indicates:   Allergen Reactions    Invokana  [canagliflozin]      Other reaction(s): been very dizzy       Medications Reviewed: see MAR    Focused Physical Exam    Vitals:    23 1019   BP: (!) 121/57   Pulse: 66     Body mass index is 22.29 kg/m². Weight: 74.6 kg (164 lb 5.7 oz) Height: 6' (182.9 cm)       Abdomen: Soft, non-tender, nondistended, no CVA tenderness  :  circ normal phallus without plaques/lesions, orthotopic urethral meatus, bilaterally desc testes in normal scrotum without mass or lesion  NEHAL: normal sphincter tone, no masses, no hemmorrhoids   PROSTATE: 15-35g, no nodules, non-tender, symmetrical.       LABS:    Recent Results (from the past 336 hour(s))   Comprehensive Metabolic Panel    Collection Time: 23 11:27 AM   Result Value Ref Range    Sodium 137 136 - 145 mmol/L    Potassium 4.2 3.5 - 5.1 mmol/L    Chloride 109 95 - 110 mmol/L    CO2 23 23 - 29 mmol/L    Glucose 81 70 - 110 mg/dL    BUN 25 (H) 8 - 23 mg/dL    Creatinine 1.9 (H) 0.5 - 1.4 mg/dL    Calcium 8.3 (L) 8.7 - 10.5 mg/dL    Total Protein 6.0 6.0 - 8.4 g/dL    Albumin 2.9 (L) 3.5 - 5.2 g/dL    Total Bilirubin 0.7 0.1 - 1.0 mg/dL    Alkaline Phosphatase 57 55 - 135 U/L    AST 20 10 - 40 U/L     ALT 14 10 - 44 U/L    Anion Gap 5 (L) 8 - 16 mmol/L    eGFR 37.0 (A) >60 mL/min/1.73 m^2   TSH    Collection Time: 05/26/23 11:27 AM   Result Value Ref Range    TSH 5.910 (H) 0.340 - 5.600 uIU/mL   CBC Auto Differential    Collection Time: 05/26/23 11:27 AM   Result Value Ref Range    WBC 5.67 3.90 - 12.70 K/uL    RBC 3.33 (L) 4.60 - 6.20 M/uL    Hemoglobin 9.5 (L) 14.0 - 18.0 g/dL    Hematocrit 30.3 (L) 40.0 - 54.0 %    MCV 91 82 - 98 fL    MCH 28.5 27.0 - 31.0 pg    MCHC 31.4 (L) 32.0 - 36.0 g/dL    RDW 17.2 (H) 11.5 - 14.5 %    Platelets 213 150 - 450 K/uL    MPV 11.0 9.2 - 12.9 fL    Immature Granulocytes 0.5 0.0 - 0.5 %    Gran # (ANC) 4.3 1.8 - 7.7 K/uL    Immature Grans (Abs) 0.03 0.00 - 0.04 K/uL    Lymph # 0.8 (L) 1.0 - 4.8 K/uL    Mono # 0.4 0.3 - 1.0 K/uL    Eos # 0.2 0.0 - 0.5 K/uL    Baso # 0.06 0.00 - 0.20 K/uL    nRBC 0 0 /100 WBC    Gran % 75.3 (H) 38.0 - 73.0 %    Lymph % 13.8 (L) 18.0 - 48.0 %    Mono % 6.7 4.0 - 15.0 %    Eosinophil % 2.6 0.0 - 8.0 %    Basophil % 1.1 0.0 - 1.9 %    Differential Method Automated    Vitamin B12    Collection Time: 05/26/23 11:27 AM   Result Value Ref Range    Vitamin B-12 261 210 - 950 pg/mL   Iron    Collection Time: 05/26/23 11:27 AM   Result Value Ref Range    Iron 27 (L) 45 - 160 ug/dL   Ferritin    Collection Time: 05/26/23 11:27 AM   Result Value Ref Range    Ferritin 88 20.0 - 300.0 ng/mL   PSA, Screening    Collection Time: 05/26/23 11:27 AM   Result Value Ref Range    PSA, Screen 10.0 (H) 0.00 - 4.00 ng/mL   Lipid Panel    Collection Time: 05/26/23 11:27 AM   Result Value Ref Range    Cholesterol 79 (L) 120 - 199 mg/dL    Triglycerides 31 30 - 150 mg/dL    HDL 38 (L) 40 - 75 mg/dL    LDL Cholesterol 34.8 (L) 63.0 - 159.0 mg/dL    HDL/Cholesterol Ratio 48.1 20.0 - 50.0 %    Total Cholesterol/HDL Ratio 2.1 2.0 - 5.0    Non-HDL Cholesterol 41 mg/dL   T4, Free    Collection Time: 05/26/23 11:27 AM   Result Value Ref Range    Free T4 0.89 0.71 - 1.51 ng/dL    BNP    Collection Time: 05/26/23 11:28 AM   Result Value Ref Range    BNP 3,230 (H) 0 - 99 pg/mL   Microalbumin/Creatinine Ratio, Urine    Collection Time: 05/26/23 12:25 PM   Result Value Ref Range    Microalbumin, Urine 16.7 <19.9 ug/mL    Creatinine, Urine 104.0 23.0 - 375.0 mg/dL    Microalb/Creat Ratio 16.1 0.0 - 30.0 ug/mg   WBC & Diff,Body Fluid Ascites    Collection Time: 06/05/23 11:45 AM   Result Value Ref Range    Body Fluid Type Ascites     Fluid Appearance Hazy     Fluid Color Yellow     WBC, Body Fluid 139 /cu mm    Segs, Fluid 8 %    Lymphs, Fluid 46 %    Monocytes/Macrophages, Fluid 33 %    Mesothelial Cells, Fluid 13 %   POCT Bladder Scan    Collection Time: 06/09/23 10:30 AM   Result Value Ref Range    POC Residual Urine Volume 199 (A) 0 - 100 mL   POCT Urinalysis(Instrument)    Collection Time: 06/09/23 10:32 AM   Result Value Ref Range    Color, POC UA Yellow Yellow, Straw, Colorless    Clarity, POC UA Clear Clear    Glucose, POC  (A) Negative    Bilirubin, POC UA Negative Negative    Ketones, POC UA Negative Negative    Spec Grav POC UA 1.010 1.005 - 1.030    Blood, POC UA Negative Negative    pH, POC UA 6.0 5.0 - 8.0    Protein, POC UA Negative Negative    Urobilinogen, POC UA 2.0 <=1.0    Nitrite, POC UA Negative Negative    WBC, POC UA Negative Negative         Assessment/Diagnosis:    1. BPH with elevated PSA        2. Elevated PSA, between 10 and less than 20 ng/ml  Ambulatory referral/consult to Urology    POCT Urinalysis(Instrument)    POCT Bladder Scan          Plans:  Extensive review of medical record and interim medical history, as well as past urologic workup and evaluation as well as PSA history as referred by primary care.  We did again discuss that his PSA is likely secondary to his significant prostatomegaly, based on prior workup with known 180 g gland at that time, now likely increased in size, though unknown if on finasteride.  Has been compliant with Flomax.  On  medical therapy has good urinary quality of life.  A bladder scan may not be accurate in the setting of his significant ascites for which he is undergoing paracentesis, and if there are emptying concerns in the future or PVR was to remain elevated on bladder scan, a formal ultrasound pre and postvoid could be obtained.  However in the meantime, we discussed possible increase in his Flomax dose if that was the case, but he would like to remain on his current dose as he feels well.  He was not sure if he is on finasteride so I did refill this as well today.  We again noted the concerns of elevated PSA yielding possible underlying does notice of prostate cancer.  While he had negative biopsy in the past in his PSA density was normal for a significant prostatomegaly, certainly with age greater than 70, could not completely rule out, and discuss the natural history of prostate cancer with aging.  In the setting of his known BPH, significantly enlarged prostate, negative prostate biopsy in the last few years, as well as his significant comorbidities with liver fibrosis and liver failure and cardiac disease with CAD status post stents and ischemic cardiomyopathy/CHF, would be unlikely to treat prostate cancer diagnosed in his comorbidities place him at greater risk, and again workup has been benign in the past, and would discontinue PSA screening, and discontinue to follow him from a standpoint of LUTS.  As he is doing well on medical therapy with no bothersome lower urinary tract concerns, can resume annual follow-up, NP okay, for re-evaluation of LUTS and emptying after continuing flomax and finasteride for dual medical therapy.    Level of Medical Decision Making  [ _ ]  Low: 2 minor/1 stable chronic/1 acute uncomplicated --- review record/result/order test x2  [ X ]  Mod: 1 chronic exac/2stable chronic/1 acute comp --- review record/result/order test x3 OR independent interp of outside test OR discuss mgmt of outside  ordered test --- start drug therapy/plan minor surgery   [ _ ]  High: chronic with exacerbation/progression or trtmnt side effect vs acute/chronic w/ life/body threat ---  (2/3) review record/result/order test x3 OR independent interp of ouutside test OR discuss mgmt of outside ordered test --- drug with monitoring for toxicity, plan major surgery, hospitalize, deescalate/withdraw care bc of prognosis

## 2023-06-09 ENCOUNTER — OFFICE VISIT (OUTPATIENT)
Dept: UROLOGY | Facility: CLINIC | Age: 73
End: 2023-06-09
Payer: MEDICARE

## 2023-06-09 VITALS
BODY MASS INDEX: 22.26 KG/M2 | HEART RATE: 66 BPM | WEIGHT: 164.38 LBS | SYSTOLIC BLOOD PRESSURE: 121 MMHG | DIASTOLIC BLOOD PRESSURE: 57 MMHG | HEIGHT: 72 IN

## 2023-06-09 DIAGNOSIS — R97.20 BPH WITH ELEVATED PSA: Primary | ICD-10-CM

## 2023-06-09 DIAGNOSIS — R97.20 ELEVATED PSA, BETWEEN 10 AND LESS THAN 20 NG/ML: ICD-10-CM

## 2023-06-09 DIAGNOSIS — N40.0 BPH WITH ELEVATED PSA: Primary | ICD-10-CM

## 2023-06-09 LAB
BILIRUBIN, UA POC OHS: NEGATIVE
BLOOD, UA POC OHS: NEGATIVE
CLARITY, UA POC OHS: CLEAR
COLOR, UA POC OHS: YELLOW
GLUCOSE, UA POC OHS: 500
KETONES, UA POC OHS: NEGATIVE
LEUKOCYTES, UA POC OHS: NEGATIVE
NITRITE, UA POC OHS: NEGATIVE
PH, UA POC OHS: 6
POC RESIDUAL URINE VOLUME: 199 ML (ref 0–100)
PROTEIN, UA POC OHS: NEGATIVE
SPECIFIC GRAVITY, UA POC OHS: 1.01
UROBILINOGEN, UA POC OHS: 2

## 2023-06-09 PROCEDURE — 1157F ADVNC CARE PLAN IN RCRD: CPT | Mod: CPTII,S$GLB,, | Performed by: UROLOGY

## 2023-06-09 PROCEDURE — 99999 PR PBB SHADOW E&M-EST. PATIENT-LVL IV: ICD-10-PCS | Mod: PBBFAC,,, | Performed by: UROLOGY

## 2023-06-09 PROCEDURE — 3061F NEG MICROALBUMINURIA REV: CPT | Mod: CPTII,S$GLB,, | Performed by: UROLOGY

## 2023-06-09 PROCEDURE — 81003 URINALYSIS AUTO W/O SCOPE: CPT | Mod: QW,S$GLB,, | Performed by: UROLOGY

## 2023-06-09 PROCEDURE — 3008F BODY MASS INDEX DOCD: CPT | Mod: CPTII,S$GLB,, | Performed by: UROLOGY

## 2023-06-09 PROCEDURE — 99999 PR PBB SHADOW E&M-EST. PATIENT-LVL IV: CPT | Mod: PBBFAC,,, | Performed by: UROLOGY

## 2023-06-09 PROCEDURE — 81003 POCT URINALYSIS(INSTRUMENT): ICD-10-PCS | Mod: QW,S$GLB,, | Performed by: UROLOGY

## 2023-06-09 PROCEDURE — 3044F HG A1C LEVEL LT 7.0%: CPT | Mod: CPTII,S$GLB,, | Performed by: UROLOGY

## 2023-06-09 PROCEDURE — 3078F PR MOST RECENT DIASTOLIC BLOOD PRESSURE < 80 MM HG: ICD-10-PCS | Mod: CPTII,S$GLB,, | Performed by: UROLOGY

## 2023-06-09 PROCEDURE — 1101F PT FALLS ASSESS-DOCD LE1/YR: CPT | Mod: CPTII,S$GLB,, | Performed by: UROLOGY

## 2023-06-09 PROCEDURE — 3066F NEPHROPATHY DOC TX: CPT | Mod: CPTII,S$GLB,, | Performed by: UROLOGY

## 2023-06-09 PROCEDURE — 3078F DIAST BP <80 MM HG: CPT | Mod: CPTII,S$GLB,, | Performed by: UROLOGY

## 2023-06-09 PROCEDURE — 51798 US URINE CAPACITY MEASURE: CPT | Mod: S$GLB,,, | Performed by: UROLOGY

## 2023-06-09 PROCEDURE — 4010F ACE/ARB THERAPY RXD/TAKEN: CPT | Mod: CPTII,S$GLB,, | Performed by: UROLOGY

## 2023-06-09 PROCEDURE — 99204 PR OFFICE/OUTPT VISIT, NEW, LEVL IV, 45-59 MIN: ICD-10-PCS | Mod: S$GLB,,, | Performed by: UROLOGY

## 2023-06-09 PROCEDURE — 4010F PR ACE/ARB THEARPY RXD/TAKEN: ICD-10-PCS | Mod: CPTII,S$GLB,, | Performed by: UROLOGY

## 2023-06-09 PROCEDURE — 3288F PR FALLS RISK ASSESSMENT DOCUMENTED: ICD-10-PCS | Mod: CPTII,S$GLB,, | Performed by: UROLOGY

## 2023-06-09 PROCEDURE — 1160F RVW MEDS BY RX/DR IN RCRD: CPT | Mod: CPTII,S$GLB,, | Performed by: UROLOGY

## 2023-06-09 PROCEDURE — 1126F AMNT PAIN NOTED NONE PRSNT: CPT | Mod: CPTII,S$GLB,, | Performed by: UROLOGY

## 2023-06-09 PROCEDURE — 3066F PR DOCUMENTATION OF TREATMENT FOR NEPHROPATHY: ICD-10-PCS | Mod: CPTII,S$GLB,, | Performed by: UROLOGY

## 2023-06-09 PROCEDURE — 3044F PR MOST RECENT HEMOGLOBIN A1C LEVEL <7.0%: ICD-10-PCS | Mod: CPTII,S$GLB,, | Performed by: UROLOGY

## 2023-06-09 PROCEDURE — 3072F PR LOW RISK FOR RETINOPATHY: ICD-10-PCS | Mod: CPTII,S$GLB,, | Performed by: UROLOGY

## 2023-06-09 PROCEDURE — 1126F PR PAIN SEVERITY QUANTIFIED, NO PAIN PRESENT: ICD-10-PCS | Mod: CPTII,S$GLB,, | Performed by: UROLOGY

## 2023-06-09 PROCEDURE — 1157F PR ADVANCE CARE PLAN OR EQUIV PRESENT IN MEDICAL RECORD: ICD-10-PCS | Mod: CPTII,S$GLB,, | Performed by: UROLOGY

## 2023-06-09 PROCEDURE — 3008F PR BODY MASS INDEX (BMI) DOCUMENTED: ICD-10-PCS | Mod: CPTII,S$GLB,, | Performed by: UROLOGY

## 2023-06-09 PROCEDURE — 1101F PR PT FALLS ASSESS DOC 0-1 FALLS W/OUT INJ PAST YR: ICD-10-PCS | Mod: CPTII,S$GLB,, | Performed by: UROLOGY

## 2023-06-09 PROCEDURE — 1159F PR MEDICATION LIST DOCUMENTED IN MEDICAL RECORD: ICD-10-PCS | Mod: CPTII,S$GLB,, | Performed by: UROLOGY

## 2023-06-09 PROCEDURE — 3288F FALL RISK ASSESSMENT DOCD: CPT | Mod: CPTII,S$GLB,, | Performed by: UROLOGY

## 2023-06-09 PROCEDURE — 1160F PR REVIEW ALL MEDS BY PRESCRIBER/CLIN PHARMACIST DOCUMENTED: ICD-10-PCS | Mod: CPTII,S$GLB,, | Performed by: UROLOGY

## 2023-06-09 PROCEDURE — 1159F MED LIST DOCD IN RCRD: CPT | Mod: CPTII,S$GLB,, | Performed by: UROLOGY

## 2023-06-09 PROCEDURE — 51798 POCT BLADDER SCAN: ICD-10-PCS | Mod: S$GLB,,, | Performed by: UROLOGY

## 2023-06-09 PROCEDURE — 3074F SYST BP LT 130 MM HG: CPT | Mod: CPTII,S$GLB,, | Performed by: UROLOGY

## 2023-06-09 PROCEDURE — 3061F PR NEG MICROALBUMINURIA RESULT DOCUMENTED/REVIEW: ICD-10-PCS | Mod: CPTII,S$GLB,, | Performed by: UROLOGY

## 2023-06-09 PROCEDURE — 3072F LOW RISK FOR RETINOPATHY: CPT | Mod: CPTII,S$GLB,, | Performed by: UROLOGY

## 2023-06-09 PROCEDURE — 99204 OFFICE O/P NEW MOD 45 MIN: CPT | Mod: S$GLB,,, | Performed by: UROLOGY

## 2023-06-09 PROCEDURE — 3074F PR MOST RECENT SYSTOLIC BLOOD PRESSURE < 130 MM HG: ICD-10-PCS | Mod: CPTII,S$GLB,, | Performed by: UROLOGY

## 2023-06-09 RX ORDER — FINASTERIDE 5 MG/1
5 TABLET, FILM COATED ORAL DAILY
Qty: 90 TABLET | Refills: 4 | Status: SHIPPED | OUTPATIENT
Start: 2023-06-09

## 2023-06-20 ENCOUNTER — HOSPITAL ENCOUNTER (OUTPATIENT)
Dept: INTERVENTIONAL RADIOLOGY/VASCULAR | Facility: HOSPITAL | Age: 73
Discharge: HOME | End: 2023-06-20
Attending: INTERNAL MEDICINE
Payer: MEDICARE

## 2023-06-20 VITALS
HEART RATE: 72 BPM | OXYGEN SATURATION: 99 % | SYSTOLIC BLOOD PRESSURE: 154 MMHG | RESPIRATION RATE: 18 BRPM | DIASTOLIC BLOOD PRESSURE: 73 MMHG

## 2023-06-20 DIAGNOSIS — R18.8 OTHER ASCITES: ICD-10-CM

## 2023-06-20 LAB
APPEARANCE FLD: CLEAR
BASOPHILS # BLD AUTO: 0.06 K/UL (ref 0–0.2)
BASOPHILS NFR BLD: 1.1 % (ref 0–1.9)
BODY FLD TYPE: NORMAL
COLOR FLD: YELLOW
DIFFERENTIAL METHOD: ABNORMAL
EOSINOPHIL # BLD AUTO: 0.1 K/UL (ref 0–0.5)
EOSINOPHIL NFR BLD: 1.6 % (ref 0–8)
ERYTHROCYTE [DISTWIDTH] IN BLOOD BY AUTOMATED COUNT: 16.6 % (ref 11.5–14.5)
HCT VFR BLD AUTO: 28.9 % (ref 40–54)
HGB BLD-MCNC: 9.6 G/DL (ref 14–18)
IMM GRANULOCYTES # BLD AUTO: 0.02 K/UL (ref 0–0.04)
IMM GRANULOCYTES NFR BLD AUTO: 0.4 % (ref 0–0.5)
INR PPP: 1.1 (ref 0.8–1.2)
LYMPHOCYTES # BLD AUTO: 0.7 K/UL (ref 1–4.8)
LYMPHOCYTES NFR BLD: 12.1 % (ref 18–48)
LYMPHOCYTES NFR FLD MANUAL: 35 %
MCH RBC QN AUTO: 29.2 PG (ref 27–31)
MCHC RBC AUTO-ENTMCNC: 33.2 G/DL (ref 32–36)
MCV RBC AUTO: 88 FL (ref 82–98)
MESOTHL CELL NFR FLD MANUAL: 4 %
MONOCYTES # BLD AUTO: 0.4 K/UL (ref 0.3–1)
MONOCYTES NFR BLD: 7.6 % (ref 4–15)
MONOS+MACROS NFR FLD MANUAL: 52 %
NEUTROPHILS # BLD AUTO: 4.4 K/UL (ref 1.8–7.7)
NEUTROPHILS NFR BLD: 77.2 % (ref 38–73)
NEUTROPHILS NFR FLD MANUAL: 9 %
NRBC BLD-RTO: 0 /100 WBC
PLATELET # BLD AUTO: 213 K/UL (ref 150–450)
PMV BLD AUTO: 10.8 FL (ref 9.2–12.9)
PROTHROMBIN TIME: 11.4 SEC (ref 9–12.5)
RBC # BLD AUTO: 3.29 M/UL (ref 4.6–6.2)
WBC # BLD AUTO: 5.68 K/UL (ref 3.9–12.7)
WBC # FLD: 162 /CU MM

## 2023-06-20 PROCEDURE — 85025 COMPLETE CBC W/AUTO DIFF WBC: CPT | Performed by: PHYSICIAN ASSISTANT

## 2023-06-20 PROCEDURE — 89051 BODY FLUID CELL COUNT: CPT | Performed by: INTERNAL MEDICINE

## 2023-06-20 PROCEDURE — P9047 ALBUMIN (HUMAN), 25%, 50ML: HCPCS | Mod: JZ | Performed by: PHYSICIAN ASSISTANT

## 2023-06-20 PROCEDURE — 85610 PROTHROMBIN TIME: CPT | Performed by: PHYSICIAN ASSISTANT

## 2023-06-20 PROCEDURE — 49083 IR PARACENTESIS WITH IMAGING: ICD-10-PCS | Mod: ,,, | Performed by: PHYSICIAN ASSISTANT

## 2023-06-20 PROCEDURE — 36415 COLL VENOUS BLD VENIPUNCTURE: CPT | Performed by: PHYSICIAN ASSISTANT

## 2023-06-20 PROCEDURE — 49083 ABD PARACENTESIS W/IMAGING: CPT | Mod: ,,, | Performed by: PHYSICIAN ASSISTANT

## 2023-06-20 PROCEDURE — C1729 CATH, DRAINAGE: HCPCS

## 2023-06-20 PROCEDURE — 49083 ABD PARACENTESIS W/IMAGING: CPT | Performed by: RADIOLOGY

## 2023-06-20 PROCEDURE — 63600175 PHARM REV CODE 636 W HCPCS: Mod: JZ | Performed by: PHYSICIAN ASSISTANT

## 2023-06-20 RX ORDER — ALBUMIN HUMAN 250 G/1000ML
50 SOLUTION INTRAVENOUS ONCE
Status: COMPLETED | OUTPATIENT
Start: 2023-06-20 | End: 2023-06-20

## 2023-06-20 RX ADMIN — ALBUMIN (HUMAN) 50 G: 12.5 SOLUTION INTRAVENOUS at 10:06

## 2023-06-20 NOTE — NURSING
PARACENTESIS    IR ultrasound guided paracentesis performed by REGINE Baker.  Procedure explained and standing consent verified.  Time out performed.  8.5 L removed-   Patient received 50 g of albumin IV.  VS remained stable for duration of procedure.  Specimens collected and sent to lab if ordered.   Para and IV d/c'd, with tip intact.   Access site cleaned with peroxide, derma bond and steri-strips applied, then covered with sterile Band-Aid.   Pt discharge home in stable condition.

## 2023-07-03 ENCOUNTER — HOSPITAL ENCOUNTER (OUTPATIENT)
Dept: RADIOLOGY | Facility: HOSPITAL | Age: 73
Discharge: HOME | End: 2023-07-03
Attending: INTERNAL MEDICINE
Payer: MEDICARE

## 2023-07-03 VITALS
RESPIRATION RATE: 18 BRPM | DIASTOLIC BLOOD PRESSURE: 74 MMHG | HEART RATE: 59 BPM | SYSTOLIC BLOOD PRESSURE: 154 MMHG | OXYGEN SATURATION: 98 %

## 2023-07-03 DIAGNOSIS — R18.8 OTHER ASCITES: ICD-10-CM

## 2023-07-03 LAB
APPEARANCE FLD: CLEAR
BODY FLD TYPE: NORMAL
COLOR FLD: YELLOW
LYMPHOCYTES NFR FLD MANUAL: 49 %
MONOS+MACROS NFR FLD MANUAL: 44 %
NEUTROPHILS NFR FLD MANUAL: 7 %
WBC # FLD: 150 /CU MM

## 2023-07-03 PROCEDURE — 49083 ABD PARACENTESIS W/IMAGING: CPT | Mod: LT,,, | Performed by: RADIOLOGY

## 2023-07-03 PROCEDURE — P9047 ALBUMIN (HUMAN), 25%, 50ML: HCPCS | Mod: JZ,JG | Performed by: RADIOLOGY

## 2023-07-03 PROCEDURE — 49083 IR PARACENTESIS WITH IMAGING: ICD-10-PCS | Mod: LT,,, | Performed by: RADIOLOGY

## 2023-07-03 PROCEDURE — 89051 BODY FLUID CELL COUNT: CPT | Performed by: INTERNAL MEDICINE

## 2023-07-03 PROCEDURE — 63600175 PHARM REV CODE 636 W HCPCS: Mod: JZ,JG | Performed by: RADIOLOGY

## 2023-07-03 PROCEDURE — C1729 CATH, DRAINAGE: HCPCS

## 2023-07-03 RX ORDER — ALBUMIN HUMAN 250 G/1000ML
50 SOLUTION INTRAVENOUS ONCE
Status: COMPLETED | OUTPATIENT
Start: 2023-07-03 | End: 2023-07-03

## 2023-07-03 RX ADMIN — ALBUMIN (HUMAN) 50 G: 12.5 SOLUTION INTRAVENOUS at 12:07

## 2023-07-03 NOTE — NURSING
PARACENTESIS    IR ultrasound guided paracentesis performed by provider.  Procedure explained by radiologist and standing consent verified.  Time out performed.  8.9 L removed-   Patient received 50 g of albumin IV.  VS remained stable for duration of procedure.  Specimens collected and sent to lab if ordered.   Para and IV d/c'd, with tip intact.   Access site cleaned with peroxide, derma bond and steri-strips applied, then covered with sterile Band-Aid.   Pt discharge home in stable condition.

## 2023-07-17 ENCOUNTER — HOSPITAL ENCOUNTER (OUTPATIENT)
Dept: INTERVENTIONAL RADIOLOGY/VASCULAR | Facility: HOSPITAL | Age: 73
Discharge: HOME | End: 2023-07-17
Attending: INTERNAL MEDICINE
Payer: MEDICARE

## 2023-07-17 ENCOUNTER — HOSPITAL ENCOUNTER (OUTPATIENT)
Dept: RADIOLOGY | Facility: HOSPITAL | Age: 73
Discharge: HOME | End: 2023-07-17
Attending: INTERNAL MEDICINE
Payer: MEDICARE

## 2023-07-17 VITALS
HEART RATE: 57 BPM | SYSTOLIC BLOOD PRESSURE: 137 MMHG | RESPIRATION RATE: 18 BRPM | OXYGEN SATURATION: 99 % | DIASTOLIC BLOOD PRESSURE: 63 MMHG

## 2023-07-17 DIAGNOSIS — R18.8 OTHER ASCITES: ICD-10-CM

## 2023-07-17 LAB
APPEARANCE FLD: CLEAR
BASOPHILS # BLD AUTO: 0.05 K/UL (ref 0–0.2)
BASOPHILS NFR BLD: 1.1 % (ref 0–1.9)
BODY FLD TYPE: NORMAL
COLOR FLD: YELLOW
DIFFERENTIAL METHOD: ABNORMAL
EOSINOPHIL # BLD AUTO: 0.1 K/UL (ref 0–0.5)
EOSINOPHIL NFR BLD: 2.3 % (ref 0–8)
ERYTHROCYTE [DISTWIDTH] IN BLOOD BY AUTOMATED COUNT: 16.6 % (ref 11.5–14.5)
HCT VFR BLD AUTO: 26.2 % (ref 40–54)
HGB BLD-MCNC: 8.5 G/DL (ref 14–18)
IMM GRANULOCYTES # BLD AUTO: 0.01 K/UL (ref 0–0.04)
IMM GRANULOCYTES NFR BLD AUTO: 0.2 % (ref 0–0.5)
INR PPP: 1.1 (ref 0.8–1.2)
LYMPHOCYTES # BLD AUTO: 0.7 K/UL (ref 1–4.8)
LYMPHOCYTES NFR BLD: 13.9 % (ref 18–48)
LYMPHOCYTES NFR FLD MANUAL: 44 %
MCH RBC QN AUTO: 29.3 PG (ref 27–31)
MCHC RBC AUTO-ENTMCNC: 32.4 G/DL (ref 32–36)
MCV RBC AUTO: 90 FL (ref 82–98)
MONOCYTES # BLD AUTO: 0.4 K/UL (ref 0.3–1)
MONOCYTES NFR BLD: 8 % (ref 4–15)
MONOS+MACROS NFR FLD MANUAL: 44 %
NEUTROPHILS # BLD AUTO: 3.5 K/UL (ref 1.8–7.7)
NEUTROPHILS NFR BLD: 74.5 % (ref 38–73)
NEUTROPHILS NFR FLD MANUAL: 12 %
NRBC BLD-RTO: 0 /100 WBC
PLATELET # BLD AUTO: ABNORMAL K/UL (ref 150–450)
PLATELET BLD QL SMEAR: ABNORMAL
PMV BLD AUTO: ABNORMAL FL (ref 9.2–12.9)
PROTHROMBIN TIME: 11.7 SEC (ref 9–12.5)
RBC # BLD AUTO: 2.9 M/UL (ref 4.6–6.2)
WBC # BLD AUTO: 4.74 K/UL (ref 3.9–12.7)
WBC # FLD: 161 /CU MM

## 2023-07-17 PROCEDURE — 49083 ABD PARACENTESIS W/IMAGING: CPT | Performed by: RADIOLOGY

## 2023-07-17 PROCEDURE — 63600175 PHARM REV CODE 636 W HCPCS: Mod: JZ,JG | Performed by: PHYSICIAN ASSISTANT

## 2023-07-17 PROCEDURE — 89051 BODY FLUID CELL COUNT: CPT | Performed by: INTERNAL MEDICINE

## 2023-07-17 PROCEDURE — 36415 COLL VENOUS BLD VENIPUNCTURE: CPT | Performed by: PHYSICIAN ASSISTANT

## 2023-07-17 PROCEDURE — P9047 ALBUMIN (HUMAN), 25%, 50ML: HCPCS | Mod: JZ,JG | Performed by: PHYSICIAN ASSISTANT

## 2023-07-17 PROCEDURE — 49083 IR PARACENTESIS WITH IMAGING: ICD-10-PCS | Mod: ,,, | Performed by: RADIOLOGY

## 2023-07-17 PROCEDURE — 85025 COMPLETE CBC W/AUTO DIFF WBC: CPT | Performed by: PHYSICIAN ASSISTANT

## 2023-07-17 PROCEDURE — 49083 ABD PARACENTESIS W/IMAGING: CPT | Mod: ,,, | Performed by: RADIOLOGY

## 2023-07-17 PROCEDURE — 85610 PROTHROMBIN TIME: CPT | Performed by: PHYSICIAN ASSISTANT

## 2023-07-17 PROCEDURE — C1729 CATH, DRAINAGE: HCPCS

## 2023-07-17 RX ORDER — ALBUMIN HUMAN 250 G/1000ML
50 SOLUTION INTRAVENOUS
Status: DISCONTINUED | OUTPATIENT
Start: 2023-07-17 | End: 2023-07-18 | Stop reason: HOSPADM

## 2023-07-17 RX ADMIN — ALBUMIN (HUMAN) 75 G: 12.5 SOLUTION INTRAVENOUS at 11:07

## 2023-07-24 NOTE — Clinical Note
The sheath was removed from the left femoral artery. Oxybutynin Pregnancy And Lactation Text: This medication is Pregnancy Category B and is considered safe during pregnancy. It is unknown if it is excreted in breast milk.

## 2023-07-31 ENCOUNTER — HOSPITAL ENCOUNTER (OUTPATIENT)
Dept: RADIOLOGY | Facility: HOSPITAL | Age: 73
Discharge: HOME | End: 2023-07-31
Attending: INTERNAL MEDICINE
Payer: MEDICARE

## 2023-07-31 VITALS
OXYGEN SATURATION: 99 % | DIASTOLIC BLOOD PRESSURE: 78 MMHG | SYSTOLIC BLOOD PRESSURE: 149 MMHG | RESPIRATION RATE: 18 BRPM | HEART RATE: 67 BPM

## 2023-07-31 DIAGNOSIS — R18.8 OTHER ASCITES: ICD-10-CM

## 2023-07-31 LAB
APPEARANCE FLD: NORMAL
BODY FLD TYPE: NORMAL
COLOR FLD: YELLOW
LYMPHOCYTES NFR FLD MANUAL: 45 %
MESOTHL CELL NFR FLD MANUAL: 2 %
MONOS+MACROS NFR FLD MANUAL: 50 %
NEUTROPHILS NFR FLD MANUAL: 3 %
WBC # FLD: 169 /CU MM

## 2023-07-31 PROCEDURE — P9047 ALBUMIN (HUMAN), 25%, 50ML: HCPCS | Mod: JZ,JG | Performed by: INTERNAL MEDICINE

## 2023-07-31 PROCEDURE — 49083 ABD PARACENTESIS W/IMAGING: CPT | Mod: ,,, | Performed by: RADIOLOGY

## 2023-07-31 PROCEDURE — 89051 BODY FLUID CELL COUNT: CPT | Performed by: INTERNAL MEDICINE

## 2023-07-31 PROCEDURE — 49083 IR PARACENTESIS WITH IMAGING: ICD-10-PCS | Mod: ,,, | Performed by: RADIOLOGY

## 2023-07-31 PROCEDURE — 63600175 PHARM REV CODE 636 W HCPCS: Mod: JZ,JG | Performed by: INTERNAL MEDICINE

## 2023-07-31 PROCEDURE — C1729 CATH, DRAINAGE: HCPCS

## 2023-07-31 RX ORDER — ALBUMIN HUMAN 250 G/1000ML
50 SOLUTION INTRAVENOUS ONCE
Status: COMPLETED | OUTPATIENT
Start: 2023-07-31 | End: 2023-07-31

## 2023-07-31 RX ADMIN — ALBUMIN (HUMAN) 50 G: 12.5 SOLUTION INTRAVENOUS at 11:07

## 2023-07-31 NOTE — NURSING
PARACENTESIS    IR ultrasound guided paracentesis performed by Dr. Marshall.  Procedure explained and standing consent verified.  Time out performed.  8 L removed-   Patient received 50 g of albumin IV.  VS remained stable for duration of procedure.  Specimens collected and sent to lab if ordered.   Para and IV d/c'd, with tip intact.   Access site cleaned with peroxide, derma bond and steri-strips applied, then covered with sterile Band-Aid.   Pt discharge home in stable condition.

## 2023-08-15 ENCOUNTER — HOSPITAL ENCOUNTER (OUTPATIENT)
Dept: INTERVENTIONAL RADIOLOGY/VASCULAR | Facility: HOSPITAL | Age: 73
Discharge: HOME | End: 2023-08-15
Attending: INTERNAL MEDICINE
Payer: MEDICARE

## 2023-08-15 VITALS
HEART RATE: 77 BPM | OXYGEN SATURATION: 99 % | SYSTOLIC BLOOD PRESSURE: 161 MMHG | RESPIRATION RATE: 18 BRPM | DIASTOLIC BLOOD PRESSURE: 73 MMHG

## 2023-08-15 DIAGNOSIS — R18.8 OTHER ASCITES: ICD-10-CM

## 2023-08-15 LAB
APPEARANCE FLD: CLEAR
BASOPHILS # BLD AUTO: 0.06 K/UL (ref 0–0.2)
BASOPHILS NFR BLD: 0.9 % (ref 0–1.9)
BODY FLD TYPE: NORMAL
COLOR FLD: YELLOW
DIFFERENTIAL METHOD: ABNORMAL
EOSINOPHIL # BLD AUTO: 0.2 K/UL (ref 0–0.5)
EOSINOPHIL NFR BLD: 2.5 % (ref 0–8)
ERYTHROCYTE [DISTWIDTH] IN BLOOD BY AUTOMATED COUNT: 16 % (ref 11.5–14.5)
HCT VFR BLD AUTO: 30.6 % (ref 40–54)
HGB BLD-MCNC: 10 G/DL (ref 14–18)
IMM GRANULOCYTES # BLD AUTO: 0.03 K/UL (ref 0–0.04)
IMM GRANULOCYTES NFR BLD AUTO: 0.5 % (ref 0–0.5)
INR PPP: 1.1 (ref 0.8–1.2)
LYMPHOCYTES # BLD AUTO: 0.7 K/UL (ref 1–4.8)
LYMPHOCYTES NFR BLD: 11.1 % (ref 18–48)
LYMPHOCYTES NFR FLD MANUAL: 27 %
MCH RBC QN AUTO: 28.7 PG (ref 27–31)
MCHC RBC AUTO-ENTMCNC: 32.7 G/DL (ref 32–36)
MCV RBC AUTO: 88 FL (ref 82–98)
MESOTHL CELL NFR FLD MANUAL: 3 %
MONOCYTES # BLD AUTO: 0.5 K/UL (ref 0.3–1)
MONOCYTES NFR BLD: 7.8 % (ref 4–15)
MONOS+MACROS NFR FLD MANUAL: 61 %
NEUTROPHILS # BLD AUTO: 5 K/UL (ref 1.8–7.7)
NEUTROPHILS NFR BLD: 77.2 % (ref 38–73)
NEUTROPHILS NFR FLD MANUAL: 9 %
NRBC BLD-RTO: 0 /100 WBC
PLATELET # BLD AUTO: 274 K/UL (ref 150–450)
PMV BLD AUTO: 9.9 FL (ref 9.2–12.9)
PROTHROMBIN TIME: 11.6 SEC (ref 9–12.5)
RBC # BLD AUTO: 3.48 M/UL (ref 4.6–6.2)
WBC # BLD AUTO: 6.41 K/UL (ref 3.9–12.7)
WBC # FLD: 191 /CU MM

## 2023-08-15 PROCEDURE — 36415 COLL VENOUS BLD VENIPUNCTURE: CPT | Performed by: PHYSICIAN ASSISTANT

## 2023-08-15 PROCEDURE — 49083 ABD PARACENTESIS W/IMAGING: CPT

## 2023-08-15 PROCEDURE — C1729 CATH, DRAINAGE: HCPCS

## 2023-08-15 PROCEDURE — 63600175 PHARM REV CODE 636 W HCPCS: Mod: JZ,JG | Performed by: PHYSICIAN ASSISTANT

## 2023-08-15 PROCEDURE — 49083 IR PARACENTESIS WITH IMAGING: ICD-10-PCS | Mod: ,,, | Performed by: RADIOLOGY

## 2023-08-15 PROCEDURE — P9047 ALBUMIN (HUMAN), 25%, 50ML: HCPCS | Mod: JZ,JG | Performed by: PHYSICIAN ASSISTANT

## 2023-08-15 PROCEDURE — 85025 COMPLETE CBC W/AUTO DIFF WBC: CPT | Performed by: PHYSICIAN ASSISTANT

## 2023-08-15 PROCEDURE — 85610 PROTHROMBIN TIME: CPT | Performed by: PHYSICIAN ASSISTANT

## 2023-08-15 PROCEDURE — 49083 ABD PARACENTESIS W/IMAGING: CPT | Mod: ,,, | Performed by: RADIOLOGY

## 2023-08-15 PROCEDURE — 89051 BODY FLUID CELL COUNT: CPT | Performed by: INTERNAL MEDICINE

## 2023-08-15 RX ORDER — ALBUMIN HUMAN 250 G/1000ML
50 SOLUTION INTRAVENOUS ONCE
Status: COMPLETED | OUTPATIENT
Start: 2023-08-15 | End: 2023-08-15

## 2023-08-15 RX ADMIN — ALBUMIN (HUMAN) 75 G: 12.5 SOLUTION INTRAVENOUS at 10:08

## 2023-08-15 NOTE — PROCEDURES
Paracentesis    Pre-procedure ultrasound imaging was performed of the abdomen and appropriate site was marked at the right lower abdominal quadrant. Informed consent was obtained. Patient was prepped and draped in the usual sterile fashion. Local anesthesia was administered. A catheter drainage device was then advanced into the abdomen. Stylette was removed and catheter left in place.  Initial fluid was straw-colored. The patient tolerated the procedure well without immediate complication. Blood loss was negligible.     If analyses were ordered, a sample of fluid was collected and sent to lab.   IV albumin administered per protocol.

## 2023-08-15 NOTE — NURSING
PARACENTESIS    IR ultrasound guided paracentesis performed by REGINE Baker.  Procedure explained and standing consent verified.  Time out performed.  10 L removed-   Patient received 75 g of albumin IV.  VS remained stable for duration of procedure.  Specimens collected and sent to lab if ordered.   Para and IV d/c'd, with tip intact.   Access site cleaned with peroxide, derma bond and steri-strips applied, then covered with sterile Band-Aid.   Pt discharge home in stable condition.

## 2023-08-28 ENCOUNTER — LAB VISIT (OUTPATIENT)
Dept: LAB | Facility: HOSPITAL | Age: 73
End: 2023-08-28
Attending: NURSE PRACTITIONER
Payer: MEDICARE

## 2023-08-28 DIAGNOSIS — R79.89 LOW VITAMIN B12 LEVEL: ICD-10-CM

## 2023-08-28 DIAGNOSIS — E03.9 HYPOTHYROIDISM, UNSPECIFIED TYPE: ICD-10-CM

## 2023-08-28 LAB
TSH SERPL DL<=0.005 MIU/L-ACNC: 3.89 UIU/ML (ref 0.34–5.6)
VIT B12 SERPL-MCNC: 240 PG/ML (ref 210–950)

## 2023-08-28 PROCEDURE — 84443 ASSAY THYROID STIM HORMONE: CPT | Performed by: NURSE PRACTITIONER

## 2023-08-28 PROCEDURE — 36415 COLL VENOUS BLD VENIPUNCTURE: CPT | Performed by: NURSE PRACTITIONER

## 2023-08-28 PROCEDURE — 82607 VITAMIN B-12: CPT | Performed by: NURSE PRACTITIONER

## 2023-08-29 ENCOUNTER — HOSPITAL ENCOUNTER (OUTPATIENT)
Dept: INTERVENTIONAL RADIOLOGY/VASCULAR | Facility: HOSPITAL | Age: 73
Discharge: HOME | End: 2023-08-29
Attending: INTERNAL MEDICINE
Payer: MEDICARE

## 2023-08-29 ENCOUNTER — OFFICE VISIT (OUTPATIENT)
Dept: FAMILY MEDICINE | Facility: CLINIC | Age: 73
End: 2023-08-29
Payer: MEDICARE

## 2023-08-29 VITALS
RESPIRATION RATE: 18 BRPM | DIASTOLIC BLOOD PRESSURE: 61 MMHG | OXYGEN SATURATION: 100 % | SYSTOLIC BLOOD PRESSURE: 138 MMHG | HEART RATE: 77 BPM

## 2023-08-29 VITALS
DIASTOLIC BLOOD PRESSURE: 64 MMHG | BODY MASS INDEX: 23.16 KG/M2 | HEIGHT: 72 IN | OXYGEN SATURATION: 99 % | WEIGHT: 171 LBS | HEART RATE: 76 BPM | SYSTOLIC BLOOD PRESSURE: 134 MMHG

## 2023-08-29 DIAGNOSIS — E78.2 MIXED HYPERLIPIDEMIA: ICD-10-CM

## 2023-08-29 DIAGNOSIS — R79.89 LOW VITAMIN B12 LEVEL: ICD-10-CM

## 2023-08-29 DIAGNOSIS — E03.9 HYPOTHYROIDISM, UNSPECIFIED TYPE: ICD-10-CM

## 2023-08-29 DIAGNOSIS — E11.9 TYPE 2 DIABETES MELLITUS WITHOUT COMPLICATION, WITHOUT LONG-TERM CURRENT USE OF INSULIN: Primary | ICD-10-CM

## 2023-08-29 DIAGNOSIS — I10 PRIMARY HYPERTENSION: ICD-10-CM

## 2023-08-29 DIAGNOSIS — R18.8 OTHER ASCITES: ICD-10-CM

## 2023-08-29 DIAGNOSIS — R18.8 ASCITES OF LIVER: ICD-10-CM

## 2023-08-29 DIAGNOSIS — I50.22 CHRONIC SYSTOLIC HEART FAILURE: Chronic | ICD-10-CM

## 2023-08-29 DIAGNOSIS — J44.9 CHRONIC OBSTRUCTIVE PULMONARY DISEASE, UNSPECIFIED COPD TYPE: Chronic | ICD-10-CM

## 2023-08-29 LAB
APPEARANCE FLD: NORMAL
BODY FLD TYPE: NORMAL
COLOR FLD: YELLOW
HBA1C MFR BLD: 5.3 %
LYMPHOCYTES NFR FLD MANUAL: 26 %
MESOTHL CELL NFR FLD MANUAL: 26 %
MONOS+MACROS NFR FLD MANUAL: 31 %
NEUTROPHILS NFR FLD MANUAL: 17 %
WBC # FLD: 243 /CU MM

## 2023-08-29 PROCEDURE — 49083 IR PARACENTESIS WITH IMAGING: ICD-10-PCS | Mod: ,,, | Performed by: RADIOLOGY

## 2023-08-29 PROCEDURE — 1157F PR ADVANCE CARE PLAN OR EQUIV PRESENT IN MEDICAL RECORD: ICD-10-PCS | Mod: CPTII,S$GLB,, | Performed by: NURSE PRACTITIONER

## 2023-08-29 PROCEDURE — 1160F PR REVIEW ALL MEDS BY PRESCRIBER/CLIN PHARMACIST DOCUMENTED: ICD-10-PCS | Mod: CPTII,S$GLB,, | Performed by: NURSE PRACTITIONER

## 2023-08-29 PROCEDURE — 3078F PR MOST RECENT DIASTOLIC BLOOD PRESSURE < 80 MM HG: ICD-10-PCS | Mod: CPTII,S$GLB,, | Performed by: NURSE PRACTITIONER

## 2023-08-29 PROCEDURE — 3008F BODY MASS INDEX DOCD: CPT | Mod: CPTII,S$GLB,, | Performed by: NURSE PRACTITIONER

## 2023-08-29 PROCEDURE — 63600175 PHARM REV CODE 636 W HCPCS: Mod: JZ,JG | Performed by: PHYSICIAN ASSISTANT

## 2023-08-29 PROCEDURE — 3066F NEPHROPATHY DOC TX: CPT | Mod: CPTII,S$GLB,, | Performed by: NURSE PRACTITIONER

## 2023-08-29 PROCEDURE — 3288F PR FALLS RISK ASSESSMENT DOCUMENTED: ICD-10-PCS | Mod: CPTII,S$GLB,, | Performed by: NURSE PRACTITIONER

## 2023-08-29 PROCEDURE — 83036 HEMOGLOBIN GLYCOSYLATED A1C: CPT | Mod: QW,S$GLB,, | Performed by: NURSE PRACTITIONER

## 2023-08-29 PROCEDURE — 83036 POCT HEMOGLOBIN A1C: ICD-10-PCS | Mod: QW,S$GLB,, | Performed by: NURSE PRACTITIONER

## 2023-08-29 PROCEDURE — 1101F PT FALLS ASSESS-DOCD LE1/YR: CPT | Mod: CPTII,S$GLB,, | Performed by: NURSE PRACTITIONER

## 2023-08-29 PROCEDURE — 4010F PR ACE/ARB THEARPY RXD/TAKEN: ICD-10-PCS | Mod: CPTII,S$GLB,, | Performed by: NURSE PRACTITIONER

## 2023-08-29 PROCEDURE — 49083 ABD PARACENTESIS W/IMAGING: CPT

## 2023-08-29 PROCEDURE — C1729 CATH, DRAINAGE: HCPCS

## 2023-08-29 PROCEDURE — 3061F NEG MICROALBUMINURIA REV: CPT | Mod: CPTII,S$GLB,, | Performed by: NURSE PRACTITIONER

## 2023-08-29 PROCEDURE — 3044F PR MOST RECENT HEMOGLOBIN A1C LEVEL <7.0%: ICD-10-PCS | Mod: CPTII,S$GLB,, | Performed by: NURSE PRACTITIONER

## 2023-08-29 PROCEDURE — 1157F ADVNC CARE PLAN IN RCRD: CPT | Mod: CPTII,S$GLB,, | Performed by: NURSE PRACTITIONER

## 2023-08-29 PROCEDURE — P9047 ALBUMIN (HUMAN), 25%, 50ML: HCPCS | Mod: JZ,JG | Performed by: PHYSICIAN ASSISTANT

## 2023-08-29 PROCEDURE — 49083 ABD PARACENTESIS W/IMAGING: CPT | Mod: ,,, | Performed by: RADIOLOGY

## 2023-08-29 PROCEDURE — 1159F PR MEDICATION LIST DOCUMENTED IN MEDICAL RECORD: ICD-10-PCS | Mod: CPTII,S$GLB,, | Performed by: NURSE PRACTITIONER

## 2023-08-29 PROCEDURE — 89051 BODY FLUID CELL COUNT: CPT | Performed by: INTERNAL MEDICINE

## 2023-08-29 PROCEDURE — 99214 PR OFFICE/OUTPT VISIT, EST, LEVL IV, 30-39 MIN: ICD-10-PCS | Mod: S$GLB,,, | Performed by: NURSE PRACTITIONER

## 2023-08-29 PROCEDURE — 99214 OFFICE O/P EST MOD 30 MIN: CPT | Mod: S$GLB,,, | Performed by: NURSE PRACTITIONER

## 2023-08-29 PROCEDURE — 1159F MED LIST DOCD IN RCRD: CPT | Mod: CPTII,S$GLB,, | Performed by: NURSE PRACTITIONER

## 2023-08-29 PROCEDURE — 1101F PR PT FALLS ASSESS DOC 0-1 FALLS W/OUT INJ PAST YR: ICD-10-PCS | Mod: CPTII,S$GLB,, | Performed by: NURSE PRACTITIONER

## 2023-08-29 PROCEDURE — 3061F PR NEG MICROALBUMINURIA RESULT DOCUMENTED/REVIEW: ICD-10-PCS | Mod: CPTII,S$GLB,, | Performed by: NURSE PRACTITIONER

## 2023-08-29 PROCEDURE — 3288F FALL RISK ASSESSMENT DOCD: CPT | Mod: CPTII,S$GLB,, | Performed by: NURSE PRACTITIONER

## 2023-08-29 PROCEDURE — 3044F HG A1C LEVEL LT 7.0%: CPT | Mod: CPTII,S$GLB,, | Performed by: NURSE PRACTITIONER

## 2023-08-29 PROCEDURE — 1126F PR PAIN SEVERITY QUANTIFIED, NO PAIN PRESENT: ICD-10-PCS | Mod: CPTII,S$GLB,, | Performed by: NURSE PRACTITIONER

## 2023-08-29 PROCEDURE — 4010F ACE/ARB THERAPY RXD/TAKEN: CPT | Mod: CPTII,S$GLB,, | Performed by: NURSE PRACTITIONER

## 2023-08-29 PROCEDURE — 3075F PR MOST RECENT SYSTOLIC BLOOD PRESS GE 130-139MM HG: ICD-10-PCS | Mod: CPTII,S$GLB,, | Performed by: NURSE PRACTITIONER

## 2023-08-29 PROCEDURE — 1126F AMNT PAIN NOTED NONE PRSNT: CPT | Mod: CPTII,S$GLB,, | Performed by: NURSE PRACTITIONER

## 2023-08-29 PROCEDURE — 1160F RVW MEDS BY RX/DR IN RCRD: CPT | Mod: CPTII,S$GLB,, | Performed by: NURSE PRACTITIONER

## 2023-08-29 PROCEDURE — 3066F PR DOCUMENTATION OF TREATMENT FOR NEPHROPATHY: ICD-10-PCS | Mod: CPTII,S$GLB,, | Performed by: NURSE PRACTITIONER

## 2023-08-29 PROCEDURE — 3075F SYST BP GE 130 - 139MM HG: CPT | Mod: CPTII,S$GLB,, | Performed by: NURSE PRACTITIONER

## 2023-08-29 PROCEDURE — 3008F PR BODY MASS INDEX (BMI) DOCUMENTED: ICD-10-PCS | Mod: CPTII,S$GLB,, | Performed by: NURSE PRACTITIONER

## 2023-08-29 PROCEDURE — 3078F DIAST BP <80 MM HG: CPT | Mod: CPTII,S$GLB,, | Performed by: NURSE PRACTITIONER

## 2023-08-29 RX ORDER — ALBUMIN HUMAN 250 G/1000ML
50 SOLUTION INTRAVENOUS ONCE
Status: COMPLETED | OUTPATIENT
Start: 2023-08-29 | End: 2023-08-29

## 2023-08-29 RX ORDER — ATORVASTATIN CALCIUM 40 MG/1
40 TABLET, FILM COATED ORAL DAILY
Qty: 90 TABLET | Refills: 0 | Status: SHIPPED | OUTPATIENT
Start: 2023-08-29 | End: 2024-01-23

## 2023-08-29 RX ORDER — METOPROLOL SUCCINATE 25 MG/1
25 TABLET, EXTENDED RELEASE ORAL DAILY
Qty: 90 TABLET | Refills: 1 | Status: SHIPPED | OUTPATIENT
Start: 2023-08-29 | End: 2024-02-29 | Stop reason: SDUPTHER

## 2023-08-29 RX ORDER — FLUTICASONE FUROATE AND VILANTEROL 100; 25 UG/1; UG/1
1 POWDER RESPIRATORY (INHALATION) DAILY
Qty: 3 EACH | Refills: 3 | Status: SHIPPED | OUTPATIENT
Start: 2023-08-29 | End: 2024-02-29 | Stop reason: SDUPTHER

## 2023-08-29 RX ORDER — CYANOCOBALAMIN 1000 UG/ML
1000 INJECTION, SOLUTION INTRAMUSCULAR; SUBCUTANEOUS
Qty: 1 ML | Refills: 11 | Status: SHIPPED | OUTPATIENT
Start: 2023-08-29 | End: 2024-02-29 | Stop reason: SDUPTHER

## 2023-08-29 RX ORDER — SACUBITRIL AND VALSARTAN 24; 26 MG/1; MG/1
1 TABLET, FILM COATED ORAL 2 TIMES DAILY
Qty: 180 TABLET | Refills: 1 | Status: SHIPPED | OUTPATIENT
Start: 2023-08-29 | End: 2024-02-29 | Stop reason: SDUPTHER

## 2023-08-29 RX ADMIN — ALBUMIN (HUMAN) 50 G: 25 SOLUTION INTRAVENOUS at 01:08

## 2023-08-29 NOTE — NURSING
PARACENTESIS    IR ultrasound guided paracentesis performed by REGINE Baker.  Procedure explained and standing consent verified.  Time out performed.  6 L removed-   Patient received 50 g of albumin IV.  VS remained stable for duration of procedure.  Specimens collected and sent to lab if ordered.   Para and IV d/c'd, with tip intact.   Access site cleaned with peroxide, derma bond and steri-strips applied, then covered with sterile Band-Aid.   Pt discharge home in stable condition.

## 2023-08-31 PROBLEM — R18.8 ASCITES OF LIVER: Status: ACTIVE | Noted: 2022-06-07

## 2023-08-31 PROBLEM — E03.9 HYPOTHYROIDISM: Status: ACTIVE | Noted: 2023-08-31

## 2023-08-31 NOTE — PROGRESS NOTES
SUBJECTIVE:      Patient ID: Baldo Carney is a 72 y.o. male.    Chief Complaint: Follow-up (3 month f/u TSH and DM)    Presents for thyroid recheck & lab review - lab now within normal range, vitamin B12 240, A1c 5.3. Pt states he is struggling with the discomfort of fluid on his abdomen and the frequency with which he has to have it drained. Feeling tired all the time. Unsure why he hasn't established with GI since last visit.    Discussed outstanding health maintenance     Diabetes  He presents for his follow-up diabetic visit. He has type 2 diabetes mellitus. No MedicAlert identification noted. His disease course has been stable. There are no hypoglycemic associated symptoms. Pertinent negatives for hypoglycemia include no confusion, dizziness, headaches, nervousness/anxiousness or pallor. Associated symptoms include fatigue. Pertinent negatives for diabetes include no chest pain, no foot paresthesias, no polydipsia, no polyuria, no weakness and no weight loss. There are no hypoglycemic complications. Symptoms are stable. Diabetic complications include heart disease, impotence, nephropathy, peripheral neuropathy, PVD and retinopathy. Risk factors for coronary artery disease include diabetes mellitus, dyslipidemia, family history, hypertension, male sex, sedentary lifestyle and stress. Current diabetic treatment includes oral agent (monotherapy). He is compliant with treatment all of the time. His weight is fluctuating minimally. He is following a generally healthy diet. When asked about meal planning, he reported none. He has not had a previous visit with a dietitian. He never participates in exercise. An ACE inhibitor/angiotensin II receptor blocker is being taken. He does not see a podiatrist.Eye exam is current.   Hypertension  This is a chronic problem. The current episode started more than 1 year ago. The problem has been gradually improving since onset. The problem is controlled. Associated  symptoms include malaise/fatigue and peripheral edema. Pertinent negatives include no chest pain, headaches, neck pain or shortness of breath. Risk factors for coronary artery disease include male gender, sedentary lifestyle, diabetes mellitus, dyslipidemia, family history and stress. Past treatments include calcium channel blockers, diuretics, direct vasodilators, angiotensin blockers and beta blockers. The current treatment provides mild improvement. Compliance problems include exercise and diet.  Hypertensive end-organ damage includes kidney disease, CAD/MI, heart failure, PVD and retinopathy. Identifiable causes of hypertension include chronic renal disease, a hypertension causing med, renovascular disease and a thyroid problem.   Hyperlipidemia  This is a chronic problem. The current episode started more than 1 year ago. Exacerbating diseases include chronic renal disease, diabetes, hypothyroidism and liver disease. Associated symptoms include myalgias (r/t hernia but can't have repair at present d/t comorbid conditions). Pertinent negatives include no chest pain or shortness of breath. Current antihyperlipidemic treatment includes statins. Compliance problems include adherence to diet and adherence to exercise.  Risk factors for coronary artery disease include dyslipidemia, diabetes mellitus, hypertension, male sex, a sedentary lifestyle, stress and family history.   Thyroid Problem  Presents for follow-up visit. Symptoms include dry skin, fatigue and leg swelling (rare). Patient reports no anxiety, cold intolerance, constipation, diarrhea, hair loss, heat intolerance or weight loss. Past treatments include nothing. His past medical history is significant for diabetes, heart failure, hyperlipidemia and neuropathy.   Fatigue  This is a recurrent problem. The current episode started more than 1 month ago. The problem occurs constantly. The problem has been unchanged. Associated symptoms include arthralgias,  fatigue and myalgias (r/t hernia but can't have repair at present d/t comorbid conditions). Pertinent negatives include no abdominal pain, chest pain, congestion, coughing, fever, headaches, joint swelling, nausea, neck pain, rash, sore throat, vomiting or weakness. The symptoms are aggravated by exertion and stress. He has tried rest, sleep, position changes, relaxation and lying down for the symptoms. The treatment provided mild relief.       Past Surgical History:   Procedure Laterality Date    ANGIOGRAPHY OF INTERNAL MAMMARY VESSEL N/A 3/11/2022    Procedure: Angiogram Internal Mammary;  Surgeon: Hank Lujan MD;  Location: [facility] CATH/EP LAB;  Service: Cardiology;  Laterality: N/A;    CARDIAC CATHETERIZATION      CARDIAC VALVE SURGERY      CATHETERIZATION OF BOTH LEFT AND RIGHT HEART Left 3/11/2022    Procedure: CATHETERIZATION, HEART, BOTH LEFT AND RIGHT;  Surgeon: Hank Lujan MD;  Location: [facility] CATH/EP LAB;  Service: Cardiology;  Laterality: Left;    CORONARY ANGIOGRAPHY N/A 3/11/2022    Procedure: ANGIOGRAM, CORONARY ARTERY;  Surgeon: Hank Lujan MD;  Location: [facility] CATH/EP LAB;  Service: Cardiology;  Laterality: N/A;    CORONARY BYPASS GRAFT ANGIOGRAPHY  3/11/2022    Procedure: Bypass graft study;  Surgeon: Hank Lujan MD;  Location: [facility] CATH/EP LAB;  Service: Cardiology;;    CYSTOSCOPY N/A 7/30/2019    Procedure: CYSTOSCOPY;  Surgeon: Spencer Nguyen MD;  Location: [facility] OR;  Service: Urology;  Laterality: N/A;    INSERTION OF INTRAVASCULAR MICROAXIAL BLOOD PUMP N/A 8/31/2022    Procedure: INSERTION, IMPELLA;  Surgeon: Zach Jensen MD;  Location: [facility] CATH LAB;  Service: Cardiology;  Laterality: N/A;    PLACEMENT OF SWAN SEE CATHETER WITH IMAGING GUIDANCE  8/31/2022    Procedure: INSERTION, CATHETER, SWAN-SEE, WITH IMAGING GUIDANCE;  Surgeon: Zach Jensen MD;  Location: [facility] CATH LAB;  Service: Cardiology;;    SHOULDER ARTHROSCOPY  1985    TRANSRECTAL BIOPSY OF  PROSTATE WITH ULTRASOUND GUIDANCE N/A 2019    Procedure: BIOPSY, PROSTATE, RECTAL APPROACH, WITH US GUIDANCE;  Surgeon: Spencer Nguyen MD;  [facility];  Service: Urology;  Laterality: N/A;  procedure not performed, pt unable to tolerate    TRANSRECTAL BIOPSY OF PROSTATE WITH ULTRASOUND GUIDANCE N/A 2019    Procedure: BIOPSY, PROSTATE, RECTAL APPROACH, WITH US GUIDANCE;  Surgeon: Spencer Nguyen MD;  Location: [facility] OR;  Service: Urology;  Laterality: N/A;     Family History   Problem Relation Age of Onset    No Known Problems Mother     Diabetes Father     Hypertension Father     Heart attack Father     Heart disease Father     Diabetes Sister     Heart disease Brother     Diabetes Brother     No Known Problems Maternal Grandmother     No Known Problems Maternal Grandfather     No Known Problems Paternal Grandmother     No Known Problems Paternal Grandfather     No Known Problems Maternal Aunt     No Known Problems Maternal Uncle     No Known Problems Paternal Aunt     No Known Problems Paternal Uncle     Anemia Neg Hx     Arrhythmia Neg Hx     Asthma Neg Hx     Clotting disorder Neg Hx     Fainting Neg Hx     Heart failure Neg Hx     Hyperlipidemia Neg Hx     Glaucoma Neg Hx       Social History     Socioeconomic History    Marital status: Legally    Tobacco Use    Smoking status: Former     Current packs/day: 0.00     Types: Cigarettes     Quit date: 1970     Years since quittin.2    Smokeless tobacco: Never   Substance and Sexual Activity    Alcohol use: Not Currently     Alcohol/week: 3.0 standard drinks of alcohol     Types: 3 Cans of beer per week     Comment: social    Drug use: No    Sexual activity: Never     Current Outpatient Medications   Medication Sig Dispense Refill    aspirin (ECOTRIN) 81 MG EC tablet Take 81 mg by mouth once daily.      budesonide-glycopyr-formoterol (BREZTRI AEROSPHERE) 160-9-4.8 mcg/actuation HFAA Inhale into the lungs daily as needed.       clopidogreL (PLAVIX) 75 mg tablet       FARXIGA 5 mg Tab tablet Take 5 mg by mouth once daily.      finasteride (PROSCAR) 5 mg tablet Take 1 tablet (5 mg total) by mouth once daily. 90 tablet 4    fluticasone propionate (FLONASE) 50 mcg/actuation nasal spray 1 SPRAY (50 MCG TOTAL) BY EACH NOSTRIL ROUTE ONCE DAILY. 16 mL 1    furosemide (LASIX) 40 MG tablet Take by mouth once daily.      gabapentin (NEURONTIN) 300 MG capsule Take 1 capsule (300 mg total) by mouth every evening. 90 capsule 1    ibuprofen (ADVIL,MOTRIN) 800 MG tablet Take 800 mg by mouth every 6 (six) hours as needed.      levothyroxine (SYNTHROID) 100 MCG tablet Take 1 tablet (100 mcg total) by mouth before breakfast. With no other meds or food 30 tablet 5    prasugreL (EFFIENT) 10 mg Tab Take 1 tablet (10 mg total) by mouth once daily. 30 tablet 11    tamsulosin (FLOMAX) 0.4 mg Cap TAKE ONE CAPSULE BY MOUTH DAILY AT 5 PM 90 capsule 11    traMADoL (ULTRAM) 50 mg tablet Take 1 tablet (50 mg total) by mouth every 8 (eight) hours as needed for Pain. 21 tablet 0    VENTOLIN HFA 90 mcg/actuation inhaler INHALE 1-2 PUFFS IN TO THE LUNGS EVERY 4 TO 6 HOURS AS NEEDED FOR WHEEZING & SHORTNESS OF BREATH (BULK) (RESCUE) 18 g 1    ZONTIVITY 2.08 mg Tab Take 1 tablet by mouth once daily.      atorvastatin (LIPITOR) 40 MG tablet Take 1 tablet (40 mg total) by mouth once daily. 90 tablet 0    cyanocobalamin 1,000 mcg/mL injection Inject 1 mL (1,000 mcg total) into the muscle every 14 (fourteen) days. 1 mL 11    fluticasone furoate-vilanteroL (BREO ELLIPTA) 100-25 mcg/dose diskus inhaler Inhale 1 puff into the lungs once daily. (DAILY CONTROLLER) 3 each 3    hydrALAZINE (APRESOLINE) 50 MG tablet Take 1 tablet (50 mg total) by mouth every 8 (eight) hours. (Patient taking differently: Take 50 mg by mouth every 12 (twelve) hours.) 90 tablet 0    isosorbide dinitrate (ISORDIL) 20 MG tablet Take 1 tablet (20 mg total) by mouth 3 (three) times daily. 90 tablet 0     metoprolol succinate (TOPROL-XL) 25 MG 24 hr tablet Take 1 tablet (25 mg total) by mouth once daily. 90 tablet 1    sacubitriL-valsartan (ENTRESTO) 24-26 mg per tablet Take 1 tablet by mouth 2 (two) times daily. 180 tablet 1     No current facility-administered medications for this visit.     Review of patient's allergies indicates:  No Known Allergies   Past Medical History:   Diagnosis Date    Asthma     Cardiomyopathy 10/21/2014    CHF (congestive heart failure)     Coronary artery disease     CRF (chronic renal failure)     Diabetes mellitus     Enlarged prostate     Hyperlipidemia     Hypertension     Neuropathy      Past Surgical History:   Procedure Laterality Date    ANGIOGRAPHY OF INTERNAL MAMMARY VESSEL N/A 3/11/2022    Procedure: Angiogram Internal Mammary;  Surgeon: Hank Lujan MD;  Location: [facility] CATH/EP LAB;  Service: Cardiology;  Laterality: N/A;    CARDIAC CATHETERIZATION      CARDIAC VALVE SURGERY      CATHETERIZATION OF BOTH LEFT AND RIGHT HEART Left 3/11/2022    Procedure: CATHETERIZATION, HEART, BOTH LEFT AND RIGHT;  Surgeon: Hank Lujan MD;  Location: [facility] CATH/EP LAB;  Service: Cardiology;  Laterality: Left;    CORONARY ANGIOGRAPHY N/A 3/11/2022    Procedure: ANGIOGRAM, CORONARY ARTERY;  Surgeon: Hank Lujan MD;  Location: [facility] CATH/EP LAB;  Service: Cardiology;  Laterality: N/A;    CORONARY BYPASS GRAFT ANGIOGRAPHY  3/11/2022    Procedure: Bypass graft study;  Surgeon: Hank Lujan MD;  Location: [facility] CATH/EP LAB;  Service: Cardiology;;    CYSTOSCOPY N/A 7/30/2019    Procedure: CYSTOSCOPY;  Surgeon: Spencer Nguyen MD;  Location: [facility] OR;  Service: Urology;  Laterality: N/A;    INSERTION OF INTRAVASCULAR MICROAXIAL BLOOD PUMP N/A 8/31/2022    Procedure: INSERTION, IMPELLA;  Surgeon: Zach Jensen MD;  Location: [facility] CATH LAB;  Service: Cardiology;  Laterality: N/A;    PLACEMENT OF SWAN SEE CATHETER WITH IMAGING GUIDANCE  8/31/2022    Procedure: INSERTION,  "CATHETER, SWAN-SEE, WITH IMAGING GUIDANCE;  Surgeon: Zach Jensen MD;  Location: [facility] CATH LAB;  Service: Cardiology;;    SHOULDER ARTHROSCOPY  1985    TRANSRECTAL BIOPSY OF PROSTATE WITH ULTRASOUND GUIDANCE N/A 7/30/2019    Procedure: BIOPSY, PROSTATE, RECTAL APPROACH, WITH US GUIDANCE;  Surgeon: Spencer Nguyen MD;  Location: [facility] OR;  Service: Urology;  Laterality: N/A;  procedure not performed, pt unable to tolerate    TRANSRECTAL BIOPSY OF PROSTATE WITH ULTRASOUND GUIDANCE N/A 8/8/2019    Procedure: BIOPSY, PROSTATE, RECTAL APPROACH, WITH US GUIDANCE;  Surgeon: Spencer Nguyen MD;  Location: [facility] OR;  Service: Urology;  Laterality: N/A;       Review of Systems   Constitutional:  Positive for fatigue and malaise/fatigue. Negative for activity change, appetite change, fever and weight loss.   HENT:  Negative for congestion, ear pain, hearing loss, postnasal drip, sinus pressure, sinus pain, sneezing and sore throat.    Eyes:  Negative for photophobia and pain.   Respiratory:  Negative for cough, chest tightness, shortness of breath and wheezing.    Cardiovascular:  Negative for chest pain and leg swelling.        Follows with cardiology   Gastrointestinal:  Positive for abdominal distention (has been getting "fluid drained" off stomach regularly). Negative for abdominal pain, blood in stool, constipation, diarrhea, nausea and vomiting.        Following with specialist for cirrhosis/paracentesis   Endocrine: Negative for cold intolerance, heat intolerance, polydipsia and polyuria.   Genitourinary:  Positive for impotence. Negative for difficulty urinating, dysuria, flank pain, frequency, hematuria and urgency.   Musculoskeletal:  Positive for arthralgias and myalgias (r/t hernia but can't have repair at present d/t comorbid conditions). Negative for back pain, joint swelling and neck pain.   Skin:  Negative for pallor and rash.   Allergic/Immunologic: Positive for environmental allergies. Negative " for food allergies.   Neurological:  Negative for dizziness, weakness, light-headedness and headaches.   Hematological:  Does not bruise/bleed easily.   Psychiatric/Behavioral:  Negative for confusion, decreased concentration, dysphoric mood, hallucinations and sleep disturbance. The patient is not nervous/anxious.       OBJECTIVE:      Vitals:    08/29/23 1023   BP: 134/64   BP Location: Right arm   Patient Position: Sitting   BP Method: Large (Manual)   Pulse: 76   SpO2: 99%   Weight: 77.6 kg (171 lb)   Height: 6' (1.829 m)     Physical Exam  Vitals and nursing note reviewed.   Constitutional:       General: He is not in acute distress.     Appearance: Normal appearance. He is well-developed, well-groomed and normal weight.   HENT:      Head: Normocephalic and atraumatic.      Right Ear: Hearing normal.      Left Ear: Hearing normal.      Nose: Nose normal. No rhinorrhea.   Eyes:      General: Lids are normal.         Right eye: No discharge.         Left eye: No discharge.      Conjunctiva/sclera: Conjunctivae normal.      Right eye: Right conjunctiva is not injected.      Left eye: Left conjunctiva is not injected.      Pupils: Pupils are equal, round, and reactive to light. Pupils are equal.      Right eye: Pupil is round and reactive.      Left eye: Pupil is round and reactive.   Neck:      Thyroid: No thyromegaly.      Vascular: No JVD.      Trachea: Trachea normal. No tracheal deviation.   Cardiovascular:      Rate and Rhythm: Normal rate and regular rhythm.      Pulses:           Radial pulses are 2+ on the right side and 2+ on the left side.        Dorsalis pedis pulses are 2+ on the right side and 2+ on the left side.        Posterior tibial pulses are 2+ on the right side and 2+ on the left side.      Heart sounds: Normal heart sounds. No murmur heard.     No friction rub. No gallop.   Pulmonary:      Effort: Pulmonary effort is normal. No respiratory distress.      Breath sounds: Normal breath sounds.  No stridor. No decreased breath sounds, wheezing, rhonchi or rales.   Abdominal:      General: Abdomen is protuberant. Bowel sounds are normal. There is distension.      Palpations: Abdomen is soft. Abdomen is not rigid.      Tenderness: There is no abdominal tenderness. There is no guarding.      Hernia: A hernia is present. Hernia is present in the right inguinal area.   Musculoskeletal:         General: Normal range of motion.      Cervical back: Normal range of motion and neck supple.      Right foot: Normal range of motion.      Left foot: Normal range of motion.   Feet:      Right foot:      Protective Sensation: 8 sites tested.  7 sites sensed.      Skin integrity: Skin integrity normal.      Left foot:      Protective Sensation: 8 sites tested.  7 sites sensed.      Skin integrity: Skin integrity normal.   Lymphadenopathy:      Cervical: No cervical adenopathy.   Skin:     General: Skin is warm and dry.      Capillary Refill: Capillary refill takes less than 2 seconds.      Coloration: Skin is not pale.      Findings: No lesion or rash.   Neurological:      Mental Status: He is alert and oriented to person, place, and time.      GCS: GCS eye subscore is 4. GCS verbal subscore is 5. GCS motor subscore is 6.      Cranial Nerves: Cranial nerves 2-12 are intact.      Sensory: Sensation is intact.      Motor: Motor function is intact. No atrophy.      Coordination: Coordination is intact. Coordination normal.      Gait: Gait is intact. Gait normal.   Psychiatric:         Attention and Perception: Attention normal. He is attentive.         Mood and Affect: Mood and affect normal.         Speech: Speech normal.         Behavior: Behavior normal. Behavior is cooperative.         Thought Content: Thought content normal.         Cognition and Memory: Cognition normal.         Judgment: Judgment normal.        Assessment:       1. Type 2 diabetes mellitus without complication, without long-term current use of insulin     2. Primary hypertension    3. Ascites of liver    4. Chronic systolic heart failure    5. Chronic obstructive pulmonary disease, unspecified COPD type    6. Mixed hyperlipidemia    7. Low vitamin B12 level    8. Hypothyroidism, unspecified type        Plan:       Type 2 diabetes mellitus without complication, without long-term current use of insulin  -     POCT HEMOGLOBIN A1C  - appears stable on current med regimen    Primary hypertension  -     metoprolol succinate (TOPROL-XL) 25 MG 24 hr tablet; Take 1 tablet (25 mg total) by mouth once daily.  Dispense: 90 tablet; Refill: 1  -     sacubitriL-valsartan (ENTRESTO) 24-26 mg per tablet; Take 1 tablet by mouth 2 (two) times daily.  Dispense: 180 tablet; Refill: 1  - following with cards    Ascites of liver  -     Ambulatory referral/consult to Hepatology; Future; Expected date: 09/05/2023    Chronic systolic heart failure  -     sacubitriL-valsartan (ENTRESTO) 24-26 mg per tablet; Take 1 tablet by mouth 2 (two) times daily.  Dispense: 180 tablet; Refill: 1    Chronic obstructive pulmonary disease, unspecified COPD type  -     fluticasone furoate-vilanteroL (BREO ELLIPTA) 100-25 mcg/dose diskus inhaler; Inhale 1 puff into the lungs once daily. (DAILY CONTROLLER)  Dispense: 3 each; Refill: 3    Mixed hyperlipidemia  -     atorvastatin (LIPITOR) 40 MG tablet; Take 1 tablet (40 mg total) by mouth once daily.  Dispense: 90 tablet; Refill: 0    Low vitamin B12 level  -     cyanocobalamin 1,000 mcg/mL injection; Inject 1 mL (1,000 mcg total) into the muscle every 14 (fourteen) days.  Dispense: 1 mL; Refill: 11    Hypothyroidism, unspecified type        - appears stable on current med regimen            Follow up in about 6 months (around 2/29/2024) for DM.         8/31/2023 KAMERON Rodriguez, FNP-C

## 2023-09-05 ENCOUNTER — TELEPHONE (OUTPATIENT)
Dept: HEPATOLOGY | Facility: CLINIC | Age: 73
End: 2023-09-05
Payer: MEDICARE

## 2023-09-05 ENCOUNTER — HOSPITAL ENCOUNTER (EMERGENCY)
Facility: HOSPITAL | Age: 73
Discharge: HOME | End: 2023-09-05
Attending: EMERGENCY MEDICINE
Payer: MEDICARE

## 2023-09-05 VITALS
RESPIRATION RATE: 18 BRPM | SYSTOLIC BLOOD PRESSURE: 160 MMHG | TEMPERATURE: 98 F | OXYGEN SATURATION: 98 % | HEART RATE: 80 BPM | DIASTOLIC BLOOD PRESSURE: 74 MMHG
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
## 2023-09-05 DIAGNOSIS — R18.8 OTHER ASCITES: Primary | ICD-10-CM

## 2023-09-05 DIAGNOSIS — I25.5 ISCHEMIC CARDIOMYOPATHY: ICD-10-CM

## 2023-09-05 DIAGNOSIS — S39.012A STRAIN OF LUMBAR REGION, INITIAL ENCOUNTER: Primary | ICD-10-CM

## 2023-09-05 DIAGNOSIS — S16.1XXA STRAIN OF NECK MUSCLE, INITIAL ENCOUNTER: ICD-10-CM

## 2023-09-05 PROCEDURE — 99284 EMERGENCY DEPT VISIT MOD MDM: CPT | Mod: 25

## 2023-09-05 PROCEDURE — 25000003 PHARM REV CODE 250: Performed by: EMERGENCY MEDICINE

## 2023-09-05 RX ORDER — ACETAMINOPHEN 500 MG
1000 TABLET ORAL
Status: COMPLETED | OUTPATIENT
Start: 2023-09-05 | End: 2023-09-05

## 2023-09-05 RX ADMIN — ACETAMINOPHEN 1000 MG: 500 TABLET ORAL at 04:09

## 2023-09-05 NOTE — TELEPHONE ENCOUNTER
Called the patient to let him know that it's time for his labs, US, and a follow visit with Dr. Wynne. Patient confirmed and agreed with the appointment.  Reminder letter mailed.
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
## 2023-09-05 NOTE — TELEPHONE ENCOUNTER
----- Message from Jennie Farias sent at 9/5/2023 11:23 AM CDT -----  Regarding: RE: Paracentesis Orders  Thank you very much  ----- Message -----  From: Tali Serrano MA  Sent: 9/5/2023  11:15 AM CDT  To: Jennie Farias  Subject: FW: Paracentesis Orders                          Orders are in  ----- Message -----  From: Charlene Tian RN  Sent: 9/5/2023   9:06 AM CDT  To: Tali Serrano MA  Subject: FW: Paracentesis Orders                          Vicky Keene orders are in.  The patient is 1yr out. Needs F/U, labs US and Office Visit.    Thanks  ----- Message -----  From: Tali Serrano MA  Sent: 9/5/2023   7:24 AM CDT  To: Charlene Tian RN  Subject: FW: Paracentesis Orders                            ----- Message -----  From: Jennie Farias  Sent: 9/3/2023   1:14 PM CDT  To: Ricci Olivera Staff  Subject: Paracentesis Orders                              Can you please put in standing orders for paracentesis for 1 x week for this patient.    Thanks  Radiology Scheduling
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
## 2023-09-10 DIAGNOSIS — J44.9 CHRONIC OBSTRUCTIVE PULMONARY DISEASE, UNSPECIFIED COPD TYPE: Chronic | ICD-10-CM

## 2023-09-11 RX ORDER — ALBUTEROL SULFATE 90 UG/1
AEROSOL, METERED RESPIRATORY (INHALATION)
Qty: 18 G | Refills: 2 | Status: SHIPPED | OUTPATIENT
Start: 2023-09-11

## 2023-09-12 ENCOUNTER — HOSPITAL ENCOUNTER (OUTPATIENT)
Dept: INTERVENTIONAL RADIOLOGY/VASCULAR | Facility: HOSPITAL | Age: 73
Discharge: HOME | End: 2023-09-12
Attending: INTERNAL MEDICINE
Payer: MEDICARE

## 2023-09-12 VITALS — OXYGEN SATURATION: 96 % | DIASTOLIC BLOOD PRESSURE: 76 MMHG | SYSTOLIC BLOOD PRESSURE: 148 MMHG | HEART RATE: 79 BPM

## 2023-09-12 DIAGNOSIS — R18.8 OTHER ASCITES: ICD-10-CM

## 2023-09-12 LAB
APPEARANCE FLD: CLEAR
BASOPHILS # BLD AUTO: 0.08 K/UL (ref 0–0.2)
BASOPHILS NFR BLD: 1.2 % (ref 0–1.9)
BODY FLD TYPE: NORMAL
COLOR FLD: YELLOW
DIFFERENTIAL METHOD: ABNORMAL
EOSINOPHIL # BLD AUTO: 0.1 K/UL (ref 0–0.5)
EOSINOPHIL NFR BLD: 1.6 % (ref 0–8)
ERYTHROCYTE [DISTWIDTH] IN BLOOD BY AUTOMATED COUNT: 16.4 % (ref 11.5–14.5)
HCT VFR BLD AUTO: 31.5 % (ref 40–54)
HGB BLD-MCNC: 9.8 G/DL (ref 14–18)
IMM GRANULOCYTES # BLD AUTO: 0.03 K/UL (ref 0–0.04)
IMM GRANULOCYTES NFR BLD AUTO: 0.4 % (ref 0–0.5)
INR PPP: 1 (ref 0.8–1.2)
LYMPHOCYTES # BLD AUTO: 0.6 K/UL (ref 1–4.8)
LYMPHOCYTES NFR BLD: 8.8 % (ref 18–48)
LYMPHOCYTES NFR FLD MANUAL: 21 %
MCH RBC QN AUTO: 27.8 PG (ref 27–31)
MCHC RBC AUTO-ENTMCNC: 31.1 G/DL (ref 32–36)
MCV RBC AUTO: 89 FL (ref 82–98)
MESOTHL CELL NFR FLD MANUAL: 17 %
MONOCYTES # BLD AUTO: 0.5 K/UL (ref 0.3–1)
MONOCYTES NFR BLD: 8 % (ref 4–15)
MONOS+MACROS NFR FLD MANUAL: 62 %
NEUTROPHILS # BLD AUTO: 5.4 K/UL (ref 1.8–7.7)
NEUTROPHILS NFR BLD: 80 % (ref 38–73)
NRBC BLD-RTO: 0 /100 WBC
PLATELET # BLD AUTO: 266 K/UL (ref 150–450)
PMV BLD AUTO: 10.6 FL (ref 9.2–12.9)
PROTHROMBIN TIME: 11.3 SEC (ref 9–12.5)
RBC # BLD AUTO: 3.53 M/UL (ref 4.6–6.2)
WBC # BLD AUTO: 6.78 K/UL (ref 3.9–12.7)
WBC # FLD: 142 /CU MM

## 2023-09-12 PROCEDURE — 85025 COMPLETE CBC W/AUTO DIFF WBC: CPT | Performed by: PHYSICIAN ASSISTANT

## 2023-09-12 PROCEDURE — 85610 PROTHROMBIN TIME: CPT | Performed by: PHYSICIAN ASSISTANT

## 2023-09-12 PROCEDURE — 63600175 PHARM REV CODE 636 W HCPCS: Mod: JZ,JG | Performed by: PHYSICIAN ASSISTANT

## 2023-09-12 PROCEDURE — 36415 COLL VENOUS BLD VENIPUNCTURE: CPT | Performed by: PHYSICIAN ASSISTANT

## 2023-09-12 PROCEDURE — C1729 CATH, DRAINAGE: HCPCS

## 2023-09-12 PROCEDURE — P9047 ALBUMIN (HUMAN), 25%, 50ML: HCPCS | Mod: JZ,JG | Performed by: PHYSICIAN ASSISTANT

## 2023-09-12 PROCEDURE — 89051 BODY FLUID CELL COUNT: CPT | Performed by: INTERNAL MEDICINE

## 2023-09-12 PROCEDURE — 49083 ABD PARACENTESIS W/IMAGING: CPT | Performed by: PHYSICIAN ASSISTANT

## 2023-09-12 PROCEDURE — 49083 ABD PARACENTESIS W/IMAGING: CPT | Mod: ,,, | Performed by: PHYSICIAN ASSISTANT

## 2023-09-12 PROCEDURE — 49083 IR PARACENTESIS WITH IMAGING: ICD-10-PCS | Mod: ,,, | Performed by: PHYSICIAN ASSISTANT

## 2023-09-12 RX ORDER — ALBUMIN HUMAN 250 G/1000ML
25 SOLUTION INTRAVENOUS ONCE
Status: COMPLETED | OUTPATIENT
Start: 2023-09-12 | End: 2023-09-12

## 2023-09-12 RX ADMIN — ALBUMIN (HUMAN) 50 G: 12.5 SOLUTION INTRAVENOUS at 01:09

## 2023-09-12 NOTE — NURSING
PARACENTESIS    IR ultrasound guided paracentesis performed by REGINE Baker.  Procedure explained and standing consent verified.  Time out performed.  8 L removed-   Patient received 50 g of albumin IV.  VS remained stable for duration of procedure.  Specimens collected and sent to lab if ordered.   Para and IV d/c'd, with tip intact.   Access site cleaned with peroxide, derma bond and steri-strips applied, then covered with sterile Band-Aid.   Pt discharge home in stable condition.

## 2023-09-14 ENCOUNTER — HOSPITAL ENCOUNTER (OUTPATIENT)
Facility: HOSPITAL | Age: 73
Discharge: HOME | End: 2023-09-15
Attending: EMERGENCY MEDICINE | Admitting: STUDENT IN AN ORGANIZED HEALTH CARE EDUCATION/TRAINING PROGRAM
Payer: MEDICARE

## 2023-09-14 DIAGNOSIS — K56.609 BOWEL OBSTRUCTION: ICD-10-CM

## 2023-09-14 DIAGNOSIS — K56.609 SMALL BOWEL OBSTRUCTION: ICD-10-CM

## 2023-09-14 DIAGNOSIS — K42.0 INCARCERATED UMBILICAL HERNIA: Primary | ICD-10-CM

## 2023-09-14 PROBLEM — K74.60 CIRRHOSIS: Status: ACTIVE | Noted: 2023-09-14

## 2023-09-14 LAB
ALBUMIN SERPL BCP-MCNC: 3.2 G/DL (ref 3.5–5.2)
ALP SERPL-CCNC: 80 U/L (ref 55–135)
ALT SERPL W/O P-5'-P-CCNC: 13 U/L (ref 10–44)
ANION GAP SERPL CALC-SCNC: 17 MMOL/L (ref 8–16)
ANISOCYTOSIS BLD QL SMEAR: SLIGHT
AST SERPL-CCNC: 18 U/L (ref 10–40)
BACTERIA #/AREA URNS HPF: ABNORMAL /HPF
BASOPHILS # BLD AUTO: 0.06 K/UL (ref 0–0.2)
BASOPHILS NFR BLD: 0.6 % (ref 0–1.9)
BILIRUB SERPL-MCNC: 1.5 MG/DL (ref 0.1–1)
BILIRUB UR QL STRIP: NEGATIVE
BUN SERPL-MCNC: 32 MG/DL (ref 8–23)
CALCIUM SERPL-MCNC: 9.2 MG/DL (ref 8.7–10.5)
CHLORIDE SERPL-SCNC: 102 MMOL/L (ref 95–110)
CLARITY UR: CLEAR
CO2 SERPL-SCNC: 21 MMOL/L (ref 23–29)
COLOR UR: YELLOW
CREAT SERPL-MCNC: 2.2 MG/DL (ref 0.5–1.4)
DIFFERENTIAL METHOD: ABNORMAL
EOSINOPHIL # BLD AUTO: 0 K/UL (ref 0–0.5)
EOSINOPHIL NFR BLD: 0.2 % (ref 0–8)
ERYTHROCYTE [DISTWIDTH] IN BLOOD BY AUTOMATED COUNT: 15.9 % (ref 11.5–14.5)
EST. GFR  (NO RACE VARIABLE): 31 ML/MIN/1.73 M^2
GLUCOSE SERPL-MCNC: 118 MG/DL (ref 70–110)
GLUCOSE UR QL STRIP: ABNORMAL
HCT VFR BLD AUTO: 33.4 % (ref 40–54)
HGB BLD-MCNC: 11.2 G/DL (ref 14–18)
HGB UR QL STRIP: NEGATIVE
HYALINE CASTS #/AREA URNS LPF: 12 /LPF
IMM GRANULOCYTES # BLD AUTO: 0.04 K/UL (ref 0–0.04)
IMM GRANULOCYTES NFR BLD AUTO: 0.4 % (ref 0–0.5)
KETONES UR QL STRIP: ABNORMAL
LEUKOCYTE ESTERASE UR QL STRIP: NEGATIVE
LIPASE SERPL-CCNC: 20 U/L (ref 4–60)
LYMPHOCYTES # BLD AUTO: 0.6 K/UL (ref 1–4.8)
LYMPHOCYTES NFR BLD: 6 % (ref 18–48)
MCH RBC QN AUTO: 28.2 PG (ref 27–31)
MCHC RBC AUTO-ENTMCNC: 33.5 G/DL (ref 32–36)
MCV RBC AUTO: 84 FL (ref 82–98)
MICROSCOPIC COMMENT: ABNORMAL
MONOCYTES # BLD AUTO: 0.7 K/UL (ref 0.3–1)
MONOCYTES NFR BLD: 6.4 % (ref 4–15)
NEUTROPHILS # BLD AUTO: 9.2 K/UL (ref 1.8–7.7)
NEUTROPHILS NFR BLD: 86.4 % (ref 38–73)
NITRITE UR QL STRIP: NEGATIVE
NRBC BLD-RTO: 0 /100 WBC
OVALOCYTES BLD QL SMEAR: ABNORMAL
PH UR STRIP: 6 [PH] (ref 5–8)
PLATELET # BLD AUTO: 272 K/UL (ref 150–450)
PLATELET BLD QL SMEAR: ABNORMAL
PMV BLD AUTO: 9.7 FL (ref 9.2–12.9)
POIKILOCYTOSIS BLD QL SMEAR: SLIGHT
POTASSIUM SERPL-SCNC: 3.7 MMOL/L (ref 3.5–5.1)
PROT SERPL-MCNC: 7.2 G/DL (ref 6–8.4)
PROT UR QL STRIP: ABNORMAL
RBC # BLD AUTO: 3.97 M/UL (ref 4.6–6.2)
RBC #/AREA URNS HPF: 1 /HPF (ref 0–4)
SARS-COV-2 RDRP RESP QL NAA+PROBE: NEGATIVE
SODIUM SERPL-SCNC: 140 MMOL/L (ref 136–145)
SP GR UR STRIP: 1.01 (ref 1–1.03)
SQUAMOUS #/AREA URNS HPF: 0 /HPF
URN SPEC COLLECT METH UR: ABNORMAL
UROBILINOGEN UR STRIP-ACNC: NEGATIVE EU/DL
WBC # BLD AUTO: 10.68 K/UL (ref 3.9–12.7)
WBC #/AREA URNS HPF: 1 /HPF (ref 0–5)
YEAST URNS QL MICRO: ABNORMAL

## 2023-09-14 PROCEDURE — 81000 URINALYSIS NONAUTO W/SCOPE: CPT | Performed by: NURSE PRACTITIONER

## 2023-09-14 PROCEDURE — 96365 THER/PROPH/DIAG IV INF INIT: CPT

## 2023-09-14 PROCEDURE — 20000000 HC ICU ROOM

## 2023-09-14 PROCEDURE — G0378 HOSPITAL OBSERVATION PER HR: HCPCS

## 2023-09-14 PROCEDURE — 96367 TX/PROPH/DG ADDL SEQ IV INF: CPT

## 2023-09-14 PROCEDURE — 25000003 PHARM REV CODE 250: Performed by: EMERGENCY MEDICINE

## 2023-09-14 PROCEDURE — 63600175 PHARM REV CODE 636 W HCPCS: Performed by: EMERGENCY MEDICINE

## 2023-09-14 PROCEDURE — 25000003 PHARM REV CODE 250: Performed by: STUDENT IN AN ORGANIZED HEALTH CARE EDUCATION/TRAINING PROGRAM

## 2023-09-14 PROCEDURE — 36415 COLL VENOUS BLD VENIPUNCTURE: CPT | Performed by: NURSE PRACTITIONER

## 2023-09-14 PROCEDURE — 83690 ASSAY OF LIPASE: CPT | Performed by: NURSE PRACTITIONER

## 2023-09-14 PROCEDURE — 80053 COMPREHEN METABOLIC PANEL: CPT | Performed by: NURSE PRACTITIONER

## 2023-09-14 PROCEDURE — U0002 COVID-19 LAB TEST NON-CDC: HCPCS | Performed by: EMERGENCY MEDICINE

## 2023-09-14 PROCEDURE — 99285 EMERGENCY DEPT VISIT HI MDM: CPT | Mod: 25

## 2023-09-14 PROCEDURE — 96366 THER/PROPH/DIAG IV INF ADDON: CPT

## 2023-09-14 PROCEDURE — 85025 COMPLETE CBC W/AUTO DIFF WBC: CPT | Performed by: NURSE PRACTITIONER

## 2023-09-14 PROCEDURE — 96375 TX/PRO/DX INJ NEW DRUG ADDON: CPT

## 2023-09-14 RX ORDER — METOPROLOL SUCCINATE 25 MG/1
25 TABLET, EXTENDED RELEASE ORAL DAILY
Status: DISCONTINUED | OUTPATIENT
Start: 2023-09-15 | End: 2023-09-15 | Stop reason: HOSPADM

## 2023-09-14 RX ORDER — FUROSEMIDE 40 MG/1
40 TABLET ORAL DAILY
Status: DISCONTINUED | OUTPATIENT
Start: 2023-09-15 | End: 2023-09-14

## 2023-09-14 RX ORDER — ISOSORBIDE DINITRATE 10 MG/1
20 TABLET ORAL 3 TIMES DAILY
Status: DISCONTINUED | OUTPATIENT
Start: 2023-09-15 | End: 2023-09-15 | Stop reason: HOSPADM

## 2023-09-14 RX ORDER — INSULIN ASPART 100 [IU]/ML
0-5 INJECTION, SOLUTION INTRAVENOUS; SUBCUTANEOUS EVERY 6 HOURS PRN
Status: DISCONTINUED | OUTPATIENT
Start: 2023-09-14 | End: 2023-09-15 | Stop reason: HOSPADM

## 2023-09-14 RX ORDER — ALBUTEROL SULFATE 0.83 MG/ML
2.5 SOLUTION RESPIRATORY (INHALATION) EVERY 4 HOURS PRN
Status: DISCONTINUED | OUTPATIENT
Start: 2023-09-14 | End: 2023-09-15

## 2023-09-14 RX ORDER — GABAPENTIN 300 MG/1
300 CAPSULE ORAL NIGHTLY
Status: DISCONTINUED | OUTPATIENT
Start: 2023-09-14 | End: 2023-09-15 | Stop reason: HOSPADM

## 2023-09-14 RX ORDER — HYDRALAZINE HYDROCHLORIDE 25 MG/1
50 TABLET, FILM COATED ORAL EVERY 8 HOURS
Status: DISCONTINUED | OUTPATIENT
Start: 2023-09-15 | End: 2023-09-15 | Stop reason: HOSPADM

## 2023-09-14 RX ORDER — LEVOTHYROXINE SODIUM 100 UG/1
100 TABLET ORAL
Status: DISCONTINUED | OUTPATIENT
Start: 2023-09-15 | End: 2023-09-15 | Stop reason: HOSPADM

## 2023-09-14 RX ORDER — GLUCAGON 1 MG
1 KIT INJECTION
Status: DISCONTINUED | OUTPATIENT
Start: 2023-09-14 | End: 2023-09-15 | Stop reason: HOSPADM

## 2023-09-14 RX ORDER — TAMSULOSIN HYDROCHLORIDE 0.4 MG/1
0.8 CAPSULE ORAL DAILY
Status: DISCONTINUED | OUTPATIENT
Start: 2023-09-15 | End: 2023-09-15 | Stop reason: HOSPADM

## 2023-09-14 RX ORDER — FINASTERIDE 5 MG/1
5 TABLET, FILM COATED ORAL DAILY
Status: DISCONTINUED | OUTPATIENT
Start: 2023-09-15 | End: 2023-09-15 | Stop reason: HOSPADM

## 2023-09-14 RX ORDER — MUPIROCIN 20 MG/G
OINTMENT TOPICAL 2 TIMES DAILY
Status: DISCONTINUED | OUTPATIENT
Start: 2023-09-14 | End: 2023-09-15 | Stop reason: HOSPADM

## 2023-09-14 RX ORDER — METRONIDAZOLE 500 MG/100ML
500 INJECTION, SOLUTION INTRAVENOUS
Status: COMPLETED | OUTPATIENT
Start: 2023-09-14 | End: 2023-09-14

## 2023-09-14 RX ORDER — ATORVASTATIN CALCIUM 40 MG/1
40 TABLET, FILM COATED ORAL DAILY
Status: DISCONTINUED | OUTPATIENT
Start: 2023-09-15 | End: 2023-09-15 | Stop reason: HOSPADM

## 2023-09-14 RX ORDER — ONDANSETRON 2 MG/ML
4 INJECTION INTRAMUSCULAR; INTRAVENOUS
Status: COMPLETED | OUTPATIENT
Start: 2023-09-14 | End: 2023-09-14

## 2023-09-14 RX ADMIN — METRONIDAZOLE 500 MG: 5 INJECTION, SOLUTION INTRAVENOUS at 07:09

## 2023-09-14 RX ADMIN — ONDANSETRON 4 MG: 2 INJECTION INTRAMUSCULAR; INTRAVENOUS at 07:09

## 2023-09-14 RX ADMIN — CEFTRIAXONE SODIUM 1 G: 1 INJECTION, POWDER, FOR SOLUTION INTRAMUSCULAR; INTRAVENOUS at 06:09

## 2023-09-14 RX ADMIN — MUPIROCIN 1 G: 20 OINTMENT TOPICAL at 10:09

## 2023-09-14 RX ADMIN — GABAPENTIN 300 MG: 300 CAPSULE ORAL at 10:09

## 2023-09-14 RX ADMIN — SACUBITRIL AND VALSARTAN 1 TABLET: 24; 26 TABLET, FILM COATED ORAL at 10:09

## 2023-09-14 NOTE — ED NOTES
Adrian from the pharmacy will fix the rocephin order so I can pull it again from the pyxis. The first bag was leaking.

## 2023-09-14 NOTE — ED PROVIDER NOTES
Encounter Date: 9/14/2023       History     Chief Complaint   Patient presents with    Constipation     Pt has not had a bm in the last four days.  +Covid, abd pain.     Chief complaint:  No bowel movement    HPI:  72-year-old male presented to a local urgent care today because he had not had a bowel movement in 4 days.  He is referred to the emergency department for concerns over bowel obstruction.  He was found to have COVID at the clinic.  He denies any nausea, vomiting or fever.  Past medical history is significant for diabetes, CHF, cardiomyopathy, coronary artery disease, chronic renal insufficiency, BPH, hypertension and hyperlipidemia.  He has undergone a TURP and a cystoscope.      Review of patient's allergies indicates:   Allergen Reactions    Invokana  [canagliflozin]      Other reaction(s): been very dizzy     Past Medical History:   Diagnosis Date    Asthma     Cardiomyopathy 10/21/2014    CHF (congestive heart failure)     Coronary artery disease     CRF (chronic renal failure)     Diabetes mellitus     Enlarged prostate     Hyperlipidemia     Hypertension     Neuropathy      Past Surgical History:   Procedure Laterality Date    ANGIOGRAPHY OF INTERNAL MAMMARY VESSEL N/A 3/11/2022    Procedure: Angiogram Internal Mammary;  Surgeon: Hank Lujan MD;  Location: [facility] CATH/EP LAB;  Service: Cardiology;  Laterality: N/A;    CARDIAC CATHETERIZATION      CARDIAC VALVE SURGERY      CATHETERIZATION OF BOTH LEFT AND RIGHT HEART Left 3/11/2022    Procedure: CATHETERIZATION, HEART, BOTH LEFT AND RIGHT;  Surgeon: Hank Lujan MD;  Location: [facility] CATH/EP LAB;  Service: Cardiology;  Laterality: Left;    CORONARY ANGIOGRAPHY N/A 3/11/2022    Procedure: ANGIOGRAM, CORONARY ARTERY;  Surgeon: Hank Lujan MD;  Location: [facility] CATH/EP LAB;  Service: Cardiology;  Laterality: N/A;    CORONARY BYPASS GRAFT ANGIOGRAPHY  3/11/2022    Procedure: Bypass graft study;  Surgeon: Hank Lujan MD;  Location: [facility]  CATH/EP LAB;  Service: Cardiology;;    CYSTOSCOPY N/A 7/30/2019    Procedure: CYSTOSCOPY;  Surgeon: Spencer Nguyen MD;  Location: [facility] OR;  Service: Urology;  Laterality: N/A;    INSERTION OF INTRAVASCULAR MICROAXIAL BLOOD PUMP N/A 8/31/2022    Procedure: INSERTION, IMPELLA;  Surgeon: Zach Jensen MD;  Location: [facility] CATH LAB;  Service: Cardiology;  Laterality: N/A;    PLACEMENT OF SWAN SEE CATHETER WITH IMAGING GUIDANCE  8/31/2022    Procedure: INSERTION, CATHETER, SWAN-SEE, WITH IMAGING GUIDANCE;  Surgeon: Zach Jensen MD;  Location: [facility] CATH LAB;  Service: Cardiology;;    SHOULDER ARTHROSCOPY  1985    TRANSRECTAL BIOPSY OF PROSTATE WITH ULTRASOUND GUIDANCE N/A 7/30/2019    Procedure: BIOPSY, PROSTATE, RECTAL APPROACH, WITH US GUIDANCE;  Surgeon: Spencer Nguyen MD;  [facility];  Service: Urology;  Laterality: N/A;  procedure not performed, pt unable to tolerate    TRANSRECTAL BIOPSY OF PROSTATE WITH ULTRASOUND GUIDANCE N/A 8/8/2019    Procedure: BIOPSY, PROSTATE, RECTAL APPROACH, WITH US GUIDANCE;  Surgeon: Spencer Nguyne MD;  Location: [facility];  Service: Urology;  Laterality: N/A;     Family History   Problem Relation Age of Onset    No Known Problems Mother     Diabetes Father     Hypertension Father     Heart attack Father     Heart disease Father     Diabetes Sister     Heart disease Brother     Diabetes Brother     No Known Problems Maternal Grandmother     No Known Problems Maternal Grandfather     No Known Problems Paternal Grandmother     No Known Problems Paternal Grandfather     No Known Problems Maternal Aunt     No Known Problems Maternal Uncle     No Known Problems Paternal Aunt     No Known Problems Paternal Uncle     Anemia Neg Hx     Arrhythmia Neg Hx     Asthma Neg Hx     Clotting disorder Neg Hx     Fainting Neg Hx     Heart failure Neg Hx     Hyperlipidemia Neg Hx     Glaucoma Neg Hx      Social History     Tobacco Use    Smoking status: Former     Current  packs/day: 0.00     Types: Cigarettes     Quit date: 1970     Years since quittin.2    Smokeless tobacco: Never   Substance Use Topics    Alcohol use: Not Currently     Alcohol/week: 3.0 standard drinks of alcohol     Types: 3 Cans of beer per week     Comment: social    Drug use: No     Review of Systems   Constitutional:  Negative for activity change, appetite change, chills, fatigue and fever.   Eyes:  Negative for visual disturbance.   Respiratory:  Negative for apnea and shortness of breath.    Cardiovascular:  Negative for chest pain and palpitations.   Gastrointestinal:  Positive for abdominal pain and constipation. Negative for abdominal distention.   Genitourinary:  Negative for difficulty urinating.   Musculoskeletal:  Negative for neck pain.   Skin:  Negative for pallor and rash.   Neurological:  Negative for headaches.   Hematological:  Does not bruise/bleed easily.   Psychiatric/Behavioral:  Negative for agitation.        Physical Exam     Initial Vitals [23 1238]   BP Pulse Resp Temp SpO2   (!) 168/79 78 17 98.1 °F (36.7 °C) 100 %      MAP       --         Physical Exam    Nursing note and vitals reviewed.  Constitutional: He appears well-developed and well-nourished.   HENT:   Head: Normocephalic and atraumatic.   Eyes: Conjunctivae are normal.   Neck: Neck supple.   Normal range of motion.  Cardiovascular:  Normal rate, regular rhythm and normal heart sounds.     Exam reveals no gallop and no friction rub.       No murmur heard.  Pulmonary/Chest: Breath sounds normal. No respiratory distress. He has no wheezes. He has no rhonchi. He has no rales.   Abdominal: Abdomen is soft. He exhibits no distension. There is abdominal tenderness (right suprapubic tenderness).   Incarcerated umbilical hernia   Genitourinary:    Genitourinary Comments: Enlarged testicles     Musculoskeletal:         General: Normal range of motion.      Cervical back: Normal range of motion and neck supple.      Neurological: He is alert and oriented to person, place, and time.   Skin: Skin is warm and dry.   Psychiatric: He has a normal mood and affect.         ED Course   Procedures  Labs Reviewed   CBC W/ AUTO DIFFERENTIAL - Abnormal; Notable for the following components:       Result Value    RBC 3.97 (*)     Hemoglobin 11.2 (*)     Hematocrit 33.4 (*)     RDW 15.9 (*)     Gran # (ANC) 9.2 (*)     Lymph # 0.6 (*)     Gran % 86.4 (*)     Lymph % 6.0 (*)     All other components within normal limits   COMPREHENSIVE METABOLIC PANEL - Abnormal; Notable for the following components:    CO2 21 (*)     Glucose 118 (*)     BUN 32 (*)     Creatinine 2.2 (*)     Albumin 3.2 (*)     Total Bilirubin 1.5 (*)     eGFR 31 (*)     Anion Gap 17 (*)     All other components within normal limits   LIPASE   URINALYSIS, REFLEX TO URINE CULTURE   SARS-COV-2 RNA AMPLIFICATION, QUAL          Imaging Results              CT Abdomen Pelvis  Without Contrast (Final result)  Result time 09/14/23 17:30:22      Final result by Evgeny Ferro Jr., MD (09/14/23 17:30:22)                   Impression:      Small-bowel obstruction secondary to incarceration at a moderate size umbilical hernia.  Large amount of ascites.  Cirrhosis.  Cholelithiasis.  Right inguinal hernia containing ascites fluid extending into the scrotum.  Prostate hypertrophy with asymmetry      Electronically signed by: Evgeny Ferro MD  Date:    09/14/2023  Time:    17:30               Narrative:    EXAMINATION:  CT ABDOMEN PELVIS WITHOUT CONTRAST    CLINICAL HISTORY:  Bowel obstruction suspected; Unspecified intestinal obstruction, unspecified as to partial versus complete obstruction    TECHNIQUE:  Low dose axial images, sagittal and coronal reformations were obtained from the lung bases to the pubic symphysis.  30 mL of oral Omnipaque 350 was administered.    COMPARISON:  Abdomen x-ray of September 14, 2023.    FINDINGS:  The liver is small and mildly nodular in  contour consistent with cirrhosis.  There is a large amount of ascites identified in both sides of the abdomen and in the pelvis.  This extends into the right scrotum down to the testicle.  The gallbladder is of normal size but contains radiodensities consistent with gallstones.  The pancreas is of normal contour and CT density without edema or mass.  The spleen is of normal size and CT density.    The adrenal glands are not enlarged.  The kidneys are of normal size contour and CT density for a noncontrast study.  Stone or hydronephrosis is not seen.  The abdominal aorta and inferior vena cava are of normal caliber.    The stomach is of normal configuration.  There are multiple loops of dilated fluid-filled proximal small bowel loops with air-fluid levels.  The point of obstruction is the moderate size umbilical hernia which also contains ascites fluid.  The efferent loops of small bowel after the umbilical hernia are empty.  The colon is of normal configuration without dilation seen.    The bladder is of normal contour.  The prostate is enlarged and significantly protrudes into the bladder base.  It measures 5.7 cm AP and 5 cm transversely with asymmetry.                                       X-Ray Abdomen Flat And Erect (Final result)  Result time 09/14/23 14:52:48      Final result by Evgeny Ferro Jr., MD (09/14/23 14:52:48)                   Impression:      Probable early small-bowel obstruction.      Electronically signed by: Evgeny Ferro MD  Date:    09/14/2023  Time:    14:52               Narrative:    EXAMINATION:  XR ABDOMEN FLAT AND ERECT    CLINICAL HISTORY:  Abdominal Pain;    TECHNIQUE:  Flat and erect AP views of the abdomen were performed.    COMPARISON:  None    FINDINGS:  A dilated air-filled length of small-bowel is seen crossing the upper abdomen.  The rest of the small bowel and colon does not show distention or air-fluid levels.  No free air is seen.  No masses or calcifications are  noted.                                       Medications   metronidazole IVPB 500 mg (500 mg Intravenous New Bag 9/14/23 1916)   cefTRIAXone (ROCEPHIN) 1 g in dextrose 5 % in water (D5W) 100 mL IVPB (MB+) (0 g Intravenous Stopped 9/14/23 1906)   ondansetron injection 4 mg (4 mg Intravenous Given 9/14/23 1932)     Medical Decision Making  72-year-old male presents with no bowel movement for the past 4 days.  He developed right lower quadrant abdominal pain.  Physical exam reveals an incarcerated umbilical hernia.  I was unable to reduce the hernia; however, Dr. Burgess successfully reduce the hernia in the emergency department.  He wishes to transfer the patient to [facility] for possible ICU admission and surgery.    Amount and/or Complexity of Data Reviewed  Radiology: ordered.    Risk  Prescription drug management.  Decision regarding hospitalization.                               Clinical Impression:   Final diagnoses:  [K56.609] Bowel obstruction  [K42.0] Incarcerated umbilical hernia (Primary)        ED Disposition Condition    Admit Stable                Av Layton III, MD  09/14/23 1937

## 2023-09-15 ENCOUNTER — CLINICAL SUPPORT (OUTPATIENT)
Dept: CARDIOLOGY | Facility: HOSPITAL | Age: 73
End: 2023-09-15
Attending: EMERGENCY MEDICINE
Payer: MEDICARE

## 2023-09-15 VITALS — BODY MASS INDEX: 19.77 KG/M2 | HEIGHT: 72 IN | WEIGHT: 146 LBS

## 2023-09-15 VITALS
TEMPERATURE: 98 F | HEART RATE: 66 BPM | RESPIRATION RATE: 11 BRPM | WEIGHT: 146.63 LBS | BODY MASS INDEX: 19.86 KG/M2 | SYSTOLIC BLOOD PRESSURE: 100 MMHG | OXYGEN SATURATION: 99 % | DIASTOLIC BLOOD PRESSURE: 57 MMHG | HEIGHT: 72 IN

## 2023-09-15 LAB
ANION GAP SERPL CALC-SCNC: 8 MMOL/L (ref 8–16)
AORTIC ROOT ANNULUS: 3.4 CM
AORTIC VALVE CUSP SEPERATION: 1.1 CM
AV INDEX (PROSTH): 0.22
AV MEAN GRADIENT: 17 MMHG
AV PEAK GRADIENT: 30 MMHG
AV REGURGITATION PRESSURE HALF TIME: 357 MS
AV VALVE AREA BY VELOCITY RATIO: 0.53 CM²
AV VALVE AREA: 0.5 CM²
AV VELOCITY RATIO: 0.23
BSA FOR ECHO PROCEDURE: 1.83 M2
BUN SERPL-MCNC: 35 MG/DL (ref 8–23)
CALCIUM SERPL-MCNC: 8.2 MG/DL (ref 8.7–10.5)
CHLORIDE SERPL-SCNC: 107 MMOL/L (ref 95–110)
CO2 SERPL-SCNC: 26 MMOL/L (ref 23–29)
CREAT SERPL-MCNC: 2.1 MG/DL (ref 0.5–1.4)
CV ECHO LV RWT: 0.41 CM
DOP CALC AO PEAK VEL: 2.76 M/S
DOP CALC AO VTI: 58 CM
DOP CALC LVOT AREA: 2.3 CM2
DOP CALC LVOT DIAMETER: 1.7 CM
DOP CALC LVOT PEAK VEL: 0.64 M/S
DOP CALC LVOT STROKE VOLUME: 29.04 CM3
DOP CALCLVOT PEAK VEL VTI: 12.8 CM
E WAVE DECELERATION TIME: 239 MSEC
E/A RATIO: 0.89
E/E' RATIO: 19 M/S
ECHO LV POSTERIOR WALL: 1.02 CM (ref 0.6–1.1)
ERYTHROCYTE [DISTWIDTH] IN BLOOD BY AUTOMATED COUNT: 16.3 % (ref 11.5–14.5)
EST. GFR  (NO RACE VARIABLE): 32.8 ML/MIN/1.73 M^2
ESTIMATED AVG GLUCOSE: 108 MG/DL (ref 68–131)
FRACTIONAL SHORTENING: 11 % (ref 28–44)
GLUCOSE SERPL-MCNC: 96 MG/DL (ref 70–110)
HBA1C MFR BLD: 5.4 % (ref 4.5–6.2)
HCT VFR BLD AUTO: 33 % (ref 40–54)
HGB BLD-MCNC: 10.9 G/DL (ref 14–18)
INTERVENTRICULAR SEPTUM: 1.06 CM (ref 0.6–1.1)
IVC DIAMETER: 1.4 CM
LACTATE SERPL-SCNC: 0.9 MMOL/L (ref 0.5–1.9)
LEFT INTERNAL DIMENSION IN SYSTOLE: 4.39 CM (ref 2.1–4)
LEFT VENTRICLE DIASTOLIC VOLUME INDEX: 61.29 ML/M2
LEFT VENTRICLE DIASTOLIC VOLUME: 114 ML
LEFT VENTRICLE MASS INDEX: 100 G/M2
LEFT VENTRICLE SYSTOLIC VOLUME INDEX: 46.9 ML/M2
LEFT VENTRICLE SYSTOLIC VOLUME: 87.2 ML
LEFT VENTRICULAR INTERNAL DIMENSION IN DIASTOLE: 4.92 CM (ref 3.5–6)
LEFT VENTRICULAR MASS: 186.89 G
LV LATERAL E/E' RATIO: 14.25 M/S
LV SEPTAL E/E' RATIO: 28.5 M/S
LVOT MG: 1 MMHG
LVOT MV: 0.41 CM/S
MAGNESIUM SERPL-MCNC: 2.3 MG/DL (ref 1.6–2.6)
MCH RBC QN AUTO: 28.4 PG (ref 27–31)
MCHC RBC AUTO-ENTMCNC: 33 G/DL (ref 32–36)
MCV RBC AUTO: 86 FL (ref 82–98)
MV PEAK A VEL: 1.28 M/S
MV PEAK E VEL: 1.14 M/S
PISA AR MAX VEL: 4.44 M/S
PISA MRMAX VEL: 2.53 M/S
PISA TR MAX VEL: 2.51 M/S
PLATELET # BLD AUTO: 282 K/UL (ref 150–450)
PMV BLD AUTO: 9.9 FL (ref 9.2–12.9)
POTASSIUM SERPL-SCNC: 3.6 MMOL/L (ref 3.5–5.1)
PV MV: 0.54 M/S
PV PEAK GRADIENT: 3 MMHG
PV PEAK VELOCITY: 0.82 M/S
RA PRESSURE ESTIMATED: 3 MMHG
RBC # BLD AUTO: 3.84 M/UL (ref 4.6–6.2)
RV TB RVSP: 6 MMHG
RV TISSUE DOPPLER FREE WALL SYSTOLIC VELOCITY 1 (APICAL 4 CHAMBER VIEW): 5.98 CM/S
SODIUM SERPL-SCNC: 141 MMOL/L (ref 136–145)
TDI LATERAL: 0.08 M/S
TDI SEPTAL: 0.04 M/S
TDI: 0.06 M/S
TR MAX PG: 25 MMHG
TRICUSPID ANNULAR PLANE SYSTOLIC EXCURSION: 0.92 CM
TV REST PULMONARY ARTERY PRESSURE: 28 MMHG
WBC # BLD AUTO: 11.07 K/UL (ref 3.9–12.7)
Z-SCORE OF LEFT VENTRICULAR DIMENSION IN END DIASTOLE: -0.51
Z-SCORE OF LEFT VENTRICULAR DIMENSION IN END SYSTOLE: 2.52

## 2023-09-15 PROCEDURE — 83036 HEMOGLOBIN GLYCOSYLATED A1C: CPT | Performed by: STUDENT IN AN ORGANIZED HEALTH CARE EDUCATION/TRAINING PROGRAM

## 2023-09-15 PROCEDURE — 83735 ASSAY OF MAGNESIUM: CPT | Performed by: STUDENT IN AN ORGANIZED HEALTH CARE EDUCATION/TRAINING PROGRAM

## 2023-09-15 PROCEDURE — 93306 TTE W/DOPPLER COMPLETE: CPT | Mod: 26,,, | Performed by: INTERNAL MEDICINE

## 2023-09-15 PROCEDURE — 25000003 PHARM REV CODE 250: Performed by: STUDENT IN AN ORGANIZED HEALTH CARE EDUCATION/TRAINING PROGRAM

## 2023-09-15 PROCEDURE — 63600175 PHARM REV CODE 636 W HCPCS: Performed by: STUDENT IN AN ORGANIZED HEALTH CARE EDUCATION/TRAINING PROGRAM

## 2023-09-15 PROCEDURE — 93306 TTE W/DOPPLER COMPLETE: CPT

## 2023-09-15 PROCEDURE — 96366 THER/PROPH/DIAG IV INF ADDON: CPT

## 2023-09-15 PROCEDURE — 99223 1ST HOSP IP/OBS HIGH 75: CPT | Mod: ,,, | Performed by: GENERAL PRACTICE

## 2023-09-15 PROCEDURE — 99223 PR INITIAL HOSPITAL CARE,LEVL III: ICD-10-PCS | Mod: ,,, | Performed by: GENERAL PRACTICE

## 2023-09-15 PROCEDURE — 93306 ECHO (CUPID ONLY): ICD-10-PCS | Mod: 26,,, | Performed by: INTERNAL MEDICINE

## 2023-09-15 PROCEDURE — 94761 N-INVAS EAR/PLS OXIMETRY MLT: CPT

## 2023-09-15 PROCEDURE — 36415 COLL VENOUS BLD VENIPUNCTURE: CPT | Performed by: STUDENT IN AN ORGANIZED HEALTH CARE EDUCATION/TRAINING PROGRAM

## 2023-09-15 PROCEDURE — 83605 ASSAY OF LACTIC ACID: CPT | Performed by: STUDENT IN AN ORGANIZED HEALTH CARE EDUCATION/TRAINING PROGRAM

## 2023-09-15 PROCEDURE — 85027 COMPLETE CBC AUTOMATED: CPT | Performed by: STUDENT IN AN ORGANIZED HEALTH CARE EDUCATION/TRAINING PROGRAM

## 2023-09-15 PROCEDURE — G0378 HOSPITAL OBSERVATION PER HR: HCPCS

## 2023-09-15 PROCEDURE — 80048 BASIC METABOLIC PNL TOTAL CA: CPT | Performed by: STUDENT IN AN ORGANIZED HEALTH CARE EDUCATION/TRAINING PROGRAM

## 2023-09-15 RX ORDER — SODIUM,POTASSIUM PHOSPHATES 280-250MG
2 POWDER IN PACKET (EA) ORAL
Status: DISCONTINUED | OUTPATIENT
Start: 2023-09-15 | End: 2023-09-15 | Stop reason: HOSPADM

## 2023-09-15 RX ORDER — LANOLIN ALCOHOL/MO/W.PET/CERES
800 CREAM (GRAM) TOPICAL
Status: DISCONTINUED | OUTPATIENT
Start: 2023-09-15 | End: 2023-09-15 | Stop reason: HOSPADM

## 2023-09-15 RX ORDER — HYDRALAZINE HYDROCHLORIDE 50 MG/1
50 TABLET, FILM COATED ORAL EVERY 12 HOURS
Qty: 60 TABLET | Refills: 0 | Status: SHIPPED | OUTPATIENT
Start: 2023-09-15 | End: 2024-02-29

## 2023-09-15 RX ORDER — METRONIDAZOLE 500 MG/100ML
500 INJECTION, SOLUTION INTRAVENOUS
Status: DISCONTINUED | OUTPATIENT
Start: 2023-09-15 | End: 2023-09-15 | Stop reason: HOSPADM

## 2023-09-15 RX ADMIN — FINASTERIDE 5 MG: 5 TABLET, FILM COATED ORAL at 08:09

## 2023-09-15 RX ADMIN — LEVOTHYROXINE SODIUM 100 MCG: 0.1 TABLET ORAL at 06:09

## 2023-09-15 RX ADMIN — POTASSIUM BICARBONATE 50 MEQ: 977.5 TABLET, EFFERVESCENT ORAL at 04:09

## 2023-09-15 RX ADMIN — METRONIDAZOLE 500 MG: 5 INJECTION, SOLUTION INTRAVENOUS at 04:09

## 2023-09-15 RX ADMIN — METRONIDAZOLE 500 MG: 5 INJECTION, SOLUTION INTRAVENOUS at 11:09

## 2023-09-15 RX ADMIN — METOPROLOL SUCCINATE 25 MG: 25 TABLET, FILM COATED, EXTENDED RELEASE ORAL at 08:09

## 2023-09-15 RX ADMIN — TAMSULOSIN HYDROCHLORIDE 0.8 MG: 0.4 CAPSULE ORAL at 08:09

## 2023-09-15 RX ADMIN — MUPIROCIN 1 G: 20 OINTMENT TOPICAL at 08:09

## 2023-09-15 RX ADMIN — ATORVASTATIN CALCIUM 40 MG: 40 TABLET, FILM COATED ORAL at 08:09

## 2023-09-15 RX ADMIN — SACUBITRIL AND VALSARTAN 1 TABLET: 24; 26 TABLET, FILM COATED ORAL at 08:09

## 2023-09-15 RX ADMIN — ISOSORBIDE DINITRATE 20 MG: 10 TABLET ORAL at 08:09

## 2023-09-15 NOTE — ASSESSMENT & PLAN NOTE
-gen surg consulted; hernia reduced at bedside   -admit to ICU for monitoring   -NPO for potential surgery in AM (if patient continues to clinically improve, will likely be able to postpone going to OR for risk stratification)  -consult cardiology for risk stratification   -hold home asa, plavix, prasugrel   -lactate in AM   -no NG tube currently

## 2023-09-15 NOTE — PROGRESS NOTES
[facility]  Adult Nutrition   Progress Note (Initial Assessment)     SUMMARY     Recommendations  Recommendation/Intervention:   1.) Advance diet when medically appropriate to  DM/Cardiac diet.   2.) If unable to resume diet within 48 hrs consider Clinimiix E @ 75 ml/hr (76.5 gm protein, 612 kcal).   3.)Suggest lactobacillus acidophilus for gut health.   4.) RD to monitor and provide recommendations PRN.    Goals: Advance diet or provide nutrition support within 48 hrs.  Nutrition Goal Status: progressing towards goal    Dietitian Rounds Brief  Pt is currently NPO, admitted with abdominal pain and unable to eat for the past few days due to short bowel obstruction. Pt also has altered renal labs.     Diet order:   Current Diet Order: NPO         Evaluation of Received Nutrient/Fluid Intake  Energy Calories Required: not meeting needs  Protein Required: not meeting needs  Fluid Required: not meeting needs    % Intake of Estimated Energy Needs: 0%  % Meal Intake: NPO      Intake/Output Summary (Last 24 hours) at 9/15/2023 1118  Last data filed at 9/14/2023 2052  Gross per 24 hour   Intake 200 ml   Output --   Net 200 ml        Anthropometrics  Temp: 97.7 °F (36.5 °C)  Height Method: Stated  Height: 6' (182.9 cm)  Height (inches): 72 in  Weight Method: Bed Scale  Weight: 66.5 kg (146 lb 9.7 oz)  Weight (lb): 146.61 lb  Ideal Body Weight (IBW), Male: 178 lb  % Ideal Body Weight, Male (lb): 82.37 %  BMI (Calculated): 19.9  BMI Grade: 18.5-24.9 - normal       Estimated/Assessed Needs  Weight Used For Calorie Calculations: 66.5 kg (146 lb 9.7 oz)  Energy Calorie Requirements (kcal): 1875-4953 (25-30 kcal/ kg bw)  Energy Need Method: Kcal/kg  Protein Requirements: 53-83 (0.8-1.0gm/ kg bw)  Weight Used For Protein Calculations: 66.5 kg (146 lb 9.7 oz)     Estimated Fluid Requirement Method: RDA Method  RDA Method (mL): 1662       Reason for Assessment  Reason For Assessment: consult  Relevant Medical History:  HTN, DM, HLD, CAD s/p PCI 1 year ago, HFrEF 15%, 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  Interdisciplinary Rounds: did not attend  Nutrition Discharge Planning: TBD    Nutrition/Diet History  Patient Reported Diet/Restrictions/Preferences: general  Food Allergies: NKFA  Factors Affecting Nutritional Intake: NPO, nausea/vomiting, abdominal distension, abdominal pain, altered gastrointestinal function    Nutrition Risk Screen  Nutrition Risk Screen: no indicators present     MST Score: 2  Have you recently lost weight without trying?: Unsure  Weight loss score: 2  Have you been eating poorly because of a decreased appetite?: No  Appetite score: 0       Weight History:  Wt Readings from Last 10 Encounters:   09/14/23 66.5 kg (146 lb 9.7 oz)   08/29/23 77.6 kg (171 lb)   06/09/23 74.6 kg (164 lb 5.7 oz)   05/29/23 78.4 kg (172 lb 14.4 oz)   05/19/23 81.2 kg (179 lb)   05/19/23 81.2 kg (179 lb 0.2 oz)   02/22/23 81.9 kg (180 lb 8 oz)   11/04/22 76.7 kg (169 lb 1.5 oz)   09/13/22 71.2 kg (156 lb 15.5 oz)   09/01/22 81.2 kg (179 lb)        Lab/Procedures/Meds: Pertinent Labs/Meds Reviewed    Medications:Pertinent Medications Reviewed  Scheduled Meds:   atorvastatin  40 mg Oral Daily    cefTRIAXone (ROCEPHIN) IVPB  1 g Intravenous Q24H    finasteride  5 mg Oral Daily    gabapentin  300 mg Oral QHS    hydrALAZINE  50 mg Oral Q8H    isosorbide dinitrate  20 mg Oral TID    levothyroxine  100 mcg Oral Before breakfast    metoprolol succinate  25 mg Oral Daily    metronidazole  500 mg Intravenous Q8H    mupirocin   Nasal BID    sacubitriL-valsartan  1 tablet Oral BID    tamsulosin  0.8 mg Oral Daily     Continuous Infusions:  PRN Meds:.dextrose 50%, glucagon (human recombinant), insulin aspart U-100, magnesium oxide, magnesium oxide, potassium bicarbonate, potassium bicarbonate, potassium bicarbonate, potassium, sodium phosphates, potassium, sodium phosphates, potassium, sodium phosphates    Labs: Pertinent Labs Reviewed  Clinical  Chemistry:  Recent Labs   Lab 09/14/23  1312 09/15/23  0326    141   K 3.7 3.6    107   CO2 21* 26   * 96   BUN 32* 35*   CREATININE 2.2* 2.1*   CALCIUM 9.2 8.2*   PROT 7.2  --    ALBUMIN 3.2*  --    BILITOT 1.5*  --    ALKPHOS 80  --    AST 18  --    ALT 13  --    ANIONGAP 17* 8   MG  --  2.3   LIPASE 20  --      CBC:   Recent Labs   Lab 09/15/23  0326   WBC 11.07   RBC 3.84*   HGB 10.9*   HCT 33.0*      MCV 86   MCH 28.4   MCHC 33.0       Monitor and Evaluation  Food and Nutrient Intake: energy intake, food and beverage intake  Food and Nutrient Adminstration: diet order  Knowledge/Beliefs/Attitudes: food and nutrition knowledge/skill, beliefs and attitudes  Physical Activity and Function: nutrition-related ADLs and IADLs  Anthropometric Measurements: height/length, weight, weight change, body mass index  Nutrition-Focused Physical Findings: overall appearance     Nutrition Risk   high    Nutrition Follow-Up  RD Follow-up?: Yes      Arlyn Koehler, MARBELLA 09/15/2023 11:18 AM

## 2023-09-15 NOTE — ASSESSMENT & PLAN NOTE
Likely pre-renal   Repeat BMP in AM   -will be cautious with fluid recusitation 2/2 poor EF   -hold lasix

## 2023-09-15 NOTE — ED NOTES
Coral lazar [facility] is aware that patient is being placed on a cardiac monitor and will need to be on one for transport.

## 2023-09-15 NOTE — DISCHARGE SUMMARY
[facility]  Discharge Summary      Patient Name: Baldo Carney  MRN: [id number]  SHAHNAZ: [id number]  Patient Class: IP- Inpatient  Admission Date: 9/14/2023  Hospital Length of Stay: 1 days  Discharge Date and Time:  09/15/2023 1:27 PM  Attending Physician: Joshua Cortez MD   Discharging Provider: Joshua Cortez MD  Primary Care Provider: Millie Hayes APRN,FNP-C    Primary Care Team: Networked reference to record PCT     HPI:   Mr. Carney is a 71 yo w/pmhx of HTN, DM, HLD, CAD s/p PCI 1 year ago, HFrEF 15%, 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 who presents as transfer from [facility] for SBO. Patient intially presented to urgent care with abdominal pain which started 3 days ago and was then instructed to go to the ED. Denies any bm since 3 days ago. Reports vomiting every time he attempted to eat. Does report passing gas. Reports pain RLQ pain radiating to the umbilicus. Reports hx of R inguinal hernia but reports surgeons told him it was too risky to operate. Denies any recent abdominal surgery. CT at [facility] significant for SBO and Dr. Damon w/surgery was consulted for umbilical hernia. Hernia was reduced in ED and patient was transferred to [facility] for ICU level care and potential surgery in AM. Reports abdominal pain significantly improved since hernia was reduced.     Received ctx and flagyl in ED. Labs notable for hgb 11, cr 2.2 (baseline 1.7), lipase 20. CT A/P also notable for small ascites, cirrhosis, cholelithiasis, right inguinal hernia, and prostate hypertrophy. Spoke with Dr. Stewart at bedside. Will order lactate for AM to r/out ischemic bowel and consult cardiology for risk stratification. Will hold AC.           Procedure(s) (LRB):  LAPAROTOMY, EXPLORATORY (N/A)      Hospital Course:   Surgeon reduced patient's hernia while in the hospital and stated that patient would need to follow-uwith his cardiologist (Dr. Lujan) for a lexiscan and clearance for surgery  and then follow up with Dr. Damon outpatient for an elective hernia repair.  Patient subsequently discharged home.    Physical examination on discharge:  Constitutional: No distress.   HENT: NC  Head: Atraumatic.   Cardiovascular: Normal rate, regular rhythm and normal heart sounds.   Pulmonary/Chest: Effort normal. No wheezes.   Abdominal: Soft. Bowel sounds are normal. No distension and no mass. No tenderness  Neurological: Alert.   Skin: Skin is warm and dry.   Psych: Appropriate mood and affect    I have seen the patient on the day of discharge and reviewed the discharge instructions as outlined.      Goals of Care Treatment Preferences:  Code Status: Full Code    Living Will: Yes              Consults:   Consults (From admission, onward)        Status Ordering Provider     Inpatient consult to Cardiology  Once        Provider:  Maldonado Heath MD    Completed YOBANI ALEXANDER          GI  * Small bowel obstruction  -gen surg consulted; hernia reduced at bedside   -admit to ICU for monitoring   -NPO for potential surgery in AM (if patient continues to clinically improve, will likely be able to postpone going to OR for risk stratification)  -consult cardiology for risk stratification   -hold home asa, plavix, prasugrel   -lactate in AM   -no NG tube currently   -cont ctx and flagyl      Final Active Diagnoses:    Diagnosis Date Noted POA    PRINCIPAL PROBLEM:  Small bowel obstruction [K56.609] 09/14/2023 Yes    Cirrhosis [K74.60] 09/14/2023 Yes    Hypothyroidism [E03.9] 08/31/2023 Yes    Chronic systolic heart failure [I50.22] 08/29/2020 Yes     Chronic    BPH (benign prostatic hyperplasia) [N40.0] 08/29/2020 Yes     Chronic    COPD (chronic obstructive pulmonary disease) [J44.9] 08/29/2020 Yes     Chronic    ERICA on CKD III [N17.9]  Yes    Peripheral polyneuropathy [G62.9] 04/11/2019 Yes    CAD (coronary artery disease) [I25.10] 11/03/2014 Yes    Aortic stenosis status post AVR [I35.0] 10/21/2014 Yes  "    Chronic    Hypertension [I10] 09/19/2014 Yes    Hyperlipidemia [E78.5] 09/19/2014 Yes    Diabetes mellitus type 2 without retinopathy [E11.9] 09/19/2014 Yes      Problems Resolved During this Admission:       Discharged Condition: good    Disposition: Home or Self Care    Follow Up:   Follow-up Information     Millie Hayes APRN,PATRICIO-C Follow up in 1 week(s).    Specialty: Family Medicine  Contact information:  [address] [facility]  [place] [address]  [phone]             Hank Lujan MD Follow up in 1 week(s).    Specialties: Cardiology, Interventional Cardiology  Why: lexiscan and cardiac op clearance  Contact information:  [address] [facility]  [address]  [place] [address] [phone]             Kenny Damon III, MD Follow up in 2 week(s).    Specialties: General Surgery, Surgery  Contact information:  [address]  [place] [address]  [phone]                       Patient Instructions:      Activity as tolerated       Significant Diagnostic Studies: Labs:   BMP:   Recent Labs   Lab 09/14/23  1312 09/15/23  0326   * 96    141   K 3.7 3.6    107   CO2 21* 26   BUN 32* 35*   CREATININE 2.2* 2.1*   CALCIUM 9.2 8.2*   MG  --  2.3   , CMP   Recent Labs   Lab 09/14/23  1312 09/15/23  0326    141   K 3.7 3.6    107   CO2 21* 26   * 96   BUN 32* 35*   CREATININE 2.2* 2.1*   CALCIUM 9.2 8.2*   PROT 7.2  --    ALBUMIN 3.2*  --    BILITOT 1.5*  --    ALKPHOS 80  --    AST 18  --    ALT 13  --    ANIONGAP 17* 8   , CBC   Recent Labs   Lab 09/14/23  1312 09/15/23  0326   WBC 10.68 11.07   HGB 11.2* 10.9*   HCT 33.4* 33.0*    282   , Troponin No results for input(s): "TROPONINI" in the last 168 hours. and All labs within the past 24 hours have been reviewed  Microbiology:   Blood Culture   Lab Results   Component Value Date    LABBLOO No growth after 5 days. 08/28/2020     Radiology: X-Ray: CXR: X-Ray Chest 1 View (CXR): No results found for " this visit on 09/14/23.  Cardiac Graphics: Echocardiogram:   2D echo with color flow doppler:   Results for orders placed or performed during the hospital encounter of 09/19/14   2D Echo w/ Color Flow Doppler   Result Value Ref Range    EF + QEF 40     Mitral Valve Regurgitation mild     Diastolic Dysfunction Yes     Narrative    TEST DESCRIPTION       General: The patient was bradycardic throughout the study.    Aorta: The aortic root is normal in size, measuring 2.7 cm at sinotubular junction and 3.0 cm at Sinuses of Valsalva. The proximal ascending aorta is normal in size, measuring 3.1 cm across.     Left Atrium: The left atrial volume index is severely enlarged, measuring 47.16 cc/m2.     Left Ventricle: The left ventricle is normal in size, with an end-diastolic diameter of 5.0 cm, and an end-systolic diameter of 3.7 cm. LV wall thickness is normal, with the septum measuring 0.7 cm and the posterior wall measuring 0.9 cm across. Relative   wall thickness was normal at 0.36, and the LV mass index was 75.1 g/m2 consistent with normal left ventricular mass. The apex is hypokinetic.   The following segments were hypokinetic: apical septum, apical lateral wall, apical inferior wall, apical anterior wall.  Global left ventricular systolic function appears mildly to moderately depressed. Visually estimated ejection fraction is 40-45%. Quantitatively measured LV function is 45%.     There is blunted systolic/diastolic flow in the pulmonary vein indicating increased left atrial pressures. Mitral inflow patterns reveal an E:A ratio of 0.8, with a deceleration time of 241 msec., consistent with diastolic dysfunction secondary to   relaxation abnormality.     Right Atrium: The right atrium is normal in size, measuring 4.6 cm in length and 4.1 cm in width in the apical view.     Right Ventricle: The right ventricle is normal in size measuring 3.7 cm at the base in the apical right ventricle-focused view. RV free wall  thickness is increased measuring 0.8 cm in Subcostal view, consistent with RVH. Global right ventricular systolic   function appears normal. Tricuspid annular plane systolic excursion (TAPSE) is 1.8 cm. The estimated PA systolic pressure is 16 mmHg.     Aortic Valve:  The aortic valve is moderately sclerotic with moderately restricted leaflet mobility. The aortic valve is tri-leaflet in structure. The peak velocity obtained across the aortic valve is 3.25 m/s, which translates to a peak gradient of 42.0   mmHg. The mean gradient is 24.0 mmHg. Using a left ventricular outflow tract diameter of 2.1 cm, a left ventricular outflow tract velocity time integral of 23.9 cm, and a peak instantaneous transvalvular velocity time integral of 86.4 cm, the calculated   aortic valve area is 0.96 cm2, consistent with moderate to severe aortic stenosis. Additionally, there is mild to moderate aortic regurgitation. There is a pressure half time of 470.0 msec.     Mitral Valve:  The mitral valve is mildly sclerotic. There is mild mitral regurgitation. There is mitral annular calcification.     Tricuspid Valve:  There is trivial tricuspid regurgitation.     Pulmonary Valve:  There is mild pulmonic regurgitation.     IVC: IVC is normal in size and collapses > 50% with a sniff, suggesting normal right atrial pressure of 3 mmHg.     Intracavitary: There is no evidence of pericardial effusion, intracavity mass, thrombi, or vegetation.         CONCLUSIONS     1 - Moderate to severe aortic stenosis, SHI = 0.96 cm2.     2 - Mild to moderate aortic regurgitation.     3 - Mildly to moderately depressed left ventricular systolic function (EF 40-45%).     4 - Quantitatively measured LV function is 45%.     5 - Left ventricular diastolic dysfunction.     6 - Mild mitral regurgitation.     7 - Severe left atrial enlargement.     8 - Mild pulmonic regurgitation.     9 - Right ventricular hypertrophy.     10 - Normal right ventricular systolic  "function .         This document has been electronically    SIGNED BY: Oneil Elaine MD On: 09/19/2014 11:15    and Transthoracic echo (TTE) complete (Cupid Only):   Results for orders placed or performed during the hospital encounter of 08/31/22   Echo   Result Value Ref Range    BSA 2.03 m2    Ascending aorta 3.25 cm    STJ 2.42 cm    AV mean gradient 18 mmHg    Ao peak efrain 2.80 m/s    Ao VTI 61.33 cm    IVS 0.93 0.6 - 1.1 cm    LA size 4.19 cm    Left Atrium Major Axis 5.80 cm    Left Atrium Minor Axis 5.84 cm    LVIDd 5.19 3.5 - 6.0 cm    LVIDs 4.46 (A) 2.1 - 4.0 cm    LVOT diameter 2.12 cm    LVOT peak VTI 16.85 cm    Posterior Wall 0.82 0.6 - 1.1 cm    MV Peak A Efrain 1.04 m/s    E wave deceleration time 177.32 msec    MV Peak E Efrain 1.36 m/s    PV Peak D Efrain 0.40 m/s    PV Peak S Efrain 0.34 m/s    RA Major Axis 6.08 cm    RA Width 4.25 cm    RVDD 4.48 cm    Sinus 2.89 cm    TAPSE 1.47 cm    TR Max Efrain 2.76 m/s    TDI LATERAL 0.05 m/s    TDI SEPTAL 0.03 m/s    LA WIDTH 4.79 cm    MV stenosis pressure 1/2 time 51.42 ms    LV Diastolic Volume 129.13 mL    LV Systolic Volume 90.50 mL    RV S' 4.57 cm/s    LVOT peak efrain 0.73 m/s    LA volume (mod) 71.94 cm3    MV "A" wave duration 8.28 msec    LV LATERAL E/E' RATIO 27.20 m/s    LV SEPTAL E/E' RATIO 45.33 m/s    FS 14 %    LA volume 99.29 cm3    LV mass 162.39 g    Left Ventricle Relative Wall Thickness 0.32 cm    AV valve area 0.97 cm2    AV Velocity Ratio 0.26     AV index (prosthetic) 0.27     MV valve area p 1/2 method 4.28 cm2    E/A ratio 1.31     Mean e' 0.04 m/s    Pulm vein S/D ratio 0.85     LVOT area 3.5 cm2    LVOT stroke volume 59.45 cm3    AV peak gradient 31 mmHg    E/E' ratio 34.00 m/s    LV Systolic Volume Index 44.6 mL/m2    LV Diastolic Volume Index 63.61 mL/m2    LA Volume Index 48.9 mL/m2    LV Mass Index 80 g/m2    Triscuspid Valve Regurgitation Peak Gradient 30 mmHg    LA Volume Index (Mod) 35.4 mL/m2    QEF 17 %    EF 15 %    Narrative    · The " left ventricle is normal in size with severely decreased systolic   function. The estimated ejection fraction is 15%.  · The quantitatively derived ejection fraction is 17%.  · There is left ventricular global hypokinesis.  · Grade II left ventricular diastolic dysfunction.  · Mild right ventricular enlargement with mildly to moderately reduced   right ventricular systolic function.  · Severe biatrial enlargement.  · There is a 23 mm Medtronic Mosaic Ultraporcine bioprosthetic aortic   valve present. The aortic valve mean gradient is 18 mmHg with a   dimensionless index of 0.27. Mild aortic regurgitation.  · Mild tricuspid regurgitation.  · Indeterminate central venous pressure.  · There is ascites present.  · There are bilateral pleural effusions.     OPERATIONS PERFORMED:  1.  Aortic valve replacement with a 23-mm Medtronic Mosaic ultra porcine   bioprosthesis.  2.  Coronary artery bypass grafting x1, with saphenous vein graft to the   distal   right coronary.         Pending Diagnostic Studies:     Procedure Component Value Units Date/Time    Echo [[id number]] Resulted: 09/15/23 1122    Order Status: Sent Lab Status: In process Updated: 09/15/23 1122         Medications:  Reconciled Home Medications:      Medication List      CONTINUE taking these medications    albuterol 90 mcg/actuation inhaler  Commonly known as: PROVENTIL/VENTOLIN HFA  INHALE 1 TO 2 PUFFS INTO THE LUNGS EVERY 4 TO 6 HOURS AS NEEDED FOR WHEEZE AND SHORTNESS OF BREATH     aspirin 81 MG EC tablet  Commonly known as: ECOTRIN  Take 81 mg by mouth once daily.     atorvastatin 40 MG tablet  Commonly known as: LIPITOR  Take 1 tablet (40 mg total) by mouth once daily.     BREZTRI AEROSPHERE 160-9-4.8 mcg/actuation Hfaa  Generic drug: budesonide-glycopyr-formoterol  Inhale into the lungs daily as needed.     clopidogreL 75 mg tablet  Commonly known as: PLAVIX     cyanocobalamin 1,000 mcg/mL injection  Inject 1 mL (1,000 mcg total) into the muscle every  14 (fourteen) days.     ENTRESTO 24-26 mg per tablet  Generic drug: sacubitriL-valsartan  Take 1 tablet by mouth 2 (two) times daily.     FARXIGA 5 mg Tab tablet  Generic drug: dapagliflozin propanediol  Take 5 mg by mouth once daily.     finasteride 5 mg tablet  Commonly known as: PROSCAR  Take 1 tablet (5 mg total) by mouth once daily.     fluticasone furoate-vilanteroL 100-25 mcg/dose diskus inhaler  Commonly known as: BREO ELLIPTA  Inhale 1 puff into the lungs once daily. (DAILY CONTROLLER)     fluticasone propionate 50 mcg/actuation nasal spray  Commonly known as: FLONASE  1 SPRAY (50 MCG TOTAL) BY EACH NOSTRIL ROUTE ONCE DAILY.     furosemide 40 MG tablet  Commonly known as: LASIX  Take by mouth once daily.     gabapentin 300 MG capsule  Commonly known as: NEURONTIN  Take 1 capsule (300 mg total) by mouth every evening.     hydrALAZINE 50 MG tablet  Commonly known as: APRESOLINE  Take 1 tablet (50 mg total) by mouth every 12 (twelve) hours.     ibuprofen 800 MG tablet  Commonly known as: ADVIL,MOTRIN  Take 800 mg by mouth every 6 (six) hours as needed.     isosorbide dinitrate 20 MG tablet  Commonly known as: ISORDIL  Take 1 tablet (20 mg total) by mouth 3 (three) times daily.     levothyroxine 100 MCG tablet  Commonly known as: SYNTHROID  Take 1 tablet (100 mcg total) by mouth before breakfast. With no other meds or food     metoprolol succinate 25 MG 24 hr tablet  Commonly known as: TOPROL-XL  Take 1 tablet (25 mg total) by mouth once daily.     prasugreL 10 mg Tab  Commonly known as: EFFIENT  Take 1 tablet (10 mg total) by mouth once daily.     tamsulosin 0.4 mg Cap  Commonly known as: FLOMAX  TAKE ONE CAPSULE BY MOUTH DAILY AT 5 PM     traMADoL 50 mg tablet  Commonly known as: ULTRAM  Take 1 tablet (50 mg total) by mouth every 8 (eight) hours as needed for Pain.     ZONTIVITY 2.08 mg Tab  Generic drug: vorapaxar  Take 1 tablet by mouth once daily.            Indwelling Lines/Drains at time of discharge:    Lines/Drains/Airways     None                 Time spent on the discharge of patient: 35 minutes    Critical care time spent on the evaluation and treatment of severe organ dysfunction, review of pertinent labs and imaging studies, discussions with consulting providers and discussions with patient/family: 35 minutes.     Joshua Cortez MD  Department of Hospital Medicine  [facility]

## 2023-09-15 NOTE — HPI
Mr. Carney is a 73 yo w/pmhx of HTN, DM, HLD, CAD s/p PCI 1 year ago, HFrEF 15%, 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 who presents as transfer from [facility] for SBO. Patient intially presented to urgent care with abdominal pain which started 3 days ago and was then instructed to go to the ED. Denies any bm since 3 days ago. Reports vomiting every time he attempted to eat. Does report passing gas. Reports pain RLQ pain radiating to the umbilicus. Reports hx of R inguinal hernia but reports surgeons told him it was too risky to operate. Denies any recent abdominal surgery. CT at [facility] significant for SBO and Dr. Damon w/surgery was consulted for umbilical hernia. Hernia was reduced in ED and patient was transferred to [facility] for ICU level care and potential surgery in AM. Reports abdominal pain significantly improved since hernia was reduced.     Received ctx and flagyl in ED. Labs notable for hgb 11, cr 2.2 (baseline 1.7), lipase 20. CT A/P also notable for small ascites, cirrhosis, cholelithiasis, right inguinal hernia, and prostate hypertrophy. Spoke with Dr. Stewart at bedside. Will order lactate for AM to r/out ischemic bowel and consult cardiology for risk stratification. Will hold AC.

## 2023-09-15 NOTE — ASSESSMENT & PLAN NOTE
TTE from 7/2022 w/EF 15%  -isordil   -metoprolol   -hold lasix while NPO   -does not appear volume overloaded on exam

## 2023-09-15 NOTE — PLAN OF CARE
Entered room to do dc assessment.  Patient getting dressed to go home.  Pt has no needs.  His brother is coming to pick him up.

## 2023-09-15 NOTE — NURSING
Nurses Note -- 4 Eyes      9/15/2023   6:14 AM      Skin assessed during: Admit      [x] No Altered Skin Integrity Present    [x]Prevention Measures Documented      [] Yes- Altered Skin Integrity Present or Discovered   [] LDA Added if Not in Epic (Describe Wound)   [] New Altered Skin Integrity was Present on Admit and Documented in LDA   [] Wound Image Taken    Wound Care Consulted? No    Attending Nurse:  Armaan Sy RN/Staff Member:   Alisha

## 2023-09-15 NOTE — NURSING
AVS reviewed with the patient. Patient verbalized understanding of education. All comments and concerns addressed. PIV x2 & telemetry removed. All belongings sent with the patient. Patient discharged at this time.
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
## 2023-09-15 NOTE — SUBJECTIVE & OBJECTIVE
Past Medical History:   Diagnosis Date    Asthma     Cardiomyopathy 10/21/2014    CHF (congestive heart failure)     Coronary artery disease     CRF (chronic renal failure)     Diabetes mellitus     Enlarged prostate     Hyperlipidemia     Hypertension     Neuropathy        Past Surgical History:   Procedure Laterality Date    ANGIOGRAPHY OF INTERNAL MAMMARY VESSEL N/A 3/11/2022    Procedure: Angiogram Internal Mammary;  Surgeon: Hank Lujan MD;  Location: [facility] CATH/EP LAB;  Service: Cardiology;  Laterality: N/A;    CARDIAC CATHETERIZATION      CARDIAC VALVE SURGERY      CATHETERIZATION OF BOTH LEFT AND RIGHT HEART Left 3/11/2022    Procedure: CATHETERIZATION, HEART, BOTH LEFT AND RIGHT;  Surgeon: Hank Lujan MD;  Location: [facility] CATH/EP LAB;  Service: Cardiology;  Laterality: Left;    CORONARY ANGIOGRAPHY N/A 3/11/2022    Procedure: ANGIOGRAM, CORONARY ARTERY;  Surgeon: Hank Lujan MD;  Location: [facility] CATH/EP LAB;  Service: Cardiology;  Laterality: N/A;    CORONARY BYPASS GRAFT ANGIOGRAPHY  3/11/2022    Procedure: Bypass graft study;  Surgeon: Hank Lujan MD;  Location: [facility] CATH/EP LAB;  Service: Cardiology;;    CYSTOSCOPY N/A 7/30/2019    Procedure: CYSTOSCOPY;  Surgeon: Spencer Nguyen MD;  Location: [facility];  Service: Urology;  Laterality: N/A;    INSERTION OF INTRAVASCULAR MICROAXIAL BLOOD PUMP N/A 8/31/2022    Procedure: INSERTION, IMPELLA;  Surgeon: Zach Jensen MD;  Location: [facility] CATH LAB;  Service: Cardiology;  Laterality: N/A;    PLACEMENT OF SWAN SEE CATHETER WITH IMAGING GUIDANCE  8/31/2022    Procedure: INSERTION, CATHETER, SWAN-SEE, WITH IMAGING GUIDANCE;  Surgeon: Zach Jensen MD;  Location: [facility] CATH LAB;  Service: Cardiology;;    SHOULDER ARTHROSCOPY  1985    TRANSRECTAL BIOPSY OF PROSTATE WITH ULTRASOUND GUIDANCE N/A 7/30/2019    Procedure: BIOPSY, PROSTATE, RECTAL APPROACH, WITH US GUIDANCE;  Surgeon: Spencer Nguyen MD;  Location: [facility];  Service:  Urology;  Laterality: N/A;  procedure not performed, pt unable to tolerate    TRANSRECTAL BIOPSY OF PROSTATE WITH ULTRASOUND GUIDANCE N/A 8/8/2019    Procedure: BIOPSY, PROSTATE, RECTAL APPROACH, WITH US GUIDANCE;  Surgeon: Spencer Nguyen MD;  Location: [facility];  Service: Urology;  Laterality: N/A;       Review of patient's allergies indicates:   Allergen Reactions    Invokana  [canagliflozin]      Other reaction(s): been very dizzy       No current facility-administered medications on file prior to encounter.     Current Outpatient Medications on File Prior to Encounter   Medication Sig    albuterol (PROVENTIL/VENTOLIN HFA) 90 mcg/actuation inhaler INHALE 1 TO 2 PUFFS INTO THE LUNGS EVERY 4 TO 6 HOURS AS NEEDED FOR WHEEZE AND SHORTNESS OF BREATH    aspirin (ECOTRIN) 81 MG EC tablet Take 81 mg by mouth once daily.    atorvastatin (LIPITOR) 40 MG tablet Take 1 tablet (40 mg total) by mouth once daily.    budesonide-glycopyr-formoterol (BREZTRI AEROSPHERE) 160-9-4.8 mcg/actuation HFAA Inhale into the lungs daily as needed.    clopidogreL (PLAVIX) 75 mg tablet     cyanocobalamin 1,000 mcg/mL injection Inject 1 mL (1,000 mcg total) into the muscle every 14 (fourteen) days.    FARXIGA 5 mg Tab tablet Take 5 mg by mouth once daily.    finasteride (PROSCAR) 5 mg tablet Take 1 tablet (5 mg total) by mouth once daily.    fluticasone furoate-vilanteroL (BREO ELLIPTA) 100-25 mcg/dose diskus inhaler Inhale 1 puff into the lungs once daily. (DAILY CONTROLLER)    fluticasone propionate (FLONASE) 50 mcg/actuation nasal spray 1 SPRAY (50 MCG TOTAL) BY EACH NOSTRIL ROUTE ONCE DAILY.    furosemide (LASIX) 40 MG tablet Take by mouth once daily.    gabapentin (NEURONTIN) 300 MG capsule Take 1 capsule (300 mg total) by mouth every evening.    hydrALAZINE (APRESOLINE) 50 MG tablet Take 1 tablet (50 mg total) by mouth every 8 (eight) hours. (Patient taking differently: Take 50 mg by mouth every 12 (twelve) hours.)    ibuprofen  (ADVIL,MOTRIN) 800 MG tablet Take 800 mg by mouth every 6 (six) hours as needed.    isosorbide dinitrate (ISORDIL) 20 MG tablet Take 1 tablet (20 mg total) by mouth 3 (three) times daily.    levothyroxine (SYNTHROID) 100 MCG tablet Take 1 tablet (100 mcg total) by mouth before breakfast. With no other meds or food    metoprolol succinate (TOPROL-XL) 25 MG 24 hr tablet Take 1 tablet (25 mg total) by mouth once daily.    prasugreL (EFFIENT) 10 mg Tab Take 1 tablet (10 mg total) by mouth once daily.    sacubitriL-valsartan (ENTRESTO) 24-26 mg per tablet Take 1 tablet by mouth 2 (two) times daily.    tamsulosin (FLOMAX) 0.4 mg Cap TAKE ONE CAPSULE BY MOUTH DAILY AT 5 PM    traMADoL (ULTRAM) 50 mg tablet Take 1 tablet (50 mg total) by mouth every 8 (eight) hours as needed for Pain.    ZONTIVITY 2.08 mg Tab Take 1 tablet by mouth once daily.     Family History       Problem Relation (Age of Onset)    Diabetes Father, Sister, Brother    Heart attack Father    Heart disease Father, Brother    Hypertension Father    No Known Problems Mother, Maternal Grandmother, Maternal Grandfather, Paternal Grandmother, Paternal Grandfather, Maternal Aunt, Maternal Uncle, Paternal Aunt, Paternal Uncle          Tobacco Use    Smoking status: Former     Current packs/day: 0.00     Types: Cigarettes     Quit date: 1970     Years since quittin.2    Smokeless tobacco: Never   Substance and Sexual Activity    Alcohol use: Not Currently     Alcohol/week: 3.0 standard drinks of alcohol     Types: 3 Cans of beer per week     Comment: social    Drug use: No    Sexual activity: Not Currently     Partners: Female     Review of Systems  Objective:     Vital Signs (Most Recent):  Temp: 98.8 °F (37.1 °C) (23)  Pulse: 75 (23)  Resp: 16 (23)  BP: (!) 188/89 (23)  SpO2: 100 % (23) Vital Signs (24h Range):  Temp:  [98.1 °F (36.7 °C)-98.8 °F (37.1 °C)] 98.8 °F (37.1 °C)  Pulse:  [75-88]  75  Resp:  [16-18] 16  SpO2:  [95 %-100 %] 100 %  BP: (168-190)/(79-94) 188/89     Weight: 66.5 kg (146 lb 9.7 oz)  Body mass index is 19.88 kg/m².     Physical Exam  Constitutional:       Appearance: Normal appearance.   HENT:      Head: Normocephalic and atraumatic.      Mouth/Throat:      Mouth: Mucous membranes are moist.   Eyes:      Extraocular Movements: Extraocular movements intact.   Cardiovascular:      Rate and Rhythm: Normal rate and regular rhythm.      Heart sounds: No murmur heard.  Pulmonary:      Effort: Pulmonary effort is normal. No respiratory distress.      Breath sounds: No wheezing or rales.   Abdominal:      General: Bowel sounds are normal.      Palpations: Abdomen is soft.      Comments: Small umbilical hernia   No inguinal hernia noted    Musculoskeletal:         General: Normal range of motion.      Right lower leg: No edema.      Left lower leg: No edema.   Skin:     General: Skin is warm and dry.   Neurological:      General: No focal deficit present.      Mental Status: He is alert and oriented to person, place, and time.   Psychiatric:         Mood and Affect: Mood normal.         Behavior: Behavior normal.                Significant Labs: All pertinent labs within the past 24 hours have been reviewed.    Significant Imaging: I have reviewed all pertinent imaging results/findings within the past 24 hours.

## 2023-09-15 NOTE — CONSULTS
Counts include 234 beds at the [facility]  General Surgery  Consult Note    Consults  Subjective:     Chief Complaint/Reason for Admission: incarcerated umbilical hernia. 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  He has regular paracentesis every 2 weeks with large volume tap.  Most recently had 8 L removed 2 weeks ago.  He presented to the emergency department with an incarcerated umbilical hernia.  He reports 2-3 days of firm bulging at the umbilicus with tenderness and now nausea.  CT scan showed a small-bowel obstruction with transition point at the incarcerated umbilical hernia.  Patient is awake and alert.  He states he has had no history of hernia repair in the past.  He is never had incarceration in the past.  The hernia was reduced during my exam.  He is feeling better.  He is being admitted for observation.  He is being transferred to [facility] for ICU.  ICU observation in case bowel was ischemic before reduction.  Will observe him for least 24 hours to check for peritoneal signs, clinical decline.    No current facility-administered medications on file prior to encounter.     Current Outpatient Medications on File Prior to Encounter   Medication Sig    albuterol (PROVENTIL/VENTOLIN HFA) 90 mcg/actuation inhaler INHALE 1 TO 2 PUFFS INTO THE LUNGS EVERY 4 TO 6 HOURS AS NEEDED FOR WHEEZE AND SHORTNESS OF BREATH    aspirin (ECOTRIN) 81 MG EC tablet Take 81 mg by mouth once daily.    atorvastatin (LIPITOR) 40 MG tablet Take 1 tablet (40 mg total) by mouth once daily.    budesonide-glycopyr-formoterol (BREZTRI AEROSPHERE) 160-9-4.8 mcg/actuation HFAA Inhale into the lungs daily as needed.    clopidogreL (PLAVIX) 75 mg tablet     cyanocobalamin 1,000 mcg/mL injection Inject 1 mL (1,000 mcg total) into the  muscle every 14 (fourteen) days.    FARXIGA 5 mg Tab tablet Take 5 mg by mouth once daily.    finasteride (PROSCAR) 5 mg tablet Take 1 tablet (5 mg total) by mouth once daily.    fluticasone furoate-vilanteroL (BREO ELLIPTA) 100-25 mcg/dose diskus inhaler Inhale 1 puff into the lungs once daily. (DAILY CONTROLLER)    fluticasone propionate (FLONASE) 50 mcg/actuation nasal spray 1 SPRAY (50 MCG TOTAL) BY EACH NOSTRIL ROUTE ONCE DAILY.    furosemide (LASIX) 40 MG tablet Take by mouth once daily.    gabapentin (NEURONTIN) 300 MG capsule Take 1 capsule (300 mg total) by mouth every evening.    hydrALAZINE (APRESOLINE) 50 MG tablet Take 1 tablet (50 mg total) by mouth every 8 (eight) hours. (Patient taking differently: Take 50 mg by mouth every 12 (twelve) hours.)    ibuprofen (ADVIL,MOTRIN) 800 MG tablet Take 800 mg by mouth every 6 (six) hours as needed.    isosorbide dinitrate (ISORDIL) 20 MG tablet Take 1 tablet (20 mg total) by mouth 3 (three) times daily.    levothyroxine (SYNTHROID) 100 MCG tablet Take 1 tablet (100 mcg total) by mouth before breakfast. With no other meds or food    metoprolol succinate (TOPROL-XL) 25 MG 24 hr tablet Take 1 tablet (25 mg total) by mouth once daily.    prasugreL (EFFIENT) 10 mg Tab Take 1 tablet (10 mg total) by mouth once daily.    sacubitriL-valsartan (ENTRESTO) 24-26 mg per tablet Take 1 tablet by mouth 2 (two) times daily.    tamsulosin (FLOMAX) 0.4 mg Cap TAKE ONE CAPSULE BY MOUTH DAILY AT 5 PM    traMADoL (ULTRAM) 50 mg tablet Take 1 tablet (50 mg total) by mouth every 8 (eight) hours as needed for Pain.    ZONTIVITY 2.08 mg Tab Take 1 tablet by mouth once daily.       Review of patient's allergies indicates:   Allergen Reactions    Invokana  [canagliflozin]      Other reaction(s): been very dizzy       Past Medical History:   Diagnosis Date    Asthma     Cardiomyopathy 10/21/2014    CHF (congestive heart failure)     Coronary artery disease     CRF (chronic renal failure)      Diabetes mellitus     Enlarged prostate     Hyperlipidemia     Hypertension     Neuropathy      Past Surgical History:   Procedure Laterality Date    ANGIOGRAPHY OF INTERNAL MAMMARY VESSEL N/A 3/11/2022    Procedure: Angiogram Internal Mammary;  Surgeon: Hank Lujan MD;  Location: [facility] CATH/EP LAB;  Service: Cardiology;  Laterality: N/A;    CARDIAC CATHETERIZATION      CARDIAC VALVE SURGERY      CATHETERIZATION OF BOTH LEFT AND RIGHT HEART Left 3/11/2022    Procedure: CATHETERIZATION, HEART, BOTH LEFT AND RIGHT;  Surgeon: Hank Lujan MD;  Location: [facility] CATH/EP LAB;  Service: Cardiology;  Laterality: Left;    CORONARY ANGIOGRAPHY N/A 3/11/2022    Procedure: ANGIOGRAM, CORONARY ARTERY;  Surgeon: Hank Lujan MD;  Location: [facility] CATH/EP LAB;  Service: Cardiology;  Laterality: N/A;    CORONARY BYPASS GRAFT ANGIOGRAPHY  3/11/2022    Procedure: Bypass graft study;  Surgeon: Hank Lujan MD;  Location: [facility] CATH/EP LAB;  Service: Cardiology;;    CYSTOSCOPY N/A 7/30/2019    Procedure: CYSTOSCOPY;  Surgeon: Spencer Nguyen MD;  Location: [facility];  Service: Urology;  Laterality: N/A;    INSERTION OF INTRAVASCULAR MICROAXIAL BLOOD PUMP N/A 8/31/2022    Procedure: INSERTION, IMPELLA;  Surgeon: Zach Jensen MD;  Location: [facility] CATH LAB;  Service: Cardiology;  Laterality: N/A;    PLACEMENT OF SWAN SEE CATHETER WITH IMAGING GUIDANCE  8/31/2022    Procedure: INSERTION, CATHETER, SWAN-SEE, WITH IMAGING GUIDANCE;  Surgeon: Zach Jensen MD;  Location: [facility] CATH LAB;  Service: Cardiology;;    SHOULDER ARTHROSCOPY  1985    TRANSRECTAL BIOPSY OF PROSTATE WITH ULTRASOUND GUIDANCE N/A 7/30/2019    Procedure: BIOPSY, PROSTATE, RECTAL APPROACH, WITH US GUIDANCE;  Surgeon: Spencer Nguyen MD;  Location: [facility] OR;  Service: Urology;  Laterality: N/A;  procedure not performed, pt unable to tolerate    TRANSRECTAL BIOPSY OF PROSTATE WITH ULTRASOUND GUIDANCE N/A 8/8/2019    Procedure: BIOPSY, PROSTATE,  RECTAL APPROACH, WITH US GUIDANCE;  Surgeon: Spencer Nguyen MD;  Location: [facility];  Service: Urology;  Laterality: N/A;     Family History       Problem Relation (Age of Onset)    Diabetes Father, Sister, Brother    Heart attack Father    Heart disease Father, Brother    Hypertension Father    No Known Problems Mother, Maternal Grandmother, Maternal Grandfather, Paternal Grandmother, Paternal Grandfather, Maternal Aunt, Maternal Uncle, Paternal Aunt, Paternal Uncle          Tobacco Use    Smoking status: Former     Current packs/day: 0.00     Types: Cigarettes     Quit date: 1970     Years since quittin.2    Smokeless tobacco: Never   Substance and Sexual Activity    Alcohol use: Not Currently     Alcohol/week: 3.0 standard drinks of alcohol     Types: 3 Cans of beer per week     Comment: social    Drug use: No    Sexual activity: Never     Review of Systems   Constitutional:  Negative for appetite change, chills, fever and unexpected weight change.   HENT:  Negative for hearing loss, rhinorrhea, sore throat and voice change.    Eyes:  Negative for photophobia and visual disturbance.   Respiratory:  Negative for cough, choking and shortness of breath.    Cardiovascular:  Negative for chest pain, palpitations and leg swelling.   Gastrointestinal:  Positive for abdominal distention, abdominal pain and nausea. Negative for blood in stool, constipation, diarrhea and vomiting.   Endocrine: Negative for cold intolerance, heat intolerance, polydipsia and polyuria.   Musculoskeletal:  Negative for arthralgias, back pain, joint swelling and neck stiffness.   Skin:  Negative for color change, pallor and rash.   Neurological:  Negative for dizziness, seizures, syncope and headaches.   Hematological:  Negative for adenopathy. Does not bruise/bleed easily.   Psychiatric/Behavioral:  Negative for agitation, behavioral problems and confusion.      Objective:     Vital Signs (Most Recent):  Temp: 98.8 °F (37.1 °C)  (09/14/23 2140)  Pulse: 75 (09/14/23 2140)  Resp: 16 (09/14/23 2140)  BP: (!) 188/89 (09/14/23 2140)  SpO2: 100 % (09/14/23 2140) Vital Signs (24h Range):  Temp:  [98.1 °F (36.7 °C)-98.8 °F (37.1 °C)] 98.8 °F (37.1 °C)  Pulse:  [75-88] 75  Resp:  [16-18] 16  SpO2:  [95 %-100 %] 100 %  BP: (168-190)/(79-94) 188/89     Weight: 66.5 kg (146 lb 9.7 oz)  Body mass index is 19.88 kg/m².      Intake/Output Summary (Last 24 hours) at 9/14/2023 2146  Last data filed at 9/14/2023 2052  Gross per 24 hour   Intake 200 ml   Output --   Net 200 ml       Physical Exam  Constitutional:       General: He is awake. He is not in acute distress.     Appearance: He is not toxic-appearing.   HENT:      Head: Normocephalic and atraumatic.   Pulmonary:      Effort: Pulmonary effort is normal. No tachypnea, bradypnea, accessory muscle usage or respiratory distress.   Abdominal:      General: There is distension.      Palpations: Abdomen is soft.          Comments: Hernia was incarcerated.  It reduced after manual reduction.  Feeling a lot better.  No peritoneal signs.  Hernia defect feels to be actually pretty small maybe a cm to 2   Musculoskeletal:      Cervical back: Neck supple.   Neurological:      Mental Status: He is alert and oriented to person, place, and time.   Psychiatric:         Behavior: Behavior is cooperative.         Significant Labs:  CBC:   Recent Labs   Lab 09/14/23  1312   WBC 10.68   RBC 3.97*   HGB 11.2*   HCT 33.4*      MCV 84   MCH 28.2   MCHC 33.5     CMP:   Recent Labs   Lab 09/14/23  1312   *   CALCIUM 9.2   ALBUMIN 3.2*   PROT 7.2      K 3.7   CO2 21*      BUN 32*   CREATININE 2.2*   ALKPHOS 80   ALT 13   AST 18   BILITOT 1.5*       Significant Diagnostics:  I have reviewed all pertinent imaging results/findings within the past 24 hours.    Assessment/Plan:   Incarcerated umbilical hernia now manually reduced  Severe congestive hepatopathy with large volume ascites    ICU observation  in case bowel was ischemic before reduction.  Will observe him for least 24 hours to check for peritoneal signs, clinical decline.  -would ideally like to repair the hernia to hopefully prevent any future incarceration.  But cardiac status is poor.  Will have to discuss case with cardiology and attending before proceeding with any elective surgery.    Thank you for your consult.     Kenny Damon III, MD  General Surgery  [facility]

## 2023-09-15 NOTE — CONSULTS
[facility]  Department of Cardiology  Consult Note      PATIENT NAME: Baldo Carney  MRN: [id number]  TODAY'S DATE: 09/15/2023  ADMIT DATE: 9/14/2023                          CONSULT REQUESTED BY: Joshua Cortez MD    SUBJECTIVE     PRINCIPAL PROBLEM: Small bowel obstruction      REASON FOR CONSULT:  Preop  Clearance       HPI:    Mr. Carney is a 72 year old male not the best historian. He has a PMH significant for CAD S/P CABG in 2008 and Aortic valve replacement. He had an angiogram in 3033 that showed multivessel disease and had stents placed by Dr. DAMARIS Jensen at [facility] as an OP seems he follows with Dr. Lujan. We have been consulted for potential hernia repair and risk stratification. He denies CP or SOB. Denies any anginal symptoms. He is able to do all his own house hold duties and ADLS. He has severe biventricular failure and has to have paracentesis very frequently. He also has been seen by Dr. Anabelle Mendez according to transplant note.     FROM HEART TRANSPLANT TEAM  -- with regards to his pulmonary hypertension, this is who group 2 related to his left-sided heart disease and as such would not be a candidate for any pulmonary hypertension specific therapies as this would likely precipitate pulmonary edema, elevated pulmonary capillary wedge pressure, and worsening respiratory symptoms.  - with regards to advanced heart failure therapies, his age would preclude consideration for transplantation.  LVAD also would not be a consideration for him as he has fairly advanced liver disease, with recurrent ascites needing paracentesis.    8/31/2022 CATH    Mr. Carney presents for unprotected LM PCI.    Impella CP utilized for high risk PCI including unprotect LM rota-PCI in this patient with LVEF=20%    Peggs-Johanne catheter placed via right IJV. RHC data reported below was obtained prior to starting PCI    Successful rota-PCI of LM-->LCx and LM--><LAD using DK Crush technique with 3.5X26  ROGER placed from LM-->lCx and 3.5X18 ROGER plaed from LM-->LAD.  LM segment post-dilated with 4mm balloon.    IVUS utilized for stent sizing     Echo 9/2022   The left ventricle is normal in size with severely decreased systolic function. The estimated ejection fraction is 15%.  The quantitatively derived ejection fraction is 17%.  There is left ventricular global hypokinesis.  Grade II left ventricular diastolic dysfunction.  Mild right ventricular enlargement with mildly to moderately reduced right ventricular systolic function.  Severe biatrial enlargement.  There is a 23 mm Medtronic Mosaic Ultraporcine bioprosthetic aortic valve present. The aortic valve mean gradient is 18 mmHg with a dimensionless index of 0.27. Mild aortic regurgitation.  Mild tricuspid regurgitation.  Indeterminate central venous pressure.  There is ascites present.  There are bilateral pleural effusions.     OPERATIONS PERFORMED:  1.  Aortic valve replacement with a 23-mm Medtronic Mosaic ultra porcine   bioprosthesis.  2.  Coronary artery bypass grafting x1, with saphenous vein graft to the distal   right coronary.         History of Present Illness:      S/P CABG 3/11/2022  71 y.o. male who presented for angiogram with PCI for multivessel coronary artery disease; RHC during procedure showed elevated filling pressures, so he is being admitted for optimization post procedure.  Patient reports he feels well post procedure and has no new complaints. He has PMHx: HTN, dyslipidemia, and an ICM (10/16/21 TTE demonstrated an LVEF=20%).  The patient underwent cardiac catheterization at [facility] on 3/11/22 that demonstrated multi-vessel CAD.      Patient states prior to his PCI he was not feeling well.  He had significant SOB and GOMEZ, he reports he could walk about 50 feet then have to stop and rest for 10-15 minutes prior to walking again.  He denied associated chest pain, dizziness, diaphroesis.  He also reports both abdominal and lower extremity edema.   He states he has multiple paracentesis over the last couple months, some of which >5L removed.  He had a liver biospsy 7/7 which showed moderate multifocal sinusoidal dilatation and congestion with focal bridging fibrosis.  He also reports PND stating he sleeps on 2 pillows but regularly wakes up around 2-3 am and has to sit on the side of the bed to breath      He is  and lives alone, but reports he has family near by.            Review of patient's allergies indicates:   Allergen Reactions    Invokana  [canagliflozin]      Other reaction(s): been very dizzy       Past Medical History:   Diagnosis Date    Asthma     Cardiomyopathy 10/21/2014    CHF (congestive heart failure)     Coronary artery disease     CRF (chronic renal failure)     Diabetes mellitus     Enlarged prostate     Hyperlipidemia     Hypertension     Neuropathy      Past Surgical History:   Procedure Laterality Date    ANGIOGRAPHY OF INTERNAL MAMMARY VESSEL N/A 3/11/2022    Procedure: Angiogram Internal Mammary;  Surgeon: Hank Lujan MD;  Location: [facility] CATH/EP LAB;  Service: Cardiology;  Laterality: N/A;    CARDIAC CATHETERIZATION      CARDIAC VALVE SURGERY      CATHETERIZATION OF BOTH LEFT AND RIGHT HEART Left 3/11/2022    Procedure: CATHETERIZATION, HEART, BOTH LEFT AND RIGHT;  Surgeon: Hank Lujan MD;  Location: [facility] CATH/EP LAB;  Service: Cardiology;  Laterality: Left;    CORONARY ANGIOGRAPHY N/A 3/11/2022    Procedure: ANGIOGRAM, CORONARY ARTERY;  Surgeon: Hank Lujan MD;  Location: [facility] CATH/EP LAB;  Service: Cardiology;  Laterality: N/A;    CORONARY BYPASS GRAFT ANGIOGRAPHY  3/11/2022    Procedure: Bypass graft study;  Surgeon: Hank Lujan MD;  Location: [facility] CATH/EP LAB;  Service: Cardiology;;    CYSTOSCOPY N/A 7/30/2019    Procedure: CYSTOSCOPY;  Surgeon: Spencer Nguyen MD;  Location: [facility] OR;  Service: Urology;  Laterality: N/A;    INSERTION OF INTRAVASCULAR MICROAXIAL BLOOD PUMP N/A 8/31/2022     Procedure: INSERTION, IMPELLA;  Surgeon: Zach Jensen MD;  Location: [facility] CATH LAB;  Service: Cardiology;  Laterality: N/A;    PLACEMENT OF SWAN SEE CATHETER WITH IMAGING GUIDANCE  2022    Procedure: INSERTION, CATHETER, SWAN-SEE, WITH IMAGING GUIDANCE;  Surgeon: Zach Jensen MD;  Location: [facility] CATH LAB;  Service: Cardiology;;    SHOULDER ARTHROSCOPY  1985    TRANSRECTAL BIOPSY OF PROSTATE WITH ULTRASOUND GUIDANCE N/A 2019    Procedure: BIOPSY, PROSTATE, RECTAL APPROACH, WITH US GUIDANCE;  Surgeon: Spencer Nguyen MD;  Location: [facility] OR;  Service: Urology;  Laterality: N/A;  procedure not performed, pt unable to tolerate    TRANSRECTAL BIOPSY OF PROSTATE WITH ULTRASOUND GUIDANCE N/A 2019    Procedure: BIOPSY, PROSTATE, RECTAL APPROACH, WITH US GUIDANCE;  Surgeon: Spencer Nguyen MD;  Location: [facility] OR;  Service: Urology;  Laterality: N/A;     Social History     Tobacco Use    Smoking status: Former     Current packs/day: 0.00     Types: Cigarettes     Quit date: 1970     Years since quittin.2    Smokeless tobacco: Never   Substance Use Topics    Alcohol use: Not Currently     Alcohol/week: 3.0 standard drinks of alcohol     Types: 3 Cans of beer per week     Comment: social    Drug use: No        REVIEW OF SYSTEMS    As mentioned in HPI    OBJECTIVE     VITAL SIGNS (Most Recent)  Temp: 98.6 °F (37 °C) (09/15/23 030)  Pulse: 69 (09/15/23 0843)  Resp: 15 (09/15/23 030)  BP: 139/62 (09/15/23 0843)  SpO2: 100 % (09/15/23 0758)    VENTILATION STATUS  Resp: 15 (09/15/23 030)  SpO2: 100 % (09/15/23 0758)           I & O (Last 24H):  Intake/Output Summary (Last 24 hours) at 9/15/2023 0848  Last data filed at 2023  Gross per 24 hour   Intake 200 ml   Output --   Net 200 ml       WEIGHTS  Wt Readings from Last 3 Encounters:   23 2140 66.5 kg (146 lb 9.7 oz)   23 1238 81.6 kg (180 lb)   23 1023 77.6 kg (171 lb)   23 1019 74.6 kg (164 lb 5.7  oz)       PHYSICAL EXAM    CONSTITUTIONAL: NAD  HEENT: Normocephalic. No pallor  NECK: no JVD  LUNGS: CTA b/l  HEART: regular rate and rhythm, S1, S2 normal, +murmur noted  ABDOMEN: soft, non-tender, bowel sounds normal  EXTREMITIES: No edema  SKIN: No rash  NEURO: AAO X 3, forgetful      HOME MEDICATIONS:  No current facility-administered medications on file prior to encounter.     Current Outpatient Medications on File Prior to Encounter   Medication Sig Dispense Refill    albuterol (PROVENTIL/VENTOLIN HFA) 90 mcg/actuation inhaler INHALE 1 TO 2 PUFFS INTO THE LUNGS EVERY 4 TO 6 HOURS AS NEEDED FOR WHEEZE AND SHORTNESS OF BREATH 18 g 2    aspirin (ECOTRIN) 81 MG EC tablet Take 81 mg by mouth once daily.      atorvastatin (LIPITOR) 40 MG tablet Take 1 tablet (40 mg total) by mouth once daily. 90 tablet 0    budesonide-glycopyr-formoterol (BREZTRI AEROSPHERE) 160-9-4.8 mcg/actuation HFAA Inhale into the lungs daily as needed.      clopidogreL (PLAVIX) 75 mg tablet       cyanocobalamin 1,000 mcg/mL injection Inject 1 mL (1,000 mcg total) into the muscle every 14 (fourteen) days. 1 mL 11    FARXIGA 5 mg Tab tablet Take 5 mg by mouth once daily.      finasteride (PROSCAR) 5 mg tablet Take 1 tablet (5 mg total) by mouth once daily. 90 tablet 4    fluticasone furoate-vilanteroL (BREO ELLIPTA) 100-25 mcg/dose diskus inhaler Inhale 1 puff into the lungs once daily. (DAILY CONTROLLER) 3 each 3    fluticasone propionate (FLONASE) 50 mcg/actuation nasal spray 1 SPRAY (50 MCG TOTAL) BY EACH NOSTRIL ROUTE ONCE DAILY. 16 mL 1    furosemide (LASIX) 40 MG tablet Take by mouth once daily.      gabapentin (NEURONTIN) 300 MG capsule Take 1 capsule (300 mg total) by mouth every evening. 90 capsule 1    hydrALAZINE (APRESOLINE) 50 MG tablet Take 1 tablet (50 mg total) by mouth every 8 (eight) hours. (Patient taking differently: Take 50 mg by mouth every 12 (twelve) hours.) 90 tablet 0    ibuprofen (ADVIL,MOTRIN) 800 MG tablet Take 800  mg by mouth every 6 (six) hours as needed.      isosorbide dinitrate (ISORDIL) 20 MG tablet Take 1 tablet (20 mg total) by mouth 3 (three) times daily. 90 tablet 0    levothyroxine (SYNTHROID) 100 MCG tablet Take 1 tablet (100 mcg total) by mouth before breakfast. With no other meds or food 30 tablet 5    metoprolol succinate (TOPROL-XL) 25 MG 24 hr tablet Take 1 tablet (25 mg total) by mouth once daily. 90 tablet 1    prasugreL (EFFIENT) 10 mg Tab Take 1 tablet (10 mg total) by mouth once daily. 30 tablet 11    sacubitriL-valsartan (ENTRESTO) 24-26 mg per tablet Take 1 tablet by mouth 2 (two) times daily. 180 tablet 1    tamsulosin (FLOMAX) 0.4 mg Cap TAKE ONE CAPSULE BY MOUTH DAILY AT 5 PM 90 capsule 11    traMADoL (ULTRAM) 50 mg tablet Take 1 tablet (50 mg total) by mouth every 8 (eight) hours as needed for Pain. 21 tablet 0    ZONTIVITY 2.08 mg Tab Take 1 tablet by mouth once daily.         SCHEDULED MEDS:   atorvastatin  40 mg Oral Daily    cefTRIAXone (ROCEPHIN) IVPB  1 g Intravenous Q24H    finasteride  5 mg Oral Daily    gabapentin  300 mg Oral QHS    hydrALAZINE  50 mg Oral Q8H    isosorbide dinitrate  20 mg Oral TID    levothyroxine  100 mcg Oral Before breakfast    metoprolol succinate  25 mg Oral Daily    metronidazole  500 mg Intravenous Q8H    mupirocin   Nasal BID    sacubitriL-valsartan  1 tablet Oral BID    tamsulosin  0.8 mg Oral Daily       CONTINUOUS INFUSIONS:    PRN MEDS:dextrose 50%, glucagon (human recombinant), insulin aspart U-100, magnesium oxide, magnesium oxide, potassium bicarbonate, potassium bicarbonate, potassium bicarbonate, potassium, sodium phosphates, potassium, sodium phosphates, potassium, sodium phosphates    LABS AND DIAGNOSTICS     CBC LAST 3 DAYS  Recent Labs   Lab 09/12/23  1340 09/14/23  1312 09/15/23  0326   WBC 6.78 10.68 11.07   RBC 3.53* 3.97* 3.84*   HGB 9.8* 11.2* 10.9*   HCT 31.5* 33.4* 33.0*   MCV 89 84 86   MCH 27.8 28.2 28.4   MCHC 31.1* 33.5 33.0   RDW 16.4*  "15.9* 16.3*    272 282   MPV 10.6 9.7 9.9   GRAN 80.0*  5.4 86.4*  9.2*  --    LYMPH 8.8*  0.6* 6.0*  0.6*  --    MONO 8.0  0.5 6.4  0.7  --    BASO 0.08 0.06  --    NRBC 0 0  --        COAGULATION LAST 3 DAYS  Recent Labs   Lab 09/12/23  1340   INR 1.0       CHEMISTRY LAST 3 DAYS  Recent Labs   Lab 09/14/23  1312 09/15/23  0326    141   K 3.7 3.6    107   CO2 21* 26   ANIONGAP 17* 8   BUN 32* 35*   CREATININE 2.2* 2.1*   * 96   CALCIUM 9.2 8.2*   MG  --  2.3   ALBUMIN 3.2*  --    PROT 7.2  --    ALKPHOS 80  --    ALT 13  --    AST 18  --    BILITOT 1.5*  --        CARDIAC PROFILE LAST 3 DAYS  No results for input(s): "BNP", "CPK", "CPKMB", "LDH", "TROPONINI", "TROPONINIHS" in the last 168 hours.    ENDOCRINE LAST 3 DAYS  No results for input(s): "TSH", "PROCAL" in the last 168 hours.    LAST ARTERIAL BLOOD GAS  ABG  No results for input(s): "PH", "PO2", "PCO2", "HCO3", "BE" in the last 168 hours.    LAST 7 DAYS MICROBIOLOGY   Microbiology Results (last 7 days)       ** No results found for the last 168 hours. **            MOST RECENT IMAGING  CT Abdomen Pelvis  Without Contrast  Narrative: EXAMINATION:  CT ABDOMEN PELVIS WITHOUT CONTRAST    CLINICAL HISTORY:  Bowel obstruction suspected; Unspecified intestinal obstruction, unspecified as to partial versus complete obstruction    TECHNIQUE:  Low dose axial images, sagittal and coronal reformations were obtained from the lung bases to the pubic symphysis.  30 mL of oral Omnipaque 350 was administered.    COMPARISON:  Abdomen x-ray of September 14, 2023.    FINDINGS:  The liver is small and mildly nodular in contour consistent with cirrhosis.  There is a large amount of ascites identified in both sides of the abdomen and in the pelvis.  This extends into the right scrotum down to the testicle.  The gallbladder is of normal size but contains radiodensities consistent with gallstones.  The pancreas is of normal contour and CT density " without edema or mass.  The spleen is of normal size and CT density.    The adrenal glands are not enlarged.  The kidneys are of normal size contour and CT density for a noncontrast study.  Stone or hydronephrosis is not seen.  The abdominal aorta and inferior vena cava are of normal caliber.    The stomach is of normal configuration.  There are multiple loops of dilated fluid-filled proximal small bowel loops with air-fluid levels.  The point of obstruction is the moderate size umbilical hernia which also contains ascites fluid.  The efferent loops of small bowel after the umbilical hernia are empty.  The colon is of normal configuration without dilation seen.    The bladder is of normal contour.  The prostate is enlarged and significantly protrudes into the bladder base.  It measures 5.7 cm AP and 5 cm transversely with asymmetry.  Impression: Small-bowel obstruction secondary to incarceration at a moderate size umbilical hernia.  Large amount of ascites.  Cirrhosis.  Cholelithiasis.  Right inguinal hernia containing ascites fluid extending into the scrotum.  Prostate hypertrophy with asymmetry    Electronically signed by: Evgeny Ferro MD  Date:    09/14/2023  Time:    17:30  X-Ray Abdomen Flat And Erect  Narrative: EXAMINATION:  XR ABDOMEN FLAT AND ERECT    CLINICAL HISTORY:  Abdominal Pain;    TECHNIQUE:  Flat and erect AP views of the abdomen were performed.    COMPARISON:  None    FINDINGS:  A dilated air-filled length of small-bowel is seen crossing the upper abdomen.  The rest of the small bowel and colon does not show distention or air-fluid levels.  No free air is seen.  No masses or calcifications are noted.  Impression: Probable early small-bowel obstruction.    Electronically signed by: Evgeny Ferro MD  Date:    09/14/2023  Time:    14:52      ECHOCARDIOGRAM RESULTS (last 5)  Results for orders placed during the hospital encounter of 08/31/22    Echo    Interpretation Summary  · The left  ventricle is normal in size with severely decreased systolic function. The estimated ejection fraction is 15%.  · The quantitatively derived ejection fraction is 17%.  · There is left ventricular global hypokinesis.  · Grade II left ventricular diastolic dysfunction.  · Mild right ventricular enlargement with mildly to moderately reduced right ventricular systolic function.  · Severe biatrial enlargement.  · There is a 23 mm Medtronic Mosaic Ultraporcine bioprosthetic aortic valve present. The aortic valve mean gradient is 18 mmHg with a dimensionless index of 0.27. Mild aortic regurgitation.  · Mild tricuspid regurgitation.  · Indeterminate central venous pressure.  · There is ascites present.  · There are bilateral pleural effusions.    OPERATIONS PERFORMED:  1.  Aortic valve replacement with a 23-mm Medtronic Mosaic ultra porcine  bioprosthesis.  2.  Coronary artery bypass grafting x1, with saphenous vein graft to the distal  right coronary.      Results for orders placed during the hospital encounter of 05/24/22    Echo Saline Bubble? No    Interpretation Summary  · The left ventricle is mildly enlarged with severely decreased systolic function. The estimated ejection fraction is 20%.  · There is left ventricular global hypokinesis with superimposed regional wall motion abnormalities in multivessel distribution.  · Grade III left ventricular diastolic dysfunction.  · Moderate right ventricular enlargement with moderately to severely reduced right ventricular systolic function.  · Severe biatrial enlargement.  · Mild aortic regurgitation.  · Mild mitral regurgitation.  · Moderate tricuspid regurgitation.  · The estimated PA systolic pressure is 69 mmHg.  · There is pulmonary hypertension.  · Elevated central venous pressure (15 mmHg).  · There are bilateral pleural effusions.  · There is ascites present.  · Systolic reversal in the hepatic veins      Results for orders placed during the hospital encounter of  10/26/21    Echo Saline Bubble? No    Interpretation Summary  · The estimated PA systolic pressure is 58 mmHg.  · The left ventricle is mildly enlarged with eccentric hypertrophy and severely decreased systolic function.  · The estimated ejection fraction is 20%.  · Grade III left ventricular diastolic dysfunction.  · There is severe left ventricular global hypokinesis.  · Normal right ventricular size with moderately reduced right ventricular systolic function.  · Mild left atrial enlargement.  · Moderate right atrial enlargement.  · The aortic valve is bileaflet.  · Moderate aortic regurgitation.  · There is mild-to-moderate aortic valve stenosis.  · Aortic valve area is 0.92 cm2; peak velocity is 2.10 m/s; mean gradient is 9 mmHg.  · Moderate-to-severe mitral regurgitation.  · There is mild mitral stenosis.  · There is severe mitral leaflet calcification.  · Severe tricuspid regurgitation.  · Moderate pulmonic regurgitation.  · There is moderate to severe pulmonary hypertension.      Results for orders placed during the hospital encounter of 08/28/20    Echo Color Flow Doppler? Yes    Interpretation Summary  · The estimated PA systolic pressure is 19 mmHg.  · Moderately decreased left ventricular systolic function. The estimated ejection fraction is 31 - 38 %.  · Grade I (mild) left ventricular diastolic dysfunction consistent with impaired relaxation.  · Mild left ventricular enlargement.  · Mildly to moderately reduced right ventricular systolic function.  · Mild left atrial enlargement.  · There is a bioprosthetic aortic valve present. Peak velocity 2.55 M/S; peak gradient 26 mm Hg, mean gradient 1mm Hg  · Mild mitral regurgitation.  · Local segmental wall motion abnormalities. Global left ventricular hypokinesia  · No evidence of endocarditis      CURRENT/PREVIOUS VISIT EKG  Results for orders placed or performed in visit on 08/31/22   EKG 12-lead    Collection Time: 08/31/22  6:15 AM    Narrative    Test  Reason :     Vent. Rate : 074 BPM     Atrial Rate : 074 BPM     P-R Int : 206 ms          QRS Dur : 166 ms      QT Int : 488 ms       P-R-T Axes : 075 269 083 degrees     QTc Int : 541 ms    Sinus rhythm with Fusion complexes  Right bundle branch block  Inferior infarct (cited on or before 03-NOV-2014)  Anterolateral infarct (cited on or before 03-NOV-2014)  Abnormal ECG  When compared with ECG of 08-MAR-2022 10:15,  Fusion complexes are now Present  Premature ventricular complexes are no longer Present  Questionable change in initial forces of Lateral leads  Confirmed by JANES JOHN MD ([id number]) on 8/31/2022 1:25:50 PM    Referred By: MIAH GANDARA           Confirmed By:JANES JOHN MD           ASSESSMENT/PLAN:     Active Hospital Problems    Diagnosis    *Small bowel obstruction    Cirrhosis    Hypothyroidism    Chronic systolic heart failure    BPH (benign prostatic hyperplasia)    COPD (chronic obstructive pulmonary disease)    ERICA on CKD III    Peripheral polyneuropathy    CAD (coronary artery disease)    Aortic stenosis status post AVR     1 - Moderate to severe aortic stenosis, SHI = 0.96 cm2.   2 - Mild to moderate aortic regurgitation.        Hypertension    Hyperlipidemia    Diabetes mellitus type 2 without retinopathy       ASSESSMENT & PLAN:       SBO  Cirrhosis  Hernia   CHF and Biventricular failure  PH  AS S/P AVR now with AR  HTN  Dyslipidemia  DM 2    RECOMMENDATIONS:      We have been consulted to weigh in on a noncardiac surgery in a cardiac patient His scores are below:  RCRI Score 4  DASI Score 10.7  METS 4.05  Would consider Lexiscan Myoview prior to invasive surgery.  Obtain ECHO  Thank you for the consultation  He seems euvolemic on exam  No further recommendations              Carolina Castillo NP  Department of Cardiology  Date of Service: 09/15/2023        I have personally interviewed and examined the patient, I have reviewed the Nurse Practitioner's history and physical, assessment, and  plan. I agree with the findings and plan.  THE PATIENT WAS NOT SEEN   HE WILL FOLLOW UP WITH HIS LOCAL CARDIALOGIST FOR THE STRESS SCAN.    DEBORA Amos M.D.  Department of Cardiology  Date of Service: 09/15/2023  8:48 AM

## 2023-09-15 NOTE — ASSESSMENT & PLAN NOTE
-gen surg consulted; hernia reduced at bedside   -admit to ICU for monitoring   -NPO for potential surgery in AM (if patient continues to clinically improve, will likely be able to postpone going to OR for risk stratification)  -consult cardiology for risk stratification   -hold home asa, plavix, prasugrel   -lactate in AM   -no NG tube currently   -cont ctx and flagyl

## 2023-09-15 NOTE — PROGRESS NOTES
[facility]  Progress Note    Patient Name: Baldo Carney  MRN: [id number]  Patient Class: IP- Inpatient   Admission Date: 9/14/2023  Length of Stay: 0 days  Attending Physician: Max Leigh MD  Primary Care Provider: Millie Hayes APRN,FNP-C        Subjective:     Principal Problem:Small bowel obstruction        HPI:  Mr. Carney is a 71 yo w/pmhx of HTN, DM, HLD, CAD s/p PCI 1 year ago, HFrEF 15%, 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 who presents as transfer from [facility] for SBO. Patient intially presented to urgent care with abdominal pain which started 3 days ago and was then instructed to go to the ED. Denies any bm since 3 days ago. Reports vomiting every time he attempted to eat. Does report passing gas. Reports pain RLQ pain radiating to the umbilicus. Reports hx of R inguinal hernia but reports surgeons told him it was too risky to operate. Denies any recent abdominal surgery. CT at [facility] significant for SBO and Dr. Damon w/surgery was consulted for umbilical hernia. Hernia was reduced in ED and patient was transferred to [facility] for ICU level care and potential surgery in AM. Reports abdominal pain significantly improved since hernia was reduced.     Received ctx and flagyl in ED. Labs notable for hgb 11, cr 2.2 (baseline 1.7), lipase 20. CT A/P also notable for small ascites, cirrhosis, cholelithiasis, right inguinal hernia, and prostate hypertrophy. Spoke with Dr. Stewart at bedside. Will order lactate for AM to r/out ischemic bowel and consult cardiology for risk stratification. Will hold AC.         Overview/Hospital Course:  No notes on file    Past Medical History:   Diagnosis Date    Asthma     Cardiomyopathy 10/21/2014    CHF (congestive heart failure)     Coronary artery disease     CRF (chronic renal failure)     Diabetes mellitus     Enlarged prostate     Hyperlipidemia     Hypertension     Neuropathy        Past Surgical History:   Procedure Laterality Date     ANGIOGRAPHY OF INTERNAL MAMMARY VESSEL N/A 3/11/2022    Procedure: Angiogram Internal Mammary;  Surgeon: Hank Lujan MD;  Location: [facility] CATH/EP LAB;  Service: Cardiology;  Laterality: N/A;    CARDIAC CATHETERIZATION      CARDIAC VALVE SURGERY      CATHETERIZATION OF BOTH LEFT AND RIGHT HEART Left 3/11/2022    Procedure: CATHETERIZATION, HEART, BOTH LEFT AND RIGHT;  Surgeon: Hank Lujan MD;  Location: [facility] CATH/EP LAB;  Service: Cardiology;  Laterality: Left;    CORONARY ANGIOGRAPHY N/A 3/11/2022    Procedure: ANGIOGRAM, CORONARY ARTERY;  Surgeon: Hank Lujan MD;  Location: [facility] CATH/EP LAB;  Service: Cardiology;  Laterality: N/A;    CORONARY BYPASS GRAFT ANGIOGRAPHY  3/11/2022    Procedure: Bypass graft study;  Surgeon: Hank Lujan MD;  Location: [facility] CATH/EP LAB;  Service: Cardiology;;    CYSTOSCOPY N/A 7/30/2019    Procedure: CYSTOSCOPY;  Surgeon: Spencer Nguyen MD;  Location: [facility];  Service: Urology;  Laterality: N/A;    INSERTION OF INTRAVASCULAR MICROAXIAL BLOOD PUMP N/A 8/31/2022    Procedure: INSERTION, IMPELLA;  Surgeon: Zach Jensen MD;  Location: [facility] CATH LAB;  Service: Cardiology;  Laterality: N/A;    PLACEMENT OF SWAN SEE CATHETER WITH IMAGING GUIDANCE  8/31/2022    Procedure: INSERTION, CATHETER, SWAN-SEE, WITH IMAGING GUIDANCE;  Surgeon: Zach Jensen MD;  Location: [facility] CATH LAB;  Service: Cardiology;;    SHOULDER ARTHROSCOPY  1985    TRANSRECTAL BIOPSY OF PROSTATE WITH ULTRASOUND GUIDANCE N/A 7/30/2019    Procedure: BIOPSY, PROSTATE, RECTAL APPROACH, WITH US GUIDANCE;  Surgeon: Spencer Nguyen MD;  Location: [facility] OR;  Service: Urology;  Laterality: N/A;  procedure not performed, pt unable to tolerate    TRANSRECTAL BIOPSY OF PROSTATE WITH ULTRASOUND GUIDANCE N/A 8/8/2019    Procedure: BIOPSY, PROSTATE, RECTAL APPROACH, WITH US GUIDANCE;  Surgeon: Spencer Nguyen MD;  Location: [facility] OR;  Service: Urology;  Laterality: N/A;       Review of  patient's allergies indicates:   Allergen Reactions    Invokana  [canagliflozin]      Other reaction(s): been very dizzy       No current facility-administered medications on file prior to encounter.     Current Outpatient Medications on File Prior to Encounter   Medication Sig    albuterol (PROVENTIL/VENTOLIN HFA) 90 mcg/actuation inhaler INHALE 1 TO 2 PUFFS INTO THE LUNGS EVERY 4 TO 6 HOURS AS NEEDED FOR WHEEZE AND SHORTNESS OF BREATH    aspirin (ECOTRIN) 81 MG EC tablet Take 81 mg by mouth once daily.    atorvastatin (LIPITOR) 40 MG tablet Take 1 tablet (40 mg total) by mouth once daily.    budesonide-glycopyr-formoterol (BREZTRI AEROSPHERE) 160-9-4.8 mcg/actuation HFAA Inhale into the lungs daily as needed.    clopidogreL (PLAVIX) 75 mg tablet     cyanocobalamin 1,000 mcg/mL injection Inject 1 mL (1,000 mcg total) into the muscle every 14 (fourteen) days.    FARXIGA 5 mg Tab tablet Take 5 mg by mouth once daily.    finasteride (PROSCAR) 5 mg tablet Take 1 tablet (5 mg total) by mouth once daily.    fluticasone furoate-vilanteroL (BREO ELLIPTA) 100-25 mcg/dose diskus inhaler Inhale 1 puff into the lungs once daily. (DAILY CONTROLLER)    fluticasone propionate (FLONASE) 50 mcg/actuation nasal spray 1 SPRAY (50 MCG TOTAL) BY EACH NOSTRIL ROUTE ONCE DAILY.    furosemide (LASIX) 40 MG tablet Take by mouth once daily.    gabapentin (NEURONTIN) 300 MG capsule Take 1 capsule (300 mg total) by mouth every evening.    hydrALAZINE (APRESOLINE) 50 MG tablet Take 1 tablet (50 mg total) by mouth every 8 (eight) hours. (Patient taking differently: Take 50 mg by mouth every 12 (twelve) hours.)    ibuprofen (ADVIL,MOTRIN) 800 MG tablet Take 800 mg by mouth every 6 (six) hours as needed.    isosorbide dinitrate (ISORDIL) 20 MG tablet Take 1 tablet (20 mg total) by mouth 3 (three) times daily.    levothyroxine (SYNTHROID) 100 MCG tablet Take 1 tablet (100 mcg total) by mouth before breakfast. With no other meds or food     metoprolol succinate (TOPROL-XL) 25 MG 24 hr tablet Take 1 tablet (25 mg total) by mouth once daily.    prasugreL (EFFIENT) 10 mg Tab Take 1 tablet (10 mg total) by mouth once daily.    sacubitriL-valsartan (ENTRESTO) 24-26 mg per tablet Take 1 tablet by mouth 2 (two) times daily.    tamsulosin (FLOMAX) 0.4 mg Cap TAKE ONE CAPSULE BY MOUTH DAILY AT 5 PM    traMADoL (ULTRAM) 50 mg tablet Take 1 tablet (50 mg total) by mouth every 8 (eight) hours as needed for Pain.    ZONTIVITY 2.08 mg Tab Take 1 tablet by mouth once daily.     Family History       Problem Relation (Age of Onset)    Diabetes Father, Sister, Brother    Heart attack Father    Heart disease Father, Brother    Hypertension Father    No Known Problems Mother, Maternal Grandmother, Maternal Grandfather, Paternal Grandmother, Paternal Grandfather, Maternal Aunt, Maternal Uncle, Paternal Aunt, Paternal Uncle          Tobacco Use    Smoking status: Former     Current packs/day: 0.00     Types: Cigarettes     Quit date: 1970     Years since quittin.2    Smokeless tobacco: Never   Substance and Sexual Activity    Alcohol use: Not Currently     Alcohol/week: 3.0 standard drinks of alcohol     Types: 3 Cans of beer per week     Comment: social    Drug use: No    Sexual activity: Not Currently     Partners: Female     Review of Systems  Objective:     Vital Signs (Most Recent):  Temp: 98.8 °F (37.1 °C) (23)  Pulse: 75 (23)  Resp: 16 (23)  BP: (!) 188/89 (23)  SpO2: 100 % (23) Vital Signs (24h Range):  Temp:  [98.1 °F (36.7 °C)-98.8 °F (37.1 °C)] 98.8 °F (37.1 °C)  Pulse:  [75-88] 75  Resp:  [16-18] 16  SpO2:  [95 %-100 %] 100 %  BP: (168-190)/(79-94) 188/89     Weight: 66.5 kg (146 lb 9.7 oz)  Body mass index is 19.88 kg/m².     Physical Exam  Constitutional:       Appearance: Normal appearance.   HENT:      Head: Normocephalic and atraumatic.      Mouth/Throat:      Mouth: Mucous membranes are  moist.   Eyes:      Extraocular Movements: Extraocular movements intact.   Cardiovascular:      Rate and Rhythm: Normal rate and regular rhythm.      Heart sounds: No murmur heard.  Pulmonary:      Effort: Pulmonary effort is normal. No respiratory distress.      Breath sounds: No wheezing or rales.   Abdominal:      General: Bowel sounds are normal.      Palpations: Abdomen is soft.      Comments: Small umbilical hernia   No inguinal hernia noted    Musculoskeletal:         General: Normal range of motion.      Right lower leg: No edema.      Left lower leg: No edema.   Skin:     General: Skin is warm and dry.   Neurological:      General: No focal deficit present.      Mental Status: He is alert and oriented to person, place, and time.   Psychiatric:         Mood and Affect: Mood normal.         Behavior: Behavior normal.                Significant Labs: All pertinent labs within the past 24 hours have been reviewed.    Significant Imaging: I have reviewed all pertinent imaging results/findings within the past 24 hours.      Assessment/Plan:      * Small bowel obstruction  -gen surg consulted; hernia reduced at bedside   -admit to ICU for monitoring   -NPO for potential surgery in AM (if patient continues to clinically improve, will likely be able to postpone going to OR for risk stratification)  -consult cardiology for risk stratification   -hold home asa, plavix, prasugrel   -lactate in AM   -no NG tube currently   -cont ctx and flagyl    Cirrhosis  2/2 HF and congestive hepatopathy; follows with hepatology outpatient       Hypothyroidism  -synthroid       COPD (chronic obstructive pulmonary disease)  -albuterol       BPH (benign prostatic hyperplasia)  -flomax  -finasteride       Chronic systolic heart failure  TTE from 7/2022 w/EF 15%  -isordil   -metoprolol   -hold lasix while NPO   -does not appear volume overloaded on exam       ERICA on CKD III  Likely pre-renal   Repeat BMP in AM   -will be cautious with  fluid recusitation 2/2 poor EF   -hold lasix     Peripheral polyneuropathy  -gabapentin       CAD (coronary artery disease)  S/p PCI in 7/2022   -on asa, plavix, prasugrel - hold all   -cont atorva       Aortic stenosis status post AVR  Noted        Diabetes mellitus type 2 without retinopathy  -SSI     Hyperlipidemia  -atorva       Hypertension  -hydral   -isordil         VTE Risk Mitigation (From admission, onward)      None            Discharge Planning   PAM: 9/18/2023     Code Status: Prior   Is the patient medically ready for discharge?:     Reason for patient still in hospital (select all that apply): Patient new problem, Treatment and Consult recommendations               Critical care time spent on the evaluation and treatment of severe organ dysfunction, review of pertinent labs and imaging studies, discussions with consulting providers and discussions with patient/family: 30 minutes.      Mike Gerard MD  Department of Hospital Medicine   [facility]

## 2023-09-15 NOTE — PLAN OF CARE
Pt clear for DC from case management standpoint. Discharging to home and follow-up with Surgery       09/15/23 1403   Final Note   Assessment Type Final Discharge Note   Anticipated Discharge Disposition Home   What phone number can be called within the next 1-3 days to see how you are doing after discharge? [phone]

## 2023-09-15 NOTE — DISCHARGE INSTRUCTIONS
Your hernia was reduced by the surgeon in the hospital and he states that you would need to follow  up with your cardiologist (Dr. Lujan) for a lexiscan and clearance for surgery and then follow up with Dr. Damon  outpatient for an elective hernia repair.

## 2023-09-17 NOTE — H&P
Count includes the [facility]  H&P     Patient Name: Baldo Carney  MRN: [id number]  Patient Class: IP- Inpatient     Admission Date: 9/14/2023  Length of Stay: 0 days  Attending Physician: Max Leigh MD  Primary Care Provider: Millie Hayes APRN,FNP-C           Subjective:      Principal Problem:Small bowel obstruction           HPI:  Mr. 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 Patient intially presented to urgent care with abdominal pain which started 3 days ago and was then instructed to go to the ED. Denies any bm since 3 days ago. Reports vomiting every time he attempted to eat. Does report passing gas. Reports pain RLQ pain radiating to the umbilicus. Reports hx of R inguinal hernia but reports surgeons told him it was too risky to operate. Denies any recent abdominal surgery. CT at [facility] significant for SBO and Dr. Damon w/surgery was consulted for umbilical hernia. Hernia was reduced in ED and patient was transferred to [facility] for ICU level care and potential surgery in AM. Reports abdominal pain significantly improved since hernia was reduced.      Received ctx and flagyl in ED. Labs notable for hgb 11, cr 2.2 (baseline 1.7), lipase 20. CT A/P also notable for small ascites, cirrhosis, cholelithiasis, right inguinal hernia, and prostate hypertrophy. Spoke with Dr. Stewart at bedside. Will order lactate for AM to r/out ischemic bowel and consult cardiology for risk stratification. Will hold AC.            Overview/Hospital Course:  No notes on file          Past Medical History:   Diagnosis Date    Asthma      Cardiomyopathy 10/21/2014    CHF (congestive heart failure)      Coronary artery disease      CRF (chronic renal failure)      Diabetes mellitus      Enlarged prostate      Hyperlipidemia      Hypertension      Neuropathy                 Past Surgical History:    Procedure Laterality Date    ANGIOGRAPHY OF INTERNAL MAMMARY VESSEL N/A 3/11/2022     Procedure: Angiogram Internal Mammary;  Surgeon: Hank Lujan MD;  Location: [facility] CATH/EP LAB;  Service: Cardiology;  Laterality: N/A;    CARDIAC CATHETERIZATION        CARDIAC VALVE SURGERY        CATHETERIZATION OF BOTH LEFT AND RIGHT HEART Left 3/11/2022     Procedure: CATHETERIZATION, HEART, BOTH LEFT AND RIGHT;  Surgeon: Hank Lujan MD;  Location: [facility] CATH/EP LAB;  Service: Cardiology;  Laterality: Left;    CORONARY ANGIOGRAPHY N/A 3/11/2022     Procedure: ANGIOGRAM, CORONARY ARTERY;  Surgeon: Hank Lujan MD;  Location: [facility] CATH/EP LAB;  Service: Cardiology;  Laterality: N/A;    CORONARY BYPASS GRAFT ANGIOGRAPHY   3/11/2022     Procedure: Bypass graft study;  Surgeon: Hank Lujan MD;  Location: [facility] CATH/EP LAB;  Service: Cardiology;;    CYSTOSCOPY N/A 7/30/2019     Procedure: CYSTOSCOPY;  Surgeon: Spencer Nguyen MD;  Location: [facility];  Service: Urology;  Laterality: N/A;    INSERTION OF INTRAVASCULAR MICROAXIAL BLOOD PUMP N/A 8/31/2022     Procedure: INSERTION, IMPELLA;  Surgeon: Zach Jensen MD;  Location: [facility] CATH LAB;  Service: Cardiology;  Laterality: N/A;    PLACEMENT OF SWAN SEE CATHETER WITH IMAGING GUIDANCE   8/31/2022     Procedure: INSERTION, CATHETER, SWAN-SEE, WITH IMAGING GUIDANCE;  Surgeon: Zach Jensen MD;  Location: [facility] CATH LAB;  Service: Cardiology;;    SHOULDER ARTHROSCOPY   1985    TRANSRECTAL BIOPSY OF PROSTATE WITH ULTRASOUND GUIDANCE N/A 7/30/2019     Procedure: BIOPSY, PROSTATE, RECTAL APPROACH, WITH US GUIDANCE;  Surgeon: Spencer Nguyen MD;  Location: [facility];  Service: Urology;  Laterality: N/A;  procedure not performed, pt unable to tolerate    TRANSRECTAL BIOPSY OF PROSTATE WITH ULTRASOUND GUIDANCE N/A 8/8/2019     Procedure: BIOPSY, PROSTATE, RECTAL APPROACH, WITH US GUIDANCE;  Surgeon: Spencer Nguyen MD;  [facility];  Service:  Urology;  Laterality: N/A;               Review of patient's allergies indicates:   Allergen Reactions    Invokana  [canagliflozin]         Other reaction(s): been very dizzy         No current facility-administered medications on file prior to encounter.           Current Outpatient Medications on File Prior to Encounter   Medication Sig    albuterol (PROVENTIL/VENTOLIN HFA) 90 mcg/actuation inhaler INHALE 1 TO 2 PUFFS INTO THE LUNGS EVERY 4 TO 6 HOURS AS NEEDED FOR WHEEZE AND SHORTNESS OF BREATH    aspirin (ECOTRIN) 81 MG EC tablet Take 81 mg by mouth once daily.    atorvastatin (LIPITOR) 40 MG tablet Take 1 tablet (40 mg total) by mouth once daily.    budesonide-glycopyr-formoterol (BREZTRI AEROSPHERE) 160-9-4.8 mcg/actuation HFAA Inhale into the lungs daily as needed.    clopidogreL (PLAVIX) 75 mg tablet      cyanocobalamin 1,000 mcg/mL injection Inject 1 mL (1,000 mcg total) into the muscle every 14 (fourteen) days.    FARXIGA 5 mg Tab tablet Take 5 mg by mouth once daily.    finasteride (PROSCAR) 5 mg tablet Take 1 tablet (5 mg total) by mouth once daily.    fluticasone furoate-vilanteroL (BREO ELLIPTA) 100-25 mcg/dose diskus inhaler Inhale 1 puff into the lungs once daily. (DAILY CONTROLLER)    fluticasone propionate (FLONASE) 50 mcg/actuation nasal spray 1 SPRAY (50 MCG TOTAL) BY EACH NOSTRIL ROUTE ONCE DAILY.    furosemide (LASIX) 40 MG tablet Take by mouth once daily.    gabapentin (NEURONTIN) 300 MG capsule Take 1 capsule (300 mg total) by mouth every evening.    hydrALAZINE (APRESOLINE) 50 MG tablet Take 1 tablet (50 mg total) by mouth every 8 (eight) hours. (Patient taking differently: Take 50 mg by mouth every 12 (twelve) hours.)    ibuprofen (ADVIL,MOTRIN) 800 MG tablet Take 800 mg by mouth every 6 (six) hours as needed.    isosorbide dinitrate (ISORDIL) 20 MG tablet Take 1 tablet (20 mg total) by mouth 3 (three) times daily.    levothyroxine (SYNTHROID) 100 MCG tablet Take 1 tablet (100 mcg total) by  mouth before breakfast. With no other meds or food    metoprolol succinate (TOPROL-XL) 25 MG 24 hr tablet Take 1 tablet (25 mg total) by mouth once daily.    prasugreL (EFFIENT) 10 mg Tab Take 1 tablet (10 mg total) by mouth once daily.    sacubitriL-valsartan (ENTRESTO) 24-26 mg per tablet Take 1 tablet by mouth 2 (two) times daily.    tamsulosin (FLOMAX) 0.4 mg Cap TAKE ONE CAPSULE BY MOUTH DAILY AT 5 PM    traMADoL (ULTRAM) 50 mg tablet Take 1 tablet (50 mg total) by mouth every 8 (eight) hours as needed for Pain.    ZONTIVITY 2.08 mg Tab Take 1 tablet by mouth once daily.      Family History         Problem Relation (Age of Onset)     Diabetes Father, Sister, Brother     Heart attack Father     Heart disease Father, Brother     Hypertension Father     No Known Problems Mother, Maternal Grandmother, Maternal Grandfather, Paternal Grandmother, Paternal Grandfather, Maternal Aunt, Maternal Uncle, Paternal Aunt, Paternal Uncle                   Tobacco Use    Smoking status: Former       Current packs/day: 0.00       Types: Cigarettes       Quit date: 1970       Years since quittin.2    Smokeless tobacco: Never   Substance and Sexual Activity    Alcohol use: Not Currently       Alcohol/week: 3.0 standard drinks of alcohol       Types: 3 Cans of beer per week       Comment: social    Drug use: No    Sexual activity: Not Currently       Partners: Female      Review of Systems  Objective:      Vital Signs (Most Recent):  Temp: 98.8 °F (37.1 °C) (23)  Pulse: 75 (23)  Resp: 16 (23)  BP: (!) 188/89 (23)  SpO2: 100 % (23) Vital Signs (24h Range):  Temp:  [98.1 °F (36.7 °C)-98.8 °F (37.1 °C)] 98.8 °F (37.1 °C)  Pulse:  [75-88] 75  Resp:  [16-18] 16  SpO2:  [95 %-100 %] 100 %  BP: (168-190)/(79-94) 188/89      Weight: 66.5 kg (146 lb 9.7 oz)  Body mass index is 19.88 kg/m².     Physical Exam  Constitutional:       Appearance: Normal appearance.   HENT:       Head: Normocephalic and atraumatic.      Mouth/Throat:      Mouth: Mucous membranes are moist.   Eyes:      Extraocular Movements: Extraocular movements intact.   Cardiovascular:      Rate and Rhythm: Normal rate and regular rhythm.      Heart sounds: No murmur heard.  Pulmonary:      Effort: Pulmonary effort is normal. No respiratory distress.      Breath sounds: No wheezing or rales.   Abdominal:      General: Bowel sounds are normal.      Palpations: Abdomen is soft.      Comments: Small umbilical hernia   No inguinal hernia noted    Musculoskeletal:         General: Normal range of motion.      Right lower leg: No edema.      Left lower leg: No edema.   Skin:     General: Skin is warm and dry.   Neurological:      General: No focal deficit present.      Mental Status: He is alert and oriented to person, place, and time.   Psychiatric:         Mood and Affect: Mood normal.         Behavior: Behavior normal.                  Significant Labs: All pertinent labs within the past 24 hours have been reviewed.     Significant Imaging: I have reviewed all pertinent imaging results/findings within the past 24 hours.        Assessment/Plan:      * Small bowel obstruction  -gen surg consulted; hernia reduced at bedside   -admit to ICU for monitoring   -NPO for potential surgery in AM (if patient continues to clinically improve, will likely be able to postpone going to OR for risk stratification)  -consult cardiology for risk stratification   -hold home asa, plavix, prasugrel   -lactate in AM   -no NG tube currently   -cont ctx and flagyl     Cirrhosis  2/2 HF and congestive hepatopathy; follows with hepatology outpatient         Hypothyroidism  -synthroid         COPD (chronic obstructive pulmonary disease)  -albuterol         BPH (benign prostatic hyperplasia)  -flomax  -finasteride         Chronic systolic heart failure  TTE from 7/2022 w/EF 15%  -isordil   -metoprolol   -hold lasix while NPO   -does not appear volume  overloaded on exam         ERICA on CKD III  Likely pre-renal   Repeat BMP in AM   -will be cautious with fluid recusitation 2/2 poor EF   -hold lasix      Peripheral polyneuropathy  -gabapentin         CAD (coronary artery disease)  S/p PCI in 7/2022   -on asa, plavix, prasugrel - hold all   -cont atorva         Aortic stenosis status post AVR  Noted           Diabetes mellitus type 2 without retinopathy  -SSI      Hyperlipidemia  -atorva         Hypertension  -hydral   -isordil            VTE Risk Mitigation (From admission, onward)        None                Discharge Planning   PAM: 9/18/2023     Code Status: Prior   Is the patient medically ready for discharge?:     Reason for patient still in hospital (select all that apply): Patient new problem, Treatment and Consult recommendations               Critical care time spent on the evaluation and treatment of severe organ dysfunction, review of pertinent labs and imaging studies, discussions with consulting providers and discussions with patient/family: 30 minutes.        Mike Gerard MD  Department of Hospital Medicine   Former[facility], later [facility]

## 2023-09-22 ENCOUNTER — LAB VISIT (OUTPATIENT)
Dept: LAB | Facility: HOSPITAL | Age: 73
End: 2023-09-22
Attending: INTERNAL MEDICINE
Payer: MEDICARE

## 2023-09-22 DIAGNOSIS — I25.5 ISCHEMIC CARDIOMYOPATHY: ICD-10-CM

## 2023-09-22 LAB
ALBUMIN SERPL BCP-MCNC: 3.2 G/DL (ref 3.5–5.2)
ALP SERPL-CCNC: 63 U/L (ref 55–135)
ALT SERPL W/O P-5'-P-CCNC: 17 U/L (ref 10–44)
ANION GAP SERPL CALC-SCNC: 8 MMOL/L (ref 8–16)
AST SERPL-CCNC: 26 U/L (ref 10–40)
BASOPHILS # BLD AUTO: 0.06 K/UL (ref 0–0.2)
BASOPHILS NFR BLD: 0.8 % (ref 0–1.9)
BILIRUB SERPL-MCNC: 0.5 MG/DL (ref 0.1–1)
BUN SERPL-MCNC: 26 MG/DL (ref 8–23)
CALCIUM SERPL-MCNC: 8.5 MG/DL (ref 8.7–10.5)
CHLORIDE SERPL-SCNC: 104 MMOL/L (ref 95–110)
CO2 SERPL-SCNC: 23 MMOL/L (ref 23–29)
CREAT SERPL-MCNC: 1.7 MG/DL (ref 0.5–1.4)
DIFFERENTIAL METHOD: ABNORMAL
EOSINOPHIL # BLD AUTO: 0.2 K/UL (ref 0–0.5)
EOSINOPHIL NFR BLD: 2.1 % (ref 0–8)
ERYTHROCYTE [DISTWIDTH] IN BLOOD BY AUTOMATED COUNT: 16.4 % (ref 11.5–14.5)
EST. GFR  (NO RACE VARIABLE): 42.3 ML/MIN/1.73 M^2
GLUCOSE SERPL-MCNC: 92 MG/DL (ref 70–110)
HCT VFR BLD AUTO: 30.8 % (ref 40–54)
HGB BLD-MCNC: 9.8 G/DL (ref 14–18)
IMM GRANULOCYTES # BLD AUTO: 0.03 K/UL (ref 0–0.04)
IMM GRANULOCYTES NFR BLD AUTO: 0.4 % (ref 0–0.5)
INR PPP: 1 (ref 0.8–1.2)
LYMPHOCYTES # BLD AUTO: 0.9 K/UL (ref 1–4.8)
LYMPHOCYTES NFR BLD: 12.6 % (ref 18–48)
MCH RBC QN AUTO: 28.1 PG (ref 27–31)
MCHC RBC AUTO-ENTMCNC: 31.8 G/DL (ref 32–36)
MCV RBC AUTO: 88 FL (ref 82–98)
MONOCYTES # BLD AUTO: 0.6 K/UL (ref 0.3–1)
MONOCYTES NFR BLD: 8 % (ref 4–15)
NEUTROPHILS # BLD AUTO: 5.5 K/UL (ref 1.8–7.7)
NEUTROPHILS NFR BLD: 76.1 % (ref 38–73)
NRBC BLD-RTO: 0 /100 WBC
PLATELET # BLD AUTO: 274 K/UL (ref 150–450)
PMV BLD AUTO: 9.9 FL (ref 9.2–12.9)
POTASSIUM SERPL-SCNC: 3.9 MMOL/L (ref 3.5–5.1)
PROT SERPL-MCNC: 7 G/DL (ref 6–8.4)
PROTHROMBIN TIME: 10.7 SEC (ref 9–12.5)
RBC # BLD AUTO: 3.49 M/UL (ref 4.6–6.2)
SODIUM SERPL-SCNC: 135 MMOL/L (ref 136–145)
WBC # BLD AUTO: 7.23 K/UL (ref 3.9–12.7)

## 2023-09-22 PROCEDURE — 85025 COMPLETE CBC W/AUTO DIFF WBC: CPT | Performed by: INTERNAL MEDICINE

## 2023-09-22 PROCEDURE — 36415 COLL VENOUS BLD VENIPUNCTURE: CPT | Performed by: INTERNAL MEDICINE

## 2023-09-22 PROCEDURE — 85610 PROTHROMBIN TIME: CPT | Performed by: INTERNAL MEDICINE

## 2023-09-22 PROCEDURE — 80053 COMPREHEN METABOLIC PANEL: CPT | Performed by: INTERNAL MEDICINE

## 2023-09-25 NOTE — PROGRESS NOTES
SUBJECTIVE:      Patient ID: Baldo Carney is a 69 y.o. male.    Chief Complaint: Follow-up    F/U for HTN recheck - doing well on all meds, states he ran out of metformin so he has only been taking januvia, however his insurance has changed & he now has a high out of pocket for same, requesting alternative med & is unsure what is covered on [employer]'s plan    Discussed outstanding health maintenance - requested referral for eye exam    Followed-up with Dr. Nguyen since last visit - all came back fine with prostate other than enlargement, he has elected to defer surgery at this time    Hypertension   This is a chronic problem. The current episode started more than 1 year ago. The problem is controlled. Pertinent negatives include no chest pain, headaches, neck pain, peripheral edema or shortness of breath. Risk factors for coronary artery disease include male gender, sedentary lifestyle, diabetes mellitus, dyslipidemia and family history. Past treatments include calcium channel blockers, ACE inhibitors and diuretics. The current treatment provides significant improvement. Compliance problems include exercise.  Hypertensive end-organ damage includes CAD/MI. Identifiable causes of hypertension include a hypertension causing med.   Diabetes   He presents for his follow-up diabetic visit. He has type 2 diabetes mellitus. No MedicAlert identification noted. Pertinent negatives for hypoglycemia include no confusion, dizziness, headaches, nervousness/anxiousness or pallor. Associated symptoms include fatigue and foot paresthesias. Pertinent negatives for diabetes include no chest pain, no polydipsia, no polyuria and no weakness. There are no hypoglycemic complications. Symptoms are stable. Diabetic complications include peripheral neuropathy. Risk factors for coronary artery disease include diabetes mellitus, dyslipidemia, family history, hypertension, male sex and sedentary lifestyle. Current diabetic treatment  Quality 110: Preventive Care And Screening: Influenza Immunization: Influenza Immunization Administered during Influenza season includes oral agent (dual therapy). He is compliant with treatment most of the time. His weight is fluctuating minimally. He is following a generally healthy diet. He has not had a previous visit with a dietitian. He rarely participates in exercise. An ACE inhibitor/angiotensin II receptor blocker is being taken. Eye exam is not current.   Hyperlipidemia   This is a chronic problem. The current episode started more than 1 year ago. The problem is controlled. Exacerbating diseases include diabetes. Pertinent negatives include no chest pain, myalgias or shortness of breath. Current antihyperlipidemic treatment includes statins. Compliance problems include adherence to exercise.  Risk factors for coronary artery disease include diabetes mellitus, dyslipidemia, family history, hypertension, male sex and a sedentary lifestyle.       Past Surgical History:   Procedure Laterality Date    BREAST SURGERY      CARDIAC CATHETERIZATION      CARDIAC VALVE SURGERY      CORONARY ARTERY BYPASS GRAFT      CYSTOSCOPY N/A 7/30/2019    Procedure: CYSTOSCOPY;  Surgeon: Spencer Nguyen MD;  Location: [facility] OR;  Service: Urology;  Laterality: N/A;    EYE SURGERY      SHOULDER ARTHROSCOPY  1985    TRANSRECTAL BIOPSY OF PROSTATE WITH ULTRASOUND GUIDANCE N/A 7/30/2019    Procedure: BIOPSY, PROSTATE, RECTAL APPROACH, WITH US GUIDANCE;  Surgeon: Spencer Nguyen MD;  Location: [facility] OR;  Service: Urology;  Laterality: N/A;  procedure not performed, pt unable to tolerate    TRANSRECTAL BIOPSY OF PROSTATE WITH ULTRASOUND GUIDANCE N/A 8/8/2019    Procedure: BIOPSY, PROSTATE, RECTAL APPROACH, WITH US GUIDANCE;  Surgeon: Spencer Nguyen MD;  Location: [facility] OR;  Service: Urology;  Laterality: N/A;     Family History   Problem Relation Age of Onset    No Known Problems Mother     Diabetes Father     Hypertension Father     Heart attack Father     Heart disease Father     Diabetes Sister     Heart disease Brother     Diabetes  Brother     No Known Problems Maternal Grandmother     No Known Problems Maternal Grandfather     No Known Problems Paternal Grandmother     No Known Problems Paternal Grandfather     No Known Problems Maternal Aunt     No Known Problems Maternal Uncle     No Known Problems Paternal Aunt     No Known Problems Paternal Uncle     Anemia Neg Hx     Arrhythmia Neg Hx     Asthma Neg Hx     Clotting disorder Neg Hx     Fainting Neg Hx     Heart failure Neg Hx     Hyperlipidemia Neg Hx     Glaucoma Neg Hx       Social History     Socioeconomic History    Marital status:      Spouse name: Not on file    Number of children: Not on file    Years of education: Not on file    Highest education level: Not on file   Occupational History    Not on file   Social Needs    Financial resource strain: Not on file    Food insecurity:     Worry: Not on file     Inability: Not on file    Transportation needs:     Medical: Not on file     Non-medical: Not on file   Tobacco Use    Smoking status: Former Smoker     Last attempt to quit: 2019     Years since quittin.3    Smokeless tobacco: Never Used   Substance and Sexual Activity    Alcohol use: Yes     Alcohol/week: 3.0 standard drinks     Types: 3 Cans of beer per week     Frequency: Never     Comment: social    Drug use: No    Sexual activity: Never   Lifestyle    Physical activity:     Days per week: Not on file     Minutes per session: Not on file    Stress: Not on file   Relationships    Social connections:     Talks on phone: Not on file     Gets together: Not on file     Attends [employer] service: Not on file     Active member of club or organization: Not on file     Attends meetings of clubs or organizations: Not on file     Relationship status: Not on file   Other Topics Concern    Not on file   Social History Narrative    Not on file     Current Outpatient Medications   Medication Sig Dispense Refill    amlodipine-benazepril 5-20  Quality 226: Preventive Care And Screening: Tobacco Use: Screening And Cessation Intervention: Patient screened for tobacco use and is an ex/non-smoker Quality 111:Pneumonia Vaccination Status For Older Adults: Patient did not receive any pneumococcal conjugate or polysaccharide vaccine on or after their 60th birthday and before the end of the measurement period mg (LOTREL) 5-20 mg per capsule TAKE 1 CAPSULE BY MOUTH EVERY NIGHT AT BEDTIME 90 capsule 1    ARNUITY ELLIPTA 100 mcg/actuation DsDv INHALE ONE BLISTER DAILY, RINSE MOUTH AFTER USE  6    aspirin (ECOTRIN) 325 MG EC tablet Take 1 tablet (325 mg total) by mouth once daily. 30 tablet 4    atorvastatin (LIPITOR) 80 MG tablet TAKE 1 TABLET BY MOUTH ONCE A DAY 90 tablet 1    azelastine (ASTELIN) 137 mcg (0.1 %) nasal spray PLACE 1 SPRAY INTO EACH NOSTRL 2 X A DAY  0    blood sugar diagnostic Strp USE BRAND COVERED BY INSURANCE AND COMPATIBLE WITH METER TO CHECK GLUCOSE 2X  strip 1    blood-glucose meter kit USE BRAND AS COVERED BY INSURANCE AND COMPATIBLE WITH STRIPS TO CHECK GLUCOSE 2X DAY 1 each 0    ciprofloxacin HCl (CIPRO) 500 MG tablet Take 1 tablet (500 mg total) by mouth 2 (two) times daily. 6 tablet 0    finasteride (PROSCAR) 5 mg tablet Take 1 tablet (5 mg total) by mouth once daily. 30 tablet 11    fluticasone-vilanterol (BREO ELLIPTA) 100-25 mcg/dose diskus inhaler Inhale 1 puff into the lungs once daily. Controller 2 each inh    furosemide (LASIX) 20 MG tablet TAKE 1 TABLET TWICE DAILY 180 tablet 1    gabapentin (NEURONTIN) 300 MG capsule Take 1 capsule (300 mg total) by mouth every evening. 90 capsule 3    meloxicam (MOBIC) 7.5 MG tablet TAKE 1 TABLET EVERY DAY 90 tablet 0    metFORMIN (GLUCOPHAGE) 1000 MG tablet Take 1 tablet (1,000 mg total) by mouth 2 (two) times daily with meals. 180 tablet 1    montelukast (SINGULAIR) 10 mg tablet Take 10 mg by mouth every evening.  0    potassium chloride SA (K-DUR,KLOR-CON) 20 MEQ tablet Take 1 tablet (20 mEq total) by mouth once daily. 90 tablet 1    PRODIGY TWIST TOP LANCET 28 gauge Misc CHECK  GLUCOSE TWICE DAILY 100 each 1    tamsulosin (FLOMAX) 0.4 mg Cap Take 2 capsules (0.8 mg total) by mouth once daily. 180 capsule 1    VENTOLIN HFA 90 mcg/actuation inhaler INHALE 2 PUFFS EVERY 6 HOURS AS NEEDED FOR WHEEZING  (RESCUE) 18 g 3     Quality 130: Documentation Of Current Medications In The Medical Record: Current Medications Documented empagliflozin (JARDIANCE) 25 mg Tab Take 1 tablet by mouth once daily. 90 tablet 1    empagliflozin (JARDIANCE) 25 mg Tab Take 1 tablet by mouth once daily. 35 tablet 0     No current facility-administered medications for this visit.      Review of patient's allergies indicates:  No Known Allergies   Past Medical History:   Diagnosis Date    Asthma     Cardiomyopathy 10/21/2014    Diabetes mellitus     Hyperlipidemia     Hypertension      Past Surgical History:   Procedure Laterality Date    BREAST SURGERY      CARDIAC CATHETERIZATION      CARDIAC VALVE SURGERY      CORONARY ARTERY BYPASS GRAFT      CYSTOSCOPY N/A 7/30/2019    Procedure: CYSTOSCOPY;  Surgeon: Spencer Nguyen MD;  Location: [facility] OR;  Service: Urology;  Laterality: N/A;    EYE SURGERY      SHOULDER ARTHROSCOPY  1985    TRANSRECTAL BIOPSY OF PROSTATE WITH ULTRASOUND GUIDANCE N/A 7/30/2019    Procedure: BIOPSY, PROSTATE, RECTAL APPROACH, WITH US GUIDANCE;  Surgeon: Spencer Nguyen MD;  Location: [facility] OR;  Service: Urology;  Laterality: N/A;  procedure not performed, pt unable to tolerate    TRANSRECTAL BIOPSY OF PROSTATE WITH ULTRASOUND GUIDANCE N/A 8/8/2019    Procedure: BIOPSY, PROSTATE, RECTAL APPROACH, WITH US GUIDANCE;  Surgeon: Spencer Nguyen MD;  Location: [facility] OR;  Service: Urology;  Laterality: N/A;       Review of Systems   Constitutional: Positive for fatigue. Negative for activity change, appetite change and fever.   HENT: Negative for congestion, ear pain, hearing loss, postnasal drip, sinus pressure, sinus pain, sneezing and sore throat.         States he has to have 2 teeth pulled but can't afford at present   Eyes: Negative for photophobia and pain.   Respiratory: Negative for cough, chest tightness, shortness of breath and wheezing.    Cardiovascular: Negative for chest pain and leg swelling.   Gastrointestinal: Negative for abdominal distention, abdominal pain, blood in stool, constipation, diarrhea, nausea and  Quality 402: Tobacco Use And Help With Quitting Among Adolescents: Patient screened for tobacco and never smoked vomiting.   Endocrine: Negative for cold intolerance, heat intolerance, polydipsia and polyuria.   Genitourinary: Negative for difficulty urinating, dysuria, flank pain, frequency, hematuria and urgency.   Musculoskeletal: Negative for arthralgias, back pain, joint swelling, myalgias and neck pain.   Skin: Negative for pallor and rash.   Allergic/Immunologic: Negative for environmental allergies and food allergies.   Neurological: Negative for dizziness, weakness, light-headedness and headaches.        BLE neuropathy   Hematological: Does not bruise/bleed easily.   Psychiatric/Behavioral: Negative for confusion, decreased concentration and sleep disturbance. The patient is not nervous/anxious.       OBJECTIVE:      Vitals:    10/10/19 0825   BP: 112/62   Pulse: 84   Resp: 16   SpO2: 98%   Weight: 78.7 kg (173 lb 9.6 oz)   Height: 6' (1.829 m)     Physical Exam   Constitutional: He is oriented to person, place, and time. Vital signs are normal. He appears well-developed and well-nourished. No distress.   HENT:   Head: Normocephalic and atraumatic.   Right Ear: Hearing normal.   Left Ear: Hearing normal.   Nose: Nose normal. No rhinorrhea.   Mouth/Throat: Mucous membranes are normal.   Eyes: Pupils are equal, round, and reactive to light. Conjunctivae and lids are normal. Right eye exhibits no discharge. Left eye exhibits no discharge. Right conjunctiva is not injected. Left conjunctiva is not injected. Right pupil is round and reactive. Left pupil is round and reactive. Pupils are equal.   Neck: Trachea normal and normal range of motion. Neck supple. No JVD present. No tracheal deviation present. No thyromegaly present.   Cardiovascular: Normal rate, regular rhythm, normal heart sounds and intact distal pulses. Exam reveals no gallop and no friction rub.   No murmur heard.  Pulses:       Radial pulses are 2+ on the right side, and 2+ on the left side.   Pulmonary/Chest: Effort normal and breath sounds normal. No  Quality 431: Preventive Care And Screening: Unhealthy Alcohol Use - Screening: Patient not identified as an unhealthy alcohol user when screened for unhealthy alcohol use using a systematic screening method stridor. No respiratory distress. He has no decreased breath sounds. He has no wheezes. He has no rhonchi. He has no rales.   Abdominal: Soft. Bowel sounds are normal. He exhibits no distension. There is no tenderness. There is no rigidity and no guarding.   Musculoskeletal: Normal range of motion. He exhibits no edema.   Lymphadenopathy:     He has no cervical adenopathy.   Neurological: He is alert and oriented to person, place, and time. He has normal strength. He displays no atrophy. He displays a negative Romberg sign. Coordination and gait normal.   Skin: Skin is warm and dry. Capillary refill takes less than 2 seconds. No lesion and no rash noted. No cyanosis. No pallor.   Psychiatric: He has a normal mood and affect. His speech is normal and behavior is normal. Judgment and thought content normal. Cognition and memory are normal. He is attentive.   Nursing note and vitals reviewed.     Assessment:       1. Essential hypertension    2. Type 2 diabetes mellitus without complication, without long-term current use of insulin    3. Mixed hyperlipidemia    4. Prostate cancer screening        Plan:       Essential hypertension  -     amlodipine-benazepril 5-20 mg (LOTREL) 5-20 mg per capsule; TAKE 1 CAPSULE BY MOUTH EVERY NIGHT AT BEDTIME  Dispense: 90 capsule; Refill: 1  -     CBC auto differential; Future; Expected date: 10/17/2019  -     Comprehensive metabolic panel; Future; Expected date: 10/10/2019    Type 2 diabetes mellitus without complication, without long-term current use of insulin  -     metFORMIN (GLUCOPHAGE) 1000 MG tablet; Take 1 tablet (1,000 mg total) by mouth 2 (two) times daily with meals.  Dispense: 180 tablet; Refill: 1  -     empagliflozin (JARDIANCE) 25 mg Tab; Take 1 tablet by mouth once daily.  Dispense: 90 tablet; Refill: 1  -     Discontinue: empagliflozin (JARDIANCE) 25 mg Tab; Take 1 tablet by mouth once daily.  Dispense: 28 tablet; Refill: 0  -     Comprehensive metabolic panel;  Detail Level: Detailed Future; Expected date: 10/10/2019  -     Microalbumin/creatinine urine ratio; Future; Expected date: 10/10/2019  -     Hemoglobin A1c; Future; Expected date: 10/10/2019  -     Ambulatory referral to Ophthalmology  -     empagliflozin (JARDIANCE) 25 mg Tab; Take 1 tablet by mouth once daily.  Dispense: 35 tablet; Refill: 0    Mixed hyperlipidemia  -     Comprehensive metabolic panel; Future; Expected date: 10/10/2019  -     Lipid panel; Future; Expected date: 10/10/2019    Prostate cancer screening  -     PSA, Screening; Future; Expected date: 10/10/2019        Follow up in about 3 months (around 1/10/2020) for DM & HTN lab review.      10/10/2019 KAMERON Rodriguez, FNP-C

## 2023-09-26 ENCOUNTER — HOSPITAL ENCOUNTER (OUTPATIENT)
Dept: INTERVENTIONAL RADIOLOGY/VASCULAR | Facility: HOSPITAL | Age: 73
Discharge: HOME | End: 2023-09-26
Attending: INTERNAL MEDICINE
Payer: MEDICARE

## 2023-09-26 VITALS — OXYGEN SATURATION: 99 % | DIASTOLIC BLOOD PRESSURE: 63 MMHG | SYSTOLIC BLOOD PRESSURE: 129 MMHG | HEART RATE: 65 BPM

## 2023-09-26 DIAGNOSIS — R18.8 OTHER ASCITES: ICD-10-CM

## 2023-09-26 LAB
APPEARANCE FLD: CLEAR
BODY FLD TYPE: NORMAL
COLOR FLD: YELLOW
LYMPHOCYTES NFR FLD MANUAL: 52 %
MESOTHL CELL NFR FLD MANUAL: 9 %
MONOS+MACROS NFR FLD MANUAL: 25 %
NEUTROPHILS NFR FLD MANUAL: 14 %
WBC # FLD: 170 /CU MM

## 2023-09-26 PROCEDURE — 63600175 PHARM REV CODE 636 W HCPCS: Mod: JZ,JG | Performed by: PHYSICIAN ASSISTANT

## 2023-09-26 PROCEDURE — 89051 BODY FLUID CELL COUNT: CPT | Performed by: INTERNAL MEDICINE

## 2023-09-26 PROCEDURE — 49083 ABD PARACENTESIS W/IMAGING: CPT | Mod: ,,, | Performed by: RADIOLOGY

## 2023-09-26 PROCEDURE — 49083 IR PARACENTESIS WITH IMAGING: ICD-10-PCS | Mod: ,,, | Performed by: RADIOLOGY

## 2023-09-26 PROCEDURE — C1729 CATH, DRAINAGE: HCPCS

## 2023-09-26 PROCEDURE — P9047 ALBUMIN (HUMAN), 25%, 50ML: HCPCS | Mod: JZ,JG | Performed by: PHYSICIAN ASSISTANT

## 2023-09-26 PROCEDURE — 49083 ABD PARACENTESIS W/IMAGING: CPT

## 2023-09-26 RX ORDER — ALBUMIN HUMAN 250 G/1000ML
25 SOLUTION INTRAVENOUS
Status: DISCONTINUED | OUTPATIENT
Start: 2023-09-26 | End: 2023-09-27 | Stop reason: HOSPADM

## 2023-09-26 RX ADMIN — ALBUMIN (HUMAN) 50 G: 12.5 SOLUTION INTRAVENOUS at 10:09

## 2023-09-26 NOTE — NURSING
PARACENTESIS    IR ultrasound guided paracentesis performed by MIGDALIA Gutierrez.  Procedure explained and standing consent verified.  Time out performed.  6 L removed-   Patient received 50 g of albumin IV.  VS remained stable for duration of procedure.  Specimens collected and sent to lab if ordered.   Para and IV d/c'd, with tip intact.   Access site cleaned with peroxide, derma bond and steri-strips applied, then covered with sterile Band-Aid.   Pt discharge home in stable condition.

## 2023-10-10 ENCOUNTER — HOSPITAL ENCOUNTER (OUTPATIENT)
Dept: INTERVENTIONAL RADIOLOGY/VASCULAR | Facility: HOSPITAL | Age: 73
Discharge: HOME | DRG: 394 | End: 2023-10-10
Attending: INTERNAL MEDICINE
Payer: MEDICARE

## 2023-10-10 VITALS
OXYGEN SATURATION: 99 % | DIASTOLIC BLOOD PRESSURE: 69 MMHG | SYSTOLIC BLOOD PRESSURE: 120 MMHG | RESPIRATION RATE: 18 BRPM | HEART RATE: 72 BPM

## 2023-10-10 DIAGNOSIS — R18.8 OTHER ASCITES: ICD-10-CM

## 2023-10-10 LAB
APPEARANCE FLD: CLEAR
BODY FLD TYPE: NORMAL
COLOR FLD: YELLOW
LYMPHOCYTES NFR FLD MANUAL: 33 %
MESOTHL CELL NFR FLD MANUAL: 10 %
MONOS+MACROS NFR FLD MANUAL: 51 %
NEUTROPHILS NFR FLD MANUAL: 6 %
WBC # FLD: 166 /CU MM

## 2023-10-10 PROCEDURE — 49083 IR PARACENTESIS WITH IMAGING: ICD-10-PCS | Mod: ,,, | Performed by: RADIOLOGY

## 2023-10-10 PROCEDURE — C1729 CATH, DRAINAGE: HCPCS

## 2023-10-10 PROCEDURE — 89051 BODY FLUID CELL COUNT: CPT | Performed by: INTERNAL MEDICINE

## 2023-10-10 PROCEDURE — 63600175 PHARM REV CODE 636 W HCPCS: Mod: JZ,JG | Performed by: PHYSICIAN ASSISTANT

## 2023-10-10 PROCEDURE — 49083 ABD PARACENTESIS W/IMAGING: CPT | Mod: ,,, | Performed by: RADIOLOGY

## 2023-10-10 PROCEDURE — 49083 ABD PARACENTESIS W/IMAGING: CPT

## 2023-10-10 PROCEDURE — P9047 ALBUMIN (HUMAN), 25%, 50ML: HCPCS | Mod: JZ,JG | Performed by: PHYSICIAN ASSISTANT

## 2023-10-10 RX ORDER — ALBUMIN HUMAN 250 G/1000ML
50 SOLUTION INTRAVENOUS ONCE
Status: COMPLETED | OUTPATIENT
Start: 2023-10-10 | End: 2023-10-10

## 2023-10-10 RX ADMIN — ALBUMIN (HUMAN) 75 G: 12.5 SOLUTION INTRAVENOUS at 10:10

## 2023-10-10 NOTE — NURSING
PARACENTESIS    IR ultrasound guided paracentesis performed by REGINE Baker.  Procedure explained and standing consent verified.  Time out performed.  10.3 L removed-   Patient received 75 g of albumin IV.  VS remained stable for duration of procedure.  Specimens collected and sent to lab if ordered.   Para and IV d/c'd, with tip intact.   Access site cleaned with peroxide, derma bond and steri-strips applied, then covered with sterile Band-Aid.   Pt discharge home in stable condition.

## 2023-10-12 ENCOUNTER — HOSPITAL ENCOUNTER (OUTPATIENT)
Facility: HOSPITAL | Age: 73
Discharge: HOME | DRG: 394 | End: 2023-10-13
Attending: STUDENT IN AN ORGANIZED HEALTH CARE EDUCATION/TRAINING PROGRAM | Admitting: INTERNAL MEDICINE
Payer: MEDICARE

## 2023-10-12 DIAGNOSIS — K42.9 UMBILICAL HERNIA WITHOUT OBSTRUCTION AND WITHOUT GANGRENE: Primary | ICD-10-CM

## 2023-10-12 DIAGNOSIS — K56.609 SMALL BOWEL OBSTRUCTION: ICD-10-CM

## 2023-10-12 LAB
ALBUMIN SERPL BCP-MCNC: 3.4 G/DL (ref 3.5–5.2)
ALP SERPL-CCNC: 65 U/L (ref 55–135)
ALT SERPL W/O P-5'-P-CCNC: 6 U/L (ref 10–44)
ANION GAP SERPL CALC-SCNC: 11 MMOL/L (ref 8–16)
AST SERPL-CCNC: 16 U/L (ref 10–40)
BASOPHILS # BLD AUTO: 0.07 K/UL (ref 0–0.2)
BASOPHILS NFR BLD: 1.1 % (ref 0–1.9)
BILIRUB SERPL-MCNC: 1.2 MG/DL (ref 0.1–1)
BUN SERPL-MCNC: 23 MG/DL (ref 8–23)
CALCIUM SERPL-MCNC: 8.8 MG/DL (ref 8.7–10.5)
CHLORIDE SERPL-SCNC: 108 MMOL/L (ref 95–110)
CO2 SERPL-SCNC: 18 MMOL/L (ref 23–29)
CREAT SERPL-MCNC: 1.6 MG/DL (ref 0.5–1.4)
CREAT SERPL-MCNC: 1.9 MG/DL (ref 0.5–1.4)
DIFFERENTIAL METHOD BLD: ABNORMAL
EOSINOPHIL # BLD AUTO: 0.1 K/UL (ref 0–0.5)
EOSINOPHIL NFR BLD: 1.1 % (ref 0–8)
ERYTHROCYTE [DISTWIDTH] IN BLOOD BY AUTOMATED COUNT: 17.1 % (ref 11.5–14.5)
EST. GFR  (NO RACE VARIABLE): 45.2 ML/MIN/1.73 M^2
GLUCOSE SERPL-MCNC: 109 MG/DL (ref 70–110)
HCT VFR BLD AUTO: 30 % (ref 40–54)
HGB BLD-MCNC: 10.1 G/DL (ref 14–18)
IMM GRANULOCYTES # BLD AUTO: 0.02 K/UL (ref 0–0.04)
IMM GRANULOCYTES NFR BLD AUTO: 0.3 % (ref 0–0.5)
LACTATE SERPL-SCNC: 1.3 MMOL/L (ref 0.5–1.9)
LIPASE SERPL-CCNC: 15 U/L (ref 4–60)
LYMPHOCYTES # BLD AUTO: 0.7 K/UL (ref 1–4.8)
LYMPHOCYTES NFR BLD: 10.3 % (ref 18–48)
MCH RBC QN AUTO: 28.9 PG (ref 27–31)
MCHC RBC AUTO-ENTMCNC: 33.7 G/DL (ref 32–36)
MCV RBC AUTO: 86 FL (ref 82–98)
MONOCYTES # BLD AUTO: 0.3 K/UL (ref 0.3–1)
MONOCYTES NFR BLD: 4.7 % (ref 4–15)
NEUTROPHILS # BLD AUTO: 5.3 K/UL (ref 1.8–7.7)
NEUTROPHILS NFR BLD: 82.5 % (ref 38–73)
NRBC BLD-RTO: 0 /100 WBC
PLATELET # BLD AUTO: 229 K/UL (ref 150–450)
PMV BLD AUTO: 10.2 FL (ref 9.2–12.9)
POTASSIUM SERPL-SCNC: 3.9 MMOL/L (ref 3.5–5.1)
PROT SERPL-MCNC: 6.6 G/DL (ref 6–8.4)
RBC # BLD AUTO: 3.5 M/UL (ref 4.6–6.2)
SAMPLE: ABNORMAL
SODIUM SERPL-SCNC: 137 MMOL/L (ref 136–145)
WBC # BLD AUTO: 6.41 K/UL (ref 3.9–12.7)

## 2023-10-12 PROCEDURE — 96375 TX/PRO/DX INJ NEW DRUG ADDON: CPT

## 2023-10-12 PROCEDURE — 96376 TX/PRO/DX INJ SAME DRUG ADON: CPT

## 2023-10-12 PROCEDURE — 83690 ASSAY OF LIPASE: CPT | Performed by: STUDENT IN AN ORGANIZED HEALTH CARE EDUCATION/TRAINING PROGRAM

## 2023-10-12 PROCEDURE — 99285 EMERGENCY DEPT VISIT HI MDM: CPT | Mod: 25

## 2023-10-12 PROCEDURE — 63600175 PHARM REV CODE 636 W HCPCS: Performed by: STUDENT IN AN ORGANIZED HEALTH CARE EDUCATION/TRAINING PROGRAM

## 2023-10-12 PROCEDURE — G0378 HOSPITAL OBSERVATION PER HR: HCPCS

## 2023-10-12 PROCEDURE — 83605 ASSAY OF LACTIC ACID: CPT | Performed by: STUDENT IN AN ORGANIZED HEALTH CARE EDUCATION/TRAINING PROGRAM

## 2023-10-12 PROCEDURE — 85025 COMPLETE CBC W/AUTO DIFF WBC: CPT | Performed by: STUDENT IN AN ORGANIZED HEALTH CARE EDUCATION/TRAINING PROGRAM

## 2023-10-12 PROCEDURE — 96374 THER/PROPH/DIAG INJ IV PUSH: CPT

## 2023-10-12 PROCEDURE — 82565 ASSAY OF CREATININE: CPT

## 2023-10-12 PROCEDURE — 25500020 PHARM REV CODE 255: Performed by: STUDENT IN AN ORGANIZED HEALTH CARE EDUCATION/TRAINING PROGRAM

## 2023-10-12 PROCEDURE — 96361 HYDRATE IV INFUSION ADD-ON: CPT

## 2023-10-12 PROCEDURE — 80053 COMPREHEN METABOLIC PANEL: CPT | Performed by: STUDENT IN AN ORGANIZED HEALTH CARE EDUCATION/TRAINING PROGRAM

## 2023-10-12 RX ORDER — HYDROMORPHONE HYDROCHLORIDE 1 MG/ML
1 INJECTION, SOLUTION INTRAMUSCULAR; INTRAVENOUS; SUBCUTANEOUS
Status: COMPLETED | OUTPATIENT
Start: 2023-10-12 | End: 2023-10-12

## 2023-10-12 RX ORDER — ONDANSETRON HYDROCHLORIDE 2 MG/ML
4 INJECTION, SOLUTION INTRAVENOUS
Status: COMPLETED | OUTPATIENT
Start: 2023-10-12 | End: 2023-10-12

## 2023-10-12 RX ORDER — HYDROMORPHONE HYDROCHLORIDE 1 MG/ML
0.3 INJECTION, SOLUTION INTRAMUSCULAR; INTRAVENOUS; SUBCUTANEOUS
Status: COMPLETED | OUTPATIENT
Start: 2023-10-12 | End: 2023-10-12

## 2023-10-12 RX ADMIN — HYDROMORPHONE HYDROCHLORIDE 0.3 MG: 0.5 INJECTION, SOLUTION INTRAMUSCULAR; INTRAVENOUS; SUBCUTANEOUS at 06:10

## 2023-10-12 RX ADMIN — IOHEXOL 100 ML: 350 INJECTION, SOLUTION INTRAVENOUS at 07:10

## 2023-10-12 RX ADMIN — HYDROMORPHONE HYDROCHLORIDE 1 MG: 1 INJECTION, SOLUTION INTRAMUSCULAR; INTRAVENOUS; SUBCUTANEOUS at 09:10

## 2023-10-12 RX ADMIN — ONDANSETRON 4 MG: 2 INJECTION INTRAMUSCULAR; INTRAVENOUS at 06:10

## 2023-10-12 RX ADMIN — HYDROMORPHONE HYDROCHLORIDE 1 MG: 1 INJECTION, SOLUTION INTRAMUSCULAR; INTRAVENOUS; SUBCUTANEOUS at 07:10

## 2023-10-12 RX ADMIN — SODIUM CHLORIDE, SODIUM LACTATE, POTASSIUM CHLORIDE, AND CALCIUM CHLORIDE 500 ML: .6; .31; .03; .02 INJECTION, SOLUTION INTRAVENOUS at 06:10

## 2023-10-12 NOTE — ED PROVIDER NOTES
Encounter Date: 10/12/2023       History     Chief Complaint   Patient presents with    Hernia    Inguinal Hernia     Umbilical and inguinal hernias painful and unable to be reduced at home.      HPI  73-year-old presenting with abdominal pain.  Reports that he has umbilical and inguinal hernias.  Noticed bulging of the umbilical hernia 3 days ago, since then has not been able to have a bowel movement or pass gas.  Worsening nausea.  No vomiting.  No fevers or chills.  Review of patient's allergies indicates:   Allergen Reactions    Invokana  [canagliflozin]      Other reaction(s): been very dizzy     Past Medical History:   Diagnosis Date    Asthma     Cardiomyopathy 10/21/2014    CHF (congestive heart failure)     Coronary artery disease     CRF (chronic renal failure)     Diabetes mellitus     Enlarged prostate     Hyperlipidemia     Hypertension     Neuropathy      Past Surgical History:   Procedure Laterality Date    ANGIOGRAPHY OF INTERNAL MAMMARY VESSEL N/A 3/11/2022    Procedure: Angiogram Internal Mammary;  Surgeon: Hank Lujan MD;  Location: [facility] CATH/EP LAB;  Service: Cardiology;  Laterality: N/A;    CARDIAC CATHETERIZATION      CARDIAC VALVE SURGERY      CATHETERIZATION OF BOTH LEFT AND RIGHT HEART Left 3/11/2022    Procedure: CATHETERIZATION, HEART, BOTH LEFT AND RIGHT;  Surgeon: Hank Lujan MD;  Location: [facility] CATH/EP LAB;  Service: Cardiology;  Laterality: Left;    CORONARY ANGIOGRAPHY N/A 3/11/2022    Procedure: ANGIOGRAM, CORONARY ARTERY;  Surgeon: Hank Lujan MD;  Location: [facility] CATH/EP LAB;  Service: Cardiology;  Laterality: N/A;    CORONARY BYPASS GRAFT ANGIOGRAPHY  3/11/2022    Procedure: Bypass graft study;  Surgeon: Hank Lujan MD;  Location: [facility] CATH/EP LAB;  Service: Cardiology;;    CYSTOSCOPY N/A 7/30/2019    Procedure: CYSTOSCOPY;  Surgeon: Spencer Nguyen MD;  Location: [facility] OR;  Service: Urology;  Laterality: N/A;    INSERTION OF INTRAVASCULAR MICROAXIAL BLOOD  PUMP N/A 2022    Procedure: INSERTION, IMPELLA;  Surgeon: Zach Jensen MD;  Location: [facility] CATH LAB;  Service: Cardiology;  Laterality: N/A;    PLACEMENT OF SWAN SEE CATHETER WITH IMAGING GUIDANCE  2022    Procedure: INSERTION, CATHETER, SWAN-SEE, WITH IMAGING GUIDANCE;  Surgeon: Zach Jensen MD;  Location: [facility] CATH LAB;  Service: Cardiology;;    SHOULDER ARTHROSCOPY  1985    TRANSRECTAL BIOPSY OF PROSTATE WITH ULTRASOUND GUIDANCE N/A 2019    Procedure: BIOPSY, PROSTATE, RECTAL APPROACH, WITH US GUIDANCE;  Surgeon: Spencer Nguyen MD;  Location: [facility] OR;  Service: Urology;  Laterality: N/A;  procedure not performed, pt unable to tolerate    TRANSRECTAL BIOPSY OF PROSTATE WITH ULTRASOUND GUIDANCE N/A 2019    Procedure: BIOPSY, PROSTATE, RECTAL APPROACH, WITH US GUIDANCE;  Surgeon: Spencer Nguyen MD;  Location: [facility] OR;  Service: Urology;  Laterality: N/A;     Family History   Problem Relation Age of Onset    No Known Problems Mother     Diabetes Father     Hypertension Father     Heart attack Father     Heart disease Father     Diabetes Sister     Heart disease Brother     Diabetes Brother     No Known Problems Maternal Grandmother     No Known Problems Maternal Grandfather     No Known Problems Paternal Grandmother     No Known Problems Paternal Grandfather     No Known Problems Maternal Aunt     No Known Problems Maternal Uncle     No Known Problems Paternal Aunt     No Known Problems Paternal Uncle     Anemia Neg Hx     Arrhythmia Neg Hx     Asthma Neg Hx     Clotting disorder Neg Hx     Fainting Neg Hx     Heart failure Neg Hx     Hyperlipidemia Neg Hx     Glaucoma Neg Hx      Social History     Tobacco Use    Smoking status: Former     Current packs/day: 0.00     Types: Cigarettes     Quit date: 1970     Years since quittin.3    Smokeless tobacco: Never   Substance Use Topics    Alcohol use: Not Currently     Alcohol/week: 3.0 standard drinks of alcohol      Types: 3 Cans of beer per week     Comment: social    Drug use: No     Review of Systems   Gastrointestinal:  Positive for abdominal pain, constipation and nausea. Negative for vomiting.   Genitourinary:  Positive for scrotal swelling.       Physical Exam     Initial Vitals [10/12/23 1516]   BP Pulse Resp Temp SpO2   (!) 177/88 70 16 97.6 °F (36.4 °C) 99 %      MAP       --         Physical Exam    Nursing note and vitals reviewed.  Constitutional: He appears well-developed and well-nourished. He is not diaphoretic.   Answering questions in full sentences without increased work of breathing.    HENT:   Head: Normocephalic.   Eyes: Right eye exhibits no discharge. Left eye exhibits no discharge. No scleral icterus.   Neck: Neck supple. No tracheal deviation present.   Normal range of motion.  Cardiovascular:  Normal rate and regular rhythm.           Pulmonary/Chest: No stridor. No respiratory distress. He has no wheezes. He has no rhonchi. He has no rales. He exhibits no tenderness.   Abdominal: Abdomen is soft. Bowel sounds are normal. He exhibits no distension. There is abdominal tenderness (umbilical hernia, buldging, erythematous, even with light palpation pt with severe ttp). There is no rebound and no guarding.   Genitourinary:    Genitourinary Comments: Scrotum bilaterally edematous but R side significantly more edematous and erythematous. TTP to light palpation. No penile ttp or lesions or erythema. No bullae, crepitus. Mild ttp R inguinal area      Musculoskeletal:         General: No edema.      Cervical back: Normal range of motion and neck supple.     Neurological: He is alert and oriented to person, place, and time.   Moving all extremities   Skin: Skin is warm and dry.         ED Course   Procedures  Labs Reviewed   CBC W/ AUTO DIFFERENTIAL - Abnormal; Notable for the following components:       Result Value    RBC 3.50 (*)     Hemoglobin 10.1 (*)     Hematocrit 30.0 (*)     RDW 17.1 (*)     Lymph #  0.7 (*)     Gran % 82.5 (*)     Lymph % 10.3 (*)     All other components within normal limits   COMPREHENSIVE METABOLIC PANEL - Abnormal; Notable for the following components:    CO2 18 (*)     Creatinine 1.6 (*)     Albumin 3.4 (*)     Total Bilirubin 1.2 (*)     ALT 6 (*)     eGFR 45.2 (*)     All other components within normal limits   ISTAT CREATININE - Abnormal; Notable for the following components:    POC Creatinine 1.9 (*)     All other components within normal limits   LIPASE   LACTIC ACID, PLASMA   URINALYSIS, REFLEX TO URINE CULTURE   TROPONIN I HIGH SENSITIVITY   CBC W/ AUTO DIFFERENTIAL   COMPREHENSIVE METABOLIC PANEL   TROPONIN I HIGH SENSITIVITY   POCT CREATININE          Imaging Results              CT Abdomen Pelvis With Contrast (Final result)  Result time 10/12/23 19:47:36      Final result by Shanda Sutherland DO (10/12/23 19:47:36)                   Narrative:    CT ABDOMEN AND PELVIS WITH INTRAVENOUS CONTRAST: 10/12/2023 7:44 PM CDT    HISTORY: 73 years  old Male with Nausea/vomiting; Bowel obstruction suspected; concern for strangulated hernia (umbilical, R inguinal) with sbo.    COMPARISON: 5/5/2022    TECHNIQUE: Axial contiguous images were obtained from the lung bases to the proximal femurs with intravenous intravenous contrast administered. Sagittal and coronal reconstructions were also obtained and reviewed. This exam was performed according to our departmental dose-optimization program, which includes automated exposure control, adjustment of the mA and/or KV according to the patient's size and/or use of iterative reconstruction technique.    FINDINGS:    LUNG BASES: No focal consolidative airspace opacities are seen.  There is trace bilateral pleural fluid noted. The cardiac silhouette is enlarged.    LIVER: The visualized hepatic parenchyma appears diffusely heterogeneous.  The liver has a slightly nodular contour which could be due to hepatic cirrhosis. The main portal vein is well  opacified with contrast.    GALLBLADDER: Cholelithiasis is seen. No significant gallbladder wall thickening is seen.    SPLEEN: The spleen is normal in size.    PANCREAS: The pancreas is unremarkable.    ADRENAL GLANDS: The bilateral adrenal glands are unremarkable.    KIDNEYS: Both kidneys demonstrate no hydronephrosis. No renal or ureteral calculi are seen.    PELVIS: The urinary bladder is mildly distended. There is also fluid seen within the inguinal canals, most pronounced on the right side. There is a large hydrocele on the right side. The prostate gland is irregular and indents upon the base of the bladder. A subtle mass within the urinary bladder is difficult to fully exclude.    STOMACH: The stomach is not well distended.    BOWEL:  No pericolonic inflammatory stranding is seen. There is anterior umbilical hernia containing portions of the small bowel and some free fluid. The loops of bowel appear to be dilated. The findings are concerning for small bowel obstruction with the transition point at the umbilical hernia. The distal small bowel is decompressed.    APPENDIX: The appendix is not visualized.    PERITONEUM: There is no evidence of pneumoperitoneum. A moderate amount of free intraperitoneal fluid is seen.    VESSELS: The IVC is unremarkable. The abdominal aorta is within normal limits of size. There is moderate atherosclerotic calcification at aorta and iliac vasculature.    LYMPH NODES: Evaluation for lymphadenopathy is limited due to extensive anasarca/edema.    BONES AND SOFT TISSUES: No acute osseous abnormality is seen. There are moderate to severe degenerative changes at the lumbar spine.    IMPRESSION: There are dilated loops of small bowel with a transition point seen at the anterior umbilical hernia containing portions of the small bowel.    There is a moderate amount of free intraperitoneal fluid. There is a large right hydrocele also seen.    The prostate gland is irregular and enlarged  and indents upon the base of the bladder. An underlying malignancy cannot be excluded.    There is a large amount of free intraperitoneal fluid consistent with ascites.    The liver has a heterogeneous appearance. This can be seen with hepatic cirrhosis. Underlying mass is difficult to exclude.        Electronically signed by:  Shanda Sutherland DO  10/12/2023 07:47 PM CDT Workstation: [id number]                                     Medications   atorvastatin tablet 40 mg (has no administration in time range)   finasteride tablet 5 mg (has no administration in time range)   levothyroxine tablet 100 mcg (has no administration in time range)   metoprolol succinate (TOPROL-XL) 24 hr tablet 25 mg (has no administration in time range)   sacubitriL-valsartan 24-26 mg per tablet 1 tablet (has no administration in time range)   tamsulosin 24 hr capsule 0.4 mg (has no administration in time range)   sodium chloride 0.9% flush 10 mL (has no administration in time range)   melatonin tablet 6 mg (has no administration in time range)   morphine injection 1 mg (has no administration in time range)   ondansetron injection 4 mg (has no administration in time range)   piperacillin-tazobactam 3.375 g in dextrose 5 % 100 mL IVPB (ready to mix) (3.375 g Intravenous New Bag 10/13/23 0248)   hydrALAZINE injection 5 mg (has no administration in time range)   HYDROmorphone injection 0.3 mg (0.3 mg Intravenous Given 10/12/23 1801)   ondansetron injection 4 mg (4 mg Intravenous Given 10/12/23 1801)   lactated ringers bolus 500 mL (0 mLs Intravenous Stopped 10/12/23 1908)   iohexoL (OMNIPAQUE 350) injection 100 mL (100 mLs Intravenous Given 10/12/23 1926)   HYDROmorphone injection 1 mg (1 mg Intravenous Given 10/12/23 1952)   HYDROmorphone injection 1 mg (1 mg Intravenous Given 10/12/23 2130)     Medical Decision Making  Amount and/or Complexity of Data Reviewed  Labs: ordered.  Radiology: ordered.    Risk  Prescription drug management.  Decision  regarding hospitalization.    73-year-old presenting with abdominal pain and inguinal pain.  History of umbilical and inguinal hernia.  No bowel movement, not passing gas, nausea and decreased appetite since pain started 3 days ago.  Worsening.    Vitals within acceptable limits on presentation except for hypertension.  Asymptomatic hypertension at this time    Differential includes strangulated versus obstructed versus incarcerated hernia, small-bowel obstruction, sepsis, necrotic bowel, testicular torsion    Considered attempting reduction of patient's umbilical and inguinal hernia but patient with severe tenderness to palpation with light palpation and patient reporting bulging and erythema for 3 days therefore patient at higher risk for necrotic bowel and therefore reduction was not attempted at this time.  Patient placed in line for stat emergent CT abdomen and pelvis.  Per vitals and presentation lower concern for sepsis at this time but we will continue to monitor.  Obtaining abdominal labs and treating pain and nausea with Dilaudid and Zofran respectively.  Suspect surgery will need to be involved for patient's hernia as clinically it is likely incarcerated versus strangulated.   Ama Durant MD  Emergency Medicine Staff Physician  5:45 PM     UPDATE:   CT scan showing SBO with transition point at umbilical hernia but no signs of strangulation or incarceration.  Patient's bulging of right testicle noted on CT to be varicocele and not hernia.  Discussed patient's presentation and workup with surgeon on-call Dr. Munguia who recommended bedside reduction by myself as he reported surgery would be extremely risky in this patient based on his cardiac risk factors.  Bedside reduction was successful with gentle pressure and IV pain control.  Lactic normal.  No fever.  White blood cell count normal.  Nausea resolved.  Patient reported with reduction abdominal pain resolved.  Due to 3 days of obstruction and  possibility of re-obstruction post bedside reduction Dr. Munguia and I discussed placing patient in observation for serial abdominal exams and discussion with surgery tomorrow morning regarding surgery versus other interventions for his hernia. Pt admitted to obs in stable condition.   Ama Durant MD  Emergency Medicine Staff Physician  4:42 AM                              Clinical Impression:   Final diagnoses:  [K56.609] Small bowel obstruction        ED Disposition Condition    Admit Stable                Ama Durant MD  10/13/23 0530       Ama Durant MD  10/13/23 0531

## 2023-10-12 NOTE — FIRST PROVIDER EVALUATION
Medical screening examination initiated.  I have conducted a focused provider triage encounter, findings are as follows:    Brief history of present illness:  Presents with an umbilical hernia and right inguinal hernia.  Patient reports this is very painful.  He is supposed to have surgery.  He has some paperwork in his hand that he needs to have a stress test prior to them doing surgery on him.    Vitals:    10/12/23 1516   BP: (!) 177/88   BP Location: Left arm   Patient Position: Sitting   Pulse: 70   Resp: 16   Temp: 97.6 °F (36.4 °C)   TempSrc: Oral   SpO2: 99%   Weight: 78.9 kg (174 lb)   Height: 6' (1.829 m)       Pertinent physical exam:  Patient has a large umbilical hernia.  It is very tender to the touch.  He also has a right inguinal hernia that does not appear to be incarcerated.    Brief workup plan:  Reduction and pain management    Preliminary workup initiated; this workup will be continued and followed by the physician or advanced practice provider that is assigned to the patient when roomed.

## 2023-10-13 VITALS
OXYGEN SATURATION: 97 % | HEIGHT: 72 IN | SYSTOLIC BLOOD PRESSURE: 138 MMHG | DIASTOLIC BLOOD PRESSURE: 63 MMHG | TEMPERATURE: 98 F | WEIGHT: 157.19 LBS | HEART RATE: 65 BPM | RESPIRATION RATE: 15 BRPM | BODY MASS INDEX: 21.29 KG/M2

## 2023-10-13 PROBLEM — N18.9 CRF (CHRONIC RENAL FAILURE): Status: ACTIVE | Noted: 2023-09-28

## 2023-10-13 PROBLEM — K56.609 SMALL BOWEL OBSTRUCTION: Status: RESOLVED | Noted: 2023-09-14 | Resolved: 2023-10-13

## 2023-10-13 PROBLEM — I25.810 CAD (CORONARY ARTERY DISEASE), AUTOLOGOUS VEIN BYPASS GRAFT: Status: ACTIVE | Noted: 2023-09-28

## 2023-10-13 PROBLEM — R60.0 BILATERAL LOWER EXTREMITY EDEMA: Status: RESOLVED | Noted: 2018-05-22 | Resolved: 2023-10-13

## 2023-10-13 PROBLEM — I25.5 ISCHEMIC CARDIOMYOPATHY: Status: ACTIVE | Noted: 2023-10-13

## 2023-10-13 PROBLEM — K42.9 UMBILICAL HERNIA: Status: ACTIVE | Noted: 2023-10-13

## 2023-10-13 PROBLEM — I50.9 ACUTE ON CHRONIC CONGESTIVE HEART FAILURE: Status: RESOLVED | Noted: 2021-10-26 | Resolved: 2023-10-13

## 2023-10-13 PROBLEM — R79.89 TROPONIN I ABOVE REFERENCE RANGE: Status: RESOLVED | Noted: 2020-08-29 | Resolved: 2023-10-13

## 2023-10-13 PROBLEM — R06.01 ORTHOPNEA: Status: RESOLVED | Noted: 2018-05-22 | Resolved: 2023-10-13

## 2023-10-13 LAB
ALBUMIN SERPL BCP-MCNC: 3.4 G/DL (ref 3.5–5.2)
ALP SERPL-CCNC: 58 U/L (ref 55–135)
ALT SERPL W/O P-5'-P-CCNC: 6 U/L (ref 10–44)
ANION GAP SERPL CALC-SCNC: 8 MMOL/L (ref 8–16)
AST SERPL-CCNC: 13 U/L (ref 10–40)
BACTERIA #/AREA URNS HPF: NEGATIVE /HPF
BASOPHILS # BLD AUTO: 0.05 K/UL (ref 0–0.2)
BASOPHILS NFR BLD: 0.7 % (ref 0–1.9)
BILIRUB SERPL-MCNC: 0.9 MG/DL (ref 0.1–1)
BILIRUB UR QL STRIP: NEGATIVE
BUN SERPL-MCNC: 25 MG/DL (ref 8–23)
CALCIUM SERPL-MCNC: 8.5 MG/DL (ref 8.7–10.5)
CHLORIDE SERPL-SCNC: 108 MMOL/L (ref 95–110)
CLARITY UR: CLEAR
CO2 SERPL-SCNC: 20 MMOL/L (ref 23–29)
COLOR UR: YELLOW
CREAT SERPL-MCNC: 1.6 MG/DL (ref 0.5–1.4)
DIFFERENTIAL METHOD BLD: ABNORMAL
EOSINOPHIL # BLD AUTO: 0.1 K/UL (ref 0–0.5)
EOSINOPHIL NFR BLD: 0.9 % (ref 0–8)
ERYTHROCYTE [DISTWIDTH] IN BLOOD BY AUTOMATED COUNT: 17.2 % (ref 11.5–14.5)
EST. GFR  (NO RACE VARIABLE): 45.2 ML/MIN/1.73 M^2
GLUCOSE SERPL-MCNC: 123 MG/DL (ref 70–110)
GLUCOSE SERPL-MCNC: 136 MG/DL (ref 70–110)
GLUCOSE SERPL-MCNC: 89 MG/DL (ref 70–110)
GLUCOSE UR QL STRIP: NEGATIVE
HCT VFR BLD AUTO: 31.3 % (ref 40–54)
HGB BLD-MCNC: 9.9 G/DL (ref 14–18)
HGB UR QL STRIP: NEGATIVE
HYALINE CASTS #/AREA URNS LPF: 1 /LPF
IMM GRANULOCYTES # BLD AUTO: 0.02 K/UL (ref 0–0.04)
IMM GRANULOCYTES NFR BLD AUTO: 0.3 % (ref 0–0.5)
KETONES UR QL STRIP: NEGATIVE
LEUKOCYTE ESTERASE UR QL STRIP: NEGATIVE
LYMPHOCYTES # BLD AUTO: 0.4 K/UL (ref 1–4.8)
LYMPHOCYTES NFR BLD: 5.8 % (ref 18–48)
MCH RBC QN AUTO: 27.9 PG (ref 27–31)
MCHC RBC AUTO-ENTMCNC: 31.6 G/DL (ref 32–36)
MCV RBC AUTO: 88 FL (ref 82–98)
MICROSCOPIC COMMENT: NORMAL
MONOCYTES # BLD AUTO: 0.4 K/UL (ref 0.3–1)
MONOCYTES NFR BLD: 5.5 % (ref 4–15)
NEUTROPHILS # BLD AUTO: 6 K/UL (ref 1.8–7.7)
NEUTROPHILS NFR BLD: 86.8 % (ref 38–73)
NITRITE UR QL STRIP: NEGATIVE
NRBC BLD-RTO: 0 /100 WBC
PH UR STRIP: 6 [PH] (ref 5–8)
PLATELET # BLD AUTO: 220 K/UL (ref 150–450)
PMV BLD AUTO: 10.7 FL (ref 9.2–12.9)
POTASSIUM SERPL-SCNC: 4.1 MMOL/L (ref 3.5–5.1)
PROT SERPL-MCNC: 6.6 G/DL (ref 6–8.4)
PROT UR QL STRIP: ABNORMAL
RBC # BLD AUTO: 3.55 M/UL (ref 4.6–6.2)
RBC #/AREA URNS HPF: 1 /HPF (ref 0–4)
SODIUM SERPL-SCNC: 136 MMOL/L (ref 136–145)
SP GR UR STRIP: >1.03 (ref 1–1.03)
SQUAMOUS #/AREA URNS HPF: 0 /HPF
TROPONIN I SERPL HS-MCNC: 25.3 PG/ML (ref 0–14.9)
TROPONIN I SERPL HS-MCNC: 25.3 PG/ML (ref 0–14.9)
URN SPEC COLLECT METH UR: ABNORMAL
UROBILINOGEN UR STRIP-ACNC: NEGATIVE EU/DL
WBC # BLD AUTO: 6.91 K/UL (ref 3.9–12.7)
WBC #/AREA URNS HPF: 0 /HPF (ref 0–5)

## 2023-10-13 PROCEDURE — 21000000 HC CCU ICU ROOM CHARGE

## 2023-10-13 PROCEDURE — 63600175 PHARM REV CODE 636 W HCPCS: Performed by: NURSE PRACTITIONER

## 2023-10-13 PROCEDURE — 81001 URINALYSIS AUTO W/SCOPE: CPT | Performed by: STUDENT IN AN ORGANIZED HEALTH CARE EDUCATION/TRAINING PROGRAM

## 2023-10-13 PROCEDURE — 25000003 PHARM REV CODE 250: Performed by: NURSE PRACTITIONER

## 2023-10-13 PROCEDURE — G0378 HOSPITAL OBSERVATION PER HR: HCPCS

## 2023-10-13 PROCEDURE — 84484 ASSAY OF TROPONIN QUANT: CPT | Performed by: NURSE PRACTITIONER

## 2023-10-13 PROCEDURE — 96365 THER/PROPH/DIAG IV INF INIT: CPT

## 2023-10-13 PROCEDURE — 96366 THER/PROPH/DIAG IV INF ADDON: CPT

## 2023-10-13 PROCEDURE — 80053 COMPREHEN METABOLIC PANEL: CPT | Performed by: NURSE PRACTITIONER

## 2023-10-13 PROCEDURE — 99223 1ST HOSP IP/OBS HIGH 75: CPT | Mod: ,,, | Performed by: SURGERY

## 2023-10-13 PROCEDURE — 94761 N-INVAS EAR/PLS OXIMETRY MLT: CPT

## 2023-10-13 PROCEDURE — 25000003 PHARM REV CODE 250: Performed by: STUDENT IN AN ORGANIZED HEALTH CARE EDUCATION/TRAINING PROGRAM

## 2023-10-13 PROCEDURE — 85025 COMPLETE CBC W/AUTO DIFF WBC: CPT | Performed by: NURSE PRACTITIONER

## 2023-10-13 RX ORDER — SODIUM CHLORIDE, SODIUM LACTATE, POTASSIUM CHLORIDE, CALCIUM CHLORIDE 600; 310; 30; 20 MG/100ML; MG/100ML; MG/100ML; MG/100ML
INJECTION, SOLUTION INTRAVENOUS CONTINUOUS
Status: DISCONTINUED | OUTPATIENT
Start: 2023-10-13 | End: 2023-10-13

## 2023-10-13 RX ORDER — HYDRALAZINE HYDROCHLORIDE 25 MG/1
50 TABLET, FILM COATED ORAL EVERY 12 HOURS
Status: DISCONTINUED | OUTPATIENT
Start: 2023-10-13 | End: 2023-10-13 | Stop reason: HOSPADM

## 2023-10-13 RX ORDER — CLOPIDOGREL BISULFATE 75 MG/1
75 TABLET ORAL DAILY
Status: DISCONTINUED | OUTPATIENT
Start: 2023-10-13 | End: 2023-10-13 | Stop reason: HOSPADM

## 2023-10-13 RX ORDER — GLUCAGON 1 MG
1 KIT INJECTION
Status: DISCONTINUED | OUTPATIENT
Start: 2023-10-13 | End: 2023-10-13 | Stop reason: HOSPADM

## 2023-10-13 RX ORDER — TALC
6 POWDER (GRAM) TOPICAL NIGHTLY PRN
Status: DISCONTINUED | OUTPATIENT
Start: 2023-10-13 | End: 2023-10-13 | Stop reason: HOSPADM

## 2023-10-13 RX ORDER — ARFORMOTEROL TARTRATE 15 UG/2ML
15 SOLUTION RESPIRATORY (INHALATION) 2 TIMES DAILY
Status: DISCONTINUED | OUTPATIENT
Start: 2023-10-13 | End: 2023-10-13 | Stop reason: HOSPADM

## 2023-10-13 RX ORDER — PRASUGREL 10 MG/1
10 TABLET, FILM COATED ORAL DAILY
Status: DISCONTINUED | OUTPATIENT
Start: 2023-10-13 | End: 2023-10-13 | Stop reason: HOSPADM

## 2023-10-13 RX ORDER — MORPHINE SULFATE 2 MG/ML
1 INJECTION, SOLUTION INTRAMUSCULAR; INTRAVENOUS EVERY 6 HOURS PRN
Status: DISCONTINUED | OUTPATIENT
Start: 2023-10-13 | End: 2023-10-13 | Stop reason: HOSPADM

## 2023-10-13 RX ORDER — BUDESONIDE 0.5 MG/2ML
0.5 INHALANT ORAL EVERY 12 HOURS
Status: DISCONTINUED | OUTPATIENT
Start: 2023-10-13 | End: 2023-10-13 | Stop reason: HOSPADM

## 2023-10-13 RX ORDER — FUROSEMIDE 40 MG/1
40 TABLET ORAL DAILY
Status: DISCONTINUED | OUTPATIENT
Start: 2023-10-13 | End: 2023-10-13 | Stop reason: HOSPADM

## 2023-10-13 RX ORDER — TAMSULOSIN HYDROCHLORIDE 0.4 MG/1
0.4 CAPSULE ORAL DAILY
Status: DISCONTINUED | OUTPATIENT
Start: 2023-10-13 | End: 2023-10-13 | Stop reason: HOSPADM

## 2023-10-13 RX ORDER — ASPIRIN 81 MG/1
81 TABLET ORAL DAILY
Status: DISCONTINUED | OUTPATIENT
Start: 2023-10-13 | End: 2023-10-13 | Stop reason: HOSPADM

## 2023-10-13 RX ORDER — HYDRALAZINE HYDROCHLORIDE 20 MG/ML
5 INJECTION INTRAMUSCULAR; INTRAVENOUS EVERY 6 HOURS PRN
Status: DISCONTINUED | OUTPATIENT
Start: 2023-10-13 | End: 2023-10-13 | Stop reason: HOSPADM

## 2023-10-13 RX ORDER — GABAPENTIN 300 MG/1
300 CAPSULE ORAL NIGHTLY
Status: DISCONTINUED | OUTPATIENT
Start: 2023-10-13 | End: 2023-10-13 | Stop reason: HOSPADM

## 2023-10-13 RX ORDER — ONDANSETRON HYDROCHLORIDE 2 MG/ML
4 INJECTION, SOLUTION INTRAVENOUS EVERY 8 HOURS PRN
Status: DISCONTINUED | OUTPATIENT
Start: 2023-10-13 | End: 2023-10-13 | Stop reason: HOSPADM

## 2023-10-13 RX ORDER — LEVOTHYROXINE SODIUM 100 UG/1
100 TABLET ORAL
Status: DISCONTINUED | OUTPATIENT
Start: 2023-10-13 | End: 2023-10-13 | Stop reason: HOSPADM

## 2023-10-13 RX ORDER — INSULIN ASPART 100 [IU]/ML
0-5 INJECTION, SOLUTION INTRAVENOUS; SUBCUTANEOUS EVERY 6 HOURS PRN
Status: DISCONTINUED | OUTPATIENT
Start: 2023-10-13 | End: 2023-10-13 | Stop reason: HOSPADM

## 2023-10-13 RX ORDER — NALOXONE HCL 0.4 MG/ML
0.4 VIAL (ML) INJECTION
Status: DISCONTINUED | OUTPATIENT
Start: 2023-10-13 | End: 2023-10-13 | Stop reason: HOSPADM

## 2023-10-13 RX ORDER — FINASTERIDE 5 MG/1
5 TABLET, FILM COATED ORAL DAILY
Status: DISCONTINUED | OUTPATIENT
Start: 2023-10-13 | End: 2023-10-13 | Stop reason: HOSPADM

## 2023-10-13 RX ORDER — ATORVASTATIN CALCIUM 40 MG/1
40 TABLET, FILM COATED ORAL DAILY
Status: DISCONTINUED | OUTPATIENT
Start: 2023-10-13 | End: 2023-10-13 | Stop reason: HOSPADM

## 2023-10-13 RX ORDER — METOPROLOL SUCCINATE 25 MG/1
25 TABLET, EXTENDED RELEASE ORAL DAILY
Status: DISCONTINUED | OUTPATIENT
Start: 2023-10-13 | End: 2023-10-13 | Stop reason: HOSPADM

## 2023-10-13 RX ORDER — SODIUM CHLORIDE 0.9 % (FLUSH) 0.9 %
10 SYRINGE (ML) INJECTION
Status: DISCONTINUED | OUTPATIENT
Start: 2023-10-13 | End: 2023-10-13 | Stop reason: HOSPADM

## 2023-10-13 RX ORDER — ISOSORBIDE DINITRATE 10 MG/1
20 TABLET ORAL 3 TIMES DAILY
Status: DISCONTINUED | OUTPATIENT
Start: 2023-10-13 | End: 2023-10-13 | Stop reason: HOSPADM

## 2023-10-13 RX ADMIN — PIPERACILLIN AND TAZOBACTAM 3.38 G: 3; .375 INJECTION, POWDER, LYOPHILIZED, FOR SOLUTION INTRAVENOUS; PARENTERAL at 09:10

## 2023-10-13 RX ADMIN — ATORVASTATIN CALCIUM 40 MG: 40 TABLET, FILM COATED ORAL at 09:10

## 2023-10-13 RX ADMIN — CLOPIDOGREL BISULFATE 75 MG: 75 TABLET, FILM COATED ORAL at 12:10

## 2023-10-13 RX ADMIN — LEVOTHYROXINE SODIUM 100 MCG: 100 TABLET ORAL at 06:10

## 2023-10-13 RX ADMIN — FUROSEMIDE 40 MG: 40 TABLET ORAL at 12:10

## 2023-10-13 RX ADMIN — SACUBITRIL AND VALSARTAN 1 TABLET: 24; 26 TABLET, FILM COATED ORAL at 09:10

## 2023-10-13 RX ADMIN — TAMSULOSIN HYDROCHLORIDE 0.4 MG: 0.4 CAPSULE ORAL at 09:10

## 2023-10-13 RX ADMIN — PRASUGREL 10 MG: 10 TABLET, FILM COATED ORAL at 01:10

## 2023-10-13 RX ADMIN — PIPERACILLIN AND TAZOBACTAM 3.38 G: 3; .375 INJECTION, POWDER, LYOPHILIZED, FOR SOLUTION INTRAVENOUS; PARENTERAL at 02:10

## 2023-10-13 RX ADMIN — FINASTERIDE 5 MG: 5 TABLET, FILM COATED ORAL at 09:10

## 2023-10-13 RX ADMIN — METOPROLOL SUCCINATE 25 MG: 25 TABLET, FILM COATED, EXTENDED RELEASE ORAL at 09:10

## 2023-10-13 RX ADMIN — ISOSORBIDE DINITRATE 20 MG: 10 TABLET ORAL at 02:10

## 2023-10-13 RX ADMIN — ASPIRIN 81 MG: 81 TABLET, COATED ORAL at 12:10

## 2023-10-13 NOTE — ASSESSMENT & PLAN NOTE
Patient with easily reducible hernia.  Now that hernia has been reduced his obstruction should resolve uneventfully.  If continues to do well tomorrow around noon would start clears and advance as tolerated.  No surgical intervention planned at this time.  Discussed with patient he would be very high risk for any surgical intervention given his cardiac history.  He notes he strongly desires to have this hernia repaired.  Discussed with he would need cardiac clearance prior to any surgical intervention.  Surgery will sign off at present.  If there is any clinical deterioration or changes please consult p.r.n.

## 2023-10-13 NOTE — H&P
[facility] History & Physical Examination   Patient Name: Baldo Carney  MRN: [id number]  Patient Class: Emergency   Admission Date: 10/12/2023  3:15 PM  Length of Stay: 0  Attending Physician:   Primary Care Provider: Millie Hayes APRN, FNP-C  Face-to-Face encounter date: 10/12/2023  Code Status: Full Code  MPOA:  Chief Complaint: Hernia and Inguinal Hernia (Umbilical and inguinal hernias painful and unable to be reduced at home. )        Patient information was obtained from patient, past medical records and ER records.   HISTORY OF PRESENT ILLNESS:   Baldo Carney is a 73 y.o. old  male who  has a past medical history of Asthma, Cardiomyopathy (10/21/2014), CHF (congestive heart failure), Coronary artery disease, CRF (chronic renal failure), Diabetes mellitus, Enlarged prostate, Hyperlipidemia, Hypertension, and Neuropathy.. The patient presented to [facility] on 10/12/2023 with a primary complaint of Hernia and Inguinal Hernia (Umbilical and inguinal hernias painful and unable to be reduced at home. )  .     73-year-old male presents to the emergency room with abdominal pain nausea vomiting and constipation for the past 3-4 days    This patient has a known history of recurrent small-bowel obstructions with the inguinal/umbilical hernia.  He also has cardio hepato liver disease and has ascites requiring large volume paracentesis every 2 weeks, ischemic cardiomyopathy last EF 15%, aortic valve replacement, type 2 diabetes, prostate enlargement, hypertension hyperlipidemia, pulmonary hypertension    The patient states for the past 3-4 days he has been having nausea vomiting and abdominal pain near his inguinal/umbilical hernia.  The patient states he had had a bowel movement in about 3-4 days.  The patient states he is had incarcerated hernias in the past that were manually reduced.  He is never had abdominal surgery.  The patient states he last ate  2 days ago grits and eggs with shortly thereafter he threw up his food.  He complaints of generalized weakness and fatigue and and mild shortness of breath he denies fever chills chest pain hematemesis hemoptysis black or bloody stools does complain of chronic edema to his scrotal region secondary to recurrent ascites    In the emergency room a CT of the abdomen was performed revealing incarcerated inguinal hernia.  The ER physician was able to reduce the hernia and the patient had symptom relief of his pain.  General surgery was consulted and agreed to see the patient in consult in a.m.    General surgery also recommended that if he had recurrent vomiting and NG tube would be appropriate currently the patient is not vomiting.  The patient did get a dose of pain medication in the ED          REVIEW OF SYSTEMS:   10 Point Review of System was performed and was found to be negative except for that mentioned already in the HPI and   Review of Systems (Negative unless checked off)  Review of Systems   Constitutional:  Positive for malaise/fatigue.   HENT: Negative.     Eyes: Negative.    Respiratory:  Positive for shortness of breath.    Cardiovascular: Negative.    Gastrointestinal:  Positive for abdominal pain, constipation, nausea and vomiting.   Genitourinary:         Scrotal edema and swelling   Musculoskeletal:  Positive for back pain.   Skin: Negative.    Neurological:  Positive for weakness.   Endo/Heme/Allergies: Negative.    Psychiatric/Behavioral: Negative.               PAST MEDICAL HISTORY:     Past Medical History:   Diagnosis Date    Asthma     Cardiomyopathy 10/21/2014    CHF (congestive heart failure)     Coronary artery disease     CRF (chronic renal failure)     Diabetes mellitus     Enlarged prostate     Hyperlipidemia     Hypertension     Neuropathy        PAST SURGICAL HISTORY:     Past Surgical History:   Procedure Laterality Date    ANGIOGRAPHY OF INTERNAL MAMMARY VESSEL N/A 3/11/2022     Procedure: Angiogram Internal Mammary;  Surgeon: Hank Lujan MD;  Location: [facility] CATH/EP LAB;  Service: Cardiology;  Laterality: N/A;    CARDIAC CATHETERIZATION      CARDIAC VALVE SURGERY      CATHETERIZATION OF BOTH LEFT AND RIGHT HEART Left 3/11/2022    Procedure: CATHETERIZATION, HEART, BOTH LEFT AND RIGHT;  Surgeon: Hank Lujan MD;  Location: [facility] CATH/EP LAB;  Service: Cardiology;  Laterality: Left;    CORONARY ANGIOGRAPHY N/A 3/11/2022    Procedure: ANGIOGRAM, CORONARY ARTERY;  Surgeon: Hank Lujan MD;  Location: [facility] CATH/EP LAB;  Service: Cardiology;  Laterality: N/A;    CORONARY BYPASS GRAFT ANGIOGRAPHY  3/11/2022    Procedure: Bypass graft study;  Surgeon: Hank Lujan MD;  Location: [facility] CATH/EP LAB;  Service: Cardiology;;    CYSTOSCOPY N/A 7/30/2019    Procedure: CYSTOSCOPY;  Surgeon: Spencer Nguyen MD;  Location: On license of [facility];  Service: Urology;  Laterality: N/A;    INSERTION OF INTRAVASCULAR MICROAXIAL BLOOD PUMP N/A 8/31/2022    Procedure: INSERTION, IMPELLA;  Surgeon: Zach Jensen MD;  Location: [facility] CATH LAB;  Service: Cardiology;  Laterality: N/A;    PLACEMENT OF SWAN SEE CATHETER WITH IMAGING GUIDANCE  8/31/2022    Procedure: INSERTION, CATHETER, SWAN-SEE, WITH IMAGING GUIDANCE;  Surgeon: Zach Jensen MD;  Location: [facility] CATH LAB;  Service: Cardiology;;    SHOULDER ARTHROSCOPY  1985    TRANSRECTAL BIOPSY OF PROSTATE WITH ULTRASOUND GUIDANCE N/A 7/30/2019    Procedure: BIOPSY, PROSTATE, RECTAL APPROACH, WITH US GUIDANCE;  Surgeon: Spencer Nguyen MD;  Location: On license of [facility];  Service: Urology;  Laterality: N/A;  procedure not performed, pt unable to tolerate    TRANSRECTAL BIOPSY OF PROSTATE WITH ULTRASOUND GUIDANCE N/A 8/8/2019    Procedure: BIOPSY, PROSTATE, RECTAL APPROACH, WITH US GUIDANCE;  Surgeon: Spencer Nguyen MD;  Location: [facility];  Service: Urology;  Laterality: N/A;       ALLERGIES:   Invokana  [canagliflozin]    FAMILY HISTORY:     Family History    Problem Relation Age of Onset    No Known Problems Mother     Diabetes Father     Hypertension Father     Heart attack Father     Heart disease Father     Diabetes Sister     Heart disease Brother     Diabetes Brother     No Known Problems Maternal Grandmother     No Known Problems Maternal Grandfather     No Known Problems Paternal Grandmother     No Known Problems Paternal Grandfather     No Known Problems Maternal Aunt     No Known Problems Maternal Uncle     No Known Problems Paternal Aunt     No Known Problems Paternal Uncle     Anemia Neg Hx     Arrhythmia Neg Hx     Asthma Neg Hx     Clotting disorder Neg Hx     Fainting Neg Hx     Heart failure Neg Hx     Hyperlipidemia Neg Hx     Glaucoma Neg Hx        SOCIAL HISTORY:     Social History     Tobacco Use    Smoking status: Former     Current packs/day: 0.00     Types: Cigarettes     Quit date: 1970     Years since quittin.3    Smokeless tobacco: Never   Substance Use Topics    Alcohol use: Not Currently     Alcohol/week: 3.0 standard drinks of alcohol     Types: 3 Cans of beer per week     Comment: social        Social History     Substance and Sexual Activity   Sexual Activity Not Currently    Partners: Female        HOME MEDICATIONS:     Prior to Admission medications    Medication Sig Start Date End Date Taking? Authorizing Provider   albuterol (PROVENTIL/VENTOLIN HFA) 90 mcg/actuation inhaler INHALE 1 TO 2 PUFFS INTO THE LUNGS EVERY 4 TO 6 HOURS AS NEEDED FOR WHEEZE AND SHORTNESS OF BREATH 23   Millie Hayes APRN,FNP-C   aspirin (ECOTRIN) 81 MG EC tablet Take 81 mg by mouth once daily.    Provider, Historical   atorvastatin (LIPITOR) 40 MG tablet Take 1 tablet (40 mg total) by mouth once daily. 23   Millie Hayes APRN,FNP-C   budesonide-glycopyr-formoterol (BREZTRI AEROSPHERE) 160-9-4.8 mcg/actuation HFAA Inhale into the lungs daily as needed.    Provider, Historical   clopidogreL (PLAVIX) 75 mg tablet  10/6/22    Provider, Historical   cyanocobalamin 1,000 mcg/mL injection Inject 1 mL (1,000 mcg total) into the muscle every 14 (fourteen) days. 8/29/23   Millie Hayes APRN, FNP-C   FARXIGA 5 mg Tab tablet Take 5 mg by mouth once daily. 6/17/22   Provider, Historical   finasteride (PROSCAR) 5 mg tablet Take 1 tablet (5 mg total) by mouth once daily. 6/9/23   Spencer Nguyen MD   fluticasone furoate-vilanteroL (BREO ELLIPTA) 100-25 mcg/dose diskus inhaler Inhale 1 puff into the lungs once daily. (DAILY CONTROLLER) 8/29/23   Millie Hayes APRN, FNP-C   fluticasone propionate (FLONASE) 50 mcg/actuation nasal spray 1 SPRAY (50 MCG TOTAL) BY EACH NOSTRIL ROUTE ONCE DAILY. 11/8/22   Millie Hayes APRN, FNP-C   furosemide (LASIX) 40 MG tablet Take by mouth once daily. 4/18/22   Provider, Historical   gabapentin (NEURONTIN) 300 MG capsule Take 1 capsule (300 mg total) by mouth every evening. 5/29/23   Millie Hayes APRN, FNP-C   hydrALAZINE (APRESOLINE) 50 MG tablet Take 1 tablet (50 mg total) by mouth every 12 (twelve) hours. 9/15/23 10/15/23  Joshua Cortez MD   ibuprofen (ADVIL,MOTRIN) 800 MG tablet Take 800 mg by mouth every 6 (six) hours as needed. 4/27/23   Provider, Historical   isosorbide dinitrate (ISORDIL) 20 MG tablet Take 1 tablet (20 mg total) by mouth 3 (three) times daily. 12/28/21 8/31/22  Gregory Riggs MD   levothyroxine (SYNTHROID) 100 MCG tablet Take 1 tablet (100 mcg total) by mouth before breakfast. With no other meds or food 5/29/23   Millie Hayes APRN, FNP-C   metoprolol succinate (TOPROL-XL) 25 MG 24 hr tablet Take 1 tablet (25 mg total) by mouth once daily. 8/29/23   Millie Hayes APRN,TEDP-C   prasugreL (EFFIENT) 10 mg Tab Take 1 tablet (10 mg total) by mouth once daily. 9/2/22 9/2/23  Aisha Gan MD   sacubitriL-valsartan (ENTRESTO) 24-26 mg per tablet Take 1 tablet by mouth 2 (two) times daily. 8/29/23   Millie Hayes APRN,PATRICIO-C   tamsulosin (FLOMAX)  0.4 mg Cap TAKE ONE CAPSULE BY MOUTH DAILY AT 5 PM 4/18/23   ClarelarsMendozashanda SYDKAMERON Joshi,FNP-C   traMADoL (ULTRAM) 50 mg tablet Take 1 tablet (50 mg total) by mouth every 8 (eight) hours as needed for Pain. 8/22/22   ClarelarsMendozashanda SYDKAMERON Joshi,FNP-C   ZONTIVITY 2.08 mg Tab Take 1 tablet by mouth once daily. 3/24/22   Provider, Historical         PHYSICAL EXAM:   BP (!) 168/88   Pulse 74   Temp 97.6 °F (36.4 °C) (Oral)   Resp 18   Ht 6' (1.829 m)   Wt 78.9 kg (174 lb)   SpO2 98%   BMI 23.60 kg/m²   Vitals Reviewed  General appearance:  Chronically ill-appearing male in no apparent distress.  Skin: No Rash.   Neuro: Motor and sensory exams grossly intact. Good tone. Power in all 4 extremities 5/5.   HENT: Atraumatic head. Moist mucous membranes of oral cavity.  Eyes: Normal extraocular movements.   Neck: Supple. No evidence of lymphadenopathy. No thyroidomegaly.  Lungs: Clear to auscultation bilaterally. No wheezing present.   Heart: Regular rate and rhythm. S1 and S2 present with positive murmurs/no  gallop/rub.  Positive pedal edema. No JVD present.   Abdomen: Soft, non-distended, non-tender. No rebound tenderness/guarding.  Positive umbilical hernia No masses or organomegaly. Bowel sounds are normal. Bladder is not palpable.   Extremities: No cyanosis, clubbing, positive edema.  Psych/mental status: Alert and oriented. Cooperative. Responds appropriately to questions.   EMERGENCY DEPARTMENT LABS AND IMAGING:   Following labs were Reviewed   Recent Labs   Lab 10/12/23  1800   WBC 6.41   HGB 10.1*   HCT 30.0*      CALCIUM 8.8   ALBUMIN 3.4*   PROT 6.6      K 3.9   CO2 18*      BUN 23   CREATININE 1.6*   ALKPHOS 65   ALT 6*   AST 16   BILITOT 1.2*         BMP:   Recent Labs   Lab 10/12/23  1800         K 3.9      CO2 18*   BUN 23   CREATININE 1.6*   CALCIUM 8.8   , CMP   Recent Labs   Lab 10/12/23  1800      K 3.9      CO2 18*      BUN 23   CREATININE 1.6*  "  CALCIUM 8.8   PROT 6.6   ALBUMIN 3.4*   BILITOT 1.2*   ALKPHOS 65   AST 16   ALT 6*   ANIONGAP 11   , CBC   Recent Labs   Lab 10/12/23  1800   WBC 6.41   HGB 10.1*   HCT 30.0*      , INR   Lab Results   Component Value Date    INR 1.0 09/22/2023    INR 1.0 09/12/2023    INR 1.1 08/15/2023   , Lipid Panel   Lab Results   Component Value Date    CHOL 79 (L) 05/26/2023    HDL 38 (L) 05/26/2023    LDLCALC 34.8 (L) 05/26/2023    TRIG 31 05/26/2023    CHOLHDL 48.1 05/26/2023   , Troponin No results for input(s): "TROPONINI" in the last 168 hours., A1C:   Recent Labs   Lab 09/15/23  0326   HGBA1C 5.4   , and All labs within the past 24 hours have been reviewed  Microbiology Results (last 7 days)       ** No results found for the last 168 hours. **          CT Abdomen Pelvis With Contrast   Final Result        CT Abdomen Pelvis With Contrast    Result Date: 10/12/2023  CT ABDOMEN AND PELVIS WITH INTRAVENOUS CONTRAST: 10/12/2023 7:44 PM CDT HISTORY: 73 years  old Male with Nausea/vomiting; Bowel obstruction suspected; concern for strangulated hernia (umbilical, R inguinal) with sbo. COMPARISON: 5/5/2022 TECHNIQUE: Axial contiguous images were obtained from the lung bases to the proximal femurs with intravenous intravenous contrast administered. Sagittal and coronal reconstructions were also obtained and reviewed. This exam was performed according to our departmental dose-optimization program, which includes automated exposure control, adjustment of the mA and/or KV according to the patient's size and/or use of iterative reconstruction technique. FINDINGS: LUNG BASES: No focal consolidative airspace opacities are seen.  There is trace bilateral pleural fluid noted. The cardiac silhouette is enlarged. LIVER: The visualized hepatic parenchyma appears diffusely heterogeneous.  The liver has a slightly nodular contour which could be due to hepatic cirrhosis. The main portal vein is well opacified with contrast. " GALLBLADDER: Cholelithiasis is seen. No significant gallbladder wall thickening is seen. SPLEEN: The spleen is normal in size. PANCREAS: The pancreas is unremarkable. ADRENAL GLANDS: The bilateral adrenal glands are unremarkable. KIDNEYS: Both kidneys demonstrate no hydronephrosis. No renal or ureteral calculi are seen. PELVIS: The urinary bladder is mildly distended. There is also fluid seen within the inguinal canals, most pronounced on the right side. There is a large hydrocele on the right side. The prostate gland is irregular and indents upon the base of the bladder. A subtle mass within the urinary bladder is difficult to fully exclude. STOMACH: The stomach is not well distended. BOWEL:  No pericolonic inflammatory stranding is seen. There is anterior umbilical hernia containing portions of the small bowel and some free fluid. The loops of bowel appear to be dilated. The findings are concerning for small bowel obstruction with the transition point at the umbilical hernia. The distal small bowel is decompressed. APPENDIX: The appendix is not visualized. PERITONEUM: There is no evidence of pneumoperitoneum. A moderate amount of free intraperitoneal fluid is seen. VESSELS: The IVC is unremarkable. The abdominal aorta is within normal limits of size. There is moderate atherosclerotic calcification at aorta and iliac vasculature. LYMPH NODES: Evaluation for lymphadenopathy is limited due to extensive anasarca/edema. BONES AND SOFT TISSUES: No acute osseous abnormality is seen. There are moderate to severe degenerative changes at the lumbar spine. IMPRESSION: There are dilated loops of small bowel with a transition point seen at the anterior umbilical hernia containing portions of the small bowel. There is a moderate amount of free intraperitoneal fluid. There is a large right hydrocele also seen. The prostate gland is irregular and enlarged and indents upon the base of the bladder. An underlying malignancy cannot  be excluded. There is a large amount of free intraperitoneal fluid consistent with ascites. The liver has a heterogeneous appearance. This can be seen with hepatic cirrhosis. Underlying mass is difficult to exclude. Electronically signed by:  Shanda Sutherland DO  10/12/2023 07:47 PM CDT Workstation: [id number]    IR Paracentesis with Imaging    Result Date: 10/10/2023  EXAMINATION: Ultrasound-guided paracentesis TECHNIQUE: - Ultrasound-guided paracentesis performed by Heather WEINER COMPARISON: Multiple priors FINDINGS: Ultrasound-guided paracentesis was performed by Heather WEINER.  Please refer to the procedure note.  10.3 L ascitic fluid was removed.  Specimen was sent to lab for evaluation of ordered.  Patient given albumin per protocol.     Ultrasound-guided paracentesis with drainage of 10.3 L mL of serous fluid. Patient discharged home in stable condition _______________________________________________________________ Electronically signed by: Trina Crowell MD Date:    10/10/2023 Time:    12:12    IR Paracentesis with Imaging    Result Date: 9/26/2023  EXAMINATION: US guided paracentesis CLINICAL HISTORY: Other ascites; TECHNIQUE: US guided paracentesis COMPARISON: Multiple priors, most recently 09/12/2023 FINDINGS: Pre-procedure ultrasound imaging was performed of the abdomen and appropriate site was marked at the right lower abdominal quadrant. Informed consent was obtained. Patient was prepped and draped in the usual sterile fashion. Local anesthesia was administered. A catheter drainage device was then advanced into the abdomen. Stylette was removed and catheter left in place.  Initial fluid was straw-colored. The patient tolerated the procedure well without immediate complication. Blood loss was negligible. If analyses were ordered, a sample of fluid was collected and sent to lab. IV albumin administered per protocol.     Ultrasound-guided paracentesis with drainage of 6 liters of ascitic fluid.  Electronically signed by resident: Heather Gutierrez Date:    09/26/2023 Time:    10:32 Electronically signed by: Zackary Marshall Date:    09/26/2023 Time:    10:41    Echo    Result Date: 9/15/2023    Left Ventricle: The left ventricle is normal in size. Normal wall thickness. Normal wall motion. There is severely reduced systolic function with a visually estimated ejection fraction of  25%. There is normal diastolic function.   Right Ventricle: Normal right ventricular cavity size. Wall thickness is normal. Right ventricle wall motion  is normal. Systolic function is normal.   Aortic Valve: The aortic valve is a trileaflet valve. There is moderate aortic valve sclerosis. Severely restricted motion. There is severe stenosis. Aortic valve area by VTI is 0.50 cm². Aortic valve peak velocity is 2.76 m/s. Mean gradient is 17 mmHg. The dimensionless index is 0.22. There is moderate aortic regurgitation with an anteromedial eccentrically directed jet.   Mitral Valve: There is moderate bileaflet sclerosis. There is moderate mitral annular calcification present.   Tricuspid Valve: There is mild regurgitation.   IVC/SVC: Normal venous pressure at 3 mmHg.     CT Abdomen Pelvis  Without Contrast    Result Date: 9/14/2023  EXAMINATION: CT ABDOMEN PELVIS WITHOUT CONTRAST CLINICAL HISTORY: Bowel obstruction suspected; Unspecified intestinal obstruction, unspecified as to partial versus complete obstruction TECHNIQUE: Low dose axial images, sagittal and coronal reformations were obtained from the lung bases to the pubic symphysis.  30 mL of oral Omnipaque 350 was administered. COMPARISON: Abdomen x-ray of September 14, 2023. FINDINGS: The liver is small and mildly nodular in contour consistent with cirrhosis.  There is a large amount of ascites identified in both sides of the abdomen and in the pelvis.  This extends into the right scrotum down to the testicle.  The gallbladder is of normal size but contains radiodensities consistent  with gallstones.  The pancreas is of normal contour and CT density without edema or mass.  The spleen is of normal size and CT density. The adrenal glands are not enlarged.  The kidneys are of normal size contour and CT density for a noncontrast study.  Stone or hydronephrosis is not seen.  The abdominal aorta and inferior vena cava are of normal caliber. The stomach is of normal configuration.  There are multiple loops of dilated fluid-filled proximal small bowel loops with air-fluid levels.  The point of obstruction is the moderate size umbilical hernia which also contains ascites fluid.  The efferent loops of small bowel after the umbilical hernia are empty.  The colon is of normal configuration without dilation seen. The bladder is of normal contour.  The prostate is enlarged and significantly protrudes into the bladder base.  It measures 5.7 cm AP and 5 cm transversely with asymmetry.     Small-bowel obstruction secondary to incarceration at a moderate size umbilical hernia.  Large amount of ascites.  Cirrhosis.  Cholelithiasis.  Right inguinal hernia containing ascites fluid extending into the scrotum.  Prostate hypertrophy with asymmetry Electronically signed by: Evgeny Ferro MD Date:    09/14/2023 Time:    17:30    X-Ray Abdomen Flat And Erect    Result Date: 9/14/2023  EXAMINATION: XR ABDOMEN FLAT AND ERECT CLINICAL HISTORY: Abdominal Pain; TECHNIQUE: Flat and erect AP views of the abdomen were performed. COMPARISON: None FINDINGS: A dilated air-filled length of small-bowel is seen crossing the upper abdomen.  The rest of the small bowel and colon does not show distention or air-fluid levels.  No free air is seen.  No masses or calcifications are noted.     Probable early small-bowel obstruction. Electronically signed by: Evgeny Ferro MD Date:    09/14/2023 Time:    14:52        I personally reviewed and agree with the radiologist's findings  ASSESSMENT & PLAN:   Baldo Carney is a 73 y.o.  male admitted for    Recurrent Small-bowel obstruction secondary to incarceration at a moderate size umbilical hernia.  -NPO  -NG tube to low wall intermittent suction if vomiting recurs or persists  -was able to reduce hernia in ED with pain relief per ER physician  -general surgery consult  -broad-spectrum antibiotic with Zosyn  -pain management    congestive hepatopathy with stage III fibrosis.    -He has been getting regular large volume paracenteses  -last paracentesis was about 2 weeks the patient states he had 10 L removed    Aortic stenosis s/p AVR  -continue cardioprotective medications     Ischemic Cardiomypathy - last EF 25%  -caution with aggressive IV hydration  -continue cardioprotective medications once no longer NPO  -trend troponins  -daily weight  -strict intake and output measurement    Last Echo (09/15/2023 as below:  Summary    Left Ventricle: The left ventricle is normal in size. Normal wall thickness. Normal wall motion. There is severely reduced systolic function with a visually estimated ejection fraction of  25%. There is normal diastolic function.    Right Ventricle: Normal right ventricular cavity size. Wall thickness is normal. Right ventricle wall motion  is normal. Systolic function is normal.    Aortic Valve: The aortic valve is a trileaflet valve. There is moderate aortic valve sclerosis. Severely restricted motion. There is severe stenosis. Aortic valve area by VTI is 0.50 cm². Aortic valve peak velocity is 2.76 m/s. Mean gradient is 17 mmHg. The dimensionless index is 0.22. There is moderate aortic regurgitation with an anteromedial eccentrically directed jet.    Mitral Valve: There is moderate bileaflet sclerosis. There is moderate mitral annular calcification present.    Tricuspid Valve: There is mild regurgitation.    IVC/SVC: Normal venous pressure at 3 mmHg.       Recurrent Ascities requires [id number] large volume paracentesis  - last one was 2 weeks ago with about 10 liters  off    6. Type II Diabetes  -low dose NovoLog insulin sliding scale  -hypoglycemic protocol  -Accu-Cheks q.6 hours    7. Essential HTN  -p.r.n. hydralazine IV for systolic blood pressure 170/80    8. BPH  -continue Flomax once no longer NPO    DVT Prophylaxis: will be placed on SCDs for DVT prophylaxis and will be advised to be as mobile as possible and sit in a chair as tolerated.   ________________________________________________________________________________    Face-to-Face encounter date: 10/12/2023  Encounter included review of the medical records, interviewing and examining the patient face-to-face, discussion with family and other health care providers including emergency medicine physician, admission orders, interpreting lab/test results and formulating a plan of care.   Medical Decision Making during this encounter was  [_] Low Complexity  [_] Moderate Complexity  [x] High Complexity  _________________________________________________________________________________    INPATIENT LIST OF MEDICATIONS   No current facility-administered medications for this encounter.    Current Outpatient Medications:     albuterol (PROVENTIL/VENTOLIN HFA) 90 mcg/actuation inhaler, INHALE 1 TO 2 PUFFS INTO THE LUNGS EVERY 4 TO 6 HOURS AS NEEDED FOR WHEEZE AND SHORTNESS OF BREATH, Disp: 18 g, Rfl: 2    aspirin (ECOTRIN) 81 MG EC tablet, Take 81 mg by mouth once daily., Disp: , Rfl:     atorvastatin (LIPITOR) 40 MG tablet, Take 1 tablet (40 mg total) by mouth once daily., Disp: 90 tablet, Rfl: 0    budesonide-glycopyr-formoterol (BREZTRI AEROSPHERE) 160-9-4.8 mcg/actuation HFAA, Inhale into the lungs daily as needed., Disp: , Rfl:     clopidogreL (PLAVIX) 75 mg tablet, , Disp: , Rfl:     cyanocobalamin 1,000 mcg/mL injection, Inject 1 mL (1,000 mcg total) into the muscle every 14 (fourteen) days., Disp: 1 mL, Rfl: 11    FARXIGA 5 mg Tab tablet, Take 5 mg by mouth once daily., Disp: , Rfl:     finasteride (PROSCAR) 5 mg tablet,  Take 1 tablet (5 mg total) by mouth once daily., Disp: 90 tablet, Rfl: 4    fluticasone furoate-vilanteroL (BREO ELLIPTA) 100-25 mcg/dose diskus inhaler, Inhale 1 puff into the lungs once daily. (DAILY CONTROLLER), Disp: 3 each, Rfl: 3    fluticasone propionate (FLONASE) 50 mcg/actuation nasal spray, 1 SPRAY (50 MCG TOTAL) BY EACH NOSTRIL ROUTE ONCE DAILY., Disp: 16 mL, Rfl: 1    furosemide (LASIX) 40 MG tablet, Take by mouth once daily., Disp: , Rfl:     gabapentin (NEURONTIN) 300 MG capsule, Take 1 capsule (300 mg total) by mouth every evening., Disp: 90 capsule, Rfl: 1    hydrALAZINE (APRESOLINE) 50 MG tablet, Take 1 tablet (50 mg total) by mouth every 12 (twelve) hours., Disp: 60 tablet, Rfl: 0    ibuprofen (ADVIL,MOTRIN) 800 MG tablet, Take 800 mg by mouth every 6 (six) hours as needed., Disp: , Rfl:     isosorbide dinitrate (ISORDIL) 20 MG tablet, Take 1 tablet (20 mg total) by mouth 3 (three) times daily., Disp: 90 tablet, Rfl: 0    levothyroxine (SYNTHROID) 100 MCG tablet, Take 1 tablet (100 mcg total) by mouth before breakfast. With no other meds or food, Disp: 30 tablet, Rfl: 5    metoprolol succinate (TOPROL-XL) 25 MG 24 hr tablet, Take 1 tablet (25 mg total) by mouth once daily., Disp: 90 tablet, Rfl: 1    prasugreL (EFFIENT) 10 mg Tab, Take 1 tablet (10 mg total) by mouth once daily., Disp: 30 tablet, Rfl: 11    sacubitriL-valsartan (ENTRESTO) 24-26 mg per tablet, Take 1 tablet by mouth 2 (two) times daily., Disp: 180 tablet, Rfl: 1    tamsulosin (FLOMAX) 0.4 mg Cap, TAKE ONE CAPSULE BY MOUTH DAILY AT 5 PM, Disp: 90 capsule, Rfl: 11    traMADoL (ULTRAM) 50 mg tablet, Take 1 tablet (50 mg total) by mouth every 8 (eight) hours as needed for Pain., Disp: 21 tablet, Rfl: 0    ZONTIVITY 2.08 mg Tab, Take 1 tablet by mouth once daily., Disp: , Rfl:       Scheduled Meds:  Continuous Infusions:  PRN Meds:.      Belen Levine  [facility] Hospitalist NP  10/12/2023

## 2023-10-13 NOTE — PLAN OF CARE
Pt is clear for dc from CM         10/13/23 1312   Final Note   Assessment Type Final Discharge Note   Anticipated Discharge Disposition Home   Hospital Resources/Appts/Education Provided Appointments scheduled and added to AVS

## 2023-10-13 NOTE — HPI
73-year-old male presents to the emergency room with abdominal pain nausea vomiting and constipation for the past 3-4 days.  He has had previous bowel obstruction related to umbilical hernia with last episode in September.  He notes his last BM wast 2 days ago.  NOtes swelling and tenderness in Umbilical area for last few days.  He had ct scan demonstrating large volume ascited and hydrocele.  He had a  PSBO with transition noted in bowel containing hernia.  Following ct scan ER MD called me and I requested her to see if she could reduce.  She could reduce.  At time of my visit he is sitting in bed comfortably.  He denies pain or discomfort.  No longer having n/v.  Hernia is obviously reduced.       This patient has a known history of recurrent small-bowel obstructions with the inguinal/umbilical hernia.  He also has cardio hepato liver disease and has ascites requiring large volume paracentesis every 2 weeks, ischemic cardiomyopathy last EF 15%, aortic valve replacement, type 2 diabetes, prostate enlargement, hypertension hyperlipidemia, pulmonary hypertension

## 2023-10-13 NOTE — ASSESSMENT & PLAN NOTE
-Continue Entresto  -Metoprolol  -Lasix  -Patient may benefit from Aldactone  -May need AICD as well; follow up with Cardiology

## 2023-10-13 NOTE — CONSULTS
Former[facility], [facility] - Emergency Dept  General Surgery  Consult Note    Patient Name: Baldo Carney  MRN: [id number]  Code Status: Full Code  Admission Date: 10/12/2023  Hospital Length of Stay: 0 days  Attending Physician: Tal Pike MD  Primary Care Provider: Millie Hayes APRN,FNP-C    Patient information was obtained from patient and ER records.     Inpatient consult to General surgery  Consult performed by: Ahsan Munguia MD  Consult ordered by: Ama Durant MD        Subjective:     Principal Problem: Small bowel obstruction    History of Present Illness:      73-year-old male presents to the emergency room with abdominal pain nausea vomiting and constipation for the past 3-4 days.  He has had previous bowel obstruction related to umbilical hernia with last episode in September.  He notes his last BM wast 2 days ago.  NOtes swelling and tenderness in Umbilical area for last few days.  He had ct scan demonstrating large volume ascited and hydrocele.  He had a  PSBO with transition noted in bowel containing hernia.  Following ct scan ER MD called me and I requested her to see if she could reduce.  She could reduce.  At time of my visit he is sitting in bed comfortably.  He denies pain or discomfort.  No longer having n/v.  Hernia is obviously reduced.       This patient has a known history of recurrent small-bowel obstructions with the inguinal/umbilical hernia.  He also has cardio hepato liver disease and has ascites requiring large volume paracentesis every 2 weeks, ischemic cardiomyopathy last EF 15%, aortic valve replacement, type 2 diabetes, prostate enlargement, hypertension hyperlipidemia, pulmonary hypertension         No new subjective & objective note has been filed under this hospital service since the last note was generated.    Assessment/Plan:     * Small bowel obstruction  Patient with easily reducible hernia.  Now that hernia has been reduced his obstruction should  resolve uneventfully.  If continues to do well tomorrow around noon would start clears and advance as tolerated.  No surgical intervention planned at this time.  Discussed with patient he would be very high risk for any surgical intervention given his cardiac history.  He notes he strongly desires to have this hernia repaired.  Discussed with he would need cardiac clearance prior to any surgical intervention.  Surgery will sign off at present.  If there is any clinical deterioration or changes please consult p.r.n.      VTE Risk Mitigation (From admission, onward)         Ordered     IP VTE LOW RISK PATIENT  Once         10/13/23 0020     Place sequential compression device  Until discontinued         10/13/23 0020                Thank you for your consult. I will sign off. Please contact us if you have any additional questions.    Ahsan Munguia MD  General Surgery  [facility] - Emergency Dept

## 2023-10-13 NOTE — DISCHARGE SUMMARY
[facility] Medicine  Discharge Summary      Patient Name: Baldo Carney  MRN: [id number]  SHAHNAZ: [id number]  Patient Class: IP- Inpatient  Admission Date: 10/12/2023  Hospital Length of Stay: 0 days  Discharge Date and Time: No discharge date for patient encounter.  Attending Physician: Warner Samayoa MD   Discharging Provider: Warner Samayoa MD  Primary Care Provider: Millie Hayes APRN,FNP-C    Primary Care Team: Networked reference to record PCT     HPI:   No notes on file    * No surgery found *      Hospital Course:   Baldo Carney is a 73 year old male with a past medical history of umbilical hernia with history of SBO, CHF, AVR, DM, HTN, HLD, CKD, pHTN, cirrhosis, COPD, hypothyroidism, anemia, and BPH who presented with abdominal pain secondary to an umbilical hernia. There was concern for SBO. Surgery was consulted. The hernia was able to be reduced. The patient was challenged on a PO diet. He was discharged 10/13/2023 and will follow up with his PCP and Cardiology. He will need to be risk stratified if he wants to have the umbilical hernia surgically repaired.       Goals of Care Treatment Preferences:  Code Status: Full Code    Living Will: Yes              Consults:   Consults (From admission, onward)        Status Ordering Provider     Inpatient consult to General Surgery  Once        Provider:  Ahsan Munguia MD    Completed MICHI SWENSON     Inpatient consult to General surgery  Once        Provider:  (Not yet assigned)    Completed PHYLLIS DOCKERY          Pulmonary  COPD (chronic obstructive pulmonary disease)  -Continue home inhalers      Cardiac/Vascular  Ischemic cardiomyopathy  -Continue Entresto  -Metoprolol  -Lasix  -Patient may benefit from Aldactone  -May need AICD as well; follow up with Cardiology    CAD (coronary artery disease)  -Continue ASA, Plavix and statin      Aortic stenosis status post AVR  Stable.      Hyperlipidemia  -Continue  statin      Hypertension  -Continue home hydralazine and nitrate  -Continue Entresto  -Continue metoprolol      Renal/  CRF (chronic renal failure)  Stable.  -Avoid nephrotoxic medications  -Renally dose medications      BPH (benign prostatic hyperplasia)  -Continue Flomax and finasteride      Oncology  Anemia  Stable.  -Trend Hgb with CBC      Endocrine  Hypothyroidism  -Continue Synthroid      Diabetes mellitus type 2 without retinopathy  -Continue home medication regimen    GI  Umbilical hernia  -Will need cardiac risk stratification before any surgical repair      Cirrhosis  Chronic. Decompensated.  -Continue Lasix      Ascites of liver  Patient has scheduled paracenteses outpatient.        Final Active Diagnoses:    Diagnosis Date Noted POA    Ischemic cardiomyopathy [I25.5] 10/13/2023 Yes    Umbilical hernia [K42.9] 10/13/2023 Yes    CRF (chronic renal failure) [N18.9] 09/28/2023 Yes    Cirrhosis [K74.60] 09/14/2023 Yes    Hypothyroidism [E03.9] 08/31/2023 Yes    Ascites of liver [R18.8] 06/07/2022 Yes    BPH (benign prostatic hyperplasia) [N40.0] 08/29/2020 Yes     Chronic    Anemia [D64.9] 08/29/2020 Yes     Chronic    COPD (chronic obstructive pulmonary disease) [J44.9] 08/29/2020 Yes     Chronic    CAD (coronary artery disease) [I25.10] 11/03/2014 Yes    Aortic stenosis status post AVR [I35.0] 10/21/2014 Yes     Chronic    Diabetes mellitus type 2 without retinopathy [E11.9] 09/19/2014 Yes    Hyperlipidemia [E78.5] 09/19/2014 Yes    Hypertension [I10] 09/19/2014 Yes      Problems Resolved During this Admission:    Diagnosis Date Noted Date Resolved POA    PRINCIPAL PROBLEM:  Small bowel obstruction [K56.609] 09/14/2023 10/13/2023 Yes       Discharged Condition: stable    Disposition: Home or Self Care    Follow Up:   Follow-up Information     Millie Hayes, APRN,FNP-C Follow up on 11/7/2023.    Specialty: Family Medicine  Contact information:  [place]  [address]  [phone]             Akira Quijano MD Follow up on 10/19/2023.    Specialty: Cardiology  Contact information:  [place] [address]  [phone]                       Patient Instructions:      Diet Cardiac     Diet renal     Diet diabetic     Notify your health care provider if you experience any of the following:  increased confusion or weakness     Notify your health care provider if you experience any of the following:  persistent dizziness, light-headedness, or visual disturbances     Notify your health care provider if you experience any of the following:  severe persistent headache     Notify your health care provider if you experience any of the following:  difficulty breathing or increased cough     Notify your health care provider if you experience any of the following:  severe uncontrolled pain     Notify your health care provider if you experience any of the following:  persistent nausea and vomiting or diarrhea     Notify your health care provider if you experience any of the following:  temperature >100.4     Activity as tolerated       Significant Diagnostic Studies: Labs: All labs within the past 24 hours have been reviewed    Pending Diagnostic Studies:     None         Medications:  Reconciled Home Medications:      Medication List      CONTINUE taking these medications    albuterol 90 mcg/actuation inhaler  Commonly known as: PROVENTIL/VENTOLIN HFA  INHALE 1 TO 2 PUFFS INTO THE LUNGS EVERY 4 TO 6 HOURS AS NEEDED FOR WHEEZE AND SHORTNESS OF BREATH     aspirin 81 MG EC tablet  Commonly known as: ECOTRIN  Take 81 mg by mouth once daily.     atorvastatin 40 MG tablet  Commonly known as: LIPITOR  Take 1 tablet (40 mg total) by mouth once daily.     BREZTRI AEROSPHERE 160-9-4.8 mcg/actuation Hfaa  Generic drug: budesonide-glycopyr-formoterol  Inhale into the lungs daily as needed.     clopidogreL 75 mg tablet  Commonly known as: PLAVIX     cyanocobalamin 1,000 mcg/mL  injection  Inject 1 mL (1,000 mcg total) into the muscle every 14 (fourteen) days.     ENTRESTO 24-26 mg per tablet  Generic drug: sacubitriL-valsartan  Take 1 tablet by mouth 2 (two) times daily.     FARXIGA 5 mg Tab tablet  Generic drug: dapagliflozin propanediol  Take 5 mg by mouth once daily.     finasteride 5 mg tablet  Commonly known as: PROSCAR  Take 1 tablet (5 mg total) by mouth once daily.     fluticasone furoate-vilanteroL 100-25 mcg/dose diskus inhaler  Commonly known as: BREO ELLIPTA  Inhale 1 puff into the lungs once daily. (DAILY CONTROLLER)     fluticasone propionate 50 mcg/actuation nasal spray  Commonly known as: FLONASE  1 SPRAY (50 MCG TOTAL) BY EACH NOSTRIL ROUTE ONCE DAILY.     furosemide 40 MG tablet  Commonly known as: LASIX  Take by mouth once daily.     gabapentin 300 MG capsule  Commonly known as: NEURONTIN  Take 1 capsule (300 mg total) by mouth every evening.     hydrALAZINE 50 MG tablet  Commonly known as: APRESOLINE  Take 1 tablet (50 mg total) by mouth every 12 (twelve) hours.     isosorbide dinitrate 20 MG tablet  Commonly known as: ISORDIL  Take 1 tablet (20 mg total) by mouth 3 (three) times daily.     levothyroxine 100 MCG tablet  Commonly known as: SYNTHROID  Take 1 tablet (100 mcg total) by mouth before breakfast. With no other meds or food     metoprolol succinate 25 MG 24 hr tablet  Commonly known as: TOPROL-XL  Take 1 tablet (25 mg total) by mouth once daily.     prasugreL 10 mg Tab  Commonly known as: EFFIENT  Take 1 tablet (10 mg total) by mouth once daily.     tamsulosin 0.4 mg Cap  Commonly known as: FLOMAX  TAKE ONE CAPSULE BY MOUTH DAILY AT 5 PM     traMADoL 50 mg tablet  Commonly known as: ULTRAM  Take 1 tablet (50 mg total) by mouth every 8 (eight) hours as needed for Pain.     ZONTIVITY 2.08 mg Tab  Generic drug: vorapaxar  Take 1 tablet by mouth once daily.        STOP taking these medications    ibuprofen 800 MG tablet  Commonly known as: ADVIL,MOTRIN             Indwelling Lines/Drains at time of discharge:   Lines/Drains/Airways     None                 Time spent on the discharge of patient: 31 minutes         Warner Samayoa MD  Department of Hospital Medicine  [facility]

## 2023-10-13 NOTE — NURSING
Nurses Note -- 4 Eyes      10/13/2023   6:21 AM      Skin assessed during: Admit      [x] No Altered Skin Integrity Present    [x]Prevention Measures Documented      [] Yes- Altered Skin Integrity Present or Discovered   [] LDA Added if Not in Epic (Describe Wound)   [] New Altered Skin Integrity was Present on Admit and Documented in LDA   [] Wound Image Taken    Wound Care Consulted? No    Attending Nurse:  Breana Sy RN/Staff Member:   J CARLOS Muniz

## 2023-10-13 NOTE — PLAN OF CARE
Critical access hospital  Initial Discharge Assessment       Primary Care Provider: Millie Hayes APRN,FNP-C    Admission Diagnosis: Small bowel obstruction [K56.609]    Admission Date: 10/12/2023  Expected Discharge Date: 10/14/2023     completed discharge assessment with Pt. Pt AAOx4s. Pt lives alone but reports brother lives nearby and has the support of nephews. Demographics, PCP, and insurance verified. Pt's POA is Rigo Griffiths (nephew) No home health. No dialysis. Pt completes ADLs without assistance. Pt to discharge home via family transport. Pt has no other needs to be addressed at this time.    Transition of Care Barriers: None    Payor: AET[employer] MANAGED MEDICARE / Plan: AETNA MEDICARE PLAN PPO / Product Type: Medicare Advantage /     Extended Emergency Contact Information  Primary Emergency Contact: MELLY GRIFFITHS  Mobile Phone: [phone]  Relation: Relative  Preferred language: English   needed? No    Discharge Plan A: Home  Discharge Plan B: Home      [id number] - [place] - [address]  [address] [address]  Phone: [phone] Fax: [phone]    [facility] - [place] - [facility] [address] [facility]  [place] [address]  Phone: [phone] Fax: [phone]    [facility] - [place], [place] - [address] [place] [address]  [address] [place] [address] [place] [address]  Phone: [phone] Fax: [phone]      Initial Assessment (most recent)       Adult Discharge Assessment - 10/13/23 1114          Discharge Assessment    Assessment Type Discharge Planning Assessment     Confirmed/corrected address, phone number and insurance Yes     Confirmed Demographics Correct on Facesheet     Source of Information patient     Does patient/caregiver understand observation status Yes     Communicated PAM with patient/caregiver Date not available/Unable to determine      Reason For Admission Small Bowel obstruction     People in Home alone     Facility Arrived From: Home     Do you expect to return to your current living situation? Yes     Do you have help at home or someone to help you manage your care at home? Yes   Family lives nearby    Who are your caregiver(s) and their phone number(s)? Tiburcio Garcia (Spouse)   [phone]     Prior to hospitilization cognitive status: Alert/Oriented     Current cognitive status: Alert/Oriented     Home Accessibility wheelchair accessible     Home Layout Able to live on 1st floor     Equipment Currently Used at Home glucometer     Readmission within 30 days? Yes     Patient currently being followed by outpatient case management? No     Do you currently have service(s) that help you manage your care at home? No     Do you take prescription medications? Yes     Do you have prescription coverage? Yes     Coverage Payor:  AETNA MANAGED MEDICARE - AETNA MEDICARE PLAN PPO     Do you have any problems affording any of your prescribed medications? No     Is the patient taking medications as prescribed? yes     Who is going to help you get home at discharge? MELLY GRIFFITHS (Relative)   [phone]     How do you get to doctors appointments? car, drives self     Are you on dialysis? No     Do you take coumadin? No     DME Needed Upon Discharge  none     Discharge Plan discussed with: Patient     Transition of Care Barriers None     Discharge Plan A Home     Discharge Plan B Home

## 2023-10-13 NOTE — SUBJECTIVE & OBJECTIVE
No current facility-administered medications on file prior to encounter.     Current Outpatient Medications on File Prior to Encounter   Medication Sig    albuterol (PROVENTIL/VENTOLIN HFA) 90 mcg/actuation inhaler INHALE 1 TO 2 PUFFS INTO THE LUNGS EVERY 4 TO 6 HOURS AS NEEDED FOR WHEEZE AND SHORTNESS OF BREATH    aspirin (ECOTRIN) 81 MG EC tablet Take 81 mg by mouth once daily.    atorvastatin (LIPITOR) 40 MG tablet Take 1 tablet (40 mg total) by mouth once daily.    budesonide-glycopyr-formoterol (BREZTRI AEROSPHERE) 160-9-4.8 mcg/actuation HFAA Inhale into the lungs daily as needed.    clopidogreL (PLAVIX) 75 mg tablet     cyanocobalamin 1,000 mcg/mL injection Inject 1 mL (1,000 mcg total) into the muscle every 14 (fourteen) days.    FARXIGA 5 mg Tab tablet Take 5 mg by mouth once daily.    finasteride (PROSCAR) 5 mg tablet Take 1 tablet (5 mg total) by mouth once daily.    fluticasone furoate-vilanteroL (BREO ELLIPTA) 100-25 mcg/dose diskus inhaler Inhale 1 puff into the lungs once daily. (DAILY CONTROLLER)    fluticasone propionate (FLONASE) 50 mcg/actuation nasal spray 1 SPRAY (50 MCG TOTAL) BY EACH NOSTRIL ROUTE ONCE DAILY.    furosemide (LASIX) 40 MG tablet Take by mouth once daily.    gabapentin (NEURONTIN) 300 MG capsule Take 1 capsule (300 mg total) by mouth every evening.    hydrALAZINE (APRESOLINE) 50 MG tablet Take 1 tablet (50 mg total) by mouth every 12 (twelve) hours.    ibuprofen (ADVIL,MOTRIN) 800 MG tablet Take 800 mg by mouth every 6 (six) hours as needed.    isosorbide dinitrate (ISORDIL) 20 MG tablet Take 1 tablet (20 mg total) by mouth 3 (three) times daily.    levothyroxine (SYNTHROID) 100 MCG tablet Take 1 tablet (100 mcg total) by mouth before breakfast. With no other meds or food    metoprolol succinate (TOPROL-XL) 25 MG 24 hr tablet Take 1 tablet (25 mg total) by mouth once daily.    prasugreL (EFFIENT) 10 mg Tab Take 1 tablet (10 mg total) by mouth once daily.    sacubitriL-valsartan  (ENTRESTO) 24-26 mg per tablet Take 1 tablet by mouth 2 (two) times daily.    tamsulosin (FLOMAX) 0.4 mg Cap TAKE ONE CAPSULE BY MOUTH DAILY AT 5 PM    traMADoL (ULTRAM) 50 mg tablet Take 1 tablet (50 mg total) by mouth every 8 (eight) hours as needed for Pain.    ZONTIVITY 2.08 mg Tab Take 1 tablet by mouth once daily.       Review of patient's allergies indicates:   Allergen Reactions    Invokana  [canagliflozin]      Other reaction(s): been very dizzy       Past Medical History:   Diagnosis Date    Asthma     Cardiomyopathy 10/21/2014    CHF (congestive heart failure)     Coronary artery disease     CRF (chronic renal failure)     Diabetes mellitus     Enlarged prostate     Hyperlipidemia     Hypertension     Neuropathy      Past Surgical History:   Procedure Laterality Date    ANGIOGRAPHY OF INTERNAL MAMMARY VESSEL N/A 3/11/2022    Procedure: Angiogram Internal Mammary;  Surgeon: Hank Lujan MD;  Location: [facility] CATH/EP LAB;  Service: Cardiology;  Laterality: N/A;    CARDIAC CATHETERIZATION      CARDIAC VALVE SURGERY      CATHETERIZATION OF BOTH LEFT AND RIGHT HEART Left 3/11/2022    Procedure: CATHETERIZATION, HEART, BOTH LEFT AND RIGHT;  Surgeon: Hank Lujan MD;  Location: [facility] CATH/EP LAB;  Service: Cardiology;  Laterality: Left;    CORONARY ANGIOGRAPHY N/A 3/11/2022    Procedure: ANGIOGRAM, CORONARY ARTERY;  Surgeon: Hank Lujan MD;  Location: [facility] CATH/EP LAB;  Service: Cardiology;  Laterality: N/A;    CORONARY BYPASS GRAFT ANGIOGRAPHY  3/11/2022    Procedure: Bypass graft study;  Surgeon: Hank Lujan MD;  Location: [facility] CATH/EP LAB;  Service: Cardiology;;    CYSTOSCOPY N/A 7/30/2019    Procedure: CYSTOSCOPY;  Surgeon: Spencer Nguyen MD;  [facility];  Service: Urology;  Laterality: N/A;    INSERTION OF INTRAVASCULAR MICROAXIAL BLOOD PUMP N/A 8/31/2022    Procedure: INSERTION, IMPELLA;  Surgeon: Zach Jensen MD;  Location: [facility] CATH LAB;  Service: Cardiology;   Laterality: N/A;    PLACEMENT OF SWAN SEE CATHETER WITH IMAGING GUIDANCE  2022    Procedure: INSERTION, CATHETER, SWAN-SEE, WITH IMAGING GUIDANCE;  Surgeon: Zach Jensen MD;  Location: [facility] CATH LAB;  Service: Cardiology;;    SHOULDER ARTHROSCOPY  1985    TRANSRECTAL BIOPSY OF PROSTATE WITH ULTRASOUND GUIDANCE N/A 2019    Procedure: BIOPSY, PROSTATE, RECTAL APPROACH, WITH US GUIDANCE;  Surgeon: Spencer Nguyen MD;  Location: [facility] OR;  Service: Urology;  Laterality: N/A;  procedure not performed, pt unable to tolerate    TRANSRECTAL BIOPSY OF PROSTATE WITH ULTRASOUND GUIDANCE N/A 2019    Procedure: BIOPSY, PROSTATE, RECTAL APPROACH, WITH US GUIDANCE;  Surgeon: Spencer Nguyen MD;  Location: [facility] OR;  Service: Urology;  Laterality: N/A;     Family History       Problem Relation (Age of Onset)    Diabetes Father, Sister, Brother    Heart attack Father    Heart disease Father, Brother    Hypertension Father    No Known Problems Mother, Maternal Grandmother, Maternal Grandfather, Paternal Grandmother, Paternal Grandfather, Maternal Aunt, Maternal Uncle, Paternal Aunt, Paternal Uncle          Tobacco Use    Smoking status: Former     Current packs/day: 0.00     Types: Cigarettes     Quit date: 1970     Years since quittin.3    Smokeless tobacco: Never   Substance and Sexual Activity    Alcohol use: Not Currently     Alcohol/week: 3.0 standard drinks of alcohol     Types: 3 Cans of beer per week     Comment: social    Drug use: No    Sexual activity: Not Currently     Partners: Female     Review of Systems   Constitutional:  Negative for activity change.   Gastrointestinal:  Positive for abdominal pain and nausea.   Skin:  Negative for color change and pallor.     Objective:     Vital Signs (Most Recent):  Temp: 97.6 °F (36.4 °C) (10/12/23 1516)  Pulse: 73 (10/13/23 0141)  Resp: 18 (10/12/23 2130)  BP: (!) 174/81 (10/13/23 013)  SpO2: 98 % (10/13/23 0141) Vital Signs (24h  Range):  Temp:  [97.6 °F (36.4 °C)] 97.6 °F (36.4 °C)  Pulse:  [67-78] 73  Resp:  [16-18] 18  SpO2:  [95 %-99 %] 98 %  BP: (155-182)/(73-88) 174/81     Weight: 78.9 kg (174 lb)  Body mass index is 23.6 kg/m².     Physical Exam  Vitals reviewed.   Cardiovascular:      Rate and Rhythm: Normal rate.      Pulses: Normal pulses.      Heart sounds: Murmur heard.   Abdominal:      General: There is no distension.      Tenderness: There is no abdominal tenderness.      Hernia: A hernia is present.      Comments: Reducible umbilical hernia    Large R sided hydrocele   Neurological:      Mental Status: He is alert.   Psychiatric:         Mood and Affect: Mood normal.            I have reviewed all pertinent lab results within the past 24 hours.  CBC:   Recent Labs   Lab 10/12/23  1800   WBC 6.41   RBC 3.50*   HGB 10.1*   HCT 30.0*      MCV 86   MCH 28.9   MCHC 33.7     BMP:   Recent Labs   Lab 10/12/23  1800         K 3.9      CO2 18*   BUN 23   CREATININE 1.6*   CALCIUM 8.8       Significant Diagnostics:  Ct scan reviewed.

## 2023-10-13 NOTE — HOSPITAL COURSE
Baldo Carney is a 73 year old male with a past medical history of umbilical hernia with history of SBO, CHF, AVR, DM, HTN, HLD, CKD, pHTN, cirrhosis, COPD, hypothyroidism, anemia, and BPH who presented with abdominal pain secondary to an umbilical hernia. There was concern for SBO. Surgery was consulted. The hernia was able to be reduced. The patient was challenged on a PO diet. He was discharged 10/13/2023 and will follow up with his PCP and Cardiology. He will need to be risk stratified if he wants to have the umbilical hernia surgically repaired.

## 2023-10-13 NOTE — CONSULTS
[facility] - Emergency Dept  General Surgery  Consult Note    Patient Name: Baldo Carney  MRN: [id number]  Code Status: Full Code  Admission Date: 10/12/2023  Hospital Length of Stay: 0 days  Attending Physician: Tal Pike MD  Primary Care Provider: Millie Hayes APRN,FNP-C    Patient information was obtained from patient and ER records.     Inpatient consult to General Surgery  Consult performed by: Ahsan Munguia MD  Consult ordered by: Belen Levine NP        Subjective:     Principal Problem: Small bowel obstruction    History of Present Illness:      73-year-old male presents to the emergency room with abdominal pain nausea vomiting and constipation for the past 3-4 days.  He has had previous bowel obstruction related to umbilical hernia with last episode in September.  He notes his last BM wast 2 days ago.  NOtes swelling and tenderness in Umbilical area for last few days.  He had ct scan demonstrating large volume ascited and hydrocele.  He had a  PSBO with transition noted in bowel containing hernia.  Following ct scan ER MD called me and I requested her to see if she could reduce.  She could reduce.  At time of my visit he is sitting in bed comfortably.  He denies pain or discomfort.  No longer having n/v.  Hernia is obviously reduced.       This patient has a known history of recurrent small-bowel obstructions with the inguinal/umbilical hernia.  He also has cardio hepato liver disease and has ascites requiring large volume paracentesis every 2 weeks, ischemic cardiomyopathy last EF 15%, aortic valve replacement, type 2 diabetes, prostate enlargement, hypertension hyperlipidemia, pulmonary hypertension         No current facility-administered medications on file prior to encounter.     Current Outpatient Medications on File Prior to Encounter   Medication Sig    albuterol (PROVENTIL/VENTOLIN HFA) 90 mcg/actuation inhaler INHALE 1 TO 2 PUFFS INTO THE LUNGS EVERY 4 TO  6 HOURS AS NEEDED FOR WHEEZE AND SHORTNESS OF BREATH    aspirin (ECOTRIN) 81 MG EC tablet Take 81 mg by mouth once daily.    atorvastatin (LIPITOR) 40 MG tablet Take 1 tablet (40 mg total) by mouth once daily.    budesonide-glycopyr-formoterol (BREZTRI AEROSPHERE) 160-9-4.8 mcg/actuation HFAA Inhale into the lungs daily as needed.    clopidogreL (PLAVIX) 75 mg tablet     cyanocobalamin 1,000 mcg/mL injection Inject 1 mL (1,000 mcg total) into the muscle every 14 (fourteen) days.    FARXIGA 5 mg Tab tablet Take 5 mg by mouth once daily.    finasteride (PROSCAR) 5 mg tablet Take 1 tablet (5 mg total) by mouth once daily.    fluticasone furoate-vilanteroL (BREO ELLIPTA) 100-25 mcg/dose diskus inhaler Inhale 1 puff into the lungs once daily. (DAILY CONTROLLER)    fluticasone propionate (FLONASE) 50 mcg/actuation nasal spray 1 SPRAY (50 MCG TOTAL) BY EACH NOSTRIL ROUTE ONCE DAILY.    furosemide (LASIX) 40 MG tablet Take by mouth once daily.    gabapentin (NEURONTIN) 300 MG capsule Take 1 capsule (300 mg total) by mouth every evening.    hydrALAZINE (APRESOLINE) 50 MG tablet Take 1 tablet (50 mg total) by mouth every 12 (twelve) hours.    ibuprofen (ADVIL,MOTRIN) 800 MG tablet Take 800 mg by mouth every 6 (six) hours as needed.    isosorbide dinitrate (ISORDIL) 20 MG tablet Take 1 tablet (20 mg total) by mouth 3 (three) times daily.    levothyroxine (SYNTHROID) 100 MCG tablet Take 1 tablet (100 mcg total) by mouth before breakfast. With no other meds or food    metoprolol succinate (TOPROL-XL) 25 MG 24 hr tablet Take 1 tablet (25 mg total) by mouth once daily.    prasugreL (EFFIENT) 10 mg Tab Take 1 tablet (10 mg total) by mouth once daily.    sacubitriL-valsartan (ENTRESTO) 24-26 mg per tablet Take 1 tablet by mouth 2 (two) times daily.    tamsulosin (FLOMAX) 0.4 mg Cap TAKE ONE CAPSULE BY MOUTH DAILY AT 5 PM    traMADoL (ULTRAM) 50 mg tablet Take 1 tablet (50 mg total) by mouth every 8 (eight) hours  as needed for Pain.    ZONTIVITY 2.08 mg Tab Take 1 tablet by mouth once daily.       Review of patient's allergies indicates:   Allergen Reactions    Invokana  [canagliflozin]      Other reaction(s): been very dizzy       Past Medical History:   Diagnosis Date    Asthma     Cardiomyopathy 10/21/2014    CHF (congestive heart failure)     Coronary artery disease     CRF (chronic renal failure)     Diabetes mellitus     Enlarged prostate     Hyperlipidemia     Hypertension     Neuropathy      Past Surgical History:   Procedure Laterality Date    ANGIOGRAPHY OF INTERNAL MAMMARY VESSEL N/A 3/11/2022    Procedure: Angiogram Internal Mammary;  Surgeon: Hank Lujan MD;  Location: [facility] CATH/EP LAB;  Service: Cardiology;  Laterality: N/A;    CARDIAC CATHETERIZATION      CARDIAC VALVE SURGERY      CATHETERIZATION OF BOTH LEFT AND RIGHT HEART Left 3/11/2022    Procedure: CATHETERIZATION, HEART, BOTH LEFT AND RIGHT;  Surgeon: Hank Lujan MD;  Location: [facility] CATH/EP LAB;  Service: Cardiology;  Laterality: Left;    CORONARY ANGIOGRAPHY N/A 3/11/2022    Procedure: ANGIOGRAM, CORONARY ARTERY;  Surgeon: Hank Lujan MD;  Location: [facility] CATH/EP LAB;  Service: Cardiology;  Laterality: N/A;    CORONARY BYPASS GRAFT ANGIOGRAPHY  3/11/2022    Procedure: Bypass graft study;  Surgeon: Hank Lujan MD;  Location: [facility] CATH/EP LAB;  Service: Cardiology;;    CYSTOSCOPY N/A 7/30/2019    Procedure: CYSTOSCOPY;  Surgeon: Spencer Nguyen MD;  Location: [facility] OR;  Service: Urology;  Laterality: N/A;    INSERTION OF INTRAVASCULAR MICROAXIAL BLOOD PUMP N/A 8/31/2022    Procedure: INSERTION, IMPELLA;  Surgeon: Zach Jensen MD;  Location: [facility] CATH LAB;  Service: Cardiology;  Laterality: N/A;    PLACEMENT OF SWAN SEE CATHETER WITH IMAGING GUIDANCE  8/31/2022    Procedure: INSERTION, CATHETER, SWAN-SEE, WITH IMAGING GUIDANCE;  Surgeon: Zach Jensen MD;  Location: [facility] CATH LAB;  Service:  Cardiology;;    SHOULDER ARTHROSCOPY  1985    TRANSRECTAL BIOPSY OF PROSTATE WITH ULTRASOUND GUIDANCE N/A 2019    Procedure: BIOPSY, PROSTATE, RECTAL APPROACH, WITH US GUIDANCE;  Surgeon: Spencer Nguyen MD;  Location: [facility] OR;  Service: Urology;  Laterality: N/A;  procedure not performed, pt unable to tolerate    TRANSRECTAL BIOPSY OF PROSTATE WITH ULTRASOUND GUIDANCE N/A 2019    Procedure: BIOPSY, PROSTATE, RECTAL APPROACH, WITH US GUIDANCE;  Surgeon: Spencer Nguyen MD;  Location: [facility] OR;  Service: Urology;  Laterality: N/A;     Family History       Problem Relation (Age of Onset)    Diabetes Father, Sister, Brother    Heart attack Father    Heart disease Father, Brother    Hypertension Father    No Known Problems Mother, Maternal Grandmother, Maternal Grandfather, Paternal Grandmother, Paternal Grandfather, Maternal Aunt, Maternal Uncle, Paternal Aunt, Paternal Uncle          Tobacco Use    Smoking status: Former     Current packs/day: 0.00     Types: Cigarettes     Quit date: 1970     Years since quittin.3    Smokeless tobacco: Never   Substance and Sexual Activity    Alcohol use: Not Currently     Alcohol/week: 3.0 standard drinks of alcohol     Types: 3 Cans of beer per week     Comment: social    Drug use: No    Sexual activity: Not Currently     Partners: Female     Review of Systems   Constitutional:  Negative for activity change.   Gastrointestinal:  Positive for abdominal pain and nausea.   Skin:  Negative for color change and pallor.     Objective:     Vital Signs (Most Recent):  Temp: 97.6 °F (36.4 °C) (10/12/23 1516)  Pulse: 73 (10/13/23 0141)  Resp: 18 (10/12/23 2130)  BP: (!) 174/81 (10/13/23 0131)  SpO2: 98 % (10/13/23 014) Vital Signs (24h Range):  Temp:  [97.6 °F (36.4 °C)] 97.6 °F (36.4 °C)  Pulse:  [67-78] 73  Resp:  [16-18] 18  SpO2:  [95 %-99 %] 98 %  BP: (155-182)/(73-88) 174/81     Weight: 78.9 kg (174 lb)  Body mass index is 23.6 kg/m².     Physical  Exam  Vitals reviewed.   Cardiovascular:      Rate and Rhythm: Normal rate.      Pulses: Normal pulses.      Heart sounds: Murmur heard.   Abdominal:      General: There is no distension.      Tenderness: There is no abdominal tenderness.      Hernia: A hernia is present.      Comments: Reducible umbilical hernia    Large R sided hydrocele   Neurological:      Mental Status: He is alert.   Psychiatric:         Mood and Affect: Mood normal.            I have reviewed all pertinent lab results within the past 24 hours.  CBC:   Recent Labs   Lab 10/12/23  1800   WBC 6.41   RBC 3.50*   HGB 10.1*   HCT 30.0*      MCV 86   MCH 28.9   MCHC 33.7     BMP:   Recent Labs   Lab 10/12/23  1800         K 3.9      CO2 18*   BUN 23   CREATININE 1.6*   CALCIUM 8.8       Significant Diagnostics:  Ct scan reviewed.          Assessment/Plan:     * Small bowel obstruction  Patient with easily reducible hernia.  Now that hernia has been reduced his obstruction should resolve uneventfully.  If continues to do well tomorrow around noon would start clears and advance as tolerated.  No surgical intervention planned at this time.  Discussed with patient he would be very high risk for any surgical intervention given his cardiac history.  He notes he strongly desires to have this hernia repaired.  Discussed with he would need cardiac clearance prior to any surgical intervention.  Surgery will sign off at present.  If there is any clinical deterioration or changes please consult p.r.n.      VTE Risk Mitigation (From admission, onward)         Ordered     IP VTE LOW RISK PATIENT  Once         10/13/23 0020     Place sequential compression device  Until discontinued         10/13/23 0020                Thank you for your consult. I will sign off. Please contact us if you have any additional questions.    Ahsan Munguia MD  General Surgery  [facility] - Emergency Dept

## 2023-10-13 NOTE — NURSING
Pt being discharged to home. IV removed, pt placed call to nephew to pick him up.  Discharge orders reviewed with patient, verbalized understanding. Diet and medications reviewed with patient, verbalized understanding. All belongings placed in pt belonging bag and given to patient.

## 2023-10-18 ENCOUNTER — PATIENT OUTREACH (OUTPATIENT)
Dept: ADMINISTRATIVE | Facility: CLINIC | Age: 73
End: 2023-10-18
Payer: MEDICARE

## 2023-10-18 NOTE — PROGRESS NOTES
C3 nurse spoke with Baldo Carney for a TCC post hospital discharge follow up call. The patient has a scheduled HOSFU appointment with PCP 11/7/23 @ 2:00pm, and will be having a Nuclear Stress Test done tomorrow at 11:30am. He will ask them about BP machine, but asked to have PCP send an order for one if possible.

## 2023-10-24 ENCOUNTER — HOSPITAL ENCOUNTER (OUTPATIENT)
Dept: INTERVENTIONAL RADIOLOGY/VASCULAR | Facility: HOSPITAL | Age: 73
Discharge: HOME | End: 2023-10-24
Attending: INTERNAL MEDICINE
Payer: MEDICARE

## 2023-10-24 VITALS
DIASTOLIC BLOOD PRESSURE: 87 MMHG | OXYGEN SATURATION: 99 % | HEART RATE: 66 BPM | SYSTOLIC BLOOD PRESSURE: 154 MMHG | RESPIRATION RATE: 18 BRPM

## 2023-10-24 DIAGNOSIS — R18.8 OTHER ASCITES: Primary | ICD-10-CM

## 2023-10-24 DIAGNOSIS — R18.8 OTHER ASCITES: ICD-10-CM

## 2023-10-24 LAB
APPEARANCE FLD: CLEAR
BODY FLD TYPE: NORMAL
COLOR FLD: YELLOW
LYMPHOCYTES NFR FLD MANUAL: 20 %
MESOTHL CELL NFR FLD MANUAL: 5 %
MONOS+MACROS NFR FLD MANUAL: 57 %
NEUTROPHILS NFR FLD MANUAL: 18 %
WBC # FLD: 131 /CU MM

## 2023-10-24 PROCEDURE — 49083 ABD PARACENTESIS W/IMAGING: CPT | Mod: ,,, | Performed by: RADIOLOGY

## 2023-10-24 PROCEDURE — 49083 IR PARACENTESIS WITH IMAGING: ICD-10-PCS | Mod: ,,, | Performed by: RADIOLOGY

## 2023-10-24 PROCEDURE — 89051 BODY FLUID CELL COUNT: CPT | Performed by: INTERNAL MEDICINE

## 2023-10-24 PROCEDURE — P9047 ALBUMIN (HUMAN), 25%, 50ML: HCPCS | Mod: JZ,JG | Performed by: INTERNAL MEDICINE

## 2023-10-24 PROCEDURE — 49083 ABD PARACENTESIS W/IMAGING: CPT | Performed by: RADIOLOGY

## 2023-10-24 PROCEDURE — C1729 CATH, DRAINAGE: HCPCS

## 2023-10-24 PROCEDURE — 63600175 PHARM REV CODE 636 W HCPCS: Mod: JZ,JG | Performed by: INTERNAL MEDICINE

## 2023-10-24 RX ORDER — ALBUMIN HUMAN 250 G/1000ML
50 SOLUTION INTRAVENOUS ONCE
Status: COMPLETED | OUTPATIENT
Start: 2023-10-24 | End: 2023-10-24

## 2023-10-24 RX ADMIN — ALBUMIN (HUMAN) 50 G: 12.5 SOLUTION INTRAVENOUS at 10:10

## 2023-11-07 ENCOUNTER — HOSPITAL ENCOUNTER (OUTPATIENT)
Dept: INTERVENTIONAL RADIOLOGY/VASCULAR | Facility: HOSPITAL | Age: 73
Discharge: HOME | End: 2023-11-07
Attending: INTERNAL MEDICINE
Payer: MEDICARE

## 2023-11-07 VITALS
OXYGEN SATURATION: 100 % | SYSTOLIC BLOOD PRESSURE: 149 MMHG | HEART RATE: 71 BPM | RESPIRATION RATE: 18 BRPM | DIASTOLIC BLOOD PRESSURE: 74 MMHG

## 2023-11-07 DIAGNOSIS — R18.8 OTHER ASCITES: ICD-10-CM

## 2023-11-07 LAB
APPEARANCE FLD: CLEAR
BASOPHILS # BLD AUTO: 0.07 K/UL (ref 0–0.2)
BASOPHILS NFR BLD: 1.1 % (ref 0–1.9)
BODY FLD TYPE: NORMAL
COLOR FLD: YELLOW
DIFFERENTIAL METHOD: ABNORMAL
EOSINOPHIL # BLD AUTO: 0.1 K/UL (ref 0–0.5)
EOSINOPHIL NFR BLD: 1.3 % (ref 0–8)
ERYTHROCYTE [DISTWIDTH] IN BLOOD BY AUTOMATED COUNT: 18.1 % (ref 11.5–14.5)
HCT VFR BLD AUTO: 30 % (ref 40–54)
HGB BLD-MCNC: 9.6 G/DL (ref 14–18)
IMM GRANULOCYTES # BLD AUTO: 0.02 K/UL (ref 0–0.04)
IMM GRANULOCYTES NFR BLD AUTO: 0.3 % (ref 0–0.5)
INR PPP: 1.1 (ref 0.8–1.2)
LYMPHOCYTES # BLD AUTO: 0.7 K/UL (ref 1–4.8)
LYMPHOCYTES NFR BLD: 12.1 % (ref 18–48)
LYMPHOCYTES NFR FLD MANUAL: 65 %
MCH RBC QN AUTO: 27.5 PG (ref 27–31)
MCHC RBC AUTO-ENTMCNC: 32 G/DL (ref 32–36)
MCV RBC AUTO: 86 FL (ref 82–98)
MONOCYTES # BLD AUTO: 0.5 K/UL (ref 0.3–1)
MONOCYTES NFR BLD: 7.4 % (ref 4–15)
MONOS+MACROS NFR FLD MANUAL: 30 %
NEUTROPHILS # BLD AUTO: 4.8 K/UL (ref 1.8–7.7)
NEUTROPHILS NFR BLD: 77.8 % (ref 38–73)
NEUTROPHILS NFR FLD MANUAL: 5 %
NRBC BLD-RTO: 0 /100 WBC
PLATELET # BLD AUTO: 241 K/UL (ref 150–450)
PMV BLD AUTO: 11 FL (ref 9.2–12.9)
PROTHROMBIN TIME: 11.6 SEC (ref 9–12.5)
RBC # BLD AUTO: 3.49 M/UL (ref 4.6–6.2)
WBC # BLD AUTO: 6.12 K/UL (ref 3.9–12.7)
WBC # FLD: 98 /CU MM

## 2023-11-07 PROCEDURE — 49083 ABD PARACENTESIS W/IMAGING: CPT

## 2023-11-07 PROCEDURE — C1729 CATH, DRAINAGE: HCPCS

## 2023-11-07 PROCEDURE — 89051 BODY FLUID CELL COUNT: CPT | Performed by: INTERNAL MEDICINE

## 2023-11-07 PROCEDURE — 36415 COLL VENOUS BLD VENIPUNCTURE: CPT | Performed by: PHYSICIAN ASSISTANT

## 2023-11-07 PROCEDURE — 49083 ABD PARACENTESIS W/IMAGING: CPT | Mod: ,,, | Performed by: RADIOLOGY

## 2023-11-07 PROCEDURE — 49083 IR PARACENTESIS WITH IMAGING: ICD-10-PCS | Mod: ,,, | Performed by: RADIOLOGY

## 2023-11-07 PROCEDURE — P9047 ALBUMIN (HUMAN), 25%, 50ML: HCPCS | Mod: JZ,JG | Performed by: PHYSICIAN ASSISTANT

## 2023-11-07 PROCEDURE — 85610 PROTHROMBIN TIME: CPT | Performed by: PHYSICIAN ASSISTANT

## 2023-11-07 PROCEDURE — 85025 COMPLETE CBC W/AUTO DIFF WBC: CPT | Performed by: PHYSICIAN ASSISTANT

## 2023-11-07 PROCEDURE — 63600175 PHARM REV CODE 636 W HCPCS: Mod: JZ,JG | Performed by: PHYSICIAN ASSISTANT

## 2023-11-07 RX ORDER — ALBUMIN HUMAN 250 G/1000ML
50 SOLUTION INTRAVENOUS ONCE
Status: COMPLETED | OUTPATIENT
Start: 2023-11-07 | End: 2023-11-07

## 2023-11-07 RX ADMIN — ALBUMIN (HUMAN) 50 G: 12.5 SOLUTION INTRAVENOUS at 10:11

## 2023-11-07 NOTE — NURSING
PARACENTESIS    IR ultrasound guided paracentesis performed by REGINE Baker.  Procedure explained and standing consent verified.  Time out performed.  7.2 L removed-   Patient received 50 g of albumin IV.  VS remained stable for duration of procedure.  Specimens collected and sent to lab if ordered.   Para and IV d/c'd, with tip intact.   Access site cleaned with peroxide, derma bond and steri-strips applied, then covered with sterile Band-Aid.   Pt discharge home in stable condition.

## 2023-11-08 ENCOUNTER — HOSPITAL ENCOUNTER (EMERGENCY)
Facility: HOSPITAL | Age: 73
Discharge: HOME | End: 2023-11-09
Attending: EMERGENCY MEDICINE
Payer: MEDICARE

## 2023-11-08 DIAGNOSIS — K42.9 UMBILICAL HERNIA WITHOUT OBSTRUCTION AND WITHOUT GANGRENE: Primary | ICD-10-CM

## 2023-11-08 PROCEDURE — 99285 EMERGENCY DEPT VISIT HI MDM: CPT | Mod: 25

## 2023-11-09 VITALS
HEART RATE: 63 BPM | BODY MASS INDEX: 23.05 KG/M2 | SYSTOLIC BLOOD PRESSURE: 165 MMHG | OXYGEN SATURATION: 100 % | DIASTOLIC BLOOD PRESSURE: 75 MMHG | WEIGHT: 170 LBS | TEMPERATURE: 99 F | RESPIRATION RATE: 19 BRPM

## 2023-11-09 LAB
ALBUMIN SERPL BCP-MCNC: 3.2 G/DL (ref 3.5–5.2)
ALP SERPL-CCNC: 59 U/L (ref 55–135)
ALT SERPL W/O P-5'-P-CCNC: 9 U/L (ref 10–44)
ANION GAP SERPL CALC-SCNC: 10 MMOL/L (ref 8–16)
ANISOCYTOSIS BLD QL SMEAR: SLIGHT
AST SERPL-CCNC: 17 U/L (ref 10–40)
BASOPHILS # BLD AUTO: 0.05 K/UL (ref 0–0.2)
BASOPHILS NFR BLD: 0.7 % (ref 0–1.9)
BILIRUB SERPL-MCNC: 0.6 MG/DL (ref 0.1–1)
BUN SERPL-MCNC: 21 MG/DL (ref 8–23)
CALCIUM SERPL-MCNC: 8.3 MG/DL (ref 8.7–10.5)
CHLORIDE SERPL-SCNC: 107 MMOL/L (ref 95–110)
CO2 SERPL-SCNC: 21 MMOL/L (ref 23–29)
CREAT SERPL-MCNC: 2.2 MG/DL (ref 0.5–1.4)
CREAT SERPL-MCNC: 2.4 MG/DL (ref 0.5–1.4)
DIFFERENTIAL METHOD: ABNORMAL
EOSINOPHIL # BLD AUTO: 0.1 K/UL (ref 0–0.5)
EOSINOPHIL NFR BLD: 1 % (ref 0–8)
ERYTHROCYTE [DISTWIDTH] IN BLOOD BY AUTOMATED COUNT: 17.9 % (ref 11.5–14.5)
EST. GFR  (NO RACE VARIABLE): 30.9 ML/MIN/1.73 M^2
GLUCOSE SERPL-MCNC: 133 MG/DL (ref 70–110)
HCT VFR BLD AUTO: 29.7 % (ref 40–54)
HGB BLD-MCNC: 9.9 G/DL (ref 14–18)
HYPOCHROMIA BLD QL SMEAR: ABNORMAL
IMM GRANULOCYTES # BLD AUTO: 0.01 K/UL (ref 0–0.04)
IMM GRANULOCYTES NFR BLD AUTO: 0.1 % (ref 0–0.5)
LACTATE SERPL-SCNC: 1.5 MMOL/L (ref 0.5–1.9)
LIPASE SERPL-CCNC: 23 U/L (ref 4–60)
LYMPHOCYTES # BLD AUTO: 0.6 K/UL (ref 1–4.8)
LYMPHOCYTES NFR BLD: 8 % (ref 18–48)
MCH RBC QN AUTO: 28.7 PG (ref 27–31)
MCHC RBC AUTO-ENTMCNC: 33.3 G/DL (ref 32–36)
MCV RBC AUTO: 86 FL (ref 82–98)
MONOCYTES # BLD AUTO: 0.4 K/UL (ref 0.3–1)
MONOCYTES NFR BLD: 5.4 % (ref 4–15)
NEUTROPHILS # BLD AUTO: 6.1 K/UL (ref 1.8–7.7)
NEUTROPHILS NFR BLD: 84.8 % (ref 38–73)
NRBC BLD-RTO: 0 /100 WBC
OVALOCYTES BLD QL SMEAR: ABNORMAL
PLATELET # BLD AUTO: 213 K/UL (ref 150–450)
PLATELET BLD QL SMEAR: ABNORMAL
PMV BLD AUTO: 11.5 FL (ref 9.2–12.9)
POIKILOCYTOSIS BLD QL SMEAR: SLIGHT
POLYCHROMASIA BLD QL SMEAR: ABNORMAL
POTASSIUM SERPL-SCNC: 3.8 MMOL/L (ref 3.5–5.1)
PROT SERPL-MCNC: 6.3 G/DL (ref 6–8.4)
RBC # BLD AUTO: 3.45 M/UL (ref 4.6–6.2)
SAMPLE: ABNORMAL
SCHISTOCYTES BLD QL SMEAR: PRESENT
SODIUM SERPL-SCNC: 138 MMOL/L (ref 136–145)
WBC # BLD AUTO: 7.22 K/UL (ref 3.9–12.7)

## 2023-11-09 PROCEDURE — 96374 THER/PROPH/DIAG INJ IV PUSH: CPT

## 2023-11-09 PROCEDURE — 85025 COMPLETE CBC W/AUTO DIFF WBC: CPT | Performed by: STUDENT IN AN ORGANIZED HEALTH CARE EDUCATION/TRAINING PROGRAM

## 2023-11-09 PROCEDURE — 83605 ASSAY OF LACTIC ACID: CPT | Performed by: STUDENT IN AN ORGANIZED HEALTH CARE EDUCATION/TRAINING PROGRAM

## 2023-11-09 PROCEDURE — 63600175 PHARM REV CODE 636 W HCPCS: Performed by: EMERGENCY MEDICINE

## 2023-11-09 PROCEDURE — 80053 COMPREHEN METABOLIC PANEL: CPT | Performed by: STUDENT IN AN ORGANIZED HEALTH CARE EDUCATION/TRAINING PROGRAM

## 2023-11-09 PROCEDURE — 96375 TX/PRO/DX INJ NEW DRUG ADDON: CPT

## 2023-11-09 PROCEDURE — 83690 ASSAY OF LIPASE: CPT | Performed by: STUDENT IN AN ORGANIZED HEALTH CARE EDUCATION/TRAINING PROGRAM

## 2023-11-09 RX ORDER — LORAZEPAM 2 MG/ML
1 INJECTION INTRAMUSCULAR
Status: COMPLETED | OUTPATIENT
Start: 2023-11-09 | End: 2023-11-09

## 2023-11-09 RX ORDER — HYDROMORPHONE HYDROCHLORIDE 1 MG/ML
1 INJECTION, SOLUTION INTRAMUSCULAR; INTRAVENOUS; SUBCUTANEOUS
Status: COMPLETED | OUTPATIENT
Start: 2023-11-09 | End: 2023-11-09

## 2023-11-09 RX ADMIN — LORAZEPAM 1 MG: 2 INJECTION INTRAMUSCULAR; INTRAVENOUS at 02:11

## 2023-11-09 RX ADMIN — HYDROMORPHONE HYDROCHLORIDE 1 MG: 1 INJECTION, SOLUTION INTRAMUSCULAR; INTRAVENOUS; SUBCUTANEOUS at 02:11

## 2023-11-09 NOTE — ED PROVIDER NOTES
Encounter Date: 11/8/2023       History     Chief Complaint   Patient presents with    Abdominal Pain     With nausea and vomiting, with bloating and hx of hernia. No bowel movements in 3 days.      Chief complaint is abdominal pain and the patient has an an umbilical hernia as well as a right inguinal hernia.  He states he is here recently had to be given pain meds and then it got better and he was able to go home.  He is supposedly getting worked up as an outpatient to have both these hernias repaired but it has not been done yet.  Review of the chart he has a history of aortic stenosis heart failure BPH COPD anemia hypertension high cholesterol diabetes among others.        Review of patient's allergies indicates:   Allergen Reactions    Invokana  [canagliflozin]      Other reaction(s): been very dizzy     Past Medical History:   Diagnosis Date    Asthma     Cardiomyopathy 10/21/2014    CHF (congestive heart failure)     Coronary artery disease     CRF (chronic renal failure)     Diabetes mellitus     Enlarged prostate     Hyperlipidemia     Hypertension     Neuropathy      Past Surgical History:   Procedure Laterality Date    ANGIOGRAPHY OF INTERNAL MAMMARY VESSEL N/A 3/11/2022    Procedure: Angiogram Internal Mammary;  Surgeon: Hank Lujan MD;  Location: [facility] CATH/EP LAB;  Service: Cardiology;  Laterality: N/A;    CARDIAC CATHETERIZATION      CARDIAC VALVE SURGERY      CATHETERIZATION OF BOTH LEFT AND RIGHT HEART Left 3/11/2022    Procedure: CATHETERIZATION, HEART, BOTH LEFT AND RIGHT;  Surgeon: Hank Lujan MD;  Location: [facility] CATH/EP LAB;  Service: Cardiology;  Laterality: Left;    CORONARY ANGIOGRAPHY N/A 3/11/2022    Procedure: ANGIOGRAM, CORONARY ARTERY;  Surgeon: Hank Lujan MD;  Location: [facility] CATH/EP LAB;  Service: Cardiology;  Laterality: N/A;    CORONARY BYPASS GRAFT ANGIOGRAPHY  3/11/2022    Procedure: Bypass graft study;  Surgeon: Hank Lujan MD;  Location: [facility] CATH/EP LAB;   Service: Cardiology;;    CYSTOSCOPY N/A 7/30/2019    Procedure: CYSTOSCOPY;  Surgeon: Spencer Nguyen MD;  Location: [facility] OR;  Service: Urology;  Laterality: N/A;    INSERTION OF INTRAVASCULAR MICROAXIAL BLOOD PUMP N/A 8/31/2022    Procedure: INSERTION, IMPELLA;  Surgeon: Zach Jensen MD;  Location: [facility] CATH LAB;  Service: Cardiology;  Laterality: N/A;    PLACEMENT OF SWAN SEE CATHETER WITH IMAGING GUIDANCE  8/31/2022    Procedure: INSERTION, CATHETER, SWAN-SEE, WITH IMAGING GUIDANCE;  Surgeon: Zach Jensen MD;  Location: [facility] CATH LAB;  Service: Cardiology;;    SHOULDER ARTHROSCOPY  1985    TRANSRECTAL BIOPSY OF PROSTATE WITH ULTRASOUND GUIDANCE N/A 7/30/2019    Procedure: BIOPSY, PROSTATE, RECTAL APPROACH, WITH US GUIDANCE;  Surgeon: Spencer Nguyen MD;  Location: [facility] OR;  Service: Urology;  Laterality: N/A;  procedure not performed, pt unable to tolerate    TRANSRECTAL BIOPSY OF PROSTATE WITH ULTRASOUND GUIDANCE N/A 8/8/2019    Procedure: BIOPSY, PROSTATE, RECTAL APPROACH, WITH US GUIDANCE;  Surgeon: Spencer Nguyen MD;  Location: [facility];  Service: Urology;  Laterality: N/A;     Family History   Problem Relation Age of Onset    No Known Problems Mother     Diabetes Father     Hypertension Father     Heart attack Father     Heart disease Father     Diabetes Sister     Heart disease Brother     Diabetes Brother     No Known Problems Maternal Grandmother     No Known Problems Maternal Grandfather     No Known Problems Paternal Grandmother     No Known Problems Paternal Grandfather     No Known Problems Maternal Aunt     No Known Problems Maternal Uncle     No Known Problems Paternal Aunt     No Known Problems Paternal Uncle     Anemia Neg Hx     Arrhythmia Neg Hx     Asthma Neg Hx     Clotting disorder Neg Hx     Fainting Neg Hx     Heart failure Neg Hx     Hyperlipidemia Neg Hx     Glaucoma Neg Hx      Social History     Tobacco Use    Smoking status: Former     Current packs/day: 0.00      Types: Cigarettes     Quit date: 1970     Years since quittin.4    Smokeless tobacco: Never   Substance Use Topics    Alcohol use: Not Currently     Alcohol/week: 3.0 standard drinks of alcohol     Types: 3 Cans of beer per week     Comment: social    Drug use: No     Review of Systems   Constitutional:  Negative for chills and fever.   HENT:  Negative for ear pain, rhinorrhea and sore throat.    Eyes:  Negative for pain and visual disturbance.   Respiratory:  Negative for cough and shortness of breath.    Cardiovascular:  Negative for chest pain and palpitations.   Gastrointestinal:  Positive for abdominal pain. Negative for constipation, diarrhea, nausea and vomiting.   Genitourinary:  Negative for dysuria, frequency, hematuria and urgency.   Musculoskeletal:  Negative for back pain, joint swelling and myalgias.   Skin:  Negative for rash.   Neurological:  Negative for dizziness, seizures, weakness and headaches.   Psychiatric/Behavioral:  Negative for dysphoric mood. The patient is not nervous/anxious.        Physical Exam     Initial Vitals [23 2340]   BP Pulse Resp Temp SpO2   (!) 183/91 69 (!) 23 98.5 °F (36.9 °C) 100 %      MAP       --         Physical Exam    Nursing note and vitals reviewed.  Constitutional: He appears well-developed and well-nourished.   HENT:   Head: Normocephalic and atraumatic.   Eyes: Conjunctivae, EOM and lids are normal. Pupils are equal, round, and reactive to light.   Neck: Trachea normal. Neck supple. No thyroid mass present.   Cardiovascular:  Normal rate, regular rhythm and normal heart sounds.           Pulmonary/Chest: Breath sounds normal. No respiratory distress.   Abdominal: Abdomen is soft. Bowel sounds are normal. There is no abdominal tenderness.   Patient with exquisite tenderness to the umbilical area where he has an umbilical hernia.  He also has a large right inguinal hernia     Musculoskeletal:         General: Normal range of motion.       Cervical back: Neck supple.     Neurological: He is alert and oriented to person, place, and time. He has normal strength and normal reflexes. No cranial nerve deficit or sensory deficit.   Skin: Skin is warm and dry.   Psychiatric: He has a normal mood and affect. His speech is normal and behavior is normal. Judgment and thought content normal.         ED Course   Procedures  Labs Reviewed   CBC W/ AUTO DIFFERENTIAL - Abnormal; Notable for the following components:       Result Value    RBC 3.45 (*)     Hemoglobin 9.9 (*)     Hematocrit 29.7 (*)     RDW 17.9 (*)     Lymph # 0.6 (*)     Gran % 84.8 (*)     Lymph % 8.0 (*)     All other components within normal limits   COMPREHENSIVE METABOLIC PANEL - Abnormal; Notable for the following components:    CO2 21 (*)     Glucose 133 (*)     Creatinine 2.2 (*)     Calcium 8.3 (*)     Albumin 3.2 (*)     ALT 9 (*)     eGFR 30.9 (*)     All other components within normal limits    Narrative:     Recoll. [id number] by [facility] at 11/09/2023 01:16, reason: Reorder   ISTAT CREATININE - Abnormal; Notable for the following components:    POC Creatinine 2.4 (*)     All other components within normal limits   LIPASE    Narrative:     Recoll. [id number] by ANDREIA at 11/09/2023 01:16, reason: Reorder   LACTIC ACID, PLASMA    Narrative:     Recoll. [id number] by [facility] at 11/09/2023 01:17, reason: Reorder          Imaging Results              CT Abdomen Pelvis  Without Contrast (Final result)  Result time 11/09/23 01:18:56      Final result by Eliseo Skaggs MD (11/09/23 01:18:56)                   Narrative:    CT ABDOMEN AND PELVIS WITHOUT INTRAVENOUS CONTRAST    HISTORY: 73 years  old Male with Abdominal pain, acute, nonlocalized.    COMPARISON: 10/12/2023, 5/5/2022    TECHNIQUE: Axial contiguous images were obtained from the lung bases to the proximal femurs with intravenous intravenous contrast administered.    Sagittal and coronal reconstructions were also obtained and reviewed. This  exam was performed according to our departmental dose-optimization program, which includes automated exposure control, adjustment of the mA and/or KV according to the patient's size and/or use of iterative reconstruction technique.    FINDINGS:    LUNG BASES: No focal consolidative airspace opacities are seen.  No discrete pleural effusions are seen.  The cardiac silhouette is enlarged. Mitral annular and coronary artery calcifications are seen.    LIVER: The visualized hepatic parenchyma demonstrates no focal abnormality.  The liver has a slightly nodular contour which could be due to hepatic cirrhosis. The portal vein is not well evaluated due to the noncontrast study.    GALLBLADDER: Cholelithiasis is seen. No significant gallbladder wall thickening is seen.    SPLEEN: The spleen is normal in size.    PANCREAS: The pancreas is unremarkable.    ADRENAL GLANDS: The bilateral adrenal glands are unremarkable.    KIDNEYS: There is a punctate left renal stone which is nonobstructing. There is no hydronephrosis.. No large cysts or obvious mass seen.    PELVIS: The urinary bladder is moderately distended. There is a large right inguinal hernia with a large amount of fluid seen within the inferior hernia sac leading to a large right hydrocele. The prostate gland is irregular and indents upon the base of the bladder. A subtle mass within the urinary bladder base is difficult to fully exclude.    STOMACH: The stomach is moderately distended. A small hiatal hernia is present.    BOWEL:  No pericolonic inflammatory stranding is seen. There is anterior umbilical hernia containing portions of the small bowel and some free fluid. The small bowel is decompressed. The colon is decompressed.    APPENDIX: The appendix is unremarkable.    PERITONEUM: There is no evidence of pneumoperitoneum. A moderate amount of free intraperitoneal fluid is seen.    VESSELS: The IVC is unremarkable. The abdominal aorta is within normal limits of  size. There is moderate atherosclerotic calcification at aorta and iliac vasculature.    LYMPH NODES: No significantly enlarged lymph nodes are seen in the abdomen or pelvis.    BONES AND SOFT TISSUES: No acute osseous abnormality is seen. There are moderate to severe degenerative changes at the lumbar spine.    IMPRESSION:  1.  There is a large right inguinal hernia with a large amount of fluid seen within the inferior hernia sac leading to a large right hydrocele.  2.  There is anterior umbilical hernia containing portions of the small bowel and some free fluid. The small bowel is decompressed. The colon is decompressed.  3.  The prostate gland is enlarged and indents upon the base of the bladder.    Electronically signed by:  Eliseo Skaggs MD  11/09/2023 01:18 AM CST Workstation: [id number]                                     Medications   HYDROmorphone injection 1 mg (1 mg Intravenous Given 11/9/23 0201)   LORazepam injection 1 mg (1 mg Intravenous Given 11/9/23 0201)     Medical Decision Making  Patient has 2 hernias.  He has pain to the umbilical and right inguinal hernia area the right inguinal hernia is chronic in nature the umbilical hernia occasionally does not reduced in causes him pain as it is doing right now.  The patient was given pain medicines and the umbilical hernia was reduced without difficulty.  Patient is feeling better.    PROCEDURE NOTE PATIENT AFTER GETTING PAIN MEDICINES WITH GENTLE MANIPULATION WITH MY GLOVED HAND HAD EASY REDUCTION OF THE UMBILICAL HERNIA.    Risk  Prescription drug management.                               Clinical Impression:   Final diagnoses:  [K42.9] Umbilical hernia without obstruction and without gangrene (Primary)        ED Disposition Condition    Discharge Stable          ED Prescriptions    None       Follow-up Information       Follow up With Specialties Details Why Contact Info    Millie Hayes, APRN,FNP-C Family Medicine Schedule an appointment as  soon as possible for a visit in 3 days  [address] [facility] [address]  [phone]               Raymond Baptiste MD  11/10/23 0033

## 2023-11-16 ENCOUNTER — OFFICE VISIT (OUTPATIENT)
Dept: FAMILY MEDICINE | Facility: CLINIC | Age: 73
End: 2023-11-16
Payer: MEDICARE

## 2023-11-16 VITALS
WEIGHT: 161.38 LBS | HEART RATE: 73 BPM | HEIGHT: 72 IN | DIASTOLIC BLOOD PRESSURE: 64 MMHG | OXYGEN SATURATION: 99 % | SYSTOLIC BLOOD PRESSURE: 134 MMHG | BODY MASS INDEX: 21.86 KG/M2

## 2023-11-16 DIAGNOSIS — I10 PRIMARY HYPERTENSION: ICD-10-CM

## 2023-11-16 DIAGNOSIS — K42.9 UMBILICAL HERNIA WITHOUT OBSTRUCTION AND WITHOUT GANGRENE: Primary | ICD-10-CM

## 2023-11-16 DIAGNOSIS — K40.40: ICD-10-CM

## 2023-11-16 DIAGNOSIS — D64.9 CHRONIC ANEMIA: ICD-10-CM

## 2023-11-16 DIAGNOSIS — N18.32 STAGE 3B CHRONIC KIDNEY DISEASE: ICD-10-CM

## 2023-11-16 PROCEDURE — 3044F PR MOST RECENT HEMOGLOBIN A1C LEVEL <7.0%: ICD-10-PCS | Mod: CPTII,S$GLB,, | Performed by: NURSE PRACTITIONER

## 2023-11-16 PROCEDURE — 3288F PR FALLS RISK ASSESSMENT DOCUMENTED: ICD-10-PCS | Mod: CPTII,S$GLB,, | Performed by: NURSE PRACTITIONER

## 2023-11-16 PROCEDURE — 3078F DIAST BP <80 MM HG: CPT | Mod: CPTII,S$GLB,, | Performed by: NURSE PRACTITIONER

## 2023-11-16 PROCEDURE — 3066F NEPHROPATHY DOC TX: CPT | Mod: CPTII,S$GLB,, | Performed by: NURSE PRACTITIONER

## 2023-11-16 PROCEDURE — 1159F MED LIST DOCD IN RCRD: CPT | Mod: CPTII,S$GLB,, | Performed by: NURSE PRACTITIONER

## 2023-11-16 PROCEDURE — 3061F NEG MICROALBUMINURIA REV: CPT | Mod: CPTII,S$GLB,, | Performed by: NURSE PRACTITIONER

## 2023-11-16 PROCEDURE — 4010F PR ACE/ARB THEARPY RXD/TAKEN: ICD-10-PCS | Mod: CPTII,S$GLB,, | Performed by: NURSE PRACTITIONER

## 2023-11-16 PROCEDURE — 1125F AMNT PAIN NOTED PAIN PRSNT: CPT | Mod: CPTII,S$GLB,, | Performed by: NURSE PRACTITIONER

## 2023-11-16 PROCEDURE — 4010F ACE/ARB THERAPY RXD/TAKEN: CPT | Mod: CPTII,S$GLB,, | Performed by: NURSE PRACTITIONER

## 2023-11-16 PROCEDURE — 3008F BODY MASS INDEX DOCD: CPT | Mod: CPTII,S$GLB,, | Performed by: NURSE PRACTITIONER

## 2023-11-16 PROCEDURE — 3078F PR MOST RECENT DIASTOLIC BLOOD PRESSURE < 80 MM HG: ICD-10-PCS | Mod: CPTII,S$GLB,, | Performed by: NURSE PRACTITIONER

## 2023-11-16 PROCEDURE — 99214 PR OFFICE/OUTPT VISIT, EST, LEVL IV, 30-39 MIN: ICD-10-PCS | Mod: S$GLB,,, | Performed by: NURSE PRACTITIONER

## 2023-11-16 PROCEDURE — 3066F PR DOCUMENTATION OF TREATMENT FOR NEPHROPATHY: ICD-10-PCS | Mod: CPTII,S$GLB,, | Performed by: NURSE PRACTITIONER

## 2023-11-16 PROCEDURE — 1160F RVW MEDS BY RX/DR IN RCRD: CPT | Mod: CPTII,S$GLB,, | Performed by: NURSE PRACTITIONER

## 2023-11-16 PROCEDURE — 1159F PR MEDICATION LIST DOCUMENTED IN MEDICAL RECORD: ICD-10-PCS | Mod: CPTII,S$GLB,, | Performed by: NURSE PRACTITIONER

## 2023-11-16 PROCEDURE — 1160F PR REVIEW ALL MEDS BY PRESCRIBER/CLIN PHARMACIST DOCUMENTED: ICD-10-PCS | Mod: CPTII,S$GLB,, | Performed by: NURSE PRACTITIONER

## 2023-11-16 PROCEDURE — 3288F FALL RISK ASSESSMENT DOCD: CPT | Mod: CPTII,S$GLB,, | Performed by: NURSE PRACTITIONER

## 2023-11-16 PROCEDURE — 1125F PR PAIN SEVERITY QUANTIFIED, PAIN PRESENT: ICD-10-PCS | Mod: CPTII,S$GLB,, | Performed by: NURSE PRACTITIONER

## 2023-11-16 PROCEDURE — 1101F PR PT FALLS ASSESS DOC 0-1 FALLS W/OUT INJ PAST YR: ICD-10-PCS | Mod: CPTII,S$GLB,, | Performed by: NURSE PRACTITIONER

## 2023-11-16 PROCEDURE — 3061F PR NEG MICROALBUMINURIA RESULT DOCUMENTED/REVIEW: ICD-10-PCS | Mod: CPTII,S$GLB,, | Performed by: NURSE PRACTITIONER

## 2023-11-16 PROCEDURE — 1101F PT FALLS ASSESS-DOCD LE1/YR: CPT | Mod: CPTII,S$GLB,, | Performed by: NURSE PRACTITIONER

## 2023-11-16 PROCEDURE — 1157F ADVNC CARE PLAN IN RCRD: CPT | Mod: CPTII,S$GLB,, | Performed by: NURSE PRACTITIONER

## 2023-11-16 PROCEDURE — 3008F PR BODY MASS INDEX (BMI) DOCUMENTED: ICD-10-PCS | Mod: CPTII,S$GLB,, | Performed by: NURSE PRACTITIONER

## 2023-11-16 PROCEDURE — 1157F PR ADVANCE CARE PLAN OR EQUIV PRESENT IN MEDICAL RECORD: ICD-10-PCS | Mod: CPTII,S$GLB,, | Performed by: NURSE PRACTITIONER

## 2023-11-16 PROCEDURE — 3044F HG A1C LEVEL LT 7.0%: CPT | Mod: CPTII,S$GLB,, | Performed by: NURSE PRACTITIONER

## 2023-11-16 PROCEDURE — 99214 OFFICE O/P EST MOD 30 MIN: CPT | Mod: S$GLB,,, | Performed by: NURSE PRACTITIONER

## 2023-11-16 PROCEDURE — 3075F PR MOST RECENT SYSTOLIC BLOOD PRESS GE 130-139MM HG: ICD-10-PCS | Mod: CPTII,S$GLB,, | Performed by: NURSE PRACTITIONER

## 2023-11-16 PROCEDURE — 3075F SYST BP GE 130 - 139MM HG: CPT | Mod: CPTII,S$GLB,, | Performed by: NURSE PRACTITIONER

## 2023-11-17 ENCOUNTER — TELEPHONE (OUTPATIENT)
Dept: HEPATOLOGY | Facility: CLINIC | Age: 73
End: 2023-11-17
Payer: MEDICARE

## 2023-11-17 DIAGNOSIS — R18.8 OTHER ASCITES: Primary | ICD-10-CM

## 2023-11-17 NOTE — TELEPHONE ENCOUNTER
----- Message from Tali Serrano MA sent at 11/15/2023  9:14 AM CST -----  Regarding: FW: Orders for paracentesis  Contact: [facility]    ----- Message -----  From: Kymberly Parikh  Sent: 11/15/2023   9:02 AM CST  To: [facility] Staff  Subject: Orders for paracentesis                          Regarding:Lab Orders    Location:[facility]          Name Of Caller: [facility]        Contact Preference: [phone] (home)            Nature of call:   is calling to  get standing orders sent to:  [facility] , for a paracentesis.Please advise. Requesting a call back    [facility]  [phone] ( phone)  [phone]

## 2023-11-17 NOTE — TELEPHONE ENCOUNTER
Call returned to Oracio murphy with [facility] at [phone].  Calling for Orders for Paracentesis for the Patient.    No Answer. Left message. Patient had 1 left on old order. Order in place of 20 more in Epic.  Please call us back if you have any questions.

## 2023-11-21 ENCOUNTER — HOSPITAL ENCOUNTER (EMERGENCY)
Facility: HOSPITAL | Age: 73
Discharge: HOME | End: 2023-11-22
Attending: EMERGENCY MEDICINE
Payer: MEDICARE

## 2023-11-21 ENCOUNTER — HOSPITAL ENCOUNTER (OUTPATIENT)
Dept: INTERVENTIONAL RADIOLOGY/VASCULAR | Facility: HOSPITAL | Age: 73
Discharge: HOME | End: 2023-11-21
Attending: INTERNAL MEDICINE
Payer: MEDICARE

## 2023-11-21 VITALS
HEART RATE: 70 BPM | OXYGEN SATURATION: 100 % | RESPIRATION RATE: 18 BRPM | SYSTOLIC BLOOD PRESSURE: 127 MMHG | DIASTOLIC BLOOD PRESSURE: 60 MMHG

## 2023-11-21 DIAGNOSIS — N50.89 SCROTAL SWELLING: Primary | ICD-10-CM

## 2023-11-21 DIAGNOSIS — K59.00 CONSTIPATION, UNSPECIFIED CONSTIPATION TYPE: ICD-10-CM

## 2023-11-21 DIAGNOSIS — K40.91 UNILATERAL RECURRENT INGUINAL HERNIA WITHOUT OBSTRUCTION OR GANGRENE: ICD-10-CM

## 2023-11-21 DIAGNOSIS — R18.8 OTHER ASCITES: ICD-10-CM

## 2023-11-21 DIAGNOSIS — R10.31 RIGHT LOWER QUADRANT ABDOMINAL PAIN: ICD-10-CM

## 2023-11-21 LAB
ALBUMIN SERPL BCP-MCNC: 3.4 G/DL (ref 3.5–5.2)
ALP SERPL-CCNC: 55 U/L (ref 55–135)
ALT SERPL W/O P-5'-P-CCNC: 8 U/L (ref 10–44)
ANION GAP SERPL CALC-SCNC: 9 MMOL/L (ref 8–16)
APPEARANCE FLD: NORMAL
AST SERPL-CCNC: 21 U/L (ref 10–40)
BASOPHILS # BLD AUTO: 0.05 K/UL (ref 0–0.2)
BASOPHILS NFR BLD: 0.8 % (ref 0–1.9)
BILIRUB SERPL-MCNC: 0.6 MG/DL (ref 0.1–1)
BODY FLD TYPE: NORMAL
BUN SERPL-MCNC: 21 MG/DL (ref 8–23)
CALCIUM SERPL-MCNC: 8.3 MG/DL (ref 8.7–10.5)
CHLORIDE SERPL-SCNC: 109 MMOL/L (ref 95–110)
CO2 SERPL-SCNC: 19 MMOL/L (ref 23–29)
COLOR FLD: YELLOW
CREAT SERPL-MCNC: 1.9 MG/DL (ref 0.5–1.4)
DIFFERENTIAL METHOD: ABNORMAL
EOSINOPHIL # BLD AUTO: 0.1 K/UL (ref 0–0.5)
EOSINOPHIL NFR BLD: 1.3 % (ref 0–8)
ERYTHROCYTE [DISTWIDTH] IN BLOOD BY AUTOMATED COUNT: 17.9 % (ref 11.5–14.5)
EST. GFR  (NO RACE VARIABLE): 36.8 ML/MIN/1.73 M^2
GLUCOSE SERPL-MCNC: 121 MG/DL (ref 70–110)
HCT VFR BLD AUTO: 28.7 % (ref 40–54)
HGB BLD-MCNC: 9.6 G/DL (ref 14–18)
IMM GRANULOCYTES # BLD AUTO: 0.03 K/UL (ref 0–0.04)
IMM GRANULOCYTES NFR BLD AUTO: 0.5 % (ref 0–0.5)
LACTATE SERPL-SCNC: 1.8 MMOL/L (ref 0.5–1.9)
LIPASE SERPL-CCNC: 23 U/L (ref 4–60)
LYMPHOCYTES # BLD AUTO: 0.8 K/UL (ref 1–4.8)
LYMPHOCYTES NFR BLD: 11.9 % (ref 18–48)
LYMPHOCYTES NFR FLD MANUAL: 26 %
MCH RBC QN AUTO: 28.9 PG (ref 27–31)
MCHC RBC AUTO-ENTMCNC: 33.4 G/DL (ref 32–36)
MCV RBC AUTO: 86 FL (ref 82–98)
MESOTHL CELL NFR FLD MANUAL: 26 %
MONOCYTES # BLD AUTO: 0.5 K/UL (ref 0.3–1)
MONOCYTES NFR BLD: 7.9 % (ref 4–15)
MONOS+MACROS NFR FLD MANUAL: 42 %
NEUTROPHILS # BLD AUTO: 4.9 K/UL (ref 1.8–7.7)
NEUTROPHILS NFR BLD: 77.6 % (ref 38–73)
NEUTROPHILS NFR FLD MANUAL: 6 %
NRBC BLD-RTO: 0 /100 WBC
PLATELET # BLD AUTO: 259 K/UL (ref 150–450)
PMV BLD AUTO: 10 FL (ref 9.2–12.9)
POTASSIUM SERPL-SCNC: 4.3 MMOL/L (ref 3.5–5.1)
PROT SERPL-MCNC: 6.7 G/DL (ref 6–8.4)
RBC # BLD AUTO: 3.32 M/UL (ref 4.6–6.2)
SODIUM SERPL-SCNC: 137 MMOL/L (ref 136–145)
WBC # BLD AUTO: 6.29 K/UL (ref 3.9–12.7)
WBC # FLD: 181 /CU MM

## 2023-11-21 PROCEDURE — 99285 EMERGENCY DEPT VISIT HI MDM: CPT | Mod: 25

## 2023-11-21 PROCEDURE — 83690 ASSAY OF LIPASE: CPT

## 2023-11-21 PROCEDURE — 96375 TX/PRO/DX INJ NEW DRUG ADDON: CPT

## 2023-11-21 PROCEDURE — 80053 COMPREHEN METABOLIC PANEL: CPT

## 2023-11-21 PROCEDURE — 63600175 PHARM REV CODE 636 W HCPCS: Performed by: EMERGENCY MEDICINE

## 2023-11-21 PROCEDURE — 96376 TX/PRO/DX INJ SAME DRUG ADON: CPT | Mod: 59

## 2023-11-21 PROCEDURE — 49083 ABD PARACENTESIS W/IMAGING: CPT | Mod: ,,, | Performed by: PHYSICIAN ASSISTANT

## 2023-11-21 PROCEDURE — 89051 BODY FLUID CELL COUNT: CPT | Performed by: INTERNAL MEDICINE

## 2023-11-21 PROCEDURE — 83605 ASSAY OF LACTIC ACID: CPT | Performed by: EMERGENCY MEDICINE

## 2023-11-21 PROCEDURE — 63600175 PHARM REV CODE 636 W HCPCS

## 2023-11-21 PROCEDURE — 85025 COMPLETE CBC W/AUTO DIFF WBC: CPT

## 2023-11-21 PROCEDURE — 49083 IR PARACENTESIS WITH IMAGING: ICD-10-PCS | Mod: ,,, | Performed by: PHYSICIAN ASSISTANT

## 2023-11-21 PROCEDURE — 25500020 PHARM REV CODE 255: Performed by: EMERGENCY MEDICINE

## 2023-11-21 PROCEDURE — 63600175 PHARM REV CODE 636 W HCPCS: Mod: JZ,JG | Performed by: PHYSICIAN ASSISTANT

## 2023-11-21 PROCEDURE — 96374 THER/PROPH/DIAG INJ IV PUSH: CPT | Mod: 59

## 2023-11-21 PROCEDURE — P9047 ALBUMIN (HUMAN), 25%, 50ML: HCPCS | Mod: JZ,JG | Performed by: PHYSICIAN ASSISTANT

## 2023-11-21 PROCEDURE — 49083 ABD PARACENTESIS W/IMAGING: CPT

## 2023-11-21 PROCEDURE — C1729 CATH, DRAINAGE: HCPCS

## 2023-11-21 RX ORDER — HYDROMORPHONE HYDROCHLORIDE 1 MG/ML
0.5 INJECTION, SOLUTION INTRAMUSCULAR; INTRAVENOUS; SUBCUTANEOUS
Status: COMPLETED | OUTPATIENT
Start: 2023-11-21 | End: 2023-11-21

## 2023-11-21 RX ORDER — ONDANSETRON 2 MG/ML
4 INJECTION INTRAMUSCULAR; INTRAVENOUS
Status: COMPLETED | OUTPATIENT
Start: 2023-11-21 | End: 2023-11-21

## 2023-11-21 RX ORDER — ALBUMIN HUMAN 250 G/1000ML
50 SOLUTION INTRAVENOUS ONCE
Status: COMPLETED | OUTPATIENT
Start: 2023-11-21 | End: 2023-11-21

## 2023-11-21 RX ADMIN — ALBUMIN (HUMAN) 50 G: 12.5 SOLUTION INTRAVENOUS at 10:11

## 2023-11-21 RX ADMIN — IOHEXOL 75 ML: 350 INJECTION, SOLUTION INTRAVENOUS at 09:11

## 2023-11-21 RX ADMIN — HYDROMORPHONE HYDROCHLORIDE 0.5 MG: 0.5 INJECTION, SOLUTION INTRAMUSCULAR; INTRAVENOUS; SUBCUTANEOUS at 10:11

## 2023-11-21 RX ADMIN — HYDROMORPHONE HYDROCHLORIDE 0.5 MG: 0.5 INJECTION, SOLUTION INTRAMUSCULAR; INTRAVENOUS; SUBCUTANEOUS at 08:11

## 2023-11-21 RX ADMIN — ONDANSETRON 4 MG: 2 INJECTION INTRAMUSCULAR; INTRAVENOUS at 08:11

## 2023-11-21 NOTE — PROGRESS NOTES
SUBJECTIVE:      Patient ID: Baldo Carney is a 73 y.o. male.    Chief Complaint: Follow-up (ED f/u - Hernias )    Presents for problem visit - states he was recently at the ED for umbilical hernia pain. He feels the pain related to the hernia in his stomach and groin have been worsening, and is considerably affecting his quality of life. Since his last visit here, he has established with Dr. Quijano (cardiology).  He states Dr. Quijano found a reason for the fluid buildup in his stomach, and plans to repair a defect in his heart in an effort to correct this problem.  He states he will be cleared for his hernia repair surgery by Cardiology.  Upon review of his hospital/ED labs, it appears his kidney function has continued to decline.  Patient states he did see Dr. Colby quite some time ago, but has not seen her in awhile and is amenable to a setting up a follow-up appointment.  He states he will do whatever it takes to get his hernia surgeries completed so he can return back to an active daily lifestyle without pain.    Recent ED labs: H&H 9.9 and 29.7, estimated GFR 30.9, creatinine 2.2, calcium and albumin below normal.    Discussed outstanding health maintenance     Hypertension  This is a chronic problem. The current episode started more than 1 year ago. The problem has been gradually worsening since onset. The problem is controlled. Associated symptoms include shortness of breath (at times). Pertinent negatives include no chest pain, headaches, neck pain or peripheral edema. Risk factors for coronary artery disease include male gender, sedentary lifestyle, diabetes mellitus, dyslipidemia, family history and stress. Past treatments include calcium channel blockers, diuretics, direct vasodilators, angiotensin blockers and beta blockers. The current treatment provides mild improvement. Compliance problems include exercise and diet.  Hypertensive end-organ damage includes kidney disease, CAD/MI,  heart failure and retinopathy. Identifiable causes of hypertension include chronic renal disease, a hypertension causing med and renovascular disease.   Groin Pain  The patient's primary symptoms include pelvic pain. This is a chronic problem. The current episode started more than 1 month ago. The problem occurs constantly. The problem has been gradually worsening. The pain is severe. Associated symptoms include abdominal pain, anorexia, nausea and shortness of breath (at times). Pertinent negatives include no chest pain, constipation, coughing, diarrhea, dysuria, fever, flank pain, frequency, headaches, rash, sore throat, urgency or vomiting. The symptoms are aggravated by activity, heavy lifting and tactile pressure. He has tried rest and prescription analgesics for the symptoms. The treatment provided mild relief. He is not sexually active. His past medical history is significant for BPH, erectile dysfunction and an inguinal hernia.       Past Surgical History:   Procedure Laterality Date    ANGIOGRAPHY OF INTERNAL MAMMARY VESSEL N/A 3/11/2022    Procedure: Angiogram Internal Mammary;  Surgeon: Hank Lujan MD;  Location: [facility] CATH/EP LAB;  Service: Cardiology;  Laterality: N/A;    CARDIAC CATHETERIZATION      CARDIAC VALVE SURGERY      CATHETERIZATION OF BOTH LEFT AND RIGHT HEART Left 3/11/2022    Procedure: CATHETERIZATION, HEART, BOTH LEFT AND RIGHT;  Surgeon: Hank Lujan MD;  Location: [facility] CATH/EP LAB;  Service: Cardiology;  Laterality: Left;    CORONARY ANGIOGRAPHY N/A 3/11/2022    Procedure: ANGIOGRAM, CORONARY ARTERY;  Surgeon: Hank Lujan MD;  Location: [facility] CATH/EP LAB;  Service: Cardiology;  Laterality: N/A;    CORONARY BYPASS GRAFT ANGIOGRAPHY  3/11/2022    Procedure: Bypass graft study;  Surgeon: Hnak Lujan MD;  Location: [facility] CATH/EP LAB;  Service: Cardiology;;    CYSTOSCOPY N/A 7/30/2019    Procedure: CYSTOSCOPY;  Surgeon: Spencer Nguyen MD;  Location: [facility] OR;  Service:  Urology;  Laterality: N/A;    INSERTION OF INTRAVASCULAR MICROAXIAL BLOOD PUMP N/A 2022    Procedure: INSERTION, IMPELLA;  Surgeon: Zach Jensen MD;  Location: [facility] CATH LAB;  Service: Cardiology;  Laterality: N/A;    PLACEMENT OF SWAN SEE CATHETER WITH IMAGING GUIDANCE  2022    Procedure: INSERTION, CATHETER, SWAN-SEE, WITH IMAGING GUIDANCE;  Surgeon: Zach Jensen MD;  Location: [facility] CATH LAB;  Service: Cardiology;;    SHOULDER ARTHROSCOPY  1985    TRANSRECTAL BIOPSY OF PROSTATE WITH ULTRASOUND GUIDANCE N/A 2019    Procedure: BIOPSY, PROSTATE, RECTAL APPROACH, WITH US GUIDANCE;  Surgeon: Spencer Nguyen MD;  Location: [facility] OR;  Service: Urology;  Laterality: N/A;  procedure not performed, pt unable to tolerate    TRANSRECTAL BIOPSY OF PROSTATE WITH ULTRASOUND GUIDANCE N/A 2019    Procedure: BIOPSY, PROSTATE, RECTAL APPROACH, WITH US GUIDANCE;  Surgeon: Spencer Nguyen MD;  Location: [facility];  Service: Urology;  Laterality: N/A;     Family History   Problem Relation Age of Onset    No Known Problems Mother     Diabetes Father     Hypertension Father     Heart attack Father     Heart disease Father     Diabetes Sister     Heart disease Brother     Diabetes Brother     No Known Problems Maternal Grandmother     No Known Problems Maternal Grandfather     No Known Problems Paternal Grandmother     No Known Problems Paternal Grandfather     No Known Problems Maternal Aunt     No Known Problems Maternal Uncle     No Known Problems Paternal Aunt     No Known Problems Paternal Uncle     Anemia Neg Hx     Arrhythmia Neg Hx     Asthma Neg Hx     Clotting disorder Neg Hx     Fainting Neg Hx     Heart failure Neg Hx     Hyperlipidemia Neg Hx     Glaucoma Neg Hx       Social History     Socioeconomic History    Marital status: Single   Tobacco Use    Smoking status: Former     Current packs/day: 0.00     Types: Cigarettes     Quit date: 1970     Years since quittin.4     Smokeless tobacco: Never   Substance and Sexual Activity    Alcohol use: Not Currently     Alcohol/week: 3.0 standard drinks of alcohol     Types: 3 Cans of beer per week     Comment: social    Drug use: No    Sexual activity: Not Currently     Partners: Female     Current Outpatient Medications   Medication Sig Dispense Refill    albuterol (PROVENTIL/VENTOLIN HFA) 90 mcg/actuation inhaler INHALE 1 TO 2 PUFFS INTO THE LUNGS EVERY 4 TO 6 HOURS AS NEEDED FOR WHEEZE AND SHORTNESS OF BREATH 18 g 2    aspirin (ECOTRIN) 81 MG EC tablet Take 81 mg by mouth once daily.      atorvastatin (LIPITOR) 40 MG tablet Take 1 tablet (40 mg total) by mouth once daily. 90 tablet 0    budesonide-glycopyr-formoterol (BREZTRI AEROSPHERE) 160-9-4.8 mcg/actuation HFAA Inhale into the lungs daily as needed.      clopidogreL (PLAVIX) 75 mg tablet       cyanocobalamin 1,000 mcg/mL injection Inject 1 mL (1,000 mcg total) into the muscle every 14 (fourteen) days. 1 mL 11    FARXIGA 5 mg Tab tablet Take 5 mg by mouth once daily.      finasteride (PROSCAR) 5 mg tablet Take 1 tablet (5 mg total) by mouth once daily. 90 tablet 4    fluticasone furoate-vilanteroL (BREO ELLIPTA) 100-25 mcg/dose diskus inhaler Inhale 1 puff into the lungs once daily. (DAILY CONTROLLER) 3 each 3    fluticasone propionate (FLONASE) 50 mcg/actuation nasal spray 1 SPRAY (50 MCG TOTAL) BY EACH NOSTRIL ROUTE ONCE DAILY. 16 mL 1    furosemide (LASIX) 40 MG tablet Take by mouth once daily.      gabapentin (NEURONTIN) 300 MG capsule Take 1 capsule (300 mg total) by mouth every evening. 90 capsule 1    levothyroxine (SYNTHROID) 100 MCG tablet Take 1 tablet (100 mcg total) by mouth before breakfast. With no other meds or food 30 tablet 5    metoprolol succinate (TOPROL-XL) 25 MG 24 hr tablet Take 1 tablet (25 mg total) by mouth once daily. 90 tablet 1    sacubitriL-valsartan (ENTRESTO) 24-26 mg per tablet Take 1 tablet by mouth 2 (two) times daily. 180 tablet 1    tamsulosin  (FLOMAX) 0.4 mg Cap TAKE ONE CAPSULE BY MOUTH DAILY AT 5 PM 90 capsule 11    traMADoL (ULTRAM) 50 mg tablet Take 1 tablet (50 mg total) by mouth every 8 (eight) hours as needed for Pain. 21 tablet 0    ZONTIVITY 2.08 mg Tab Take 1 tablet by mouth once daily.      hydrALAZINE (APRESOLINE) 50 MG tablet Take 1 tablet (50 mg total) by mouth every 12 (twelve) hours. 60 tablet 0    isosorbide dinitrate (ISORDIL) 20 MG tablet Take 1 tablet (20 mg total) by mouth 3 (three) times daily. 90 tablet 0    prasugreL (EFFIENT) 10 mg Tab Take 1 tablet (10 mg total) by mouth once daily. 30 tablet 11     No current facility-administered medications for this visit.     Review of patient's allergies indicates:  No Known Allergies   Past Medical History:   Diagnosis Date    Asthma     Cardiomyopathy 10/21/2014    CHF (congestive heart failure)     Coronary artery disease     CRF (chronic renal failure)     Diabetes mellitus     Enlarged prostate     Hyperlipidemia     Hypertension     Neuropathy      Past Surgical History:   Procedure Laterality Date    ANGIOGRAPHY OF INTERNAL MAMMARY VESSEL N/A 3/11/2022    Procedure: Angiogram Internal Mammary;  Surgeon: Hank Lujan MD;  Location: [facility] CATH/EP LAB;  Service: Cardiology;  Laterality: N/A;    CARDIAC CATHETERIZATION      CARDIAC VALVE SURGERY      CATHETERIZATION OF BOTH LEFT AND RIGHT HEART Left 3/11/2022    Procedure: CATHETERIZATION, HEART, BOTH LEFT AND RIGHT;  Surgeon: Hank Lujan MD;  Location: [facility] CATH/EP LAB;  Service: Cardiology;  Laterality: Left;    CORONARY ANGIOGRAPHY N/A 3/11/2022    Procedure: ANGIOGRAM, CORONARY ARTERY;  Surgeon: Hank Lujan MD;  Location: [facility] CATH/EP LAB;  Service: Cardiology;  Laterality: N/A;    CORONARY BYPASS GRAFT ANGIOGRAPHY  3/11/2022    Procedure: Bypass graft study;  Surgeon: Hank Lujan MD;  Location: [facility] CATH/EP LAB;  Service: Cardiology;;    CYSTOSCOPY N/A 7/30/2019    Procedure: CYSTOSCOPY;  Surgeon: Spencer OCAMPO  "MD Wendy;  Location: [facility] OR;  Service: Urology;  Laterality: N/A;    INSERTION OF INTRAVASCULAR MICROAXIAL BLOOD PUMP N/A 8/31/2022    Procedure: INSERTION, IMPELLA;  Surgeon: Zach Jensen MD;  Location: [facility] CATH LAB;  Service: Cardiology;  Laterality: N/A;    PLACEMENT OF SWAN SEE CATHETER WITH IMAGING GUIDANCE  8/31/2022    Procedure: INSERTION, CATHETER, SWAN-SEE, WITH IMAGING GUIDANCE;  Surgeon: Zach Jensen MD;  Location: [facility] CATH LAB;  Service: Cardiology;;    SHOULDER ARTHROSCOPY  1985    TRANSRECTAL BIOPSY OF PROSTATE WITH ULTRASOUND GUIDANCE N/A 7/30/2019    Procedure: BIOPSY, PROSTATE, RECTAL APPROACH, WITH US GUIDANCE;  Surgeon: Spencer Nguyen MD;  Location: [facility] OR;  Service: Urology;  Laterality: N/A;  procedure not performed, pt unable to tolerate    TRANSRECTAL BIOPSY OF PROSTATE WITH ULTRASOUND GUIDANCE N/A 8/8/2019    Procedure: BIOPSY, PROSTATE, RECTAL APPROACH, WITH US GUIDANCE;  Surgeon: Spencer Nguyen MD;  Location: [facility] OR;  Service: Urology;  Laterality: N/A;       Review of Systems   Constitutional:  Positive for activity change. Negative for appetite change, fatigue and fever.   HENT:  Negative for congestion, ear pain, hearing loss, postnasal drip, sinus pressure, sinus pain, sneezing and sore throat.    Eyes:  Negative for photophobia and pain.   Respiratory:  Positive for shortness of breath (at times). Negative for cough, chest tightness and wheezing.    Cardiovascular:  Negative for chest pain and leg swelling.   Gastrointestinal:  Positive for abdominal distention (has been getting "fluid drained" off stomach), abdominal pain, anorexia and nausea. Negative for blood in stool, constipation, diarrhea and vomiting.   Endocrine: Negative for cold intolerance, heat intolerance, polydipsia and polyuria.   Genitourinary:  Positive for pelvic pain. Negative for difficulty urinating, dysuria, flank pain, frequency, hematuria and urgency.   Musculoskeletal:  " "Positive for myalgias (R groin tenderness, unable to have hernia surgery with heart & ascites issues). Negative for arthralgias, back pain, joint swelling and neck pain.   Skin:  Negative for pallor and rash.   Allergic/Immunologic: Positive for environmental allergies. Negative for food allergies.   Neurological:  Negative for dizziness, weakness, light-headedness and headaches.   Hematological:  Does not bruise/bleed easily.   Psychiatric/Behavioral:  Positive for dysphoric mood and sleep disturbance (d/t pain). Negative for confusion and decreased concentration. The patient is not nervous/anxious.       OBJECTIVE:      Vitals:    11/16/23 1537   BP: 134/64   BP Location: Right arm   Patient Position: Sitting   BP Method: Large (Manual)   Pulse: 73   SpO2: 99%   Weight: 73.2 kg (161 lb 6.4 oz)   Height: 6' 0.01" (1.829 m)     Physical Exam  Vitals and nursing note reviewed.   Constitutional:       General: He is not in acute distress.     Appearance: Normal appearance. He is well-developed, well-groomed and normal weight.      Comments: 10# loss since August visit, 16# loss since April visit   HENT:      Head: Normocephalic and atraumatic.      Right Ear: Hearing normal.      Left Ear: Hearing normal.      Nose: Nose normal. No rhinorrhea.   Eyes:      General: Lids are normal.         Right eye: No discharge.         Left eye: No discharge.      Conjunctiva/sclera: Conjunctivae normal.      Right eye: Right conjunctiva is not injected.      Left eye: Left conjunctiva is not injected.      Pupils: Pupils are equal, round, and reactive to light. Pupils are equal.      Right eye: Pupil is round and reactive.      Left eye: Pupil is round and reactive.   Neck:      Thyroid: No thyromegaly.      Vascular: No JVD.      Trachea: Trachea normal. No tracheal deviation.   Cardiovascular:      Rate and Rhythm: Normal rate and regular rhythm.      Pulses:           Radial pulses are 2+ on the right side and 2+ on the left " side.      Heart sounds: Normal heart sounds. No murmur heard.     No friction rub. No gallop.   Pulmonary:      Effort: Pulmonary effort is normal. No respiratory distress.      Breath sounds: Normal breath sounds. No stridor. No decreased breath sounds, wheezing, rhonchi or rales.   Abdominal:      General: Abdomen is protuberant. Bowel sounds are normal. There is distension.      Palpations: Abdomen is soft. Abdomen is not rigid.      Tenderness: There is no abdominal tenderness. There is no guarding.      Hernia: A hernia is present. Hernia is present in the umbilical area and right inguinal area.   Musculoskeletal:         General: Normal range of motion.      Cervical back: Normal range of motion and neck supple.   Lymphadenopathy:      Cervical: No cervical adenopathy.   Skin:     General: Skin is warm and dry.      Capillary Refill: Capillary refill takes less than 2 seconds.      Coloration: Skin is not pale.      Findings: No lesion or rash.   Neurological:      Mental Status: He is alert and oriented to person, place, and time.      Motor: No atrophy.      Coordination: Coordination normal.      Gait: Gait normal.   Psychiatric:         Attention and Perception: He is attentive.         Speech: Speech normal.         Behavior: Behavior normal.         Thought Content: Thought content normal.         Judgment: Judgment normal.        Assessment:       1. Umbilical hernia without obstruction and without gangrene    2. Inguinal hernia of right side with gangrene and obstruction    3. Chronic anemia    4. Stage 3b chronic kidney disease    5. Primary hypertension        Plan:       Umbilical hernia without obstruction and without gangrene  -     Ambulatory referral/consult to General Surgery; Future; Expected date: 11/23/2023    Inguinal hernia of right side with gangrene and obstruction  -     Ambulatory referral/consult to General Surgery; Future; Expected date: 11/23/2023    Chronic anemia  -      Ambulatory referral/consult to Hematology / Oncology; Future; Expected date: 11/23/2023    Stage 3b chronic kidney disease  -     Ambulatory referral/consult to Nephrology; Future; Expected date: 11/23/2023    Primary hypertension        - follows with specialist        - appears stable on current med regimen        Follow up if symptoms worsen or fail to improve.         11/20/2023 KAMERON Rodriguez, FNP-C

## 2023-11-21 NOTE — NURSING
PARACENTESIS    IR ultrasound guided paracentesis performed by REGINE Baker.  Procedure explained and standing consent verified.  Time out performed.  7 L removed-   Patient received 50 g of albumin IV.  VS remained stable for duration of procedure.  Specimens collected and sent to lab if ordered.   Para and IV d/c'd, with tip intact.   Access site cleaned with peroxide, derma bond and steri-strips applied, then covered with sterile Band-Aid.   Pt discharge home in stable condition.

## 2023-11-22 VITALS
BODY MASS INDEX: 22.35 KG/M2 | RESPIRATION RATE: 24 BRPM | TEMPERATURE: 98 F | HEART RATE: 68 BPM | HEIGHT: 72 IN | OXYGEN SATURATION: 99 % | SYSTOLIC BLOOD PRESSURE: 175 MMHG | DIASTOLIC BLOOD PRESSURE: 85 MMHG | WEIGHT: 165 LBS

## 2023-11-22 DIAGNOSIS — E03.9 HYPOTHYROIDISM, UNSPECIFIED TYPE: ICD-10-CM

## 2023-11-22 LAB
BACTERIA #/AREA URNS HPF: NEGATIVE /HPF
BILIRUB UR QL STRIP: NEGATIVE
CLARITY UR: CLEAR
COLOR UR: YELLOW
GLUCOSE UR QL STRIP: ABNORMAL
HGB UR QL STRIP: NEGATIVE
HYALINE CASTS #/AREA URNS LPF: 0 /LPF
KETONES UR QL STRIP: NEGATIVE
LEUKOCYTE ESTERASE UR QL STRIP: NEGATIVE
MICROSCOPIC COMMENT: ABNORMAL
NITRITE UR QL STRIP: NEGATIVE
PH UR STRIP: 6 [PH] (ref 5–8)
PROT UR QL STRIP: ABNORMAL
RBC #/AREA URNS HPF: 1 /HPF (ref 0–4)
SP GR UR STRIP: >1.03 (ref 1–1.03)
SQUAMOUS #/AREA URNS HPF: 1 /HPF
URN SPEC COLLECT METH UR: ABNORMAL
UROBILINOGEN UR STRIP-ACNC: NEGATIVE EU/DL
WBC #/AREA URNS HPF: 1 /HPF (ref 0–5)
YEAST URNS QL MICRO: ABNORMAL

## 2023-11-22 PROCEDURE — 81001 URINALYSIS AUTO W/SCOPE: CPT

## 2023-11-22 RX ORDER — SYRING-NEEDL,DISP,INSUL,0.3 ML 29 G X1/2"
296 SYRINGE, EMPTY DISPOSABLE MISCELLANEOUS ONCE
Qty: 296 ML | Refills: 3 | Status: SHIPPED | OUTPATIENT
Start: 2023-11-22 | End: 2023-11-22

## 2023-11-22 RX ORDER — HYDROCODONE BITARTRATE AND ACETAMINOPHEN 5; 325 MG/1; MG/1
1 TABLET ORAL EVERY 6 HOURS PRN
Qty: 12 TABLET | Refills: 0 | Status: SHIPPED | OUTPATIENT
Start: 2023-11-22 | End: 2023-11-25

## 2023-11-22 NOTE — ED PROVIDER NOTES
Encounter Date: 11/21/2023       History     Chief Complaint   Patient presents with    Abdominal Pain     Abd pain to lower quadrant from existing hernia     HPI    Seen and evaluated.  Presents with a chief complaint of abdominal pain.  Patient has longstanding inguinal hernia and intermittent umbilical hernia.  He has regular paracentesis.  Since the paracentesis he notes no periumbilical discomfort.  He is still passing gas.  He notes pain at the superior margin of the inguinal hernia.  He states it is moderate to severe persistent ongoing.  Denies any alleviating factors.  No associated fevers or chills.      Review of patient's allergies indicates:   Allergen Reactions    Invokana  [canagliflozin]      Other reaction(s): been very dizzy     Past Medical History:   Diagnosis Date    Asthma     Cardiomyopathy 10/21/2014    CHF (congestive heart failure)     Coronary artery disease     CRF (chronic renal failure)     Diabetes mellitus     Enlarged prostate     Hyperlipidemia     Hypertension     Neuropathy      Past Surgical History:   Procedure Laterality Date    ANGIOGRAPHY OF INTERNAL MAMMARY VESSEL N/A 3/11/2022    Procedure: Angiogram Internal Mammary;  Surgeon: Hank Lujan MD;  Location: [facility] CATH/EP LAB;  Service: Cardiology;  Laterality: N/A;    CARDIAC CATHETERIZATION      CARDIAC VALVE SURGERY      CATHETERIZATION OF BOTH LEFT AND RIGHT HEART Left 3/11/2022    Procedure: CATHETERIZATION, HEART, BOTH LEFT AND RIGHT;  Surgeon: Hank Lujan MD;  Location: [facility] CATH/EP LAB;  Service: Cardiology;  Laterality: Left;    CORONARY ANGIOGRAPHY N/A 3/11/2022    Procedure: ANGIOGRAM, CORONARY ARTERY;  Surgeon: Hank Lujan MD;  Location: [facility] CATH/EP LAB;  Service: Cardiology;  Laterality: N/A;    CORONARY BYPASS GRAFT ANGIOGRAPHY  3/11/2022    Procedure: Bypass graft study;  Surgeon: Hank Lujan MD;  Location: [facility] CATH/EP LAB;  Service: Cardiology;;    CYSTOSCOPY N/A 7/30/2019    Procedure:  CYSTOSCOPY;  Surgeon: Spencer Nguyen MD;  Location: [facility] OR;  Service: Urology;  Laterality: N/A;    INSERTION OF INTRAVASCULAR MICROAXIAL BLOOD PUMP N/A 8/31/2022    Procedure: INSERTION, IMPELLA;  Surgeon: Zach Jensen MD;  Location: [facility] CATH LAB;  Service: Cardiology;  Laterality: N/A;    PLACEMENT OF SWAN SEE CATHETER WITH IMAGING GUIDANCE  8/31/2022    Procedure: INSERTION, CATHETER, SWAN-SEE, WITH IMAGING GUIDANCE;  Surgeon: Zach Jensen MD;  Location: [facility] CATH LAB;  Service: Cardiology;;    SHOULDER ARTHROSCOPY  1985    TRANSRECTAL BIOPSY OF PROSTATE WITH ULTRASOUND GUIDANCE N/A 7/30/2019    Procedure: BIOPSY, PROSTATE, RECTAL APPROACH, WITH US GUIDANCE;  Surgeon: Spencer Nguyen MD;  Location: [facility];  Service: Urology;  Laterality: N/A;  procedure not performed, pt unable to tolerate    TRANSRECTAL BIOPSY OF PROSTATE WITH ULTRASOUND GUIDANCE N/A 8/8/2019    Procedure: BIOPSY, PROSTATE, RECTAL APPROACH, WITH US GUIDANCE;  Surgeon: Spencer Nguyen MD;  Location: [facility];  Service: Urology;  Laterality: N/A;     Family History   Problem Relation Age of Onset    No Known Problems Mother     Diabetes Father     Hypertension Father     Heart attack Father     Heart disease Father     Diabetes Sister     Heart disease Brother     Diabetes Brother     No Known Problems Maternal Grandmother     No Known Problems Maternal Grandfather     No Known Problems Paternal Grandmother     No Known Problems Paternal Grandfather     No Known Problems Maternal Aunt     No Known Problems Maternal Uncle     No Known Problems Paternal Aunt     No Known Problems Paternal Uncle     Anemia Neg Hx     Arrhythmia Neg Hx     Asthma Neg Hx     Clotting disorder Neg Hx     Fainting Neg Hx     Heart failure Neg Hx     Hyperlipidemia Neg Hx     Glaucoma Neg Hx      Social History     Tobacco Use    Smoking status: Former     Current packs/day: 0.00     Types: Cigarettes     Quit date: 6/19/1970     Years since  quittin.4    Smokeless tobacco: Never   Substance Use Topics    Alcohol use: Not Currently     Alcohol/week: 3.0 standard drinks of alcohol     Types: 3 Cans of beer per week     Comment: social    Drug use: No     Review of Systems   Constitutional:  Negative for fever.   Respiratory:  Negative for shortness of breath.    Cardiovascular:  Negative for chest pain.   Gastrointestinal:  Negative for abdominal pain and nausea.   Genitourinary:  Positive for scrotal swelling. Negative for dysuria.        Inguinal hernia   Skin:  Negative for rash.   Neurological:  Negative for weakness.   All other systems reviewed and are negative.      Physical Exam     Initial Vitals [23]   BP Pulse Resp Temp SpO2   (!) 164/73 63 (!) 22 97.8 °F (36.6 °C) 100 %      MAP       --         Physical Exam    Nursing note and vitals reviewed.  Constitutional: No distress.   HENT:   Head: Normocephalic and atraumatic.   Eyes: Conjunctivae are normal.   Cardiovascular:  Normal rate and regular rhythm.           Abdominal: Abdomen is soft.   Genitourinary:    Genitourinary Comments: Inguinal hernia with large right testicular hydrocele, tender at the superior portion of the hernia     Musculoskeletal:         General: Normal range of motion.     Neurological: He is alert and oriented to person, place, and time.   Skin: Skin is warm and dry.   Psychiatric: He has a normal mood and affect. His speech is normal.         ED Course   Procedures  Labs Reviewed   CBC W/ AUTO DIFFERENTIAL - Abnormal; Notable for the following components:       Result Value    RBC 3.32 (*)     Hemoglobin 9.6 (*)     Hematocrit 28.7 (*)     RDW 17.9 (*)     Lymph # 0.8 (*)     Gran % 77.6 (*)     Lymph % 11.9 (*)     All other components within normal limits   COMPREHENSIVE METABOLIC PANEL - Abnormal; Notable for the following components:    CO2 19 (*)     Glucose 121 (*)     Creatinine 1.9 (*)     Calcium 8.3 (*)     Albumin 3.4 (*)     ALT 8 (*)      eGFR 36.8 (*)     All other components within normal limits   URINALYSIS, REFLEX TO URINE CULTURE - Abnormal; Notable for the following components:    Specific Gravity, UA >1.030 (*)     Protein, UA Trace (*)     Glucose, UA 4+ (*)     All other components within normal limits    Narrative:     Specimen Source->Urine   URINALYSIS MICROSCOPIC - Abnormal; Notable for the following components:    Hyaline Casts, UA 0.00 (*)     All other components within normal limits    Narrative:     Specimen Source->Urine   LIPASE   LACTIC ACID, PLASMA          Imaging Results              CT Abdomen Pelvis With IV Contrast (Final result)  Result time 11/21/23 21:45:54      Final result by Sahil Houston MD (11/21/23 21:45:54)                   Narrative:    EXAM DESCRIPTION:  CT ABDOMEN PELVIS WITH IV CONTRAST  RadLex: CT ABDOMEN PELVIS WITH IV CONTRAST    CLINICAL HISTORY:  73 years  Male;  Abdominal pain, acute, nonlocalized    TECHNOLOGIST NOTES: Abdominal Pain (Abd pain to lower quadrant from existing hernia)    TECHNIQUE: Helical CT imaging was obtained of the abdomen and pelvis with multiplanar reconstructions. This exam was performed according to our departmental dose-optimization program, which includes automated exposure control, adjustment of the mA and/or kV according to patient size and/or use of iterative reconstruction techniques.    COMPARISON: None currently available    FINDINGS:  Abdomen:  No evidence of acute disease in the visualized lung bases.  The liver, spleen, pancreas, adrenal glands and kidneys show no acute abnormality. No evidence of acute pancreatitis.    There are multiple calcified gallstones. There is no gallbladder wall thickening. No pericholecystic fluid.  There is no gross biliary duct dilatation.  No significant hydronephrosis bilaterally.  Small fluid containing umbilical hernia.    No free air.    There is moderate free fluid.    There is no significant aneurysmal enlargement of the abdominal  aorta.    Pelvis:  There is no evidence of bowel obstruction.    There is a large right inguinal hernia that contains small bowel loops, fat and fluid.. No focal inflammatory changes . The appendix is unremarkable.  Evaluation of the bowel is limited without oral contrast or with incomplete bowel opacification.   There is moderate free fluid in the pelvis. The bladder is grossly unremarkable. Very lobular prostate. The prostate is approximately 6.4 x 5.9 x 7.5 cm and is probably pressing into the bladder.  No acute osseous finding.    IMPRESSION:  1.  Large right inguinal hernia that contains unobstructed small bowel  2.  Cholelithiasis  3.  Large prostate.  4.  Moderately large volume of free fluid in the abdomen and pelvis  5.  Please see body of report for non-critical findings.    Electronically signed by:  Sahil Houston MD  11/21/2023 09:45 PM CST Workstation: [id number]                                     US Scrotum And Testicles (Final result)  Result time 11/21/23 21:11:30      Final result by Nicko Camarillo DO (11/21/23 21:11:30)                   Narrative:    EXAMINATION: US SCROTUM AND TESTICLES    HISTORY: Swelling    COMPARISON: CT abdomen/pelvis 11/9/2023    TECHNIQUE: Multiple images of the scrotal contents using grayscale, color and spectral Doppler ultrasound.    FINDINGS:    RIGHT TESTICLE: Unable to visualize right testis.    RIGHT EPIDIDYMIS: Unable to visualize right epididymis    LEFT TESTICLE: Measures 4.4 x 3.6 x 2.3 cm, volume of 18.8 mL. Normal echotexture. No mass.    LEFT EPIDIDYMIS: No mass. Normal vascularity.    HYDROCELE: Large right hydrocele with multiple septations. No increased vascularity. No significant left hydrocele.    VARICOCELE: None.    OTHER FINDINGS: Right inguinal hernia containing fat and bowel.    IMPRESSION:  1.  Large right multiseptated hydrocele. Differential includes infection, hematoma, or possibly extratesticular complex cystic mass.  2.  Large right  inguinal hernia containing fat and bowel.  3.  Nonvisualization of the right testis and right epididymis, likely due to mass effect from above.  4.  Unremarkable appearance of the left testis and left epididymis.    Electronically signed by:  Nickomilly Camarillo DO  11/21/2023 09:11 PM [facility] Workstation: [id number]                                     Medications   ondansetron injection 4 mg (4 mg Intravenous Given 11/21/23 2008)   HYDROmorphone injection 0.5 mg (0.5 mg Intravenous Given 11/21/23 2007)   iohexoL (OMNIPAQUE 350) injection 75 mL (75 mLs Intravenous Given 11/21/23 2131)   HYDROmorphone injection 0.5 mg (0.5 mg Intravenous Given 11/21/23 2226)     Medical Decision Making  BP (!) 179/75   Pulse 64   Temp 97.8 °F (36.6 °C) (Oral)   Resp 18   Ht 6' (1.829 m)   Wt 74.8 kg (165 lb)   SpO2 100%   BMI 22.38 kg/m²     Seen and evaluated.  Presents with a chief complaint of inguinal hernia pain.  Has a history of ascites received a IR guided paracentesis today.  No umbilical pain.  His umbilical hernia is easily reducible and soft.  His abdomen is soft and not peritoneal.  He has tenderness at the superior margin of the inguinal hernia.  He notes the pain is moderate to severe persistent ongoing.  Laboratory evaluation was completed.  Lactic was 1.8.  Elected to scan with CT contrast given concerns for possible changes to the hernia.  There was no stranding or evidence of incarceration.  The hernia defect that is large.  He has nonobstructed small bowel in the hernia.  He also notes he is passing gas.  He is received the secondary dose of pain medication is pending the results of the urinalysis.  He does not appear to have a surgical emergency.  If he is able to achieve pain control he will be appropriate for discharge.    Rom Ojeda MD MPH  11/21/2023 10:35 PM          Amount and/or Complexity of Data Reviewed  Radiology: ordered.    Risk  OTC drugs.  Prescription drug management.               ED Course as  of 11/22/23 1612   Wed Nov 22, 2023   0248 I took over care of this patient at the end of Dr. Ojeda shift while awaiting urinalysis.  Urine has returned and reveals increased specific gravity trace protein 4+ glucose no other abnormalities no UTI no hematuria.  Patient was reassessed he was resting comfortably blood pressure 180/84 pulse 65 patient was in no distress soft abdomen with multiple hernias including ventral hernia umbilical hernia and large right inguinal hernia that is fully reducible no erythema or warmth.  Does report tenderness in the site of the right inguinal hernia.  No signs of peritonitis or acute abdomen.  Discussed with patient that urinalysis is normal CT did not reveal any significant abnormalities that would explain his pain ultrasound of the scrotum and testicles also did not reveal any significant abnormalities other than the large right inguinal hernia that would explain pain.  Patient reports he was comfortable with being discharged home he was going to continue his bowel regimen to help prevent constipation and will add magnesium citrate since at this time he was not had a bowel movement 3 days.  And I will prescribe Norco 5/325 mg 12 tablets to help control pain  Karli Bhatia M.D.  2:50 AM 11/22/2023   [RM]      ED Course User Index  [RM] Karli Bhatia MD                        Clinical Impression:  Final diagnoses:  [N50.89] Scrotal swelling (Primary)  [K40.91] Unilateral recurrent inguinal hernia without obstruction or gangrene  [K59.00] Constipation, unspecified constipation type  [R10.31] Right lower quadrant abdominal pain          ED Disposition Condition    Discharge Stable          ED Prescriptions       Medication Sig Dispense Start Date End Date Auth. Provider    magnesium citrate solution (Expires today) Take 296 mLs by mouth once. for 1 dose 296 mL 11/22/2023 11/22/2023 Karli Bhatia MD    HYDROcodone-acetaminophen (NORCO) 5-325 mg per tablet Take 1  tablet by mouth every 6 (six) hours as needed for Pain. 12 tablet 11/22/2023 11/25/2023 Karli Bhatia MD          Follow-up Information       Follow up With Specialties Details Why Contact Info Additional Information    Millie Hayes, APRN,FNP-C Family Medicine Schedule an appointment as soon as possible for a visit   [address] [facility] [address]  [phone]       [facility] - Emergency Dept Emergency Medicine Go to  If symptoms worsen [address] [facility] [address]  [phone] 1st floor    Please follow up with general surgery  Schedule an appointment as soon as possible for a visit                 Rom Ojeda Jr., MD  11/21/23 [id number]       Rom Ojeda Jr., MD  11/22/23 2918

## 2023-11-22 NOTE — ED NOTES
UA pending collection.  Pt attempting to void.  Provided water to pt. Reviewed with provider, does not want straight cath at this time.

## 2023-11-27 ENCOUNTER — OFFICE VISIT (OUTPATIENT)
Dept: SURGERY | Facility: CLINIC | Age: 73
End: 2023-11-27
Payer: MEDICARE

## 2023-11-27 VITALS — HEART RATE: 69 BPM | TEMPERATURE: 97 F | SYSTOLIC BLOOD PRESSURE: 165 MMHG | DIASTOLIC BLOOD PRESSURE: 78 MMHG

## 2023-11-27 DIAGNOSIS — R18.8 CIRRHOSIS OF LIVER WITH ASCITES, UNSPECIFIED HEPATIC CIRRHOSIS TYPE: ICD-10-CM

## 2023-11-27 DIAGNOSIS — K42.9 UMBILICAL HERNIA WITHOUT OBSTRUCTION AND WITHOUT GANGRENE: ICD-10-CM

## 2023-11-27 DIAGNOSIS — I50.9 CHF (CONGESTIVE HEART FAILURE): ICD-10-CM

## 2023-11-27 DIAGNOSIS — K74.60 CIRRHOSIS OF LIVER WITH ASCITES, UNSPECIFIED HEPATIC CIRRHOSIS TYPE: ICD-10-CM

## 2023-11-27 DIAGNOSIS — K40.40: Primary | ICD-10-CM

## 2023-11-27 DIAGNOSIS — J44.9 CHRONIC OBSTRUCTIVE PULMONARY DISEASE, UNSPECIFIED COPD TYPE: Chronic | ICD-10-CM

## 2023-11-27 DIAGNOSIS — I50.22 CHRONIC SYSTOLIC HEART FAILURE: Chronic | ICD-10-CM

## 2023-11-27 PROCEDURE — 3066F NEPHROPATHY DOC TX: CPT | Mod: CPTII,S$GLB,, | Performed by: SURGERY

## 2023-11-27 PROCEDURE — 1101F PR PT FALLS ASSESS DOC 0-1 FALLS W/OUT INJ PAST YR: ICD-10-PCS | Mod: CPTII,S$GLB,, | Performed by: SURGERY

## 2023-11-27 PROCEDURE — 3078F PR MOST RECENT DIASTOLIC BLOOD PRESSURE < 80 MM HG: ICD-10-PCS | Mod: CPTII,S$GLB,, | Performed by: SURGERY

## 2023-11-27 PROCEDURE — 3044F PR MOST RECENT HEMOGLOBIN A1C LEVEL <7.0%: ICD-10-PCS | Mod: CPTII,S$GLB,, | Performed by: SURGERY

## 2023-11-27 PROCEDURE — 1125F AMNT PAIN NOTED PAIN PRSNT: CPT | Mod: CPTII,S$GLB,, | Performed by: SURGERY

## 2023-11-27 PROCEDURE — 1125F PR PAIN SEVERITY QUANTIFIED, PAIN PRESENT: ICD-10-PCS | Mod: CPTII,S$GLB,, | Performed by: SURGERY

## 2023-11-27 PROCEDURE — 3077F SYST BP >= 140 MM HG: CPT | Mod: CPTII,S$GLB,, | Performed by: SURGERY

## 2023-11-27 PROCEDURE — 3061F NEG MICROALBUMINURIA REV: CPT | Mod: CPTII,S$GLB,, | Performed by: SURGERY

## 2023-11-27 PROCEDURE — 1157F ADVNC CARE PLAN IN RCRD: CPT | Mod: CPTII,S$GLB,, | Performed by: SURGERY

## 2023-11-27 PROCEDURE — 1157F PR ADVANCE CARE PLAN OR EQUIV PRESENT IN MEDICAL RECORD: ICD-10-PCS | Mod: CPTII,S$GLB,, | Performed by: SURGERY

## 2023-11-27 PROCEDURE — 4010F ACE/ARB THERAPY RXD/TAKEN: CPT | Mod: CPTII,S$GLB,, | Performed by: SURGERY

## 2023-11-27 PROCEDURE — 3066F PR DOCUMENTATION OF TREATMENT FOR NEPHROPATHY: ICD-10-PCS | Mod: CPTII,S$GLB,, | Performed by: SURGERY

## 2023-11-27 PROCEDURE — 3078F DIAST BP <80 MM HG: CPT | Mod: CPTII,S$GLB,, | Performed by: SURGERY

## 2023-11-27 PROCEDURE — 3288F FALL RISK ASSESSMENT DOCD: CPT | Mod: CPTII,S$GLB,, | Performed by: SURGERY

## 2023-11-27 PROCEDURE — 3288F PR FALLS RISK ASSESSMENT DOCUMENTED: ICD-10-PCS | Mod: CPTII,S$GLB,, | Performed by: SURGERY

## 2023-11-27 PROCEDURE — 4010F PR ACE/ARB THEARPY RXD/TAKEN: ICD-10-PCS | Mod: CPTII,S$GLB,, | Performed by: SURGERY

## 2023-11-27 PROCEDURE — 99205 OFFICE O/P NEW HI 60 MIN: CPT | Mod: S$GLB,,, | Performed by: SURGERY

## 2023-11-27 PROCEDURE — 99205 PR OFFICE/OUTPT VISIT, NEW, LEVL V, 60-74 MIN: ICD-10-PCS | Mod: S$GLB,,, | Performed by: SURGERY

## 2023-11-27 PROCEDURE — 3061F PR NEG MICROALBUMINURIA RESULT DOCUMENTED/REVIEW: ICD-10-PCS | Mod: CPTII,S$GLB,, | Performed by: SURGERY

## 2023-11-27 PROCEDURE — 1101F PT FALLS ASSESS-DOCD LE1/YR: CPT | Mod: CPTII,S$GLB,, | Performed by: SURGERY

## 2023-11-27 PROCEDURE — 3077F PR MOST RECENT SYSTOLIC BLOOD PRESSURE >= 140 MM HG: ICD-10-PCS | Mod: CPTII,S$GLB,, | Performed by: SURGERY

## 2023-11-27 PROCEDURE — 3044F HG A1C LEVEL LT 7.0%: CPT | Mod: CPTII,S$GLB,, | Performed by: SURGERY

## 2023-11-27 NOTE — H&P
GENERAL SURGERY  OUTPATIENT H&P    REASON FOR VISIT/CC: Inguinal and umbilical hernias    HPI: Baldo Carney is a 73 y.o. male with complicated medical history including coronary artery disease, aortic stenosis status post CABG and AVR, ischemic cardiomyopathy, type 2 diabetes, hypertension, hyperlipidemia, COPD, ascites of the liver from possible cirrhosis versus CHF requiring frequent paracentesis approximately every other week who presents for evaluation of a very large bowel containing right inguinal hernia and umbilical hernia. Patient has had multiple presentations for obstruction and pain due the hernias.  Due to his complicated medical history and increased surgical risk immediate intervention has been deferred. Patient was most recent admission was in October of this year which time he had resolution of pain and obstruction. He was also required presentation to the emergency room on November 22, 2023 for ongoing abdominal pain localized mostly in the right lower quadrant. Patient has recently been evaluated by his cardiologist, Dr. Finch, who understands he is a high-risk for surgical intervention however his optimize at this point. Cardiac imaging includes an echo from September 2023 with an ejection fraction of 25% with ongoing severely restricted aortic stenosis and nuclear stress test which showed left ventricular systolic dysfunction with EF of 22%, no arrhythmias, no ischemia but evidence of large previous infarct. Patient now states that his hernias are affecting his daily life.  They are making it difficult for him to have a bowel movement in cause frequent pain and discomfort. He expressed understanding that he is a high-risk for surgery given his multiple medical comorbidities but would like to pursue surgical intervention if possible in order to improve his quality of life.    I have reviewed the patient's chart including prior progress notes, procedures and testing.     ROS:    Review of Systems    PROBLEM LIST:  Patient Active Problem List   Diagnosis    Hypertension    Hyperlipidemia    Diabetes mellitus type 2 without retinopathy    Aortic stenosis status post AVR    CAD (coronary artery disease)    S/P CABG (coronary artery bypass graft)    Erectile dysfunction    Peripheral polyneuropathy    Elevated PSA, greater than or equal to 20 ng/ml    Generalized OA    Chronic systolic heart failure    BPH (benign prostatic hyperplasia)    Lymphopenia    COPD (chronic obstructive pulmonary disease)    Anemia    Ascites of liver    Vitreous hemorrhage, right eye    Vitreous degeneration of both eyes    OAG (open angle glaucoma) suspect, high risk, bilateral    Age-related nuclear cataract of both eyes    Mild nonproliferative diabetic retinopathy of both eyes without macular edema associated with type 2 diabetes mellitus    Hypothyroidism    Cirrhosis    Ischemic cardiomyopathy    CAD (coronary artery disease), autologous vein bypass graft    CRF (chronic renal failure)    Umbilical hernia         HISTORY  Past Medical History:   Diagnosis Date    Asthma     Cardiomyopathy 10/21/2014    CHF (congestive heart failure)     Coronary artery disease     CRF (chronic renal failure)     Diabetes mellitus     Enlarged prostate     Hyperlipidemia     Hypertension     Neuropathy        Past Surgical History:   Procedure Laterality Date    ANGIOGRAPHY OF INTERNAL MAMMARY VESSEL N/A 3/11/2022    Procedure: Angiogram Internal Mammary;  Surgeon: Hank Lujan MD;  Location: [facility] CATH/EP LAB;  Service: Cardiology;  Laterality: N/A;    CARDIAC CATHETERIZATION      CARDIAC VALVE SURGERY      CATHETERIZATION OF BOTH LEFT AND RIGHT HEART Left 3/11/2022    Procedure: CATHETERIZATION, HEART, BOTH LEFT AND RIGHT;  Surgeon: Hank Lujan MD;  Location: [facility] CATH/EP LAB;  Service: Cardiology;  Laterality: Left;    CORONARY ANGIOGRAPHY N/A 3/11/2022    Procedure: ANGIOGRAM, CORONARY ARTERY;  Surgeon: Hank DENSON  MD Tai;  Location: [facility] CATH/EP LAB;  Service: Cardiology;  Laterality: N/A;    CORONARY BYPASS GRAFT ANGIOGRAPHY  3/11/2022    Procedure: Bypass graft study;  Surgeon: Hank Lujan MD;  Location: [facility] CATH/EP LAB;  Service: Cardiology;;    CYSTOSCOPY N/A 2019    Procedure: CYSTOSCOPY;  Surgeon: Spencer Nguyen MD;  Location: [facility];  Service: Urology;  Laterality: N/A;    INSERTION OF INTRAVASCULAR MICROAXIAL BLOOD PUMP N/A 2022    Procedure: INSERTION, IMPELLA;  Surgeon: Zach Jensen MD;  Location: [facility] CATH LAB;  Service: Cardiology;  Laterality: N/A;    PLACEMENT OF SWAN SEE CATHETER WITH IMAGING GUIDANCE  2022    Procedure: INSERTION, CATHETER, SWAN-SEE, WITH IMAGING GUIDANCE;  Surgeon: Zach Jensen MD;  Location: [facility] CATH LAB;  Service: Cardiology;;    SHOULDER ARTHROSCOPY  1985    TRANSRECTAL BIOPSY OF PROSTATE WITH ULTRASOUND GUIDANCE N/A 2019    Procedure: BIOPSY, PROSTATE, RECTAL APPROACH, WITH US GUIDANCE;  Surgeon: Spencer Nguyen MD;  Location: [facility];  Service: Urology;  Laterality: N/A;  procedure not performed, pt unable to tolerate    TRANSRECTAL BIOPSY OF PROSTATE WITH ULTRASOUND GUIDANCE N/A 2019    Procedure: BIOPSY, PROSTATE, RECTAL APPROACH, WITH US GUIDANCE;  Surgeon: Spencer Nguyen MD;  Location: [facility];  Service: Urology;  Laterality: N/A;       Social History     Tobacco Use    Smoking status: Former     Current packs/day: 0.00     Types: Cigarettes     Quit date: 1970     Years since quittin.4    Smokeless tobacco: Never   Substance Use Topics    Alcohol use: Not Currently     Alcohol/week: 3.0 standard drinks of alcohol     Types: 3 Cans of beer per week     Comment: social    Drug use: No       Family History   Problem Relation Age of Onset    No Known Problems Mother     Diabetes Father     Hypertension Father     Heart attack Father     Heart disease Father     Diabetes Sister     Heart disease Brother      Diabetes Brother     No Known Problems Maternal Grandmother     No Known Problems Maternal Grandfather     No Known Problems Paternal Grandmother     No Known Problems Paternal Grandfather     No Known Problems Maternal Aunt     No Known Problems Maternal Uncle     No Known Problems Paternal Aunt     No Known Problems Paternal Uncle     Anemia Neg Hx     Arrhythmia Neg Hx     Asthma Neg Hx     Clotting disorder Neg Hx     Fainting Neg Hx     Heart failure Neg Hx     Hyperlipidemia Neg Hx     Glaucoma Neg Hx          MEDS:  Current Outpatient Medications on File Prior to Visit   Medication Sig Dispense Refill    albuterol (PROVENTIL/VENTOLIN HFA) 90 mcg/actuation inhaler INHALE 1 TO 2 PUFFS INTO THE LUNGS EVERY 4 TO 6 HOURS AS NEEDED FOR WHEEZE AND SHORTNESS OF BREATH 18 g 2    aspirin (ECOTRIN) 81 MG EC tablet Take 81 mg by mouth once daily.      atorvastatin (LIPITOR) 40 MG tablet Take 1 tablet (40 mg total) by mouth once daily. 90 tablet 0    budesonide-glycopyr-formoterol (BREZTRI AEROSPHERE) 160-9-4.8 mcg/actuation HFAA Inhale into the lungs daily as needed.      clopidogreL (PLAVIX) 75 mg tablet       cyanocobalamin 1,000 mcg/mL injection Inject 1 mL (1,000 mcg total) into the muscle every 14 (fourteen) days. 1 mL 11    FARXIGA 5 mg Tab tablet Take 5 mg by mouth once daily.      finasteride (PROSCAR) 5 mg tablet Take 1 tablet (5 mg total) by mouth once daily. 90 tablet 4    fluticasone furoate-vilanteroL (BREO ELLIPTA) 100-25 mcg/dose diskus inhaler Inhale 1 puff into the lungs once daily. (DAILY CONTROLLER) 3 each 3    fluticasone propionate (FLONASE) 50 mcg/actuation nasal spray 1 SPRAY (50 MCG TOTAL) BY EACH NOSTRIL ROUTE ONCE DAILY. 16 mL 1    furosemide (LASIX) 40 MG tablet Take by mouth once daily.      gabapentin (NEURONTIN) 300 MG capsule Take 1 capsule (300 mg total) by mouth every evening. 90 capsule 1    levothyroxine (SYNTHROID) 100 MCG tablet Take 1 tablet (100 mcg total) by mouth before breakfast.  With no other meds or food 30 tablet 5    metoprolol succinate (TOPROL-XL) 25 MG 24 hr tablet Take 1 tablet (25 mg total) by mouth once daily. 90 tablet 1    sacubitriL-valsartan (ENTRESTO) 24-26 mg per tablet Take 1 tablet by mouth 2 (two) times daily. 180 tablet 1    tamsulosin (FLOMAX) 0.4 mg Cap TAKE ONE CAPSULE BY MOUTH DAILY AT 5 PM 90 capsule 11    traMADoL (ULTRAM) 50 mg tablet Take 1 tablet (50 mg total) by mouth every 8 (eight) hours as needed for Pain. 21 tablet 0    ZONTIVITY 2.08 mg Tab Take 1 tablet by mouth once daily.      hydrALAZINE (APRESOLINE) 50 MG tablet Take 1 tablet (50 mg total) by mouth every 12 (twelve) hours. 60 tablet 0    isosorbide dinitrate (ISORDIL) 20 MG tablet Take 1 tablet (20 mg total) by mouth 3 (three) times daily. 90 tablet 0    prasugreL (EFFIENT) 10 mg Tab Take 1 tablet (10 mg total) by mouth once daily. 30 tablet 11     No current facility-administered medications on file prior to visit.       ALLERGIES:  Review of patient's allergies indicates:   Allergen Reactions    Invokana  [canagliflozin]      Other reaction(s): been very dizzy         VITALS:  Vitals:    11/27/23 1325   BP: (!) 165/78   Pulse: 69   Temp: 97.1 °F (36.2 °C)         PHYSICAL EXAM:  Physical Exam  Vitals reviewed.   Constitutional:       General: He is not in acute distress.     Appearance: Normal appearance. He is well-developed.   HENT:      Head: Normocephalic and atraumatic.   Eyes:      General: No scleral icterus.  Neck:      Trachea: No tracheal deviation.   Cardiovascular:      Rate and Rhythm: Normal rate and regular rhythm.      Pulses: Normal pulses.   Pulmonary:      Effort: Pulmonary effort is normal. No respiratory distress.      Breath sounds: Normal breath sounds. No wheezing.   Abdominal:      General: There is no distension.      Palpations: Abdomen is soft.      Tenderness: There is no abdominal tenderness.      Comments: Patient has a reducible umbilical hernia with a small fascial  defect of proximally 1 cm.  The skin overlying it was very thinned in the hernias likely filled with ascitic fluid. There was no ulceration. Patient has a massive right inguinal hernia containing bowel with significant amount of ascitic fluid present. It was non reducible.  There is no overlying skin changes.   Musculoskeletal:         General: No swelling or tenderness. Normal range of motion.      Cervical back: Normal range of motion and neck supple. No rigidity.      Right lower leg: No edema.      Left lower leg: No edema.      Comments: No lower extremity edema   Skin:     General: Skin is warm and dry.      Coloration: Skin is not jaundiced.      Findings: No erythema.   Neurological:      General: No focal deficit present.      Mental Status: He is alert and oriented to person, place, and time. He is not disoriented.      Motor: No weakness or abnormal muscle tone.   Psychiatric:         Mood and Affect: Mood normal.         Behavior: Behavior normal.         Thought Content: Thought content normal.         Judgment: Judgment normal.           LABS:  Lab Results   Component Value Date    WBC 6.29 11/21/2023    WBC 7.9 09/27/2017    RBC 3.32 (L) 11/21/2023    HGB 9.6 (L) 11/21/2023    HCT 28.7 (L) 11/21/2023    HCT 26 (L) 11/03/2014     11/21/2023     Lab Results   Component Value Date     (H) 11/21/2023    GLU 93 01/10/2019     11/21/2023     01/10/2019    K 4.3 11/21/2023     11/21/2023     01/10/2019    CO2 19 (L) 11/21/2023    BUN 21 11/21/2023    CREATININE 1.9 (H) 11/21/2023    CREATININE 1.36 01/10/2019    CALCIUM 8.3 (L) 11/21/2023     Lab Results   Component Value Date    ALT 8 (L) 11/21/2023    AST 21 11/21/2023    ALKPHOS 55 11/21/2023    BILITOT 0.6 11/21/2023     Lab Results   Component Value Date    MG 2.3 09/15/2023    PHOS 5.7 (H) 12/27/2021       MELD 3.0: 16 at 11/9/2023 12:12 AM  MELD-Na: 15 at 11/9/2023 12:12 AM  Calculated from:  Serum Creatinine: 2.2  mg/dL at 11/9/2023 12:12 AM  Serum Sodium: 138 mmol/L (Using max of 137 mmol/L) at 11/9/2023 12:12 AM  Total Bilirubin: 0.6 mg/dL (Using min of 1 mg/dL) at 11/9/2023 12:12 AM  Serum Albumin: 3.2 g/dL at 11/9/2023 12:12 AM  INR(ratio): 1.1 at 11/7/2023 10:49 AM  Age at listing (hypothetical): 73 years  Sex: Male at 11/9/2023 12:12 AM    Child Mckinney 8 - Class B        STUDIES:  Images and reports were personally reviewed.  CT abdomen pelvis 11/21/2023  FINDINGS:  Abdomen:  No evidence of acute disease in the visualized lung bases.  The liver, spleen, pancreas, adrenal glands and kidneys show no acute abnormality. No evidence of acute pancreatitis.    There are multiple calcified gallstones. There is no gallbladder wall thickening. No pericholecystic fluid.  There is no gross biliary duct dilatation.  No significant hydronephrosis bilaterally.  Small fluid containing umbilical hernia.     No free air.    There is moderate free fluid.    There is no significant aneurysmal enlargement of the abdominal aorta.     Pelvis:  There is no evidence of bowel obstruction.    There is a large right inguinal hernia that contains small bowel loops, fat and fluid.. No focal inflammatory changes . The appendix is unremarkable.  Evaluation of the bowel is limited without oral contrast or with incomplete bowel opacification.   There is moderate free fluid in the pelvis. The bladder is grossly unremarkable. Very lobular prostate. The prostate is approximately 6.4 x 5.9 x 7.5 cm and is probably pressing into the bladder.  No acute osseous finding.     IMPRESSION:  1.  Large right inguinal hernia that contains unobstructed small bowel  2.  Cholelithiasis  3.  Large prostate.  4.  Moderately large volume of free fluid in the abdomen and pelvis  5.  Please see body of report for non-critical findings.        ASSESSMENT & PLAN:  73 y.o. male with large symptomatic right inguinal hernia, umbilical hernia, significant cardiac disease, ascites  from cirrhosis versus heart failure  -at this time patient was right inguinal hernias causing him significant discomfort and multiple presentations to the emergency room for pain and admissions for obstruction  -he has been evaluated by his cardiologist in his deemed high-risk for noncardiac surgery but is optimized at this point and no further intervention is recommended   -patient and I had an extensive discussion about the risks associated with general anesthesia which would be required for this large hernia that include heart attack stroke and death, we also extensively reviewed the high-risk of recurrence given his recurrent ascites which will add increased pressure to our hernia repair and possibly lead to abdominal leakage of peritoneal fluid and or bacterial peritonitis   -these risks in addition to associated risk of hernia surgery which include pain, bleeding, scarring, seroma, hematoma, mesh infection, recurrence, etc. reviewed at length as well  -currently his MELD score is 16 and he is a child's Mckinney classification B  -she has no significant platelet abnormalities, elevated INR/PT, hyperbilirubinemia, his albumin is appropriate   -I discussed with him repair options which would be an open repair of the right inguinal hernia with mesh and an open repair of the umbilical hernia with mesh, we would plan to admit him postoperatively we would also plan to place a surgical drain in the scrotum and likely a tunneled peritoneal catheter for periodic drainage of ascitic fluid at the time of surgery   -will also plan for prophylactic antibiotic if tunneled line is placed until it was removed, would likely keep it in place for 2 weeks  -will schedule for surgery on 12/07/2023 at [facility]  -repeat lab workup and chest x-ray/EKG ordered

## 2023-11-27 NOTE — H&P (VIEW-ONLY)
GENERAL SURGERY  OUTPATIENT H&P    REASON FOR VISIT/CC: Inguinal and umbilical hernias    HPI: Baldo Carney is a 73 y.o. male with complicated medical history including coronary artery disease, aortic stenosis status post CABG and AVR, ischemic cardiomyopathy, type 2 diabetes, hypertension, hyperlipidemia, COPD, ascites of the liver from possible cirrhosis versus CHF requiring frequent paracentesis approximately every other week who presents for evaluation of a very large bowel containing right inguinal hernia and umbilical hernia. Patient has had multiple presentations for obstruction and pain due the hernias.  Due to his complicated medical history and increased surgical risk immediate intervention has been deferred. Patient was most recent admission was in October of this year which time he had resolution of pain and obstruction. He was also required presentation to the emergency room on November 22, 2023 for ongoing abdominal pain localized mostly in the right lower quadrant. Patient has recently been evaluated by his cardiologist, Dr. Finch, who understands he is a high-risk for surgical intervention however his optimize at this point. Cardiac imaging includes an echo from September 2023 with an ejection fraction of 25% with ongoing severely restricted aortic stenosis and nuclear stress test which showed left ventricular systolic dysfunction with EF of 22%, no arrhythmias, no ischemia but evidence of large previous infarct. Patient now states that his hernias are affecting his daily life.  They are making it difficult for him to have a bowel movement in cause frequent pain and discomfort. He expressed understanding that he is a high-risk for surgery given his multiple medical comorbidities but would like to pursue surgical intervention if possible in order to improve his quality of life.    I have reviewed the patient's chart including prior progress notes, procedures and testing.     ROS:    Review of Systems    PROBLEM LIST:  Patient Active Problem List   Diagnosis    Hypertension    Hyperlipidemia    Diabetes mellitus type 2 without retinopathy    Aortic stenosis status post AVR    CAD (coronary artery disease)    S/P CABG (coronary artery bypass graft)    Erectile dysfunction    Peripheral polyneuropathy    Elevated PSA, greater than or equal to 20 ng/ml    Generalized OA    Chronic systolic heart failure    BPH (benign prostatic hyperplasia)    Lymphopenia    COPD (chronic obstructive pulmonary disease)    Anemia    Ascites of liver    Vitreous hemorrhage, right eye    Vitreous degeneration of both eyes    OAG (open angle glaucoma) suspect, high risk, bilateral    Age-related nuclear cataract of both eyes    Mild nonproliferative diabetic retinopathy of both eyes without macular edema associated with type 2 diabetes mellitus    Hypothyroidism    Cirrhosis    Ischemic cardiomyopathy    CAD (coronary artery disease), autologous vein bypass graft    CRF (chronic renal failure)    Umbilical hernia         HISTORY  Past Medical History:   Diagnosis Date    Asthma     Cardiomyopathy 10/21/2014    CHF (congestive heart failure)     Coronary artery disease     CRF (chronic renal failure)     Diabetes mellitus     Enlarged prostate     Hyperlipidemia     Hypertension     Neuropathy        Past Surgical History:   Procedure Laterality Date    ANGIOGRAPHY OF INTERNAL MAMMARY VESSEL N/A 3/11/2022    Procedure: Angiogram Internal Mammary;  Surgeon: Hank Lujan MD;  Location: [facility] CATH/EP LAB;  Service: Cardiology;  Laterality: N/A;    CARDIAC CATHETERIZATION      CARDIAC VALVE SURGERY      CATHETERIZATION OF BOTH LEFT AND RIGHT HEART Left 3/11/2022    Procedure: CATHETERIZATION, HEART, BOTH LEFT AND RIGHT;  Surgeon: Hank Lujan MD;  Location: [facility] CATH/EP LAB;  Service: Cardiology;  Laterality: Left;    CORONARY ANGIOGRAPHY N/A 3/11/2022    Procedure: ANGIOGRAM, CORONARY ARTERY;  Surgeon: Hank DENSON  MD Tai;  Location: [facility] CATH/EP LAB;  Service: Cardiology;  Laterality: N/A;    CORONARY BYPASS GRAFT ANGIOGRAPHY  3/11/2022    Procedure: Bypass graft study;  Surgeon: Hank Lujan MD;  Location: [facility] CATH/EP LAB;  Service: Cardiology;;    CYSTOSCOPY N/A 2019    Procedure: CYSTOSCOPY;  Surgeon: Spencer Nguyen MD;  Location: [facility];  Service: Urology;  Laterality: N/A;    INSERTION OF INTRAVASCULAR MICROAXIAL BLOOD PUMP N/A 2022    Procedure: INSERTION, IMPELLA;  Surgeon: Zach Jensen MD;  Location: [facility] CATH LAB;  Service: Cardiology;  Laterality: N/A;    PLACEMENT OF SWAN SEE CATHETER WITH IMAGING GUIDANCE  2022    Procedure: INSERTION, CATHETER, SWAN-SEE, WITH IMAGING GUIDANCE;  Surgeon: Zach Jensen MD;  Location: [facility] CATH LAB;  Service: Cardiology;;    SHOULDER ARTHROSCOPY  1985    TRANSRECTAL BIOPSY OF PROSTATE WITH ULTRASOUND GUIDANCE N/A 2019    Procedure: BIOPSY, PROSTATE, RECTAL APPROACH, WITH US GUIDANCE;  Surgeon: Spencer Nguyen MD;  Location: [facility];  Service: Urology;  Laterality: N/A;  procedure not performed, pt unable to tolerate    TRANSRECTAL BIOPSY OF PROSTATE WITH ULTRASOUND GUIDANCE N/A 2019    Procedure: BIOPSY, PROSTATE, RECTAL APPROACH, WITH US GUIDANCE;  Surgeon: Spencer Nguyen MD;  Location: [facility];  Service: Urology;  Laterality: N/A;       Social History     Tobacco Use    Smoking status: Former     Current packs/day: 0.00     Types: Cigarettes     Quit date: 1970     Years since quittin.4    Smokeless tobacco: Never   Substance Use Topics    Alcohol use: Not Currently     Alcohol/week: 3.0 standard drinks of alcohol     Types: 3 Cans of beer per week     Comment: social    Drug use: No       Family History   Problem Relation Age of Onset    No Known Problems Mother     Diabetes Father     Hypertension Father     Heart attack Father     Heart disease Father     Diabetes Sister     Heart disease Brother      Diabetes Brother     No Known Problems Maternal Grandmother     No Known Problems Maternal Grandfather     No Known Problems Paternal Grandmother     No Known Problems Paternal Grandfather     No Known Problems Maternal Aunt     No Known Problems Maternal Uncle     No Known Problems Paternal Aunt     No Known Problems Paternal Uncle     Anemia Neg Hx     Arrhythmia Neg Hx     Asthma Neg Hx     Clotting disorder Neg Hx     Fainting Neg Hx     Heart failure Neg Hx     Hyperlipidemia Neg Hx     Glaucoma Neg Hx          MEDS:  Current Outpatient Medications on File Prior to Visit   Medication Sig Dispense Refill    albuterol (PROVENTIL/VENTOLIN HFA) 90 mcg/actuation inhaler INHALE 1 TO 2 PUFFS INTO THE LUNGS EVERY 4 TO 6 HOURS AS NEEDED FOR WHEEZE AND SHORTNESS OF BREATH 18 g 2    aspirin (ECOTRIN) 81 MG EC tablet Take 81 mg by mouth once daily.      atorvastatin (LIPITOR) 40 MG tablet Take 1 tablet (40 mg total) by mouth once daily. 90 tablet 0    budesonide-glycopyr-formoterol (BREZTRI AEROSPHERE) 160-9-4.8 mcg/actuation HFAA Inhale into the lungs daily as needed.      clopidogreL (PLAVIX) 75 mg tablet       cyanocobalamin 1,000 mcg/mL injection Inject 1 mL (1,000 mcg total) into the muscle every 14 (fourteen) days. 1 mL 11    FARXIGA 5 mg Tab tablet Take 5 mg by mouth once daily.      finasteride (PROSCAR) 5 mg tablet Take 1 tablet (5 mg total) by mouth once daily. 90 tablet 4    fluticasone furoate-vilanteroL (BREO ELLIPTA) 100-25 mcg/dose diskus inhaler Inhale 1 puff into the lungs once daily. (DAILY CONTROLLER) 3 each 3    fluticasone propionate (FLONASE) 50 mcg/actuation nasal spray 1 SPRAY (50 MCG TOTAL) BY EACH NOSTRIL ROUTE ONCE DAILY. 16 mL 1    furosemide (LASIX) 40 MG tablet Take by mouth once daily.      gabapentin (NEURONTIN) 300 MG capsule Take 1 capsule (300 mg total) by mouth every evening. 90 capsule 1    levothyroxine (SYNTHROID) 100 MCG tablet Take 1 tablet (100 mcg total) by mouth before breakfast.  With no other meds or food 30 tablet 5    metoprolol succinate (TOPROL-XL) 25 MG 24 hr tablet Take 1 tablet (25 mg total) by mouth once daily. 90 tablet 1    sacubitriL-valsartan (ENTRESTO) 24-26 mg per tablet Take 1 tablet by mouth 2 (two) times daily. 180 tablet 1    tamsulosin (FLOMAX) 0.4 mg Cap TAKE ONE CAPSULE BY MOUTH DAILY AT 5 PM 90 capsule 11    traMADoL (ULTRAM) 50 mg tablet Take 1 tablet (50 mg total) by mouth every 8 (eight) hours as needed for Pain. 21 tablet 0    ZONTIVITY 2.08 mg Tab Take 1 tablet by mouth once daily.      hydrALAZINE (APRESOLINE) 50 MG tablet Take 1 tablet (50 mg total) by mouth every 12 (twelve) hours. 60 tablet 0    isosorbide dinitrate (ISORDIL) 20 MG tablet Take 1 tablet (20 mg total) by mouth 3 (three) times daily. 90 tablet 0    prasugreL (EFFIENT) 10 mg Tab Take 1 tablet (10 mg total) by mouth once daily. 30 tablet 11     No current facility-administered medications on file prior to visit.       ALLERGIES:  Review of patient's allergies indicates:   Allergen Reactions    Invokana  [canagliflozin]      Other reaction(s): been very dizzy         VITALS:  Vitals:    11/27/23 1325   BP: (!) 165/78   Pulse: 69   Temp: 97.1 °F (36.2 °C)         PHYSICAL EXAM:  Physical Exam  Vitals reviewed.   Constitutional:       General: He is not in acute distress.     Appearance: Normal appearance. He is well-developed.   HENT:      Head: Normocephalic and atraumatic.   Eyes:      General: No scleral icterus.  Neck:      Trachea: No tracheal deviation.   Cardiovascular:      Rate and Rhythm: Normal rate and regular rhythm.      Pulses: Normal pulses.   Pulmonary:      Effort: Pulmonary effort is normal. No respiratory distress.      Breath sounds: Normal breath sounds. No wheezing.   Abdominal:      General: There is no distension.      Palpations: Abdomen is soft.      Tenderness: There is no abdominal tenderness.      Comments: Patient has a reducible umbilical hernia with a small fascial  defect of proximally 1 cm.  The skin overlying it was very thinned in the hernias likely filled with ascitic fluid. There was no ulceration. Patient has a massive right inguinal hernia containing bowel with significant amount of ascitic fluid present. It was non reducible.  There is no overlying skin changes.   Musculoskeletal:         General: No swelling or tenderness. Normal range of motion.      Cervical back: Normal range of motion and neck supple. No rigidity.      Right lower leg: No edema.      Left lower leg: No edema.      Comments: No lower extremity edema   Skin:     General: Skin is warm and dry.      Coloration: Skin is not jaundiced.      Findings: No erythema.   Neurological:      General: No focal deficit present.      Mental Status: He is alert and oriented to person, place, and time. He is not disoriented.      Motor: No weakness or abnormal muscle tone.   Psychiatric:         Mood and Affect: Mood normal.         Behavior: Behavior normal.         Thought Content: Thought content normal.         Judgment: Judgment normal.           LABS:  Lab Results   Component Value Date    WBC 6.29 11/21/2023    WBC 7.9 09/27/2017    RBC 3.32 (L) 11/21/2023    HGB 9.6 (L) 11/21/2023    HCT 28.7 (L) 11/21/2023    HCT 26 (L) 11/03/2014     11/21/2023     Lab Results   Component Value Date     (H) 11/21/2023    GLU 93 01/10/2019     11/21/2023     01/10/2019    K 4.3 11/21/2023     11/21/2023     01/10/2019    CO2 19 (L) 11/21/2023    BUN 21 11/21/2023    CREATININE 1.9 (H) 11/21/2023    CREATININE 1.36 01/10/2019    CALCIUM 8.3 (L) 11/21/2023     Lab Results   Component Value Date    ALT 8 (L) 11/21/2023    AST 21 11/21/2023    ALKPHOS 55 11/21/2023    BILITOT 0.6 11/21/2023     Lab Results   Component Value Date    MG 2.3 09/15/2023    PHOS 5.7 (H) 12/27/2021       MELD 3.0: 16 at 11/9/2023 12:12 AM  MELD-Na: 15 at 11/9/2023 12:12 AM  Calculated from:  Serum Creatinine: 2.2  mg/dL at 11/9/2023 12:12 AM  Serum Sodium: 138 mmol/L (Using max of 137 mmol/L) at 11/9/2023 12:12 AM  Total Bilirubin: 0.6 mg/dL (Using min of 1 mg/dL) at 11/9/2023 12:12 AM  Serum Albumin: 3.2 g/dL at 11/9/2023 12:12 AM  INR(ratio): 1.1 at 11/7/2023 10:49 AM  Age at listing (hypothetical): 73 years  Sex: Male at 11/9/2023 12:12 AM    Child Mckinney 8 - Class B        STUDIES:  Images and reports were personally reviewed.  CT abdomen pelvis 11/21/2023  FINDINGS:  Abdomen:  No evidence of acute disease in the visualized lung bases.  The liver, spleen, pancreas, adrenal glands and kidneys show no acute abnormality. No evidence of acute pancreatitis.    There are multiple calcified gallstones. There is no gallbladder wall thickening. No pericholecystic fluid.  There is no gross biliary duct dilatation.  No significant hydronephrosis bilaterally.  Small fluid containing umbilical hernia.     No free air.    There is moderate free fluid.    There is no significant aneurysmal enlargement of the abdominal aorta.     Pelvis:  There is no evidence of bowel obstruction.    There is a large right inguinal hernia that contains small bowel loops, fat and fluid.. No focal inflammatory changes . The appendix is unremarkable.  Evaluation of the bowel is limited without oral contrast or with incomplete bowel opacification.   There is moderate free fluid in the pelvis. The bladder is grossly unremarkable. Very lobular prostate. The prostate is approximately 6.4 x 5.9 x 7.5 cm and is probably pressing into the bladder.  No acute osseous finding.     IMPRESSION:  1.  Large right inguinal hernia that contains unobstructed small bowel  2.  Cholelithiasis  3.  Large prostate.  4.  Moderately large volume of free fluid in the abdomen and pelvis  5.  Please see body of report for non-critical findings.        ASSESSMENT & PLAN:  73 y.o. male with large symptomatic right inguinal hernia, umbilical hernia, significant cardiac disease, ascites  from cirrhosis versus heart failure  -at this time patient was right inguinal hernias causing him significant discomfort and multiple presentations to the emergency room for pain and admissions for obstruction  -he has been evaluated by his cardiologist in his deemed high-risk for noncardiac surgery but is optimized at this point and no further intervention is recommended   -patient and I had an extensive discussion about the risks associated with general anesthesia which would be required for this large hernia that include heart attack stroke and death, we also extensively reviewed the high-risk of recurrence given his recurrent ascites which will add increased pressure to our hernia repair and possibly lead to abdominal leakage of peritoneal fluid and or bacterial peritonitis   -these risks in addition to associated risk of hernia surgery which include pain, bleeding, scarring, seroma, hematoma, mesh infection, recurrence, etc. reviewed at length as well  -currently his MELD score is 16 and he is a child's Mckinney classification B  -she has no significant platelet abnormalities, elevated INR/PT, hyperbilirubinemia, his albumin is appropriate   -I discussed with him repair options which would be an open repair of the right inguinal hernia with mesh and an open repair of the umbilical hernia with mesh, we would plan to admit him postoperatively we would also plan to place a surgical drain in the scrotum and likely a tunneled peritoneal catheter for periodic drainage of ascitic fluid at the time of surgery   -will also plan for prophylactic antibiotic if tunneled line is placed until it was removed, would likely keep it in place for 2 weeks  -will schedule for surgery on 12/07/2023 at [facility]  -repeat lab workup and chest x-ray/EKG ordered

## 2023-11-28 ENCOUNTER — LAB VISIT (OUTPATIENT)
Dept: LAB | Facility: HOSPITAL | Age: 73
End: 2023-11-28
Attending: SURGERY
Payer: MEDICARE

## 2023-11-28 DIAGNOSIS — K42.9 UMBILICAL HERNIA WITHOUT OBSTRUCTION AND WITHOUT GANGRENE: ICD-10-CM

## 2023-11-28 DIAGNOSIS — K40.40: ICD-10-CM

## 2023-11-28 DIAGNOSIS — K74.60 CIRRHOSIS OF LIVER WITH ASCITES, UNSPECIFIED HEPATIC CIRRHOSIS TYPE: ICD-10-CM

## 2023-11-28 DIAGNOSIS — I50.22 CHRONIC SYSTOLIC HEART FAILURE: Chronic | ICD-10-CM

## 2023-11-28 DIAGNOSIS — J44.9 CHRONIC OBSTRUCTIVE PULMONARY DISEASE, UNSPECIFIED COPD TYPE: Chronic | ICD-10-CM

## 2023-11-28 DIAGNOSIS — R18.8 CIRRHOSIS OF LIVER WITH ASCITES, UNSPECIFIED HEPATIC CIRRHOSIS TYPE: ICD-10-CM

## 2023-11-28 LAB
ALBUMIN SERPL BCP-MCNC: 3.1 G/DL (ref 3.5–5.2)
ALP SERPL-CCNC: 61 U/L (ref 55–135)
ALT SERPL W/O P-5'-P-CCNC: 7 U/L (ref 10–44)
ANION GAP SERPL CALC-SCNC: 5 MMOL/L (ref 8–16)
APTT PPP: 31.9 SEC (ref 21–32)
AST SERPL-CCNC: 13 U/L (ref 10–40)
BASOPHILS # BLD AUTO: 0.06 K/UL (ref 0–0.2)
BASOPHILS NFR BLD: 1.1 % (ref 0–1.9)
BILIRUB SERPL-MCNC: 0.6 MG/DL (ref 0.1–1)
BUN SERPL-MCNC: 20 MG/DL (ref 8–23)
CALCIUM SERPL-MCNC: 8.4 MG/DL (ref 8.7–10.5)
CHLORIDE SERPL-SCNC: 110 MMOL/L (ref 95–110)
CO2 SERPL-SCNC: 23 MMOL/L (ref 23–29)
CREAT SERPL-MCNC: 2.1 MG/DL (ref 0.5–1.4)
DIFFERENTIAL METHOD: ABNORMAL
EOSINOPHIL # BLD AUTO: 0.1 K/UL (ref 0–0.5)
EOSINOPHIL NFR BLD: 2.2 % (ref 0–8)
ERYTHROCYTE [DISTWIDTH] IN BLOOD BY AUTOMATED COUNT: 18.1 % (ref 11.5–14.5)
EST. GFR  (NO RACE VARIABLE): 32.6 ML/MIN/1.73 M^2
GLUCOSE SERPL-MCNC: 94 MG/DL (ref 70–110)
HCT VFR BLD AUTO: 29.9 % (ref 40–54)
HGB BLD-MCNC: 9.7 G/DL (ref 14–18)
IMM GRANULOCYTES # BLD AUTO: 0.02 K/UL (ref 0–0.04)
IMM GRANULOCYTES NFR BLD AUTO: 0.4 % (ref 0–0.5)
INR PPP: 1 (ref 0.8–1.2)
LYMPHOCYTES # BLD AUTO: 0.6 K/UL (ref 1–4.8)
LYMPHOCYTES NFR BLD: 11.4 % (ref 18–48)
MCH RBC QN AUTO: 28.2 PG (ref 27–31)
MCHC RBC AUTO-ENTMCNC: 32.4 G/DL (ref 32–36)
MCV RBC AUTO: 87 FL (ref 82–98)
MONOCYTES # BLD AUTO: 0.4 K/UL (ref 0.3–1)
MONOCYTES NFR BLD: 7.8 % (ref 4–15)
NEUTROPHILS # BLD AUTO: 4.1 K/UL (ref 1.8–7.7)
NEUTROPHILS NFR BLD: 77.1 % (ref 38–73)
NRBC BLD-RTO: 0 /100 WBC
PLATELET # BLD AUTO: 257 K/UL (ref 150–450)
PMV BLD AUTO: 9.6 FL (ref 9.2–12.9)
POTASSIUM SERPL-SCNC: 4.4 MMOL/L (ref 3.5–5.1)
PROT SERPL-MCNC: 6.2 G/DL (ref 6–8.4)
PROTHROMBIN TIME: 11.4 SEC (ref 9–12.5)
RBC # BLD AUTO: 3.44 M/UL (ref 4.6–6.2)
SODIUM SERPL-SCNC: 138 MMOL/L (ref 136–145)
WBC # BLD AUTO: 5.36 K/UL (ref 3.9–12.7)

## 2023-11-28 PROCEDURE — 85730 THROMBOPLASTIN TIME PARTIAL: CPT | Performed by: SURGERY

## 2023-11-28 PROCEDURE — 85025 COMPLETE CBC W/AUTO DIFF WBC: CPT | Performed by: SURGERY

## 2023-11-28 PROCEDURE — 80053 COMPREHEN METABOLIC PANEL: CPT | Performed by: SURGERY

## 2023-11-28 PROCEDURE — 36415 COLL VENOUS BLD VENIPUNCTURE: CPT | Performed by: SURGERY

## 2023-11-28 PROCEDURE — 85610 PROTHROMBIN TIME: CPT | Performed by: SURGERY

## 2023-11-29 RX ORDER — LEVOTHYROXINE SODIUM 100 UG/1
100 TABLET ORAL
Qty: 90 TABLET | Refills: 0 | Status: SHIPPED | OUTPATIENT
Start: 2023-11-29 | End: 2024-02-29 | Stop reason: SDUPTHER

## 2023-12-04 ENCOUNTER — HOSPITAL ENCOUNTER (OUTPATIENT)
Dept: PREADMISSION TESTING | Facility: HOSPITAL | Age: 73
Discharge: HOME | End: 2023-12-04
Attending: SURGERY
Payer: MEDICARE

## 2023-12-04 VITALS
OXYGEN SATURATION: 99 % | TEMPERATURE: 98 F | BODY MASS INDEX: 22.65 KG/M2 | DIASTOLIC BLOOD PRESSURE: 71 MMHG | SYSTOLIC BLOOD PRESSURE: 151 MMHG | HEART RATE: 67 BPM | WEIGHT: 167.25 LBS | RESPIRATION RATE: 18 BRPM | HEIGHT: 72 IN

## 2023-12-04 DIAGNOSIS — K40.40: ICD-10-CM

## 2023-12-04 DIAGNOSIS — K74.60 CIRRHOSIS OF LIVER WITH ASCITES, UNSPECIFIED HEPATIC CIRRHOSIS TYPE: ICD-10-CM

## 2023-12-04 DIAGNOSIS — J44.9 CHRONIC OBSTRUCTIVE PULMONARY DISEASE, UNSPECIFIED COPD TYPE: Chronic | ICD-10-CM

## 2023-12-04 DIAGNOSIS — R18.8 CIRRHOSIS OF LIVER WITH ASCITES, UNSPECIFIED HEPATIC CIRRHOSIS TYPE: ICD-10-CM

## 2023-12-04 DIAGNOSIS — Z01.818 PREOP TESTING: Primary | ICD-10-CM

## 2023-12-04 DIAGNOSIS — K42.9 UMBILICAL HERNIA WITHOUT OBSTRUCTION AND WITHOUT GANGRENE: ICD-10-CM

## 2023-12-04 DIAGNOSIS — I50.22 CHRONIC SYSTOLIC HEART FAILURE: Chronic | ICD-10-CM

## 2023-12-04 LAB
ABO + RH BLD: NORMAL
BLD GP AB SCN CELLS X3 SERPL QL: NORMAL
SPECIMEN OUTDATE: NORMAL

## 2023-12-04 PROCEDURE — 93010 ELECTROCARDIOGRAM REPORT: CPT | Mod: ,,, | Performed by: GENERAL PRACTICE

## 2023-12-04 PROCEDURE — 93005 ELECTROCARDIOGRAM TRACING: CPT | Performed by: GENERAL PRACTICE

## 2023-12-04 PROCEDURE — 93010 EKG 12-LEAD: ICD-10-PCS | Mod: ,,, | Performed by: GENERAL PRACTICE

## 2023-12-04 PROCEDURE — 86901 BLOOD TYPING SEROLOGIC RH(D): CPT | Performed by: SURGERY

## 2023-12-04 NOTE — CARE UPDATE
Patient returned phone call states he does not have Zontivity or effient at all .   He was on aspirin and plavix but was told by MD to stop for surgery - last dose 11/30/23

## 2023-12-04 NOTE — DISCHARGE INSTRUCTIONS
INSTRUCTIONS  To confirm your doctor has scheduled your surgery for: December 7, 2023    COVID TESTING SCHEDULED:     Morning of surgery please check in with registration near Parking Garage Entrance then proceed to Outpatient Surgery Department.    Preop nurses will call the afternoon prior to surgery between 4:00 and 6:00 PM with your final arrival time.  PLEASE NOTE:  The surgery schedule has many variables which may affect the time of your surgery case. Family members should be available if your surgery time changes. Plan to be here the day of your procedure between 4-6 hours.    TAKE ONLY THESE MEDICATIONS WITH A SMALL SIP OF WATER THE MORNING OF SURGERY: see list    DO NOT TAKE THESE MEDICATIONS 5-7 DAYS PRIOR to your procedure per your surgeon's request: ASPIRIN, ALEVE, BC powder, KRISTINA SELTZER, IBUPROFEN, FISH OIL, VITAMIN E, OR HERBALS   (May take Tylenol)    If you are prescribed any types of blood thinners (Aspirin, Coumadin, Plavix, Pradaxa, Xarelto, Aggrenox, Effient, Eliquis, Savasya, Brilinta or any other), please ask your surgeon how many days before scheduled procedure should you stop taking them. You may also need to verify with prescribing physician if it is OK to stop your blood thinners.      INSTRUCTIONS IMPORTANT!!  Do not eat or drink anything after midnight.  ONLY if you are diabetic, check your sugar in the morning before your procedure.  Do not smoke, vape or drink alcoholic beverages 24 hours prior to your procedure.  Shower the night before AND the morning of your procedure with a Chlorhexidine wash such as hibiclens or Dial antibacterial soap from neck down. You may use your own shampoo and face wash. This helps your skin to be as bacteria free as possible.  If you wear contact lenses, dentures, hearing aids or glasses, bring a container to put them in and give to a family member.    Please leave all jewelry, piercings and valuables at home.  DO NOT remove hair from the surgery site.    If your condition changes such as fever, cough, etc, please notify your surgeon.   Make arrangements in advance for transportation home by a responsible adult.  You must make arrangements for transportation, TAXI'S, UBER'S OR LYFTS ARE NOT ALLOWED.        If you have any questions about these instructions, call Pre-Op Admit  Nursing at [phone] or the Pre-Op Day Surgery Unit at [phone].

## 2023-12-05 ENCOUNTER — HOSPITAL ENCOUNTER (OUTPATIENT)
Dept: INTERVENTIONAL RADIOLOGY/VASCULAR | Facility: HOSPITAL | Age: 73
Discharge: HOME | End: 2023-12-05
Attending: INTERNAL MEDICINE
Payer: MEDICARE

## 2023-12-05 VITALS
HEART RATE: 71 BPM | OXYGEN SATURATION: 100 % | DIASTOLIC BLOOD PRESSURE: 74 MMHG | RESPIRATION RATE: 18 BRPM | SYSTOLIC BLOOD PRESSURE: 148 MMHG

## 2023-12-05 DIAGNOSIS — R18.8 OTHER ASCITES: ICD-10-CM

## 2023-12-05 LAB
APPEARANCE FLD: NORMAL
BODY FLD TYPE: NORMAL
COLOR FLD: YELLOW
LYMPHOCYTES NFR FLD MANUAL: 82 %
MESOTHL CELL NFR FLD MANUAL: 9 %
MONOS+MACROS NFR FLD MANUAL: 7 %
NEUTROPHILS NFR FLD MANUAL: 2 %
WBC # FLD: 104 /CU MM

## 2023-12-05 PROCEDURE — 49083 IR PARACENTESIS WITH IMAGING: ICD-10-PCS | Mod: ,,, | Performed by: PHYSICIAN ASSISTANT

## 2023-12-05 PROCEDURE — 49083 ABD PARACENTESIS W/IMAGING: CPT | Performed by: PHYSICIAN ASSISTANT

## 2023-12-05 PROCEDURE — 63600175 PHARM REV CODE 636 W HCPCS: Mod: JZ,JG | Performed by: PHYSICIAN ASSISTANT

## 2023-12-05 PROCEDURE — C1729 CATH, DRAINAGE: HCPCS

## 2023-12-05 PROCEDURE — 49083 ABD PARACENTESIS W/IMAGING: CPT | Mod: ,,, | Performed by: PHYSICIAN ASSISTANT

## 2023-12-05 PROCEDURE — P9047 ALBUMIN (HUMAN), 25%, 50ML: HCPCS | Mod: JZ,JG | Performed by: PHYSICIAN ASSISTANT

## 2023-12-05 PROCEDURE — 89051 BODY FLUID CELL COUNT: CPT | Performed by: INTERNAL MEDICINE

## 2023-12-05 RX ORDER — ALBUMIN HUMAN 250 G/1000ML
50 SOLUTION INTRAVENOUS ONCE
Status: COMPLETED | OUTPATIENT
Start: 2023-12-05 | End: 2023-12-05

## 2023-12-05 RX ADMIN — ALBUMIN (HUMAN) 50 G: 12.5 SOLUTION INTRAVENOUS at 10:12

## 2023-12-07 ENCOUNTER — HOSPITAL ENCOUNTER (OUTPATIENT)
Facility: HOSPITAL | Age: 73
Discharge: HOME HEALTH | End: 2023-12-09
Attending: SURGERY | Admitting: SURGERY
Payer: MEDICARE

## 2023-12-07 ENCOUNTER — ANESTHESIA (OUTPATIENT)
Dept: SURGERY | Facility: HOSPITAL | Age: 73
End: 2023-12-07
Payer: MEDICARE

## 2023-12-07 ENCOUNTER — ANESTHESIA EVENT (OUTPATIENT)
Dept: SURGERY | Facility: HOSPITAL | Age: 73
End: 2023-12-07
Payer: MEDICARE

## 2023-12-07 DIAGNOSIS — I50.22 CHRONIC SYSTOLIC HEART FAILURE: Chronic | ICD-10-CM

## 2023-12-07 DIAGNOSIS — J44.9 CHRONIC OBSTRUCTIVE PULMONARY DISEASE, UNSPECIFIED COPD TYPE: Chronic | ICD-10-CM

## 2023-12-07 DIAGNOSIS — I35.0 AORTIC STENOSIS, SEVERE: Chronic | ICD-10-CM

## 2023-12-07 DIAGNOSIS — K74.60 CIRRHOSIS OF LIVER WITH ASCITES, UNSPECIFIED HEPATIC CIRRHOSIS TYPE: ICD-10-CM

## 2023-12-07 DIAGNOSIS — K40.40: Primary | ICD-10-CM

## 2023-12-07 DIAGNOSIS — K42.9 UMBILICAL HERNIA WITHOUT OBSTRUCTION AND WITHOUT GANGRENE: ICD-10-CM

## 2023-12-07 DIAGNOSIS — I50.9 CHF (CONGESTIVE HEART FAILURE): ICD-10-CM

## 2023-12-07 DIAGNOSIS — R18.8 ASCITIC FLUID: ICD-10-CM

## 2023-12-07 DIAGNOSIS — R18.8 CIRRHOSIS OF LIVER WITH ASCITES, UNSPECIFIED HEPATIC CIRRHOSIS TYPE: ICD-10-CM

## 2023-12-07 LAB
ALBUMIN SERPL BCP-MCNC: 3 G/DL (ref 3.5–5.2)
ALP SERPL-CCNC: 50 U/L (ref 55–135)
ALT SERPL W/O P-5'-P-CCNC: 6 U/L (ref 10–44)
ANION GAP SERPL CALC-SCNC: 6 MMOL/L (ref 8–16)
AST SERPL-CCNC: 11 U/L (ref 10–40)
BASOPHILS # BLD AUTO: 0.06 K/UL (ref 0–0.2)
BASOPHILS NFR BLD: 1 % (ref 0–1.9)
BILIRUB SERPL-MCNC: 0.7 MG/DL (ref 0.1–1)
BUN SERPL-MCNC: 22 MG/DL (ref 8–23)
CALCIUM SERPL-MCNC: 7.9 MG/DL (ref 8.7–10.5)
CHLORIDE SERPL-SCNC: 110 MMOL/L (ref 95–110)
CO2 SERPL-SCNC: 20 MMOL/L (ref 23–29)
CREAT SERPL-MCNC: 1.8 MG/DL (ref 0.5–1.4)
DIFFERENTIAL METHOD: ABNORMAL
EOSINOPHIL # BLD AUTO: 0 K/UL (ref 0–0.5)
EOSINOPHIL NFR BLD: 0.6 % (ref 0–8)
ERYTHROCYTE [DISTWIDTH] IN BLOOD BY AUTOMATED COUNT: 17.8 % (ref 11.5–14.5)
EST. GFR  (NO RACE VARIABLE): 39.3 ML/MIN/1.73 M^2
GLUCOSE SERPL-MCNC: 105 MG/DL (ref 70–110)
GLUCOSE SERPL-MCNC: 106 MG/DL (ref 70–110)
GLUCOSE SERPL-MCNC: 99 MG/DL (ref 70–110)
HCT VFR BLD AUTO: 29.5 % (ref 40–54)
HGB BLD-MCNC: 9.6 G/DL (ref 14–18)
IMM GRANULOCYTES # BLD AUTO: 0.03 K/UL (ref 0–0.04)
IMM GRANULOCYTES NFR BLD AUTO: 0.5 % (ref 0–0.5)
LYMPHOCYTES # BLD AUTO: 0.6 K/UL (ref 1–4.8)
LYMPHOCYTES NFR BLD: 8.9 % (ref 18–48)
MAGNESIUM SERPL-MCNC: 1.9 MG/DL (ref 1.6–2.6)
MCH RBC QN AUTO: 28.4 PG (ref 27–31)
MCHC RBC AUTO-ENTMCNC: 32.5 G/DL (ref 32–36)
MCV RBC AUTO: 87 FL (ref 82–98)
MONOCYTES # BLD AUTO: 0.5 K/UL (ref 0.3–1)
MONOCYTES NFR BLD: 7.3 % (ref 4–15)
NEUTROPHILS # BLD AUTO: 5.1 K/UL (ref 1.8–7.7)
NEUTROPHILS NFR BLD: 81.7 % (ref 38–73)
NRBC BLD-RTO: 0 /100 WBC
PLATELET # BLD AUTO: 216 K/UL (ref 150–450)
PMV BLD AUTO: 10.8 FL (ref 9.2–12.9)
POTASSIUM SERPL-SCNC: 4.2 MMOL/L (ref 3.5–5.1)
PROT SERPL-MCNC: 6 G/DL (ref 6–8.4)
RBC # BLD AUTO: 3.38 M/UL (ref 4.6–6.2)
SODIUM SERPL-SCNC: 136 MMOL/L (ref 136–145)
WBC # BLD AUTO: 6.19 K/UL (ref 3.9–12.7)

## 2023-12-07 PROCEDURE — 36000707: Performed by: SURGERY

## 2023-12-07 PROCEDURE — 85025 COMPLETE CBC W/AUTO DIFF WBC: CPT | Performed by: STUDENT IN AN ORGANIZED HEALTH CARE EDUCATION/TRAINING PROGRAM

## 2023-12-07 PROCEDURE — 36620 ARTERIAL: ICD-10-PCS | Mod: 59,,, | Performed by: ANESTHESIOLOGY

## 2023-12-07 PROCEDURE — 27201423 OPTIME MED/SURG SUP & DEVICES STERILE SUPPLY: Performed by: SURGERY

## 2023-12-07 PROCEDURE — 80053 COMPREHEN METABOLIC PANEL: CPT | Performed by: STUDENT IN AN ORGANIZED HEALTH CARE EDUCATION/TRAINING PROGRAM

## 2023-12-07 PROCEDURE — 25000003 PHARM REV CODE 250: Performed by: NURSE ANESTHETIST, CERTIFIED REGISTERED

## 2023-12-07 PROCEDURE — 37000009 HC ANESTHESIA EA ADD 15 MINS: Performed by: SURGERY

## 2023-12-07 PROCEDURE — C9290 INJ, BUPIVACAINE LIPOSOME: HCPCS

## 2023-12-07 PROCEDURE — 25000003 PHARM REV CODE 250: Performed by: STUDENT IN AN ORGANIZED HEALTH CARE EDUCATION/TRAINING PROGRAM

## 2023-12-07 PROCEDURE — 49591 RPR AA HRN 1ST < 3 CM RDC: CPT | Mod: 51,,, | Performed by: SURGERY

## 2023-12-07 PROCEDURE — 21000000 HC CCU ICU ROOM CHARGE

## 2023-12-07 PROCEDURE — 83735 ASSAY OF MAGNESIUM: CPT | Performed by: STUDENT IN AN ORGANIZED HEALTH CARE EDUCATION/TRAINING PROGRAM

## 2023-12-07 PROCEDURE — 96374 THER/PROPH/DIAG INJ IV PUSH: CPT | Mod: 59

## 2023-12-07 PROCEDURE — 99900035 HC TECH TIME PER 15 MIN (STAT)

## 2023-12-07 PROCEDURE — 71000033 HC RECOVERY, INTIAL HOUR: Performed by: SURGERY

## 2023-12-07 PROCEDURE — 49507 PRP I/HERN INIT BLOCK >5 YR: CPT | Mod: RT,,, | Performed by: SURGERY

## 2023-12-07 PROCEDURE — 49591 PR REPAIR ANT ABD HERNIA INITIAL < 3 CM REDUCIBLE: ICD-10-PCS | Mod: 51,,, | Performed by: SURGERY

## 2023-12-07 PROCEDURE — 49422 REMOVE TUNNELED IP CATH: CPT | Mod: 51,,, | Performed by: SURGERY

## 2023-12-07 PROCEDURE — 49422 PR REMOVAL TUNNELED INTRAPERITONEAL CATHETER: ICD-10-PCS | Mod: 51,,, | Performed by: SURGERY

## 2023-12-07 PROCEDURE — 25000003 PHARM REV CODE 250: Performed by: ANESTHESIOLOGY

## 2023-12-07 PROCEDURE — 49507 PR REPAIR ING HERNIA,5+Y/O,STRANG: ICD-10-PCS | Mod: RT,,, | Performed by: SURGERY

## 2023-12-07 PROCEDURE — 94761 N-INVAS EAR/PLS OXIMETRY MLT: CPT

## 2023-12-07 PROCEDURE — 25000003 PHARM REV CODE 250: Performed by: SURGERY

## 2023-12-07 PROCEDURE — D9220A PRA ANESTHESIA: Mod: ANES,,, | Performed by: ANESTHESIOLOGY

## 2023-12-07 PROCEDURE — 63600175 PHARM REV CODE 636 W HCPCS

## 2023-12-07 PROCEDURE — D9220A PRA ANESTHESIA: ICD-10-PCS | Mod: CRNA,,, | Performed by: NURSE ANESTHETIST, CERTIFIED REGISTERED

## 2023-12-07 PROCEDURE — 63600175 PHARM REV CODE 636 W HCPCS: Performed by: SURGERY

## 2023-12-07 PROCEDURE — D9220A PRA ANESTHESIA: Mod: CRNA,,, | Performed by: NURSE ANESTHETIST, CERTIFIED REGISTERED

## 2023-12-07 PROCEDURE — 36000706: Performed by: SURGERY

## 2023-12-07 PROCEDURE — 37000008 HC ANESTHESIA 1ST 15 MINUTES: Performed by: SURGERY

## 2023-12-07 PROCEDURE — C1781 MESH (IMPLANTABLE): HCPCS | Performed by: SURGERY

## 2023-12-07 PROCEDURE — 63600175 PHARM REV CODE 636 W HCPCS: Performed by: ANESTHESIOLOGY

## 2023-12-07 PROCEDURE — 36620 INSERTION CATHETER ARTERY: CPT | Mod: 59,,, | Performed by: ANESTHESIOLOGY

## 2023-12-07 PROCEDURE — C1750 CATH, HEMODIALYSIS,LONG-TERM: HCPCS | Performed by: SURGERY

## 2023-12-07 PROCEDURE — 71000039 HC RECOVERY, EACH ADD'L HOUR: Performed by: SURGERY

## 2023-12-07 PROCEDURE — D9220A PRA ANESTHESIA: ICD-10-PCS | Mod: ANES,,, | Performed by: ANESTHESIOLOGY

## 2023-12-07 PROCEDURE — 63600175 PHARM REV CODE 636 W HCPCS: Performed by: NURSE ANESTHETIST, CERTIFIED REGISTERED

## 2023-12-07 DEVICE — IMPLANTABLE DEVICE: Type: IMPLANTABLE DEVICE | Site: ABDOMEN | Status: FUNCTIONAL

## 2023-12-07 DEVICE — MESH FLAT SHEET 3IN 6IN 0112680: Type: IMPLANTABLE DEVICE | Site: INGUINAL | Status: FUNCTIONAL

## 2023-12-07 RX ORDER — ONDANSETRON HYDROCHLORIDE 2 MG/ML
INJECTION, SOLUTION INTRAMUSCULAR; INTRAVENOUS
Status: DISCONTINUED | OUTPATIENT
Start: 2023-12-07 | End: 2023-12-18

## 2023-12-07 RX ORDER — IBUPROFEN 200 MG
16 TABLET ORAL
Status: DISCONTINUED | OUTPATIENT
Start: 2023-12-07 | End: 2023-12-09 | Stop reason: HOSPADM

## 2023-12-07 RX ORDER — ACETAMINOPHEN 325 MG/1
650 TABLET ORAL EVERY 8 HOURS PRN
Status: DISCONTINUED | OUTPATIENT
Start: 2023-12-07 | End: 2023-12-09 | Stop reason: HOSPADM

## 2023-12-07 RX ORDER — OXYCODONE HYDROCHLORIDE 5 MG/1
5 TABLET ORAL
Status: DISCONTINUED | OUTPATIENT
Start: 2023-12-07 | End: 2023-12-07

## 2023-12-07 RX ORDER — CEFAZOLIN SODIUM 2 G/50ML
2 SOLUTION INTRAVENOUS
Status: COMPLETED | OUTPATIENT
Start: 2023-12-07 | End: 2023-12-07

## 2023-12-07 RX ORDER — INSULIN ASPART 100 [IU]/ML
0-5 INJECTION, SOLUTION INTRAVENOUS; SUBCUTANEOUS
Status: DISCONTINUED | OUTPATIENT
Start: 2023-12-07 | End: 2023-12-09 | Stop reason: HOSPADM

## 2023-12-07 RX ORDER — LIDOCAINE HYDROCHLORIDE 20 MG/ML
INJECTION, SOLUTION EPIDURAL; INFILTRATION; INTRACAUDAL; PERINEURAL
Status: DISCONTINUED | OUTPATIENT
Start: 2023-12-07 | End: 2023-12-18

## 2023-12-07 RX ORDER — HYDRALAZINE HYDROCHLORIDE 25 MG/1
50 TABLET, FILM COATED ORAL EVERY 12 HOURS
Status: DISCONTINUED | OUTPATIENT
Start: 2023-12-07 | End: 2023-12-09 | Stop reason: HOSPADM

## 2023-12-07 RX ORDER — HYDRALAZINE HYDROCHLORIDE 20 MG/ML
10 INJECTION INTRAMUSCULAR; INTRAVENOUS EVERY 6 HOURS PRN
Status: DISCONTINUED | OUTPATIENT
Start: 2023-12-07 | End: 2023-12-09 | Stop reason: HOSPADM

## 2023-12-07 RX ORDER — ONDANSETRON 4 MG/1
4 TABLET, ORALLY DISINTEGRATING ORAL EVERY 6 HOURS PRN
Status: DISCONTINUED | OUTPATIENT
Start: 2023-12-07 | End: 2023-12-09 | Stop reason: HOSPADM

## 2023-12-07 RX ORDER — TAMSULOSIN HYDROCHLORIDE 0.4 MG/1
0.4 CAPSULE ORAL DAILY
Status: DISCONTINUED | OUTPATIENT
Start: 2023-12-08 | End: 2023-12-09 | Stop reason: HOSPADM

## 2023-12-07 RX ORDER — FINASTERIDE 5 MG/1
5 TABLET, FILM COATED ORAL DAILY
Status: DISCONTINUED | OUTPATIENT
Start: 2023-12-08 | End: 2023-12-09 | Stop reason: HOSPADM

## 2023-12-07 RX ORDER — FENTANYL CITRATE 50 UG/ML
25 INJECTION, SOLUTION INTRAMUSCULAR; INTRAVENOUS EVERY 5 MIN PRN
Status: DISCONTINUED | OUTPATIENT
Start: 2023-12-07 | End: 2023-12-07

## 2023-12-07 RX ORDER — GABAPENTIN 300 MG/1
300 CAPSULE ORAL NIGHTLY
Status: DISCONTINUED | OUTPATIENT
Start: 2023-12-07 | End: 2023-12-09 | Stop reason: HOSPADM

## 2023-12-07 RX ORDER — FENTANYL CITRATE 50 UG/ML
INJECTION, SOLUTION INTRAMUSCULAR; INTRAVENOUS
Status: DISCONTINUED | OUTPATIENT
Start: 2023-12-07 | End: 2023-12-18

## 2023-12-07 RX ORDER — ALBUTEROL SULFATE 0.83 MG/ML
2.5 SOLUTION RESPIRATORY (INHALATION) EVERY 4 HOURS PRN
Status: DISCONTINUED | OUTPATIENT
Start: 2023-12-07 | End: 2023-12-09 | Stop reason: HOSPADM

## 2023-12-07 RX ORDER — LEVOTHYROXINE SODIUM 100 UG/1
100 TABLET ORAL
Status: DISCONTINUED | OUTPATIENT
Start: 2023-12-08 | End: 2023-12-09 | Stop reason: HOSPADM

## 2023-12-07 RX ORDER — ISOSORBIDE DINITRATE 10 MG/1
20 TABLET ORAL 3 TIMES DAILY
Status: DISCONTINUED | OUTPATIENT
Start: 2023-12-07 | End: 2023-12-09 | Stop reason: HOSPADM

## 2023-12-07 RX ORDER — GLUCAGON 1 MG
1 KIT INJECTION
Status: DISCONTINUED | OUTPATIENT
Start: 2023-12-07 | End: 2023-12-09 | Stop reason: HOSPADM

## 2023-12-07 RX ORDER — METOPROLOL SUCCINATE 25 MG/1
25 TABLET, EXTENDED RELEASE ORAL DAILY
Status: DISCONTINUED | OUTPATIENT
Start: 2023-12-08 | End: 2023-12-09 | Stop reason: HOSPADM

## 2023-12-07 RX ORDER — FUROSEMIDE 40 MG/1
40 TABLET ORAL DAILY
Status: DISCONTINUED | OUTPATIENT
Start: 2023-12-08 | End: 2023-12-09 | Stop reason: HOSPADM

## 2023-12-07 RX ORDER — ETOMIDATE 2 MG/ML
INJECTION INTRAVENOUS
Status: DISCONTINUED | OUTPATIENT
Start: 2023-12-07 | End: 2023-12-18

## 2023-12-07 RX ORDER — IBUPROFEN 200 MG
24 TABLET ORAL
Status: DISCONTINUED | OUTPATIENT
Start: 2023-12-07 | End: 2023-12-09 | Stop reason: HOSPADM

## 2023-12-07 RX ORDER — DIPHENHYDRAMINE HYDROCHLORIDE 50 MG/ML
12.5 INJECTION INTRAMUSCULAR; INTRAVENOUS
Status: DISCONTINUED | OUTPATIENT
Start: 2023-12-07 | End: 2023-12-07

## 2023-12-07 RX ORDER — PROPOFOL 10 MG/ML
VIAL (ML) INTRAVENOUS
Status: DISCONTINUED | OUTPATIENT
Start: 2023-12-07 | End: 2023-12-18

## 2023-12-07 RX ORDER — ACETAMINOPHEN 10 MG/ML
INJECTION, SOLUTION INTRAVENOUS
Status: DISCONTINUED | OUTPATIENT
Start: 2023-12-07 | End: 2023-12-18

## 2023-12-07 RX ORDER — FAMOTIDINE 10 MG/ML
INJECTION INTRAVENOUS
Status: DISCONTINUED | OUTPATIENT
Start: 2023-12-07 | End: 2023-12-18

## 2023-12-07 RX ORDER — OXYCODONE HYDROCHLORIDE 5 MG/1
5 TABLET ORAL EVERY 4 HOURS PRN
Status: DISCONTINUED | OUTPATIENT
Start: 2023-12-07 | End: 2023-12-09 | Stop reason: HOSPADM

## 2023-12-07 RX ORDER — ONDANSETRON 2 MG/ML
4 INJECTION INTRAMUSCULAR; INTRAVENOUS DAILY PRN
Status: DISCONTINUED | OUTPATIENT
Start: 2023-12-07 | End: 2023-12-07

## 2023-12-07 RX ORDER — MORPHINE SULFATE 4 MG/ML
4 INJECTION, SOLUTION INTRAMUSCULAR; INTRAVENOUS EVERY 4 HOURS PRN
Status: DISCONTINUED | OUTPATIENT
Start: 2023-12-07 | End: 2023-12-09 | Stop reason: HOSPADM

## 2023-12-07 RX ORDER — DOXYCYCLINE 100 MG/1
100 CAPSULE ORAL EVERY 12 HOURS
Status: DISCONTINUED | OUTPATIENT
Start: 2023-12-07 | End: 2023-12-09 | Stop reason: HOSPADM

## 2023-12-07 RX ORDER — SUCCINYLCHOLINE CHLORIDE 20 MG/ML
INJECTION INTRAMUSCULAR; INTRAVENOUS
Status: DISCONTINUED | OUTPATIENT
Start: 2023-12-07 | End: 2023-12-18

## 2023-12-07 RX ORDER — ROCURONIUM BROMIDE 10 MG/ML
INJECTION, SOLUTION INTRAVENOUS
Status: DISCONTINUED | OUTPATIENT
Start: 2023-12-07 | End: 2023-12-18

## 2023-12-07 RX ADMIN — ISOSORBIDE DINITRATE 20 MG: 10 TABLET ORAL at 08:12

## 2023-12-07 RX ADMIN — ONDANSETRON 4 MG: 2 INJECTION INTRAMUSCULAR; INTRAVENOUS at 04:12

## 2023-12-07 RX ADMIN — FENTANYL CITRATE 25 MCG: 50 INJECTION INTRAMUSCULAR; INTRAVENOUS at 05:12

## 2023-12-07 RX ADMIN — PROPOFOL 40 MG: 10 INJECTION, EMULSION INTRAVENOUS at 01:12

## 2023-12-07 RX ADMIN — SACUBITRIL AND VALSARTAN 1 TABLET: 24; 26 TABLET, FILM COATED ORAL at 08:12

## 2023-12-07 RX ADMIN — ACETAMINOPHEN 1000 MG: 10 INJECTION, SOLUTION INTRAVENOUS at 01:12

## 2023-12-07 RX ADMIN — FAMOTIDINE 20 MG: 10 INJECTION, SOLUTION INTRAVENOUS at 01:12

## 2023-12-07 RX ADMIN — FENTANYL CITRATE 50 MCG: 0.05 INJECTION, SOLUTION INTRAMUSCULAR; INTRAVENOUS at 01:12

## 2023-12-07 RX ADMIN — FENTANYL CITRATE 25 MCG: 50 INJECTION INTRAMUSCULAR; INTRAVENOUS at 04:12

## 2023-12-07 RX ADMIN — GABAPENTIN 300 MG: 300 CAPSULE ORAL at 08:12

## 2023-12-07 RX ADMIN — OXYCODONE HYDROCHLORIDE 5 MG: 5 TABLET ORAL at 04:12

## 2023-12-07 RX ADMIN — DOXYCYCLINE HYCLATE 100 MG: 100 CAPSULE ORAL at 08:12

## 2023-12-07 RX ADMIN — ROCURONIUM BROMIDE 10 MG: 10 INJECTION, SOLUTION INTRAVENOUS at 01:12

## 2023-12-07 RX ADMIN — HYDRALAZINE HYDROCHLORIDE 50 MG: 25 TABLET, FILM COATED ORAL at 08:12

## 2023-12-07 RX ADMIN — SUGAMMADEX 200 MG: 100 INJECTION, SOLUTION INTRAVENOUS at 03:12

## 2023-12-07 RX ADMIN — SODIUM CHLORIDE, SODIUM LACTATE, POTASSIUM CHLORIDE, AND CALCIUM CHLORIDE: .6; .31; .03; .02 INJECTION, SOLUTION INTRAVENOUS at 01:12

## 2023-12-07 RX ADMIN — CEFAZOLIN SODIUM 2 G: 2 SOLUTION INTRAVENOUS at 01:12

## 2023-12-07 RX ADMIN — SODIUM CHLORIDE: 900 INJECTION, SOLUTION INTRAVENOUS at 01:12

## 2023-12-07 RX ADMIN — OXYCODONE HYDROCHLORIDE 5 MG: 5 TABLET ORAL at 08:12

## 2023-12-07 RX ADMIN — MORPHINE SULFATE 4 MG: 4 INJECTION, SOLUTION INTRAMUSCULAR; INTRAVENOUS at 10:12

## 2023-12-07 RX ADMIN — ONDANSETRON 4 MG: 2 INJECTION INTRAMUSCULAR; INTRAVENOUS at 01:12

## 2023-12-07 RX ADMIN — FENTANYL CITRATE 50 MCG: 0.05 INJECTION, SOLUTION INTRAMUSCULAR; INTRAVENOUS at 02:12

## 2023-12-07 RX ADMIN — ETOMIDATE 12 MG: 2 INJECTION, SOLUTION INTRAVENOUS at 01:12

## 2023-12-07 RX ADMIN — Medication 120 MG: at 01:12

## 2023-12-07 RX ADMIN — ONDANSETRON 4 MG: 4 TABLET, ORALLY DISINTEGRATING ORAL at 10:12

## 2023-12-07 RX ADMIN — LIDOCAINE HYDROCHLORIDE 100 MG: 20 INJECTION, SOLUTION INTRAVENOUS at 01:12

## 2023-12-07 NOTE — CONSULTS
[facility]  Consult Note    Patient Name: Baldo Carney  MRN: [id number]  Admission Date: 12/7/2023  Hospital Length of Stay: 1 days  Attending Physician: Greg Bone Jr*   Primary Care Provider: Millie Hayes, KAMERON,TEDP-C           Patient information was obtained from patient and ER records.     Consults  Subjective:     Principal Problem: Umbilical hernia without obstruction and without gangrene    Chief Complaint:  Inguinal hernia repair     HPI: Baldo Carney is a 73 y.o. male with complicated medical history including coronary artery disease, aortic stenosis status post CABG and AVR, ischemic cardiomyopathy, type 2 diabetes, hypertension, hyperlipidemia, COPD, ascites of the liver from possible cirrhosis versus CHF requiring frequent paracentesis approximately every other week did not going to hernia repair on account of a very large bowel containing right inguinal hernia and umbilical hernia.  Prior to surgery, patient states that he was having abdominal pain with associated nausea and vomiting.  He denied any hemoptysis, hematochezia or melena.  He states that his sugars are controlled on oral anti glycemic.  Does not smoke cigarettes, drink alcohol or use any illicit medication.    Past Medical History:   Diagnosis Date    Asthma     Cardiomyopathy 10/21/2014    CHF (congestive heart failure)     Coronary artery disease     CRF (chronic renal failure)     Diabetes mellitus     Enlarged prostate     Hyperlipidemia     Hypertension     Neuropathy        Past Surgical History:   Procedure Laterality Date    ANGIOGRAPHY OF INTERNAL MAMMARY VESSEL N/A 3/11/2022    Procedure: Angiogram Internal Mammary;  Surgeon: Hank Lujan MD;  Location: [facility] CATH/EP LAB;  Service: Cardiology;  Laterality: N/A;    CARDIAC CATHETERIZATION      CARDIAC VALVE SURGERY      CATHETERIZATION OF BOTH LEFT AND RIGHT HEART Left 3/11/2022    Procedure: CATHETERIZATION,  HEART, BOTH LEFT AND RIGHT;  Surgeon: Hank Lujan MD;  Location: [facility] CATH/EP LAB;  Service: Cardiology;  Laterality: Left;    CORONARY ANGIOGRAPHY N/A 3/11/2022    Procedure: ANGIOGRAM, CORONARY ARTERY;  Surgeon: Hank Lujan MD;  Location: [facility] CATH/EP LAB;  Service: Cardiology;  Laterality: N/A;    CORONARY BYPASS GRAFT ANGIOGRAPHY  3/11/2022    Procedure: Bypass graft study;  Surgeon: Hank Lujan MD;  Location: [facility] CATH/EP LAB;  Service: Cardiology;;    CYSTOSCOPY N/A 7/30/2019    Procedure: CYSTOSCOPY;  Surgeon: Spencer Nguyen MD;  Location: [facility];  Service: Urology;  Laterality: N/A;    INSERTION OF INTRAVASCULAR MICROAXIAL BLOOD PUMP N/A 8/31/2022    Procedure: INSERTION, IMPELLA;  Surgeon: Zach Jensen MD;  Location: [facility] CATH LAB;  Service: Cardiology;  Laterality: N/A;    PLACEMENT OF SWAN SEE CATHETER WITH IMAGING GUIDANCE  8/31/2022    Procedure: INSERTION, CATHETER, SWAN-SEE, WITH IMAGING GUIDANCE;  Surgeon: Zach Jensen MD;  Location: [facility] CATH LAB;  Service: Cardiology;;    SHOULDER ARTHROSCOPY  1985    TRANSRECTAL BIOPSY OF PROSTATE WITH ULTRASOUND GUIDANCE N/A 7/30/2019    Procedure: BIOPSY, PROSTATE, RECTAL APPROACH, WITH US GUIDANCE;  Surgeon: Spencer Nguyen MD;  Location: [facility];  Service: Urology;  Laterality: N/A;  procedure not performed, pt unable to tolerate    TRANSRECTAL BIOPSY OF PROSTATE WITH ULTRASOUND GUIDANCE N/A 8/8/2019    Procedure: BIOPSY, PROSTATE, RECTAL APPROACH, WITH US GUIDANCE;  Surgeon: Spencer Nguyen MD;  Location: [facility];  Service: Urology;  Laterality: N/A;       Review of patient's allergies indicates:   Allergen Reactions    Invokana  [canagliflozin]      Other reaction(s): been very dizzy       No current facility-administered medications on file prior to encounter.     Current Outpatient Medications on File Prior to Encounter   Medication Sig    atorvastatin (LIPITOR) 40 MG tablet Take 1 tablet (40 mg total) by mouth  once daily.    finasteride (PROSCAR) 5 mg tablet Take 1 tablet (5 mg total) by mouth once daily.    fluticasone furoate-vilanteroL (BREO ELLIPTA) 100-25 mcg/dose diskus inhaler Inhale 1 puff into the lungs once daily. (DAILY CONTROLLER)    furosemide (LASIX) 40 MG tablet Take by mouth once daily.    gabapentin (NEURONTIN) 300 MG capsule Take 1 capsule (300 mg total) by mouth every evening.    hydrALAZINE (APRESOLINE) 50 MG tablet Take 1 tablet (50 mg total) by mouth every 12 (twelve) hours.    isosorbide dinitrate (ISORDIL) 20 MG tablet Take 1 tablet (20 mg total) by mouth 3 (three) times daily.    levothyroxine (SYNTHROID) 100 MCG tablet TAKE 1 TABLET (100 MCG TOTAL) BY MOUTH BEFORE BREAKFAST. WITH NO OTHER MEDS OR FOOD    metoprolol succinate (TOPROL-XL) 25 MG 24 hr tablet Take 1 tablet (25 mg total) by mouth once daily.    sacubitriL-valsartan (ENTRESTO) 24-26 mg per tablet Take 1 tablet by mouth 2 (two) times daily.    tamsulosin (FLOMAX) 0.4 mg Cap TAKE ONE CAPSULE BY MOUTH DAILY AT 5 PM    traMADoL (ULTRAM) 50 mg tablet Take 1 tablet (50 mg total) by mouth every 8 (eight) hours as needed for Pain.    albuterol (PROVENTIL/VENTOLIN HFA) 90 mcg/actuation inhaler INHALE 1 TO 2 PUFFS INTO THE LUNGS EVERY 4 TO 6 HOURS AS NEEDED FOR WHEEZE AND SHORTNESS OF BREATH    aspirin (ECOTRIN) 81 MG EC tablet Take 81 mg by mouth once daily.    budesonide-glycopyr-formoterol (BREZTRI AEROSPHERE) 160-9-4.8 mcg/actuation HFAA Inhale into the lungs daily as needed.    clopidogreL (PLAVIX) 75 mg tablet     cyanocobalamin 1,000 mcg/mL injection Inject 1 mL (1,000 mcg total) into the muscle every 14 (fourteen) days.    FARXIGA 5 mg Tab tablet Take 5 mg by mouth once daily.    fluticasone propionate (FLONASE) 50 mcg/actuation nasal spray 1 SPRAY (50 MCG TOTAL) BY EACH NOSTRIL ROUTE ONCE DAILY.    prasugreL (EFFIENT) 10 mg Tab Take 1 tablet (10 mg total) by mouth once daily.    ZONTIVITY 2.08 mg Tab Take 1 tablet by mouth once daily.      Family History       Problem Relation (Age of Onset)    Diabetes Father, Sister, Brother    Heart attack Father    Heart disease Father, Brother    Hypertension Father    No Known Problems Mother, Maternal Grandmother, Maternal Grandfather, Paternal Grandmother, Paternal Grandfather, Maternal Aunt, Maternal Uncle, Paternal Aunt, Paternal Uncle          Tobacco Use    Smoking status: Former     Current packs/day: 0.00     Types: Cigarettes     Quit date: 1970     Years since quittin.5    Smokeless tobacco: Never   Substance and Sexual Activity    Alcohol use: Not Currently     Alcohol/week: 3.0 standard drinks of alcohol     Types: 3 Cans of beer per week    Drug use: No    Sexual activity: Not Currently     Partners: Female     Review of Systems   Constitutional:  Negative for fever.   Respiratory:  Negative for shortness of breath.    Cardiovascular:  Negative for palpitations.   Gastrointestinal:  Positive for abdominal pain and nausea. Negative for vomiting.   Genitourinary:  Negative for decreased urine volume.   Neurological:  Negative for weakness.   All other systems reviewed and are negative.    Objective:     Vital Signs (Most Recent):  Temp: 97 °F (36.1 °C) (23 1551)  Pulse: (!) 58 (23 1645)  Resp: 10 (23 1645)  BP: (!) 143/66 (23 1630)  SpO2: 99 % (23 1645) Vital Signs (24h Range):  Temp:  [97 °F (36.1 °C)-97.5 °F (36.4 °C)] 97 °F (36.1 °C)  Pulse:  [56-68] 58  Resp:  [10-17] 10  SpO2:  [99 %-100 %] 99 %  BP: (139-161)/(66-86) 143/66        There is no height or weight on file to calculate BMI.     Physical Exam  Vitals reviewed.   HENT:      Head: Normocephalic.   Eyes:      Pupils: Pupils are equal, round, and reactive to light.   Cardiovascular:      Rate and Rhythm: Normal rate.      Pulses: Normal pulses.   Pulmonary:      Effort: Pulmonary effort is normal.   Abdominal:      Palpations: Abdomen is soft.      Tenderness: There is abdominal tenderness.       "Comments: Drain in-situ   Musculoskeletal:         General: Normal range of motion.      Cervical back: Normal range of motion.   Neurological:      General: No focal deficit present.      Mental Status: He is alert. Mental status is at baseline.   Psychiatric:         Mood and Affect: Mood normal.          Significant Labs: All pertinent labs within the past 24 hours have been reviewed.  CBC: No results for input(s): "WBC", "HGB", "HCT", "PLT" in the last 48 hours.  CMP: No results for input(s): "NA", "K", "CL", "CO2", "GLU", "BUN", "CREATININE", "CALCIUM", "PROT", "ALBUMIN", "BILITOT", "ALKPHOS", "AST", "ALT", "ANIONGAP", "EGFRNONAA" in the last 48 hours.    Invalid input(s): "ESTGFAFRICA"  Cardiac Markers: No results for input(s): "CKMB", "MYOGLOBIN", "BNP", "TROPISTAT" in the last 48 hours.    Significant Imaging: I have reviewed all pertinent imaging results/findings within the past 24 hours.      Assessment/Plan:     * Umbilical hernia without obstruction and without gangrene  Management per primary team surgery  Pain control      Diabetes mellitus type 2 without retinopathy  Patient's FSGs are controlled on current medication regimen.  Last A1c reviewed-   Lab Results   Component Value Date    HGBA1C 5.4 09/15/2023     Most recent fingerstick glucose reviewed- No results for input(s): "POCTGLUCOSE" in the last 24 hours.  Current correctional scale  Low  Maintain anti-hyperglycemic dose as follows-   Antihyperglycemics (From admission, onward)      Start     Stop Route Frequency Ordered    12/07/23 1810  insulin aspart U-100 pen 0-5 Units         -- SubQ Before meals & nightly PRN 12/07/23 1710          Hold Oral hypoglycemics while patient is in the hospital.    Hypertension  Chronic, controlled. Latest blood pressure and vitals reviewed-     Temp:  [97 °F (36.1 °C)-97.5 °F (36.4 °C)]   Pulse:  [56-68]   Resp:  [10-17]   BP: (139-161)/(66-86)   SpO2:  [99 %-100 %] .   Home meds for hypertension were reviewed " and noted below.   Hypertension Medications               furosemide (LASIX) 40 MG tablet Take by mouth once daily.    hydrALAZINE (APRESOLINE) 50 MG tablet Take 1 tablet (50 mg total) by mouth every 12 (twelve) hours.    isosorbide dinitrate (ISORDIL) 20 MG tablet Take 1 tablet (20 mg total) by mouth 3 (three) times daily.    metoprolol succinate (TOPROL-XL) 25 MG 24 hr tablet Take 1 tablet (25 mg total) by mouth once daily.    sacubitriL-valsartan (ENTRESTO) 24-26 mg per tablet Take 1 tablet by mouth 2 (two) times daily.            While in the hospital, will manage blood pressure as follows; Continue home antihypertensive regimen    Will utilize p.r.n. blood pressure medication only if patient's blood pressure greater than 160/100 and he develops symptoms such as worsening chest pain or shortness of breath.    Hypothyroidism  Continue home medication of levothyroxine    Chronic systolic heart failure  Continue home medication of Entresto, metoprolol and Lasix      COPD (chronic obstructive pulmonary disease)  Patient's COPD is controlled currently.  Patient is currently on COPD Pathway. Continue scheduled inhalers Supplemental oxygen and monitor respiratory status closely.     BPH (benign prostatic hyperplasia)  Continue home medication of finasteride  Monitor for signs of urinary retention        VTE Risk Mitigation (From admission, onward)           Ordered     Place sequential compression device  Until discontinued         12/07/23 1817                        Thank you for your consult. I will follow-up with patient. Please contact us if you have any additional questions.    Joshua Cortez MD  Department of Hospital Medicine   [facility]

## 2023-12-07 NOTE — ANESTHESIA PREPROCEDURE EVALUATION
12/07/2023  Baldo Carney is a 73 y.o., male.      Pre-op Assessment    I have reviewed the Patient Summary Reports.     I have reviewed the Nursing Notes. I have reviewed the NPO Status.   I have reviewed the Medications.     Review of Systems  Anesthesia Hx:  No problems with previous Anesthesia   Neg history of prior surgery.          Denies Family Hx of Anesthesia complications.    Denies Personal Hx of Anesthesia complications.                    Social:  Former Smoker, No Alcohol Use       Hematology/Oncology:  Hematology Normal   Oncology Normal                                   EENT/Dental:  EENT/Dental Normal           Cardiovascular:     Hypertension Valvular problems/Murmurs, AS  CAD  asymptomatic     CHF        Severe Aortic stenosis  EF 25 %                           Pulmonary:   COPD Asthma                    Renal/:  Chronic Renal Disease, CKD                Hepatic/GI:      Liver disease: Cirrhosis.            Musculoskeletal:  Arthritis               Neurological:        Peripheral Neuropathy                          Endocrine:  Diabetes, type 2 Hypothyroidism          Dermatological:  Skin Normal    Psych:  Psychiatric Normal                  Patient Active Problem List   Diagnosis    Hypertension    Hyperlipidemia    Diabetes mellitus type 2 without retinopathy    Aortic stenosis status post AVR    CAD (coronary artery disease)    S/P CABG (coronary artery bypass graft)    Erectile dysfunction    Peripheral polyneuropathy    Elevated PSA, greater than or equal to 20 ng/ml    Generalized OA    Chronic systolic heart failure    BPH (benign prostatic hyperplasia)    Lymphopenia    COPD (chronic obstructive pulmonary disease)    Anemia    Ascites of liver    Vitreous hemorrhage, right eye    Vitreous degeneration of both eyes    OAG (open angle glaucoma) suspect, high risk, bilateral     Age-related nuclear cataract of both eyes    Mild nonproliferative diabetic retinopathy of both eyes without macular edema associated with type 2 diabetes mellitus    Hypothyroidism    Cirrhosis    Ischemic cardiomyopathy    CAD (coronary artery disease), autologous vein bypass graft    CRF (chronic renal failure)    Umbilical hernia       Past Surgical History:   Procedure Laterality Date    ANGIOGRAPHY OF INTERNAL MAMMARY VESSEL N/A 3/11/2022    Procedure: Angiogram Internal Mammary;  Surgeon: Hank Lujan MD;  Location: [facility] CATH/EP LAB;  Service: Cardiology;  Laterality: N/A;    CARDIAC CATHETERIZATION      CARDIAC VALVE SURGERY      CATHETERIZATION OF BOTH LEFT AND RIGHT HEART Left 3/11/2022    Procedure: CATHETERIZATION, HEART, BOTH LEFT AND RIGHT;  Surgeon: Hank Lujan MD;  Location: [facility] CATH/EP LAB;  Service: Cardiology;  Laterality: Left;    CORONARY ANGIOGRAPHY N/A 3/11/2022    Procedure: ANGIOGRAM, CORONARY ARTERY;  Surgeon: Hank Lujan MD;  Location: [facility] CATH/EP LAB;  Service: Cardiology;  Laterality: N/A;    CORONARY BYPASS GRAFT ANGIOGRAPHY  3/11/2022    Procedure: Bypass graft study;  Surgeon: Hank Lujan MD;  Location: [facility] CATH/EP LAB;  Service: Cardiology;;    CYSTOSCOPY N/A 7/30/2019    Procedure: CYSTOSCOPY;  Surgeon: Spencer Nguyen MD;  Location: [facility] OR;  Service: Urology;  Laterality: N/A;    INSERTION OF INTRAVASCULAR MICROAXIAL BLOOD PUMP N/A 8/31/2022    Procedure: INSERTION, IMPELLA;  Surgeon: Zach Jensen MD;  Location: [facility] CATH LAB;  Service: Cardiology;  Laterality: N/A;    PLACEMENT OF SWAN SEE CATHETER WITH IMAGING GUIDANCE  8/31/2022    Procedure: INSERTION, CATHETER, SWAN-SEE, WITH IMAGING GUIDANCE;  Surgeon: Zach Jensen MD;  Location: [facility] CATH LAB;  Service: Cardiology;;    SHOULDER ARTHROSCOPY  1985    TRANSRECTAL BIOPSY OF PROSTATE WITH ULTRASOUND GUIDANCE N/A 7/30/2019    Procedure: BIOPSY, PROSTATE, RECTAL APPROACH, WITH US  GUIDANCE;  Surgeon: Spencer Nguyen MD;  Location: [facility] OR;  Service: Urology;  Laterality: N/A;  procedure not performed, pt unable to tolerate    TRANSRECTAL BIOPSY OF PROSTATE WITH ULTRASOUND GUIDANCE N/A 8/8/2019    Procedure: BIOPSY, PROSTATE, RECTAL APPROACH, WITH US GUIDANCE;  Surgeon: Spencer Nguyen MD;  Location: [facility] OR;  Service: Urology;  Laterality: N/A;        Tobacco Use:  The patient  reports that he quit smoking about 53 years ago. His smoking use included cigarettes. He has never used smokeless tobacco.     Results for orders placed or performed during the hospital encounter of 12/04/23   EKG 12-lead    Collection Time: 12/04/23  8:28 AM    Narrative    Test Reason : Z01.818,    Vent. Rate : 065 BPM     Atrial Rate : 065 BPM     P-R Int : 178 ms          QRS Dur : 162 ms      QT Int : 514 ms       P-R-T Axes : 052 -84 080 degrees     QTc Int : 534 ms    Normal sinus rhythm  Left axis deviation  Right bundle branch block  Inferior infarct (cited on or before 03-NOV-2014)  Anterolateral infarct (cited on or before 03-NOV-2014)  Abnormal ECG  When compared with ECG of 31-AUG-2022 06:15,  Fusion complexes are no longer Present  Questionable change in initial forces of Lateral leads  Confirmed by Bette GOMES, Car ARORA ([id number]) on 12/4/2023 10:52:33 PM    Referred By:             Confirmed By:Car Amos MD             Lab Results   Component Value Date    WBC 5.36 11/28/2023    HGB 9.7 (L) 11/28/2023    HCT 29.9 (L) 11/28/2023    MCV 87 11/28/2023     11/28/2023     BMP  Lab Results   Component Value Date     11/28/2023    K 4.4 11/28/2023     11/28/2023    CO2 23 11/28/2023    BUN 20 11/28/2023    CREATININE 2.1 (H) 11/28/2023    CALCIUM 8.4 (L) 11/28/2023    ANIONGAP 5 (L) 11/28/2023    GLU 94 11/28/2023     (H) 11/21/2023     (H) 11/09/2023       Results for orders placed during the hospital encounter of 09/14/23    Echo    Interpretation Summary    Left  Ventricle: The left ventricle is normal in size. Normal wall thickness. Normal wall motion. There is severely reduced systolic function with a visually estimated ejection fraction of  25%. There is normal diastolic function.    Right Ventricle: Normal right ventricular cavity size. Wall thickness is normal. Right ventricle wall motion  is normal. Systolic function is normal.    Aortic Valve: The aortic valve is a trileaflet valve. There is moderate aortic valve sclerosis. Severely restricted motion. There is severe stenosis. Aortic valve area by VTI is 0.50 cm². Aortic valve peak velocity is 2.76 m/s. Mean gradient is 17 mmHg. The dimensionless index is 0.22. There is moderate aortic regurgitation with an anteromedial eccentrically directed jet.    Mitral Valve: There is moderate bileaflet sclerosis. There is moderate mitral annular calcification present.    Tricuspid Valve: There is mild regurgitation.    IVC/SVC: Normal venous pressure at 3 mmHg.           Physical Exam  General: Well nourished and Alert    Airway:  Mallampati: II   Mouth Opening: Normal  TM Distance: Normal  Tongue: Normal  Neck ROM: Normal ROM    Dental:  Intact    Chest/Lungs:  Clear to auscultation, Normal Respiratory Rate    Heart:  Rate: Normal  Rhythm: Regular Rhythm  Sounds: Normal        Anesthesia Plan  Type of Anesthesia, risks & benefits discussed:    Anesthesia Type: Gen ETT  Intra-op Monitoring Plan: Standard ASA Monitors and Art Line  Post Op Pain Control Plan: multimodal analgesia  Induction:  IV  Airway Plan: Video, Post-Induction  Informed Consent: Informed consent signed with the Patient and all parties understand the risks and agree with anesthesia plan.  All questions answered. Patient consented to blood products? Yes  ASA Score: 4  Anesthesia Plan Notes: Tylenol 1 gm  Zofran, Pepcid    Ready For Surgery From Anesthesia Perspective.     .

## 2023-12-07 NOTE — ASSESSMENT & PLAN NOTE
Chronic, controlled. Latest blood pressure and vitals reviewed-     Temp:  [97 °F (36.1 °C)-97.5 °F (36.4 °C)]   Pulse:  [56-68]   Resp:  [10-17]   BP: (139-161)/(66-86)   SpO2:  [99 %-100 %] .   Home meds for hypertension were reviewed and noted below.   Hypertension Medications               furosemide (LASIX) 40 MG tablet Take by mouth once daily.    hydrALAZINE (APRESOLINE) 50 MG tablet Take 1 tablet (50 mg total) by mouth every 12 (twelve) hours.    isosorbide dinitrate (ISORDIL) 20 MG tablet Take 1 tablet (20 mg total) by mouth 3 (three) times daily.    metoprolol succinate (TOPROL-XL) 25 MG 24 hr tablet Take 1 tablet (25 mg total) by mouth once daily.    sacubitriL-valsartan (ENTRESTO) 24-26 mg per tablet Take 1 tablet by mouth 2 (two) times daily.            While in the hospital, will manage blood pressure as follows; Continue home antihypertensive regimen    Will utilize p.r.n. blood pressure medication only if patient's blood pressure greater than 160/100 and he develops symptoms such as worsening chest pain or shortness of breath.

## 2023-12-07 NOTE — SUBJECTIVE & OBJECTIVE
Past Medical History:   Diagnosis Date    Asthma     Cardiomyopathy 10/21/2014    CHF (congestive heart failure)     Coronary artery disease     CRF (chronic renal failure)     Diabetes mellitus     Enlarged prostate     Hyperlipidemia     Hypertension     Neuropathy        Past Surgical History:   Procedure Laterality Date    ANGIOGRAPHY OF INTERNAL MAMMARY VESSEL N/A 3/11/2022    Procedure: Angiogram Internal Mammary;  Surgeon: Hank Lujan MD;  Location: [facility] CATH/EP LAB;  Service: Cardiology;  Laterality: N/A;    CARDIAC CATHETERIZATION      CARDIAC VALVE SURGERY      CATHETERIZATION OF BOTH LEFT AND RIGHT HEART Left 3/11/2022    Procedure: CATHETERIZATION, HEART, BOTH LEFT AND RIGHT;  Surgeon: Hank Lujan MD;  Location: [facility] CATH/EP LAB;  Service: Cardiology;  Laterality: Left;    CORONARY ANGIOGRAPHY N/A 3/11/2022    Procedure: ANGIOGRAM, CORONARY ARTERY;  Surgeon: Hank Lujan MD;  Location: [facility] CATH/EP LAB;  Service: Cardiology;  Laterality: N/A;    CORONARY BYPASS GRAFT ANGIOGRAPHY  3/11/2022    Procedure: Bypass graft study;  Surgeon: Hank Lujan MD;  Location: [facility] CATH/EP LAB;  Service: Cardiology;;    CYSTOSCOPY N/A 7/30/2019    Procedure: CYSTOSCOPY;  Surgeon: Spencer Nguyen MD;  Location: [facility];  Service: Urology;  Laterality: N/A;    INSERTION OF INTRAVASCULAR MICROAXIAL BLOOD PUMP N/A 8/31/2022    Procedure: INSERTION, IMPELLA;  Surgeon: Zach Jensen MD;  Location: [facility] CATH LAB;  Service: Cardiology;  Laterality: N/A;    PLACEMENT OF SWAN SEE CATHETER WITH IMAGING GUIDANCE  8/31/2022    Procedure: INSERTION, CATHETER, SWAN-SEE, WITH IMAGING GUIDANCE;  Surgeon: Zach Jensen MD;  Location: [facility] CATH LAB;  Service: Cardiology;;    SHOULDER ARTHROSCOPY  1985    TRANSRECTAL BIOPSY OF PROSTATE WITH ULTRASOUND GUIDANCE N/A 7/30/2019    Procedure: BIOPSY, PROSTATE, RECTAL APPROACH, WITH US GUIDANCE;  Surgeon: Spencer Nguyen MD;  Location: [facility];  Service:  Urology;  Laterality: N/A;  procedure not performed, pt unable to tolerate    TRANSRECTAL BIOPSY OF PROSTATE WITH ULTRASOUND GUIDANCE N/A 8/8/2019    Procedure: BIOPSY, PROSTATE, RECTAL APPROACH, WITH US GUIDANCE;  Surgeon: Spencer Nguyen MD;  Location: [facility];  Service: Urology;  Laterality: N/A;       Review of patient's allergies indicates:   Allergen Reactions    Invokana  [canagliflozin]      Other reaction(s): been very dizzy       No current facility-administered medications on file prior to encounter.     Current Outpatient Medications on File Prior to Encounter   Medication Sig    atorvastatin (LIPITOR) 40 MG tablet Take 1 tablet (40 mg total) by mouth once daily.    finasteride (PROSCAR) 5 mg tablet Take 1 tablet (5 mg total) by mouth once daily.    fluticasone furoate-vilanteroL (BREO ELLIPTA) 100-25 mcg/dose diskus inhaler Inhale 1 puff into the lungs once daily. (DAILY CONTROLLER)    furosemide (LASIX) 40 MG tablet Take by mouth once daily.    gabapentin (NEURONTIN) 300 MG capsule Take 1 capsule (300 mg total) by mouth every evening.    hydrALAZINE (APRESOLINE) 50 MG tablet Take 1 tablet (50 mg total) by mouth every 12 (twelve) hours.    isosorbide dinitrate (ISORDIL) 20 MG tablet Take 1 tablet (20 mg total) by mouth 3 (three) times daily.    levothyroxine (SYNTHROID) 100 MCG tablet TAKE 1 TABLET (100 MCG TOTAL) BY MOUTH BEFORE BREAKFAST. WITH NO OTHER MEDS OR FOOD    metoprolol succinate (TOPROL-XL) 25 MG 24 hr tablet Take 1 tablet (25 mg total) by mouth once daily.    sacubitriL-valsartan (ENTRESTO) 24-26 mg per tablet Take 1 tablet by mouth 2 (two) times daily.    tamsulosin (FLOMAX) 0.4 mg Cap TAKE ONE CAPSULE BY MOUTH DAILY AT 5 PM    traMADoL (ULTRAM) 50 mg tablet Take 1 tablet (50 mg total) by mouth every 8 (eight) hours as needed for Pain.    albuterol (PROVENTIL/VENTOLIN HFA) 90 mcg/actuation inhaler INHALE 1 TO 2 PUFFS INTO THE LUNGS EVERY 4 TO 6 HOURS AS NEEDED FOR WHEEZE AND SHORTNESS  OF BREATH    aspirin (ECOTRIN) 81 MG EC tablet Take 81 mg by mouth once daily.    budesonide-glycopyr-formoterol (BREZTRI AEROSPHERE) 160-9-4.8 mcg/actuation HFAA Inhale into the lungs daily as needed.    clopidogreL (PLAVIX) 75 mg tablet     cyanocobalamin 1,000 mcg/mL injection Inject 1 mL (1,000 mcg total) into the muscle every 14 (fourteen) days.    FARXIGA 5 mg Tab tablet Take 5 mg by mouth once daily.    fluticasone propionate (FLONASE) 50 mcg/actuation nasal spray 1 SPRAY (50 MCG TOTAL) BY EACH NOSTRIL ROUTE ONCE DAILY.    prasugreL (EFFIENT) 10 mg Tab Take 1 tablet (10 mg total) by mouth once daily.    ZONTIVITY 2.08 mg Tab Take 1 tablet by mouth once daily.     Family History       Problem Relation (Age of Onset)    Diabetes Father, Sister, Brother    Heart attack Father    Heart disease Father, Brother    Hypertension Father    No Known Problems Mother, Maternal Grandmother, Maternal Grandfather, Paternal Grandmother, Paternal Grandfather, Maternal Aunt, Maternal Uncle, Paternal Aunt, Paternal Uncle          Tobacco Use    Smoking status: Former     Current packs/day: 0.00     Types: Cigarettes     Quit date: 1970     Years since quittin.5    Smokeless tobacco: Never   Substance and Sexual Activity    Alcohol use: Not Currently     Alcohol/week: 3.0 standard drinks of alcohol     Types: 3 Cans of beer per week    Drug use: No    Sexual activity: Not Currently     Partners: Female     Review of Systems   Constitutional:  Negative for fever.   Respiratory:  Negative for shortness of breath.    Cardiovascular:  Negative for palpitations.   Gastrointestinal:  Positive for abdominal pain and nausea. Negative for vomiting.   Genitourinary:  Negative for decreased urine volume.   Neurological:  Negative for weakness.   All other systems reviewed and are negative.    Objective:     Vital Signs (Most Recent):  Temp: 97 °F (36.1 °C) (23 1551)  Pulse: (!) 58 (23 1645)  Resp: 10 (23  "1645)  BP: (!) 143/66 (12/07/23 1630)  SpO2: 99 % (12/07/23 1645) Vital Signs (24h Range):  Temp:  [97 °F (36.1 °C)-97.5 °F (36.4 °C)] 97 °F (36.1 °C)  Pulse:  [56-68] 58  Resp:  [10-17] 10  SpO2:  [99 %-100 %] 99 %  BP: (139-161)/(66-86) 143/66        There is no height or weight on file to calculate BMI.     Physical Exam  Vitals reviewed.   HENT:      Head: Normocephalic.   Eyes:      Pupils: Pupils are equal, round, and reactive to light.   Cardiovascular:      Rate and Rhythm: Normal rate.      Pulses: Normal pulses.   Pulmonary:      Effort: Pulmonary effort is normal.   Abdominal:      Palpations: Abdomen is soft.      Tenderness: There is abdominal tenderness.      Comments: Drain in-situ   Musculoskeletal:         General: Normal range of motion.      Cervical back: Normal range of motion.   Neurological:      General: No focal deficit present.      Mental Status: He is alert. Mental status is at baseline.   Psychiatric:         Mood and Affect: Mood normal.          Significant Labs: All pertinent labs within the past 24 hours have been reviewed.  CBC: No results for input(s): "WBC", "HGB", "HCT", "PLT" in the last 48 hours.  CMP: No results for input(s): "NA", "K", "CL", "CO2", "GLU", "BUN", "CREATININE", "CALCIUM", "PROT", "ALBUMIN", "BILITOT", "ALKPHOS", "AST", "ALT", "ANIONGAP", "EGFRNONAA" in the last 48 hours.    Invalid input(s): "ESTGFAFRICA"  Cardiac Markers: No results for input(s): "CKMB", "MYOGLOBIN", "BNP", "TROPISTAT" in the last 48 hours.    Significant Imaging: I have reviewed all pertinent imaging results/findings within the past 24 hours.      "

## 2023-12-07 NOTE — ANESTHESIA PROCEDURE NOTES
Intubation    Date/Time: 12/7/2023 1:14 PM    Performed by: Sunshine Reyna CRNA  Authorized by: Sunshine Reyna CRNA    Intubation:     Induction:  Intravenous    Intubated:  Postinduction    Mask Ventilation:  Easy mask    Attempts:  1    Attempted By:  CRNA    Method of Intubation:  Direct and video laryngoscopy    Blade:  Gilliam 3    Laryngeal View Grade: Grade I - full view of cords      Difficult Airway Encountered?: No      Complications:  None    Airway Device:  Oral endotracheal tube    Airway Device Size:  7.5    Style/Cuff Inflation:  Cuffed (inflated to minimal occlusive pressure)    Tube secured:  21    Secured at:  The teeth    Placement Verified By:  Capnometry    Complicating Factors:  None    Findings Post-Intubation:  BS equal bilateral and atraumatic/condition of teeth unchanged

## 2023-12-07 NOTE — TRANSFER OF CARE
Anesthesia Transfer of Care Note    Patient: Baldo Carney    Procedure(s) Performed: Procedure(s) (LRB):  REPAIR, HERNIA, INGUINAL, INCARCERATED, INITIAL (Right)  REPAIR, HERNIA, UMBILICAL (N/A)  INSERTION, CATHETER, DIALYSIS, PERITONEAL (N/A)    Patient location: PACU    Anesthesia Type: general    Transport from OR: Transported from OR on room air with adequate spontaneous ventilation    Post pain: adequate analgesia    Post assessment: no apparent anesthetic complications and tolerated procedure well    Post vital signs: stable    Level of consciousness: awake    Nausea/Vomiting: no nausea/vomiting    Complications: none    Transfer of care protocol was followed      Last vitals: Visit Vitals  BP (!) 161/83 (BP Location: Right arm, Patient Position: Lying)   Pulse 62   Temp 36.4 °C (97.5 °F) (Oral)   Resp 16   SpO2 100%

## 2023-12-07 NOTE — OP NOTE
DATE OF PROCEDURE: 12/07/2023    PREOPERATIVE DIAGNOSIS:   1. Non reducible right inguinal hernia  2. Reducible umbilical hernia   3. Cirrhosis with ascites  4. CHF  5. Aortic stenosis  6. CAD  7. COPD     POSTOPERATIVE DIAGNOSIS: Same    PROCEDURE:   1. Open repair of umbilical hernia with mesh, 2 cm fascial defect  2. Open placement of tunneled intraperitoneal drain with cuff   3. Open repair with mesh of non reducible right inguinal hernia containing bowel     SURGEON: Greg Bone M.D    ASSISTANT: Latasha Ryder MD PGYV    ANESTHESIA: General    ESTIMATED BLOOD LOSS: Minimal    SPECIMEN: None    CONDITION: Stable    COMPLICATIONS: None    FINDINGS:   1. Reducible umbilical hernia containing ascitic fluid with 2 cm fascial defect, repaired with a 6.4 cm circular coated mesh  2. A tunneled intraperitoneal drained with cuff was placed to allow periodic drainage of ascitic fluid during initial recovery to prevent leakage from surgical incision sites and redevelopment of ascites within the scrotum   3. Non reducible right inguinal hernia containing bowel and ascitic fluid was repaired with mesh    INDICATIONS: The patient is a 73-year-old male with extensive medical history including CAD, CHF with EF of 25%, severe aortic stenosis, COPD, long-term anticoagulation use, liver cirrhosis with ascites who had symptomatic umbilical and right inguinal hernias. Right inguinal hernia was large containing bowel and a significant amount of ascitic fluid. This was significantly symptomatic affecting his daily life and leading him to multiple presentations to the emergency room. Patient was evaluated by Cardiology who deemed him a high-risk but no other intervention was recommended.  Patient was counseled on options agreed proceed with open repair of both the inguinal and umbilical hernias with mesh and would also plan to place a peritoneal drain to relieve tension from ascitic fluid buildup.    PROCEDURE IN DETAIL: Patient  was taken operating room placed in supine position where general anesthesia was induced and he was intubated. Anesthesia did place a right radial art line. Patient received perioperative antibiotics.  His abdomen, groin and scrotum were prepped and draped typical sterile fashion.  Time-out performed by members of the operative team.    We began at the umbilical hernia. This was reducible and had a smaller fascial defect.  Local anesthetic was injected and an infraumbilical incision was made.  Electrocautery was used to dissect through subcutaneous tissue until the hernia sac was identified. This was then circumferentially dissected free away from the overlying dermis and surrounding subcutaneous tissue. It was then taken down to the level of the fascia. There was no contents within the sac other than ascitic fluid. We transected the redundant sac and then oversewed the opening with a running Vicryl suture in 2 layers. Sac was then reduced. We created a preperitoneal space.  The hernia defect measured 2 cm wide. We selected a 6.4 cm circular coated mesh and inserted in the preperitoneal space. We assured lay flat. The mesh was tacked to the lateral edges of the fascia at its mesh wings and then the wings were trimmed.  We then closed the fascia overlying the mesh using interrupted Ethibond sutures.  Additional local anesthetic was injected. Hemostasis was assured.  The umbilical stalk was tacked back down to the fascia.  The deep dermal layer was closed with interrupted Vicryl suture.  The skin was closed with running 4-0 Monocryl subcuticular stitch.    In order to prevent accumulation of ascitic fluid which would stress our hernia repairs and potentially lead to ascitic fluid drainage we elected to place a tunneled intraperitoneal catheter with the cuff. In the left mid abdomen local anesthetic was injected. Just overlying the lateral edge of the rectus muscle and a proximally 3 cm incision was made transversely.  Proximally 4 cm lateral this a stab incision was made and the drain was pulled in in a tunneled fashion. We then dissect through the subcutaneous fat down to the level of the anterior rectus fascia which was divided sharply.  The muscle was bluntly divided and the posterior rectus fascia was elevated.  This was incised sharply. Were then able to elevate the peritoneum which was incised sharply which gained access into the abdominal cavity. Were able to place a catheter towards the pelvis using a sheath.  The sheath was peeled away.  The cuff was positioned in the rectus muscle. We then closed the deep dermal layer with Vicryl suture the skin with a running 4-0 Monocryl subcuticular stitch. The drain exit site was secured to the skin with a silk suture. We did have drainage of ascitic fluid to confirm placement.    We then turned our attention to the right groin.  The inguinal hernia was large.  With some manipulation we are able to get the majority of it to reduced. Made incision over the inguinal space and used electrocautery dissect through the subcutaneous tissue and Frantz's fascia.  The external oblique aponeurosis was found and cleared away. Tissue this area was very fibrotic likely from chronic hernia. We made a stab incision in the superior lateral aspect of the external oblique and continued the transection towards the external ring. We created a space below the external oblique aponeurosis and severe lower aspect. The ilioinguinal nerve was identified and we elected to ligate it at the exit from the internal oblique muscle with a silk suture and transected a portion of it. Were then able to gain circumferential control of the cord structures and hernia sac. The cremasteric were very thickened and fibrotic.  These were transected.  We are able to isolate and preserve the cord structures and  him from a large thick fibrotic hernia sac.  The hernia sac was dissected down to the internal ring.  The  sac was opened assure no contents were present.  None were.  Ascitic fluid was suctioned. We then transected the redundant sac and again ran this closed with a 2 layer Vicryl suture. The sac was reduced. There was no significant direct hernia. A 3 x 6 cm polypropylene flat mesh was cut to a keyhole shape and position.  It was tacked to the pubic tubercle with Ethibond sutures. We then did interrupted Ethibond sutures along the inferior shelving edge and superior shelving edge.  The tails of the mesh were brought together and reapproximated with Ethibond sutures. The new internal ring accommodated the cord structures without strangulation. We irrigated the area copiously. We assured adequate hemostasis.  Additional local anesthetic was injected. The external oblique aponeurosis was closed with running Vicryl suture.  The deep dermal layer and Frantz's layer were closed with Vicryl suture.  The skin was closed with running Monocryl.     Glue was then applied to all incisions. A gauze pressure dressing was placed at the umbilicus. A drain sponge was placed around the peritoneal catheter exit site. The patient was aroused from sedation, extubated taken to recovery room stable condition having suffered no complications.  All counts correct x2 the case.  I was present scrubbed throughout all operative portions of the case.    DISPO: pacu then admit for extended recovery

## 2023-12-07 NOTE — ASSESSMENT & PLAN NOTE
Patient's COPD is controlled currently.  Patient is currently on COPD Pathway. Continue scheduled inhalers Supplemental oxygen and monitor respiratory status closely.

## 2023-12-07 NOTE — ANESTHESIA PROCEDURE NOTES
Arterial    Diagnosis: Incarcerated hernia    Patient location during procedure: done in OR  Procedure start time: 12/7/2023 1:12 PM  Timeout: 12/7/2023 1:12 PM  Procedure end time: 12/7/2023 1:15 PM    Staffing  Authorizing Provider: Raf Wick Jr., MD  Performing Provider: Raf Wick Jr., MD    Staffing  Performed by: Raf Wick Jr., MD  Authorized by: Raf Wick Jr., MD    Anesthesiologist was present at the time of the procedure.    Preanesthetic Checklist  Completed: patient identified, IV checked, site marked, risks and benefits discussed, surgical consent, monitors and equipment checked, pre-op evaluation, timeout performed and anesthesia consent givenArterial  Skin Prep: chlorhexidine gluconate  Local Infiltration: lidocaine  Orientation: right  Location: radial    Catheter Size: 20 G  Catheter placement by Ultrasound guidance. Heme positive aspiration all ports.   Vessel Caliber: medium, patent, compressibility normal  Needle advanced into vessel with real time Ultrasound guidance.  Guidewire confirmed in vessel.  Sterile sheath used.Insertion Attempts: 1  Assessment  Dressing: secured with tape and tegaderm and sutured in place and taped  Patient: Tolerated well

## 2023-12-07 NOTE — HOSPITAL COURSE
Baldo Carney is a 73 year old male with a past medical history of CAD s/p CABG, AVR, DM, HTN, HLD, COPD, CKD, HFrEF, BPH, cirrhosis, and hypothyroidism who presented for elective inguinal (R) and umbilical hernias per Dr. Bone.. He is POD 1. There have been no perioperative complications. He is tolerating a PO diet. He is scheduled to transfer to the telemetry unit.    12/9/2023  Mr Carney is feeling great and would like to go home. Dr agustin is discharging but I feel that he would benefit from home health

## 2023-12-07 NOTE — HPI
Baldo Carney is a 73 y.o. male with complicated medical history including coronary artery disease, aortic stenosis status post CABG and AVR, ischemic cardiomyopathy, type 2 diabetes, hypertension, hyperlipidemia, COPD, ascites of the liver from possible cirrhosis versus CHF requiring frequent paracentesis approximately every other week did not going to hernia repair on account of a very large bowel containing right inguinal hernia and umbilical hernia.  Prior to surgery, patient states that he was having abdominal pain with associated nausea and vomiting.  He denied any hemoptysis, hematochezia or melena.  He states that his sugars are controlled on oral anti glycemic.  Does not smoke cigarettes, drink alcohol or use any illicit medication.

## 2023-12-07 NOTE — ASSESSMENT & PLAN NOTE
"Patient's FSGs are controlled on current medication regimen.  Last A1c reviewed-   Lab Results   Component Value Date    HGBA1C 5.4 09/15/2023     Most recent fingerstick glucose reviewed- No results for input(s): "POCTGLUCOSE" in the last 24 hours.  Current correctional scale  Low  Maintain anti-hyperglycemic dose as follows-   Antihyperglycemics (From admission, onward)      Start     Stop Route Frequency Ordered    12/07/23 1810  insulin aspart U-100 pen 0-5 Units         -- SubQ Before meals & nightly PRN 12/07/23 1710          Hold Oral hypoglycemics while patient is in the hospital.  "

## 2023-12-08 PROBLEM — D72.810 LYMPHOPENIA: Status: RESOLVED | Noted: 2020-08-29 | Resolved: 2023-12-08

## 2023-12-08 LAB
ALBUMIN SERPL BCP-MCNC: 2.8 G/DL (ref 3.5–5.2)
ALP SERPL-CCNC: 54 U/L (ref 55–135)
ALT SERPL W/O P-5'-P-CCNC: 5 U/L (ref 10–44)
ANION GAP SERPL CALC-SCNC: 5 MMOL/L (ref 8–16)
ANION GAP SERPL CALC-SCNC: 5 MMOL/L (ref 8–16)
AST SERPL-CCNC: 11 U/L (ref 10–40)
BASOPHILS # BLD AUTO: 0.08 K/UL (ref 0–0.2)
BASOPHILS NFR BLD: 1.1 % (ref 0–1.9)
BILIRUB SERPL-MCNC: 1 MG/DL (ref 0.1–1)
BUN SERPL-MCNC: 22 MG/DL (ref 8–23)
BUN SERPL-MCNC: 22 MG/DL (ref 8–23)
CALCIUM SERPL-MCNC: 7.9 MG/DL (ref 8.7–10.5)
CALCIUM SERPL-MCNC: 7.9 MG/DL (ref 8.7–10.5)
CHLORIDE SERPL-SCNC: 110 MMOL/L (ref 95–110)
CHLORIDE SERPL-SCNC: 110 MMOL/L (ref 95–110)
CO2 SERPL-SCNC: 21 MMOL/L (ref 23–29)
CO2 SERPL-SCNC: 21 MMOL/L (ref 23–29)
CREAT SERPL-MCNC: 1.7 MG/DL (ref 0.5–1.4)
CREAT SERPL-MCNC: 1.7 MG/DL (ref 0.5–1.4)
DIFFERENTIAL METHOD: ABNORMAL
EOSINOPHIL # BLD AUTO: 0.1 K/UL (ref 0–0.5)
EOSINOPHIL NFR BLD: 0.7 % (ref 0–8)
ERYTHROCYTE [DISTWIDTH] IN BLOOD BY AUTOMATED COUNT: 17.7 % (ref 11.5–14.5)
EST. GFR  (NO RACE VARIABLE): 42 ML/MIN/1.73 M^2
EST. GFR  (NO RACE VARIABLE): 42 ML/MIN/1.73 M^2
GLUCOSE SERPL-MCNC: 102 MG/DL (ref 70–110)
GLUCOSE SERPL-MCNC: 140 MG/DL (ref 70–110)
GLUCOSE SERPL-MCNC: 175 MG/DL (ref 70–110)
GLUCOSE SERPL-MCNC: 76 MG/DL (ref 70–110)
GLUCOSE SERPL-MCNC: 87 MG/DL (ref 70–110)
GLUCOSE SERPL-MCNC: 87 MG/DL (ref 70–110)
HCT VFR BLD AUTO: 29.3 % (ref 40–54)
HGB BLD-MCNC: 9.4 G/DL (ref 14–18)
IMM GRANULOCYTES # BLD AUTO: 0.02 K/UL (ref 0–0.04)
IMM GRANULOCYTES NFR BLD AUTO: 0.3 % (ref 0–0.5)
LYMPHOCYTES # BLD AUTO: 0.4 K/UL (ref 1–4.8)
LYMPHOCYTES NFR BLD: 5.9 % (ref 18–48)
MAGNESIUM SERPL-MCNC: 1.8 MG/DL (ref 1.6–2.6)
MCH RBC QN AUTO: 28.7 PG (ref 27–31)
MCHC RBC AUTO-ENTMCNC: 32.1 G/DL (ref 32–36)
MCV RBC AUTO: 89 FL (ref 82–98)
MONOCYTES # BLD AUTO: 0.6 K/UL (ref 0.3–1)
MONOCYTES NFR BLD: 8 % (ref 4–15)
NEUTROPHILS # BLD AUTO: 6.3 K/UL (ref 1.8–7.7)
NEUTROPHILS NFR BLD: 84 % (ref 38–73)
NRBC BLD-RTO: 0 /100 WBC
PLATELET # BLD AUTO: 222 K/UL (ref 150–450)
PMV BLD AUTO: 10.3 FL (ref 9.2–12.9)
POTASSIUM SERPL-SCNC: 4.5 MMOL/L (ref 3.5–5.1)
POTASSIUM SERPL-SCNC: 4.5 MMOL/L (ref 3.5–5.1)
PROT SERPL-MCNC: 5.6 G/DL (ref 6–8.4)
RBC # BLD AUTO: 3.28 M/UL (ref 4.6–6.2)
SODIUM SERPL-SCNC: 136 MMOL/L (ref 136–145)
SODIUM SERPL-SCNC: 136 MMOL/L (ref 136–145)
WBC # BLD AUTO: 7.49 K/UL (ref 3.9–12.7)

## 2023-12-08 PROCEDURE — 94761 N-INVAS EAR/PLS OXIMETRY MLT: CPT

## 2023-12-08 PROCEDURE — 94799 UNLISTED PULMONARY SVC/PX: CPT | Mod: XB

## 2023-12-08 PROCEDURE — 36415 COLL VENOUS BLD VENIPUNCTURE: CPT | Performed by: SURGERY

## 2023-12-08 PROCEDURE — 25000003 PHARM REV CODE 250: Performed by: SURGERY

## 2023-12-08 PROCEDURE — G0378 HOSPITAL OBSERVATION PER HR: HCPCS

## 2023-12-08 PROCEDURE — 83735 ASSAY OF MAGNESIUM: CPT | Performed by: STUDENT IN AN ORGANIZED HEALTH CARE EDUCATION/TRAINING PROGRAM

## 2023-12-08 PROCEDURE — 85025 COMPLETE CBC W/AUTO DIFF WBC: CPT | Performed by: SURGERY

## 2023-12-08 PROCEDURE — 80053 COMPREHEN METABOLIC PANEL: CPT | Performed by: SURGERY

## 2023-12-08 PROCEDURE — 99900035 HC TECH TIME PER 15 MIN (STAT)

## 2023-12-08 PROCEDURE — 25000003 PHARM REV CODE 250: Performed by: STUDENT IN AN ORGANIZED HEALTH CARE EDUCATION/TRAINING PROGRAM

## 2023-12-08 PROCEDURE — 94799 UNLISTED PULMONARY SVC/PX: CPT

## 2023-12-08 PROCEDURE — 96376 TX/PRO/DX INJ SAME DRUG ADON: CPT

## 2023-12-08 PROCEDURE — 63600175 PHARM REV CODE 636 W HCPCS: Performed by: SURGERY

## 2023-12-08 PROCEDURE — 99900031 HC PATIENT EDUCATION (STAT)

## 2023-12-08 RX ORDER — ASPIRIN 81 MG/1
81 TABLET ORAL DAILY
Status: DISCONTINUED | OUTPATIENT
Start: 2023-12-08 | End: 2023-12-09 | Stop reason: HOSPADM

## 2023-12-08 RX ORDER — CLOPIDOGREL BISULFATE 75 MG/1
75 TABLET ORAL DAILY
Status: DISCONTINUED | OUTPATIENT
Start: 2023-12-08 | End: 2023-12-09 | Stop reason: HOSPADM

## 2023-12-08 RX ORDER — SODIUM CHLORIDE, SODIUM LACTATE, POTASSIUM CHLORIDE, CALCIUM CHLORIDE 600; 310; 30; 20 MG/100ML; MG/100ML; MG/100ML; MG/100ML
INJECTION, SOLUTION INTRAVENOUS CONTINUOUS
Status: ACTIVE | OUTPATIENT
Start: 2023-12-08 | End: 2023-12-09

## 2023-12-08 RX ORDER — DOXYCYCLINE 100 MG/1
100 CAPSULE ORAL EVERY 12 HOURS
Qty: 10 CAPSULE | Refills: 0 | Status: SHIPPED | OUTPATIENT
Start: 2023-12-08 | End: 2023-12-13

## 2023-12-08 RX ORDER — OXYCODONE HYDROCHLORIDE 5 MG/1
5 TABLET ORAL EVERY 6 HOURS PRN
Qty: 28 TABLET | Refills: 0 | Status: SHIPPED | OUTPATIENT
Start: 2023-12-08 | End: 2023-12-13 | Stop reason: SDUPTHER

## 2023-12-08 RX ORDER — NALOXONE HCL 0.4 MG/ML
0.4 VIAL (ML) INJECTION
Status: DISCONTINUED | OUTPATIENT
Start: 2023-12-08 | End: 2023-12-09 | Stop reason: HOSPADM

## 2023-12-08 RX ORDER — ATORVASTATIN CALCIUM 40 MG/1
40 TABLET, FILM COATED ORAL NIGHTLY
Status: DISCONTINUED | OUTPATIENT
Start: 2023-12-08 | End: 2023-12-09 | Stop reason: HOSPADM

## 2023-12-08 RX ADMIN — LEVOTHYROXINE SODIUM 100 MCG: 100 TABLET ORAL at 05:12

## 2023-12-08 RX ADMIN — ATORVASTATIN CALCIUM 40 MG: 40 TABLET, FILM COATED ORAL at 08:12

## 2023-12-08 RX ADMIN — FUROSEMIDE 40 MG: 40 TABLET ORAL at 08:12

## 2023-12-08 RX ADMIN — DOXYCYCLINE HYCLATE 100 MG: 100 CAPSULE ORAL at 08:12

## 2023-12-08 RX ADMIN — MORPHINE SULFATE 4 MG: 4 INJECTION, SOLUTION INTRAMUSCULAR; INTRAVENOUS at 11:12

## 2023-12-08 RX ADMIN — FINASTERIDE 5 MG: 5 TABLET, FILM COATED ORAL at 08:12

## 2023-12-08 RX ADMIN — MORPHINE SULFATE 4 MG: 4 INJECTION, SOLUTION INTRAMUSCULAR; INTRAVENOUS at 05:12

## 2023-12-08 RX ADMIN — ISOSORBIDE DINITRATE 20 MG: 10 TABLET ORAL at 02:12

## 2023-12-08 RX ADMIN — SACUBITRIL AND VALSARTAN 1 TABLET: 24; 26 TABLET, FILM COATED ORAL at 08:12

## 2023-12-08 RX ADMIN — ONDANSETRON 4 MG: 4 TABLET, ORALLY DISINTEGRATING ORAL at 09:12

## 2023-12-08 RX ADMIN — METOPROLOL SUCCINATE 25 MG: 25 TABLET, EXTENDED RELEASE ORAL at 08:12

## 2023-12-08 RX ADMIN — ISOSORBIDE DINITRATE 20 MG: 10 TABLET ORAL at 08:12

## 2023-12-08 RX ADMIN — GABAPENTIN 300 MG: 300 CAPSULE ORAL at 08:12

## 2023-12-08 RX ADMIN — OXYCODONE HYDROCHLORIDE 5 MG: 5 TABLET ORAL at 08:12

## 2023-12-08 RX ADMIN — HYDRALAZINE HYDROCHLORIDE 50 MG: 25 TABLET, FILM COATED ORAL at 08:12

## 2023-12-08 RX ADMIN — TAMSULOSIN HYDROCHLORIDE 0.4 MG: 0.4 CAPSULE ORAL at 08:12

## 2023-12-08 RX ADMIN — CLOPIDOGREL BISULFATE 75 MG: 75 TABLET, FILM COATED ORAL at 02:12

## 2023-12-08 RX ADMIN — OXYCODONE HYDROCHLORIDE 5 MG: 5 TABLET ORAL at 07:12

## 2023-12-08 RX ADMIN — SODIUM CHLORIDE, SODIUM LACTATE, POTASSIUM CHLORIDE, AND CALCIUM CHLORIDE: .6; .31; .03; .02 INJECTION, SOLUTION INTRAVENOUS at 02:12

## 2023-12-08 RX ADMIN — ASPIRIN 81 MG: 81 TABLET, COATED ORAL at 02:12

## 2023-12-08 NOTE — PLAN OF CARE
Problem: Oral Intake Inadequate  Goal: Improved Oral Intake  Outcome: Ongoing, Progressing  Intervention: Promote and Optimize Oral Intake  Flowsheets (Taken 12/8/2023 1046)  Oral Nutrition Promotion: calorie-dense liquids provided

## 2023-12-08 NOTE — PROGRESS NOTES
"[facility] Medicine  Progress Note    Patient Name: Baldo Carney  MRN: [id number]  Patient Class: OP- Outpatient Recovery   Admission Date: 12/7/2023  Length of Stay: 0 days  Attending Physician: Warner Samayoa MD  Primary Care Provider: Millie Hayes APRN,FNP-C        Subjective:     Principal Problem:Umbilical hernia without obstruction and without gangrene        HPI:  Baldo Carney is a 73 y.o. male with complicated medical history including coronary artery disease, aortic stenosis status post CABG and AVR, ischemic cardiomyopathy, type 2 diabetes, hypertension, hyperlipidemia, COPD, ascites of the liver from possible cirrhosis versus CHF requiring frequent paracentesis approximately every other week did not going to hernia repair on account of a very large bowel containing right inguinal hernia and umbilical hernia.  Prior to surgery, patient states that he was having abdominal pain with associated nausea and vomiting.  He denied any hemoptysis, hematochezia or melena.  He states that his sugars are controlled on oral anti glycemic.  Does not smoke cigarettes, drink alcohol or use any illicit medication.    Overview/Hospital Course:  Baldo Carney is a 73 year old male with a past medical history of CAD s/p CABG, AVR, DM, HTN, HLD, COPD, CKD, HFrEF, BPH, cirrhosis, and hypothyroidism who presented for elective inguinal (R) and umbilical hernias per Dr. Bone.. He is POD 1. There have been no perioperative complications. He is tolerating a PO diet. He is scheduled to transfer to the telemetry unit.    Interval History: see "Hospital Course"    Review of Systems   Gastrointestinal:  Positive for abdominal pain.   Skin:  Positive for wound.     Objective:     Vital Signs (Most Recent):  Temp: 98.8 °F (37.1 °C) (12/08/23 1139)  Pulse: 72 (12/08/23 1100)  Resp: 18 (12/08/23 1127)  BP: 130/62 (12/08/23 1100)  SpO2: 99 % (12/08/23 1100) Vital Signs (24h " Range):  Temp:  [97 °F (36.1 °C)-98.8 °F (37.1 °C)] 98.8 °F (37.1 °C)  Pulse:  [54-83] 72  Resp:  [10-21] 18  SpO2:  [93 %-100 %] 99 %  BP: (107-156)/(52-86) 130/62     Weight: 75.8 kg (167 lb 1.7 oz)  Body mass index is 22.66 kg/m².    Intake/Output Summary (Last 24 hours) at 12/8/2023 1146  Last data filed at 12/8/2023 1044  Gross per 24 hour   Intake 990 ml   Output 1475 ml   Net -485 ml         Physical Exam  Vitals and nursing note reviewed.   Constitutional:       General: He is not in acute distress.  HENT:      Head: Normocephalic and atraumatic.      Right Ear: External ear normal.      Left Ear: External ear normal.      Nose: Nose normal.      Mouth/Throat:      Mouth: Mucous membranes are moist.      Pharynx: Oropharynx is clear.   Eyes:      Extraocular Movements: Extraocular movements intact.      Conjunctiva/sclera: Conjunctivae normal.   Cardiovascular:      Rate and Rhythm: Normal rate and regular rhythm.      Pulses: Normal pulses.      Heart sounds: Normal heart sounds.   Pulmonary:      Effort: Pulmonary effort is normal.      Breath sounds: Normal breath sounds.   Abdominal:      Tenderness: There is abdominal tenderness.      Comments: Drain in place. Dressings, clean, dry and intact.   Musculoskeletal:         General: Normal range of motion.      Cervical back: Normal range of motion.      Right lower leg: No edema.      Left lower leg: No edema.   Skin:     General: Skin is warm and dry.   Neurological:      Mental Status: He is alert. Mental status is at baseline.   Psychiatric:         Mood and Affect: Mood normal.         Behavior: Behavior normal.             Significant Labs: All pertinent labs within the past 24 hours have been reviewed.    Significant Imaging: I have reviewed all pertinent imaging results/findings within the past 24 hours.    Assessment/Plan:      * Umbilical hernia without obstruction and without gangrene  -Surgery follow  -PRN analgesics  -Incentive  "spirometry  -Encourage ambulation  -Cardiac diet      CRF (chronic renal failure)  Stable.  -Renally dose medications  -Avoid nephrotoxic agents    Hypothyroidism  -Continue Synthroid    Ascites of liver  Possible cirrhosis secondary to CHF.  -Monitor fluid status      Anemia  Stable.  -Trend Hgb with CBC    COPD (chronic obstructive pulmonary disease)  -Continue home Breztri    BPH (benign prostatic hyperplasia)  -Finasteride  -Flomax    Chronic systolic heart failure  -Entresto  -Lasix  -Metoprolol  -Hydralazine and nitrate  -Telemetry      CAD (coronary artery disease)  -Continue ASA, statin and metoprolol  -Telemetry    Diabetes mellitus type 2 without retinopathy  Patient's FSGs are controlled on current medication regimen.  Last A1c reviewed-   Lab Results   Component Value Date    HGBA1C 5.4 09/15/2023     Most recent fingerstick glucose reviewed- No results for input(s): "POCTGLUCOSE" in the last 24 hours.  Current correctional scale  Low  Maintain anti-hyperglycemic dose as follows-   Antihyperglycemics (From admission, onward)      Start     Stop Route Frequency Ordered    12/07/23 1810  insulin aspart U-100 pen 0-5 Units         -- SubQ Before meals & nightly PRN 12/07/23 1710          Hold Oral hypoglycemics while patient is in the hospital.    Hyperlipidemia  -Continue statin      Hypertension  -Continue home hydralazine, nitrate, metoprolol and Entresto  -Continue to monitor      VTE Risk Mitigation (From admission, onward)           Ordered     Place sequential compression device  Until discontinued         12/07/23 1708                    Discharge Planning   PAM: 12/9/2023     Code Status: Full Code   Is the patient medically ready for discharge?:     Reason for patient still in hospital (select all that apply): Patient trending condition and Consult recommendations                     Warner Samayoa MD  Department of Hospital Medicine   [facility]    "

## 2023-12-08 NOTE — PROGRESS NOTES
[facility]  Adult Nutrition   Progress Note (Initial Assessment)     SUMMARY     Recommendations  1) Continue current cardiac diet as tolerated and encourage intake.   2) RD has added Ensure High Protein to meal trays to assist in meeting nutritional needs.   3) Honor pt food selections and preferences.    Goals:   Pt to meet 100% of his energy and protein needs.    Nutrition Goal Status: progressing towards goal    Communication of RD Recs: reviewed with RN    Dietitian Rounds Brief  Assessed due to MST of 3: Pt is a 73 yobm admitted with an umbilical hernia without obstruction and without gangrene and a pmh of  coronary artery disease, aortic stenosis status post CABG and AVR, ischemic cardiomyopathy, type 2 diabetes, hypertension, hyperlipidemia, COPD, ascites of the liver from possible cirrhosis versus CHF requiring frequent paracentesis approximately every other week.    He reports eating about half of his meals and would like some Ensure HP to assist in meeting his nutritional needs. His skin appears to be intact except for a couple of incision sites. His LBM was on 12/6. Labs have been reviewed and will continue to follow prn till discharge.    Diet order:   Current Diet Order: Cardiac      Evaluation of Received Nutrient/Fluid Intake  Energy Calories Required: not meeting needs  Protein Required: not meeting needs  Fluid Required: meeting needs  Tolerance: not tolerating     % Intake of Estimated Energy Needs: 75 - 100 %  % Meal Intake: 50 - 75 %      Intake/Output Summary (Last 24 hours) at 12/8/2023 1105  Last data filed at 12/8/2023 0546  Gross per 24 hour   Intake 870 ml   Output 1075 ml   Net -205 ml        Anthropometrics  Temp: 98.6 °F (37 °C)  Height Method: Stated  Height: 6' (182.9 cm)  Height (inches): 72 in  Weight Method: Standard Scale  Weight: 75.8 kg (167 lb 1.7 oz)  Weight (lb): 167.11 lb  Ideal Body Weight (IBW), Male: 178 lb  % Ideal Body Weight, Male (lb): 93.88 %  BMI  (Calculated): 22.7  BMI Grade: 18.5-24.9 - normal       Estimated/Assessed Needs  Weight Used For Calorie Calculations: 75.8 kg (167 lb 1.7 oz)  Energy Calorie Requirements (kcal): 1252-8547 kcals/day (25-30 kcals/kg ABW)  Energy Need Method: Kcal/kg  Protein Requirements:  g/day (1.2-1.5 g/kg IBW)  Weight Used For Protein Calculations: 81 kg (178 lb 9.2 oz)     Estimated Fluid Requirement Method: RDA Method  RDA Method (mL): 1895       Reason for Assessment  Reason For Assessment: identified at risk by screening criteria (MST of 3)  Diagnosis: other (see comments) (Umbilical hernia without obstruction and without gangrene)  Relevant Medical History: Asthma, Hyperlipidemia, Hypertension, Cardiomyopathy, Diabetes mellitus, CHF (congestive heart failure), Coronary artery disease, Neuropathy, Enlarged prostate, CRF (chronic renal failure)    Nutrition/Diet History  Spiritual, Cultural Beliefs, Christian Practices, Values that Affect Care: no  Food Allergies: NKFA  Factors Affecting Nutritional Intake: nausea/vomiting    Nutrition Risk Screen  Nutrition Risk Screen: no indicators present     MST Score: 3  Have you recently lost weight without trying?: Unsure  Weight loss score: 2  Have you been eating poorly because of a decreased appetite?: Yes  Appetite score: 1       Weight History:  Wt Readings from Last 5 Encounters:   12/08/23 75.8 kg (167 lb 1.7 oz)   12/04/23 75.8 kg (167 lb 3.5 oz)   11/21/23 74.8 kg (165 lb)   11/16/23 73.2 kg (161 lb 6.4 oz)   11/08/23 77.1 kg (170 lb)        Lab/Procedures/Meds: Pertinent Labs/Meds Reviewed    Medications:Pertinent Medications Reviewed  Scheduled Meds:   budesonide-glycopyr-formoterol   Inhalation Daily    doxycycline  100 mg Oral Q12H    finasteride  5 mg Oral Daily    furosemide  40 mg Oral Daily    gabapentin  300 mg Oral QHS    hydrALAZINE  50 mg Oral Q12H    isosorbide dinitrate  20 mg Oral TID    levothyroxine  100 mcg Oral Before breakfast    metoprolol  succinate  25 mg Oral Daily    sacubitriL-valsartan  1 tablet Oral BID    tamsulosin  0.4 mg Oral Daily     Continuous Infusions:  PRN Meds:.acetaminophen, albuterol, dextrose 50%, dextrose 50%, glucagon (human recombinant), glucose, glucose, hydrALAZINE, ibuprofen, insulin aspart U-100, morphine, ondansetron, oxyCODONE    Labs: Pertinent Labs Reviewed  Clinical Chemistry:  Recent Labs   Lab 12/07/23  1730 12/08/23  0345    136  136   K 4.2 4.5  4.5    110  110   CO2 20* 21*  21*   GLU 99 87  87   BUN 22 22  22   CREATININE 1.8* 1.7*  1.7*   CALCIUM 7.9* 7.9*  7.9*   PROT 6.0 5.6*   ALBUMIN 3.0* 2.8*   BILITOT 0.7 1.0   ALKPHOS 50* 54*   AST 11 11   ALT 6* 5*   ANIONGAP 6* 5*  5*   MG 1.9 1.8     CBC:   Recent Labs   Lab 12/08/23  0345   WBC 7.49   RBC 3.28*   HGB 9.4*   HCT 29.3*      MCV 89   MCH 28.7   MCHC 32.1         Monitor and Evaluation  Food and Nutrient Intake: food and beverage intake, energy intake  Food and Nutrient Adminstration: diet order  Knowledge/Beliefs/Attitudes: food and nutrition knowledge/skill, beliefs and attitudes  Physical Activity and Function: nutrition-related ADLs and IADLs, factors affecting access to physical activity  Anthropometric Measurements: weight, weight change, body mass index  Biochemical Data, Medical Tests and Procedures: lipid profile, inflammatory profile, glucose/endocrine profile, gastrointestinal profile, electrolyte and renal panel  Nutrition-Focused Physical Findings: overall appearance     Nutrition Risk  Level of Risk/Frequency of Follow-up: high     Nutrition Follow-Up  RD Follow-up?: Yes      Irma Mock, MARBELLA 12/08/2023 11:05 AM

## 2023-12-08 NOTE — SUBJECTIVE & OBJECTIVE
"Interval History: see "Hospital Course"    Review of Systems   Gastrointestinal:  Positive for abdominal pain.   Skin:  Positive for wound.     Objective:     Vital Signs (Most Recent):  Temp: 98.8 °F (37.1 °C) (12/08/23 1139)  Pulse: 72 (12/08/23 1100)  Resp: 18 (12/08/23 1127)  BP: 130/62 (12/08/23 1100)  SpO2: 99 % (12/08/23 1100) Vital Signs (24h Range):  Temp:  [97 °F (36.1 °C)-98.8 °F (37.1 °C)] 98.8 °F (37.1 °C)  Pulse:  [54-83] 72  Resp:  [10-21] 18  SpO2:  [93 %-100 %] 99 %  BP: (107-156)/(52-86) 130/62     Weight: 75.8 kg (167 lb 1.7 oz)  Body mass index is 22.66 kg/m².    Intake/Output Summary (Last 24 hours) at 12/8/2023 1146  Last data filed at 12/8/2023 1044  Gross per 24 hour   Intake 990 ml   Output 1475 ml   Net -485 ml         Physical Exam  Vitals and nursing note reviewed.   Constitutional:       General: He is not in acute distress.  HENT:      Head: Normocephalic and atraumatic.      Right Ear: External ear normal.      Left Ear: External ear normal.      Nose: Nose normal.      Mouth/Throat:      Mouth: Mucous membranes are moist.      Pharynx: Oropharynx is clear.   Eyes:      Extraocular Movements: Extraocular movements intact.      Conjunctiva/sclera: Conjunctivae normal.   Cardiovascular:      Rate and Rhythm: Normal rate and regular rhythm.      Pulses: Normal pulses.      Heart sounds: Normal heart sounds.   Pulmonary:      Effort: Pulmonary effort is normal.      Breath sounds: Normal breath sounds.   Abdominal:      Tenderness: There is abdominal tenderness.      Comments: Drain in place. Dressings, clean, dry and intact.   Musculoskeletal:         General: Normal range of motion.      Cervical back: Normal range of motion.      Right lower leg: No edema.      Left lower leg: No edema.   Skin:     General: Skin is warm and dry.   Neurological:      Mental Status: He is alert. Mental status is at baseline.   Psychiatric:         Mood and Affect: Mood normal.         Behavior: Behavior " normal.             Significant Labs: All pertinent labs within the past 24 hours have been reviewed.    Significant Imaging: I have reviewed all pertinent imaging results/findings within the past 24 hours.

## 2023-12-08 NOTE — PROGRESS NOTES
General Surgery Progress Note    Admit Date: 12/7/2023  S/P: Procedure(s) (LRB):  REPAIR, HERNIA, INGUINAL, INCARCERATED, INITIAL (Right)  REPAIR, HERNIA, UMBILICAL (N/A)  INSERTION, CATHETER, DIALYSIS, PERITONEAL (N/A)    Post-operative Day: 1 Day Post-Op    Hospital Day: 2    SUBJECTIVE:   Patient had ongoing ascitic drainage around his peritoneal drain overnight. This required multiple bandage changes. It was starting to slow down.  Patient tolerated diet.  Pain adequately controlled.  Hemodynamically stable.    OBJECTIVE:     Vital Signs (Most Recent)  Temp:  [97 °F (36.1 °C)-98.8 °F (37.1 °C)] 98.8 °F (37.1 °C)  Pulse:  [54-83] 72  Resp:  [10-21] 18  SpO2:  [93 %-100 %] 99 %  BP: (107-156)/(52-86) 130/62    I&Os:  I/O last 3 completed shifts:  In: 870 [P.O.:120; IV Piggyback:750]  Out: 1075 [Urine:75; Drains:1000]    Physical Exam:  Gen: NAD, AAOx3  HEENT: Anicteric sclera  Pulm: unlabored, symmetrical   Abd: Left mid abdomen peritoneal drain in place, no active drainage at this time. Periumbilical incision with gauze bandage overlying, no evidence of fluid collection or bleeding. Right inguinal incision intact without signs of infection or bleeding, mild swelling of the scrotum    Laboratory:  CBC:   Recent Labs   Lab 12/08/23  0345   WBC 7.49   RBC 3.28*   HGB 9.4*   HCT 29.3*      MCV 89   MCH 28.7   MCHC 32.1     CMP:   Recent Labs   Lab 12/08/23  0345   GLU 87  87   CALCIUM 7.9*  7.9*   ALBUMIN 2.8*   PROT 5.6*     136   K 4.5  4.5   CO2 21*  21*     110   BUN 22  22   CREATININE 1.7*  1.7*   ALKPHOS 54*   ALT 5*   AST 11   BILITOT 1.0     Labs within the past 24 hours have been reviewed.    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 type 2 diabetes mellitus 03/15/2023    Vitreous hemorrhage, right eye 11/09/2022    Vitreous degeneration of both eyes 11/09/2022    OAG (open angle glaucoma) suspect, high risk, bilateral 11/09/2022    Age-related nuclear cataract of both eyes 11/09/2022    Ascites of liver 06/07/2022    Chronic systolic heart failure 08/29/2020    BPH (benign prostatic hyperplasia) 08/29/2020    COPD (chronic obstructive pulmonary disease) 08/29/2020    Anemia 08/29/2020    Generalized OA 05/13/2020    Elevated PSA, greater than or equal to 20 ng/ml 07/30/2019    Peripheral polyneuropathy 04/11/2019    Erectile dysfunction 03/21/2018    CAD (coronary artery disease) 11/03/2014    S/P CABG (coronary artery bypass graft) 11/03/2014    Aortic stenosis status post AVR 10/21/2014    Hypertension 09/19/2014    Hyperlipidemia 09/19/2014    Diabetes mellitus type 2 without retinopathy 09/19/2014         73 y.o. male with CHF, aortic stenosis, cirrhosis with ascites, status post open umbilical hernia repair with mesh and open right inguinal hernia repair with mesh with peritoneal drain placement  -approximally 2000 cc of ascitic fluid drained during surgery and through the peritoneal drain, there was also extensive unmeasurable loss around the peritoneal drain  -I will start low rate IV fluids to make up for the losses taking care not to overload this patient who has congestive heart failure  -continue diet as tolerated  -will continue doxycycline while peritoneal drain is in place for prophylactic reasons to prevent bacterial peritonitis and mesh infection  -okay to resume anticoagulation  -continue home blood pressure medications  -will likely be okay for discharge home tomorrow if leakage of ascitic fluid around peritoneal drain has ceased  -will have patient follow up with me  next Monday

## 2023-12-09 VITALS
HEIGHT: 72 IN | SYSTOLIC BLOOD PRESSURE: 92 MMHG | OXYGEN SATURATION: 97 % | BODY MASS INDEX: 19.8 KG/M2 | TEMPERATURE: 99 F | RESPIRATION RATE: 20 BRPM | HEART RATE: 64 BPM | WEIGHT: 146.19 LBS | DIASTOLIC BLOOD PRESSURE: 53 MMHG

## 2023-12-09 LAB
ANION GAP SERPL CALC-SCNC: 5 MMOL/L (ref 8–16)
BASOPHILS # BLD AUTO: 0.02 K/UL (ref 0–0.2)
BASOPHILS NFR BLD: 0.2 % (ref 0–1.9)
BUN SERPL-MCNC: 23 MG/DL (ref 8–23)
CALCIUM SERPL-MCNC: 7.6 MG/DL (ref 8.7–10.5)
CHLORIDE SERPL-SCNC: 108 MMOL/L (ref 95–110)
CO2 SERPL-SCNC: 23 MMOL/L (ref 23–29)
CREAT SERPL-MCNC: 1.8 MG/DL (ref 0.5–1.4)
DIFFERENTIAL METHOD: ABNORMAL
EOSINOPHIL # BLD AUTO: 0.1 K/UL (ref 0–0.5)
EOSINOPHIL NFR BLD: 0.7 % (ref 0–8)
ERYTHROCYTE [DISTWIDTH] IN BLOOD BY AUTOMATED COUNT: 17.5 % (ref 11.5–14.5)
EST. GFR  (NO RACE VARIABLE): 39.3 ML/MIN/1.73 M^2
GLUCOSE SERPL-MCNC: 115 MG/DL (ref 70–110)
GLUCOSE SERPL-MCNC: 132 MG/DL (ref 70–110)
GLUCOSE SERPL-MCNC: 136 MG/DL (ref 70–110)
HCT VFR BLD AUTO: 29.3 % (ref 40–54)
HGB BLD-MCNC: 9.4 G/DL (ref 14–18)
IMM GRANULOCYTES # BLD AUTO: 0.06 K/UL (ref 0–0.04)
IMM GRANULOCYTES NFR BLD AUTO: 0.7 % (ref 0–0.5)
LYMPHOCYTES # BLD AUTO: 0.6 K/UL (ref 1–4.8)
LYMPHOCYTES NFR BLD: 7.1 % (ref 18–48)
MCH RBC QN AUTO: 28.2 PG (ref 27–31)
MCHC RBC AUTO-ENTMCNC: 32.1 G/DL (ref 32–36)
MCV RBC AUTO: 88 FL (ref 82–98)
MONOCYTES # BLD AUTO: 0.9 K/UL (ref 0.3–1)
MONOCYTES NFR BLD: 10.9 % (ref 4–15)
NEUTROPHILS # BLD AUTO: 6.8 K/UL (ref 1.8–7.7)
NEUTROPHILS NFR BLD: 80.4 % (ref 38–73)
NRBC BLD-RTO: 0 /100 WBC
PLATELET # BLD AUTO: 219 K/UL (ref 150–450)
PMV BLD AUTO: 10.3 FL (ref 9.2–12.9)
POTASSIUM SERPL-SCNC: 4.9 MMOL/L (ref 3.5–5.1)
RBC # BLD AUTO: 3.33 M/UL (ref 4.6–6.2)
SODIUM SERPL-SCNC: 136 MMOL/L (ref 136–145)
WBC # BLD AUTO: 8.51 K/UL (ref 3.9–12.7)

## 2023-12-09 PROCEDURE — G0378 HOSPITAL OBSERVATION PER HR: HCPCS

## 2023-12-09 PROCEDURE — 36415 COLL VENOUS BLD VENIPUNCTURE: CPT | Performed by: STUDENT IN AN ORGANIZED HEALTH CARE EDUCATION/TRAINING PROGRAM

## 2023-12-09 PROCEDURE — 80048 BASIC METABOLIC PNL TOTAL CA: CPT | Performed by: STUDENT IN AN ORGANIZED HEALTH CARE EDUCATION/TRAINING PROGRAM

## 2023-12-09 PROCEDURE — 99900031 HC PATIENT EDUCATION (STAT)

## 2023-12-09 PROCEDURE — 85025 COMPLETE CBC W/AUTO DIFF WBC: CPT | Performed by: SURGERY

## 2023-12-09 PROCEDURE — 94761 N-INVAS EAR/PLS OXIMETRY MLT: CPT

## 2023-12-09 PROCEDURE — 25000003 PHARM REV CODE 250: Performed by: SURGERY

## 2023-12-09 PROCEDURE — 25000003 PHARM REV CODE 250: Performed by: STUDENT IN AN ORGANIZED HEALTH CARE EDUCATION/TRAINING PROGRAM

## 2023-12-09 RX ORDER — IBUPROFEN 600 MG/1
600 TABLET ORAL EVERY 6 HOURS PRN
Start: 2023-12-09

## 2023-12-09 RX ADMIN — OXYCODONE HYDROCHLORIDE 5 MG: 5 TABLET ORAL at 12:12

## 2023-12-09 RX ADMIN — DOXYCYCLINE HYCLATE 100 MG: 100 CAPSULE ORAL at 10:12

## 2023-12-09 RX ADMIN — ISOSORBIDE DINITRATE 20 MG: 10 TABLET ORAL at 10:12

## 2023-12-09 RX ADMIN — FINASTERIDE 5 MG: 5 TABLET, FILM COATED ORAL at 10:12

## 2023-12-09 RX ADMIN — CLOPIDOGREL BISULFATE 75 MG: 75 TABLET, FILM COATED ORAL at 10:12

## 2023-12-09 RX ADMIN — TAMSULOSIN HYDROCHLORIDE 0.4 MG: 0.4 CAPSULE ORAL at 10:12

## 2023-12-09 RX ADMIN — FUROSEMIDE 40 MG: 40 TABLET ORAL at 10:12

## 2023-12-09 RX ADMIN — ASPIRIN 81 MG: 81 TABLET, COATED ORAL at 10:12

## 2023-12-09 RX ADMIN — LEVOTHYROXINE SODIUM 100 MCG: 100 TABLET ORAL at 06:12

## 2023-12-09 NOTE — PLAN OF CARE
CM spoke to patient regarding provider selection for home health services. Patient reports no preference and requested that  connect patient to the nearest provider.    CM submitted referral on patient's behalf to [facility] for Home Health services.

## 2023-12-09 NOTE — NURSING
Removed patients IVS. Removed patients monitor and returned. Reviewed discharge instructions/information with patient, verbalized understanding of all information provided. Patient notified family of discharge, awaiting for family to arrive for transport home.

## 2023-12-09 NOTE — PLAN OF CARE
Problem: Adult Inpatient Plan of Care  Goal: Plan of Care Review  Outcome: Met  Goal: Patient-Specific Goal (Individualized)  Outcome: Met  Goal: Absence of Hospital-Acquired Illness or Injury  Outcome: Met  Goal: Optimal Comfort and Wellbeing  Outcome: Met  Goal: Readiness for Transition of Care  Outcome: Met     Problem: Diabetes Comorbidity  Goal: Blood Glucose Level Within Targeted Range  Outcome: Met     Problem: Fall Injury Risk  Goal: Absence of Fall and Fall-Related Injury  Outcome: Met     Problem: Oral Intake Inadequate  Goal: Improved Oral Intake  Outcome: Met     Problem: Skin Injury Risk Increased  Goal: Skin Health and Integrity  Outcome: Met

## 2023-12-09 NOTE — PLAN OF CARE
Patient accepted to receive Home Health services via [facility] with a start date of 12/10/2023. Notified patient via telephone.

## 2023-12-09 NOTE — PROGRESS NOTES
[facility] Medicine  Progress Note    Patient Name: Baldo Carney  MRN: [id number]  Patient Class: OP- Observation   Admission Date: 12/7/2023  Length of Stay: 0 days  Attending Physician: Yuriy Jalloh MD  Primary Care Provider: Millie Hayes APRN,FNP-C        Subjective:     Principal Problem:Umbilical hernia without obstruction and without gangrene        HPI:  Baldo Carney is a 73 y.o. male with complicated medical history including coronary artery disease, aortic stenosis status post CABG and AVR, ischemic cardiomyopathy, type 2 diabetes, hypertension, hyperlipidemia, COPD, ascites of the liver from possible cirrhosis versus CHF requiring frequent paracentesis approximately every other week did not going to hernia repair on account of a very large bowel containing right inguinal hernia and umbilical hernia.  Prior to surgery, patient states that he was having abdominal pain with associated nausea and vomiting.  He denied any hemoptysis, hematochezia or melena.  He states that his sugars are controlled on oral anti glycemic.  Does not smoke cigarettes, drink alcohol or use any illicit medication.    Overview/Hospital Course:  Baldo Carney is a 73 year old male with a past medical history of CAD s/p CABG, AVR, DM, HTN, HLD, COPD, CKD, HFrEF, BPH, cirrhosis, and hypothyroidism who presented for elective inguinal (R) and umbilical hernias per Dr. Bone.. He is POD 1. There have been no perioperative complications. He is tolerating a PO diet. He is scheduled to transfer to the telemetry unit.    12/9/2023  Mr Carney is feeling great and would like to go home. Dr agustin is discharging but I feel that he would benefit from home health    Interval History: Mr Carney has had no drain leakage. I feel that he would benefit from home health    Review of Systems   Constitutional:  Positive for activity change and fatigue.   HENT: Negative.     Eyes: Negative.     Respiratory: Negative.     Cardiovascular: Negative.    Gastrointestinal:  Positive for abdominal distention. Negative for abdominal pain, nausea and vomiting.   Endocrine: Negative.    Genitourinary: Negative.    Musculoskeletal:  Positive for arthralgias.   Skin:  Positive for wound.   Allergic/Immunologic: Negative.    Neurological:  Positive for weakness.   Hematological:  Bruises/bleeds easily.   Psychiatric/Behavioral: Negative.       Objective:     Vital Signs (Most Recent):  Temp: 99.2 °F (37.3 °C) (12/09/23 1125)  Pulse: 64 (12/09/23 1125)  Resp: 20 (12/09/23 1125)  BP: (!) 92/53 (12/09/23 1125)  SpO2: 97 % (12/09/23 1125) Vital Signs (24h Range):  Temp:  [98.3 °F (36.8 °C)-99.2 °F (37.3 °C)] 99.2 °F (37.3 °C)  Pulse:  [64-84] 64  Resp:  [16-30] 20  SpO2:  [95 %-100 %] 97 %  BP: ()/(53-74) 92/53     Weight: 66.3 kg (146 lb 2.6 oz)  Body mass index is 19.82 kg/m².    Intake/Output Summary (Last 24 hours) at 12/9/2023 1429  Last data filed at 12/9/2023 1230  Gross per 24 hour   Intake 300 ml   Output 1350 ml   Net -1050 ml         Physical Exam  Vitals and nursing note reviewed.   Constitutional:       General: He is not in acute distress.     Appearance: He is not ill-appearing.   HENT:      Head: Normocephalic and atraumatic.      Nose: Nose normal.      Mouth/Throat:      Mouth: Mucous membranes are moist.   Eyes:      Extraocular Movements: Extraocular movements intact.      Pupils: Pupils are equal, round, and reactive to light.   Cardiovascular:      Rate and Rhythm: Normal rate and regular rhythm.   Pulmonary:      Effort: Pulmonary effort is normal.      Breath sounds: Normal breath sounds.   Abdominal:      General: There is no distension.      Tenderness: There is no abdominal tenderness. There is no guarding or rebound.      Comments: Drains in place no leakage or erythema   Musculoskeletal:         General: Normal range of motion.      Cervical back: Normal range of motion and neck supple.    Skin:     General: Skin is warm.   Neurological:      Mental Status: He is alert and oriented to person, place, and time.   Psychiatric:         Mood and Affect: Mood normal.             Significant Labs: All pertinent labs within the past 24 hours have been reviewed.  Recent Lab Results  (Last 5 results in the past 24 hours)        12/09/23  1127   12/09/23  0730   12/09/23  0439   12/08/23  2056   12/08/23  1519        Anion Gap     5           Baso #     0.02           Basophil %     0.2           BUN     23           Calcium     7.6           Chloride     108           CO2     23           Creatinine     1.8           Differential Method     Automated           eGFR     39.3           Eos #     0.1           Eosinophil %     0.7           Glucose     115           Gran # (ANC)     6.8           Gran %     80.4           Hematocrit     29.3           Hemoglobin     9.4           Immature Grans (Abs)     0.06  Comment: Mild elevation in immature granulocytes is non specific and   can be seen in a variety of conditions including stress response,   acute inflammation, trauma and pregnancy. Correlation with other   laboratory and clinical findings is essential.             Immature Granulocytes     0.7           Lymph #     0.6           Lymph %     7.1           MCH     28.2           MCHC     32.1           MCV     88           Mono #     0.9           Mono %     10.9           MPV     10.3           nRBC     0           Platelet Count     219           POC Glucose 132   136     175   140       Potassium     4.9           RBC     3.33           RDW     17.5           Sodium     136           WBC     8.51                                  Significant Imaging: I have reviewed all pertinent imaging results/findings within the past 24 hours.    Assessment/Plan:      * Umbilical hernia without obstruction and without gangrene  -Surgery follow  -PRN analgesics  -Incentive spirometry  -Encourage ambulation  -Cardiac  "diet      CRF (chronic renal failure)  Stable.  -Renally dose medications  -Avoid nephrotoxic agents    Hypothyroidism  -Continue Synthroid    Ascites of liver  Possible cirrhosis secondary to CHF.  -Monitor fluid status      Anemia  Stable.  -Trend Hgb with CBC    COPD (chronic obstructive pulmonary disease)  -Continue home Breztri    BPH (benign prostatic hyperplasia)  -Finasteride  -Flomax    Chronic systolic heart failure  -Entresto  -Lasix  -Metoprolol  -Hydralazine and nitrate  -Telemetry      CAD (coronary artery disease)  -Continue ASA, statin and metoprolol  -Telemetry    Diabetes mellitus type 2 without retinopathy  Patient's FSGs are controlled on current medication regimen.  Last A1c reviewed-   Lab Results   Component Value Date    HGBA1C 5.4 09/15/2023     Most recent fingerstick glucose reviewed- No results for input(s): "POCTGLUCOSE" in the last 24 hours.  Current correctional scale  Low  Maintain anti-hyperglycemic dose as follows-   Antihyperglycemics (From admission, onward)      Start     Stop Route Frequency Ordered    12/07/23 1810  insulin aspart U-100 pen 0-5 Units         -- SubQ Before meals & nightly PRN 12/07/23 1710          Hold Oral hypoglycemics while patient is in the hospital.    Hyperlipidemia  -Continue statin      Hypertension  -Continue home hydralazine, nitrate, metoprolol and Entresto  -Continue to monitor      VTE Risk Mitigation (From admission, onward)           Ordered     Place sequential compression device  Until discontinued         12/07/23 1708                    Discharge Planning   PAM: 12/9/2023     Code Status: Full Code   Is the patient medically ready for discharge?:     Reason for patient still in hospital (select all that apply): Pending disposition  Discharge Plan A: Home   Discharge Delays: None known at this time              Yuriy Jalloh MD  Department of Hospital Medicine   [facility]    "

## 2023-12-09 NOTE — NURSING
Patients family arrived to hospital to transfer patient home for discharge. Staff transported patient via wheelchair downstairs where family awaits. All belongings sent with patient home.

## 2023-12-09 NOTE — NURSING
Pt transported per w/c with drain and IV fluids to room 2516.  Pt in bed siderails up x2 call light in reach.  Tele monitor intact

## 2023-12-09 NOTE — NURSING
Removed 5CC of drainage from left peritoneal drainage bag. Drain to left side of abdomen remains clamped patient voices no complaints of abdominal fullness. All sites remain clean, dry, intact with no drainage present. No signs of infection to sites.

## 2023-12-09 NOTE — PLAN OF CARE
12/09/23 1411   Final Note   Assessment Type Final Discharge Note   Anticipated Discharge Disposition Home   Post-Acute Status   Discharge Delays None known at this time     Patient cleared for discharge from case management standpoint.  Patient will begin Home Health Services via [facility] 12/10/2023.  Chart and discharge orders reviewed.  Patient discharged home with no further case management needs.

## 2023-12-09 NOTE — PLAN OF CARE
Problem: Adult Inpatient Plan of Care  Goal: Plan of Care Review  Outcome: Ongoing, Progressing  Goal: Patient-Specific Goal (Individualized)  Outcome: Ongoing, Progressing  Goal: Absence of Hospital-Acquired Illness or Injury  Outcome: Ongoing, Progressing  Goal: Optimal Comfort and Wellbeing  Outcome: Ongoing, Progressing  Goal: Readiness for Transition of Care  Outcome: Ongoing, Progressing     Problem: Diabetes Comorbidity  Goal: Blood Glucose Level Within Targeted Range  Outcome: Ongoing, Progressing     Problem: Fall Injury Risk  Goal: Absence of Fall and Fall-Related Injury  Outcome: Ongoing, Progressing     Problem: Oral Intake Inadequate  Goal: Improved Oral Intake  Outcome: Ongoing, Progressing

## 2023-12-09 NOTE — DISCHARGE SUMMARY
[facility]  General Surgery  Discharge Summary      Patient Name: Baldo Carney  MRN: [id number]  Admission Date: 12/7/2023  Hospital Length of Stay: 0 days  Discharge Date and Time:  12/09/2023 2:06 PM  Attending Physician: Yuriy Jalloh MD   Discharging Provider: Lion Rea MD  Primary Care Provider: Millie Hayes APRN,FNP-C     HPI:  73-year-old male with cirrhosis presented for elective hernia repair.  He recovered well after surgery.    Procedure(s) (LRB):  REPAIR, HERNIA, INGUINAL, INCARCERATED, INITIAL (Right)  REPAIR, HERNIA, UMBILICAL (N/A)  INSERTION, CATHETER, DIALYSIS, PERITONEAL (N/A)     Hospital Course:  As above    Consults:   Consults (From admission, onward)          Status Ordering Provider     Inpatient consult to Hospitalist  Once        Provider:  (Not yet assigned)    Acknowledged SAMANTHA ALVAREZ            Significant Diagnostic Studies: See Epic For labs    Pending Diagnostic Studies:       None          Final Active Diagnoses:    Diagnosis Date Noted POA    PRINCIPAL PROBLEM:  Umbilical hernia without obstruction and without gangrene [K42.9] 10/13/2023 Yes    CRF (chronic renal failure) [N18.9] 09/28/2023 Yes    Hypothyroidism [E03.9] 08/31/2023 Yes    Ascites of liver [R18.8] 06/07/2022 Yes    BPH (benign prostatic hyperplasia) [N40.0] 08/29/2020 Yes     Chronic    COPD (chronic obstructive pulmonary disease) [J44.9] 08/29/2020 Yes     Chronic    Chronic systolic heart failure [I50.22] 08/29/2020 Yes     Chronic    Anemia [D64.9] 08/29/2020 Yes     Chronic    CAD (coronary artery disease) [I25.10] 11/03/2014 Yes    Hypertension [I10] 09/19/2014 Yes    Diabetes mellitus type 2 without retinopathy [E11.9] 09/19/2014 Yes    Hyperlipidemia [E78.5] 09/19/2014 Yes      Problems Resolved During this Admission:      Discharged Condition: good    Disposition: Home or Self Care    Follow Up:   Follow-up Information       Greg Bone Jr., MD Follow up.     Specialty: General Surgery  Why: 12/18 @ 3pm  Contact information:  [facility]  [address]  [place] [address]  [phone]                           Patient Instructions:      Diet Adult Regular     Diet Adult Regular     Ice to affected area     Lifting restrictions   Order Comments: Please avoid lifting greater than 20 lb, straining, strenuous activity for six weeks.     Change dressing (specify)   Order Comments: Post-Operative Wound Care    A surgical glue has been placed over your incisions, please leave the glue in place and do not attempt to remove it.  It is ok to shower using mild soap and water over the incisions the day after your procedure. Pat dry your incisions. Do not soak in a bathtub or other body of water for 2 weeks or until cleared by your surgeon.     You may drain up to 1000 mL (1L) of ascitic fluid per day if you develop abdominal fullness or leakage around other incision sites.    If you noticed redness, swelling, fever, increasing pain or significant drainage from your wound please call/message the office or the 24 hr nurse hotline after hours.     Notify your health care provider if you experience any of the following:  temperature >100.4     Notify your health care provider if you experience any of the following:  persistent nausea and vomiting or diarrhea     Notify your health care provider if you experience any of the following:  severe uncontrolled pain     Notify your health care provider if you experience any of the following:  redness, tenderness, or signs of infection (pain, swelling, redness, odor or green/yellow discharge around incision site)     Notify your health care provider if you experience any of the following:  worsening rash     Notify your health care provider if you experience any of the following:  increased confusion or weakness     Lifting restrictions   Order Comments: No more than 10 lbs     No driving until:   Order Comments: Off narcotics     Notify your  health care provider if you experience any of the following:  increased confusion or weakness     Notify your health care provider if you experience any of the following:  persistent dizziness, light-headedness, or visual disturbances     Notify your health care provider if you experience any of the following:  worsening rash     Notify your health care provider if you experience any of the following:  severe persistent headache     Notify your health care provider if you experience any of the following:  difficulty breathing or increased cough     Notify your health care provider if you experience any of the following:  redness, tenderness, or signs of infection (pain, swelling, redness, odor or green/yellow discharge around incision site)     Notify your health care provider if you experience any of the following:  severe uncontrolled pain     Notify your health care provider if you experience any of the following:  persistent nausea and vomiting or diarrhea     Notify your health care provider if you experience any of the following:  temperature >100.4     No dressing needed   Order Comments: Okay to gently shower over her incisions with soap and water.  Protective clean dressing over top as needed after.  Empty KORI drain at least daily but no more than 1 L out and a 24 hour..     Shower on day dressing removed (No bath)     Medications:  Reconciled Home Medications:      Medication List        START taking these medications      doxycycline 100 MG Cap  Commonly known as: VIBRAMYCIN  Take 1 capsule (100 mg total) by mouth every 12 (twelve) hours. for 5 days     oxyCODONE 5 MG immediate release tablet  Commonly known as: ROXICODONE  Take 1 tablet (5 mg total) by mouth every 6 (six) hours as needed for Pain.            CONTINUE taking these medications      albuterol 90 mcg/actuation inhaler  Commonly known as: PROVENTIL/VENTOLIN HFA  INHALE 1 TO 2 PUFFS INTO THE LUNGS EVERY 4 TO 6 HOURS AS NEEDED FOR WHEEZE AND  SHORTNESS OF BREATH     aspirin 81 MG EC tablet  Commonly known as: ECOTRIN  Take 81 mg by mouth once daily.     atorvastatin 40 MG tablet  Commonly known as: LIPITOR  Take 1 tablet (40 mg total) by mouth once daily.     BREZTRI AEROSPHERE 160-9-4.8 mcg/actuation Hfaa  Generic drug: budesonide-glycopyr-formoterol  Inhale into the lungs daily as needed.     clopidogreL 75 mg tablet  Commonly known as: PLAVIX     cyanocobalamin 1,000 mcg/mL injection  Inject 1 mL (1,000 mcg total) into the muscle every 14 (fourteen) days.     ENTRESTO 24-26 mg per tablet  Generic drug: sacubitriL-valsartan  Take 1 tablet by mouth 2 (two) times daily.     FARXIGA 5 mg Tab tablet  Generic drug: dapagliflozin propanediol  Take 5 mg by mouth once daily.     finasteride 5 mg tablet  Commonly known as: PROSCAR  Take 1 tablet (5 mg total) by mouth once daily.     fluticasone furoate-vilanteroL 100-25 mcg/dose diskus inhaler  Commonly known as: BREO ELLIPTA  Inhale 1 puff into the lungs once daily. (DAILY CONTROLLER)     fluticasone propionate 50 mcg/actuation nasal spray  Commonly known as: FLONASE  1 SPRAY (50 MCG TOTAL) BY EACH NOSTRIL ROUTE ONCE DAILY.     furosemide 40 MG tablet  Commonly known as: LASIX  Take by mouth once daily.     gabapentin 300 MG capsule  Commonly known as: NEURONTIN  Take 1 capsule (300 mg total) by mouth every evening.     hydrALAZINE 50 MG tablet  Commonly known as: APRESOLINE  Take 1 tablet (50 mg total) by mouth every 12 (twelve) hours.     isosorbide dinitrate 20 MG tablet  Commonly known as: ISORDIL  Take 1 tablet (20 mg total) by mouth 3 (three) times daily.     levothyroxine 100 MCG tablet  Commonly known as: SYNTHROID  TAKE 1 TABLET (100 MCG TOTAL) BY MOUTH BEFORE BREAKFAST. WITH NO OTHER MEDS OR FOOD     metoprolol succinate 25 MG 24 hr tablet  Commonly known as: TOPROL-XL  Take 1 tablet (25 mg total) by mouth once daily.     prasugreL 10 mg Tab  Commonly known as: EFFIENT  Take 1 tablet (10 mg total) by  mouth once daily.     tamsulosin 0.4 mg Cap  Commonly known as: FLOMAX  TAKE ONE CAPSULE BY MOUTH DAILY AT 5 PM     ZONTIVITY 2.08 mg Tab  Generic drug: vorapaxar  Take 1 tablet by mouth once daily.            STOP taking these medications      traMADoL 50 mg tablet  Commonly known as: JEFFERY Rea MD  General Surgery  [facility]

## 2023-12-09 NOTE — SUBJECTIVE & OBJECTIVE
Interval History: Mr Carney has had no drain leakage. I feel that he would benefit from home health    Review of Systems   Constitutional:  Positive for activity change and fatigue.   HENT: Negative.     Eyes: Negative.    Respiratory: Negative.     Cardiovascular: Negative.    Gastrointestinal:  Positive for abdominal distention. Negative for abdominal pain, nausea and vomiting.   Endocrine: Negative.    Genitourinary: Negative.    Musculoskeletal:  Positive for arthralgias.   Skin:  Positive for wound.   Allergic/Immunologic: Negative.    Neurological:  Positive for weakness.   Hematological:  Bruises/bleeds easily.   Psychiatric/Behavioral: Negative.       Objective:     Vital Signs (Most Recent):  Temp: 99.2 °F (37.3 °C) (12/09/23 1125)  Pulse: 64 (12/09/23 1125)  Resp: 20 (12/09/23 1125)  BP: (!) 92/53 (12/09/23 1125)  SpO2: 97 % (12/09/23 1125) Vital Signs (24h Range):  Temp:  [98.3 °F (36.8 °C)-99.2 °F (37.3 °C)] 99.2 °F (37.3 °C)  Pulse:  [64-84] 64  Resp:  [16-30] 20  SpO2:  [95 %-100 %] 97 %  BP: ()/(53-74) 92/53     Weight: 66.3 kg (146 lb 2.6 oz)  Body mass index is 19.82 kg/m².    Intake/Output Summary (Last 24 hours) at 12/9/2023 1429  Last data filed at 12/9/2023 1230  Gross per 24 hour   Intake 300 ml   Output 1350 ml   Net -1050 ml         Physical Exam  Vitals and nursing note reviewed.   Constitutional:       General: He is not in acute distress.     Appearance: He is not ill-appearing.   HENT:      Head: Normocephalic and atraumatic.      Nose: Nose normal.      Mouth/Throat:      Mouth: Mucous membranes are moist.   Eyes:      Extraocular Movements: Extraocular movements intact.      Pupils: Pupils are equal, round, and reactive to light.   Cardiovascular:      Rate and Rhythm: Normal rate and regular rhythm.   Pulmonary:      Effort: Pulmonary effort is normal.      Breath sounds: Normal breath sounds.   Abdominal:      General: There is no distension.      Tenderness: There is no  abdominal tenderness. There is no guarding or rebound.      Comments: Drains in place no leakage or erythema   Musculoskeletal:         General: Normal range of motion.      Cervical back: Normal range of motion and neck supple.   Skin:     General: Skin is warm.   Neurological:      Mental Status: He is alert and oriented to person, place, and time.   Psychiatric:         Mood and Affect: Mood normal.             Significant Labs: All pertinent labs within the past 24 hours have been reviewed.  Recent Lab Results  (Last 5 results in the past 24 hours)        12/09/23  1127   12/09/23  0730   12/09/23  0439   12/08/23  2056   12/08/23  1519        Anion Gap     5           Baso #     0.02           Basophil %     0.2           BUN     23           Calcium     7.6           Chloride     108           CO2     23           Creatinine     1.8           Differential Method     Automated           eGFR     39.3           Eos #     0.1           Eosinophil %     0.7           Glucose     115           Gran # (ANC)     6.8           Gran %     80.4           Hematocrit     29.3           Hemoglobin     9.4           Immature Grans (Abs)     0.06  Comment: Mild elevation in immature granulocytes is non specific and   can be seen in a variety of conditions including stress response,   acute inflammation, trauma and pregnancy. Correlation with other   laboratory and clinical findings is essential.             Immature Granulocytes     0.7           Lymph #     0.6           Lymph %     7.1           MCH     28.2           MCHC     32.1           MCV     88           Mono #     0.9           Mono %     10.9           MPV     10.3           nRBC     0           Platelet Count     219           POC Glucose 132   136     175   140       Potassium     4.9           RBC     3.33           RDW     17.5           Sodium     136           WBC     8.51                                  Significant Imaging: I have reviewed all pertinent  imaging results/findings within the past 24 hours.

## 2023-12-10 PROCEDURE — G0180 MD CERTIFICATION HHA PATIENT: HCPCS | Mod: ,,, | Performed by: NURSE PRACTITIONER

## 2023-12-12 ENCOUNTER — LAB VISIT (OUTPATIENT)
Dept: LAB | Facility: HOSPITAL | Age: 73
End: 2023-12-12
Attending: NURSE PRACTITIONER
Payer: MEDICARE

## 2023-12-12 DIAGNOSIS — K74.60 CIRRHOSIS: ICD-10-CM

## 2023-12-12 DIAGNOSIS — K42.9 UMBILICAL HERNIA WITHOUT OBSTRUCTION OR GANGRENE: Primary | ICD-10-CM

## 2023-12-12 LAB
BASOPHILS # BLD AUTO: 0.04 K/UL (ref 0–0.2)
BASOPHILS NFR BLD: 0.7 % (ref 0–1.9)
DIFFERENTIAL METHOD: ABNORMAL
EOSINOPHIL # BLD AUTO: 0.1 K/UL (ref 0–0.5)
EOSINOPHIL NFR BLD: 0.8 % (ref 0–8)
ERYTHROCYTE [DISTWIDTH] IN BLOOD BY AUTOMATED COUNT: 17.3 % (ref 11.5–14.5)
HCT VFR BLD AUTO: 29.6 % (ref 40–54)
HGB BLD-MCNC: 9.6 G/DL (ref 14–18)
IMM GRANULOCYTES # BLD AUTO: 0.01 K/UL (ref 0–0.04)
IMM GRANULOCYTES NFR BLD AUTO: 0.2 % (ref 0–0.5)
LYMPHOCYTES # BLD AUTO: 0.6 K/UL (ref 1–4.8)
LYMPHOCYTES NFR BLD: 9.1 % (ref 18–48)
MCH RBC QN AUTO: 28.8 PG (ref 27–31)
MCHC RBC AUTO-ENTMCNC: 32.4 G/DL (ref 32–36)
MCV RBC AUTO: 89 FL (ref 82–98)
MONOCYTES # BLD AUTO: 0.5 K/UL (ref 0.3–1)
MONOCYTES NFR BLD: 8.1 % (ref 4–15)
NEUTROPHILS # BLD AUTO: 4.9 K/UL (ref 1.8–7.7)
NEUTROPHILS NFR BLD: 81.1 % (ref 38–73)
NRBC BLD-RTO: 0 /100 WBC
PLATELET # BLD AUTO: 260 K/UL (ref 150–450)
PMV BLD AUTO: 10.9 FL (ref 9.2–12.9)
RBC # BLD AUTO: 3.33 M/UL (ref 4.6–6.2)
WBC # BLD AUTO: 6.06 K/UL (ref 3.9–12.7)

## 2023-12-12 PROCEDURE — 85025 COMPLETE CBC W/AUTO DIFF WBC: CPT | Performed by: NURSE PRACTITIONER

## 2023-12-12 PROCEDURE — 80048 BASIC METABOLIC PNL TOTAL CA: CPT | Performed by: NURSE PRACTITIONER

## 2023-12-13 ENCOUNTER — OFFICE VISIT (OUTPATIENT)
Dept: SURGERY | Facility: CLINIC | Age: 73
End: 2023-12-13
Payer: MEDICARE

## 2023-12-13 VITALS — DIASTOLIC BLOOD PRESSURE: 77 MMHG | SYSTOLIC BLOOD PRESSURE: 137 MMHG | HEART RATE: 67 BPM | TEMPERATURE: 97 F

## 2023-12-13 DIAGNOSIS — Z09 S/P UMBILICAL HERNIA REPAIR, FOLLOW-UP EXAM: ICD-10-CM

## 2023-12-13 DIAGNOSIS — R18.8 CIRRHOSIS OF LIVER WITH ASCITES, UNSPECIFIED HEPATIC CIRRHOSIS TYPE: ICD-10-CM

## 2023-12-13 DIAGNOSIS — Z98.890 S/P INGUINAL HERNIA REPAIR: Primary | ICD-10-CM

## 2023-12-13 DIAGNOSIS — K74.60 CIRRHOSIS OF LIVER WITH ASCITES, UNSPECIFIED HEPATIC CIRRHOSIS TYPE: ICD-10-CM

## 2023-12-13 DIAGNOSIS — Z87.19 S/P INGUINAL HERNIA REPAIR: Primary | ICD-10-CM

## 2023-12-13 LAB
ANION GAP SERPL CALC-SCNC: 9 MMOL/L (ref 8–16)
BUN SERPL-MCNC: 24 MG/DL (ref 8–23)
CALCIUM SERPL-MCNC: 8.1 MG/DL (ref 8.7–10.5)
CHLORIDE SERPL-SCNC: 108 MMOL/L (ref 95–110)
CO2 SERPL-SCNC: 22 MMOL/L (ref 23–29)
CREAT SERPL-MCNC: 1.8 MG/DL (ref 0.5–1.4)
EST. GFR  (NO RACE VARIABLE): 39.3 ML/MIN/1.73 M^2
GLUCOSE SERPL-MCNC: 115 MG/DL (ref 70–110)
POTASSIUM SERPL-SCNC: 4.6 MMOL/L (ref 3.5–5.1)
SODIUM SERPL-SCNC: 139 MMOL/L (ref 136–145)

## 2023-12-13 PROCEDURE — 1101F PT FALLS ASSESS-DOCD LE1/YR: CPT | Mod: CPTII,S$GLB,, | Performed by: SURGERY

## 2023-12-13 PROCEDURE — 1101F PR PT FALLS ASSESS DOC 0-1 FALLS W/OUT INJ PAST YR: ICD-10-PCS | Mod: CPTII,S$GLB,, | Performed by: SURGERY

## 2023-12-13 PROCEDURE — 3075F SYST BP GE 130 - 139MM HG: CPT | Mod: CPTII,S$GLB,, | Performed by: SURGERY

## 2023-12-13 PROCEDURE — 1125F AMNT PAIN NOTED PAIN PRSNT: CPT | Mod: CPTII,S$GLB,, | Performed by: SURGERY

## 2023-12-13 PROCEDURE — 3044F HG A1C LEVEL LT 7.0%: CPT | Mod: CPTII,S$GLB,, | Performed by: SURGERY

## 2023-12-13 PROCEDURE — 3078F DIAST BP <80 MM HG: CPT | Mod: CPTII,S$GLB,, | Performed by: SURGERY

## 2023-12-13 PROCEDURE — 1125F PR PAIN SEVERITY QUANTIFIED, PAIN PRESENT: ICD-10-PCS | Mod: CPTII,S$GLB,, | Performed by: SURGERY

## 2023-12-13 PROCEDURE — 3061F PR NEG MICROALBUMINURIA RESULT DOCUMENTED/REVIEW: ICD-10-PCS | Mod: CPTII,S$GLB,, | Performed by: SURGERY

## 2023-12-13 PROCEDURE — 3075F PR MOST RECENT SYSTOLIC BLOOD PRESS GE 130-139MM HG: ICD-10-PCS | Mod: CPTII,S$GLB,, | Performed by: SURGERY

## 2023-12-13 PROCEDURE — 99024 POSTOP FOLLOW-UP VISIT: CPT | Mod: S$GLB,,, | Performed by: SURGERY

## 2023-12-13 PROCEDURE — 99024 PR POST-OP FOLLOW-UP VISIT: ICD-10-PCS | Mod: S$GLB,,, | Performed by: SURGERY

## 2023-12-13 PROCEDURE — 1157F PR ADVANCE CARE PLAN OR EQUIV PRESENT IN MEDICAL RECORD: ICD-10-PCS | Mod: CPTII,S$GLB,, | Performed by: SURGERY

## 2023-12-13 PROCEDURE — 3288F FALL RISK ASSESSMENT DOCD: CPT | Mod: CPTII,S$GLB,, | Performed by: SURGERY

## 2023-12-13 PROCEDURE — 3061F NEG MICROALBUMINURIA REV: CPT | Mod: CPTII,S$GLB,, | Performed by: SURGERY

## 2023-12-13 PROCEDURE — 3066F NEPHROPATHY DOC TX: CPT | Mod: CPTII,S$GLB,, | Performed by: SURGERY

## 2023-12-13 PROCEDURE — 3078F PR MOST RECENT DIASTOLIC BLOOD PRESSURE < 80 MM HG: ICD-10-PCS | Mod: CPTII,S$GLB,, | Performed by: SURGERY

## 2023-12-13 PROCEDURE — 1157F ADVNC CARE PLAN IN RCRD: CPT | Mod: CPTII,S$GLB,, | Performed by: SURGERY

## 2023-12-13 PROCEDURE — 3066F PR DOCUMENTATION OF TREATMENT FOR NEPHROPATHY: ICD-10-PCS | Mod: CPTII,S$GLB,, | Performed by: SURGERY

## 2023-12-13 PROCEDURE — 4010F PR ACE/ARB THEARPY RXD/TAKEN: ICD-10-PCS | Mod: CPTII,S$GLB,, | Performed by: SURGERY

## 2023-12-13 PROCEDURE — 3044F PR MOST RECENT HEMOGLOBIN A1C LEVEL <7.0%: ICD-10-PCS | Mod: CPTII,S$GLB,, | Performed by: SURGERY

## 2023-12-13 PROCEDURE — 3288F PR FALLS RISK ASSESSMENT DOCUMENTED: ICD-10-PCS | Mod: CPTII,S$GLB,, | Performed by: SURGERY

## 2023-12-13 PROCEDURE — 4010F ACE/ARB THERAPY RXD/TAKEN: CPT | Mod: CPTII,S$GLB,, | Performed by: SURGERY

## 2023-12-13 RX ORDER — OXYCODONE HYDROCHLORIDE 5 MG/1
5 TABLET ORAL EVERY 6 HOURS PRN
Qty: 28 TABLET | Refills: 0 | Status: SHIPPED | OUTPATIENT
Start: 2023-12-13 | End: 2024-02-29

## 2023-12-18 NOTE — PROGRESS NOTES
GENERAL SURGERY  POST-OP PROGRESS NOTE    HPI: Baldo Carney is a 73 y.o. male status post open repair of large right inguinal hernia with mesh and open repair of umbilical hernia with mesh, drain placement for liver cirrhosis with ascites here for follow-up. Patient was discharged home after 2 day hospital stay with drain in place. Patient has intermittently been draining ascitic fluid. No fevers or chills. Abdominal pain well controlled.  Tolerating a diet.    VITALS:  Vitals:    12/13/23 1122   BP: 137/77   Pulse: 67   Temp: 97.2 °F (36.2 °C)       PHYSICAL EXAM:  Left-sided surgical drain in place with serous fluid present, no sign of infection and fluid around drain site    Umbilical hernia repair site well healed without signs of infection or breakdown, no underlying seroma present    Right inguinal hernia repair incision intact, there is still swelling in the right testicle which is likely related to his chronically incarcerated hernia, possible fluid reaccumulation, no sign of infection      ASSESSMENT & PLAN:  73 y.o. male s/p open repair with mesh of right inguinal hernia, umbilical hernia, drain placement for ascites  -overall doing well  -no major reaccumulation of ascites in the scrotum or at the umbilical area  -drain removed in totality including the cuff  -may have ongoing drainage from the drain site  -keep appointment with Radiology for therapeutic paracentesis as needed  -return to clinic in 2 weeks  -call with any questions or concerns  -go to ER for fevers, chills, worsening abdominal pain

## 2023-12-19 ENCOUNTER — HOSPITAL ENCOUNTER (OUTPATIENT)
Dept: INTERVENTIONAL RADIOLOGY/VASCULAR | Facility: HOSPITAL | Age: 73
Discharge: HOME | End: 2023-12-19
Attending: INTERNAL MEDICINE
Payer: MEDICARE

## 2023-12-19 DIAGNOSIS — R18.8 OTHER ASCITES: ICD-10-CM

## 2023-12-19 PROCEDURE — 76705 ECHO EXAM OF ABDOMEN: CPT | Mod: TC

## 2023-12-19 PROCEDURE — 76705 IR US ABDOMEN LIMITED: ICD-10-PCS | Mod: 26,,, | Performed by: RADIOLOGY

## 2023-12-19 PROCEDURE — 76705 ECHO EXAM OF ABDOMEN: CPT | Mod: 26,,, | Performed by: RADIOLOGY

## 2023-12-19 NOTE — ANESTHESIA POSTPROCEDURE EVALUATION
Anesthesia Post Evaluation    Patient: Baldo Craney    Procedure(s) Performed: Procedure(s) (LRB):  REPAIR, HERNIA, INGUINAL, INCARCERATED, INITIAL (Right)  REPAIR, HERNIA, UMBILICAL (N/A)  INSERTION, CATHETER, DIALYSIS, PERITONEAL (N/A)    Final Anesthesia Type: general      Patient location during evaluation: PACU  Patient participation: Yes- Able to Participate  Level of consciousness: awake and alert  Post-procedure vital signs: reviewed and stable  Pain management: adequate  Airway patency: patent    PONV status at discharge: No PONV  Anesthetic complications: no      Cardiovascular status: stable  Respiratory status: unassisted and spontaneous ventilation  Hydration status: euvolemic  Follow-up not needed.              Vitals Value Taken Time   /77 12/13/23 1122   Temp 36.2 °C (97.2 °F) 12/13/23 1122   Pulse 67 12/13/23 1122   Resp 20 12/09/23 1125   SpO2 97 % 12/09/23 1125         Event Time   Out of Recovery 18:05:00         Pain/[place] Score: No data recorded

## 2023-12-26 ENCOUNTER — EXTERNAL HOME HEALTH (OUTPATIENT)
Dept: HOME HEALTH SERVICES | Facility: HOSPITAL | Age: 73
End: 2023-12-26
Payer: MEDICARE

## 2023-12-26 ENCOUNTER — OFFICE VISIT (OUTPATIENT)
Dept: FAMILY MEDICINE | Facility: CLINIC | Age: 73
End: 2023-12-26
Payer: MEDICARE

## 2023-12-26 VITALS
BODY MASS INDEX: 22.89 KG/M2 | HEIGHT: 72 IN | SYSTOLIC BLOOD PRESSURE: 118 MMHG | OXYGEN SATURATION: 99 % | WEIGHT: 169 LBS | TEMPERATURE: 98 F | HEART RATE: 71 BPM | DIASTOLIC BLOOD PRESSURE: 82 MMHG

## 2023-12-26 DIAGNOSIS — E11.9 TYPE 2 DIABETES MELLITUS WITHOUT COMPLICATION, WITHOUT LONG-TERM CURRENT USE OF INSULIN: ICD-10-CM

## 2023-12-26 DIAGNOSIS — B02.9 HERPES ZOSTER WITHOUT COMPLICATION: Primary | ICD-10-CM

## 2023-12-26 PROCEDURE — 4010F PR ACE/ARB THEARPY RXD/TAKEN: ICD-10-PCS | Mod: CPTII,,, | Performed by: NURSE PRACTITIONER

## 2023-12-26 PROCEDURE — 3044F PR MOST RECENT HEMOGLOBIN A1C LEVEL <7.0%: ICD-10-PCS | Mod: CPTII,,, | Performed by: NURSE PRACTITIONER

## 2023-12-26 PROCEDURE — 3061F PR NEG MICROALBUMINURIA RESULT DOCUMENTED/REVIEW: ICD-10-PCS | Mod: CPTII,,, | Performed by: NURSE PRACTITIONER

## 2023-12-26 PROCEDURE — 1159F MED LIST DOCD IN RCRD: CPT | Mod: CPTII,,, | Performed by: NURSE PRACTITIONER

## 2023-12-26 PROCEDURE — 1126F AMNT PAIN NOTED NONE PRSNT: CPT | Mod: CPTII,,, | Performed by: NURSE PRACTITIONER

## 2023-12-26 PROCEDURE — 4010F ACE/ARB THERAPY RXD/TAKEN: CPT | Mod: CPTII,,, | Performed by: NURSE PRACTITIONER

## 2023-12-26 PROCEDURE — 1126F PR PAIN SEVERITY QUANTIFIED, NO PAIN PRESENT: ICD-10-PCS | Mod: CPTII,,, | Performed by: NURSE PRACTITIONER

## 2023-12-26 PROCEDURE — 3008F PR BODY MASS INDEX (BMI) DOCUMENTED: ICD-10-PCS | Mod: CPTII,,, | Performed by: NURSE PRACTITIONER

## 2023-12-26 PROCEDURE — 1160F PR REVIEW ALL MEDS BY PRESCRIBER/CLIN PHARMACIST DOCUMENTED: ICD-10-PCS | Mod: CPTII,,, | Performed by: NURSE PRACTITIONER

## 2023-12-26 PROCEDURE — 3079F PR MOST RECENT DIASTOLIC BLOOD PRESSURE 80-89 MM HG: ICD-10-PCS | Mod: CPTII,,, | Performed by: NURSE PRACTITIONER

## 2023-12-26 PROCEDURE — 1157F ADVNC CARE PLAN IN RCRD: CPT | Mod: CPTII,,, | Performed by: NURSE PRACTITIONER

## 2023-12-26 PROCEDURE — 3066F NEPHROPATHY DOC TX: CPT | Mod: CPTII,,, | Performed by: NURSE PRACTITIONER

## 2023-12-26 PROCEDURE — 3044F HG A1C LEVEL LT 7.0%: CPT | Mod: CPTII,,, | Performed by: NURSE PRACTITIONER

## 2023-12-26 PROCEDURE — 3074F SYST BP LT 130 MM HG: CPT | Mod: CPTII,,, | Performed by: NURSE PRACTITIONER

## 2023-12-26 PROCEDURE — 3061F NEG MICROALBUMINURIA REV: CPT | Mod: CPTII,,, | Performed by: NURSE PRACTITIONER

## 2023-12-26 PROCEDURE — 99214 PR OFFICE/OUTPT VISIT, EST, LEVL IV, 30-39 MIN: ICD-10-PCS | Mod: ,,, | Performed by: NURSE PRACTITIONER

## 2023-12-26 PROCEDURE — 3008F BODY MASS INDEX DOCD: CPT | Mod: CPTII,,, | Performed by: NURSE PRACTITIONER

## 2023-12-26 PROCEDURE — 3079F DIAST BP 80-89 MM HG: CPT | Mod: CPTII,,, | Performed by: NURSE PRACTITIONER

## 2023-12-26 PROCEDURE — 1159F PR MEDICATION LIST DOCUMENTED IN MEDICAL RECORD: ICD-10-PCS | Mod: CPTII,,, | Performed by: NURSE PRACTITIONER

## 2023-12-26 PROCEDURE — 3066F PR DOCUMENTATION OF TREATMENT FOR NEPHROPATHY: ICD-10-PCS | Mod: CPTII,,, | Performed by: NURSE PRACTITIONER

## 2023-12-26 PROCEDURE — 99214 OFFICE O/P EST MOD 30 MIN: CPT | Mod: ,,, | Performed by: NURSE PRACTITIONER

## 2023-12-26 PROCEDURE — 3074F PR MOST RECENT SYSTOLIC BLOOD PRESSURE < 130 MM HG: ICD-10-PCS | Mod: CPTII,,, | Performed by: NURSE PRACTITIONER

## 2023-12-26 PROCEDURE — 1160F RVW MEDS BY RX/DR IN RCRD: CPT | Mod: CPTII,,, | Performed by: NURSE PRACTITIONER

## 2023-12-26 PROCEDURE — 1157F PR ADVANCE CARE PLAN OR EQUIV PRESENT IN MEDICAL RECORD: ICD-10-PCS | Mod: CPTII,,, | Performed by: NURSE PRACTITIONER

## 2023-12-26 RX ORDER — HYDROXYZINE HYDROCHLORIDE 25 MG/1
25 TABLET, FILM COATED ORAL 2 TIMES DAILY PRN
Qty: 14 TABLET | Refills: 0 | Status: SHIPPED | OUTPATIENT
Start: 2023-12-26 | End: 2024-02-29

## 2023-12-26 RX ORDER — VALACYCLOVIR HYDROCHLORIDE 1 G/1
1000 TABLET, FILM COATED ORAL EVERY 12 HOURS
Qty: 14 TABLET | Refills: 0 | Status: SHIPPED | OUTPATIENT
Start: 2023-12-26 | End: 2024-02-29

## 2023-12-26 NOTE — PROGRESS NOTES
SUBJECTIVE:      Patient ID: Baldo Carney is a 73 y.o. male.    Chief Complaint: bumps (Pt has scatters red raised patches on his left side and back area he states he noticed it 4 days ago itchy and burning)    73-year-old male with a history of asthma, CHF, cardiomyopathy, CAD, CRF, DM, neuropathy, HTN, and HLD presents to the clinic with complaints of a rash. He is s/p hernia repair on . Rash started 4 days ago. The rash to located to his back and left side. The rash itches and burns. Reports hx of chicken pox. No treatments tried. Denies changing soaps/detergents, there are no pets in the house, denies chemical or plant exposures. Denies recent illness.       Family History   Problem Relation Age of Onset    No Known Problems Mother     Diabetes Father     Hypertension Father     Heart attack Father     Heart disease Father     Diabetes Sister     Heart disease Brother     Diabetes Brother     No Known Problems Maternal Grandmother     No Known Problems Maternal Grandfather     No Known Problems Paternal Grandmother     No Known Problems Paternal Grandfather     No Known Problems Maternal Aunt     No Known Problems Maternal Uncle     No Known Problems Paternal Aunt     No Known Problems Paternal Uncle     Anemia Neg Hx     Arrhythmia Neg Hx     Asthma Neg Hx     Clotting disorder Neg Hx     Fainting Neg Hx     Heart failure Neg Hx     Hyperlipidemia Neg Hx     Glaucoma Neg Hx       Social History     Socioeconomic History    Marital status: Single   Tobacco Use    Smoking status: Former     Current packs/day: 0.00     Types: Cigarettes     Quit date: 1970     Years since quittin.5    Smokeless tobacco: Never   Substance and Sexual Activity    Alcohol use: Not Currently     Alcohol/week: 3.0 standard drinks of alcohol     Types: 3 Cans of beer per week    Drug use: No    Sexual activity: Not Currently     Partners: Female     Current Outpatient Medications   Medication Sig Dispense Refill     albuterol (PROVENTIL/VENTOLIN HFA) 90 mcg/actuation inhaler INHALE 1 TO 2 PUFFS INTO THE LUNGS EVERY 4 TO 6 HOURS AS NEEDED FOR WHEEZE AND SHORTNESS OF BREATH 18 g 2    aspirin (ECOTRIN) 81 MG EC tablet Take 81 mg by mouth once daily.      atorvastatin (LIPITOR) 40 MG tablet Take 1 tablet (40 mg total) by mouth once daily. 90 tablet 0    budesonide-glycopyr-formoterol (BREZTRI AEROSPHERE) 160-9-4.8 mcg/actuation HFAA Inhale into the lungs daily as needed.      clopidogreL (PLAVIX) 75 mg tablet       cyanocobalamin 1,000 mcg/mL injection Inject 1 mL (1,000 mcg total) into the muscle every 14 (fourteen) days. 1 mL 11    finasteride (PROSCAR) 5 mg tablet Take 1 tablet (5 mg total) by mouth once daily. 90 tablet 4    fluticasone propionate (FLONASE) 50 mcg/actuation nasal spray 1 SPRAY (50 MCG TOTAL) BY EACH NOSTRIL ROUTE ONCE DAILY. 16 mL 1    furosemide (LASIX) 40 MG tablet Take by mouth once daily.      gabapentin (NEURONTIN) 300 MG capsule Take 1 capsule (300 mg total) by mouth every evening. 90 capsule 1    ibuprofen (ADVIL,MOTRIN) 600 MG tablet Take 1 tablet (600 mg total) by mouth every 6 (six) hours as needed.      levothyroxine (SYNTHROID) 100 MCG tablet TAKE 1 TABLET (100 MCG TOTAL) BY MOUTH BEFORE BREAKFAST. WITH NO OTHER MEDS OR FOOD 90 tablet 0    metoprolol succinate (TOPROL-XL) 25 MG 24 hr tablet Take 1 tablet (25 mg total) by mouth once daily. 90 tablet 1    oxyCODONE (ROXICODONE) 5 MG immediate release tablet Take 1 tablet (5 mg total) by mouth every 6 (six) hours as needed for Pain. 28 tablet 0    sacubitriL-valsartan (ENTRESTO) 24-26 mg per tablet Take 1 tablet by mouth 2 (two) times daily. 180 tablet 1    tamsulosin (FLOMAX) 0.4 mg Cap TAKE ONE CAPSULE BY MOUTH DAILY AT 5 PM 90 capsule 11    empagliflozin (JARDIANCE) 25 mg tablet Take 1 tablet (25 mg total) by mouth once daily. 90 tablet 0    fluticasone furoate-vilanteroL (BREO ELLIPTA) 100-25 mcg/dose diskus inhaler Inhale 1 puff into the lungs  once daily. (DAILY CONTROLLER) (Patient not taking: Reported on 12/26/2023) 3 each 3    hydrALAZINE (APRESOLINE) 50 MG tablet Take 1 tablet (50 mg total) by mouth every 12 (twelve) hours. 60 tablet 0    hydrOXYzine HCL (ATARAX) 25 MG tablet Take 1 tablet (25 mg total) by mouth 2 (two) times daily as needed for Itching. 14 tablet 0    isosorbide dinitrate (ISORDIL) 20 MG tablet Take 1 tablet (20 mg total) by mouth 3 (three) times daily. 90 tablet 0    prasugreL (EFFIENT) 10 mg Tab Take 1 tablet (10 mg total) by mouth once daily. 30 tablet 11    valACYclovir (VALTREX) 1000 MG tablet Take 1 tablet (1,000 mg total) by mouth every 12 (twelve) hours. for 7 days 14 tablet 0    ZONTIVITY 2.08 mg Tab Take 1 tablet by mouth once daily.       No current facility-administered medications for this visit.     Review of patient's allergies indicates:   Allergen Reactions    Canagliflozin      Other reaction(s): been very dizzy      Past Medical History:   Diagnosis Date    Asthma     Cardiomyopathy 10/21/2014    CHF (congestive heart failure)     Coronary artery disease     CRF (chronic renal failure)     Diabetes mellitus     Enlarged prostate     Hyperlipidemia     Hypertension     Neuropathy      Past Surgical History:   Procedure Laterality Date    ANGIOGRAPHY OF INTERNAL MAMMARY VESSEL N/A 3/11/2022    Procedure: Angiogram Internal Mammary;  Surgeon: Hank Lujan MD;  Location: [facility] CATH/EP LAB;  Service: Cardiology;  Laterality: N/A;    CARDIAC CATHETERIZATION      CARDIAC VALVE SURGERY      CATHETERIZATION OF BOTH LEFT AND RIGHT HEART Left 3/11/2022    Procedure: CATHETERIZATION, HEART, BOTH LEFT AND RIGHT;  Surgeon: Hank Lujan MD;  Location: [facility] CATH/EP LAB;  Service: Cardiology;  Laterality: Left;    CORONARY ANGIOGRAPHY N/A 3/11/2022    Procedure: ANGIOGRAM, CORONARY ARTERY;  Surgeon: Hank Lujan MD;  Location: [facility] CATH/EP LAB;  Service: Cardiology;  Laterality: N/A;    CORONARY BYPASS GRAFT ANGIOGRAPHY   3/11/2022    Procedure: Bypass graft study;  Surgeon: Hank Lujan MD;  Location: [facility] CATH/EP LAB;  Service: Cardiology;;    CYSTOSCOPY N/A 7/30/2019    Procedure: CYSTOSCOPY;  Surgeon: Spencer Nguyen MD;  Location: [facility] OR;  Service: Urology;  Laterality: N/A;    INSERTION OF INTRAVASCULAR MICROAXIAL BLOOD PUMP N/A 8/31/2022    Procedure: INSERTION, IMPELLA;  Surgeon: Zach Jensen MD;  Location: [facility] CATH LAB;  Service: Cardiology;  Laterality: N/A;    INSERTION, CATHETER, DIALYSIS, PERITONEAL N/A 12/7/2023    Procedure: INSERTION, CATHETER, DIALYSIS, PERITONEAL;  Surgeon: Greg Bone Jr., MD;  Location: [facility];  Service: General;  Laterality: N/A;  need PD catheter    PLACEMENT OF SWAN SEE CATHETER WITH IMAGING GUIDANCE  8/31/2022    Procedure: INSERTION, CATHETER, SWAN-SEE, WITH IMAGING GUIDANCE;  Surgeon: Zach Jensen MD;  Location: [facility] CATH LAB;  Service: Cardiology;;    REPAIR, HERNIA, INGUINAL, INCARCERATED, INITIAL, AGE 5 YEARS OR OLDER Right 12/7/2023    Procedure: REPAIR, HERNIA, INGUINAL, INCARCERATED, INITIAL;  Surgeon: Greg Bone Jr., MD;  Location: [facility];  Service: General;  Laterality: Right;    SHOULDER ARTHROSCOPY  1985    TRANSRECTAL BIOPSY OF PROSTATE WITH ULTRASOUND GUIDANCE N/A 7/30/2019    Procedure: BIOPSY, PROSTATE, RECTAL APPROACH, WITH US GUIDANCE;  Surgeon: Spencer Nguyen MD;  Location: [facility];  Service: Urology;  Laterality: N/A;  procedure not performed, pt unable to tolerate    TRANSRECTAL BIOPSY OF PROSTATE WITH ULTRASOUND GUIDANCE N/A 8/8/2019    Procedure: BIOPSY, PROSTATE, RECTAL APPROACH, WITH US GUIDANCE;  Surgeon: Spencer Nguyen MD;  Location: [facility] OR;  Service: Urology;  Laterality: N/A;    UMBILICAL HERNIA REPAIR N/A 12/7/2023    Procedure: REPAIR, HERNIA, UMBILICAL;  Surgeon: Greg Bone Jr., MD;  Location: [facility];  Service: General;  Laterality: N/A;       Review of Systems   Constitutional:  Negative for  activity change, appetite change, chills, diaphoresis, fatigue, fever and unexpected weight change.   HENT:  Negative for congestion, ear pain, sinus pressure, sore throat, trouble swallowing and voice change.    Eyes:  Negative for pain, discharge and visual disturbance.   Respiratory:  Negative for cough, chest tightness, shortness of breath and wheezing.    Cardiovascular:  Negative for chest pain and palpitations.   Gastrointestinal:  Negative for abdominal pain, constipation, diarrhea, nausea and vomiting.   Genitourinary:  Negative for difficulty urinating, flank pain, frequency and urgency.   Musculoskeletal:  Negative for back pain and joint swelling.   Skin:  Positive for rash (left side and back). Negative for color change.   Neurological:  Negative for dizziness, seizures, syncope, weakness, numbness and headaches.   Hematological:  Negative for adenopathy.   Psychiatric/Behavioral:  Negative for dysphoric mood and sleep disturbance. The patient is not nervous/anxious.       OBJECTIVE:      Vitals:    12/26/23 0943   BP: 118/82   BP Location: Left arm   Patient Position: Sitting   BP Method: Large (Manual)   Pulse: 71   Temp: 97.6 °F (36.4 °C)   TempSrc: Oral   SpO2: 99%   Weight: 76.7 kg (169 lb)   Height: 6' (1.829 m)     Physical Exam  Vitals and nursing note reviewed.   Constitutional:       General: He is awake. He is not in acute distress.     Appearance: Normal appearance. He is normal weight. He is not ill-appearing, toxic-appearing or diaphoretic.   HENT:      Head: Normocephalic and atraumatic.      Nose: Nose normal.   Eyes:      General: Lids are normal. Gaze aligned appropriately.      Conjunctiva/sclera: Conjunctivae normal.      Right eye: Right conjunctiva is not injected.      Left eye: Left conjunctiva is not injected.      Pupils: Pupils are equal, round, and reactive to light.   Cardiovascular:      Rate and Rhythm: Normal rate and regular rhythm.      Pulses: Normal pulses.      Heart  sounds: Normal heart sounds, S1 normal and S2 normal. No murmur heard.     No friction rub. No gallop.   Pulmonary:      Effort: Pulmonary effort is normal. No respiratory distress.      Breath sounds: Normal breath sounds. No stridor. No decreased breath sounds, wheezing, rhonchi or rales.   Chest:      Chest wall: No tenderness.   Musculoskeletal:      Cervical back: Neck supple.      Right lower leg: No edema.      Left lower leg: No edema.   Lymphadenopathy:      Cervical: No cervical adenopathy.   Skin:     General: Skin is warm and dry.      Capillary Refill: Capillary refill takes less than 2 seconds.      Findings: Rash present. No erythema.             Comments: Clusters of erythematous blisters and lesion to left side and flank, see imagining.   Neurological:      Mental Status: He is alert and oriented to person, place, and time. Mental status is at baseline.   Psychiatric:         Attention and Perception: Attention normal.         Mood and Affect: Mood normal.         Speech: Speech normal.         Behavior: Behavior normal. Behavior is cooperative.         Thought Content: Thought content normal.         Judgment: Judgment normal.      Assessment:       1. Herpes zoster without complication    2. Type 2 diabetes mellitus without complication, without long-term current use of insulin        Plan:       Herpes zoster without complication  Start Valtrex.  Dosage adjust due to renal issues. Continue Gabapentin. Hydroxyzine prescribed prn itching. Avoid scratching lesions. Wash hands frequently. Avoid contact with pregnant women and those who have not had chicken pox of the varicella vaccine.   -     valACYclovir (VALTREX) 1000 MG tablet; Take 1 tablet (1,000 mg total) by mouth every 12 (twelve) hours. for 7 days  Dispense: 14 tablet; Refill: 0  -     hydrOXYzine HCL (ATARAX) 25 MG tablet; Take 1 tablet (25 mg total) by mouth 2 (two) times daily as needed for Itching.  Dispense: 14 tablet; Refill: 0    Type  2 diabetes mellitus without complication, without long-term current use of insulin  Patient is out of Jardiance, med refilled. A1c stable at 5.4%. Continue other meds.  -     empagliflozin (JARDIANCE) 25 mg tablet; Take 1 tablet (25 mg total) by mouth once daily.  Dispense: 90 tablet; Refill: 0    This note was created using [employer] voice recognition software that occasionally misinterprets phrases or words.     I spent a total of 34 minutes on the day of the visit.This includes face to face time and non-face to face time preparing to see the patient (eg, review of tests), obtaining and/or reviewing separately obtained history, documenting clinical information in the electronic or other health record, independently interpreting results and communicating results to the patient/family/caregiver, or care coordinator.    Follow up if symptoms worsen or fail to improve.      12/26/2023 KAMERON Hirsch, FNP

## 2024-01-01 ENCOUNTER — OFFICE VISIT (OUTPATIENT)
Dept: FAMILY MEDICINE | Facility: CLINIC | Age: 74
End: 2024-01-01
Payer: MEDICARE

## 2024-01-01 ENCOUNTER — TELEPHONE (OUTPATIENT)
Dept: FAMILY MEDICINE | Facility: CLINIC | Age: 74
End: 2024-01-01
Payer: MEDICARE

## 2024-01-01 ENCOUNTER — HOSPITAL ENCOUNTER (INPATIENT)
Facility: HOSPITAL | Age: 74
LOS: 3 days | Discharge: HOME HEALTH | DRG: 638 | End: 2024-09-29
Attending: EMERGENCY MEDICINE | Admitting: FAMILY MEDICINE
Payer: MEDICARE

## 2024-01-01 ENCOUNTER — DOCUMENT SCAN (OUTPATIENT)
Dept: HOME HEALTH SERVICES | Facility: HOSPITAL | Age: 74
End: 2024-01-01
Payer: MEDICARE

## 2024-01-01 ENCOUNTER — HOSPITAL ENCOUNTER (EMERGENCY)
Facility: HOSPITAL | Age: 74
Discharge: HOME | End: 2024-10-07
Attending: EMERGENCY MEDICINE
Payer: MEDICARE

## 2024-01-01 VITALS
DIASTOLIC BLOOD PRESSURE: 63 MMHG | SYSTOLIC BLOOD PRESSURE: 137 MMHG | BODY MASS INDEX: 20.99 KG/M2 | HEIGHT: 72 IN | OXYGEN SATURATION: 100 % | WEIGHT: 155 LBS | TEMPERATURE: 98 F | HEART RATE: 69 BPM | RESPIRATION RATE: 19 BRPM

## 2024-01-01 VITALS
TEMPERATURE: 97 F | WEIGHT: 146.19 LBS | SYSTOLIC BLOOD PRESSURE: 108 MMHG | HEART RATE: 67 BPM | RESPIRATION RATE: 14 BRPM | OXYGEN SATURATION: 100 % | BODY MASS INDEX: 19.8 KG/M2 | DIASTOLIC BLOOD PRESSURE: 56 MMHG | HEIGHT: 72 IN

## 2024-01-01 VITALS
DIASTOLIC BLOOD PRESSURE: 68 MMHG | BODY MASS INDEX: 21.06 KG/M2 | HEART RATE: 61 BPM | SYSTOLIC BLOOD PRESSURE: 124 MMHG | HEIGHT: 72 IN | WEIGHT: 155.5 LBS | OXYGEN SATURATION: 99 %

## 2024-01-01 DIAGNOSIS — N18.9 ACUTE KIDNEY INJURY SUPERIMPOSED ON CHRONIC KIDNEY DISEASE: ICD-10-CM

## 2024-01-01 DIAGNOSIS — E16.2 HYPOGLYCEMIA: Primary | ICD-10-CM

## 2024-01-01 DIAGNOSIS — Z09 HOSPITAL DISCHARGE FOLLOW-UP: Primary | ICD-10-CM

## 2024-01-01 DIAGNOSIS — J30.2 SEASONAL ALLERGIES: ICD-10-CM

## 2024-01-01 DIAGNOSIS — R53.81 DEBILITY: ICD-10-CM

## 2024-01-01 DIAGNOSIS — Z23 NEED FOR INFLUENZA VACCINATION: ICD-10-CM

## 2024-01-01 DIAGNOSIS — E78.2 MIXED HYPERLIPIDEMIA: ICD-10-CM

## 2024-01-01 DIAGNOSIS — N17.9 ACUTE KIDNEY INJURY SUPERIMPOSED ON CHRONIC KIDNEY DISEASE: ICD-10-CM

## 2024-01-01 DIAGNOSIS — I50.22 CHRONIC SYSTOLIC HEART FAILURE: Chronic | ICD-10-CM

## 2024-01-01 DIAGNOSIS — K59.09 CHRONIC CONSTIPATION: ICD-10-CM

## 2024-01-01 DIAGNOSIS — E87.1 HYPONATREMIA: ICD-10-CM

## 2024-01-01 DIAGNOSIS — R07.9 CHEST PAIN: ICD-10-CM

## 2024-01-01 DIAGNOSIS — I50.43 ACUTE ON CHRONIC COMBINED SYSTOLIC AND DIASTOLIC HEART FAILURE: ICD-10-CM

## 2024-01-01 LAB
ALBUMIN SERPL BCP-MCNC: 3.1 G/DL (ref 3.5–5.2)
ALBUMIN SERPL BCP-MCNC: 3.2 G/DL (ref 3.5–5.2)
ALBUMIN SERPL BCP-MCNC: 3.3 G/DL (ref 3.5–5.2)
ALBUMIN SERPL BCP-MCNC: 3.3 G/DL (ref 3.5–5.2)
ALBUMIN SERPL BCP-MCNC: 3.6 G/DL (ref 3.5–5.2)
ALBUMIN SERPL BCP-MCNC: 3.8 G/DL (ref 3.5–5.2)
ALP SERPL-CCNC: 43 U/L (ref 55–135)
ALP SERPL-CCNC: 47 U/L (ref 55–135)
ALP SERPL-CCNC: 48 U/L (ref 55–135)
ALP SERPL-CCNC: 49 U/L (ref 55–135)
ALP SERPL-CCNC: 55 U/L (ref 55–135)
ALP SERPL-CCNC: 58 U/L (ref 55–135)
ALT SERPL W/O P-5'-P-CCNC: 13 U/L (ref 10–44)
ALT SERPL W/O P-5'-P-CCNC: 24 U/L (ref 10–44)
ALT SERPL W/O P-5'-P-CCNC: 25 U/L (ref 10–44)
ALT SERPL W/O P-5'-P-CCNC: 26 U/L (ref 10–44)
ALT SERPL W/O P-5'-P-CCNC: 28 U/L (ref 10–44)
ALT SERPL W/O P-5'-P-CCNC: 36 U/L (ref 10–44)
ANION GAP SERPL CALC-SCNC: 14 MMOL/L (ref 8–16)
ANION GAP SERPL CALC-SCNC: 7 MMOL/L (ref 8–16)
ANION GAP SERPL CALC-SCNC: 8 MMOL/L (ref 8–16)
ANION GAP SERPL CALC-SCNC: 9 MMOL/L (ref 8–16)
AST SERPL-CCNC: 21 U/L (ref 10–40)
AST SERPL-CCNC: 23 U/L (ref 10–40)
AST SERPL-CCNC: 25 U/L (ref 10–40)
AST SERPL-CCNC: 27 U/L (ref 10–40)
AST SERPL-CCNC: 30 U/L (ref 10–40)
AST SERPL-CCNC: 34 U/L (ref 10–40)
B-OH-BUTYR BLD STRIP-SCNC: 0.1 MMOL/L (ref 0–0.5)
BASOPHILS # BLD AUTO: 0.02 K/UL (ref 0–0.2)
BASOPHILS # BLD AUTO: 0.03 K/UL (ref 0–0.2)
BASOPHILS # BLD AUTO: 0.04 K/UL (ref 0–0.2)
BASOPHILS # BLD AUTO: 0.04 K/UL (ref 0–0.2)
BASOPHILS NFR BLD: 0.2 % (ref 0–1.9)
BASOPHILS NFR BLD: 0.3 % (ref 0–1.9)
BASOPHILS NFR BLD: 0.3 % (ref 0–1.9)
BASOPHILS NFR BLD: 0.5 % (ref 0–1.9)
BASOPHILS NFR BLD: 0.6 % (ref 0–1.9)
BASOPHILS NFR BLD: 0.9 % (ref 0–1.9)
BILIRUB SERPL-MCNC: 1 MG/DL (ref 0.1–1)
BILIRUB SERPL-MCNC: 1 MG/DL (ref 0.1–1)
BILIRUB SERPL-MCNC: 1.2 MG/DL (ref 0.1–1)
BILIRUB SERPL-MCNC: 1.3 MG/DL (ref 0.1–1)
BILIRUB SERPL-MCNC: 1.3 MG/DL (ref 0.1–1)
BILIRUB SERPL-MCNC: 1.6 MG/DL (ref 0.1–1)
BILIRUB UR QL STRIP: NEGATIVE
BUN SERPL-MCNC: 59 MG/DL (ref 8–23)
BUN SERPL-MCNC: 62 MG/DL (ref 8–23)
BUN SERPL-MCNC: 63 MG/DL (ref 8–23)
BUN SERPL-MCNC: 64 MG/DL (ref 8–23)
BUN SERPL-MCNC: 65 MG/DL (ref 8–23)
BUN SERPL-MCNC: 65 MG/DL (ref 8–23)
BUN SERPL-MCNC: 67 MG/DL (ref 8–23)
C PEPTIDE SERPL-MCNC: 34 NG/ML (ref 1.1–4.4)
CALCIUM SERPL-MCNC: 8.1 MG/DL (ref 8.7–10.5)
CALCIUM SERPL-MCNC: 8.2 MG/DL (ref 8.7–10.5)
CALCIUM SERPL-MCNC: 8.2 MG/DL (ref 8.7–10.5)
CALCIUM SERPL-MCNC: 8.6 MG/DL (ref 8.7–10.5)
CALCIUM SERPL-MCNC: 8.6 MG/DL (ref 8.7–10.5)
CALCIUM SERPL-MCNC: 8.9 MG/DL (ref 8.7–10.5)
CALCIUM SERPL-MCNC: 9.4 MG/DL (ref 8.7–10.5)
CHLORIDE SERPL-SCNC: 101 MMOL/L (ref 95–110)
CHLORIDE SERPL-SCNC: 93 MMOL/L (ref 95–110)
CHLORIDE SERPL-SCNC: 95 MMOL/L (ref 95–110)
CHLORIDE SERPL-SCNC: 96 MMOL/L (ref 95–110)
CHLORIDE SERPL-SCNC: 98 MMOL/L (ref 95–110)
CLARITY UR: CLEAR
CO2 SERPL-SCNC: 25 MMOL/L (ref 23–29)
CO2 SERPL-SCNC: 27 MMOL/L (ref 23–29)
CO2 SERPL-SCNC: 28 MMOL/L (ref 23–29)
CO2 SERPL-SCNC: 28 MMOL/L (ref 23–29)
CO2 SERPL-SCNC: 29 MMOL/L (ref 23–29)
CO2 SERPL-SCNC: 30 MMOL/L (ref 23–29)
CO2 SERPL-SCNC: 32 MMOL/L (ref 23–29)
COLOR UR: YELLOW
CORTIS SERPL-MCNC: 15.4 UG/DL (ref 4.3–22.4)
CORTIS SERPL-MCNC: 19.1 UG/DL
CREAT SERPL-MCNC: 2.4 MG/DL (ref 0.5–1.4)
CREAT SERPL-MCNC: 2.5 MG/DL (ref 0.5–1.4)
CREAT SERPL-MCNC: 2.9 MG/DL (ref 0.5–1.4)
DIFFERENTIAL METHOD BLD: ABNORMAL
EOSINOPHIL # BLD AUTO: 0 K/UL (ref 0–0.5)
EOSINOPHIL # BLD AUTO: 0 K/UL (ref 0–0.5)
EOSINOPHIL # BLD AUTO: 0.1 K/UL (ref 0–0.5)
EOSINOPHIL # BLD AUTO: 0.2 K/UL (ref 0–0.5)
EOSINOPHIL NFR BLD: 0.1 % (ref 0–8)
EOSINOPHIL NFR BLD: 0.3 % (ref 0–8)
EOSINOPHIL NFR BLD: 1.8 % (ref 0–8)
EOSINOPHIL NFR BLD: 2.6 % (ref 0–8)
EOSINOPHIL NFR BLD: 4.7 % (ref 0–8)
EOSINOPHIL NFR BLD: 4.8 % (ref 0–8)
ERYTHROCYTE [DISTWIDTH] IN BLOOD BY AUTOMATED COUNT: 21.8 % (ref 11.5–14.5)
ERYTHROCYTE [DISTWIDTH] IN BLOOD BY AUTOMATED COUNT: 22.7 % (ref 11.5–14.5)
ERYTHROCYTE [DISTWIDTH] IN BLOOD BY AUTOMATED COUNT: 23.1 % (ref 11.5–14.5)
ERYTHROCYTE [DISTWIDTH] IN BLOOD BY AUTOMATED COUNT: 23.4 % (ref 11.5–14.5)
ERYTHROCYTE [DISTWIDTH] IN BLOOD BY AUTOMATED COUNT: 23.8 % (ref 11.5–14.5)
ERYTHROCYTE [DISTWIDTH] IN BLOOD BY AUTOMATED COUNT: 23.8 % (ref 11.5–14.5)
EST. GFR  (NO RACE VARIABLE): 22 ML/MIN/1.73 M^2
EST. GFR  (NO RACE VARIABLE): 26.5 ML/MIN/1.73 M^2
EST. GFR  (NO RACE VARIABLE): 27.8 ML/MIN/1.73 M^2
GLUCOSE SERPL-MCNC: 108 MG/DL (ref 70–110)
GLUCOSE SERPL-MCNC: 13 MG/DL (ref 70–110)
GLUCOSE SERPL-MCNC: 154 MG/DL (ref 70–110)
GLUCOSE SERPL-MCNC: 194 MG/DL (ref 70–110)
GLUCOSE SERPL-MCNC: 21 MG/DL (ref 70–110)
GLUCOSE SERPL-MCNC: 31 MG/DL (ref 70–110)
GLUCOSE SERPL-MCNC: 56 MG/DL (ref 70–110)
GLUCOSE SERPL-MCNC: 56 MG/DL (ref 70–110)
GLUCOSE SERPL-MCNC: 57 MG/DL (ref 70–110)
GLUCOSE SERPL-MCNC: 76 MG/DL (ref 70–110)
GLUCOSE UR QL STRIP: NEGATIVE
HCT VFR BLD AUTO: 23.9 % (ref 40–54)
HCT VFR BLD AUTO: 24.2 % (ref 40–54)
HCT VFR BLD AUTO: 27.4 % (ref 40–54)
HCT VFR BLD AUTO: 27.6 % (ref 40–54)
HCT VFR BLD AUTO: 29.3 % (ref 40–54)
HCT VFR BLD AUTO: 34.7 % (ref 40–54)
HGB BLD-MCNC: 11.3 G/DL (ref 14–18)
HGB BLD-MCNC: 8 G/DL (ref 14–18)
HGB BLD-MCNC: 8 G/DL (ref 14–18)
HGB BLD-MCNC: 8.9 G/DL (ref 14–18)
HGB BLD-MCNC: 9.2 G/DL (ref 14–18)
HGB BLD-MCNC: 9.6 G/DL (ref 14–18)
HGB UR QL STRIP: NEGATIVE
IMM GRANULOCYTES # BLD AUTO: 0.02 K/UL (ref 0–0.04)
IMM GRANULOCYTES # BLD AUTO: 0.03 K/UL (ref 0–0.04)
IMM GRANULOCYTES # BLD AUTO: 0.04 K/UL (ref 0–0.04)
IMM GRANULOCYTES # BLD AUTO: 0.05 K/UL (ref 0–0.04)
IMM GRANULOCYTES NFR BLD AUTO: 0.3 % (ref 0–0.5)
IMM GRANULOCYTES NFR BLD AUTO: 0.4 % (ref 0–0.5)
IMM GRANULOCYTES NFR BLD AUTO: 0.7 % (ref 0–0.5)
INSULIN SERPL-ACNC: 87.3 UIU/ML (ref 2.6–24.9)
KETONES UR QL STRIP: NEGATIVE
LABCORP MISC TEST CODE: 4432
LABCORP MISC TEST NAME: NORMAL
LABCORP MISCELLANEOUS TEST: NORMAL
LEUKOCYTE ESTERASE UR QL STRIP: NEGATIVE
LYMPHOCYTES # BLD AUTO: 0.3 K/UL (ref 1–4.8)
LYMPHOCYTES # BLD AUTO: 0.4 K/UL (ref 1–4.8)
LYMPHOCYTES # BLD AUTO: 0.4 K/UL (ref 1–4.8)
LYMPHOCYTES # BLD AUTO: 0.5 K/UL (ref 1–4.8)
LYMPHOCYTES # BLD AUTO: 0.5 K/UL (ref 1–4.8)
LYMPHOCYTES # BLD AUTO: 0.6 K/UL (ref 1–4.8)
LYMPHOCYTES NFR BLD: 10 % (ref 18–48)
LYMPHOCYTES NFR BLD: 2.9 % (ref 18–48)
LYMPHOCYTES NFR BLD: 5.3 % (ref 18–48)
LYMPHOCYTES NFR BLD: 5.9 % (ref 18–48)
LYMPHOCYTES NFR BLD: 7.8 % (ref 18–48)
LYMPHOCYTES NFR BLD: 9.9 % (ref 18–48)
MAGNESIUM SERPL-MCNC: 1.7 MG/DL (ref 1.6–2.6)
MAGNESIUM SERPL-MCNC: 1.7 MG/DL (ref 1.6–2.6)
MAGNESIUM SERPL-MCNC: 1.9 MG/DL (ref 1.6–2.6)
MAGNESIUM SERPL-MCNC: 2 MG/DL (ref 1.6–2.6)
MCH RBC QN AUTO: 29.5 PG (ref 27–31)
MCH RBC QN AUTO: 29.7 PG (ref 27–31)
MCH RBC QN AUTO: 29.9 PG (ref 27–31)
MCH RBC QN AUTO: 30.3 PG (ref 27–31)
MCH RBC QN AUTO: 30.3 PG (ref 27–31)
MCH RBC QN AUTO: 30.4 PG (ref 27–31)
MCHC RBC AUTO-ENTMCNC: 32.2 G/DL (ref 32–36)
MCHC RBC AUTO-ENTMCNC: 32.6 G/DL (ref 32–36)
MCHC RBC AUTO-ENTMCNC: 32.8 G/DL (ref 32–36)
MCHC RBC AUTO-ENTMCNC: 33.1 G/DL (ref 32–36)
MCHC RBC AUTO-ENTMCNC: 33.5 G/DL (ref 32–36)
MCHC RBC AUTO-ENTMCNC: 33.6 G/DL (ref 32–36)
MCV RBC AUTO: 88 FL (ref 82–98)
MCV RBC AUTO: 89 FL (ref 82–98)
MCV RBC AUTO: 90 FL (ref 82–98)
MCV RBC AUTO: 91 FL (ref 82–98)
MCV RBC AUTO: 91 FL (ref 82–98)
MCV RBC AUTO: 94 FL (ref 82–98)
MONOCYTES # BLD AUTO: 0.3 K/UL (ref 0.3–1)
MONOCYTES # BLD AUTO: 0.3 K/UL (ref 0.3–1)
MONOCYTES # BLD AUTO: 0.4 K/UL (ref 0.3–1)
MONOCYTES # BLD AUTO: 0.5 K/UL (ref 0.3–1)
MONOCYTES NFR BLD: 3.3 % (ref 4–15)
MONOCYTES NFR BLD: 3.5 % (ref 4–15)
MONOCYTES NFR BLD: 5 % (ref 4–15)
MONOCYTES NFR BLD: 8.3 % (ref 4–15)
MONOCYTES NFR BLD: 8.4 % (ref 4–15)
MONOCYTES NFR BLD: 8.7 % (ref 4–15)
NEUTROPHILS # BLD AUTO: 3.4 K/UL (ref 1.8–7.7)
NEUTROPHILS # BLD AUTO: 3.9 K/UL (ref 1.8–7.7)
NEUTROPHILS # BLD AUTO: 4.8 K/UL (ref 1.8–7.7)
NEUTROPHILS # BLD AUTO: 6.6 K/UL (ref 1.8–7.7)
NEUTROPHILS # BLD AUTO: 7 K/UL (ref 1.8–7.7)
NEUTROPHILS # BLD AUTO: 9 K/UL (ref 1.8–7.7)
NEUTROPHILS NFR BLD: 75.6 % (ref 38–73)
NEUTROPHILS NFR BLD: 77.7 % (ref 38–73)
NEUTROPHILS NFR BLD: 78.6 % (ref 38–73)
NEUTROPHILS NFR BLD: 86.3 % (ref 38–73)
NEUTROPHILS NFR BLD: 90.2 % (ref 38–73)
NEUTROPHILS NFR BLD: 92.9 % (ref 38–73)
NITRITE UR QL STRIP: NEGATIVE
NRBC BLD-RTO: 0 /100 WBC
PH UR STRIP: 5 [PH] (ref 5–8)
PLATELET # BLD AUTO: 199 K/UL (ref 150–450)
PLATELET # BLD AUTO: 200 K/UL (ref 150–450)
PLATELET # BLD AUTO: 222 K/UL (ref 150–450)
PLATELET # BLD AUTO: 228 K/UL (ref 150–450)
PLATELET # BLD AUTO: 234 K/UL (ref 150–450)
PLATELET # BLD AUTO: 239 K/UL (ref 150–450)
PMV BLD AUTO: 10.9 FL (ref 9.2–12.9)
PMV BLD AUTO: 11 FL (ref 9.2–12.9)
PMV BLD AUTO: 11.1 FL (ref 9.2–12.9)
PMV BLD AUTO: 11.2 FL (ref 9.2–12.9)
PMV BLD AUTO: 11.8 FL (ref 9.2–12.9)
PMV BLD AUTO: 11.9 FL (ref 9.2–12.9)
POCT GLUCOSE: 102 MG/DL (ref 70–110)
POCT GLUCOSE: 107 MG/DL (ref 70–110)
POCT GLUCOSE: 107 MG/DL (ref 70–110)
POCT GLUCOSE: 109 MG/DL (ref 70–110)
POCT GLUCOSE: 109 MG/DL (ref 70–110)
POCT GLUCOSE: 113 MG/DL (ref 70–110)
POCT GLUCOSE: 121 MG/DL (ref 70–110)
POCT GLUCOSE: 124 MG/DL (ref 70–110)
POCT GLUCOSE: 126 MG/DL (ref 70–110)
POCT GLUCOSE: 151 MG/DL (ref 70–110)
POCT GLUCOSE: 152 MG/DL (ref 70–110)
POCT GLUCOSE: 156 MG/DL (ref 70–110)
POCT GLUCOSE: 177 MG/DL (ref 70–110)
POCT GLUCOSE: 182 MG/DL (ref 70–110)
POCT GLUCOSE: 186 MG/DL (ref 70–110)
POCT GLUCOSE: 186 MG/DL (ref 70–110)
POCT GLUCOSE: 187 MG/DL (ref 70–110)
POCT GLUCOSE: 21 MG/DL (ref 70–110)
POCT GLUCOSE: 23 MG/DL (ref 70–110)
POCT GLUCOSE: 25 MG/DL (ref 70–110)
POCT GLUCOSE: 257 MG/DL (ref 70–110)
POCT GLUCOSE: 27 MG/DL (ref 70–110)
POCT GLUCOSE: 28 MG/DL (ref 70–110)
POCT GLUCOSE: 31 MG/DL (ref 70–110)
POCT GLUCOSE: 32 MG/DL (ref 70–110)
POCT GLUCOSE: 47 MG/DL (ref 70–110)
POCT GLUCOSE: 47 MG/DL (ref 70–110)
POCT GLUCOSE: 49 MG/DL (ref 70–110)
POCT GLUCOSE: 49 MG/DL (ref 70–110)
POCT GLUCOSE: 57 MG/DL (ref 70–110)
POCT GLUCOSE: 66 MG/DL (ref 70–110)
POCT GLUCOSE: 70 MG/DL (ref 70–110)
POCT GLUCOSE: 73 MG/DL (ref 70–110)
POCT GLUCOSE: 74 MG/DL (ref 70–110)
POCT GLUCOSE: 77 MG/DL (ref 70–110)
POCT GLUCOSE: 82 MG/DL (ref 70–110)
POCT GLUCOSE: 82 MG/DL (ref 70–110)
POCT GLUCOSE: 92 MG/DL (ref 70–110)
POCT GLUCOSE: 93 MG/DL (ref 70–110)
POCT GLUCOSE: 94 MG/DL (ref 70–110)
POCT GLUCOSE: 95 MG/DL (ref 70–110)
POCT GLUCOSE: <20 MG/DL (ref 70–110)
POTASSIUM SERPL-SCNC: 3.3 MMOL/L (ref 3.5–5.1)
POTASSIUM SERPL-SCNC: 3.3 MMOL/L (ref 3.5–5.1)
POTASSIUM SERPL-SCNC: 3.4 MMOL/L (ref 3.5–5.1)
POTASSIUM SERPL-SCNC: 3.8 MMOL/L (ref 3.5–5.1)
POTASSIUM SERPL-SCNC: 4 MMOL/L (ref 3.5–5.1)
POTASSIUM SERPL-SCNC: 4.1 MMOL/L (ref 3.5–5.1)
POTASSIUM SERPL-SCNC: 4.5 MMOL/L (ref 3.5–5.1)
PROLACTIN SERPL-MCNC: 10 NG/ML (ref 3.6–25.2)
PROT SERPL-MCNC: 6.3 G/DL (ref 6–8.4)
PROT SERPL-MCNC: 6.7 G/DL (ref 6–8.4)
PROT SERPL-MCNC: 6.8 G/DL (ref 6–8.4)
PROT SERPL-MCNC: 7.1 G/DL (ref 6–8.4)
PROT SERPL-MCNC: 7.8 G/DL (ref 6–8.4)
PROT SERPL-MCNC: 8.4 G/DL (ref 6–8.4)
PROT UR QL STRIP: ABNORMAL
PTH-INTACT SERPL-MCNC: 95.8 PG/ML (ref 9–77)
RBC # BLD AUTO: 2.63 M/UL (ref 4.6–6.2)
RBC # BLD AUTO: 2.71 M/UL (ref 4.6–6.2)
RBC # BLD AUTO: 2.94 M/UL (ref 4.6–6.2)
RBC # BLD AUTO: 3.04 M/UL (ref 4.6–6.2)
RBC # BLD AUTO: 3.21 M/UL (ref 4.6–6.2)
RBC # BLD AUTO: 3.81 M/UL (ref 4.6–6.2)
SODIUM SERPL-SCNC: 131 MMOL/L (ref 136–145)
SODIUM SERPL-SCNC: 132 MMOL/L (ref 136–145)
SODIUM SERPL-SCNC: 132 MMOL/L (ref 136–145)
SODIUM SERPL-SCNC: 133 MMOL/L (ref 136–145)
SODIUM SERPL-SCNC: 134 MMOL/L (ref 136–145)
SODIUM SERPL-SCNC: 137 MMOL/L (ref 136–145)
SODIUM SERPL-SCNC: 138 MMOL/L (ref 136–145)
SP GR UR STRIP: 1.01 (ref 1–1.03)
URN SPEC COLLECT METH UR: ABNORMAL
UROBILINOGEN UR STRIP-ACNC: NEGATIVE EU/DL
WBC # BLD AUTO: 4.5 K/UL (ref 3.9–12.7)
WBC # BLD AUTO: 4.97 K/UL (ref 3.9–12.7)
WBC # BLD AUTO: 6.16 K/UL (ref 3.9–12.7)
WBC # BLD AUTO: 7.62 K/UL (ref 3.9–12.7)
WBC # BLD AUTO: 7.74 K/UL (ref 3.9–12.7)
WBC # BLD AUTO: 9.71 K/UL (ref 3.9–12.7)

## 2024-01-01 PROCEDURE — 25000242 PHARM REV CODE 250 ALT 637 W/ HCPCS: Performed by: INTERNAL MEDICINE

## 2024-01-01 PROCEDURE — 97161 PT EVAL LOW COMPLEX 20 MIN: CPT

## 2024-01-01 PROCEDURE — 83970 ASSAY OF PARATHORMONE: CPT | Performed by: INTERNAL MEDICINE

## 2024-01-01 PROCEDURE — 96376 TX/PRO/DX INJ SAME DRUG ADON: CPT

## 2024-01-01 PROCEDURE — 85025 COMPLETE CBC W/AUTO DIFF WBC: CPT | Performed by: INTERNAL MEDICINE

## 2024-01-01 PROCEDURE — 1126F AMNT PAIN NOTED NONE PRSNT: CPT | Mod: CPTII,S$GLB,, | Performed by: NURSE PRACTITIONER

## 2024-01-01 PROCEDURE — 82010 KETONE BODYS QUAN: CPT | Performed by: FAMILY MEDICINE

## 2024-01-01 PROCEDURE — 20000000 HC ICU ROOM

## 2024-01-01 PROCEDURE — 1111F DSCHRG MED/CURRENT MED MERGE: CPT | Mod: CPTII,S$GLB,, | Performed by: NURSE PRACTITIONER

## 2024-01-01 PROCEDURE — 82533 TOTAL CORTISOL: CPT | Performed by: INTERNAL MEDICINE

## 2024-01-01 PROCEDURE — 80053 COMPREHEN METABOLIC PANEL: CPT | Performed by: EMERGENCY MEDICINE

## 2024-01-01 PROCEDURE — 94640 AIRWAY INHALATION TREATMENT: CPT

## 2024-01-01 PROCEDURE — 94761 N-INVAS EAR/PLS OXIMETRY MLT: CPT

## 2024-01-01 PROCEDURE — 83735 ASSAY OF MAGNESIUM: CPT | Performed by: INTERNAL MEDICINE

## 2024-01-01 PROCEDURE — 25000003 PHARM REV CODE 250

## 2024-01-01 PROCEDURE — 30000890 LABCORP MISCELLANEOUS TEST: Performed by: INTERNAL MEDICINE

## 2024-01-01 PROCEDURE — 82962 GLUCOSE BLOOD TEST: CPT

## 2024-01-01 PROCEDURE — 1159F MED LIST DOCD IN RCRD: CPT | Mod: CPTII,S$GLB,, | Performed by: NURSE PRACTITIONER

## 2024-01-01 PROCEDURE — 25000003 PHARM REV CODE 250: Performed by: STUDENT IN AN ORGANIZED HEALTH CARE EDUCATION/TRAINING PROGRAM

## 2024-01-01 PROCEDURE — 3044F HG A1C LEVEL LT 7.0%: CPT | Mod: CPTII,S$GLB,, | Performed by: NURSE PRACTITIONER

## 2024-01-01 PROCEDURE — 36415 COLL VENOUS BLD VENIPUNCTURE: CPT | Performed by: FAMILY MEDICINE

## 2024-01-01 PROCEDURE — 99900035 HC TECH TIME PER 15 MIN (STAT)

## 2024-01-01 PROCEDURE — 96374 THER/PROPH/DIAG INJ IV PUSH: CPT | Mod: 59

## 2024-01-01 PROCEDURE — 25000003 PHARM REV CODE 250: Performed by: FAMILY MEDICINE

## 2024-01-01 PROCEDURE — 94799 UNLISTED PULMONARY SVC/PX: CPT

## 2024-01-01 PROCEDURE — 82530 CORTISOL FREE: CPT | Performed by: INTERNAL MEDICINE

## 2024-01-01 PROCEDURE — 25000003 PHARM REV CODE 250: Performed by: EMERGENCY MEDICINE

## 2024-01-01 PROCEDURE — 3078F DIAST BP <80 MM HG: CPT | Mod: CPTII,S$GLB,, | Performed by: NURSE PRACTITIONER

## 2024-01-01 PROCEDURE — 99900031 HC PATIENT EDUCATION (STAT)

## 2024-01-01 PROCEDURE — 80053 COMPREHEN METABOLIC PANEL: CPT | Performed by: INTERNAL MEDICINE

## 2024-01-01 PROCEDURE — 25000003 PHARM REV CODE 250: Performed by: INTERNAL MEDICINE

## 2024-01-01 PROCEDURE — 99495 TRANSJ CARE MGMT MOD F2F 14D: CPT | Mod: S$GLB,,, | Performed by: NURSE PRACTITIONER

## 2024-01-01 PROCEDURE — 81003 URINALYSIS AUTO W/O SCOPE: CPT | Performed by: EMERGENCY MEDICINE

## 2024-01-01 PROCEDURE — 1160F RVW MEDS BY RX/DR IN RCRD: CPT | Mod: CPTII,S$GLB,, | Performed by: NURSE PRACTITIONER

## 2024-01-01 PROCEDURE — 83525 ASSAY OF INSULIN: CPT | Performed by: FAMILY MEDICINE

## 2024-01-01 PROCEDURE — 82947 ASSAY GLUCOSE BLOOD QUANT: CPT | Performed by: FAMILY MEDICINE

## 2024-01-01 PROCEDURE — 90653 IIV ADJUVANT VACCINE IM: CPT | Mod: S$GLB,,, | Performed by: NURSE PRACTITIONER

## 2024-01-01 PROCEDURE — 1157F ADVNC CARE PLAN IN RCRD: CPT | Mod: CPTII,S$GLB,, | Performed by: NURSE PRACTITIONER

## 2024-01-01 PROCEDURE — 36415 COLL VENOUS BLD VENIPUNCTURE: CPT | Performed by: INTERNAL MEDICINE

## 2024-01-01 PROCEDURE — 85025 COMPLETE CBC W/AUTO DIFF WBC: CPT | Performed by: EMERGENCY MEDICINE

## 2024-01-01 PROCEDURE — 97116 GAIT TRAINING THERAPY: CPT

## 2024-01-01 PROCEDURE — 96374 THER/PROPH/DIAG INJ IV PUSH: CPT

## 2024-01-01 PROCEDURE — 1101F PT FALLS ASSESS-DOCD LE1/YR: CPT | Mod: CPTII,S$GLB,, | Performed by: NURSE PRACTITIONER

## 2024-01-01 PROCEDURE — 99285 EMERGENCY DEPT VISIT HI MDM: CPT | Mod: 25

## 2024-01-01 PROCEDURE — G0008 ADMIN INFLUENZA VIRUS VAC: HCPCS | Mod: S$GLB,,, | Performed by: NURSE PRACTITIONER

## 2024-01-01 PROCEDURE — G0378 HOSPITAL OBSERVATION PER HR: HCPCS

## 2024-01-01 PROCEDURE — 96361 HYDRATE IV INFUSION ADD-ON: CPT

## 2024-01-01 PROCEDURE — 3288F FALL RISK ASSESSMENT DOCD: CPT | Mod: CPTII,S$GLB,, | Performed by: NURSE PRACTITIONER

## 2024-01-01 PROCEDURE — 99999 PR PBB SHADOW E&M-EST. PATIENT-LVL IV: CPT | Mod: PBBFAC,,, | Performed by: NURSE PRACTITIONER

## 2024-01-01 PROCEDURE — 94640 AIRWAY INHALATION TREATMENT: CPT | Mod: XB

## 2024-01-01 PROCEDURE — 84681 ASSAY OF C-PEPTIDE: CPT | Performed by: FAMILY MEDICINE

## 2024-01-01 PROCEDURE — 3074F SYST BP LT 130 MM HG: CPT | Mod: CPTII,S$GLB,, | Performed by: NURSE PRACTITIONER

## 2024-01-01 PROCEDURE — 80048 BASIC METABOLIC PNL TOTAL CA: CPT | Performed by: FAMILY MEDICINE

## 2024-01-01 PROCEDURE — 82947 ASSAY GLUCOSE BLOOD QUANT: CPT | Performed by: HOSPITALIST

## 2024-01-01 PROCEDURE — 84146 ASSAY OF PROLACTIN: CPT | Performed by: INTERNAL MEDICINE

## 2024-01-01 PROCEDURE — 82533 TOTAL CORTISOL: CPT | Performed by: FAMILY MEDICINE

## 2024-01-01 PROCEDURE — 99284 EMERGENCY DEPT VISIT MOD MDM: CPT | Mod: 25

## 2024-01-01 RX ORDER — HYDRALAZINE HYDROCHLORIDE 50 MG/1
50 TABLET, FILM COATED ORAL EVERY 12 HOURS
Qty: 180 TABLET | Refills: 3 | Status: SHIPPED | OUTPATIENT
Start: 2024-01-01 | End: 2025-09-29

## 2024-01-01 RX ORDER — TORSEMIDE 20 MG/1
80 TABLET ORAL 2 TIMES DAILY
Status: DISCONTINUED | OUTPATIENT
Start: 2024-01-01 | End: 2024-01-01 | Stop reason: HOSPADM

## 2024-01-01 RX ORDER — ONDANSETRON HYDROCHLORIDE 2 MG/ML
4 INJECTION, SOLUTION INTRAVENOUS EVERY 6 HOURS PRN
Status: DISCONTINUED | OUTPATIENT
Start: 2024-01-01 | End: 2024-01-01 | Stop reason: HOSPADM

## 2024-01-01 RX ORDER — METOPROLOL SUCCINATE 25 MG/1
25 TABLET, EXTENDED RELEASE ORAL DAILY
Status: DISCONTINUED | OUTPATIENT
Start: 2024-01-01 | End: 2024-01-01 | Stop reason: HOSPADM

## 2024-01-01 RX ORDER — DEXTROSE MONOHYDRATE 50 MG/ML
INJECTION, SOLUTION INTRAVENOUS CONTINUOUS
Status: DISCONTINUED | OUTPATIENT
Start: 2024-01-01 | End: 2024-01-01

## 2024-01-01 RX ORDER — ACETAMINOPHEN 325 MG/1
650 TABLET ORAL EVERY 8 HOURS PRN
Status: DISCONTINUED | OUTPATIENT
Start: 2024-01-01 | End: 2024-01-01 | Stop reason: HOSPADM

## 2024-01-01 RX ORDER — IBUPROFEN 200 MG
16 TABLET ORAL
Status: DISCONTINUED | OUTPATIENT
Start: 2024-01-01 | End: 2024-01-01 | Stop reason: HOSPADM

## 2024-01-01 RX ORDER — SODIUM,POTASSIUM PHOSPHATES 280-250MG
2 POWDER IN PACKET (EA) ORAL
Status: DISCONTINUED | OUTPATIENT
Start: 2024-01-01 | End: 2024-01-01 | Stop reason: HOSPADM

## 2024-01-01 RX ORDER — FLUTICASONE FUROATE AND VILANTEROL 100; 25 UG/1; UG/1
1 POWDER RESPIRATORY (INHALATION) DAILY
Status: DISCONTINUED | OUTPATIENT
Start: 2024-01-01 | End: 2024-01-01

## 2024-01-01 RX ORDER — MUPIROCIN 20 MG/G
OINTMENT TOPICAL 2 TIMES DAILY
Status: DISCONTINUED | OUTPATIENT
Start: 2024-01-01 | End: 2024-01-01 | Stop reason: HOSPADM

## 2024-01-01 RX ORDER — ISOSORBIDE DINITRATE 20 MG/1
20 TABLET ORAL 2 TIMES DAILY
Qty: 180 TABLET | Refills: 1 | Status: SHIPPED | OUTPATIENT
Start: 2024-01-01

## 2024-01-01 RX ORDER — ATORVASTATIN CALCIUM 40 MG/1
40 TABLET, FILM COATED ORAL DAILY
Status: DISCONTINUED | OUTPATIENT
Start: 2024-01-01 | End: 2024-01-01 | Stop reason: HOSPADM

## 2024-01-01 RX ORDER — LEVALBUTEROL INHALATION SOLUTION 0.63 MG/3ML
0.63 SOLUTION RESPIRATORY (INHALATION) EVERY 6 HOURS PRN
Status: DISCONTINUED | OUTPATIENT
Start: 2024-01-01 | End: 2024-01-01 | Stop reason: HOSPADM

## 2024-01-01 RX ORDER — FLUTICASONE PROPIONATE 50 MCG
1 SPRAY, SUSPENSION (ML) NASAL DAILY PRN
Start: 2024-01-01

## 2024-01-01 RX ORDER — ISOSORBIDE DINITRATE 10 MG/1
20 TABLET ORAL 3 TIMES DAILY
Status: DISCONTINUED | OUTPATIENT
Start: 2024-01-01 | End: 2024-01-01 | Stop reason: HOSPADM

## 2024-01-01 RX ORDER — DEXTROSE 50 % IN WATER (D50W) INTRAVENOUS SYRINGE
25
Status: COMPLETED | OUTPATIENT
Start: 2024-01-01 | End: 2024-01-01

## 2024-01-01 RX ORDER — HYDRALAZINE HYDROCHLORIDE 25 MG/1
50 TABLET, FILM COATED ORAL 3 TIMES DAILY
Status: DISCONTINUED | OUTPATIENT
Start: 2024-01-01 | End: 2024-01-01 | Stop reason: HOSPADM

## 2024-01-01 RX ORDER — ALUMINUM HYDROXIDE, MAGNESIUM HYDROXIDE, AND SIMETHICONE 1200; 120; 1200 MG/30ML; MG/30ML; MG/30ML
30 SUSPENSION ORAL 4 TIMES DAILY PRN
Status: DISCONTINUED | OUTPATIENT
Start: 2024-01-01 | End: 2024-01-01 | Stop reason: HOSPADM

## 2024-01-01 RX ORDER — ACETAMINOPHEN 325 MG/1
650 TABLET ORAL EVERY 4 HOURS PRN
Status: DISCONTINUED | OUTPATIENT
Start: 2024-01-01 | End: 2024-01-01 | Stop reason: HOSPADM

## 2024-01-01 RX ORDER — DEXTROSE MONOHYDRATE 100 MG/ML
INJECTION, SOLUTION INTRAVENOUS CONTINUOUS
Status: DISCONTINUED | OUTPATIENT
Start: 2024-01-01 | End: 2024-01-01

## 2024-01-01 RX ORDER — ASPIRIN 81 MG/1
81 TABLET ORAL DAILY
Status: DISCONTINUED | OUTPATIENT
Start: 2024-01-01 | End: 2024-01-01 | Stop reason: HOSPADM

## 2024-01-01 RX ORDER — DEXTROSE MONOHYDRATE 50 MG/ML
1000 INJECTION, SOLUTION INTRAVENOUS
Status: COMPLETED | OUTPATIENT
Start: 2024-01-01 | End: 2024-01-01

## 2024-01-01 RX ORDER — ATORVASTATIN CALCIUM 40 MG/1
40 TABLET, FILM COATED ORAL DAILY
Qty: 90 TABLET | Refills: 1 | Status: SHIPPED | OUTPATIENT
Start: 2024-01-01

## 2024-01-01 RX ORDER — SODIUM CHLORIDE 9 MG/ML
INJECTION, SOLUTION INTRAVENOUS CONTINUOUS
Status: DISCONTINUED | OUTPATIENT
Start: 2024-01-01 | End: 2024-01-01 | Stop reason: HOSPADM

## 2024-01-01 RX ORDER — LANOLIN ALCOHOL/MO/W.PET/CERES
800 CREAM (GRAM) TOPICAL
Status: DISCONTINUED | OUTPATIENT
Start: 2024-01-01 | End: 2024-01-01 | Stop reason: HOSPADM

## 2024-01-01 RX ORDER — ARFORMOTEROL TARTRATE 15 UG/2ML
15 SOLUTION RESPIRATORY (INHALATION) 2 TIMES DAILY
Status: DISCONTINUED | OUTPATIENT
Start: 2024-01-01 | End: 2024-01-01 | Stop reason: HOSPADM

## 2024-01-01 RX ORDER — PANTOPRAZOLE SODIUM 40 MG/1
40 TABLET, DELAYED RELEASE ORAL
Status: DISCONTINUED | OUTPATIENT
Start: 2024-01-01 | End: 2024-01-01 | Stop reason: HOSPADM

## 2024-01-01 RX ORDER — IBUPROFEN 200 MG
24 TABLET ORAL
Status: DISCONTINUED | OUTPATIENT
Start: 2024-01-01 | End: 2024-01-01 | Stop reason: HOSPADM

## 2024-01-01 RX ORDER — TALC
6 POWDER (GRAM) TOPICAL NIGHTLY PRN
Status: DISCONTINUED | OUTPATIENT
Start: 2024-01-01 | End: 2024-01-01 | Stop reason: HOSPADM

## 2024-01-01 RX ORDER — GLUCAGON 1 MG
1 KIT INJECTION
Status: DISCONTINUED | OUTPATIENT
Start: 2024-01-01 | End: 2024-01-01 | Stop reason: HOSPADM

## 2024-01-01 RX ORDER — BUDESONIDE 0.5 MG/2ML
0.5 INHALANT ORAL EVERY 12 HOURS
Status: DISCONTINUED | OUTPATIENT
Start: 2024-01-01 | End: 2024-01-01 | Stop reason: HOSPADM

## 2024-01-01 RX ORDER — NALOXONE HCL 0.4 MG/ML
0.02 VIAL (ML) INJECTION
Status: DISCONTINUED | OUTPATIENT
Start: 2024-01-01 | End: 2024-01-01 | Stop reason: HOSPADM

## 2024-01-01 RX ADMIN — DEXTROSE MONOHYDRATE 25 G: 25 INJECTION, SOLUTION INTRAVENOUS at 04:09

## 2024-01-01 RX ADMIN — HYDRALAZINE HYDROCHLORIDE 50 MG: 25 TABLET ORAL at 09:09

## 2024-01-01 RX ADMIN — POTASSIUM BICARBONATE 50 MEQ: 978 TABLET, EFFERVESCENT ORAL at 09:09

## 2024-01-01 RX ADMIN — APIXABAN 2.5 MG: 2.5 TABLET, FILM COATED ORAL at 08:09

## 2024-01-01 RX ADMIN — METOPROLOL SUCCINATE 25 MG: 25 TABLET, EXTENDED RELEASE ORAL at 09:09

## 2024-01-01 RX ADMIN — ATORVASTATIN CALCIUM 40 MG: 40 TABLET, FILM COATED ORAL at 09:09

## 2024-01-01 RX ADMIN — SODIUM CHLORIDE: 9 INJECTION, SOLUTION INTRAVENOUS at 04:09

## 2024-01-01 RX ADMIN — ISOSORBIDE DINITRATE 20 MG: 10 TABLET ORAL at 03:09

## 2024-01-01 RX ADMIN — MUPIROCIN 1 G: 20 OINTMENT TOPICAL at 09:09

## 2024-01-01 RX ADMIN — POTASSIUM BICARBONATE 35 MEQ: 391 TABLET, EFFERVESCENT ORAL at 09:09

## 2024-01-01 RX ADMIN — DEXTROSE MONOHYDRATE 25 G: 25 INJECTION, SOLUTION INTRAVENOUS at 08:09

## 2024-01-01 RX ADMIN — ISOSORBIDE DINITRATE 20 MG: 10 TABLET ORAL at 09:09

## 2024-01-01 RX ADMIN — APIXABAN 2.5 MG: 2.5 TABLET, FILM COATED ORAL at 09:09

## 2024-01-01 RX ADMIN — ARFORMOTEROL TARTRATE 15 MCG: 15 SOLUTION RESPIRATORY (INHALATION) at 07:09

## 2024-01-01 RX ADMIN — DEXTROSE MONOHYDRATE 25 G: 25 INJECTION, SOLUTION INTRAVENOUS at 11:10

## 2024-01-01 RX ADMIN — TORSEMIDE 80 MG: 20 TABLET ORAL at 09:09

## 2024-01-01 RX ADMIN — ARFORMOTEROL TARTRATE 15 MCG: 15 SOLUTION RESPIRATORY (INHALATION) at 08:09

## 2024-01-01 RX ADMIN — BUDESONIDE INHALATION 0.5 MG: 0.5 SUSPENSION RESPIRATORY (INHALATION) at 07:09

## 2024-01-01 RX ADMIN — HYDRALAZINE HYDROCHLORIDE 50 MG: 25 TABLET ORAL at 10:09

## 2024-01-01 RX ADMIN — DEXTROSE MONOHYDRATE 25 G: 25 INJECTION, SOLUTION INTRAVENOUS at 07:09

## 2024-01-01 RX ADMIN — TORSEMIDE 80 MG: 20 TABLET ORAL at 08:09

## 2024-01-01 RX ADMIN — PANTOPRAZOLE SODIUM 40 MG: 40 TABLET, DELAYED RELEASE ORAL at 06:09

## 2024-01-01 RX ADMIN — SODIUM CHLORIDE: 9 INJECTION, SOLUTION INTRAVENOUS at 07:09

## 2024-01-01 RX ADMIN — DEXTROSE: 10 SOLUTION INTRAVENOUS at 03:09

## 2024-01-01 RX ADMIN — PANTOPRAZOLE SODIUM 40 MG: 40 TABLET, DELAYED RELEASE ORAL at 05:09

## 2024-01-01 RX ADMIN — DEXTROSE MONOHYDRATE 25 G: 25 INJECTION, SOLUTION INTRAVENOUS at 06:09

## 2024-01-01 RX ADMIN — ISOSORBIDE DINITRATE 20 MG: 10 TABLET ORAL at 10:09

## 2024-01-01 RX ADMIN — MUPIROCIN 1 G: 20 OINTMENT TOPICAL at 08:09

## 2024-01-01 RX ADMIN — Medication 800 MG: at 09:09

## 2024-01-01 RX ADMIN — SODIUM CHLORIDE: 9 INJECTION, SOLUTION INTRAVENOUS at 11:09

## 2024-01-01 RX ADMIN — BUDESONIDE INHALATION 0.5 MG: 0.5 SUSPENSION RESPIRATORY (INHALATION) at 08:09

## 2024-01-01 RX ADMIN — ASPIRIN 81 MG: 81 TABLET, COATED ORAL at 09:09

## 2024-01-01 RX ADMIN — HYDRALAZINE HYDROCHLORIDE 50 MG: 25 TABLET ORAL at 08:09

## 2024-01-01 RX ADMIN — HYDRALAZINE HYDROCHLORIDE 50 MG: 25 TABLET ORAL at 03:09

## 2024-01-01 RX ADMIN — DEXTROSE MONOHYDRATE 25 G: 25 INJECTION, SOLUTION INTRAVENOUS at 03:09

## 2024-01-01 RX ADMIN — Medication 24 G: at 10:09

## 2024-01-01 RX ADMIN — DEXTROSE MONOHYDRATE 25 G: 25 INJECTION, SOLUTION INTRAVENOUS at 09:09

## 2024-01-01 RX ADMIN — DEXTROSE: 10 SOLUTION INTRAVENOUS at 05:09

## 2024-01-01 RX ADMIN — DEXTROSE MONOHYDRATE 1000 ML: 50 INJECTION, SOLUTION INTRAVENOUS at 07:09

## 2024-01-01 RX ADMIN — ISOSORBIDE DINITRATE 20 MG: 10 TABLET ORAL at 08:09

## 2024-01-01 RX ADMIN — POTASSIUM BICARBONATE 35 MEQ: 391 TABLET, EFFERVESCENT ORAL at 02:09

## 2024-01-01 RX ADMIN — SODIUM CHLORIDE: 9 INJECTION, SOLUTION INTRAVENOUS at 09:09

## 2024-01-01 RX ADMIN — HYDRALAZINE HYDROCHLORIDE 50 MG: 25 TABLET ORAL at 11:09

## 2024-01-01 RX ADMIN — DEXTROSE: 10 SOLUTION INTRAVENOUS at 12:09

## 2024-01-01 RX ADMIN — DEXTROSE: 10 SOLUTION INTRAVENOUS at 04:09

## 2024-01-01 RX ADMIN — DEXTROSE MONOHYDRATE 25 G: 25 INJECTION, SOLUTION INTRAVENOUS at 10:09

## 2024-01-01 RX ADMIN — SODIUM CHLORIDE 500 ML: 9 INJECTION, SOLUTION INTRAVENOUS at 07:09

## 2024-01-02 ENCOUNTER — HOSPITAL ENCOUNTER (OUTPATIENT)
Dept: INTERVENTIONAL RADIOLOGY/VASCULAR | Facility: HOSPITAL | Age: 74
Discharge: HOME | End: 2024-01-02
Attending: INTERNAL MEDICINE
Payer: MEDICARE

## 2024-01-02 VITALS
DIASTOLIC BLOOD PRESSURE: 80 MMHG | HEART RATE: 70 BPM | SYSTOLIC BLOOD PRESSURE: 170 MMHG | RESPIRATION RATE: 18 BRPM | OXYGEN SATURATION: 99 %

## 2024-01-02 DIAGNOSIS — R18.8 OTHER ASCITES: ICD-10-CM

## 2024-01-02 LAB
APPEARANCE FLD: CLEAR
BASOPHILS # BLD AUTO: 0.05 K/UL (ref 0–0.2)
BASOPHILS NFR BLD: 0.9 % (ref 0–1.9)
BODY FLD TYPE: NORMAL
COLOR FLD: YELLOW
DIFFERENTIAL METHOD BLD: ABNORMAL
EOSINOPHIL # BLD AUTO: 0.2 K/UL (ref 0–0.5)
EOSINOPHIL NFR BLD: 3.3 % (ref 0–8)
ERYTHROCYTE [DISTWIDTH] IN BLOOD BY AUTOMATED COUNT: 17.8 % (ref 11.5–14.5)
HCT VFR BLD AUTO: 28.6 % (ref 40–54)
HGB BLD-MCNC: 9.3 G/DL (ref 14–18)
IMM GRANULOCYTES # BLD AUTO: 0.02 K/UL (ref 0–0.04)
IMM GRANULOCYTES NFR BLD AUTO: 0.3 % (ref 0–0.5)
INR PPP: 1 (ref 0.8–1.2)
LYMPHOCYTES # BLD AUTO: 0.7 K/UL (ref 1–4.8)
LYMPHOCYTES NFR BLD: 12.3 % (ref 18–48)
LYMPHOCYTES NFR FLD MANUAL: 70 %
MCH RBC QN AUTO: 27.9 PG (ref 27–31)
MCHC RBC AUTO-ENTMCNC: 32.5 G/DL (ref 32–36)
MCV RBC AUTO: 86 FL (ref 82–98)
MESOTHL CELL NFR FLD MANUAL: 12 %
MONOCYTES # BLD AUTO: 0.4 K/UL (ref 0.3–1)
MONOCYTES NFR BLD: 6.9 % (ref 4–15)
MONOS+MACROS NFR FLD MANUAL: 12 %
NEUTROPHILS # BLD AUTO: 4.4 K/UL (ref 1.8–7.7)
NEUTROPHILS NFR BLD: 76.3 % (ref 38–73)
NEUTROPHILS NFR FLD MANUAL: 6 %
NRBC BLD-RTO: 0 /100 WBC
PLATELET # BLD AUTO: 255 K/UL (ref 150–450)
PMV BLD AUTO: 9.9 FL (ref 9.2–12.9)
PROTHROMBIN TIME: 11.6 SEC (ref 9–12.5)
RBC # BLD AUTO: 3.33 M/UL (ref 4.6–6.2)
WBC # BLD AUTO: 5.78 K/UL (ref 3.9–12.7)
WBC # FLD: 99 /CU MM

## 2024-01-02 PROCEDURE — 49083 ABD PARACENTESIS W/IMAGING: CPT

## 2024-01-02 PROCEDURE — 49083 ABD PARACENTESIS W/IMAGING: CPT | Mod: ,,, | Performed by: RADIOLOGY

## 2024-01-02 PROCEDURE — 85610 PROTHROMBIN TIME: CPT | Performed by: PHYSICIAN ASSISTANT

## 2024-01-02 PROCEDURE — C1729 CATH, DRAINAGE: HCPCS

## 2024-01-02 PROCEDURE — P9047 ALBUMIN (HUMAN), 25%, 50ML: HCPCS | Mod: JZ,JG | Performed by: PHYSICIAN ASSISTANT

## 2024-01-02 PROCEDURE — 36415 COLL VENOUS BLD VENIPUNCTURE: CPT | Performed by: PHYSICIAN ASSISTANT

## 2024-01-02 PROCEDURE — 85025 COMPLETE CBC W/AUTO DIFF WBC: CPT | Performed by: PHYSICIAN ASSISTANT

## 2024-01-02 PROCEDURE — 63600175 PHARM REV CODE 636 W HCPCS: Mod: JZ,JG | Performed by: PHYSICIAN ASSISTANT

## 2024-01-02 PROCEDURE — 89051 BODY FLUID CELL COUNT: CPT | Performed by: INTERNAL MEDICINE

## 2024-01-02 RX ORDER — ALBUMIN HUMAN 250 G/1000ML
50 SOLUTION INTRAVENOUS ONCE
Status: COMPLETED | OUTPATIENT
Start: 2024-01-02 | End: 2024-01-02

## 2024-01-02 RX ADMIN — ALBUMIN (HUMAN) 50 G: 12.5 SOLUTION INTRAVENOUS at 10:01

## 2024-01-02 NOTE — NURSING
PARACENTESIS    IR ultrasound guided paracentesis performed by Dr. Raines.  Procedure explained and standing consent verified.  Time out performed.  8.2 L removed-   Patient received 50 g of albumin IV.  VS remained stable for duration of procedure.  Specimens collected and sent to lab if ordered.   Para and IV d/c'd, with tip intact.   Access site cleaned with peroxide, derma bond and steri-strips applied, then covered with sterile Band-Aid.   Pt discharge home in stable condition.

## 2024-01-08 ENCOUNTER — LAB VISIT (OUTPATIENT)
Dept: LAB | Facility: HOSPITAL | Age: 74
End: 2024-01-08
Attending: INTERNAL MEDICINE
Payer: MEDICARE

## 2024-01-08 ENCOUNTER — OFFICE VISIT (OUTPATIENT)
Dept: HEMATOLOGY/ONCOLOGY | Facility: CLINIC | Age: 74
End: 2024-01-08
Payer: MEDICARE

## 2024-01-08 VITALS
RESPIRATION RATE: 17 BRPM | BODY MASS INDEX: 21.38 KG/M2 | HEART RATE: 74 BPM | TEMPERATURE: 97 F | HEIGHT: 72 IN | SYSTOLIC BLOOD PRESSURE: 161 MMHG | DIASTOLIC BLOOD PRESSURE: 85 MMHG | WEIGHT: 157.88 LBS

## 2024-01-08 DIAGNOSIS — N18.30 ANEMIA ASSOCIATED WITH STAGE 3 CHRONIC RENAL FAILURE: ICD-10-CM

## 2024-01-08 DIAGNOSIS — N18.30 ANEMIA ASSOCIATED WITH STAGE 3 CHRONIC RENAL FAILURE: Primary | ICD-10-CM

## 2024-01-08 DIAGNOSIS — D64.9 CHRONIC ANEMIA: ICD-10-CM

## 2024-01-08 DIAGNOSIS — I38 VALVULAR HEART DISEASE: ICD-10-CM

## 2024-01-08 DIAGNOSIS — N40.0 PROSTATISM: ICD-10-CM

## 2024-01-08 DIAGNOSIS — D52.0 ANEMIA, MACROCYTIC, NUTRITIONAL: ICD-10-CM

## 2024-01-08 DIAGNOSIS — R74.8 ABNORMAL LEVELS OF OTHER SERUM ENZYMES: ICD-10-CM

## 2024-01-08 DIAGNOSIS — D63.1 ANEMIA ASSOCIATED WITH STAGE 3 CHRONIC RENAL FAILURE: ICD-10-CM

## 2024-01-08 DIAGNOSIS — D63.1 ANEMIA ASSOCIATED WITH STAGE 3 CHRONIC RENAL FAILURE: Primary | ICD-10-CM

## 2024-01-08 DIAGNOSIS — E03.9 ACQUIRED HYPOTHYROIDISM: ICD-10-CM

## 2024-01-08 LAB
ALBUMIN SERPL BCP-MCNC: 3.2 G/DL (ref 3.5–5.2)
ALP SERPL-CCNC: 67 U/L (ref 55–135)
ALT SERPL W/O P-5'-P-CCNC: 7 U/L (ref 10–44)
ANION GAP SERPL CALC-SCNC: 11 MMOL/L (ref 8–16)
ANISOCYTOSIS BLD QL SMEAR: SLIGHT
AST SERPL-CCNC: 14 U/L (ref 10–40)
BASOPHILS # BLD AUTO: 0.07 K/UL (ref 0–0.2)
BASOPHILS NFR BLD: 1.4 % (ref 0–1.9)
BILIRUB SERPL-MCNC: 0.6 MG/DL (ref 0.1–1)
BUN SERPL-MCNC: 22 MG/DL (ref 8–23)
BURR CELLS BLD QL SMEAR: ABNORMAL
CALCIUM SERPL-MCNC: 8.6 MG/DL (ref 8.7–10.5)
CHLORIDE SERPL-SCNC: 107 MMOL/L (ref 95–110)
CO2 SERPL-SCNC: 20 MMOL/L (ref 23–29)
CREAT SERPL-MCNC: 1.7 MG/DL (ref 0.5–1.4)
DACRYOCYTES BLD QL SMEAR: ABNORMAL
DIFFERENTIAL METHOD BLD: ABNORMAL
EOSINOPHIL # BLD AUTO: 0.2 K/UL (ref 0–0.5)
EOSINOPHIL NFR BLD: 3.1 % (ref 0–8)
ERYTHROCYTE [DISTWIDTH] IN BLOOD BY AUTOMATED COUNT: 17.7 % (ref 11.5–14.5)
EST. GFR  (NO RACE VARIABLE): 42 ML/MIN/1.73 M^2
FERRITIN SERPL-MCNC: 137.2 NG/ML (ref 20–300)
FOLATE SERPL-MCNC: 7 NG/ML (ref 4–24)
GLUCOSE SERPL-MCNC: 90 MG/DL (ref 70–110)
HCT VFR BLD AUTO: 29 % (ref 40–54)
HGB BLD-MCNC: 9.4 G/DL (ref 14–18)
IMM GRANULOCYTES # BLD AUTO: 0.03 K/UL (ref 0–0.04)
IMM GRANULOCYTES NFR BLD AUTO: 0.6 % (ref 0–0.5)
LYMPHOCYTES # BLD AUTO: 0.7 K/UL (ref 1–4.8)
LYMPHOCYTES NFR BLD: 14.1 % (ref 18–48)
MCH RBC QN AUTO: 27.7 PG (ref 27–31)
MCHC RBC AUTO-ENTMCNC: 32.4 G/DL (ref 32–36)
MCV RBC AUTO: 86 FL (ref 82–98)
MONOCYTES # BLD AUTO: 0.4 K/UL (ref 0.3–1)
MONOCYTES NFR BLD: 7.6 % (ref 4–15)
NEUTROPHILS # BLD AUTO: 3.7 K/UL (ref 1.8–7.7)
NEUTROPHILS NFR BLD: 73.2 % (ref 38–73)
NRBC BLD-RTO: 0 /100 WBC
OVALOCYTES BLD QL SMEAR: ABNORMAL
PATH REV BLD -IMP: NORMAL
PLATELET # BLD AUTO: 254 K/UL (ref 150–450)
PLATELET BLD QL SMEAR: ABNORMAL
PMV BLD AUTO: 10.2 FL (ref 9.2–12.9)
POIKILOCYTOSIS BLD QL SMEAR: ABNORMAL
POTASSIUM SERPL-SCNC: 3.6 MMOL/L (ref 3.5–5.1)
PROT SERPL-MCNC: 7 G/DL (ref 6–8.4)
RBC # BLD AUTO: 3.39 M/UL (ref 4.6–6.2)
RETICS/RBC NFR AUTO: 1.5 % (ref 0.4–2)
SODIUM SERPL-SCNC: 138 MMOL/L (ref 136–145)
TSH SERPL DL<=0.005 MIU/L-ACNC: 5.54 UIU/ML (ref 0.34–5.6)
VIT B12 SERPL-MCNC: >1500 PG/ML (ref 210–950)
WBC # BLD AUTO: 5.11 K/UL (ref 3.9–12.7)

## 2024-01-08 PROCEDURE — 3008F BODY MASS INDEX DOCD: CPT | Mod: CPTII,S$GLB,, | Performed by: INTERNAL MEDICINE

## 2024-01-08 PROCEDURE — 99204 OFFICE O/P NEW MOD 45 MIN: CPT | Mod: S$GLB,,, | Performed by: INTERNAL MEDICINE

## 2024-01-08 PROCEDURE — 82607 VITAMIN B-12: CPT | Performed by: INTERNAL MEDICINE

## 2024-01-08 PROCEDURE — 3288F FALL RISK ASSESSMENT DOCD: CPT | Mod: CPTII,S$GLB,, | Performed by: INTERNAL MEDICINE

## 2024-01-08 PROCEDURE — 36415 COLL VENOUS BLD VENIPUNCTURE: CPT | Performed by: INTERNAL MEDICINE

## 2024-01-08 PROCEDURE — 82784 ASSAY IGA/IGD/IGG/IGM EACH: CPT | Mod: 91 | Performed by: INTERNAL MEDICINE

## 2024-01-08 PROCEDURE — 3077F SYST BP >= 140 MM HG: CPT | Mod: CPTII,S$GLB,, | Performed by: INTERNAL MEDICINE

## 2024-01-08 PROCEDURE — 83521 IG LIGHT CHAINS FREE EACH: CPT | Mod: 91 | Performed by: INTERNAL MEDICINE

## 2024-01-08 PROCEDURE — 82728 ASSAY OF FERRITIN: CPT | Performed by: INTERNAL MEDICINE

## 2024-01-08 PROCEDURE — 84207 ASSAY OF VITAMIN B-6: CPT | Performed by: INTERNAL MEDICINE

## 2024-01-08 PROCEDURE — 1159F MED LIST DOCD IN RCRD: CPT | Mod: CPTII,S$GLB,, | Performed by: INTERNAL MEDICINE

## 2024-01-08 PROCEDURE — 83020 HEMOGLOBIN ELECTROPHORESIS: CPT | Mod: 91 | Performed by: INTERNAL MEDICINE

## 2024-01-08 PROCEDURE — 3079F DIAST BP 80-89 MM HG: CPT | Mod: CPTII,S$GLB,, | Performed by: INTERNAL MEDICINE

## 2024-01-08 PROCEDURE — 1101F PT FALLS ASSESS-DOCD LE1/YR: CPT | Mod: CPTII,S$GLB,, | Performed by: INTERNAL MEDICINE

## 2024-01-08 PROCEDURE — 82668 ASSAY OF ERYTHROPOIETIN: CPT | Performed by: INTERNAL MEDICINE

## 2024-01-08 PROCEDURE — 85045 AUTOMATED RETICULOCYTE COUNT: CPT | Performed by: INTERNAL MEDICINE

## 2024-01-08 PROCEDURE — 83010 ASSAY OF HAPTOGLOBIN QUANT: CPT | Performed by: INTERNAL MEDICINE

## 2024-01-08 PROCEDURE — 1157F ADVNC CARE PLAN IN RCRD: CPT | Mod: CPTII,S$GLB,, | Performed by: INTERNAL MEDICINE

## 2024-01-08 PROCEDURE — 82746 ASSAY OF FOLIC ACID SERUM: CPT | Performed by: INTERNAL MEDICINE

## 2024-01-08 PROCEDURE — 84165 PROTEIN E-PHORESIS SERUM: CPT | Performed by: INTERNAL MEDICINE

## 2024-01-08 PROCEDURE — 82040 ASSAY OF SERUM ALBUMIN: CPT | Mod: 59 | Performed by: INTERNAL MEDICINE

## 2024-01-08 PROCEDURE — 80053 COMPREHEN METABOLIC PANEL: CPT | Performed by: INTERNAL MEDICINE

## 2024-01-08 PROCEDURE — 85025 COMPLETE CBC W/AUTO DIFF WBC: CPT | Performed by: INTERNAL MEDICINE

## 2024-01-08 PROCEDURE — 84443 ASSAY THYROID STIM HORMONE: CPT | Performed by: INTERNAL MEDICINE

## 2024-01-08 PROCEDURE — 30000890 LABCORP MISCELLANEOUS TEST: Mod: 91 | Performed by: INTERNAL MEDICINE

## 2024-01-08 PROCEDURE — 1125F AMNT PAIN NOTED PAIN PRSNT: CPT | Mod: CPTII,S$GLB,, | Performed by: INTERNAL MEDICINE

## 2024-01-09 LAB
ALBUMIN SERPL-MCNC: 3.3 G/DL (ref 3.8–4.8)
HAPTOGLOB SERPL-MCNC: 247 MG/DL (ref 34–355)
SHBG SERPL-SCNC: 65 NMOL/L (ref 19.3–76.4)
TESTOST FREE SERPL-MCNC: 56.8 PG/ML (ref 31.7–120.8)
TESTOST SERPL-MCNC: 412 NG/DL (ref 264–916)

## 2024-01-09 NOTE — PROGRESS NOTES
"[facility] Hematology Oncology In Office Initial Encounter Note    1/8/24    Subjective:      Patient ID:   Baldo Carney  73 y.o. male  1950  Aunmonse Hayes NP    Chief Complaint:   anemia    HPI:  73 y.o. male referred for evaluation of anemia, Hgb 9.6.  Denies Fatigue or GOMEZ.  Never transfused, denies liver dx or hepatitis.  Denies family hx of anemia or SS Dx.    He is S/P AVR, placement of pig valve, has coronary stents x's 2, 2014.  Asthma hx, DM, HTN, thyroid dx, renal dx, and peripheral neuropathy sx in feet.  Diabetic retinopathy, COPD, Aortic stenosis with AVR, chronic CHF, high cholesterol, CAD, CABG, ischemic cardiomyopathy, BPH, ch renal dx., OA, cirrhosis of liver, hypothyroid.  S/P ventral and R inguinal hernia repair recently, he has paracentesis of ascites fluid q 2 weeks.    Smoke 1 pack per week for < 5 years. Quit when he was 24.  ETOH +, quit 3-4 years ago.  Allergy to canagliflozin, dizzyness.    He worked with [employer], fork lift, [employer].    Dad had "fluid", DM, HTN, heart dx, AMI.  Mom heart dx, 9 siblings HTN DM Heart dx.  Son A & W.      ROS:   GEN: normal without any fever, night sweats or weight loss  HEENT: normal with no HA's, sore throat, stiff neck, changes in vision  CV: See HPI  PULM: See HPI  GI:See HPI  : See HPI  BREAST: normal with no mass, discharge, pain  SKIN: normal with no rash, erythema, bruising, or swelling     Past Medical History:   Diagnosis Date    Asthma     Cardiomyopathy 10/21/2014    CHF (congestive heart failure)     Coronary artery disease     CRF (chronic renal failure)     Diabetes mellitus     Enlarged prostate     Hyperlipidemia     Hypertension     Neuropathy      Past Surgical History:   Procedure Laterality Date    ANGIOGRAPHY OF INTERNAL MAMMARY VESSEL N/A 3/11/2022    Procedure: Angiogram Internal Mammary;  Surgeon: Hank Lujan MD;  Location: [facility] CATH/EP LAB;  Service: Cardiology;  Laterality: N/A;    CARDIAC " CATHETERIZATION      CARDIAC VALVE SURGERY      CATHETERIZATION OF BOTH LEFT AND RIGHT HEART Left 3/11/2022    Procedure: CATHETERIZATION, HEART, BOTH LEFT AND RIGHT;  Surgeon: Hank Lujan MD;  Location: [facility] CATH/EP LAB;  Service: Cardiology;  Laterality: Left;    CORONARY ANGIOGRAPHY N/A 3/11/2022    Procedure: ANGIOGRAM, CORONARY ARTERY;  Surgeon: Hank Lujan MD;  Location: [facility] CATH/EP LAB;  Service: Cardiology;  Laterality: N/A;    CORONARY BYPASS GRAFT ANGIOGRAPHY  3/11/2022    Procedure: Bypass graft study;  Surgeon: Hank Lujan MD;  Location: [facility] CATH/EP LAB;  Service: Cardiology;;    CYSTOSCOPY N/A 7/30/2019    Procedure: CYSTOSCOPY;  Surgeon: Spencer Nguyen MD;  Location: [facility];  Service: Urology;  Laterality: N/A;    INSERTION OF INTRAVASCULAR MICROAXIAL BLOOD PUMP N/A 8/31/2022    Procedure: INSERTION, IMPELLA;  Surgeon: Zach Jensen MD;  Location: [facility] CATH LAB;  Service: Cardiology;  Laterality: N/A;    INSERTION, CATHETER, DIALYSIS, PERITONEAL N/A 12/7/2023    Procedure: INSERTION, CATHETER, DIALYSIS, PERITONEAL;  Surgeon: Greg Bone Jr., MD;  Location: [facility];  Service: General;  Laterality: N/A;  need PD catheter    PLACEMENT OF SWAN SEE CATHETER WITH IMAGING GUIDANCE  8/31/2022    Procedure: INSERTION, CATHETER, SWAN-SEE, WITH IMAGING GUIDANCE;  Surgeon: Zach Jensen MD;  Location: [facility] CATH LAB;  Service: Cardiology;;    REPAIR, HERNIA, INGUINAL, INCARCERATED, INITIAL, AGE 5 YEARS OR OLDER Right 12/7/2023    Procedure: REPAIR, HERNIA, INGUINAL, INCARCERATED, INITIAL;  Surgeon: Greg Bone Jr., MD;  Location: [facility] OR;  Service: General;  Laterality: Right;    SHOULDER ARTHROSCOPY  1985    TRANSRECTAL BIOPSY OF PROSTATE WITH ULTRASOUND GUIDANCE N/A 7/30/2019    Procedure: BIOPSY, PROSTATE, RECTAL APPROACH, WITH US GUIDANCE;  Surgeon: Spencer Nguyen MD;  Location: [facility];  Service: Urology;  Laterality: N/A;  procedure not performed,  pt unable to tolerate    TRANSRECTAL BIOPSY OF PROSTATE WITH ULTRASOUND GUIDANCE N/A 2019    Procedure: BIOPSY, PROSTATE, RECTAL APPROACH, WITH US GUIDANCE;  Surgeon: Spencer Nguyen MD;  Location: [facility] OR;  Service: Urology;  Laterality: N/A;    UMBILICAL HERNIA REPAIR N/A 2023    Procedure: REPAIR, HERNIA, UMBILICAL;  Surgeon: Greg Bone Jr., MD;  Location: [facility] OR;  Service: General;  Laterality: N/A;       Review of patient's allergies indicates:   Allergen Reactions    Canagliflozin      Other reaction(s): been very dizzy     Social History     Socioeconomic History    Marital status: Single   Tobacco Use    Smoking status: Former     Current packs/day: 0.00     Types: Cigarettes     Quit date: 1970     Years since quittin.5    Smokeless tobacco: Never   Substance and Sexual Activity    Alcohol use: Not Currently     Alcohol/week: 3.0 standard drinks of alcohol     Types: 3 Cans of beer per week    Drug use: No    Sexual activity: Not Currently     Partners: Female         Current Outpatient Medications:     albuterol (PROVENTIL/VENTOLIN HFA) 90 mcg/actuation inhaler, INHALE 1 TO 2 PUFFS INTO THE LUNGS EVERY 4 TO 6 HOURS AS NEEDED FOR WHEEZE AND SHORTNESS OF BREATH, Disp: 18 g, Rfl: 2    aspirin (ECOTRIN) 81 MG EC tablet, Take 81 mg by mouth once daily., Disp: , Rfl:     atorvastatin (LIPITOR) 40 MG tablet, Take 1 tablet (40 mg total) by mouth once daily., Disp: 90 tablet, Rfl: 0    budesonide-glycopyr-formoterol (BREZTRI AEROSPHERE) 160-9-4.8 mcg/actuation HFAA, Inhale into the lungs daily as needed., Disp: , Rfl:     clopidogreL (PLAVIX) 75 mg tablet, , Disp: , Rfl:     cyanocobalamin 1,000 mcg/mL injection, Inject 1 mL (1,000 mcg total) into the muscle every 14 (fourteen) days., Disp: 1 mL, Rfl: 11    empagliflozin (JARDIANCE) 25 mg tablet, Take 1 tablet (25 mg total) by mouth once daily., Disp: 90 tablet, Rfl: 0    finasteride (PROSCAR) 5 mg tablet, Take 1 tablet (5 mg  total) by mouth once daily., Disp: 90 tablet, Rfl: 4    fluticasone propionate (FLONASE) 50 mcg/actuation nasal spray, 1 SPRAY (50 MCG TOTAL) BY EACH NOSTRIL ROUTE ONCE DAILY., Disp: 16 mL, Rfl: 1    furosemide (LASIX) 40 MG tablet, Take by mouth once daily., Disp: , Rfl:     gabapentin (NEURONTIN) 300 MG capsule, Take 1 capsule (300 mg total) by mouth every evening., Disp: 90 capsule, Rfl: 1    hydrOXYzine HCL (ATARAX) 25 MG tablet, Take 1 tablet (25 mg total) by mouth 2 (two) times daily as needed for Itching., Disp: 14 tablet, Rfl: 0    ibuprofen (ADVIL,MOTRIN) 600 MG tablet, Take 1 tablet (600 mg total) by mouth every 6 (six) hours as needed., Disp: , Rfl:     levothyroxine (SYNTHROID) 100 MCG tablet, TAKE 1 TABLET (100 MCG TOTAL) BY MOUTH BEFORE BREAKFAST. WITH NO OTHER MEDS OR FOOD, Disp: 90 tablet, Rfl: 0    metoprolol succinate (TOPROL-XL) 25 MG 24 hr tablet, Take 1 tablet (25 mg total) by mouth once daily., Disp: 90 tablet, Rfl: 1    oxyCODONE (ROXICODONE) 5 MG immediate release tablet, Take 1 tablet (5 mg total) by mouth every 6 (six) hours as needed for Pain., Disp: 28 tablet, Rfl: 0    sacubitriL-valsartan (ENTRESTO) 24-26 mg per tablet, Take 1 tablet by mouth 2 (two) times daily., Disp: 180 tablet, Rfl: 1    tamsulosin (FLOMAX) 0.4 mg Cap, TAKE ONE CAPSULE BY MOUTH DAILY AT 5 PM, Disp: 90 capsule, Rfl: 11    fluticasone furoate-vilanteroL (BREO ELLIPTA) 100-25 mcg/dose diskus inhaler, Inhale 1 puff into the lungs once daily. (DAILY CONTROLLER) (Patient not taking: Reported on 12/26/2023), Disp: 3 each, Rfl: 3    hydrALAZINE (APRESOLINE) 50 MG tablet, Take 1 tablet (50 mg total) by mouth every 12 (twelve) hours., Disp: 60 tablet, Rfl: 0    isosorbide dinitrate (ISORDIL) 20 MG tablet, Take 1 tablet (20 mg total) by mouth 3 (three) times daily., Disp: 90 tablet, Rfl: 0    prasugreL (EFFIENT) 10 mg Tab, Take 1 tablet (10 mg total) by mouth once daily., Disp: 30 tablet, Rfl: 11    valACYclovir (VALTREX) 1000  MG tablet, Take 1 tablet (1,000 mg total) by mouth every 12 (twelve) hours. for 7 days, Disp: 14 tablet, Rfl: 0    ZONTIVITY 2.08 mg Tab, Take 1 tablet by mouth once daily., Disp: , Rfl:           Objective:   Vitals:  Blood pressure (!) 161/85, pulse 74, temperature 96.8 °F (36 °C), resp. rate 17, height 6' (1.829 m), weight 71.6 kg (157 lb 14.4 oz).    Physical Examination:   GEN: no apparent distress, comfortable  HEAD: atraumatic and normocephalic  EYES: + pallor, no icterus  ENT: no pharyngeal erythema, external ears WNL; no nasal discharge  NECK: no masses, thyroid normal, trachea midline, no LAD/LN's, supple  CV: RRR with + murmur;  + pedal edema  CHEST: Normal respiratory effort; CTAB; distant breath sounds; no wheeze or crackles  ABDOM: nontender and distended with ascites, not tight; soft; no rebound/guarding  MUSC/Skeletal: ROM normal; no crepitus; joints normal  EXTREM: no clubbing, cyanosis, inflammation or swelling  SKIN: no rashes, lesions, ulcers, petechiae   : no cvat  NEURO: grossly intact; motor/sensory WNL;  no tremors  PSYCH: normal mood, affect and behavior  LYMPH: normal cervical, supraclavicular, axillary  LN's      Labs:   Lab Results   Component Value Date    WBC 5.11 01/08/2024    HGB 9.4 (L) 01/08/2024    HCT 29.0 (L) 01/08/2024    MCV 86 01/08/2024     01/08/2024    CMP  Sodium   Date Value Ref Range Status   01/08/2024 138 136 - 145 mmol/L Final   01/10/2019 141 134 - 144 mmol/L      Potassium   Date Value Ref Range Status   01/08/2024 3.6 3.5 - 5.1 mmol/L Final     Chloride   Date Value Ref Range Status   01/08/2024 107 95 - 110 mmol/L Final   01/10/2019 105 98 - 110 mmol/L      CO2   Date Value Ref Range Status   01/08/2024 20 (L) 23 - 29 mmol/L Final     Glucose   Date Value Ref Range Status   01/08/2024 90 70 - 110 mg/dL Final   01/10/2019 93 70 - 99 mg/dL      BUN   Date Value Ref Range Status   01/08/2024 22 8 - 23 mg/dL Final     Creatinine   Date Value Ref Range Status    01/08/2024 1.7 (H) 0.5 - 1.4 mg/dL Final   01/10/2019 1.36 0.60 - 1.40 mg/dL      Calcium   Date Value Ref Range Status   01/08/2024 8.6 (L) 8.7 - 10.5 mg/dL Final     Total Protein   Date Value Ref Range Status   01/08/2024 7.0 6.0 - 8.4 g/dL Final     Albumin   Date Value Ref Range Status   01/08/2024 3.2 (L) 3.5 - 5.2 g/dL Final   08/17/2021 pos  Final     Total Bilirubin   Date Value Ref Range Status   01/08/2024 0.6 0.1 - 1.0 mg/dL Final     Comment:     For infants and newborns, interpretation of results should be based  on gestational age, weight and in agreement with clinical  observations.    Premature Infant recommended reference ranges:  Up to 24 hours.............<8.0 mg/dL  Up to 48 hours............<12.0 mg/dL  3-5 days..................<15.0 mg/dL  6-29 days.................<15.0 mg/dL       Alkaline Phosphatase   Date Value Ref Range Status   01/08/2024 67 55 - 135 U/L Final     AST   Date Value Ref Range Status   01/08/2024 14 10 - 40 U/L Final     ALT   Date Value Ref Range Status   01/08/2024 7 (L) 10 - 44 U/L Final     Anion Gap   Date Value Ref Range Status   01/08/2024 11 8 - 16 mmol/L Final     eGFR if    Date Value Ref Range Status   06/07/2022 53.4 (A) >60 mL/min/1.73 m^2 Final     eGFR if non    Date Value Ref Range Status   06/07/2022 46.2 (A) >60 mL/min/1.73 m^2 Final     Comment:     Calculation used to obtain the estimated glomerular filtration  rate (eGFR) is the CKD-EPI equation.          Hgb 9.4, he has been started on B 12 injections.    Assessment:   (1) 73 y.o. male with moderate anemia.  Consider nutritional deficiency, B 12, folate, ferritin., B 6.  Hormonal imbalances, thyroid, testosterone.  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  Hemoglobinopathy evaluation.  See orders, [facility].    CAD, VHD, S/P AVR, CABG, coronary  stenting, CHF sx.    Chronic liver Dx, cirrhosis, ascites.    B 12 deficiency?, on B 12 injections.    RTC 2-3 weeks.        Plan:       Anemia associated with stage 3 chronic renal failure  -     CBC Auto Differential; Future; Expected date: 01/08/2024  -     Reticulocytes; Future; Expected date: 01/08/2024  -     Erythropoietin; Future; Expected date: 01/08/2024  -     Immunoglobulins (IgG, IgA, IgM) Quantitative; Future; Expected date: 01/08/2024  -     Protein Electrophoresis, Serum; Future; Expected date: 01/08/2024  -     Immunofixation Electrophoresis; Future; Expected date: 01/08/2024  -     [place]/Lambda Light Chain; Future; Expected date: 01/08/2024    Chronic anemia  -     Ambulatory referral/consult to Hematology / Oncology  -     Hemoglobinopathy Evaluation; Future; Expected date: 01/08/2024    Anemia, macrocytic, nutritional  -     CBC Auto Differential; Future; Expected date: 01/08/2024  -     Vitamin B12; Future; Expected date: 01/08/2024  -     Folate; Future; Expected date: 01/08/2024  -     Ferritin; Future; Expected date: 01/08/2024  -     Vitamin B6; Future; Expected date: 01/08/2024    Acquired hypothyroidism  -     TSH; Future; Expected date: 01/08/2024    Prostatism  -     Testosterone Free, Profile; Future; Expected date: 01/08/2024    Valvular heart disease  -     CBC Auto Differential; Future; Expected date: 01/08/2024  -     CMP; Future; Expected date: 01/08/2024  -     HAPTOGLOBIN; Future; Expected date: 01/08/2024  -     Pathologist Interpretation Differential; Future; Expected date: 01/08/2024    Abnormal levels of other serum enzymes  -     Vitamin B6; Future; Expected date: 01/08/2024      Follow up in about 3 weeks (around 1/29/2024) for check of blood status after therapy.    I have explained and the patient understands all of  the current recommendation(s). I have answered all of their questions to the best of my ability and to their complete satisfaction.

## 2024-01-10 ENCOUNTER — OFFICE VISIT (OUTPATIENT)
Dept: SURGERY | Facility: CLINIC | Age: 74
End: 2024-01-10
Payer: MEDICARE

## 2024-01-10 VITALS — DIASTOLIC BLOOD PRESSURE: 75 MMHG | SYSTOLIC BLOOD PRESSURE: 132 MMHG | TEMPERATURE: 98 F | HEART RATE: 78 BPM

## 2024-01-10 DIAGNOSIS — Z98.890 S/P INGUINAL HERNIA REPAIR: Primary | ICD-10-CM

## 2024-01-10 DIAGNOSIS — Z87.19 S/P INGUINAL HERNIA REPAIR: Primary | ICD-10-CM

## 2024-01-10 LAB
EPO SERPL-ACNC: 88.9 MIU/ML (ref 2.6–18.5)
IGA SERPL-MCNC: 316 MG/DL (ref 61–437)
IGA SERPL-MCNC: 316 MG/DL (ref 61–437)
IGG SERPL-MCNC: 1693 MG/DL (ref 603–1613)
IGG SERPL-MCNC: 1693 MG/DL (ref 603–1613)
IGM SERPL-MCNC: 139 MG/DL (ref 15–143)
IGM SERPL-MCNC: 139 MG/DL (ref 15–143)
LABCORP MISC TEST CODE: NORMAL
LABCORP MISC TEST CODE: NORMAL
LABCORP MISC TEST NAME: NORMAL
LABCORP MISC TEST NAME: NORMAL
LABCORP MISCELLANEOUS TEST: NORMAL
LABCORP MISCELLANEOUS TEST: NORMAL
PROT PATTERN SERPL IFE-IMP: ABNORMAL

## 2024-01-10 PROCEDURE — 3288F FALL RISK ASSESSMENT DOCD: CPT | Mod: CPTII,S$GLB,, | Performed by: SURGERY

## 2024-01-10 PROCEDURE — 3075F SYST BP GE 130 - 139MM HG: CPT | Mod: CPTII,S$GLB,, | Performed by: SURGERY

## 2024-01-10 PROCEDURE — 1101F PT FALLS ASSESS-DOCD LE1/YR: CPT | Mod: CPTII,S$GLB,, | Performed by: SURGERY

## 2024-01-10 PROCEDURE — 1125F AMNT PAIN NOTED PAIN PRSNT: CPT | Mod: CPTII,S$GLB,, | Performed by: SURGERY

## 2024-01-10 PROCEDURE — 99999 PR PBB SHADOW E&M-EST. PATIENT-LVL III: CPT | Mod: PBBFAC,,, | Performed by: SURGERY

## 2024-01-10 PROCEDURE — 1157F ADVNC CARE PLAN IN RCRD: CPT | Mod: CPTII,S$GLB,, | Performed by: SURGERY

## 2024-01-10 PROCEDURE — 3078F DIAST BP <80 MM HG: CPT | Mod: CPTII,S$GLB,, | Performed by: SURGERY

## 2024-01-10 PROCEDURE — 99024 POSTOP FOLLOW-UP VISIT: CPT | Mod: S$GLB,,, | Performed by: SURGERY

## 2024-01-10 RX ORDER — HYDROXYZINE HYDROCHLORIDE 25 MG/1
1 TABLET, FILM COATED ORAL 2 TIMES DAILY
COMMUNITY
Start: 2023-12-26

## 2024-01-10 RX ORDER — DOXYCYCLINE HYCLATE 100 MG
100 TABLET ORAL 2 TIMES DAILY
Qty: 14 TABLET | Refills: 0 | Status: SHIPPED | OUTPATIENT
Start: 2024-01-10 | End: 2024-01-17

## 2024-01-11 LAB
ALBUMIN SERPL ELPH-MCNC: 3 G/DL (ref 2.9–4.4)
ALBUMIN/GLOB SERPL: 0.8 {RATIO} (ref 0.7–1.7)
ALPHA1 GLOB SERPL ELPH-MCNC: 0.4 G/DL (ref 0–0.4)
ALPHA2 GLOB SERPL ELPH-MCNC: 0.7 G/DL (ref 0.4–1)
B-GLOBULIN SERPL ELPH-MCNC: 0.9 G/DL (ref 0.7–1.3)
GAMMA GLOB SERPL ELPH-MCNC: 1.6 G/DL (ref 0.4–1.8)
GLOBULIN SER CALC-MCNC: 3.6 G/DL (ref 2.2–3.9)
LABORATORY COMMENT REPORT: NORMAL
M PROTEIN SERPL ELPH-MCNC: NORMAL G/DL
PROT SERPL-MCNC: 6.6 G/DL (ref 6–8.5)

## 2024-01-11 NOTE — PROGRESS NOTES
GENERAL SURGERY  POST-OP PROGRESS NOTE    HPI: Baldo Carney is a 73 y.o. male with liver cirrhosis with ascites status post open repair of large right inguinal hernia with mesh and open repair of umbilical hernia with mesh on 12/7/23. Here for additional follow up. States he feels great but still has bulging in the groin. Denies drainage from the site or redness. Still getting frequent paracentesis for fluid removal (last procedure removed 8.2 L).    VITALS:  Vitals:    01/10/24 1017   BP: 132/75   Pulse: 78   Temp: 97.6 °F (36.4 °C)       PHYSICAL EXAM:  Left-sided surgical drain site well healed    Umbilical hernia repair site now with superficial breakdown from suspected thermal injury, no drainage of ascitic fluid, no surrounding erythema    Right inguinal hernia repair incision intact, ongoing swelling in the right inguinal space/scrotum almost certainly related to accumulation of ascitic fluid in the potential space, no sign of infection      ASSESSMENT & PLAN:  73 y.o. male with cirrhosis and ascites s/p open repair with mesh of right inguinal hernia, umbilical hernia  -overall feels well  -now with fluid reaccumulation in the right scrotum but no obvious hernia recurrence  -also with superficial thermal injury to the skin around the umbilicus but no drainage  -no sign of infection or peritonitis  -continue to monitor  -f/u with hepatologist   -prn paracentesis  -rtc in 2 weeks

## 2024-01-15 LAB — VIT B6 SERPL-MCNC: 1.8 UG/L (ref 3.4–65.2)

## 2024-01-16 ENCOUNTER — HOSPITAL ENCOUNTER (OUTPATIENT)
Dept: INTERVENTIONAL RADIOLOGY/VASCULAR | Facility: HOSPITAL | Age: 74
Discharge: HOME | End: 2024-01-16
Attending: INTERNAL MEDICINE
Payer: MEDICARE

## 2024-01-16 VITALS
OXYGEN SATURATION: 100 % | RESPIRATION RATE: 17 BRPM | DIASTOLIC BLOOD PRESSURE: 62 MMHG | HEART RATE: 68 BPM | SYSTOLIC BLOOD PRESSURE: 144 MMHG

## 2024-01-16 DIAGNOSIS — R18.8 OTHER ASCITES: ICD-10-CM

## 2024-01-16 LAB
APPEARANCE FLD: CLEAR
BODY FLD TYPE: NORMAL
COLOR FLD: YELLOW
LYMPHOCYTES NFR FLD MANUAL: 65 %
MONOS+MACROS NFR FLD MANUAL: 24 %
NEUTROPHILS NFR FLD MANUAL: 11 %
WBC # FLD: 119 /CU MM

## 2024-01-16 PROCEDURE — P9047 ALBUMIN (HUMAN), 25%, 50ML: HCPCS | Mod: JZ,JG | Performed by: PHYSICIAN ASSISTANT

## 2024-01-16 PROCEDURE — C1729 CATH, DRAINAGE: HCPCS

## 2024-01-16 PROCEDURE — 49083 ABD PARACENTESIS W/IMAGING: CPT | Mod: ,,, | Performed by: RADIOLOGY

## 2024-01-16 PROCEDURE — 63600175 PHARM REV CODE 636 W HCPCS: Mod: JZ,JG | Performed by: PHYSICIAN ASSISTANT

## 2024-01-16 PROCEDURE — 89051 BODY FLUID CELL COUNT: CPT | Performed by: INTERNAL MEDICINE

## 2024-01-16 PROCEDURE — 49083 ABD PARACENTESIS W/IMAGING: CPT

## 2024-01-16 RX ORDER — ALBUMIN HUMAN 250 G/1000ML
50 SOLUTION INTRAVENOUS ONCE
Status: COMPLETED | OUTPATIENT
Start: 2024-01-16 | End: 2024-01-16

## 2024-01-16 RX ADMIN — ALBUMIN (HUMAN) 50 G: 12.5 SOLUTION INTRAVENOUS at 11:01

## 2024-01-16 NOTE — NURSING
PARACENTESIS    IR ultrasound guided paracentesis performed by REGINE Baker.  Procedure explained and standing consent verified.  Time out performed.  6.5 L removed-   Patient received 50 g of albumin IV.  VS remained stable for duration of procedure.  Specimens collected and sent to lab if ordered.   Para and IV d/c'd, with tip intact.   Access site cleaned with peroxide, derma bond and steri-strips applied, then covered with sterile Band-Aid.   Pt discharge home in stable condition.

## 2024-01-19 DIAGNOSIS — E78.2 MIXED HYPERLIPIDEMIA: ICD-10-CM

## 2024-01-23 RX ORDER — ATORVASTATIN CALCIUM 40 MG/1
40 TABLET, FILM COATED ORAL
Qty: 90 TABLET | Refills: 0 | Status: SHIPPED | OUTPATIENT
Start: 2024-01-23 | End: 2024-04-22

## 2024-01-24 ENCOUNTER — OFFICE VISIT (OUTPATIENT)
Dept: SURGERY | Facility: CLINIC | Age: 74
End: 2024-01-24
Payer: MEDICARE

## 2024-01-24 VITALS — TEMPERATURE: 98 F | HEART RATE: 75 BPM | SYSTOLIC BLOOD PRESSURE: 104 MMHG | DIASTOLIC BLOOD PRESSURE: 65 MMHG

## 2024-01-24 DIAGNOSIS — Z09 S/P UMBILICAL HERNIA REPAIR, FOLLOW-UP EXAM: ICD-10-CM

## 2024-01-24 DIAGNOSIS — Z87.19 S/P INGUINAL HERNIA REPAIR: Primary | ICD-10-CM

## 2024-01-24 DIAGNOSIS — Z98.890 S/P INGUINAL HERNIA REPAIR: Primary | ICD-10-CM

## 2024-01-24 PROCEDURE — 1101F PT FALLS ASSESS-DOCD LE1/YR: CPT | Mod: CPTII,S$GLB,, | Performed by: SURGERY

## 2024-01-24 PROCEDURE — 1126F AMNT PAIN NOTED NONE PRSNT: CPT | Mod: CPTII,S$GLB,, | Performed by: SURGERY

## 2024-01-24 PROCEDURE — 3078F DIAST BP <80 MM HG: CPT | Mod: CPTII,S$GLB,, | Performed by: SURGERY

## 2024-01-24 PROCEDURE — 99024 POSTOP FOLLOW-UP VISIT: CPT | Mod: S$GLB,,, | Performed by: SURGERY

## 2024-01-24 PROCEDURE — 99999 PR PBB SHADOW E&M-EST. PATIENT-LVL II: CPT | Mod: PBBFAC,,, | Performed by: SURGERY

## 2024-01-24 PROCEDURE — 1157F ADVNC CARE PLAN IN RCRD: CPT | Mod: CPTII,S$GLB,, | Performed by: SURGERY

## 2024-01-24 PROCEDURE — 3074F SYST BP LT 130 MM HG: CPT | Mod: CPTII,S$GLB,, | Performed by: SURGERY

## 2024-01-24 PROCEDURE — 3288F FALL RISK ASSESSMENT DOCD: CPT | Mod: CPTII,S$GLB,, | Performed by: SURGERY

## 2024-01-24 RX ORDER — DOCUSATE SODIUM 100 MG/1
100 CAPSULE, LIQUID FILLED ORAL DAILY
Qty: 90 CAPSULE | Refills: 0 | Status: SHIPPED | OUTPATIENT
Start: 2024-01-24 | End: 2024-02-29 | Stop reason: ALTCHOICE

## 2024-01-24 NOTE — PROGRESS NOTES
GENERAL SURGERY  POST-OP PROGRESS NOTE    HPI: Baldo Carney is a 73 y.o. male with liver cirrhosis with ascites status post open repair of large right inguinal hernia with mesh and open repair of umbilical hernia with mesh on 12/7/23. Here for additional follow up. Continues too do well. No pain or worsening swelling. Is going for periodic paracentesis.     VITALS:  Vitals:    01/24/24 1011   BP: 104/65   Pulse: 75   Temp: 98 °F (36.7 °C)       PHYSICAL EXAM:  Left-sided surgical drain site well healed    Umbilical hernia repair site healing well, small scab present,no significant fluid collection, no surrounding erythema    Right inguinal hernia repair incision intact, some residual swelling in the groin but no major seroma or evidence of recurrence, no erythema       ASSESSMENT & PLAN:  73 y.o. male with cirrhosis and ascites s/p open repair with mesh of right inguinal hernia, umbilical hernia  -continues to do well  -incisions are well healed  -no significant recurrence of ascitic fluid in sift tisse  -no sign of infection or peritonitis  -f/u with hepatologist   -prn paracentesis  -rtc prn

## 2024-01-25 ENCOUNTER — OFFICE VISIT (OUTPATIENT)
Dept: FAMILY MEDICINE | Facility: CLINIC | Age: 74
End: 2024-01-25
Payer: MEDICARE

## 2024-01-25 VITALS
HEART RATE: 66 BPM | WEIGHT: 171.19 LBS | HEIGHT: 72 IN | DIASTOLIC BLOOD PRESSURE: 66 MMHG | OXYGEN SATURATION: 99 % | BODY MASS INDEX: 23.19 KG/M2 | SYSTOLIC BLOOD PRESSURE: 132 MMHG

## 2024-01-25 DIAGNOSIS — H93.8X2 CONGESTION OF LEFT EAR: ICD-10-CM

## 2024-01-25 DIAGNOSIS — I10 PRIMARY HYPERTENSION: ICD-10-CM

## 2024-01-25 DIAGNOSIS — E11.9 DIABETES MELLITUS TYPE 2 WITHOUT RETINOPATHY: ICD-10-CM

## 2024-01-25 DIAGNOSIS — H10.32 ACUTE CONJUNCTIVITIS OF LEFT EYE, UNSPECIFIED ACUTE CONJUNCTIVITIS TYPE: Primary | ICD-10-CM

## 2024-01-25 PROCEDURE — 99214 OFFICE O/P EST MOD 30 MIN: CPT | Mod: 25,S$GLB,, | Performed by: NURSE PRACTITIONER

## 2024-01-25 PROCEDURE — 96372 THER/PROPH/DIAG INJ SC/IM: CPT | Mod: PBBFAC,PN

## 2024-01-25 PROCEDURE — 1157F ADVNC CARE PLAN IN RCRD: CPT | Mod: CPTII,S$GLB,, | Performed by: NURSE PRACTITIONER

## 2024-01-25 PROCEDURE — 96372 THER/PROPH/DIAG INJ SC/IM: CPT | Mod: S$GLB,,, | Performed by: NURSE PRACTITIONER

## 2024-01-25 PROCEDURE — 99999 PR PBB SHADOW E&M-EST. PATIENT-LVL III: CPT | Mod: PBBFAC,,, | Performed by: NURSE PRACTITIONER

## 2024-01-25 RX ORDER — NEOMYCIN SULFATE, POLYMYXIN B SULFATE AND DEXAMETHASONE 3.5; 10000; 1 MG/ML; [USP'U]/ML; MG/ML
1 SUSPENSION/ DROPS OPHTHALMIC EVERY 6 HOURS
Qty: 5 ML | Refills: 0 | Status: SHIPPED | OUTPATIENT
Start: 2024-01-25 | End: 2024-02-29 | Stop reason: ALTCHOICE

## 2024-01-25 RX ORDER — DOXYCYCLINE 100 MG/1
CAPSULE ORAL
COMMUNITY
Start: 2023-12-08 | End: 2024-02-29

## 2024-01-25 RX ORDER — DEXAMETHASONE SODIUM PHOSPHATE 4 MG/ML
4 INJECTION, SOLUTION INTRA-ARTICULAR; INTRALESIONAL; INTRAMUSCULAR; INTRAVENOUS; SOFT TISSUE
Status: COMPLETED | OUTPATIENT
Start: 2024-01-25 | End: 2024-01-25

## 2024-01-25 RX ORDER — AMOXICILLIN AND CLAVULANATE POTASSIUM 875; 125 MG/1; MG/1
1 TABLET, FILM COATED ORAL 2 TIMES DAILY
COMMUNITY
Start: 2024-01-19 | End: 2024-02-29

## 2024-01-25 RX ORDER — OXYCODONE AND ACETAMINOPHEN 5; 325 MG/1; MG/1
1 TABLET ORAL EVERY 8 HOURS PRN
COMMUNITY
Start: 2024-01-19 | End: 2024-02-29

## 2024-01-25 RX ADMIN — DEXAMETHASONE SODIUM PHOSPHATE 4 MG: 4 INJECTION INTRA-ARTICULAR; INTRALESIONAL; INTRAMUSCULAR; INTRAVENOUS; SOFT TISSUE at 09:01

## 2024-01-25 NOTE — PROGRESS NOTES
SUBJECTIVE:      Patient ID: Baldo Carney is a 73 y.o. male.    Chief Complaint: Eye Pain (Both eyes are red - been going on for the past four days. ) and Otalgia (Left ear is bothering -- feels clogged - feels some fluid )    Presents for problem visit    Discussed outstanding health maintenance     Hypertension  This is a chronic problem. The current episode started more than 1 year ago. The problem has been gradually improving since onset. The problem is controlled. Pertinent negatives include no chest pain, headaches, neck pain, peripheral edema or shortness of breath. Risk factors for coronary artery disease include male gender, sedentary lifestyle, diabetes mellitus, dyslipidemia, family history and stress. Past treatments include calcium channel blockers, diuretics, direct vasodilators, angiotensin blockers and beta blockers. The current treatment provides mild improvement. Compliance problems include exercise and diet.  Hypertensive end-organ damage includes kidney disease, CAD/MI, heart failure and retinopathy. Identifiable causes of hypertension include chronic renal disease, a hypertension causing med and renovascular disease.   Eye Pain   The left eye is affected. This is a new problem. The current episode started in the past 7 days. The problem occurs constantly. The problem has been unchanged. There was no injury mechanism. The pain is at a severity of 7/10. The pain is moderate. There is No known exposure to pink eye. He Does not wear contacts. Associated symptoms include an eye discharge, eye redness, a foreign body sensation, itching and nausea. Pertinent negatives include no fever, photophobia, vomiting or weakness. He has tried nothing for the symptoms.   Otalgia   There is pain in the left ear. This is a new problem. The current episode started in the past 7 days. The problem occurs constantly. The problem has been unchanged. There has been no fever. The pain is mild. Pertinent  negatives include no abdominal pain, coughing, diarrhea, headaches, hearing loss, neck pain, rash, sore throat or vomiting. He has tried nothing for the symptoms. His past medical history is significant for hearing loss. There is no history of a chronic ear infection.       Past Surgical History:   Procedure Laterality Date    ANGIOGRAPHY OF INTERNAL MAMMARY VESSEL N/A 3/11/2022    Procedure: Angiogram Internal Mammary;  Surgeon: Hank Lujan MD;  Location: [facility] CATH/EP LAB;  Service: Cardiology;  Laterality: N/A;    CARDIAC CATHETERIZATION      CARDIAC VALVE SURGERY      CATHETERIZATION OF BOTH LEFT AND RIGHT HEART Left 3/11/2022    Procedure: CATHETERIZATION, HEART, BOTH LEFT AND RIGHT;  Surgeon: Hank Lujan MD;  Location: [facility] CATH/EP LAB;  Service: Cardiology;  Laterality: Left;    CORONARY ANGIOGRAPHY N/A 3/11/2022    Procedure: ANGIOGRAM, CORONARY ARTERY;  Surgeon: Hank Lujan MD;  Location: [facility] CATH/EP LAB;  Service: Cardiology;  Laterality: N/A;    CORONARY BYPASS GRAFT ANGIOGRAPHY  3/11/2022    Procedure: Bypass graft study;  Surgeon: Hank Lujan MD;  Location: [facility] CATH/EP LAB;  Service: Cardiology;;    CYSTOSCOPY N/A 7/30/2019    Procedure: CYSTOSCOPY;  Surgeon: Spencer Nguyen MD;  Location: [facility];  Service: Urology;  Laterality: N/A;    INSERTION OF INTRAVASCULAR MICROAXIAL BLOOD PUMP N/A 8/31/2022    Procedure: INSERTION, IMPELLA;  Surgeon: Zach Jensen MD;  Location: [facility] CATH LAB;  Service: Cardiology;  Laterality: N/A;    INSERTION, CATHETER, DIALYSIS, PERITONEAL N/A 12/7/2023    Procedure: INSERTION, CATHETER, DIALYSIS, PERITONEAL;  Surgeon: Greg Bone Jr., MD;  Location: [facility] OR;  Service: General;  Laterality: N/A;  need PD catheter    PLACEMENT OF SWAN SEE CATHETER WITH IMAGING GUIDANCE  8/31/2022    Procedure: INSERTION, CATHETER, SWAN-SEE, WITH IMAGING GUIDANCE;  Surgeon: Zach Jensen MD;  Location: [facility] CATH LAB;  Service: Cardiology;;     REPAIR, HERNIA, INGUINAL, INCARCERATED, INITIAL, AGE 5 YEARS OR OLDER Right 2023    Procedure: REPAIR, HERNIA, INGUINAL, INCARCERATED, INITIAL;  Surgeon: Greg Bone Jr., MD;  Location: [facility] OR;  Service: General;  Laterality: Right;    SHOULDER ARTHROSCOPY  1985    TRANSRECTAL BIOPSY OF PROSTATE WITH ULTRASOUND GUIDANCE N/A 2019    Procedure: BIOPSY, PROSTATE, RECTAL APPROACH, WITH US GUIDANCE;  Surgeon: Spencer Nguyen MD;  Location: [facility] OR;  Service: Urology;  Laterality: N/A;  procedure not performed, pt unable to tolerate    TRANSRECTAL BIOPSY OF PROSTATE WITH ULTRASOUND GUIDANCE N/A 2019    Procedure: BIOPSY, PROSTATE, RECTAL APPROACH, WITH US GUIDANCE;  Surgeon: Spencer Nguyen MD;  Location: [facility] OR;  Service: Urology;  Laterality: N/A;    UMBILICAL HERNIA REPAIR N/A 2023    Procedure: REPAIR, HERNIA, UMBILICAL;  Surgeon: Greg Bone Jr., MD;  Location: [facility];  Service: General;  Laterality: N/A;     Family History   Problem Relation Age of Onset    No Known Problems Mother     Diabetes Father     Hypertension Father     Heart attack Father     Heart disease Father     Diabetes Sister     Heart disease Brother     Diabetes Brother     No Known Problems Maternal Grandmother     No Known Problems Maternal Grandfather     No Known Problems Paternal Grandmother     No Known Problems Paternal Grandfather     No Known Problems Maternal Aunt     No Known Problems Maternal Uncle     No Known Problems Paternal Aunt     No Known Problems Paternal Uncle     Anemia Neg Hx     Arrhythmia Neg Hx     Asthma Neg Hx     Clotting disorder Neg Hx     Fainting Neg Hx     Heart failure Neg Hx     Hyperlipidemia Neg Hx     Glaucoma Neg Hx       Social History     Socioeconomic History    Marital status: Single   Tobacco Use    Smoking status: Former     Current packs/day: 0.00     Types: Cigarettes     Quit date: 1970     Years since quittin.6    Smokeless tobacco: Never    Substance and Sexual Activity    Alcohol use: Not Currently     Alcohol/week: 3.0 standard drinks of alcohol     Types: 3 Cans of beer per week    Drug use: No    Sexual activity: Not Currently     Partners: Female     Current Outpatient Medications   Medication Sig Dispense Refill    albuterol (PROVENTIL/VENTOLIN HFA) 90 mcg/actuation inhaler INHALE 1 TO 2 PUFFS INTO THE LUNGS EVERY 4 TO 6 HOURS AS NEEDED FOR WHEEZE AND SHORTNESS OF BREATH 18 g 2    amoxicillin-clavulanate 875-125mg (AUGMENTIN) 875-125 mg per tablet Take 1 tablet by mouth 2 (two) times daily.      aspirin (ECOTRIN) 81 MG EC tablet Take 81 mg by mouth once daily.      atorvastatin (LIPITOR) 40 MG tablet TAKE 1 TABLET BY MOUTH EVERY DAY 90 tablet 0    budesonide-glycopyr-formoterol (BREZTRI AEROSPHERE) 160-9-4.8 mcg/actuation HFAA Inhale into the lungs daily as needed.      clopidogreL (PLAVIX) 75 mg tablet       cyanocobalamin 1,000 mcg/mL injection Inject 1 mL (1,000 mcg total) into the muscle every 14 (fourteen) days. 1 mL 11    docusate sodium (COLACE) 100 MG capsule Take 1 capsule (100 mg total) by mouth Daily. 90 capsule 0    doxycycline (VIBRAMYCIN) 100 MG Cap       empagliflozin (JARDIANCE) 25 mg tablet Take 1 tablet (25 mg total) by mouth once daily. 90 tablet 0    finasteride (PROSCAR) 5 mg tablet Take 1 tablet (5 mg total) by mouth once daily. 90 tablet 4    fluticasone furoate-vilanteroL (BREO ELLIPTA) 100-25 mcg/dose diskus inhaler Inhale 1 puff into the lungs once daily. (DAILY CONTROLLER) 3 each 3    fluticasone propionate (FLONASE) 50 mcg/actuation nasal spray 1 SPRAY (50 MCG TOTAL) BY EACH NOSTRIL ROUTE ONCE DAILY. 16 mL 1    furosemide (LASIX) 40 MG tablet Take by mouth once daily.      gabapentin (NEURONTIN) 300 MG capsule Take 1 capsule (300 mg total) by mouth every evening. 90 capsule 1    hydrOXYzine HCL (ATARAX) 25 MG tablet Take 1 tablet (25 mg total) by mouth 2 (two) times daily as needed for Itching. 14 tablet 0     hydrOXYzine HCL (ATARAX) 25 MG tablet Take 1 tablet by mouth 2 (two) times daily.      ibuprofen (ADVIL,MOTRIN) 600 MG tablet Take 1 tablet (600 mg total) by mouth every 6 (six) hours as needed.      levothyroxine (SYNTHROID) 100 MCG tablet TAKE 1 TABLET (100 MCG TOTAL) BY MOUTH BEFORE BREAKFAST. WITH NO OTHER MEDS OR FOOD 90 tablet 0    metoprolol succinate (TOPROL-XL) 25 MG 24 hr tablet Take 1 tablet (25 mg total) by mouth once daily. 90 tablet 1    sacubitriL-valsartan (ENTRESTO) 24-26 mg per tablet Take 1 tablet by mouth 2 (two) times daily. 180 tablet 1    tamsulosin (FLOMAX) 0.4 mg Cap TAKE ONE CAPSULE BY MOUTH DAILY AT 5 PM 90 capsule 11    ZONTIVITY 2.08 mg Tab Take 1 tablet by mouth once daily.      hydrALAZINE (APRESOLINE) 50 MG tablet Take 1 tablet (50 mg total) by mouth every 12 (twelve) hours. 60 tablet 0    isosorbide dinitrate (ISORDIL) 20 MG tablet Take 1 tablet (20 mg total) by mouth 3 (three) times daily. 90 tablet 0    neomycin-polymyxin-dexamethasone (MAXITROL) 3.5mg/mL-10,000 unit/mL-0.1 % DrpS Place 1 drop into the left eye every 6 (six) hours. 5 mL 0    oxyCODONE (ROXICODONE) 5 MG immediate release tablet Take 1 tablet (5 mg total) by mouth every 6 (six) hours as needed for Pain. (Patient not taking: Reported on 1/25/2024) 28 tablet 0    oxyCODONE-acetaminophen (PERCOCET) 5-325 mg per tablet Take 1 tablet by mouth every 8 (eight) hours as needed.      prasugreL (EFFIENT) 10 mg Tab Take 1 tablet (10 mg total) by mouth once daily. 30 tablet 11    valACYclovir (VALTREX) 1000 MG tablet Take 1 tablet (1,000 mg total) by mouth every 12 (twelve) hours. for 7 days 14 tablet 0     No current facility-administered medications for this visit.     Review of patient's allergies indicates:  No Known Allergies   Past Medical History:   Diagnosis Date    Asthma     Cardiomyopathy 10/21/2014    CHF (congestive heart failure)     Coronary artery disease     CRF (chronic renal failure)     Diabetes mellitus      Enlarged prostate     Hyperlipidemia     Hypertension     Neuropathy      Past Surgical History:   Procedure Laterality Date    ANGIOGRAPHY OF INTERNAL MAMMARY VESSEL N/A 3/11/2022    Procedure: Angiogram Internal Mammary;  Surgeon: Hank Lujan MD;  Location: [facility] CATH/EP LAB;  Service: Cardiology;  Laterality: N/A;    CARDIAC CATHETERIZATION      CARDIAC VALVE SURGERY      CATHETERIZATION OF BOTH LEFT AND RIGHT HEART Left 3/11/2022    Procedure: CATHETERIZATION, HEART, BOTH LEFT AND RIGHT;  Surgeon: Hank Lujan MD;  Location: [facility] CATH/EP LAB;  Service: Cardiology;  Laterality: Left;    CORONARY ANGIOGRAPHY N/A 3/11/2022    Procedure: ANGIOGRAM, CORONARY ARTERY;  Surgeon: Hank Lujan MD;  Location: [facility] CATH/EP LAB;  Service: Cardiology;  Laterality: N/A;    CORONARY BYPASS GRAFT ANGIOGRAPHY  3/11/2022    Procedure: Bypass graft study;  Surgeon: Hnak Lujan MD;  Location: [facility] CATH/EP LAB;  Service: Cardiology;;    CYSTOSCOPY N/A 7/30/2019    Procedure: CYSTOSCOPY;  Surgeon: Spencer Nguyen MD;  Location: [facility] OR;  Service: Urology;  Laterality: N/A;    INSERTION OF INTRAVASCULAR MICROAXIAL BLOOD PUMP N/A 8/31/2022    Procedure: INSERTION, IMPELLA;  Surgeon: Zach Jensen MD;  Location: [facility] CATH LAB;  Service: Cardiology;  Laterality: N/A;    INSERTION, CATHETER, DIALYSIS, PERITONEAL N/A 12/7/2023    Procedure: INSERTION, CATHETER, DIALYSIS, PERITONEAL;  Surgeon: Greg Bone Jr., MD;  Location: [facility];  Service: General;  Laterality: N/A;  need PD catheter    PLACEMENT OF SWAN SEE CATHETER WITH IMAGING GUIDANCE  8/31/2022    Procedure: INSERTION, CATHETER, SWAN-SEE, WITH IMAGING GUIDANCE;  Surgeon: Zach Jensen MD;  Location: [facility] CATH LAB;  Service: Cardiology;;    REPAIR, HERNIA, INGUINAL, INCARCERATED, INITIAL, AGE 5 YEARS OR OLDER Right 12/7/2023    Procedure: REPAIR, HERNIA, INGUINAL, INCARCERATED, INITIAL;  Surgeon: Greg oBne Jr., MD;  Location:  "[facility] OR;  Service: General;  Laterality: Right;    SHOULDER ARTHROSCOPY  1985    TRANSRECTAL BIOPSY OF PROSTATE WITH ULTRASOUND GUIDANCE N/A 7/30/2019    Procedure: BIOPSY, PROSTATE, RECTAL APPROACH, WITH US GUIDANCE;  Surgeon: Spencer Nguyen MD;  Location: [facility] OR;  Service: Urology;  Laterality: N/A;  procedure not performed, pt unable to tolerate    TRANSRECTAL BIOPSY OF PROSTATE WITH ULTRASOUND GUIDANCE N/A 8/8/2019    Procedure: BIOPSY, PROSTATE, RECTAL APPROACH, WITH US GUIDANCE;  Surgeon: Spencer Nguyen MD;  Location: [facility] OR;  Service: Urology;  Laterality: N/A;    UMBILICAL HERNIA REPAIR N/A 12/7/2023    Procedure: REPAIR, HERNIA, UMBILICAL;  Surgeon: Greg Bone Jr., MD;  Location: [facility] OR;  Service: General;  Laterality: N/A;       Review of Systems   Constitutional:  Negative for activity change, appetite change, fatigue and fever.   HENT:  Positive for ear pain. Negative for congestion, hearing loss, postnasal drip, sinus pressure, sinus pain, sneezing and sore throat.    Eyes:  Positive for pain, discharge, redness and itching. Negative for photophobia.   Respiratory:  Negative for cough, chest tightness, shortness of breath and wheezing.    Cardiovascular:  Negative for chest pain and leg swelling.   Gastrointestinal:  Positive for abdominal distention (has been getting "fluid drained" off stomach) and nausea. Negative for abdominal pain, blood in stool, constipation, diarrhea and vomiting.   Endocrine: Negative for cold intolerance, heat intolerance, polydipsia and polyuria.   Genitourinary:  Negative for difficulty urinating, dysuria, flank pain, frequency, hematuria and urgency.   Musculoskeletal:  Negative for arthralgias, back pain, joint swelling, myalgias and neck pain.   Skin:  Negative for pallor and rash.   Allergic/Immunologic: Positive for environmental allergies. Negative for food allergies.   Neurological:  Negative for dizziness, weakness, light-headedness and " headaches.   Hematological:  Does not bruise/bleed easily.   Psychiatric/Behavioral:  Negative for confusion, decreased concentration, dysphoric mood and sleep disturbance. The patient is not nervous/anxious.       OBJECTIVE:      Vitals:    01/25/24 0855   BP: 132/66   BP Location: Right arm   Patient Position: Sitting   BP Method: Large (Manual)   Pulse: 66   SpO2: 99%   Weight: 77.7 kg (171 lb 3.2 oz)   Height: 6' (1.829 m)     Physical Exam  Vitals and nursing note reviewed.   Constitutional:       General: He is not in acute distress.     Appearance: Normal appearance. He is well-developed, well-groomed and normal weight.      Comments: 10# loss since August visit, 16# loss since April visit   HENT:      Head: Normocephalic and atraumatic.      Right Ear: Hearing normal.      Left Ear: Hearing normal. A middle ear effusion (serous) is present.      Nose: Nose normal. No rhinorrhea.   Eyes:      General: Lids are normal. Lids are everted, no foreign bodies appreciated. Vision grossly intact.         Right eye: No discharge.         Left eye: No discharge.      Conjunctiva/sclera:      Right eye: Right conjunctiva is not injected.      Left eye: Left conjunctiva is injected.      Pupils: Pupils are equal, round, and reactive to light. Pupils are equal.      Right eye: Pupil is round and reactive.      Left eye: Pupil is round and reactive.   Neck:      Thyroid: No thyromegaly.      Vascular: No JVD.      Trachea: Trachea normal. No tracheal deviation.   Cardiovascular:      Rate and Rhythm: Normal rate and regular rhythm.      Pulses:           Radial pulses are 2+ on the right side and 2+ on the left side.      Heart sounds: Normal heart sounds. No murmur heard.     No friction rub. No gallop.   Pulmonary:      Effort: Pulmonary effort is normal. No respiratory distress.      Breath sounds: Normal breath sounds. No stridor. No decreased breath sounds, wheezing, rhonchi or rales.   Abdominal:      General:  Abdomen is protuberant. Bowel sounds are normal. There is no distension.      Palpations: Abdomen is soft. Abdomen is not rigid.      Tenderness: There is no abdominal tenderness. There is no guarding.      Hernia: No hernia is present.   Musculoskeletal:         General: Normal range of motion.      Cervical back: Normal range of motion and neck supple.   Lymphadenopathy:      Cervical: No cervical adenopathy.   Skin:     General: Skin is warm and dry.      Capillary Refill: Capillary refill takes less than 2 seconds.      Coloration: Skin is not pale.      Findings: No lesion or rash.   Neurological:      Mental Status: He is alert and oriented to person, place, and time.      GCS: GCS eye subscore is 4. GCS verbal subscore is 5. GCS motor subscore is 6.      Motor: No atrophy.      Coordination: Coordination normal.      Gait: Gait normal.   Psychiatric:         Attention and Perception: Attention normal. He is attentive.         Mood and Affect: Mood normal.         Speech: Speech normal.         Behavior: Behavior normal.         Thought Content: Thought content normal.         Cognition and Memory: Cognition normal.         Judgment: Judgment normal.        Assessment:       1. Acute conjunctivitis of left eye, unspecified acute conjunctivitis type    2. Congestion of left ear    3. Diabetes mellitus type 2 without retinopathy    4. Primary hypertension        Plan:       Acute conjunctivitis of left eye, unspecified acute conjunctivitis type  -     neomycin-polymyxin-dexamethasone (MAXITROL) 3.5mg/mL-10,000 unit/mL-0.1 % DrpS; Place 1 drop into the left eye every 6 (six) hours.  Dispense: 5 mL; Refill: 0    Congestion of left ear  -     dexAMETHasone injection 4 mg    Diabetes mellitus type 2 without retinopathy  -     Ambulatory referral/consult to Ophthalmology; Future; Expected date: 02/01/2024  - continue on current med regimen    Primary hypertension        - appears stable on current med regimen         - follows with cardiology          Follow up if symptoms worsen or fail to improve.         1/25/2024 KAMERNO Rodriguez, FNP-C

## 2024-01-30 ENCOUNTER — HOSPITAL ENCOUNTER (OUTPATIENT)
Dept: INTERVENTIONAL RADIOLOGY/VASCULAR | Facility: HOSPITAL | Age: 74
Discharge: HOME | End: 2024-01-30
Attending: INTERNAL MEDICINE
Payer: MEDICARE

## 2024-01-30 VITALS
RESPIRATION RATE: 17 BRPM | SYSTOLIC BLOOD PRESSURE: 168 MMHG | OXYGEN SATURATION: 100 % | HEART RATE: 72 BPM | DIASTOLIC BLOOD PRESSURE: 87 MMHG

## 2024-01-30 DIAGNOSIS — R18.8 OTHER ASCITES: ICD-10-CM

## 2024-01-30 LAB
APPEARANCE FLD: CLEAR
BASOPHILS # BLD AUTO: 0.09 K/UL (ref 0–0.2)
BASOPHILS NFR BLD: 1.5 % (ref 0–1.9)
BODY FLD TYPE: NORMAL
COLOR FLD: YELLOW
DIFFERENTIAL METHOD BLD: ABNORMAL
EOSINOPHIL # BLD AUTO: 0.1 K/UL (ref 0–0.5)
EOSINOPHIL NFR BLD: 2.1 % (ref 0–8)
ERYTHROCYTE [DISTWIDTH] IN BLOOD BY AUTOMATED COUNT: 19.1 % (ref 11.5–14.5)
HCT VFR BLD AUTO: 28.7 % (ref 40–54)
HGB BLD-MCNC: 9.1 G/DL (ref 14–18)
IMM GRANULOCYTES # BLD AUTO: 0.03 K/UL (ref 0–0.04)
IMM GRANULOCYTES NFR BLD AUTO: 0.5 % (ref 0–0.5)
INR PPP: 1.1 (ref 0.8–1.2)
LYMPHOCYTES # BLD AUTO: 0.6 K/UL (ref 1–4.8)
LYMPHOCYTES NFR BLD: 10.3 % (ref 18–48)
LYMPHOCYTES NFR FLD MANUAL: 89 %
MCH RBC QN AUTO: 28.3 PG (ref 27–31)
MCHC RBC AUTO-ENTMCNC: 31.7 G/DL (ref 32–36)
MCV RBC AUTO: 89 FL (ref 82–98)
MESOTHL CELL NFR FLD MANUAL: 7 %
MONOCYTES # BLD AUTO: 0.5 K/UL (ref 0.3–1)
MONOCYTES NFR BLD: 8.9 % (ref 4–15)
NEUTROPHILS # BLD AUTO: 4.7 K/UL (ref 1.8–7.7)
NEUTROPHILS NFR BLD: 76.7 % (ref 38–73)
NEUTROPHILS NFR FLD MANUAL: 4 %
NRBC BLD-RTO: 0 /100 WBC
PLATELET # BLD AUTO: 196 K/UL (ref 150–450)
PMV BLD AUTO: 10.8 FL (ref 9.2–12.9)
PROTHROMBIN TIME: 12.2 SEC (ref 9–12.5)
RBC # BLD AUTO: 3.22 M/UL (ref 4.6–6.2)
WBC # BLD AUTO: 6.1 K/UL (ref 3.9–12.7)
WBC # FLD: 108 /CU MM

## 2024-01-30 PROCEDURE — 36415 COLL VENOUS BLD VENIPUNCTURE: CPT | Performed by: PHYSICIAN ASSISTANT

## 2024-01-30 PROCEDURE — 85025 COMPLETE CBC W/AUTO DIFF WBC: CPT | Performed by: PHYSICIAN ASSISTANT

## 2024-01-30 PROCEDURE — 85610 PROTHROMBIN TIME: CPT | Performed by: PHYSICIAN ASSISTANT

## 2024-01-30 PROCEDURE — P9047 ALBUMIN (HUMAN), 25%, 50ML: HCPCS | Mod: JZ,JG | Performed by: PHYSICIAN ASSISTANT

## 2024-01-30 PROCEDURE — 49083 ABD PARACENTESIS W/IMAGING: CPT | Mod: ,,, | Performed by: RADIOLOGY

## 2024-01-30 PROCEDURE — 63600175 PHARM REV CODE 636 W HCPCS: Mod: JZ,JG | Performed by: PHYSICIAN ASSISTANT

## 2024-01-30 PROCEDURE — 49083 ABD PARACENTESIS W/IMAGING: CPT

## 2024-01-30 PROCEDURE — 89051 BODY FLUID CELL COUNT: CPT | Performed by: INTERNAL MEDICINE

## 2024-01-30 PROCEDURE — C1729 CATH, DRAINAGE: HCPCS

## 2024-01-30 RX ORDER — ALBUMIN HUMAN 250 G/1000ML
50 SOLUTION INTRAVENOUS ONCE
Status: COMPLETED | OUTPATIENT
Start: 2024-01-30 | End: 2024-01-30

## 2024-01-30 RX ADMIN — ALBUMIN (HUMAN) 50 G: 12.5 SOLUTION INTRAVENOUS at 10:01

## 2024-01-31 ENCOUNTER — TELEPHONE (OUTPATIENT)
Dept: HEMATOLOGY/ONCOLOGY | Facility: CLINIC | Age: 74
End: 2024-01-31

## 2024-01-31 DIAGNOSIS — H10.32 ACUTE CONJUNCTIVITIS OF LEFT EYE, UNSPECIFIED ACUTE CONJUNCTIVITIS TYPE: ICD-10-CM

## 2024-01-31 DIAGNOSIS — E78.2 MIXED HYPERLIPIDEMIA: ICD-10-CM

## 2024-02-01 RX ORDER — NEOMYCIN SULFATE, POLYMYXIN B SULFATE AND DEXAMETHASONE 3.5; 10000; 1 MG/ML; [USP'U]/ML; MG/ML
SUSPENSION/ DROPS OPHTHALMIC
Qty: 5 ML | Refills: 0 | OUTPATIENT
Start: 2024-02-01

## 2024-02-01 RX ORDER — ATORVASTATIN CALCIUM 40 MG/1
40 TABLET, FILM COATED ORAL
Qty: 90 TABLET | Refills: 0 | OUTPATIENT
Start: 2024-02-01

## 2024-02-14 ENCOUNTER — HOSPITAL ENCOUNTER (OUTPATIENT)
Dept: INTERVENTIONAL RADIOLOGY/VASCULAR | Facility: HOSPITAL | Age: 74
Discharge: HOME | End: 2024-02-14
Attending: INTERNAL MEDICINE
Payer: MEDICARE

## 2024-02-14 VITALS
OXYGEN SATURATION: 99 % | RESPIRATION RATE: 18 BRPM | HEART RATE: 70 BPM | DIASTOLIC BLOOD PRESSURE: 72 MMHG | SYSTOLIC BLOOD PRESSURE: 138 MMHG

## 2024-02-14 DIAGNOSIS — R18.8 OTHER ASCITES: ICD-10-CM

## 2024-02-14 LAB
APPEARANCE FLD: CLEAR
BODY FLD TYPE: NORMAL
COLOR FLD: YELLOW
LYMPHOCYTES NFR FLD MANUAL: 40 %
MESOTHL CELL NFR FLD MANUAL: 2 %
MONOS+MACROS NFR FLD MANUAL: 51 %
NEUTROPHILS NFR FLD MANUAL: 7 %
WBC # FLD: 102 /CU MM

## 2024-02-14 PROCEDURE — C1729 CATH, DRAINAGE: HCPCS

## 2024-02-14 PROCEDURE — 49083 ABD PARACENTESIS W/IMAGING: CPT | Performed by: PHYSICIAN ASSISTANT

## 2024-02-14 PROCEDURE — P9047 ALBUMIN (HUMAN), 25%, 50ML: HCPCS | Mod: JZ,JG | Performed by: PHYSICIAN ASSISTANT

## 2024-02-14 PROCEDURE — 63600175 PHARM REV CODE 636 W HCPCS: Mod: JZ,JG | Performed by: PHYSICIAN ASSISTANT

## 2024-02-14 PROCEDURE — 89051 BODY FLUID CELL COUNT: CPT | Performed by: INTERNAL MEDICINE

## 2024-02-14 PROCEDURE — 49083 ABD PARACENTESIS W/IMAGING: CPT | Mod: ,,, | Performed by: PHYSICIAN ASSISTANT

## 2024-02-14 RX ORDER — ALBUMIN HUMAN 250 G/1000ML
50 SOLUTION INTRAVENOUS ONCE
Status: COMPLETED | OUTPATIENT
Start: 2024-02-14 | End: 2024-02-14

## 2024-02-14 RX ADMIN — ALBUMIN (HUMAN) 50 G: 12.5 SOLUTION INTRAVENOUS at 11:02

## 2024-02-14 NOTE — NURSING
Call [phone] to schedule your MRI  Call [phone] for neuropsychiatric testing with Dr. Power     PARACENTESIS    IR ultrasound guided paracentesis performed by REGINE Baker.  Procedure explained and standing consent verified.  Time out performed.  8.5 L removed-   Patient received 50 g of albumin IV.  VS remained stable for duration of procedure.  Specimens collected and sent to lab if ordered.   Para and IV d/c'd, with tip intact.   Access site cleaned with peroxide, derma bond and steri-strips applied, then covered with sterile Band-Aid.   Pt discharge home in stable condition.

## 2024-02-27 ENCOUNTER — HOSPITAL ENCOUNTER (OUTPATIENT)
Dept: INTERVENTIONAL RADIOLOGY/VASCULAR | Facility: HOSPITAL | Age: 74
Discharge: HOME | End: 2024-02-27
Attending: INTERNAL MEDICINE
Payer: MEDICARE

## 2024-02-27 VITALS
DIASTOLIC BLOOD PRESSURE: 61 MMHG | HEART RATE: 69 BPM | RESPIRATION RATE: 18 BRPM | SYSTOLIC BLOOD PRESSURE: 148 MMHG | OXYGEN SATURATION: 99 %

## 2024-02-27 DIAGNOSIS — R18.8 OTHER ASCITES: ICD-10-CM

## 2024-02-27 LAB
APPEARANCE FLD: CLEAR
BODY FLD TYPE: NORMAL
COLOR FLD: YELLOW
LYMPHOCYTES NFR FLD MANUAL: 43 %
MESOTHL CELL NFR FLD MANUAL: 3 %
MONOS+MACROS NFR FLD MANUAL: 49 %
NEUTROPHILS NFR FLD MANUAL: 5 %
WBC # FLD: 164 /CU MM

## 2024-02-27 PROCEDURE — 89051 BODY FLUID CELL COUNT: CPT | Performed by: INTERNAL MEDICINE

## 2024-02-27 PROCEDURE — 49083 ABD PARACENTESIS W/IMAGING: CPT | Performed by: RADIOLOGY

## 2024-02-27 PROCEDURE — P9047 ALBUMIN (HUMAN), 25%, 50ML: HCPCS | Mod: JZ,JG | Performed by: PHYSICIAN ASSISTANT

## 2024-02-27 PROCEDURE — 49083 ABD PARACENTESIS W/IMAGING: CPT | Mod: ,,, | Performed by: PHYSICIAN ASSISTANT

## 2024-02-27 PROCEDURE — C1729 CATH, DRAINAGE: HCPCS

## 2024-02-27 PROCEDURE — 63600175 PHARM REV CODE 636 W HCPCS: Mod: JZ,JG | Performed by: PHYSICIAN ASSISTANT

## 2024-02-27 RX ORDER — ALBUMIN HUMAN 250 G/1000ML
50 SOLUTION INTRAVENOUS ONCE
Status: COMPLETED | OUTPATIENT
Start: 2024-02-27 | End: 2024-02-27

## 2024-02-27 RX ADMIN — ALBUMIN (HUMAN) 50 G: 5 SOLUTION INTRAVENOUS at 10:02

## 2024-02-29 ENCOUNTER — OFFICE VISIT (OUTPATIENT)
Dept: FAMILY MEDICINE | Facility: CLINIC | Age: 74
End: 2024-02-29
Payer: MEDICARE

## 2024-02-29 VITALS
HEART RATE: 71 BPM | BODY MASS INDEX: 21.49 KG/M2 | DIASTOLIC BLOOD PRESSURE: 68 MMHG | SYSTOLIC BLOOD PRESSURE: 132 MMHG | WEIGHT: 158.69 LBS | HEIGHT: 72 IN | OXYGEN SATURATION: 99 %

## 2024-02-29 DIAGNOSIS — Z12.5 PROSTATE CANCER SCREENING: ICD-10-CM

## 2024-02-29 DIAGNOSIS — E11.9 TYPE 2 DIABETES MELLITUS WITHOUT COMPLICATION, WITHOUT LONG-TERM CURRENT USE OF INSULIN: Primary | ICD-10-CM

## 2024-02-29 DIAGNOSIS — J44.9 CHRONIC OBSTRUCTIVE PULMONARY DISEASE, UNSPECIFIED COPD TYPE: Chronic | ICD-10-CM

## 2024-02-29 DIAGNOSIS — R79.89 LOW VITAMIN B12 LEVEL: ICD-10-CM

## 2024-02-29 DIAGNOSIS — I50.22 CHRONIC SYSTOLIC HEART FAILURE: Chronic | ICD-10-CM

## 2024-02-29 DIAGNOSIS — K59.09 CHRONIC CONSTIPATION: ICD-10-CM

## 2024-02-29 DIAGNOSIS — E03.9 HYPOTHYROIDISM, UNSPECIFIED TYPE: ICD-10-CM

## 2024-02-29 DIAGNOSIS — E78.2 MIXED HYPERLIPIDEMIA: ICD-10-CM

## 2024-02-29 DIAGNOSIS — J30.2 SEASONAL ALLERGIES: ICD-10-CM

## 2024-02-29 DIAGNOSIS — I10 PRIMARY HYPERTENSION: ICD-10-CM

## 2024-02-29 LAB — HBA1C MFR BLD: 5.5 %

## 2024-02-29 PROCEDURE — 4010F ACE/ARB THERAPY RXD/TAKEN: CPT | Mod: CPTII,S$GLB,, | Performed by: NURSE PRACTITIONER

## 2024-02-29 PROCEDURE — 3008F BODY MASS INDEX DOCD: CPT | Mod: CPTII,S$GLB,, | Performed by: NURSE PRACTITIONER

## 2024-02-29 PROCEDURE — 99214 OFFICE O/P EST MOD 30 MIN: CPT | Mod: S$GLB,,, | Performed by: NURSE PRACTITIONER

## 2024-02-29 PROCEDURE — 99999 PR PBB SHADOW E&M-EST. PATIENT-LVL IV: CPT | Mod: PBBFAC,,, | Performed by: NURSE PRACTITIONER

## 2024-02-29 PROCEDURE — 1157F ADVNC CARE PLAN IN RCRD: CPT | Mod: CPTII,S$GLB,, | Performed by: NURSE PRACTITIONER

## 2024-02-29 PROCEDURE — 3075F SYST BP GE 130 - 139MM HG: CPT | Mod: CPTII,S$GLB,, | Performed by: NURSE PRACTITIONER

## 2024-02-29 PROCEDURE — 1101F PT FALLS ASSESS-DOCD LE1/YR: CPT | Mod: CPTII,S$GLB,, | Performed by: NURSE PRACTITIONER

## 2024-02-29 PROCEDURE — 1160F RVW MEDS BY RX/DR IN RCRD: CPT | Mod: CPTII,S$GLB,, | Performed by: NURSE PRACTITIONER

## 2024-02-29 PROCEDURE — 1125F AMNT PAIN NOTED PAIN PRSNT: CPT | Mod: CPTII,S$GLB,, | Performed by: NURSE PRACTITIONER

## 2024-02-29 PROCEDURE — 3288F FALL RISK ASSESSMENT DOCD: CPT | Mod: CPTII,S$GLB,, | Performed by: NURSE PRACTITIONER

## 2024-02-29 PROCEDURE — 1159F MED LIST DOCD IN RCRD: CPT | Mod: CPTII,S$GLB,, | Performed by: NURSE PRACTITIONER

## 2024-02-29 PROCEDURE — 3078F DIAST BP <80 MM HG: CPT | Mod: CPTII,S$GLB,, | Performed by: NURSE PRACTITIONER

## 2024-02-29 PROCEDURE — 83036 HEMOGLOBIN GLYCOSYLATED A1C: CPT | Mod: QW,S$GLB,, | Performed by: NURSE PRACTITIONER

## 2024-02-29 PROCEDURE — 3044F HG A1C LEVEL LT 7.0%: CPT | Mod: CPTII,S$GLB,, | Performed by: NURSE PRACTITIONER

## 2024-02-29 RX ORDER — FLUTICASONE PROPIONATE 50 MCG
1 SPRAY, SUSPENSION (ML) NASAL DAILY
Qty: 16 ML | Refills: 1 | Status: SHIPPED | OUTPATIENT
Start: 2024-02-29 | End: 2024-05-02

## 2024-02-29 RX ORDER — FLUTICASONE FUROATE AND VILANTEROL 100; 25 UG/1; UG/1
1 POWDER RESPIRATORY (INHALATION) DAILY
Qty: 3 EACH | Refills: 1 | Status: SHIPPED | OUTPATIENT
Start: 2024-02-29

## 2024-02-29 RX ORDER — METOPROLOL SUCCINATE 25 MG/1
25 TABLET, EXTENDED RELEASE ORAL DAILY
Qty: 90 TABLET | Refills: 1 | Status: SHIPPED | OUTPATIENT
Start: 2024-02-29

## 2024-02-29 RX ORDER — SACUBITRIL AND VALSARTAN 24; 26 MG/1; MG/1
1 TABLET, FILM COATED ORAL 2 TIMES DAILY
Qty: 180 TABLET | Refills: 1 | Status: SHIPPED | OUTPATIENT
Start: 2024-02-29

## 2024-02-29 RX ORDER — LEVOTHYROXINE SODIUM 100 UG/1
100 TABLET ORAL
Qty: 90 TABLET | Refills: 1 | Status: SHIPPED | OUTPATIENT
Start: 2024-02-29

## 2024-02-29 RX ORDER — VERICIGUAT 5 MG/1
5 TABLET, FILM COATED ORAL
COMMUNITY
Start: 2024-01-30

## 2024-02-29 RX ORDER — GLIMEPIRIDE 4 MG/1
4 TABLET ORAL
Qty: 90 TABLET | Refills: 1 | Status: SHIPPED | OUTPATIENT
Start: 2024-02-29 | End: 2024-06-19

## 2024-02-29 RX ORDER — GABAPENTIN 300 MG/1
300 CAPSULE ORAL NIGHTLY
Qty: 90 CAPSULE | Refills: 1 | Status: SHIPPED | OUTPATIENT
Start: 2024-02-29

## 2024-02-29 RX ORDER — CYANOCOBALAMIN 1000 UG/ML
1000 INJECTION, SOLUTION INTRAMUSCULAR; SUBCUTANEOUS
Qty: 1 ML | Refills: 5 | Status: SHIPPED | OUTPATIENT
Start: 2024-02-29 | End: 2024-06-19

## 2024-02-29 NOTE — PROGRESS NOTES
SUBJECTIVE:      Patient ID: Baldo Carney is a 73 y.o. male.    Chief Complaint: Diabetes (6 month f/u DM)    Presents for DM recheck - today's A1c 5.5    Discussed outstanding health maintenance     Diabetes  He presents for his follow-up diabetic visit. He has type 2 diabetes mellitus. No MedicAlert identification noted. His disease course has been stable. There are no hypoglycemic associated symptoms. Pertinent negatives for hypoglycemia include no confusion, dizziness, headaches, nervousness/anxiousness or pallor. Associated symptoms include foot paresthesias and weakness. Pertinent negatives for diabetes include no chest pain, no fatigue, no polydipsia, no polyuria and no weight loss. There are no hypoglycemic complications. Symptoms are stable. Diabetic complications include heart disease, impotence, nephropathy, peripheral neuropathy, PVD and retinopathy. Risk factors for coronary artery disease include diabetes mellitus, dyslipidemia, family history, hypertension, male sex, sedentary lifestyle and stress. Current diabetic treatment includes oral agent (monotherapy). He is compliant with treatment all of the time. His weight is decreasing steadily. He is following a generally healthy diet. When asked about meal planning, he reported none. He has not had a previous visit with a dietitian. He never participates in exercise. An ACE inhibitor/angiotensin II receptor blocker is being taken. He does not see a podiatrist.Eye exam is current.   Hypertension  This is a chronic problem. The current episode started more than 1 year ago. The problem has been gradually improving since onset. The problem is controlled. Associated symptoms include peripheral edema. Pertinent negatives include no chest pain, headaches, malaise/fatigue, neck pain or shortness of breath. Risk factors for coronary artery disease include male gender, sedentary lifestyle, diabetes mellitus, dyslipidemia, family history and stress.  Past treatments include angiotensin blockers and beta blockers. The current treatment provides mild improvement. Compliance problems include exercise and diet.  Hypertensive end-organ damage includes kidney disease, CAD/MI, heart failure, PVD and retinopathy. Identifiable causes of hypertension include chronic renal disease, a hypertension causing med, renovascular disease and a thyroid problem.   Hyperlipidemia  This is a chronic problem. The current episode started more than 1 year ago. Exacerbating diseases include chronic renal disease, diabetes, hypothyroidism and liver disease. Associated symptoms include myalgias. Pertinent negatives include no chest pain or shortness of breath. Current antihyperlipidemic treatment includes statins. Compliance problems include adherence to diet and adherence to exercise.  Risk factors for coronary artery disease include dyslipidemia, diabetes mellitus, hypertension, male sex, a sedentary lifestyle, stress and family history.   Thyroid Problem  Presents for follow-up visit. Symptoms include constipation, dry skin and leg swelling. Patient reports no anxiety, cold intolerance, diarrhea, fatigue, hair loss, heat intolerance or weight loss. The symptoms have been stable. Past treatments include nothing. His past medical history is significant for diabetes, heart failure, hyperlipidemia and neuropathy.   Constipation  This is a recurrent problem. The current episode started more than 1 month ago. The problem has been waxing and waning since onset. His stool frequency is 2 to 3 times per week. The stool is described as firm and formed. The patient is not on a high fiber diet. He Does not exercise regularly. There has Been adequate water intake. Pertinent negatives include no abdominal pain, back pain, diarrhea, difficulty urinating, fever, nausea, vomiting or weight loss. Risk factors include stress and recent illness. He has tried stool softeners, laxatives, fiber and diet changes  for the symptoms. The treatment provided mild relief.       Past Surgical History:   Procedure Laterality Date    ANGIOGRAPHY OF INTERNAL MAMMARY VESSEL N/A 3/11/2022    Procedure: Angiogram Internal Mammary;  Surgeon: Hank Lujan MD;  Location: [facility] CATH/EP LAB;  Service: Cardiology;  Laterality: N/A;    CARDIAC CATHETERIZATION      CARDIAC VALVE SURGERY      CATHETERIZATION OF BOTH LEFT AND RIGHT HEART Left 3/11/2022    Procedure: CATHETERIZATION, HEART, BOTH LEFT AND RIGHT;  Surgeon: Hank Lujan MD;  Location: [facility] CATH/EP LAB;  Service: Cardiology;  Laterality: Left;    CORONARY ANGIOGRAPHY N/A 3/11/2022    Procedure: ANGIOGRAM, CORONARY ARTERY;  Surgeon: Hank Lujan MD;  Location: [facility] CATH/EP LAB;  Service: Cardiology;  Laterality: N/A;    CORONARY BYPASS GRAFT ANGIOGRAPHY  3/11/2022    Procedure: Bypass graft study;  Surgeon: Hank Lujan MD;  Location: [facility] CATH/EP LAB;  Service: Cardiology;;    CYSTOSCOPY N/A 7/30/2019    Procedure: CYSTOSCOPY;  Surgeon: Spencer Nguyen MD;  Location: [facility];  Service: Urology;  Laterality: N/A;    INSERTION OF INTRAVASCULAR MICROAXIAL BLOOD PUMP N/A 8/31/2022    Procedure: INSERTION, IMPELLA;  Surgeon: Zach Jensen MD;  Location: [facility] CATH LAB;  Service: Cardiology;  Laterality: N/A;    INSERTION, CATHETER, DIALYSIS, PERITONEAL N/A 12/7/2023    Procedure: INSERTION, CATHETER, DIALYSIS, PERITONEAL;  Surgeon: Greg Bone Jr., MD;  Location: [facility];  Service: General;  Laterality: N/A;  need PD catheter    PLACEMENT OF SWAN SEE CATHETER WITH IMAGING GUIDANCE  8/31/2022    Procedure: INSERTION, CATHETER, SWAN-SEE, WITH IMAGING GUIDANCE;  Surgeon: Zach Jensen MD;  Location: [facility] CATH LAB;  Service: Cardiology;;    REPAIR, HERNIA, INGUINAL, INCARCERATED, INITIAL, AGE 5 YEARS OR OLDER Right 12/7/2023    Procedure: REPAIR, HERNIA, INGUINAL, INCARCERATED, INITIAL;  Surgeon: Greg Bone Jr., MD;  Location: [facility];   Service: General;  Laterality: Right;    SHOULDER ARTHROSCOPY  1985    TRANSRECTAL BIOPSY OF PROSTATE WITH ULTRASOUND GUIDANCE N/A 2019    Procedure: BIOPSY, PROSTATE, RECTAL APPROACH, WITH US GUIDANCE;  Surgeon: Spencer Nguyen MD;  Location: [facility] OR;  Service: Urology;  Laterality: N/A;  procedure not performed, pt unable to tolerate    TRANSRECTAL BIOPSY OF PROSTATE WITH ULTRASOUND GUIDANCE N/A 2019    Procedure: BIOPSY, PROSTATE, RECTAL APPROACH, WITH US GUIDANCE;  Surgeon: Spencer Nguyen MD;  Location: [facility] OR;  Service: Urology;  Laterality: N/A;    UMBILICAL HERNIA REPAIR N/A 2023    Procedure: REPAIR, HERNIA, UMBILICAL;  Surgeon: Greg Bone Jr., MD;  Location: [facility];  Service: General;  Laterality: N/A;     Family History   Problem Relation Age of Onset    No Known Problems Mother     Diabetes Father     Hypertension Father     Heart attack Father     Heart disease Father     Diabetes Sister     Heart disease Brother     Diabetes Brother     No Known Problems Maternal Grandmother     No Known Problems Maternal Grandfather     No Known Problems Paternal Grandmother     No Known Problems Paternal Grandfather     No Known Problems Maternal Aunt     No Known Problems Maternal Uncle     No Known Problems Paternal Aunt     No Known Problems Paternal Uncle     Anemia Neg Hx     Arrhythmia Neg Hx     Asthma Neg Hx     Clotting disorder Neg Hx     Fainting Neg Hx     Heart failure Neg Hx     Hyperlipidemia Neg Hx     Glaucoma Neg Hx       Social History     Socioeconomic History    Marital status: Single   Tobacco Use    Smoking status: Former     Current packs/day: 0.00     Types: Cigarettes     Quit date: 1970     Years since quittin.7    Smokeless tobacco: Never   Substance and Sexual Activity    Alcohol use: Not Currently     Alcohol/week: 3.0 standard drinks of alcohol     Types: 3 Cans of beer per week    Drug use: No    Sexual activity: Not Currently     Partners:  Female     Current Outpatient Medications   Medication Sig Dispense Refill    albuterol (PROVENTIL/VENTOLIN HFA) 90 mcg/actuation inhaler INHALE 1 TO 2 PUFFS INTO THE LUNGS EVERY 4 TO 6 HOURS AS NEEDED FOR WHEEZE AND SHORTNESS OF BREATH 18 g 2    aspirin (ECOTRIN) 81 MG EC tablet Take 81 mg by mouth once daily.      atorvastatin (LIPITOR) 40 MG tablet TAKE 1 TABLET BY MOUTH EVERY DAY 90 tablet 0    budesonide-glycopyr-formoterol (BREZTRI AEROSPHERE) 160-9-4.8 mcg/actuation HFAA Inhale into the lungs daily as needed.      clopidogreL (PLAVIX) 75 mg tablet       empagliflozin (JARDIANCE) 25 mg tablet Take 1 tablet (25 mg total) by mouth once daily. 90 tablet 0    finasteride (PROSCAR) 5 mg tablet Take 1 tablet (5 mg total) by mouth once daily. 90 tablet 4    ibuprofen (ADVIL,MOTRIN) 600 MG tablet Take 1 tablet (600 mg total) by mouth every 6 (six) hours as needed.      tamsulosin (FLOMAX) 0.4 mg Cap TAKE ONE CAPSULE BY MOUTH DAILY AT 5 PM 90 capsule 11    VERQUVO 5 mg Tab Take 5 mg by mouth.      cyanocobalamin 1,000 mcg/mL injection Inject 1 mL (1,000 mcg total) into the muscle every 30 days. 1 mL 5    fluticasone furoate-vilanteroL (BREO ELLIPTA) 100-25 mcg/dose diskus inhaler Inhale 1 puff into the lungs once daily. (DAILY CONTROLLER) 3 each 1    fluticasone propionate (FLONASE) 50 mcg/actuation nasal spray 1 spray (50 mcg total) by Each Nostril route once daily. 16 mL 1    gabapentin (NEURONTIN) 300 MG capsule Take 1 capsule (300 mg total) by mouth every evening. 90 capsule 1    glimepiride (AMARYL) 4 MG tablet Take 1 tablet (4 mg total) by mouth before breakfast. 90 tablet 1    levothyroxine (SYNTHROID) 100 MCG tablet Take 1 tablet (100 mcg total) by mouth before breakfast. With no other meds or food 90 tablet 1    linaCLOtide (LINZESS) 72 mcg Cap capsule Take 1 capsule (72 mcg total) by mouth before breakfast. 90 capsule 1    metoprolol succinate (TOPROL-XL) 25 MG 24 hr tablet Take 1 tablet (25 mg total) by  mouth once daily. 90 tablet 1    sacubitriL-valsartan (ENTRESTO) 24-26 mg per tablet Take 1 tablet by mouth 2 (two) times daily. 180 tablet 1     No current facility-administered medications for this visit.     Review of patient's allergies indicates:  No Known Allergies   Past Medical History:   Diagnosis Date    Asthma     Cardiomyopathy 10/21/2014    CHF (congestive heart failure)     Coronary artery disease     CRF (chronic renal failure)     Diabetes mellitus     Enlarged prostate     Hyperlipidemia     Hypertension     Neuropathy      Past Surgical History:   Procedure Laterality Date    ANGIOGRAPHY OF INTERNAL MAMMARY VESSEL N/A 3/11/2022    Procedure: Angiogram Internal Mammary;  Surgeon: Hank Lujan MD;  Location: [facility] CATH/EP LAB;  Service: Cardiology;  Laterality: N/A;    CARDIAC CATHETERIZATION      CARDIAC VALVE SURGERY      CATHETERIZATION OF BOTH LEFT AND RIGHT HEART Left 3/11/2022    Procedure: CATHETERIZATION, HEART, BOTH LEFT AND RIGHT;  Surgeon: Hank Lujan MD;  Location: [facility] CATH/EP LAB;  Service: Cardiology;  Laterality: Left;    CORONARY ANGIOGRAPHY N/A 3/11/2022    Procedure: ANGIOGRAM, CORONARY ARTERY;  Surgeon: Hank Lujan MD;  Location: [facility] CATH/EP LAB;  Service: Cardiology;  Laterality: N/A;    CORONARY BYPASS GRAFT ANGIOGRAPHY  3/11/2022    Procedure: Bypass graft study;  Surgeon: Hank Lujan MD;  Location: [facility] CATH/EP LAB;  Service: Cardiology;;    CYSTOSCOPY N/A 7/30/2019    Procedure: CYSTOSCOPY;  Surgeon: Spencer Nguyen MD;  Location: [facility] OR;  Service: Urology;  Laterality: N/A;    INSERTION OF INTRAVASCULAR MICROAXIAL BLOOD PUMP N/A 8/31/2022    Procedure: INSERTION, IMPELLA;  Surgeon: Zach Jensen MD;  Location: [facility] CATH LAB;  Service: Cardiology;  Laterality: N/A;    INSERTION, CATHETER, DIALYSIS, PERITONEAL N/A 12/7/2023    Procedure: INSERTION, CATHETER, DIALYSIS, PERITONEAL;  Surgeon: Grge Bone Jr., MD;  Location: [facility];   Service: General;  Laterality: N/A;  need PD catheter    PLACEMENT OF SWAN SEE CATHETER WITH IMAGING GUIDANCE  8/31/2022    Procedure: INSERTION, CATHETER, SWAN-SEE, WITH IMAGING GUIDANCE;  Surgeon: Zach Jensen MD;  Location: [facility] CATH LAB;  Service: Cardiology;;    REPAIR, HERNIA, INGUINAL, INCARCERATED, INITIAL, AGE 5 YEARS OR OLDER Right 12/7/2023    Procedure: REPAIR, HERNIA, INGUINAL, INCARCERATED, INITIAL;  Surgeon: Greg Bone Jr., MD;  Location: [facility] OR;  Service: General;  Laterality: Right;    SHOULDER ARTHROSCOPY  1985    TRANSRECTAL BIOPSY OF PROSTATE WITH ULTRASOUND GUIDANCE N/A 7/30/2019    Procedure: BIOPSY, PROSTATE, RECTAL APPROACH, WITH US GUIDANCE;  Surgeon: Spencer Nguyen MD;  Location: [facility] OR;  Service: Urology;  Laterality: N/A;  procedure not performed, pt unable to tolerate    TRANSRECTAL BIOPSY OF PROSTATE WITH ULTRASOUND GUIDANCE N/A 8/8/2019    Procedure: BIOPSY, PROSTATE, RECTAL APPROACH, WITH US GUIDANCE;  Surgeon: Spencer gNuyen MD;  Location: [facility] OR;  Service: Urology;  Laterality: N/A;    UMBILICAL HERNIA REPAIR N/A 12/7/2023    Procedure: REPAIR, HERNIA, UMBILICAL;  Surgeon: Greg Bone Jr., MD;  Location: [facility];  Service: General;  Laterality: N/A;       Review of Systems   Constitutional:  Negative for activity change, appetite change, fatigue, fever, malaise/fatigue and weight loss.   HENT:  Negative for congestion, ear pain, hearing loss, postnasal drip, sinus pressure, sinus pain, sneezing and sore throat.    Eyes:  Negative for photophobia and pain.   Respiratory:  Negative for cough, chest tightness, shortness of breath and wheezing.    Cardiovascular:  Negative for chest pain and leg swelling.        Follows with cardiology   Gastrointestinal:  Positive for constipation. Negative for abdominal distention, abdominal pain, blood in stool, diarrhea, nausea and vomiting.        Following with specialist for cirrhosis/paracentesis    Endocrine: Negative for cold intolerance, heat intolerance, polydipsia and polyuria.   Genitourinary:  Positive for impotence. Negative for difficulty urinating, dysuria, flank pain, frequency, hematuria and urgency.   Musculoskeletal:  Positive for arthralgias and myalgias. Negative for back pain, joint swelling and neck pain.   Skin:  Negative for pallor and rash.   Allergic/Immunologic: Positive for environmental allergies. Negative for food allergies.   Neurological:  Positive for weakness. Negative for dizziness, light-headedness and headaches.   Hematological:  Does not bruise/bleed easily.   Psychiatric/Behavioral:  Negative for confusion, decreased concentration, dysphoric mood, hallucinations and sleep disturbance. The patient is not nervous/anxious.       OBJECTIVE:      Vitals:    02/29/24 0945   BP: 132/68   BP Location: Right arm   Patient Position: Sitting   BP Method: Large (Manual)   Pulse: 71   SpO2: 99%   Weight: 72 kg (158 lb 11.2 oz)   Height: 6' (1.829 m)     Physical Exam  Vitals and nursing note reviewed.   Constitutional:       General: He is not in acute distress.     Appearance: Normal appearance. He is well-developed, well-groomed and normal weight.      Comments: 13# loss since January visit   HENT:      Head: Normocephalic and atraumatic.      Right Ear: Hearing normal.      Left Ear: Hearing normal.      Nose: Nose normal. No rhinorrhea.   Eyes:      General: Lids are normal.         Right eye: No discharge.         Left eye: No discharge.      Conjunctiva/sclera: Conjunctivae normal.      Right eye: Right conjunctiva is not injected.      Left eye: Left conjunctiva is not injected.      Pupils: Pupils are equal, round, and reactive to light. Pupils are equal.      Right eye: Pupil is round and reactive.      Left eye: Pupil is round and reactive.   Neck:      Thyroid: No thyromegaly.      Vascular: No JVD.      Trachea: Trachea normal. No tracheal deviation.   Cardiovascular:       Rate and Rhythm: Normal rate and regular rhythm.      Pulses:           Radial pulses are 2+ on the right side and 2+ on the left side.      Heart sounds: Normal heart sounds. No murmur heard.     No friction rub. No gallop.      Comments: L CW pacemaker  Pulmonary:      Effort: Pulmonary effort is normal. No respiratory distress.      Breath sounds: Normal breath sounds. No stridor. No decreased breath sounds, wheezing, rhonchi or rales.   Abdominal:      General: Abdomen is protuberant. Bowel sounds are normal. There is no distension.      Palpations: Abdomen is soft. Abdomen is not rigid.      Tenderness: There is no abdominal tenderness. There is no guarding.      Hernia: No hernia is present.   Musculoskeletal:         General: Normal range of motion.      Cervical back: Normal range of motion and neck supple.   Lymphadenopathy:      Cervical: No cervical adenopathy.   Skin:     General: Skin is warm and dry.      Capillary Refill: Capillary refill takes less than 2 seconds.      Coloration: Skin is not pale.      Findings: No lesion or rash.   Neurological:      Mental Status: He is alert and oriented to person, place, and time.      GCS: GCS eye subscore is 4. GCS verbal subscore is 5. GCS motor subscore is 6.      Cranial Nerves: Cranial nerves 2-12 are intact.      Sensory: Sensation is intact.      Motor: Motor function is intact. No atrophy.      Coordination: Coordination is intact. Coordination normal.      Gait: Gait is intact. Gait normal.   Psychiatric:         Attention and Perception: Attention and perception normal. He is attentive.         Mood and Affect: Mood and affect normal.         Speech: Speech normal.         Behavior: Behavior normal. Behavior is cooperative.         Thought Content: Thought content normal.         Cognition and Memory: Cognition and memory normal.         Judgment: Judgment normal.        Assessment:       1. Type 2 diabetes mellitus without complication, without  long-term current use of insulin    2. Primary hypertension    3. Chronic systolic heart failure    4. Chronic constipation    5. Hypothyroidism, unspecified type    6. Chronic obstructive pulmonary disease, unspecified COPD type    7. Mixed hyperlipidemia    8. Low vitamin B12 level    9. Seasonal allergies    10. Prostate cancer screening        Plan:       Type 2 diabetes mellitus without complication, without long-term current use of insulin  -     POCT HEMOGLOBIN A1C  -     gabapentin (NEURONTIN) 300 MG capsule; Take 1 capsule (300 mg total) by mouth every evening.  Dispense: 90 capsule; Refill: 1  -     glimepiride (AMARYL) 4 MG tablet; Take 1 tablet (4 mg total) by mouth before breakfast.  Dispense: 90 tablet; Refill: 1  -     Microalbumin/Creatinine Ratio, Urine; Future; Expected date: 02/29/2024  -     Comprehensive Metabolic Panel; Future; Expected date: 02/29/2024  -     Hemoglobin A1C; Future; Expected date: 02/29/2024    Primary hypertension  -     sacubitriL-valsartan (ENTRESTO) 24-26 mg per tablet; Take 1 tablet by mouth 2 (two) times daily.  Dispense: 180 tablet; Refill: 1  -     metoprolol succinate (TOPROL-XL) 25 MG 24 hr tablet; Take 1 tablet (25 mg total) by mouth once daily.  Dispense: 90 tablet; Refill: 1    Chronic systolic heart failure  -     sacubitriL-valsartan (ENTRESTO) 24-26 mg per tablet; Take 1 tablet by mouth 2 (two) times daily.  Dispense: 180 tablet; Refill: 1    Chronic constipation  -     linaCLOtide (LINZESS) 72 mcg Cap capsule; Take 1 capsule (72 mcg total) by mouth before breakfast.  Dispense: 90 capsule; Refill: 1    Hypothyroidism, unspecified type  -     levothyroxine (SYNTHROID) 100 MCG tablet; Take 1 tablet (100 mcg total) by mouth before breakfast. With no other meds or food  Dispense: 90 tablet; Refill: 1  -     TSH; Future; Expected date: 02/29/2024    Chronic obstructive pulmonary disease, unspecified COPD type  -     fluticasone furoate-vilanteroL (BREO ELLIPTA)  100-25 mcg/dose diskus inhaler; Inhale 1 puff into the lungs once daily. (DAILY CONTROLLER)  Dispense: 3 each; Refill: 1    Mixed hyperlipidemia  -     LIPID PANEL; Future; Expected date: 02/29/2024    Low vitamin B12 level  -     cyanocobalamin 1,000 mcg/mL injection; Inject 1 mL (1,000 mcg total) into the muscle every 30 days.  Dispense: 1 mL; Refill: 5    Seasonal allergies  -     fluticasone propionate (FLONASE) 50 mcg/actuation nasal spray; 1 spray (50 mcg total) by Each Nostril route once daily.  Dispense: 16 mL; Refill: 1    Prostate cancer screening  -     PSA, Screening; Future; Expected date: 02/29/2024              Follow up in about 6 months (around 8/29/2024) for DM.         3/4/2024 KAMERON Rodriguez, FNP-C

## 2024-03-08 DIAGNOSIS — R79.89 LOW VITAMIN B12 LEVEL: ICD-10-CM

## 2024-03-11 ENCOUNTER — HOSPITAL ENCOUNTER (OUTPATIENT)
Dept: INTERVENTIONAL RADIOLOGY/VASCULAR | Facility: HOSPITAL | Age: 74
Discharge: HOME | End: 2024-03-11
Attending: INTERNAL MEDICINE
Payer: MEDICARE

## 2024-03-11 VITALS
RESPIRATION RATE: 18 BRPM | DIASTOLIC BLOOD PRESSURE: 82 MMHG | OXYGEN SATURATION: 99 % | SYSTOLIC BLOOD PRESSURE: 170 MMHG | HEART RATE: 72 BPM

## 2024-03-11 DIAGNOSIS — R18.8 OTHER ASCITES: ICD-10-CM

## 2024-03-11 LAB
APPEARANCE FLD: CLEAR
BODY FLD TYPE: NORMAL
COLOR FLD: YELLOW
LYMPHOCYTES NFR FLD MANUAL: 32 %
MESOTHL CELL NFR FLD MANUAL: 2 %
MONOS+MACROS NFR FLD MANUAL: 63 %
NEUTROPHILS NFR FLD MANUAL: 3 %
WBC # FLD: 155 /CU MM

## 2024-03-11 PROCEDURE — 89051 BODY FLUID CELL COUNT: CPT | Performed by: INTERNAL MEDICINE

## 2024-03-11 PROCEDURE — C1729 CATH, DRAINAGE: HCPCS

## 2024-03-11 PROCEDURE — 49083 ABD PARACENTESIS W/IMAGING: CPT | Performed by: RADIOLOGY

## 2024-03-11 PROCEDURE — 49083 ABD PARACENTESIS W/IMAGING: CPT | Mod: ,,, | Performed by: PHYSICIAN ASSISTANT

## 2024-03-11 NOTE — NURSING
PARACENTESIS    IR ultrasound guided paracentesis performed by REGINE Baker.  Procedure explained and standing consent verified.  Time out performed.  6.5 L removed-   Patient received 0 g of albumin IV due to patient refusal of stick for IV after 2 unsuccessful attempts by PA.   VS remained stable for duration of procedure.  Specimens collected and sent to lab if ordered.   Para d/c'd, with tip intact.   Access site cleaned with peroxide, derma bond and steri-strips applied, then covered with sterile Band-Aid.   Pt discharge home in stable condition.

## 2024-03-12 RX ORDER — CYANOCOBALAMIN 1000 UG/ML
1000 INJECTION, SOLUTION INTRAMUSCULAR; SUBCUTANEOUS
Qty: 1 ML | Refills: 5 | OUTPATIENT
Start: 2024-03-12

## 2024-03-14 DIAGNOSIS — H10.32 ACUTE CONJUNCTIVITIS OF LEFT EYE, UNSPECIFIED ACUTE CONJUNCTIVITIS TYPE: ICD-10-CM

## 2024-03-14 DIAGNOSIS — E78.2 MIXED HYPERLIPIDEMIA: ICD-10-CM

## 2024-03-19 RX ORDER — NEOMYCIN SULFATE, POLYMYXIN B SULFATE AND DEXAMETHASONE 3.5; 10000; 1 MG/ML; [USP'U]/ML; MG/ML
SUSPENSION/ DROPS OPHTHALMIC
Qty: 5 ML | Refills: 0 | OUTPATIENT
Start: 2024-03-19

## 2024-03-19 RX ORDER — ATORVASTATIN CALCIUM 40 MG/1
40 TABLET, FILM COATED ORAL
Qty: 90 TABLET | Refills: 0 | OUTPATIENT
Start: 2024-03-19

## 2024-03-25 ENCOUNTER — EXTERNAL HOME HEALTH (OUTPATIENT)
Dept: HOME HEALTH SERVICES | Facility: HOSPITAL | Age: 74
End: 2024-03-25

## 2024-03-25 ENCOUNTER — HOSPITAL ENCOUNTER (OUTPATIENT)
Dept: INTERVENTIONAL RADIOLOGY/VASCULAR | Facility: HOSPITAL | Age: 74
Discharge: HOME | End: 2024-03-25
Attending: INTERNAL MEDICINE
Payer: MEDICARE

## 2024-03-25 VITALS
HEART RATE: 73 BPM | SYSTOLIC BLOOD PRESSURE: 158 MMHG | OXYGEN SATURATION: 99 % | RESPIRATION RATE: 18 BRPM | DIASTOLIC BLOOD PRESSURE: 74 MMHG

## 2024-03-25 DIAGNOSIS — R18.8 OTHER ASCITES: ICD-10-CM

## 2024-03-25 LAB
APPEARANCE FLD: CLEAR
BODY FLD TYPE: NORMAL
COLOR FLD: YELLOW
LYMPHOCYTES NFR FLD MANUAL: 26 %
MESOTHL CELL NFR FLD MANUAL: 21 %
MONOS+MACROS NFR FLD MANUAL: 51 %
NEUTROPHILS NFR FLD MANUAL: 2 %
WBC # FLD: 152 /CU MM

## 2024-03-25 PROCEDURE — 89051 BODY FLUID CELL COUNT: CPT | Performed by: PHYSICIAN ASSISTANT

## 2024-03-25 PROCEDURE — 49083 ABD PARACENTESIS W/IMAGING: CPT | Mod: ,,, | Performed by: PHYSICIAN ASSISTANT

## 2024-03-25 PROCEDURE — P9047 ALBUMIN (HUMAN), 25%, 50ML: HCPCS | Mod: JZ,JG | Performed by: PHYSICIAN ASSISTANT

## 2024-03-25 PROCEDURE — 49083 ABD PARACENTESIS W/IMAGING: CPT | Performed by: RADIOLOGY

## 2024-03-25 PROCEDURE — C1729 CATH, DRAINAGE: HCPCS

## 2024-03-25 PROCEDURE — 63600175 PHARM REV CODE 636 W HCPCS: Mod: JZ,JG | Performed by: PHYSICIAN ASSISTANT

## 2024-03-25 RX ORDER — ALBUMIN HUMAN 250 G/1000ML
25 SOLUTION INTRAVENOUS ONCE
Status: COMPLETED | OUTPATIENT
Start: 2024-03-25 | End: 2024-03-25

## 2024-03-25 RX ADMIN — ALBUMIN (HUMAN) 25 G: 5 SOLUTION INTRAVENOUS at 10:03

## 2024-03-25 NOTE — NURSING
PARACENTESIS    IR ultrasound guided paracentesis performed by REGINE Baker.  Procedure explained and standing consent verified.  Time out performed.  5.5 L removed-   Patient received 25 g of albumin IV.  VS remained stable for duration of procedure.  Specimens collected and sent to lab if ordered.   Para and IV d/c'd, with tip intact.   Access site cleaned with peroxide, derma bond and steri-strips applied, then covered with sterile Band-Aid.   Pt discharge home in stable condition.

## 2024-03-28 ENCOUNTER — TELEPHONE (OUTPATIENT)
Dept: HEPATOLOGY | Facility: CLINIC | Age: 74
End: 2024-03-28
Payer: MEDICARE

## 2024-03-28 ENCOUNTER — PATIENT OUTREACH (OUTPATIENT)
Dept: ADMINISTRATIVE | Facility: HOSPITAL | Age: 74
End: 2024-03-28
Payer: MEDICARE

## 2024-03-28 NOTE — TELEPHONE ENCOUNTER
----- Message from Saud Leary MA sent at 3/28/2024  9:03 AM CDT -----    ----- Message -----  From: Giovanna Queen  Sent: 3/28/2024   8:57 AM CDT  To: Ricci Olivera Staff    Patient is scheduled for IR Paracentesis on several dates starting 4/8.  Spoke with Dana @ [employer] ref # [id number] patient is OON with no OON benefits,  Please make other arrangements for your patient.  Thank you

## 2024-03-28 NOTE — TELEPHONE ENCOUNTER
Received message from Giovanna NEGRON had spoken to Dana Ref # [id number]. The Patient was OON and OON Benefits for all upcoming Paracentesis Appts at [facility].    Call returned to [employer] at [phone] and spoke with Representative Jessica regarding message received pt was out of network for upcoming Paracentesis appts at [facility].      [employer] [phone]  Jessica stated: Ref # for Call [id number]  Patient ID # [id number]  Network HMO Plan ID # [id number]  Effective Date July 23, 2023 with No Termination Date    Jessica stated the Patient is In Network to have the Paracentesis done at [facility].

## 2024-04-02 ENCOUNTER — TELEPHONE (OUTPATIENT)
Dept: TRANSPLANT | Facility: CLINIC | Age: 74
End: 2024-04-02
Payer: MEDICARE

## 2024-04-02 DIAGNOSIS — I50.9 CONGESTIVE HEART FAILURE, UNSPECIFIED HF CHRONICITY, UNSPECIFIED HEART FAILURE TYPE: Primary | ICD-10-CM

## 2024-04-08 ENCOUNTER — HOSPITAL ENCOUNTER (OUTPATIENT)
Dept: INTERVENTIONAL RADIOLOGY/VASCULAR | Facility: HOSPITAL | Age: 74
Discharge: HOME | End: 2024-04-08
Attending: INTERNAL MEDICINE
Payer: MEDICARE

## 2024-04-08 VITALS
DIASTOLIC BLOOD PRESSURE: 60 MMHG | OXYGEN SATURATION: 99 % | RESPIRATION RATE: 18 BRPM | HEART RATE: 88 BPM | SYSTOLIC BLOOD PRESSURE: 136 MMHG

## 2024-04-08 DIAGNOSIS — R18.8 OTHER ASCITES: ICD-10-CM

## 2024-04-08 LAB
APPEARANCE FLD: CLEAR
BASOPHILS # BLD AUTO: 0.06 K/UL (ref 0–0.2)
BASOPHILS NFR BLD: 1.2 % (ref 0–1.9)
BODY FLD TYPE: NORMAL
COLOR FLD: YELLOW
DIFFERENTIAL METHOD BLD: ABNORMAL
EOSINOPHIL # BLD AUTO: 0.2 K/UL (ref 0–0.5)
EOSINOPHIL NFR BLD: 3.6 % (ref 0–8)
ERYTHROCYTE [DISTWIDTH] IN BLOOD BY AUTOMATED COUNT: 17.4 % (ref 11.5–14.5)
HCT VFR BLD AUTO: 29.1 % (ref 40–54)
HGB BLD-MCNC: 9.5 G/DL (ref 14–18)
IMM GRANULOCYTES # BLD AUTO: 0.03 K/UL (ref 0–0.04)
IMM GRANULOCYTES NFR BLD AUTO: 0.6 % (ref 0–0.5)
INR PPP: 1.1 (ref 0.8–1.2)
LYMPHOCYTES # BLD AUTO: 0.5 K/UL (ref 1–4.8)
LYMPHOCYTES NFR BLD: 10.5 % (ref 18–48)
LYMPHOCYTES NFR FLD MANUAL: 39 %
MCH RBC QN AUTO: 27.8 PG (ref 27–31)
MCHC RBC AUTO-ENTMCNC: 32.6 G/DL (ref 32–36)
MCV RBC AUTO: 85 FL (ref 82–98)
MESOTHL CELL NFR FLD MANUAL: 9 %
MONOCYTES # BLD AUTO: 0.5 K/UL (ref 0.3–1)
MONOCYTES NFR BLD: 9.1 % (ref 4–15)
MONOS+MACROS NFR FLD MANUAL: 49 %
NEUTROPHILS # BLD AUTO: 3.8 K/UL (ref 1.8–7.7)
NEUTROPHILS NFR BLD: 75 % (ref 38–73)
NEUTROPHILS NFR FLD MANUAL: 3 %
NRBC BLD-RTO: 0 /100 WBC
PLATELET # BLD AUTO: 226 K/UL (ref 150–450)
PMV BLD AUTO: 10.2 FL (ref 9.2–12.9)
PROTHROMBIN TIME: 11.9 SEC (ref 9–12.5)
RBC # BLD AUTO: 3.42 M/UL (ref 4.6–6.2)
WBC # BLD AUTO: 5.04 K/UL (ref 3.9–12.7)
WBC # FLD: 141 /CU MM

## 2024-04-08 PROCEDURE — 49083 ABD PARACENTESIS W/IMAGING: CPT | Mod: ,,, | Performed by: RADIOLOGY

## 2024-04-08 PROCEDURE — 49083 ABD PARACENTESIS W/IMAGING: CPT | Performed by: RADIOLOGY

## 2024-04-08 PROCEDURE — C1729 CATH, DRAINAGE: HCPCS

## 2024-04-08 PROCEDURE — 63600175 PHARM REV CODE 636 W HCPCS: Mod: JZ,JG | Performed by: PHYSICIAN ASSISTANT

## 2024-04-08 PROCEDURE — 85025 COMPLETE CBC W/AUTO DIFF WBC: CPT | Performed by: PHYSICIAN ASSISTANT

## 2024-04-08 PROCEDURE — 85610 PROTHROMBIN TIME: CPT | Performed by: PHYSICIAN ASSISTANT

## 2024-04-08 PROCEDURE — 36415 COLL VENOUS BLD VENIPUNCTURE: CPT | Performed by: PHYSICIAN ASSISTANT

## 2024-04-08 PROCEDURE — 89051 BODY FLUID CELL COUNT: CPT | Performed by: INTERNAL MEDICINE

## 2024-04-08 PROCEDURE — P9047 ALBUMIN (HUMAN), 25%, 50ML: HCPCS | Mod: JZ,JG | Performed by: PHYSICIAN ASSISTANT

## 2024-04-08 RX ORDER — ALBUMIN HUMAN 250 G/1000ML
50 SOLUTION INTRAVENOUS ONCE
Status: COMPLETED | OUTPATIENT
Start: 2024-04-08 | End: 2024-04-08

## 2024-04-08 RX ADMIN — ALBUMIN (HUMAN) 50 G: 5 SOLUTION INTRAVENOUS at 10:04

## 2024-04-13 NOTE — PROGRESS NOTES
OUTPATIENT CATHETERIZATION INSTRUCTIONS  You have been scheduled for a procedure in the catheterization lab on Wednesday, August 31, 2022.     Please report to the Cardiology Waiting Area on the Third floor of the hospital and check in at 6 AM.   You will then be taken to the SSCU (Short Stay Cardiac Unit) and prepared for your procedure. Please be aware that this is not the time of your procedure but the time you are to arrive. The procedures are scheduled on an hourly basis; however, emergency cases take precedence over all other cases.     1. No solid foods 8 hours prior to your procedure.  You may have clear liquids until the time of your admission which should be 2 hours prior to your procedure.  You are encouraged to drink at least 8 ounces of clear liquids prior to your admission to SSCU.  Patients with gastric emptying issues should be fasting for 6- 8 hours prior to the procedure.  Clear liquids include water, black coffee, clear juices, and performance drinks - no pulp or milk.    2. Heart failure or dialysis patients will be limited to 8 ounces (1 cup) of clear liquids up until 2 hours of the procedure.    3.   You may take your regular morning medications with water. If there are any medications that you should not take you will be instructed to hold them that morning. If you         are diabetic and on Metformin (Glucophage) do not take it the day before, the day of, and for 2 days after your procedure.  4.   If you are on Pradaxa, Eliquis or Coumadin , you can hold them 3 days prior to your procedure.  Do not stop your Aspirin, Plavix, Effient, or Brilinta.    The procedure will take 1-2 hours to perform. After the procedure, you will return to SSCU on the third floor of the hospital. You will need to lie still (or keep your arm still) for the next 4 to 6 hours to minimize bleeding from the puncture site. Your family may remain in the room with you during this time.     You may be able to be discharged  home that same afternoon if there is someone to drive you home and there were no complications. If you have one of the balloon, stent, or device procedures you may spend the night in the hospital. Your doctor will determine, based on your progress, the date and time of your discharge. The results of your procedure will be discussed with you before you are discharged. Any further testing or procedures will be scheduled for you either before you leave or you will be called with these appointments.     If you should have any questions, concerns, or need to change the date of your procedure, please call  J CARLOS Haro @ [phone]    Special Instructions:    Drink plenty of water the day before and after your procedure.     THE ABOVE INSTRUCTIONS WERE GIVEN TO THE PATIENT VERBALLY AND THEY VERBALIZED UNDERSTANDING.  THEY DO NOT REQUIRE ANY SPECIAL NEEDS AND DO NOT HAVE ANY LEARNING BARRIERS.          Directions for Reporting to Cardiology Waiting Area in the Hospital  If you park in the Parking Garage:  Take elevators to the1st floor of the parking garage.  Continue past the gift shop, coffee shop, and piano.  Take a right and go to the gold elevators. (Elevator B)  Take the elevator to the 3rd floor.  Follow the arrow on the sign on the wall that says Cath Lab Registration/EP Lab Registration.  Follow the long hallway all the way around until you come to a big open area.  This is the registration area.  Check in at Reception Desk.    OR    If family is dropping you off:  Have them drop you off at the front of the Hospital under the green overhang.  Enter through the doors and take a right.  Take the E elevators to the 3rd floor Cardiology Waiting Area.  Check in at the Reception Desk in the waiting room.                 None

## 2024-04-17 ENCOUNTER — TELEPHONE (OUTPATIENT)
Dept: TRANSPLANT | Facility: CLINIC | Age: 74
End: 2024-04-17
Payer: MEDICARE

## 2024-04-19 DIAGNOSIS — E78.2 MIXED HYPERLIPIDEMIA: ICD-10-CM

## 2024-04-22 ENCOUNTER — HOSPITAL ENCOUNTER (OUTPATIENT)
Dept: INTERVENTIONAL RADIOLOGY/VASCULAR | Facility: HOSPITAL | Age: 74
Discharge: HOME | End: 2024-04-22
Attending: INTERNAL MEDICINE
Payer: MEDICARE

## 2024-04-22 VITALS
SYSTOLIC BLOOD PRESSURE: 112 MMHG | DIASTOLIC BLOOD PRESSURE: 62 MMHG | OXYGEN SATURATION: 99 % | HEART RATE: 88 BPM | RESPIRATION RATE: 18 BRPM

## 2024-04-22 DIAGNOSIS — R18.8 OTHER ASCITES: ICD-10-CM

## 2024-04-22 LAB
APPEARANCE FLD: CLEAR
BODY FLD TYPE: NORMAL
COLOR FLD: YELLOW
LYMPHOCYTES NFR FLD MANUAL: 29 %
MESOTHL CELL NFR FLD MANUAL: 6 %
MONOS+MACROS NFR FLD MANUAL: 56 %
NEUTROPHILS NFR FLD MANUAL: 9 %
WBC # FLD: 163 /CU MM

## 2024-04-22 PROCEDURE — 49083 ABD PARACENTESIS W/IMAGING: CPT | Performed by: RADIOLOGY

## 2024-04-22 PROCEDURE — P9047 ALBUMIN (HUMAN), 25%, 50ML: HCPCS | Mod: JZ,JG | Performed by: INTERNAL MEDICINE

## 2024-04-22 PROCEDURE — 49083 ABD PARACENTESIS W/IMAGING: CPT | Mod: ,,, | Performed by: PHYSICIAN ASSISTANT

## 2024-04-22 PROCEDURE — 89051 BODY FLUID CELL COUNT: CPT | Performed by: INTERNAL MEDICINE

## 2024-04-22 PROCEDURE — 63600175 PHARM REV CODE 636 W HCPCS: Mod: JZ,JG | Performed by: INTERNAL MEDICINE

## 2024-04-22 PROCEDURE — C1729 CATH, DRAINAGE: HCPCS

## 2024-04-22 RX ORDER — ALBUMIN HUMAN 250 G/1000ML
50 SOLUTION INTRAVENOUS ONCE
Status: COMPLETED | OUTPATIENT
Start: 2024-04-22 | End: 2024-04-22

## 2024-04-22 RX ORDER — ATORVASTATIN CALCIUM 40 MG/1
40 TABLET, FILM COATED ORAL
Qty: 90 TABLET | Refills: 0 | Status: SHIPPED | OUTPATIENT
Start: 2024-04-22

## 2024-04-22 RX ADMIN — ALBUMIN (HUMAN) 50 G: 5 SOLUTION INTRAVENOUS at 09:04

## 2024-04-22 NOTE — NURSING
PARACENTESIS    IR ultrasound guided paracentesis performed by REGINE Baker.  Procedure explained and standing consent verified.  Time out performed.  6.9 L removed-   Patient received 50 g of albumin IV.  VS remained stable for duration of procedure.  Specimens collected and sent to lab if ordered.   Para and IV d/c'd, with tip intact.   Access site cleaned with peroxide, derma bond and steri-strips applied, then covered with sterile Band-Aid.   Pt discharge home in stable condition.

## 2024-04-23 DIAGNOSIS — J30.2 SEASONAL ALLERGIES: ICD-10-CM

## 2024-04-25 ENCOUNTER — LAB VISIT (OUTPATIENT)
Dept: LAB | Facility: HOSPITAL | Age: 74
End: 2024-04-25
Attending: INTERNAL MEDICINE
Payer: MEDICARE

## 2024-04-25 DIAGNOSIS — I50.9 CONGESTIVE HEART FAILURE, UNSPECIFIED HF CHRONICITY, UNSPECIFIED HEART FAILURE TYPE: ICD-10-CM

## 2024-04-25 LAB
ALBUMIN SERPL BCP-MCNC: 2.7 G/DL (ref 3.5–5.2)
ALP SERPL-CCNC: 77 U/L (ref 55–135)
ALT SERPL W/O P-5'-P-CCNC: 11 U/L (ref 10–44)
ANION GAP SERPL CALC-SCNC: 9 MMOL/L (ref 8–16)
AST SERPL-CCNC: 17 U/L (ref 10–40)
BILIRUB SERPL-MCNC: 0.4 MG/DL (ref 0.1–1)
BNP SERPL-MCNC: 1310 PG/ML (ref 0–99)
BUN SERPL-MCNC: 41 MG/DL (ref 8–23)
CALCIUM SERPL-MCNC: 8.3 MG/DL (ref 8.7–10.5)
CHLORIDE SERPL-SCNC: 106 MMOL/L (ref 95–110)
CO2 SERPL-SCNC: 24 MMOL/L (ref 23–29)
CREAT SERPL-MCNC: 2.4 MG/DL (ref 0.5–1.4)
EST. GFR  (NO RACE VARIABLE): 27.8 ML/MIN/1.73 M^2
GLUCOSE SERPL-MCNC: 87 MG/DL (ref 70–110)
MAGNESIUM SERPL-MCNC: 2.3 MG/DL (ref 1.6–2.6)
POTASSIUM SERPL-SCNC: 3.4 MMOL/L (ref 3.5–5.1)
PROT SERPL-MCNC: 6.3 G/DL (ref 6–8.4)
SODIUM SERPL-SCNC: 139 MMOL/L (ref 136–145)
TSH SERPL DL<=0.005 MIU/L-ACNC: 1.82 UIU/ML (ref 0.4–4)

## 2024-04-25 PROCEDURE — 84443 ASSAY THYROID STIM HORMONE: CPT | Performed by: INTERNAL MEDICINE

## 2024-04-25 PROCEDURE — 83735 ASSAY OF MAGNESIUM: CPT | Performed by: INTERNAL MEDICINE

## 2024-04-25 PROCEDURE — 80053 COMPREHEN METABOLIC PANEL: CPT | Performed by: INTERNAL MEDICINE

## 2024-04-25 PROCEDURE — 83880 ASSAY OF NATRIURETIC PEPTIDE: CPT | Mod: 91 | Performed by: INTERNAL MEDICINE

## 2024-04-25 PROCEDURE — 83880 ASSAY OF NATRIURETIC PEPTIDE: CPT | Performed by: INTERNAL MEDICINE

## 2024-04-27 LAB — NT-PROBNP SERPL IA-MCNC: 7361 PG/ML

## 2024-04-29 ENCOUNTER — TELEPHONE (OUTPATIENT)
Dept: TRANSPLANT | Facility: CLINIC | Age: 74
End: 2024-04-29
Payer: MEDICARE

## 2024-04-29 NOTE — TELEPHONE ENCOUNTER
----- Message from Latoya Alcaraz RN sent at 4/29/2024  9:52 AM CDT -----  Hi,    Please see message below.  He is trying to reschedule his appt.    Thanks,  Latoya  ----- Message -----  From: Mary Carmen Domínguez  Sent: 4/29/2024   8:59 AM CDT  To: Sharifa Hinkle V Staff    Type:  Sooner Apoointment Request    Caller is requesting a sooner appointment.  Caller declined first available appointment listed below.  Caller will not accept being placed on the waitlist and is requesting a message be sent to doctor.  Name of Caller:pt   When is the first available appointment?  Symptoms:CHF consult  Would the patient rather a call back or a response via [facility]? call  Best Call Back Number:[phone]  Additional Information: pt would like to reschedule missed appt

## 2024-04-29 NOTE — TELEPHONE ENCOUNTER
Appointment scheduled for 6/21 w/Dr. Skaggs, patient verbalized understanding and appointment slip mailed out.

## 2024-05-02 RX ORDER — FLUTICASONE PROPIONATE 50 MCG
SPRAY, SUSPENSION (ML) NASAL
Qty: 48 ML | Refills: 1 | Status: SHIPPED | OUTPATIENT
Start: 2024-05-02

## 2024-05-06 ENCOUNTER — HOSPITAL ENCOUNTER (OUTPATIENT)
Dept: INTERVENTIONAL RADIOLOGY/VASCULAR | Facility: HOSPITAL | Age: 74
Discharge: HOME | End: 2024-05-06
Attending: INTERNAL MEDICINE
Payer: MEDICARE

## 2024-05-06 VITALS
OXYGEN SATURATION: 97 % | SYSTOLIC BLOOD PRESSURE: 151 MMHG | RESPIRATION RATE: 15 BRPM | DIASTOLIC BLOOD PRESSURE: 62 MMHG | HEART RATE: 64 BPM

## 2024-05-06 DIAGNOSIS — R18.8 OTHER ASCITES: ICD-10-CM

## 2024-05-06 LAB
APPEARANCE FLD: CLEAR
BODY FLD TYPE: NORMAL
COLOR FLD: YELLOW
LYMPHOCYTES NFR FLD MANUAL: 48 %
MESOTHL CELL NFR FLD MANUAL: 7 %
MONOS+MACROS NFR FLD MANUAL: 43 %
NEUTROPHILS NFR FLD MANUAL: 2 %
WBC # FLD: 110 /CU MM

## 2024-05-06 PROCEDURE — P9047 ALBUMIN (HUMAN), 25%, 50ML: HCPCS | Mod: JZ,JG | Performed by: PHYSICIAN ASSISTANT

## 2024-05-06 PROCEDURE — 89051 BODY FLUID CELL COUNT: CPT | Performed by: INTERNAL MEDICINE

## 2024-05-06 PROCEDURE — 49083 ABD PARACENTESIS W/IMAGING: CPT | Performed by: RADIOLOGY

## 2024-05-06 PROCEDURE — C1729 CATH, DRAINAGE: HCPCS

## 2024-05-06 PROCEDURE — 63600175 PHARM REV CODE 636 W HCPCS: Mod: JZ,JG | Performed by: PHYSICIAN ASSISTANT

## 2024-05-06 PROCEDURE — 49083 ABD PARACENTESIS W/IMAGING: CPT | Mod: ,,, | Performed by: RADIOLOGY

## 2024-05-06 RX ORDER — ALBUMIN HUMAN 250 G/1000ML
50 SOLUTION INTRAVENOUS
Status: DISCONTINUED | OUTPATIENT
Start: 2024-05-06 | End: 2024-05-07 | Stop reason: HOSPADM

## 2024-05-06 RX ADMIN — ALBUMIN (HUMAN) 50 G: 5 SOLUTION INTRAVENOUS at 10:05

## 2024-05-06 NOTE — NURSING
PARACENTESIS    IR ultrasound guided paracentesis performed by A JONES.  Procedure explained and standing consent verified.  Time out performed.  8 L removed-   Patient received 50 g of albumin IV.  VS remained stable for duration of procedure.  Specimens collected and sent to lab if ordered.   Para and IV d/c'd, with tip intact.   Access site cleaned with peroxide, derma bond and steri-strips applied, then covered with sterile Band-Aid.   Pt discharge home in stable condition.

## 2024-05-20 ENCOUNTER — HOSPITAL ENCOUNTER (OUTPATIENT)
Dept: INTERVENTIONAL RADIOLOGY/VASCULAR | Facility: HOSPITAL | Age: 74
Discharge: HOME | End: 2024-05-20
Attending: INTERNAL MEDICINE
Payer: MEDICARE

## 2024-05-20 VITALS
OXYGEN SATURATION: 100 % | RESPIRATION RATE: 18 BRPM | TEMPERATURE: 98 F | SYSTOLIC BLOOD PRESSURE: 182 MMHG | DIASTOLIC BLOOD PRESSURE: 92 MMHG | HEART RATE: 65 BPM

## 2024-05-20 DIAGNOSIS — R18.8 OTHER ASCITES: ICD-10-CM

## 2024-05-20 DIAGNOSIS — N40.0 BENIGN PROSTATIC HYPERPLASIA, UNSPECIFIED WHETHER LOWER URINARY TRACT SYMPTOMS PRESENT: Chronic | ICD-10-CM

## 2024-05-20 LAB
APPEARANCE FLD: NORMAL
BODY FLD TYPE: NORMAL
COLOR FLD: YELLOW
LYMPHOCYTES NFR FLD MANUAL: 57 %
MESOTHL CELL NFR FLD MANUAL: 22 %
MONOS+MACROS NFR FLD MANUAL: 15 %
NEUTROPHILS NFR FLD MANUAL: 6 %
WBC # FLD: 128 /CU MM

## 2024-05-20 PROCEDURE — 49083 ABD PARACENTESIS W/IMAGING: CPT | Mod: ,,, | Performed by: PHYSICIAN ASSISTANT

## 2024-05-20 PROCEDURE — 63600175 PHARM REV CODE 636 W HCPCS: Mod: JZ,JG | Performed by: PHYSICIAN ASSISTANT

## 2024-05-20 PROCEDURE — P9047 ALBUMIN (HUMAN), 25%, 50ML: HCPCS | Mod: JZ,JG | Performed by: PHYSICIAN ASSISTANT

## 2024-05-20 PROCEDURE — C1729 CATH, DRAINAGE: HCPCS

## 2024-05-20 PROCEDURE — 49083 ABD PARACENTESIS W/IMAGING: CPT | Performed by: RADIOLOGY

## 2024-05-20 PROCEDURE — 89051 BODY FLUID CELL COUNT: CPT | Performed by: INTERNAL MEDICINE

## 2024-05-20 RX ORDER — ALBUMIN HUMAN 250 G/1000ML
50 SOLUTION INTRAVENOUS ONCE
Status: COMPLETED | OUTPATIENT
Start: 2024-05-20 | End: 2024-05-20

## 2024-05-20 RX ADMIN — ALBUMIN (HUMAN) 25 G: 5 SOLUTION INTRAVENOUS at 10:05

## 2024-05-22 RX ORDER — TAMSULOSIN HYDROCHLORIDE 0.4 MG/1
CAPSULE ORAL
Qty: 90 CAPSULE | Refills: 0 | Status: SHIPPED | OUTPATIENT
Start: 2024-05-22

## 2024-06-03 ENCOUNTER — HOSPITAL ENCOUNTER (OUTPATIENT)
Dept: INTERVENTIONAL RADIOLOGY/VASCULAR | Facility: HOSPITAL | Age: 74
Discharge: HOME | End: 2024-06-03
Attending: INTERNAL MEDICINE
Payer: MEDICARE

## 2024-06-03 VITALS
OXYGEN SATURATION: 98 % | DIASTOLIC BLOOD PRESSURE: 78 MMHG | HEART RATE: 62 BPM | RESPIRATION RATE: 16 BRPM | SYSTOLIC BLOOD PRESSURE: 161 MMHG

## 2024-06-03 DIAGNOSIS — R18.8 OTHER ASCITES: ICD-10-CM

## 2024-06-03 LAB
APPEARANCE FLD: CLEAR
BODY FLD TYPE: NORMAL
COLOR FLD: YELLOW
LYMPHOCYTES NFR FLD MANUAL: 54 %
MESOTHL CELL NFR FLD MANUAL: 11 %
MONOS+MACROS NFR FLD MANUAL: 31 %
NEUTROPHILS NFR FLD MANUAL: 4 %
WBC # FLD: 115 /CU MM

## 2024-06-03 PROCEDURE — 89051 BODY FLUID CELL COUNT: CPT | Performed by: INTERNAL MEDICINE

## 2024-06-03 PROCEDURE — 49083 ABD PARACENTESIS W/IMAGING: CPT | Performed by: RADIOLOGY

## 2024-06-03 PROCEDURE — 49083 ABD PARACENTESIS W/IMAGING: CPT | Mod: ,,, | Performed by: PHYSICIAN ASSISTANT

## 2024-06-03 PROCEDURE — C1729 CATH, DRAINAGE: HCPCS

## 2024-06-03 PROCEDURE — 63600175 PHARM REV CODE 636 W HCPCS: Mod: JZ,JG | Performed by: PHYSICIAN ASSISTANT

## 2024-06-03 PROCEDURE — P9047 ALBUMIN (HUMAN), 25%, 50ML: HCPCS | Mod: JZ,JG | Performed by: PHYSICIAN ASSISTANT

## 2024-06-03 RX ORDER — ALBUMIN HUMAN 250 G/1000ML
25 SOLUTION INTRAVENOUS
Status: DISCONTINUED | OUTPATIENT
Start: 2024-06-03 | End: 2024-06-04 | Stop reason: HOSPADM

## 2024-06-03 RX ADMIN — ALBUMIN (HUMAN) 50 G: 5 SOLUTION INTRAVENOUS at 10:06

## 2024-06-03 NOTE — NURSING
PARACENTESIS    IR ultrasound guided paracentesis performed by MIGDALIA Gutierrez NP.  Procedure explained and standing consent verified.  Time out performed.  7 L removed- RLQ  Patient received 50 g of albumin IV.  VS remained stable for duration of procedure.  Specimens collected and sent to lab if ordered.   Para and IV d/c'd, with tip intact.   Access site cleaned with peroxide, derma bond and steri-strips applied, then covered with sterile Band-Aid.   Pt discharge home in stable condition.

## 2024-06-15 ENCOUNTER — TELEPHONE (OUTPATIENT)
Dept: UROLOGY | Facility: CLINIC | Age: 74
End: 2024-06-15
Payer: MEDICARE

## 2024-06-15 DIAGNOSIS — R79.89 LOW VITAMIN B12 LEVEL: ICD-10-CM

## 2024-06-15 DIAGNOSIS — E11.9 TYPE 2 DIABETES MELLITUS WITHOUT COMPLICATION, WITHOUT LONG-TERM CURRENT USE OF INSULIN: ICD-10-CM

## 2024-06-15 DIAGNOSIS — H10.32 ACUTE CONJUNCTIVITIS OF LEFT EYE, UNSPECIFIED ACUTE CONJUNCTIVITIS TYPE: ICD-10-CM

## 2024-06-17 ENCOUNTER — HOSPITAL ENCOUNTER (OUTPATIENT)
Dept: INTERVENTIONAL RADIOLOGY/VASCULAR | Facility: HOSPITAL | Age: 74
Discharge: HOME | End: 2024-06-17
Attending: INTERNAL MEDICINE
Payer: MEDICARE

## 2024-06-17 VITALS
OXYGEN SATURATION: 100 % | HEART RATE: 60 BPM | DIASTOLIC BLOOD PRESSURE: 68 MMHG | SYSTOLIC BLOOD PRESSURE: 140 MMHG | TEMPERATURE: 98 F | RESPIRATION RATE: 16 BRPM

## 2024-06-17 DIAGNOSIS — R18.8 ASCITES OF LIVER: Primary | ICD-10-CM

## 2024-06-17 DIAGNOSIS — R18.8 OTHER ASCITES: ICD-10-CM

## 2024-06-17 LAB
APPEARANCE FLD: CLEAR
BODY FLD TYPE: NORMAL
COLOR FLD: YELLOW
LYMPHOCYTES NFR FLD MANUAL: 37 %
MESOTHL CELL NFR FLD MANUAL: 8 %
MONOS+MACROS NFR FLD MANUAL: 48 %
NEUTROPHILS NFR FLD MANUAL: 7 %
WBC # FLD: 128 /CU MM

## 2024-06-17 PROCEDURE — 63600175 PHARM REV CODE 636 W HCPCS: Mod: JZ,JG | Performed by: PHYSICIAN ASSISTANT

## 2024-06-17 PROCEDURE — C1729 CATH, DRAINAGE: HCPCS

## 2024-06-17 PROCEDURE — P9047 ALBUMIN (HUMAN), 25%, 50ML: HCPCS | Mod: JZ,JG | Performed by: PHYSICIAN ASSISTANT

## 2024-06-17 PROCEDURE — 89051 BODY FLUID CELL COUNT: CPT | Performed by: INTERNAL MEDICINE

## 2024-06-17 RX ORDER — ALBUMIN HUMAN 250 G/1000ML
25 SOLUTION INTRAVENOUS
Status: DISCONTINUED | OUTPATIENT
Start: 2024-06-17 | End: 2024-06-18 | Stop reason: HOSPADM

## 2024-06-17 RX ORDER — FINASTERIDE 5 MG/1
5 TABLET, FILM COATED ORAL
Qty: 30 TABLET | Refills: 0 | Status: SHIPPED | OUTPATIENT
Start: 2024-06-17

## 2024-06-17 RX ADMIN — ALBUMIN (HUMAN) 25 G: 5 SOLUTION INTRAVENOUS at 10:06

## 2024-06-18 NOTE — TELEPHONE ENCOUNTER
1 month supply sent as needs annual f/u    Per plan and reminder last year    resume annual follow-up, NP okay     -->> set annual with NP before needs further refill

## 2024-06-18 NOTE — TELEPHONE ENCOUNTER
Spoke with patient's wife informed him and his wife of recommendations. Verbally voiced understanding. While in the process of scheduling appointment it stated patient's insurance is out of network. Unable to schedule.

## 2024-06-19 RX ORDER — NEOMYCIN SULFATE, POLYMYXIN B SULFATE AND DEXAMETHASONE 3.5; 10000; 1 MG/ML; [USP'U]/ML; MG/ML
SUSPENSION/ DROPS OPHTHALMIC
Qty: 5 ML | Refills: 0 | OUTPATIENT
Start: 2024-06-19

## 2024-06-19 RX ORDER — GLIMEPIRIDE 4 MG/1
4 TABLET ORAL
Qty: 90 TABLET | Refills: 0 | Status: SHIPPED | OUTPATIENT
Start: 2024-06-19

## 2024-06-19 RX ORDER — CYANOCOBALAMIN 1000 UG/ML
INJECTION, SOLUTION INTRAMUSCULAR; SUBCUTANEOUS
Qty: 3 ML | Refills: 0 | Status: SHIPPED | OUTPATIENT
Start: 2024-06-19

## 2024-07-01 ENCOUNTER — HOSPITAL ENCOUNTER (OUTPATIENT)
Dept: INTERVENTIONAL RADIOLOGY/VASCULAR | Facility: HOSPITAL | Age: 74
Discharge: HOME | End: 2024-07-01
Attending: INTERNAL MEDICINE
Payer: MEDICARE

## 2024-07-01 VITALS
DIASTOLIC BLOOD PRESSURE: 79 MMHG | HEART RATE: 64 BPM | SYSTOLIC BLOOD PRESSURE: 141 MMHG | OXYGEN SATURATION: 99 % | TEMPERATURE: 97 F | RESPIRATION RATE: 20 BRPM

## 2024-07-01 DIAGNOSIS — R18.8 OTHER ASCITES: ICD-10-CM

## 2024-07-01 LAB
APPEARANCE FLD: CLEAR
BASOPHILS # BLD AUTO: 0.06 K/UL (ref 0–0.2)
BASOPHILS NFR BLD: 1.4 % (ref 0–1.9)
BODY FLD TYPE: NORMAL
COLOR FLD: YELLOW
DIFFERENTIAL METHOD BLD: ABNORMAL
EOSINOPHIL # BLD AUTO: 0.1 K/UL (ref 0–0.5)
EOSINOPHIL NFR BLD: 1.4 % (ref 0–8)
ERYTHROCYTE [DISTWIDTH] IN BLOOD BY AUTOMATED COUNT: 20.1 % (ref 11.5–14.5)
HCT VFR BLD AUTO: 33.8 % (ref 40–54)
HGB BLD-MCNC: 10.8 G/DL (ref 14–18)
IMM GRANULOCYTES # BLD AUTO: 0.01 K/UL (ref 0–0.04)
IMM GRANULOCYTES NFR BLD AUTO: 0.2 % (ref 0–0.5)
INR PPP: 1.2 (ref 0.8–1.2)
LYMPHOCYTES # BLD AUTO: 0.5 K/UL (ref 1–4.8)
LYMPHOCYTES NFR BLD: 12.6 % (ref 18–48)
LYMPHOCYTES NFR FLD MANUAL: 40 %
MCH RBC QN AUTO: 28 PG (ref 27–31)
MCHC RBC AUTO-ENTMCNC: 32 G/DL (ref 32–36)
MCV RBC AUTO: 88 FL (ref 82–98)
MESOTHL CELL NFR FLD MANUAL: 2 %
MONOCYTES # BLD AUTO: 0.3 K/UL (ref 0.3–1)
MONOCYTES NFR BLD: 6.6 % (ref 4–15)
MONOS+MACROS NFR FLD MANUAL: 47 %
NEUTROPHILS # BLD AUTO: 3.3 K/UL (ref 1.8–7.7)
NEUTROPHILS NFR BLD: 77.8 % (ref 38–73)
NEUTROPHILS NFR FLD MANUAL: 11 %
NRBC BLD-RTO: 0 /100 WBC
PLATELET # BLD AUTO: 200 K/UL (ref 150–450)
PMV BLD AUTO: 11.3 FL (ref 9.2–12.9)
PROTHROMBIN TIME: 12.4 SEC (ref 9–12.5)
RBC # BLD AUTO: 3.86 M/UL (ref 4.6–6.2)
WBC # BLD AUTO: 4.27 K/UL (ref 3.9–12.7)
WBC # FLD: 108 /CU MM

## 2024-07-01 PROCEDURE — 85610 PROTHROMBIN TIME: CPT | Performed by: PHYSICIAN ASSISTANT

## 2024-07-01 PROCEDURE — 49083 ABD PARACENTESIS W/IMAGING: CPT | Mod: ,,, | Performed by: PHYSICIAN ASSISTANT

## 2024-07-01 PROCEDURE — 49083 ABD PARACENTESIS W/IMAGING: CPT | Performed by: RADIOLOGY

## 2024-07-01 PROCEDURE — 85025 COMPLETE CBC W/AUTO DIFF WBC: CPT | Performed by: PHYSICIAN ASSISTANT

## 2024-07-01 PROCEDURE — 89051 BODY FLUID CELL COUNT: CPT | Performed by: INTERNAL MEDICINE

## 2024-07-01 PROCEDURE — C1729 CATH, DRAINAGE: HCPCS

## 2024-07-01 PROCEDURE — 36415 COLL VENOUS BLD VENIPUNCTURE: CPT | Performed by: PHYSICIAN ASSISTANT

## 2024-07-01 NOTE — NURSING
PARACENTESIS    IR ultrasound guided paracentesis performed by A JONES.  Procedure explained and standing consent verified.  Time out performed.  3 L removed-   Patient received 25 g of albumin IV.  VS remained stable for duration of procedure.  Specimens collected and sent to lab if ordered.   Para and IV d/c'd, with tip intact.   Access site cleaned with peroxide, derma bond and steri-strips applied, then covered with sterile Band-Aid.   Pt discharge home in stable condition.

## 2024-07-02 RX ORDER — LEVALBUTEROL INHALATION SOLUTION 1.25 MG/3ML
1 SOLUTION RESPIRATORY (INHALATION) EVERY 4 HOURS PRN
Qty: 75 ML | Refills: 0 | OUTPATIENT
Start: 2024-07-02

## 2024-07-15 ENCOUNTER — HOSPITAL ENCOUNTER (OUTPATIENT)
Dept: INTERVENTIONAL RADIOLOGY/VASCULAR | Facility: HOSPITAL | Age: 74
Discharge: HOME | End: 2024-07-15
Attending: INTERNAL MEDICINE
Payer: MEDICARE

## 2024-07-15 VITALS
RESPIRATION RATE: 18 BRPM | SYSTOLIC BLOOD PRESSURE: 141 MMHG | HEART RATE: 66 BPM | OXYGEN SATURATION: 99 % | DIASTOLIC BLOOD PRESSURE: 79 MMHG

## 2024-07-15 DIAGNOSIS — R18.8 OTHER ASCITES: ICD-10-CM

## 2024-07-15 LAB
APPEARANCE FLD: CLEAR
BODY FLD TYPE: NORMAL
COLOR FLD: YELLOW
LYMPHOCYTES NFR FLD MANUAL: 52 %
MESOTHL CELL NFR FLD MANUAL: 1 %
MONOS+MACROS NFR FLD MANUAL: 46 %
NEUTROPHILS NFR FLD MANUAL: 1 %
WBC # FLD: 99 /CU MM

## 2024-07-15 PROCEDURE — 89051 BODY FLUID CELL COUNT: CPT | Performed by: INTERNAL MEDICINE

## 2024-07-15 PROCEDURE — P9047 ALBUMIN (HUMAN), 25%, 50ML: HCPCS | Mod: JZ,JG | Performed by: INTERNAL MEDICINE

## 2024-07-15 PROCEDURE — C1729 CATH, DRAINAGE: HCPCS

## 2024-07-15 PROCEDURE — 49083 ABD PARACENTESIS W/IMAGING: CPT | Mod: ,,, | Performed by: PHYSICIAN ASSISTANT

## 2024-07-15 PROCEDURE — 49083 ABD PARACENTESIS W/IMAGING: CPT | Performed by: RADIOLOGY

## 2024-07-15 PROCEDURE — 63600175 PHARM REV CODE 636 W HCPCS: Mod: JZ,JG | Performed by: INTERNAL MEDICINE

## 2024-07-15 RX ORDER — ALBUMIN HUMAN 250 G/1000ML
50 SOLUTION INTRAVENOUS ONCE
Status: COMPLETED | OUTPATIENT
Start: 2024-07-15 | End: 2024-07-15

## 2024-07-15 RX ADMIN — ALBUMIN (HUMAN) 50 G: 5 SOLUTION INTRAVENOUS at 09:07

## 2024-07-29 ENCOUNTER — HOSPITAL ENCOUNTER (INPATIENT)
Facility: HOSPITAL | Age: 74
LOS: 2 days | Discharge: HOME | DRG: 947 | End: 2024-08-01
Attending: EMERGENCY MEDICINE | Admitting: STUDENT IN AN ORGANIZED HEALTH CARE EDUCATION/TRAINING PROGRAM
Payer: MEDICARE

## 2024-07-29 DIAGNOSIS — I50.22 CHRONIC SYSTOLIC HEART FAILURE: Chronic | ICD-10-CM

## 2024-07-29 DIAGNOSIS — I10 PRIMARY HYPERTENSION: ICD-10-CM

## 2024-07-29 DIAGNOSIS — R55 SYNCOPE: ICD-10-CM

## 2024-07-29 DIAGNOSIS — E11.9 TYPE 2 DIABETES MELLITUS WITHOUT COMPLICATION, WITHOUT LONG-TERM CURRENT USE OF INSULIN: ICD-10-CM

## 2024-07-29 DIAGNOSIS — R07.9 CHEST PAIN: ICD-10-CM

## 2024-07-29 PROBLEM — R79.89 ELEVATED TROPONIN: Status: ACTIVE | Noted: 2024-07-29

## 2024-07-29 PROBLEM — N17.9 ACUTE RENAL FAILURE SUPERIMPOSED ON STAGE 4 CHRONIC KIDNEY DISEASE: Status: ACTIVE | Noted: 2024-07-29

## 2024-07-29 PROBLEM — R79.89 ELEVATED LACTIC ACID LEVEL: Status: ACTIVE | Noted: 2024-07-29

## 2024-07-29 PROBLEM — N18.4 ACUTE RENAL FAILURE SUPERIMPOSED ON STAGE 4 CHRONIC KIDNEY DISEASE: Status: ACTIVE | Noted: 2024-07-29

## 2024-07-29 LAB
ALBUMIN SERPL BCP-MCNC: 2.8 G/DL (ref 3.5–5.2)
ALLENS TEST: ABNORMAL
ALP SERPL-CCNC: 79 U/L (ref 55–135)
ALT SERPL W/O P-5'-P-CCNC: 21 U/L (ref 10–44)
ANION GAP SERPL CALC-SCNC: 13 MMOL/L (ref 8–16)
AORTIC ROOT ANNULUS: 2.03 CM
AORTIC VALVE CUSP SEPERATION: 1.44 CM
APICAL FOUR CHAMBER EJECTION FRACTION: 22 %
APICAL TWO CHAMBER EJECTION FRACTION: 21 %
ASCENDING AORTA: 2.77 CM
AST SERPL-CCNC: 43 U/L (ref 10–40)
AV INDEX (PROSTH): 0.38
AV MEAN GRADIENT: 10 MMHG
AV PEAK GRADIENT: 19 MMHG
AV REGURGITATION PRESSURE HALF TIME: 438.85 MS
AV VALVE AREA BY VELOCITY RATIO: 1.44 CM²
AV VALVE AREA: 1.23 CM²
AV VELOCITY RATIO: 0.44
BASOPHILS # BLD AUTO: 0.02 K/UL (ref 0–0.2)
BASOPHILS NFR BLD: 0.3 % (ref 0–1.9)
BILIRUB SERPL-MCNC: 1.8 MG/DL (ref 0.1–1)
BSA FOR ECHO PROCEDURE: 1.91 M2
BUN SERPL-MCNC: 45 MG/DL (ref 8–23)
CALCIUM SERPL-MCNC: 9.2 MG/DL (ref 8.7–10.5)
CHLORIDE SERPL-SCNC: 112 MMOL/L (ref 95–110)
CK SERPL-CCNC: 661 U/L (ref 20–200)
CO2 SERPL-SCNC: 16 MMOL/L (ref 23–29)
CREAT SERPL-MCNC: 3 MG/DL (ref 0.5–1.4)
CV ECHO LV RWT: 0.21 CM
DELSYS: ABNORMAL
DIFFERENTIAL METHOD BLD: ABNORMAL
DOP CALC AO PEAK VEL: 2.18 M/S
DOP CALC AO VTI: 34.3 CM
DOP CALC LVOT AREA: 3.2 CM2
DOP CALC LVOT DIAMETER: 2.03 CM
DOP CALC LVOT PEAK VEL: 0.97 M/S
DOP CALC LVOT STROKE VOLUME: 42.05 CM3
DOP CALC MV VTI: 24.2 CM
DOP CALCLVOT PEAK VEL VTI: 13 CM
E WAVE DECELERATION TIME: 96.74 MSEC
E/A RATIO: 1.12
E/E' RATIO: 37.67 M/S
ECHO LV POSTERIOR WALL: 0.64 CM (ref 0.6–1.1)
EOSINOPHIL # BLD AUTO: 0 K/UL (ref 0–0.5)
EOSINOPHIL NFR BLD: 0 % (ref 0–8)
ERYTHROCYTE [DISTWIDTH] IN BLOOD BY AUTOMATED COUNT: 19.4 % (ref 11.5–14.5)
EST. GFR  (NO RACE VARIABLE): 21 ML/MIN/1.73 M^2
FIO2: 21
FRACTIONAL SHORTENING: 12 % (ref 28–44)
GLUCOSE SERPL-MCNC: 70 MG/DL (ref 70–110)
HCO3 UR-SCNC: 16.2 MMOL/L (ref 24–28)
HCT VFR BLD AUTO: 33.8 % (ref 40–54)
HGB BLD-MCNC: 10.8 G/DL (ref 14–18)
IMM GRANULOCYTES # BLD AUTO: 0.02 K/UL (ref 0–0.04)
IMM GRANULOCYTES NFR BLD AUTO: 0.3 % (ref 0–0.5)
INFLUENZA A, MOLECULAR: NEGATIVE
INFLUENZA B, MOLECULAR: NEGATIVE
INR PPP: 1.3 (ref 0.8–1.2)
INTERVENTRICULAR SEPTUM: 0.53 CM (ref 0.6–1.1)
IVRT: 129.4 MSEC
LA MAJOR: 7.17 CM
LACTATE SERPL-SCNC: 3 MMOL/L (ref 0.5–2.2)
LACTATE SERPL-SCNC: 3.1 MMOL/L (ref 0.5–2.2)
LACTATE SERPL-SCNC: 3.3 MMOL/L (ref 0.5–2.2)
LEFT ATRIUM AREA SYSTOLIC (APICAL 2 CHAMBER): 26.55 CM2
LEFT ATRIUM AREA SYSTOLIC (APICAL 4 CHAMBER): 24.2 CM2
LEFT ATRIUM VOLUME INDEX MOD: 42.6 ML/M2
LEFT ATRIUM VOLUME MOD: 82.17 CM3
LEFT INTERNAL DIMENSION IN SYSTOLE: 5.27 CM (ref 2.1–4)
LEFT VENTRICLE DIASTOLIC VOLUME INDEX: 92.45 ML/M2
LEFT VENTRICLE DIASTOLIC VOLUME: 178.43 ML
LEFT VENTRICLE END DIASTOLIC VOLUME APICAL 2 CHAMBER: 234.27 ML
LEFT VENTRICLE END DIASTOLIC VOLUME APICAL 4 CHAMBER: 142.28 ML
LEFT VENTRICLE END SYSTOLIC VOLUME APICAL 2 CHAMBER: 90.97 ML
LEFT VENTRICLE END SYSTOLIC VOLUME APICAL 4 CHAMBER: 68.38 ML
LEFT VENTRICLE MASS INDEX: 66 G/M2
LEFT VENTRICLE SYSTOLIC VOLUME INDEX: 69.2 ML/M2
LEFT VENTRICLE SYSTOLIC VOLUME: 133.6 ML
LEFT VENTRICULAR INTERNAL DIMENSION IN DIASTOLE: 5.98 CM (ref 3.5–6)
LEFT VENTRICULAR MASS: 126.8 G
LV LATERAL E/E' RATIO: 37.67 M/S
LV SEPTAL E/E' RATIO: 37.67 M/S
LVED V (TEICH): 178.43 ML
LVES V (TEICH): 133.6 ML
LVOT MG: 1.61 MMHG
LVOT MV: 0.53 CM/S
LYMPHOCYTES # BLD AUTO: 0.5 K/UL (ref 1–4.8)
LYMPHOCYTES NFR BLD: 7 % (ref 18–48)
MAGNESIUM SERPL-MCNC: 2.2 MG/DL (ref 1.6–2.6)
MCH RBC QN AUTO: 28.2 PG (ref 27–31)
MCHC RBC AUTO-ENTMCNC: 32 G/DL (ref 32–36)
MCV RBC AUTO: 88 FL (ref 82–98)
MODE: ABNORMAL
MONOCYTES # BLD AUTO: 0.5 K/UL (ref 0.3–1)
MONOCYTES NFR BLD: 6.8 % (ref 4–15)
MR PISA EROA: 0.11 CM2
MV A" WAVE DURATION": 91.34 MSEC
MV MEAN GRADIENT: 3 MMHG
MV PEAK A VEL: 1.01 M/S
MV PEAK E VEL: 1.13 M/S
MV PEAK GRADIENT: 6 MMHG
MV STENOSIS PRESSURE HALF TIME: 37.06 MS
MV VALVE AREA BY CONTINUITY EQUATION: 1.74 CM2
MV VALVE AREA P 1/2 METHOD: 5.94 CM2
NEUTROPHILS # BLD AUTO: 5.8 K/UL (ref 1.8–7.7)
NEUTROPHILS NFR BLD: 85.6 % (ref 38–73)
NRBC BLD-RTO: 0 /100 WBC
OHS CV RV/LV RATIO: 0.69 CM
OHS LV EJECTION FRACTION SIMPSONS BIPLANE MOD: 22 %
PCO2 BLDA: 31 MMHG (ref 35–45)
PH SMN: 7.33 [PH] (ref 7.35–7.45)
PISA AR MAX VEL: 4.88 M/S
PISA MRMAX VEL: 5.63 M/S
PISA RADIUS: 0.51 CM
PISA TR MAX VEL: 3.73 M/S
PISA VN NYQUIST MS: 0.37 M/S
PISA VN NYQUIST: 0.37 M/S
PLATELET # BLD AUTO: 218 K/UL (ref 150–450)
PMV BLD AUTO: 10.9 FL (ref 9.2–12.9)
PO2 BLDA: 28 MMHG (ref 40–60)
POC BE: -10 MMOL/L
POC SATURATED O2: 50 % (ref 95–100)
POC TCO2: 17 MMOL/L (ref 24–29)
POCT GLUCOSE: 76 MG/DL (ref 70–110)
POCT GLUCOSE: 98 MG/DL (ref 70–110)
POTASSIUM SERPL-SCNC: 4.9 MMOL/L (ref 3.5–5.1)
PROT SERPL-MCNC: 7.1 G/DL (ref 6–8.4)
PROTHROMBIN TIME: 13.8 SEC (ref 9–12.5)
PV MV: 0.32 M/S
PV PEAK GRADIENT: 1 MMHG
PV PEAK VELOCITY: 0.52 M/S
RA MAJOR: 5.94 CM
RA PRESSURE ESTIMATED: 15 MMHG
RBC # BLD AUTO: 3.83 M/UL (ref 4.6–6.2)
RIGHT VENTRICLE DIASTOLIC BASEL DIMENSION: 4.5 CM
RIGHT VENTRICLE DIASTOLIC LENGTH: 7.2 CM
RIGHT VENTRICULAR END-DIASTOLIC DIMENSION: 4.15 CM
RIGHT VENTRICULAR LENGTH IN DIASTOLE (APICAL 4-CHAMBER VIEW): 7.19 CM
RV TB RVSP: 19 MMHG
RV TISSUE DOPPLER FREE WALL SYSTOLIC VELOCITY 1 (APICAL 4 CHAMBER VIEW): 4.45 CM/S
SAMPLE: ABNORMAL
SARS-COV-2 RDRP RESP QL NAA+PROBE: NEGATIVE
SITE: ABNORMAL
SODIUM SERPL-SCNC: 141 MMOL/L (ref 136–145)
SPECIMEN SOURCE: NORMAL
STJ: 2.56 CM
TDI LATERAL: 0.03 M/S
TDI SEPTAL: 0.03 M/S
TDI: 0.03 M/S
TR MAX PG: 56 MMHG
TRICUSPID ANNULAR PLANE SYSTOLIC EXCURSION: 1.16 CM
TROPONIN I SERPL DL<=0.01 NG/ML-MCNC: 0.2 NG/ML (ref 0–0.03)
TROPONIN I SERPL DL<=0.01 NG/ML-MCNC: 0.22 NG/ML (ref 0–0.03)
TROPONIN I SERPL DL<=0.01 NG/ML-MCNC: 0.24 NG/ML (ref 0–0.03)
TV REST PULMONARY ARTERY PRESSURE: 71 MMHG
WBC # BLD AUTO: 6.74 K/UL (ref 3.9–12.7)
Z-SCORE OF LEFT VENTRICULAR DIMENSION IN END DIASTOLE: 0.9
Z-SCORE OF LEFT VENTRICULAR DIMENSION IN END SYSTOLE: 3.55

## 2024-07-29 PROCEDURE — 84484 ASSAY OF TROPONIN QUANT: CPT | Mod: 91

## 2024-07-29 PROCEDURE — 51798 US URINE CAPACITY MEASURE: CPT

## 2024-07-29 PROCEDURE — 82803 BLOOD GASES ANY COMBINATION: CPT

## 2024-07-29 PROCEDURE — 94799 UNLISTED PULMONARY SVC/PX: CPT

## 2024-07-29 PROCEDURE — G0378 HOSPITAL OBSERVATION PER HR: HCPCS

## 2024-07-29 PROCEDURE — 85610 PROTHROMBIN TIME: CPT

## 2024-07-29 PROCEDURE — 63600175 PHARM REV CODE 636 W HCPCS

## 2024-07-29 PROCEDURE — 80053 COMPREHEN METABOLIC PANEL: CPT | Performed by: EMERGENCY MEDICINE

## 2024-07-29 PROCEDURE — 83735 ASSAY OF MAGNESIUM: CPT

## 2024-07-29 PROCEDURE — 87040 BLOOD CULTURE FOR BACTERIA: CPT | Mod: 59 | Performed by: EMERGENCY MEDICINE

## 2024-07-29 PROCEDURE — 82550 ASSAY OF CK (CPK): CPT

## 2024-07-29 PROCEDURE — 96361 HYDRATE IV INFUSION ADD-ON: CPT

## 2024-07-29 PROCEDURE — 87502 INFLUENZA DNA AMP PROBE: CPT | Performed by: EMERGENCY MEDICINE

## 2024-07-29 PROCEDURE — 85025 COMPLETE CBC W/AUTO DIFF WBC: CPT | Performed by: EMERGENCY MEDICINE

## 2024-07-29 PROCEDURE — 99900035 HC TECH TIME PER 15 MIN (STAT)

## 2024-07-29 PROCEDURE — 25000003 PHARM REV CODE 250

## 2024-07-29 PROCEDURE — 94761 N-INVAS EAR/PLS OXIMETRY MLT: CPT | Mod: XB

## 2024-07-29 PROCEDURE — 82962 GLUCOSE BLOOD TEST: CPT

## 2024-07-29 PROCEDURE — 96375 TX/PRO/DX INJ NEW DRUG ADDON: CPT

## 2024-07-29 PROCEDURE — 99900031 HC PATIENT EDUCATION (STAT)

## 2024-07-29 PROCEDURE — 83605 ASSAY OF LACTIC ACID: CPT | Performed by: EMERGENCY MEDICINE

## 2024-07-29 PROCEDURE — 99291 CRITICAL CARE FIRST HOUR: CPT

## 2024-07-29 PROCEDURE — 36415 COLL VENOUS BLD VENIPUNCTURE: CPT | Performed by: STUDENT IN AN ORGANIZED HEALTH CARE EDUCATION/TRAINING PROGRAM

## 2024-07-29 PROCEDURE — 93010 ELECTROCARDIOGRAM REPORT: CPT | Mod: ,,, | Performed by: GENERAL PRACTICE

## 2024-07-29 PROCEDURE — 36415 COLL VENOUS BLD VENIPUNCTURE: CPT

## 2024-07-29 PROCEDURE — 94640 AIRWAY INHALATION TREATMENT: CPT

## 2024-07-29 PROCEDURE — U0002 COVID-19 LAB TEST NON-CDC: HCPCS | Performed by: EMERGENCY MEDICINE

## 2024-07-29 PROCEDURE — 83605 ASSAY OF LACTIC ACID: CPT | Mod: 91

## 2024-07-29 PROCEDURE — 93005 ELECTROCARDIOGRAM TRACING: CPT

## 2024-07-29 PROCEDURE — 83605 ASSAY OF LACTIC ACID: CPT | Mod: 91 | Performed by: STUDENT IN AN ORGANIZED HEALTH CARE EDUCATION/TRAINING PROGRAM

## 2024-07-29 PROCEDURE — 25000242 PHARM REV CODE 250 ALT 637 W/ HCPCS

## 2024-07-29 PROCEDURE — 84484 ASSAY OF TROPONIN QUANT: CPT | Performed by: EMERGENCY MEDICINE

## 2024-07-29 RX ORDER — PROCHLORPERAZINE EDISYLATE 5 MG/ML
5 INJECTION INTRAMUSCULAR; INTRAVENOUS EVERY 6 HOURS PRN
Status: DISCONTINUED | OUTPATIENT
Start: 2024-07-29 | End: 2024-08-01 | Stop reason: HOSPADM

## 2024-07-29 RX ORDER — GLUCAGON 1 MG
1 KIT INJECTION
Status: DISCONTINUED | OUTPATIENT
Start: 2024-07-29 | End: 2024-08-01 | Stop reason: HOSPADM

## 2024-07-29 RX ORDER — FINASTERIDE 5 MG/1
5 TABLET, FILM COATED ORAL DAILY
Status: DISCONTINUED | OUTPATIENT
Start: 2024-07-30 | End: 2024-08-01 | Stop reason: HOSPADM

## 2024-07-29 RX ORDER — FUROSEMIDE 40 MG/1
40 TABLET ORAL DAILY
Status: DISCONTINUED | OUTPATIENT
Start: 2024-07-29 | End: 2024-07-29

## 2024-07-29 RX ORDER — INSULIN ASPART 100 [IU]/ML
0-5 INJECTION, SOLUTION INTRAVENOUS; SUBCUTANEOUS
Status: DISCONTINUED | OUTPATIENT
Start: 2024-07-29 | End: 2024-08-01 | Stop reason: HOSPADM

## 2024-07-29 RX ORDER — ARFORMOTEROL TARTRATE 15 UG/2ML
15 SOLUTION RESPIRATORY (INHALATION) 2 TIMES DAILY
Status: DISCONTINUED | OUTPATIENT
Start: 2024-07-29 | End: 2024-08-01 | Stop reason: HOSPADM

## 2024-07-29 RX ORDER — NALOXONE HCL 0.4 MG/ML
0.02 VIAL (ML) INJECTION
Status: DISCONTINUED | OUTPATIENT
Start: 2024-07-29 | End: 2024-08-01 | Stop reason: HOSPADM

## 2024-07-29 RX ORDER — ATORVASTATIN CALCIUM 40 MG/1
40 TABLET, FILM COATED ORAL NIGHTLY
Status: DISCONTINUED | OUTPATIENT
Start: 2024-07-29 | End: 2024-08-01 | Stop reason: HOSPADM

## 2024-07-29 RX ORDER — IBUPROFEN 200 MG
16 TABLET ORAL
Status: DISCONTINUED | OUTPATIENT
Start: 2024-07-29 | End: 2024-08-01 | Stop reason: HOSPADM

## 2024-07-29 RX ORDER — SODIUM CHLORIDE 0.9 % (FLUSH) 0.9 %
10 SYRINGE (ML) INJECTION EVERY 12 HOURS PRN
Status: DISCONTINUED | OUTPATIENT
Start: 2024-07-29 | End: 2024-08-01 | Stop reason: HOSPADM

## 2024-07-29 RX ORDER — FUROSEMIDE 10 MG/ML
40 INJECTION INTRAMUSCULAR; INTRAVENOUS ONCE
Status: COMPLETED | OUTPATIENT
Start: 2024-07-29 | End: 2024-07-29

## 2024-07-29 RX ORDER — BUDESONIDE 0.5 MG/2ML
0.5 INHALANT ORAL EVERY 12 HOURS
Status: DISCONTINUED | OUTPATIENT
Start: 2024-07-29 | End: 2024-08-01 | Stop reason: HOSPADM

## 2024-07-29 RX ORDER — IBUPROFEN 600 MG/1
600 TABLET ORAL EVERY 6 HOURS PRN
Status: DISCONTINUED | OUTPATIENT
Start: 2024-07-29 | End: 2024-08-01 | Stop reason: HOSPADM

## 2024-07-29 RX ORDER — SIMETHICONE 80 MG
1 TABLET,CHEWABLE ORAL 4 TIMES DAILY PRN
Status: DISCONTINUED | OUTPATIENT
Start: 2024-07-29 | End: 2024-08-01 | Stop reason: HOSPADM

## 2024-07-29 RX ORDER — LEVOTHYROXINE SODIUM 100 UG/1
100 TABLET ORAL
Status: DISCONTINUED | OUTPATIENT
Start: 2024-07-30 | End: 2024-08-01 | Stop reason: HOSPADM

## 2024-07-29 RX ORDER — IBUPROFEN 200 MG
24 TABLET ORAL
Status: DISCONTINUED | OUTPATIENT
Start: 2024-07-29 | End: 2024-08-01 | Stop reason: HOSPADM

## 2024-07-29 RX ORDER — METOPROLOL SUCCINATE 25 MG/1
25 TABLET, EXTENDED RELEASE ORAL DAILY
Status: DISCONTINUED | OUTPATIENT
Start: 2024-07-30 | End: 2024-08-01 | Stop reason: HOSPADM

## 2024-07-29 RX ORDER — ONDANSETRON HYDROCHLORIDE 2 MG/ML
4 INJECTION, SOLUTION INTRAVENOUS EVERY 8 HOURS PRN
Status: DISCONTINUED | OUTPATIENT
Start: 2024-07-29 | End: 2024-08-01 | Stop reason: HOSPADM

## 2024-07-29 RX ORDER — CLOPIDOGREL BISULFATE 75 MG/1
75 TABLET ORAL DAILY
Status: DISCONTINUED | OUTPATIENT
Start: 2024-07-30 | End: 2024-08-01 | Stop reason: HOSPADM

## 2024-07-29 RX ORDER — GABAPENTIN 300 MG/1
300 CAPSULE ORAL NIGHTLY
Status: DISCONTINUED | OUTPATIENT
Start: 2024-07-29 | End: 2024-08-01 | Stop reason: HOSPADM

## 2024-07-29 RX ORDER — FLUTICASONE FUROATE AND VILANTEROL 100; 25 UG/1; UG/1
1 POWDER RESPIRATORY (INHALATION) DAILY
Status: DISCONTINUED | OUTPATIENT
Start: 2024-07-30 | End: 2024-07-29

## 2024-07-29 RX ORDER — IPRATROPIUM BROMIDE AND ALBUTEROL SULFATE 2.5; .5 MG/3ML; MG/3ML
3 SOLUTION RESPIRATORY (INHALATION) EVERY 6 HOURS PRN
Status: DISCONTINUED | OUTPATIENT
Start: 2024-07-29 | End: 2024-08-01 | Stop reason: HOSPADM

## 2024-07-29 RX ORDER — ALUMINUM HYDROXIDE, MAGNESIUM HYDROXIDE, AND SIMETHICONE 2400; 240; 2400 MG/30ML; MG/30ML; MG/30ML
15 SUSPENSION ORAL 4 TIMES DAILY PRN
Status: DISCONTINUED | OUTPATIENT
Start: 2024-07-29 | End: 2024-08-01 | Stop reason: HOSPADM

## 2024-07-29 RX ORDER — ASPIRIN 81 MG/1
81 TABLET ORAL DAILY
Status: DISCONTINUED | OUTPATIENT
Start: 2024-07-30 | End: 2024-08-01 | Stop reason: HOSPADM

## 2024-07-29 RX ADMIN — BUDESONIDE INHALATION 0.5 MG: 0.5 SUSPENSION RESPIRATORY (INHALATION) at 07:07

## 2024-07-29 RX ADMIN — ARFORMOTEROL TARTRATE 15 MCG: 15 SOLUTION RESPIRATORY (INHALATION) at 07:07

## 2024-07-29 RX ADMIN — GABAPENTIN 300 MG: 300 CAPSULE ORAL at 08:07

## 2024-07-29 RX ADMIN — SACUBITRIL AND VALSARTAN 1 TABLET: 24; 26 TABLET, FILM COATED ORAL at 08:07

## 2024-07-29 RX ADMIN — ATORVASTATIN CALCIUM 40 MG: 40 TABLET, FILM COATED ORAL at 08:07

## 2024-07-29 RX ADMIN — SODIUM CHLORIDE, POTASSIUM CHLORIDE, SODIUM LACTATE AND CALCIUM CHLORIDE 500 ML: 600; 310; 30; 20 INJECTION, SOLUTION INTRAVENOUS at 11:07

## 2024-07-29 RX ADMIN — FUROSEMIDE 40 MG: 10 INJECTION, SOLUTION INTRAMUSCULAR; INTRAVENOUS at 11:07

## 2024-07-29 RX ADMIN — SODIUM CHLORIDE 500 ML: 9 INJECTION, SOLUTION INTRAVENOUS at 06:07

## 2024-07-30 PROBLEM — R79.89 ELEVATED LACTIC ACID LEVEL: Status: RESOLVED | Noted: 2024-07-29 | Resolved: 2024-07-30

## 2024-07-30 PROBLEM — G93.41 ENCEPHALOPATHY, METABOLIC: Status: ACTIVE | Noted: 2024-07-30

## 2024-07-30 LAB
ALBUMIN SERPL BCP-MCNC: 2.5 G/DL (ref 3.5–5.2)
ALP SERPL-CCNC: 75 U/L (ref 55–135)
ALT SERPL W/O P-5'-P-CCNC: 58 U/L (ref 10–44)
AMMONIA PLAS-SCNC: 40 UMOL/L (ref 10–50)
ANION GAP SERPL CALC-SCNC: 11 MMOL/L (ref 8–16)
AST SERPL-CCNC: 89 U/L (ref 10–40)
BILIRUB SERPL-MCNC: 1.4 MG/DL (ref 0.1–1)
BILIRUB UR QL STRIP: NEGATIVE
BNP SERPL-MCNC: >4900 PG/ML (ref 0–99)
BUN SERPL-MCNC: 52 MG/DL (ref 8–23)
CALCIUM SERPL-MCNC: 8.6 MG/DL (ref 8.7–10.5)
CHLORIDE SERPL-SCNC: 114 MMOL/L (ref 95–110)
CLARITY UR: CLEAR
CO2 SERPL-SCNC: 15 MMOL/L (ref 23–29)
COLOR UR: YELLOW
CREAT SERPL-MCNC: 3.1 MG/DL (ref 0.5–1.4)
ERYTHROCYTE [DISTWIDTH] IN BLOOD BY AUTOMATED COUNT: 19.1 % (ref 11.5–14.5)
EST. GFR  (NO RACE VARIABLE): 20 ML/MIN/1.73 M^2
GLUCOSE SERPL-MCNC: 59 MG/DL (ref 70–110)
GLUCOSE UR QL STRIP: NEGATIVE
HCT VFR BLD AUTO: 31.4 % (ref 40–54)
HGB BLD-MCNC: 10.5 G/DL (ref 14–18)
HGB UR QL STRIP: NEGATIVE
KETONES UR QL STRIP: NEGATIVE
LACTATE SERPL-SCNC: 1.7 MMOL/L (ref 0.5–2.2)
LEUKOCYTE ESTERASE UR QL STRIP: NEGATIVE
MAGNESIUM SERPL-MCNC: 2.6 MG/DL (ref 1.6–2.6)
MCH RBC QN AUTO: 28.8 PG (ref 27–31)
MCHC RBC AUTO-ENTMCNC: 33.4 G/DL (ref 32–36)
MCV RBC AUTO: 86 FL (ref 82–98)
NITRITE UR QL STRIP: NEGATIVE
PH UR STRIP: 5 [PH] (ref 5–8)
PHOSPHATE SERPL-MCNC: 4.8 MG/DL (ref 2.7–4.5)
PLATELET # BLD AUTO: 218 K/UL (ref 150–450)
PMV BLD AUTO: 11.5 FL (ref 9.2–12.9)
POCT GLUCOSE: 130 MG/DL (ref 70–110)
POCT GLUCOSE: 134 MG/DL (ref 70–110)
POCT GLUCOSE: 143 MG/DL (ref 70–110)
POCT GLUCOSE: 166 MG/DL (ref 70–110)
POCT GLUCOSE: 67 MG/DL (ref 70–110)
POTASSIUM SERPL-SCNC: 4.6 MMOL/L (ref 3.5–5.1)
PROT SERPL-MCNC: 6.3 G/DL (ref 6–8.4)
PROT UR QL STRIP: ABNORMAL
RBC # BLD AUTO: 3.65 M/UL (ref 4.6–6.2)
SODIUM SERPL-SCNC: 140 MMOL/L (ref 136–145)
SP GR UR STRIP: 1.01 (ref 1–1.03)
URN SPEC COLLECT METH UR: ABNORMAL
UROBILINOGEN UR STRIP-ACNC: NEGATIVE EU/DL
WBC # BLD AUTO: 5.65 K/UL (ref 3.9–12.7)

## 2024-07-30 PROCEDURE — 96365 THER/PROPH/DIAG IV INF INIT: CPT

## 2024-07-30 PROCEDURE — 36415 COLL VENOUS BLD VENIPUNCTURE: CPT | Performed by: STUDENT IN AN ORGANIZED HEALTH CARE EDUCATION/TRAINING PROGRAM

## 2024-07-30 PROCEDURE — 0W9G3ZZ DRAINAGE OF PERITONEAL CAVITY, PERCUTANEOUS APPROACH: ICD-10-PCS | Performed by: STUDENT IN AN ORGANIZED HEALTH CARE EDUCATION/TRAINING PROGRAM

## 2024-07-30 PROCEDURE — 25000003 PHARM REV CODE 250

## 2024-07-30 PROCEDURE — P9047 ALBUMIN (HUMAN), 25%, 50ML: HCPCS | Mod: JZ,JG | Performed by: RADIOLOGY

## 2024-07-30 PROCEDURE — 82140 ASSAY OF AMMONIA: CPT | Performed by: STUDENT IN AN ORGANIZED HEALTH CARE EDUCATION/TRAINING PROGRAM

## 2024-07-30 PROCEDURE — 80053 COMPREHEN METABOLIC PANEL: CPT

## 2024-07-30 PROCEDURE — 81003 URINALYSIS AUTO W/O SCOPE: CPT

## 2024-07-30 PROCEDURE — 99900031 HC PATIENT EDUCATION (STAT)

## 2024-07-30 PROCEDURE — 94640 AIRWAY INHALATION TREATMENT: CPT

## 2024-07-30 PROCEDURE — 36415 COLL VENOUS BLD VENIPUNCTURE: CPT

## 2024-07-30 PROCEDURE — 94761 N-INVAS EAR/PLS OXIMETRY MLT: CPT

## 2024-07-30 PROCEDURE — 63600175 PHARM REV CODE 636 W HCPCS: Mod: JZ,JG | Performed by: RADIOLOGY

## 2024-07-30 PROCEDURE — 11000001 HC ACUTE MED/SURG PRIVATE ROOM

## 2024-07-30 PROCEDURE — 25000003 PHARM REV CODE 250: Performed by: STUDENT IN AN ORGANIZED HEALTH CARE EDUCATION/TRAINING PROGRAM

## 2024-07-30 PROCEDURE — 85027 COMPLETE CBC AUTOMATED: CPT

## 2024-07-30 PROCEDURE — 83605 ASSAY OF LACTIC ACID: CPT | Performed by: STUDENT IN AN ORGANIZED HEALTH CARE EDUCATION/TRAINING PROGRAM

## 2024-07-30 PROCEDURE — 96361 HYDRATE IV INFUSION ADD-ON: CPT

## 2024-07-30 PROCEDURE — 83880 ASSAY OF NATRIURETIC PEPTIDE: CPT | Performed by: STUDENT IN AN ORGANIZED HEALTH CARE EDUCATION/TRAINING PROGRAM

## 2024-07-30 PROCEDURE — 83735 ASSAY OF MAGNESIUM: CPT

## 2024-07-30 PROCEDURE — 84100 ASSAY OF PHOSPHORUS: CPT

## 2024-07-30 PROCEDURE — 25000242 PHARM REV CODE 250 ALT 637 W/ HCPCS

## 2024-07-30 RX ORDER — MUPIROCIN 20 MG/G
OINTMENT TOPICAL 2 TIMES DAILY
Status: DISCONTINUED | OUTPATIENT
Start: 2024-07-30 | End: 2024-08-01 | Stop reason: HOSPADM

## 2024-07-30 RX ORDER — ALBUMIN HUMAN 250 G/1000ML
25 SOLUTION INTRAVENOUS ONCE
Status: COMPLETED | OUTPATIENT
Start: 2024-07-30 | End: 2024-07-30

## 2024-07-30 RX ADMIN — LINACLOTIDE 72 MCG: 72 CAPSULE, GELATIN COATED ORAL at 06:07

## 2024-07-30 RX ADMIN — ASPIRIN 81 MG: 81 TABLET, COATED ORAL at 08:07

## 2024-07-30 RX ADMIN — ARFORMOTEROL TARTRATE 15 MCG: 15 SOLUTION RESPIRATORY (INHALATION) at 07:07

## 2024-07-30 RX ADMIN — BUDESONIDE INHALATION 0.5 MG: 0.5 SUSPENSION RESPIRATORY (INHALATION) at 07:07

## 2024-07-30 RX ADMIN — ALBUMIN (HUMAN) 25 G: 0.25 INJECTION, SOLUTION INTRAVENOUS at 01:07

## 2024-07-30 RX ADMIN — CLOPIDOGREL BISULFATE 75 MG: 75 TABLET, FILM COATED ORAL at 08:07

## 2024-07-30 RX ADMIN — Medication 16 G: at 05:07

## 2024-07-30 RX ADMIN — MUPIROCIN 1 G: 20 OINTMENT TOPICAL at 08:07

## 2024-07-30 RX ADMIN — ATORVASTATIN CALCIUM 40 MG: 40 TABLET, FILM COATED ORAL at 08:07

## 2024-07-30 RX ADMIN — FINASTERIDE 5 MG: 5 TABLET, FILM COATED ORAL at 08:07

## 2024-07-30 RX ADMIN — SACUBITRIL AND VALSARTAN 1 TABLET: 24; 26 TABLET, FILM COATED ORAL at 08:07

## 2024-07-30 RX ADMIN — LEVOTHYROXINE SODIUM 100 MCG: 0.1 TABLET ORAL at 06:07

## 2024-07-30 RX ADMIN — METOPROLOL SUCCINATE 25 MG: 25 TABLET, EXTENDED RELEASE ORAL at 08:07

## 2024-07-30 RX ADMIN — GABAPENTIN 300 MG: 300 CAPSULE ORAL at 08:07

## 2024-07-31 PROBLEM — G93.41 ENCEPHALOPATHY, METABOLIC: Status: RESOLVED | Noted: 2024-07-30 | Resolved: 2024-07-31

## 2024-07-31 LAB
ALBUMIN SERPL BCP-MCNC: 2.6 G/DL (ref 3.5–5.2)
ALP SERPL-CCNC: 64 U/L (ref 55–135)
ALT SERPL W/O P-5'-P-CCNC: 58 U/L (ref 10–44)
ANION GAP SERPL CALC-SCNC: 9 MMOL/L (ref 8–16)
AST SERPL-CCNC: 57 U/L (ref 10–40)
BILIRUB SERPL-MCNC: 1 MG/DL (ref 0.1–1)
BUN SERPL-MCNC: 50 MG/DL (ref 8–23)
CALCIUM SERPL-MCNC: 8.2 MG/DL (ref 8.7–10.5)
CHLORIDE SERPL-SCNC: 114 MMOL/L (ref 95–110)
CO2 SERPL-SCNC: 16 MMOL/L (ref 23–29)
CREAT SERPL-MCNC: 2.8 MG/DL (ref 0.5–1.4)
ERYTHROCYTE [DISTWIDTH] IN BLOOD BY AUTOMATED COUNT: 18.9 % (ref 11.5–14.5)
EST. GFR  (NO RACE VARIABLE): 23 ML/MIN/1.73 M^2
GLUCOSE SERPL-MCNC: 99 MG/DL (ref 70–110)
HCT VFR BLD AUTO: 32.7 % (ref 40–54)
HGB BLD-MCNC: 10.7 G/DL (ref 14–18)
MAGNESIUM SERPL-MCNC: 2.1 MG/DL (ref 1.6–2.6)
MCH RBC QN AUTO: 28.2 PG (ref 27–31)
MCHC RBC AUTO-ENTMCNC: 32.7 G/DL (ref 32–36)
MCV RBC AUTO: 86 FL (ref 82–98)
PHOSPHATE SERPL-MCNC: 3.3 MG/DL (ref 2.7–4.5)
PLATELET # BLD AUTO: 184 K/UL (ref 150–450)
PMV BLD AUTO: 10.4 FL (ref 9.2–12.9)
POCT GLUCOSE: 111 MG/DL (ref 70–110)
POCT GLUCOSE: 137 MG/DL (ref 70–110)
POCT GLUCOSE: 176 MG/DL (ref 70–110)
POCT GLUCOSE: 87 MG/DL (ref 70–110)
POTASSIUM SERPL-SCNC: 4.2 MMOL/L (ref 3.5–5.1)
PROT SERPL-MCNC: 5.8 G/DL (ref 6–8.4)
RBC # BLD AUTO: 3.8 M/UL (ref 4.6–6.2)
SODIUM SERPL-SCNC: 139 MMOL/L (ref 136–145)
WBC # BLD AUTO: 8.01 K/UL (ref 3.9–12.7)

## 2024-07-31 PROCEDURE — 51798 US URINE CAPACITY MEASURE: CPT

## 2024-07-31 PROCEDURE — 97116 GAIT TRAINING THERAPY: CPT

## 2024-07-31 PROCEDURE — 94760 N-INVAS EAR/PLS OXIMETRY 1: CPT

## 2024-07-31 PROCEDURE — 25000242 PHARM REV CODE 250 ALT 637 W/ HCPCS

## 2024-07-31 PROCEDURE — 11000001 HC ACUTE MED/SURG PRIVATE ROOM

## 2024-07-31 PROCEDURE — 99233 SBSQ HOSP IP/OBS HIGH 50: CPT | Mod: ,,, | Performed by: INTERNAL MEDICINE

## 2024-07-31 PROCEDURE — 94761 N-INVAS EAR/PLS OXIMETRY MLT: CPT

## 2024-07-31 PROCEDURE — 25000003 PHARM REV CODE 250: Performed by: STUDENT IN AN ORGANIZED HEALTH CARE EDUCATION/TRAINING PROGRAM

## 2024-07-31 PROCEDURE — 85027 COMPLETE CBC AUTOMATED: CPT

## 2024-07-31 PROCEDURE — 84100 ASSAY OF PHOSPHORUS: CPT

## 2024-07-31 PROCEDURE — 94640 AIRWAY INHALATION TREATMENT: CPT

## 2024-07-31 PROCEDURE — 80053 COMPREHEN METABOLIC PANEL: CPT

## 2024-07-31 PROCEDURE — 36415 COLL VENOUS BLD VENIPUNCTURE: CPT

## 2024-07-31 PROCEDURE — 97161 PT EVAL LOW COMPLEX 20 MIN: CPT

## 2024-07-31 PROCEDURE — 83735 ASSAY OF MAGNESIUM: CPT

## 2024-07-31 PROCEDURE — 25000003 PHARM REV CODE 250

## 2024-07-31 RX ORDER — TAMSULOSIN HYDROCHLORIDE 0.4 MG/1
0.4 CAPSULE ORAL DAILY
Status: DISCONTINUED | OUTPATIENT
Start: 2024-07-31 | End: 2024-08-01 | Stop reason: HOSPADM

## 2024-07-31 RX ADMIN — GABAPENTIN 300 MG: 300 CAPSULE ORAL at 08:07

## 2024-07-31 RX ADMIN — TAMSULOSIN HYDROCHLORIDE 0.4 MG: 0.4 CAPSULE ORAL at 03:07

## 2024-07-31 RX ADMIN — MUPIROCIN 1 G: 20 OINTMENT TOPICAL at 08:07

## 2024-07-31 RX ADMIN — ARFORMOTEROL TARTRATE 15 MCG: 15 SOLUTION RESPIRATORY (INHALATION) at 06:07

## 2024-07-31 RX ADMIN — FINASTERIDE 5 MG: 5 TABLET, FILM COATED ORAL at 08:07

## 2024-07-31 RX ADMIN — ATORVASTATIN CALCIUM 40 MG: 40 TABLET, FILM COATED ORAL at 08:07

## 2024-07-31 RX ADMIN — BUDESONIDE INHALATION 0.5 MG: 0.5 SUSPENSION RESPIRATORY (INHALATION) at 07:07

## 2024-07-31 RX ADMIN — LEVOTHYROXINE SODIUM 100 MCG: 0.1 TABLET ORAL at 05:07

## 2024-07-31 RX ADMIN — CLOPIDOGREL BISULFATE 75 MG: 75 TABLET, FILM COATED ORAL at 08:07

## 2024-07-31 RX ADMIN — LINACLOTIDE 72 MCG: 72 CAPSULE, GELATIN COATED ORAL at 05:07

## 2024-07-31 RX ADMIN — BUDESONIDE INHALATION 0.5 MG: 0.5 SUSPENSION RESPIRATORY (INHALATION) at 06:07

## 2024-07-31 RX ADMIN — ARFORMOTEROL TARTRATE 15 MCG: 15 SOLUTION RESPIRATORY (INHALATION) at 07:07

## 2024-07-31 RX ADMIN — ASPIRIN 81 MG: 81 TABLET, COATED ORAL at 08:07

## 2024-07-31 RX ADMIN — METOPROLOL SUCCINATE 25 MG: 25 TABLET, EXTENDED RELEASE ORAL at 08:07

## 2024-08-01 VITALS
HEIGHT: 72 IN | DIASTOLIC BLOOD PRESSURE: 57 MMHG | TEMPERATURE: 98 F | RESPIRATION RATE: 18 BRPM | SYSTOLIC BLOOD PRESSURE: 117 MMHG | BODY MASS INDEX: 22.09 KG/M2 | HEART RATE: 60 BPM | WEIGHT: 163.13 LBS | OXYGEN SATURATION: 99 %

## 2024-08-01 LAB
ALBUMIN SERPL BCP-MCNC: 2.1 G/DL (ref 3.5–5.2)
ALP SERPL-CCNC: 59 U/L (ref 55–135)
ALT SERPL W/O P-5'-P-CCNC: 47 U/L (ref 10–44)
ANION GAP SERPL CALC-SCNC: 6 MMOL/L (ref 8–16)
AST SERPL-CCNC: 39 U/L (ref 10–40)
BILIRUB SERPL-MCNC: 0.5 MG/DL (ref 0.1–1)
BNP SERPL-MCNC: 3926 PG/ML (ref 0–99)
BUN SERPL-MCNC: 44 MG/DL (ref 8–23)
CALCIUM SERPL-MCNC: 7.5 MG/DL (ref 8.7–10.5)
CHLORIDE SERPL-SCNC: 114 MMOL/L (ref 95–110)
CO2 SERPL-SCNC: 18 MMOL/L (ref 23–29)
CREAT SERPL-MCNC: 2.3 MG/DL (ref 0.5–1.4)
ERYTHROCYTE [DISTWIDTH] IN BLOOD BY AUTOMATED COUNT: 18.8 % (ref 11.5–14.5)
EST. GFR  (NO RACE VARIABLE): 29 ML/MIN/1.73 M^2
GLUCOSE SERPL-MCNC: 115 MG/DL (ref 70–110)
HCT VFR BLD AUTO: 29.4 % (ref 40–54)
HGB BLD-MCNC: 9.7 G/DL (ref 14–18)
MAGNESIUM SERPL-MCNC: 1.9 MG/DL (ref 1.6–2.6)
MCH RBC QN AUTO: 28.2 PG (ref 27–31)
MCHC RBC AUTO-ENTMCNC: 33 G/DL (ref 32–36)
MCV RBC AUTO: 86 FL (ref 82–98)
OHS QRS DURATION: 156 MS
OHS QTC CALCULATION: 537 MS
PHOSPHATE SERPL-MCNC: 2.7 MG/DL (ref 2.7–4.5)
PLATELET # BLD AUTO: 190 K/UL (ref 150–450)
PMV BLD AUTO: 11.4 FL (ref 9.2–12.9)
POCT GLUCOSE: 134 MG/DL (ref 70–110)
POCT GLUCOSE: 139 MG/DL (ref 70–110)
POTASSIUM SERPL-SCNC: 3.7 MMOL/L (ref 3.5–5.1)
PROT SERPL-MCNC: 4.9 G/DL (ref 6–8.4)
RBC # BLD AUTO: 3.44 M/UL (ref 4.6–6.2)
SODIUM SERPL-SCNC: 138 MMOL/L (ref 136–145)
WBC # BLD AUTO: 5.86 K/UL (ref 3.9–12.7)

## 2024-08-01 PROCEDURE — 97116 GAIT TRAINING THERAPY: CPT | Mod: CQ

## 2024-08-01 PROCEDURE — 36415 COLL VENOUS BLD VENIPUNCTURE: CPT | Performed by: INTERNAL MEDICINE

## 2024-08-01 PROCEDURE — 25000003 PHARM REV CODE 250: Performed by: STUDENT IN AN ORGANIZED HEALTH CARE EDUCATION/TRAINING PROGRAM

## 2024-08-01 PROCEDURE — 25000003 PHARM REV CODE 250

## 2024-08-01 PROCEDURE — 83735 ASSAY OF MAGNESIUM: CPT

## 2024-08-01 PROCEDURE — 36415 COLL VENOUS BLD VENIPUNCTURE: CPT

## 2024-08-01 PROCEDURE — 83880 ASSAY OF NATRIURETIC PEPTIDE: CPT | Performed by: INTERNAL MEDICINE

## 2024-08-01 PROCEDURE — 85027 COMPLETE CBC AUTOMATED: CPT

## 2024-08-01 PROCEDURE — 80053 COMPREHEN METABOLIC PANEL: CPT

## 2024-08-01 PROCEDURE — 84100 ASSAY OF PHOSPHORUS: CPT

## 2024-08-01 RX ORDER — SACUBITRIL AND VALSARTAN 24; 26 MG/1; MG/1
1 TABLET, FILM COATED ORAL 2 TIMES DAILY
Qty: 180 TABLET | Refills: 1 | Status: SHIPPED | OUTPATIENT
Start: 2024-08-01

## 2024-08-01 RX ADMIN — FINASTERIDE 5 MG: 5 TABLET, FILM COATED ORAL at 08:08

## 2024-08-01 RX ADMIN — LEVOTHYROXINE SODIUM 100 MCG: 0.1 TABLET ORAL at 05:08

## 2024-08-01 RX ADMIN — MUPIROCIN 1 G: 20 OINTMENT TOPICAL at 08:08

## 2024-08-01 RX ADMIN — CLOPIDOGREL BISULFATE 75 MG: 75 TABLET, FILM COATED ORAL at 08:08

## 2024-08-01 RX ADMIN — METOPROLOL SUCCINATE 25 MG: 25 TABLET, EXTENDED RELEASE ORAL at 08:08

## 2024-08-01 RX ADMIN — LINACLOTIDE 72 MCG: 72 CAPSULE, GELATIN COATED ORAL at 05:08

## 2024-08-01 RX ADMIN — ASPIRIN 81 MG: 81 TABLET, COATED ORAL at 08:08

## 2024-08-01 RX ADMIN — TAMSULOSIN HYDROCHLORIDE 0.4 MG: 0.4 CAPSULE ORAL at 08:08

## 2024-08-03 LAB
BACTERIA BLD CULT: NORMAL
BACTERIA BLD CULT: NORMAL

## 2024-08-05 ENCOUNTER — PATIENT OUTREACH (OUTPATIENT)
Dept: FAMILY MEDICINE | Facility: CLINIC | Age: 74
End: 2024-08-05
Payer: MEDICARE

## 2024-08-08 ENCOUNTER — OFFICE VISIT (OUTPATIENT)
Dept: FAMILY MEDICINE | Facility: CLINIC | Age: 74
End: 2024-08-08
Payer: MEDICARE

## 2024-08-08 VITALS
HEART RATE: 62 BPM | DIASTOLIC BLOOD PRESSURE: 58 MMHG | OXYGEN SATURATION: 99 % | HEIGHT: 72 IN | SYSTOLIC BLOOD PRESSURE: 110 MMHG | WEIGHT: 169.63 LBS | TEMPERATURE: 99 F | BODY MASS INDEX: 22.97 KG/M2

## 2024-08-08 DIAGNOSIS — R79.89 LOW VITAMIN B12 LEVEL: ICD-10-CM

## 2024-08-08 DIAGNOSIS — D64.9 CHRONIC ANEMIA: ICD-10-CM

## 2024-08-08 DIAGNOSIS — Z09 HOSPITAL DISCHARGE FOLLOW-UP: Primary | ICD-10-CM

## 2024-08-08 DIAGNOSIS — E78.2 MIXED HYPERLIPIDEMIA: ICD-10-CM

## 2024-08-08 DIAGNOSIS — D64.9 SYMPTOMATIC ANEMIA: ICD-10-CM

## 2024-08-08 DIAGNOSIS — R53.1 GENERALIZED WEAKNESS: ICD-10-CM

## 2024-08-08 DIAGNOSIS — N40.1 BENIGN PROSTATIC HYPERPLASIA WITH URINARY HESITANCY: ICD-10-CM

## 2024-08-08 DIAGNOSIS — E03.9 HYPOTHYROIDISM, UNSPECIFIED TYPE: ICD-10-CM

## 2024-08-08 DIAGNOSIS — R29.6 FALLS FREQUENTLY: ICD-10-CM

## 2024-08-08 DIAGNOSIS — E11.9 TYPE 2 DIABETES MELLITUS WITHOUT COMPLICATION, WITHOUT LONG-TERM CURRENT USE OF INSULIN: ICD-10-CM

## 2024-08-08 DIAGNOSIS — I50.22 CHRONIC SYSTOLIC HEART FAILURE: Chronic | ICD-10-CM

## 2024-08-08 DIAGNOSIS — N18.4 CHRONIC RENAL FAILURE, STAGE 4 (SEVERE): ICD-10-CM

## 2024-08-08 DIAGNOSIS — I10 PRIMARY HYPERTENSION: ICD-10-CM

## 2024-08-08 DIAGNOSIS — K59.09 CHRONIC CONSTIPATION: ICD-10-CM

## 2024-08-08 DIAGNOSIS — R39.11 BENIGN PROSTATIC HYPERPLASIA WITH URINARY HESITANCY: ICD-10-CM

## 2024-08-08 DIAGNOSIS — J44.9 CHRONIC OBSTRUCTIVE PULMONARY DISEASE, UNSPECIFIED COPD TYPE: Chronic | ICD-10-CM

## 2024-08-08 PROCEDURE — 99999 PR PBB SHADOW E&M-EST. PATIENT-LVL IV: CPT | Mod: PBBFAC,,, | Performed by: NURSE PRACTITIONER

## 2024-08-08 RX ORDER — FLUTICASONE FUROATE AND VILANTEROL 100; 25 UG/1; UG/1
1 POWDER RESPIRATORY (INHALATION) DAILY
Qty: 3 EACH | Refills: 1 | Status: SHIPPED | OUTPATIENT
Start: 2024-08-08

## 2024-08-08 RX ORDER — HYDRALAZINE HYDROCHLORIDE 50 MG/1
50 TABLET, FILM COATED ORAL 2 TIMES DAILY
COMMUNITY
End: 2024-08-08 | Stop reason: SDUPTHER

## 2024-08-08 RX ORDER — ATORVASTATIN CALCIUM 40 MG/1
40 TABLET, FILM COATED ORAL DAILY
Qty: 90 TABLET | Refills: 0 | Status: SHIPPED | OUTPATIENT
Start: 2024-08-08

## 2024-08-08 RX ORDER — GABAPENTIN 300 MG/1
300 CAPSULE ORAL NIGHTLY
Qty: 90 CAPSULE | Refills: 1 | Status: SHIPPED | OUTPATIENT
Start: 2024-08-08

## 2024-08-08 RX ORDER — VERICIGUAT 5 MG/1
5 TABLET, FILM COATED ORAL DAILY
Qty: 90 TABLET | Refills: 0 | Status: SHIPPED | OUTPATIENT
Start: 2024-08-08

## 2024-08-08 RX ORDER — LEVOTHYROXINE SODIUM 100 UG/1
100 TABLET ORAL
Qty: 90 TABLET | Refills: 1 | Status: SHIPPED | OUTPATIENT
Start: 2024-08-08

## 2024-08-08 RX ORDER — HYDRALAZINE HYDROCHLORIDE 50 MG/1
50 TABLET, FILM COATED ORAL 2 TIMES DAILY
Qty: 180 TABLET | Refills: 0 | Status: SHIPPED | OUTPATIENT
Start: 2024-08-08

## 2024-08-08 RX ORDER — CYANOCOBALAMIN 1000 UG/ML
1000 INJECTION, SOLUTION INTRAMUSCULAR; SUBCUTANEOUS
Qty: 3 ML | Refills: 1 | Status: SHIPPED | OUTPATIENT
Start: 2024-08-08

## 2024-08-08 RX ORDER — METOPROLOL SUCCINATE 25 MG/1
25 TABLET, EXTENDED RELEASE ORAL DAILY
Qty: 90 TABLET | Refills: 1 | Status: SHIPPED | OUTPATIENT
Start: 2024-08-08

## 2024-08-08 RX ORDER — GLIMEPIRIDE 4 MG/1
4 TABLET ORAL
Qty: 90 TABLET | Refills: 1 | Status: SHIPPED | OUTPATIENT
Start: 2024-08-08

## 2024-08-12 ENCOUNTER — HOSPITAL ENCOUNTER (INPATIENT)
Facility: HOSPITAL | Age: 74
LOS: 5 days | Discharge: HOME HEALTH | DRG: 637 | End: 2024-08-18
Attending: STUDENT IN AN ORGANIZED HEALTH CARE EDUCATION/TRAINING PROGRAM | Admitting: INTERNAL MEDICINE
Payer: MEDICARE

## 2024-08-12 DIAGNOSIS — R55 SYNCOPE AND COLLAPSE: ICD-10-CM

## 2024-08-12 DIAGNOSIS — R40.20 LOC (LOSS OF CONSCIOUSNESS): ICD-10-CM

## 2024-08-12 DIAGNOSIS — R40.20 LOSS OF CONSCIOUSNESS: Primary | ICD-10-CM

## 2024-08-12 DIAGNOSIS — E16.2 HYPOGLYCEMIA: ICD-10-CM

## 2024-08-12 DIAGNOSIS — R53.1 GENERALIZED WEAKNESS: ICD-10-CM

## 2024-08-12 DIAGNOSIS — R07.9 CHEST PAIN: ICD-10-CM

## 2024-08-12 PROBLEM — I50.42 CHRONIC COMBINED SYSTOLIC AND DIASTOLIC HEART FAILURE: Status: ACTIVE | Noted: 2020-08-29

## 2024-08-12 LAB
ALBUMIN SERPL BCP-MCNC: 2.6 G/DL (ref 3.5–5.2)
ALP SERPL-CCNC: 70 U/L (ref 55–135)
ALT SERPL W/O P-5'-P-CCNC: 15 U/L (ref 10–44)
AMMONIA PLAS-SCNC: 41 UMOL/L (ref 10–50)
ANION GAP SERPL CALC-SCNC: 10 MMOL/L (ref 8–16)
AST SERPL-CCNC: 15 U/L (ref 10–40)
BACTERIA #/AREA URNS HPF: ABNORMAL /HPF
BASOPHILS # BLD AUTO: 0.04 K/UL (ref 0–0.2)
BASOPHILS NFR BLD: 0.5 % (ref 0–1.9)
BILIRUB SERPL-MCNC: 0.6 MG/DL (ref 0.1–1)
BILIRUB UR QL STRIP: NEGATIVE
BNP SERPL-MCNC: 2595 PG/ML (ref 0–99)
BUN SERPL-MCNC: 29 MG/DL (ref 8–23)
CALCIUM SERPL-MCNC: 8.7 MG/DL (ref 8.7–10.5)
CHLORIDE SERPL-SCNC: 111 MMOL/L (ref 95–110)
CLARITY UR: CLEAR
CO2 SERPL-SCNC: 19 MMOL/L (ref 23–29)
COLOR UR: YELLOW
CREAT SERPL-MCNC: 2 MG/DL (ref 0.5–1.4)
DIFFERENTIAL METHOD BLD: ABNORMAL
EOSINOPHIL # BLD AUTO: 0 K/UL (ref 0–0.5)
EOSINOPHIL NFR BLD: 0.5 % (ref 0–8)
ERYTHROCYTE [DISTWIDTH] IN BLOOD BY AUTOMATED COUNT: 18.6 % (ref 11.5–14.5)
EST. GFR  (NO RACE VARIABLE): 35 ML/MIN/1.73 M^2
GLUCOSE SERPL-MCNC: 36 MG/DL (ref 70–110)
GLUCOSE UR QL STRIP: NEGATIVE
HCT VFR BLD AUTO: 34.6 % (ref 40–54)
HGB BLD-MCNC: 11.2 G/DL (ref 14–18)
HGB UR QL STRIP: NEGATIVE
IMM GRANULOCYTES # BLD AUTO: 0.03 K/UL (ref 0–0.04)
IMM GRANULOCYTES NFR BLD AUTO: 0.3 % (ref 0–0.5)
INR PPP: 1.1 (ref 0.8–1.2)
KETONES UR QL STRIP: NEGATIVE
LEUKOCYTE ESTERASE UR QL STRIP: ABNORMAL
LYMPHOCYTES # BLD AUTO: 0.5 K/UL (ref 1–4.8)
LYMPHOCYTES NFR BLD: 5.4 % (ref 18–48)
MCH RBC QN AUTO: 28.1 PG (ref 27–31)
MCHC RBC AUTO-ENTMCNC: 32.4 G/DL (ref 32–36)
MCV RBC AUTO: 87 FL (ref 82–98)
MICROSCOPIC COMMENT: ABNORMAL
MONOCYTES # BLD AUTO: 0.3 K/UL (ref 0.3–1)
MONOCYTES NFR BLD: 3.6 % (ref 4–15)
NEUTROPHILS # BLD AUTO: 7.7 K/UL (ref 1.8–7.7)
NEUTROPHILS NFR BLD: 89.7 % (ref 38–73)
NITRITE UR QL STRIP: NEGATIVE
NRBC BLD-RTO: 0 /100 WBC
PH UR STRIP: 5 [PH] (ref 5–8)
PLATELET # BLD AUTO: 240 K/UL (ref 150–450)
PMV BLD AUTO: 10.9 FL (ref 9.2–12.9)
POCT GLUCOSE: 177 MG/DL (ref 70–110)
POCT GLUCOSE: 218 MG/DL (ref 70–110)
POCT GLUCOSE: 27 MG/DL (ref 70–110)
POCT GLUCOSE: 90 MG/DL (ref 70–110)
POTASSIUM SERPL-SCNC: 4.1 MMOL/L (ref 3.5–5.1)
PROT SERPL-MCNC: 6.8 G/DL (ref 6–8.4)
PROT UR QL STRIP: NEGATIVE
PROTHROMBIN TIME: 11.9 SEC (ref 9–12.5)
RBC # BLD AUTO: 3.99 M/UL (ref 4.6–6.2)
RBC #/AREA URNS HPF: 0 /HPF (ref 0–4)
SARS-COV-2 RDRP RESP QL NAA+PROBE: NEGATIVE
SODIUM SERPL-SCNC: 140 MMOL/L (ref 136–145)
SP GR UR STRIP: 1.01 (ref 1–1.03)
SQUAMOUS #/AREA URNS HPF: 1 /HPF
TROPONIN I SERPL DL<=0.01 NG/ML-MCNC: 0.02 NG/ML (ref 0–0.03)
TROPONIN I SERPL DL<=0.01 NG/ML-MCNC: 0.03 NG/ML (ref 0–0.03)
TROPONIN I SERPL DL<=0.01 NG/ML-MCNC: 0.04 NG/ML (ref 0–0.03)
TSH SERPL DL<=0.005 MIU/L-ACNC: 3.9 UIU/ML (ref 0.4–4)
URN SPEC COLLECT METH UR: ABNORMAL
UROBILINOGEN UR STRIP-ACNC: NEGATIVE EU/DL
WBC # BLD AUTO: 8.58 K/UL (ref 3.9–12.7)
WBC #/AREA URNS HPF: 47 /HPF (ref 0–5)
WBC CLUMPS URNS QL MICRO: ABNORMAL

## 2024-08-12 PROCEDURE — 85025 COMPLETE CBC W/AUTO DIFF WBC: CPT | Performed by: STUDENT IN AN ORGANIZED HEALTH CARE EDUCATION/TRAINING PROGRAM

## 2024-08-12 PROCEDURE — 87086 URINE CULTURE/COLONY COUNT: CPT | Performed by: STUDENT IN AN ORGANIZED HEALTH CARE EDUCATION/TRAINING PROGRAM

## 2024-08-12 PROCEDURE — G0378 HOSPITAL OBSERVATION PER HR: HCPCS

## 2024-08-12 PROCEDURE — 96361 HYDRATE IV INFUSION ADD-ON: CPT

## 2024-08-12 PROCEDURE — 94640 AIRWAY INHALATION TREATMENT: CPT

## 2024-08-12 PROCEDURE — 99285 EMERGENCY DEPT VISIT HI MDM: CPT | Mod: 25

## 2024-08-12 PROCEDURE — 94761 N-INVAS EAR/PLS OXIMETRY MLT: CPT

## 2024-08-12 PROCEDURE — 94799 UNLISTED PULMONARY SVC/PX: CPT

## 2024-08-12 PROCEDURE — 83880 ASSAY OF NATRIURETIC PEPTIDE: CPT | Performed by: STUDENT IN AN ORGANIZED HEALTH CARE EDUCATION/TRAINING PROGRAM

## 2024-08-12 PROCEDURE — 84484 ASSAY OF TROPONIN QUANT: CPT | Mod: 91

## 2024-08-12 PROCEDURE — 36415 COLL VENOUS BLD VENIPUNCTURE: CPT | Performed by: STUDENT IN AN ORGANIZED HEALTH CARE EDUCATION/TRAINING PROGRAM

## 2024-08-12 PROCEDURE — 85610 PROTHROMBIN TIME: CPT

## 2024-08-12 PROCEDURE — U0002 COVID-19 LAB TEST NON-CDC: HCPCS | Performed by: STUDENT IN AN ORGANIZED HEALTH CARE EDUCATION/TRAINING PROGRAM

## 2024-08-12 PROCEDURE — 84484 ASSAY OF TROPONIN QUANT: CPT | Performed by: STUDENT IN AN ORGANIZED HEALTH CARE EDUCATION/TRAINING PROGRAM

## 2024-08-12 PROCEDURE — 81000 URINALYSIS NONAUTO W/SCOPE: CPT | Performed by: STUDENT IN AN ORGANIZED HEALTH CARE EDUCATION/TRAINING PROGRAM

## 2024-08-12 PROCEDURE — 25000003 PHARM REV CODE 250: Performed by: STUDENT IN AN ORGANIZED HEALTH CARE EDUCATION/TRAINING PROGRAM

## 2024-08-12 PROCEDURE — 25000003 PHARM REV CODE 250

## 2024-08-12 PROCEDURE — 94760 N-INVAS EAR/PLS OXIMETRY 1: CPT

## 2024-08-12 PROCEDURE — 36415 COLL VENOUS BLD VENIPUNCTURE: CPT

## 2024-08-12 PROCEDURE — 82140 ASSAY OF AMMONIA: CPT

## 2024-08-12 PROCEDURE — 93010 ELECTROCARDIOGRAM REPORT: CPT | Mod: ,,, | Performed by: GENERAL PRACTICE

## 2024-08-12 PROCEDURE — 82962 GLUCOSE BLOOD TEST: CPT

## 2024-08-12 PROCEDURE — 84443 ASSAY THYROID STIM HORMONE: CPT | Performed by: STUDENT IN AN ORGANIZED HEALTH CARE EDUCATION/TRAINING PROGRAM

## 2024-08-12 PROCEDURE — 87186 SC STD MICRODIL/AGAR DIL: CPT | Performed by: STUDENT IN AN ORGANIZED HEALTH CARE EDUCATION/TRAINING PROGRAM

## 2024-08-12 PROCEDURE — 25000242 PHARM REV CODE 250 ALT 637 W/ HCPCS

## 2024-08-12 PROCEDURE — 80053 COMPREHEN METABOLIC PANEL: CPT | Performed by: STUDENT IN AN ORGANIZED HEALTH CARE EDUCATION/TRAINING PROGRAM

## 2024-08-12 PROCEDURE — 96374 THER/PROPH/DIAG INJ IV PUSH: CPT

## 2024-08-12 PROCEDURE — 25000242 PHARM REV CODE 250 ALT 637 W/ HCPCS: Performed by: STUDENT IN AN ORGANIZED HEALTH CARE EDUCATION/TRAINING PROGRAM

## 2024-08-12 PROCEDURE — 93005 ELECTROCARDIOGRAM TRACING: CPT

## 2024-08-12 PROCEDURE — 99900035 HC TECH TIME PER 15 MIN (STAT)

## 2024-08-12 RX ORDER — LEVOTHYROXINE SODIUM 100 UG/1
100 TABLET ORAL
Status: DISCONTINUED | OUTPATIENT
Start: 2024-08-13 | End: 2024-08-18 | Stop reason: HOSPADM

## 2024-08-12 RX ORDER — ISOSORBIDE DINITRATE 20 MG/1
20 TABLET ORAL 3 TIMES DAILY
COMMUNITY
Start: 2024-05-19

## 2024-08-12 RX ORDER — SODIUM CHLORIDE 0.9 % (FLUSH) 0.9 %
10 SYRINGE (ML) INJECTION EVERY 12 HOURS PRN
Status: DISCONTINUED | OUTPATIENT
Start: 2024-08-12 | End: 2024-08-18 | Stop reason: HOSPADM

## 2024-08-12 RX ORDER — ALUMINUM HYDROXIDE, MAGNESIUM HYDROXIDE, AND SIMETHICONE 1200; 120; 1200 MG/30ML; MG/30ML; MG/30ML
30 SUSPENSION ORAL 4 TIMES DAILY PRN
Status: DISCONTINUED | OUTPATIENT
Start: 2024-08-12 | End: 2024-08-18 | Stop reason: HOSPADM

## 2024-08-12 RX ORDER — IBUPROFEN 600 MG/1
600 TABLET ORAL EVERY 6 HOURS PRN
Status: DISCONTINUED | OUTPATIENT
Start: 2024-08-12 | End: 2024-08-17

## 2024-08-12 RX ORDER — LEVALBUTEROL INHALATION SOLUTION 0.63 MG/3ML
0.63 SOLUTION RESPIRATORY (INHALATION) EVERY 6 HOURS PRN
COMMUNITY
Start: 2024-07-16 | End: 2025-07-16

## 2024-08-12 RX ORDER — GABAPENTIN 300 MG/1
300 CAPSULE ORAL NIGHTLY
Status: DISCONTINUED | OUTPATIENT
Start: 2024-08-12 | End: 2024-08-18 | Stop reason: HOSPADM

## 2024-08-12 RX ORDER — IBUPROFEN 200 MG
16 TABLET ORAL
Status: DISCONTINUED | OUTPATIENT
Start: 2024-08-12 | End: 2024-08-18 | Stop reason: HOSPADM

## 2024-08-12 RX ORDER — ARFORMOTEROL TARTRATE 15 UG/2ML
15 SOLUTION RESPIRATORY (INHALATION) 2 TIMES DAILY
Status: DISCONTINUED | OUTPATIENT
Start: 2024-08-12 | End: 2024-08-18 | Stop reason: HOSPADM

## 2024-08-12 RX ORDER — FLUTICASONE FUROATE AND VILANTEROL 100; 25 UG/1; UG/1
1 POWDER RESPIRATORY (INHALATION) DAILY
Status: DISCONTINUED | OUTPATIENT
Start: 2024-08-13 | End: 2024-08-12

## 2024-08-12 RX ORDER — ONDANSETRON HYDROCHLORIDE 2 MG/ML
4 INJECTION, SOLUTION INTRAVENOUS EVERY 8 HOURS PRN
Status: DISCONTINUED | OUTPATIENT
Start: 2024-08-12 | End: 2024-08-18 | Stop reason: HOSPADM

## 2024-08-12 RX ORDER — LEVALBUTEROL INHALATION SOLUTION 0.63 MG/3ML
0.63 SOLUTION RESPIRATORY (INHALATION) EVERY 8 HOURS
Status: DISCONTINUED | OUTPATIENT
Start: 2024-08-12 | End: 2024-08-18 | Stop reason: HOSPADM

## 2024-08-12 RX ORDER — NALOXONE HCL 0.4 MG/ML
0.02 VIAL (ML) INJECTION
Status: DISCONTINUED | OUTPATIENT
Start: 2024-08-12 | End: 2024-08-18 | Stop reason: HOSPADM

## 2024-08-12 RX ORDER — IPRATROPIUM BROMIDE AND ALBUTEROL SULFATE 2.5; .5 MG/3ML; MG/3ML
3 SOLUTION RESPIRATORY (INHALATION) EVERY 6 HOURS PRN
Status: DISCONTINUED | OUTPATIENT
Start: 2024-08-12 | End: 2024-08-12

## 2024-08-12 RX ORDER — GLUCAGON 1 MG
1 KIT INJECTION
Status: DISCONTINUED | OUTPATIENT
Start: 2024-08-12 | End: 2024-08-18 | Stop reason: HOSPADM

## 2024-08-12 RX ORDER — HYDRALAZINE HYDROCHLORIDE 25 MG/1
50 TABLET, FILM COATED ORAL 2 TIMES DAILY
Status: DISCONTINUED | OUTPATIENT
Start: 2024-08-12 | End: 2024-08-18 | Stop reason: HOSPADM

## 2024-08-12 RX ORDER — ATORVASTATIN CALCIUM 40 MG/1
40 TABLET, FILM COATED ORAL DAILY
Status: DISCONTINUED | OUTPATIENT
Start: 2024-08-13 | End: 2024-08-18 | Stop reason: HOSPADM

## 2024-08-12 RX ORDER — METOPROLOL SUCCINATE 25 MG/1
25 TABLET, EXTENDED RELEASE ORAL DAILY
Status: DISCONTINUED | OUTPATIENT
Start: 2024-08-13 | End: 2024-08-18 | Stop reason: HOSPADM

## 2024-08-12 RX ORDER — IBUPROFEN 200 MG
24 TABLET ORAL
Status: DISCONTINUED | OUTPATIENT
Start: 2024-08-12 | End: 2024-08-18 | Stop reason: HOSPADM

## 2024-08-12 RX ORDER — ISOSORBIDE DINITRATE 20 MG/1
20 TABLET ORAL 3 TIMES DAILY
Status: DISCONTINUED | OUTPATIENT
Start: 2024-08-12 | End: 2024-08-18 | Stop reason: HOSPADM

## 2024-08-12 RX ORDER — INSULIN ASPART 100 [IU]/ML
0-5 INJECTION, SOLUTION INTRAVENOUS; SUBCUTANEOUS
Status: DISCONTINUED | OUTPATIENT
Start: 2024-08-12 | End: 2024-08-12

## 2024-08-12 RX ORDER — BUDESONIDE 0.5 MG/2ML
0.5 INHALANT ORAL 2 TIMES DAILY
Status: DISCONTINUED | OUTPATIENT
Start: 2024-08-12 | End: 2024-08-18 | Stop reason: HOSPADM

## 2024-08-12 RX ORDER — SIMETHICONE 80 MG
1 TABLET,CHEWABLE ORAL 4 TIMES DAILY PRN
Status: DISCONTINUED | OUTPATIENT
Start: 2024-08-12 | End: 2024-08-18 | Stop reason: HOSPADM

## 2024-08-12 RX ORDER — ASPIRIN 81 MG/1
81 TABLET ORAL DAILY
Status: DISCONTINUED | OUTPATIENT
Start: 2024-08-13 | End: 2024-08-18 | Stop reason: HOSPADM

## 2024-08-12 RX ORDER — DEXTROSE 50 % IN WATER (D50W) INTRAVENOUS SYRINGE
25
Status: COMPLETED | OUTPATIENT
Start: 2024-08-12 | End: 2024-08-12

## 2024-08-12 RX ADMIN — BUDESONIDE INHALATION 0.5 MG: 0.5 SUSPENSION RESPIRATORY (INHALATION) at 08:08

## 2024-08-12 RX ADMIN — HYDRALAZINE HYDROCHLORIDE 50 MG: 25 TABLET ORAL at 08:08

## 2024-08-12 RX ADMIN — LEVALBUTEROL HYDROCHLORIDE 0.63 MG: 0.63 SOLUTION RESPIRATORY (INHALATION) at 04:08

## 2024-08-12 RX ADMIN — ARFORMOTEROL TARTRATE 15 MCG: 15 SOLUTION RESPIRATORY (INHALATION) at 08:08

## 2024-08-12 RX ADMIN — LEVALBUTEROL HYDROCHLORIDE 0.63 MG: 0.63 SOLUTION RESPIRATORY (INHALATION) at 11:08

## 2024-08-12 RX ADMIN — DEXTROSE MONOHYDRATE 250 ML: 100 INJECTION, SOLUTION INTRAVENOUS at 02:08

## 2024-08-12 RX ADMIN — GABAPENTIN 300 MG: 300 CAPSULE ORAL at 08:08

## 2024-08-12 RX ADMIN — SACUBITRIL AND VALSARTAN 1 TABLET: 24; 26 TABLET, FILM COATED ORAL at 08:08

## 2024-08-12 RX ADMIN — ISOSORBIDE DINITRATE 20 MG: 20 TABLET ORAL at 08:08

## 2024-08-12 RX ADMIN — DEXTROSE MONOHYDRATE 25 G: 25 INJECTION, SOLUTION INTRAVENOUS at 01:08

## 2024-08-12 NOTE — ASSESSMENT & PLAN NOTE
Patient's FSGs are controlled on current medication regimen.  Last A1c reviewed-   Lab Results   Component Value Date    HGBA1C 5.4 09/15/2023     Most recent fingerstick glucose reviewed-   Recent Labs   Lab 08/12/24  1347 08/12/24  1448   POCTGLUCOSE 27* 218*     Hold all insulin or oral meds currently and ACCUCHECKS Q 4 hours to monitor blood sugar closely     Hold Oral hypoglycemics while patient is in the hospital.

## 2024-08-12 NOTE — ASSESSMENT & PLAN NOTE
CHF currently controlled. Latest ECHO shows ejection fraction of 22%. Continue BB, ACEi,   and monitor clinical status closely. Monitor on telemetry. Last BNP is   Recent Labs   Lab 08/12/24  1231   BNP 2,595*   . Continue to stress to patient importance of self efficacy and  on diet for CHF.

## 2024-08-12 NOTE — HPI
Baldo Carney is a 73 year old male with a previous medical history of COPD, cirrhosis of the liver with bi monthly paracentesis, cardiomyopathy, CHF, aortic stenosis, CAD, HTN, HLD, BPH, chronic renal failure, and DMII who presented to the emergency room for syncope this morning. He reports he awoke to his brother lifting him up off the floor and then brought him to the hospital. The patient has had family members check in on him more often due to recent hospital admission for syncope.  He does not recall any events prior to waking up on the floor or after he went to bed yesterday evening. He reports eating a hearty meal one hour prior to bed last night.  In the ED his blood sugar was noted to be 37 but patient was AAOx4 and just endorsing generalized weakness. His blood sugar improved with IV dextrose. Patient denies any adverse symptoms other than generalized fatigue and reports he was due for his therapeutic paracentesis on 8/12/24. Troponin mildly elevated and patient in an atrial paced rhythm. He denies any defibrillator shocks chest pain or SOB. Denies dysuria or urinary issues. Urine studies positive for infection and patient covered with IV rocephin. Patient admitted by hospital medicine for further evaluation and management.

## 2024-08-12 NOTE — NURSING
Nurses Note -- 4 Eyes      8/12/2024   6:25 PM      Skin assessed during: Admit      [x] No Altered Skin Integrity Present    []Prevention Measures Documented      [] Yes- Altered Skin Integrity Present or Discovered   [] LDA Added if Not in Epic (Describe Wound)   [] New Altered Skin Integrity was Present on Admit and Documented in LDA   [] Wound Image Taken    Wound Care Consulted? No    Attending Nurse:  Harish Sy RN/Staff Member:   Artem Alas

## 2024-08-12 NOTE — ASSESSMENT & PLAN NOTE
Patient's COPD is controlled currently.  Patient is currently off COPD Pathway. Continue scheduled inhalers Supplemental oxygen PRN and monitor respiratory status closely.

## 2024-08-12 NOTE — ED PROVIDER NOTES
Encounter Date: 8/12/2024       History     Chief Complaint   Patient presents with    Loss of Consciousness     Passed out this am / dizziness this am      73 y.o. male with CHF, coronary artery disease, CKD, DM, HLD, HTN, neuropathy, history of syncope, asthma/COPD presents for loss of consciousness.  Patient reports he woke up on the floor.  His brother came to assist him and drove him to the ED.  He only remembers waking up from sleep but does not remember anything else from this morning.  He does report waking up prior to driving to the hospital.  He denies any preceding illness.  He does report generalized weakness.  He denies any chest pain, shortness of breath, fever/chills, dysuria    The history is provided by the patient and medical records.     Review of patient's allergies indicates:   Allergen Reactions    Canagliflozin      Other reaction(s): been very dizzy     Past Medical History:   Diagnosis Date    Asthma     Cardiomyopathy 10/21/2014    CHF (congestive heart failure)     Coronary artery disease     CRF (chronic renal failure)     Diabetes mellitus     Enlarged prostate     Hyperlipidemia     Hypertension     Neuropathy     Syncope 7/29/2024     Past Surgical History:   Procedure Laterality Date    ANGIOGRAPHY OF INTERNAL MAMMARY VESSEL N/A 3/11/2022    Procedure: Angiogram Internal Mammary;  Surgeon: Hank Lujan MD;  Location: [facility] CATH/EP LAB;  Service: Cardiology;  Laterality: N/A;    CARDIAC CATHETERIZATION      CARDIAC VALVE SURGERY      CATHETERIZATION OF BOTH LEFT AND RIGHT HEART Left 3/11/2022    Procedure: CATHETERIZATION, HEART, BOTH LEFT AND RIGHT;  Surgeon: Hank Lujan MD;  Location: [facility] CATH/EP LAB;  Service: Cardiology;  Laterality: Left;    CORONARY ANGIOGRAPHY N/A 3/11/2022    Procedure: ANGIOGRAM, CORONARY ARTERY;  Surgeon: Hank Lujan MD;  Location: [facility] CATH/EP LAB;  Service: Cardiology;  Laterality: N/A;    CORONARY BYPASS GRAFT ANGIOGRAPHY  3/11/2022     Procedure: Bypass graft study;  Surgeon: Hank Lujan MD;  Location: [facility] CATH/EP LAB;  Service: Cardiology;;    CYSTOSCOPY N/A 7/30/2019    Procedure: CYSTOSCOPY;  Surgeon: Spencer Nguyen MD;  Location: [facility] OR;  Service: Urology;  Laterality: N/A;    INSERTION OF INTRAVASCULAR MICROAXIAL BLOOD PUMP N/A 8/31/2022    Procedure: INSERTION, IMPELLA;  Surgeon: Zach Jensen MD;  Location: [facility] CATH LAB;  Service: Cardiology;  Laterality: N/A;    INSERTION, CATHETER, DIALYSIS, PERITONEAL N/A 12/7/2023    Procedure: INSERTION, CATHETER, DIALYSIS, PERITONEAL;  Surgeon: Greg Bone Jr., MD;  Location: [facility];  Service: General;  Laterality: N/A;  need PD catheter    PLACEMENT OF SWAN SEE CATHETER WITH IMAGING GUIDANCE  8/31/2022    Procedure: INSERTION, CATHETER, SWAN-SEE, WITH IMAGING GUIDANCE;  Surgeon: Zach Jensen MD;  Location: [facility] CATH LAB;  Service: Cardiology;;    REPAIR, HERNIA, INGUINAL, INCARCERATED, INITIAL, AGE 5 YEARS OR OLDER Right 12/7/2023    Procedure: REPAIR, HERNIA, INGUINAL, INCARCERATED, INITIAL;  Surgeon: Greg Bone Jr., MD;  Location: [facility];  Service: General;  Laterality: Right;    SHOULDER ARTHROSCOPY  1985    TRANSRECTAL BIOPSY OF PROSTATE WITH ULTRASOUND GUIDANCE N/A 7/30/2019    Procedure: BIOPSY, PROSTATE, RECTAL APPROACH, WITH US GUIDANCE;  Surgeon: Spencer Nguyen MD;  Location: [facility];  Service: Urology;  Laterality: N/A;  procedure not performed, pt unable to tolerate    TRANSRECTAL BIOPSY OF PROSTATE WITH ULTRASOUND GUIDANCE N/A 8/8/2019    Procedure: BIOPSY, PROSTATE, RECTAL APPROACH, WITH US GUIDANCE;  Surgeon: Spencer Nguyen MD;  Location: [facility] OR;  Service: Urology;  Laterality: N/A;    UMBILICAL HERNIA REPAIR N/A 12/7/2023    Procedure: REPAIR, HERNIA, UMBILICAL;  Surgeon: Greg Bone Jr., MD;  Location: [facility];  Service: General;  Laterality: N/A;     Family History   Problem Relation Name Age of Onset    No Known  Problems Mother      Diabetes Father      Hypertension Father      Heart attack Father      Heart disease Father      Diabetes Sister 5     Heart disease Brother 4     Diabetes Brother 4     No Known Problems Maternal Grandmother      No Known Problems Maternal Grandfather      No Known Problems Paternal Grandmother      No Known Problems Paternal Grandfather      No Known Problems Maternal Aunt      No Known Problems Maternal Uncle      No Known Problems Paternal Aunt      No Known Problems Paternal Uncle      Anemia Neg Hx      Arrhythmia Neg Hx      Asthma Neg Hx      Clotting disorder Neg Hx      Fainting Neg Hx      Heart failure Neg Hx      Hyperlipidemia Neg Hx      Glaucoma Neg Hx       Social History     Tobacco Use    Smoking status: Former     Current packs/day: 0.00     Types: Cigarettes     Quit date: 1970     Years since quittin.1    Smokeless tobacco: Never   Substance Use Topics    Alcohol use: Not Currently     Alcohol/week: 3.0 standard drinks of alcohol     Types: 3 Cans of beer per week    Drug use: No     Review of Systems   Reason unable to perform ROS: See HPI for relevant ROS.       Physical Exam     Initial Vitals [24 1148]   BP Pulse Resp Temp SpO2   (!) 148/66 64 18 97.8 °F (36.6 °C) 100 %      MAP       --         Physical Exam    Nursing note and vitals reviewed.  Constitutional:   Alert, speaking full sentences, no acute distress   HENT:   Mouth/Throat: Oropharynx is clear and moist.   Eyes: Conjunctivae and EOM are normal. Pupils are equal, round, and reactive to light. No scleral icterus.   Cardiovascular:  Normal rate, regular rhythm and intact distal pulses.           Pulmonary/Chest: Breath sounds normal. No respiratory distress.   Abdominal: Abdomen is soft. He exhibits no distension. There is no abdominal tenderness.   Musculoskeletal:         General: No tenderness or edema.     Neurological: He is alert and oriented to person, place, and time.   Diminished  strength of all 4 extremity, no unilateral weakness, no sensory deficit   Skin: Skin is warm and dry.         ED Course   Procedures  Labs Reviewed   CBC W/ AUTO DIFFERENTIAL - Abnormal       Result Value    WBC 8.58      RBC 3.99 (*)     Hemoglobin 11.2 (*)     Hematocrit 34.6 (*)     MCV 87      MCH 28.1      MCHC 32.4      RDW 18.6 (*)     Platelets 240      MPV 10.9      Immature Granulocytes 0.3      Gran # (ANC) 7.7      Immature Grans (Abs) 0.03      Lymph # 0.5 (*)     Mono # 0.3      Eos # 0.0      Baso # 0.04      nRBC 0      Gran % 89.7 (*)     Lymph % 5.4 (*)     Mono % 3.6 (*)     Eosinophil % 0.5      Basophil % 0.5      Differential Method Automated     COMPREHENSIVE METABOLIC PANEL - Abnormal    Sodium 140      Potassium 4.1      Chloride 111 (*)     CO2 19 (*)     Glucose 36 (*)     BUN 29 (*)     Creatinine 2.0 (*)     Calcium 8.7      Total Protein 6.8      Albumin 2.6 (*)     Total Bilirubin 0.6      Alkaline Phosphatase 70      AST 15      ALT 15      eGFR 35 (*)     Anion Gap 10      Narrative:     Glucose  critical result(s) called and verbal readback obtained from   Kaylee Alfonso RN. by RAYO 08/12/2024 13:46   B-TYPE NATRIURETIC PEPTIDE - Abnormal    BNP 2,595 (*)    POCT GLUCOSE - Abnormal    POCT Glucose 27 (*)    POCT GLUCOSE - Abnormal    POCT Glucose 218 (*)    POCT GLUCOSE - Abnormal    POCT Glucose 177 (*)    TROPONIN I    Troponin I 0.017     TSH    TSH 3.902     SARS-COV-2 RNA AMPLIFICATION, QUAL    SARS-CoV-2 RNA, Amplification, Qual Negative     PROTIME-INR    Prothrombin Time 11.9      INR 1.1     AMMONIA    Ammonia 41     POCT GLUCOSE MONITORING CONTINUOUS          Imaging Results              IR Paracentesis with Imaging (Final result)  Result time 08/13/24 10:14:38      Final result by Jeremy Raines MD (08/13/24 10:14:38)                   Impression:      Successful ultrasound-guided paracentesis.      Electronically signed by: Jeremy Raines  MD  Date:    08/13/2024  Time:    10:14               Narrative:    EXAMINATION:  IR PARACENTESIS WITH IMAGING    CLINICAL HISTORY:  Ascities-weekly paracentisis that was due to be performed 8/12/14. Pt admitted for syncope;    TECHNIQUE:  Written, informed consent was obtained.  A time-out was performed.  Sonographic imaging was utilized to identify an appropriate entry site.  The skin was prepped and draped in usual sterile fashion.  The skin and underlying soft tissues within anesthetized with 1% lidocaine.  A needle over catheter device was then advanced into the peritoneal cavity, and fluid was returned.  Following cessation of flow from the catheter, the catheter was removed and hemostasis was achieved.  The incision was sealed and bandaged sterilely.  The patient tolerated the procedure well and was discharged in good condition.  There were no immediate postprocedure complications.    COMPARISON:  None    FINDINGS:  6 L fluid removed.    Medications: Albumin per protocol    Estimated blood loss: Minimal                                       X-Ray Chest AP Portable (Final result)  Result time 08/12/24 13:24:07      Final result by Trina Crowell MD (08/12/24 13:24:07)                   Impression:      No significant change compared to the prior study of 07/29/2024.  Mild prominence of the perihilar markings which could reflect mild perihilar infiltrate or pulmonary vascular distention.  No confluent infiltrates.      Electronically signed by: Trina Crowell MD  Date:    08/12/2024  Time:    13:24               Narrative:    EXAMINATION:  XR CHEST AP PORTABLE    CLINICAL HISTORY:  Weakness    TECHNIQUE:  Single frontal view of the chest was performed.    COMPARISON:  07/29/2024    FINDINGS:  Stable cardiomediastinal silhouette.  Anterior chest wall cardiac pacemaker with multiple leads remain in place.  Median sternotomy sutures.  The clear confluent infiltrate.  Mild prominence of perihilar markings not  appearing significantly changed.                                       CT Head Without Contrast (Final result)  Result time 08/12/24 13:22:12      Final result by Trina Crowell MD (08/12/24 13:22:12)                   Impression:      Mild involutional change and findings suggesting microvascular ischemic change and old infarcts with no acute intracranial findings or significant change compared to the prior exam.      Electronically signed by: Trina Crowell MD  Date:    08/12/2024  Time:    13:22               Narrative:    EXAMINATION:  CT HEAD WITHOUT CONTRAST    CLINICAL HISTORY:  Head trauma, minor (Age >= 65y);    TECHNIQUE:  Low dose axial images were obtained through the head.  Coronal and sagittal reformations were also performed. Contrast was not administered.    COMPARISON:  07/29/2024    FINDINGS:  Mild dilatation of ventricles, sulci, fissures not appearing out of proportion for the patient's age.  Decreased density in white matter suggesting microvascular ischemic change.  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  Probable old lacunar infarct right cerebellar versus averaging of CSF space.  No acute intracranial findings.  No acute intracranial hemorrhage.  No intracranial mass effect.  No acute major vascular territory infarct.  Note is made that MRI is typically more sensitive than CT particular for detection of early or small nonhemorrhagic infarct.  The calvarium appears intact.  Mastoids appear well pneumatized and visualized paranasal sinuses essentially clear.  Calcifications are seen in parasellar portions of internal carotid arteries                                       Medications   sodium chloride 0.9% flush 10 mL (has no administration in time range)   naloxone 0.4 mg/mL injection 0.02 mg (has no administration in time range)   glucose chewable tablet 16 g (16 g Oral Given 8/14/24 1131)   glucose chewable tablet 24 g (has no administration in  time range)   glucagon (human recombinant) injection 1 mg (has no administration in time range)   ibuprofen tablet 600 mg (has no administration in time range)   ondansetron injection 4 mg (4 mg Intravenous Given 8/14/24 2308)   simethicone chewable tablet 80 mg (has no administration in time range)   aluminum-magnesium hydroxide-simethicone 200-200-20 mg/5 mL suspension 30 mL (has no administration in time range)   dextrose 10% bolus 125 mL 125 mL (has no administration in time range)   dextrose 10% bolus 250 mL 250 mL ( Intravenous Stopped 8/14/24 1419)   atorvastatin tablet 40 mg (40 mg Oral Given 8/15/24 0819)   gabapentin capsule 300 mg (300 mg Oral Given 8/14/24 2038)   hydrALAZINE tablet 50 mg (50 mg Oral Not Given 8/14/24 2038)   levothyroxine tablet 100 mcg (100 mcg Oral Given 8/15/24 0502)   metoprolol succinate (TOPROL-XL) 24 hr tablet 25 mg (25 mg Oral Given 8/15/24 0819)   sacubitriL-valsartan 24-26 mg per tablet 1 tablet (1 tablet Oral Given 8/15/24 0819)   vericiguat Tab 5 mg (5 mg Oral Not Given 8/14/24 0900)   aspirin EC tablet 81 mg (81 mg Oral Given 8/15/24 0819)   isosorbide dinitrate tablet 20 mg (20 mg Oral Not Given 8/14/24 2038)   levalbuterol nebulizer solution 0.63 mg (0.63 mg Nebulization Given 8/15/24 0708)   arformoteroL nebulizer solution 15 mcg (15 mcg Nebulization Given 8/15/24 0736)   budesonide nebulizer solution 0.5 mg (0.5 mg Nebulization Given 8/15/24 0725)   cefTRIAXone (Rocephin) 1 g in D5W 100 mL IVPB (MB+) (0 g Intravenous Stopped 8/15/24 0039)   dextrose 50% injection 25 g (25 g Intravenous Given 8/14/24 0011)   dextrose 50% injection 25 g (25 g Intravenous Given 8/14/24 0413)   dextrose 10 % infusion ( Intravenous Rate/Dose Change 8/15/24 0941)   benzonatate capsule 100 mg (100 mg Oral Given 8/15/24 0502)   dextrose 10% bolus 250 mL 250 mL (0 mLs Intravenous Stopped 8/12/24 1510)   dextrose 50 % in water (D50W) injection 25 g (25 g Intravenous Given 8/12/24 1345)   albumin  human 25% bottle 50 g (0 g Intravenous Stopped 8/13/24 1012)   potassium, sodium phosphates 280-160-250 mg packet 1 packet (1 packet Oral Given 8/13/24 1238)   furosemide injection 40 mg (40 mg Intravenous Given 8/15/24 0819)     Medical Decision Making  73 y.o. male with CHF, coronary artery disease, CKD, DM, HLD, HTN, neuropathy, history of syncope, asthma/COPD presents for loss of consciousness  Differentials include orthostatic hypotension, metabolic derangement, dehydration, arrhythmia, ACS, PE  Patient presenting after syncopal episode.  Patient does not have clear memory of the event but does remember being in bed and then waking up on the floor.  No clear trauma or headache, neck pain, back pain.  MSK exam overall within normal limits.  Patient has mild diffuse weakness but no unilateral deficits, neurologic exam otherwise within normal limits  EKG shows sinus rhythm with bundle-branch block similar to prior, possibly paced rhythm though pacer spikes are difficult to visualize.  Sgarbossa negative, no chest pain or shortness of breath at any point, no known history/risk factors for PE  Patient is fatigued appearing but hemodynamically stable, nontoxic, no systemic infectious symptoms.  Patient was found to have marked hypoglycemia.  Given dextrose and p.o. glucose with improvement in in weakness.  Patient admitted to hospital medicine for further monitoring and management    Amount and/or Complexity of Data Reviewed  Labs: ordered. Decision-making details documented in ED Course.  Radiology: ordered. Decision-making details documented in ED Course.  ECG/medicine tests:  Decision-making details documented in ED Course.    Risk  Prescription drug management.               ED Course as of 08/15/24 1054   Mon Aug 12, 2024   1349 POCT Glucose(!!): 27 [OK]   1356 EKG 12-lead  Sinus rhythm with left bundle-branch block, regular, wide complex, rate of 59, Sgarbossa negative, no significant change compared to prior  [OK]   1458 POCT Glucose(!): 218 [OK]   1458 CT Head Without Contrast  No acute findings [OK]      ED Course User Index  [OK] Marvel Buckner MD                           Clinical Impression:  Final diagnoses:  [R40.20] LOC (loss of consciousness)  [R40.20] Loss of consciousness (Primary)  [R53.1] Generalized weakness  [E16.2] Hypoglycemia          ED Disposition Condition    Observation                 Marvel Buckner MD  08/15/24 1055

## 2024-08-12 NOTE — ASSESSMENT & PLAN NOTE
Creatine stable for now. BMP reviewed- noted Estimated Creatinine Clearance: 35.7 mL/min (A) (based on SCr of 2 mg/dL (H)). according to latest data. Based on current GFR, CKD stage is stage 3 - GFR 30-59.  Monitor UOP and serial BMP and adjust therapy as needed. Renally dose meds. Avoid nephrotoxic medications and procedures.

## 2024-08-12 NOTE — PHARMACY MED REC
"Admission Medication History     The home medication history was taken by Rah Levine.    You may go to "Admission" then "Reconcile Home Medications" tabs to review and/or act upon these items.     The home medication list has been updated by the Pharmacy department.   Please read ALL comments highlighted in yellow.   Please address this information as you see fit.    Feel free to contact us if you have any questions or require assistance.      The medications listed below were removed from the home medication list. Please reorder if appropriate:  Patient reports no longer taking the following medication(s):  Breztri HFA  Albuterol HFA      Medications listed below were obtained from: Patient/family and Analytic software- [employer]  No current facility-administered medications on file prior to encounter.     Current Outpatient Medications on File Prior to Encounter   Medication Sig Dispense Refill    aspirin (ECOTRIN) 81 MG EC tablet Take 81 mg by mouth once daily.      atorvastatin (LIPITOR) 40 MG tablet Take 1 tablet (40 mg total) by mouth once daily. 90 tablet 0    cyanocobalamin 1,000 mcg/mL injection Inject 1 mL (1,000 mcg total) into the muscle every 30 days. (Patient taking differently: Inject 1,000 mcg into the muscle every 30 days. 1st of month) 3 mL 1    empagliflozin (JARDIANCE) 25 mg tablet Take 1 tablet (25 mg total) by mouth once daily. 90 tablet 0    fluticasone furoate-vilanteroL (BREO ELLIPTA) 100-25 mcg/dose diskus inhaler Inhale 1 puff into the lungs once daily. (DAILY CONTROLLER) 3 each 1    fluticasone propionate (FLONASE) 50 mcg/actuation nasal spray USE 1 SPRAY (50 MCG TOTAL) IN EACH NOSTRIL ONCE DAILY (Patient taking differently: 1 spray by Each Nostril route Daily.) 48 mL 1    gabapentin (NEURONTIN) 300 MG capsule Take 1 capsule (300 mg total) by mouth every evening. 90 capsule 1    glimepiride (AMARYL) 4 MG tablet Take 1 tablet (4 mg total) by mouth before breakfast. 90 tablet 1    " isosorbide dinitrate (ISORDIL) 20 MG tablet Take 20 mg by mouth 3 (three) times daily.      levalbuterol (XOPENEX) 0.63 mg/3 mL nebulizer solution Inhale 0.63 mg into the lungs every 6 (six) hours as needed for Shortness of Breath.      levothyroxine (SYNTHROID) 100 MCG tablet Take 1 tablet (100 mcg total) by mouth before breakfast. With no other meds or food 90 tablet 1    metoprolol succinate (TOPROL-XL) 25 MG 24 hr tablet Take 1 tablet (25 mg total) by mouth once daily. 90 tablet 1    sacubitriL-valsartan (ENTRESTO) 24-26 mg per tablet Take 1 tablet by mouth 2 (two) times daily. 180 tablet 1    VERQUVO 5 mg Tab Take 5 mg by mouth once daily. 90 tablet 0    hydrALAZINE (APRESOLINE) 50 MG tablet Take 1 tablet (50 mg total) by mouth 2 (two) times a day. 180 tablet 0    linaCLOtide (LINZESS) 72 mcg Cap capsule Take 1 capsule (72 mcg total) by mouth before breakfast. 90 capsule 1    [DISCONTINUED] albuterol (PROVENTIL/VENTOLIN HFA) 90 mcg/actuation inhaler INHALE 1 TO 2 PUFFS INTO THE LUNGS EVERY 4 TO 6 HOURS AS NEEDED FOR WHEEZE AND SHORTNESS OF BREATH 18 g 2    [DISCONTINUED] budesonide-glycopyr-formoterol (BREZTRI AEROSPHERE) 160-9-4.8 mcg/actuation HFAA Inhale into the lungs daily as needed.             Rah Levine  EXT 1924                .

## 2024-08-12 NOTE — ASSESSMENT & PLAN NOTE
-IR consulted for weekly paracentesis-most recently performed on 7/29/24 with 6.5 liters removed. Next paracentesis was due to be performed 8/12/24  -Daily CBC, CMP, INR  -Ammonia pending

## 2024-08-12 NOTE — ASSESSMENT & PLAN NOTE
Chronic, controlled. Latest blood pressure and vitals reviewed-     Temp:  [97.8 °F (36.6 °C)]   Pulse:  [60-64]   Resp:  [18]   BP: (148-178)/(66-81)   SpO2:  [97 %-100 %] .   Home meds for hypertension were reviewed and noted below.   Hypertension Medications               hydrALAZINE (APRESOLINE) 50 MG tablet Take 1 tablet (50 mg total) by mouth 2 (two) times a day.    isosorbide dinitrate (ISORDIL) 20 MG tablet Take 20 mg by mouth 3 (three) times daily.    metoprolol succinate (TOPROL-XL) 25 MG 24 hr tablet Take 1 tablet (25 mg total) by mouth once daily.    sacubitriL-valsartan (ENTRESTO) 24-26 mg per tablet Take 1 tablet by mouth 2 (two) times daily.            While in the hospital, will manage blood pressure as follows; Continue home antihypertensive regimen    Will utilize p.r.n. blood pressure medication only if patient's blood pressure greater than 180/110 and he develops symptoms such as worsening chest pain or shortness of breath.

## 2024-08-12 NOTE — NURSING
Arrives to room 310 S/p ER admission AAOX4 VSS afebrile denies pain tele monitor in place patient on room air 2+ edema noted bilateral lower extremities JOHANSEN well Skin warm and dry no impaired integrity noted

## 2024-08-12 NOTE — ASSESSMENT & PLAN NOTE
"-Telemetry monitoring  -Neurochecks Q 4 hours  -CT of head negative for any acute process  -Likely secondary to hypoglycemia  -Orthostatic vital signs  -CBC, CMP, Mag, Phos daily  -Trend troponin  -Cardiac device check ordered  -Carotid US on 7/29/24 showed no "No evidence of a hemodynamically significant carotid bifurcation stenosis"  Echo    Result Date: 7/29/2024    Left Ventricle: The left ventricle is mildly dilated. Normal wall   thickness. Global hypokinesis present. Septal motion is consistent with   bundle branch block. There is severely reduced systolic function. Biplane   (2D) method of discs ejection fraction is 22%. Grade II diastolic   dysfunction.    Right Ventricle: Mild right ventricular enlargement. Systolic function   is moderately reduced. Pacemaker lead present in the ventricle.    Left Atrium: Left atrium is moderately dilated.    Right Atrium: Right atrium is moderately dilated.    Aortic Valve: Not well visualized due to poor acoustic window. There is   a bioprosthetic valve in the aortic position. Moderately calcified cusps.   Aortic valve area by VTI is 1.23 cm². Aortic valve peak velocity is 2.18   m/s. Mean gradient is 10 mmHg. The dimensionless index is 0.38. There is   mild to moderate aortic regurgitation.    Mitral Valve: There is moderate bileaflet sclerosis. There is mitral   annular calcification present. There is mild to moderate regurgitation.    Tricuspid Valve: There is moderate regurgitation.    Pulmonic Valve: There is mild to moderate regurgitation.    Pulmonary Artery: There is pulmonary hypertension. The estimated   pulmonary artery systolic pressure is 71 mmHg.    IVC/SVC: Elevated venous pressure at 15 mmHg.           "

## 2024-08-13 PROBLEM — N39.0 UTI (URINARY TRACT INFECTION): Status: ACTIVE | Noted: 2024-08-13

## 2024-08-13 LAB
ALBUMIN SERPL BCP-MCNC: 2.1 G/DL (ref 3.5–5.2)
ALBUMIN SERPL BCP-MCNC: 2.4 G/DL (ref 3.5–5.2)
ALP SERPL-CCNC: 58 U/L (ref 55–135)
ALP SERPL-CCNC: 59 U/L (ref 55–135)
ALT SERPL W/O P-5'-P-CCNC: 12 U/L (ref 10–44)
ALT SERPL W/O P-5'-P-CCNC: 12 U/L (ref 10–44)
ANION GAP SERPL CALC-SCNC: 7 MMOL/L (ref 8–16)
ANION GAP SERPL CALC-SCNC: 8 MMOL/L (ref 8–16)
AST SERPL-CCNC: 15 U/L (ref 10–40)
AST SERPL-CCNC: 16 U/L (ref 10–40)
BILIRUB SERPL-MCNC: 0.4 MG/DL (ref 0.1–1)
BILIRUB SERPL-MCNC: 0.5 MG/DL (ref 0.1–1)
BUN SERPL-MCNC: 29 MG/DL (ref 8–23)
BUN SERPL-MCNC: 31 MG/DL (ref 8–23)
CALCIUM SERPL-MCNC: 7.8 MG/DL (ref 8.7–10.5)
CALCIUM SERPL-MCNC: 8 MG/DL (ref 8.7–10.5)
CHLORIDE SERPL-SCNC: 106 MMOL/L (ref 95–110)
CHLORIDE SERPL-SCNC: 111 MMOL/L (ref 95–110)
CO2 SERPL-SCNC: 20 MMOL/L (ref 23–29)
CO2 SERPL-SCNC: 21 MMOL/L (ref 23–29)
CREAT SERPL-MCNC: 1.7 MG/DL (ref 0.5–1.4)
CREAT SERPL-MCNC: 2 MG/DL (ref 0.5–1.4)
ERYTHROCYTE [DISTWIDTH] IN BLOOD BY AUTOMATED COUNT: 18.5 % (ref 11.5–14.5)
EST. GFR  (NO RACE VARIABLE): 35 ML/MIN/1.73 M^2
EST. GFR  (NO RACE VARIABLE): 42 ML/MIN/1.73 M^2
GLUCOSE SERPL-MCNC: 33 MG/DL (ref 70–110)
GLUCOSE SERPL-MCNC: 37 MG/DL (ref 70–110)
GLUCOSE SERPL-MCNC: 67 MG/DL (ref 70–110)
HCT VFR BLD AUTO: 27.7 % (ref 40–54)
HGB BLD-MCNC: 9.3 G/DL (ref 14–18)
INR PPP: 1.1 (ref 0.8–1.2)
MAGNESIUM SERPL-MCNC: 1.6 MG/DL (ref 1.6–2.6)
MCH RBC QN AUTO: 28.2 PG (ref 27–31)
MCHC RBC AUTO-ENTMCNC: 33.6 G/DL (ref 32–36)
MCV RBC AUTO: 84 FL (ref 82–98)
PHOSPHATE SERPL-MCNC: 2.5 MG/DL (ref 2.7–4.5)
PLATELET # BLD AUTO: 215 K/UL (ref 150–450)
PMV BLD AUTO: 10.6 FL (ref 9.2–12.9)
POCT GLUCOSE: 135 MG/DL (ref 70–110)
POCT GLUCOSE: 147 MG/DL (ref 70–110)
POCT GLUCOSE: 35 MG/DL (ref 70–110)
POCT GLUCOSE: 36 MG/DL (ref 70–110)
POCT GLUCOSE: 38 MG/DL (ref 70–110)
POCT GLUCOSE: 41 MG/DL (ref 70–110)
POCT GLUCOSE: 45 MG/DL (ref 70–110)
POCT GLUCOSE: 52 MG/DL (ref 70–110)
POCT GLUCOSE: 73 MG/DL (ref 70–110)
POCT GLUCOSE: 95 MG/DL (ref 70–110)
POTASSIUM SERPL-SCNC: 4 MMOL/L (ref 3.5–5.1)
POTASSIUM SERPL-SCNC: 4.5 MMOL/L (ref 3.5–5.1)
PROT SERPL-MCNC: 5.5 G/DL (ref 6–8.4)
PROT SERPL-MCNC: 5.8 G/DL (ref 6–8.4)
PROTHROMBIN TIME: 12.1 SEC (ref 9–12.5)
RBC # BLD AUTO: 3.3 M/UL (ref 4.6–6.2)
SODIUM SERPL-SCNC: 135 MMOL/L (ref 136–145)
SODIUM SERPL-SCNC: 138 MMOL/L (ref 136–145)
WBC # BLD AUTO: 7.93 K/UL (ref 3.9–12.7)

## 2024-08-13 PROCEDURE — 94761 N-INVAS EAR/PLS OXIMETRY MLT: CPT

## 2024-08-13 PROCEDURE — 36415 COLL VENOUS BLD VENIPUNCTURE: CPT | Performed by: INTERNAL MEDICINE

## 2024-08-13 PROCEDURE — 25000003 PHARM REV CODE 250

## 2024-08-13 PROCEDURE — 84100 ASSAY OF PHOSPHORUS: CPT

## 2024-08-13 PROCEDURE — 36415 COLL VENOUS BLD VENIPUNCTURE: CPT

## 2024-08-13 PROCEDURE — 85027 COMPLETE CBC AUTOMATED: CPT

## 2024-08-13 PROCEDURE — 85610 PROTHROMBIN TIME: CPT

## 2024-08-13 PROCEDURE — 63600175 PHARM REV CODE 636 W HCPCS: Mod: JZ,JG | Performed by: RADIOLOGY

## 2024-08-13 PROCEDURE — 11000001 HC ACUTE MED/SURG PRIVATE ROOM

## 2024-08-13 PROCEDURE — 80053 COMPREHEN METABOLIC PANEL: CPT | Mod: 91

## 2024-08-13 PROCEDURE — 63600175 PHARM REV CODE 636 W HCPCS

## 2024-08-13 PROCEDURE — 94640 AIRWAY INHALATION TREATMENT: CPT

## 2024-08-13 PROCEDURE — 82947 ASSAY GLUCOSE BLOOD QUANT: CPT | Performed by: INTERNAL MEDICINE

## 2024-08-13 PROCEDURE — 0W9G3ZZ DRAINAGE OF PERITONEAL CAVITY, PERCUTANEOUS APPROACH: ICD-10-PCS | Performed by: RADIOLOGY

## 2024-08-13 PROCEDURE — 25000242 PHARM REV CODE 250 ALT 637 W/ HCPCS: Performed by: STUDENT IN AN ORGANIZED HEALTH CARE EDUCATION/TRAINING PROGRAM

## 2024-08-13 PROCEDURE — 83735 ASSAY OF MAGNESIUM: CPT

## 2024-08-13 PROCEDURE — 25000242 PHARM REV CODE 250 ALT 637 W/ HCPCS

## 2024-08-13 PROCEDURE — 25000003 PHARM REV CODE 250: Performed by: INTERNAL MEDICINE

## 2024-08-13 PROCEDURE — P9047 ALBUMIN (HUMAN), 25%, 50ML: HCPCS | Mod: JZ,JG | Performed by: RADIOLOGY

## 2024-08-13 RX ORDER — SODIUM,POTASSIUM PHOSPHATES 280-250MG
1 POWDER IN PACKET (EA) ORAL ONCE
Status: COMPLETED | OUTPATIENT
Start: 2024-08-13 | End: 2024-08-13

## 2024-08-13 RX ORDER — DEXTROSE MONOHYDRATE 100 MG/ML
INJECTION, SOLUTION INTRAVENOUS CONTINUOUS
Status: DISCONTINUED | OUTPATIENT
Start: 2024-08-13 | End: 2024-08-14

## 2024-08-13 RX ORDER — ALBUMIN HUMAN 250 G/1000ML
50 SOLUTION INTRAVENOUS ONCE
Status: COMPLETED | OUTPATIENT
Start: 2024-08-13 | End: 2024-08-13

## 2024-08-13 RX ADMIN — LEVALBUTEROL HYDROCHLORIDE 0.63 MG: 0.63 SOLUTION RESPIRATORY (INHALATION) at 11:08

## 2024-08-13 RX ADMIN — BUDESONIDE INHALATION 0.5 MG: 0.5 SUSPENSION RESPIRATORY (INHALATION) at 07:08

## 2024-08-13 RX ADMIN — DEXTROSE MONOHYDRATE 250 ML: 100 INJECTION, SOLUTION INTRAVENOUS at 11:08

## 2024-08-13 RX ADMIN — LEVOTHYROXINE SODIUM 100 MCG: 0.1 TABLET ORAL at 05:08

## 2024-08-13 RX ADMIN — LEVALBUTEROL HYDROCHLORIDE 0.63 MG: 0.63 SOLUTION RESPIRATORY (INHALATION) at 03:08

## 2024-08-13 RX ADMIN — ALBUMIN (HUMAN) 50 G: 5 SOLUTION INTRAVENOUS at 09:08

## 2024-08-13 RX ADMIN — LEVALBUTEROL HYDROCHLORIDE 0.63 MG: 0.63 SOLUTION RESPIRATORY (INHALATION) at 07:08

## 2024-08-13 RX ADMIN — DEXTROSE MONOHYDRATE: 100 INJECTION, SOLUTION INTRAVENOUS at 01:08

## 2024-08-13 RX ADMIN — SACUBITRIL AND VALSARTAN 1 TABLET: 24; 26 TABLET, FILM COATED ORAL at 09:08

## 2024-08-13 RX ADMIN — METOPROLOL SUCCINATE 25 MG: 25 TABLET, EXTENDED RELEASE ORAL at 09:08

## 2024-08-13 RX ADMIN — DEXTROSE MONOHYDRATE 250 ML: 100 INJECTION, SOLUTION INTRAVENOUS at 06:08

## 2024-08-13 RX ADMIN — ARFORMOTEROL TARTRATE 15 MCG: 15 SOLUTION RESPIRATORY (INHALATION) at 07:08

## 2024-08-13 RX ADMIN — POTASSIUM & SODIUM PHOSPHATES POWDER PACK 280-160-250 MG 1 PACKET: 280-160-250 PACK at 12:08

## 2024-08-13 RX ADMIN — ATORVASTATIN CALCIUM 40 MG: 40 TABLET, FILM COATED ORAL at 09:08

## 2024-08-13 RX ADMIN — CEFTRIAXONE 1 G: 1 INJECTION, POWDER, FOR SOLUTION INTRAMUSCULAR; INTRAVENOUS at 01:08

## 2024-08-13 RX ADMIN — GABAPENTIN 300 MG: 300 CAPSULE ORAL at 08:08

## 2024-08-13 RX ADMIN — HYDRALAZINE HYDROCHLORIDE 50 MG: 25 TABLET ORAL at 09:08

## 2024-08-13 RX ADMIN — ASPIRIN 81 MG: 81 TABLET, COATED ORAL at 09:08

## 2024-08-13 RX ADMIN — ISOSORBIDE DINITRATE 20 MG: 20 TABLET ORAL at 09:08

## 2024-08-13 NOTE — PLAN OF CARE
Plan of care reviewed with patient. Verbalized understanding. IV intact and patent. UA sent to lab. Tele in place and being monitored. Patient ambulatory in room with standby assistance. Glucose monitored. Snack given to prevent drop in sugar during night. Patient alert and able to make needs known. Safety maintained. Call light in reach and instructed to call for assistance. Will continue to monitor.

## 2024-08-13 NOTE — NURSING
IR ultrasound guided paracentesis performed by Dr. Raines.  Procedure explained and consent obtained.   Time out performed.  6 L removed-   Patient received 50 g of albumin IV.  VS remained stable for duration of procedure.  Specimens collected and sent to lab if ordered.   Para d/c'd, with tip intact.   Access site cleaned with peroxide, derma bond and steri-strips applied, then covered with sterile Band-Aid.   Report given to Isis. Patient transported back to room 310 in stable condition.

## 2024-08-13 NOTE — PLAN OF CARE
[facility] - Med/Surg  Initial Discharge Assessment       Primary Care Provider: Millie Hayes APRN,TEDP-C    Admission Diagnosis: LOC (loss of consciousness) [R40.20]    Admission Date: 8/12/2024  Expected Discharge Date: 8/13/2024    Transition of Care Barriers: None    Payor: [employer] MANAGED MEDICARE / Plan: [employer] MEDICARE PLAN HMO / Product Type: Medicare Advantage /     Extended Emergency Contact Information  Primary Emergency Contact: MELLY GRIFFITHS  Mobile Phone: [phone]  Relation: Relative  Preferred language: English   needed? No  Secondary Emergency Contact: Blanca Lombardo  Mobile Phone: [phone]  Relation: Sister   needed? No    Discharge Plan A: Home with family  Discharge Plan B: Home      [id number] - [place] - [address]  [address] [place] [address]  Phone: [phone] Fax: [phone]    [facility] [place] - [facility] AT Portal to Registered [facility]  [place] [address]  Phone: [phone] Fax: [phone]    [facility] - [place] - [address] [place] [address]  [address] [place] [id number]  Phone: [phone] Fax: [phone]    Discharge assessment completed at bedside with pt.  Verified Facesheet information.  States independent with activities, drives to apts.  Reports brother will be providing transportation home at discharge.  Denies blood thinners, HD, HH opt services.  States has a rollator at home.  Discharge plan is home.  Initial Assessment (most recent)       Adult Discharge Assessment - 08/13/24 1310          Discharge Assessment    Assessment Type Discharge Planning Assessment     Confirmed/corrected address, phone number and insurance Yes     Confirmed Demographics Correct on Facesheet     Source of Information patient     Does patient/caregiver understand observation status Yes     Communicated PAM with  patient/caregiver Yes     People in Home alone     Do you expect to return to your current living situation? Yes     Do you have help at home or someone to help you manage your care at home? No     Prior to hospitilization cognitive status: Alert/Oriented     Current cognitive status: Alert/Oriented     Walking or Climbing Stairs Difficulty no     Dressing/Bathing Difficulty no     Equipment Currently Used at Home rollator     Readmission within 30 days? Yes     Patient currently being followed by outpatient case management? No     Do you currently have service(s) that help you manage your care at home? No     Do you take prescription medications? Yes     Do you have prescription coverage? Yes     Do you have any problems affording any of your prescribed medications? No     Is the patient taking medications as prescribed? yes     Who is going to help you get home at discharge? brother     How do you get to doctors appointments? car, drives self     Are you on dialysis? No     Do you take coumadin? No     Discharge Plan A Home with family     Discharge Plan B Home     DME Needed Upon Discharge  none     Discharge Plan discussed with: Patient     Transition of Care Barriers None

## 2024-08-13 NOTE — SUBJECTIVE & OBJECTIVE
Past Medical History:   Diagnosis Date    Asthma     Cardiomyopathy 10/21/2014    CHF (congestive heart failure)     Coronary artery disease     CRF (chronic renal failure)     Diabetes mellitus     Enlarged prostate     Hyperlipidemia     Hypertension     Neuropathy     Syncope 7/29/2024       Past Surgical History:   Procedure Laterality Date    ANGIOGRAPHY OF INTERNAL MAMMARY VESSEL N/A 3/11/2022    Procedure: Angiogram Internal Mammary;  Surgeon: Hank Lujan MD;  Location: [facility] CATH/EP LAB;  Service: Cardiology;  Laterality: N/A;    CARDIAC CATHETERIZATION      CARDIAC VALVE SURGERY      CATHETERIZATION OF BOTH LEFT AND RIGHT HEART Left 3/11/2022    Procedure: CATHETERIZATION, HEART, BOTH LEFT AND RIGHT;  Surgeon: Hank Lujan MD;  Location: [facility] CATH/EP LAB;  Service: Cardiology;  Laterality: Left;    CORONARY ANGIOGRAPHY N/A 3/11/2022    Procedure: ANGIOGRAM, CORONARY ARTERY;  Surgeon: Hank Lujan MD;  Location: [facility] CATH/EP LAB;  Service: Cardiology;  Laterality: N/A;    CORONARY BYPASS GRAFT ANGIOGRAPHY  3/11/2022    Procedure: Bypass graft study;  Surgeon: Hank Lujan MD;  Location: [facility] CATH/EP LAB;  Service: Cardiology;;    CYSTOSCOPY N/A 7/30/2019    Procedure: CYSTOSCOPY;  Surgeon: Spencer Nguyen MD;  Location: [facility] OR;  Service: Urology;  Laterality: N/A;    INSERTION OF INTRAVASCULAR MICROAXIAL BLOOD PUMP N/A 8/31/2022    Procedure: INSERTION, IMPELLA;  Surgeon: Zach Jensen MD;  Location: [facility] CATH LAB;  Service: Cardiology;  Laterality: N/A;    INSERTION, CATHETER, DIALYSIS, PERITONEAL N/A 12/7/2023    Procedure: INSERTION, CATHETER, DIALYSIS, PERITONEAL;  Surgeon: Greg Bone Jr., MD;  Location: [facility] OR;  Service: General;  Laterality: N/A;  need PD catheter    PLACEMENT OF SWAN SEE CATHETER WITH IMAGING GUIDANCE  8/31/2022    Procedure: INSERTION, CATHETER, SWAN-SEE, WITH IMAGING GUIDANCE;  Surgeon: Zach Jensen MD;  Location: [facility] CATH LAB;   Service: Cardiology;;    REPAIR, HERNIA, INGUINAL, INCARCERATED, INITIAL, AGE 5 YEARS OR OLDER Right 12/7/2023    Procedure: REPAIR, HERNIA, INGUINAL, INCARCERATED, INITIAL;  Surgeon: Greg Bone Jr., MD;  Location: [facility] OR;  Service: General;  Laterality: Right;    SHOULDER ARTHROSCOPY  1985    TRANSRECTAL BIOPSY OF PROSTATE WITH ULTRASOUND GUIDANCE N/A 7/30/2019    Procedure: BIOPSY, PROSTATE, RECTAL APPROACH, WITH US GUIDANCE;  Surgeon: Spencer Nguyen MD;  Location: [facility] OR;  Service: Urology;  Laterality: N/A;  procedure not performed, pt unable to tolerate    TRANSRECTAL BIOPSY OF PROSTATE WITH ULTRASOUND GUIDANCE N/A 8/8/2019    Procedure: BIOPSY, PROSTATE, RECTAL APPROACH, WITH US GUIDANCE;  Surgeon: Spencer Nguyen MD;  Location: [facility] OR;  Service: Urology;  Laterality: N/A;    UMBILICAL HERNIA REPAIR N/A 12/7/2023    Procedure: REPAIR, HERNIA, UMBILICAL;  Surgeon: Greg Bone Jr., MD;  Location: [facility] OR;  Service: General;  Laterality: N/A;       Review of patient's allergies indicates:   Allergen Reactions    Canagliflozin      Other reaction(s): been very dizzy       No current facility-administered medications on file prior to encounter.     Current Outpatient Medications on File Prior to Encounter   Medication Sig    aspirin (ECOTRIN) 81 MG EC tablet Take 81 mg by mouth once daily.    atorvastatin (LIPITOR) 40 MG tablet Take 1 tablet (40 mg total) by mouth once daily.    cyanocobalamin 1,000 mcg/mL injection Inject 1 mL (1,000 mcg total) into the muscle every 30 days. (Patient taking differently: Inject 1,000 mcg into the muscle every 30 days. 1st of month)    empagliflozin (JARDIANCE) 25 mg tablet Take 1 tablet (25 mg total) by mouth once daily.    fluticasone furoate-vilanteroL (BREO ELLIPTA) 100-25 mcg/dose diskus inhaler Inhale 1 puff into the lungs once daily. (DAILY CONTROLLER)    fluticasone propionate (FLONASE) 50 mcg/actuation nasal spray USE 1 SPRAY (50 MCG TOTAL) IN  EACH NOSTRIL ONCE DAILY (Patient taking differently: 1 spray by Each Nostril route Daily.)    gabapentin (NEURONTIN) 300 MG capsule Take 1 capsule (300 mg total) by mouth every evening.    glimepiride (AMARYL) 4 MG tablet Take 1 tablet (4 mg total) by mouth before breakfast.    isosorbide dinitrate (ISORDIL) 20 MG tablet Take 20 mg by mouth 3 (three) times daily.    levalbuterol (XOPENEX) 0.63 mg/3 mL nebulizer solution Inhale 0.63 mg into the lungs every 6 (six) hours as needed for Shortness of Breath.    levothyroxine (SYNTHROID) 100 MCG tablet Take 1 tablet (100 mcg total) by mouth before breakfast. With no other meds or food    metoprolol succinate (TOPROL-XL) 25 MG 24 hr tablet Take 1 tablet (25 mg total) by mouth once daily.    sacubitriL-valsartan (ENTRESTO) 24-26 mg per tablet Take 1 tablet by mouth 2 (two) times daily.    VERQUVO 5 mg Tab Take 5 mg by mouth once daily.    hydrALAZINE (APRESOLINE) 50 MG tablet Take 1 tablet (50 mg total) by mouth 2 (two) times a day.    linaCLOtide (LINZESS) 72 mcg Cap capsule Take 1 capsule (72 mcg total) by mouth before breakfast.    [DISCONTINUED] albuterol (PROVENTIL/VENTOLIN HFA) 90 mcg/actuation inhaler INHALE 1 TO 2 PUFFS INTO THE LUNGS EVERY 4 TO 6 HOURS AS NEEDED FOR WHEEZE AND SHORTNESS OF BREATH    [DISCONTINUED] budesonide-glycopyr-formoterol (BREZTRI AEROSPHERE) 160-9-4.8 mcg/actuation HFAA Inhale into the lungs daily as needed.     Family History       Problem Relation (Age of Onset)    Diabetes Father, Sister, Brother    Heart attack Father    Heart disease Father, Brother    Hypertension Father    No Known Problems Mother, Maternal Grandmother, Maternal Grandfather, Paternal Grandmother, Paternal Grandfather, Maternal Aunt, Maternal Uncle, Paternal Aunt, Paternal Uncle          Tobacco Use    Smoking status: Former     Current packs/day: 0.00     Types: Cigarettes     Quit date: 1970     Years since quittin.1    Smokeless tobacco: Never   Substance  and Sexual Activity    Alcohol use: Not Currently     Alcohol/week: 3.0 standard drinks of alcohol     Types: 3 Cans of beer per week    Drug use: No    Sexual activity: Not Currently     Partners: Female     Review of Systems   Constitutional:  Positive for fatigue.   Cardiovascular:  Positive for leg swelling.   Gastrointestinal:  Positive for abdominal distention.   Neurological:  Positive for syncope.   All other systems reviewed and are negative.    Objective:     Vital Signs (Most Recent):  Temp: 98.2 °F (36.8 °C) (08/12/24 1941)  Pulse: 70 (08/12/24 2023)  Resp: 18 (08/12/24 2023)  BP: (!) 149/71 (08/12/24 2050)  SpO2: 100 % (08/12/24 2023) Vital Signs (24h Range):  Temp:  [97.1 °F (36.2 °C)-98.2 °F (36.8 °C)] 98.2 °F (36.8 °C)  Pulse:  [60-73] 70  Resp:  [17-18] 18  SpO2:  [97 %-100 %] 100 %  BP: (133-178)/(66-81) 149/71     Weight: 78 kg (171 lb 15.3 oz)  Body mass index is 23.32 kg/m².     Physical Exam  Vitals reviewed.   Constitutional:       General: He is not in acute distress.  HENT:      Head: Normocephalic and atraumatic.      Nose: Nose normal.      Mouth/Throat:      Mouth: Mucous membranes are dry.      Pharynx: Oropharynx is clear.   Eyes:      Extraocular Movements: Extraocular movements intact.      Pupils: Pupils are equal, round, and reactive to light.   Cardiovascular:      Rate and Rhythm: Normal rate and regular rhythm.      Pulses: Normal pulses.           Radial pulses are 2+ on the right side and 2+ on the left side.        Dorsalis pedis pulses are 2+ on the right side and 2+ on the left side.   Pulmonary:      Effort: Pulmonary effort is normal.      Breath sounds: Normal breath sounds.   Abdominal:      General: Bowel sounds are normal. There is distension.      Tenderness: There is no abdominal tenderness.   Musculoskeletal:         General: Normal range of motion.      Cervical back: Normal range of motion and neck supple.      Right lower leg: 3+ Edema present.      Left lower  leg: 3+ Edema present.   Skin:     General: Skin is warm and dry.      Capillary Refill: Capillary refill takes less than 2 seconds.   Neurological:      General: No focal deficit present.      Mental Status: He is alert and oriented to person, place, and time. Mental status is at baseline.   Psychiatric:         Mood and Affect: Mood normal.         Behavior: Behavior normal.         Thought Content: Thought content normal.         Judgment: Judgment normal.              CRANIAL NERVES     CN III, IV, VI   Pupils are equal, round, and reactive to light.       Significant Labs: All pertinent labs within the past 24 hours have been reviewed.    Bilirubin:   Recent Labs   Lab 07/29/24  1250 07/30/24  0319 07/31/24  0457 08/01/24  0432 08/12/24  1231   BILITOT 1.8* 1.4* 1.0 0.5 0.6     BMP:   Recent Labs   Lab 08/12/24  1231   GLU 36*      K 4.1   *   CO2 19*   BUN 29*   CREATININE 2.0*   CALCIUM 8.7     CBC:   Recent Labs   Lab 08/12/24  1232   WBC 8.58   HGB 11.2*   HCT 34.6*        CMP:   Recent Labs   Lab 08/12/24  1231      K 4.1   *   CO2 19*   GLU 36*   BUN 29*   CREATININE 2.0*   CALCIUM 8.7   PROT 6.8   ALBUMIN 2.6*   BILITOT 0.6   ALKPHOS 70   AST 15   ALT 15   ANIONGAP 10     Cardiac Markers:   Recent Labs   Lab 08/12/24  1231   BNP 2,595*     Coagulation:   Recent Labs   Lab 08/12/24  1550   INR 1.1       POCT Glucose:   Recent Labs   Lab 08/12/24  1448 08/12/24  1702 08/12/24  2051   POCTGLUCOSE 218* 177* 90       Troponin:   Recent Labs   Lab 08/12/24  1231 08/12/24  1734 08/12/24  2129   TROPONINI 0.017 0.028* 0.037*     TSH:   Recent Labs   Lab 08/12/24  1259   TSH 3.902       Significant Imaging: I have reviewed all pertinent imaging results/findings within the past 24 hours.  EXAMINATION:  XR CHEST AP PORTABLE     CLINICAL HISTORY:  Weakness     TECHNIQUE:  Single frontal view of the chest was performed.     COMPARISON:  07/29/2024     FINDINGS:  Stable cardiomediastinal  silhouette.  Anterior chest wall cardiac pacemaker with multiple leads remain in place.  Median sternotomy sutures.  The clear confluent infiltrate.  Mild prominence of perihilar markings not appearing significantly changed.     Impression:     No significant change compared to the prior study of 07/29/2024.  Mild prominence of the perihilar markings which could reflect mild perihilar infiltrate or pulmonary vascular distention.  No confluent infiltrates.        Electronically signed by:Trina Crowell MD  Date:                                            08/12/2024  Time:                                           13:24    EXAMINATION:  CT HEAD WITHOUT CONTRAST     CLINICAL HISTORY:  Head trauma, minor (Age >= 65y);     TECHNIQUE:  Low dose axial images were obtained through the head.  Coronal and sagittal reformations were also performed. Contrast was not administered.     COMPARISON:  07/29/2024     FINDINGS:  Mild dilatation of ventricles, sulci, fissures not appearing out of proportion for the patient's age.  Decreased density in white matter suggesting microvascular ischemic change.  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  Probable old lacunar infarct right cerebellar versus averaging of CSF space.  No acute intracranial findings.  No acute intracranial hemorrhage.  No intracranial mass effect.  No acute major vascular territory infarct.  Note is made that MRI is typically more sensitive than CT particular for detection of early or small nonhemorrhagic infarct.  The calvarium appears intact.  Mastoids appear well pneumatized and visualized paranasal sinuses essentially clear.  Calcifications are seen in parasellar portions of internal carotid arteries     Impression:     Mild involutional change and findings suggesting microvascular ischemic change and old infarcts with no acute intracranial findings or significant change compared to the prior exam.        Electronically  signed by:Trina Crowell MD  Date:                                            08/12/2024  Time:                                           13:22

## 2024-08-13 NOTE — NURSING
Patient BG for lunch checked. 36 in right hand. Recheck 41 in left hand. Patient asymptomatic. Orange juice and romy crackers given. MD notified. Stat glucose ordered. PRN D10 administered. Will recheck BG.

## 2024-08-13 NOTE — PROGRESS NOTES
[facility] Medicine  Progress Note    Patient Name: Baldo Carney  MRN: [id number]  Patient Class: IP- Inpatient   Admission Date: 8/12/2024  Length of Stay: 0 days  Attending Physician: Tali Soria MD  Primary Care Provider: Millie Hayes APRN,FNP-C        Subjective:     Principal Problem:Syncope and collapse        HPI:  Baldo Carney is a 73 year old male with a previous medical history of COPD, cirrhosis of the liver with bi monthly paracentesis, cardiomyopathy, CHF, aortic stenosis, CAD, HTN, HLD, BPH, chronic renal failure, and DMII who presented to the emergency room for syncope this morning. He reports he awoke to his brother lifting him up off the floor and then brought him to the hospital. The patient has had family members check in on him more often due to recent hospital admission for syncope.  He does not recall any events prior to waking up on the floor or after he went to bed yesterday evening. He reports eating a hearty meal one hour prior to bed last night.  In the ED his blood sugar was noted to be 37 but patient was AAOx4 and just endorsing generalized weakness. His blood sugar improved with IV dextrose. Patient denies any adverse symptoms other than generalized fatigue and reports he was due for his therapeutic paracentesis on 8/12/24. Troponin mildly elevated and patient in an atrial paced rhythm. He denies any defibrillator shocks chest pain or SOB. Denies dysuria or urinary issues. Urine studies positive for infection and patient covered with IV rocephin. Patient admitted by hospital medicine for further evaluation and management.    Overview/Hospital Course:  Patient was admitted and monitored on telemetry. Serial neuro checks were done. The patient developed hypoglycemia once again, and was placed on a D10 infusion. CT brain was unremarkable. Pacemaker was interrogated and was functioning properly. Serial glucometer checks were  continued. Diabetic meds were held.    Interval History: Developed hypoglycemia once again. Placed on D10 infusion.    Review of Systems   Constitutional:  Positive for fatigue. Negative for activity change, appetite change, chills and fever.   HENT:  Negative for congestion, ear pain, nosebleeds and sinus pain.    Eyes:  Negative for discharge and itching.   Respiratory:  Negative for apnea, cough, chest tightness and shortness of breath.    Cardiovascular:  Positive for leg swelling. Negative for chest pain and palpitations.   Gastrointestinal:  Positive for abdominal distention. Negative for abdominal pain and vomiting.   Genitourinary:  Negative for difficulty urinating, dysuria, flank pain and frequency.   Musculoskeletal:  Negative for arthralgias, back pain, joint swelling and myalgias.   Skin:  Negative for color change, pallor and rash.   Neurological:  Positive for syncope. Negative for dizziness, weakness, light-headedness and headaches.   Psychiatric/Behavioral:  Negative for agitation, behavioral problems, confusion and suicidal ideas.      Objective:     Vital Signs (Most Recent):  Temp: 97.9 °F (36.6 °C) (08/13/24 1145)  Pulse: 67 (08/13/24 1510)  Resp: 16 (08/13/24 1510)  BP: (!) 92/51 (08/13/24 1145)  SpO2: 99 % (08/13/24 1510) Vital Signs (24h Range):  Temp:  [97.1 °F (36.2 °C)-99 °F (37.2 °C)] 97.9 °F (36.6 °C)  Pulse:  [63-93] 67  Resp:  [16-20] 16  SpO2:  [97 %-100 %] 99 %  BP: ()/(51-79) 92/51     Weight: 78 kg (171 lb 15.3 oz)  Body mass index is 23.32 kg/m².    Intake/Output Summary (Last 24 hours) at 8/13/2024 1515  Last data filed at 8/13/2024 1200  Gross per 24 hour   Intake 576.69 ml   Output 200 ml   Net 376.69 ml         Physical Exam  Vitals reviewed.   Constitutional:       General: He is not in acute distress.     Appearance: Normal appearance. He is normal weight. He is not ill-appearing.   HENT:      Head: Normocephalic and atraumatic.      Nose: Nose normal.       "Mouth/Throat:      Mouth: Mucous membranes are moist.      Pharynx: Oropharynx is clear.   Eyes:      General: No scleral icterus.     Extraocular Movements: Extraocular movements intact.      Conjunctiva/sclera: Conjunctivae normal.      Pupils: Pupils are equal, round, and reactive to light.   Cardiovascular:      Rate and Rhythm: Normal rate and regular rhythm.      Pulses: Normal pulses.      Heart sounds: Normal heart sounds. No murmur heard.     No gallop.   Pulmonary:      Effort: Pulmonary effort is normal. No respiratory distress.      Breath sounds: Normal breath sounds. No wheezing, rhonchi or rales.   Chest:      Chest wall: No tenderness.   Abdominal:      General: Abdomen is flat. There is distension.      Palpations: Abdomen is soft.      Tenderness: There is no abdominal tenderness.   Musculoskeletal:         General: No swelling or tenderness.      Cervical back: Normal range of motion and neck supple. No rigidity or tenderness.      Right lower leg: Edema present.      Left lower leg: Edema present.   Skin:     General: Skin is warm and dry.   Neurological:      General: No focal deficit present.      Mental Status: He is alert and oriented to person, place, and time. Mental status is at baseline.      Motor: No weakness.   Psychiatric:         Mood and Affect: Mood normal.         Behavior: Behavior normal.         Thought Content: Thought content normal.             Significant Labs: All pertinent labs within the past 24 hours have been reviewed.    Significant Imaging: I have reviewed all pertinent imaging results/findings within the past 24 hours.    Assessment/Plan:      * Syncope and collapse  -Telemetry monitoring  -Neurochecks Q 4 hours  -CT of head negative for any acute process  -Likely secondary to hypoglycemia  -Orthostatic vital signs  -CBC, CMP, Mag, Phos daily  -Trend troponin  -Cardiac device check ordered  -Carotid US on 7/29/24 showed no "No evidence of a hemodynamically " "significant carotid bifurcation stenosis"  Echo    Result Date: 7/29/2024    Left Ventricle: The left ventricle is mildly dilated. Normal wall   thickness. Global hypokinesis present. Septal motion is consistent with   bundle branch block. There is severely reduced systolic function. Biplane   (2D) method of discs ejection fraction is 22%. Grade II diastolic   dysfunction.    Right Ventricle: Mild right ventricular enlargement. Systolic function   is moderately reduced. Pacemaker lead present in the ventricle.    Left Atrium: Left atrium is moderately dilated.    Right Atrium: Right atrium is moderately dilated.    Aortic Valve: Not well visualized due to poor acoustic window. There is   a bioprosthetic valve in the aortic position. Moderately calcified cusps.   Aortic valve area by VTI is 1.23 cm². Aortic valve peak velocity is 2.18   m/s. Mean gradient is 10 mmHg. The dimensionless index is 0.38. There is   mild to moderate aortic regurgitation.    Mitral Valve: There is moderate bileaflet sclerosis. There is mitral   annular calcification present. There is mild to moderate regurgitation.    Tricuspid Valve: There is moderate regurgitation.    Pulmonic Valve: There is mild to moderate regurgitation.    Pulmonary Artery: There is pulmonary hypertension. The estimated   pulmonary artery systolic pressure is 71 mmHg.    IVC/SVC: Elevated venous pressure at 15 mmHg.             UTI (urinary tract infection)  -IV rocephin daily  -Urine culture pending      Type 2 diabetes mellitus, without long-term current use of insulin  Patient's FSGs are controlled on current medication regimen.  Last A1c reviewed-   Lab Results   Component Value Date    HGBA1C 5.4 09/15/2023     Most recent fingerstick glucose reviewed-   Recent Labs   Lab 08/12/24  1347 08/12/24  1448   POCTGLUCOSE 27* 218*     Hold all insulin or oral meds currently and ACCUCHECKS Q 4 hours to monitor blood sugar closely     Hold Oral hypoglycemics while patient " is in the hospital.       CRF (chronic renal failure)  Creatine stable for now. BMP reviewed- noted Estimated Creatinine Clearance: 35.7 mL/min (A) (based on SCr of 2 mg/dL (H)). according to latest data. Based on current GFR, CKD stage is stage 3 - GFR 30-59.  Monitor UOP and serial BMP and adjust therapy as needed. Renally dose meds. Avoid nephrotoxic medications and procedures.    Cirrhosis  -IR consulted for weekly paracentesis-most recently performed on 7/29/24 with 6.5 liters removed. Next paracentesis was due to be performed 8/12/24  -Daily CBC, CMP, INR        Hypothyroidism  -levothyroxine continued      COPD (chronic obstructive pulmonary disease)  Patient's COPD is controlled currently.  Patient is currently off COPD Pathway. Continue scheduled inhalers Supplemental oxygen PRN and monitor respiratory status closely.     Chronic combined systolic and diastolic heart failure  CHF currently controlled. Latest ECHO shows ejection fraction of 22%. Continue BB, ACEi,   and monitor clinical status closely. Monitor on telemetry. Last BNP is   Recent Labs   Lab 08/12/24  1231   BNP 2,595*   . Continue to stress to patient importance of self efficacy and  on diet for CHF.       Hypertension  Chronic, controlled. Latest blood pressure and vitals reviewed-     Temp:  [97.8 °F (36.6 °C)]   Pulse:  [60-64]   Resp:  [18]   BP: (148-178)/(66-81)   SpO2:  [97 %-100 %] .   Home meds for hypertension were reviewed and noted below.   Hypertension Medications               hydrALAZINE (APRESOLINE) 50 MG tablet Take 1 tablet (50 mg total) by mouth 2 (two) times a day.    isosorbide dinitrate (ISORDIL) 20 MG tablet Take 20 mg by mouth 3 (three) times daily.    metoprolol succinate (TOPROL-XL) 25 MG 24 hr tablet Take 1 tablet (25 mg total) by mouth once daily.    sacubitriL-valsartan (ENTRESTO) 24-26 mg per tablet Take 1 tablet by mouth 2 (two) times daily.            While in the hospital, will manage blood pressure as  follows; Continue home antihypertensive regimen    Will utilize p.r.n. blood pressure medication only if patient's blood pressure greater than 180/110 and he develops symptoms such as worsening chest pain or shortness of breath.      VTE Risk Mitigation (From admission, onward)           Ordered     IP VTE HIGH RISK PATIENT  Once         08/12/24 1524     Place sequential compression device  Until discontinued         08/12/24 1524     Reason for No Pharmacological VTE Prophylaxis  Once        Question:  Reasons:  Answer:  Risk of Bleeding    08/12/24 1524                    Discharge Planning   PAM: 8/14/2024     Code Status: Full Code   Is the patient medically ready for discharge?:     Reason for patient still in hospital (select all that apply): Patient trending condition  Discharge Plan A: Home with family                  Tali Soria MD, MD  Department of Hospital Medicine   [facility]/Surg

## 2024-08-13 NOTE — NURSING
Pt with [employer] pacemaker. Bedside interrogation done. Report faxed from [employer] placed in pt chart. Primary nurse Isis MONTENEGRO updated. Dr. Soria updated report received.

## 2024-08-13 NOTE — ASSESSMENT & PLAN NOTE
-IR consulted for weekly paracentesis-most recently performed on 7/29/24 with 6.5 liters removed. Next paracentesis was due to be performed 8/12/24  -Daily CBC, CMP, INR

## 2024-08-13 NOTE — PLAN OF CARE
08/12/24 2023   Patient Assessment/Suction   Level of Consciousness (AVPU) alert   Respiratory Effort Normal;Unlabored   Expansion/Accessory Muscles/Retractions expansion symmetric   All Lung Fields Breath Sounds clear   Rhythm/Pattern, Respiratory pattern regular   Cough Frequency no cough   PRE-TX-O2   Device (Oxygen Therapy) room air   SpO2 100 %   Pulse Oximetry Type Intermittent   Pulse 70   Resp 18   Aerosol Therapy   $ Aerosol Therapy Charges Aerosol Treatment   Respiratory Treatment Status (SVN) given   Treatment Route (SVN) mask   Patient Position HOB elevated   Post Treatment Assessment (SVN) breath sounds unchanged   Signs of Intolerance (SVN) none   Breath Sounds Post-Respiratory Treatment   Post-treatment Heart Rate (beats/min) 72   Post-treatment Resp Rate (breaths/min) 18

## 2024-08-13 NOTE — HOSPITAL COURSE
Patient was admitted and monitored on telemetry. Serial neuro checks were done. The patient developed hypoglycemia once again, and was placed on a D10 infusion. CT brain was unremarkable. Pacemaker was interrogated and was functioning properly. Serial glucometer checks were continued. Diabetic meds were held. Glucose levels continued to be low. Dextrose IVF's were continued. Insulin leve, proinsuin, c-peptide and random cortisol level were ordered. Hypoglycemia resolved, and glucose ran high. SSI was added. Patient had low grade fever and cough, and was screened for covid. This was positive. Dexamethasone started and discontinued as no oxygen req and sugars spiking. Hyponatremia has developed and no obvious cause, nephrology consulted and osmolarity labs ordered.  Patient requested that he will be leaving AMA if he is not discharged today.  Discussed with Nephrology and since patient's hyponatremia is chronic and he is asymptomatic patient can follow-up outpatient.  Alarming symptoms discussed with patient and patient verbalized understanding to call the office or come back to hospital if needed.  Ciprofloxacin sent to [facility] for 10 days for UTI

## 2024-08-13 NOTE — H&P
[facility] Medicine  History & Physical    Patient Name: Baldo Carney  MRN: [id number]  Patient Class: OP- Observation  Admission Date: 8/12/2024  Attending Physician: Warner Samayoa MD   Primary Care Provider: Millie Hayes APRN,FNP-C         Patient information was obtained from patient, past medical records, and ER records.     Subjective:     Principal Problem:Syncope and collapse    Chief Complaint:   Chief Complaint   Patient presents with    Loss of Consciousness     Passed out this am / dizziness this am         HPI: Baldo Carney is a 73 year old male with a previous medical history of COPD, cirrhosis of the liver with bi monthly paracentesis, cardiomyopathy, CHF, aortic stenosis, CAD, HTN, HLD, BPH, chronic renal failure, and DMII who presented to the emergency room for syncope this morning. He reports he awoke to his brother lifting him up off the floor and then brought him to the hospital. The patient has had family members check in on him more often due to recent hospital admission for syncope.  He does not recall any events prior to waking up on the floor or after he went to bed yesterday evening. He reports eating a hearty meal one hour prior to bed last night.  In the ED his blood sugar was noted to be 37 but patient was AAOx4 and just endorsing generalized weakness. His blood sugar improved with IV dextrose. Patient denies any adverse symptoms other than generalized fatigue and reports he was due for his therapeutic paracentesis on 8/12/24. Troponin mildly elevated and patient in an atrial paced rhythm. He denies any defibrillator shocks chest pain or SOB. Denies dysuria or urinary issues. Urine studies positive for infection and patient covered with IV rocephin. Patient admitted by hospital medicine for further evaluation and management.    Past Medical History:   Diagnosis Date    Asthma     Cardiomyopathy 10/21/2014    CHF (congestive heart  failure)     Coronary artery disease     CRF (chronic renal failure)     Diabetes mellitus     Enlarged prostate     Hyperlipidemia     Hypertension     Neuropathy     Syncope 7/29/2024       Past Surgical History:   Procedure Laterality Date    ANGIOGRAPHY OF INTERNAL MAMMARY VESSEL N/A 3/11/2022    Procedure: Angiogram Internal Mammary;  Surgeon: Hank Lujan MD;  Location: [facility] CATH/EP LAB;  Service: Cardiology;  Laterality: N/A;    CARDIAC CATHETERIZATION      CARDIAC VALVE SURGERY      CATHETERIZATION OF BOTH LEFT AND RIGHT HEART Left 3/11/2022    Procedure: CATHETERIZATION, HEART, BOTH LEFT AND RIGHT;  Surgeon: Hank Lujan MD;  Location: [facility] CATH/EP LAB;  Service: Cardiology;  Laterality: Left;    CORONARY ANGIOGRAPHY N/A 3/11/2022    Procedure: ANGIOGRAM, CORONARY ARTERY;  Surgeon: Hank Lujan MD;  Location: [facility] CATH/EP LAB;  Service: Cardiology;  Laterality: N/A;    CORONARY BYPASS GRAFT ANGIOGRAPHY  3/11/2022    Procedure: Bypass graft study;  Surgeon: Hank Lujan MD;  Location: [facility] CATH/EP LAB;  Service: Cardiology;;    CYSTOSCOPY N/A 7/30/2019    Procedure: CYSTOSCOPY;  Surgeon: Spencer Nguyen MD;  [facility];  Service: Urology;  Laterality: N/A;    INSERTION OF INTRAVASCULAR MICROAXIAL BLOOD PUMP N/A 8/31/2022    Procedure: INSERTION, IMPELLA;  Surgeon: Zach Jensen MD;  Location: [facility] CATH LAB;  Service: Cardiology;  Laterality: N/A;    INSERTION, CATHETER, DIALYSIS, PERITONEAL N/A 12/7/2023    Procedure: INSERTION, CATHETER, DIALYSIS, PERITONEAL;  Surgeon: Greg Bone Jr., MD;  Location: [facility] OR;  Service: General;  Laterality: N/A;  need PD catheter    PLACEMENT OF SWAN SEE CATHETER WITH IMAGING GUIDANCE  8/31/2022    Procedure: INSERTION, CATHETER, SWAN-SEE, WITH IMAGING GUIDANCE;  Surgeon: Zach Jensen MD;  Location: [facility] CATH LAB;  Service: Cardiology;;    REPAIR, HERNIA, INGUINAL, INCARCERATED, INITIAL, AGE 5 YEARS OR OLDER Right  12/7/2023    Procedure: REPAIR, HERNIA, INGUINAL, INCARCERATED, INITIAL;  Surgeon: Greg Bone Jr., MD;  Location: [facility] OR;  Service: General;  Laterality: Right;    SHOULDER ARTHROSCOPY  1985    TRANSRECTAL BIOPSY OF PROSTATE WITH ULTRASOUND GUIDANCE N/A 7/30/2019    Procedure: BIOPSY, PROSTATE, RECTAL APPROACH, WITH US GUIDANCE;  Surgeon: Spencer Nguyen MD;  Location: [facility] OR;  Service: Urology;  Laterality: N/A;  procedure not performed, pt unable to tolerate    TRANSRECTAL BIOPSY OF PROSTATE WITH ULTRASOUND GUIDANCE N/A 8/8/2019    Procedure: BIOPSY, PROSTATE, RECTAL APPROACH, WITH US GUIDANCE;  Surgeon: Spencer Nguyen MD;  Location: [facility] OR;  Service: Urology;  Laterality: N/A;    UMBILICAL HERNIA REPAIR N/A 12/7/2023    Procedure: REPAIR, HERNIA, UMBILICAL;  Surgeon: Greg Bone Jr., MD;  Location: [facility] OR;  Service: General;  Laterality: N/A;       Review of patient's allergies indicates:   Allergen Reactions    Canagliflozin      Other reaction(s): been very dizzy       No current facility-administered medications on file prior to encounter.     Current Outpatient Medications on File Prior to Encounter   Medication Sig    aspirin (ECOTRIN) 81 MG EC tablet Take 81 mg by mouth once daily.    atorvastatin (LIPITOR) 40 MG tablet Take 1 tablet (40 mg total) by mouth once daily.    cyanocobalamin 1,000 mcg/mL injection Inject 1 mL (1,000 mcg total) into the muscle every 30 days. (Patient taking differently: Inject 1,000 mcg into the muscle every 30 days. 1st of month)    empagliflozin (JARDIANCE) 25 mg tablet Take 1 tablet (25 mg total) by mouth once daily.    fluticasone furoate-vilanteroL (BREO ELLIPTA) 100-25 mcg/dose diskus inhaler Inhale 1 puff into the lungs once daily. (DAILY CONTROLLER)    fluticasone propionate (FLONASE) 50 mcg/actuation nasal spray USE 1 SPRAY (50 MCG TOTAL) IN EACH NOSTRIL ONCE DAILY (Patient taking differently: 1 spray by Each Nostril route Daily.)     gabapentin (NEURONTIN) 300 MG capsule Take 1 capsule (300 mg total) by mouth every evening.    glimepiride (AMARYL) 4 MG tablet Take 1 tablet (4 mg total) by mouth before breakfast.    isosorbide dinitrate (ISORDIL) 20 MG tablet Take 20 mg by mouth 3 (three) times daily.    levalbuterol (XOPENEX) 0.63 mg/3 mL nebulizer solution Inhale 0.63 mg into the lungs every 6 (six) hours as needed for Shortness of Breath.    levothyroxine (SYNTHROID) 100 MCG tablet Take 1 tablet (100 mcg total) by mouth before breakfast. With no other meds or food    metoprolol succinate (TOPROL-XL) 25 MG 24 hr tablet Take 1 tablet (25 mg total) by mouth once daily.    sacubitriL-valsartan (ENTRESTO) 24-26 mg per tablet Take 1 tablet by mouth 2 (two) times daily.    VERQUVO 5 mg Tab Take 5 mg by mouth once daily.    hydrALAZINE (APRESOLINE) 50 MG tablet Take 1 tablet (50 mg total) by mouth 2 (two) times a day.    linaCLOtide (LINZESS) 72 mcg Cap capsule Take 1 capsule (72 mcg total) by mouth before breakfast.    [DISCONTINUED] albuterol (PROVENTIL/VENTOLIN HFA) 90 mcg/actuation inhaler INHALE 1 TO 2 PUFFS INTO THE LUNGS EVERY 4 TO 6 HOURS AS NEEDED FOR WHEEZE AND SHORTNESS OF BREATH    [DISCONTINUED] budesonide-glycopyr-formoterol (BREZTRI AEROSPHERE) 160-9-4.8 mcg/actuation HFAA Inhale into the lungs daily as needed.     Family History       Problem Relation (Age of Onset)    Diabetes Father, Sister, Brother    Heart attack Father    Heart disease Father, Brother    Hypertension Father    No Known Problems Mother, Maternal Grandmother, Maternal Grandfather, Paternal Grandmother, Paternal Grandfather, Maternal Aunt, Maternal Uncle, Paternal Aunt, Paternal Uncle          Tobacco Use    Smoking status: Former     Current packs/day: 0.00     Types: Cigarettes     Quit date: 1970     Years since quittin.1    Smokeless tobacco: Never   Substance and Sexual Activity    Alcohol use: Not Currently     Alcohol/week: 3.0 standard drinks of  alcohol     Types: 3 Cans of beer per week    Drug use: No    Sexual activity: Not Currently     Partners: Female     Review of Systems   Constitutional:  Positive for fatigue.   Cardiovascular:  Positive for leg swelling.   Gastrointestinal:  Positive for abdominal distention.   Neurological:  Positive for syncope.   All other systems reviewed and are negative.    Objective:     Vital Signs (Most Recent):  Temp: 98.2 °F (36.8 °C) (08/12/24 1941)  Pulse: 70 (08/12/24 2023)  Resp: 18 (08/12/24 2023)  BP: (!) 149/71 (08/12/24 2050)  SpO2: 100 % (08/12/24 2023) Vital Signs (24h Range):  Temp:  [97.1 °F (36.2 °C)-98.2 °F (36.8 °C)] 98.2 °F (36.8 °C)  Pulse:  [60-73] 70  Resp:  [17-18] 18  SpO2:  [97 %-100 %] 100 %  BP: (133-178)/(66-81) 149/71     Weight: 78 kg (171 lb 15.3 oz)  Body mass index is 23.32 kg/m².     Physical Exam  Vitals reviewed.   Constitutional:       General: He is not in acute distress.  HENT:      Head: Normocephalic and atraumatic.      Nose: Nose normal.      Mouth/Throat:      Mouth: Mucous membranes are dry.      Pharynx: Oropharynx is clear.   Eyes:      Extraocular Movements: Extraocular movements intact.      Pupils: Pupils are equal, round, and reactive to light.   Cardiovascular:      Rate and Rhythm: Normal rate and regular rhythm.      Pulses: Normal pulses.           Radial pulses are 2+ on the right side and 2+ on the left side.        Dorsalis pedis pulses are 2+ on the right side and 2+ on the left side.   Pulmonary:      Effort: Pulmonary effort is normal.      Breath sounds: Normal breath sounds.   Abdominal:      General: Bowel sounds are normal. There is distension.      Tenderness: There is no abdominal tenderness.   Musculoskeletal:         General: Normal range of motion.      Cervical back: Normal range of motion and neck supple.      Right lower leg: 3+ Edema present.      Left lower leg: 3+ Edema present.   Skin:     General: Skin is warm and dry.      Capillary Refill:  Capillary refill takes less than 2 seconds.   Neurological:      General: No focal deficit present.      Mental Status: He is alert and oriented to person, place, and time. Mental status is at baseline.   Psychiatric:         Mood and Affect: Mood normal.         Behavior: Behavior normal.         Thought Content: Thought content normal.         Judgment: Judgment normal.              CRANIAL NERVES     CN III, IV, VI   Pupils are equal, round, and reactive to light.       Significant Labs: All pertinent labs within the past 24 hours have been reviewed.    Bilirubin:   Recent Labs   Lab 07/29/24  1250 07/30/24  0319 07/31/24  0457 08/01/24  0432 08/12/24  1231   BILITOT 1.8* 1.4* 1.0 0.5 0.6     BMP:   Recent Labs   Lab 08/12/24  1231   GLU 36*      K 4.1   *   CO2 19*   BUN 29*   CREATININE 2.0*   CALCIUM 8.7     CBC:   Recent Labs   Lab 08/12/24  1232   WBC 8.58   HGB 11.2*   HCT 34.6*        CMP:   Recent Labs   Lab 08/12/24  1231      K 4.1   *   CO2 19*   GLU 36*   BUN 29*   CREATININE 2.0*   CALCIUM 8.7   PROT 6.8   ALBUMIN 2.6*   BILITOT 0.6   ALKPHOS 70   AST 15   ALT 15   ANIONGAP 10     Cardiac Markers:   Recent Labs   Lab 08/12/24  1231   BNP 2,595*     Coagulation:   Recent Labs   Lab 08/12/24  1550   INR 1.1       POCT Glucose:   Recent Labs   Lab 08/12/24  1448 08/12/24  1702 08/12/24  2051   POCTGLUCOSE 218* 177* 90       Troponin:   Recent Labs   Lab 08/12/24  1231 08/12/24  1734 08/12/24  2129   TROPONINI 0.017 0.028* 0.037*     TSH:   Recent Labs   Lab 08/12/24  1259   TSH 3.902       Significant Imaging: I have reviewed all pertinent imaging results/findings within the past 24 hours.  EXAMINATION:  XR CHEST AP PORTABLE     CLINICAL HISTORY:  Weakness     TECHNIQUE:  Single frontal view of the chest was performed.     COMPARISON:  07/29/2024     FINDINGS:  Stable cardiomediastinal silhouette.  Anterior chest wall cardiac pacemaker with multiple leads remain in place.   Median sternotomy sutures.  The clear confluent infiltrate.  Mild prominence of perihilar markings not appearing significantly changed.     Impression:     No significant change compared to the prior study of 07/29/2024.  Mild prominence of the perihilar markings which could reflect mild perihilar infiltrate or pulmonary vascular distention.  No confluent infiltrates.        Electronically signed by:Trina Crowell MD  Date:                                            08/12/2024  Time:                                           13:24    EXAMINATION:  CT HEAD WITHOUT CONTRAST     CLINICAL HISTORY:  Head trauma, minor (Age >= 65y);     TECHNIQUE:  Low dose axial images were obtained through the head.  Coronal and sagittal reformations were also performed. Contrast was not administered.     COMPARISON:  07/29/2024     FINDINGS:  Mild dilatation of ventricles, sulci, fissures not appearing out of proportion for the patient's age.  Decreased density in white matter suggesting microvascular ischemic change.  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  Probable old lacunar infarct right cerebellar versus averaging of CSF space.  No acute intracranial findings.  No acute intracranial hemorrhage.  No intracranial mass effect.  No acute major vascular territory infarct.  Note is made that MRI is typically more sensitive than CT particular for detection of early or small nonhemorrhagic infarct.  The calvarium appears intact.  Mastoids appear well pneumatized and visualized paranasal sinuses essentially clear.  Calcifications are seen in parasellar portions of internal carotid arteries     Impression:     Mild involutional change and findings suggesting microvascular ischemic change and old infarcts with no acute intracranial findings or significant change compared to the prior exam.        Electronically signed by:Trina Crowell MD  Date:                                             "08/12/2024  Time:                                           13:22                       Assessment/Plan:     * Syncope and collapse  -Telemetry monitoring  -Neurochecks Q 4 hours  -CT of head negative for any acute process  -Likely secondary to hypoglycemia  -Orthostatic vital signs  -CBC, CMP, Mag, Phos daily  -Trend troponin  -Cardiac device check ordered  -Carotid US on 7/29/24 showed no "No evidence of a hemodynamically significant carotid bifurcation stenosis"  Echo    Result Date: 7/29/2024    Left Ventricle: The left ventricle is mildly dilated. Normal wall   thickness. Global hypokinesis present. Septal motion is consistent with   bundle branch block. There is severely reduced systolic function. Biplane   (2D) method of discs ejection fraction is 22%. Grade II diastolic   dysfunction.    Right Ventricle: Mild right ventricular enlargement. Systolic function   is moderately reduced. Pacemaker lead present in the ventricle.    Left Atrium: Left atrium is moderately dilated.    Right Atrium: Right atrium is moderately dilated.    Aortic Valve: Not well visualized due to poor acoustic window. There is   a bioprosthetic valve in the aortic position. Moderately calcified cusps.   Aortic valve area by VTI is 1.23 cm². Aortic valve peak velocity is 2.18   m/s. Mean gradient is 10 mmHg. The dimensionless index is 0.38. There is   mild to moderate aortic regurgitation.    Mitral Valve: There is moderate bileaflet sclerosis. There is mitral   annular calcification present. There is mild to moderate regurgitation.    Tricuspid Valve: There is moderate regurgitation.    Pulmonic Valve: There is mild to moderate regurgitation.    Pulmonary Artery: There is pulmonary hypertension. The estimated   pulmonary artery systolic pressure is 71 mmHg.    IVC/SVC: Elevated venous pressure at 15 mmHg.             UTI (urinary tract infection)  -IV rocephin daily  -Urine culture pending      Type 2 diabetes mellitus, without long-term " current use of insulin  Patient's FSGs are controlled on current medication regimen.  Last A1c reviewed-   Lab Results   Component Value Date    HGBA1C 5.4 09/15/2023     Most recent fingerstick glucose reviewed-   Recent Labs   Lab 08/12/24  1347 08/12/24  1448   POCTGLUCOSE 27* 218*     Hold all insulin or oral meds currently and ACCUCHECKS Q 4 hours to monitor blood sugar closely     Hold Oral hypoglycemics while patient is in the hospital.       CRF (chronic renal failure)  Creatine stable for now. BMP reviewed- noted Estimated Creatinine Clearance: 35.7 mL/min (A) (based on SCr of 2 mg/dL (H)). according to latest data. Based on current GFR, CKD stage is stage 3 - GFR 30-59.  Monitor UOP and serial BMP and adjust therapy as needed. Renally dose meds. Avoid nephrotoxic medications and procedures.    Cirrhosis  -IR consulted for weekly paracentesis-most recently performed on 7/29/24 with 6.5 liters removed. Next paracentesis was due to be performed 8/12/24  -Daily CBC, CMP, INR  -Ammonia pending      Hypothyroidism  -levothyroxine continued      COPD (chronic obstructive pulmonary disease)  Patient's COPD is controlled currently.  Patient is currently off COPD Pathway. Continue scheduled inhalers Supplemental oxygen PRN and monitor respiratory status closely.     Chronic combined systolic and diastolic heart failure  CHF currently controlled. Latest ECHO shows ejection fraction of 22%. Continue BB, ACEi,   and monitor clinical status closely. Monitor on telemetry. Last BNP is   Recent Labs   Lab 08/12/24  1231   BNP 2,595*   . Continue to stress to patient importance of self efficacy and  on diet for CHF.       Hypertension  Chronic, controlled. Latest blood pressure and vitals reviewed-     Temp:  [97.8 °F (36.6 °C)]   Pulse:  [60-64]   Resp:  [18]   BP: (148-178)/(66-81)   SpO2:  [97 %-100 %] .   Home meds for hypertension were reviewed and noted below.   Hypertension Medications                hydrALAZINE (APRESOLINE) 50 MG tablet Take 1 tablet (50 mg total) by mouth 2 (two) times a day.    isosorbide dinitrate (ISORDIL) 20 MG tablet Take 20 mg by mouth 3 (three) times daily.    metoprolol succinate (TOPROL-XL) 25 MG 24 hr tablet Take 1 tablet (25 mg total) by mouth once daily.    sacubitriL-valsartan (ENTRESTO) 24-26 mg per tablet Take 1 tablet by mouth 2 (two) times daily.            While in the hospital, will manage blood pressure as follows; Continue home antihypertensive regimen    Will utilize p.r.n. blood pressure medication only if patient's blood pressure greater than 180/110 and he develops symptoms such as worsening chest pain or shortness of breath.      VTE Risk Mitigation (From admission, onward)           Ordered     IP VTE HIGH RISK PATIENT  Once         08/12/24 1524     Place sequential compression device  Until discontinued         08/12/24 1524     Reason for No Pharmacological VTE Prophylaxis  Once        Question:  Reasons:  Answer:  Risk of Bleeding    08/12/24 1524                         On 08/13/2024, patient should be placed in hospital observation services under my care in collaboration with Dr. Warner Samayoa MD.           Gilda Llanos NP  Department of Hospital Medicine  [facility]/Surg

## 2024-08-13 NOTE — SUBJECTIVE & OBJECTIVE
Interval History: Developed hypoglycemia once again. Placed on D10 infusion.    Review of Systems   Constitutional:  Positive for fatigue. Negative for activity change, appetite change, chills and fever.   HENT:  Negative for congestion, ear pain, nosebleeds and sinus pain.    Eyes:  Negative for discharge and itching.   Respiratory:  Negative for apnea, cough, chest tightness and shortness of breath.    Cardiovascular:  Positive for leg swelling. Negative for chest pain and palpitations.   Gastrointestinal:  Positive for abdominal distention. Negative for abdominal pain and vomiting.   Genitourinary:  Negative for difficulty urinating, dysuria, flank pain and frequency.   Musculoskeletal:  Negative for arthralgias, back pain, joint swelling and myalgias.   Skin:  Negative for color change, pallor and rash.   Neurological:  Positive for syncope. Negative for dizziness, weakness, light-headedness and headaches.   Psychiatric/Behavioral:  Negative for agitation, behavioral problems, confusion and suicidal ideas.      Objective:     Vital Signs (Most Recent):  Temp: 97.9 °F (36.6 °C) (08/13/24 1145)  Pulse: 67 (08/13/24 1510)  Resp: 16 (08/13/24 1510)  BP: (!) 92/51 (08/13/24 1145)  SpO2: 99 % (08/13/24 1510) Vital Signs (24h Range):  Temp:  [97.1 °F (36.2 °C)-99 °F (37.2 °C)] 97.9 °F (36.6 °C)  Pulse:  [63-93] 67  Resp:  [16-20] 16  SpO2:  [97 %-100 %] 99 %  BP: ()/(51-79) 92/51     Weight: 78 kg (171 lb 15.3 oz)  Body mass index is 23.32 kg/m².    Intake/Output Summary (Last 24 hours) at 8/13/2024 1515  Last data filed at 8/13/2024 1200  Gross per 24 hour   Intake 576.69 ml   Output 200 ml   Net 376.69 ml         Physical Exam  Vitals reviewed.   Constitutional:       General: He is not in acute distress.     Appearance: Normal appearance. He is normal weight. He is not ill-appearing.   HENT:      Head: Normocephalic and atraumatic.      Nose: Nose normal.      Mouth/Throat:      Mouth: Mucous membranes are  moist.      Pharynx: Oropharynx is clear.   Eyes:      General: No scleral icterus.     Extraocular Movements: Extraocular movements intact.      Conjunctiva/sclera: Conjunctivae normal.      Pupils: Pupils are equal, round, and reactive to light.   Cardiovascular:      Rate and Rhythm: Normal rate and regular rhythm.      Pulses: Normal pulses.      Heart sounds: Normal heart sounds. No murmur heard.     No gallop.   Pulmonary:      Effort: Pulmonary effort is normal. No respiratory distress.      Breath sounds: Normal breath sounds. No wheezing, rhonchi or rales.   Chest:      Chest wall: No tenderness.   Abdominal:      General: Abdomen is flat. There is distension.      Palpations: Abdomen is soft.      Tenderness: There is no abdominal tenderness.   Musculoskeletal:         General: No swelling or tenderness.      Cervical back: Normal range of motion and neck supple. No rigidity or tenderness.      Right lower leg: Edema present.      Left lower leg: Edema present.   Skin:     General: Skin is warm and dry.   Neurological:      General: No focal deficit present.      Mental Status: He is alert and oriented to person, place, and time. Mental status is at baseline.      Motor: No weakness.   Psychiatric:         Mood and Affect: Mood normal.         Behavior: Behavior normal.         Thought Content: Thought content normal.             Significant Labs: All pertinent labs within the past 24 hours have been reviewed.    Significant Imaging: I have reviewed all pertinent imaging results/findings within the past 24 hours.

## 2024-08-13 NOTE — PLAN OF CARE
GUTHRIE explained to pt. Pt verbalized understanding and signed form.     08/13/24 1215   GUTHRIE Message   Medicare Outpatient and Observation Notification regarding financial responsibility Given to patient/caregiver;Explained to patient/caregiver;Signed/date by patient/caregiver   Date GUTHRIE was signed 08/13/24   Time GUTHRIE was signed [id number]

## 2024-08-13 NOTE — PLAN OF CARE
Problem: Adult Inpatient Plan of Care  Goal: Plan of Care Review  Outcome: Progressing     Problem: Adult Inpatient Plan of Care  Goal: Optimal Comfort and Wellbeing  Outcome: Progressing     Problem: Diabetes Comorbidity  Goal: Blood Glucose Level Within Targeted Range  Outcome: Progressing     Pt rested in bed this shift. Ambulatory to BR as tolerated. BG monitored closely, ranges varied. MD aware and orders followed. IVF infusing as ordered. Continuous cardiac monitoring in place. Phosphorus replaced. Safety maintained. Needs attended to.

## 2024-08-14 LAB
ALBUMIN SERPL BCP-MCNC: 2.3 G/DL (ref 3.5–5.2)
ALP SERPL-CCNC: 59 U/L (ref 55–135)
ALT SERPL W/O P-5'-P-CCNC: 10 U/L (ref 10–44)
ANION GAP SERPL CALC-SCNC: 7 MMOL/L (ref 8–16)
AST SERPL-CCNC: 14 U/L (ref 10–40)
BILIRUB SERPL-MCNC: 0.4 MG/DL (ref 0.1–1)
BUN SERPL-MCNC: 30 MG/DL (ref 8–23)
CALCIUM SERPL-MCNC: 7.6 MG/DL (ref 8.7–10.5)
CHLORIDE SERPL-SCNC: 103 MMOL/L (ref 95–110)
CO2 SERPL-SCNC: 20 MMOL/L (ref 23–29)
CREAT SERPL-MCNC: 2.1 MG/DL (ref 0.5–1.4)
ERYTHROCYTE [DISTWIDTH] IN BLOOD BY AUTOMATED COUNT: 18.6 % (ref 11.5–14.5)
EST. GFR  (NO RACE VARIABLE): 33 ML/MIN/1.73 M^2
GLUCOSE SERPL-MCNC: 50 MG/DL (ref 70–110)
GLUCOSE SERPL-MCNC: 97 MG/DL (ref 70–110)
HCT VFR BLD AUTO: 26.2 % (ref 40–54)
HGB BLD-MCNC: 8.7 G/DL (ref 14–18)
INR PPP: 1.1 (ref 0.8–1.2)
MAGNESIUM SERPL-MCNC: 1.6 MG/DL (ref 1.6–2.6)
MCH RBC QN AUTO: 28.3 PG (ref 27–31)
MCHC RBC AUTO-ENTMCNC: 33.2 G/DL (ref 32–36)
MCV RBC AUTO: 85 FL (ref 82–98)
PHOSPHATE SERPL-MCNC: 2.4 MG/DL (ref 2.7–4.5)
PLATELET # BLD AUTO: 201 K/UL (ref 150–450)
PMV BLD AUTO: 11 FL (ref 9.2–12.9)
POCT GLUCOSE: 103 MG/DL (ref 70–110)
POCT GLUCOSE: 105 MG/DL (ref 70–110)
POCT GLUCOSE: 107 MG/DL (ref 70–110)
POCT GLUCOSE: 109 MG/DL (ref 70–110)
POCT GLUCOSE: 116 MG/DL (ref 70–110)
POCT GLUCOSE: 118 MG/DL (ref 70–110)
POCT GLUCOSE: 120 MG/DL (ref 70–110)
POCT GLUCOSE: 125 MG/DL (ref 70–110)
POCT GLUCOSE: 130 MG/DL (ref 70–110)
POCT GLUCOSE: 131 MG/DL (ref 70–110)
POCT GLUCOSE: 134 MG/DL (ref 70–110)
POCT GLUCOSE: 41 MG/DL (ref 70–110)
POCT GLUCOSE: 50 MG/DL (ref 70–110)
POCT GLUCOSE: 50 MG/DL (ref 70–110)
POCT GLUCOSE: 52 MG/DL (ref 70–110)
POCT GLUCOSE: 61 MG/DL (ref 70–110)
POCT GLUCOSE: 75 MG/DL (ref 70–110)
POCT GLUCOSE: 83 MG/DL (ref 70–110)
POCT GLUCOSE: 85 MG/DL (ref 70–110)
POCT GLUCOSE: 91 MG/DL (ref 70–110)
POCT GLUCOSE: 98 MG/DL (ref 70–110)
POTASSIUM SERPL-SCNC: 4.4 MMOL/L (ref 3.5–5.1)
PROT SERPL-MCNC: 5.6 G/DL (ref 6–8.4)
PROTHROMBIN TIME: 11.9 SEC (ref 9–12.5)
RBC # BLD AUTO: 3.07 M/UL (ref 4.6–6.2)
SODIUM SERPL-SCNC: 130 MMOL/L (ref 136–145)
WBC # BLD AUTO: 7.13 K/UL (ref 3.9–12.7)

## 2024-08-14 PROCEDURE — S5010 5% DEXTROSE AND 0.45% SALINE: HCPCS | Performed by: INTERNAL MEDICINE

## 2024-08-14 PROCEDURE — 94761 N-INVAS EAR/PLS OXIMETRY MLT: CPT

## 2024-08-14 PROCEDURE — 82947 ASSAY GLUCOSE BLOOD QUANT: CPT | Performed by: INTERNAL MEDICINE

## 2024-08-14 PROCEDURE — 36415 COLL VENOUS BLD VENIPUNCTURE: CPT | Performed by: INTERNAL MEDICINE

## 2024-08-14 PROCEDURE — 25000003 PHARM REV CODE 250: Performed by: NURSE PRACTITIONER

## 2024-08-14 PROCEDURE — 11000001 HC ACUTE MED/SURG PRIVATE ROOM

## 2024-08-14 PROCEDURE — 25000003 PHARM REV CODE 250: Performed by: INTERNAL MEDICINE

## 2024-08-14 PROCEDURE — 63600175 PHARM REV CODE 636 W HCPCS

## 2024-08-14 PROCEDURE — 36415 COLL VENOUS BLD VENIPUNCTURE: CPT

## 2024-08-14 PROCEDURE — 85027 COMPLETE CBC AUTOMATED: CPT

## 2024-08-14 PROCEDURE — 85610 PROTHROMBIN TIME: CPT

## 2024-08-14 PROCEDURE — 80053 COMPREHEN METABOLIC PANEL: CPT

## 2024-08-14 PROCEDURE — 94640 AIRWAY INHALATION TREATMENT: CPT

## 2024-08-14 PROCEDURE — 84100 ASSAY OF PHOSPHORUS: CPT

## 2024-08-14 PROCEDURE — 25000242 PHARM REV CODE 250 ALT 637 W/ HCPCS: Performed by: STUDENT IN AN ORGANIZED HEALTH CARE EDUCATION/TRAINING PROGRAM

## 2024-08-14 PROCEDURE — 25000242 PHARM REV CODE 250 ALT 637 W/ HCPCS

## 2024-08-14 PROCEDURE — 25000003 PHARM REV CODE 250

## 2024-08-14 PROCEDURE — 83735 ASSAY OF MAGNESIUM: CPT

## 2024-08-14 RX ORDER — BENZONATATE 100 MG/1
100 CAPSULE ORAL 3 TIMES DAILY PRN
Status: DISCONTINUED | OUTPATIENT
Start: 2024-08-15 | End: 2024-08-18 | Stop reason: HOSPADM

## 2024-08-14 RX ORDER — DEXTROSE MONOHYDRATE AND SODIUM CHLORIDE 5; .45 G/100ML; G/100ML
INJECTION, SOLUTION INTRAVENOUS CONTINUOUS
Status: DISCONTINUED | OUTPATIENT
Start: 2024-08-14 | End: 2024-08-14

## 2024-08-14 RX ORDER — DEXTROSE MONOHYDRATE 100 MG/ML
INJECTION, SOLUTION INTRAVENOUS CONTINUOUS
Status: DISCONTINUED | OUTPATIENT
Start: 2024-08-14 | End: 2024-08-15

## 2024-08-14 RX ADMIN — DEXTROSE MONOHYDRATE 25 G: 25 INJECTION, SOLUTION INTRAVENOUS at 04:08

## 2024-08-14 RX ADMIN — DEXTROSE AND SODIUM CHLORIDE: 5; 450 INJECTION, SOLUTION INTRAVENOUS at 10:08

## 2024-08-14 RX ADMIN — ARFORMOTEROL TARTRATE 15 MCG: 15 SOLUTION RESPIRATORY (INHALATION) at 07:08

## 2024-08-14 RX ADMIN — SACUBITRIL AND VALSARTAN 1 TABLET: 24; 26 TABLET, FILM COATED ORAL at 09:08

## 2024-08-14 RX ADMIN — LEVALBUTEROL HYDROCHLORIDE 0.63 MG: 0.63 SOLUTION RESPIRATORY (INHALATION) at 11:08

## 2024-08-14 RX ADMIN — BUDESONIDE INHALATION 0.5 MG: 0.5 SUSPENSION RESPIRATORY (INHALATION) at 07:08

## 2024-08-14 RX ADMIN — CEFTRIAXONE 1 G: 1 INJECTION, POWDER, FOR SOLUTION INTRAMUSCULAR; INTRAVENOUS at 12:08

## 2024-08-14 RX ADMIN — DEXTROSE MONOHYDRATE 25 G: 25 INJECTION, SOLUTION INTRAVENOUS at 12:08

## 2024-08-14 RX ADMIN — ATORVASTATIN CALCIUM 40 MG: 40 TABLET, FILM COATED ORAL at 09:08

## 2024-08-14 RX ADMIN — ASPIRIN 81 MG: 81 TABLET, COATED ORAL at 09:08

## 2024-08-14 RX ADMIN — Medication 16 G: at 11:08

## 2024-08-14 RX ADMIN — LEVALBUTEROL HYDROCHLORIDE 0.63 MG: 0.63 SOLUTION RESPIRATORY (INHALATION) at 02:08

## 2024-08-14 RX ADMIN — GABAPENTIN 300 MG: 300 CAPSULE ORAL at 08:08

## 2024-08-14 RX ADMIN — LEVALBUTEROL HYDROCHLORIDE 0.63 MG: 0.63 SOLUTION RESPIRATORY (INHALATION) at 07:08

## 2024-08-14 RX ADMIN — DEXTROSE MONOHYDRATE: 100 INJECTION, SOLUTION INTRAVENOUS at 02:08

## 2024-08-14 RX ADMIN — METOPROLOL SUCCINATE 25 MG: 25 TABLET, EXTENDED RELEASE ORAL at 09:08

## 2024-08-14 RX ADMIN — DEXTROSE MONOHYDRATE 250 ML: 100 INJECTION, SOLUTION INTRAVENOUS at 01:08

## 2024-08-14 RX ADMIN — ONDANSETRON 4 MG: 2 INJECTION INTRAMUSCULAR; INTRAVENOUS at 11:08

## 2024-08-14 RX ADMIN — LEVOTHYROXINE SODIUM 100 MCG: 0.1 TABLET ORAL at 05:08

## 2024-08-14 NOTE — NURSING
Re-checked glucose and it was 45 in one hand and 52 in the other. Notified Gregoria Liu NP- order for one amp of D 50 given.

## 2024-08-14 NOTE — NURSING
Glucose re-check is 50 three hours after D50. Manish iLu NP- ordered another amp of D50 to be given IVP. Will re-check 30 minutes post IVP.

## 2024-08-14 NOTE — NURSING
Patient's re-check glucose was 73. Informed Gilda NP and was advised to make him eat whenever he is awake. Also informed NP that I will check his glucose every 4 hours during the night.

## 2024-08-14 NOTE — PLAN OF CARE
Plan of care reviewed with patient. Verbalized understanding. IV intact and patent with fluids infusing. Tele in place and being monitored. Glucose monitored closely and corrected as needed. Juice and snacks kept at bedside to promote more level glucose values. No complaint of pain. Patient has been asymptomatic with each glucose drop. Safety maintained. Call light in reach and instructed to call for assistance. Will continue to monitor.

## 2024-08-14 NOTE — NURSING
Patient's BP is 99/50- asymptomatic. Informed Gilda NP and was told to hold all BP medications for the night.

## 2024-08-14 NOTE — NURSING
Fingerstick blood glucose 50, repeat 41. Order for stat serum glucose placed. PRN D10 bolus requested from pharmacy.

## 2024-08-14 NOTE — ASSESSMENT & PLAN NOTE
"-Telemetry monitoring  -Neurochecks Q 4 hours  -CT of head negative for any acute process  -Likely secondary to hypoglycemia  -Orthostatic vital signs  -CBC, CMP, Mag, Phos daily  Troponins negative  -Cardiac device checked, functioning normally  -Carotid US on 7/29/24 showed no "No evidence of a hemodynamically significant carotid bifurcation stenosis"  No echocardiogram results found for the past 14 days.      "

## 2024-08-14 NOTE — PLAN OF CARE
08/13/24 1952   Patient Assessment/Suction   Level of Consciousness (AVPU) alert   Respiratory Effort Normal;Unlabored   Expansion/Accessory Muscles/Retractions expansion symmetric   All Lung Fields Breath Sounds clear   Rhythm/Pattern, Respiratory pattern regular   Cough Frequency no cough   PRE-TX-O2   Device (Oxygen Therapy) room air   SpO2 99 %   Pulse Oximetry Type Intermittent   Pulse 69   Resp 18   Aerosol Therapy   $ Aerosol Therapy Charges Aerosol Treatment   Respiratory Treatment Status (SVN) given   Treatment Route (SVN) mask   Patient Position HOB elevated   Post Treatment Assessment (SVN) breath sounds unchanged   Signs of Intolerance (SVN) none   Breath Sounds Post-Respiratory Treatment   Post-treatment Heart Rate (beats/min) 74   Post-treatment Resp Rate (breaths/min) 18        none

## 2024-08-14 NOTE — PROGRESS NOTES
[facility] Medicine  Progress Note    Patient Name: Baldo Carney  MRN: [id number]  Patient Class: IP- Inpatient   Admission Date: 8/12/2024  Length of Stay: 1 days  Attending Physician: Tali Soria MD  Primary Care Provider: Millie Hayes APRN,FNP-C        Subjective:     Principal Problem:Syncope and collapse        HPI:  Baldo Carney is a 73 year old male with a previous medical history of COPD, cirrhosis of the liver with bi monthly paracentesis, cardiomyopathy, CHF, aortic stenosis, CAD, HTN, HLD, BPH, chronic renal failure, and DMII who presented to the emergency room for syncope this morning. He reports he awoke to his brother lifting him up off the floor and then brought him to the hospital. The patient has had family members check in on him more often due to recent hospital admission for syncope.  He does not recall any events prior to waking up on the floor or after he went to bed yesterday evening. He reports eating a hearty meal one hour prior to bed last night.  In the ED his blood sugar was noted to be 37 but patient was AAOx4 and just endorsing generalized weakness. His blood sugar improved with IV dextrose. Patient denies any adverse symptoms other than generalized fatigue and reports he was due for his therapeutic paracentesis on 8/12/24. Troponin mildly elevated and patient in an atrial paced rhythm. He denies any defibrillator shocks chest pain or SOB. Denies dysuria or urinary issues. Urine studies positive for infection and patient covered with IV rocephin. Patient admitted by hospital medicine for further evaluation and management.    Overview/Hospital Course:  Patient was admitted and monitored on telemetry. Serial neuro checks were done. The patient developed hypoglycemia once again, and was placed on a D10 infusion. CT brain was unremarkable. Pacemaker was interrogated and was functioning properly. Serial glucometer checks were  continued. Diabetic meds were held. Glucose levels continued to be low. Dextrose IVF's were continued.    Interval History: Continues with hypoglycemia    Review of Systems   Constitutional:  Positive for fatigue. Negative for activity change, appetite change, chills and fever.   HENT:  Negative for congestion, ear pain, nosebleeds and sinus pain.    Eyes:  Negative for discharge and itching.   Respiratory:  Negative for apnea, cough, chest tightness and shortness of breath.    Cardiovascular:  Positive for leg swelling. Negative for chest pain and palpitations.   Gastrointestinal:  Positive for abdominal distention. Negative for abdominal pain and vomiting.   Genitourinary:  Negative for difficulty urinating, dysuria, flank pain and frequency.   Musculoskeletal:  Negative for arthralgias, back pain, joint swelling and myalgias.   Skin:  Negative for color change, pallor and rash.   Neurological:  Positive for syncope. Negative for dizziness, weakness, light-headedness and headaches.   Psychiatric/Behavioral:  Negative for agitation, behavioral problems, confusion and suicidal ideas.      Objective:     Vital Signs (Most Recent):  Temp: 98.3 °F (36.8 °C) (08/14/24 1208)  Pulse: 64 (08/14/24 1208)  Resp: 18 (08/14/24 1208)  BP: (!) 100/56 (08/14/24 1208)  SpO2: 99 % (08/14/24 1208) Vital Signs (24h Range):  Temp:  [97.8 °F (36.6 °C)-98.7 °F (37.1 °C)] 98.3 °F (36.8 °C)  Pulse:  [64-76] 64  Resp:  [16-24] 18  SpO2:  [96 %-99 %] 99 %  BP: ()/(50-56) 100/56     Weight: 78 kg (171 lb 15.3 oz)  Body mass index is 23.32 kg/m².    Intake/Output Summary (Last 24 hours) at 8/14/2024 1227  Last data filed at 8/14/2024 0454  Gross per 24 hour   Intake 1847.59 ml   Output --   Net 1847.59 ml         Physical Exam  Vitals reviewed.   Constitutional:       General: He is not in acute distress.     Appearance: Normal appearance. He is normal weight. He is not ill-appearing.   HENT:      Head: Normocephalic and atraumatic.       Nose: Nose normal.      Mouth/Throat:      Mouth: Mucous membranes are moist.      Pharynx: Oropharynx is clear.   Eyes:      General: No scleral icterus.     Extraocular Movements: Extraocular movements intact.      Conjunctiva/sclera: Conjunctivae normal.      Pupils: Pupils are equal, round, and reactive to light.   Cardiovascular:      Rate and Rhythm: Normal rate and regular rhythm.      Pulses: Normal pulses.      Heart sounds: Normal heart sounds. No murmur heard.     No gallop.   Pulmonary:      Effort: Pulmonary effort is normal. No respiratory distress.      Breath sounds: Normal breath sounds. No wheezing, rhonchi or rales.   Chest:      Chest wall: No tenderness.   Abdominal:      General: Abdomen is flat. There is distension.      Palpations: Abdomen is soft.      Tenderness: There is no abdominal tenderness.   Musculoskeletal:         General: No swelling or tenderness.      Cervical back: Normal range of motion and neck supple. No rigidity or tenderness.      Right lower leg: Edema present.      Left lower leg: Edema present.   Skin:     General: Skin is warm and dry.   Neurological:      General: No focal deficit present.      Mental Status: He is alert and oriented to person, place, and time. Mental status is at baseline.      Motor: No weakness.   Psychiatric:         Mood and Affect: Mood normal.         Behavior: Behavior normal.         Thought Content: Thought content normal.             Significant Labs: All pertinent labs within the past 24 hours have been reviewed.    Significant Imaging: I have reviewed all pertinent imaging results/findings within the past 24 hours.    Assessment/Plan:      * Syncope and collapse  -Telemetry monitoring  -Neurochecks Q 4 hours  -CT of head negative for any acute process  -Likely secondary to hypoglycemia  -Orthostatic vital signs  -CBC, CMP, Mag, Phos daily  Troponins negative  -Cardiac device checked, functioning normally  -Carotid US on 7/29/24 showed no  ""No evidence of a hemodynamically significant carotid bifurcation stenosis"  No echocardiogram results found for the past 14 days.        UTI (urinary tract infection)  -IV rocephin daily  -Urine culture pending      Type 2 diabetes mellitus, without long-term current use of insulin  Patient's FSGs are controlled on current medication regimen.  Last A1c reviewed-   Lab Results   Component Value Date    HGBA1C 5.4 09/15/2023     Most recent fingerstick glucose reviewed-   Recent Labs   Lab 08/12/24  1347 08/12/24  1448   POCTGLUCOSE 27* 218*     Hold all insulin or oral meds currently and ACCUCHECKS Q 4 hours to monitor blood sugar closely     Hold Oral hypoglycemics while patient is in the hospital.       CRF (chronic renal failure)  Creatine stable for now. BMP reviewed- noted Estimated Creatinine Clearance: 35.7 mL/min (A) (based on SCr of 2 mg/dL (H)). according to latest data. Based on current GFR, CKD stage is stage 3 - GFR 30-59.  Monitor UOP and serial BMP and adjust therapy as needed. Renally dose meds. Avoid nephrotoxic medications and procedures.    Cirrhosis  -IR consulted for weekly paracentesis-most recently performed on 7/29/24 with 6.5 liters removed. Next paracentesis was due to be performed 8/12/24  -Daily CBC, CMP, INR        Hypothyroidism  -levothyroxine continued      COPD (chronic obstructive pulmonary disease)  Patient's COPD is controlled currently.  Patient is currently off COPD Pathway. Continue scheduled inhalers Supplemental oxygen PRN and monitor respiratory status closely.     Chronic combined systolic and diastolic heart failure  CHF currently controlled. Latest ECHO shows ejection fraction of 22%. Continue BB, ACEi,   and monitor clinical status closely. Monitor on telemetry. Last BNP is   Recent Labs   Lab 08/12/24  1231   BNP 2,595*   . Continue to stress to patient importance of self efficacy and  on diet for CHF.       Hypertension  Chronic, controlled. Latest blood " pressure and vitals reviewed-     Temp:  [97.8 °F (36.6 °C)]   Pulse:  [60-64]   Resp:  [18]   BP: (148-178)/(66-81)   SpO2:  [97 %-100 %] .   Home meds for hypertension were reviewed and noted below.   Hypertension Medications               hydrALAZINE (APRESOLINE) 50 MG tablet Take 1 tablet (50 mg total) by mouth 2 (two) times a day.    isosorbide dinitrate (ISORDIL) 20 MG tablet Take 20 mg by mouth 3 (three) times daily.    metoprolol succinate (TOPROL-XL) 25 MG 24 hr tablet Take 1 tablet (25 mg total) by mouth once daily.    sacubitriL-valsartan (ENTRESTO) 24-26 mg per tablet Take 1 tablet by mouth 2 (two) times daily.            While in the hospital, will manage blood pressure as follows; Continue home antihypertensive regimen    Will utilize p.r.n. blood pressure medication only if patient's blood pressure greater than 180/110 and he develops symptoms such as worsening chest pain or shortness of breath.      VTE Risk Mitigation (From admission, onward)           Ordered     IP VTE HIGH RISK PATIENT  Once         08/12/24 1524     Place sequential compression device  Until discontinued         08/12/24 1524     Reason for No Pharmacological VTE Prophylaxis  Once        Question:  Reasons:  Answer:  Risk of Bleeding    08/12/24 1524                    Discharge Planning   PAM: 8/16/2024     Code Status: Full Code   Is the patient medically ready for discharge?:     Reason for patient still in hospital (select all that apply): Patient trending condition  Discharge Plan A: Home with family                  Tali Soria MD, MD  Department of Hospital Medicine   [facility]/Surg

## 2024-08-14 NOTE — PLAN OF CARE
Problem: Adult Inpatient Plan of Care  Goal: Plan of Care Review  Outcome: Progressing     Problem: Adult Inpatient Plan of Care  Goal: Absence of Hospital-Acquired Illness or Injury  Outcome: Progressing     Problem: Diabetes Comorbidity  Goal: Blood Glucose Level Within Targeted Range  Outcome: Progressing     Problem: Acute Kidney Injury/Impairment  Goal: Fluid and Electrolyte Balance  Outcome: Progressing     Problem: Acute Kidney Injury/Impairment  Goal: Improved Oral Intake  Outcome: Progressing     Problem: Acute Kidney Injury/Impairment  Goal: Effective Renal Function  Outcome: Progressing

## 2024-08-14 NOTE — NURSING
Glucose 38 on right hand and 35 on left hand. Informed Gilda NP- CMP ordered and D10 bolus of 250 infusing. Patient asymptomatic. Will recheck glucose.

## 2024-08-14 NOTE — SUBJECTIVE & OBJECTIVE
Interval History: Continues with hypoglycemia    Review of Systems   Constitutional:  Positive for fatigue. Negative for activity change, appetite change, chills and fever.   HENT:  Negative for congestion, ear pain, nosebleeds and sinus pain.    Eyes:  Negative for discharge and itching.   Respiratory:  Negative for apnea, cough, chest tightness and shortness of breath.    Cardiovascular:  Positive for leg swelling. Negative for chest pain and palpitations.   Gastrointestinal:  Positive for abdominal distention. Negative for abdominal pain and vomiting.   Genitourinary:  Negative for difficulty urinating, dysuria, flank pain and frequency.   Musculoskeletal:  Negative for arthralgias, back pain, joint swelling and myalgias.   Skin:  Negative for color change, pallor and rash.   Neurological:  Positive for syncope. Negative for dizziness, weakness, light-headedness and headaches.   Psychiatric/Behavioral:  Negative for agitation, behavioral problems, confusion and suicidal ideas.      Objective:     Vital Signs (Most Recent):  Temp: 98.3 °F (36.8 °C) (08/14/24 1208)  Pulse: 64 (08/14/24 1208)  Resp: 18 (08/14/24 1208)  BP: (!) 100/56 (08/14/24 1208)  SpO2: 99 % (08/14/24 1208) Vital Signs (24h Range):  Temp:  [97.8 °F (36.6 °C)-98.7 °F (37.1 °C)] 98.3 °F (36.8 °C)  Pulse:  [64-76] 64  Resp:  [16-24] 18  SpO2:  [96 %-99 %] 99 %  BP: ()/(50-56) 100/56     Weight: 78 kg (171 lb 15.3 oz)  Body mass index is 23.32 kg/m².    Intake/Output Summary (Last 24 hours) at 8/14/2024 1227  Last data filed at 8/14/2024 0454  Gross per 24 hour   Intake 1847.59 ml   Output --   Net 1847.59 ml         Physical Exam  Vitals reviewed.   Constitutional:       General: He is not in acute distress.     Appearance: Normal appearance. He is normal weight. He is not ill-appearing.   HENT:      Head: Normocephalic and atraumatic.      Nose: Nose normal.      Mouth/Throat:      Mouth: Mucous membranes are moist.      Pharynx: Oropharynx  is clear.   Eyes:      General: No scleral icterus.     Extraocular Movements: Extraocular movements intact.      Conjunctiva/sclera: Conjunctivae normal.      Pupils: Pupils are equal, round, and reactive to light.   Cardiovascular:      Rate and Rhythm: Normal rate and regular rhythm.      Pulses: Normal pulses.      Heart sounds: Normal heart sounds. No murmur heard.     No gallop.   Pulmonary:      Effort: Pulmonary effort is normal. No respiratory distress.      Breath sounds: Normal breath sounds. No wheezing, rhonchi or rales.   Chest:      Chest wall: No tenderness.   Abdominal:      General: Abdomen is flat. There is distension.      Palpations: Abdomen is soft.      Tenderness: There is no abdominal tenderness.   Musculoskeletal:         General: No swelling or tenderness.      Cervical back: Normal range of motion and neck supple. No rigidity or tenderness.      Right lower leg: Edema present.      Left lower leg: Edema present.   Skin:     General: Skin is warm and dry.   Neurological:      General: No focal deficit present.      Mental Status: He is alert and oriented to person, place, and time. Mental status is at baseline.      Motor: No weakness.   Psychiatric:         Mood and Affect: Mood normal.         Behavior: Behavior normal.         Thought Content: Thought content normal.             Significant Labs: All pertinent labs within the past 24 hours have been reviewed.    Significant Imaging: I have reviewed all pertinent imaging results/findings within the past 24 hours.

## 2024-08-15 PROBLEM — E16.2 HYPOGLYCEMIA: Status: ACTIVE | Noted: 2024-08-15

## 2024-08-15 LAB
ALBUMIN SERPL BCP-MCNC: 2.4 G/DL (ref 3.5–5.2)
ALP SERPL-CCNC: 63 U/L (ref 55–135)
ALT SERPL W/O P-5'-P-CCNC: 13 U/L (ref 10–44)
ANION GAP SERPL CALC-SCNC: 7 MMOL/L (ref 8–16)
AST SERPL-CCNC: 15 U/L (ref 10–40)
BILIRUB SERPL-MCNC: 0.4 MG/DL (ref 0.1–1)
BUN SERPL-MCNC: 32 MG/DL (ref 8–23)
CALCIUM SERPL-MCNC: 8.1 MG/DL (ref 8.7–10.5)
CHLORIDE SERPL-SCNC: 102 MMOL/L (ref 95–110)
CO2 SERPL-SCNC: 19 MMOL/L (ref 23–29)
CORTIS SERPL-MCNC: 11 UG/DL
CREAT SERPL-MCNC: 2.5 MG/DL (ref 0.5–1.4)
ERYTHROCYTE [DISTWIDTH] IN BLOOD BY AUTOMATED COUNT: 18.6 % (ref 11.5–14.5)
EST. GFR  (NO RACE VARIABLE): 26 ML/MIN/1.73 M^2
ESTIMATED AVG GLUCOSE: 111 MG/DL (ref 68–131)
GLUCOSE SERPL-MCNC: 106 MG/DL (ref 70–110)
HBA1C MFR BLD: 5.5 % (ref 4.5–6.2)
HCT VFR BLD AUTO: 27.8 % (ref 40–54)
HGB BLD-MCNC: 9.3 G/DL (ref 14–18)
INR PPP: 1.1 (ref 0.8–1.2)
MAGNESIUM SERPL-MCNC: 1.6 MG/DL (ref 1.6–2.6)
MCH RBC QN AUTO: 28.6 PG (ref 27–31)
MCHC RBC AUTO-ENTMCNC: 33.5 G/DL (ref 32–36)
MCV RBC AUTO: 86 FL (ref 82–98)
OHS QRS DURATION: 166 MS
OHS QTC CALCULATION: 536 MS
PHOSPHATE SERPL-MCNC: 2.6 MG/DL (ref 2.7–4.5)
PLATELET # BLD AUTO: 185 K/UL (ref 150–450)
PMV BLD AUTO: 10.7 FL (ref 9.2–12.9)
POCT GLUCOSE: 110 MG/DL (ref 70–110)
POCT GLUCOSE: 113 MG/DL (ref 70–110)
POCT GLUCOSE: 114 MG/DL (ref 70–110)
POCT GLUCOSE: 114 MG/DL (ref 70–110)
POCT GLUCOSE: 116 MG/DL (ref 70–110)
POCT GLUCOSE: 116 MG/DL (ref 70–110)
POCT GLUCOSE: 119 MG/DL (ref 70–110)
POCT GLUCOSE: 122 MG/DL (ref 70–110)
POCT GLUCOSE: 125 MG/DL (ref 70–110)
POCT GLUCOSE: 130 MG/DL (ref 70–110)
POCT GLUCOSE: 133 MG/DL (ref 70–110)
POCT GLUCOSE: 142 MG/DL (ref 70–110)
POCT GLUCOSE: 143 MG/DL (ref 70–110)
POCT GLUCOSE: 163 MG/DL (ref 70–110)
POCT GLUCOSE: 259 MG/DL (ref 70–110)
POCT GLUCOSE: 334 MG/DL (ref 70–110)
POTASSIUM SERPL-SCNC: 5.5 MMOL/L (ref 3.5–5.1)
PROT SERPL-MCNC: 6.2 G/DL (ref 6–8.4)
PROTHROMBIN TIME: 11.9 SEC (ref 9–12.5)
RBC # BLD AUTO: 3.25 M/UL (ref 4.6–6.2)
SARS-COV-2 RDRP RESP QL NAA+PROBE: POSITIVE
SODIUM SERPL-SCNC: 128 MMOL/L (ref 136–145)
WBC # BLD AUTO: 9 K/UL (ref 3.9–12.7)

## 2024-08-15 PROCEDURE — U0002 COVID-19 LAB TEST NON-CDC: HCPCS | Performed by: INTERNAL MEDICINE

## 2024-08-15 PROCEDURE — 85610 PROTHROMBIN TIME: CPT

## 2024-08-15 PROCEDURE — 36415 COLL VENOUS BLD VENIPUNCTURE: CPT

## 2024-08-15 PROCEDURE — 84681 ASSAY OF C-PEPTIDE: CPT | Performed by: INTERNAL MEDICINE

## 2024-08-15 PROCEDURE — 25000003 PHARM REV CODE 250

## 2024-08-15 PROCEDURE — 80053 COMPREHEN METABOLIC PANEL: CPT

## 2024-08-15 PROCEDURE — 63600175 PHARM REV CODE 636 W HCPCS: Performed by: INTERNAL MEDICINE

## 2024-08-15 PROCEDURE — 11000001 HC ACUTE MED/SURG PRIVATE ROOM

## 2024-08-15 PROCEDURE — 83735 ASSAY OF MAGNESIUM: CPT

## 2024-08-15 PROCEDURE — 83036 HEMOGLOBIN GLYCOSYLATED A1C: CPT | Performed by: INTERNAL MEDICINE

## 2024-08-15 PROCEDURE — 83525 ASSAY OF INSULIN: CPT | Performed by: INTERNAL MEDICINE

## 2024-08-15 PROCEDURE — 94640 AIRWAY INHALATION TREATMENT: CPT

## 2024-08-15 PROCEDURE — 99900031 HC PATIENT EDUCATION (STAT)

## 2024-08-15 PROCEDURE — 82533 TOTAL CORTISOL: CPT | Performed by: INTERNAL MEDICINE

## 2024-08-15 PROCEDURE — 36415 COLL VENOUS BLD VENIPUNCTURE: CPT | Performed by: INTERNAL MEDICINE

## 2024-08-15 PROCEDURE — 25000003 PHARM REV CODE 250: Performed by: INTERNAL MEDICINE

## 2024-08-15 PROCEDURE — 84100 ASSAY OF PHOSPHORUS: CPT

## 2024-08-15 PROCEDURE — 63600175 PHARM REV CODE 636 W HCPCS

## 2024-08-15 PROCEDURE — 94761 N-INVAS EAR/PLS OXIMETRY MLT: CPT

## 2024-08-15 PROCEDURE — 85027 COMPLETE CBC AUTOMATED: CPT

## 2024-08-15 PROCEDURE — 27000207 HC ISOLATION

## 2024-08-15 PROCEDURE — 84206 ASSAY OF PROINSULIN: CPT | Performed by: INTERNAL MEDICINE

## 2024-08-15 PROCEDURE — 25000242 PHARM REV CODE 250 ALT 637 W/ HCPCS: Performed by: STUDENT IN AN ORGANIZED HEALTH CARE EDUCATION/TRAINING PROGRAM

## 2024-08-15 PROCEDURE — 25000242 PHARM REV CODE 250 ALT 637 W/ HCPCS

## 2024-08-15 PROCEDURE — S5010 5% DEXTROSE AND 0.45% SALINE: HCPCS | Performed by: INTERNAL MEDICINE

## 2024-08-15 RX ORDER — DEXAMETHASONE SODIUM PHOSPHATE 4 MG/ML
6 INJECTION, SOLUTION INTRA-ARTICULAR; INTRALESIONAL; INTRAMUSCULAR; INTRAVENOUS; SOFT TISSUE EVERY 24 HOURS
Status: DISCONTINUED | OUTPATIENT
Start: 2024-08-15 | End: 2024-08-17

## 2024-08-15 RX ORDER — FUROSEMIDE 10 MG/ML
40 INJECTION INTRAMUSCULAR; INTRAVENOUS ONCE
Status: COMPLETED | OUTPATIENT
Start: 2024-08-15 | End: 2024-08-15

## 2024-08-15 RX ORDER — MUPIROCIN 20 MG/G
OINTMENT TOPICAL 2 TIMES DAILY
Status: DISCONTINUED | OUTPATIENT
Start: 2024-08-15 | End: 2024-08-18 | Stop reason: HOSPADM

## 2024-08-15 RX ORDER — CHOLECALCIFEROL (VITAMIN D3) 25 MCG
1000 TABLET ORAL DAILY
Status: DISCONTINUED | OUTPATIENT
Start: 2024-08-15 | End: 2024-08-18 | Stop reason: HOSPADM

## 2024-08-15 RX ORDER — DEXTROSE MONOHYDRATE AND SODIUM CHLORIDE 5; .45 G/100ML; G/100ML
INJECTION, SOLUTION INTRAVENOUS CONTINUOUS
Status: DISCONTINUED | OUTPATIENT
Start: 2024-08-15 | End: 2024-08-15

## 2024-08-15 RX ADMIN — SACUBITRIL AND VALSARTAN 1 TABLET: 24; 26 TABLET, FILM COATED ORAL at 08:08

## 2024-08-15 RX ADMIN — BUDESONIDE INHALATION 0.5 MG: 0.5 SUSPENSION RESPIRATORY (INHALATION) at 07:08

## 2024-08-15 RX ADMIN — ATORVASTATIN CALCIUM 40 MG: 40 TABLET, FILM COATED ORAL at 08:08

## 2024-08-15 RX ADMIN — FUROSEMIDE 40 MG: 10 INJECTION, SOLUTION INTRAMUSCULAR; INTRAVENOUS at 08:08

## 2024-08-15 RX ADMIN — MUPIROCIN 1 G: 20 OINTMENT TOPICAL at 07:08

## 2024-08-15 RX ADMIN — LEVALBUTEROL HYDROCHLORIDE 0.63 MG: 0.63 SOLUTION RESPIRATORY (INHALATION) at 07:08

## 2024-08-15 RX ADMIN — LEVOTHYROXINE SODIUM 100 MCG: 0.1 TABLET ORAL at 05:08

## 2024-08-15 RX ADMIN — BENZONATATE 100 MG: 100 CAPSULE ORAL at 12:08

## 2024-08-15 RX ADMIN — ASPIRIN 81 MG: 81 TABLET, COATED ORAL at 08:08

## 2024-08-15 RX ADMIN — DEXTROSE AND SODIUM CHLORIDE: 5; 450 INJECTION, SOLUTION INTRAVENOUS at 01:08

## 2024-08-15 RX ADMIN — Medication 1000 UNITS: at 03:08

## 2024-08-15 RX ADMIN — BENZONATATE 100 MG: 100 CAPSULE ORAL at 05:08

## 2024-08-15 RX ADMIN — METOPROLOL SUCCINATE 25 MG: 25 TABLET, EXTENDED RELEASE ORAL at 08:08

## 2024-08-15 RX ADMIN — GABAPENTIN 300 MG: 300 CAPSULE ORAL at 07:08

## 2024-08-15 RX ADMIN — LEVALBUTEROL HYDROCHLORIDE 0.63 MG: 0.63 SOLUTION RESPIRATORY (INHALATION) at 05:08

## 2024-08-15 RX ADMIN — ARFORMOTEROL TARTRATE 15 MCG: 15 SOLUTION RESPIRATORY (INHALATION) at 07:08

## 2024-08-15 RX ADMIN — DEXTROSE MONOHYDRATE: 100 INJECTION, SOLUTION INTRAVENOUS at 04:08

## 2024-08-15 RX ADMIN — BENZONATATE 100 MG: 100 CAPSULE ORAL at 11:08

## 2024-08-15 RX ADMIN — CEFTRIAXONE 1 G: 1 INJECTION, POWDER, FOR SOLUTION INTRAMUSCULAR; INTRAVENOUS at 12:08

## 2024-08-15 RX ADMIN — BENZONATATE 100 MG: 100 CAPSULE ORAL at 07:08

## 2024-08-15 RX ADMIN — IBUPROFEN 600 MG: 600 TABLET ORAL at 03:08

## 2024-08-15 RX ADMIN — DEXAMETHASONE SODIUM PHOSPHATE 6 MG: 4 INJECTION, SOLUTION INTRA-ARTICULAR; INTRALESIONAL; INTRAMUSCULAR; INTRAVENOUS; SOFT TISSUE at 01:08

## 2024-08-15 RX ADMIN — SACUBITRIL AND VALSARTAN 1 TABLET: 24; 26 TABLET, FILM COATED ORAL at 07:08

## 2024-08-15 NOTE — PLAN OF CARE
Pt active with [facility]. Will need subsequent orders upon DC        08/15/24 2279   Post-Acute Status   Post-Acute Authorization Home Health   Home Health Status Pending medical clearance/testing

## 2024-08-15 NOTE — PROGRESS NOTES
[facility] Medicine  Progress Note    Patient Name: Baldo Carney  MRN: [id number]  Patient Class: IP- Inpatient   Admission Date: 8/12/2024  Length of Stay: 2 days  Attending Physician: Tali Soria MD  Primary Care Provider: Millie Hayes APRN,FNP-C        Subjective:     Principal Problem:Syncope and collapse        HPI:  Baldo Carney is a 73 year old male with a previous medical history of COPD, cirrhosis of the liver with bi monthly paracentesis, cardiomyopathy, CHF, aortic stenosis, CAD, HTN, HLD, BPH, chronic renal failure, and DMII who presented to the emergency room for syncope this morning. He reports he awoke to his brother lifting him up off the floor and then brought him to the hospital. The patient has had family members check in on him more often due to recent hospital admission for syncope.  He does not recall any events prior to waking up on the floor or after he went to bed yesterday evening. He reports eating a hearty meal one hour prior to bed last night.  In the ED his blood sugar was noted to be 37 but patient was AAOx4 and just endorsing generalized weakness. His blood sugar improved with IV dextrose. Patient denies any adverse symptoms other than generalized fatigue and reports he was due for his therapeutic paracentesis on 8/12/24. Troponin mildly elevated and patient in an atrial paced rhythm. He denies any defibrillator shocks chest pain or SOB. Denies dysuria or urinary issues. Urine studies positive for infection and patient covered with IV rocephin. Patient admitted by hospital medicine for further evaluation and management.    Overview/Hospital Course:  Patient was admitted and monitored on telemetry. Serial neuro checks were done. The patient developed hypoglycemia once again, and was placed on a D10 infusion. CT brain was unremarkable. Pacemaker was interrogated and was functioning properly. Serial glucometer checks were  continued. Diabetic meds were held. Glucose levels continued to be low. Dextrose IVF's were continued. Insulin leve, proinsuin, c-peptide and random cortisol level were ordered.    Interval History: Continues with hypoglycemia. Glucose levels in low 100's while on D10.    Review of Systems   Constitutional:  Positive for fatigue. Negative for activity change, appetite change, chills and fever.   HENT:  Negative for congestion, ear pain, nosebleeds and sinus pain.    Eyes:  Negative for discharge and itching.   Respiratory:  Negative for apnea, cough, chest tightness and shortness of breath.    Cardiovascular:  Positive for leg swelling. Negative for chest pain and palpitations.   Gastrointestinal:  Positive for abdominal distention. Negative for abdominal pain and vomiting.   Genitourinary:  Negative for difficulty urinating, dysuria, flank pain and frequency.   Musculoskeletal:  Negative for arthralgias, back pain, joint swelling and myalgias.   Skin:  Negative for color change, pallor and rash.   Neurological:  Positive for syncope. Negative for dizziness, weakness, light-headedness and headaches.   Psychiatric/Behavioral:  Negative for agitation, behavioral problems, confusion and suicidal ideas.      Objective:     Vital Signs (Most Recent):  Temp: 98.3 °F (36.8 °C) (08/15/24 0726)  Pulse: 81 (08/15/24 0736)  Resp: 18 (08/15/24 0736)  BP: 135/64 (08/15/24 0726)  SpO2: 100 % (08/15/24 0736) Vital Signs (24h Range):  Temp:  [96.7 °F (35.9 °C)-99.5 °F (37.5 °C)] 98.3 °F (36.8 °C)  Pulse:  [62-86] 81  Resp:  [17-18] 18  SpO2:  [97 %-100 %] 100 %  BP: (100-135)/(56-64) 135/64     Weight: 78 kg (171 lb 15.3 oz)  Body mass index is 23.32 kg/m².    Intake/Output Summary (Last 24 hours) at 8/15/2024 1042  Last data filed at 8/15/2024 0607  Gross per 24 hour   Intake 2911.42 ml   Output 825 ml   Net 2086.42 ml         Physical Exam  Vitals reviewed.   Constitutional:       General: He is not in acute distress.      Appearance: Normal appearance. He is normal weight. He is not ill-appearing.   HENT:      Head: Normocephalic and atraumatic.      Nose: Nose normal.      Mouth/Throat:      Mouth: Mucous membranes are moist.      Pharynx: Oropharynx is clear.   Eyes:      General: No scleral icterus.     Extraocular Movements: Extraocular movements intact.      Conjunctiva/sclera: Conjunctivae normal.      Pupils: Pupils are equal, round, and reactive to light.   Cardiovascular:      Rate and Rhythm: Normal rate and regular rhythm.      Pulses: Normal pulses.      Heart sounds: Normal heart sounds. No murmur heard.     No gallop.   Pulmonary:      Effort: Pulmonary effort is normal. No respiratory distress.      Breath sounds: Normal breath sounds. No wheezing, rhonchi or rales.   Chest:      Chest wall: No tenderness.   Abdominal:      General: Abdomen is flat. There is distension.      Palpations: Abdomen is soft.      Tenderness: There is no abdominal tenderness.   Musculoskeletal:         General: No swelling or tenderness.      Cervical back: Normal range of motion and neck supple. No rigidity or tenderness.      Right lower leg: Edema present.      Left lower leg: Edema present.   Skin:     General: Skin is warm and dry.   Neurological:      General: No focal deficit present.      Mental Status: He is alert and oriented to person, place, and time. Mental status is at baseline.      Motor: No weakness.   Psychiatric:         Mood and Affect: Mood normal.         Behavior: Behavior normal.         Thought Content: Thought content normal.             Significant Labs: All pertinent labs within the past 24 hours have been reviewed.    Significant Imaging: I have reviewed all pertinent imaging results/findings within the past 24 hours.    Assessment/Plan:      * Syncope and collapse  -Telemetry monitoring  -Neurochecks Q 4 hours  -CT of head negative for any acute process  -Likely secondary to hypoglycemia  -Orthostatic vital  "signs  -CBC, CMP, Mag, Phos daily  Troponins negative  -Cardiac device checked, functioning normally  -Carotid US on 7/29/24 showed no "No evidence of a hemodynamically significant carotid bifurcation stenosis"  No echocardiogram results found for the past 14 days.        Hypoglycemia  Continue D10 ivf's but will decrease rate to see how glucose levels go  Using fluids as judiciously as possible given patient's chronic CHF  C-peptide, insulin level, proinsulin and random cortisol levels ordered      UTI (urinary tract infection)  -IV rocephin daily  -Urine culture pending      Type 2 diabetes mellitus, without long-term current use of insulin  Patient's FSGs are controlled on current medication regimen.  Last A1c reviewed-   Lab Results   Component Value Date    HGBA1C 5.4 09/15/2023     Most recent fingerstick glucose reviewed-   Recent Labs   Lab 08/12/24  1347 08/12/24  1448   POCTGLUCOSE 27* 218*     Hold all insulin or oral meds currently and ACCUCHECKS Q 4 hours to monitor blood sugar closely     Hold Oral hypoglycemics while patient is in the hospital.       CRF (chronic renal failure)  Creatine stable for now. BMP reviewed- noted Estimated Creatinine Clearance: 35.7 mL/min (A) (based on SCr of 2 mg/dL (H)). according to latest data. Based on current GFR, CKD stage is stage 3 - GFR 30-59.  Monitor UOP and serial BMP and adjust therapy as needed. Renally dose meds. Avoid nephrotoxic medications and procedures.    Cirrhosis  -IR consulted for weekly paracentesis-most recently performed on 7/29/24 with 6.5 liters removed. Next paracentesis was due to be performed 8/12/24  -Daily CBC, CMP, INR        Hypothyroidism  -levothyroxine continued      COPD (chronic obstructive pulmonary disease)  Patient's COPD is controlled currently.  Patient is currently off COPD Pathway. Continue scheduled inhalers Supplemental oxygen PRN and monitor respiratory status closely.     Chronic combined systolic and diastolic heart " failure  CHF currently controlled. Latest ECHO shows ejection fraction of 22%. Continue BB, ACEi,   and monitor clinical status closely. Monitor on telemetry. Last BNP is   Recent Labs   Lab 08/12/24  1231   BNP 2,595*   . Continue to stress to patient importance of self efficacy and  on diet for CHF.       Hypertension  Chronic, controlled. Latest blood pressure and vitals reviewed-     Temp:  [97.8 °F (36.6 °C)]   Pulse:  [60-64]   Resp:  [18]   BP: (148-178)/(66-81)   SpO2:  [97 %-100 %] .   Home meds for hypertension were reviewed and noted below.   Hypertension Medications               hydrALAZINE (APRESOLINE) 50 MG tablet Take 1 tablet (50 mg total) by mouth 2 (two) times a day.    isosorbide dinitrate (ISORDIL) 20 MG tablet Take 20 mg by mouth 3 (three) times daily.    metoprolol succinate (TOPROL-XL) 25 MG 24 hr tablet Take 1 tablet (25 mg total) by mouth once daily.    sacubitriL-valsartan (ENTRESTO) 24-26 mg per tablet Take 1 tablet by mouth 2 (two) times daily.            While in the hospital, will manage blood pressure as follows; Continue home antihypertensive regimen    Will utilize p.r.n. blood pressure medication only if patient's blood pressure greater than 180/110 and he develops symptoms such as worsening chest pain or shortness of breath.      VTE Risk Mitigation (From admission, onward)           Ordered     IP VTE HIGH RISK PATIENT  Once         08/12/24 1524     Place sequential compression device  Until discontinued         08/12/24 1524     Reason for No Pharmacological VTE Prophylaxis  Once        Question:  Reasons:  Answer:  Risk of Bleeding    08/12/24 1524                    Discharge Planning   PAM: 8/16/2024     Code Status: Full Code   Is the patient medically ready for discharge?:     Reason for patient still in hospital (select all that apply): Patient trending condition  Discharge Plan A: Home with family                  Tali Soria MD, MD  Department of Hospital  Medicine   [place] [facility]/Surg

## 2024-08-15 NOTE — SUBJECTIVE & OBJECTIVE
Interval History: Continues with hypoglycemia. Glucose levels in low 100's while on D10.    Review of Systems   Constitutional:  Positive for fatigue. Negative for activity change, appetite change, chills and fever.   HENT:  Negative for congestion, ear pain, nosebleeds and sinus pain.    Eyes:  Negative for discharge and itching.   Respiratory:  Negative for apnea, cough, chest tightness and shortness of breath.    Cardiovascular:  Positive for leg swelling. Negative for chest pain and palpitations.   Gastrointestinal:  Positive for abdominal distention. Negative for abdominal pain and vomiting.   Genitourinary:  Negative for difficulty urinating, dysuria, flank pain and frequency.   Musculoskeletal:  Negative for arthralgias, back pain, joint swelling and myalgias.   Skin:  Negative for color change, pallor and rash.   Neurological:  Positive for syncope. Negative for dizziness, weakness, light-headedness and headaches.   Psychiatric/Behavioral:  Negative for agitation, behavioral problems, confusion and suicidal ideas.      Objective:     Vital Signs (Most Recent):  Temp: 98.3 °F (36.8 °C) (08/15/24 0726)  Pulse: 81 (08/15/24 0736)  Resp: 18 (08/15/24 0736)  BP: 135/64 (08/15/24 0726)  SpO2: 100 % (08/15/24 0736) Vital Signs (24h Range):  Temp:  [96.7 °F (35.9 °C)-99.5 °F (37.5 °C)] 98.3 °F (36.8 °C)  Pulse:  [62-86] 81  Resp:  [17-18] 18  SpO2:  [97 %-100 %] 100 %  BP: (100-135)/(56-64) 135/64     Weight: 78 kg (171 lb 15.3 oz)  Body mass index is 23.32 kg/m².    Intake/Output Summary (Last 24 hours) at 8/15/2024 1042  Last data filed at 8/15/2024 0607  Gross per 24 hour   Intake 2911.42 ml   Output 825 ml   Net 2086.42 ml         Physical Exam  Vitals reviewed.   Constitutional:       General: He is not in acute distress.     Appearance: Normal appearance. He is normal weight. He is not ill-appearing.   HENT:      Head: Normocephalic and atraumatic.      Nose: Nose normal.      Mouth/Throat:      Mouth: Mucous  membranes are moist.      Pharynx: Oropharynx is clear.   Eyes:      General: No scleral icterus.     Extraocular Movements: Extraocular movements intact.      Conjunctiva/sclera: Conjunctivae normal.      Pupils: Pupils are equal, round, and reactive to light.   Cardiovascular:      Rate and Rhythm: Normal rate and regular rhythm.      Pulses: Normal pulses.      Heart sounds: Normal heart sounds. No murmur heard.     No gallop.   Pulmonary:      Effort: Pulmonary effort is normal. No respiratory distress.      Breath sounds: Normal breath sounds. No wheezing, rhonchi or rales.   Chest:      Chest wall: No tenderness.   Abdominal:      General: Abdomen is flat. There is distension.      Palpations: Abdomen is soft.      Tenderness: There is no abdominal tenderness.   Musculoskeletal:         General: No swelling or tenderness.      Cervical back: Normal range of motion and neck supple. No rigidity or tenderness.      Right lower leg: Edema present.      Left lower leg: Edema present.   Skin:     General: Skin is warm and dry.   Neurological:      General: No focal deficit present.      Mental Status: He is alert and oriented to person, place, and time. Mental status is at baseline.      Motor: No weakness.   Psychiatric:         Mood and Affect: Mood normal.         Behavior: Behavior normal.         Thought Content: Thought content normal.             Significant Labs: All pertinent labs within the past 24 hours have been reviewed.    Significant Imaging: I have reviewed all pertinent imaging results/findings within the past 24 hours.

## 2024-08-15 NOTE — CARE UPDATE
08/15/24 0708 08/15/24 0725 08/15/24 0726   Patient Assessment/Suction   Level of Consciousness (AVPU) alert alert  --    Respiratory Effort Normal;Unlabored  --   --    Expansion/Accessory Muscles/Retractions no use of accessory muscles;expansion symmetric;no retractions  --   --    All Lung Fields Breath Sounds Anterior:;Lateral:;clear  --   --    Rhythm/Pattern, Respiratory unlabored;pattern regular;depth regular;no shortness of breath reported  --   --    PRE-TX-O2   Device (Oxygen Therapy) room air aerosol mask  --    SpO2 99 % 100 % 100 %   Pulse Oximetry Type Intermittent Intermittent  --    $ Pulse Oximetry - Multiple Charge Pulse Oximetry - Multiple Pulse Oximetry - Multiple  --    Pulse 77 78 79   Resp 18 18 18   Temp  --   --  98.3 °F (36.8 °C)   BP  --   --  135/64   Aerosol Therapy   $ Aerosol Therapy Charges Aerosol Treatment Aerosol Treatment  --    Respiratory Treatment Status (SVN) given given  --    Treatment Route (SVN) mask mask  --    Post Treatment Assessment (SVN)  --   --   --    Signs of Intolerance (SVN) none none  --    Breath Sounds Post-Respiratory Treatment   Throughout All Fields Post-Treatment  --   --   --    Throughout All Fields Post-Treatment  --   --   --    Post-treatment Heart Rate (beats/min) 78 79  --    Post-treatment Resp Rate (breaths/min) 18 18  --       08/15/24 0736   Patient Assessment/Suction   Level of Consciousness (AVPU) alert   Respiratory Effort  --    Expansion/Accessory Muscles/Retractions  --    All Lung Fields Breath Sounds  --    Rhythm/Pattern, Respiratory  --    PRE-TX-O2   Device (Oxygen Therapy) aerosol mask   SpO2 100 %   Pulse Oximetry Type Intermittent   $ Pulse Oximetry - Multiple Charge Pulse Oximetry - Multiple   Pulse 81   Resp 18   Temp  --    BP  --    Aerosol Therapy   $ Aerosol Therapy Charges Aerosol Treatment   Respiratory Treatment Status (SVN) given   Treatment Route (SVN) mask   Post Treatment Assessment (SVN) breath sounds unchanged    Signs of Intolerance (SVN) none   Breath Sounds Post-Respiratory Treatment   Throughout All Fields Post-Treatment All Fields   Throughout All Fields Post-Treatment no change   Post-treatment Heart Rate (beats/min) 78   Post-treatment Resp Rate (breaths/min) 18

## 2024-08-15 NOTE — CONSULTS
[place] [facility]/Surg  Adult Nutrition  Consult Note    SUMMARY     Recommendations  Continue Cardiac 1500 ml FR  Ordered Boost Glucose control with all meals ( to be included in FR)  Vitamin D3 daily supplementation  Monitor intake and weekly wt  Collaborate with health care providers  Order A1C level     Goal: intake > 75% upon RD F/U visit  Nutrition Goal Status: new  Comments: Pt denies any N/V but appetite has decreased d/t COVID.  He takes Jardiance and Amaryl at home int he morning to manage diabetes but admits to skipping breakfast which may be attributing to hypoglycemia/syncope episodes.  He does not routinely check his BG and did not discuss this with his recent visit with primary MD.  Stressed importance of not skipping meals; eating 3 scheduled meals daily and to F/U with his Md to discuss diabetes medications. Encouraged him to check his BG daily at home.  Denies any recent wt loss.     Nutrition Related Social Determinants of Health: SDOH: Adequate food in home environment    Assessment and Plan  Nutrition Problem  Inadequate oral intake    Related to (etiology):   COVID-19; cough    Signs and Symptoms (as evidenced by):   Intake < 75%     Interventions/Recommendations (treatment strategy):  Provide Boost Glucose control with all meals; count within FR    Nutrition Diagnosis Status:   New    Malnutrition Assessment       Pt does not meet criteria for Malnutrition but is at risk d/t multiple chronic diseases and current intake 50-75% of meals d/t COVID/cough.       Reason for Assessment: Educate patient on diabetic diet/hypoglycemia  Dx:  COVID-19; Syncope and collapse; UTI; Bimonthly paracentesis to manage ascites   PMH:  T2DM; COPD; HTN; Cirrhosis; cardiomyoptahy; sortic stenosis; CKD  Reason For Assessment: consult  Diagnosis: other (see comments)  Interdisciplinary Rounds: did not attend  Nutrition Discharge Planning: pending    Nutrition Risk Screen    Nutrition Risk Screen: no  indicators present    Nutrition/Diet History    Patient Reported Diet/Restrictions/Preferences: diabetic diet  Spiritual, Cultural Beliefs, Islam Practices, Values that Affect Care: no  Food Allergies: NKFA  Factors Affecting Nutritional Intake: None identified at this time    Anthropometrics  Wt Readings from Last 9 Encounters:   08/15/24 78 kg (171 lb 15.3 oz)   08/08/24 76.9 kg (169 lb 9.6 oz)   07/29/24 74 kg (163 lb 2.3 oz)   02/29/24 72 kg (158 lb 11.2 oz)   01/25/24 77.7 kg (171 lb 3.2 oz)   01/08/24 71.6 kg (157 lb 14.4 oz)   12/26/23 76.7 kg (169 lb)   12/08/23 66.3 kg (146 lb 2.6 oz)   12/04/23 75.8 kg (167 lb 3.5 oz)     Temp: 100 °F (37.8 °C)  Height Method: Stated  Height: 6' (182.9 cm)  Height (inches): 72 in  Weight Method: Bed Scale  Weight: 78 kg (171 lb 15.3 oz)  Weight (lb): 171.96 lb  Ideal Body Weight (IBW), Male: 178 lb  % Ideal Body Weight, Male (lb): 96.61 %  BMI (Calculated): 23.3  BMI Grade: 18.5-24.9 - normal     Lab/Procedures/Meds   Latest Reference Range & Units Most Recent   Hemoglobin 14.0 - 18.0 g/dL 9.3 (L)  8/15/24 05:11   Hematocrit 40.0 - 54.0 % 27.8 (L)  8/15/24 05:11   (L): Data is abnormally low   Latest Reference Range & Units Most Recent   CO2 23 - 29 mmol/L 19 (L)  8/15/24 05:11   (L): Data is abnormally low   Latest Reference Range & Units Most Recent   Glucose 70 - 110 mg/dL 106  8/15/24 05:11      Latest Reference Range & Units Most Recent   Albumin 3.5 - 5.2 g/dL 2.4 (L)  8/15/24 05:11   (L): Data is abnormally low  Pertinent Labs Reviewed: reviewed  Pertinent Medications Reviewed: reviewed; dexamethasone; gabapentin; arformoterol nebulizer; IV rocephin; levothyroxine; valsartan; D5 and NaCl infusing @ 75 ml/hr.    Physical Findings/Assessment  No skin integrity issues have been documented  Gary score = 19     Estimated/Assessed Needs    Weight Used For Calorie Calculations: 78 kg (171 lb 15.3 oz)  Energy Calorie Requirements (kcal): 2000 calories daily;   25/kg  Energy Need Method: Kcal/kg  Protein Requirements: 95 gm protein daily;  1.2/kg  Weight Used For Protein Calculations: 78 kg (171 lb 15.3 oz)     Estimated Fluid Requirement Method: RDA Method  RDA Method (mL): 2000     Nutrition Prescription Ordered    Current Diet Order: cardiac 1500 ml FR  Oral Nutrition Supplement: none    Evaluation of Received Nutrient/Fluid Intake    Intake/Output Summary (Last 24 hours) at 8/15/2024 1419  Last data filed at 8/15/2024 0607  Gross per 24 hour   Intake 2671.42 ml   Output 625 ml   Net 2046.42 ml     Energy Calories Required: meeting needs  Protein Required: not meeting needs  Fluid Required: other (see comments)  Total Fluid Intake (mL/kg): 1500 ml FR  Tolerance: tolerating  % Intake of Estimated Energy Needs: 50 - 75 %  % Meal Intake: 50 - 75 %    Nutrition Risk    Level of Risk/Frequency of Follow-up: low 1 weekly    Monitor and Evaluation    Food and Nutrient Intake: food and beverage intake  Food and Nutrient Adminstration: diet order  Knowledge/Beliefs/Attitudes: food and nutrition knowledge/skill  Physical Activity and Function: nutrition-related ADLs and IADLs  Anthropometric Measurements: weight change  Biochemical Data, Medical Tests and Procedures: glucose/endocrine profile, gastrointestinal profile, electrolyte and renal panel, inflammatory profile, other (specify)  Nutrition-Focused Physical Findings: overall appearance     Nutrition Follow-Up  yes

## 2024-08-15 NOTE — PLAN OF CARE
Problem: Adult Inpatient Plan of Care  Goal: Plan of Care Review  Outcome: Progressing  Goal: Patient-Specific Goal (Individualized)  Outcome: Progressing  Goal: Optimal Comfort and Wellbeing  Outcome: Progressing     Problem: Diabetes Comorbidity  Goal: Blood Glucose Level Within Targeted Range  Outcome: Progressing     Problem: Acute Kidney Injury/Impairment  Goal: Fluid and Electrolyte Balance  Outcome: Progressing  Goal: Improved Oral Intake  Outcome: Progressing  Goal: Effective Renal Function  Outcome: Progressing     Problem: Fall Injury Risk  Goal: Absence of Fall and Fall-Related Injury  Outcome: Progressing     Problem: Infection  Goal: Absence of Infection Signs and Symptoms  Outcome: Progressing     Problem: Glycemic Control Impaired  Goal: Blood Glucose Level Within Targeted Range  Outcome: Progressing  Goal: Minimize Risk of Hypoglycemia  Outcome: Progressing     Problem: UTI (Urinary Tract Infection)  Goal: Improved Infection Symptoms  Outcome: Progressing

## 2024-08-15 NOTE — NURSING
"Patient called and reported a vomiting episode as well as requested assistance to the BR. Upon this RN assisting patient, patient reported vomiting once, with emesis of oral fluids that were previously taken in, noted at bedside. Patient provided assistance up to the BR and does move bowels. PIV site noted to be leaking, is not longer patent, and is discontinued. Upon further assessment, patient reports vomiting is associated "with this cough I've had since yesterday. I was coughing and it just came up." Patient reports dry and nonproductive cough since "yesterday." Upon multiple attempts, PIV access is obtained and Zofran is administered. Contact is made with Hospital Medicine provider, MIGDALIA Romero DNP, at [phone], in which the above information is relayed. All pertinent and qualifying patient information is vocalized to the provider. Upon contacting provider, order is noted as entered by provider. Medication later administered with education to patient vocalized.   "

## 2024-08-15 NOTE — PLAN OF CARE
Problem: Adult Inpatient Plan of Care  Goal: Plan of Care Review  Outcome: Progressing     Problem: Adult Inpatient Plan of Care  Goal: Absence of Hospital-Acquired Illness or Injury  Outcome: Progressing     Problem: Diabetes Comorbidity  Goal: Blood Glucose Level Within Targeted Range  Outcome: Progressing     Problem: Acute Kidney Injury/Impairment  Goal: Fluid and Electrolyte Balance  Outcome: Progressing     Problem: Pain Acute  Goal: Optimal Pain Control and Function  Outcome: Progressing     Problem: UTI (Urinary Tract Infection)  Goal: Improved Infection Symptoms  Outcome: Progressing     Problem: Glycemic Control Impaired  Goal: Blood Glucose Level Within Targeted Range  Outcome: Progressing     Problem: Fall Injury Risk  Goal: Absence of Fall and Fall-Related Injury  Outcome: Progressing     Problem: Gas Exchange Impaired  Goal: Optimal Gas Exchange  Outcome: Progressing

## 2024-08-15 NOTE — ASSESSMENT & PLAN NOTE
Continue D10 ivf's but will decrease rate to see how glucose levels go  Using fluids as judiciously as possible given patient's chronic CHF  C-peptide, insulin level, proinsulin and random cortisol levels ordered

## 2024-08-15 NOTE — PLAN OF CARE
Recommendations  Continue Cardiac 1500 ml FR  Ordered Boost Glucose control with all meals ( to be included in FR)  Vitamin D3 daily supplementation  Monitor intake and weekly wt  Collaborate with health care providers  Order A1C level      Goal: intake > 75% upon RD F/U visit  Nutrition Goal Status: new  Comments: Pt denies any N/V but appetite has decreased d/t COVID.  He takes Jardiance and Amaryl at home int he morning to manage diabetes but admits to skipping breakfast which may be attributing to hypoglycemia/syncope episodes.  He does not routinely check his BG and did not discuss this with his recent visit with primary MD.  Stressed importance of not skipping meals; eating 3 scheduled meals daily and to F/U with his Md to discuss diabetes medications. Encouraged him to check his BG daily at home.  Denies any recent wt loss.      Nutrition Related Social Determinants of Health: SDOH: Adequate food in home environment     Assessment and Plan  Nutrition Problem  Inadequate oral intake     Related to (etiology):   COVID-19; cough     Signs and Symptoms (as evidenced by):   Intake < 75%      Interventions/Recommendations (treatment strategy):  Provide Boost Glucose control with all meals; count within FR     Nutrition Diagnosis Status:   New     Malnutrition Assessment    Pt does not meet criteria for Malnutrition but is at risk d/t multiple chronic diseases and current intake 50-75% of meals d/t COVID/cough.

## 2024-08-15 NOTE — CARE UPDATE
Patient with low grade fever and cough. Tested positive for covid. Isolation orders placed, dexamethasone ordered.

## 2024-08-16 LAB
ALBUMIN SERPL BCP-MCNC: 2.5 G/DL (ref 3.5–5.2)
ALP SERPL-CCNC: 67 U/L (ref 55–135)
ALT SERPL W/O P-5'-P-CCNC: 13 U/L (ref 10–44)
ANION GAP SERPL CALC-SCNC: 10 MMOL/L (ref 8–16)
ANION GAP SERPL CALC-SCNC: 8 MMOL/L (ref 8–16)
AST SERPL-CCNC: 15 U/L (ref 10–40)
BACTERIA UR CULT: ABNORMAL
BASOPHILS # BLD AUTO: 0.01 K/UL (ref 0–0.2)
BASOPHILS NFR BLD: 0.1 % (ref 0–1.9)
BILIRUB SERPL-MCNC: 0.3 MG/DL (ref 0.1–1)
BUN SERPL-MCNC: 38 MG/DL (ref 8–23)
BUN SERPL-MCNC: 40 MG/DL (ref 8–23)
C PEPTIDE SERPL-MCNC: 14.1 NG/ML (ref 1.1–4.4)
CALCIUM SERPL-MCNC: 8.2 MG/DL (ref 8.7–10.5)
CALCIUM SERPL-MCNC: 8.3 MG/DL (ref 8.7–10.5)
CHLORIDE SERPL-SCNC: 98 MMOL/L (ref 95–110)
CHLORIDE SERPL-SCNC: 99 MMOL/L (ref 95–110)
CO2 SERPL-SCNC: 18 MMOL/L (ref 23–29)
CO2 SERPL-SCNC: 19 MMOL/L (ref 23–29)
CREAT SERPL-MCNC: 2.4 MG/DL (ref 0.5–1.4)
CREAT SERPL-MCNC: 2.6 MG/DL (ref 0.5–1.4)
DIFFERENTIAL METHOD BLD: ABNORMAL
EOSINOPHIL # BLD AUTO: 0 K/UL (ref 0–0.5)
EOSINOPHIL NFR BLD: 0 % (ref 0–8)
ERYTHROCYTE [DISTWIDTH] IN BLOOD BY AUTOMATED COUNT: 18.5 % (ref 11.5–14.5)
EST. GFR  (NO RACE VARIABLE): 25 ML/MIN/1.73 M^2
EST. GFR  (NO RACE VARIABLE): 28 ML/MIN/1.73 M^2
GLUCOSE SERPL-MCNC: 144 MG/DL (ref 70–110)
GLUCOSE SERPL-MCNC: 240 MG/DL (ref 70–110)
HCT VFR BLD AUTO: 27.2 % (ref 40–54)
HGB BLD-MCNC: 9 G/DL (ref 14–18)
IMM GRANULOCYTES # BLD AUTO: 0.02 K/UL (ref 0–0.04)
IMM GRANULOCYTES NFR BLD AUTO: 0.3 % (ref 0–0.5)
INSULIN AB SER QL: 9.4 MCIU/ML (ref 2.6–24.9)
LYMPHOCYTES # BLD AUTO: 0.4 K/UL (ref 1–4.8)
LYMPHOCYTES NFR BLD: 5.6 % (ref 18–48)
MAGNESIUM SERPL-MCNC: 1.8 MG/DL (ref 1.6–2.6)
MCH RBC QN AUTO: 28.4 PG (ref 27–31)
MCHC RBC AUTO-ENTMCNC: 33.1 G/DL (ref 32–36)
MCV RBC AUTO: 86 FL (ref 82–98)
MONOCYTES # BLD AUTO: 0.6 K/UL (ref 0.3–1)
MONOCYTES NFR BLD: 8.2 % (ref 4–15)
NEUTROPHILS # BLD AUTO: 6.3 K/UL (ref 1.8–7.7)
NEUTROPHILS NFR BLD: 85.8 % (ref 38–73)
NRBC BLD-RTO: 0 /100 WBC
PLATELET # BLD AUTO: 161 K/UL (ref 150–450)
PMV BLD AUTO: 10.4 FL (ref 9.2–12.9)
POCT GLUCOSE: 157 MG/DL (ref 70–110)
POCT GLUCOSE: 176 MG/DL (ref 70–110)
POCT GLUCOSE: 204 MG/DL (ref 70–110)
POCT GLUCOSE: 271 MG/DL (ref 70–110)
POCT GLUCOSE: 337 MG/DL (ref 70–110)
POCT GLUCOSE: 354 MG/DL (ref 70–110)
POTASSIUM SERPL-SCNC: 5.5 MMOL/L (ref 3.5–5.1)
POTASSIUM SERPL-SCNC: 5.6 MMOL/L (ref 3.5–5.1)
PROT SERPL-MCNC: 6.5 G/DL (ref 6–8.4)
RBC # BLD AUTO: 3.17 M/UL (ref 4.6–6.2)
SODIUM SERPL-SCNC: 126 MMOL/L (ref 136–145)
SODIUM SERPL-SCNC: 126 MMOL/L (ref 136–145)
WBC # BLD AUTO: 7.31 K/UL (ref 3.9–12.7)

## 2024-08-16 PROCEDURE — 25000242 PHARM REV CODE 250 ALT 637 W/ HCPCS

## 2024-08-16 PROCEDURE — 63600175 PHARM REV CODE 636 W HCPCS

## 2024-08-16 PROCEDURE — 63600175 PHARM REV CODE 636 W HCPCS: Performed by: INTERNAL MEDICINE

## 2024-08-16 PROCEDURE — 25000242 PHARM REV CODE 250 ALT 637 W/ HCPCS: Performed by: STUDENT IN AN ORGANIZED HEALTH CARE EDUCATION/TRAINING PROGRAM

## 2024-08-16 PROCEDURE — 36415 COLL VENOUS BLD VENIPUNCTURE: CPT | Performed by: INTERNAL MEDICINE

## 2024-08-16 PROCEDURE — 11000001 HC ACUTE MED/SURG PRIVATE ROOM

## 2024-08-16 PROCEDURE — 80048 BASIC METABOLIC PNL TOTAL CA: CPT | Performed by: INTERNAL MEDICINE

## 2024-08-16 PROCEDURE — 85025 COMPLETE CBC W/AUTO DIFF WBC: CPT | Performed by: INTERNAL MEDICINE

## 2024-08-16 PROCEDURE — 25000003 PHARM REV CODE 250

## 2024-08-16 PROCEDURE — 94640 AIRWAY INHALATION TREATMENT: CPT

## 2024-08-16 PROCEDURE — 99900031 HC PATIENT EDUCATION (STAT)

## 2024-08-16 PROCEDURE — 83735 ASSAY OF MAGNESIUM: CPT | Performed by: INTERNAL MEDICINE

## 2024-08-16 PROCEDURE — 80053 COMPREHEN METABOLIC PANEL: CPT | Performed by: INTERNAL MEDICINE

## 2024-08-16 PROCEDURE — 25000003 PHARM REV CODE 250: Performed by: INTERNAL MEDICINE

## 2024-08-16 PROCEDURE — 27000207 HC ISOLATION

## 2024-08-16 PROCEDURE — 94761 N-INVAS EAR/PLS OXIMETRY MLT: CPT

## 2024-08-16 RX ORDER — GLUCAGON 1 MG
1 KIT INJECTION
Status: DISCONTINUED | OUTPATIENT
Start: 2024-08-16 | End: 2024-08-18 | Stop reason: HOSPADM

## 2024-08-16 RX ORDER — INSULIN ASPART 100 [IU]/ML
0-5 INJECTION, SOLUTION INTRAVENOUS; SUBCUTANEOUS
Status: DISCONTINUED | OUTPATIENT
Start: 2024-08-16 | End: 2024-08-18 | Stop reason: HOSPADM

## 2024-08-16 RX ORDER — SODIUM CHLORIDE 9 MG/ML
INJECTION, SOLUTION INTRAVENOUS CONTINUOUS
Status: DISCONTINUED | OUTPATIENT
Start: 2024-08-16 | End: 2024-08-17

## 2024-08-16 RX ORDER — IBUPROFEN 200 MG
16 TABLET ORAL
Status: DISCONTINUED | OUTPATIENT
Start: 2024-08-16 | End: 2024-08-18 | Stop reason: HOSPADM

## 2024-08-16 RX ORDER — IBUPROFEN 200 MG
24 TABLET ORAL
Status: DISCONTINUED | OUTPATIENT
Start: 2024-08-16 | End: 2024-08-18 | Stop reason: HOSPADM

## 2024-08-16 RX ADMIN — BUDESONIDE INHALATION 0.5 MG: 0.5 SUSPENSION RESPIRATORY (INHALATION) at 07:08

## 2024-08-16 RX ADMIN — LEVALBUTEROL HYDROCHLORIDE 0.63 MG: 0.63 SOLUTION RESPIRATORY (INHALATION) at 03:08

## 2024-08-16 RX ADMIN — DEXAMETHASONE SODIUM PHOSPHATE 6 MG: 4 INJECTION, SOLUTION INTRA-ARTICULAR; INTRALESIONAL; INTRAMUSCULAR; INTRAVENOUS; SOFT TISSUE at 02:08

## 2024-08-16 RX ADMIN — SACUBITRIL AND VALSARTAN 1 TABLET: 24; 26 TABLET, FILM COATED ORAL at 10:08

## 2024-08-16 RX ADMIN — MUPIROCIN 1 G: 20 OINTMENT TOPICAL at 10:08

## 2024-08-16 RX ADMIN — MUPIROCIN 1 G: 20 OINTMENT TOPICAL at 08:08

## 2024-08-16 RX ADMIN — LEVALBUTEROL HYDROCHLORIDE 0.63 MG: 0.63 SOLUTION RESPIRATORY (INHALATION) at 12:08

## 2024-08-16 RX ADMIN — ISOSORBIDE DINITRATE 20 MG: 20 TABLET ORAL at 02:08

## 2024-08-16 RX ADMIN — INSULIN ASPART 3 UNITS: 100 INJECTION, SOLUTION INTRAVENOUS; SUBCUTANEOUS at 08:08

## 2024-08-16 RX ADMIN — ATORVASTATIN CALCIUM 40 MG: 40 TABLET, FILM COATED ORAL at 08:08

## 2024-08-16 RX ADMIN — SACUBITRIL AND VALSARTAN 1 TABLET: 24; 26 TABLET, FILM COATED ORAL at 08:08

## 2024-08-16 RX ADMIN — CEFTRIAXONE 1 G: 1 INJECTION, POWDER, FOR SOLUTION INTRAMUSCULAR; INTRAVENOUS at 12:08

## 2024-08-16 RX ADMIN — ARFORMOTEROL TARTRATE 15 MCG: 15 SOLUTION RESPIRATORY (INHALATION) at 07:08

## 2024-08-16 RX ADMIN — HYDRALAZINE HYDROCHLORIDE 50 MG: 25 TABLET ORAL at 10:08

## 2024-08-16 RX ADMIN — GABAPENTIN 300 MG: 300 CAPSULE ORAL at 10:08

## 2024-08-16 RX ADMIN — ISOSORBIDE DINITRATE 20 MG: 20 TABLET ORAL at 10:08

## 2024-08-16 RX ADMIN — LEVALBUTEROL HYDROCHLORIDE 0.63 MG: 0.63 SOLUTION RESPIRATORY (INHALATION) at 07:08

## 2024-08-16 RX ADMIN — INSULIN ASPART 1 UNITS: 100 INJECTION, SOLUTION INTRAVENOUS; SUBCUTANEOUS at 10:08

## 2024-08-16 RX ADMIN — SODIUM CHLORIDE: 9 INJECTION, SOLUTION INTRAVENOUS at 02:08

## 2024-08-16 RX ADMIN — SODIUM ZIRCONIUM CYCLOSILICATE 5 G: 5 POWDER, FOR SUSPENSION ORAL at 10:08

## 2024-08-16 RX ADMIN — LEVOTHYROXINE SODIUM 100 MCG: 0.1 TABLET ORAL at 05:08

## 2024-08-16 RX ADMIN — METOPROLOL SUCCINATE 25 MG: 25 TABLET, EXTENDED RELEASE ORAL at 08:08

## 2024-08-16 RX ADMIN — BENZONATATE 100 MG: 100 CAPSULE ORAL at 10:08

## 2024-08-16 RX ADMIN — LEVALBUTEROL HYDROCHLORIDE 0.63 MG: 0.63 SOLUTION RESPIRATORY (INHALATION) at 11:08

## 2024-08-16 RX ADMIN — ASPIRIN 81 MG: 81 TABLET, COATED ORAL at 08:08

## 2024-08-16 RX ADMIN — Medication 1000 UNITS: at 08:08

## 2024-08-16 NOTE — PLAN OF CARE
Problem: Adult Inpatient Plan of Care  Goal: Plan of Care Review  Outcome: Progressing  Goal: Patient-Specific Goal (Individualized)  Outcome: Progressing  Goal: Optimal Comfort and Wellbeing  Outcome: Progressing     Problem: Diabetes Comorbidity  Goal: Blood Glucose Level Within Targeted Range  Outcome: Progressing     Problem: Acute Kidney Injury/Impairment  Goal: Fluid and Electrolyte Balance  Outcome: Progressing  Goal: Improved Oral Intake  Outcome: Progressing  Goal: Effective Renal Function  Outcome: Progressing     Problem: Pain Acute  Goal: Optimal Pain Control and Function  Outcome: Progressing     Problem: Fall Injury Risk  Goal: Absence of Fall and Fall-Related Injury  Outcome: Progressing     Problem: Infection  Goal: Absence of Infection Signs and Symptoms  Outcome: Progressing     Problem: Glycemic Control Impaired  Goal: Blood Glucose Level Within Targeted Range  Outcome: Progressing  Goal: Minimize Risk of Hypoglycemia  Outcome: Progressing     Problem: UTI (Urinary Tract Infection)  Goal: Improved Infection Symptoms  Outcome: Progressing     Problem: Gas Exchange Impaired  Goal: Optimal Gas Exchange  Outcome: Progressing     Problem: Pulmonary Impairment  Goal: Improved Activity Tolerance  Outcome: Progressing  Goal: Effective Airway Clearance  Outcome: Progressing  Goal: Optimal Gas Exchange  Outcome: Progressing     Problem: Fatigue  Goal: Improved Activity Tolerance  Outcome: Progressing

## 2024-08-16 NOTE — SUBJECTIVE & OBJECTIVE
Interval History: Hypoglycemia has resolved.  Glucose now running high. Patient tested positive for covid.    Review of Systems   Constitutional:  Positive for fatigue. Negative for activity change, appetite change, chills and fever.   HENT:  Negative for congestion, ear pain, nosebleeds and sinus pain.    Eyes:  Negative for discharge and itching.   Respiratory:  Negative for apnea, cough, chest tightness and shortness of breath.    Cardiovascular:  Positive for leg swelling. Negative for chest pain and palpitations.   Gastrointestinal:  Positive for abdominal distention. Negative for abdominal pain and vomiting.   Genitourinary:  Negative for difficulty urinating, dysuria, flank pain and frequency.   Musculoskeletal:  Negative for arthralgias, back pain, joint swelling and myalgias.   Skin:  Negative for color change, pallor and rash.   Neurological:  Positive for syncope. Negative for dizziness, weakness, light-headedness and headaches.   Psychiatric/Behavioral:  Negative for agitation, behavioral problems, confusion and suicidal ideas.      Objective:     Vital Signs (Most Recent):  Temp: 97.6 °F (36.4 °C) (08/16/24 0714)  Pulse: 67 (08/16/24 0714)  Resp: 18 (08/16/24 0714)  BP: 139/66 (08/16/24 0714)  SpO2: 100 % (08/16/24 0714) Vital Signs (24h Range):  Temp:  [97.6 °F (36.4 °C)-101.1 °F (38.4 °C)] 97.6 °F (36.4 °C)  Pulse:  [65-73] 67  Resp:  [16-18] 18  SpO2:  [97 %-100 %] 100 %  BP: (110-139)/(55-70) 139/66     Weight: 78 kg (171 lb 15.3 oz)  Body mass index is 23.32 kg/m².    Intake/Output Summary (Last 24 hours) at 8/16/2024 1102  Last data filed at 8/16/2024 0607  Gross per 24 hour   Intake 1909.06 ml   Output 900 ml   Net 1009.06 ml         Physical Exam  Vitals reviewed.   Constitutional:       General: He is not in acute distress.     Appearance: Normal appearance. He is normal weight. He is not ill-appearing.   HENT:      Head: Normocephalic and atraumatic.      Nose: Nose normal.      Mouth/Throat:       Mouth: Mucous membranes are moist.      Pharynx: Oropharynx is clear.   Eyes:      General: No scleral icterus.     Extraocular Movements: Extraocular movements intact.      Conjunctiva/sclera: Conjunctivae normal.      Pupils: Pupils are equal, round, and reactive to light.   Cardiovascular:      Rate and Rhythm: Normal rate and regular rhythm.      Pulses: Normal pulses.      Heart sounds: Normal heart sounds. No murmur heard.     No gallop.   Pulmonary:      Effort: Pulmonary effort is normal. No respiratory distress.      Breath sounds: Normal breath sounds. No wheezing, rhonchi or rales.   Chest:      Chest wall: No tenderness.   Abdominal:      General: Abdomen is flat. There is distension.      Palpations: Abdomen is soft.      Tenderness: There is no abdominal tenderness.   Musculoskeletal:         General: No swelling or tenderness.      Cervical back: Normal range of motion and neck supple. No rigidity or tenderness.      Right lower leg: Edema present.      Left lower leg: Edema present.   Skin:     General: Skin is warm and dry.   Neurological:      General: No focal deficit present.      Mental Status: He is alert and oriented to person, place, and time. Mental status is at baseline.      Motor: No weakness.   Psychiatric:         Mood and Affect: Mood normal.         Behavior: Behavior normal.         Thought Content: Thought content normal.             Significant Labs: All pertinent labs within the past 24 hours have been reviewed.    Significant Imaging: I have reviewed all pertinent imaging results/findings within the past 24 hours.

## 2024-08-16 NOTE — PLAN OF CARE
Problem: Adult Inpatient Plan of Care  Goal: Plan of Care Review  Outcome: Progressing     Problem: Adult Inpatient Plan of Care  Goal: Absence of Hospital-Acquired Illness or Injury  Outcome: Progressing     Problem: Diabetes Comorbidity  Goal: Blood Glucose Level Within Targeted Range  Outcome: Progressing     Problem: Acute Kidney Injury/Impairment  Goal: Fluid and Electrolyte Balance  Outcome: Progressing     Problem: Acute Kidney Injury/Impairment  Goal: Effective Renal Function  Outcome: Progressing     Problem: Pain Acute  Goal: Optimal Pain Control and Function  Outcome: Progressing     Problem: Fall Injury Risk  Goal: Absence of Fall and Fall-Related Injury  Outcome: Progressing     Problem: Infection  Goal: Absence of Infection Signs and Symptoms  Outcome: Progressing     Problem: Glycemic Control Impaired  Goal: Blood Glucose Level Within Targeted Range  Outcome: Progressing     Problem: UTI (Urinary Tract Infection)  Goal: Improved Infection Symptoms  Outcome: Progressing     Problem: Gas Exchange Impaired  Goal: Optimal Gas Exchange  Outcome: Progressing     Problem: Pulmonary Impairment  Goal: Improved Activity Tolerance  Outcome: Progressing

## 2024-08-16 NOTE — PLAN OF CARE
Pt accepted by [facility]. MARCIO 8/19 08/16/24 1452   Post-Acute Status   Post-Acute Authorization Home Health   Home Health Status Set-up Complete/Auth obtained

## 2024-08-16 NOTE — PROGRESS NOTES
[facility] Medicine  Progress Note    Patient Name: Baldo Carney  MRN: [id number]  Patient Class: IP- Inpatient   Admission Date: 8/12/2024  Length of Stay: 3 days  Attending Physician: Tali Soria MD  Primary Care Provider: Millie Hayes APRN,FNP-C        Subjective:     Principal Problem:Syncope and collapse        HPI:  Baldo Carney is a 73 year old male with a previous medical history of COPD, cirrhosis of the liver with bi monthly paracentesis, cardiomyopathy, CHF, aortic stenosis, CAD, HTN, HLD, BPH, chronic renal failure, and DMII who presented to the emergency room for syncope this morning. He reports he awoke to his brother lifting him up off the floor and then brought him to the hospital. The patient has had family members check in on him more often due to recent hospital admission for syncope.  He does not recall any events prior to waking up on the floor or after he went to bed yesterday evening. He reports eating a hearty meal one hour prior to bed last night.  In the ED his blood sugar was noted to be 37 but patient was AAOx4 and just endorsing generalized weakness. His blood sugar improved with IV dextrose. Patient denies any adverse symptoms other than generalized fatigue and reports he was due for his therapeutic paracentesis on 8/12/24. Troponin mildly elevated and patient in an atrial paced rhythm. He denies any defibrillator shocks chest pain or SOB. Denies dysuria or urinary issues. Urine studies positive for infection and patient covered with IV rocephin. Patient admitted by hospital medicine for further evaluation and management.    Overview/Hospital Course:  Patient was admitted and monitored on telemetry. Serial neuro checks were done. The patient developed hypoglycemia once again, and was placed on a D10 infusion. CT brain was unremarkable. Pacemaker was interrogated and was functioning properly. Serial glucometer checks were  continued. Diabetic meds were held. Glucose levels continued to be low. Dextrose IVF's were continued. Insulin leve, proinsuin, c-peptide and random cortisol level were ordered. Hypoglycemia resolved, and glucose ran high. SSI was added. Patient had low grade fever and cough, and was screened for covid. This was positive. Dexamethasone started.     Interval History: Hypoglycemia has resolved.  Glucose now running high. Patient tested positive for covid.    Review of Systems   Constitutional:  Positive for fatigue. Negative for activity change, appetite change, chills and fever.   HENT:  Negative for congestion, ear pain, nosebleeds and sinus pain.    Eyes:  Negative for discharge and itching.   Respiratory:  Negative for apnea, cough, chest tightness and shortness of breath.    Cardiovascular:  Positive for leg swelling. Negative for chest pain and palpitations.   Gastrointestinal:  Positive for abdominal distention. Negative for abdominal pain and vomiting.   Genitourinary:  Negative for difficulty urinating, dysuria, flank pain and frequency.   Musculoskeletal:  Negative for arthralgias, back pain, joint swelling and myalgias.   Skin:  Negative for color change, pallor and rash.   Neurological:  Positive for syncope. Negative for dizziness, weakness, light-headedness and headaches.   Psychiatric/Behavioral:  Negative for agitation, behavioral problems, confusion and suicidal ideas.      Objective:     Vital Signs (Most Recent):  Temp: 97.6 °F (36.4 °C) (08/16/24 0714)  Pulse: 67 (08/16/24 0714)  Resp: 18 (08/16/24 0714)  BP: 139/66 (08/16/24 0714)  SpO2: 100 % (08/16/24 0714) Vital Signs (24h Range):  Temp:  [97.6 °F (36.4 °C)-101.1 °F (38.4 °C)] 97.6 °F (36.4 °C)  Pulse:  [65-73] 67  Resp:  [16-18] 18  SpO2:  [97 %-100 %] 100 %  BP: (110-139)/(55-70) 139/66     Weight: 78 kg (171 lb 15.3 oz)  Body mass index is 23.32 kg/m².    Intake/Output Summary (Last 24 hours) at 8/16/2024 1102  Last data filed at 8/16/2024  0607  Gross per 24 hour   Intake 1909.06 ml   Output 900 ml   Net 1009.06 ml         Physical Exam  Vitals reviewed.   Constitutional:       General: He is not in acute distress.     Appearance: Normal appearance. He is normal weight. He is not ill-appearing.   HENT:      Head: Normocephalic and atraumatic.      Nose: Nose normal.      Mouth/Throat:      Mouth: Mucous membranes are moist.      Pharynx: Oropharynx is clear.   Eyes:      General: No scleral icterus.     Extraocular Movements: Extraocular movements intact.      Conjunctiva/sclera: Conjunctivae normal.      Pupils: Pupils are equal, round, and reactive to light.   Cardiovascular:      Rate and Rhythm: Normal rate and regular rhythm.      Pulses: Normal pulses.      Heart sounds: Normal heart sounds. No murmur heard.     No gallop.   Pulmonary:      Effort: Pulmonary effort is normal. No respiratory distress.      Breath sounds: Normal breath sounds. No wheezing, rhonchi or rales.   Chest:      Chest wall: No tenderness.   Abdominal:      General: Abdomen is flat. There is distension.      Palpations: Abdomen is soft.      Tenderness: There is no abdominal tenderness.   Musculoskeletal:         General: No swelling or tenderness.      Cervical back: Normal range of motion and neck supple. No rigidity or tenderness.      Right lower leg: Edema present.      Left lower leg: Edema present.   Skin:     General: Skin is warm and dry.   Neurological:      General: No focal deficit present.      Mental Status: He is alert and oriented to person, place, and time. Mental status is at baseline.      Motor: No weakness.   Psychiatric:         Mood and Affect: Mood normal.         Behavior: Behavior normal.         Thought Content: Thought content normal.             Significant Labs: All pertinent labs within the past 24 hours have been reviewed.    Significant Imaging: I have reviewed all pertinent imaging results/findings within the past 24  "hours.    Assessment/Plan:      * Syncope and collapse  -Telemetry monitoring  -Neurochecks Q 4 hours  -CT of head negative for any acute process  -Likely secondary to hypoglycemia  -Orthostatic vital signs  -CBC, CMP, Mag, Phos daily  Troponins negative  -Cardiac device checked, functioning normally  -Carotid US on 7/29/24 showed no "No evidence of a hemodynamically significant carotid bifurcation stenosis"  No echocardiogram results found for the past 14 days.        Hypoglycemia  Resolved  C-peptide, insulin level, proinsulin and random cortisol levels ordered      UTI (urinary tract infection)  -IV rocephin daily  -Urine culture positive for Klebsiella sensitive to rocephin      Type 2 diabetes mellitus, without long-term current use of insulin  Hypoglycemia resolved  Last A1c reviewed-   Lab Results   Component Value Date    HGBA1C 5.5 08/15/2024     Most recent fingerstick glucose reviewed-   Recent Labs   Lab 08/15/24  1950/24  2355 08/16/24  0359 08/16/24  0802   POCTGLUCOSE 334* 354* 337* 271*       Hold all insulin or oral meds currently and ACCUCHECKS Q 4 hours to monitor blood sugar closely     Hold Oral hypoglycemics while patient is in the hospital.       CRF (chronic renal failure)  Creatine stable for now. BMP reviewed- noted Estimated Creatinine Clearance: 35.7 mL/min (A) (based on SCr of 2 mg/dL (H)). according to latest data. Based on current GFR, CKD stage is stage 3 - GFR 30-59.  Monitor UOP and serial BMP and adjust therapy as needed. Renally dose meds. Avoid nephrotoxic medications and procedures.    Cirrhosis  -IR consulted for weekly paracentesis-most recently performed on 7/29/24 with 6.5 liters removed. Next paracentesis was due to be performed 8/12/24  -Daily CBC, CMP, INR        Hypothyroidism  -levothyroxine continued      COPD (chronic obstructive pulmonary disease)  Patient's COPD is controlled currently.  Patient is currently off COPD Pathway. Continue scheduled inhalers " Supplemental oxygen PRN and monitor respiratory status closely.     Chronic combined systolic and diastolic heart failure  CHF currently controlled. Latest ECHO shows ejection fraction of 22%. Continue BB, ACEi,   and monitor clinical status closely. Monitor on telemetry. Last BNP is   Recent Labs   Lab 08/12/24  1231   BNP 2,595*   . Continue to stress to patient importance of self efficacy and  on diet for CHF.       Hypertension  Chronic, controlled. Latest blood pressure and vitals reviewed-     Temp:  [97.8 °F (36.6 °C)]   Pulse:  [60-64]   Resp:  [18]   BP: (148-178)/(66-81)   SpO2:  [97 %-100 %] .   Home meds for hypertension were reviewed and noted below.   Hypertension Medications               hydrALAZINE (APRESOLINE) 50 MG tablet Take 1 tablet (50 mg total) by mouth 2 (two) times a day.    isosorbide dinitrate (ISORDIL) 20 MG tablet Take 20 mg by mouth 3 (three) times daily.    metoprolol succinate (TOPROL-XL) 25 MG 24 hr tablet Take 1 tablet (25 mg total) by mouth once daily.    sacubitriL-valsartan (ENTRESTO) 24-26 mg per tablet Take 1 tablet by mouth 2 (two) times daily.            While in the hospital, will manage blood pressure as follows; Continue home antihypertensive regimen    Will utilize p.r.n. blood pressure medication only if patient's blood pressure greater than 180/110 and he develops symptoms such as worsening chest pain or shortness of breath.      VTE Risk Mitigation (From admission, onward)           Ordered     IP VTE HIGH RISK PATIENT  Once         08/12/24 1524     Place sequential compression device  Until discontinued         08/12/24 1524     Reason for No Pharmacological VTE Prophylaxis  Once        Question:  Reasons:  Answer:  Risk of Bleeding    08/12/24 1524                    Discharge Planning   PAM: 8/17/2024     Code Status: Full Code   Is the patient medically ready for discharge?:     Reason for patient still in hospital (select all that apply): Patient trending  condition  Discharge Plan A: Home Health                  Tali Soria MD, MD  Department of Hospital Medicine   [facility]/Surg

## 2024-08-16 NOTE — PLAN OF CARE
Pt unable to receive apt with DC clinic d/t payor and clinic protocol.    Requested follow up apt with pt's listed PCP via in basket message. Office to contact pt for scheduling       08/16/24 6247   Post-Acute Status   Hospital Resources/Appts/Education Provided Appointment suggestion unavailable

## 2024-08-16 NOTE — ASSESSMENT & PLAN NOTE
Hypoglycemia resolved  Last A1c reviewed-   Lab Results   Component Value Date    HGBA1C 5.5 08/15/2024     Most recent fingerstick glucose reviewed-   Recent Labs   Lab 08/15/24  1950/24  2355 08/16/24  0359 08/16/24  0802   POCTGLUCOSE 334* 354* 337* 271*       Hold all insulin or oral meds currently and ACCUCHECKS Q 4 hours to monitor blood sugar closely     Hold Oral hypoglycemics while patient is in the hospital.

## 2024-08-16 NOTE — NURSING
"Patient's left arm continues to have swelling noted. Care of patient provided prior, 8/14 - 8/15, by this RN and slight swelling to left hand and wrist area was noted. Report was previously obtained that patient did have an PIV infiltrate to left hand. This evening and throughout this shift, this RN continues to note swelling, appearing to have increased with time. Left upper arm, wrist, and hand all noted to have swelling, 1-2+ edema to the extremity. The skin is taught and tight to this extremity. Pulse is palpable and other than obvious swelling, patient denies any further complaints or s/s to this extremity including numbness and/or tingling.Patient does not recall any specific event that may have cause the swelling, other than PIV site infiltration as noted. At 0016, contact is made Hospital Medicine provider, MIGDALIA Romero DNP, regarding all of the above information and physical presentation. Reviewed relevant, pertinent, and qualifying patient information and history and noted no pharmacological DVT prevention is ordered. Upon contacting provider, provider states "Elevate the arm." Notified provider that this RN has encouraged and educated patient and has maintained extremity in an elevated position. Upon reaching out to provider, no orders are received and no further concerns are vocalized by MIGDALIA Romero DNP.   "

## 2024-08-16 NOTE — PLAN OF CARE
[facility]/Surg  Discharge Reassessment    Primary Care Provider: Millie Hayes APRN,FNP-C    Expected Discharge Date: 8/17/2024    Case Management continuing to follow and will assist with discharge planning as needed. Pt not medically clear for DC today d/t ERICA and hyperkalemia. Likely DC over the weekend. Referral sent to [facility] for resumption of services. Requested DC clinic apt for hospital follow up per smart scheduling rec.     Reassessment (most recent)       Discharge Reassessment - 08/16/24 6967          Discharge Reassessment    Assessment Type Discharge Planning Reassessment     Did the patient's condition or plan change since previous assessment? No     Discharge Plan discussed with: Patient     Communicated PAM with patient/caregiver Yes     Discharge Plan A Home Health     Discharge Plan B Home with family     DME Needed Upon Discharge  none     Transition of Care Barriers None     Why the patient remains in the hospital Requires continued medical care        Post-Acute Status    Post-Acute Authorization Home Health     Home Health Status Set-up Complete/Auth obtained     Patient choice form signed by patient/caregiver List from System Post-Acute Care

## 2024-08-17 PROBLEM — E87.1 HYPONATREMIA: Status: ACTIVE | Noted: 2024-08-17

## 2024-08-17 LAB
ALBUMIN SERPL BCP-MCNC: 2.4 G/DL (ref 3.5–5.2)
ALP SERPL-CCNC: 56 U/L (ref 55–135)
ALT SERPL W/O P-5'-P-CCNC: 11 U/L (ref 10–44)
ANION GAP SERPL CALC-SCNC: 8 MMOL/L (ref 8–16)
AST SERPL-CCNC: 15 U/L (ref 10–40)
BASOPHILS # BLD AUTO: 0.01 K/UL (ref 0–0.2)
BASOPHILS NFR BLD: 0.1 % (ref 0–1.9)
BILIRUB SERPL-MCNC: 0.2 MG/DL (ref 0.1–1)
BUN SERPL-MCNC: 43 MG/DL (ref 8–23)
CALCIUM SERPL-MCNC: 8.2 MG/DL (ref 8.7–10.5)
CHLORIDE SERPL-SCNC: 100 MMOL/L (ref 95–110)
CO2 SERPL-SCNC: 18 MMOL/L (ref 23–29)
CREAT SERPL-MCNC: 2.3 MG/DL (ref 0.5–1.4)
DIFFERENTIAL METHOD BLD: ABNORMAL
EOSINOPHIL # BLD AUTO: 0 K/UL (ref 0–0.5)
EOSINOPHIL NFR BLD: 0 % (ref 0–8)
ERYTHROCYTE [DISTWIDTH] IN BLOOD BY AUTOMATED COUNT: 18.5 % (ref 11.5–14.5)
EST. GFR  (NO RACE VARIABLE): 29 ML/MIN/1.73 M^2
GLUCOSE SERPL-MCNC: 167 MG/DL (ref 70–110)
HCT VFR BLD AUTO: 24.8 % (ref 40–54)
HGB BLD-MCNC: 8.3 G/DL (ref 14–18)
IMM GRANULOCYTES # BLD AUTO: 0.03 K/UL (ref 0–0.04)
IMM GRANULOCYTES NFR BLD AUTO: 0.4 % (ref 0–0.5)
LYMPHOCYTES # BLD AUTO: 0.4 K/UL (ref 1–4.8)
LYMPHOCYTES NFR BLD: 4.5 % (ref 18–48)
MAGNESIUM SERPL-MCNC: 1.8 MG/DL (ref 1.6–2.6)
MCH RBC QN AUTO: 28.1 PG (ref 27–31)
MCHC RBC AUTO-ENTMCNC: 33.5 G/DL (ref 32–36)
MCV RBC AUTO: 84 FL (ref 82–98)
MONOCYTES # BLD AUTO: 0.5 K/UL (ref 0.3–1)
MONOCYTES NFR BLD: 6.3 % (ref 4–15)
NEUTROPHILS # BLD AUTO: 7 K/UL (ref 1.8–7.7)
NEUTROPHILS NFR BLD: 88.7 % (ref 38–73)
NRBC BLD-RTO: 0 /100 WBC
OSMOLALITY SERPL: 283 MOSM/KG (ref 280–300)
OSMOLALITY UR: 231 MOSM/KG (ref 50–1200)
PLATELET # BLD AUTO: 168 K/UL (ref 150–450)
PMV BLD AUTO: 10.5 FL (ref 9.2–12.9)
POCT GLUCOSE: 147 MG/DL (ref 70–110)
POCT GLUCOSE: 174 MG/DL (ref 70–110)
POCT GLUCOSE: 211 MG/DL (ref 70–110)
POTASSIUM SERPL-SCNC: 5.5 MMOL/L (ref 3.5–5.1)
PROT SERPL-MCNC: 6.1 G/DL (ref 6–8.4)
RBC # BLD AUTO: 2.95 M/UL (ref 4.6–6.2)
SODIUM SERPL-SCNC: 126 MMOL/L (ref 136–145)
SODIUM UR-SCNC: <20 MMOL/L (ref 20–250)
WBC # BLD AUTO: 7.84 K/UL (ref 3.9–12.7)

## 2024-08-17 PROCEDURE — 97161 PT EVAL LOW COMPLEX 20 MIN: CPT

## 2024-08-17 PROCEDURE — 27000207 HC ISOLATION

## 2024-08-17 PROCEDURE — 94761 N-INVAS EAR/PLS OXIMETRY MLT: CPT

## 2024-08-17 PROCEDURE — 94640 AIRWAY INHALATION TREATMENT: CPT

## 2024-08-17 PROCEDURE — 63600175 PHARM REV CODE 636 W HCPCS: Performed by: FAMILY MEDICINE

## 2024-08-17 PROCEDURE — 36415 COLL VENOUS BLD VENIPUNCTURE: CPT | Performed by: INTERNAL MEDICINE

## 2024-08-17 PROCEDURE — 82533 TOTAL CORTISOL: CPT | Performed by: INTERNAL MEDICINE

## 2024-08-17 PROCEDURE — 97530 THERAPEUTIC ACTIVITIES: CPT

## 2024-08-17 PROCEDURE — 25000003 PHARM REV CODE 250: Performed by: INTERNAL MEDICINE

## 2024-08-17 PROCEDURE — 84300 ASSAY OF URINE SODIUM: CPT | Performed by: INTERNAL MEDICINE

## 2024-08-17 PROCEDURE — 11000001 HC ACUTE MED/SURG PRIVATE ROOM

## 2024-08-17 PROCEDURE — 25000242 PHARM REV CODE 250 ALT 637 W/ HCPCS: Performed by: STUDENT IN AN ORGANIZED HEALTH CARE EDUCATION/TRAINING PROGRAM

## 2024-08-17 PROCEDURE — 80053 COMPREHEN METABOLIC PANEL: CPT | Performed by: INTERNAL MEDICINE

## 2024-08-17 PROCEDURE — 83930 ASSAY OF BLOOD OSMOLALITY: CPT | Performed by: FAMILY MEDICINE

## 2024-08-17 PROCEDURE — 85025 COMPLETE CBC W/AUTO DIFF WBC: CPT | Performed by: INTERNAL MEDICINE

## 2024-08-17 PROCEDURE — 63600175 PHARM REV CODE 636 W HCPCS

## 2024-08-17 PROCEDURE — 83935 ASSAY OF URINE OSMOLALITY: CPT | Performed by: FAMILY MEDICINE

## 2024-08-17 PROCEDURE — 36415 COLL VENOUS BLD VENIPUNCTURE: CPT | Performed by: FAMILY MEDICINE

## 2024-08-17 PROCEDURE — 83735 ASSAY OF MAGNESIUM: CPT | Performed by: INTERNAL MEDICINE

## 2024-08-17 PROCEDURE — 25000003 PHARM REV CODE 250

## 2024-08-17 PROCEDURE — 25000242 PHARM REV CODE 250 ALT 637 W/ HCPCS

## 2024-08-17 RX ORDER — CIPROFLOXACIN 500 MG/1
500 TABLET ORAL EVERY 12 HOURS
Status: DISCONTINUED | OUTPATIENT
Start: 2024-08-17 | End: 2024-08-18 | Stop reason: HOSPADM

## 2024-08-17 RX ORDER — SULFAMETHOXAZOLE AND TRIMETHOPRIM 800; 160 MG/1; MG/1
1 TABLET ORAL 2 TIMES DAILY
Status: DISCONTINUED | OUTPATIENT
Start: 2024-08-18 | End: 2024-08-17

## 2024-08-17 RX ORDER — INSULIN GLARGINE 100 [IU]/ML
5 INJECTION, SOLUTION SUBCUTANEOUS DAILY
Status: DISCONTINUED | OUTPATIENT
Start: 2024-08-17 | End: 2024-08-18 | Stop reason: HOSPADM

## 2024-08-17 RX ORDER — DEXAMETHASONE SODIUM PHOSPHATE 4 MG/ML
6 INJECTION, SOLUTION INTRA-ARTICULAR; INTRALESIONAL; INTRAMUSCULAR; INTRAVENOUS; SOFT TISSUE EVERY 24 HOURS
Status: COMPLETED | OUTPATIENT
Start: 2024-08-17 | End: 2024-08-17

## 2024-08-17 RX ADMIN — ASPIRIN 81 MG: 81 TABLET, COATED ORAL at 09:08

## 2024-08-17 RX ADMIN — CEFTRIAXONE 1 G: 1 INJECTION, POWDER, FOR SOLUTION INTRAMUSCULAR; INTRAVENOUS at 12:08

## 2024-08-17 RX ADMIN — GABAPENTIN 300 MG: 300 CAPSULE ORAL at 08:08

## 2024-08-17 RX ADMIN — MUPIROCIN 1 G: 20 OINTMENT TOPICAL at 09:08

## 2024-08-17 RX ADMIN — INSULIN GLARGINE 5 UNITS: 100 INJECTION, SOLUTION SUBCUTANEOUS at 10:08

## 2024-08-17 RX ADMIN — HYDRALAZINE HYDROCHLORIDE 50 MG: 25 TABLET ORAL at 08:08

## 2024-08-17 RX ADMIN — BUDESONIDE INHALATION 0.5 MG: 0.5 SUSPENSION RESPIRATORY (INHALATION) at 11:08

## 2024-08-17 RX ADMIN — SODIUM CHLORIDE: 9 INJECTION, SOLUTION INTRAVENOUS at 12:08

## 2024-08-17 RX ADMIN — BUDESONIDE INHALATION 0.5 MG: 0.5 SUSPENSION RESPIRATORY (INHALATION) at 07:08

## 2024-08-17 RX ADMIN — ISOSORBIDE DINITRATE 20 MG: 20 TABLET ORAL at 08:08

## 2024-08-17 RX ADMIN — SACUBITRIL AND VALSARTAN 1 TABLET: 24; 26 TABLET, FILM COATED ORAL at 09:08

## 2024-08-17 RX ADMIN — HYDRALAZINE HYDROCHLORIDE 50 MG: 25 TABLET ORAL at 09:08

## 2024-08-17 RX ADMIN — Medication 1000 UNITS: at 09:08

## 2024-08-17 RX ADMIN — ARFORMOTEROL TARTRATE 15 MCG: 15 SOLUTION RESPIRATORY (INHALATION) at 08:08

## 2024-08-17 RX ADMIN — ATORVASTATIN CALCIUM 40 MG: 40 TABLET, FILM COATED ORAL at 09:08

## 2024-08-17 RX ADMIN — DEXAMETHASONE SODIUM PHOSPHATE 6 MG: 4 INJECTION, SOLUTION INTRA-ARTICULAR; INTRALESIONAL; INTRAMUSCULAR; INTRAVENOUS; SOFT TISSUE at 09:08

## 2024-08-17 RX ADMIN — METOPROLOL SUCCINATE 25 MG: 25 TABLET, EXTENDED RELEASE ORAL at 09:08

## 2024-08-17 RX ADMIN — LEVALBUTEROL HYDROCHLORIDE 0.63 MG: 0.63 SOLUTION RESPIRATORY (INHALATION) at 03:08

## 2024-08-17 RX ADMIN — LEVOTHYROXINE SODIUM 100 MCG: 0.1 TABLET ORAL at 05:08

## 2024-08-17 RX ADMIN — ISOSORBIDE DINITRATE 20 MG: 20 TABLET ORAL at 09:08

## 2024-08-17 RX ADMIN — MUPIROCIN 1 G: 20 OINTMENT TOPICAL at 08:08

## 2024-08-17 RX ADMIN — CIPROFLOXACIN 500 MG: 500 TABLET ORAL at 08:08

## 2024-08-17 RX ADMIN — SODIUM ZIRCONIUM CYCLOSILICATE 10 G: 10 POWDER, FOR SUSPENSION ORAL at 06:08

## 2024-08-17 RX ADMIN — INSULIN ASPART 2 UNITS: 100 INJECTION, SOLUTION INTRAVENOUS; SUBCUTANEOUS at 10:08

## 2024-08-17 RX ADMIN — ISOSORBIDE DINITRATE 20 MG: 20 TABLET ORAL at 02:08

## 2024-08-17 RX ADMIN — LEVALBUTEROL HYDROCHLORIDE 0.63 MG: 0.63 SOLUTION RESPIRATORY (INHALATION) at 07:08

## 2024-08-17 RX ADMIN — SACUBITRIL AND VALSARTAN 1 TABLET: 24; 26 TABLET, FILM COATED ORAL at 08:08

## 2024-08-17 NOTE — CONSULTS
INPATIENT NEPHROLOGY CONSULT   [facility] NEPHROLOGY    Baldo Carney  08/17/2024    Reason for consultation:    CKDIII    Chief Complaint:   Chief Complaint   Patient presents with    Loss of Consciousness     Passed out this am / dizziness this am           History of Present Illness:    Per H and P  Baldo Carney is a 73 year old male with a previous medical history of COPD, cirrhosis of the liver with bi monthly paracentesis, cardiomyopathy, CHF, aortic stenosis, CAD, HTN, HLD, BPH, chronic renal failure, and DMII who presented to the emergency room for syncope this morning. He reports he awoke to his brother lifting him up off the floor and then brought him to the hospital. The patient has had family members check in on him more often due to recent hospital admission for syncope. He does not recall any events prior to waking up on the floor or after he went to bed yesterday evening. He reports eating a hearty meal one hour prior to bed last night. In the ED his blood sugar was noted to be 37 but patient was AAOx4 and just endorsing generalized weakness. His blood sugar improved with IV dextrose. Patient denies any adverse symptoms other than generalized fatigue and reports he was due for his therapeutic paracentesis on 8/12/24. Troponin mildly elevated and patient in an atrial paced rhythm. He denies any defibrillator shocks chest pain or SOB. Denies dysuria or urinary issues. Urine studies positive for infection and patient covered with IV rocephin. Patient admitted by hospital medicine for further evaluation and management.         Plan of Care:       Assessment:       CKD III due to diabetes and cardiorenal syndrome  --Avoid NSAIDS, Cummings II inhibitors, and other non-essential nephrotoxic agents  --renal dose medication  --keep map above 55  --long term bp goal 130/80 and hgA1c goal less than 7  --stop Bactrim.  This should not be used in pt with CKD.  It can worsen renal function and cause  hyperkalemia  --stop ibuprofen     Hyperkalemia  --stop bactrim--switch to cipro.  Klebsiella sensitive  --stop Boost.  This supplement is high is potassium  --low k diet  --continue entresto for now  --lokelma 10g one dose.   Hesitant to use daily given sodium load and HFrEF   --am cortisol given high k and low sodium     Hyponatremia  --urine osm  --uric acid  --bnp  --tsh  --fluid restrict  --avoid thiazide diuretics  --tolvaptan contraindicated in cirrhotics   --hyponatremia is cirrhotics is hard to correct     Anemia  --iron panel  --retacrit if iron stores adequate     Thank you for allowing us to participate in this patient's care. We will continue to follow.    Vital Signs:  Temp Readings from Last 3 Encounters:   08/17/24 97.6 °F (36.4 °C) (Oral)   08/08/24 98.6 °F (37 °C) (Oral)   08/01/24 97.7 °F (36.5 °C)       Pulse Readings from Last 3 Encounters:   08/17/24 65   08/08/24 62   08/01/24 60       BP Readings from Last 3 Encounters:   08/17/24 (!) 114/59   08/08/24 (!) 110/58   08/01/24 (!) 117/57       Weight:  Wt Readings from Last 3 Encounters:   08/15/24 78 kg (171 lb 15.3 oz)   08/08/24 76.9 kg (169 lb 9.6 oz)   07/29/24 74 kg (163 lb 2.3 oz)       Past Medical & Surgical History:  Past Medical History:   Diagnosis Date    Asthma     Cardiomyopathy 10/21/2014    CHF (congestive heart failure)     Coronary artery disease     CRF (chronic renal failure)     Diabetes mellitus     Enlarged prostate     Hyperlipidemia     Hypertension     Neuropathy     Syncope 7/29/2024       Past Surgical History:   Procedure Laterality Date    ANGIOGRAPHY OF INTERNAL MAMMARY VESSEL N/A 3/11/2022    Procedure: Angiogram Internal Mammary;  Surgeon: Hank Lujan MD;  Location: [facility] CATH/EP LAB;  Service: Cardiology;  Laterality: N/A;    CARDIAC CATHETERIZATION      CARDIAC VALVE SURGERY      CATHETERIZATION OF BOTH LEFT AND RIGHT HEART Left 3/11/2022    Procedure: CATHETERIZATION, HEART, BOTH LEFT AND RIGHT;  Surgeon:  Hank Lujan MD;  Location: [facility] CATH/EP LAB;  Service: Cardiology;  Laterality: Left;    CORONARY ANGIOGRAPHY N/A 3/11/2022    Procedure: ANGIOGRAM, CORONARY ARTERY;  Surgeon: Hank Lujan MD;  Location: [facility] CATH/EP LAB;  Service: Cardiology;  Laterality: N/A;    CORONARY BYPASS GRAFT ANGIOGRAPHY  3/11/2022    Procedure: Bypass graft study;  Surgeon: Hank Lujan MD;  Location: [facility] CATH/EP LAB;  Service: Cardiology;;    CYSTOSCOPY N/A 7/30/2019    Procedure: CYSTOSCOPY;  Surgeon: Spencer Nguyen MD;  Location: [facility];  Service: Urology;  Laterality: N/A;    INSERTION OF INTRAVASCULAR MICROAXIAL BLOOD PUMP N/A 8/31/2022    Procedure: INSERTION, IMPELLA;  Surgeon: Zach Jensen MD;  Location: [facility] CATH LAB;  Service: Cardiology;  Laterality: N/A;    INSERTION, CATHETER, DIALYSIS, PERITONEAL N/A 12/7/2023    Procedure: INSERTION, CATHETER, DIALYSIS, PERITONEAL;  Surgeon: Greg Bone Jr., MD;  Location: [facility];  Service: General;  Laterality: N/A;  need PD catheter    PLACEMENT OF SWAN SEE CATHETER WITH IMAGING GUIDANCE  8/31/2022    Procedure: INSERTION, CATHETER, SWAN-SEE, WITH IMAGING GUIDANCE;  Surgeon: Zach Jensen MD;  Location: [facility] CATH LAB;  Service: Cardiology;;    REPAIR, HERNIA, INGUINAL, INCARCERATED, INITIAL, AGE 5 YEARS OR OLDER Right 12/7/2023    Procedure: REPAIR, HERNIA, INGUINAL, INCARCERATED, INITIAL;  Surgeon: Greg Bone Jr., MD;  Location: [facility];  Service: General;  Laterality: Right;    SHOULDER ARTHROSCOPY  1985    TRANSRECTAL BIOPSY OF PROSTATE WITH ULTRASOUND GUIDANCE N/A 7/30/2019    Procedure: BIOPSY, PROSTATE, RECTAL APPROACH, WITH US GUIDANCE;  Surgeon: Spencer Nguyen MD;  Location: [facility];  Service: Urology;  Laterality: N/A;  procedure not performed, pt unable to tolerate    TRANSRECTAL BIOPSY OF PROSTATE WITH ULTRASOUND GUIDANCE N/A 8/8/2019    Procedure: BIOPSY, PROSTATE, RECTAL APPROACH, WITH US GUIDANCE;  Surgeon:  Spencer Nguyen MD;  Location: [facility] OR;  Service: Urology;  Laterality: N/A;    UMBILICAL HERNIA REPAIR N/A 2023    Procedure: REPAIR, HERNIA, UMBILICAL;  Surgeon: Greg Bone Jr., MD;  Location: [facility] OR;  Service: General;  Laterality: N/A;       Past Social History:  Social History     Socioeconomic History    Marital status: Single   Tobacco Use    Smoking status: Former     Current packs/day: 0.00     Types: Cigarettes     Quit date: 1970     Years since quittin.2    Smokeless tobacco: Never   Substance and Sexual Activity    Alcohol use: Not Currently     Alcohol/week: 3.0 standard drinks of alcohol     Types: 3 Cans of beer per week    Drug use: No    Sexual activity: Not Currently     Partners: Female     Social Determinants of Health     Transportation Needs: No Transportation Needs (2024)    TRANSPORTATION NEEDS     Transportation : No       Medications:  No current facility-administered medications on file prior to encounter.     Current Outpatient Medications on File Prior to Encounter   Medication Sig Dispense Refill    aspirin (ECOTRIN) 81 MG EC tablet Take 81 mg by mouth once daily.      atorvastatin (LIPITOR) 40 MG tablet Take 1 tablet (40 mg total) by mouth once daily. 90 tablet 0    cyanocobalamin 1,000 mcg/mL injection Inject 1 mL (1,000 mcg total) into the muscle every 30 days. (Patient taking differently: Inject 1,000 mcg into the muscle every 30 days. 1st of month) 3 mL 1    empagliflozin (JARDIANCE) 25 mg tablet Take 1 tablet (25 mg total) by mouth once daily. 90 tablet 0    fluticasone furoate-vilanteroL (BREO ELLIPTA) 100-25 mcg/dose diskus inhaler Inhale 1 puff into the lungs once daily. (DAILY CONTROLLER) 3 each 1    fluticasone propionate (FLONASE) 50 mcg/actuation nasal spray USE 1 SPRAY (50 MCG TOTAL) IN EACH NOSTRIL ONCE DAILY (Patient taking differently: 1 spray by Each Nostril route Daily.) 48 mL 1    gabapentin (NEURONTIN) 300 MG capsule Take 1  capsule (300 mg total) by mouth every evening. 90 capsule 1    glimepiride (AMARYL) 4 MG tablet Take 1 tablet (4 mg total) by mouth before breakfast. 90 tablet 1    isosorbide dinitrate (ISORDIL) 20 MG tablet Take 20 mg by mouth 3 (three) times daily.      levalbuterol (XOPENEX) 0.63 mg/3 mL nebulizer solution Inhale 0.63 mg into the lungs every 6 (six) hours as needed for Shortness of Breath.      levothyroxine (SYNTHROID) 100 MCG tablet Take 1 tablet (100 mcg total) by mouth before breakfast. With no other meds or food 90 tablet 1    metoprolol succinate (TOPROL-XL) 25 MG 24 hr tablet Take 1 tablet (25 mg total) by mouth once daily. 90 tablet 1    sacubitriL-valsartan (ENTRESTO) 24-26 mg per tablet Take 1 tablet by mouth 2 (two) times daily. 180 tablet 1    VERQUVO 5 mg Tab Take 5 mg by mouth once daily. 90 tablet 0    hydrALAZINE (APRESOLINE) 50 MG tablet Take 1 tablet (50 mg total) by mouth 2 (two) times a day. 180 tablet 0    linaCLOtide (LINZESS) 72 mcg Cap capsule Take 1 capsule (72 mcg total) by mouth before breakfast. 90 capsule 1     Scheduled Meds:   arformoteroL  15 mcg Nebulization BID    aspirin  81 mg Oral Daily    atorvastatin  40 mg Oral Daily    budesonide  0.5 mg Nebulization BID    gabapentin  300 mg Oral QHS    hydrALAZINE  50 mg Oral BID    insulin glargine U-100  5 Units Subcutaneous Daily    isosorbide dinitrate  20 mg Oral TID    levalbuterol  0.63 mg Nebulization Q8H    levothyroxine  100 mcg Oral Before breakfast    metoprolol succinate  25 mg Oral Daily    mupirocin   Nasal BID    sacubitriL-valsartan  1 tablet Oral BID    [START ON 8/18/2024] sulfamethoxazole-trimethoprim 800-160mg  1 tablet Oral BID    vericiguat  5 mg Oral Daily    vitamin D  1,000 Units Oral Daily     Continuous Infusions:  PRN Meds:.  Current Facility-Administered Medications:     aluminum-magnesium hydroxide-simethicone, 30 mL, Oral, QID PRN    benzonatate, 100 mg, Oral, TID PRN    dextrose 10%, 12.5 g, Intravenous,  PRN    dextrose 10%, 12.5 g, Intravenous, PRN    dextrose 10%, 25 g, Intravenous, PRN    dextrose 10%, 25 g, Intravenous, PRN    dextrose 50%, 25 g, Intravenous, PRN    dextrose 50%, 25 g, Intravenous, PRN    glucagon (human recombinant), 1 mg, Intramuscular, PRN    glucagon (human recombinant), 1 mg, Intramuscular, PRN    glucose, 16 g, Oral, PRN    glucose, 16 g, Oral, PRN    glucose, 24 g, Oral, PRN    glucose, 24 g, Oral, PRN    ibuprofen, 600 mg, Oral, Q6H PRN    insulin aspart U-100, 0-5 Units, Subcutaneous, QID (AC + HS) PRN    naloxone, 0.02 mg, Intravenous, PRN    ondansetron, 4 mg, Intravenous, Q8H PRN    simethicone, 1 tablet, Oral, QID PRN    sodium chloride 0.9%, 10 mL, Intravenous, Q12H PRN    Allergies:  Canagliflozin    Past Family History:  Reviewed; refer to Hospitalist Admission Note    Review of Systems:  Review of Systems - All 14 systems reviewed and negative, except as noted in HPI    Physical Exam:    BP (!) 114/59 (BP Location: Right arm, Patient Position: Sitting)   Pulse 65   Temp 97.6 °F (36.4 °C) (Oral)   Resp 18   Ht 6' (1.829 m)   Wt 78 kg (171 lb 15.3 oz)   SpO2 99%   BMI 23.32 kg/m²     General Appearance:    Alert, cooperative, no distress, appears stated age   Head:    Normocephalic, without obvious abnormality, atraumatic   Eyes:    PER, conjunctiva/corneas clear, EOM's intact in both eyes        Throat:   Lips, mucosa, and tongue normal; teeth and gums normal   Back:     Symmetric, no curvature, ROM normal, no CVA tenderness   Lungs:     Clear to auscultation bilaterally, respirations unlabored   Chest wall:    No tenderness or deformity   Heart:    Regular rate and rhythm, S1 and S2 normal, no murmur, rub   or gallop   Abdomen:     Soft, non-tender, bowel sounds active all four quadrants,     no masses, no organomegaly   Extremities:   Extremities normal, atraumatic, no cyanosis or edema   Pulses:   2+ and symmetric all extremities   MSK:   No joint or muscle swelling,  tenderness or deformity   Skin:   Skin color, texture, turgor normal, no rashes or lesions   Neurologic:   CNII-XII intact, normal strength and sensation       Throughout.  No flap     Results:  Lab Results   Component Value Date     (L) 08/17/2024    K 5.5 (H) 08/17/2024     08/17/2024    CO2 18 (L) 08/17/2024    BUN 43 (H) 08/17/2024    CREATININE 2.3 (H) 08/17/2024    CALCIUM 8.2 (L) 08/17/2024    ANIONGAP 8 08/17/2024    ESTGFRAFRICA 53.4 (A) 06/07/2022    EGFRNONAA 46.2 (A) 06/07/2022       Lab Results   Component Value Date    CALCIUM 8.2 (L) 08/17/2024    PHOS 2.6 (L) 08/15/2024       Recent Labs   Lab 08/17/24  0440   WBC 7.84   RBC 2.95*   HGB 8.3*   HCT 24.8*      MCV 84   MCH 28.1   MCHC 33.5          I have personally reviewed pertinent radiological imaging and reports.    Arsen Torres MD  [facility] [place]  [phone]

## 2024-08-17 NOTE — ASSESSMENT & PLAN NOTE
Hyponatremia is likely due to unclear. The patient's most recent sodium results are listed below.  Recent Labs     08/16/24  0817 08/16/24  1329 08/17/24  0440   * 126* 126*     Plan  - Correct the sodium by 4-6mEq in 24 hours.   - Obtain the following studies: Urine sodium, urine osmolality, serum osmolality.  - Will treat the hyponatremia with Fluid restriction of:  1.5 liter per day  - Monitor sodium Every 12 hours.   - Patient hyponatremia is worsening. Will adjust treatment as follows: consult nephrology

## 2024-08-17 NOTE — PLAN OF CARE
Problem: Adult Inpatient Plan of Care  Goal: Plan of Care Review  Outcome: Progressing  Goal: Patient-Specific Goal (Individualized)  Outcome: Progressing  Goal: Optimal Comfort and Wellbeing  Outcome: Progressing     Problem: Diabetes Comorbidity  Goal: Blood Glucose Level Within Targeted Range  Outcome: Progressing     Problem: Acute Kidney Injury/Impairment  Goal: Fluid and Electrolyte Balance  Outcome: Progressing  Goal: Improved Oral Intake  Outcome: Progressing  Goal: Effective Renal Function  Outcome: Progressing     Problem: Pain Acute  Goal: Optimal Pain Control and Function  Outcome: Progressing     Problem: Fall Injury Risk  Goal: Absence of Fall and Fall-Related Injury  Outcome: Progressing     Problem: Infection  Goal: Absence of Infection Signs and Symptoms  Outcome: Progressing     Problem: Glycemic Control Impaired  Goal: Blood Glucose Level Within Targeted Range  Outcome: Progressing  Goal: Minimize Risk of Hypoglycemia  Outcome: Progressing     Problem: UTI (Urinary Tract Infection)  Goal: Improved Infection Symptoms  Outcome: Progressing     Problem: Gas Exchange Impaired  Goal: Optimal Gas Exchange  Outcome: Progressing     Problem: Pulmonary Impairment  Goal: Improved Activity Tolerance  Outcome: Progressing  Goal: Effective Airway Clearance  Outcome: Progressing  Goal: Optimal Gas Exchange  Outcome: Progressing     Problem: Fatigue  Goal: Improved Activity Tolerance  Outcome: Progressing     Problem: Liver Failure  Goal: Optimal Coping with Liver Failure  Outcome: Progressing  Goal: Fluid and Electrolyte Balance  Outcome: Progressing  Goal: Optimal Gastrointestinal Function  Outcome: Progressing  Goal: Blood Glucose Level Within Target Range  Outcome: Progressing  Goal: Optimal Coagulation Function  Outcome: Progressing  Goal: Absence of Infection Signs and Symptoms  Outcome: Progressing  Goal: Optimal Neurologic Function  Outcome: Progressing  Goal: Optimal Pain Control, Comfort and  Function  Outcome: Progressing  Goal: Optimize Renal Function  Outcome: Progressing  Goal: Effective Oxygenation and Ventilation  Outcome: Progressing

## 2024-08-17 NOTE — PROGRESS NOTES
[facility] Medicine  Progress Note    Patient Name: Baldo Carney  MRN: [id number]  Patient Class: IP- Inpatient   Admission Date: 8/12/2024  Length of Stay: 4 days  Attending Physician: Wallace Phan DO  Primary Care Provider: Millie Hayes APRN,FNP-C        Subjective:     Principal Problem:Syncope and collapse        HPI:  Baldo Carney is a 73 year old male with a previous medical history of COPD, cirrhosis of the liver with bi monthly paracentesis, cardiomyopathy, CHF, aortic stenosis, CAD, HTN, HLD, BPH, chronic renal failure, and DMII who presented to the emergency room for syncope this morning. He reports he awoke to his brother lifting him up off the floor and then brought him to the hospital. The patient has had family members check in on him more often due to recent hospital admission for syncope.  He does not recall any events prior to waking up on the floor or after he went to bed yesterday evening. He reports eating a hearty meal one hour prior to bed last night.  In the ED his blood sugar was noted to be 37 but patient was AAOx4 and just endorsing generalized weakness. His blood sugar improved with IV dextrose. Patient denies any adverse symptoms other than generalized fatigue and reports he was due for his therapeutic paracentesis on 8/12/24. Troponin mildly elevated and patient in an atrial paced rhythm. He denies any defibrillator shocks chest pain or SOB. Denies dysuria or urinary issues. Urine studies positive for infection and patient covered with IV rocephin. Patient admitted by hospital medicine for further evaluation and management.    Overview/Hospital Course:  Patient was admitted and monitored on telemetry. Serial neuro checks were done. The patient developed hypoglycemia once again, and was placed on a D10 infusion. CT brain was unremarkable. Pacemaker was interrogated and was functioning properly. Serial glucometer checks were  continued. Diabetic meds were held. Glucose levels continued to be low. Dextrose IVF's were continued. Insulin leve, proinsuin, c-peptide and random cortisol level were ordered. Hypoglycemia resolved, and glucose ran high. SSI was added. Patient had low grade fever and cough, and was screened for covid. This was positive. Dexamethasone started and discontinued as no oxygen req and sugars spiking. Hyponatremia has developed and no obvious cause, nephrology consulted and osmolarity labs ordered.    Interval History: Hypoglycemia has resolved.  Glucose now running high. Patient tested positive for covid.    Review of Systems   Constitutional:  Positive for fatigue. Negative for activity change, appetite change, chills and fever.   HENT:  Negative for congestion, ear pain, nosebleeds and sinus pain.    Eyes:  Negative for discharge and itching.   Respiratory:  Negative for apnea, cough, chest tightness and shortness of breath.    Cardiovascular:  Positive for leg swelling. Negative for chest pain and palpitations.   Gastrointestinal:  Positive for abdominal distention. Negative for abdominal pain and vomiting.   Genitourinary:  Negative for difficulty urinating, dysuria, flank pain and frequency.   Musculoskeletal:  Negative for arthralgias, back pain, joint swelling and myalgias.   Skin:  Negative for color change, pallor and rash.   Neurological:  Positive for syncope. Negative for dizziness, weakness, light-headedness and headaches.   Psychiatric/Behavioral:  Negative for agitation, behavioral problems, confusion and suicidal ideas.      Objective:     Vital Signs (Most Recent):  Temp: 97.6 °F (36.4 °C) (08/17/24 1247)  Pulse: 65 (08/17/24 1247)  Resp: 18 (08/17/24 1247)  BP: (!) 114/59 (08/17/24 1247)  SpO2: 99 % (08/17/24 1247) Vital Signs (24h Range):  Temp:  [97.4 °F (36.3 °C)-97.9 °F (36.6 °C)] 97.6 °F (36.4 °C)  Pulse:  [62-78] 65  Resp:  [17-18] 18  SpO2:  [98 %-100 %] 99 %  BP: (114-168)/(58-77) 114/59      Weight: 78 kg (171 lb 15.3 oz)  Body mass index is 23.32 kg/m².    Intake/Output Summary (Last 24 hours) at 8/17/2024 1456  Last data filed at 8/17/2024 0610  Gross per 24 hour   Intake 2193.83 ml   Output 600 ml   Net 1593.83 ml         Physical Exam  Vitals reviewed.   Constitutional:       General: He is not in acute distress.     Appearance: Normal appearance. He is normal weight. He is not ill-appearing.   HENT:      Head: Normocephalic and atraumatic.      Nose: Nose normal.      Mouth/Throat:      Mouth: Mucous membranes are moist.      Pharynx: Oropharynx is clear.   Eyes:      General: No scleral icterus.     Extraocular Movements: Extraocular movements intact.      Conjunctiva/sclera: Conjunctivae normal.      Pupils: Pupils are equal, round, and reactive to light.   Cardiovascular:      Rate and Rhythm: Normal rate and regular rhythm.      Pulses: Normal pulses.      Heart sounds: Normal heart sounds. No murmur heard.     No gallop.   Pulmonary:      Effort: Pulmonary effort is normal. No respiratory distress.      Breath sounds: Normal breath sounds. No wheezing, rhonchi or rales.   Chest:      Chest wall: No tenderness.   Abdominal:      General: Abdomen is flat. There is distension.      Palpations: Abdomen is soft.      Tenderness: There is no abdominal tenderness.   Musculoskeletal:         General: No swelling or tenderness.      Cervical back: Normal range of motion and neck supple. No rigidity or tenderness.      Right lower leg: Edema present.      Left lower leg: Edema present.   Skin:     General: Skin is warm and dry.   Neurological:      General: No focal deficit present.      Mental Status: He is alert and oriented to person, place, and time. Mental status is at baseline.      Motor: No weakness.   Psychiatric:         Mood and Affect: Mood normal.         Behavior: Behavior normal.         Thought Content: Thought content normal.             Significant Labs: All pertinent labs within  the past 24 hours have been reviewed.  Recent Lab Results         08/17/24  1156   08/17/24  0440   08/16/24  2222   08/16/24  1709        Albumin   2.4           ALP   56           ALT   11           Anion Gap   8           AST   15           Baso #   0.01           Basophil %   0.1           BILIRUBIN TOTAL   0.2  Comment: For infants and newborns, interpretation of results should be based  on gestational age, weight and in agreement with clinical  observations.    Premature Infant recommended reference ranges:  Up to 24 hours.............<8.0 mg/dL  Up to 48 hours............<12.0 mg/dL  3-5 days..................<15.0 mg/dL  6-29 days.................<15.0 mg/dL             BUN   43           Calcium   8.2           Chloride   100           CO2   18           Creatinine   2.3           Differential Method   Automated           eGFR   29           Eos #   0.0           Eos %   0.0           Glucose   167           Gran # (ANC)   7.0           Gran %   88.7           Hematocrit   24.8           Hemoglobin   8.3           Immature Grans (Abs)   0.03  Comment: Mild elevation in immature granulocytes is non specific and   can be seen in a variety of conditions including stress response,   acute inflammation, trauma and pregnancy. Correlation with other   laboratory and clinical findings is essential.             Immature Granulocytes   0.4           Lymph #   0.4           Lymph %   4.5           Magnesium    1.8           MCH   28.1           MCHC   33.5           MCV   84           Mono #   0.5           Mono %   6.3           MPV   10.5           nRBC   0           Platelet Count   168           POCT Glucose 147     204   157       Potassium   5.5           PROTEIN TOTAL   6.1           RBC   2.95           RDW   18.5           Sodium   126           WBC   7.84                   Significant Imaging: I have reviewed all pertinent imaging results/findings within the past 24 hours.  Imaging Results              IR  Paracentesis with Imaging (Final result)  Result time 08/13/24 10:14:38      Final result by Jeremy Raines MD (08/13/24 10:14:38)                   Impression:      Successful ultrasound-guided paracentesis.      Electronically signed by: Jeremy Raines MD  Date:    08/13/2024  Time:    10:14               Narrative:    EXAMINATION:  IR PARACENTESIS WITH IMAGING    CLINICAL HISTORY:  Ascities-weekly paracentisis that was due to be performed 8/12/14. Pt admitted for syncope;    TECHNIQUE:  Written, informed consent was obtained.  A time-out was performed.  Sonographic imaging was utilized to identify an appropriate entry site.  The skin was prepped and draped in usual sterile fashion.  The skin and underlying soft tissues within anesthetized with 1% lidocaine.  A needle over catheter device was then advanced into the peritoneal cavity, and fluid was returned.  Following cessation of flow from the catheter, the catheter was removed and hemostasis was achieved.  The incision was sealed and bandaged sterilely.  The patient tolerated the procedure well and was discharged in good condition.  There were no immediate postprocedure complications.    COMPARISON:  None    FINDINGS:  6 L fluid removed.    Medications: Albumin per protocol    Estimated blood loss: Minimal                                       X-Ray Chest AP Portable (Final result)  Result time 08/12/24 13:24:07      Final result by Trina Crowell MD (08/12/24 13:24:07)                   Impression:      No significant change compared to the prior study of 07/29/2024.  Mild prominence of the perihilar markings which could reflect mild perihilar infiltrate or pulmonary vascular distention.  No confluent infiltrates.      Electronically signed by: Trina Crowell MD  Date:    08/12/2024  Time:    13:24               Narrative:    EXAMINATION:  XR CHEST AP PORTABLE    CLINICAL HISTORY:  Weakness    TECHNIQUE:  Single frontal view of the chest was  performed.    COMPARISON:  07/29/2024    FINDINGS:  Stable cardiomediastinal silhouette.  Anterior chest wall cardiac pacemaker with multiple leads remain in place.  Median sternotomy sutures.  The clear confluent infiltrate.  Mild prominence of perihilar markings not appearing significantly changed.                                       CT Head Without Contrast (Final result)  Result time 08/12/24 13:22:12      Final result by Trina Crowell MD (08/12/24 13:22:12)                   Impression:      Mild involutional change and findings suggesting microvascular ischemic change and old infarcts with no acute intracranial findings or significant change compared to the prior exam.      Electronically signed by: Trina Crowell MD  Date:    08/12/2024  Time:    13:22               Narrative:    EXAMINATION:  CT HEAD WITHOUT CONTRAST    CLINICAL HISTORY:  Head trauma, minor (Age >= 65y);    TECHNIQUE:  Low dose axial images were obtained through the head.  Coronal and sagittal reformations were also performed. Contrast was not administered.    COMPARISON:  07/29/2024    FINDINGS:  Mild dilatation of ventricles, sulci, fissures not appearing out of proportion for the patient's age.  Decreased density in white matter suggesting microvascular ischemic change.  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  Probable old lacunar infarct right cerebellar versus averaging of CSF space.  No acute intracranial findings.  No acute intracranial hemorrhage.  No intracranial mass effect.  No acute major vascular territory infarct.  Note is made that MRI is typically more sensitive than CT particular for detection of early or small nonhemorrhagic infarct.  The calvarium appears intact.  Mastoids appear well pneumatized and visualized paranasal sinuses essentially clear.  Calcifications are seen in parasellar portions of internal carotid arteries                                 "        Assessment/Plan:      * Syncope and collapse  -Telemetry monitoring  -Neurochecks Q 4 hours  -CT of head negative for any acute process  -Likely secondary to hypoglycemia  -Orthostatic vital signs  -CBC, CMP, Mag, Phos daily  Troponins negative  -Cardiac device checked, functioning normally  -Carotid US on 7/29/24 showed no "No evidence of a hemodynamically significant carotid bifurcation stenosis"  No echocardiogram results found for the past 14 days.        Hyponatremia  Hyponatremia is likely due to unclear. The patient's most recent sodium results are listed below.  Recent Labs     08/16/24  0817 08/16/24  1329 08/17/24  0440   * 126* 126*     Plan  - Correct the sodium by 4-6mEq in 24 hours.   - Obtain the following studies: Urine sodium, urine osmolality, serum osmolality.  - Will treat the hyponatremia with Fluid restriction of:  1.5 liter per day  - Monitor sodium Every 12 hours.   - Patient hyponatremia is worsening. Will adjust treatment as follows: consult nephrology    Hypoglycemia  Resolved  C-peptide, insulin level, proinsulin and random cortisol levels ordered      UTI (urinary tract infection)  -IV rocephin daily  -Urine culture positive for Klebsiella sensitive to rocephin      Type 2 diabetes mellitus, without long-term current use of insulin  Hypoglycemia resolved  Last A1c reviewed-   Lab Results   Component Value Date    HGBA1C 5.5 08/15/2024     Most recent fingerstick glucose reviewed-   Recent Labs   Lab 08/15/24  1950/24  2355 08/16/24  0359 08/16/24  0802   POCTGLUCOSE 334* 354* 337* 271*       Hold all insulin or oral meds currently and ACCUCHECKS Q 4 hours to monitor blood sugar closely     Hold Oral hypoglycemics while patient is in the hospital.       CRF (chronic renal failure)  Creatine stable for now. BMP reviewed- noted Estimated Creatinine Clearance: 35.7 mL/min (A) (based on SCr of 2 mg/dL (H)). according to latest data. Based on current GFR, CKD stage is stage " 3 - GFR 30-59.  Monitor UOP and serial BMP and adjust therapy as needed. Renally dose meds. Avoid nephrotoxic medications and procedures.    Cirrhosis  -IR consulted for weekly paracentesis-most recently performed on 7/29/24 with 6.5 liters removed. Next paracentesis was due to be performed 8/12/24  -Daily CBC, CMP, INR        Hypothyroidism  -levothyroxine continued      COPD (chronic obstructive pulmonary disease)  Patient's COPD is controlled currently.  Patient is currently off COPD Pathway. Continue scheduled inhalers Supplemental oxygen PRN and monitor respiratory status closely.     Chronic combined systolic and diastolic heart failure  CHF currently controlled. Latest ECHO shows ejection fraction of 22%. Continue BB, ACEi,   and monitor clinical status closely. Monitor on telemetry. Last BNP is   Recent Labs   Lab 08/12/24  1231   BNP 2,595*   . Continue to stress to patient importance of self efficacy and  on diet for CHF.       Hypertension  Chronic, controlled. Latest blood pressure and vitals reviewed-     Temp:  [97.8 °F (36.6 °C)]   Pulse:  [60-64]   Resp:  [18]   BP: (148-178)/(66-81)   SpO2:  [97 %-100 %] .   Home meds for hypertension were reviewed and noted below.   Hypertension Medications               hydrALAZINE (APRESOLINE) 50 MG tablet Take 1 tablet (50 mg total) by mouth 2 (two) times a day.    isosorbide dinitrate (ISORDIL) 20 MG tablet Take 20 mg by mouth 3 (three) times daily.    metoprolol succinate (TOPROL-XL) 25 MG 24 hr tablet Take 1 tablet (25 mg total) by mouth once daily.    sacubitriL-valsartan (ENTRESTO) 24-26 mg per tablet Take 1 tablet by mouth 2 (two) times daily.            While in the hospital, will manage blood pressure as follows; Continue home antihypertensive regimen    Will utilize p.r.n. blood pressure medication only if patient's blood pressure greater than 180/110 and he develops symptoms such as worsening chest pain or shortness of breath.      VTE Risk  Mitigation (From admission, onward)           Ordered     IP VTE HIGH RISK PATIENT  Once         08/12/24 1524     Place sequential compression device  Until discontinued         08/12/24 1524     Reason for No Pharmacological VTE Prophylaxis  Once        Question:  Reasons:  Answer:  Risk of Bleeding    08/12/24 1524                    Discharge Planning   PAM: 8/17/2024     Code Status: Full Code   Is the patient medically ready for discharge?:     Reason for patient still in hospital (select all that apply): Treatment, Consult recommendations, and Pending disposition  Discharge Plan A: Home Health                  Wallace Phan DO  Department of Hospital Medicine   [facility]/Surg

## 2024-08-17 NOTE — PLAN OF CARE
Problem: Physical Therapy  Goal: Physical Therapy Goal  Description: Goals to be met by: 2024     Patient will increase functional independence with mobility by performin). Supine to sit with Modified [place]  2). Sit to supine with Modified [place]  3). Sit to stand transfer with Modified [place]  4). Gait  x > 150 feet with Modified [place] using Rolling Walker.     Outcome: Progressing

## 2024-08-17 NOTE — PT/OT/SLP EVAL
Physical Therapy Evaluation    Patient Name:  Baldo Carney   MRN:  [id number]    Recommendations:     Discharge Recommendations: Low Intensity Therapy   Discharge Equipment Recommendations: walker, rolling (patient reports that was already ordered by PCP)     Assessment:     Baldo Carney is a 73 y.o. male admitted with a medical diagnosis of Syncope and collapse.  He presents with the following impairments/functional limitations: weakness, impaired endurance, impaired balance, gait instability, impaired functional mobility, decreased lower extremity function  .    Rehab Prognosis: Good; patient would benefit from acute skilled PT services to address these deficits and reach maximum level of function.    Recent Surgery: * No surgery found *      Plan:     During this hospitalization, patient to be seen 6 x/week to address the identified rehab impairments via gait training, therapeutic activities, therapeutic exercises and progress toward the following goals:    Plan of Care Expires:  08/31/24    Subjective     Patient/Family Comments/goals: pleasant and cooperative, wants to go home.    Living Environment:  Alone in house with 1 small step to enter.  Prior to admission, patients level of function was independent but HHPT recently started.  Equipment used at home: none.  DME owned (not currently used): none.  Upon discharge, patient will have assistance from multiple family members.    Objective:     Communicated with nurse (Ranjana) prior to session.  Patient found supine with bed alarm, peripheral IV, telemetry  upon PT entry to room.    General Precautions: Standard, airborne, contact, droplet, fall  Orthopedic Precautions:N/A   Braces: N/A  Respiratory Status: Room air    Functional Mobility training:  Bed Mobility:     Rolling Right: stand by assistance  Supine to Sit: stand by assistance  Transfers:     Sit to Stand:  stand by assistance with rolling walker  Gait: 100' x 2 with RW with SBA (and  assist for IV pole)      AM-PAC 6 CLICK MOBILITY  Total Score:18       Treatment & Education:  Mobility training as above with cues for technique  SEATED: LAQ, ankle DF/PF (rec'd to perform frequently to prevent DVTs)    Patient left up in chair with all lines intact and call button in reach.    GOALS:   Multidisciplinary Problems       Physical Therapy Goals          Problem: Physical Therapy    Goal Priority Disciplines Outcome Goal Variances Interventions   Physical Therapy Goal     PT, PT/OT Progressing     Description: Goals to be met by: 2024     Patient will increase functional independence with mobility by performin). Supine to sit with Modified [place]  2). Sit to supine with Modified [place]  3). Sit to stand transfer with Modified [place]  4). Gait  x > 150 feet with Modified [place] using Rolling Walker.                          History:     Past Medical History:   Diagnosis Date    Asthma     Cardiomyopathy 10/21/2014    CHF (congestive heart failure)     Coronary artery disease     CRF (chronic renal failure)     Diabetes mellitus     Enlarged prostate     Hyperlipidemia     Hypertension     Neuropathy     Syncope 2024       Past Surgical History:   Procedure Laterality Date    ANGIOGRAPHY OF INTERNAL MAMMARY VESSEL N/A 3/11/2022    Procedure: Angiogram Internal Mammary;  Surgeon: Hank Lujan MD;  Location: [facility] CATH/EP LAB;  Service: Cardiology;  Laterality: N/A;    CARDIAC CATHETERIZATION      CARDIAC VALVE SURGERY      CATHETERIZATION OF BOTH LEFT AND RIGHT HEART Left 3/11/2022    Procedure: CATHETERIZATION, HEART, BOTH LEFT AND RIGHT;  Surgeon: Hank Lujan MD;  Location: [facility] CATH/EP LAB;  Service: Cardiology;  Laterality: Left;    CORONARY ANGIOGRAPHY N/A 3/11/2022    Procedure: ANGIOGRAM, CORONARY ARTERY;  Surgeon: Hank Lujan MD;  Location: [facility] CATH/EP LAB;  Service: Cardiology;  Laterality: N/A;    CORONARY BYPASS GRAFT ANGIOGRAPHY  3/11/2022     Procedure: Bypass graft study;  Surgeon: Hank Lujan MD;  Location: [facility] CATH/EP LAB;  Service: Cardiology;;    CYSTOSCOPY N/A 7/30/2019    Procedure: CYSTOSCOPY;  Surgeon: Spencer Nguyen MD;  Location: [facility] OR;  Service: Urology;  Laterality: N/A;    INSERTION OF INTRAVASCULAR MICROAXIAL BLOOD PUMP N/A 8/31/2022    Procedure: INSERTION, IMPELLA;  Surgeon: Zach Jensen MD;  Location: [facility] CATH LAB;  Service: Cardiology;  Laterality: N/A;    INSERTION, CATHETER, DIALYSIS, PERITONEAL N/A 12/7/2023    Procedure: INSERTION, CATHETER, DIALYSIS, PERITONEAL;  Surgeon: Greg Bone Jr., MD;  Location: [facility];  Service: General;  Laterality: N/A;  need PD catheter    PLACEMENT OF SWAN SEE CATHETER WITH IMAGING GUIDANCE  8/31/2022    Procedure: INSERTION, CATHETER, SWAN-SEE, WITH IMAGING GUIDANCE;  Surgeon: Zach Jensen MD;  Location: [facility] CATH LAB;  Service: Cardiology;;    REPAIR, HERNIA, INGUINAL, INCARCERATED, INITIAL, AGE 5 YEARS OR OLDER Right 12/7/2023    Procedure: REPAIR, HERNIA, INGUINAL, INCARCERATED, INITIAL;  Surgeon: Greg Bone Jr., MD;  Location: [facility];  Service: General;  Laterality: Right;    SHOULDER ARTHROSCOPY  1985    TRANSRECTAL BIOPSY OF PROSTATE WITH ULTRASOUND GUIDANCE N/A 7/30/2019    Procedure: BIOPSY, PROSTATE, RECTAL APPROACH, WITH US GUIDANCE;  Surgeon: Spencer Nguyen MD;  Location: [facility];  Service: Urology;  Laterality: N/A;  procedure not performed, pt unable to tolerate    TRANSRECTAL BIOPSY OF PROSTATE WITH ULTRASOUND GUIDANCE N/A 8/8/2019    Procedure: BIOPSY, PROSTATE, RECTAL APPROACH, WITH US GUIDANCE;  Surgeon: Spencer Nguyen MD;  Location: [facility] OR;  Service: Urology;  Laterality: N/A;    UMBILICAL HERNIA REPAIR N/A 12/7/2023    Procedure: REPAIR, HERNIA, UMBILICAL;  Surgeon: Greg Bone Jr., MD;  Location: [facility];  Service: General;  Laterality: N/A;       Time Tracking:     PT Received On: 08/17/24  PT Start Time:  0905     PT Stop Time: 0923  PT Total Time (min): 18 min     Billable Minutes: Evaluation 8 and Therapeutic Activity 10      08/17/2024

## 2024-08-17 NOTE — SUBJECTIVE & OBJECTIVE
Interval History: Hypoglycemia has resolved.  Glucose now running high. Patient tested positive for covid.    Review of Systems   Constitutional:  Positive for fatigue. Negative for activity change, appetite change, chills and fever.   HENT:  Negative for congestion, ear pain, nosebleeds and sinus pain.    Eyes:  Negative for discharge and itching.   Respiratory:  Negative for apnea, cough, chest tightness and shortness of breath.    Cardiovascular:  Positive for leg swelling. Negative for chest pain and palpitations.   Gastrointestinal:  Positive for abdominal distention. Negative for abdominal pain and vomiting.   Genitourinary:  Negative for difficulty urinating, dysuria, flank pain and frequency.   Musculoskeletal:  Negative for arthralgias, back pain, joint swelling and myalgias.   Skin:  Negative for color change, pallor and rash.   Neurological:  Positive for syncope. Negative for dizziness, weakness, light-headedness and headaches.   Psychiatric/Behavioral:  Negative for agitation, behavioral problems, confusion and suicidal ideas.      Objective:     Vital Signs (Most Recent):  Temp: 97.6 °F (36.4 °C) (08/17/24 1247)  Pulse: 65 (08/17/24 1247)  Resp: 18 (08/17/24 1247)  BP: (!) 114/59 (08/17/24 1247)  SpO2: 99 % (08/17/24 1247) Vital Signs (24h Range):  Temp:  [97.4 °F (36.3 °C)-97.9 °F (36.6 °C)] 97.6 °F (36.4 °C)  Pulse:  [62-78] 65  Resp:  [17-18] 18  SpO2:  [98 %-100 %] 99 %  BP: (114-168)/(58-77) 114/59     Weight: 78 kg (171 lb 15.3 oz)  Body mass index is 23.32 kg/m².    Intake/Output Summary (Last 24 hours) at 8/17/2024 1456  Last data filed at 8/17/2024 0610  Gross per 24 hour   Intake 2193.83 ml   Output 600 ml   Net 1593.83 ml         Physical Exam  Vitals reviewed.   Constitutional:       General: He is not in acute distress.     Appearance: Normal appearance. He is normal weight. He is not ill-appearing.   HENT:      Head: Normocephalic and atraumatic.      Nose: Nose normal.      Mouth/Throat:       Mouth: Mucous membranes are moist.      Pharynx: Oropharynx is clear.   Eyes:      General: No scleral icterus.     Extraocular Movements: Extraocular movements intact.      Conjunctiva/sclera: Conjunctivae normal.      Pupils: Pupils are equal, round, and reactive to light.   Cardiovascular:      Rate and Rhythm: Normal rate and regular rhythm.      Pulses: Normal pulses.      Heart sounds: Normal heart sounds. No murmur heard.     No gallop.   Pulmonary:      Effort: Pulmonary effort is normal. No respiratory distress.      Breath sounds: Normal breath sounds. No wheezing, rhonchi or rales.   Chest:      Chest wall: No tenderness.   Abdominal:      General: Abdomen is flat. There is distension.      Palpations: Abdomen is soft.      Tenderness: There is no abdominal tenderness.   Musculoskeletal:         General: No swelling or tenderness.      Cervical back: Normal range of motion and neck supple. No rigidity or tenderness.      Right lower leg: Edema present.      Left lower leg: Edema present.   Skin:     General: Skin is warm and dry.   Neurological:      General: No focal deficit present.      Mental Status: He is alert and oriented to person, place, and time. Mental status is at baseline.      Motor: No weakness.   Psychiatric:         Mood and Affect: Mood normal.         Behavior: Behavior normal.         Thought Content: Thought content normal.             Significant Labs: All pertinent labs within the past 24 hours have been reviewed.  Recent Lab Results         08/17/24  1156   08/17/24  0440   08/16/24  2222   08/16/24  1709        Albumin   2.4           ALP   56           ALT   11           Anion Gap   8           AST   15           Baso #   0.01           Basophil %   0.1           BILIRUBIN TOTAL   0.2  Comment: For infants and newborns, interpretation of results should be based  on gestational age, weight and in agreement with clinical  observations.    Premature Infant recommended reference  ranges:  Up to 24 hours.............<8.0 mg/dL  Up to 48 hours............<12.0 mg/dL  3-5 days..................<15.0 mg/dL  6-29 days.................<15.0 mg/dL             BUN   43           Calcium   8.2           Chloride   100           CO2   18           Creatinine   2.3           Differential Method   Automated           eGFR   29           Eos #   0.0           Eos %   0.0           Glucose   167           Gran # (ANC)   7.0           Gran %   88.7           Hematocrit   24.8           Hemoglobin   8.3           Immature Grans (Abs)   0.03  Comment: Mild elevation in immature granulocytes is non specific and   can be seen in a variety of conditions including stress response,   acute inflammation, trauma and pregnancy. Correlation with other   laboratory and clinical findings is essential.             Immature Granulocytes   0.4           Lymph #   0.4           Lymph %   4.5           Magnesium    1.8           MCH   28.1           MCHC   33.5           MCV   84           Mono #   0.5           Mono %   6.3           MPV   10.5           nRBC   0           Platelet Count   168           POCT Glucose 147     204   157       Potassium   5.5           PROTEIN TOTAL   6.1           RBC   2.95           RDW   18.5           Sodium   126           WBC   7.84                   Significant Imaging: I have reviewed all pertinent imaging results/findings within the past 24 hours.  Imaging Results              IR Paracentesis with Imaging (Final result)  Result time 08/13/24 10:14:38      Final result by Jeremy Raines MD (08/13/24 10:14:38)                   Impression:      Successful ultrasound-guided paracentesis.      Electronically signed by: Jeremy Raines MD  Date:    08/13/2024  Time:    10:14               Narrative:    EXAMINATION:  IR PARACENTESIS WITH IMAGING    CLINICAL HISTORY:  Ascities-weekly paracentisis that was due to be performed 8/12/14. Pt admitted for syncope;    TECHNIQUE:  Written,  informed consent was obtained.  A time-out was performed.  Sonographic imaging was utilized to identify an appropriate entry site.  The skin was prepped and draped in usual sterile fashion.  The skin and underlying soft tissues within anesthetized with 1% lidocaine.  A needle over catheter device was then advanced into the peritoneal cavity, and fluid was returned.  Following cessation of flow from the catheter, the catheter was removed and hemostasis was achieved.  The incision was sealed and bandaged sterilely.  The patient tolerated the procedure well and was discharged in good condition.  There were no immediate postprocedure complications.    COMPARISON:  None    FINDINGS:  6 L fluid removed.    Medications: Albumin per protocol    Estimated blood loss: Minimal                                       X-Ray Chest AP Portable (Final result)  Result time 08/12/24 13:24:07      Final result by Trina Crowell MD (08/12/24 13:24:07)                   Impression:      No significant change compared to the prior study of 07/29/2024.  Mild prominence of the perihilar markings which could reflect mild perihilar infiltrate or pulmonary vascular distention.  No confluent infiltrates.      Electronically signed by: Trina Crowell MD  Date:    08/12/2024  Time:    13:24               Narrative:    EXAMINATION:  XR CHEST AP PORTABLE    CLINICAL HISTORY:  Weakness    TECHNIQUE:  Single frontal view of the chest was performed.    COMPARISON:  07/29/2024    FINDINGS:  Stable cardiomediastinal silhouette.  Anterior chest wall cardiac pacemaker with multiple leads remain in place.  Median sternotomy sutures.  The clear confluent infiltrate.  Mild prominence of perihilar markings not appearing significantly changed.                                       CT Head Without Contrast (Final result)  Result time 08/12/24 13:22:12      Final result by Trina Crowell MD (08/12/24 13:22:12)                   Impression:      Mild  involutional change and findings suggesting microvascular ischemic change and old infarcts with no acute intracranial findings or significant change compared to the prior exam.      Electronically signed by: Trina Crowell MD  Date:    08/12/2024  Time:    13:22               Narrative:    EXAMINATION:  CT HEAD WITHOUT CONTRAST    CLINICAL HISTORY:  Head trauma, minor (Age >= 65y);    TECHNIQUE:  Low dose axial images were obtained through the head.  Coronal and sagittal reformations were also performed. Contrast was not administered.    COMPARISON:  07/29/2024    FINDINGS:  Mild dilatation of ventricles, sulci, fissures not appearing out of proportion for the patient's age.  Decreased density in white matter suggesting microvascular ischemic change.  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  Probable old lacunar infarct right cerebellar versus averaging of CSF space.  No acute intracranial findings.  No acute intracranial hemorrhage.  No intracranial mass effect.  No acute major vascular territory infarct.  Note is made that MRI is typically more sensitive than CT particular for detection of early or small nonhemorrhagic infarct.  The calvarium appears intact.  Mastoids appear well pneumatized and visualized paranasal sinuses essentially clear.  Calcifications are seen in parasellar portions of internal carotid arteries

## 2024-08-17 NOTE — RESPIRATORY THERAPY
Patient receiving aerosol tx via 0.63mg xopenex and nacl now and q8hr follow by 0.5ml pulmicort and 15mcg Brovana now and bid.  Hr  78 and 02 saturation 99% on room air.

## 2024-08-18 VITALS
DIASTOLIC BLOOD PRESSURE: 56 MMHG | HEIGHT: 72 IN | RESPIRATION RATE: 17 BRPM | WEIGHT: 171.94 LBS | TEMPERATURE: 98 F | OXYGEN SATURATION: 97 % | SYSTOLIC BLOOD PRESSURE: 107 MMHG | BODY MASS INDEX: 23.29 KG/M2 | HEART RATE: 75 BPM

## 2024-08-18 DIAGNOSIS — U07.1 COVID-19 VIRUS DETECTED: ICD-10-CM

## 2024-08-18 LAB
ALBUMIN SERPL BCP-MCNC: 2.4 G/DL (ref 3.5–5.2)
ALP SERPL-CCNC: 53 U/L (ref 55–135)
ALT SERPL W/O P-5'-P-CCNC: 11 U/L (ref 10–44)
ANION GAP SERPL CALC-SCNC: 10 MMOL/L (ref 8–16)
AST SERPL-CCNC: 17 U/L (ref 10–40)
BASOPHILS # BLD AUTO: 0 K/UL (ref 0–0.2)
BASOPHILS NFR BLD: 0 % (ref 0–1.9)
BILIRUB SERPL-MCNC: 0.2 MG/DL (ref 0.1–1)
BNP SERPL-MCNC: 3792 PG/ML (ref 0–99)
BUN SERPL-MCNC: 45 MG/DL (ref 8–23)
CALCIUM SERPL-MCNC: 8.2 MG/DL (ref 8.7–10.5)
CHLORIDE SERPL-SCNC: 100 MMOL/L (ref 95–110)
CO2 SERPL-SCNC: 17 MMOL/L (ref 23–29)
CORTIS SERPL-MCNC: 1.8 UG/DL
CREAT SERPL-MCNC: 2.1 MG/DL (ref 0.5–1.4)
DIFFERENTIAL METHOD BLD: ABNORMAL
EOSINOPHIL # BLD AUTO: 0 K/UL (ref 0–0.5)
EOSINOPHIL NFR BLD: 0 % (ref 0–8)
ERYTHROCYTE [DISTWIDTH] IN BLOOD BY AUTOMATED COUNT: 18.4 % (ref 11.5–14.5)
EST. GFR  (NO RACE VARIABLE): 33 ML/MIN/1.73 M^2
ESTIMATED AVG GLUCOSE: 126 MG/DL (ref 68–131)
FERRITIN SERPL-MCNC: 208 NG/ML (ref 20–300)
GLUCOSE SERPL-MCNC: 111 MG/DL (ref 70–110)
HBA1C MFR BLD: 6 % (ref 4.5–6.2)
HCT VFR BLD AUTO: 24 % (ref 40–54)
HGB BLD-MCNC: 8.1 G/DL (ref 14–18)
IMM GRANULOCYTES # BLD AUTO: 0.04 K/UL (ref 0–0.04)
IMM GRANULOCYTES NFR BLD AUTO: 0.5 % (ref 0–0.5)
IRON SERPL-MCNC: 26 UG/DL (ref 45–160)
LYMPHOCYTES # BLD AUTO: 0.4 K/UL (ref 1–4.8)
LYMPHOCYTES NFR BLD: 5.3 % (ref 18–48)
MAGNESIUM SERPL-MCNC: 1.8 MG/DL (ref 1.6–2.6)
MAGNESIUM SERPL-MCNC: 1.8 MG/DL (ref 1.6–2.6)
MCH RBC QN AUTO: 28.2 PG (ref 27–31)
MCHC RBC AUTO-ENTMCNC: 33.8 G/DL (ref 32–36)
MCV RBC AUTO: 84 FL (ref 82–98)
MONOCYTES # BLD AUTO: 0.5 K/UL (ref 0.3–1)
MONOCYTES NFR BLD: 7.1 % (ref 4–15)
NEUTROPHILS # BLD AUTO: 6.5 K/UL (ref 1.8–7.7)
NEUTROPHILS NFR BLD: 87.1 % (ref 38–73)
NRBC BLD-RTO: 0 /100 WBC
PHOSPHATE SERPL-MCNC: 3.3 MG/DL (ref 2.7–4.5)
PLATELET # BLD AUTO: 182 K/UL (ref 150–450)
PMV BLD AUTO: 10.7 FL (ref 9.2–12.9)
POCT GLUCOSE: 111 MG/DL (ref 70–110)
POCT GLUCOSE: 117 MG/DL (ref 70–110)
POTASSIUM SERPL-SCNC: 5.1 MMOL/L (ref 3.5–5.1)
PROT SERPL-MCNC: 6 G/DL (ref 6–8.4)
PTH-INTACT SERPL-MCNC: 123.7 PG/ML (ref 9–77)
RBC # BLD AUTO: 2.87 M/UL (ref 4.6–6.2)
SATURATED IRON: 11 % (ref 20–50)
SODIUM SERPL-SCNC: 127 MMOL/L (ref 136–145)
TOTAL IRON BINDING CAPACITY: 237 UG/DL (ref 250–450)
TRANSFERRIN SERPL-MCNC: 169 MG/DL (ref 200–375)
URATE SERPL-MCNC: 9.3 MG/DL (ref 3.4–7)
WBC # BLD AUTO: 7.51 K/UL (ref 3.9–12.7)

## 2024-08-18 PROCEDURE — 85025 COMPLETE CBC W/AUTO DIFF WBC: CPT | Performed by: INTERNAL MEDICINE

## 2024-08-18 PROCEDURE — 83735 ASSAY OF MAGNESIUM: CPT | Mod: 91 | Performed by: INTERNAL MEDICINE

## 2024-08-18 PROCEDURE — 36415 COLL VENOUS BLD VENIPUNCTURE: CPT | Performed by: INTERNAL MEDICINE

## 2024-08-18 PROCEDURE — 94640 AIRWAY INHALATION TREATMENT: CPT

## 2024-08-18 PROCEDURE — 25000003 PHARM REV CODE 250: Performed by: INTERNAL MEDICINE

## 2024-08-18 PROCEDURE — 94761 N-INVAS EAR/PLS OXIMETRY MLT: CPT

## 2024-08-18 PROCEDURE — 84550 ASSAY OF BLOOD/URIC ACID: CPT | Performed by: INTERNAL MEDICINE

## 2024-08-18 PROCEDURE — 83880 ASSAY OF NATRIURETIC PEPTIDE: CPT | Performed by: INTERNAL MEDICINE

## 2024-08-18 PROCEDURE — 84466 ASSAY OF TRANSFERRIN: CPT | Performed by: INTERNAL MEDICINE

## 2024-08-18 PROCEDURE — 25000003 PHARM REV CODE 250

## 2024-08-18 PROCEDURE — 82728 ASSAY OF FERRITIN: CPT | Performed by: INTERNAL MEDICINE

## 2024-08-18 PROCEDURE — 83735 ASSAY OF MAGNESIUM: CPT | Performed by: INTERNAL MEDICINE

## 2024-08-18 PROCEDURE — 83970 ASSAY OF PARATHORMONE: CPT | Performed by: INTERNAL MEDICINE

## 2024-08-18 PROCEDURE — 25000242 PHARM REV CODE 250 ALT 637 W/ HCPCS: Performed by: STUDENT IN AN ORGANIZED HEALTH CARE EDUCATION/TRAINING PROGRAM

## 2024-08-18 PROCEDURE — 25000242 PHARM REV CODE 250 ALT 637 W/ HCPCS

## 2024-08-18 PROCEDURE — 84100 ASSAY OF PHOSPHORUS: CPT | Performed by: INTERNAL MEDICINE

## 2024-08-18 PROCEDURE — 83036 HEMOGLOBIN GLYCOSYLATED A1C: CPT | Performed by: INTERNAL MEDICINE

## 2024-08-18 PROCEDURE — 80053 COMPREHEN METABOLIC PANEL: CPT | Performed by: INTERNAL MEDICINE

## 2024-08-18 RX ORDER — CIPROFLOXACIN 500 MG/1
500 TABLET ORAL EVERY 12 HOURS
Qty: 20 TABLET | Refills: 0 | Status: SHIPPED | OUTPATIENT
Start: 2024-08-18 | End: 2024-08-28

## 2024-08-18 RX ADMIN — BUDESONIDE INHALATION 0.5 MG: 0.5 SUSPENSION RESPIRATORY (INHALATION) at 07:08

## 2024-08-18 RX ADMIN — INSULIN GLARGINE 5 UNITS: 100 INJECTION, SOLUTION SUBCUTANEOUS at 09:08

## 2024-08-18 RX ADMIN — SACUBITRIL AND VALSARTAN 1 TABLET: 24; 26 TABLET, FILM COATED ORAL at 09:08

## 2024-08-18 RX ADMIN — HYDRALAZINE HYDROCHLORIDE 50 MG: 25 TABLET ORAL at 09:08

## 2024-08-18 RX ADMIN — ARFORMOTEROL TARTRATE 15 MCG: 15 SOLUTION RESPIRATORY (INHALATION) at 08:08

## 2024-08-18 RX ADMIN — CIPROFLOXACIN 500 MG: 500 TABLET ORAL at 09:08

## 2024-08-18 RX ADMIN — METOPROLOL SUCCINATE 25 MG: 25 TABLET, EXTENDED RELEASE ORAL at 09:08

## 2024-08-18 RX ADMIN — ATORVASTATIN CALCIUM 40 MG: 40 TABLET, FILM COATED ORAL at 09:08

## 2024-08-18 RX ADMIN — MUPIROCIN 1 G: 20 OINTMENT TOPICAL at 09:08

## 2024-08-18 RX ADMIN — LEVALBUTEROL HYDROCHLORIDE 0.63 MG: 0.63 SOLUTION RESPIRATORY (INHALATION) at 12:08

## 2024-08-18 RX ADMIN — LEVOTHYROXINE SODIUM 100 MCG: 0.1 TABLET ORAL at 05:08

## 2024-08-18 RX ADMIN — Medication 1000 UNITS: at 09:08

## 2024-08-18 RX ADMIN — ISOSORBIDE DINITRATE 20 MG: 20 TABLET ORAL at 09:08

## 2024-08-18 RX ADMIN — ASPIRIN 81 MG: 81 TABLET, COATED ORAL at 09:08

## 2024-08-18 RX ADMIN — LEVALBUTEROL HYDROCHLORIDE 0.63 MG: 0.63 SOLUTION RESPIRATORY (INHALATION) at 07:08

## 2024-08-18 RX ADMIN — ARFORMOTEROL TARTRATE 15 MCG: 15 SOLUTION RESPIRATORY (INHALATION) at 12:08

## 2024-08-18 NOTE — CARE UPDATE
08/18/24 0000   Patient Assessment/Suction   Level of Consciousness (AVPU) alert   Respiratory Effort Normal;Unlabored   Expansion/Accessory Muscles/Retractions no retractions;expansion symmetric;no use of accessory muscles   BRITANY Breath Sounds clear   LLL Breath Sounds diminished   RUL Breath Sounds clear   RML Breath Sounds diminished   RLL Breath Sounds diminished   Cough Frequency no cough   PRE-TX-O2   Device (Oxygen Therapy) room air   SpO2 96 %   Pulse Oximetry Type Intermittent   Pulse 62   Resp 18   Aerosol Therapy   $ Aerosol Therapy Charges Aerosol Treatment   Respiratory Treatment Status (SVN) given   Treatment Route (SVN) mask;oxygen   Patient Position HOB elevated   Signs of Intolerance (SVN) none   Breath Sounds Post-Respiratory Treatment   Throughout All Fields Post-Treatment All Fields   Throughout All Fields Post-Treatment no change

## 2024-08-18 NOTE — DISCHARGE SUMMARY
[facility]  Discharge Summary      Patient Name: Baldo Carney  MRN: [id number]  SHAHNAZ: [id number]  Patient Class: IP- Inpatient  Admission Date: 8/12/2024  Hospital Length of Stay: 5 days  Discharge Date and Time:  08/18/2024 2:29 PM  Attending Physician: Tsering att. providers found   Discharging Provider: Basil Jensen DO  Primary Care Provider: Millie Hayes APRN,FNP-C    Primary Care Team: Networked reference to record PCT     HPI:   Baldo Carney is a 73 year old male with a previous medical history of COPD, cirrhosis of the liver with bi monthly paracentesis, cardiomyopathy, CHF, aortic stenosis, CAD, HTN, HLD, BPH, chronic renal failure, and DMII who presented to the emergency room for syncope this morning. He reports he awoke to his brother lifting him up off the floor and then brought him to the hospital. The patient has had family members check in on him more often due to recent hospital admission for syncope.  He does not recall any events prior to waking up on the floor or after he went to bed yesterday evening. He reports eating a hearty meal one hour prior to bed last night.  In the ED his blood sugar was noted to be 37 but patient was AAOx4 and just endorsing generalized weakness. His blood sugar improved with IV dextrose. Patient denies any adverse symptoms other than generalized fatigue and reports he was due for his therapeutic paracentesis on 8/12/24. Troponin mildly elevated and patient in an atrial paced rhythm. He denies any defibrillator shocks chest pain or SOB. Denies dysuria or urinary issues. Urine studies positive for infection and patient covered with IV rocephin. Patient admitted by hospital medicine for further evaluation and management.    * No surgery found *      Hospital Course:   Patient was admitted and monitored on telemetry. Serial neuro checks were done. The patient developed hypoglycemia once again, and was placed on a D10  infusion. CT brain was unremarkable. Pacemaker was interrogated and was functioning properly. Serial glucometer checks were continued. Diabetic meds were held. Glucose levels continued to be low. Dextrose IVF's were continued. Insulin leve, proinsuin, c-peptide and random cortisol level were ordered. Hypoglycemia resolved, and glucose ran high. SSI was added. Patient had low grade fever and cough, and was screened for covid. This was positive. Dexamethasone started and discontinued as no oxygen req and sugars spiking. Hyponatremia has developed and no obvious cause, nephrology consulted and osmolarity labs ordered.  Patient requested that he will be leaving AMA if he is not discharged today.  Discussed with Nephrology and since patient's hyponatremia is chronic and he is asymptomatic patient can follow-up outpatient.  Alarming symptoms discussed with patient and patient verbalized understanding to call the office or come back to hospital if needed.  Ciprofloxacin sent to [facility] for 10 days for UTI     Goals of Care Treatment Preferences:  Code Status: Full Code    Living Will: Yes                 Consults:   Consults (From admission, onward)          Status Ordering Provider     Inpatient consult to Nephrology  Once        Provider:  Arsen Torres MD    Completed KRISTAN SALINAS     Inpatient consult to Registered Dietitian/Nutritionist  Once        Provider:  (Not yet assigned)    Completed KIRTI HOLDEN            No new Assessment & Plan notes have been filed under this hospital service since the last note was generated.  Service: Hospital Medicine    Final Active Diagnoses:    Diagnosis Date Noted POA    PRINCIPAL PROBLEM:  Syncope and collapse [R55] 07/29/2024 Yes    Hyponatremia [E87.1] 08/17/2024 No    Hypoglycemia [E16.2] 08/15/2024 Yes    UTI (urinary tract infection) [N39.0] 08/13/2024 Yes    Type 2 diabetes mellitus, without long-term current use of insulin [E11.9] 07/29/2024 Yes    CRF (chronic renal  failure) [N18.9] 09/28/2023 Yes    Cirrhosis [K74.60] 09/14/2023 Yes    Hypothyroidism [E03.9] 08/31/2023 Yes    COPD (chronic obstructive pulmonary disease) [J44.9] 08/29/2020 Yes     Chronic    Chronic combined systolic and diastolic heart failure [I50.42] 08/29/2020 Yes    Hypertension [I10] 09/19/2014 Yes      Problems Resolved During this Admission:       Discharged Condition: fair    Disposition: Home or Self Care    Follow Up:   Follow-up Information       [facility] Follow up.    Specialty: Home Health Services  Contact information:  [address]  [address]  [place] [address]  [phone]               Millie Hayes APRN,FNP-C Follow up.    Specialty: Family Medicine  Why: requested apt. office to contact you for scheduling. if you do not hear from clinic in next few days, please call to schedule  Contact information:  [address] [address]  [phone]               Arsen Torres MD Follow up in 1 week(s).    Specialty: Nephrology  Contact information:  [address]  [place] [address]  [phone]                           Patient Instructions:      CBC Auto Differential   Standing Status: Future Standing Exp. Date: 11/16/25     Comprehensive Metabolic Panel   Standing Status: Future Standing Exp. Date: 11/16/25     SUBSEQUENT HOME HEALTH ORDERS   Order Comments: Please resume HH as previously ordered. All orders to be signed by PCP     Order Specific Question Answer Comments   What Home Health Agency is the patient currently using? [facility]        Significant Diagnostic Studies: N/A    Pending Diagnostic Studies:       Procedure Component Value Units Date/Time    Proinsulin [[id number]] Collected: 08/15/24 0805    Order Status: Sent Lab Status: In process Updated: 08/15/24 0825    Specimen: Blood            Medications:  None    Indwelling Lines/Drains at time of discharge:   Lines/Drains/Airways        None                   Time spent on the discharge of patient: 32 minutes         Basil Jensen DO  Department of Hospital Medicine  [facility]/Surg

## 2024-08-18 NOTE — PLAN OF CARE
Patient AAOX4.[id number].NAD. Blood glucose monitored. Supplemental insulin given. Q2 rounding completed. Airborne, droplet and contact precautions maintained. Will continue to monitor.   Problem: Adult Inpatient Plan of Care  Goal: Plan of Care Review  Outcome: Progressing  Goal: Patient-Specific Goal (Individualized)  Outcome: Progressing  Goal: Absence of Hospital-Acquired Illness or Injury  Outcome: Progressing  Goal: Optimal Comfort and Wellbeing  Outcome: Progressing  Goal: Readiness for Transition of Care  Outcome: Progressing     Problem: Diabetes Comorbidity  Goal: Blood Glucose Level Within Targeted Range  Outcome: Progressing     Problem: Acute Kidney Injury/Impairment  Goal: Fluid and Electrolyte Balance  Outcome: Progressing  Goal: Improved Oral Intake  Outcome: Progressing  Goal: Effective Renal Function  Outcome: Progressing

## 2024-08-18 NOTE — PLAN OF CARE
Per [facility], Pt will be seen on Monday     Patient cleared for discharge from case management standpoint.    Chart and discharge orders reviewed.  Patient discharged home with no further case management needs.       08/18/24 1231   Final Note   Assessment Type Final Discharge Note   Anticipated Discharge Disposition Home-Health   Post-Acute Status   Post-Acute Authorization Home [facility]   Home Health Status Set-up Complete/Auth obtained   Discharge Delays None known at this time

## 2024-08-18 NOTE — NURSING
Pts ride arrived. Tele [id number] removed and placed at [facility] station. IV in right hand removed with cath intact. Gauze applied and secured with coflex. No bleeding from site .Pt gathered belongings  gathered. Pt dcd via wheelchair to his brothers car and going home.

## 2024-08-18 NOTE — PLAN OF CARE
Problem: Adult Inpatient Plan of Care  Goal: Plan of Care Review  Outcome: Progressing  Goal: Patient-Specific Goal (Individualized)  Outcome: Progressing  Goal: Absence of Hospital-Acquired Illness or Injury  Outcome: Progressing  Goal: Optimal Comfort and Wellbeing  Outcome: Progressing  Goal: Readiness for Transition of Care  Outcome: Progressing     Problem: Diabetes Comorbidity  Goal: Blood Glucose Level Within Targeted Range  Outcome: Progressing     Problem: Acute Kidney Injury/Impairment  Goal: Fluid and Electrolyte Balance  Outcome: Progressing  Goal: Improved Oral Intake  Outcome: Progressing  Goal: Effective Renal Function  Outcome: Progressing     Problem: Pain Acute  Goal: Optimal Pain Control and Function  Outcome: Progressing     Problem: Fall Injury Risk  Goal: Absence of Fall and Fall-Related Injury  Outcome: Progressing     Problem: Infection  Goal: Absence of Infection Signs and Symptoms  Outcome: Progressing     Problem: Glycemic Control Impaired  Goal: Blood Glucose Level Within Targeted Range  Outcome: Progressing  Goal: Minimize Risk of Hypoglycemia  Outcome: Progressing     Problem: UTI (Urinary Tract Infection)  Goal: Improved Infection Symptoms  Outcome: Progressing     Problem: Gas Exchange Impaired  Goal: Optimal Gas Exchange  Outcome: Progressing     Problem: Pulmonary Impairment  Goal: Improved Activity Tolerance  Outcome: Progressing  Goal: Effective Airway Clearance  Outcome: Progressing  Goal: Optimal Gas Exchange  Outcome: Progressing     Problem: Fatigue  Goal: Improved Activity Tolerance  Outcome: Progressing     Problem: Liver Failure  Goal: Optimal Coping with Liver Failure  Outcome: Progressing  Goal: Fluid and Electrolyte Balance  Outcome: Progressing  Goal: Optimal Gastrointestinal Function  Outcome: Progressing  Goal: Blood Glucose Level Within Target Range  Outcome: Progressing  Goal: Optimal Coagulation Function  Outcome: Progressing  Goal: Absence of Infection Signs and  Symptoms  Outcome: Progressing  Goal: Optimal Neurologic Function  Outcome: Progressing  Goal: Improved Oral Intake  Outcome: Progressing  Goal: Optimal Pain Control, Comfort and Function  Outcome: Progressing  Goal: Optimize Renal Function  Outcome: Progressing  Goal: Effective Oxygenation and Ventilation  Outcome: Progressing   Plan of care reviewed with patient. Patient verbalized understanding. All fall precautions maintained. Bed in lowest position, call light within reach, slip resistant socks maintained. Bed alarm on.

## 2024-08-18 NOTE — PLAN OF CARE
Problem: Adult Inpatient Plan of Care  Goal: Plan of Care Review  Outcome: Progressing     Problem: Adult Inpatient Plan of Care  Goal: Optimal Comfort and Wellbeing  Outcome: Progressing     Problem: Diabetes Comorbidity  Goal: Blood Glucose Level Within Targeted Range  Outcome: Progressing

## 2024-08-18 NOTE — PLAN OF CARE
Problem: Adult Inpatient Plan of Care  Goal: Plan of Care Review  8/18/2024 1251 by Tali Damian RN  Outcome: Met  8/18/2024 1220 by Tali Damian RN  Outcome: Progressing  Goal: Patient-Specific Goal (Individualized)  8/18/2024 1251 by Tali Damian RN  Outcome: Met  8/18/2024 1220 by Tali Damian RN  Outcome: Progressing  Goal: Absence of Hospital-Acquired Illness or Injury  8/18/2024 1251 by Tali Damian RN  Outcome: Met  8/18/2024 1220 by Tali Damian RN  Outcome: Progressing  Goal: Optimal Comfort and Wellbeing  8/18/2024 1251 by Tali Damian RN  Outcome: Met  8/18/2024 1220 by Tali Damian RN  Outcome: Progressing  Goal: Readiness for Transition of Care  8/18/2024 1251 by Tali Damian RN  Outcome: Met  8/18/2024 1220 by Tali Damian RN  Outcome: Progressing     Problem: Diabetes Comorbidity  Goal: Blood Glucose Level Within Targeted Range  8/18/2024 1251 by Tali Damian RN  Outcome: Met  8/18/2024 1220 by Tali Damian RN  Outcome: Progressing     Problem: Acute Kidney Injury/Impairment  Goal: Fluid and Electrolyte Balance  8/18/2024 1251 by Tali Damian RN  Outcome: Met  8/18/2024 1220 by Tali Damian RN  Outcome: Progressing  Goal: Improved Oral Intake  8/18/2024 1251 by Tali Damian RN  Outcome: Met  8/18/2024 1220 by Tali Damian RN  Outcome: Progressing  Goal: Effective Renal Function  8/18/2024 1251 by Tali Damian RN  Outcome: Met  8/18/2024 1220 by Tali Damian RN  Outcome: Progressing     Problem: Pain Acute  Goal: Optimal Pain Control and Function  8/18/2024 1251 by Tali Damian RN  Outcome: Met  8/18/2024 1220 by Tali Damian RN  Outcome: Progressing     Problem: Fall Injury Risk  Goal: Absence of Fall and Fall-Related Injury  8/18/2024 1251 by Tali Damian RN  Outcome: Met  8/18/2024 1220 by Tali Dmaian RN  Outcome: Progressing     Problem: Infection  Goal: Absence of Infection Signs and Symptoms  8/18/2024 1251 by Tali Damian RN  Outcome: Met  8/18/2024 1220 by Nati  J CARLOS Cesar  Outcome: Progressing     Problem: Glycemic Control Impaired  Goal: Blood Glucose Level Within Targeted Range  8/18/2024 1251 by Tali Damian RN  Outcome: Met  8/18/2024 1220 by Tali Damian RN  Outcome: Progressing  Goal: Minimize Risk of Hypoglycemia  8/18/2024 1251 by Tali Damian RN  Outcome: Met  8/18/2024 1220 by Tali Damian RN  Outcome: Progressing     Problem: UTI (Urinary Tract Infection)  Goal: Improved Infection Symptoms  8/18/2024 1251 by Tali Damian RN  Outcome: Met  8/18/2024 1220 by Tali Damian RN  Outcome: Progressing     Problem: Gas Exchange Impaired  Goal: Optimal Gas Exchange  8/18/2024 1251 by Tali Damian RN  Outcome: Met  8/18/2024 1220 by Tali Damian RN  Outcome: Progressing     Problem: Pulmonary Impairment  Goal: Improved Activity Tolerance  8/18/2024 1251 by Tali Damian RN  Outcome: Met  8/18/2024 1220 by Tali Damian RN  Outcome: Progressing  Goal: Effective Airway Clearance  8/18/2024 1251 by Tali Damian RN  Outcome: Met  8/18/2024 1220 by Tali Damian RN  Outcome: Progressing  Goal: Optimal Gas Exchange  8/18/2024 1251 by Tali Damian RN  Outcome: Met  8/18/2024 1220 by Tail Damian RN  Outcome: Progressing     Problem: Fatigue  Goal: Improved Activity Tolerance  8/18/2024 1251 by Tali Damian RN  Outcome: Met  8/18/2024 1220 by Tali Damian RN  Outcome: Progressing     Problem: Liver Failure  Goal: Optimal Coping with Liver Failure  8/18/2024 1251 by Tali Damian RN  Outcome: Met  8/18/2024 1220 by Tali Damian RN  Outcome: Progressing  Goal: Fluid and Electrolyte Balance  8/18/2024 1251 by Tali Damian RN  Outcome: Met  8/18/2024 1220 by Tali Damian RN  Outcome: Progressing  Goal: Optimal Gastrointestinal Function  8/18/2024 1251 by Tali Damian RN  Outcome: Met  8/18/2024 1220 by Tali Damian, RN  Outcome: Progressing  Goal: Blood Glucose Level Within Target Range  8/18/2024 1251 by Tali Damian, RN  Outcome: Met  8/18/2024 1220 by Tali Damian,  RN  Outcome: Progressing  Goal: Optimal Coagulation Function  8/18/2024 1251 by Tali Damian RN  Outcome: Met  8/18/2024 1220 by Tali Damian RN  Outcome: Progressing  Goal: Absence of Infection Signs and Symptoms  8/18/2024 1251 by Tali Damian RN  Outcome: Met  8/18/2024 1220 by Tali Damian RN  Outcome: Progressing  Goal: Optimal Neurologic Function  8/18/2024 1251 by Tali Damian RN  Outcome: Met  8/18/2024 1220 by Tali Damian RN  Outcome: Progressing  Goal: Improved Oral Intake  8/18/2024 1251 by Tali Damian RN  Outcome: Met  8/18/2024 1220 by Tali Damian RN  Outcome: Progressing  Goal: Optimal Pain Control, Comfort and Function  8/18/2024 1251 by Tali Damian RN  Outcome: Met  8/18/2024 1220 by Tali Damian RN  Outcome: Progressing  Goal: Optimize Renal Function  8/18/2024 1251 by Tali Damian RN  Outcome: Met  8/18/2024 1220 by Tali Damian RN  Outcome: Progressing  Goal: Effective Oxygenation and Ventilation  8/18/2024 1251 by Tali Damian RN  Outcome: Met  8/18/2024 1220 by Tali Damian RN  Outcome: Progressing

## 2024-08-18 NOTE — NURSING
Pt clear for dc. Pts ride is on their way. Dc instructions given and reviewed with pt. Verbalized understanding.

## 2024-08-20 ENCOUNTER — PATIENT OUTREACH (OUTPATIENT)
Dept: FAMILY MEDICINE | Facility: CLINIC | Age: 74
End: 2024-08-20
Payer: MEDICARE

## 2024-08-20 ENCOUNTER — TELEPHONE (OUTPATIENT)
Dept: FAMILY MEDICINE | Facility: CLINIC | Age: 74
End: 2024-08-20
Payer: MEDICARE

## 2024-08-20 LAB — PROINSULIN SERPL-SCNC: 15 PMOL/L (ref 3.6–22)

## 2024-08-20 NOTE — TELEPHONE ENCOUNTER
Make sure the TCC gets documented please ma'am, since you made the call to him:)    Meeta at the  this morning spoke with him in regards to the insurances we take.    I will change the appointment type to Hospital Follow-up for the 27th.

## 2024-08-20 NOTE — TELEPHONE ENCOUNTER
Discharge Information     Discharge Date:   08/18/2024    Primary Discharge Diagnosis:  Syncope and collapse      Discharge Summary:  Reviewed      Medication & Order Review     Were medication changes made or new medications added?   Yes    If so, has the patient filled the prescriptions?  Patient states that he was able to get 2 out of 3 filled, due to the third costing $500.00 he is unsure of what the medications were called.      Was Home Health ordered? Yes    If so, has Home Health contacted patient and/or initiated services?  Yes    Name of Home Health Agency?  [facility]    Durable Medical Equipment ordered?  No     If so, has the [facility] [place] provider contacted patient and delivered equipment?  N/A    Follow Up               Any problems since discharge? Yes    How is the patient feeling since returning home?      Have you set up recommended follow up appointments?  (cardiology, surgery, etc.)    Schedule Hospital Follow-up appointment within 7-14 days (preferably 7).      Notes:  problems since returned home patient states that he is having weakness in his bilateral leg, and swelling in the right arm, denies any pain.             JEANNE BOECKELMAN

## 2024-08-20 NOTE — TELEPHONE ENCOUNTER
----- Message from Evelina Novoa LPN sent at 8/19/2024  3:55 PM CDT -----  Call patient - needs post-hospital phone call within 2 business days and hospital follow up visit scheduled within 7-14 days.

## 2024-08-20 NOTE — TELEPHONE ENCOUNTER
Patient is wanting to know if we can make his appointment that is on 08/27/2024 a hospital follow up instead of the 6 mo fu? Please advise.   I called the patient in regards to his appointment, I gave the patient information regarding being out of network vs being cash pay, patient expressed verbal understanding and wants to keep his appointment.

## 2024-08-23 ENCOUNTER — LAB VISIT (OUTPATIENT)
Dept: LAB | Facility: HOSPITAL | Age: 74
End: 2024-08-23
Attending: NURSE PRACTITIONER
Payer: MEDICARE

## 2024-08-23 DIAGNOSIS — N18.4 CHRONIC KIDNEY DISEASE, STAGE IV (SEVERE): ICD-10-CM

## 2024-08-23 DIAGNOSIS — I13.0 HYPERTENSIVE HEART AND RENAL DISEASE WITH CONGESTIVE HEART FAILURE: Primary | ICD-10-CM

## 2024-08-23 LAB
ALBUMIN SERPL BCP-MCNC: 2.5 G/DL (ref 3.5–5.2)
ALP SERPL-CCNC: 53 U/L (ref 55–135)
ALT SERPL W/O P-5'-P-CCNC: 12 U/L (ref 10–44)
ANION GAP SERPL CALC-SCNC: 6 MMOL/L (ref 8–16)
AST SERPL-CCNC: 19 U/L (ref 10–40)
BASOPHILS # BLD AUTO: 0.04 K/UL (ref 0–0.2)
BASOPHILS NFR BLD: 0.7 % (ref 0–1.9)
BILIRUB SERPL-MCNC: 0.4 MG/DL (ref 0.1–1)
BUN SERPL-MCNC: 37 MG/DL (ref 8–23)
CALCIUM SERPL-MCNC: 8.2 MG/DL (ref 8.7–10.5)
CHLORIDE SERPL-SCNC: 112 MMOL/L (ref 95–110)
CO2 SERPL-SCNC: 23 MMOL/L (ref 23–29)
CREAT SERPL-MCNC: 1.9 MG/DL (ref 0.5–1.4)
DIFFERENTIAL METHOD BLD: ABNORMAL
EOSINOPHIL # BLD AUTO: 0.2 K/UL (ref 0–0.5)
EOSINOPHIL NFR BLD: 4.1 % (ref 0–8)
ERYTHROCYTE [DISTWIDTH] IN BLOOD BY AUTOMATED COUNT: 18.9 % (ref 11.5–14.5)
EST. GFR  (NO RACE VARIABLE): 37 ML/MIN/1.73 M^2
GLUCOSE SERPL-MCNC: 76 MG/DL (ref 70–110)
HCT VFR BLD AUTO: 25.1 % (ref 40–54)
HGB BLD-MCNC: 8.1 G/DL (ref 14–18)
IMM GRANULOCYTES # BLD AUTO: 0.02 K/UL (ref 0–0.04)
IMM GRANULOCYTES NFR BLD AUTO: 0.4 % (ref 0–0.5)
LYMPHOCYTES # BLD AUTO: 0.7 K/UL (ref 1–4.8)
LYMPHOCYTES NFR BLD: 11.7 % (ref 18–48)
MCH RBC QN AUTO: 27.5 PG (ref 27–31)
MCHC RBC AUTO-ENTMCNC: 32.3 G/DL (ref 32–36)
MCV RBC AUTO: 85 FL (ref 82–98)
MONOCYTES # BLD AUTO: 0.5 K/UL (ref 0.3–1)
MONOCYTES NFR BLD: 8.5 % (ref 4–15)
NEUTROPHILS # BLD AUTO: 4.1 K/UL (ref 1.8–7.7)
NEUTROPHILS NFR BLD: 74.6 % (ref 38–73)
NRBC BLD-RTO: 0 /100 WBC
PLATELET # BLD AUTO: 213 K/UL (ref 150–450)
PMV BLD AUTO: 10.6 FL (ref 9.2–12.9)
POTASSIUM SERPL-SCNC: 3.9 MMOL/L (ref 3.5–5.1)
PROT SERPL-MCNC: 6.1 G/DL (ref 6–8.4)
RBC # BLD AUTO: 2.95 M/UL (ref 4.6–6.2)
SODIUM SERPL-SCNC: 141 MMOL/L (ref 136–145)
WBC # BLD AUTO: 5.55 K/UL (ref 3.9–12.7)

## 2024-08-23 PROCEDURE — 36415 COLL VENOUS BLD VENIPUNCTURE: CPT | Performed by: NURSE PRACTITIONER

## 2024-08-23 PROCEDURE — 80053 COMPREHEN METABOLIC PANEL: CPT | Performed by: NURSE PRACTITIONER

## 2024-08-23 PROCEDURE — 85025 COMPLETE CBC W/AUTO DIFF WBC: CPT | Performed by: NURSE PRACTITIONER

## 2024-08-26 ENCOUNTER — HOSPITAL ENCOUNTER (OUTPATIENT)
Dept: INTERVENTIONAL RADIOLOGY/VASCULAR | Facility: HOSPITAL | Age: 74
Discharge: HOME | End: 2024-08-26
Attending: INTERNAL MEDICINE
Payer: MEDICARE

## 2024-08-26 VITALS
SYSTOLIC BLOOD PRESSURE: 156 MMHG | OXYGEN SATURATION: 99 % | DIASTOLIC BLOOD PRESSURE: 68 MMHG | RESPIRATION RATE: 18 BRPM | HEART RATE: 78 BPM

## 2024-08-26 DIAGNOSIS — R18.8 OTHER ASCITES: ICD-10-CM

## 2024-08-26 LAB
APPEARANCE FLD: CLEAR
BODY FLD TYPE: NORMAL
COLOR FLD: YELLOW
LYMPHOCYTES NFR FLD MANUAL: 24 %
MONOS+MACROS NFR FLD MANUAL: 69 %
NEUTROPHILS NFR FLD MANUAL: 7 %
WBC # FLD: 140 /CU MM

## 2024-08-26 PROCEDURE — 89051 BODY FLUID CELL COUNT: CPT | Performed by: INTERNAL MEDICINE

## 2024-08-26 PROCEDURE — P9047 ALBUMIN (HUMAN), 25%, 50ML: HCPCS | Mod: JZ,JG | Performed by: INTERNAL MEDICINE

## 2024-08-26 PROCEDURE — C1729 CATH, DRAINAGE: HCPCS

## 2024-08-26 PROCEDURE — 49083 ABD PARACENTESIS W/IMAGING: CPT | Performed by: RADIOLOGY

## 2024-08-26 PROCEDURE — 63600175 PHARM REV CODE 636 W HCPCS: Mod: JZ,JG | Performed by: INTERNAL MEDICINE

## 2024-08-26 RX ORDER — ALBUMIN HUMAN 250 G/1000ML
50 SOLUTION INTRAVENOUS ONCE
Status: COMPLETED | OUTPATIENT
Start: 2024-08-26 | End: 2024-08-26

## 2024-08-26 RX ADMIN — ALBUMIN (HUMAN) 50 G: 0.25 INJECTION, SOLUTION INTRAVENOUS at 10:08

## 2024-08-26 NOTE — NURSING
PARACENTESIS    IR ultrasound guided paracentesis performed by REGINE Baker.  Procedure explained and standing consent obtained.  Time out performed.  7 L removed-   Patient received 50 g of albumin IV.  VS remained stable for duration of procedure.  Specimens collected and sent to lab if ordered.   Para and IV d/c'd, with tip intact.   Access site cleaned with peroxide, derma bond and steri-strips applied, then covered with sterile Band-Aid.   Pt discharge home in stable condition.

## 2024-08-27 ENCOUNTER — OFFICE VISIT (OUTPATIENT)
Dept: FAMILY MEDICINE | Facility: CLINIC | Age: 74
End: 2024-08-27
Payer: MEDICARE

## 2024-08-27 VITALS
DIASTOLIC BLOOD PRESSURE: 74 MMHG | OXYGEN SATURATION: 99 % | WEIGHT: 169.81 LBS | SYSTOLIC BLOOD PRESSURE: 132 MMHG | HEIGHT: 72 IN | BODY MASS INDEX: 23 KG/M2 | HEART RATE: 74 BPM

## 2024-08-27 DIAGNOSIS — I10 PRIMARY HYPERTENSION: ICD-10-CM

## 2024-08-27 DIAGNOSIS — R18.8 ASCITES OF LIVER: ICD-10-CM

## 2024-08-27 DIAGNOSIS — F41.8 SITUATIONAL ANXIETY: ICD-10-CM

## 2024-08-27 DIAGNOSIS — R12 HEARTBURN: ICD-10-CM

## 2024-08-27 DIAGNOSIS — Z09 HOSPITAL DISCHARGE FOLLOW-UP: Primary | ICD-10-CM

## 2024-08-27 DIAGNOSIS — E11.9 DIABETES MELLITUS TYPE 2 WITHOUT RETINOPATHY: ICD-10-CM

## 2024-08-27 PROCEDURE — 99215 OFFICE O/P EST HI 40 MIN: CPT | Mod: PBBFAC,PN | Performed by: NURSE PRACTITIONER

## 2024-08-27 PROCEDURE — 99999 PR PBB SHADOW E&M-EST. PATIENT-LVL V: CPT | Mod: PBBFAC,,, | Performed by: NURSE PRACTITIONER

## 2024-08-27 RX ORDER — OMEPRAZOLE 40 MG/1
40 CAPSULE, DELAYED RELEASE ORAL DAILY
Qty: 90 CAPSULE | Refills: 1 | Status: SHIPPED | OUTPATIENT
Start: 2024-08-27 | End: 2025-08-27

## 2024-08-27 RX ORDER — MIRTAZAPINE 7.5 MG/1
7.5 TABLET, FILM COATED ORAL NIGHTLY
Qty: 30 TABLET | Refills: 5 | Status: SHIPPED | OUTPATIENT
Start: 2024-08-27

## 2024-08-28 NOTE — PROGRESS NOTES
SUBJECTIVE:    Patient ID: Baldo Carney is a 73 y.o. male.    Chief Complaint: Hospital Follow Up ([facility] - dc'd 08/18 - Syncope and Collapse)    Pt presents for hospital d/c follow-up related to another bout of syncope & confusion. Upon review of recent labs, anemia appears stable count-wise but pt states he is very fatigued with minimal exertion. His kidney function remains diminished as well. He wants to remain with his current cardiologist, Dr. Quijano, and will not see him again until October. He has not called to establish with any referrals (hematology, nephro, uro) from his last hospital d/c follow-up visit here. HH is coming 2x weekly to take vitals & his is receiving in-house PT.    Continues to have weekly paracentesis.      Discussed outstanding health maintenance - diabetic eye exam performed this year via [employer] [facility]    Hypertension  This is a chronic problem. The current episode started more than 1 year ago. The problem has been gradually improving since onset. The problem is controlled. Associated symptoms include anxiety, malaise/fatigue, orthopnea, peripheral edema and shortness of breath. Pertinent negatives include no chest pain, headaches or neck pain. Agents associated with hypertension include NSAIDs. Risk factors for coronary artery disease include sedentary lifestyle, male gender, family history, dyslipidemia and diabetes mellitus. Past treatments include beta blockers, direct vasodilators and angiotensin blockers. The current treatment provides mild improvement. Compliance problems include exercise.  Hypertensive end-organ damage includes angina, kidney disease, CAD/MI and heart failure. Identifiable causes of hypertension include chronic renal disease, renovascular disease and a thyroid problem. There is no history of a hypertension causing med.   Diabetes  He presents for his follow-up diabetic visit. He has type 2 diabetes mellitus. No MedicAlert identification noted. His  disease course has been stable. Hypoglycemia symptoms include nervousness/anxiousness. Pertinent negatives for hypoglycemia include no confusion, dizziness, headaches or pallor. Associated symptoms include fatigue and weakness. Pertinent negatives for diabetes include no chest pain, no polydipsia, no polyuria and no weight loss. Symptoms are stable. Diabetic complications include heart disease and impotence. Risk factors for coronary artery disease include diabetes mellitus, dyslipidemia, family history, hypertension, male sex and sedentary lifestyle. Current diabetic treatment includes oral agent (dual therapy). He is compliant with treatment most of the time. His weight is fluctuating minimally. He is following a generally healthy diet. When asked about meal planning, he reported none. He has not had a previous visit with a dietitian. He never participates in exercise. An ACE inhibitor/angiotensin II receptor blocker is not being taken. He does not see a podiatrist.Eye exam is not current.   Fatigue  This is a recurrent problem. The current episode started more than 1 month ago. The problem occurs daily. The problem has been unchanged. Associated symptoms include coughing (clear mucus), fatigue, myalgias and weakness. Pertinent negatives include no abdominal pain, arthralgias, chest pain, congestion, fever, headaches, joint swelling, nausea, neck pain, rash, sore throat or vomiting. The symptoms are aggravated by exertion, standing and walking. He has tried rest, sleep, position changes, relaxation and lying down (B12) for the symptoms. The treatment provided mild relief.   Anxiety  Presents for initial visit. Onset was 1 to 6 months ago. The problem has been waxing and waning. Symptoms include excessive worry, impotence, insomnia, nervous/anxious behavior and shortness of breath. Patient reports no chest pain, confusion, decreased concentration, dizziness or nausea. Symptoms occur most days. The severity of  symptoms is mild. Exacerbated by: health issues. The quality of sleep is poor.     Past treatments include nothing.   Heartburn  He complains of coughing (clear mucus) and heartburn. He reports no abdominal pain, no chest pain, no nausea, no sore throat or no wheezing. This is a recurrent problem. The current episode started more than 1 month ago. The problem occurs frequently. The problem has been gradually worsening. The heartburn is of moderate intensity. The heartburn does not wake him from sleep. The heartburn does not limit his activity. Associated symptoms include fatigue and muscle weakness. Pertinent negatives include no weight loss. Risk factors include lack of exercise. He has tried nothing for the symptoms.       Admit Date: 8/12/2024  Discharge Date: 8/18/2024  Discharge Facility: Hospital      Family and/or Caretaker present at visit? No  Medication Reconciliation:  No medication changes.   New Prescriptions filled after discharge: not applicable  Discharge summary reviewed:  yes  Diagnostic tests reviewed/disposition: No diagnosic tests pending after this hospitalization  Disease/illness education: CHF, DM, BPH, anemia  Follow up appointments scheduled:  yes              with Cardiology (but already scheduled prior to this stay)  Follow up labs/tests ordered:   n/a  Home Health ordered on discharge: Patient does have home health established prior to this hospital stay.   Home Health company name: [facility]  Establishment or re-establishment of referral orders for community resources: No other necessary community resources  DME ordered at discharge:   no  How patient is feeling since discharge from the hospital?  remains fatigued but strength improving  Discussion with other health care providers: No discussion with other health care providers necessary  Patient follow up phone call documented on separate encounter.    Hospital Outpatient Visit on 08/26/2024   Component Date Value Ref Range Status     Body Fluid Type 08/26/2024 Ascites   Final    Fluid Appearance 08/26/2024 Clear   Final    Fluid Color 08/26/2024 Yellow   Final    WBC, Body Fluid 08/26/2024 140  /cu mm Final    Segs, Fluid 08/26/2024 7  % Final    Lymphs, Fluid 08/26/2024 24  % Final    Monocytes/Macrophages, Fluid 08/26/2024 69  % Final   Lab Visit on 08/23/2024   Component Date Value Ref Range Status    WBC 08/23/2024 5.55  3.90 - 12.70 K/uL Final    RBC 08/23/2024 2.95 (L)  4.60 - 6.20 M/uL Final    Hemoglobin 08/23/2024 8.1 (L)  14.0 - 18.0 g/dL Final    Hematocrit 08/23/2024 25.1 (L)  40.0 - 54.0 % Final    MCV 08/23/2024 85  82 - 98 fL Final    MCH 08/23/2024 27.5  27.0 - 31.0 pg Final    MCHC 08/23/2024 32.3  32.0 - 36.0 g/dL Final    RDW 08/23/2024 18.9 (H)  11.5 - 14.5 % Final    Platelets 08/23/2024 213  150 - 450 K/uL Final    MPV 08/23/2024 10.6  9.2 - 12.9 fL Final    Immature Granulocytes 08/23/2024 0.4  0.0 - 0.5 % Final    Gran # (ANC) 08/23/2024 4.1  1.8 - 7.7 K/uL Final    Immature Grans (Abs) 08/23/2024 0.02  0.00 - 0.04 K/uL Final    Lymph # 08/23/2024 0.7 (L)  1.0 - 4.8 K/uL Final    Mono # 08/23/2024 0.5  0.3 - 1.0 K/uL Final    Eos # 08/23/2024 0.2  0.0 - 0.5 K/uL Final    Baso # 08/23/2024 0.04  0.00 - 0.20 K/uL Final    nRBC 08/23/2024 0  0 /100 WBC Final    Gran % 08/23/2024 74.6 (H)  38.0 - 73.0 % Final    Lymph % 08/23/2024 11.7 (L)  18.0 - 48.0 % Final    Mono % 08/23/2024 8.5  4.0 - 15.0 % Final    Eosinophil % 08/23/2024 4.1  0.0 - 8.0 % Final    Basophil % 08/23/2024 0.7  0.0 - 1.9 % Final    Differential Method 08/23/2024 Automated   Final    Sodium 08/23/2024 141  136 - 145 mmol/L Final    Potassium 08/23/2024 3.9  3.5 - 5.1 mmol/L Final    Chloride 08/23/2024 112 (H)  95 - 110 mmol/L Final    CO2 08/23/2024 23  23 - 29 mmol/L Final    Glucose 08/23/2024 76  70 - 110 mg/dL Final    BUN 08/23/2024 37 (H)  8 - 23 mg/dL Final    Creatinine 08/23/2024 1.9 (H)  0.5 - 1.4 mg/dL Final    Calcium 08/23/2024 8.2 (L)  8.7  - 10.5 mg/dL Final    Total Protein 08/23/2024 6.1  6.0 - 8.4 g/dL Final    Albumin 08/23/2024 2.5 (L)  3.5 - 5.2 g/dL Final    Total Bilirubin 08/23/2024 0.4  0.1 - 1.0 mg/dL Final    Alkaline Phosphatase 08/23/2024 53 (L)  55 - 135 U/L Final    AST 08/23/2024 19  10 - 40 U/L Final    ALT 08/23/2024 12  10 - 44 U/L Final    eGFR 08/23/2024 37 (A)  >60 mL/min/1.73 m^2 Final    Anion Gap 08/23/2024 6 (L)  8 - 16 mmol/L Final   No results displayed because visit has over 200 results.      No results displayed because visit has over 200 results.      Hospital Outpatient Visit on 07/15/2024   Component Date Value Ref Range Status    Body Fluid Type 07/15/2024 Ascites   Final    Fluid Appearance 07/15/2024 Clear   Final    Fluid Color 07/15/2024 Yellow   Final    WBC, Body Fluid 07/15/2024 99  /cu mm Final    Segs, Fluid 07/15/2024 1  % Final    Lymphs, Fluid 07/15/2024 52  % Final    Monocytes/Macrophages, Fluid 07/15/2024 46  % Final    Mesothelial Cells, Fluid 07/15/2024 1  % Final   Hospital Outpatient Visit on 07/01/2024   Component Date Value Ref Range Status    Body Fluid Type 07/01/2024 Abdominal Fluid   Final    Fluid Appearance 07/01/2024 Clear   Final    Fluid Color 07/01/2024 Yellow   Final    WBC, Body Fluid 07/01/2024 108  /cu mm Final    Segs, Fluid 07/01/2024 11  % Final    Lymphs, Fluid 07/01/2024 40  % Final    Monocytes/Macrophages, Fluid 07/01/2024 47  % Final    Mesothelial Cells, Fluid 07/01/2024 2  % Final    WBC 07/01/2024 4.27  3.90 - 12.70 K/uL Final    RBC 07/01/2024 3.86 (L)  4.60 - 6.20 M/uL Final    Hemoglobin 07/01/2024 10.8 (L)  14.0 - 18.0 g/dL Final    Hematocrit 07/01/2024 33.8 (L)  40.0 - 54.0 % Final    MCV 07/01/2024 88  82 - 98 fL Final    MCH 07/01/2024 28.0  27.0 - 31.0 pg Final    MCHC 07/01/2024 32.0  32.0 - 36.0 g/dL Final    RDW 07/01/2024 20.1 (H)  11.5 - 14.5 % Final    Platelets 07/01/2024 200  150 - 450 K/uL Final    MPV 07/01/2024 11.3  9.2 - 12.9 fL Final    Immature  Granulocytes 07/01/2024 0.2  0.0 - 0.5 % Final    Gran # (ANC) 07/01/2024 3.3  1.8 - 7.7 K/uL Final    Immature Grans (Abs) 07/01/2024 0.01  0.00 - 0.04 K/uL Final    Lymph # 07/01/2024 0.5 (L)  1.0 - 4.8 K/uL Final    Mono # 07/01/2024 0.3  0.3 - 1.0 K/uL Final    Eos # 07/01/2024 0.1  0.0 - 0.5 K/uL Final    Baso # 07/01/2024 0.06  0.00 - 0.20 K/uL Final    nRBC 07/01/2024 0  0 /100 WBC Final    Gran % 07/01/2024 77.8 (H)  38.0 - 73.0 % Final    Lymph % 07/01/2024 12.6 (L)  18.0 - 48.0 % Final    Mono % 07/01/2024 6.6  4.0 - 15.0 % Final    Eosinophil % 07/01/2024 1.4  0.0 - 8.0 % Final    Basophil % 07/01/2024 1.4  0.0 - 1.9 % Final    Differential Method 07/01/2024 Automated   Final    Prothrombin Time 07/01/2024 12.4  9.0 - 12.5 sec Final    INR 07/01/2024 1.2  0.8 - 1.2 Final   Hospital Outpatient Visit on 06/17/2024   Component Date Value Ref Range Status    Body Fluid Type 06/17/2024 Ascites   Final    Fluid Appearance 06/17/2024 Clear   Final    Fluid Color 06/17/2024 Yellow   Final    WBC, Body Fluid 06/17/2024 128  /cu mm Final    Segs, Fluid 06/17/2024 7  % Final    Lymphs, Fluid 06/17/2024 37  % Final    Monocytes/Macrophages, Fluid 06/17/2024 48  % Final    Mesothelial Cells, Fluid 06/17/2024 8  % Final   Hospital Outpatient Visit on 06/03/2024   Component Date Value Ref Range Status    Body Fluid Type 06/03/2024 Ascites   Final    Fluid Appearance 06/03/2024 Clear   Final    Fluid Color 06/03/2024 Yellow   Final    WBC, Body Fluid 06/03/2024 115  /cu mm Final    Segs, Fluid 06/03/2024 4  % Final    Lymphs, Fluid 06/03/2024 54  % Final    Monocytes/Macrophages, Fluid 06/03/2024 31  % Final    Mesothelial Cells, Fluid 06/03/2024 11  % Final   Hospital Outpatient Visit on 05/20/2024   Component Date Value Ref Range Status    Body Fluid Type 05/20/2024 Ascites   Final    Fluid Appearance 05/20/2024 Hazy   Final    Fluid Color 05/20/2024 Yellow   Final    WBC, Body Fluid 05/20/2024 128  /cu mm Final     Segs, Fluid 05/20/2024 6  % Final    Lymphs, Fluid 05/20/2024 57  % Final    Monocytes/Macrophages, Fluid 05/20/2024 15  % Final    Mesothelial Cells, Fluid 05/20/2024 22  % Final   Hospital Outpatient Visit on 05/06/2024   Component Date Value Ref Range Status    Body Fluid Type 05/06/2024 Ascites   Final    Fluid Appearance 05/06/2024 Clear   Final    Fluid Color 05/06/2024 Yellow   Final    WBC, Body Fluid 05/06/2024 110  /cu mm Final    Segs, Fluid 05/06/2024 2  % Final    Lymphs, Fluid 05/06/2024 48  % Final    Monocytes/Macrophages, Fluid 05/06/2024 43  % Final    Mesothelial Cells, Fluid 05/06/2024 7  % Final   There may be more visits with results that are not included.       Past Medical History:   Diagnosis Date    Asthma     Cardiomyopathy 10/21/2014    CHF (congestive heart failure)     Coronary artery disease     CRF (chronic renal failure)     Diabetes mellitus     Enlarged prostate     Hyperlipidemia     Hypertension     Neuropathy     Syncope 7/29/2024     Past Surgical History:   Procedure Laterality Date    ANGIOGRAPHY OF INTERNAL MAMMARY VESSEL N/A 3/11/2022    Procedure: Angiogram Internal Mammary;  Surgeon: Hank Lujan MD;  Location: [facility] CATH/EP LAB;  Service: Cardiology;  Laterality: N/A;    CARDIAC CATHETERIZATION      CARDIAC VALVE SURGERY      CATHETERIZATION OF BOTH LEFT AND RIGHT HEART Left 3/11/2022    Procedure: CATHETERIZATION, HEART, BOTH LEFT AND RIGHT;  Surgeon: Hank Lujan MD;  Location: [facility] CATH/EP LAB;  Service: Cardiology;  Laterality: Left;    CORONARY ANGIOGRAPHY N/A 3/11/2022    Procedure: ANGIOGRAM, CORONARY ARTERY;  Surgeon: Hank Lujan MD;  Location: [facility] CATH/EP LAB;  Service: Cardiology;  Laterality: N/A;    CORONARY BYPASS GRAFT ANGIOGRAPHY  3/11/2022    Procedure: Bypass graft study;  Surgeon: Hank Lujan MD;  Location: [facility] CATH/EP LAB;  Service: Cardiology;;    CYSTOSCOPY N/A 7/30/2019    Procedure: CYSTOSCOPY;  Surgeon: Spencer Nguyen MD;   Location: [facility] OR;  Service: Urology;  Laterality: N/A;    INSERTION OF INTRAVASCULAR MICROAXIAL BLOOD PUMP N/A 8/31/2022    Procedure: INSERTION, IMPELLA;  Surgeon: Zach Jensen MD;  Location: [facility] CATH LAB;  Service: Cardiology;  Laterality: N/A;    INSERTION, CATHETER, DIALYSIS, PERITONEAL N/A 12/7/2023    Procedure: INSERTION, CATHETER, DIALYSIS, PERITONEAL;  Surgeon: Greg Bone Jr., MD;  Location: [facility];  Service: General;  Laterality: N/A;  need PD catheter    PLACEMENT OF SWAN SEE CATHETER WITH IMAGING GUIDANCE  8/31/2022    Procedure: INSERTION, CATHETER, SWAN-SEE, WITH IMAGING GUIDANCE;  Surgeon: Zach Jensen MD;  Location: [facility] CATH LAB;  Service: Cardiology;;    REPAIR, HERNIA, INGUINAL, INCARCERATED, INITIAL, AGE 5 YEARS OR OLDER Right 12/7/2023    Procedure: REPAIR, HERNIA, INGUINAL, INCARCERATED, INITIAL;  Surgeon: Greg Bone Jr., MD;  Location: [facility];  Service: General;  Laterality: Right;    SHOULDER ARTHROSCOPY  1985    TRANSRECTAL BIOPSY OF PROSTATE WITH ULTRASOUND GUIDANCE N/A 7/30/2019    Procedure: BIOPSY, PROSTATE, RECTAL APPROACH, WITH US GUIDANCE;  Surgeon: Spencer Nguyen MD;  Location: [facility];  Service: Urology;  Laterality: N/A;  procedure not performed, pt unable to tolerate    TRANSRECTAL BIOPSY OF PROSTATE WITH ULTRASOUND GUIDANCE N/A 8/8/2019    Procedure: BIOPSY, PROSTATE, RECTAL APPROACH, WITH US GUIDANCE;  Surgeon: Spencer gNuyen MD;  Location: [facility] OR;  Service: Urology;  Laterality: N/A;    UMBILICAL HERNIA REPAIR N/A 12/7/2023    Procedure: REPAIR, HERNIA, UMBILICAL;  Surgeon: Greg Bone Jr., MD;  Location: [facility];  Service: General;  Laterality: N/A;     Family History   Problem Relation Name Age of Onset    No Known Problems Mother      Diabetes Father      Hypertension Father      Heart attack Father      Heart disease Father      Diabetes Sister 5     Heart disease Brother 4     Diabetes Brother 4     No Known  Problems Maternal Grandmother      No Known Problems Maternal Grandfather      No Known Problems Paternal Grandmother      No Known Problems Paternal Grandfather      No Known Problems Maternal Aunt      No Known Problems Maternal Uncle      No Known Problems Paternal Aunt      No Known Problems Paternal Uncle      Anemia Neg Hx      Arrhythmia Neg Hx      Asthma Neg Hx      Clotting disorder Neg Hx      Fainting Neg Hx      Heart failure Neg Hx      Hyperlipidemia Neg Hx      Glaucoma Neg Hx         Marital Status: Single  Alcohol History:  reports that he does not currently use alcohol after a past usage of about 3.0 standard drinks of alcohol per week.  Tobacco History:  reports that he quit smoking about 54 years ago. His smoking use included cigarettes. He has never used smokeless tobacco.  Drug History:  reports no history of drug use.    Review of patient's allergies indicates:   Allergen Reactions    Canagliflozin      Other reaction(s): been very dizzy       Current Outpatient Medications:     aspirin (ECOTRIN) 81 MG EC tablet, Take 81 mg by mouth once daily., Disp: , Rfl:     atorvastatin (LIPITOR) 40 MG tablet, Take 1 tablet (40 mg total) by mouth once daily., Disp: 90 tablet, Rfl: 0    ciprofloxacin HCl (CIPRO) 500 MG tablet, Take 1 tablet (500 mg total) by mouth every 12 (twelve) hours. for 10 days, Disp: 20 tablet, Rfl: 0    cyanocobalamin 1,000 mcg/mL injection, Inject 1 mL (1,000 mcg total) into the muscle every 30 days., Disp: 3 mL, Rfl: 1    empagliflozin (JARDIANCE) 25 mg tablet, Take 1 tablet (25 mg total) by mouth once daily., Disp: 90 tablet, Rfl: 0    fluticasone furoate-vilanteroL (BREO ELLIPTA) 100-25 mcg/dose diskus inhaler, Inhale 1 puff into the lungs once daily. (DAILY CONTROLLER), Disp: 3 each, Rfl: 1    fluticasone propionate (FLONASE) 50 mcg/actuation nasal spray, USE 1 SPRAY (50 MCG TOTAL) IN EACH NOSTRIL ONCE DAILY, Disp: 48 mL, Rfl: 1    gabapentin (NEURONTIN) 300 MG capsule, Take  1 capsule (300 mg total) by mouth every evening., Disp: 90 capsule, Rfl: 1    hydrALAZINE (APRESOLINE) 50 MG tablet, Take 1 tablet (50 mg total) by mouth 2 (two) times a day., Disp: 180 tablet, Rfl: 0    isosorbide dinitrate (ISORDIL) 20 MG tablet, Take 20 mg by mouth 3 (three) times daily., Disp: , Rfl:     levalbuterol (XOPENEX) 0.63 mg/3 mL nebulizer solution, Inhale 0.63 mg into the lungs every 6 (six) hours as needed for Shortness of Breath., Disp: , Rfl:     levothyroxine (SYNTHROID) 100 MCG tablet, Take 1 tablet (100 mcg total) by mouth before breakfast. With no other meds or food, Disp: 90 tablet, Rfl: 1    linaCLOtide (LINZESS) 72 mcg Cap capsule, Take 1 capsule (72 mcg total) by mouth before breakfast., Disp: 90 capsule, Rfl: 1    metoprolol succinate (TOPROL-XL) 25 MG 24 hr tablet, Take 1 tablet (25 mg total) by mouth once daily., Disp: 90 tablet, Rfl: 1    sacubitriL-valsartan (ENTRESTO) 24-26 mg per tablet, Take 1 tablet by mouth 2 (two) times daily., Disp: 180 tablet, Rfl: 1    VERQUVO 5 mg Tab, Take 5 mg by mouth once daily., Disp: 90 tablet, Rfl: 0    mirtazapine (REMERON) 7.5 MG Tab, Take 1 tablet (7.5 mg total) by mouth every evening., Disp: 30 tablet, Rfl: 5    omeprazole (PRILOSEC) 40 MG capsule, Take 1 capsule (40 mg total) by mouth once daily., Disp: 90 capsule, Rfl: 1    Review of Systems   Constitutional:  Positive for fatigue and malaise/fatigue. Negative for activity change, appetite change, fever and weight loss.   HENT:  Negative for congestion, ear pain, hearing loss, postnasal drip, sinus pressure, sinus pain, sneezing and sore throat.    Eyes:  Negative for photophobia and pain.   Respiratory:  Positive for cough (clear mucus) and shortness of breath. Negative for chest tightness and wheezing.    Cardiovascular:  Positive for orthopnea and leg swelling. Negative for chest pain.   Gastrointestinal:  Positive for heartburn. Negative for abdominal distention, abdominal pain, blood in  stool, constipation, diarrhea, nausea and vomiting.   Endocrine: Negative for cold intolerance, heat intolerance, polydipsia and polyuria.   Genitourinary:  Positive for impotence. Negative for difficulty urinating, dysuria, flank pain, frequency, hematuria and urgency.   Musculoskeletal:  Positive for myalgias and muscle weakness. Negative for arthralgias, back pain, joint swelling and neck pain.   Skin:  Negative for pallor and rash.   Allergic/Immunologic: Negative for environmental allergies and food allergies.   Neurological:  Positive for weakness. Negative for dizziness, light-headedness and headaches.   Hematological:  Does not bruise/bleed easily.   Psychiatric/Behavioral:  Negative for confusion, decreased concentration and sleep disturbance. The patient is nervous/anxious and has insomnia.         Objective:      Vitals:    08/27/24 0931   BP: 132/74   Pulse: 74   SpO2: 99%   Weight: 77 kg (169 lb 12.8 oz)   Height: 6' (1.829 m)     Physical Exam  Vitals and nursing note reviewed.   Constitutional:       General: He is not in acute distress.     Appearance: Normal appearance. He is well-developed, well-groomed and normal weight.   HENT:      Head: Normocephalic and atraumatic.      Right Ear: Hearing normal.      Left Ear: Hearing normal.      Nose: Nose normal. No rhinorrhea.   Eyes:      General: Lids are normal.         Right eye: No discharge.         Left eye: No discharge.      Conjunctiva/sclera: Conjunctivae normal.      Right eye: Right conjunctiva is not injected.      Left eye: Left conjunctiva is not injected.      Pupils: Pupils are equal, round, and reactive to light. Pupils are equal.      Right eye: Pupil is round and reactive.      Left eye: Pupil is round and reactive.   Neck:      Thyroid: No thyromegaly.      Vascular: No JVD.      Trachea: Trachea normal. No tracheal deviation.   Cardiovascular:      Rate and Rhythm: Normal rate and regular rhythm.      Pulses:           Radial pulses  are 2+ on the right side and 2+ on the left side.        Posterior tibial pulses are 2+ on the right side and 2+ on the left side.      Heart sounds: Normal heart sounds. No murmur heard.     No friction rub. No gallop.   Pulmonary:      Effort: Pulmonary effort is normal. No respiratory distress.      Breath sounds: Normal breath sounds. No stridor. No decreased breath sounds, wheezing, rhonchi or rales.   Abdominal:      General: Bowel sounds are normal. There is no distension.      Palpations: Abdomen is soft. Abdomen is not rigid.      Tenderness: There is no abdominal tenderness. There is no guarding.   Musculoskeletal:         General: Normal range of motion.      Cervical back: Normal range of motion and neck supple.   Lymphadenopathy:      Cervical: No cervical adenopathy.   Skin:     General: Skin is warm and dry.      Capillary Refill: Capillary refill takes less than 2 seconds.      Coloration: Skin is not pale.      Findings: No lesion or rash.   Neurological:      Mental Status: He is alert and oriented to person, place, and time.      GCS: GCS eye subscore is 4. GCS verbal subscore is 5. GCS motor subscore is 6.      Cranial Nerves: Cranial nerves 2-12 are intact.      Sensory: Sensation is intact.      Motor: Motor function is intact. No atrophy.      Coordination: Coordination is intact. Coordination normal.      Gait: Gait is intact. Gait normal.   Psychiatric:         Attention and Perception: Attention and perception normal. He is attentive.         Mood and Affect: Mood and affect normal.         Speech: Speech normal.         Behavior: Behavior normal.         Thought Content: Thought content normal.         Cognition and Memory: Cognition and memory normal.         Judgment: Judgment normal.         Assessment:       1. Hospital discharge follow-up    2. Primary hypertension    3. Diabetes mellitus type 2 without retinopathy    4. Situational anxiety    5. Heartburn    6. Ascites of liver          Plan:       Hospital discharge follow-up        - no new meds at discharge        - continue to follow with appropriate specialists        - take all meds as directed         Primary hypertension        - follows with cardiology        - appears stable on current med regimen    Diabetes mellitus type 2 without retinopathy        - appears stable on current med regimen    Situational anxiety  -     mirtazapine (REMERON) 7.5 MG Tab; Take 1 tablet (7.5 mg total) by mouth every evening.  Dispense: 30 tablet; Refill: 5    Heartburn  -     omeprazole (PRILOSEC) 40 MG capsule; Take 1 capsule (40 mg total) by mouth once daily.  Dispense: 90 capsule; Refill: 1    Ascites of liver        - follows with specialist        - continue weekly paracentesis            Follow up in about 3 months (around 11/27/2024) for HTN.

## 2024-09-07 ENCOUNTER — HOSPITAL ENCOUNTER (EMERGENCY)
Facility: HOSPITAL | Age: 74
Discharge: ANOTHER HOSPITAL | End: 2024-09-08
Attending: EMERGENCY MEDICINE
Payer: MEDICARE

## 2024-09-07 DIAGNOSIS — K72.10 END STAGE LIVER DISEASE: ICD-10-CM

## 2024-09-07 DIAGNOSIS — R07.9 CHEST PAIN: ICD-10-CM

## 2024-09-07 DIAGNOSIS — E87.5 HYPERKALEMIA: ICD-10-CM

## 2024-09-07 DIAGNOSIS — I50.9 CONGESTIVE HEART FAILURE, UNSPECIFIED HF CHRONICITY, UNSPECIFIED HEART FAILURE TYPE: Primary | ICD-10-CM

## 2024-09-07 LAB
ALBUMIN SERPL BCP-MCNC: 3 G/DL (ref 3.5–5.2)
ALP SERPL-CCNC: 90 U/L (ref 55–135)
ALT SERPL W/O P-5'-P-CCNC: 330 U/L (ref 10–44)
ANION GAP SERPL CALC-SCNC: 18 MMOL/L (ref 8–16)
ANISOCYTOSIS BLD QL SMEAR: ABNORMAL
AST SERPL-CCNC: 227 U/L (ref 10–40)
BASOPHILS # BLD AUTO: 0.06 K/UL (ref 0–0.2)
BASOPHILS NFR BLD: 1.1 % (ref 0–1.9)
BILIRUB SERPL-MCNC: 2.7 MG/DL (ref 0.1–1)
BNP SERPL-MCNC: >4900 PG/ML (ref 0–99)
BUN SERPL-MCNC: 69 MG/DL (ref 8–23)
BURR CELLS BLD QL SMEAR: ABNORMAL
CALCIUM SERPL-MCNC: 8.3 MG/DL (ref 8.7–10.5)
CHLORIDE SERPL-SCNC: 110 MMOL/L (ref 95–110)
CO2 SERPL-SCNC: 12 MMOL/L (ref 23–29)
CREAT SERPL-MCNC: 4.8 MG/DL (ref 0.5–1.4)
DIFFERENTIAL METHOD BLD: ABNORMAL
EOSINOPHIL # BLD AUTO: 0.1 K/UL (ref 0–0.5)
EOSINOPHIL NFR BLD: 1.1 % (ref 0–8)
ERYTHROCYTE [DISTWIDTH] IN BLOOD BY AUTOMATED COUNT: 23.3 % (ref 11.5–14.5)
EST. GFR  (NO RACE VARIABLE): 12 ML/MIN/1.73 M^2
GLUCOSE SERPL-MCNC: 143 MG/DL (ref 70–110)
HCT VFR BLD AUTO: 33.2 % (ref 40–54)
HGB BLD-MCNC: 11 G/DL (ref 14–18)
IMM GRANULOCYTES # BLD AUTO: 0.04 K/UL (ref 0–0.04)
IMM GRANULOCYTES NFR BLD AUTO: 0.7 % (ref 0–0.5)
LYMPHOCYTES # BLD AUTO: 0.8 K/UL (ref 1–4.8)
LYMPHOCYTES NFR BLD: 14.3 % (ref 18–48)
MAGNESIUM SERPL-MCNC: 2.2 MG/DL (ref 1.6–2.6)
MCH RBC QN AUTO: 29 PG (ref 27–31)
MCHC RBC AUTO-ENTMCNC: 33.1 G/DL (ref 32–36)
MCV RBC AUTO: 88 FL (ref 82–98)
MONOCYTES # BLD AUTO: 0.5 K/UL (ref 0.3–1)
MONOCYTES NFR BLD: 8.1 % (ref 4–15)
NEUTROPHILS # BLD AUTO: 4.2 K/UL (ref 1.8–7.7)
NEUTROPHILS NFR BLD: 74.7 % (ref 38–73)
NRBC BLD-RTO: 2 /100 WBC
OVALOCYTES BLD QL SMEAR: ABNORMAL
PLATELET # BLD AUTO: 62 K/UL (ref 150–450)
PLATELET BLD QL SMEAR: ABNORMAL
PMV BLD AUTO: ABNORMAL FL (ref 9.2–12.9)
POIKILOCYTOSIS BLD QL SMEAR: ABNORMAL
POLYCHROMASIA BLD QL SMEAR: ABNORMAL
POTASSIUM SERPL-SCNC: 5.3 MMOL/L (ref 3.5–5.1)
PROT SERPL-MCNC: 6.6 G/DL (ref 6–8.4)
RBC # BLD AUTO: 3.79 M/UL (ref 4.6–6.2)
SCHISTOCYTES BLD QL SMEAR: ABNORMAL
SCHISTOCYTES BLD QL SMEAR: PRESENT
SODIUM SERPL-SCNC: 140 MMOL/L (ref 136–145)
TARGETS BLD QL SMEAR: ABNORMAL
TROPONIN I SERPL DL<=0.01 NG/ML-MCNC: 0.2 NG/ML (ref 0–0.03)
WBC # BLD AUTO: 5.65 K/UL (ref 3.9–12.7)

## 2024-09-07 PROCEDURE — 25000003 PHARM REV CODE 250: Performed by: EMERGENCY MEDICINE

## 2024-09-07 PROCEDURE — 93010 ELECTROCARDIOGRAM REPORT: CPT | Mod: ,,, | Performed by: GENERAL PRACTICE

## 2024-09-07 PROCEDURE — 93005 ELECTROCARDIOGRAM TRACING: CPT

## 2024-09-07 PROCEDURE — 83880 ASSAY OF NATRIURETIC PEPTIDE: CPT | Performed by: EMERGENCY MEDICINE

## 2024-09-07 PROCEDURE — 85025 COMPLETE CBC W/AUTO DIFF WBC: CPT | Performed by: EMERGENCY MEDICINE

## 2024-09-07 PROCEDURE — 96375 TX/PRO/DX INJ NEW DRUG ADDON: CPT

## 2024-09-07 PROCEDURE — 85730 THROMBOPLASTIN TIME PARTIAL: CPT | Performed by: EMERGENCY MEDICINE

## 2024-09-07 PROCEDURE — 84484 ASSAY OF TROPONIN QUANT: CPT | Performed by: EMERGENCY MEDICINE

## 2024-09-07 PROCEDURE — 94760 N-INVAS EAR/PLS OXIMETRY 1: CPT

## 2024-09-07 PROCEDURE — 36415 COLL VENOUS BLD VENIPUNCTURE: CPT | Performed by: EMERGENCY MEDICINE

## 2024-09-07 PROCEDURE — 63600175 PHARM REV CODE 636 W HCPCS: Performed by: EMERGENCY MEDICINE

## 2024-09-07 PROCEDURE — 99285 EMERGENCY DEPT VISIT HI MDM: CPT | Mod: 25

## 2024-09-07 PROCEDURE — 80053 COMPREHEN METABOLIC PANEL: CPT | Performed by: EMERGENCY MEDICINE

## 2024-09-07 PROCEDURE — 85610 PROTHROMBIN TIME: CPT | Performed by: EMERGENCY MEDICINE

## 2024-09-07 PROCEDURE — 96374 THER/PROPH/DIAG INJ IV PUSH: CPT

## 2024-09-07 PROCEDURE — 83735 ASSAY OF MAGNESIUM: CPT | Performed by: EMERGENCY MEDICINE

## 2024-09-07 RX ORDER — FUROSEMIDE 10 MG/ML
40 INJECTION INTRAMUSCULAR; INTRAVENOUS
Status: COMPLETED | OUTPATIENT
Start: 2024-09-07 | End: 2024-09-07

## 2024-09-07 RX ORDER — MORPHINE SULFATE 2 MG/ML
2 INJECTION, SOLUTION INTRAMUSCULAR; INTRAVENOUS
Status: COMPLETED | OUTPATIENT
Start: 2024-09-07 | End: 2024-09-07

## 2024-09-07 RX ORDER — ASPIRIN 325 MG
325 TABLET ORAL
Status: COMPLETED | OUTPATIENT
Start: 2024-09-07 | End: 2024-09-07

## 2024-09-07 RX ADMIN — MORPHINE SULFATE 2 MG: 2 INJECTION, SOLUTION INTRAMUSCULAR; INTRAVENOUS at 10:09

## 2024-09-07 RX ADMIN — ASPIRIN 325 MG: 325 TABLET ORAL at 10:09

## 2024-09-07 RX ADMIN — SODIUM ZIRCONIUM CYCLOSILICATE 5 G: 5 POWDER, FOR SUSPENSION ORAL at 11:09

## 2024-09-07 RX ADMIN — FUROSEMIDE 40 MG: 10 INJECTION, SOLUTION INTRAMUSCULAR; INTRAVENOUS at 11:09

## 2024-09-07 RX ADMIN — NITROGLYCERIN 0.5 INCH: 20 OINTMENT TOPICAL at 10:09

## 2024-09-08 ENCOUNTER — HOSPITAL ENCOUNTER (INPATIENT)
Facility: HOSPITAL | Age: 74
LOS: 11 days | Discharge: HOME HEALTH | DRG: 291 | End: 2024-09-19
Attending: STUDENT IN AN ORGANIZED HEALTH CARE EDUCATION/TRAINING PROGRAM | Admitting: STUDENT IN AN ORGANIZED HEALTH CARE EDUCATION/TRAINING PROGRAM
Payer: MEDICARE

## 2024-09-08 VITALS
DIASTOLIC BLOOD PRESSURE: 66 MMHG | WEIGHT: 160 LBS | HEIGHT: 72 IN | RESPIRATION RATE: 16 BRPM | TEMPERATURE: 98 F | SYSTOLIC BLOOD PRESSURE: 125 MMHG | OXYGEN SATURATION: 100 % | HEART RATE: 68 BPM | BODY MASS INDEX: 21.67 KG/M2

## 2024-09-08 DIAGNOSIS — N18.9 ACUTE KIDNEY INJURY SUPERIMPOSED ON CHRONIC KIDNEY DISEASE: ICD-10-CM

## 2024-09-08 DIAGNOSIS — K74.60 DECOMPENSATED HEPATIC CIRRHOSIS: ICD-10-CM

## 2024-09-08 DIAGNOSIS — R53.81 DEBILITY: ICD-10-CM

## 2024-09-08 DIAGNOSIS — N17.9 ACUTE KIDNEY INJURY SUPERIMPOSED ON CHRONIC KIDNEY DISEASE: ICD-10-CM

## 2024-09-08 DIAGNOSIS — I50.22 CHRONIC SYSTOLIC HEART FAILURE: Chronic | ICD-10-CM

## 2024-09-08 DIAGNOSIS — R07.9 CHEST PAIN: ICD-10-CM

## 2024-09-08 DIAGNOSIS — K72.90 DECOMPENSATED HEPATIC CIRRHOSIS: ICD-10-CM

## 2024-09-08 DIAGNOSIS — I35.0 AORTIC STENOSIS, SEVERE: Primary | Chronic | ICD-10-CM

## 2024-09-08 DIAGNOSIS — I50.43 ACUTE ON CHRONIC COMBINED SYSTOLIC AND DIASTOLIC HEART FAILURE: ICD-10-CM

## 2024-09-08 PROBLEM — I50.42 CHRONIC COMBINED SYSTOLIC AND DIASTOLIC HEART FAILURE: Chronic | Status: ACTIVE | Noted: 2020-08-29

## 2024-09-08 PROBLEM — G62.9 PERIPHERAL POLYNEUROPATHY: Chronic | Status: ACTIVE | Noted: 2019-04-11

## 2024-09-08 PROBLEM — I25.5 ISCHEMIC CARDIOMYOPATHY: Chronic | Status: ACTIVE | Noted: 2023-10-13

## 2024-09-08 PROBLEM — E16.2 HYPOGLYCEMIA: Status: RESOLVED | Noted: 2024-08-15 | Resolved: 2024-09-08

## 2024-09-08 PROBLEM — R18.8 CIRRHOSIS OF LIVER WITH ASCITES: Chronic | Status: ACTIVE | Noted: 2023-09-14

## 2024-09-08 PROBLEM — R18.8 CIRRHOSIS OF LIVER WITH ASCITES: Status: ACTIVE | Noted: 2023-09-14

## 2024-09-08 PROBLEM — I50.23 ACUTE ON CHRONIC SYSTOLIC HEART FAILURE: Status: ACTIVE | Noted: 2021-10-26

## 2024-09-08 PROBLEM — R55 SYNCOPE AND COLLAPSE: Status: RESOLVED | Noted: 2024-07-29 | Resolved: 2024-09-08

## 2024-09-08 PROBLEM — M15.9 GENERALIZED OA: Chronic | Status: ACTIVE | Noted: 2020-05-13

## 2024-09-08 PROBLEM — E11.3593 TYPE 2 DIABETES MELLITUS WITH BOTH EYES AFFECTED BY PROLIFERATIVE RETINOPATHY WITHOUT MACULAR EDEMA, WITHOUT LONG-TERM CURRENT USE OF INSULIN: Chronic | Status: ACTIVE | Noted: 2024-07-29

## 2024-09-08 PROBLEM — I25.810 CAD (CORONARY ARTERY DISEASE), AUTOLOGOUS VEIN BYPASS GRAFT: Chronic | Status: ACTIVE | Noted: 2023-09-28

## 2024-09-08 PROBLEM — K42.9 UMBILICAL HERNIA WITHOUT OBSTRUCTION AND WITHOUT GANGRENE: Status: RESOLVED | Noted: 2023-10-13 | Resolved: 2024-09-08

## 2024-09-08 PROBLEM — E03.9 HYPOTHYROIDISM: Chronic | Status: ACTIVE | Noted: 2023-08-31

## 2024-09-08 PROBLEM — E11.3293 MILD NONPROLIFERATIVE DIABETIC RETINOPATHY OF BOTH EYES WITHOUT MACULAR EDEMA ASSOCIATED WITH TYPE 2 DIABETES MELLITUS: Chronic | Status: ACTIVE | Noted: 2023-03-15

## 2024-09-08 PROBLEM — N39.0 UTI (URINARY TRACT INFECTION): Status: RESOLVED | Noted: 2024-08-13 | Resolved: 2024-09-08

## 2024-09-08 LAB
ALBUMIN SERPL BCP-MCNC: 2.7 G/DL (ref 3.5–5.2)
ALP SERPL-CCNC: 85 U/L (ref 55–135)
ALT SERPL W/O P-5'-P-CCNC: 288 U/L (ref 10–44)
ANION GAP SERPL CALC-SCNC: 13 MMOL/L (ref 8–16)
APTT PPP: 44.4 SEC (ref 21–32)
AST SERPL-CCNC: 200 U/L (ref 10–40)
BASOPHILS # BLD AUTO: 0.05 K/UL (ref 0–0.2)
BASOPHILS NFR BLD: 1.1 % (ref 0–1.9)
BILIRUB SERPL-MCNC: 2.2 MG/DL (ref 0.1–1)
BUN SERPL-MCNC: 71 MG/DL (ref 8–23)
CALCIUM SERPL-MCNC: 8.1 MG/DL (ref 8.7–10.5)
CHLORIDE SERPL-SCNC: 109 MMOL/L (ref 95–110)
CO2 SERPL-SCNC: 13 MMOL/L (ref 23–29)
CREAT SERPL-MCNC: 4.6 MG/DL (ref 0.5–1.4)
DIFFERENTIAL METHOD BLD: ABNORMAL
EOSINOPHIL # BLD AUTO: 0.1 K/UL (ref 0–0.5)
EOSINOPHIL NFR BLD: 1.8 % (ref 0–8)
ERYTHROCYTE [DISTWIDTH] IN BLOOD BY AUTOMATED COUNT: 23.9 % (ref 11.5–14.5)
EST. GFR  (NO RACE VARIABLE): 12.7 ML/MIN/1.73 M^2
GLUCOSE SERPL-MCNC: 116 MG/DL (ref 70–110)
HCT VFR BLD AUTO: 33.4 % (ref 40–54)
HGB BLD-MCNC: 10.2 G/DL (ref 14–18)
IMM GRANULOCYTES # BLD AUTO: 0.04 K/UL (ref 0–0.04)
IMM GRANULOCYTES NFR BLD AUTO: 0.9 % (ref 0–0.5)
INR PPP: 2 (ref 0.8–1.2)
INR PPP: 2.1 (ref 0.8–1.2)
LYMPHOCYTES # BLD AUTO: 0.7 K/UL (ref 1–4.8)
LYMPHOCYTES NFR BLD: 14.9 % (ref 18–48)
MCH RBC QN AUTO: 27.9 PG (ref 27–31)
MCHC RBC AUTO-ENTMCNC: 30.5 G/DL (ref 32–36)
MCV RBC AUTO: 92 FL (ref 82–98)
MONOCYTES # BLD AUTO: 0.3 K/UL (ref 0.3–1)
MONOCYTES NFR BLD: 6.6 % (ref 4–15)
NEUTROPHILS # BLD AUTO: 3.4 K/UL (ref 1.8–7.7)
NEUTROPHILS NFR BLD: 74.7 % (ref 38–73)
NRBC BLD-RTO: 2 /100 WBC
PLATELET # BLD AUTO: 64 K/UL (ref 150–450)
PMV BLD AUTO: ABNORMAL FL (ref 9.2–12.9)
POCT GLUCOSE: 113 MG/DL (ref 70–110)
POCT GLUCOSE: 116 MG/DL (ref 70–110)
POCT GLUCOSE: 122 MG/DL (ref 70–110)
POTASSIUM SERPL-SCNC: 4.9 MMOL/L (ref 3.5–5.1)
PROT SERPL-MCNC: 6.2 G/DL (ref 6–8.4)
PROTHROMBIN TIME: 20.5 SEC (ref 9–12.5)
PROTHROMBIN TIME: 21.6 SEC (ref 9–12.5)
RBC # BLD AUTO: 3.65 M/UL (ref 4.6–6.2)
SODIUM SERPL-SCNC: 135 MMOL/L (ref 136–145)
SODIUM UR-SCNC: 34 MMOL/L (ref 20–250)
TROPONIN I SERPL DL<=0.01 NG/ML-MCNC: 0.17 NG/ML (ref 0–0.03)
TROPONIN I SERPL DL<=0.01 NG/ML-MCNC: 0.18 NG/ML (ref 0–0.03)
WBC # BLD AUTO: 4.55 K/UL (ref 3.9–12.7)

## 2024-09-08 PROCEDURE — 99223 1ST HOSP IP/OBS HIGH 75: CPT | Mod: ,,, | Performed by: INTERNAL MEDICINE

## 2024-09-08 PROCEDURE — 20600001 HC STEP DOWN PRIVATE ROOM

## 2024-09-08 PROCEDURE — 80053 COMPREHEN METABOLIC PANEL: CPT | Performed by: INTERNAL MEDICINE

## 2024-09-08 PROCEDURE — 25000003 PHARM REV CODE 250: Performed by: INTERNAL MEDICINE

## 2024-09-08 PROCEDURE — 85025 COMPLETE CBC W/AUTO DIFF WBC: CPT | Performed by: INTERNAL MEDICINE

## 2024-09-08 PROCEDURE — 85610 PROTHROMBIN TIME: CPT | Performed by: INTERNAL MEDICINE

## 2024-09-08 PROCEDURE — 84484 ASSAY OF TROPONIN QUANT: CPT | Mod: 91 | Performed by: INTERNAL MEDICINE

## 2024-09-08 PROCEDURE — 99222 1ST HOSP IP/OBS MODERATE 55: CPT | Mod: GC,,, | Performed by: INTERNAL MEDICINE

## 2024-09-08 PROCEDURE — 36415 COLL VENOUS BLD VENIPUNCTURE: CPT | Performed by: INTERNAL MEDICINE

## 2024-09-08 PROCEDURE — P9047 ALBUMIN (HUMAN), 25%, 50ML: HCPCS | Mod: JZ,JG | Performed by: HOSPITALIST

## 2024-09-08 PROCEDURE — 63600175 PHARM REV CODE 636 W HCPCS: Performed by: HOSPITALIST

## 2024-09-08 PROCEDURE — 84300 ASSAY OF URINE SODIUM: CPT | Performed by: INTERNAL MEDICINE

## 2024-09-08 RX ORDER — ALBUMIN HUMAN 250 G/1000ML
25 SOLUTION INTRAVENOUS ONCE
Status: COMPLETED | OUTPATIENT
Start: 2024-09-08 | End: 2024-09-08

## 2024-09-08 RX ORDER — ISOSORBIDE DINITRATE 20 MG/1
20 TABLET ORAL 3 TIMES DAILY
Status: DISCONTINUED | OUTPATIENT
Start: 2024-09-08 | End: 2024-09-19 | Stop reason: HOSPADM

## 2024-09-08 RX ORDER — IBUPROFEN 200 MG
24 TABLET ORAL
Status: DISCONTINUED | OUTPATIENT
Start: 2024-09-08 | End: 2024-09-19 | Stop reason: HOSPADM

## 2024-09-08 RX ORDER — MIRTAZAPINE 7.5 MG/1
7.5 TABLET, FILM COATED ORAL NIGHTLY
Status: DISCONTINUED | OUTPATIENT
Start: 2024-09-08 | End: 2024-09-19 | Stop reason: HOSPADM

## 2024-09-08 RX ORDER — FUROSEMIDE 10 MG/ML
80 INJECTION INTRAMUSCULAR; INTRAVENOUS 2 TIMES DAILY
Status: DISCONTINUED | OUTPATIENT
Start: 2024-09-08 | End: 2024-09-09

## 2024-09-08 RX ORDER — LEVALBUTEROL INHALATION SOLUTION 0.63 MG/3ML
0.63 SOLUTION RESPIRATORY (INHALATION) EVERY 6 HOURS PRN
Status: DISCONTINUED | OUTPATIENT
Start: 2024-09-08 | End: 2024-09-19 | Stop reason: HOSPADM

## 2024-09-08 RX ORDER — IBUPROFEN 200 MG
16 TABLET ORAL
Status: DISCONTINUED | OUTPATIENT
Start: 2024-09-08 | End: 2024-09-19 | Stop reason: HOSPADM

## 2024-09-08 RX ORDER — LEVOTHYROXINE SODIUM 100 UG/1
100 TABLET ORAL
Status: DISCONTINUED | OUTPATIENT
Start: 2024-09-08 | End: 2024-09-19 | Stop reason: HOSPADM

## 2024-09-08 RX ORDER — METOPROLOL SUCCINATE 25 MG/1
25 TABLET, EXTENDED RELEASE ORAL DAILY
Status: DISCONTINUED | OUTPATIENT
Start: 2024-09-08 | End: 2024-09-19 | Stop reason: HOSPADM

## 2024-09-08 RX ORDER — ASPIRIN 81 MG/1
81 TABLET ORAL DAILY
Status: DISCONTINUED | OUTPATIENT
Start: 2024-09-08 | End: 2024-09-19 | Stop reason: HOSPADM

## 2024-09-08 RX ORDER — GLUCAGON 1 MG
1 KIT INJECTION
Status: DISCONTINUED | OUTPATIENT
Start: 2024-09-08 | End: 2024-09-19 | Stop reason: HOSPADM

## 2024-09-08 RX ORDER — INSULIN ASPART 100 [IU]/ML
0-5 INJECTION, SOLUTION INTRAVENOUS; SUBCUTANEOUS
Status: DISCONTINUED | OUTPATIENT
Start: 2024-09-08 | End: 2024-09-19 | Stop reason: HOSPADM

## 2024-09-08 RX ADMIN — ALBUMIN (HUMAN) 25 G: 12.5 SOLUTION INTRAVENOUS at 11:09

## 2024-09-08 RX ADMIN — MIRTAZAPINE 7.5 MG: 7.5 TABLET, FILM COATED ORAL at 08:09

## 2024-09-08 RX ADMIN — FUROSEMIDE 80 MG: 10 INJECTION, SOLUTION INTRAVENOUS at 05:09

## 2024-09-08 RX ADMIN — ASPIRIN 81 MG: 81 TABLET, COATED ORAL at 11:09

## 2024-09-08 RX ADMIN — LEVOTHYROXINE SODIUM 100 MCG: 100 TABLET ORAL at 05:09

## 2024-09-08 RX ADMIN — ISOSORBIDE DINITRATE 20 MG: 20 TABLET ORAL at 08:09

## 2024-09-08 RX ADMIN — ISOSORBIDE DINITRATE 20 MG: 20 TABLET ORAL at 03:09

## 2024-09-08 NOTE — ASSESSMENT & PLAN NOTE
"Nephrology consulted for "Hepatology suspects hepatorenal syndrome."  Baseline Cr ~1.9. Urine Na >20, however, potentially 2/2 ATN or unlisted diuretic use or CRS.     PLAN:    - Worsening renal function  - Obtain Urine studies (Urine Na, Urine Osm, Urine Cr, Urine K)   - UPCr   - Obtain renal ultrasound  - CK   - Follow-up UA   - Strict Is/Os  - Recommend nguyen catheter to monitor UOP  - Continue Lasix 80 BID.  - Trend Cr/ Serial BMPs  - Replace electrolytes PRN, goal K/Phos/Mg 4/3/2  - Avoid nephrotoxic agents when feasible (NSAIDs, ACEi/ARB, IV radiocontrast, gadolinium, etc.)  - Avoid Fleet's and Brown Bomb Enemas given their propensity to induce hypermagnesemia  - Renally dose all meds to eGFR  - Goal MAP > 65 mmHg  - No acute indications for RRT at this time   "

## 2024-09-08 NOTE — ASSESSMENT & PLAN NOTE
He takes metoprolol succinate, hydralazine, isosorbide dinitrate, sacubitril-valsartan, empagliflozin. Giving home metoprolol, isosorbide dinitrate. Resume home hydralazine. Not on a diuretic at home. Giving IV furosemide. Monitor electrolytes, intake/output.

## 2024-09-08 NOTE — CONSULTS
[facility]  Hepatology  Consult Note    Patient Name: Baldo Carney  MRN: [id number]  Admission Date: 9/8/2024  Hospital Length of Stay: 0 days  Code Status: Full Code   Attending Provider: Adryan Beaulieu MD   Consulting Provider: Naun Chiu MD  Primary Care Physician: Millie Hayes, APRN,FNP-C  Principal Problem:<principal problem not specified>    Inpatient consult to Hepatology  Consult performed by: Naun Chiu MD  Consult ordered by: Dejon Sparrow MD        Subjective:     HPI: Baldo Carney is a 73 y.o. male with history of stage 3 fibrosis and now likely cirrhosis from longstanding history of ischemic cardiomyopathy and chronic congestive heart failure (EF last 22%) who receives 2x per month paracentesis, COPD, HLD, CKD, and T2DM who presents with L sided chest pain and volume overload. He had a paracentesis on 8/12/24 with removal of 6L fluid and received albumin at that time. He last saw Dr. Wynne on 9/13/22 and has not followed up since then. Patient denies abdominal pain, N/V, jaundice, scleral icterus.    Labs show Cr 4.6 (baseline ~2.5), , , Tbili 2.2, ALP 85, INR 2.1, Plt 64. Hepatology consulted given elevated liver enzymes and ERICA.        Past Medical History:   Diagnosis Date    Asthma     Cardiomyopathy 10/21/2014    CHF (congestive heart failure)     Coronary artery disease     CRF (chronic renal failure)     Diabetes mellitus     Enlarged prostate     Hyperlipidemia     Hypertension     Neuropathy     Syncope 7/29/2024       Past Surgical History:   Procedure Laterality Date    ANGIOGRAPHY OF INTERNAL MAMMARY VESSEL N/A 3/11/2022    Procedure: Angiogram Internal Mammary;  Surgeon: Hank Lujan MD;  Location: [facility] CATH/EP LAB;  Service: Cardiology;  Laterality: N/A;    CARDIAC CATHETERIZATION      CARDIAC VALVE SURGERY      CATHETERIZATION OF BOTH LEFT AND RIGHT HEART Left 3/11/2022    Procedure:  CATHETERIZATION, HEART, BOTH LEFT AND RIGHT;  Surgeon: Hank Lujan MD;  Location: [facility] CATH/EP LAB;  Service: Cardiology;  Laterality: Left;    CORONARY ANGIOGRAPHY N/A 3/11/2022    Procedure: ANGIOGRAM, CORONARY ARTERY;  Surgeon: Hank Lujan MD;  Location: [facility] CATH/EP LAB;  Service: Cardiology;  Laterality: N/A;    CORONARY BYPASS GRAFT ANGIOGRAPHY  3/11/2022    Procedure: Bypass graft study;  Surgeon: Hank Lujan MD;  Location: [facility] CATH/EP LAB;  Service: Cardiology;;    CYSTOSCOPY N/A 7/30/2019    Procedure: CYSTOSCOPY;  Surgeon: Spencer Nguyen MD;  Location: [facility];  Service: Urology;  Laterality: N/A;    INSERTION OF INTRAVASCULAR MICROAXIAL BLOOD PUMP N/A 8/31/2022    Procedure: INSERTION, IMPELLA;  Surgeon: Zach Jensen MD;  Location: [facility] CATH LAB;  Service: Cardiology;  Laterality: N/A;    INSERTION, CATHETER, DIALYSIS, PERITONEAL N/A 12/7/2023    Procedure: INSERTION, CATHETER, DIALYSIS, PERITONEAL;  Surgeon: Greg Bone Jr., MD;  Location: [facility];  Service: General;  Laterality: N/A;  need PD catheter    PLACEMENT OF SWAN SEE CATHETER WITH IMAGING GUIDANCE  8/31/2022    Procedure: INSERTION, CATHETER, SWAN-SEE, WITH IMAGING GUIDANCE;  Surgeon: Zach Jensen MD;  Location: [facility] CATH LAB;  Service: Cardiology;;    REPAIR, HERNIA, INGUINAL, INCARCERATED, INITIAL, AGE 5 YEARS OR OLDER Right 12/7/2023    Procedure: REPAIR, HERNIA, INGUINAL, INCARCERATED, INITIAL;  Surgeon: Greg Bone Jr., MD;  Location: [facility];  Service: General;  Laterality: Right;    SHOULDER ARTHROSCOPY  1985    TRANSRECTAL BIOPSY OF PROSTATE WITH ULTRASOUND GUIDANCE N/A 7/30/2019    Procedure: BIOPSY, PROSTATE, RECTAL APPROACH, WITH US GUIDANCE;  Surgeon: Spencer Nguyen MD;  Location: [facility];  Service: Urology;  Laterality: N/A;  procedure not performed, pt unable to tolerate    TRANSRECTAL BIOPSY OF PROSTATE WITH ULTRASOUND GUIDANCE N/A 8/8/2019    Procedure: BIOPSY,  PROSTATE, RECTAL APPROACH, WITH US GUIDANCE;  Surgeon: Spencer Nguyen MD;  Location: [facility] OR;  Service: Urology;  Laterality: N/A;    UMBILICAL HERNIA REPAIR N/A 2023    Procedure: REPAIR, HERNIA, UMBILICAL;  Surgeon: Greg Bone Jr., MD;  Location: [facility] OR;  Service: General;  Laterality: N/A;       Family History   Problem Relation Name Age of Onset    No Known Problems Mother      Diabetes Father      Hypertension Father      Heart attack Father      Heart disease Father      Diabetes Sister 5     Heart disease Brother 4     Diabetes Brother 4     No Known Problems Maternal Grandmother      No Known Problems Maternal Grandfather      No Known Problems Paternal Grandmother      No Known Problems Paternal Grandfather      No Known Problems Maternal Aunt      No Known Problems Maternal Uncle      No Known Problems Paternal Aunt      No Known Problems Paternal Uncle      Anemia Neg Hx      Arrhythmia Neg Hx      Asthma Neg Hx      Clotting disorder Neg Hx      Fainting Neg Hx      Heart failure Neg Hx      Hyperlipidemia Neg Hx      Glaucoma Neg Hx         Social History     Socioeconomic History    Marital status: Single   Tobacco Use    Smoking status: Former     Current packs/day: 0.00     Types: Cigarettes     Quit date: 1970     Years since quittin.2    Smokeless tobacco: Never   Substance and Sexual Activity    Alcohol use: Not Currently     Alcohol/week: 3.0 standard drinks of alcohol     Types: 3 Cans of beer per week    Drug use: No    Sexual activity: Not Currently     Partners: Female     Social Determinants of Health     Transportation Needs: No Transportation Needs (2024)    TRANSPORTATION NEEDS     Transportation : No       Current Facility-Administered Medications on File Prior to Encounter   Medication Dose Route Frequency Provider Last Rate Last Admin    [COMPLETED] aspirin tablet 325 mg  325 mg Oral ED 1 Time Heri Cuevas MD   325 mg at 24 5853     [COMPLETED] furosemide injection 40 mg  40 mg Intravenous ED 1 Time Heri Cuevas MD   40 mg at 09/07/24 2327    [COMPLETED] morphine injection 2 mg  2 mg Intravenous ED 1 Time Heri Cuevas MD   2 mg at 09/07/24 2206    [COMPLETED] nitroGLYCERIN 2% TD oint ointment 0.5 inch  0.5 inch Topical (Top) ED 1 Time Heri Cuevas MD   0.5 inch at 09/07/24 2206    [COMPLETED] sodium zirconium cyclosilicate packet 5 g  5 g Oral Once Heri Cuevas MD   5 g at 09/07/24 2327     Current Outpatient Medications on File Prior to Encounter   Medication Sig Dispense Refill    aspirin (ECOTRIN) 81 MG EC tablet Take 81 mg by mouth once daily.      atorvastatin (LIPITOR) 40 MG tablet Take 1 tablet (40 mg total) by mouth once daily. 90 tablet 0    cyanocobalamin 1,000 mcg/mL injection Inject 1 mL (1,000 mcg total) into the muscle every 30 days. 3 mL 1    empagliflozin (JARDIANCE) 25 mg tablet Take 1 tablet (25 mg total) by mouth once daily. 90 tablet 0    fluticasone furoate-vilanteroL (BREO ELLIPTA) 100-25 mcg/dose diskus inhaler Inhale 1 puff into the lungs once daily. (DAILY CONTROLLER) 3 each 1    fluticasone propionate (FLONASE) 50 mcg/actuation nasal spray USE 1 SPRAY (50 MCG TOTAL) IN EACH NOSTRIL ONCE DAILY 48 mL 1    gabapentin (NEURONTIN) 300 MG capsule Take 1 capsule (300 mg total) by mouth every evening. 90 capsule 1    hydrALAZINE (APRESOLINE) 50 MG tablet Take 1 tablet (50 mg total) by mouth 2 (two) times a day. 180 tablet 0    isosorbide dinitrate (ISORDIL) 20 MG tablet Take 20 mg by mouth 3 (three) times daily.      levalbuterol (XOPENEX) 0.63 mg/3 mL nebulizer solution Inhale 0.63 mg into the lungs every 6 (six) hours as needed for Shortness of Breath.      levothyroxine (SYNTHROID) 100 MCG tablet Take 1 tablet (100 mcg total) by mouth before breakfast. With no other meds or food 90 tablet 1    linaCLOtide (LINZESS) 72 mcg Cap capsule Take 1 capsule (72 mcg total) by mouth before breakfast. 90  capsule 1    metoprolol succinate (TOPROL-XL) 25 MG 24 hr tablet Take 1 tablet (25 mg total) by mouth once daily. 90 tablet 1    mirtazapine (REMERON) 7.5 MG Tab Take 1 tablet (7.5 mg total) by mouth every evening. 30 tablet 5    omeprazole (PRILOSEC) 40 MG capsule Take 1 capsule (40 mg total) by mouth once daily. 90 capsule 1    sacubitriL-valsartan (ENTRESTO) 24-26 mg per tablet Take 1 tablet by mouth 2 (two) times daily. 180 tablet 1    VERQUVO 5 mg Tab Take 5 mg by mouth once daily. 90 tablet 0       Review of patient's allergies indicates:   Allergen Reactions    Canagliflozin      Other reaction(s): been very dizzy       Review of Systems   Constitutional:  Negative for malaise/fatigue.   Eyes:         No yellowing of eyes   Gastrointestinal:  Negative for abdominal pain, nausea and vomiting.   Skin:         No yellowing of skin   Neurological:         No confusion        Objective:     Vitals:    09/08/24 0816   BP: 134/67   Pulse: 66   Resp: 19   Temp: 97.8 °F (36.6 °C)         Constitutional:  not in acute distress and well developed  HENT: Head: Normal, normocephalic, atraumatic.  Eyes: conjunctiva clear and sclera nonicteric  Respiratory: normal chest expansion & respiratory effort   and no accessory muscle use  GI: soft, non-tender, without masses or organomegaly  Skin: normal color  Neurological: alert, oriented x3  Psychiatric: mood and affect are within normal limits, pt is a good historian; no memory problems were noted    Significant Labs:  Recent Labs   Lab 09/07/24  2159 09/08/24  0642   HGB 11.0* 10.2*       Lab Results   Component Value Date    WBC 4.55 09/08/2024    HGB 10.2 (L) 09/08/2024    HCT 33.4 (L) 09/08/2024    MCV 92 09/08/2024    PLT 64 (L) 09/08/2024       Lab Results   Component Value Date     (L) 09/08/2024    K 4.9 09/08/2024     09/08/2024    CO2 13 (L) 09/08/2024    BUN 71 (H) 09/08/2024    CREATININE 4.6 (H) 09/08/2024    CALCIUM 8.1 (L) 09/08/2024    ANIONGAP 13  09/08/2024    ESTGFRAFRICA 53.4 (A) 06/07/2022    EGFRNONAA 46.2 (A) 06/07/2022       Lab Results   Component Value Date     (H) 09/08/2024     (H) 09/08/2024    ALKPHOS 85 09/08/2024    BILITOT 2.2 (H) 09/08/2024       Lab Results   Component Value Date    INR 2.1 (H) 09/08/2024    INR 2.0 (H) 09/07/2024    INR 1.1 08/15/2024       Significant Imaging:  Reviewed pertinent radiology findings.       Assessment/Plan:     Baldo Carney is a 73 y.o. male with history of stage 3 fibrosis and now likely cirrhosis from longstanding history of ischemic cardiomyopathy and chronic congestive heart failure (EF last 22%) who receives 2x per month paracentesis, COPD, HLD, CKD, and T2DM who presents with L sided chest pain and volume overload. He had a paracentesis on 8/12/24 with removal of 6L fluid and received albumin at that time. He last saw Dr. Wynne on 9/13/22 and has not followed up since then. Patient denies abdominal pain, N/V, jaundice, scleral icterus.    Labs show Cr 4.6 (baseline ~2.5), , , Tbili 2.2, ALP 85, INR 2.1, Plt 64. Hepatology consulted given elevated liver enzymes and ERICA.    Problem List:  Cirrhosis from ischemic cardiomyopathy and CHF  Elevated liver enzymes  ERICA c/f HRS    Recommendations:  - Agree with repeat Echo  - Recommend albumin challenge given ERICA and nephrology consult for concern for HRS  - Daily CBC, CMP, INR  - Discussed with patient the importance of following up in clinic for which he was agreeable.    Thank you for involving us in the care of Baldo Carney. Please call with any additional questions, concerns or changes in the patient's clinical status. We will continue to follow.    Discussed with Dr. Wynne.    Naun Chiu MD  Gastroenterology & Hepatology Fellow PGY-IV  [facility]

## 2024-09-08 NOTE — PLAN OF CARE
Chart reviewed. Patient seen by the admitting physician this morning. See H&P for today's full note.    Baldo Carney is a 73 year old Black man with former cigarette smoking (quit in 1970), hypertension, diabetes mellitus type 2 with retinopathy and peripheral neuropathy, coronary artery disease status post coronary artery bypass graft (saphenous vein graft to right coronary artery) on 11/3/2014, status post percutaneous coronary intervention with drug eluting stent placements in left main to left anterior descending artery and left main to lateral circumflex artery on 8/31/2022, history of aortic valve replacement with porcine bioprosthesis on 11/3/2014, ischemic cardiomyopathy with chronic biventricular systolic and diastolic heart failure, chronic kidney disease, anemia, benign prostatic hyperplasia, hypothyroidism, cirrhosis with ascites requiring bimonthly paracentesis, chronic obstructive pulmonary disease (COPD), history of incarcerated inguinal hernia repair on 12/7/2023. He lives in [place], [place].    He was hospitalized at [facility] from 8/12/2024 to 8/18/2024 for syncope, cystitis, hypoglycemia, COVID-19. He underwent paracentesis on admission with 6 liters of fluid removed and albumin given.    He was seen by his primary care nurse practitioner in clinic on 8/27/2024 and was prescribed mirtazapine for situational anxiety and omeprazole for heartburn.   He presented to [facility] Emergency Department on Saturday 9/7/2024 with left sided chest pain for 4 hours without radiation, associated with shortness of breath. He also had unchanged chronic bilateral lower extremity edema. He was scheduled for paracentesis on Monday 9/9/2024. Labs showed elevated BNP (>4900 pg/mL) and troponin (0.196 ng/mL) and acute kidney injury (BUN 69 mg/dL and creatinine 4.8 mg/dL from 37 and 1.9 on 8/23/2024), suggestive of volume overload due to cirrhosis and heart failure. He was  transferred to [facility] on 9/8/2024 and admitted to Hospital Medicine Team L.     Nephrology and Cardiology were consulted.

## 2024-09-08 NOTE — CONSULTS
"[facility] - Transplant Stepdown  Nephrology  Consult Note    Patient Name: Baldo Carney  MRN: [id number]  Admission Date: 9/8/2024  Hospital Length of Stay: 0 days  Attending Provider: Adryan Beaulieu MD   Primary Care Physician: Millie Hayes, APRN,FNP-C  Principal Problem:<principal problem not specified>    Inpatient consult to Nephrology  Consult performed by: Derek Hutchison MD  Consult ordered by: Adryan Beaulieu MD  Reason for consult: "Hepatology suspects hepatorenal syndrome."  Assessment/Recommendations: Nephrology consulted for "Hepatology suspects hepatorenal syndrome." Urine Na >20, however, potentially 2/2 ATN or unlisted diuretic use. Will collect urine and spin to assess for cast formation.     PLAN:    - Urine microscopy to look for casts/sediment -> awaiting urine sample collection.  - Lasix 80 BID.         Subjective:     HPI: 73 year old male with a previous medical history of COPD, cirrhosis of the liver with bi monthly paracentesis, cardiomyopathy, CHF, aortic stenosis, CAD, HTN, HLD, BPH, chronic renal failure, and DMII who presented to the emergency room with left sided chest pain x 4 hours. S/p paracentesis 8/12/24 with 6L fluid removal and albumin replacement     Elevated troponin, BNP with ERICA on CKD suggest volume overload s/t decompensated cirrhosis and biventricular HFrEF 22%. Thrombocytopenia s/t chronic liver disease.     Nephrology consulted for "Hepatology suspects hepatorenal syndrome." Urine Na >20.     Past Medical History:   Diagnosis Date    Asthma     Cardiomyopathy 10/21/2014    CHF (congestive heart failure)     Coronary artery disease     CRF (chronic renal failure)     Diabetes mellitus     Enlarged prostate     Hyperlipidemia     Hypertension     Neuropathy     Syncope 07/29/2024    Umbilical hernia without obstruction and without gangrene 10/13/2023       Past Surgical History:   Procedure Laterality Date    ANGIOGRAPHY OF INTERNAL MAMMARY VESSEL N/A " 3/11/2022    Procedure: Angiogram Internal Mammary;  Surgeon: Hank Lujan MD;  Location: [facility] CATH/EP LAB;  Service: Cardiology;  Laterality: N/A;    CARDIAC CATHETERIZATION      CARDIAC VALVE SURGERY      CATHETERIZATION OF BOTH LEFT AND RIGHT HEART Left 3/11/2022    Procedure: CATHETERIZATION, HEART, BOTH LEFT AND RIGHT;  Surgeon: Hank Lujan MD;  Location: [facility] CATH/EP LAB;  Service: Cardiology;  Laterality: Left;    CORONARY ANGIOGRAPHY N/A 3/11/2022    Procedure: ANGIOGRAM, CORONARY ARTERY;  Surgeon: Hank Lujan MD;  Location: [facility] CATH/EP LAB;  Service: Cardiology;  Laterality: N/A;    CORONARY BYPASS GRAFT ANGIOGRAPHY  3/11/2022    Procedure: Bypass graft study;  Surgeon: Hank Lujan MD;  Location: [facility] CATH/EP LAB;  Service: Cardiology;;    CYSTOSCOPY N/A 7/30/2019    Procedure: CYSTOSCOPY;  Surgeon: Spencer Nguyen MD;  Location: [facility];  Service: Urology;  Laterality: N/A;    INSERTION OF INTRAVASCULAR MICROAXIAL BLOOD PUMP N/A 8/31/2022    Procedure: INSERTION, IMPELLA;  Surgeon: Zach Jensen MD;  Location: [facility] CATH LAB;  Service: Cardiology;  Laterality: N/A;    INSERTION, CATHETER, DIALYSIS, PERITONEAL N/A 12/7/2023    Procedure: INSERTION, CATHETER, DIALYSIS, PERITONEAL;  Surgeon: Greg Bone Jr., MD;  Location: [facility];  Service: General;  Laterality: N/A;  need PD catheter    PLACEMENT OF SWAN SEE CATHETER WITH IMAGING GUIDANCE  8/31/2022    Procedure: INSERTION, CATHETER, SWAN-SEE, WITH IMAGING GUIDANCE;  Surgeon: Zach Jensen MD;  Location: [facility] CATH LAB;  Service: Cardiology;;    REPAIR, HERNIA, INGUINAL, INCARCERATED, INITIAL, AGE 5 YEARS OR OLDER Right 12/7/2023    Procedure: REPAIR, HERNIA, INGUINAL, INCARCERATED, INITIAL;  Surgeon: Greg Bone Jr., MD;  Location: [facility];  Service: General;  Laterality: Right;    SHOULDER ARTHROSCOPY  1985    TRANSRECTAL BIOPSY OF PROSTATE WITH ULTRASOUND GUIDANCE N/A 7/30/2019    Procedure:  BIOPSY, PROSTATE, RECTAL APPROACH, WITH US GUIDANCE;  Surgeon: Spencer Nguyen MD;  Location: [facility] OR;  Service: Urology;  Laterality: N/A;  procedure not performed, pt unable to tolerate    TRANSRECTAL BIOPSY OF PROSTATE WITH ULTRASOUND GUIDANCE N/A 2019    Procedure: BIOPSY, PROSTATE, RECTAL APPROACH, WITH US GUIDANCE;  Surgeon: Spencer Nguyen MD;  [facility];  Service: Urology;  Laterality: N/A;    UMBILICAL HERNIA REPAIR N/A 2023    Procedure: REPAIR, HERNIA, UMBILICAL;  Surgeon: Greg Bone Jr., MD;  Location: [facility] OR;  Service: General;  Laterality: N/A;       Review of patient's allergies indicates:   Allergen Reactions    Canagliflozin      Other reaction(s): been very dizzy     Current Facility-Administered Medications   Medication Frequency    albumin human 25% bottle 25 g Once    aspirin EC tablet 81 mg Daily    dextrose 10% bolus 125 mL 125 mL PRN    dextrose 10% bolus 250 mL 250 mL PRN    glucagon (human recombinant) injection 1 mg PRN    glucose chewable tablet 16 g PRN    glucose chewable tablet 24 g PRN    insulin aspart U-100 pen 0-5 Units QID (AC + HS) PRN    isosorbide dinitrate tablet 20 mg TID    levalbuterol nebulizer solution 0.63 mg Q6H PRN    levothyroxine tablet 100 mcg Before breakfast    metoprolol succinate (TOPROL-XL) 24 hr tablet 25 mg Daily    mirtazapine tablet 7.5 mg QHS     Family History       Problem Relation (Age of Onset)    Diabetes Father, Sister, Brother    Heart attack Father    Heart disease Father, Brother    Hypertension Father    No Known Problems Mother, Maternal Grandmother, Maternal Grandfather, Paternal Grandmother, Paternal Grandfather, Maternal Aunt, Maternal Uncle, Paternal Aunt, Paternal Uncle          Tobacco Use    Smoking status: Former     Current packs/day: 0.00     Types: Cigarettes     Quit date: 1970     Years since quittin.2    Smokeless tobacco: Never   Substance and Sexual Activity    Alcohol use: Not  Currently     Alcohol/week: 3.0 standard drinks of alcohol     Types: 3 Cans of beer per week    Drug use: No    Sexual activity: Not Currently     Partners: Female     Review of Systems   Constitutional:  Positive for fatigue. Negative for appetite change, chills and fever.   HENT:  Negative for congestion and nosebleeds.    Eyes:  Negative for photophobia and pain.   Respiratory:  Positive for shortness of breath. Negative for cough.    Cardiovascular:  Positive for chest pain and leg swelling. Negative for palpitations.   Gastrointestinal:  Negative for abdominal pain, diarrhea, nausea and vomiting.   Endocrine: Negative for polydipsia, polyphagia and polyuria.   Genitourinary:  Negative for dysuria and frequency.   Musculoskeletal:  Negative for arthralgias and myalgias.   Neurological:  Negative for dizziness, weakness and light-headedness.   Psychiatric/Behavioral:  Negative for agitation and confusion.      Objective:     Vital Signs (Most Recent):  Temp: 97.8 °F (36.6 °C) (09/08/24 0816)  Pulse: 65 (09/08/24 0900)  Resp: 19 (09/08/24 0816)  BP: (!) 97/52 (09/08/24 1015)  SpO2: 100 % (09/08/24 0816) Vital Signs (24h Range):  Temp:  [97.5 °F (36.4 °C)-98.6 °F (37 °C)] 97.8 °F (36.6 °C)  Pulse:  [65-96] 65  Resp:  [16-19] 19  SpO2:  [99 %-100 %] 100 %  BP: ()/(52-83) 97/52     Weight: 73 kg (160 lb 15 oz) (09/08/24 0332)  Body mass index is 21.83 kg/m².  Body surface area is 1.93 meters squared.    No intake/output data recorded.     Physical Exam  Constitutional:       Appearance: Normal appearance.   HENT:      Head: Normocephalic and atraumatic.      Mouth/Throat:      Mouth: Mucous membranes are moist.   Cardiovascular:      Rate and Rhythm: Normal rate and regular rhythm.      Pulses: Normal pulses.      Heart sounds: Normal heart sounds. No murmur heard.     No gallop.   Pulmonary:      Effort: Pulmonary effort is normal.      Breath sounds: Rales present.   Abdominal:      General: Abdomen is flat.  "Bowel sounds are normal. There is no distension.      Palpations: Abdomen is soft.      Tenderness: There is no abdominal tenderness.   Musculoskeletal:         General: No swelling. Normal range of motion.      Cervical back: Normal range of motion.      Right lower leg: Edema present.      Left lower leg: Edema present.   Skin:     General: Skin is warm.   Neurological:      General: No focal deficit present.      Mental Status: He is alert and oriented to person, place, and time.          Significant Labs:  All labs within the past 24 hours have been reviewed.    Significant Imaging:  All relevant imaging reviewed.  Assessment/Plan:     Renal/  Acute kidney injury superimposed on chronic kidney disease  Nephrology consulted for "Hepatology suspects hepatorenal syndrome." Urine Na >20, however, potentially 2/2 ATN or unlisted diuretic use. Will collect urine and spin to assess for cast formation.     PLAN:    - Urine microscopy to look for casts/sediment -> awaiting urine sample collection.  - Lasix 80 BID.         Thank you for your consult. I will follow-up with patient. Please contact us if you have any additional questions.    Derek Hutchison MD  Nephrology  [facility] - Transplant Stepdown  "

## 2024-09-08 NOTE — ASSESSMENT & PLAN NOTE
ERICA is likely due to  CRS vs HRS . Baseline creatinine is  2 . Most recent creatinine and eGFR are listed below.  Recent Labs     09/07/24  2159   CREATININE 4.8*   EGFRNORACEVR 12*      Plan  - ERICA is worsening. Will continue current treatment  - Avoid nephrotoxins and renally dose meds for GFR listed above  - Monitor urine output, serial BMP, and adjust therapy as needed  - nephro consult am   - will get urine Na

## 2024-09-08 NOTE — ASSESSMENT & PLAN NOTE
"Patient's FSGs are controlled on current medication regimen.  Last A1c reviewed-   Lab Results   Component Value Date    HGBA1C 6.0 08/18/2024     Most recent fingerstick glucose reviewed- No results for input(s): "POCTGLUCOSE" in the last 24 hours.  Current correctional scale  Low  Maintain anti-hyperglycemic dose as follows-   Antihyperglycemics (From admission, onward)      Start     Stop Route Frequency Ordered    09/08/24 0511  insulin aspart U-100 pen 0-5 Units         -- SubQ Before meals & nightly PRN 09/08/24 0411          Hold Oral hypoglycemics while patient is in the hospital.  "

## 2024-09-08 NOTE — ASSESSMENT & PLAN NOTE
Patient with known Cirrhosis with Child's class Calculator for Child's score link- https://www.[employer].com/calc/340/child-myles-score-cirrhosis-mortality#creator-insights. Co-morbidities are present and inclusive of ascites.  MELD-Na score calculated; MELD 3.0: 29 at 9/7/2024  9:59 PM  MELD-Na: 31 at 9/7/2024  9:59 PM  Calculated from:  Serum Creatinine: 4.8 mg/dL (Using max of 3 mg/dL) at 9/7/2024  9:59 PM  Serum Sodium: 140 mmol/L (Using max of 137 mmol/L) at 9/7/2024  9:59 PM  Total Bilirubin: 2.7 mg/dL at 9/7/2024  9:59 PM  Serum Albumin: 3.0 g/dL at 9/7/2024  9:59 PM  INR(ratio): 2.0 at 9/7/2024  9:59 PM  Age at listing (hypothetical): 73 years  Sex: Male at 9/7/2024  9:59 PM      Continue chronic meds. Will avoid any hepatotoxic meds, and monitor CBC/CMP/INR for synthetic function.

## 2024-09-08 NOTE — ED PROVIDER NOTES
Encounter Date: 9/7/2024       History     Chief Complaint   Patient presents with    Chest Pain     X4 hours.  Complains of pressure to left side of chest.       Patient presents emergency department with reported chest pain left-sided onset proximally 4 hours prior to arrival pain does not radiate it is associated with shortness of breath no nausea no diaphoresis patient has a history of coronary disease cardiomyopathy congestive heart failure he does have bilateral lower extremity edema which is chronic and unchanged he has also been having difficulty with ascites had paracentesis performed last month in his scheduled for paracentesis again on Monday no fever chills he does have a cough which she was occasionally productive        Review of patient's allergies indicates:   Allergen Reactions    Canagliflozin      Other reaction(s): been very dizzy     Past Medical History:   Diagnosis Date    Asthma     Cardiomyopathy 10/21/2014    CHF (congestive heart failure)     Coronary artery disease     CRF (chronic renal failure)     Diabetes mellitus     Enlarged prostate     Hyperlipidemia     Hypertension     Neuropathy     Syncope 7/29/2024     Past Surgical History:   Procedure Laterality Date    ANGIOGRAPHY OF INTERNAL MAMMARY VESSEL N/A 3/11/2022    Procedure: Angiogram Internal Mammary;  Surgeon: Hank Lujan MD;  Location: [facility] CATH/EP LAB;  Service: Cardiology;  Laterality: N/A;    CARDIAC CATHETERIZATION      CARDIAC VALVE SURGERY      CATHETERIZATION OF BOTH LEFT AND RIGHT HEART Left 3/11/2022    Procedure: CATHETERIZATION, HEART, BOTH LEFT AND RIGHT;  Surgeon: Hank Lujan MD;  Location: [facility] CATH/EP LAB;  Service: Cardiology;  Laterality: Left;    CORONARY ANGIOGRAPHY N/A 3/11/2022    Procedure: ANGIOGRAM, CORONARY ARTERY;  Surgeon: Hank Lujan MD;  Location: [facility] CATH/EP LAB;  Service: Cardiology;  Laterality: N/A;    CORONARY BYPASS GRAFT ANGIOGRAPHY  3/11/2022    Procedure: Bypass graft  study;  Surgeon: Hank Lujan MD;  Location: [facility] CATH/EP LAB;  Service: Cardiology;;    CYSTOSCOPY N/A 7/30/2019    Procedure: CYSTOSCOPY;  Surgeon: Spencer Nguyen MD;  Location: [facility];  Service: Urology;  Laterality: N/A;    INSERTION OF INTRAVASCULAR MICROAXIAL BLOOD PUMP N/A 8/31/2022    Procedure: INSERTION, IMPELLA;  Surgeon: Zach Jensen MD;  Location: [facility] CATH LAB;  Service: Cardiology;  Laterality: N/A;    INSERTION, CATHETER, DIALYSIS, PERITONEAL N/A 12/7/2023    Procedure: INSERTION, CATHETER, DIALYSIS, PERITONEAL;  Surgeon: Greg Bone Jr., MD;  Location: [facility];  Service: General;  Laterality: N/A;  need PD catheter    PLACEMENT OF SWAN SEE CATHETER WITH IMAGING GUIDANCE  8/31/2022    Procedure: INSERTION, CATHETER, SWAN-SEE, WITH IMAGING GUIDANCE;  Surgeon: Zach Jensen MD;  Location: [facility] CATH LAB;  Service: Cardiology;;    REPAIR, HERNIA, INGUINAL, INCARCERATED, INITIAL, AGE 5 YEARS OR OLDER Right 12/7/2023    Procedure: REPAIR, HERNIA, INGUINAL, INCARCERATED, INITIAL;  Surgeon: Greg Bone Jr., MD;  Location: [facility];  Service: General;  Laterality: Right;    SHOULDER ARTHROSCOPY  1985    TRANSRECTAL BIOPSY OF PROSTATE WITH ULTRASOUND GUIDANCE N/A 7/30/2019    Procedure: BIOPSY, PROSTATE, RECTAL APPROACH, WITH US GUIDANCE;  Surgeon: Spencer Nguyen MD;  Location: [facility];  Service: Urology;  Laterality: N/A;  procedure not performed, pt unable to tolerate    TRANSRECTAL BIOPSY OF PROSTATE WITH ULTRASOUND GUIDANCE N/A 8/8/2019    Procedure: BIOPSY, PROSTATE, RECTAL APPROACH, WITH US GUIDANCE;  Surgeon: Spencer Nguyen MD;  Location: [facility] OR;  Service: Urology;  Laterality: N/A;    UMBILICAL HERNIA REPAIR N/A 12/7/2023    Procedure: REPAIR, HERNIA, UMBILICAL;  Surgeon: Greg Bone Jr., MD;  Location: [facility];  Service: General;  Laterality: N/A;     Family History   Problem Relation Name Age of Onset    No Known Problems Mother       Diabetes Father      Hypertension Father      Heart attack Father      Heart disease Father      Diabetes Sister 5     Heart disease Brother 4     Diabetes Brother 4     No Known Problems Maternal Grandmother      No Known Problems Maternal Grandfather      No Known Problems Paternal Grandmother      No Known Problems Paternal Grandfather      No Known Problems Maternal Aunt      No Known Problems Maternal Uncle      No Known Problems Paternal Aunt      No Known Problems Paternal Uncle      Anemia Neg Hx      Arrhythmia Neg Hx      Asthma Neg Hx      Clotting disorder Neg Hx      Fainting Neg Hx      Heart failure Neg Hx      Hyperlipidemia Neg Hx      Glaucoma Neg Hx       Social History     Tobacco Use    Smoking status: Former     Current packs/day: 0.00     Types: Cigarettes     Quit date: 1970     Years since quittin.2    Smokeless tobacco: Never   Substance Use Topics    Alcohol use: Not Currently     Alcohol/week: 3.0 standard drinks of alcohol     Types: 3 Cans of beer per week    Drug use: No     Review of Systems   Constitutional:  Positive for activity change. Negative for appetite change, chills, diaphoresis and fever.   Respiratory:  Positive for cough and shortness of breath. Negative for wheezing.    Cardiovascular:  Positive for chest pain and leg swelling.   Gastrointestinal:  Negative for abdominal pain, diarrhea, nausea and vomiting.   All other systems reviewed and are negative.      Physical Exam     Initial Vitals   BP Pulse Resp Temp SpO2   24 2139 24 2139 24 21324 2140 24   (!) 153/80 67 18 97.5 °F (36.4 °C) 100 %      MAP       --                Physical Exam    Constitutional: He appears well-developed and well-nourished. No distress.   HENT:   Head: Normocephalic and atraumatic.   Right Ear: External ear normal.   Left Ear: External ear normal.   Mouth/Throat: Oropharynx is clear and moist.   Eyes: Pupils are equal, round, and reactive to  light.   Neck: Neck supple.   Normal range of motion.  Cardiovascular:  Normal rate, regular rhythm, S1 normal, S2 normal, normal heart sounds and intact distal pulses.           Pulmonary/Chest: No respiratory distress. He has rales.   Abdominal: Abdomen is soft. Bowel sounds are normal. There is no abdominal tenderness.   Musculoskeletal:         General: Edema present. Normal range of motion.      Cervical back: Normal range of motion and neck supple.     Neurological: He is alert and oriented to person, place, and time. He has normal strength. GCS eye subscore is 4. GCS verbal subscore is 5. GCS motor subscore is 6.   Skin: Skin is warm and dry. No rash noted.   Psychiatric: He has a normal mood and affect. His behavior is normal.         ED Course   Procedures  Labs Reviewed   CBC W/ AUTO DIFFERENTIAL - Abnormal       Result Value    WBC 5.65      RBC 3.79 (*)     Hemoglobin 11.0 (*)     Hematocrit 33.2 (*)     MCV 88      MCH 29.0      MCHC 33.1      RDW 23.3 (*)     Platelets 62 (*)     MPV SEE COMMENT      Immature Granulocytes 0.7 (*)     Gran # (ANC) 4.2      Immature Grans (Abs) 0.04      Lymph # 0.8 (*)     Mono # 0.5      Eos # 0.1      Baso # 0.06      nRBC 2 (*)     Gran % 74.7 (*)     Lymph % 14.3 (*)     Mono % 8.1      Eosinophil % 1.1      Basophil % 1.1      Platelet Estimate Decreased (*)     Aniso Moderate      Poik Moderate      Poly Occasional      Ovalocytes Occasional      Target Cells Occasional      Meghan Cells Occasional      Schistocytes Present      Fragmented Cells Occasional      Differential Method Automated     COMPREHENSIVE METABOLIC PANEL - Abnormal    Sodium 140      Potassium 5.3 (*)     Chloride 110      CO2 12 (*)     Glucose 143 (*)     BUN 69 (*)     Creatinine 4.8 (*)     Calcium 8.3 (*)     Total Protein 6.6      Albumin 3.0 (*)     Total Bilirubin 2.7 (*)     Alkaline Phosphatase 90       (*)      (*)     eGFR 12 (*)     Anion Gap 18 (*)    TROPONIN I -  Abnormal    Troponin I 0.196 (*)     Narrative:       Trop critical result(s) called and verbal readback obtained from   Fernando Das RN ED by MAP 09/07/2024 23:07   B-TYPE NATRIURETIC PEPTIDE - Abnormal    BNP >4,900 (*)    APTT - Abnormal    aPTT 44.4 (*)    PROTIME-INR - Abnormal    Prothrombin Time 20.5 (*)     INR 2.0 (*)    MAGNESIUM    Magnesium 2.2            Imaging Results              X-Ray Chest AP Portable (In process)                      Medications   aspirin tablet 325 mg (325 mg Oral Given 9/7/24 2206)   nitroGLYCERIN 2% TD oint ointment 0.5 inch (0.5 inches Topical (Top) Given 9/7/24 2206)   morphine injection 2 mg (2 mg Intravenous Given 9/7/24 2206)   sodium zirconium cyclosilicate packet 5 g (5 g Oral Given 9/7/24 2327)   furosemide injection 40 mg (40 mg Intravenous Given 9/7/24 2327)     Medical Decision Making  Laboratory evaluation shows significant elevation of patient's BNP elevation of his troponin potassium was 5.3 he has interval worsening in his renal function and elevation in his liver enzymes he has received Lasix nitro paste in the emergency department he took aspirin prior to arrival emergency department I have given him a dose of Lokelma for the elevation in his potassium of 5.3 Hospital Medicine who felt patient was best served at [facility] given his multiple medical conditions and patient is being followed by hepatology I have discussed patient's findings with hepatology at [facility] and Hospital Medicine will transfer patient there for further evaluation and treatment    Amount and/or Complexity of Data Reviewed  Labs: ordered.  Radiology: ordered.    Risk  OTC drugs.  Prescription drug management.                                      Clinical Impression:  Final diagnoses:  [R07.9] Chest pain  [I50.9] Congestive heart failure, unspecified HF chronicity, unspecified heart failure type (Primary)  [E87.5] Hyperkalemia  [K72.10] End stage liver disease          ED Disposition  Condition    Transfer to Another Facility Stable                Heri Cuevas MD  09/08/24 0159

## 2024-09-08 NOTE — SUBJECTIVE & OBJECTIVE
Past Medical History:   Diagnosis Date    Asthma     Cardiomyopathy 10/21/2014    CHF (congestive heart failure)     Coronary artery disease     CRF (chronic renal failure)     Diabetes mellitus     Enlarged prostate     Hyperlipidemia     Hypertension     Neuropathy     Syncope 07/29/2024    Umbilical hernia without obstruction and without gangrene 10/13/2023       Past Surgical History:   Procedure Laterality Date    ANGIOGRAPHY OF INTERNAL MAMMARY VESSEL N/A 3/11/2022    Procedure: Angiogram Internal Mammary;  Surgeon: Hank Lujan MD;  Location: [facility] CATH/EP LAB;  Service: Cardiology;  Laterality: N/A;    CARDIAC CATHETERIZATION      CARDIAC VALVE SURGERY      CATHETERIZATION OF BOTH LEFT AND RIGHT HEART Left 3/11/2022    Procedure: CATHETERIZATION, HEART, BOTH LEFT AND RIGHT;  Surgeon: Hank Lujan MD;  Location: [facility] CATH/EP LAB;  Service: Cardiology;  Laterality: Left;    CORONARY ANGIOGRAPHY N/A 3/11/2022    Procedure: ANGIOGRAM, CORONARY ARTERY;  Surgeon: Hank Lujan MD;  Location: [facility] CATH/EP LAB;  Service: Cardiology;  Laterality: N/A;    CORONARY BYPASS GRAFT ANGIOGRAPHY  3/11/2022    Procedure: Bypass graft study;  Surgeon: Hank Lujan MD;  Location: [facility] CATH/EP LAB;  Service: Cardiology;;    CYSTOSCOPY N/A 7/30/2019    Procedure: CYSTOSCOPY;  Surgeon: Spencer Nguyen MD;  Location: [facility] OR;  Service: Urology;  Laterality: N/A;    INSERTION OF INTRAVASCULAR MICROAXIAL BLOOD PUMP N/A 8/31/2022    Procedure: INSERTION, IMPELLA;  Surgeon: Zach Jensen MD;  Location: [facility] CATH LAB;  Service: Cardiology;  Laterality: N/A;    INSERTION, CATHETER, DIALYSIS, PERITONEAL N/A 12/7/2023    Procedure: INSERTION, CATHETER, DIALYSIS, PERITONEAL;  Surgeon: Greg Bone Jr., MD;  Location: [facility] OR;  Service: General;  Laterality: N/A;  need PD catheter    PLACEMENT OF SWAN SEE CATHETER WITH IMAGING GUIDANCE  8/31/2022    Procedure: INSERTION, CATHETER, SWAN-SEE, WITH IMAGING  GUIDANCE;  Surgeon: Zach Jensen MD;  Location: [facility] CATH LAB;  Service: Cardiology;;    REPAIR, HERNIA, INGUINAL, INCARCERATED, INITIAL, AGE 5 YEARS OR OLDER Right 12/7/2023    Procedure: REPAIR, HERNIA, INGUINAL, INCARCERATED, INITIAL;  Surgeon: Greg Bone Jr., MD;  Location: [facility];  Service: General;  Laterality: Right;    SHOULDER ARTHROSCOPY  1985    TRANSRECTAL BIOPSY OF PROSTATE WITH ULTRASOUND GUIDANCE N/A 7/30/2019    Procedure: BIOPSY, PROSTATE, RECTAL APPROACH, WITH US GUIDANCE;  Surgeon: Spencer Nguyen MD;  Location: [facility] OR;  Service: Urology;  Laterality: N/A;  procedure not performed, pt unable to tolerate    TRANSRECTAL BIOPSY OF PROSTATE WITH ULTRASOUND GUIDANCE N/A 8/8/2019    Procedure: BIOPSY, PROSTATE, RECTAL APPROACH, WITH US GUIDANCE;  Surgeon: Spencer Nguyen MD;  Location: [facility] OR;  Service: Urology;  Laterality: N/A;    UMBILICAL HERNIA REPAIR N/A 12/7/2023    Procedure: REPAIR, HERNIA, UMBILICAL;  Surgeon: Greg Bone Jr., MD;  Location: [facility];  Service: General;  Laterality: N/A;       Review of patient's allergies indicates:   Allergen Reactions    Canagliflozin      Other reaction(s): been very dizzy     Current Facility-Administered Medications   Medication Frequency    albumin human 25% bottle 25 g Once    aspirin EC tablet 81 mg Daily    dextrose 10% bolus 125 mL 125 mL PRN    dextrose 10% bolus 250 mL 250 mL PRN    glucagon (human recombinant) injection 1 mg PRN    glucose chewable tablet 16 g PRN    glucose chewable tablet 24 g PRN    insulin aspart U-100 pen 0-5 Units QID (AC + HS) PRN    isosorbide dinitrate tablet 20 mg TID    levalbuterol nebulizer solution 0.63 mg Q6H PRN    levothyroxine tablet 100 mcg Before breakfast    metoprolol succinate (TOPROL-XL) 24 hr tablet 25 mg Daily    mirtazapine tablet 7.5 mg QHS     Family History       Problem Relation (Age of Onset)    Diabetes Father, Sister, Brother    Heart attack Father    Heart  disease Father, Brother    Hypertension Father    No Known Problems Mother, Maternal Grandmother, Maternal Grandfather, Paternal Grandmother, Paternal Grandfather, Maternal Aunt, Maternal Uncle, Paternal Aunt, Paternal Uncle          Tobacco Use    Smoking status: Former     Current packs/day: 0.00     Types: Cigarettes     Quit date: 1970     Years since quittin.2    Smokeless tobacco: Never   Substance and Sexual Activity    Alcohol use: Not Currently     Alcohol/week: 3.0 standard drinks of alcohol     Types: 3 Cans of beer per week    Drug use: No    Sexual activity: Not Currently     Partners: Female     Review of Systems   Constitutional:  Positive for fatigue. Negative for appetite change, chills and fever.   HENT:  Negative for congestion and nosebleeds.    Eyes:  Negative for photophobia and pain.   Respiratory:  Positive for shortness of breath. Negative for cough.    Cardiovascular:  Positive for chest pain and leg swelling. Negative for palpitations.   Gastrointestinal:  Negative for abdominal pain, diarrhea, nausea and vomiting.   Endocrine: Negative for polydipsia, polyphagia and polyuria.   Genitourinary:  Negative for dysuria and frequency.   Musculoskeletal:  Negative for arthralgias and myalgias.   Neurological:  Negative for dizziness, weakness and light-headedness.   Psychiatric/Behavioral:  Negative for agitation and confusion.      Objective:     Vital Signs (Most Recent):  Temp: 97.8 °F (36.6 °C) (24 0816)  Pulse: 65 (24 0900)  Resp: 19 (24 0816)  BP: (!) 97/52 (24 1015)  SpO2: 100 % (24 0816) Vital Signs (24h Range):  Temp:  [97.5 °F (36.4 °C)-98.6 °F (37 °C)] 97.8 °F (36.6 °C)  Pulse:  [65-96] 65  Resp:  [16-19] 19  SpO2:  [99 %-100 %] 100 %  BP: ()/(52-83) 97/52     Weight: 73 kg (160 lb 15 oz) (24 0332)  Body mass index is 21.83 kg/m².  Body surface area is 1.93 meters squared.    No intake/output data recorded.     Physical  Exam  Constitutional:       Appearance: Normal appearance.   HENT:      Head: Normocephalic and atraumatic.      Mouth/Throat:      Mouth: Mucous membranes are moist.   Cardiovascular:      Rate and Rhythm: Normal rate and regular rhythm.      Pulses: Normal pulses.      Heart sounds: Normal heart sounds. No murmur heard.     No gallop.   Pulmonary:      Effort: Pulmonary effort is normal.      Breath sounds: Rales present.   Abdominal:      General: Abdomen is flat. Bowel sounds are normal. There is no distension.      Palpations: Abdomen is soft.      Tenderness: There is no abdominal tenderness.   Musculoskeletal:         General: No swelling. Normal range of motion.      Cervical back: Normal range of motion.      Right lower leg: Edema present.      Left lower leg: Edema present.   Skin:     General: Skin is warm.   Neurological:      General: No focal deficit present.      Mental Status: He is alert and oriented to person, place, and time.          Significant Labs:  All labs within the past 24 hours have been reviewed.    Significant Imaging:  All relevant imaging reviewed.

## 2024-09-08 NOTE — ED NOTES
Pt denies the need to urinate. Empty urinal at bedside in case of urination. Pt educated on importance of getting urine.

## 2024-09-08 NOTE — SUBJECTIVE & OBJECTIVE
Past Medical History:   Diagnosis Date    Asthma     Cardiomyopathy 10/21/2014    CHF (congestive heart failure)     Coronary artery disease     CRF (chronic renal failure)     Diabetes mellitus     Enlarged prostate     Hyperlipidemia     Hypertension     Neuropathy     Syncope 7/29/2024       Past Surgical History:   Procedure Laterality Date    ANGIOGRAPHY OF INTERNAL MAMMARY VESSEL N/A 3/11/2022    Procedure: Angiogram Internal Mammary;  Surgeon: Hank Lujan MD;  Location: [facility] CATH/EP LAB;  Service: Cardiology;  Laterality: N/A;    CARDIAC CATHETERIZATION      CARDIAC VALVE SURGERY      CATHETERIZATION OF BOTH LEFT AND RIGHT HEART Left 3/11/2022    Procedure: CATHETERIZATION, HEART, BOTH LEFT AND RIGHT;  Surgeon: Hank Lujan MD;  Location: [facility] CATH/EP LAB;  Service: Cardiology;  Laterality: Left;    CORONARY ANGIOGRAPHY N/A 3/11/2022    Procedure: ANGIOGRAM, CORONARY ARTERY;  Surgeon: Hank Lujan MD;  Location: [facility] CATH/EP LAB;  Service: Cardiology;  Laterality: N/A;    CORONARY BYPASS GRAFT ANGIOGRAPHY  3/11/2022    Procedure: Bypass graft study;  Surgeon: Hank Lujan MD;  Location: [facility] CATH/EP LAB;  Service: Cardiology;;    CYSTOSCOPY N/A 7/30/2019    Procedure: CYSTOSCOPY;  Surgeon: Spencer Nguyen MD;  Location: [facility] OR;  Service: Urology;  Laterality: N/A;    INSERTION OF INTRAVASCULAR MICROAXIAL BLOOD PUMP N/A 8/31/2022    Procedure: INSERTION, IMPELLA;  Surgeon: Zach Jensen MD;  Location: [facility] CATH LAB;  Service: Cardiology;  Laterality: N/A;    INSERTION, CATHETER, DIALYSIS, PERITONEAL N/A 12/7/2023    Procedure: INSERTION, CATHETER, DIALYSIS, PERITONEAL;  Surgeon: Greg Bone Jr., MD;  Location: [facility] OR;  Service: General;  Laterality: N/A;  need PD catheter    PLACEMENT OF SWAN SEE CATHETER WITH IMAGING GUIDANCE  8/31/2022    Procedure: INSERTION, CATHETER, SWAN-SEE, WITH IMAGING GUIDANCE;  Surgeon: Zach Jensen MD;  Location: [facility] CATH LAB;   Service: Cardiology;;    REPAIR, HERNIA, INGUINAL, INCARCERATED, INITIAL, AGE 5 YEARS OR OLDER Right 12/7/2023    Procedure: REPAIR, HERNIA, INGUINAL, INCARCERATED, INITIAL;  Surgeon: Greg Bone Jr., MD;  Location: [facility] OR;  Service: General;  Laterality: Right;    SHOULDER ARTHROSCOPY  1985    TRANSRECTAL BIOPSY OF PROSTATE WITH ULTRASOUND GUIDANCE N/A 7/30/2019    Procedure: BIOPSY, PROSTATE, RECTAL APPROACH, WITH US GUIDANCE;  Surgeon: Spencer Nguyen MD;  Location: [facility] OR;  Service: Urology;  Laterality: N/A;  procedure not performed, pt unable to tolerate    TRANSRECTAL BIOPSY OF PROSTATE WITH ULTRASOUND GUIDANCE N/A 8/8/2019    Procedure: BIOPSY, PROSTATE, RECTAL APPROACH, WITH US GUIDANCE;  Surgeon: Spencer Nguyen MD;  Location: [facility] OR;  Service: Urology;  Laterality: N/A;    UMBILICAL HERNIA REPAIR N/A 12/7/2023    Procedure: REPAIR, HERNIA, UMBILICAL;  Surgeon: Greg Bone Jr., MD;  Location: [facility];  Service: General;  Laterality: N/A;       Review of patient's allergies indicates:   Allergen Reactions    Canagliflozin      Other reaction(s): been very dizzy       Current Facility-Administered Medications on File Prior to Encounter   Medication    [COMPLETED] aspirin tablet 325 mg    [COMPLETED] furosemide injection 40 mg    [COMPLETED] morphine injection 2 mg    [COMPLETED] nitroGLYCERIN 2% TD oint ointment 0.5 inch    [COMPLETED] sodium zirconium cyclosilicate packet 5 g     Current Outpatient Medications on File Prior to Encounter   Medication Sig    aspirin (ECOTRIN) 81 MG EC tablet Take 81 mg by mouth once daily.    atorvastatin (LIPITOR) 40 MG tablet Take 1 tablet (40 mg total) by mouth once daily.    cyanocobalamin 1,000 mcg/mL injection Inject 1 mL (1,000 mcg total) into the muscle every 30 days.    empagliflozin (JARDIANCE) 25 mg tablet Take 1 tablet (25 mg total) by mouth once daily.    fluticasone furoate-vilanteroL (BREO ELLIPTA) 100-25 mcg/dose diskus inhaler  Inhale 1 puff into the lungs once daily. (DAILY CONTROLLER)    fluticasone propionate (FLONASE) 50 mcg/actuation nasal spray USE 1 SPRAY (50 MCG TOTAL) IN EACH NOSTRIL ONCE DAILY    gabapentin (NEURONTIN) 300 MG capsule Take 1 capsule (300 mg total) by mouth every evening.    hydrALAZINE (APRESOLINE) 50 MG tablet Take 1 tablet (50 mg total) by mouth 2 (two) times a day.    isosorbide dinitrate (ISORDIL) 20 MG tablet Take 20 mg by mouth 3 (three) times daily.    levalbuterol (XOPENEX) 0.63 mg/3 mL nebulizer solution Inhale 0.63 mg into the lungs every 6 (six) hours as needed for Shortness of Breath.    levothyroxine (SYNTHROID) 100 MCG tablet Take 1 tablet (100 mcg total) by mouth before breakfast. With no other meds or food    linaCLOtide (LINZESS) 72 mcg Cap capsule Take 1 capsule (72 mcg total) by mouth before breakfast.    metoprolol succinate (TOPROL-XL) 25 MG 24 hr tablet Take 1 tablet (25 mg total) by mouth once daily.    mirtazapine (REMERON) 7.5 MG Tab Take 1 tablet (7.5 mg total) by mouth every evening.    omeprazole (PRILOSEC) 40 MG capsule Take 1 capsule (40 mg total) by mouth once daily.    sacubitriL-valsartan (ENTRESTO) 24-26 mg per tablet Take 1 tablet by mouth 2 (two) times daily.    VERQUVO 5 mg Tab Take 5 mg by mouth once daily.     Family History       Problem Relation (Age of Onset)    Diabetes Father, Sister, Brother    Heart attack Father    Heart disease Father, Brother    Hypertension Father    No Known Problems Mother, Maternal Grandmother, Maternal Grandfather, Paternal Grandmother, Paternal Grandfather, Maternal Aunt, Maternal Uncle, Paternal Aunt, Paternal Uncle          Tobacco Use    Smoking status: Former     Current packs/day: 0.00     Types: Cigarettes     Quit date: 1970     Years since quittin.2    Smokeless tobacco: Never   Substance and Sexual Activity    Alcohol use: Not Currently     Alcohol/week: 3.0 standard drinks of alcohol     Types: 3 Cans of beer per week     Drug use: No    Sexual activity: Not Currently     Partners: Female     Review of Systems   Constitutional:  Positive for fatigue. Negative for appetite change, chills and fever.   HENT:  Negative for congestion and nosebleeds.    Eyes:  Negative for photophobia and pain.   Respiratory:  Positive for shortness of breath. Negative for cough.    Cardiovascular:  Positive for chest pain and leg swelling. Negative for palpitations.   Gastrointestinal:  Negative for abdominal pain, diarrhea, nausea and vomiting.   Endocrine: Negative for polydipsia, polyphagia and polyuria.   Genitourinary:  Negative for dysuria and frequency.   Musculoskeletal:  Negative for arthralgias and myalgias.   Neurological:  Negative for dizziness, weakness and light-headedness.   Psychiatric/Behavioral:  Negative for agitation and confusion.      Objective:     Vital Signs (Most Recent):  Temp: 98.6 °F (37 °C) (09/08/24 0332)  Pulse: 96 (09/08/24 0332)  Resp: 16 (09/08/24 0332)  BP: 127/73 (09/08/24 0332)  SpO2: 100 % (09/08/24 0332) Vital Signs (24h Range):  Temp:  [97.5 °F (36.4 °C)-98.6 °F (37 °C)] 98.6 °F (37 °C)  Pulse:  [66-96] 96  Resp:  [16-18] 16  SpO2:  [99 %-100 %] 100 %  BP: (124-153)/(66-83) 127/73     Weight: 73 kg (160 lb 15 oz)  Body mass index is 21.83 kg/m².     Physical Exam  Constitutional:       Appearance: Normal appearance.   HENT:      Head: Normocephalic and atraumatic.      Mouth/Throat:      Mouth: Mucous membranes are moist.   Cardiovascular:      Rate and Rhythm: Normal rate and regular rhythm.      Pulses: Normal pulses.      Heart sounds: Normal heart sounds. No murmur heard.     No gallop.   Pulmonary:      Effort: Pulmonary effort is normal.      Breath sounds: Rales present.   Abdominal:      General: Abdomen is flat. Bowel sounds are normal. There is no distension.      Palpations: Abdomen is soft.      Tenderness: There is no abdominal tenderness.   Musculoskeletal:         General: No swelling. Normal range  of motion.      Cervical back: Normal range of motion.      Right lower leg: Edema present.      Left lower leg: Edema present.   Skin:     General: Skin is warm.   Neurological:      General: No focal deficit present.      Mental Status: He is alert and oriented to person, place, and time.                Significant Labs: All pertinent labs within the past 24 hours have been reviewed.    Significant Imaging: I have reviewed all pertinent imaging results/findings within the past 24 hours.

## 2024-09-08 NOTE — ED NOTES
Transfer center called with bed assignment and setting up transport at this time. Pts family and pt notified pt will go to [facility] room [id number].

## 2024-09-08 NOTE — HPI
Baldo Carney is a 73 year old Black man with former cigarette smoking (quit in 1970), hypertension, diabetes mellitus type 2 with retinopathy and peripheral neuropathy, coronary artery disease status post coronary artery bypass graft (saphenous vein graft to right coronary artery) on 11/3/2014, status post percutaneous coronary intervention with drug eluting stent placements in left main to left anterior descending artery and left main to lateral circumflex artery on 8/31/2022, history of aortic valve replacement with porcine bioprosthesis on 11/3/2014, ischemic cardiomyopathy with chronic biventricular systolic and diastolic heart failure, chronic kidney disease stage 3-4, anemia, benign prostatic hyperplasia, hypothyroidism, cirrhosis with ascites requiring bimonthly paracentesis, chronic obstructive pulmonary disease (COPD), history of incarcerated inguinal hernia repair on 12/7/2023. He lives in [place], [place].    [facility] 8/18/2024 for syncope, cystitis, hypoglycemia, COVID-19. He underwent paracentesis on admission with 6 liters of fluid removed and albumin given.    He was seen by his primary care nurse practitioner in clinic on 8/27/2024 and was prescribed mirtazapine for situational anxiety and omeprazole for heartburn.   [facility]24 with left sided chest pain for 4 hours without radiation, associated with shortness of breath. He also had unchanged chronic bilateral lower extremity edema. He was scheduled for paracentesis on Monday 9/9/2024. Labs showed elevated BNP (>4900 pg/mL) and troponin (0.196 ng/mL) and acute kidney injury (BUN 69 mg/dL and creatinine 4.8 mg/dL from 37 and 1.9 on 8/23/2024), suggestive of volume overload due to cirrhosis and heart failure. He was transferred to [facility] on 9/8/2024 and admitted to Hospital Medicine  Brendon GRIJALVA.

## 2024-09-08 NOTE — ASSESSMENT & PLAN NOTE
"Nephrology consulted for "Hepatology suspects hepatorenal syndrome." Urine Na >20, however, potentially 2/2 ATN or unlisted diuretic use. Will collect urine and spin to assess for cast formation.     PLAN:    - Urine microscopy to look for casts.  - Lasix 80 BID.   "

## 2024-09-08 NOTE — ASSESSMENT & PLAN NOTE
Patient with known Cirrhosis with Child's class Calculator for Child's score link- https://[email or web address].TRiQ/calc/340/child-myles-score-cirrhosis-mortality#creator-insights. Co-morbidities are present and inclusive of ascites.  MELD-Na score calculated; MELD 3.0: 30 at 9/8/2024  6:42 AM  MELD-Na: 32 at 9/8/2024  6:42 AM  Calculated from:  Serum Creatinine: 4.6 mg/dL (Using max of 3 mg/dL) at 9/8/2024  6:42 AM  Serum Sodium: 135 mmol/L at 9/8/2024  6:42 AM  Total Bilirubin: 2.2 mg/dL at 9/8/2024  6:42 AM  Serum Albumin: 2.7 g/dL at 9/8/2024  6:42 AM  INR(ratio): 2.1 at 9/8/2024  6:42 AM  Age at listing (hypothetical): 73 years  Sex: Male at 9/8/2024  6:42 AM    Transferred here for Hepatology. Appreciate Hepatology. 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

## 2024-09-08 NOTE — PROVIDER TRANSFER
Outside Transfer Acceptance Note / Regional Referral Center    Referring facility: [facility]   Referring provider: AMANDA MATHUR ARCHIE C.  Accepting facility: [facility]  Accepting provider: ROBERTO GANDARA  Admitting provider: Roberto Gandara  Reason for transfer:  hepatology service  Transfer diagnosis: decompensated cirrhosis  Transfer specialty requested: Hepatology  Transfer specialty notified: Yes  Transfer level: NUMBER 1-5: 2  Bed type requested: stepdown  Isolation status: No active isolations   Admission class or status: IP- Inpatient      Narrative     73 year old male with a previous medical history of COPD, cirrhosis of the liver with bi monthly paracentesis, cardiomyopathy, CHF, aortic stenosis, CAD, HTN, HLD, BPH, chronic renal failure, and DMII who presented to the emergency room with left sided chest pain x 4 hours.  S/p paracentesis 8/12/24 with 6L fluid removal and albumin replacement    Elevated troponin, BNP with ERICA on CKD suggest volume overload s/t decompensated cirrhosis and biventricular HFrEF 22%. Thrombocytopenia s/t chronic liver disease. Transfer accepted for further IV diuresis and hepatology consult    Discussed with hepatology Dr Wynne who will see as consult upon transfer.  Objective     Vitals: Temp: 97.5 °F (36.4 °C) (09/07/24 2140)  Pulse: 86 (09/08/24 0129)  Resp: 16 (09/07/24 2206)  BP: (!) 143/83 (09/08/24 0129)  SpO2: 100 % (09/08/24 0129)  Recent Labs: All pertinent labs within the past 24 hours have been reviewed.  BMP:   Recent Labs   Lab 09/07/24 2159   *      K 5.3*      CO2 12*   BUN 69*   CREATININE 4.8*   CALCIUM 8.3*   MG 2.2     CBC:   Recent Labs   Lab 09/07/24 2159   WBC 5.65   HGB 11.0*   HCT 33.2*   PLT 62*     CMP:   Recent Labs   Lab 09/07/24 2159      K 5.3*      CO2 12*   *   BUN 69*   CREATININE 4.8*   CALCIUM 8.3*   PROT 6.6    ALBUMIN 3.0*   BILITOT 2.7*   ALKPHOS 90   *   *   ANIONGAP 18*     Cardiac Markers:   Recent Labs   Lab 09/07/24 2159   BNP >4,900*     Magnesium:   Recent Labs   Lab 09/07/24 2159   MG 2.2     Troponin:   Recent Labs   Lab 09/07/24 2159   TROPONINI 0.196*     Echo: 7/28/24:    Left Ventricle: The left ventricle is mildly dilated. Normal wall thickness. Global hypokinesis present. Septal motion is consistent with bundle branch block. There is severely reduced systolic function. Biplane (2D) method of discs ejection fraction is 22%. Grade II diastolic dysfunction.    Right Ventricle: Mild right ventricular enlargement. Systolic function is moderately reduced. Pacemaker lead present in the ventricle.    Left Atrium: Left atrium is moderately dilated.    Right Atrium: Right atrium is moderately dilated.    Aortic Valve: Not well visualized due to poor acoustic window. There is a bioprosthetic valve in the aortic position. Moderately calcified cusps. Aortic valve area by VTI is 1.23 cm². Aortic valve peak velocity is 2.18 m/s. Mean gradient is 10 mmHg. The dimensionless index is 0.38. There is mild to moderate aortic regurgitation.    Mitral Valve: There is moderate bileaflet sclerosis. There is mitral annular calcification present. There is mild to moderate regurgitation.    Tricuspid Valve: There is moderate regurgitation.    Pulmonic Valve: There is mild to moderate regurgitation.    Pulmonary Artery: There is pulmonary hypertension. The estimated pulmonary artery systolic pressure is 71 mmHg.    IVC/SVC: Elevated venous pressure at 15 mmHg.       IV access:        Peripheral IV - Single Lumen 09/07/24 18 G Anterior;Left;Proximal Forearm (Active)     Infusions: IV lasix  Allergies:   Review of patient's allergies indicates:   Allergen Reactions    Canagliflozin      Other reaction(s): been very dizzy      NPO: Yes    Anticoagulation:   Anticoagulants       None             Instructions      [address]-  Admit to Hospital Medicine  Upon patient arrival to floor, please send SecureChat to [facility] or call extension [phone] (if no answer, do NOT leave a callback number after the beep, rather please send a SecureChat to [facility]), for Hospital Medicine admit team assignment and for additional admit orders for the patient.  Do not page the attending physician associated with the patient on arrival (this physician may not be on duty at the time of arrival).  Rather, always send a SecureChat to [facility] or call [phone] to reach the triage physician for orders and team assignment.

## 2024-09-08 NOTE — HPI
"73 year old male with a previous medical history of COPD, cirrhosis of the liver with bi monthly paracentesis, cardiomyopathy, CHF, aortic stenosis, CAD, HTN, HLD, BPH, chronic renal failure, and DMII who presented to the emergency room with left sided chest pain x 4 hours. S/p paracentesis 8/12/24 with 6L fluid removal and albumin replacement     Elevated troponin, BNP with ERICA on CKD suggest volume overload s/t decompensated cirrhosis and biventricular HFrEF 22%. Thrombocytopenia s/t chronic liver disease.     Nephrology consulted for "Hepatology suspects hepatorenal syndrome." Urine Na >20.   "

## 2024-09-08 NOTE — ASSESSMENT & PLAN NOTE
Chronic, controlled. Latest blood pressure and vitals reviewed-     Temp:  [97.5 °F (36.4 °C)-98.6 °F (37 °C)]   Pulse:  [66-96]   Resp:  [16-18]   BP: (124-153)/(66-83)   SpO2:  [99 %-100 %] .   Home meds for hypertension were reviewed and noted below.   Hypertension Medications               hydrALAZINE (APRESOLINE) 50 MG tablet Take 1 tablet (50 mg total) by mouth 2 (two) times a day.    isosorbide dinitrate (ISORDIL) 20 MG tablet Take 20 mg by mouth 3 (three) times daily.    metoprolol succinate (TOPROL-XL) 25 MG 24 hr tablet Take 1 tablet (25 mg total) by mouth once daily.    sacubitriL-valsartan (ENTRESTO) 24-26 mg per tablet Take 1 tablet by mouth 2 (two) times daily.            While in the hospital, will manage blood pressure as follows; Continue home antihypertensive regimen    Will utilize p.r.n. blood pressure medication only if patient's blood pressure greater than 180/110 and he develops symptoms such as worsening chest pain or shortness of breath.

## 2024-09-08 NOTE — ASSESSMENT & PLAN NOTE
Patient is identified as having Systolic (HFrEF) heart failure that is Acute on chronic. CHF is currently uncontrolled due to Continued edema of extremities. Latest ECHO performed and demonstrates- Results for orders placed during the hospital encounter of 07/29/24    Echo    Interpretation Summary    Left Ventricle: The left ventricle is mildly dilated. Normal wall thickness. Global hypokinesis present. Septal motion is consistent with bundle branch block. There is severely reduced systolic function. Biplane (2D) method of discs ejection fraction is 22%. Grade II diastolic dysfunction.    Right Ventricle: Mild right ventricular enlargement. Systolic function is moderately reduced. Pacemaker lead present in the ventricle.    Left Atrium: Left atrium is moderately dilated.    Right Atrium: Right atrium is moderately dilated.    Aortic Valve: Not well visualized due to poor acoustic window. There is a bioprosthetic valve in the aortic position. Moderately calcified cusps. Aortic valve area by VTI is 1.23 cm². Aortic valve peak velocity is 2.18 m/s. Mean gradient is 10 mmHg. The dimensionless index is 0.38. There is mild to moderate aortic regurgitation.    Mitral Valve: There is moderate bileaflet sclerosis. There is mitral annular calcification present. There is mild to moderate regurgitation.    Tricuspid Valve: There is moderate regurgitation.    Pulmonic Valve: There is mild to moderate regurgitation.    Pulmonary Artery: There is pulmonary hypertension. The estimated pulmonary artery systolic pressure is 71 mmHg.    IVC/SVC: Elevated venous pressure at 15 mmHg.  . Continue Beta Blocker and Nitrate/Vasodilator and monitor clinical status closely. Monitor on telemetry. Patient is on CHF pathway.  Monitor strict Is&Os and daily weights.  Place on fluid restriction of 1.5 L. Cardiology has not been consulted. Continue to stress to patient importance of self efficacy and  on diet for CHF. Last BNP reviewed- and  noted below   Recent Labs   Lab 09/07/24  4753   BNP >4,900*

## 2024-09-08 NOTE — H&P
[facility]  History & Physical    Patient Name: Baldo Carney  MRN: [id number]  Patient Class: IP- Inpatient  Admission Date: 9/8/2024  Attending Physician: Demetrius Jensen MD   Primary Care Provider: Millie Hayes APRN,FNP-C         Patient information was obtained from patient and ER records.     Subjective:     Principal Problem:<principal problem not specified>    Chief Complaint: No chief complaint on file.       HPI: 73 year old male with a previous medical history of COPD, cirrhosis of the liver with bi monthly paracentesis, cardiomyopathy, CHF, aortic stenosis, CAD, HTN, HLD, BPH, chronic renal failure, and DMII who presented to the emergency room with left sided chest pain x 4 hours.  S/p paracentesis 8/12/24 with 6L fluid removal and albumin replacement     Elevated troponin, BNP with ERICA on CKD suggest volume overload s/t decompensated cirrhosis and biventricular HFrEF 22%. Thrombocytopenia s/t chronic liver disease.     Past Medical History:   Diagnosis Date    Asthma     Cardiomyopathy 10/21/2014    CHF (congestive heart failure)     Coronary artery disease     CRF (chronic renal failure)     Diabetes mellitus     Enlarged prostate     Hyperlipidemia     Hypertension     Neuropathy     Syncope 7/29/2024       Past Surgical History:   Procedure Laterality Date    ANGIOGRAPHY OF INTERNAL MAMMARY VESSEL N/A 3/11/2022    Procedure: Angiogram Internal Mammary;  Surgeon: Hank Lujan MD;  Location: [facility] CATH/EP LAB;  Service: Cardiology;  Laterality: N/A;    CARDIAC CATHETERIZATION      CARDIAC VALVE SURGERY      CATHETERIZATION OF BOTH LEFT AND RIGHT HEART Left 3/11/2022    Procedure: CATHETERIZATION, HEART, BOTH LEFT AND RIGHT;  Surgeon: Hank Lujan MD;  Location: [facility] CATH/EP LAB;  Service: Cardiology;  Laterality: Left;    CORONARY ANGIOGRAPHY N/A 3/11/2022    Procedure: ANGIOGRAM, CORONARY ARTERY;  Surgeon: Hank Lujan MD;  Location:  [facility] CATH/EP LAB;  Service: Cardiology;  Laterality: N/A;    CORONARY BYPASS GRAFT ANGIOGRAPHY  3/11/2022    Procedure: Bypass graft study;  Surgeon: Hank Lujan MD;  Location: [facility] CATH/EP LAB;  Service: Cardiology;;    CYSTOSCOPY N/A 7/30/2019    Procedure: CYSTOSCOPY;  Surgeon: Spencer Nguyen MD;  Location: [facility];  Service: Urology;  Laterality: N/A;    INSERTION OF INTRAVASCULAR MICROAXIAL BLOOD PUMP N/A 8/31/2022    Procedure: INSERTION, IMPELLA;  Surgeon: Zach Jensen MD;  Location: [facility] CATH LAB;  Service: Cardiology;  Laterality: N/A;    INSERTION, CATHETER, DIALYSIS, PERITONEAL N/A 12/7/2023    Procedure: INSERTION, CATHETER, DIALYSIS, PERITONEAL;  Surgeon: Greg Bone Jr., MD;  Location: [facility];  Service: General;  Laterality: N/A;  need PD catheter    PLACEMENT OF SWAN SEE CATHETER WITH IMAGING GUIDANCE  8/31/2022    Procedure: INSERTION, CATHETER, SWAN-SEE, WITH IMAGING GUIDANCE;  Surgeon: Zach Jensen MD;  Location: [facility] CATH LAB;  Service: Cardiology;;    REPAIR, HERNIA, INGUINAL, INCARCERATED, INITIAL, AGE 5 YEARS OR OLDER Right 12/7/2023    Procedure: REPAIR, HERNIA, INGUINAL, INCARCERATED, INITIAL;  Surgeon: Greg Bone Jr., MD;  Location: [facility];  Service: General;  Laterality: Right;    SHOULDER ARTHROSCOPY  1985    TRANSRECTAL BIOPSY OF PROSTATE WITH ULTRASOUND GUIDANCE N/A 7/30/2019    Procedure: BIOPSY, PROSTATE, RECTAL APPROACH, WITH US GUIDANCE;  Surgeon: Spencer Nguyen MD;  Location: [facility];  Service: Urology;  Laterality: N/A;  procedure not performed, pt unable to tolerate    TRANSRECTAL BIOPSY OF PROSTATE WITH ULTRASOUND GUIDANCE N/A 8/8/2019    Procedure: BIOPSY, PROSTATE, RECTAL APPROACH, WITH US GUIDANCE;  Surgeon: Spencer Nguyen MD;  Location: [facility] OR;  Service: Urology;  Laterality: N/A;    UMBILICAL HERNIA REPAIR N/A 12/7/2023    Procedure: REPAIR, HERNIA, UMBILICAL;  Surgeon: Greg Bone Jr., MD;  Location: [facility]  OR;  Service: General;  Laterality: N/A;       Review of patient's allergies indicates:   Allergen Reactions    Canagliflozin      Other reaction(s): been very dizzy       Current Facility-Administered Medications on File Prior to Encounter   Medication    [COMPLETED] aspirin tablet 325 mg    [COMPLETED] furosemide injection 40 mg    [COMPLETED] morphine injection 2 mg    [COMPLETED] nitroGLYCERIN 2% TD oint ointment 0.5 inch    [COMPLETED] sodium zirconium cyclosilicate packet 5 g     Current Outpatient Medications on File Prior to Encounter   Medication Sig    aspirin (ECOTRIN) 81 MG EC tablet Take 81 mg by mouth once daily.    atorvastatin (LIPITOR) 40 MG tablet Take 1 tablet (40 mg total) by mouth once daily.    cyanocobalamin 1,000 mcg/mL injection Inject 1 mL (1,000 mcg total) into the muscle every 30 days.    empagliflozin (JARDIANCE) 25 mg tablet Take 1 tablet (25 mg total) by mouth once daily.    fluticasone furoate-vilanteroL (BREO ELLIPTA) 100-25 mcg/dose diskus inhaler Inhale 1 puff into the lungs once daily. (DAILY CONTROLLER)    fluticasone propionate (FLONASE) 50 mcg/actuation nasal spray USE 1 SPRAY (50 MCG TOTAL) IN EACH NOSTRIL ONCE DAILY    gabapentin (NEURONTIN) 300 MG capsule Take 1 capsule (300 mg total) by mouth every evening.    hydrALAZINE (APRESOLINE) 50 MG tablet Take 1 tablet (50 mg total) by mouth 2 (two) times a day.    isosorbide dinitrate (ISORDIL) 20 MG tablet Take 20 mg by mouth 3 (three) times daily.    levalbuterol (XOPENEX) 0.63 mg/3 mL nebulizer solution Inhale 0.63 mg into the lungs every 6 (six) hours as needed for Shortness of Breath.    levothyroxine (SYNTHROID) 100 MCG tablet Take 1 tablet (100 mcg total) by mouth before breakfast. With no other meds or food    linaCLOtide (LINZESS) 72 mcg Cap capsule Take 1 capsule (72 mcg total) by mouth before breakfast.    metoprolol succinate (TOPROL-XL) 25 MG 24 hr tablet Take 1 tablet (25 mg total) by mouth once daily.    mirtazapine  (REMERON) 7.5 MG Tab Take 1 tablet (7.5 mg total) by mouth every evening.    omeprazole (PRILOSEC) 40 MG capsule Take 1 capsule (40 mg total) by mouth once daily.    sacubitriL-valsartan (ENTRESTO) 24-26 mg per tablet Take 1 tablet by mouth 2 (two) times daily.    VERQUVO 5 mg Tab Take 5 mg by mouth once daily.     Family History       Problem Relation (Age of Onset)    Diabetes Father, Sister, Brother    Heart attack Father    Heart disease Father, Brother    Hypertension Father    No Known Problems Mother, Maternal Grandmother, Maternal Grandfather, Paternal Grandmother, Paternal Grandfather, Maternal Aunt, Maternal Uncle, Paternal Aunt, Paternal Uncle          Tobacco Use    Smoking status: Former     Current packs/day: 0.00     Types: Cigarettes     Quit date: 1970     Years since quittin.2    Smokeless tobacco: Never   Substance and Sexual Activity    Alcohol use: Not Currently     Alcohol/week: 3.0 standard drinks of alcohol     Types: 3 Cans of beer per week    Drug use: No    Sexual activity: Not Currently     Partners: Female     Review of Systems   Constitutional:  Positive for fatigue. Negative for appetite change, chills and fever.   HENT:  Negative for congestion and nosebleeds.    Eyes:  Negative for photophobia and pain.   Respiratory:  Positive for shortness of breath. Negative for cough.    Cardiovascular:  Positive for chest pain and leg swelling. Negative for palpitations.   Gastrointestinal:  Negative for abdominal pain, diarrhea, nausea and vomiting.   Endocrine: Negative for polydipsia, polyphagia and polyuria.   Genitourinary:  Negative for dysuria and frequency.   Musculoskeletal:  Negative for arthralgias and myalgias.   Neurological:  Negative for dizziness, weakness and light-headedness.   Psychiatric/Behavioral:  Negative for agitation and confusion.      Objective:     Vital Signs (Most Recent):  Temp: 98.6 °F (37 °C) (24)  Pulse: 96 (24)  Resp: 16  (09/08/24 0332)  BP: 127/73 (09/08/24 0332)  SpO2: 100 % (09/08/24 0332) Vital Signs (24h Range):  Temp:  [97.5 °F (36.4 °C)-98.6 °F (37 °C)] 98.6 °F (37 °C)  Pulse:  [66-96] 96  Resp:  [16-18] 16  SpO2:  [99 %-100 %] 100 %  BP: (124-153)/(66-83) 127/73     Weight: 73 kg (160 lb 15 oz)  Body mass index is 21.83 kg/m².     Physical Exam  Constitutional:       Appearance: Normal appearance.   HENT:      Head: Normocephalic and atraumatic.      Mouth/Throat:      Mouth: Mucous membranes are moist.   Cardiovascular:      Rate and Rhythm: Normal rate and regular rhythm.      Pulses: Normal pulses.      Heart sounds: Normal heart sounds. No murmur heard.     No gallop.   Pulmonary:      Effort: Pulmonary effort is normal.      Breath sounds: Rales present.   Abdominal:      General: Abdomen is flat. Bowel sounds are normal. There is no distension.      Palpations: Abdomen is soft.      Tenderness: There is no abdominal tenderness.   Musculoskeletal:         General: No swelling. Normal range of motion.      Cervical back: Normal range of motion.      Right lower leg: Edema present.      Left lower leg: Edema present.   Skin:     General: Skin is warm.   Neurological:      General: No focal deficit present.      Mental Status: He is alert and oriented to person, place, and time.                Significant Labs: All pertinent labs within the past 24 hours have been reviewed.    Significant Imaging: I have reviewed all pertinent imaging results/findings within the past 24 hours.  Assessment/Plan:     REICA (acute kidney injury)  ERICA is likely due to  CRS vs HRS . Baseline creatinine is  2 . Most recent creatinine and eGFR are listed below.  Recent Labs     09/07/24  2159   CREATININE 4.8*   EGFRNORACEVR 12*      Plan  - ERICA is worsening. Will continue current treatment  - Avoid nephrotoxins and renally dose meds for GFR listed above  - Monitor urine output, serial BMP, and adjust therapy as needed  - nephro consult am   - will  get urine Na     Elevated troponin  Will trend  EKG: no st-t changes  Will get TTE  Cardio consult am       Cirrhosis of liver with ascites  Patient with known Cirrhosis with Child's class Calculator for Child's score link- https://[email or web address].[email or web address]/calc/340/child-myles-score-cirrhosis-mortality#creator-insights. Co-morbidities are present and inclusive of ascites.  MELD-Na score calculated; MELD 3.0: 29 at 9/7/2024  9:59 PM  MELD-Na: 31 at 9/7/2024  9:59 PM  Calculated from:  Serum Creatinine: 4.8 mg/dL (Using max of 3 mg/dL) at 9/7/2024  9:59 PM  Serum Sodium: 140 mmol/L (Using max of 137 mmol/L) at 9/7/2024  9:59 PM  Total Bilirubin: 2.7 mg/dL at 9/7/2024  9:59 PM  Serum Albumin: 3.0 g/dL at 9/7/2024  9:59 PM  INR(ratio): 2.0 at 9/7/2024  9:59 PM  Age at listing (hypothetical): 73 years  Sex: Male at 9/7/2024  9:59 PM      Continue chronic meds. Will avoid any hepatotoxic meds, and monitor CBC/CMP/INR for synthetic function.     Hypothyroidism  Synthroid resumed       Acute on chronic systolic heart failure  Patient is identified as having Systolic (HFrEF) heart failure that is Acute on chronic. CHF is currently uncontrolled due to Continued edema of extremities. Latest ECHO performed and demonstrates- Results for orders placed during the hospital encounter of 07/29/24    Echo    Interpretation Summary    Left Ventricle: The left ventricle is mildly dilated. Normal wall thickness. Global hypokinesis present. Septal motion is consistent with bundle branch block. There is severely reduced systolic function. Biplane (2D) method of discs ejection fraction is 22%. Grade II diastolic dysfunction.    Right Ventricle: Mild right ventricular enlargement. Systolic function is moderately reduced. Pacemaker lead present in the ventricle.    Left Atrium: Left atrium is moderately dilated.    Right Atrium: Right atrium is moderately dilated.    Aortic Valve: Not well visualized due to poor acoustic window. There is a bioprosthetic  "valve in the aortic position. Moderately calcified cusps. Aortic valve area by VTI is 1.23 cm². Aortic valve peak velocity is 2.18 m/s. Mean gradient is 10 mmHg. The dimensionless index is 0.38. There is mild to moderate aortic regurgitation.    Mitral Valve: There is moderate bileaflet sclerosis. There is mitral annular calcification present. There is mild to moderate regurgitation.    Tricuspid Valve: There is moderate regurgitation.    Pulmonic Valve: There is mild to moderate regurgitation.    Pulmonary Artery: There is pulmonary hypertension. The estimated pulmonary artery systolic pressure is 71 mmHg.    IVC/SVC: Elevated venous pressure at 15 mmHg.  . Continue Beta Blocker and Nitrate/Vasodilator and monitor clinical status closely. Monitor on telemetry. Patient is on CHF pathway.  Monitor strict Is&Os and daily weights.  Place on fluid restriction of 1.5 L. Cardiology has not been consulted. Continue to stress to patient importance of self efficacy and  on diet for CHF. Last BNP reviewed- and noted below   Recent Labs   Lab 09/07/24 2159   BNP >4,900*           COPD (chronic obstructive pulmonary disease)  Patient's COPD is controlled currently.  Patient is currently on COPD Pathway. Continue scheduled inhalers Supplemental oxygen and monitor respiratory status closely.     Diabetes mellitus type 2 without retinopathy  Patient's FSGs are controlled on current medication regimen.  Last A1c reviewed-   Lab Results   Component Value Date    HGBA1C 6.0 08/18/2024     Most recent fingerstick glucose reviewed- No results for input(s): "POCTGLUCOSE" in the last 24 hours.  Current correctional scale  Low  Maintain anti-hyperglycemic dose as follows-   Antihyperglycemics (From admission, onward)      Start     Stop Route Frequency Ordered    09/08/24 0511  insulin aspart U-100 pen 0-5 Units         -- SubQ Before meals & nightly PRN 09/08/24 0411          Hold Oral hypoglycemics while patient is in the " hospital.    Hypertension  Chronic, controlled. Latest blood pressure and vitals reviewed-     Temp:  [97.5 °F (36.4 °C)-98.6 °F (37 °C)]   Pulse:  [66-96]   Resp:  [16-18]   BP: (124-153)/(66-83)   SpO2:  [99 %-100 %] .   Home meds for hypertension were reviewed and noted below.   Hypertension Medications               hydrALAZINE (APRESOLINE) 50 MG tablet Take 1 tablet (50 mg total) by mouth 2 (two) times a day.    isosorbide dinitrate (ISORDIL) 20 MG tablet Take 20 mg by mouth 3 (three) times daily.    metoprolol succinate (TOPROL-XL) 25 MG 24 hr tablet Take 1 tablet (25 mg total) by mouth once daily.    sacubitriL-valsartan (ENTRESTO) 24-26 mg per tablet Take 1 tablet by mouth 2 (two) times daily.            While in the hospital, will manage blood pressure as follows; Continue home antihypertensive regimen    Will utilize p.r.n. blood pressure medication only if patient's blood pressure greater than 180/110 and he develops symptoms such as worsening chest pain or shortness of breath.      VTE Risk Mitigation (From admission, onward)      None                            Dejon Sparrow MD  Department of Hospital Medicine  [facility]

## 2024-09-08 NOTE — ASSESSMENT & PLAN NOTE
Treating as cardiorenal syndrome. Nephrology following. Monitor labs, intake/output.   dabrafenib (TAFINLAR) 75 MG capsule 120 capsule 0 1/3/2022 2/2/2022   Sig - Route: Take 2 capsules by mouth every 12 hours. Do not start before January 3, 2022. Separate doses by approximately 12 hours. Take each dose with approximately 1 cup of water either 1 hour before or 2 hours after a meal. - Oral   Sent to pharmacy as: Dabrafenib Mesylate 75 MG Oral Capsule (TAFINLAR)   Class: Eprescribe   E-Prescribing Status: Sent to pharmacy (12/17/2021  3:30 PM CST)     trametinib (MEKINIST) 2 MG tablet 30 tablet 0 1/3/2022 2/2/2022   Sig - Route: Take 1 tablet by mouth daily. Do not start before January 3, 2022. Take dose at approximately the same time each day with either the morning dose or the evening dose of dabrafenib. Take each dose with approximately 1 cup of water either 1 hour before or 2 hours after a meal. - Oral   Sent to pharmacy as: Trametinib Dimethyl Sulfoxide 2 MG Oral Tablet (MEKINIST)   Class: Eprescribe   E-Prescribing Status: Sent to pharmacy (12/17/2021  3:30 PM CST)     [facility] - [address]

## 2024-09-08 NOTE — HOSPITAL COURSE
Nephrology and Cardiology were consulted. Hepatology recommended IV albumin. Suárez catheter was placed for urine output measurement. Nephrology suspected cardiorenal syndrome and recommended giving IV furosemide 80 mg twice daily. It was ineffective. Hospital Medicine increased it to 120 mg twice daily and it still did not increase urine output. Echocardiogram showed a layered nonmobile left ventricular apical thrombus. He was put on therapeutic enoxaparin. Cardiology recommended furosemide drip. Nephrology agreed with furosemide drip and recommended a trial of IV albumin for 3 days. He diuresed better. Furosemide dose was increased and he was given chlorothiazide. Enoxaparin was changed to heparin drip due to renal insufficiency. He was not a candidate for ARNI/MRA or SGLT2 inhibitor due to renal insufficiency. Cardiology recommended outpatient valve clinic follow up for possible valve in valve transcatheter aortic valve replacement once his left ventricular thrombus resolves. Palliative Care was consulted per Hepatology recommendations. Heparin drip was transitioned to apixaban 2.5 mg twice daily. He was put on torsemide 80 mg twice daily on 9/17/2024. Physical and Occupational Therapy recommended low intensity therapy and bath bench. He was discharged home with home health on 9/19/2024.

## 2024-09-09 LAB
ALBUMIN SERPL BCP-MCNC: 3 G/DL (ref 3.5–5.2)
ALP SERPL-CCNC: 79 U/L (ref 55–135)
ALT SERPL W/O P-5'-P-CCNC: 229 U/L (ref 10–44)
ANION GAP SERPL CALC-SCNC: 15 MMOL/L (ref 8–16)
ASCENDING AORTA: 3.27 CM
AST SERPL-CCNC: 138 U/L (ref 10–40)
AV INDEX (PROSTH): 0.24
AV MEAN GRADIENT: 28 MMHG
AV PEAK GRADIENT: 47 MMHG
AV REGURGITATION PRESSURE HALF TIME: 470 MS
AV VALVE AREA BY VELOCITY RATIO: 0.74 CM²
AV VALVE AREA: 0.8 CM²
AV VELOCITY RATIO: 0.22
BILIRUB SERPL-MCNC: 2.5 MG/DL (ref 0.1–1)
BSA FOR ECHO PROCEDURE: 1.97 M2
BUN SERPL-MCNC: 76 MG/DL (ref 8–23)
CALCIUM SERPL-MCNC: 8.1 MG/DL (ref 8.7–10.5)
CHLORIDE SERPL-SCNC: 107 MMOL/L (ref 95–110)
CO2 SERPL-SCNC: 15 MMOL/L (ref 23–29)
CREAT SERPL-MCNC: 4.5 MG/DL (ref 0.5–1.4)
CREAT UR-MCNC: 74 MG/DL (ref 23–375)
CV ECHO LV RWT: 0.31 CM
DOP CALC AO PEAK VEL: 3.43 M/S
DOP CALC AO VTI: 67.64 CM
DOP CALC LVOT AREA: 3.3 CM2
DOP CALC LVOT DIAMETER: 2.06 CM
DOP CALC LVOT PEAK VEL: 0.76 M/S
DOP CALC LVOT STROKE VOLUME: 54.33 CM3
DOP CALCLVOT PEAK VEL VTI: 16.31 CM
E WAVE DECELERATION TIME: 185.45 MSEC
E/A RATIO: 1.56
E/E' RATIO: 33.33 M/S
ECHO LV POSTERIOR WALL: 0.89 CM (ref 0.6–1.1)
EST. GFR  (NO RACE VARIABLE): 13.1 ML/MIN/1.73 M^2
FRACTIONAL SHORTENING: 16 % (ref 28–44)
GLUCOSE SERPL-MCNC: 103 MG/DL (ref 70–110)
INTERVENTRICULAR SEPTUM: 0.84 CM (ref 0.6–1.1)
IVRT: 117.03 MSEC
LA MAJOR: 7.05 CM
LA MINOR: 6.33 CM
LA WIDTH: 4.83 CM
LEFT ATRIUM SIZE: 4.98 CM
LEFT ATRIUM VOLUME INDEX MOD: 41.1 ML/M2
LEFT ATRIUM VOLUME INDEX: 68.9 ML/M2
LEFT ATRIUM VOLUME MOD: 81.3 CM3
LEFT ATRIUM VOLUME: 136.38 CM3
LEFT INTERNAL DIMENSION IN SYSTOLE: 4.88 CM (ref 2.1–4)
LEFT VENTRICLE DIASTOLIC VOLUME INDEX: 84.85 ML/M2
LEFT VENTRICLE DIASTOLIC VOLUME: 168 ML
LEFT VENTRICLE MASS INDEX: 98 G/M2
LEFT VENTRICLE SYSTOLIC VOLUME INDEX: 56.4 ML/M2
LEFT VENTRICLE SYSTOLIC VOLUME: 111.61 ML
LEFT VENTRICULAR INTERNAL DIMENSION IN DIASTOLE: 5.82 CM (ref 3.5–6)
LEFT VENTRICULAR MASS: 194.65 G
LV LATERAL E/E' RATIO: 33.33 M/S
LV SEPTAL E/E' RATIO: 33.33 M/S
MV PEAK A VEL: 0.64 M/S
MV PEAK E VEL: 1 M/S
MV STENOSIS PRESSURE HALF TIME: 53.78 MS
MV VALVE AREA P 1/2 METHOD: 4.09 CM2
OSMOLALITY SERPL: 318 MOSM/KG (ref 280–300)
OSMOLALITY UR: 332 MOSM/KG (ref 50–1200)
PISA TR MAX VEL: 3.85 M/S
POCT GLUCOSE: 107 MG/DL (ref 70–110)
POTASSIUM SERPL-SCNC: 4.7 MMOL/L (ref 3.5–5.1)
POTASSIUM UR-SCNC: 31 MMOL/L (ref 15–95)
PROT SERPL-MCNC: 6.1 G/DL (ref 6–8.4)
PROT UR-MCNC: 10 MG/DL (ref 0–15)
PROT UR-MCNC: 10 MG/DL (ref 0–15)
PROT/CREAT UR: 0.14 MG/G{CREAT} (ref 0–0.2)
PROT/CREAT UR: 0.14 MG/G{CREAT} (ref 0–0.2)
RA MAJOR: 5.76 CM
RA PRESSURE ESTIMATED: 15 MMHG
RA WIDTH: 4.72 CM
RIGHT VENTRICLE DIASTOLIC BASEL DIMENSION: 4.4 CM
RV TB RVSP: 19 MMHG
SINUS: 3.03 CM
SODIUM SERPL-SCNC: 137 MMOL/L (ref 136–145)
SODIUM UR-SCNC: 80 MMOL/L (ref 20–250)
STJ: 2.35 CM
TDI LATERAL: 0.03 M/S
TDI SEPTAL: 0.03 M/S
TDI: 0.03 M/S
TR MAX PG: 59 MMHG
TRICUSPID ANNULAR PLANE SYSTOLIC EXCURSION: 0.62 CM
TV REST PULMONARY ARTERY PRESSURE: 74 MMHG
UUN UR-MCNC: 240 MG/DL (ref 140–1050)
Z-SCORE OF LEFT VENTRICULAR DIMENSION IN END DIASTOLE: 0.18
Z-SCORE OF LEFT VENTRICULAR DIMENSION IN END SYSTOLE: 2.6

## 2024-09-09 PROCEDURE — 80053 COMPREHEN METABOLIC PANEL: CPT | Performed by: HOSPITALIST

## 2024-09-09 PROCEDURE — 25000003 PHARM REV CODE 250: Performed by: INTERNAL MEDICINE

## 2024-09-09 PROCEDURE — 99232 SBSQ HOSP IP/OBS MODERATE 35: CPT | Mod: ,,, | Performed by: HOSPITALIST

## 2024-09-09 PROCEDURE — 63600175 PHARM REV CODE 636 W HCPCS: Performed by: HOSPITALIST

## 2024-09-09 PROCEDURE — 83930 ASSAY OF BLOOD OSMOLALITY: CPT | Performed by: INTERNAL MEDICINE

## 2024-09-09 PROCEDURE — 82570 ASSAY OF URINE CREATININE: CPT | Performed by: HOSPITALIST

## 2024-09-09 PROCEDURE — 25000003 PHARM REV CODE 250: Performed by: HOSPITALIST

## 2024-09-09 PROCEDURE — 84133 ASSAY OF URINE POTASSIUM: CPT

## 2024-09-09 PROCEDURE — 84540 ASSAY OF URINE/UREA-N: CPT

## 2024-09-09 PROCEDURE — 84156 ASSAY OF PROTEIN URINE: CPT | Performed by: HOSPITALIST

## 2024-09-09 PROCEDURE — 36415 COLL VENOUS BLD VENIPUNCTURE: CPT | Performed by: HOSPITALIST

## 2024-09-09 PROCEDURE — 83935 ASSAY OF URINE OSMOLALITY: CPT

## 2024-09-09 PROCEDURE — 80321 ALCOHOLS BIOMARKERS 1OR 2: CPT | Performed by: STUDENT IN AN ORGANIZED HEALTH CARE EDUCATION/TRAINING PROGRAM

## 2024-09-09 PROCEDURE — 84300 ASSAY OF URINE SODIUM: CPT

## 2024-09-09 PROCEDURE — 20600001 HC STEP DOWN PRIVATE ROOM

## 2024-09-09 RX ORDER — HYDRALAZINE HYDROCHLORIDE 50 MG/1
50 TABLET, FILM COATED ORAL 2 TIMES DAILY
Status: DISCONTINUED | OUTPATIENT
Start: 2024-09-09 | End: 2024-09-11

## 2024-09-09 RX ORDER — FUROSEMIDE 10 MG/ML
120 INJECTION INTRAMUSCULAR; INTRAVENOUS 2 TIMES DAILY
Status: DISCONTINUED | OUTPATIENT
Start: 2024-09-09 | End: 2024-09-10

## 2024-09-09 RX ORDER — FUROSEMIDE 10 MG/ML
60 INJECTION INTRAMUSCULAR; INTRAVENOUS ONCE
Status: COMPLETED | OUTPATIENT
Start: 2024-09-09 | End: 2024-09-09

## 2024-09-09 RX ORDER — ENOXAPARIN SODIUM 100 MG/ML
1 INJECTION SUBCUTANEOUS EVERY 24 HOURS
Status: DISCONTINUED | OUTPATIENT
Start: 2024-09-09 | End: 2024-09-12

## 2024-09-09 RX ADMIN — ISOSORBIDE DINITRATE 20 MG: 20 TABLET ORAL at 08:09

## 2024-09-09 RX ADMIN — ENOXAPARIN SODIUM 80 MG: 80 INJECTION SUBCUTANEOUS at 12:09

## 2024-09-09 RX ADMIN — LEVOTHYROXINE SODIUM 100 MCG: 100 TABLET ORAL at 05:09

## 2024-09-09 RX ADMIN — MIRTAZAPINE 7.5 MG: 7.5 TABLET, FILM COATED ORAL at 08:09

## 2024-09-09 RX ADMIN — FUROSEMIDE 80 MG: 10 INJECTION, SOLUTION INTRAVENOUS at 08:09

## 2024-09-09 RX ADMIN — METOPROLOL SUCCINATE 25 MG: 25 TABLET, EXTENDED RELEASE ORAL at 08:09

## 2024-09-09 RX ADMIN — ASPIRIN 81 MG: 81 TABLET, COATED ORAL at 08:09

## 2024-09-09 RX ADMIN — FUROSEMIDE 120 MG: 10 INJECTION, SOLUTION INTRAVENOUS at 05:09

## 2024-09-09 RX ADMIN — FUROSEMIDE 60 MG: 10 INJECTION, SOLUTION INTRAMUSCULAR; INTRAVENOUS at 02:09

## 2024-09-09 RX ADMIN — HYDRALAZINE HYDROCHLORIDE 50 MG: 50 TABLET ORAL at 02:09

## 2024-09-09 RX ADMIN — HYDRALAZINE HYDROCHLORIDE 50 MG: 50 TABLET ORAL at 08:09

## 2024-09-09 RX ADMIN — ISOSORBIDE DINITRATE 20 MG: 20 TABLET ORAL at 02:09

## 2024-09-09 NOTE — PROGRESS NOTES
"[facility]  Nephrology  Progress Note    Patient Name: Baldo Carney  MRN: [id number]  Admission Date: 9/8/2024  Hospital Length of Stay: 1 days  Attending Provider: Adryan Beaulieu MD   Primary Care Physician: Millie Hayes, APRN,FNP-C  Principal Problem:<principal problem not specified>    Subjective:     HPI: 73 year old male with a previous medical history of COPD, cirrhosis of the liver with bi monthly paracentesis, cardiomyopathy, CHF, aortic stenosis, CAD, HTN, HLD, BPH, chronic renal failure, and DMII who presented to the emergency room with left sided chest pain x 4 hours. S/p paracentesis 8/12/24 with 6L fluid removal and albumin replacement     Elevated troponin, BNP with ERICA on CKD suggest volume overload s/t decompensated cirrhosis and biventricular HFrEF 22%. Thrombocytopenia s/t chronic liver disease.     Nephrology consulted for "Hepatology suspects hepatorenal syndrome." Urine Na >20.     Interval History: NAEO. He reports doing well this morning. States that his urine output has decreased over the last few months but is still having good urine output. Denies any CP, SOB, N/V, weakness. ERICA continues to worsen.     Review of patient's allergies indicates:   Allergen Reactions    Canagliflozin      Other reaction(s): been very dizzy     Current Facility-Administered Medications   Medication Frequency    aspirin EC tablet 81 mg Daily    dextrose 10% bolus 125 mL 125 mL PRN    dextrose 10% bolus 250 mL 250 mL PRN    enoxaparin injection 80 mg Q24H (treatment, non-standard time)    furosemide injection 120 mg BID    glucagon (human recombinant) injection 1 mg PRN    glucose chewable tablet 16 g PRN    glucose chewable tablet 24 g PRN    hydrALAZINE tablet 50 mg BID    insulin aspart U-100 pen 0-5 Units QID (AC + HS) PRN    isosorbide dinitrate tablet 20 mg TID    levalbuterol nebulizer solution 0.63 mg Q6H PRN    levothyroxine tablet 100 mcg Before breakfast    metoprolol " succinate (TOPROL-XL) 24 hr tablet 25 mg Daily    mirtazapine tablet 7.5 mg QHS       Objective:     Vital Signs (Most Recent):  Temp: 98 °F (36.7 °C) (24 0800)  Pulse: 66 (24 1145)  Resp: 16 (24 1145)  BP: 136/70 (24 1145)  SpO2: 100 % (24 1145) Vital Signs (24h Range):  Temp:  [97.6 °F (36.4 °C)-98.7 °F (37.1 °C)] 98 °F (36.7 °C)  Pulse:  [66-73] 66  Resp:  [15-18] 16  SpO2:  [98 %-100 %] 100 %  BP: (118-152)/(64-75) 136/70     Weight: 76.7 kg (169 lb) (24 0900)  Body mass index is 22.92 kg/m².  Body surface area is 1.97 meters squared.    No intake/output data recorded.     Physical Exam  Constitutional:       General: He is not in acute distress.     Appearance: Normal appearance. He is not ill-appearing.   HENT:      Head: Normocephalic.      Right Ear: External ear normal.      Left Ear: External ear normal.   Eyes:      General: No scleral icterus.     Extraocular Movements: Extraocular movements intact.   Cardiovascular:      Rate and Rhythm: Normal rate and regular rhythm.      Pulses: Normal pulses.      Heart sounds: Normal heart sounds. No murmur heard.     No friction rub. No gallop.   Pulmonary:      Effort: Pulmonary effort is normal. No respiratory distress.      Breath sounds: Normal breath sounds.   Abdominal:      General: Abdomen is flat. Bowel sounds are normal. There is no distension.      Palpations: Abdomen is soft.      Tenderness: There is no abdominal tenderness.   Musculoskeletal:         General: No swelling.      Right lower le+ Edema present.      Left lower le+ Edema present.   Skin:     General: Skin is warm.      Coloration: Skin is not jaundiced.      Findings: No erythema or rash.   Neurological:      General: No focal deficit present.      Mental Status: He is alert and oriented to person, place, and time. Mental status is at baseline.   Psychiatric:         Mood and Affect: Mood normal.         Behavior: Behavior normal.          "Thought Content: Thought content normal.          Significant Labs:  All labs within the past 24 hours have been reviewed.     Significant Imaging:  Labs: Reviewed  Assessment/Plan:     Renal/  Acute kidney injury superimposed on chronic kidney disease  Nephrology consulted for "Hepatology suspects hepatorenal syndrome."  Baseline Cr ~1.9. Urine Na >20, however, potentially 2/2 ATN or unlisted diuretic use or CRS.     PLAN:    - Worsening renal function  - Obtain Urine studies (Urine Na, Urine Osm, Urine Cr, Urine K)   - UPCr   - Obtain renal ultrasound  - CK   - Follow-up UA   - Strict Is/Os  - Recommend nguyen catheter to monitor UOP  - Continue Lasix 80 BID.  - Trend Cr/ Serial BMPs  - Replace electrolytes PRN, goal K/Phos/Mg 4/3/2  - Avoid nephrotoxic agents when feasible (NSAIDs, ACEi/ARB, IV radiocontrast, gadolinium, etc.)  - Avoid Fleet's and Brown Bomb Enemas given their propensity to induce hypermagnesemia  - Renally dose all meds to eGFR  - Goal MAP > 65 mmHg  - No acute indications for RRT at this time         Thank you for your consult. I will follow-up with patient. Please contact us if you have any additional questions.    Erica Lanier MD  Nephrology  [facility]  "

## 2024-09-09 NOTE — PLAN OF CARE
Patient is current with [facility] ([place]) CM confirmed with Tali  that the patient is current with [facility]   [facility] WCTM

## 2024-09-09 NOTE — SUBJECTIVE & OBJECTIVE
Interval History: NAEO. He reports doing well this morning. States that his urine output has decreased over the last few months but is still having good urine output. Denies any CP, SOB, N/V, weakness. ERICA continues to worsen.     Review of patient's allergies indicates:   Allergen Reactions    Canagliflozin      Other reaction(s): been very dizzy     Current Facility-Administered Medications   Medication Frequency    aspirin EC tablet 81 mg Daily    dextrose 10% bolus 125 mL 125 mL PRN    dextrose 10% bolus 250 mL 250 mL PRN    enoxaparin injection 80 mg Q24H (treatment, non-standard time)    furosemide injection 120 mg BID    glucagon (human recombinant) injection 1 mg PRN    glucose chewable tablet 16 g PRN    glucose chewable tablet 24 g PRN    hydrALAZINE tablet 50 mg BID    insulin aspart U-100 pen 0-5 Units QID (AC + HS) PRN    isosorbide dinitrate tablet 20 mg TID    levalbuterol nebulizer solution 0.63 mg Q6H PRN    levothyroxine tablet 100 mcg Before breakfast    metoprolol succinate (TOPROL-XL) 24 hr tablet 25 mg Daily    mirtazapine tablet 7.5 mg QHS       Objective:     Vital Signs (Most Recent):  Temp: 98 °F (36.7 °C) (09/09/24 0800)  Pulse: 66 (09/09/24 1145)  Resp: 16 (09/09/24 1145)  BP: 136/70 (09/09/24 1145)  SpO2: 100 % (09/09/24 1145) Vital Signs (24h Range):  Temp:  [97.6 °F (36.4 °C)-98.7 °F (37.1 °C)] 98 °F (36.7 °C)  Pulse:  [66-73] 66  Resp:  [15-18] 16  SpO2:  [98 %-100 %] 100 %  BP: (118-152)/(64-75) 136/70     Weight: 76.7 kg (169 lb) (09/09/24 0900)  Body mass index is 22.92 kg/m².  Body surface area is 1.97 meters squared.    No intake/output data recorded.     Physical Exam  Constitutional:       General: He is not in acute distress.     Appearance: Normal appearance. He is not ill-appearing.   HENT:      Head: Normocephalic.      Right Ear: External ear normal.      Left Ear: External ear normal.   Eyes:      General: No scleral icterus.     Extraocular Movements: Extraocular  movements intact.   Cardiovascular:      Rate and Rhythm: Normal rate and regular rhythm.      Pulses: Normal pulses.      Heart sounds: Normal heart sounds. No murmur heard.     No friction rub. No gallop.   Pulmonary:      Effort: Pulmonary effort is normal. No respiratory distress.      Breath sounds: Normal breath sounds.   Abdominal:      General: Abdomen is flat. Bowel sounds are normal. There is no distension.      Palpations: Abdomen is soft.      Tenderness: There is no abdominal tenderness.   Musculoskeletal:         General: No swelling.      Right lower le+ Edema present.      Left lower le+ Edema present.   Skin:     General: Skin is warm.      Coloration: Skin is not jaundiced.      Findings: No erythema or rash.   Neurological:      General: No focal deficit present.      Mental Status: He is alert and oriented to person, place, and time. Mental status is at baseline.   Psychiatric:         Mood and Affect: Mood normal.         Behavior: Behavior normal.         Thought Content: Thought content normal.          Significant Labs:  All labs within the past 24 hours have been reviewed.     Significant Imaging:  Labs: Reviewed

## 2024-09-09 NOTE — PLAN OF CARE
Pt voided in bedside commode chair and toilet.  Unable to collect urine sample needed.  Bed alarm set.  No falls or injuries reported this shift.  Up with assist this shift due to weakness.   bleeding minimally/clean/dry

## 2024-09-09 NOTE — SUBJECTIVE & OBJECTIVE
Interval History: On IV furosemide.    Review of Systems   Constitutional:  Negative for chills and fever.   Neurological:  Negative for seizures and syncope.     Objective:     Vital Signs (Most Recent):  Temp: 98 °F (36.7 °C) (09/09/24 0800)  Pulse: 66 (09/09/24 1145)  Resp: 16 (09/09/24 1145)  BP: 136/70 (09/09/24 1145)  SpO2: 100 % (09/09/24 1145) Vital Signs (24h Range):  Temp:  [97.6 °F (36.4 °C)-98.7 °F (37.1 °C)] 98 °F (36.7 °C)  Pulse:  [66-73] 66  Resp:  [15-18] 16  SpO2:  [98 %-100 %] 100 %  BP: (118-152)/(64-75) 136/70     Weight: 76.7 kg (169 lb)  Body mass index is 22.92 kg/m².  No intake or output data in the 24 hours ending 09/09/24 1315      Physical Exam  Vitals and nursing note reviewed.   Constitutional:       General: He is not in acute distress.     Appearance: He is well-developed and normal weight. He is not diaphoretic.      Interventions: He is not intubated.  Pulmonary:      Effort: Pulmonary effort is normal. No accessory muscle usage or respiratory distress. He is not intubated.   Neurological:      Mental Status: He is alert and oriented to person, place, and time. Mental status is at baseline.      Motor: No seizure activity.   Psychiatric:         Attention and Perception: Attention normal.         Mood and Affect: Mood and affect normal.         Behavior: Behavior is cooperative.             Significant Labs: All pertinent labs within the past 24 hours have been reviewed.  Recent Labs   Lab 09/07/24  2159 09/08/24  0642 09/09/24  1028    135* 137   K 5.3* 4.9 4.7    109 107   CO2 12* 13* 15*   BUN 69* 71* 76*   CREATININE 4.8* 4.6* 4.5*   CALCIUM 8.3* 8.1* 8.1*   PROT 6.6 6.2 6.1   BILITOT 2.7* 2.2* 2.5*   ALKPHOS 90 85 79   * 288* 229*   * 200* 138*         Significant Imaging: I have reviewed all pertinent imaging results/findings within the past 24 hours.  Transthoracic echo complete 9/09/24:     Left Ventricle: The left ventricle is mildly dilated  measuring 5.8 cm. Normal wall thickness. Severe global hypokinesis present. There is severely reduced systolic function. Grade II diastolic dysfunction. Elevated left ventricular filling pressure. There is a layered, nonmobile thrombus in the apex measuring 1.3 x 2.5 cm.    Right Ventricle: Mild right ventricular enlargement. Systolic function is mildly reduced. Pacemaker lead present in the ventricle.    Left Atrium: Left atrium is severely dilated.    Right Atrium: Right atrium is mildly dilated.    Aortic Valve: There is moderate to severe stenosis. Aortic valve area by VTI is 0.80 cm². Aortic valve peak velocity is 3.43 m/s. Mean gradient is 28 mmHg. The dimensionless index is 0.24. There is mild aortic regurgitation.    Mitral Valve: There is mild regurgitation.    Tricuspid Valve: There is moderate regurgitation.    Pulmonary Artery: There is severe pulmonary hypertension. The estimated pulmonary artery systolic pressure is 74 mmHg.    IVC/SVC: Elevated venous pressure at 15 mmHg.

## 2024-09-09 NOTE — PLAN OF CARE
[facility]  Initial Discharge Assessment       Primary Care Provider: Millie Hayes APRN,FNP-C    Admission Diagnosis: Decompensated hepatic cirrhosis [K72.90, K74.60]    Admission Date: 9/8/2024  Expected Discharge Date: 9/9/2024    Transition of Care Barriers: No family/friends to help    Payor: [employer]ET[employer] MANAGED MEDICARE / Plan: [employer]ET[employer] MEDICARE PLAN HMO / Product Type: Medicare Advantage /     Extended Emergency Contact Information  Primary Emergency Contact: MELLY GRIFFITHS  Mobile Phone: [phone]  Relation: Relative  Preferred language: English   needed? No  Secondary Emergency Contact: MuniraBlanca  Mobile Phone: [phone]  Relation: Sister   needed? No    Discharge Plan A: Home, Home with family, Home Health  Discharge Plan B: Home Health      [id number] - [place] - [address]  [address]  [place] [address]  Phone: [phone] Fax: [phone]    [facility] [place] - [address] AT Portal to [facility]  [facility]  [place] [id number]  Phone: [phone] Fax: [phone]    [facility] - [place] - [address] [place] [address]  [address] [place] [id number]  Phone: [phone] Fax: [phone]      Initial Assessment (most recent)       Adult Discharge Assessment - 09/09/24 1041          Discharge Assessment    Assessment Type Discharge Planning Assessment     Confirmed/corrected address, phone number and insurance Yes     Confirmed Demographics Correct on Facesheet     Source of Information patient     Communicated PAM with patient/caregiver Date not available/Unable to determine     People in Home alone     Do you expect to return to your current living situation? Yes     Do you have help at home or someone to help you manage your care at home? No     Prior to hospitilization cognitive status: No Deficits     Current cognitive status: No  Deficits     Walking or Climbing Stairs Difficulty yes     Mobility Management walker     Home Layout Able to live on 1st floor     Equipment Currently Used at Home walker, rolling     Readmission within 30 days? Yes     Patient currently being followed by outpatient case management? Unable to determine (comments)     Do you currently have service(s) that help you manage your care at home? No     Do you take prescription medications? Yes     Do you have prescription coverage? Yes     Do you have any problems affording any of your prescribed medications? TBD     Is the patient taking medications as prescribed? yes     Who is going to help you get home at discharge? MELLY GRIFFITHS      How do you get to doctors appointments? family or friend will provide     Are you on dialysis? No     Do you take coumadin? No     Discharge Plan A Home;Home with family;Home Health     Discharge Plan B Home Health     Discharge Plan discussed with: Patient     Transition of Care Barriers No family/friends to help        Physical Activity    On average, how many days per week do you engage in moderate to strenuous exercise (like a brisk walk)? 0 days     On average, how many minutes do you engage in exercise at this level? 0 min        Financial Resource Strain    How hard is it for you to pay for the very basics like food, housing, medical care, and heating? Not hard at all        Housing Stability    In the last 12 months, was there a time when you were not able to pay the mortgage or rent on time? No     At any time in the past 12 months, were you homeless or living in a shelter (including now)? No        Transportation Needs    Has the lack of transportation kept you from medical appointments, meetings, work or from getting things needed for daily living? No        Food Insecurity    Within the past 12 months, you worried that your food would run out before you got the money to buy more. Never true     Within the past 12  months, the food you bought just didn't last and you didn't have money to get more. Never true        Stress    Do you feel stress - tense, restless, nervous, or anxious, or unable to sleep at night because your mind is troubled all the time - these days? Patient declined        Social Isolation    How often do you feel lonely or isolated from those around you?  Patient declined        Alcohol Use    Q1: How often do you have a drink containing alcohol? Patient declined     Q2: How many drinks containing alcohol do you have on a typical day when you are drinking? Patient declined     Q3: How often do you have six or more drinks on one occasion? Patient declined        Utilities    In the past 12 months has the electric, gas, oil, or water company threatened to shut off services in your home? No        Health Literacy    How often do you need to have someone help you when you read instructions, pamphlets, or other written material from your doctor or pharmacy? Sometimes                 Patient lives alone in a first floor apartment he is not on dialysis and does not take coumadin he has a ride home   Discharge Plan A home health  and Plan B home health have been determined by review of patient's clinical status, future medical and therapeutic needs, and coverage/benefits for post-acute care in coordination with multidisciplinary team members.

## 2024-09-10 LAB
ALBUMIN SERPL BCP-MCNC: 3 G/DL (ref 3.5–5.2)
ALP SERPL-CCNC: 78 U/L (ref 55–135)
ALT SERPL W/O P-5'-P-CCNC: 209 U/L (ref 10–44)
ANION GAP SERPL CALC-SCNC: 15 MMOL/L (ref 8–16)
AST SERPL-CCNC: 122 U/L (ref 10–40)
BILIRUB SERPL-MCNC: 2.3 MG/DL (ref 0.1–1)
BUN SERPL-MCNC: 81 MG/DL (ref 8–23)
CALCIUM SERPL-MCNC: 8.3 MG/DL (ref 8.7–10.5)
CHLORIDE SERPL-SCNC: 110 MMOL/L (ref 95–110)
CK SERPL-CCNC: 1967 U/L (ref 20–200)
CO2 SERPL-SCNC: 15 MMOL/L (ref 23–29)
CREAT SERPL-MCNC: 4.6 MG/DL (ref 0.5–1.4)
ERYTHROCYTE [DISTWIDTH] IN BLOOD BY AUTOMATED COUNT: 25 % (ref 11.5–14.5)
EST. GFR  (NO RACE VARIABLE): 12.7 ML/MIN/1.73 M^2
GLUCOSE SERPL-MCNC: 88 MG/DL (ref 70–110)
HCT VFR BLD AUTO: 31.6 % (ref 40–54)
HGB BLD-MCNC: 11 G/DL (ref 14–18)
INR PPP: 1.7 (ref 0.8–1.2)
MCH RBC QN AUTO: 29.7 PG (ref 27–31)
MCHC RBC AUTO-ENTMCNC: 34.8 G/DL (ref 32–36)
MCV RBC AUTO: 85 FL (ref 82–98)
PLATELET # BLD AUTO: 67 K/UL (ref 150–450)
PMV BLD AUTO: ABNORMAL FL (ref 9.2–12.9)
POCT GLUCOSE: 104 MG/DL (ref 70–110)
POCT GLUCOSE: 77 MG/DL (ref 70–110)
POCT GLUCOSE: 83 MG/DL (ref 70–110)
POCT GLUCOSE: 94 MG/DL (ref 70–110)
POTASSIUM SERPL-SCNC: 4.3 MMOL/L (ref 3.5–5.1)
PROT SERPL-MCNC: 6.2 G/DL (ref 6–8.4)
PROTHROMBIN TIME: 18.1 SEC (ref 9–12.5)
RBC # BLD AUTO: 3.7 M/UL (ref 4.6–6.2)
SODIUM SERPL-SCNC: 140 MMOL/L (ref 136–145)
WBC # BLD AUTO: 5.43 K/UL (ref 3.9–12.7)

## 2024-09-10 PROCEDURE — 85610 PROTHROMBIN TIME: CPT | Performed by: HOSPITALIST

## 2024-09-10 PROCEDURE — 99223 1ST HOSP IP/OBS HIGH 75: CPT | Mod: GC,,, | Performed by: INTERNAL MEDICINE

## 2024-09-10 PROCEDURE — 36415 COLL VENOUS BLD VENIPUNCTURE: CPT | Performed by: HOSPITALIST

## 2024-09-10 PROCEDURE — 25000003 PHARM REV CODE 250: Performed by: HOSPITALIST

## 2024-09-10 PROCEDURE — 63600175 PHARM REV CODE 636 W HCPCS: Performed by: HOSPITALIST

## 2024-09-10 PROCEDURE — 25000003 PHARM REV CODE 250: Performed by: INTERNAL MEDICINE

## 2024-09-10 PROCEDURE — 63600175 PHARM REV CODE 636 W HCPCS: Performed by: STUDENT IN AN ORGANIZED HEALTH CARE EDUCATION/TRAINING PROGRAM

## 2024-09-10 PROCEDURE — 99233 SBSQ HOSP IP/OBS HIGH 50: CPT | Mod: ,,, | Performed by: HOSPITALIST

## 2024-09-10 PROCEDURE — 80053 COMPREHEN METABOLIC PANEL: CPT | Performed by: HOSPITALIST

## 2024-09-10 PROCEDURE — 20600001 HC STEP DOWN PRIVATE ROOM

## 2024-09-10 PROCEDURE — 82550 ASSAY OF CK (CPK): CPT

## 2024-09-10 PROCEDURE — 85027 COMPLETE CBC AUTOMATED: CPT | Performed by: HOSPITALIST

## 2024-09-10 RX ORDER — ALBUMIN HUMAN 250 G/1000ML
25 SOLUTION INTRAVENOUS DAILY
Status: COMPLETED | OUTPATIENT
Start: 2024-09-11 | End: 2024-09-13

## 2024-09-10 RX ORDER — FUROSEMIDE 10 MG/ML
80 INJECTION INTRAMUSCULAR; INTRAVENOUS ONCE
Status: COMPLETED | OUTPATIENT
Start: 2024-09-10 | End: 2024-09-10

## 2024-09-10 RX ADMIN — ISOSORBIDE DINITRATE 20 MG: 20 TABLET ORAL at 09:09

## 2024-09-10 RX ADMIN — HYDRALAZINE HYDROCHLORIDE 50 MG: 50 TABLET ORAL at 09:09

## 2024-09-10 RX ADMIN — CHLOROTHIAZIDE SODIUM 250 MG: 500 INJECTION, POWDER, LYOPHILIZED, FOR SOLUTION INTRAVENOUS at 12:09

## 2024-09-10 RX ADMIN — FUROSEMIDE 120 MG: 10 INJECTION, SOLUTION INTRAVENOUS at 09:09

## 2024-09-10 RX ADMIN — FUROSEMIDE 30 MG/HR: 10 INJECTION, SOLUTION INTRAMUSCULAR; INTRAVENOUS at 02:09

## 2024-09-10 RX ADMIN — ENOXAPARIN SODIUM 80 MG: 80 INJECTION SUBCUTANEOUS at 02:09

## 2024-09-10 RX ADMIN — METOPROLOL SUCCINATE 25 MG: 25 TABLET, EXTENDED RELEASE ORAL at 09:09

## 2024-09-10 RX ADMIN — MIRTAZAPINE 7.5 MG: 7.5 TABLET, FILM COATED ORAL at 09:09

## 2024-09-10 RX ADMIN — ASPIRIN 81 MG: 81 TABLET, COATED ORAL at 09:09

## 2024-09-10 RX ADMIN — ISOSORBIDE DINITRATE 20 MG: 20 TABLET ORAL at 02:09

## 2024-09-10 RX ADMIN — LEVOTHYROXINE SODIUM 100 MCG: 100 TABLET ORAL at 05:09

## 2024-09-10 RX ADMIN — FUROSEMIDE 80 MG: 10 INJECTION, SOLUTION INTRAVENOUS at 02:09

## 2024-09-10 NOTE — HPI
Baldo Carney is a 73 year old Black man with former cigarette smoking (quit in 1970), hypertension, T2DM with retinopathy and peripheral neuropathy, CAD s/p CABG (saphenous vein graft to right coronary artery) on 11/3/2014, s/p PCI with drug eluting stent placements in left main to left anterior descending artery and left main to lateral circumflex artery on 8/31/2022, history of aortic valve replacement with porcine bioprosthesis on 11/3/2014, ischemic cardiomyopathy with chronic biventricular systolic and diastolic heart failure, CKD stage 3-4, anemia, BPH, hypothyroidism, cirrhosis with ascites requiring bimonthly paracentesis, COPD, history of incarcerated inguinal hernia repair on 12/7/2023 presented to [facility] with complaints of shortness of breath and bilateral leg swelling. States these symptoms began a few days prior to presentation and he has been compliant with his home medications of entresto, jardiance 25mg, metoprolol succinate 25mg, isosorbide dinitrate 20, and hydralazine 50mg. Denies any excess salt or fluid intake. Has been needing 2 pillows to sleep at night. Labs showed elevated BNP (>4900 pg/mL) and troponin (0.196 ng/mL) and acute kidney injury (BUN 69 mg/dL and creatinine 4.8 mg/dL from 37 and 1.9 on 8/23/2024). Nephrology consulted with recommendations to diuresis patient with IV Lasix 80 mg BID however that was increased to Lasix 120mg BID given inappropriate urine output. Cardiology consulted for assistance in diuresis.

## 2024-09-10 NOTE — TREATMENT PLAN
Hepatology Treatment Plan    Baldo Carney is a 73 y.o. male admitted to hospital 9/8/2024 (Hospital Day: 3) due to <principal problem not specified>.     Interval History  NAEON. Patient being diuresed with Lasix 120 mg BID but only made 1 L of urine yesterday.    TTE 9/09/2024 - severely reduced ejection fraction, global hypokinesis, grade 2 diastolic dysfunction, moderate to severe aortic stenosis, moderate tricuspid regurgitation, and estimated PASP 74 mm of Hg.         Objective  Temp:  [98.3 °F (36.8 °C)-98.5 °F (36.9 °C)] 98.5 °F (36.9 °C) (09/10 1212)  Pulse:  [60-65] 60 (09/10 1212)  BP: (113-136)/(61-76) 113/61 (09/10 1212)  Resp:  [14-18] 18 (09/10 1212)  SpO2:  [97 %-100 %] 98 % (09/10 1212)    General: Alert, Oriented x3, ill appearing. Lower extremity edema +nt.   Neurologic: Asterixis absent  Abdomen: Normoactive bowel sounds. Non-distended. Normal tympany. Soft. Non-tender. No peritoneal signs.    Laboratory    Lab Results   Component Value Date    WBC 5.43 09/10/2024    HGB 11.0 (L) 09/10/2024    HCT 31.6 (L) 09/10/2024    MCV 85 09/10/2024    PLT 67 (L) 09/10/2024       Lab Results   Component Value Date     09/10/2024    K 4.3 09/10/2024     09/10/2024    CO2 15 (L) 09/10/2024    BUN 81 (H) 09/10/2024    CREATININE 4.6 (H) 09/10/2024    CALCIUM 8.3 (L) 09/10/2024       Lab Results   Component Value Date    ALBUMIN 3.0 (L) 09/10/2024     (H) 09/10/2024     (H) 09/10/2024    ALKPHOS 78 09/10/2024    BILITOT 2.3 (H) 09/10/2024       Lab Results   Component Value Date    INR 1.7 (H) 09/10/2024    INR 2.1 (H) 09/08/2024    INR 2.0 (H) 09/07/2024       MELD 3.0: 27 at 9/10/2024  6:13 AM  MELD-Na: 29 at 9/10/2024  6:13 AM  Calculated from:  Serum Creatinine: 4.6 mg/dL (Using max of 3 mg/dL) at 9/10/2024  6:13 AM  Serum Sodium: 140 mmol/L (Using max of 137 mmol/L) at 9/10/2024  6:13 AM  Total Bilirubin: 2.3 mg/dL at 9/10/2024  6:13 AM  Serum Albumin: 3.0 g/dL at 9/10/2024   6:13 AM  INR(ratio): 1.7 at 9/10/2024  6:13 AM  Age at listing (hypothetical): 73 years  Sex: Male at 9/10/2024  6:13 AM      Assessment  Baldo Carney is a 73 y.o. male with history of stage 3 fibrosis and now likely cirrhosis from longstanding history of ischemic cardiomyopathy and chronic congestive heart failure (EF last 22%) who receives 2x per month paracentesis, COPD, HLD, CKD, and T2DM who presents with L sided chest pain and volume overload. He had a paracentesis on 8/12/24 with removal of 6L fluid and received albumin at that time. He last saw Dr. Wynne on 9/13/22 and has not followed up since then. Patient denies abdominal pain, N/V, jaundice, scleral icterus.     TTE 9/09/2024 - severely reduced ejection fraction, global hypokinesis, grade 2 diastolic dysfunction, moderate to severe aortic stenosis, moderate tricuspid regurgitation, and estimated PASP 74 mm of Hg.    MELD 3.0 score 27. Not a candidate for liver transplant evaluation.     Problem List:  Cirrhosis from ischemic cardiomyopathy and CHF  Elevated liver enzymes  ERCIA c/f HRS    Plan  - Continue supportive care & diuresis per primary team. Consult cardiology team.   - Explained to patient that he was not a candidate for liver transplant evaluation given multiple comorbidities & severe heart failure. Appreciate cardiology recommendations.   - Once seen by cardiology, would consider palliative care recs. Patient is amenable to this.    - please obtain daily CBC, BMP, LFT, INR  - Plan of care was discussed with primary team    Thank you for involving us in the care of Baldo Carney. Please call with any additional concerns or questions.    Jose Guadalupe Skaggs MD, PGY-VI  Gastroenterology Fellow  [facility]

## 2024-09-10 NOTE — SUBJECTIVE & OBJECTIVE
Interval History: NAEO. He reports doing well this morning. States that his urine output has decreased over the last few months but is still having good urine output. Denies any CP, SOB, N/V, weakness. ERICA continues to worsen.     Review of patient's allergies indicates:   Allergen Reactions    Canagliflozin      Other reaction(s): been very dizzy     Current Facility-Administered Medications   Medication Frequency    [START ON 9/11/2024] albumin human 25% bottle 25 g Daily    aspirin EC tablet 81 mg Daily    dextrose 10% bolus 125 mL 125 mL PRN    dextrose 10% bolus 250 mL 250 mL PRN    enoxaparin injection 80 mg Q24H (treatment, non-standard time)    furosemide (Lasix) 500 mg in 50 mL infusion (conc: 10 mg/mL) Continuous    glucagon (human recombinant) injection 1 mg PRN    glucose chewable tablet 16 g PRN    glucose chewable tablet 24 g PRN    hydrALAZINE tablet 50 mg BID    insulin aspart U-100 pen 0-5 Units QID (AC + HS) PRN    isosorbide dinitrate tablet 20 mg TID    levalbuterol nebulizer solution 0.63 mg Q6H PRN    levothyroxine tablet 100 mcg Before breakfast    metoprolol succinate (TOPROL-XL) 24 hr tablet 25 mg Daily    mirtazapine tablet 7.5 mg QHS       Objective:     Vital Signs (Most Recent):  Temp: 98.5 °F (36.9 °C) (09/10/24 1538)  Pulse: 60 (09/10/24 1538)  Resp: 18 (09/10/24 1538)  BP: 118/62 (09/10/24 1538)  SpO2: 99 % (09/10/24 1538) Vital Signs (24h Range):  Temp:  [98.5 °F (36.9 °C)] 98.5 °F (36.9 °C)  Pulse:  [60-63] 60  Resp:  [18] 18  SpO2:  [97 %-100 %] 99 %  BP: (113-136)/(61-76) 118/62     Weight: 76.7 kg (169 lb) (09/09/24 0900)  Body mass index is 22.92 kg/m².  Body surface area is 1.97 meters squared.    I/O last 3 completed shifts:  In: 120 [P.O.:120]  Out: 1025 [Urine:1025]     Physical Exam  Constitutional:       General: He is not in acute distress.     Appearance: Normal appearance. He is not ill-appearing.   HENT:      Head: Normocephalic.   Cardiovascular:      Rate and Rhythm:  Normal rate and regular rhythm.      Pulses: Normal pulses.      Heart sounds: Normal heart sounds. No murmur heard.     No friction rub. No gallop.   Pulmonary:      Effort: Pulmonary effort is normal. No respiratory distress.      Breath sounds: Normal breath sounds.   Abdominal:      General: Abdomen is flat. Bowel sounds are normal. There is no distension.      Palpations: Abdomen is soft.      Tenderness: There is no abdominal tenderness.   Musculoskeletal:         General: No swelling.      Right lower le+ Edema present.      Left lower le+ Edema present.   Skin:     General: Skin is warm.      Coloration: Skin is not jaundiced.      Findings: No erythema or rash.   Neurological:      General: No focal deficit present.      Mental Status: He is alert and oriented to person, place, and time. Mental status is at baseline.   Psychiatric:         Mood and Affect: Mood normal.         Behavior: Behavior normal.         Thought Content: Thought content normal.          Significant Labs:  All labs within the past 24 hours have been reviewed.     Significant Imaging:  Labs: Reviewed

## 2024-09-10 NOTE — SUBJECTIVE & OBJECTIVE
Past Medical History:   Diagnosis Date    Asthma     Cardiomyopathy 10/21/2014    CHF (congestive heart failure)     Coronary artery disease     CRF (chronic renal failure)     Diabetes mellitus     Enlarged prostate     Hyperlipidemia     Hypertension     Neuropathy     Syncope 07/29/2024    Umbilical hernia without obstruction and without gangrene 10/13/2023       Past Surgical History:   Procedure Laterality Date    ANGIOGRAPHY OF INTERNAL MAMMARY VESSEL N/A 3/11/2022    Procedure: Angiogram Internal Mammary;  Surgeon: Hank Lujan MD;  Location: [facility] CATH/EP LAB;  Service: Cardiology;  Laterality: N/A;    CARDIAC CATHETERIZATION      CARDIAC VALVE SURGERY      CATHETERIZATION OF BOTH LEFT AND RIGHT HEART Left 3/11/2022    Procedure: CATHETERIZATION, HEART, BOTH LEFT AND RIGHT;  Surgeon: Hank Lujan MD;  Location: [facility] CATH/EP LAB;  Service: Cardiology;  Laterality: Left;    CORONARY ANGIOGRAPHY N/A 3/11/2022    Procedure: ANGIOGRAM, CORONARY ARTERY;  Surgeon: Hank Lujan MD;  Location: [facility] CATH/EP LAB;  Service: Cardiology;  Laterality: N/A;    CORONARY BYPASS GRAFT ANGIOGRAPHY  3/11/2022    Procedure: Bypass graft study;  Surgeon: Hank Lujan MD;  Location: [facility] CATH/EP LAB;  Service: Cardiology;;    CYSTOSCOPY N/A 7/30/2019    Procedure: CYSTOSCOPY;  Surgeon: Spencer Nguyen MD;  [facility];  Service: Urology;  Laterality: N/A;    INSERTION OF INTRAVASCULAR MICROAXIAL BLOOD PUMP N/A 8/31/2022    Procedure: INSERTION, IMPELLA;  Surgeon: Zach Jensen MD;  Location: [facility] CATH LAB;  Service: Cardiology;  Laterality: N/A;    INSERTION, CATHETER, DIALYSIS, PERITONEAL N/A 12/7/2023    Procedure: INSERTION, CATHETER, DIALYSIS, PERITONEAL;  Surgeon: Greg Bone Jr., MD;  Location: [facility] OR;  Service: General;  Laterality: N/A;  need PD catheter    PLACEMENT OF SWAN SEE CATHETER WITH IMAGING GUIDANCE  8/31/2022    Procedure: INSERTION, CATHETER, SWAN-SEE, WITH IMAGING  GUIDANCE;  Surgeon: Zach Jensen MD;  Location: [facility] CATH LAB;  Service: Cardiology;;    REPAIR, HERNIA, INGUINAL, INCARCERATED, INITIAL, AGE 5 YEARS OR OLDER Right 12/7/2023    Procedure: REPAIR, HERNIA, INGUINAL, INCARCERATED, INITIAL;  Surgeon: Greg Bone Jr., MD;  Location: [facility] OR;  Service: General;  Laterality: Right;    SHOULDER ARTHROSCOPY  1985    TRANSRECTAL BIOPSY OF PROSTATE WITH ULTRASOUND GUIDANCE N/A 7/30/2019    Procedure: BIOPSY, PROSTATE, RECTAL APPROACH, WITH US GUIDANCE;  Surgeon: Spencer Nguyen MD;  Location: [facility] OR;  Service: Urology;  Laterality: N/A;  procedure not performed, pt unable to tolerate    TRANSRECTAL BIOPSY OF PROSTATE WITH ULTRASOUND GUIDANCE N/A 8/8/2019    Procedure: BIOPSY, PROSTATE, RECTAL APPROACH, WITH US GUIDANCE;  Surgeon: Spencer Nguyen MD;  Location: [facility] OR;  Service: Urology;  Laterality: N/A;    UMBILICAL HERNIA REPAIR N/A 12/7/2023    Procedure: REPAIR, HERNIA, UMBILICAL;  Surgeon: Greg Bone Jr., MD;  Location: [facility];  Service: General;  Laterality: N/A;       Review of patient's allergies indicates:   Allergen Reactions    Canagliflozin      Other reaction(s): been very dizzy       No current facility-administered medications on file prior to encounter.     Current Outpatient Medications on File Prior to Encounter   Medication Sig    aspirin (ECOTRIN) 81 MG EC tablet Take 81 mg by mouth once daily.    atorvastatin (LIPITOR) 40 MG tablet Take 1 tablet (40 mg total) by mouth once daily.    cyanocobalamin 1,000 mcg/mL injection Inject 1 mL (1,000 mcg total) into the muscle every 30 days.    empagliflozin (JARDIANCE) 25 mg tablet Take 1 tablet (25 mg total) by mouth once daily.    fluticasone furoate-vilanteroL (BREO ELLIPTA) 100-25 mcg/dose diskus inhaler Inhale 1 puff into the lungs once daily. (DAILY CONTROLLER)    fluticasone propionate (FLONASE) 50 mcg/actuation nasal spray USE 1 SPRAY (50 MCG TOTAL) IN EACH NOSTRIL ONCE  DAILY    gabapentin (NEURONTIN) 300 MG capsule Take 1 capsule (300 mg total) by mouth every evening.    hydrALAZINE (APRESOLINE) 50 MG tablet Take 1 tablet (50 mg total) by mouth 2 (two) times a day.    isosorbide dinitrate (ISORDIL) 20 MG tablet Take 20 mg by mouth 3 (three) times daily.    levalbuterol (XOPENEX) 0.63 mg/3 mL nebulizer solution Inhale 0.63 mg into the lungs every 6 (six) hours as needed for Shortness of Breath.    levothyroxine (SYNTHROID) 100 MCG tablet Take 1 tablet (100 mcg total) by mouth before breakfast. With no other meds or food    linaCLOtide (LINZESS) 72 mcg Cap capsule Take 1 capsule (72 mcg total) by mouth before breakfast.    metoprolol succinate (TOPROL-XL) 25 MG 24 hr tablet Take 1 tablet (25 mg total) by mouth once daily.    mirtazapine (REMERON) 7.5 MG Tab Take 1 tablet (7.5 mg total) by mouth every evening.    omeprazole (PRILOSEC) 40 MG capsule Take 1 capsule (40 mg total) by mouth once daily.    sacubitriL-valsartan (ENTRESTO) 24-26 mg per tablet Take 1 tablet by mouth 2 (two) times daily.    VERQUVO 5 mg Tab Take 5 mg by mouth once daily.     Family History       Problem Relation (Age of Onset)    Diabetes Father, Sister, Brother    Heart attack Father    Heart disease Father, Brother    Hypertension Father    No Known Problems Mother, Maternal Grandmother, Maternal Grandfather, Paternal Grandmother, Paternal Grandfather, Maternal Aunt, Maternal Uncle, Paternal Aunt, Paternal Uncle          Tobacco Use    Smoking status: Former     Current packs/day: 0.00     Types: Cigarettes     Quit date: 1970     Years since quittin.2    Smokeless tobacco: Never   Substance and Sexual Activity    Alcohol use: Not Currently     Alcohol/week: 3.0 standard drinks of alcohol     Types: 3 Cans of beer per week    Drug use: No    Sexual activity: Not Currently     Partners: Female     ROS  Objective:     Vital Signs (Most Recent):  Temp: 98.5 °F (36.9 °C) (09/10/24 1212)  Pulse: 60  (09/10/24 1212)  Resp: 18 (09/10/24 1212)  BP: 113/61 (09/10/24 1212)  SpO2: 98 % (09/10/24 1212) Vital Signs (24h Range):  Temp:  [98.3 °F (36.8 °C)-98.5 °F (36.9 °C)] 98.5 °F (36.9 °C)  Pulse:  [60-65] 60  Resp:  [14-18] 18  SpO2:  [97 %-100 %] 98 %  BP: (113-136)/(61-76) 113/61     Weight: 76.7 kg (169 lb)  Body mass index is 22.92 kg/m².    SpO2: 98 %         Intake/Output Summary (Last 24 hours) at 9/10/2024 1309  Last data filed at 9/10/2024 0908  Gross per 24 hour   Intake 120 ml   Output 1475 ml   Net -1355 ml       Lines/Drains/Airways       Drain  Duration                  Urethral Catheter 09/09/24 1643 16 Fr. <1 day              Peripheral Intravenous Line  Duration                  Peripheral IV - Single Lumen 09/07/24 18 G Anterior;Left;Proximal Forearm 3 days                     Physical Exam  Vitals and nursing note reviewed.   Constitutional:       Appearance: He is ill-appearing.   HENT:      Head: Normocephalic and atraumatic.      Nose: Nose normal.   Eyes:      Conjunctiva/sclera: Conjunctivae normal.   Neck:      Comments: Elevated JVD to mandible  Cardiovascular:      Rate and Rhythm: Normal rate.      Pulses: Normal pulses.      Heart sounds: Normal heart sounds.   Pulmonary:      Effort: Pulmonary effort is normal.      Breath sounds: Rales present.   Abdominal:      General: Abdomen is flat. There is no distension.      Tenderness: There is no abdominal tenderness. There is no guarding.   Musculoskeletal:      Right lower leg: Edema present.      Left lower leg: Edema present.   Neurological:      Mental Status: He is alert and oriented to person, place, and time.   Psychiatric:         Mood and Affect: Mood normal.          Significant Labs: All pertinent lab results from the last 24 hours have been reviewed.    Significant Imaging:  All pertinent imaging results from the last 24 hours have been reviewed.

## 2024-09-10 NOTE — PROGRESS NOTES
[facility] Medicine  Progress Note    Patient Name: Baldo Carney  MRN: [id number]  Patient Class: IP- Inpatient   Admission Date: 9/8/2024  Length of Stay: 2 days  Attending Physician: Adryan Beaulieu MD  Primary Care Provider: Millie Hayes APRN,FNP-C        Subjective:     Principal Problem:<principal problem not specified>        HPI:  Baldo Carney is a 73 year old Black man with former cigarette smoking (quit in 1970), hypertension, diabetes mellitus type 2 with retinopathy and peripheral neuropathy, coronary artery disease status post coronary artery bypass graft (saphenous vein graft to right coronary artery) on 11/3/2014, status post percutaneous coronary intervention with drug eluting stent placements in left main to left anterior descending artery and left main to lateral circumflex artery on 8/31/2022, history of aortic valve replacement with porcine bioprosthesis on 11/3/2014, ischemic cardiomyopathy with chronic biventricular systolic and diastolic heart failure, chronic kidney disease stage 3-4, anemia, benign prostatic hyperplasia, hypothyroidism, cirrhosis with ascites requiring bimonthly paracentesis, chronic obstructive pulmonary disease (COPD), history of incarcerated inguinal hernia repair on 12/7/2023. He lives in [place], [place].    He was hospitalized at [facility] from 8/12/2024 to 8/18/2024 for syncope, cystitis, hypoglycemia, COVID-19. He underwent paracentesis on admission with 6 liters of fluid removed and albumin given.    He was seen by his primary care nurse practitioner in clinic on 8/27/2024 and was prescribed mirtazapine for situational anxiety and omeprazole for heartburn.   He presented to [facility] Emergency Department on Saturday 9/7/2024 with left sided chest pain for 4 hours without radiation, associated with shortness of breath. He also had unchanged chronic bilateral lower extremity edema.  He was scheduled for paracentesis on Monday 9/9/2024. Labs showed elevated BNP (>4900 pg/mL) and troponin (0.196 ng/mL) and acute kidney injury (BUN 69 mg/dL and creatinine 4.8 mg/dL from 37 and 1.9 on 8/23/2024), suggestive of volume overload due to cirrhosis and heart failure. He was transferred to [facility] on 9/8/2024 and admitted to Hospital Medicine Team L.     Overview/Hospital Course:  Nephrology and Cardiology were consulted. Hepatology recommended IV albumin. Nephrology suspected cardiorenal syndrome and recommended giving IV furosemide 80 mg twice daily. It was ineffective. Hospital Medicine increased it to 120 mg twice daily and it still did not increase urine output.     Interval History: On IV furosemide.    Review of Systems   Constitutional:  Negative for chills and fever.   Neurological:  Negative for seizures and syncope.     Objective:     Vital Signs (Most Recent):  Temp: 98 °F (36.7 °C) (09/09/24 0800)  Pulse: 66 (09/09/24 1145)  Resp: 16 (09/09/24 1145)  BP: 136/70 (09/09/24 1145)  SpO2: 100 % (09/09/24 1145) Vital Signs (24h Range):  Temp:  [97.6 °F (36.4 °C)-98.7 °F (37.1 °C)] 98 °F (36.7 °C)  Pulse:  [66-73] 66  Resp:  [15-18] 16  SpO2:  [98 %-100 %] 100 %  BP: (118-152)/(64-75) 136/70     Weight: 76.7 kg (169 lb)  Body mass index is 22.92 kg/m².  No intake or output data in the 24 hours ending 09/09/24 1315      Physical Exam  Vitals and nursing note reviewed.   Constitutional:       General: He is not in acute distress.     Appearance: He is well-developed and normal weight. He is not diaphoretic.      Interventions: He is not intubated.  Pulmonary:      Effort: Pulmonary effort is normal. No accessory muscle usage or respiratory distress. He is not intubated.   Neurological:      Mental Status: He is alert and oriented to person, place, and time. Mental status is at baseline.      Motor: No seizure activity.   Psychiatric:         Attention and Perception:  Attention normal.         Mood and Affect: Mood and affect normal.         Behavior: Behavior is cooperative.             Significant Labs: All pertinent labs within the past 24 hours have been reviewed.  Recent Labs   Lab 09/07/24  2159 09/08/24  0642 09/09/24  1028    135* 137   K 5.3* 4.9 4.7    109 107   CO2 12* 13* 15*   BUN 69* 71* 76*   CREATININE 4.8* 4.6* 4.5*   CALCIUM 8.3* 8.1* 8.1*   PROT 6.6 6.2 6.1   BILITOT 2.7* 2.2* 2.5*   ALKPHOS 90 85 79   * 288* 229*   * 200* 138*         Significant Imaging: I have reviewed all pertinent imaging results/findings within the past 24 hours.  Transthoracic echo complete 9/09/24:     Left Ventricle: The left ventricle is mildly dilated measuring 5.8 cm. Normal wall thickness. Severe global hypokinesis present. There is severely reduced systolic function. Grade II diastolic dysfunction. Elevated left ventricular filling pressure. There is a layered, nonmobile thrombus in the apex measuring 1.3 x 2.5 cm.    Right Ventricle: Mild right ventricular enlargement. Systolic function is mildly reduced. Pacemaker lead present in the ventricle.    Left Atrium: Left atrium is severely dilated.    Right Atrium: Right atrium is mildly dilated.    Aortic Valve: There is moderate to severe stenosis. Aortic valve area by VTI is 0.80 cm². Aortic valve peak velocity is 3.43 m/s. Mean gradient is 28 mmHg. The dimensionless index is 0.24. There is mild aortic regurgitation.    Mitral Valve: There is mild regurgitation.    Tricuspid Valve: There is moderate regurgitation.    Pulmonary Artery: There is severe pulmonary hypertension. The estimated pulmonary artery systolic pressure is 74 mmHg.    IVC/SVC: Elevated venous pressure at 15 mmHg.    Assessment/Plan:      Acute kidney injury superimposed on chronic kidney disease  Treating as cardiorenal syndrome. Nephrology following. Monitor labs, intake/output.    Elevated troponin  Due to CHF.      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 He takes empgagliflozin.     Cirrhosis of liver with ascites  Patient with known Cirrhosis with Child's class Calculator for Child's score link- [email or web address]-myles-score-cirrhosis-mortality#creator-insights. Co-morbidities are present and inclusive of ascites.  MELD-Na score calculated; MELD 3.0: 30 at 9/8/2024  6:42 AM  MELD-Na: 32 at 9/8/2024  6:42 AM  Calculated from:  Serum Creatinine: 4.6 mg/dL (Using max of 3 mg/dL) at 9/8/2024  6:42 AM  Serum Sodium: 135 mmol/L at 9/8/2024  6:42 AM  Total Bilirubin: 2.2 mg/dL at 9/8/2024  6:42 AM  Serum Albumin: 2.7 g/dL at 9/8/2024  6:42 AM  INR(ratio): 2.1 at 9/8/2024  6:42 AM  Age at listing (hypothetical): 73 years  Sex: Male at 9/8/2024  6:42 AM    Transferred here for Hepatology. Appreciate Hepatology. 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    Hypothyroidism  Continue home levothyroxine.      Acute on chronic systolic heart failure  He takes metoprolol succinate, hydralazine, isosorbide dinitrate, sacubitril-valsartan, empagliflozin. Giving home metoprolol, isosorbide dinitrate. Resume home hydralazine. Not on a diuretic at home. Giving IV furosemide. Monitor electrolytes, intake/output.     COPD (chronic obstructive pulmonary disease)  He takes fluticasone-vilanterol. Giving levalbuterol prn.    Hypertension  Chronic. Continue home hydralazine, isosorbide dinitrate, metoprolol. Monitor BP.      VTE Risk Mitigation (From admission, onward)           Ordered     enoxaparin injection 80 mg  Every 24 hours         09/09/24 1115                    Discharge Planning   PAM: 9/12/2024     Code Status: Full Code   Is the patient medically ready for discharge?:     Reason for patient still in hospital (select all that apply): Treatment  Discharge Plan A: Home, Home with family, Home Health                   Adryan Beaulieu MD  Department of Hospital Medicine   [facility] - Transplant Stepdown

## 2024-09-10 NOTE — CONSULTS
[facility]  Cardiology  Consult Note    Patient Name: Baldo Carney  MRN: [id number]  Admission Date: 9/8/2024  Hospital Length of Stay: 2 days  Code Status: Full Code   Attending Provider: Adryan Beaulieu MD   Consulting Provider: Mawadah Samad, MD  Primary Care Physician: Millie Hayes, APRN,FNP-C  Principal Problem:<principal problem not specified>    Patient information was obtained from patient and ER records.     Consults  Subjective:     Chief Complaint:  Shortness of Breath     HPI:   Baldo Carney is a 73 year old Black man with former cigarette smoking (quit in 1970), hypertension, T2DM with retinopathy and peripheral neuropathy, CAD s/p CABG (saphenous vein graft to right coronary artery) on 11/3/2014, s/p PCI with drug eluting stent placements in left main to left anterior descending artery and left main to lateral circumflex artery on 8/31/2022, history of aortic valve replacement with porcine bioprosthesis on 11/3/2014, ischemic cardiomyopathy with chronic biventricular systolic and diastolic heart failure, CKD stage 3-4, anemia, BPH, hypothyroidism, cirrhosis with ascites requiring bimonthly paracentesis, COPD, history of incarcerated inguinal hernia repair on 12/7/2023 presented to [facility] with complaints of shortness of breath and bilateral leg swelling. States these symptoms began a few days prior to presentation and he has been compliant with his home medications of entresto, jardiance 25mg, metoprolol succinate 25mg, isosorbide dinitrate 20, and hydralazine 50mg. Denies any excess salt or fluid intake. Has been needing 2 pillows to sleep at night. Labs showed elevated BNP (>4900 pg/mL) and troponin (0.196 ng/mL) and acute kidney injury (BUN 69 mg/dL and creatinine 4.8 mg/dL from 37 and 1.9 on 8/23/2024). Nephrology consulted with recommendations to diuresis patient with IV Lasix 80 mg BID however that was increased to Lasix 120mg BID given  inappropriate urine output. Cardiology consulted for assistance in diuresis.    Past Medical History:   Diagnosis Date    Asthma     Cardiomyopathy 10/21/2014    CHF (congestive heart failure)     Coronary artery disease     CRF (chronic renal failure)     Diabetes mellitus     Enlarged prostate     Hyperlipidemia     Hypertension     Neuropathy     Syncope 07/29/2024    Umbilical hernia without obstruction and without gangrene 10/13/2023       Past Surgical History:   Procedure Laterality Date    ANGIOGRAPHY OF INTERNAL MAMMARY VESSEL N/A 3/11/2022    Procedure: Angiogram Internal Mammary;  Surgeon: Hank Lujan MD;  Location: [facility] CATH/EP LAB;  Service: Cardiology;  Laterality: N/A;    CARDIAC CATHETERIZATION      CARDIAC VALVE SURGERY      CATHETERIZATION OF BOTH LEFT AND RIGHT HEART Left 3/11/2022    Procedure: CATHETERIZATION, HEART, BOTH LEFT AND RIGHT;  Surgeon: Hank Lujan MD;  Location: [facility] CATH/EP LAB;  Service: Cardiology;  Laterality: Left;    CORONARY ANGIOGRAPHY N/A 3/11/2022    Procedure: ANGIOGRAM, CORONARY ARTERY;  Surgeon: Hank Lujan MD;  Location: [facility] CATH/EP LAB;  Service: Cardiology;  Laterality: N/A;    CORONARY BYPASS GRAFT ANGIOGRAPHY  3/11/2022    Procedure: Bypass graft study;  Surgeon: Hank Lujan MD;  Location: [facility] CATH/EP LAB;  Service: Cardiology;;    CYSTOSCOPY N/A 7/30/2019    Procedure: CYSTOSCOPY;  Surgeon: Spencer Nguyen MD;  Location: [facility] OR;  Service: Urology;  Laterality: N/A;    INSERTION OF INTRAVASCULAR MICROAXIAL BLOOD PUMP N/A 8/31/2022    Procedure: INSERTION, IMPELLA;  Surgeon: Zach Jensen MD;  Location: [facility] CATH LAB;  Service: Cardiology;  Laterality: N/A;    INSERTION, CATHETER, DIALYSIS, PERITONEAL N/A 12/7/2023    Procedure: INSERTION, CATHETER, DIALYSIS, PERITONEAL;  Surgeon: Greg Bone Jr., MD;  Location: [facility] OR;  Service: General;  Laterality: N/A;  need PD catheter    PLACEMENT OF SWAN SEE CATHETER WITH IMAGING  GUIDANCE  8/31/2022    Procedure: INSERTION, CATHETER, SWAN-SEE, WITH IMAGING GUIDANCE;  Surgeon: Zach Jensen MD;  Location: [facility] CATH LAB;  Service: Cardiology;;    REPAIR, HERNIA, INGUINAL, INCARCERATED, INITIAL, AGE 5 YEARS OR OLDER Right 12/7/2023    Procedure: REPAIR, HERNIA, INGUINAL, INCARCERATED, INITIAL;  Surgeon: Greg Bone Jr., MD;  Location: [facility] OR;  Service: General;  Laterality: Right;    SHOULDER ARTHROSCOPY  1985    TRANSRECTAL BIOPSY OF PROSTATE WITH ULTRASOUND GUIDANCE N/A 7/30/2019    Procedure: BIOPSY, PROSTATE, RECTAL APPROACH, WITH US GUIDANCE;  Surgeon: Spencer Nguyen MD;  Location: [facility] OR;  Service: Urology;  Laterality: N/A;  procedure not performed, pt unable to tolerate    TRANSRECTAL BIOPSY OF PROSTATE WITH ULTRASOUND GUIDANCE N/A 8/8/2019    Procedure: BIOPSY, PROSTATE, RECTAL APPROACH, WITH US GUIDANCE;  Surgeon: Spencer Nguyen MD;  Location: [facility] OR;  Service: Urology;  Laterality: N/A;    UMBILICAL HERNIA REPAIR N/A 12/7/2023    Procedure: REPAIR, HERNIA, UMBILICAL;  Surgeon: Greg Bone Jr., MD;  Location: [facility] OR;  Service: General;  Laterality: N/A;       Review of patient's allergies indicates:   Allergen Reactions    Canagliflozin      Other reaction(s): been very dizzy       No current facility-administered medications on file prior to encounter.     Current Outpatient Medications on File Prior to Encounter   Medication Sig    aspirin (ECOTRIN) 81 MG EC tablet Take 81 mg by mouth once daily.    atorvastatin (LIPITOR) 40 MG tablet Take 1 tablet (40 mg total) by mouth once daily.    cyanocobalamin 1,000 mcg/mL injection Inject 1 mL (1,000 mcg total) into the muscle every 30 days.    empagliflozin (JARDIANCE) 25 mg tablet Take 1 tablet (25 mg total) by mouth once daily.    fluticasone furoate-vilanteroL (BREO ELLIPTA) 100-25 mcg/dose diskus inhaler Inhale 1 puff into the lungs once daily. (DAILY CONTROLLER)    fluticasone propionate  (FLONASE) 50 mcg/actuation nasal spray USE 1 SPRAY (50 MCG TOTAL) IN EACH NOSTRIL ONCE DAILY    gabapentin (NEURONTIN) 300 MG capsule Take 1 capsule (300 mg total) by mouth every evening.    hydrALAZINE (APRESOLINE) 50 MG tablet Take 1 tablet (50 mg total) by mouth 2 (two) times a day.    isosorbide dinitrate (ISORDIL) 20 MG tablet Take 20 mg by mouth 3 (three) times daily.    levalbuterol (XOPENEX) 0.63 mg/3 mL nebulizer solution Inhale 0.63 mg into the lungs every 6 (six) hours as needed for Shortness of Breath.    levothyroxine (SYNTHROID) 100 MCG tablet Take 1 tablet (100 mcg total) by mouth before breakfast. With no other meds or food    linaCLOtide (LINZESS) 72 mcg Cap capsule Take 1 capsule (72 mcg total) by mouth before breakfast.    metoprolol succinate (TOPROL-XL) 25 MG 24 hr tablet Take 1 tablet (25 mg total) by mouth once daily.    mirtazapine (REMERON) 7.5 MG Tab Take 1 tablet (7.5 mg total) by mouth every evening.    omeprazole (PRILOSEC) 40 MG capsule Take 1 capsule (40 mg total) by mouth once daily.    sacubitriL-valsartan (ENTRESTO) 24-26 mg per tablet Take 1 tablet by mouth 2 (two) times daily.    VERQUVO 5 mg Tab Take 5 mg by mouth once daily.     Family History       Problem Relation (Age of Onset)    Diabetes Father, Sister, Brother    Heart attack Father    Heart disease Father, Brother    Hypertension Father    No Known Problems Mother, Maternal Grandmother, Maternal Grandfather, Paternal Grandmother, Paternal Grandfather, Maternal Aunt, Maternal Uncle, Paternal Aunt, Paternal Uncle          Tobacco Use    Smoking status: Former     Current packs/day: 0.00     Types: Cigarettes     Quit date: 1970     Years since quittin.2    Smokeless tobacco: Never   Substance and Sexual Activity    Alcohol use: Not Currently     Alcohol/week: 3.0 standard drinks of alcohol     Types: 3 Cans of beer per week    Drug use: No    Sexual activity: Not Currently     Partners: Female     ROS  Objective:      Vital Signs (Most Recent):  Temp: 98.5 °F (36.9 °C) (09/10/24 1212)  Pulse: 60 (09/10/24 1212)  Resp: 18 (09/10/24 1212)  BP: 113/61 (09/10/24 1212)  SpO2: 98 % (09/10/24 1212) Vital Signs (24h Range):  Temp:  [98.3 °F (36.8 °C)-98.5 °F (36.9 °C)] 98.5 °F (36.9 °C)  Pulse:  [60-65] 60  Resp:  [14-18] 18  SpO2:  [97 %-100 %] 98 %  BP: (113-136)/(61-76) 113/61     Weight: 76.7 kg (169 lb)  Body mass index is 22.92 kg/m².    SpO2: 98 %         Intake/Output Summary (Last 24 hours) at 9/10/2024 1309  Last data filed at 9/10/2024 0908  Gross per 24 hour   Intake 120 ml   Output 1475 ml   Net -1355 ml       Lines/Drains/Airways       Drain  Duration                  Urethral Catheter 09/09/24 1643 16 Fr. <1 day              Peripheral Intravenous Line  Duration                  Peripheral IV - Single Lumen 09/07/24 18 G Anterior;Left;Proximal Forearm 3 days                     Physical Exam  Vitals and nursing note reviewed.   Constitutional:       Appearance: He is ill-appearing.   HENT:      Head: Normocephalic and atraumatic.      Nose: Nose normal.   Eyes:      Conjunctiva/sclera: Conjunctivae normal.   Neck:      Comments: Elevated JVD to mandible  Cardiovascular:      Rate and Rhythm: Normal rate.      Pulses: Normal pulses.      Heart sounds: Normal heart sounds.   Pulmonary:      Effort: Pulmonary effort is normal.      Breath sounds: Rales present.   Abdominal:      General: Abdomen is flat. There is no distension.      Tenderness: There is no abdominal tenderness. There is no guarding.   Musculoskeletal:      Right lower leg: Edema present.      Left lower leg: Edema present.   Neurological:      Mental Status: He is alert and oriented to person, place, and time.   Psychiatric:         Mood and Affect: Mood normal.          Significant Labs: All pertinent lab results from the last 24 hours have been reviewed.    Significant Imaging:  All pertinent imaging results from the last 24 hours have been reviewed.    Assessment and Plan:     Acute on chronic systolic heart failure  Baldo Carney, 73M PMHx of HTN, T2DM with retinopathy and peripheral neuropathy, CAD s/p CABG (saphenous vein graft to right coronary artery) , s/p PCI with drug eluting stent placements in left main to left anterior descending artery and left main to lateral circumflex artery,  aortic valve replacement with porcine bioprosthesis, ischemic cardiomyopathy with chronic biventricular systolic and diastolic heart failure with severely reduced EF, CKD3, and cirrhosis with ascites.   Aortic Valve Prosthetic valve stenosis    Urine output only about 1 liter in the past 24 hours on Lasix 120 mg IV BID.       Recommendations:     [ ] Would start Lasix Drip 10 mg/ml    [ ] Would increase Diuril to 500mg (would give dose today)    [ ] Strict I's and O's    [ ] Switching Enoxaparin to Heparin given ERICA    [ ] Increase hydralazine from bid to tid    [ ] Consider urology consult for hematuria (if not traumatic)    [ ] Check BMP BID to follow Cr and address accordingly         VTE Risk Mitigation (From admission, onward)           Ordered     enoxaparin injection 80 mg  Every 24 hours         09/09/24 1115                    Thank you for your consult. I will follow-up with patient. Please contact us if you have any additional questions.    Mawadah Samad, MD  Cardiology   [facility]

## 2024-09-10 NOTE — PROGRESS NOTES
"[facility]  Nephrology  Progress Note    Patient Name: Baldo Carney  MRN: [id number]  Admission Date: 9/8/2024  Hospital Length of Stay: 2 days  Attending Provider: Adryan Beaulieu MD   Primary Care Physician: Millie Hayes, APRN,FNP-C  Principal Problem:Acute on chronic combined systolic and diastolic heart failure    Subjective:     HPI: 73 year old male with a previous medical history of COPD, cirrhosis of the liver with bi monthly paracentesis, cardiomyopathy, CHF, aortic stenosis, CAD, HTN, HLD, BPH, chronic renal failure, and DMII who presented to the emergency room with left sided chest pain x 4 hours. S/p paracentesis 8/12/24 with 6L fluid removal and albumin replacement     Elevated troponin, BNP with ERICA on CKD suggest volume overload s/t decompensated cirrhosis and biventricular HFrEF 22%. Thrombocytopenia s/t chronic liver disease.     Nephrology consulted for "Hepatology suspects hepatorenal syndrome." Urine Na >20.     Interval History: NAEO. He reports doing well this morning. States that his urine output has decreased over the last few months but is still having good urine output. Denies any CP, SOB, N/V, weakness. ERICA continues to worsen.     Review of patient's allergies indicates:   Allergen Reactions    Canagliflozin      Other reaction(s): been very dizzy     Current Facility-Administered Medications   Medication Frequency    [START ON 9/11/2024] albumin human 25% bottle 25 g Daily    aspirin EC tablet 81 mg Daily    dextrose 10% bolus 125 mL 125 mL PRN    dextrose 10% bolus 250 mL 250 mL PRN    enoxaparin injection 80 mg Q24H (treatment, non-standard time)    furosemide (Lasix) 500 mg in 50 mL infusion (conc: 10 mg/mL) Continuous    glucagon (human recombinant) injection 1 mg PRN    glucose chewable tablet 16 g PRN    glucose chewable tablet 24 g PRN    hydrALAZINE tablet 50 mg BID    insulin aspart U-100 pen 0-5 Units QID (AC + HS) PRN    isosorbide dinitrate " tablet 20 mg TID    levalbuterol nebulizer solution 0.63 mg Q6H PRN    levothyroxine tablet 100 mcg Before breakfast    metoprolol succinate (TOPROL-XL) 24 hr tablet 25 mg Daily    mirtazapine tablet 7.5 mg QHS       Objective:     Vital Signs (Most Recent):  Temp: 98.5 °F (36.9 °C) (09/10/24 1538)  Pulse: 60 (09/10/24 1538)  Resp: 18 (09/10/24 153)  BP: 118/62 (09/10/24 153)  SpO2: 99 % (09/10/24 153) Vital Signs (24h Range):  Temp:  [98.5 °F (36.9 °C)] 98.5 °F (36.9 °C)  Pulse:  [60-63] 60  Resp:  [18] 18  SpO2:  [97 %-100 %] 99 %  BP: (113-136)/(61-76) 118/62     Weight: 76.7 kg (169 lb) (24 0900)  Body mass index is 22.92 kg/m².  Body surface area is 1.97 meters squared.    I/O last 3 completed shifts:  In: 120 [P.O.:120]  Out: 1025 [Urine:1025]     Physical Exam  Constitutional:       General: He is not in acute distress.     Appearance: Normal appearance. He is not ill-appearing.   HENT:      Head: Normocephalic.   Cardiovascular:      Rate and Rhythm: Normal rate and regular rhythm.      Pulses: Normal pulses.      Heart sounds: Normal heart sounds. No murmur heard.     No friction rub. No gallop.   Pulmonary:      Effort: Pulmonary effort is normal. No respiratory distress.      Breath sounds: Normal breath sounds.   Abdominal:      General: Abdomen is flat. Bowel sounds are normal. There is no distension.      Palpations: Abdomen is soft.      Tenderness: There is no abdominal tenderness.   Musculoskeletal:         General: No swelling.      Right lower le+ Edema present.      Left lower le+ Edema present.   Skin:     General: Skin is warm.      Coloration: Skin is not jaundiced.      Findings: No erythema or rash.   Neurological:      General: No focal deficit present.      Mental Status: He is alert and oriented to person, place, and time. Mental status is at baseline.   Psychiatric:         Mood and Affect: Mood normal.         Behavior: Behavior normal.         Thought Content: Thought  "content normal.          Significant Labs:  All labs within the past 24 hours have been reviewed.     Significant Imaging:  Labs: Reviewed  Assessment/Plan:     Renal/  Acute kidney injury superimposed on chronic kidney disease  Nephrology consulted for "Hepatology suspects hepatorenal syndrome."  Baseline Cr ~1.9. Urine Na >20, however, potentially 2/2 ATN or unlisted diuretic use or CRS.     PLAN:    - Worsening renal function  - Recommend starting Lasix gtt  - 25g Albumin 25% for 3 doses for volume optimization  - Trend Cr/ Serial BMPs  - Replace electrolytes PRN, goal K/Phos/Mg 4/3/2  - Avoid nephrotoxic agents when feasible (NSAIDs, ACEi/ARB, IV radiocontrast, gadolinium, etc.)  - Avoid Fleet's and Brown Bomb Enemas given their propensity to induce hypermagnesemia  - Renally dose all meds to eGFR  - Goal MAP > 65 mmHg  - No acute indications for RRT at this time         Thank you for your consult. I will follow-up with patient. Please contact us if you have any additional questions.    Erica Lanier MD  Nephrology  [facility]  "

## 2024-09-10 NOTE — ASSESSMENT & PLAN NOTE
Baldo Carney, 73M PMHx of HTN, T2DM with retinopathy and peripheral neuropathy, CAD s/p CABG (saphenous vein graft to right coronary artery) , s/p PCI with drug eluting stent placements in left main to left anterior descending artery and left main to lateral circumflex artery,  aortic valve replacement with porcine bioprosthesis, ischemic cardiomyopathy with chronic biventricular systolic and diastolic heart failure with severely reduced EF, CKD3, and cirrhosis with ascites.     Results for orders placed during the hospital encounter of 09/08/24    Echo    Interpretation Summary  Images from the original result were not included.      Left Ventricle: The left ventricle is mildly dilated measuring 5.8 cm. Normal wall thickness. Severe global hypokinesis present. There is severely reduced systolic function. Grade II diastolic dysfunction. Elevated left ventricular filling pressure. There is a layered, nonmobile thrombus in the apex measuring 1.3 x 2.5 cm.    Right Ventricle: Mild right ventricular enlargement. Systolic function is mildly reduced. Pacemaker lead present in the ventricle.    Left Atrium: Left atrium is severely dilated.    Right Atrium: Right atrium is mildly dilated.    Aortic Valve: There is moderate to severe stenosis. Aortic valve area by VTI is 0.80 cm². Aortic valve peak velocity is 3.43 m/s. Mean gradient is 28 mmHg. The dimensionless index is 0.24. There is mild aortic regurgitation.    Mitral Valve: There is mild regurgitation.    Tricuspid Valve: There is moderate regurgitation.    Pulmonary Artery: There is severe pulmonary hypertension. The estimated pulmonary artery systolic pressure is 74 mmHg.    IVC/SVC: Elevated venous pressure at 15 mmHg.    ECHO concerning for aortic prosthetic valve stenosis likely making it difficult to diuresis patient.      Urine output only about 1 liter in the past 24 hours on Lasix 120 mg IV BID.       Recommendations:     [ ] Would start Lasix Drip 10  mg/ml    [ ] Would increase Diuril to 500mg    [ ] Strict I's and O's    [ ] Switching Enoxaparin to Heparin given ERICA    [ ] Increase hydralazine from bid to tid    [ ] Consider urology consult for hematuria     [ ] Check BMP BID to follow Cr and address accordingly

## 2024-09-10 NOTE — ASSESSMENT & PLAN NOTE
He takes metoprolol succinate, hydralazine, isosorbide dinitrate, sacubitril-valsartan, empagliflozin. Giving home metoprolol, isosorbide dinitrate. Resume home hydralazine. Not on a diuretic at home. Giving IV furosemide 120 mg BID. Give a dose of IV chlorothiazide. Consult Cardiology. Monitor electrolytes, intake/output.

## 2024-09-10 NOTE — ASSESSMENT & PLAN NOTE
"Nephrology consulted for "Hepatology suspects hepatorenal syndrome."  Baseline Cr ~1.9. Urine Na >20, however, potentially 2/2 ATN or unlisted diuretic use or CRS.     PLAN:    - Worsening renal function  - Recommend starting Lasix gtt  - 25g Albumin 25% for 3 doses for volume optimization  - Trend Cr/ Serial BMPs  - Replace electrolytes PRN, goal K/Phos/Mg 4/3/2  - Avoid nephrotoxic agents when feasible (NSAIDs, ACEi/ARB, IV radiocontrast, gadolinium, etc.)  - Avoid Fleet's and Brown Bomb Enemas given their propensity to induce hypermagnesemia  - Renally dose all meds to eGFR  - Goal MAP > 65 mmHg  - No acute indications for RRT at this time   "

## 2024-09-10 NOTE — SUBJECTIVE & OBJECTIVE
Interval History: Urine output only about 1 liter in the past 24 hours on Lasix 120 mg IV BID. Hepatology recommended consulting Cardiology (per H&P was supposed to have been done on admission) and Palliative Care. Will consult Cardiology for now and try increasing diuretics.     Review of Systems   Constitutional:  Negative for chills and fever.   Neurological:  Negative for seizures and syncope.     Objective:     Vital Signs (Most Recent):  Temp: 98.5 °F (36.9 °C) (09/10/24 0706)  Pulse: 61 (09/10/24 0706)  Resp: 18 (09/10/24 0706)  BP: 123/70 (09/10/24 0706)  SpO2: 97 % (09/10/24 0706) Vital Signs (24h Range):  Temp:  [98.3 °F (36.8 °C)-98.5 °F (36.9 °C)] 98.5 °F (36.9 °C)  Pulse:  [61-73] 61  Resp:  [14-18] 18  SpO2:  [97 %-100 %] 97 %  BP: (123-136)/(62-76) 123/70     Weight: 76.7 kg (169 lb)  Body mass index is 22.92 kg/m².    Intake/Output Summary (Last 24 hours) at 9/10/2024 0849  Last data filed at 9/10/2024 0540  Gross per 24 hour   Intake 120 ml   Output 1025 ml   Net -905 ml         Physical Exam  Vitals and nursing note reviewed.   Constitutional:       General: He is not in acute distress.     Appearance: He is well-developed and normal weight. He is not diaphoretic.      Interventions: He is not intubated.  Pulmonary:      Effort: Pulmonary effort is normal. No accessory muscle usage or respiratory distress. He is not intubated.   Neurological:      Mental Status: He is alert and oriented to person, place, and time. Mental status is at baseline.      Motor: No seizure activity.   Psychiatric:         Attention and Perception: Attention normal.         Mood and Affect: Mood and affect normal.         Behavior: Behavior is cooperative.             Significant Labs: All pertinent labs within the past 24 hours have been reviewed.  Recent Labs   Lab 09/08/24  0642 09/09/24  1028 09/10/24  0613   * 137 140   K 4.9 4.7 4.3    107 110   CO2 13* 15* 15*   BUN 71* 76* 81*   CREATININE 4.6* 4.5*  4.6*   CALCIUM 8.1* 8.1* 8.3*   PROT 6.2 6.1 6.2   BILITOT 2.2* 2.5* 2.3*   ALKPHOS 85 79 78   * 229* 209*   * 138* 122*         Significant Imaging: I have reviewed all pertinent imaging results/findings within the past 24 hours.

## 2024-09-10 NOTE — PLAN OF CARE
"Patient remaining free from injury.  Patient requiring 1 person assist to transfer.  Patient up in chair for 2 hours and wanted to get back in bed.  Patient received Diuril and Lasix and voided > 1L.  Suárez care performed.  No skin breakdown noted.  Patient eating > 25% of meals.  Patient denies pain.  Patient noted confused in the am which improved this pm.  BG < 150.  Bed alarm on.  Patient IV beeping secondary to patient bending arm.  Offered to change site and patient said "no, Im cold" and pulled up his blanket.  Diuresing well.    Nurses Note -- 4 Eyes      9/10/2024   6:49 PM      Skin assessed during: Q Shift Change      [x] No Altered Skin Integrity Present    []Prevention Measures Documented      [] Yes- Altered Skin Integrity Present or Discovered   [] LDA Added if Not in Epic (Describe Wound)   [] New Altered Skin Integrity was Present on Admit and Documented in LDA   [] Wound Image Taken    Wound Care Consulted? No    Attending Nurse:  Vita Sy RN/Staff Member:  Latisha"

## 2024-09-10 NOTE — CONSULTS
[facility]  Cardiology  Consult Note    Patient Name: Baldo Carney  MRN: [id number]  Admission Date: 9/8/2024  Hospital Length of Stay: 2 days  Code Status: Full Code   Attending Provider: Adryan Beaulieu MD   Consulting Provider: Mawadah Samad, MD  Primary Care Physician: Millie Hayes, APRN,FNP-C  Principal Problem:Acute on chronic combined systolic and diastolic heart failure    Patient information was obtained from patient and ER records.     Inpatient consult to Cardiology  Consult performed by: Samad, Mawadah, MD  Consult ordered by: Adryan Beaulieu MD        Subjective:     Chief Complaint:  Shortness of Breath     HPI:   Baldo Carney is a 73 year old Black man with former cigarette smoking (quit in 1970), hypertension, T2DM with retinopathy and peripheral neuropathy, CAD s/p CABG (saphenous vein graft to right coronary artery) on 11/3/2014, s/p PCI with drug eluting stent placements in left main to left anterior descending artery and left main to lateral circumflex artery on 8/31/2022, history of aortic valve replacement with porcine bioprosthesis on 11/3/2014, ischemic cardiomyopathy with chronic biventricular systolic and diastolic heart failure, CKD stage 3-4, anemia, BPH, hypothyroidism, cirrhosis with ascites requiring bimonthly paracentesis, COPD, history of incarcerated inguinal hernia repair on 12/7/2023 presented to [facility] with complaints of shortness of breath and bilateral leg swelling. States these symptoms began a few days prior to presentation and he has been compliant with his home medications of entresto, jardiance 25mg, metoprolol succinate 25mg, isosorbide dinitrate 20, and hydralazine 50mg. Denies any excess salt or fluid intake. Has been needing 2 pillows to sleep at night. Labs showed elevated BNP (>4900 pg/mL) and troponin (0.196 ng/mL) and acute kidney injury (BUN 69 mg/dL and creatinine 4.8 mg/dL from 37 and 1.9 on  8/23/2024). Nephrology consulted with recommendations to diuresis patient with IV Lasix 80 mg BID however that was increased to Lasix 120mg BID given inappropriate urine output. Cardiology consulted for assistance in diuresis.    Past Medical History:   Diagnosis Date    Asthma     Cardiomyopathy 10/21/2014    CHF (congestive heart failure)     Coronary artery disease     CRF (chronic renal failure)     Diabetes mellitus     Enlarged prostate     Hyperlipidemia     Hypertension     Neuropathy     Syncope 07/29/2024    Umbilical hernia without obstruction and without gangrene 10/13/2023       Past Surgical History:   Procedure Laterality Date    ANGIOGRAPHY OF INTERNAL MAMMARY VESSEL N/A 3/11/2022    Procedure: Angiogram Internal Mammary;  Surgeon: Hank Lujan MD;  Location: [facility] CATH/EP LAB;  Service: Cardiology;  Laterality: N/A;    CARDIAC CATHETERIZATION      CARDIAC VALVE SURGERY      CATHETERIZATION OF BOTH LEFT AND RIGHT HEART Left 3/11/2022    Procedure: CATHETERIZATION, HEART, BOTH LEFT AND RIGHT;  Surgeon: Hank Lujan MD;  Location: [facility] CATH/EP LAB;  Service: Cardiology;  Laterality: Left;    CORONARY ANGIOGRAPHY N/A 3/11/2022    Procedure: ANGIOGRAM, CORONARY ARTERY;  Surgeon: Hank Lujan MD;  Location: [facility] CATH/EP LAB;  Service: Cardiology;  Laterality: N/A;    CORONARY BYPASS GRAFT ANGIOGRAPHY  3/11/2022    Procedure: Bypass graft study;  Surgeon: Hank Lujan MD;  Location: [facility] CATH/EP LAB;  Service: Cardiology;;    CYSTOSCOPY N/A 7/30/2019    Procedure: CYSTOSCOPY;  Surgeon: Spencer Nguyen MD;  Location: [facility] OR;  Service: Urology;  Laterality: N/A;    INSERTION OF INTRAVASCULAR MICROAXIAL BLOOD PUMP N/A 8/31/2022    Procedure: INSERTION, IMPELLA;  Surgeon: Zach Jensen MD;  Location: [facility] CATH LAB;  Service: Cardiology;  Laterality: N/A;    INSERTION, CATHETER, DIALYSIS, PERITONEAL N/A 12/7/2023    Procedure: INSERTION, CATHETER, DIALYSIS, PERITONEAL;  Surgeon:  Greg Bone Jr., MD;  Location: [facility];  Service: General;  Laterality: N/A;  need PD catheter    PLACEMENT OF SWAN SEE CATHETER WITH IMAGING GUIDANCE  8/31/2022    Procedure: INSERTION, CATHETER, SWAN-SEE, WITH IMAGING GUIDANCE;  Surgeon: Zach Jensen MD;  Location: [facility] CATH LAB;  Service: Cardiology;;    REPAIR, HERNIA, INGUINAL, INCARCERATED, INITIAL, AGE 5 YEARS OR OLDER Right 12/7/2023    Procedure: REPAIR, HERNIA, INGUINAL, INCARCERATED, INITIAL;  Surgeon: Greg Bone Jr., MD;  Location: [facility];  Service: General;  Laterality: Right;    SHOULDER ARTHROSCOPY  1985    TRANSRECTAL BIOPSY OF PROSTATE WITH ULTRASOUND GUIDANCE N/A 7/30/2019    Procedure: BIOPSY, PROSTATE, RECTAL APPROACH, WITH US GUIDANCE;  Surgeon: Spencer Nguyen MD;  Location: [facility] OR;  Service: Urology;  Laterality: N/A;  procedure not performed, pt unable to tolerate    TRANSRECTAL BIOPSY OF PROSTATE WITH ULTRASOUND GUIDANCE N/A 8/8/2019    Procedure: BIOPSY, PROSTATE, RECTAL APPROACH, WITH US GUIDANCE;  Surgeon: Spencer Nguyen MD;  Location: [facility] OR;  Service: Urology;  Laterality: N/A;    UMBILICAL HERNIA REPAIR N/A 12/7/2023    Procedure: REPAIR, HERNIA, UMBILICAL;  Surgeon: Greg Bone Jr., MD;  Location: [facility];  Service: General;  Laterality: N/A;       Review of patient's allergies indicates:   Allergen Reactions    Canagliflozin      Other reaction(s): been very dizzy       No current facility-administered medications on file prior to encounter.     Current Outpatient Medications on File Prior to Encounter   Medication Sig    aspirin (ECOTRIN) 81 MG EC tablet Take 81 mg by mouth once daily.    atorvastatin (LIPITOR) 40 MG tablet Take 1 tablet (40 mg total) by mouth once daily.    cyanocobalamin 1,000 mcg/mL injection Inject 1 mL (1,000 mcg total) into the muscle every 30 days.    empagliflozin (JARDIANCE) 25 mg tablet Take 1 tablet (25 mg total) by mouth once daily.    fluticasone  furoate-vilanteroL (BREO ELLIPTA) 100-25 mcg/dose diskus inhaler Inhale 1 puff into the lungs once daily. (DAILY CONTROLLER)    fluticasone propionate (FLONASE) 50 mcg/actuation nasal spray USE 1 SPRAY (50 MCG TOTAL) IN EACH NOSTRIL ONCE DAILY    gabapentin (NEURONTIN) 300 MG capsule Take 1 capsule (300 mg total) by mouth every evening.    hydrALAZINE (APRESOLINE) 50 MG tablet Take 1 tablet (50 mg total) by mouth 2 (two) times a day.    isosorbide dinitrate (ISORDIL) 20 MG tablet Take 20 mg by mouth 3 (three) times daily.    levalbuterol (XOPENEX) 0.63 mg/3 mL nebulizer solution Inhale 0.63 mg into the lungs every 6 (six) hours as needed for Shortness of Breath.    levothyroxine (SYNTHROID) 100 MCG tablet Take 1 tablet (100 mcg total) by mouth before breakfast. With no other meds or food    linaCLOtide (LINZESS) 72 mcg Cap capsule Take 1 capsule (72 mcg total) by mouth before breakfast.    metoprolol succinate (TOPROL-XL) 25 MG 24 hr tablet Take 1 tablet (25 mg total) by mouth once daily.    mirtazapine (REMERON) 7.5 MG Tab Take 1 tablet (7.5 mg total) by mouth every evening.    omeprazole (PRILOSEC) 40 MG capsule Take 1 capsule (40 mg total) by mouth once daily.    sacubitriL-valsartan (ENTRESTO) 24-26 mg per tablet Take 1 tablet by mouth 2 (two) times daily.    VERQUVO 5 mg Tab Take 5 mg by mouth once daily.     Family History       Problem Relation (Age of Onset)    Diabetes Father, Sister, Brother    Heart attack Father    Heart disease Father, Brother    Hypertension Father    No Known Problems Mother, Maternal Grandmother, Maternal Grandfather, Paternal Grandmother, Paternal Grandfather, Maternal Aunt, Maternal Uncle, Paternal Aunt, Paternal Uncle          Tobacco Use    Smoking status: Former     Current packs/day: 0.00     Types: Cigarettes     Quit date: 1970     Years since quittin.2    Smokeless tobacco: Never   Substance and Sexual Activity    Alcohol use: Not Currently     Alcohol/week: 3.0  standard drinks of alcohol     Types: 3 Cans of beer per week    Drug use: No    Sexual activity: Not Currently     Partners: Female     ROS  Objective:     Vital Signs (Most Recent):  Temp: 98.5 °F (36.9 °C) (09/10/24 1212)  Pulse: 60 (09/10/24 1212)  Resp: 18 (09/10/24 1212)  BP: 113/61 (09/10/24 1212)  SpO2: 98 % (09/10/24 1212) Vital Signs (24h Range):  Temp:  [98.3 °F (36.8 °C)-98.5 °F (36.9 °C)] 98.5 °F (36.9 °C)  Pulse:  [60-65] 60  Resp:  [14-18] 18  SpO2:  [97 %-100 %] 98 %  BP: (113-136)/(61-76) 113/61     Weight: 76.7 kg (169 lb)  Body mass index is 22.92 kg/m².    SpO2: 98 %         Intake/Output Summary (Last 24 hours) at 9/10/2024 1309  Last data filed at 9/10/2024 0908  Gross per 24 hour   Intake 120 ml   Output 1475 ml   Net -1355 ml       Lines/Drains/Airways       Drain  Duration                  Urethral Catheter 09/09/24 1643 16 Fr. <1 day              Peripheral Intravenous Line  Duration                  Peripheral IV - Single Lumen 09/07/24 18 G Anterior;Left;Proximal Forearm 3 days                     Physical Exam  Vitals and nursing note reviewed.   Constitutional:       Appearance: He is ill-appearing.   HENT:      Head: Normocephalic and atraumatic.      Nose: Nose normal.   Eyes:      Conjunctiva/sclera: Conjunctivae normal.   Neck:      Comments: Elevated JVD to mandible  Cardiovascular:      Rate and Rhythm: Normal rate.      Pulses: Normal pulses.      Heart sounds: Normal heart sounds.   Pulmonary:      Effort: Pulmonary effort is normal.      Breath sounds: Rales present.   Abdominal:      General: Abdomen is flat. There is no distension.      Tenderness: There is no abdominal tenderness. There is no guarding.   Musculoskeletal:      Right lower leg: Edema present.      Left lower leg: Edema present.   Neurological:      Mental Status: He is alert and oriented to person, place, and time.   Psychiatric:         Mood and Affect: Mood normal.          Significant Labs: All pertinent  lab results from the last 24 hours have been reviewed.    Significant Imaging:  All pertinent imaging results from the last 24 hours have been reviewed.   Assessment and Plan:     * Acute on chronic combined systolic and diastolic heart failure  Baldo Carney, 73M PMHx of HTN, T2DM with retinopathy and peripheral neuropathy, CAD s/p CABG (saphenous vein graft to right coronary artery) , s/p PCI with drug eluting stent placements in left main to left anterior descending artery and left main to lateral circumflex artery,  aortic valve replacement with porcine bioprosthesis, ischemic cardiomyopathy with chronic biventricular systolic and diastolic heart failure with severely reduced EF, CKD3, and cirrhosis with ascites.     Results for orders placed during the hospital encounter of 09/08/24    Echo    Interpretation Summary  Images from the original result were not included.      Left Ventricle: The left ventricle is mildly dilated measuring 5.8 cm. Normal wall thickness. Severe global hypokinesis present. There is severely reduced systolic function. Grade II diastolic dysfunction. Elevated left ventricular filling pressure. There is a layered, nonmobile thrombus in the apex measuring 1.3 x 2.5 cm.    Right Ventricle: Mild right ventricular enlargement. Systolic function is mildly reduced. Pacemaker lead present in the ventricle.    Left Atrium: Left atrium is severely dilated.    Right Atrium: Right atrium is mildly dilated.    Aortic Valve: There is moderate to severe stenosis. Aortic valve area by VTI is 0.80 cm². Aortic valve peak velocity is 3.43 m/s. Mean gradient is 28 mmHg. The dimensionless index is 0.24. There is mild aortic regurgitation.    Mitral Valve: There is mild regurgitation.    Tricuspid Valve: There is moderate regurgitation.    Pulmonary Artery: There is severe pulmonary hypertension. The estimated pulmonary artery systolic pressure is 74 mmHg.    IVC/SVC: Elevated venous pressure at 15  mmHg.    ECHO concerning for aortic prosthetic valve stenosis likely making it difficult to diuresis patient.      Urine output only about 1 liter in the past 24 hours on Lasix 120 mg IV BID.       Recommendations:     [ ] Would start Lasix Drip 10 mg/ml    [ ] Would increase Diuril to 500mg    [ ] Strict I's and O's    [ ] Switching Enoxaparin to Heparin given ERICA    [ ] Increase hydralazine from bid to tid    [ ] Consider urology consult for hematuria     [ ] Check BMP BID to follow Cr and address accordingly         VTE Risk Mitigation (From admission, onward)           Ordered     enoxaparin injection 80 mg  Every 24 hours         09/09/24 1116                    Thank you for your consult. I will follow-up with patient. Please contact us if you have any additional questions.    Mawadah Samad, MD  Cardiology   [facility]

## 2024-09-10 NOTE — PROGRESS NOTES
[facility] - [facility] Medicine  Progress Note    Patient Name: Baldo Carney  MRN: [id number]  Patient Class: IP- Inpatient   Admission Date: 9/8/2024  Length of Stay: 2 days  Attending Physician: Adryan Beaulieu MD  Primary Care Provider: Millie Hayes APRN,FNP-C        Subjective:     Principal Problem:<principal problem not specified>        HPI:  Baldo Carney is a 73 year old Black man with former cigarette smoking (quit in 1970), hypertension, diabetes mellitus type 2 with retinopathy and peripheral neuropathy, coronary artery disease status post coronary artery bypass graft (saphenous vein graft to right coronary artery) on 11/3/2014, status post percutaneous coronary intervention with drug eluting stent placements in left main to left anterior descending artery and left main to lateral circumflex artery on 8/31/2022, history of aortic valve replacement with porcine bioprosthesis on 11/3/2014, ischemic cardiomyopathy with chronic biventricular systolic and diastolic heart failure, chronic kidney disease stage 3-4, anemia, benign prostatic hyperplasia, hypothyroidism, cirrhosis with ascites requiring bimonthly paracentesis, chronic obstructive pulmonary disease (COPD), history of incarcerated inguinal hernia repair on 12/7/2023. He lives in [place], [place].    He was hospitalized at [facility] from 8/12/2024 to 8/18/2024 for syncope, cystitis, hypoglycemia, COVID-19. He underwent paracentesis on admission with 6 liters of fluid removed and albumin given.    He was seen by his primary care nurse practitioner in clinic on 8/27/2024 and was prescribed mirtazapine for situational anxiety and omeprazole for heartburn.   He presented to [facility] Emergency Department on Saturday 9/7/2024 with left sided chest pain for 4 hours without radiation, associated with shortness of breath. He also had unchanged chronic bilateral lower extremity edema.  He was scheduled for paracentesis on Monday 9/9/2024. Labs showed elevated BNP (>4900 pg/mL) and troponin (0.196 ng/mL) and acute kidney injury (BUN 69 mg/dL and creatinine 4.8 mg/dL from 37 and 1.9 on 8/23/2024), suggestive of volume overload due to cirrhosis and heart failure. He was transferred to [facility] on 9/8/2024 and admitted to Hospital Medicine Team L.     Overview/Hospital Course:  Nephrology and Cardiology were consulted. Hepatology recommended IV albumin. Nephrology suspected cardiorenal syndrome and recommended giving IV furosemide 80 mg twice daily. It was ineffective. Hospital Medicine increased it to 120 mg twice daily and it still did not increase urine output.     Interval History: Urine output only about 1 liter in the past 24 hours on Lasix 120 mg IV BID. Hepatology recommended consulting Cardiology (per H&P was supposed to have been done on admission) and Palliative Care. Will consult Cardiology for now and try increasing diuretics.     Review of Systems   Constitutional:  Negative for chills and fever.   Neurological:  Negative for seizures and syncope.     Objective:     Vital Signs (Most Recent):  Temp: 98.5 °F (36.9 °C) (09/10/24 0706)  Pulse: 61 (09/10/24 0706)  Resp: 18 (09/10/24 0706)  BP: 123/70 (09/10/24 0706)  SpO2: 97 % (09/10/24 0706) Vital Signs (24h Range):  Temp:  [98.3 °F (36.8 °C)-98.5 °F (36.9 °C)] 98.5 °F (36.9 °C)  Pulse:  [61-73] 61  Resp:  [14-18] 18  SpO2:  [97 %-100 %] 97 %  BP: (123-136)/(62-76) 123/70     Weight: 76.7 kg (169 lb)  Body mass index is 22.92 kg/m².    Intake/Output Summary (Last 24 hours) at 9/10/2024 0866  Last data filed at 9/10/2024 0540  Gross per 24 hour   Intake 120 ml   Output 1025 ml   Net -905 ml         Physical Exam  Vitals and nursing note reviewed.   Constitutional:       General: He is not in acute distress.     Appearance: He is well-developed and normal weight. He is not diaphoretic.      Interventions: He is not  intubated.  Pulmonary:      Effort: Pulmonary effort is normal. No accessory muscle usage or respiratory distress. He is not intubated.   Neurological:      Mental Status: He is alert and oriented to person, place, and time. Mental status is at baseline.      Motor: No seizure activity.   Psychiatric:         Attention and Perception: Attention normal.         Mood and Affect: Mood and affect normal.         Behavior: Behavior is cooperative.             Significant Labs: All pertinent labs within the past 24 hours have been reviewed.  Recent Labs   Lab 09/08/24  0642 09/09/24  1028 09/10/24  0613   * 137 140   K 4.9 4.7 4.3    107 110   CO2 13* 15* 15*   BUN 71* 76* 81*   CREATININE 4.6* 4.5* 4.6*   CALCIUM 8.1* 8.1* 8.3*   PROT 6.2 6.1 6.2   BILITOT 2.2* 2.5* 2.3*   ALKPHOS 85 79 78   * 229* 209*   * 138* 122*         Significant Imaging: I have reviewed all pertinent imaging results/findings within the past 24 hours.    Assessment/Plan:      Acute kidney injury superimposed on chronic kidney disease  Treating as cardiorenal syndrome. Nephrology following. Monitor labs, intake/output.    Elevated troponin  Due to CHF.      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 without macular edema, without long-term current use of insulin  Hemoglobin A1c 6.0% on 8/18/2024. He takes empgagliflozin.     Cirrhosis of liver with ascites  Patient with known Cirrhosis with Child's class Calculator for Child's score link- https://www.mdcalc.com/calc/340/child-myles-score-cirrhosis-mortality#creator-insights. Co-morbidities are present and inclusive of ascites.  MELD-Na score calculated; MELD 3.0: 30 at 9/8/2024  6:42 AM  MELD-Na: 32 at 9/8/2024  6:42 AM  Calculated from:  Serum Creatinine: 4.6 mg/dL (Using max of 3 mg/dL) at 9/8/2024  6:42 AM  Serum Sodium: 135 mmol/L at 9/8/2024  6:42 AM  Total Bilirubin: 2.2 mg/dL at 9/8/2024  6:42 AM  Serum Albumin: 2.7 g/dL at 9/8/2024   6:42 AM  INR(ratio): 2.1 at 9/8/2024  6:42 AM  Age at listing (hypothetical): 73 years  Sex: Male at 9/8/2024  6:42 AM    Transferred here for Hepatology. Appreciate Hepatology. 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    Hypothyroidism  Continue home levothyroxine.      Acute on chronic systolic heart failure  He takes metoprolol succinate, hydralazine, isosorbide dinitrate, sacubitril-valsartan, empagliflozin. Giving home metoprolol, isosorbide dinitrate. Resume home hydralazine. Not on a diuretic at home. Giving IV furosemide 120 mg BID. Give a dose of IV chlorothiazide. Consult Cardiology. Monitor electrolytes, intake/output.     COPD (chronic obstructive pulmonary disease)  He takes fluticasone-vilanterol. Giving levalbuterol prn.    Hypertension  Chronic. Continue home hydralazine, isosorbide dinitrate, metoprolol. Monitor BP.      VTE Risk Mitigation (From admission, onward)           Ordered     enoxaparin injection 80 mg  Every 24 hours         09/09/24 1115                    Discharge Planning   PAM: 9/12/2024     Code Status: Full Code   Is the patient medically ready for discharge?:     Reason for patient still in hospital (select all that apply): Patient trending condition, Treatment, and Consult recommendations  Discharge Plan A: Home, Home with family, Home Health                  Adryan Beaulieu MD  Department of Hospital Medicine   [facility]

## 2024-09-10 NOTE — CONSULTS
[facility]  Cardiology  Consult Note    Patient Name: Baldo Carney  MRN: [id number]  Admission Date: 9/8/2024  Hospital Length of Stay: 2 days  Code Status: Full Code   Attending Provider: Adryan Beaulieu MD   Consulting Provider: Mawadah Samad, MD  Primary Care Physician: Millie Hayes, APRN,FNP-C  Principal Problem:<principal problem not specified>    Patient information was obtained from patient and ER records.     Inpatient consult to Cardiology  Consult performed by: Samad, Mawadah, MD  Consult ordered by: Adryan Beaulieu MD        Subjective:     Chief Complaint:  Shortness of Breath     HPI:   Baldo Carney is a 73 year old Black man with former cigarette smoking (quit in 1970), hypertension, T2DM with retinopathy and peripheral neuropathy, CAD s/p CABG (saphenous vein graft to right coronary artery) on 11/3/2014, s/p PCI with drug eluting stent placements in left main to left anterior descending artery and left main to lateral circumflex artery on 8/31/2022, history of aortic valve replacement with porcine bioprosthesis on 11/3/2014, ischemic cardiomyopathy with chronic biventricular systolic and diastolic heart failure, CKD stage 3-4, anemia, BPH, hypothyroidism, cirrhosis with ascites requiring bimonthly paracentesis, COPD, history of incarcerated inguinal hernia repair on 12/7/2023 presented to [facility] with complaints of shortness of breath and bilateral leg swelling. States these symptoms began a few days prior to presentation and he has been compliant with his home medications of entresto, jardiance 25mg, metoprolol succinate 25mg, isosorbide dinitrate 20, and hydralazine 50mg. Denies any excess salt or fluid intake. Has been needing 2 pillows to sleep at night. Labs showed elevated BNP (>4900 pg/mL) and troponin (0.196 ng/mL) and acute kidney injury (BUN 69 mg/dL and creatinine 4.8 mg/dL from 37 and 1.9 on 8/23/2024). Nephrology consulted with  recommendations to diuresis patient with IV Lasix 80 mg BID however that was increased to Lasix 120mg BID given inappropriate urine output. Cardiology consulted for assistance in diuresis.    Past Medical History:   Diagnosis Date    Asthma     Cardiomyopathy 10/21/2014    CHF (congestive heart failure)     Coronary artery disease     CRF (chronic renal failure)     Diabetes mellitus     Enlarged prostate     Hyperlipidemia     Hypertension     Neuropathy     Syncope 07/29/2024    Umbilical hernia without obstruction and without gangrene 10/13/2023       Past Surgical History:   Procedure Laterality Date    ANGIOGRAPHY OF INTERNAL MAMMARY VESSEL N/A 3/11/2022    Procedure: Angiogram Internal Mammary;  Surgeon: Hank Lujan MD;  Location: [facility] CATH/EP LAB;  Service: Cardiology;  Laterality: N/A;    CARDIAC CATHETERIZATION      CARDIAC VALVE SURGERY      CATHETERIZATION OF BOTH LEFT AND RIGHT HEART Left 3/11/2022    Procedure: CATHETERIZATION, HEART, BOTH LEFT AND RIGHT;  Surgeon: Hank Lujan MD;  Location: [facility] CATH/EP LAB;  Service: Cardiology;  Laterality: Left;    CORONARY ANGIOGRAPHY N/A 3/11/2022    Procedure: ANGIOGRAM, CORONARY ARTERY;  Surgeon: Hank Lujan MD;  Location: [facility] CATH/EP LAB;  Service: Cardiology;  Laterality: N/A;    CORONARY BYPASS GRAFT ANGIOGRAPHY  3/11/2022    Procedure: Bypass graft study;  Surgeon: Hank Lujan MD;  Location: [facility] CATH/EP LAB;  Service: Cardiology;;    CYSTOSCOPY N/A 7/30/2019    Procedure: CYSTOSCOPY;  Surgeon: Spencer Nguyen MD;  Location: [facility] OR;  Service: Urology;  Laterality: N/A;    INSERTION OF INTRAVASCULAR MICROAXIAL BLOOD PUMP N/A 8/31/2022    Procedure: INSERTION, IMPELLA;  Surgeon: Zach Jensen MD;  Location: [facility] CATH LAB;  Service: Cardiology;  Laterality: N/A;    INSERTION, CATHETER, DIALYSIS, PERITONEAL N/A 12/7/2023    Procedure: INSERTION, CATHETER, DIALYSIS, PERITONEAL;  Surgeon: Greg Bone Jr., MD;  Location:  [facility] OR;  Service: General;  Laterality: N/A;  need PD catheter    PLACEMENT OF SWAN SEE CATHETER WITH IMAGING GUIDANCE  8/31/2022    Procedure: INSERTION, CATHETER, SWAN-SEE, WITH IMAGING GUIDANCE;  Surgeon: Zach Jensen MD;  Location: [facility] CATH LAB;  Service: Cardiology;;    REPAIR, HERNIA, INGUINAL, INCARCERATED, INITIAL, AGE 5 YEARS OR OLDER Right 12/7/2023    Procedure: REPAIR, HERNIA, INGUINAL, INCARCERATED, INITIAL;  Surgeon: Greg Bone Jr., MD;  Location: [facility] OR;  Service: General;  Laterality: Right;    SHOULDER ARTHROSCOPY  1985    TRANSRECTAL BIOPSY OF PROSTATE WITH ULTRASOUND GUIDANCE N/A 7/30/2019    Procedure: BIOPSY, PROSTATE, RECTAL APPROACH, WITH US GUIDANCE;  Surgeon: Spencer Nguyen MD;  Location: [facility] OR;  Service: Urology;  Laterality: N/A;  procedure not performed, pt unable to tolerate    TRANSRECTAL BIOPSY OF PROSTATE WITH ULTRASOUND GUIDANCE N/A 8/8/2019    Procedure: BIOPSY, PROSTATE, RECTAL APPROACH, WITH US GUIDANCE;  Surgeon: Spencer Nguyen MD;  Location: [facility] OR;  Service: Urology;  Laterality: N/A;    UMBILICAL HERNIA REPAIR N/A 12/7/2023    Procedure: REPAIR, HERNIA, UMBILICAL;  Surgeon: Greg Bone Jr., MD;  Location: [facility];  Service: General;  Laterality: N/A;       Review of patient's allergies indicates:   Allergen Reactions    Canagliflozin      Other reaction(s): been very dizzy       No current facility-administered medications on file prior to encounter.     Current Outpatient Medications on File Prior to Encounter   Medication Sig    aspirin (ECOTRIN) 81 MG EC tablet Take 81 mg by mouth once daily.    atorvastatin (LIPITOR) 40 MG tablet Take 1 tablet (40 mg total) by mouth once daily.    cyanocobalamin 1,000 mcg/mL injection Inject 1 mL (1,000 mcg total) into the muscle every 30 days.    empagliflozin (JARDIANCE) 25 mg tablet Take 1 tablet (25 mg total) by mouth once daily.    fluticasone furoate-vilanteroL (BREO ELLIPTA) 100-25  mcg/dose diskus inhaler Inhale 1 puff into the lungs once daily. (DAILY CONTROLLER)    fluticasone propionate (FLONASE) 50 mcg/actuation nasal spray USE 1 SPRAY (50 MCG TOTAL) IN EACH NOSTRIL ONCE DAILY    gabapentin (NEURONTIN) 300 MG capsule Take 1 capsule (300 mg total) by mouth every evening.    hydrALAZINE (APRESOLINE) 50 MG tablet Take 1 tablet (50 mg total) by mouth 2 (two) times a day.    isosorbide dinitrate (ISORDIL) 20 MG tablet Take 20 mg by mouth 3 (three) times daily.    levalbuterol (XOPENEX) 0.63 mg/3 mL nebulizer solution Inhale 0.63 mg into the lungs every 6 (six) hours as needed for Shortness of Breath.    levothyroxine (SYNTHROID) 100 MCG tablet Take 1 tablet (100 mcg total) by mouth before breakfast. With no other meds or food    linaCLOtide (LINZESS) 72 mcg Cap capsule Take 1 capsule (72 mcg total) by mouth before breakfast.    metoprolol succinate (TOPROL-XL) 25 MG 24 hr tablet Take 1 tablet (25 mg total) by mouth once daily.    mirtazapine (REMERON) 7.5 MG Tab Take 1 tablet (7.5 mg total) by mouth every evening.    omeprazole (PRILOSEC) 40 MG capsule Take 1 capsule (40 mg total) by mouth once daily.    sacubitriL-valsartan (ENTRESTO) 24-26 mg per tablet Take 1 tablet by mouth 2 (two) times daily.    VERQUVO 5 mg Tab Take 5 mg by mouth once daily.     Family History       Problem Relation (Age of Onset)    Diabetes Father, Sister, Brother    Heart attack Father    Heart disease Father, Brother    Hypertension Father    No Known Problems Mother, Maternal Grandmother, Maternal Grandfather, Paternal Grandmother, Paternal Grandfather, Maternal Aunt, Maternal Uncle, Paternal Aunt, Paternal Uncle          Tobacco Use    Smoking status: Former     Current packs/day: 0.00     Types: Cigarettes     Quit date: 1970     Years since quittin.2    Smokeless tobacco: Never   Substance and Sexual Activity    Alcohol use: Not Currently     Alcohol/week: 3.0 standard drinks of alcohol     Types: 3  Cans of beer per week    Drug use: No    Sexual activity: Not Currently     Partners: Female     ROS  Objective:     Vital Signs (Most Recent):  Temp: 98.5 °F (36.9 °C) (09/10/24 1212)  Pulse: 60 (09/10/24 1212)  Resp: 18 (09/10/24 1212)  BP: 113/61 (09/10/24 1212)  SpO2: 98 % (09/10/24 1212) Vital Signs (24h Range):  Temp:  [98.3 °F (36.8 °C)-98.5 °F (36.9 °C)] 98.5 °F (36.9 °C)  Pulse:  [60-65] 60  Resp:  [14-18] 18  SpO2:  [97 %-100 %] 98 %  BP: (113-136)/(61-76) 113/61     Weight: 76.7 kg (169 lb)  Body mass index is 22.92 kg/m².    SpO2: 98 %         Intake/Output Summary (Last 24 hours) at 9/10/2024 1309  Last data filed at 9/10/2024 0908  Gross per 24 hour   Intake 120 ml   Output 1475 ml   Net -1355 ml       Lines/Drains/Airways       Drain  Duration                  Urethral Catheter 09/09/24 1643 16 Fr. <1 day              Peripheral Intravenous Line  Duration                  Peripheral IV - Single Lumen 09/07/24 18 G Anterior;Left;Proximal Forearm 3 days                     Physical Exam  Vitals and nursing note reviewed.   Constitutional:       Appearance: He is ill-appearing.   HENT:      Head: Normocephalic and atraumatic.      Nose: Nose normal.   Eyes:      Conjunctiva/sclera: Conjunctivae normal.   Neck:      Comments: Elevated JVD to mandible  Cardiovascular:      Rate and Rhythm: Normal rate.      Pulses: Normal pulses.      Heart sounds: Normal heart sounds.   Pulmonary:      Effort: Pulmonary effort is normal.      Breath sounds: Rales present.   Abdominal:      General: Abdomen is flat. There is no distension.      Tenderness: There is no abdominal tenderness. There is no guarding.   Musculoskeletal:      Right lower leg: Edema present.      Left lower leg: Edema present.   Neurological:      Mental Status: He is alert and oriented to person, place, and time.   Psychiatric:         Mood and Affect: Mood normal.          Significant Labs: All pertinent lab results from the last 24 hours have  been reviewed.    Significant Imaging:  All pertinent imaging results from the last 24 hours have been reviewed.   Assessment and Plan:     Acute on chronic systolic heart failure  Baldo Carney, 73M PMHx of HTN, T2DM with retinopathy and peripheral neuropathy, CAD s/p CABG (saphenous vein graft to right coronary artery) , s/p PCI with drug eluting stent placements in left main to left anterior descending artery and left main to lateral circumflex artery,  aortic valve replacement with porcine bioprosthesis, ischemic cardiomyopathy with chronic biventricular systolic and diastolic heart failure with severely reduced EF, CKD3, and cirrhosis with ascites.   Aortic Valve Prosthetic valve stenosis    Urine output only about 1 liter in the past 24 hours on Lasix 120 mg IV BID.       Recommendations:     [ ] Would start Lasix Drip 10 mg/ml    [ ] Would increase Diuril to 500mg    [ ] Strict I's and O's    [ ] Switching Enoxaparin to Heparin given ERICA    [ ] Increase hydralazine from bid to tid    [ ] Consider urology consult for hematuria     [ ] Check BMP BID to follow Cr and address accordingly         VTE Risk Mitigation (From admission, onward)           Ordered     enoxaparin injection 80 mg  Every 24 hours         09/09/24 1854                    Thank you for your consult. I will follow-up with patient. Please contact us if you have any additional questions.    Mawadah Samad, MD  Cardiology   [facility]

## 2024-09-11 PROBLEM — I51.3 LEFT VENTRICULAR APICAL THROMBUS: Status: ACTIVE | Noted: 2024-09-09

## 2024-09-11 LAB
ALBUMIN SERPL BCP-MCNC: 2.8 G/DL (ref 3.5–5.2)
ALP SERPL-CCNC: 74 U/L (ref 55–135)
ALT SERPL W/O P-5'-P-CCNC: 178 U/L (ref 10–44)
ANION GAP SERPL CALC-SCNC: 17 MMOL/L (ref 8–16)
AST SERPL-CCNC: 99 U/L (ref 10–40)
BILIRUB SERPL-MCNC: 2 MG/DL (ref 0.1–1)
BUN SERPL-MCNC: 77 MG/DL (ref 8–23)
CALCIUM SERPL-MCNC: 7.9 MG/DL (ref 8.7–10.5)
CHLORIDE SERPL-SCNC: 108 MMOL/L (ref 95–110)
CO2 SERPL-SCNC: 15 MMOL/L (ref 23–29)
CREAT SERPL-MCNC: 4.4 MG/DL (ref 0.5–1.4)
ERYTHROCYTE [DISTWIDTH] IN BLOOD BY AUTOMATED COUNT: 24.6 % (ref 11.5–14.5)
EST. GFR  (NO RACE VARIABLE): 13.4 ML/MIN/1.73 M^2
GLUCOSE SERPL-MCNC: 97 MG/DL (ref 70–110)
HCT VFR BLD AUTO: 29.3 % (ref 40–54)
HGB BLD-MCNC: 10.5 G/DL (ref 14–18)
INR PPP: 1.5 (ref 0.8–1.2)
MCH RBC QN AUTO: 29.7 PG (ref 27–31)
MCHC RBC AUTO-ENTMCNC: 35.8 G/DL (ref 32–36)
MCV RBC AUTO: 83 FL (ref 82–98)
PLATELET # BLD AUTO: 70 K/UL (ref 150–450)
PMV BLD AUTO: ABNORMAL FL (ref 9.2–12.9)
POCT GLUCOSE: 108 MG/DL (ref 70–110)
POCT GLUCOSE: 156 MG/DL (ref 70–110)
POCT GLUCOSE: 168 MG/DL (ref 70–110)
POCT GLUCOSE: 87 MG/DL (ref 70–110)
POTASSIUM SERPL-SCNC: 3.8 MMOL/L (ref 3.5–5.1)
PROT SERPL-MCNC: 6.1 G/DL (ref 6–8.4)
PROTHROMBIN TIME: 16.1 SEC (ref 9–12.5)
RBC # BLD AUTO: 3.54 M/UL (ref 4.6–6.2)
SODIUM SERPL-SCNC: 140 MMOL/L (ref 136–145)
WBC # BLD AUTO: 5.93 K/UL (ref 3.9–12.7)

## 2024-09-11 PROCEDURE — 85027 COMPLETE CBC AUTOMATED: CPT | Performed by: HOSPITALIST

## 2024-09-11 PROCEDURE — 63600175 PHARM REV CODE 636 W HCPCS: Performed by: HOSPITALIST

## 2024-09-11 PROCEDURE — P9047 ALBUMIN (HUMAN), 25%, 50ML: HCPCS | Mod: JZ,JG | Performed by: HOSPITALIST

## 2024-09-11 PROCEDURE — 36415 COLL VENOUS BLD VENIPUNCTURE: CPT | Performed by: HOSPITALIST

## 2024-09-11 PROCEDURE — 25000003 PHARM REV CODE 250: Performed by: HOSPITALIST

## 2024-09-11 PROCEDURE — 80053 COMPREHEN METABOLIC PANEL: CPT | Performed by: HOSPITALIST

## 2024-09-11 PROCEDURE — 99233 SBSQ HOSP IP/OBS HIGH 50: CPT | Mod: ,,, | Performed by: INTERNAL MEDICINE

## 2024-09-11 PROCEDURE — 63600175 PHARM REV CODE 636 W HCPCS: Performed by: STUDENT IN AN ORGANIZED HEALTH CARE EDUCATION/TRAINING PROGRAM

## 2024-09-11 PROCEDURE — 20600001 HC STEP DOWN PRIVATE ROOM

## 2024-09-11 PROCEDURE — 85610 PROTHROMBIN TIME: CPT | Performed by: HOSPITALIST

## 2024-09-11 PROCEDURE — 25000003 PHARM REV CODE 250: Performed by: INTERNAL MEDICINE

## 2024-09-11 RX ORDER — HYDRALAZINE HYDROCHLORIDE 50 MG/1
50 TABLET, FILM COATED ORAL 3 TIMES DAILY
Status: DISCONTINUED | OUTPATIENT
Start: 2024-09-11 | End: 2024-09-19 | Stop reason: HOSPADM

## 2024-09-11 RX ORDER — POTASSIUM CHLORIDE 20 MEQ/1
40 TABLET, EXTENDED RELEASE ORAL ONCE
Status: COMPLETED | OUTPATIENT
Start: 2024-09-11 | End: 2024-09-11

## 2024-09-11 RX ADMIN — ASPIRIN 81 MG: 81 TABLET, COATED ORAL at 08:09

## 2024-09-11 RX ADMIN — ENOXAPARIN SODIUM 80 MG: 80 INJECTION SUBCUTANEOUS at 02:09

## 2024-09-11 RX ADMIN — FUROSEMIDE 30 MG/HR: 10 INJECTION, SOLUTION INTRAMUSCULAR; INTRAVENOUS at 08:09

## 2024-09-11 RX ADMIN — ISOSORBIDE DINITRATE 20 MG: 20 TABLET ORAL at 02:09

## 2024-09-11 RX ADMIN — HYDRALAZINE HYDROCHLORIDE 50 MG: 50 TABLET ORAL at 09:09

## 2024-09-11 RX ADMIN — CHLOROTHIAZIDE SODIUM 500 MG: 500 INJECTION, POWDER, LYOPHILIZED, FOR SOLUTION INTRAVENOUS at 01:09

## 2024-09-11 RX ADMIN — FUROSEMIDE 30 MG/HR: 10 INJECTION, SOLUTION INTRAMUSCULAR; INTRAVENOUS at 11:09

## 2024-09-11 RX ADMIN — ISOSORBIDE DINITRATE 20 MG: 20 TABLET ORAL at 09:09

## 2024-09-11 RX ADMIN — METOPROLOL SUCCINATE 25 MG: 25 TABLET, EXTENDED RELEASE ORAL at 08:09

## 2024-09-11 RX ADMIN — POTASSIUM CHLORIDE 40 MEQ: 1500 TABLET, EXTENDED RELEASE ORAL at 09:09

## 2024-09-11 RX ADMIN — ALBUMIN (HUMAN) 25 G: 12.5 SOLUTION INTRAVENOUS at 08:09

## 2024-09-11 RX ADMIN — HYDRALAZINE HYDROCHLORIDE 50 MG: 50 TABLET ORAL at 02:09

## 2024-09-11 RX ADMIN — MIRTAZAPINE 7.5 MG: 7.5 TABLET, FILM COATED ORAL at 09:09

## 2024-09-11 RX ADMIN — LEVOTHYROXINE SODIUM 100 MCG: 100 TABLET ORAL at 06:09

## 2024-09-11 NOTE — CONSULTS
[facility]down  Adult Nutrition  Consult Note    SUMMARY     Recommendations  1. Continue Diabetic diet, 1500 calorie diet  2. Continue Boost GC TID for optimization of protein/calorie intake   3. Bene-protein added for extra protein   3. RD following    Goals: Meet % of EEN/EPN by RD f/u date  Nutrition Goal Status: goal not met  Communication of RD Recs: POC    Assessment and Plan    Nutrition Problem  Inadequate energy intake     Related to (etiology):   Physiological needs     Signs and Symptoms (as evidenced by):   25% meal consumption w/ Boost supplements     Interventions (treatment strategy):  Collaboration of nutrition care w/ other providers     Nutrition Diagnosis Status:   New     Reason for Assessment    Reason For Assessment: consult  Diagnosis: other (see comments) (HF)  Relevant Medical History: HLD, HTN  Interdisciplinary Rounds: did not attend  General Information Comments: 73 year old male with a previous medical history of COPD, cirrhosis of the liver with bi monthly paracentesis, cardiomyopathy, CHF, aortic stenosis, CAD, HTN, HLD, BPH, chronic renal failure, and DMII who presented to the emergency room with left sided chest pain x 4 hours. S/p paracentesis 8/12/24 with 6L fluid removal and albumin replacement.     RD consulted for poor oral intake. Pt consuming 25% of meals provided- along with protein supplements at this time. Pt w/ no issues chewing/swallowing at this time. Pt is currently on a diabetic 1500 calorie diet. Per chart review, noted pt w/  weight fluctuations- most likely d/t fluid. NFPE complete, 9/11, pt at risk for malnutrition if po intake declines <50% meal consumption at this time. RD following.    Nutrition Discharge Planning: diabetic diet along with Boost GC supplements.    Nutrition Risk Screen    Nutrition Risk Screen: reduced oral intake over the last month    Nutrition/Diet History    Food Allergies: NKFA    Anthropometrics    Temp: 98.1 °F  (36.7 °C)  Height Method: Stated  Height: 6' (182.9 cm)  Height (inches): 72 in  Weight Method: Standard Scale  Weight: 67.8 kg (149 lb 9.3 oz)  Weight (lb): 149.58 lb  Ideal Body Weight (IBW), Male: 178 lb  % Ideal Body Weight, Male (lb): 94.94 %  BMI (Calculated): 20.3  BMI Grade: 18.5-24.9 - normal       Lab/Procedures/Meds    Pertinent Labs Reviewed: reviewed  Pertinent Labs Comments: BUN 77, Cr 4.4, GFR 13.4  Pertinent Medications Reviewed: reviewed  Pertinent Medications Comments: mirtazipine    Estimated/Assessed Needs    Weight Used For Calorie Calculations: 67.8 kg (149 lb 7.6 oz)  Energy Calorie Requirements (kcal): 1826  Energy Need Method: [place]-St Jeor (PAL 1.25)  Protein Requirements: 81g (1.2 g/kg)  Weight Used For Protein Calculations: 67.8 kg (149 lb 7.6 oz)        RDA Method (mL): 1826  CHO Requirement: 228 g      Nutrition Prescription Ordered    Current Diet Order: Diabetic 1500 calorie diet    Evaluation of Received Nutrient/Fluid Intake    I/O: -2.9 L since admit  Energy Calories Required: not meeting needs  Protein Required: not meeting needs  Fluid Required: not meeting needs  Total Fluid Intake (mL/kg): 1 ml or fluid per MD  Tolerance: tolerating  % Intake of Estimated Energy Needs: 50%  % Meal Intake: 25%    Nutrition Risk    Level of Risk/Frequency of Follow-up: low (1x/week)    Monitor and Evaluation    Food and Nutrient Intake: food and beverage intake, energy intake  Food and Nutrient Adminstration: diet order  Knowledge/Beliefs/Attitudes: beliefs and attitudes, food and nutrition knowledge/skill  Physical Activity and Function: nutrition-related ADLs and IADLs, factors affecting access to physical activity  Anthropometric Measurements: height/length, weight, weight change, body mass index, growth pattern indices/percentile ranks  Biochemical Data, Medical Tests and Procedures: electrolyte and renal panel, gastrointestinal profile, glucose/endocrine profile, inflammatory profile,  lipid profile  Nutrition-Focused Physical Findings: overall appearance, extremities, muscles and bones, head and eyes, skin       Nutrition Follow-Up    RD Follow-up?: Yes

## 2024-09-11 NOTE — PROGRESS NOTES
[facility] Medicine  Progress Note    Patient Name: Baldo Carney  MRN: [id number]  Patient Class: IP- Inpatient   Admission Date: 9/8/2024  Length of Stay: 3 days  Attending Physician: Adryan Beaulieu MD  Primary Care Provider: Millie Hayes APRN,FNP-C        Subjective:     Principal Problem:Acute on chronic combined systolic and diastolic heart failure        HPI:  Baldo Carney is a 73 year old Black man with former cigarette smoking (quit in 1970), hypertension, diabetes mellitus type 2 with retinopathy and peripheral neuropathy, coronary artery disease status post coronary artery bypass graft (saphenous vein graft to right coronary artery) on 11/3/2014, status post percutaneous coronary intervention with drug eluting stent placements in left main to left anterior descending artery and left main to lateral circumflex artery on 8/31/2022, history of aortic valve replacement with porcine bioprosthesis on 11/3/2014, ischemic cardiomyopathy with chronic biventricular systolic and diastolic heart failure, chronic kidney disease stage 3-4, anemia, benign prostatic hyperplasia, hypothyroidism, cirrhosis with ascites requiring bimonthly paracentesis, chronic obstructive pulmonary disease (COPD), history of incarcerated inguinal hernia repair on 12/7/2023. He lives in [place], [place].    [facility] to 8/18/2024 for syncope, cystitis, hypoglycemia, COVID-19. He underwent paracentesis on admission with 6 liters of fluid removed and albumin given.    He was seen by his primary care nurse practitioner in clinic on 8/27/2024 and was prescribed mirtazapine for situational anxiety and omeprazole for heartburn.   [facility] Saturday 9/7/2024 with left sided chest pain for 4 hours without radiation, associated with shortness of breath. He also had unchanged chronic  bilateral lower extremity edema. He was scheduled for paracentesis on Monday 9/9/2024. Labs showed elevated BNP (>4900 pg/mL) and troponin (0.196 ng/mL) and acute kidney injury (BUN 69 mg/dL and creatinine 4.8 mg/dL from 37 and 1.9 on 8/23/2024), suggestive of volume overload due to cirrhosis and heart failure. He was transferred to [facility] on 9/8/2024 and admitted to Hospital Medicine Team L.     Overview/Hospital Course:  Nephrology and Cardiology were consulted. Hepatology recommended IV albumin. Suárez catheter was placed for urine output measurement. Nephrology suspected cardiorenal syndrome and recommended giving IV furosemide 80 mg twice daily. It was ineffective. Hospital Medicine increased it to 120 mg twice daily and it still did not increase urine output. Cardiology recommended furosemide drip. Nephrology agreed with furosemide drip and recommended a trial of IV albumin for 3 days. He diuresed better.     Interval History: Increased urine output on furosemide drip.     Review of Systems   Constitutional:  Negative for chills and fever.   Neurological:  Negative for seizures and syncope.     Objective:     Vital Signs (Most Recent):  Temp: 98.6 °F (37 °C) (09/11/24 0503)  Pulse: 64 (09/11/24 0503)  Resp: 18 (09/11/24 0503)  BP: 119/61 (09/11/24 0503)  SpO2: 99 % (09/11/24 0503) Vital Signs (24h Range):  Temp:  [96.6 °F (35.9 °C)-98.6 °F (37 °C)] 98.6 °F (37 °C)  Pulse:  [60-64] 64  Resp:  [18] 18  SpO2:  [98 %-99 %] 99 %  BP: (113-121)/(61-68) 119/61     Weight: 76.7 kg (169 lb)  Body mass index is 22.92 kg/m².    Intake/Output Summary (Last 24 hours) at 9/11/2024 0810  Last data filed at 9/11/2024 0532  Gross per 24 hour   Intake 500 ml   Output 2525 ml   Net -2025 ml         Physical Exam  Vitals and nursing note reviewed.   Constitutional:       General: He is not in acute distress.     Appearance: He is well-developed and normal weight. He is not diaphoretic.       Interventions: He is not intubated.  Pulmonary:      Effort: Pulmonary effort is normal. No accessory muscle usage or respiratory distress. He is not intubated.   Neurological:      Mental Status: He is alert and oriented to person, place, and time. Mental status is at baseline.      Motor: No seizure activity.   Psychiatric:         Attention and Perception: Attention normal.         Mood and Affect: Mood and affect normal.         Behavior: Behavior is cooperative.             Significant Labs: All pertinent labs within the past 24 hours have been reviewed.  Recent Labs   Lab 09/09/24  1028 09/10/24  0613 09/11/24  0213    140 140   K 4.7 4.3 3.8    110 108   CO2 15* 15* 15*   BUN 76* 81* 77*   CREATININE 4.5* 4.6* 4.4*   CALCIUM 8.1* 8.3* 7.9*   PROT 6.1 6.2 6.1   BILITOT 2.5* 2.3* 2.0*   ALKPHOS 79 78 74   * 209* 178*   * 122* 99*         Significant Imaging: I have reviewed all pertinent imaging results/findings within the past 24 hours.  US Retroperitoneal Complete 9/10/24: FINDINGS:   Right kidney: The right kidney measures 8.4 cm. No cortical thinning or loss of corticomedullary distinction. Resistive index measures 0.71.  No mass. No renal stone. No hydronephrosis.   Left kidney: The left kidney measures 9.1 cm. No cortical thinning or loss of corticomedullary distinction. Resistive index measures 0.61.  No mass. No renal stone. No hydronephrosis.   Splenic resistive index measures 0.78.   Suárez catheter present with decompression of the bladder.  Prostate is enlarged measuring 5.1 x 5.6 x 5.1 cm.   Large volume ascites.   Impression:  No significant sonographic abnormalities the kidneys.   Incomplete evaluation of bladder due to Suárez decompression.   Prostatomegaly.   Abdominopelvic ascites.     Assessment/Plan:      * Acute on chronic combined systolic and diastolic heart failure  He takes metoprolol succinate, hydralazine, isosorbide dinitrate, sacubitril-valsartan,  empagliflozin. Giving home metoprolol, isosorbide dinitrate, hydralazine. Not on a diuretic at home.Gave a dose of IV chlorothiazide. Tried furosemide IV boluses, which were ineffective, then switched to furosemide drip by Cardiology. Appreciate Cardiology. Monitor electrolytes, intake/output.     Acute kidney injury superimposed on chronic kidney disease  Treating as cardiorenal syndrome. Nephrology following. Monitor labs, intake/output.    Elevated troponin  Due to CHF.      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 without macular edema, without long-term current use of insulin  Hemoglobin A1c 6.0% on 8/18/2024. He takes empgagliflozin.     Cirrhosis of liver with ascites  Patient with known Cirrhosis with Child's class Calculator for Child's score link- https://[email or web address].Shipzi/calc/340/child-myles-score-cirrhosis-mortality#creator-insights. Co-morbidities are present and inclusive of ascites.  MELD-Na score calculated; MELD 3.0: 30 at 9/8/2024  6:42 AM  MELD-Na: 32 at 9/8/2024  6:42 AM  Calculated from:  Serum Creatinine: 4.6 mg/dL (Using max of 3 mg/dL) at 9/8/2024  6:42 AM  Serum Sodium: 135 mmol/L at 9/8/2024  6:42 AM  Total Bilirubin: 2.2 mg/dL at 9/8/2024  6:42 AM  Serum Albumin: 2.7 g/dL at 9/8/2024  6:42 AM  INR(ratio): 2.1 at 9/8/2024  6:42 AM  Age at listing (hypothetical): 73 years  Sex: Male at 9/8/2024  6:42 AM    Transferred here for Hepatology. Appreciate Hepatology. 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    Hypothyroidism  Continue home levothyroxine.      COPD (chronic obstructive pulmonary disease)  He takes fluticasone-vilanterol. Giving levalbuterol prn.    Hypertension  Chronic. Continue home hydralazine, isosorbide dinitrate, metoprolol. Monitor BP.      VTE Risk Mitigation (From admission, onward)           Ordered     enoxaparin injection 80 mg  Every 24 hours         09/09/24 1115                    Discharge Planning   PAM: 9/12/2024     Code Status: Full Code    Is the patient medically ready for discharge?:     Reason for patient still in hospital (select all that apply): Patient trending condition, Treatment, and Consult recommendations  Discharge Plan A: Home, Home with family, Home Health                  Adryan Beaulieu MD  Department of Hospital Medicine   [facility] - Transplant Stepdown

## 2024-09-11 NOTE — SUBJECTIVE & OBJECTIVE
Interval History: Increased urine output on furosemide drip.     Review of Systems   Constitutional:  Negative for chills and fever.   Neurological:  Negative for seizures and syncope.     Objective:     Vital Signs (Most Recent):  Temp: 98.6 °F (37 °C) (09/11/24 0503)  Pulse: 64 (09/11/24 0503)  Resp: 18 (09/11/24 0503)  BP: 119/61 (09/11/24 0503)  SpO2: 99 % (09/11/24 0503) Vital Signs (24h Range):  Temp:  [96.6 °F (35.9 °C)-98.6 °F (37 °C)] 98.6 °F (37 °C)  Pulse:  [60-64] 64  Resp:  [18] 18  SpO2:  [98 %-99 %] 99 %  BP: (113-121)/(61-68) 119/61     Weight: 76.7 kg (169 lb)  Body mass index is 22.92 kg/m².    Intake/Output Summary (Last 24 hours) at 9/11/2024 0810  Last data filed at 9/11/2024 0532  Gross per 24 hour   Intake 500 ml   Output 2525 ml   Net -2025 ml         Physical Exam  Vitals and nursing note reviewed.   Constitutional:       General: He is not in acute distress.     Appearance: He is well-developed and normal weight. He is not diaphoretic.      Interventions: He is not intubated.  Pulmonary:      Effort: Pulmonary effort is normal. No accessory muscle usage or respiratory distress. He is not intubated.   Neurological:      Mental Status: He is alert and oriented to person, place, and time. Mental status is at baseline.      Motor: No seizure activity.   Psychiatric:         Attention and Perception: Attention normal.         Mood and Affect: Mood and affect normal.         Behavior: Behavior is cooperative.             Significant Labs: All pertinent labs within the past 24 hours have been reviewed.  Recent Labs   Lab 09/09/24  1028 09/10/24  0613 09/11/24  0213    140 140   K 4.7 4.3 3.8    110 108   CO2 15* 15* 15*   BUN 76* 81* 77*   CREATININE 4.5* 4.6* 4.4*   CALCIUM 8.1* 8.3* 7.9*   PROT 6.1 6.2 6.1   BILITOT 2.5* 2.3* 2.0*   ALKPHOS 79 78 74   * 209* 178*   * 122* 99*         Significant Imaging: I have reviewed all pertinent imaging results/findings within the  past 24 hours.  US Retroperitoneal Complete 9/10/24: FINDINGS:   Right kidney: The right kidney measures 8.4 cm. No cortical thinning or loss of corticomedullary distinction. Resistive index measures 0.71.  No mass. No renal stone. No hydronephrosis.   Left kidney: The left kidney measures 9.1 cm. No cortical thinning or loss of corticomedullary distinction. Resistive index measures 0.61.  No mass. No renal stone. No hydronephrosis.   Splenic resistive index measures 0.78.   Suárez catheter present with decompression of the bladder.  Prostate is enlarged measuring 5.1 x 5.6 x 5.1 cm.   Large volume ascites.   Impression:  No significant sonographic abnormalities the kidneys.   Incomplete evaluation of bladder due to Suárez decompression.   Prostatomegaly.   Abdominopelvic ascites.

## 2024-09-11 NOTE — ASSESSMENT & PLAN NOTE
Baldo Carney, 73M PMHx of HTN, T2DM with retinopathy and peripheral neuropathy, CAD s/p CABG (saphenous vein graft to right coronary artery) , s/p PCI with drug eluting stent placements in left main to left anterior descending artery and left main to lateral circumflex artery,  aortic valve replacement with porcine bioprosthesis, ischemic cardiomyopathy with chronic biventricular systolic and diastolic heart failure with severely reduced EF, Layered LV thrombus, CKD3, and cirrhosis with ascites.     Results for orders placed during the hospital encounter of 09/08/24    Echo    Interpretation Summary  Images from the original result were not included.      Left Ventricle: The left ventricle is mildly dilated measuring 5.8 cm. Normal wall thickness. Severe global hypokinesis present. There is severely reduced systolic function. Grade II diastolic dysfunction. Elevated left ventricular filling pressure. There is a layered, nonmobile thrombus in the apex measuring 1.3 x 2.5 cm.    Right Ventricle: Mild right ventricular enlargement. Systolic function is mildly reduced. Pacemaker lead present in the ventricle.    Left Atrium: Left atrium is severely dilated.    Right Atrium: Right atrium is mildly dilated.    Aortic Valve: There is moderate to severe stenosis. Aortic valve area by VTI is 0.80 cm². Aortic valve peak velocity is 3.43 m/s. Mean gradient is 28 mmHg. The dimensionless index is 0.24. There is mild aortic regurgitation.    Mitral Valve: There is mild regurgitation.    Tricuspid Valve: There is moderate regurgitation.    Pulmonary Artery: There is severe pulmonary hypertension. The estimated pulmonary artery systolic pressure is 74 mmHg.    IVC/SVC: Elevated venous pressure at 15 mmHg.    ECHO concerning for aortic prosthetic valve stenosis likely making it difficult to diuresis patient.      Urine output only about 1 liter in the past 24 hours on Lasix 120 mg IV BID.     Recommendations:   Continue Lasix Drip  at 30 mg/hr  Increase Diuril to 500mg  Strict I's and O's  Consider switiching Enoxaparin to Heparin given ERICA  Increase hydralazine from bid to tid  Consider urology consult for hematuria   Check BMP BID to follow Cr and address accordingly   We will place referral to Dr. Gee with [facility] to discuss valve options in the outpatient setting

## 2024-09-11 NOTE — PLAN OF CARE
Pt admitted 9/8 for fluid volume overload 2/2 CHF exacerbation. Pt is awake and oriented to person and time only. VSS on bedside monitor, afebrile, on RA. NSR on tele. Lasix gtt continued per orders (3mg/hr). Diuril x1 so far today. Creatinine 4.4, pt has known CKD. PO K replaced today. Suárez in place due to retention, see flowsheets for UOP. PO intake is poor, boost protein drinks added to meal plan today. PIV expires today, unsuccessful attempts by RN x2 this am, pt declined new PIV placement at this time. Readiness reassessed throughout the shift. Pt mildly unpleasant towards RN. Pt encouraged to sit up in chair today, good bit of encouragement needed. Pt sat in chair for a few hours this am. Bed alarm set. Pt is up with one-person assist. Remains free from falls so far today. Nonskid socks on, call bell within reach, bed locked/lowest position. Pt verbalized understanding to call for needs/assistance.

## 2024-09-11 NOTE — SUBJECTIVE & OBJECTIVE
Interval History: NAEON. Remains volume overloaded. Received IV diuril [id number] yesterday. He is on lasix 30mg/hr. UOP 2.5L, net negative 2L over the past 24 hrs.     Review of Systems   Cardiovascular:  Negative for chest pain.   Respiratory:  Negative for shortness of breath.      Objective:     Vital Signs (Most Recent):  Temp: 98.1 °F (36.7 °C) (09/11/24 1137)  Pulse: 62 (09/11/24 1137)  Resp: 18 (09/11/24 1137)  BP: (!) 115/57 (09/11/24 1137)  SpO2: 100 % (09/11/24 1137) Vital Signs (24h Range):  Temp:  [96.6 °F (35.9 °C)-98.6 °F (37 °C)] 98.1 °F (36.7 °C)  Pulse:  [60-66] 62  Resp:  [18] 18  SpO2:  [98 %-100 %] 100 %  BP: (113-128)/(57-68) 115/57     Weight: 67.8 kg (149 lb 9.3 oz)  Body mass index is 20.29 kg/m².     SpO2: 100 %         Intake/Output Summary (Last 24 hours) at 9/11/2024 1202  Last data filed at 9/11/2024 0916  Gross per 24 hour   Intake 639.91 ml   Output 2750 ml   Net -2110.09 ml       Lines/Drains/Airways       Drain  Duration                  Urethral Catheter 09/09/24 1643 16 Fr. 1 day              Peripheral Intravenous Line  Duration                  Peripheral IV - Single Lumen 09/07/24 18 G Anterior;Left;Proximal Forearm 4 days                       Physical Exam  Vitals and nursing note reviewed.   Constitutional:       Appearance: He is ill-appearing.   HENT:      Head: Normocephalic and atraumatic.      Nose: Nose normal.   Eyes:      Conjunctiva/sclera: Conjunctivae normal.   Neck:      Comments: Elevated JVD to mandible  Cardiovascular:      Rate and Rhythm: Normal rate.      Pulses: Normal pulses.      Heart sounds: Normal heart sounds.   Pulmonary:      Effort: Pulmonary effort is normal.      Breath sounds: Rales present.   Abdominal:      General: Abdomen is flat. There is no distension.      Tenderness: There is no abdominal tenderness. There is no guarding.   Musculoskeletal:      Right lower leg: Edema present.      Left lower leg: Edema present.   Neurological:      Mental  Status: He is alert and oriented to person, place, and time.   Psychiatric:         Mood and Affect: Mood normal.            Significant Labs: BMP:   Recent Labs   Lab 09/10/24  0613 09/11/24  0213   GLU 88 97    140   K 4.3 3.8    108   CO2 15* 15*   BUN 81* 77*   CREATININE 4.6* 4.4*   CALCIUM 8.3* 7.9*   , CMP   Recent Labs   Lab 09/10/24  0613 09/11/24  0213    140   K 4.3 3.8    108   CO2 15* 15*   GLU 88 97   BUN 81* 77*   CREATININE 4.6* 4.4*   CALCIUM 8.3* 7.9*   PROT 6.2 6.1   ALBUMIN 3.0* 2.8*   BILITOT 2.3* 2.0*   ALKPHOS 78 74   * 99*   * 178*   ANIONGAP 15 17*   , CBC   Recent Labs   Lab 09/10/24  0614 09/11/24 0213   WBC 5.43 5.93   HGB 11.0* 10.5*   HCT 31.6* 29.3*   PLT 67* 70*   , and INR   Recent Labs   Lab 09/10/24  0613 09/11/24  0213   INR 1.7* 1.5*

## 2024-09-11 NOTE — PLAN OF CARE
Recommendations  1. Continue Diabetic diet, 1500 calorie diet  2. Continue Boost GC TID for optimization of protein/calorie intake   3. Bene-protein added for extra protein   3. RD following     Goals: Meet % of EEN/EPN by RD f/u date  Nutrition Goal Status: goal not met  Communication of RD Recs: POC

## 2024-09-11 NOTE — ASSESSMENT & PLAN NOTE
He takes metoprolol succinate, hydralazine, isosorbide dinitrate, sacubitril-valsartan, empagliflozin. Giving home metoprolol, isosorbide dinitrate, hydralazine. Not on a diuretic at home.Gave a dose of IV chlorothiazide. Tried furosemide IV boluses, which were ineffective, then switched to furosemide drip by Cardiology. Appreciate Cardiology. Monitor electrolytes, intake/output.

## 2024-09-11 NOTE — PLAN OF CARE
Pt c/o feeling dizzy. HS glucose 77. Pt drank x2 apple juices with night med medication. Re-check 104.

## 2024-09-11 NOTE — PLAN OF CARE
AAO x4. VSS. SpO2 stable on room air. Afebrile. Sinus arrhythmia on bedside tele at 60 bpm. Lasix gtt infusing at 3 ml/hr (30 mg). Suárez output 1400 ml overnight. Pt up with stand by assist. Bed alarm on (patient forgets he has wires connected). Reminded to call for assistance with ambulating as well. No c/o pain. Bed locked and in lowest setting. Call bell in reach. Tele alarm on.

## 2024-09-11 NOTE — PROGRESS NOTES
Nephrology Daily Progress Note    Assessment and plan  ERICA on CKD stage 3   Creatinine   Date Value Ref Range Status   09/11/2024 4.4 (H) 0.5 - 1.4 mg/dL Final   09/10/2024 4.6 (H) 0.5 - 1.4 mg/dL Final   09/09/2024 4.5 (H) 0.5 - 1.4 mg/dL Final   09/08/2024 4.6 (H) 0.5 - 1.4 mg/dL Final   09/07/2024 4.8 (H) 0.5 - 1.4 mg/dL Final   08/23/2024 1.9 (H) 0.5 - 1.4 mg/dL Final   08/18/2024 2.1 (H) 0.5 - 1.4 mg/dL Final   08/17/2024 2.3 (H) 0.5 - 1.4 mg/dL Final   08/16/2024 2.4 (H) 0.5 - 1.4 mg/dL Final   08/16/2024 2.6 (H) 0.5 - 1.4 mg/dL Final   01/10/2019 1.36 0.60 - 1.40 mg/dL    06/13/2018 1.52 (H) 0.60 - 1.40 mg/dL    03/28/2018 1.14 0.60 - 1.40 mg/dL    09/15/2017 1.17 0.60 - 1.40 mg/dL      Prot/Creat Ratio, Urine   Date Value Ref Range Status   09/09/2024 0.14 0.00 - 0.20 Final   09/09/2024 0.14 0.00 - 0.20 Final   -Baseline Cr 1.7-2.0  -Acute kidney injury thought to be 2/2 cardiorenal syndrome; congestive hepatopathy also noted  -Currently no indication for RRT  -Can titrate lasix drip to keep I/O neg 1L (can decrease it to 20 mg/hr today)  -Avoid nephrotoxin, monitor I/O and weight    High risk    Subjective  States breathing feels better  BP 110s-120s  I/O 500/2525 cc  Currently on lasix drip 30 mg/hr    Objective  PHYSICAL EXAMINATION:  Blood pressure (!) 115/57, pulse 62, temperature 98.1 °F (36.7 °C), temperature source Temporal, resp. rate 18, height 6' (1.829 m), weight 67.8 kg (149 lb 9.3 oz), SpO2 100%.  Constitutional - No acute distress  Cardiovascular - JVP distended  Respiratory - Minimal basal rales  Musculoskeletal - Peripheral edema trace to 1+

## 2024-09-11 NOTE — PROGRESS NOTES
[facility] - Transplant Stepdown  Cardiology  Progress Note    Patient Name: Baldo Carney  MRN: [id number]  Admission Date: 9/8/2024  Hospital Length of Stay: 3 days  Code Status: Full Code   Attending Physician: Adryan Beaulieu MD   Primary Care Physician: Millie Hayes, APRN,FNP-C  Expected Discharge Date: 9/12/2024  Principal Problem:Acute on chronic combined systolic and diastolic heart failure    Subjective:     Hospital Course:   No notes on file    Interval History: NAEON. Remains volume overloaded. Received IV diuril [id number] yesterday. He is on lasix 30mg/hr. UOP 2.5L, net negative 2L over the past 24 hrs.     Review of Systems   Cardiovascular:  Negative for chest pain.   Respiratory:  Negative for shortness of breath.      Objective:     Vital Signs (Most Recent):  Temp: 98.1 °F (36.7 °C) (09/11/24 1137)  Pulse: 62 (09/11/24 1137)  Resp: 18 (09/11/24 1137)  BP: (!) 115/57 (09/11/24 1137)  SpO2: 100 % (09/11/24 1137) Vital Signs (24h Range):  Temp:  [96.6 °F (35.9 °C)-98.6 °F (37 °C)] 98.1 °F (36.7 °C)  Pulse:  [60-66] 62  Resp:  [18] 18  SpO2:  [98 %-100 %] 100 %  BP: (113-128)/(57-68) 115/57     Weight: 67.8 kg (149 lb 9.3 oz)  Body mass index is 20.29 kg/m².     SpO2: 100 %         Intake/Output Summary (Last 24 hours) at 9/11/2024 1202  Last data filed at 9/11/2024 0916  Gross per 24 hour   Intake 639.91 ml   Output 2750 ml   Net -2110.09 ml       Lines/Drains/Airways       Drain  Duration                  Urethral Catheter 09/09/24 1643 16 Fr. 1 day              Peripheral Intravenous Line  Duration                  Peripheral IV - Single Lumen 09/07/24 18 G Anterior;Left;Proximal Forearm 4 days                       Physical Exam  Vitals and nursing note reviewed.   Constitutional:       Appearance: He is ill-appearing.   HENT:      Head: Normocephalic and atraumatic.      Nose: Nose normal.   Eyes:      Conjunctiva/sclera: Conjunctivae normal.   Neck:      Comments: Elevated JVD to  mandible  Cardiovascular:      Rate and Rhythm: Normal rate.      Pulses: Normal pulses.      Heart sounds: Normal heart sounds.   Pulmonary:      Effort: Pulmonary effort is normal.      Breath sounds: Rales present.   Abdominal:      General: Abdomen is flat. There is no distension.      Tenderness: There is no abdominal tenderness. There is no guarding.   Musculoskeletal:      Right lower leg: Edema present.      Left lower leg: Edema present.   Neurological:      Mental Status: He is alert and oriented to person, place, and time.   Psychiatric:         Mood and Affect: Mood normal.            Significant Labs: BMP:   Recent Labs   Lab 09/10/24  0613 09/11/24 0213   GLU 88 97    140   K 4.3 3.8    108   CO2 15* 15*   BUN 81* 77*   CREATININE 4.6* 4.4*   CALCIUM 8.3* 7.9*   , CMP   Recent Labs   Lab 09/10/24  0613 09/11/24 0213    140   K 4.3 3.8    108   CO2 15* 15*   GLU 88 97   BUN 81* 77*   CREATININE 4.6* 4.4*   CALCIUM 8.3* 7.9*   PROT 6.2 6.1   ALBUMIN 3.0* 2.8*   BILITOT 2.3* 2.0*   ALKPHOS 78 74   * 99*   * 178*   ANIONGAP 15 17*   , CBC   Recent Labs   Lab 09/10/24  0614 09/11/24 0213   WBC 5.43 5.93   HGB 11.0* 10.5*   HCT 31.6* 29.3*   PLT 67* 70*   , and INR   Recent Labs   Lab 09/10/24  0613 09/11/24  0213   INR 1.7* 1.5*         Assessment and Plan:       * Acute on chronic combined systolic and diastolic heart failure  Prosthetic valve stenosis  Baldo Carney, 73M PMHx of HTN, T2DM with retinopathy and peripheral neuropathy, CAD s/p CABG (saphenous vein graft to right coronary artery) , s/p PCI with drug eluting stent placements in left main to left anterior descending artery and left main to lateral circumflex artery,  aortic valve replacement with porcine bioprosthesis, ischemic cardiomyopathy with chronic biventricular systolic and diastolic heart failure with severely reduced EF, Layered LV thrombus, CKD3, and cirrhosis with ascites.     Results for  orders placed during the hospital encounter of 09/08/24    Echo    Interpretation Summary  Images from the original result were not included.      Left Ventricle: The left ventricle is mildly dilated measuring 5.8 cm. Normal wall thickness. Severe global hypokinesis present. There is severely reduced systolic function. Grade II diastolic dysfunction. Elevated left ventricular filling pressure. There is a layered, nonmobile thrombus in the apex measuring 1.3 x 2.5 cm.    Right Ventricle: Mild right ventricular enlargement. Systolic function is mildly reduced. Pacemaker lead present in the ventricle.    Left Atrium: Left atrium is severely dilated.    Right Atrium: Right atrium is mildly dilated.    Aortic Valve: There is moderate to severe stenosis. Aortic valve area by VTI is 0.80 cm². Aortic valve peak velocity is 3.43 m/s. Mean gradient is 28 mmHg. The dimensionless index is 0.24. There is mild aortic regurgitation.    Mitral Valve: There is mild regurgitation.    Tricuspid Valve: There is moderate regurgitation.    Pulmonary Artery: There is severe pulmonary hypertension. The estimated pulmonary artery systolic pressure is 74 mmHg.    IVC/SVC: Elevated venous pressure at 15 mmHg.    ECHO concerning for aortic prosthetic valve stenosis likely making it difficult to diuresis patient.      Urine output only about 1 liter in the past 24 hours on Lasix 120 mg IV BID.     Recommendations:   Continue Lasix Drip at 30 mg/hr  Increase Diuril to 500mg  Strict I's and O's  Consider switiching Enoxaparin to Heparin given ERICA  Increase hydralazine from bid to tid  Consider urology consult for hematuria   Check BMP BID to follow Cr and address accordingly   We will place referral to Dr. Gee with Interventional Clinic to discuss valve options in the outpatient setting        VTE Risk Mitigation (From admission, onward)           Ordered     enoxaparin injection 80 mg  Every 24 hours         09/09/24 9232                     Iggy Garibay MD  Cardiology  [facility] - Transplant Stepdown    IC STAFF  I have seen the patient, reviewed the Fellow's history and physical, assessment and plan. I have personally interviewed and examined the patient and agree with the findings.     ERAN Gee MD

## 2024-09-12 DIAGNOSIS — R06.02 SOB (SHORTNESS OF BREATH): Primary | ICD-10-CM

## 2024-09-12 PROBLEM — Z51.5 PALLIATIVE CARE ENCOUNTER: Status: ACTIVE | Noted: 2024-09-12

## 2024-09-12 LAB
ALBUMIN SERPL BCP-MCNC: 3.1 G/DL (ref 3.5–5.2)
ALP SERPL-CCNC: 71 U/L (ref 55–135)
ALT SERPL W/O P-5'-P-CCNC: 135 U/L (ref 10–44)
ANION GAP SERPL CALC-SCNC: 11 MMOL/L (ref 8–16)
APTT PPP: 41.8 SEC (ref 21–32)
APTT PPP: >150 SEC (ref 21–32)
AST SERPL-CCNC: 67 U/L (ref 10–40)
BILIRUB SERPL-MCNC: 1.6 MG/DL (ref 0.1–1)
BUN SERPL-MCNC: 82 MG/DL (ref 8–23)
CALCIUM SERPL-MCNC: 8.1 MG/DL (ref 8.7–10.5)
CHLORIDE SERPL-SCNC: 108 MMOL/L (ref 95–110)
CO2 SERPL-SCNC: 21 MMOL/L (ref 23–29)
CREAT SERPL-MCNC: 4.2 MG/DL (ref 0.5–1.4)
ERYTHROCYTE [DISTWIDTH] IN BLOOD BY AUTOMATED COUNT: 25.2 % (ref 11.5–14.5)
EST. GFR  (NO RACE VARIABLE): 14.2 ML/MIN/1.73 M^2
GLUCOSE SERPL-MCNC: 132 MG/DL (ref 70–110)
HCT VFR BLD AUTO: 28.1 % (ref 40–54)
HGB BLD-MCNC: 9.6 G/DL (ref 14–18)
INR PPP: 1.3 (ref 0.8–1.2)
MAGNESIUM SERPL-MCNC: 2.2 MG/DL (ref 1.6–2.6)
MCH RBC QN AUTO: 29.1 PG (ref 27–31)
MCHC RBC AUTO-ENTMCNC: 34.2 G/DL (ref 32–36)
MCV RBC AUTO: 85 FL (ref 82–98)
PHOSPHATE SERPL-MCNC: 4.1 MG/DL (ref 2.7–4.5)
PLATELET # BLD AUTO: 73 K/UL (ref 150–450)
PMV BLD AUTO: ABNORMAL FL (ref 9.2–12.9)
POCT GLUCOSE: 122 MG/DL (ref 70–110)
POCT GLUCOSE: 126 MG/DL (ref 70–110)
POCT GLUCOSE: 126 MG/DL (ref 70–110)
POCT GLUCOSE: 140 MG/DL (ref 70–110)
POTASSIUM SERPL-SCNC: 3.9 MMOL/L (ref 3.5–5.1)
PROT SERPL-MCNC: 6.4 G/DL (ref 6–8.4)
PROTHROMBIN TIME: 13.9 SEC (ref 9–12.5)
RBC # BLD AUTO: 3.3 M/UL (ref 4.6–6.2)
SODIUM SERPL-SCNC: 140 MMOL/L (ref 136–145)
WBC # BLD AUTO: 4.7 K/UL (ref 3.9–12.7)

## 2024-09-12 PROCEDURE — 85027 COMPLETE CBC AUTOMATED: CPT | Performed by: HOSPITALIST

## 2024-09-12 PROCEDURE — 99497 ADVNCD CARE PLAN 30 MIN: CPT | Mod: 25,,,

## 2024-09-12 PROCEDURE — 63600175 PHARM REV CODE 636 W HCPCS: Performed by: STUDENT IN AN ORGANIZED HEALTH CARE EDUCATION/TRAINING PROGRAM

## 2024-09-12 PROCEDURE — 84100 ASSAY OF PHOSPHORUS: CPT

## 2024-09-12 PROCEDURE — 20600001 HC STEP DOWN PRIVATE ROOM

## 2024-09-12 PROCEDURE — 99498 ADVNCD CARE PLAN ADDL 30 MIN: CPT | Mod: ,,,

## 2024-09-12 PROCEDURE — 80053 COMPREHEN METABOLIC PANEL: CPT

## 2024-09-12 PROCEDURE — 36415 COLL VENOUS BLD VENIPUNCTURE: CPT | Performed by: STUDENT IN AN ORGANIZED HEALTH CARE EDUCATION/TRAINING PROGRAM

## 2024-09-12 PROCEDURE — 25000003 PHARM REV CODE 250: Performed by: HOSPITALIST

## 2024-09-12 PROCEDURE — 63600175 PHARM REV CODE 636 W HCPCS: Mod: JZ,JG | Performed by: HOSPITALIST

## 2024-09-12 PROCEDURE — 25000003 PHARM REV CODE 250: Performed by: INTERNAL MEDICINE

## 2024-09-12 PROCEDURE — 36415 COLL VENOUS BLD VENIPUNCTURE: CPT | Performed by: HOSPITALIST

## 2024-09-12 PROCEDURE — 25000003 PHARM REV CODE 250: Performed by: STUDENT IN AN ORGANIZED HEALTH CARE EDUCATION/TRAINING PROGRAM

## 2024-09-12 PROCEDURE — 99232 SBSQ HOSP IP/OBS MODERATE 35: CPT | Mod: GC,,, | Performed by: INTERNAL MEDICINE

## 2024-09-12 PROCEDURE — 85730 THROMBOPLASTIN TIME PARTIAL: CPT | Mod: 91 | Performed by: STUDENT IN AN ORGANIZED HEALTH CARE EDUCATION/TRAINING PROGRAM

## 2024-09-12 PROCEDURE — 85610 PROTHROMBIN TIME: CPT | Performed by: HOSPITALIST

## 2024-09-12 PROCEDURE — 36415 COLL VENOUS BLD VENIPUNCTURE: CPT | Mod: XB | Performed by: HOSPITALIST

## 2024-09-12 PROCEDURE — 99233 SBSQ HOSP IP/OBS HIGH 50: CPT | Mod: ,,, | Performed by: INTERNAL MEDICINE

## 2024-09-12 PROCEDURE — 83735 ASSAY OF MAGNESIUM: CPT

## 2024-09-12 PROCEDURE — 99223 1ST HOSP IP/OBS HIGH 75: CPT | Mod: ,,,

## 2024-09-12 PROCEDURE — 85730 THROMBOPLASTIN TIME PARTIAL: CPT | Mod: 91 | Performed by: HOSPITALIST

## 2024-09-12 PROCEDURE — P9047 ALBUMIN (HUMAN), 25%, 50ML: HCPCS | Mod: JZ,JG | Performed by: HOSPITALIST

## 2024-09-12 RX ORDER — FUROSEMIDE 10 MG/ML
80 INJECTION INTRAMUSCULAR; INTRAVENOUS ONCE
Status: COMPLETED | OUTPATIENT
Start: 2024-09-12 | End: 2024-09-12

## 2024-09-12 RX ORDER — HEPARIN SODIUM,PORCINE/D5W 25000/250
0-40 INTRAVENOUS SOLUTION INTRAVENOUS CONTINUOUS
Status: DISCONTINUED | OUTPATIENT
Start: 2024-09-12 | End: 2024-09-17

## 2024-09-12 RX ADMIN — HYDRALAZINE HYDROCHLORIDE 50 MG: 50 TABLET ORAL at 01:09

## 2024-09-12 RX ADMIN — ISOSORBIDE DINITRATE 20 MG: 20 TABLET ORAL at 01:09

## 2024-09-12 RX ADMIN — HYDRALAZINE HYDROCHLORIDE 50 MG: 50 TABLET ORAL at 09:09

## 2024-09-12 RX ADMIN — FUROSEMIDE 80 MG: 10 INJECTION, SOLUTION INTRAVENOUS at 03:09

## 2024-09-12 RX ADMIN — METOPROLOL SUCCINATE 25 MG: 25 TABLET, EXTENDED RELEASE ORAL at 01:09

## 2024-09-12 RX ADMIN — ISOSORBIDE DINITRATE 20 MG: 20 TABLET ORAL at 09:09

## 2024-09-12 RX ADMIN — ALBUMIN (HUMAN) 25 G: 12.5 SOLUTION INTRAVENOUS at 08:09

## 2024-09-12 RX ADMIN — LEVOTHYROXINE SODIUM 100 MCG: 100 TABLET ORAL at 05:09

## 2024-09-12 RX ADMIN — MIRTAZAPINE 7.5 MG: 7.5 TABLET, FILM COATED ORAL at 09:09

## 2024-09-12 RX ADMIN — ASPIRIN 81 MG: 81 TABLET, COATED ORAL at 08:09

## 2024-09-12 RX ADMIN — CHLOROTHIAZIDE SODIUM 250 MG: 500 INJECTION, POWDER, LYOPHILIZED, FOR SOLUTION INTRAVENOUS at 05:09

## 2024-09-12 RX ADMIN — HEPARIN SODIUM 18 UNITS/KG/HR: 10000 INJECTION, SOLUTION INTRAVENOUS at 02:09

## 2024-09-12 RX ADMIN — FUROSEMIDE 40 MG/HR: 10 INJECTION, SOLUTION INTRAMUSCULAR; INTRAVENOUS at 11:09

## 2024-09-12 NOTE — ASSESSMENT & PLAN NOTE
He takes metoprolol succinate, hydralazine, isosorbide dinitrate, sacubitril-valsartan, empagliflozin.     -Giving home metoprolol, isosorbide dinitrate, hydralazine. Not on a diuretic at home.  -Gave a dose of IV chlorothiazide. Tried furosemide IV boluses, which were ineffective, then switched to furosemide drip by Cardiology. Appreciate Cardiology. Monitor electrolytes, intake/output. Can hold or wean GDMT but current goal is to optimize diuresis.     Continued on lasix gtt, appreciate cardiology recommendations. Lasix dose increased and diuril given. GDMT holding parameters added. Continued monitoring on heparin gtt. Not a candidate for ARNI/ MRA or SGLT2i due to renal disease.    Echo    Result Date: 9/9/2024  Images from the original result were not included.      Left Ventricle: The left ventricle is mildly dilated measuring 5.8 cm.   Normal wall thickness. Severe global hypokinesis present. There is   severely reduced systolic function. Grade II diastolic dysfunction.   Elevated left ventricular filling pressure. There is a layered, nonmobile   thrombus in the apex measuring 1.3 x 2.5 cm.    Right Ventricle: Mild right ventricular enlargement. Systolic function   is mildly reduced. Pacemaker lead present in the ventricle.    Left Atrium: Left atrium is severely dilated.    Right Atrium: Right atrium is mildly dilated.    Aortic Valve: There is moderate to severe stenosis. Aortic valve area   by VTI is 0.80 cm². Aortic valve peak velocity is 3.43 m/s. Mean gradient   is 28 mmHg. The dimensionless index is 0.24. There is mild aortic   regurgitation.    Mitral Valve: There is mild regurgitation.    Tricuspid Valve: There is moderate regurgitation.    Pulmonary Artery: There is severe pulmonary hypertension. The estimated   pulmonary artery systolic pressure is 74 mmHg.    IVC/SVC: Elevated venous pressure at 15 mmHg.

## 2024-09-12 NOTE — PROGRESS NOTES
"[facility]  Nephrology  Progress Note    Patient Name: Baldo Carney  MRN: [id number]  Admission Date: 9/8/2024  Hospital Length of Stay: 4 days  Attending Provider: Marianela Bridges MD   Primary Care Physician: Millie Hayes, APRN,FNP-C  Principal Problem:Acute on chronic combined systolic and diastolic heart failure    Subjective:     HPI: 73 year old male with a previous medical history of COPD, cirrhosis of the liver with bi monthly paracentesis, cardiomyopathy, CHF, aortic stenosis, CAD, HTN, HLD, BPH, chronic renal failure, and DMII who presented to the emergency room with left sided chest pain x 4 hours. S/p paracentesis 8/12/24 with 6L fluid removal and albumin replacement     Elevated troponin, BNP with ERICA on CKD suggest volume overload s/t decompensated cirrhosis and biventricular HFrEF 22%. Thrombocytopenia s/t chronic liver disease.     Nephrology consulted for "Hepatology suspects hepatorenal syndrome." Urine Na >20.     Interval History: NAEO. Denies any symptoms this morning. Good UOP with lasix gtt + Diuril 500mg dose yesterday. Renal function slowly improving.     Review of patient's allergies indicates:   Allergen Reactions    Canagliflozin      Other reaction(s): been very dizzy     Current Facility-Administered Medications   Medication Frequency    albumin human 25% bottle 25 g Daily    aspirin EC tablet 81 mg Daily    dextrose 10% bolus 125 mL 125 mL PRN    dextrose 10% bolus 250 mL 250 mL PRN    furosemide (Lasix) 500 mg in 50 mL infusion (conc: 10 mg/mL) Continuous    glucagon (human recombinant) injection 1 mg PRN    glucose chewable tablet 16 g PRN    glucose chewable tablet 24 g PRN    heparin 25,000 units in dextrose 5% (100 units/ml) IV bolus from bag HIGH INTENSITY nomogram - [facility] PRN    heparin 25,000 units in dextrose 5% (100 units/ml) IV bolus from bag HIGH INTENSITY nomogram - [facility] PRN    heparin 25,000 units in dextrose 5% 250 mL (100 units/mL) infusion HIGH " INTENSITY nomogram - [facility] Continuous    hydrALAZINE tablet 50 mg TID    insulin aspart U-100 pen 0-5 Units QID (AC + HS) PRN    isosorbide dinitrate tablet 20 mg TID    levalbuterol nebulizer solution 0.63 mg Q6H PRN    levothyroxine tablet 100 mcg Before breakfast    metoprolol succinate (TOPROL-XL) 24 hr tablet 25 mg Daily    mirtazapine tablet 7.5 mg QHS       Objective:     Vital Signs (Most Recent):  Temp: 97.4 °F (36.3 °C) (24 1130)  Pulse: 60 (24 1130)  Resp: 20 (24 1130)  BP: 117/61 (24 1130)  SpO2: 100 % (24 113) Vital Signs (24h Range):  Temp:  [97.4 °F (36.3 °C)-97.5 °F (36.4 °C)] 97.4 °F (36.3 °C)  Pulse:  [60-62] 60  Resp:  [16-20] 20  SpO2:  [97 %-100 %] 100 %  BP: (106-120)/(51-66) 117/61     Weight: 67.8 kg (149 lb 9.3 oz) (24 0928)  Body mass index is 20.29 kg/m².  Body surface area is 1.86 meters squared.    I/O last 3 completed shifts:  In: 1088 [P.O.:877; I.V.:162.6; IV Piggyback:48.4]  Out: 3655 [Urine:3655]     Physical Exam  Constitutional:       General: He is not in acute distress.     Appearance: Normal appearance. He is not ill-appearing.   HENT:      Head: Normocephalic.   Cardiovascular:      Rate and Rhythm: Normal rate and regular rhythm.      Pulses: Normal pulses.      Heart sounds: Normal heart sounds. No murmur heard.     No friction rub. No gallop.   Pulmonary:      Effort: Pulmonary effort is normal. No respiratory distress.      Breath sounds: Normal breath sounds.   Abdominal:      General: Abdomen is flat. Bowel sounds are normal. There is no distension.      Palpations: Abdomen is soft.      Tenderness: There is no abdominal tenderness.   Musculoskeletal:         General: No swelling.      Right lower le+ Edema present.      Left lower le+ Edema present.   Skin:     General: Skin is warm.      Coloration: Skin is not jaundiced.      Findings: No erythema or rash.   Neurological:      General: No focal deficit present.      Mental  "Status: He is alert and oriented to person, place, and time. Mental status is at baseline.   Psychiatric:         Mood and Affect: Mood normal.         Behavior: Behavior normal.         Thought Content: Thought content normal.          Significant Labs:  All labs within the past 24 hours have been reviewed.     Significant Imaging:  Labs: Reviewed  Assessment/Plan:     Renal/  Acute kidney injury superimposed on chronic kidney disease  Nephrology consulted for "Hepatology suspects hepatorenal syndrome."  Baseline Cr ~1.9. Urine Na >20, however, potentially 2/2 ATN or unlisted diuretic use or CRS.     PLAN:    - Renal function slowly improving  - Continue Lasix gtt  - Finish doses of Albumin  - Trend Cr/ Serial BMPs  - Replace electrolytes PRN, goal K/Phos/Mg 4/3/2  - Avoid nephrotoxic agents when feasible (NSAIDs, ACEi/ARB, IV radiocontrast, gadolinium, etc.)  - Avoid Fleet's and Brown Bomb Enemas given their propensity to induce hypermagnesemia  - Renally dose all meds to eGFR  - Goal MAP > 65 mmHg  - No acute indications for RRT at this time         Thank you for your consult. I will follow-up with patient. Please contact us if you have any additional questions.    Erica Lanier MD  Nephrology  [facility]  "

## 2024-09-12 NOTE — ASSESSMENT & PLAN NOTE
ERICA is likely due to  Cardiorenal . Baseline creatinine is  1.7-2 . Most recent creatinine and eGFR are listed below.  Recent Labs     09/10/24  0613 09/11/24  0213 09/12/24  0726   CREATININE 4.6* 4.4* 4.2*   EGFRNORACEVR 12.7* 13.4* 14.2*      Plan  - ERICA is stable  - Avoid nephrotoxins and renally dose meds for GFR listed above  - Monitor urine output, serial BMP, and adjust therapy as needed  - appreciate nephrology recommendations.

## 2024-09-12 NOTE — PROGRESS NOTES
"[facility]) nurse navigator notified of [facility] candidate in need of education and introduction to 4-6 week program.      PT aao x 3 while lying in bed. Introduced self to pt as [facility] nurse navigator.     Patient given "[facility] Pamphlet". "Home Care Guide" which includes "Daily weight and symptom tracker"will be given at [facility] appt.  Encouraged pt to review information.      Reviewed the following key points of [facility] program with pt:              1.) Take your medications as directed. Call Ms Nubia if any health Care Professional changes your Heart/Fluid medications.               2.) Weight yourself daily. Daily Dry Weights. Upon waking ,empty your bladder, weigh with as little clothes as possible before eating/drinking. Record weight in Symptom Tracker and compare these weights for fluid gain. Weight gain overnight of 3-4 lbs is Fluid, also a gain of 5 lbs in 3 days is Fluid as well. Call US!              3.) Follow low salt and limited fluid diet. Salt/Sodium Restriction 4773-8400 mg, see page 4. Sodium makes you hold onto Fluid. High Sodium Foods;Deli Meat/Cheese, Sausages/Hot Dogs, Fast Food/Restaurant Food, Anything in a box, bottle or can. Cook with Fresh or Frozen ingredients and use seasonings that are labeled NO SALT. Check Portion Sizes, Salt is reported for 1 portion. A can may contain 2-3 portions. Fluid Restriction 1.5 -2 Liters/Day, see page 6, measure all of your Fluid, the milk in your cereal, broth in your soup and ice cream because anything that melts at room temp is a liquid.              4.) Stop smoking and start exercising. Brisk walking is good, don't walk like you are going to the [employer] and DON"T WALK IN THE HEAT! Start low and increase as tolerated. Remember if you don't use it you lose it.              5.) Go to your appointments and call your team. Have your weight and BP/P ready when we call to do the Phone Check ins and " call us if you have fluid gain or questions       Pt reminded to follow Symptom tracker and to call at the onset of symptoms according to tracker.     Reviewed plan for follow up once discharged to include phone calls, in person and virtual visits to assist pt optimizing their heart failure medication regimen and encouraging healthy lifestyle modifications.  Reminded pt that program will assist them over the next 4-6 weeks and then patient will be transferred to long term care provider .  Reminded pt how to contact [facility] navigator via phone and or via [employer].     Pt instructed appointment will be printed on hospital discharge paperwork.     Pt also reminded RN will call 48-72 hours after discharge to check on them.     PT can verbalize read back of some of the  information given.  Encouraged pt and family to read over information often and contact team with any questions or concerns.     PT requests AM appointments please.

## 2024-09-12 NOTE — ASSESSMENT & PLAN NOTE
"Nephrology consulted for "Hepatology suspects hepatorenal syndrome."  Baseline Cr ~1.9. Urine Na >20, however, potentially 2/2 ATN or unlisted diuretic use or CRS.     PLAN:    - Renal function slowly improving  - Continue Lasix gtt  - Finish doses of Albumin  - Trend Cr/ Serial BMPs  - Replace electrolytes PRN, goal K/Phos/Mg 4/3/2  - Avoid nephrotoxic agents when feasible (NSAIDs, ACEi/ARB, IV radiocontrast, gadolinium, etc.)  - Avoid Fleet's and Brown Bomb Enemas given their propensity to induce hypermagnesemia  - Renally dose all meds to eGFR  - Goal MAP > 65 mmHg  - No acute indications for RRT at this time   "

## 2024-09-12 NOTE — PROGRESS NOTES
[facility]  Cardiology  Progress Note    Patient Name: Baldo Carney  MRN: [id number]  Admission Date: 9/8/2024  Hospital Length of Stay: 4 days  Code Status: Full Code   Attending Physician: Marianela Bridges MD   Primary Care Physician: Millie Hayes, APRN,FNP-C  Expected Discharge Date: 9/16/2024  Principal Problem:Acute on chronic combined systolic and diastolic heart failure    Subjective:     Interval History: No acute events overnight. No chest pain, SOB, cough, or PND. He notes that he preferentially carries fluid in his abdomen and feels as if it is still distended.       Review of Systems   Cardiovascular:  Negative for chest pain, dyspnea on exertion, irregular heartbeat and paroxysmal nocturnal dyspnea.     Objective:     Vital Signs (Most Recent):  Temp: 97.4 °F (36.3 °C) (09/12/24 1130)  Pulse: 60 (09/12/24 1130)  Resp: 16 (09/12/24 0730)  BP: 117/61 (09/12/24 1130)  SpO2: 100 % (09/12/24 1130) Vital Signs (24h Range):  Temp:  [97.4 °F (36.3 °C)-97.5 °F (36.4 °C)] 97.4 °F (36.3 °C)  Pulse:  [60-62] 60  Resp:  [16-18] 16  SpO2:  [97 %-100 %] 100 %  BP: (106-132)/(51-69) 117/61     Weight: 67.8 kg (149 lb 9.3 oz)  Body mass index is 20.29 kg/m².    SpO2: 100 %         Intake/Output Summary (Last 24 hours) at 9/12/2024 1150  Last data filed at 9/12/2024 0505  Gross per 24 hour   Intake 531.1 ml   Output 1580 ml   Net -1048.9 ml       Lines/Drains/Airways       Drain  Duration                  Urethral Catheter 09/09/24 1643 16 Fr. 2 days              Peripheral Intravenous Line  Duration                  Peripheral IV - Single Lumen 09/11/24 1737 20 G Anterior;Left Forearm <1 day                     Physical Exam  Constitutional:       General: He is not in acute distress.     Appearance: He is not toxic-appearing.   HENT:      Right Ear: External ear normal.      Left Ear: External ear normal.   Eyes:      General:         Right eye: No discharge.         Left eye: No discharge.       Extraocular Movements: Extraocular movements intact.   Cardiovascular:      Rate and Rhythm: Normal rate and regular rhythm.      Heart sounds: No murmur heard.     No gallop.      Comments: JVP to Rt-mandible  Pulmonary:      Effort: Pulmonary effort is normal.      Breath sounds: Rales present.   Abdominal:      General: There is distension.   Musculoskeletal:      Cervical back: Normal range of motion.      Right lower leg: No edema.      Left lower leg: No edema.   Skin:     General: Skin is warm and dry.   Neurological:      General: No focal deficit present.      Mental Status: He is alert and oriented to person, place, and time.          Significant Labs: All pertinent lab results from the last 24 hours have been reviewed. and   Recent Lab Results  (Last 5 results in the past 24 hours)        09/12/24  0942   09/12/24  0747   09/12/24  0726   09/12/24  0709   09/11/24  2202        Albumin     3.1           ALP     71           ALT     135           Anion Gap     11           PTT   41.8  Comment: Refer to local heparin nomogram for intensity/dose specific   therapeutic   range.               AST     67           BILIRUBIN TOTAL     1.6  Comment: For infants and newborns, interpretation of results should be based  on gestational age, weight and in agreement with clinical  observations.    Premature Infant recommended reference ranges:  Up to 24 hours.............<8.0 mg/dL  Up to 48 hours............<12.0 mg/dL  3-5 days..................<15.0 mg/dL  6-29 days.................<15.0 mg/dL             BUN     82           Calcium     8.1           Chloride     108           CO2     21           Creatinine     4.2           eGFR     14.2           Glucose     132           Hematocrit       28.1         Hemoglobin       9.6         INR       1.3  Comment: Coumadin Therapy:  2.0 - 3.0 for INR for all indicators except mechanical heart valves  and antiphospholipid syndromes which should use 2.5 - 3.5.            Magnesium      2.2           MCH       29.1         MCHC       34.2         MCV       85         MPV       SEE COMMENT  Comment: Result not available.         Phosphorus Level     4.1           Platelet Count       73         POCT Glucose 126         168       Potassium     3.9           PROTEIN TOTAL     6.4           PT       13.9         RBC       3.30         RDW       25.2         Sodium     140           WBC       4.70                                Significant Imaging:  Have been reviewed  Assessment and Plan:     Brief HPI: Baldo Carney, 73M PMHx of HFrEF 2/2 ischemic cardiomyopathy (due to CAD s/p CABG (saphenous vein graft to right coronary artery) , s/p PCI with drug eluting stent placements in left main to left anterior descending artery and left main to lateral circumflex artery),  aortic valve replacement with porcine bioprosthesis stenosis, HTN, T2DM with retinopathy and peripheral neuropathy, Layered LV thrombus, CKD3, and cirrhosis with ascites is being treated for acute on chronic HF exacerbation in the setting of cardiorenal syndrome and congestive hepatopathy.     * Acute on chronic combined systolic and diastolic heart failure  Results for orders placed during the hospital encounter of 09/08/24    Echo    Interpretation Summary  Images from the original result were not included.      Left Ventricle: The left ventricle is mildly dilated measuring 5.8 cm. Normal wall thickness. Severe global hypokinesis present. There is severely reduced systolic function. Grade II diastolic dysfunction. Elevated left ventricular filling pressure. There is a layered, nonmobile thrombus in the apex measuring 1.3 x 2.5 cm.    Right Ventricle: Mild right ventricular enlargement. Systolic function is mildly reduced. Pacemaker lead present in the ventricle.    Left Atrium: Left atrium is severely dilated.    Right Atrium: Right atrium is mildly dilated.    Aortic Valve: There is moderate to severe stenosis. Aortic  valve area by VTI is 0.80 cm². Aortic valve peak velocity is 3.43 m/s. Mean gradient is 28 mmHg. The dimensionless index is 0.24. There is mild aortic regurgitation.    Mitral Valve: There is mild regurgitation.    Tricuspid Valve: There is moderate regurgitation.    Pulmonary Artery: There is severe pulmonary hypertension. The estimated pulmonary artery systolic pressure is 74 mmHg.    IVC/SVC: Elevated venous pressure at 15 mmHg.    ECHO concerning for aortic prosthetic valve stenosis likely making it difficult to diuresis patient.      Urine output only about 1 liter in the past 24 hours on Lasix 120 mg IV BID.     Recommendations:   - Increase Lasix Drip to 40/hr after lasix 80mg IV rebolus   - Continue Diuril 250mg BID  - Strict I's and O's  - Continue Heparin given ERICA  - Continue hydralazine tid  - Continue isosorbide dinitrate TID  - Continue metoprolol succinate   - Check BMP BID to follow Cr and address accordingly   - Consider additional GDMT when able   - Avoid NSAID's and CCB's     We will place referral to Dr. Gee with [facility] to discuss valve options in the outpatient setting        VTE Risk Mitigation (From admission, onward)           Ordered     heparin 25,000 units in dextrose 5% 250 mL (100 units/mL) infusion HIGH INTENSITY nomogram - [facility]  Continuous        Question:  Begin at (units/kg/hr)  Answer:  18    09/12/24 0739     heparin 25,000 units in dextrose 5% (100 units/ml) IV bolus from bag HIGH INTENSITY nomogram - [facility]  As needed (PRN)        Question:  Heparin Infusion Adjustment (DO NOT MODIFY ANSWER)  Answer:  [email or web address]\epic\Images\Pharmacy\HeparinInfusions\heparin HIGH INTENSITY nomogram for [facility] [id number]    09/12/24 0739     heparin 25,000 units in dextrose 5% (100 units/ml) IV bolus from bag HIGH INTENSITY nomogram - [facility]  As needed (PRN)        Question:  Heparin Infusion Adjustment (DO NOT MODIFY ANSWER)  Answer:   [email or web address]\epic\Images\Pharmacy\HeparinInfusions\heparin HIGH INTENSITY nomogram for [facility] [id number]    09/12/24 0739                    Spencer Broderick,   Internal Medicine Intern   Cardiology  [facility]

## 2024-09-12 NOTE — SUBJECTIVE & OBJECTIVE
Interval History:     NAEON. Seen at bedside today.  Blood pressures were borderline low this morning however no reported dizziness or lightheadedness.  Continued on Lasix drip.  We will optimize diuresis per Cardiology recommendations.  Also discussed  holding parameters on GDMT.      Review of Systems   Constitutional:  Negative for chills and fever.   HENT:  Negative for nosebleeds.    Eyes:  Negative for visual disturbance.   Respiratory:  Negative for chest tightness and shortness of breath.    Cardiovascular:  Negative for chest pain.   Gastrointestinal:  Positive for abdominal distention. Negative for abdominal pain and constipation.   Genitourinary:         Suárez catheter in place   Neurological:  Negative for dizziness and light-headedness.   Psychiatric/Behavioral:  Negative for suicidal ideas. The patient is not nervous/anxious.      Objective:     Vital Signs (Most Recent):  Temp: 97.4 °F (36.3 °C) (09/12/24 1130)  Pulse: 60 (09/12/24 1130)  Resp: 20 (09/12/24 1130)  BP: 117/61 (09/12/24 1130)  SpO2: 100 % (09/12/24 1130) Vital Signs (24h Range):  Temp:  [97.4 °F (36.3 °C)-97.5 °F (36.4 °C)] 97.4 °F (36.3 °C)  Pulse:  [60-62] 60  Resp:  [16-20] 20  SpO2:  [97 %-100 %] 100 %  BP: (106-132)/(51-69) 117/61     Weight: 67.8 kg (149 lb 9.3 oz)  Body mass index is 20.29 kg/m².    Intake/Output Summary (Last 24 hours) at 9/12/2024 1401  Last data filed at 9/12/2024 0505  Gross per 24 hour   Intake 183.75 ml   Output 1580 ml   Net -1396.25 ml         Physical Exam  Vitals and nursing note reviewed.   Constitutional:       General: He is not in acute distress.     Appearance: Normal appearance. He is well-developed.      Interventions: He is not intubated.  HENT:      Head: Normocephalic and atraumatic.   Eyes:      Extraocular Movements: Extraocular movements intact.      Conjunctiva/sclera: Conjunctivae normal.   Cardiovascular:      Rate and Rhythm: Normal rate and regular rhythm.      Pulses: Normal pulses.       Heart sounds: Murmur heard.   Pulmonary:      Effort: Pulmonary effort is normal. No accessory muscle usage or respiratory distress. He is not intubated.      Breath sounds: Rales present.   Abdominal:      General: There is distension.      Tenderness: There is no abdominal tenderness.   Musculoskeletal:      Cervical back: Normal range of motion and neck supple.      Right lower leg: No edema.      Left lower leg: No edema.   Skin:     General: Skin is warm and dry.   Neurological:      Mental Status: He is alert and oriented to person, place, and time. Mental status is at baseline.   Psychiatric:         Attention and Perception: Attention normal.         Mood and Affect: Mood and affect normal.         Behavior: Behavior normal. Behavior is cooperative.             Significant Labs: All pertinent labs within the past 24 hours have been reviewed.  Recent Labs   Lab 09/10/24  0613 09/11/24  0213 09/12/24  0726    140 140   K 4.3 3.8 3.9    108 108   CO2 15* 15* 21*   BUN 81* 77* 82*   CREATININE 4.6* 4.4* 4.2*   CALCIUM 8.3* 7.9* 8.1*   PROT 6.2 6.1 6.4   BILITOT 2.3* 2.0* 1.6*   ALKPHOS 78 74 71   * 178* 135*   * 99* 67*     Recent Labs   Lab 09/12/24  0709   WBC 4.70   RBC 3.30*   HGB 9.6*   HCT 28.1*   PLT 73*   MCV 85   MCH 29.1   MCHC 34.2        Significant Imaging: I have reviewed all pertinent imaging results/findings within the past 24 hours.

## 2024-09-12 NOTE — TREATMENT PLAN
Hepatology Treatment Plan    Baldo Carney is a 73 y.o. male admitted to hospital 9/8/2024 (Hospital Day: 5) due to Acute on chronic combined systolic and diastolic heart failure.     Interval History  NAEON. Patient seen by cardiology who are adjusting his medications. Now on heparin gtt for LV thrombus.     I informed the patient about his tenuous clinical situation in no uncertain words.  I informed him that he was not a candidate for liver transplant given his significant medical comorbidities.  He reiterated understanding.    TTE 9/09/2024 - severely reduced ejection fraction, global hypokinesis, grade 2 diastolic dysfunction, moderate to severe aortic stenosis, moderate tricuspid regurgitation, and estimated PASP 74 mm of Hg.         Objective  Temp:  [97.4 °F (36.3 °C)-98.1 °F (36.7 °C)] 97.4 °F (36.3 °C) (09/12 0441)  Pulse:  [60-66] 60 (09/12 0441)  BP: (106-132)/(51-69) 120/64 (09/12 0441)  Resp:  [18] 18 (09/12 0441)  SpO2:  [97 %-100 %] 99 % (09/12 0441)    General: Alert, Oriented x3, ill appearing. Lower extremity edema +nt.   Neurologic: Asterixis absent  Abdomen: Normoactive bowel sounds. Non-distended. Normal tympany. Soft. Non-tender. No peritoneal signs.    Laboratory    Lab Results   Component Value Date    WBC 5.93 09/11/2024    HGB 10.5 (L) 09/11/2024    HCT 29.3 (L) 09/11/2024    MCV 83 09/11/2024    PLT 70 (L) 09/11/2024       Lab Results   Component Value Date     09/11/2024    K 3.8 09/11/2024     09/11/2024    CO2 15 (L) 09/11/2024    BUN 77 (H) 09/11/2024    CREATININE 4.4 (H) 09/11/2024    CALCIUM 7.9 (L) 09/11/2024       Lab Results   Component Value Date    ALBUMIN 2.8 (L) 09/11/2024     (H) 09/11/2024    AST 99 (H) 09/11/2024    ALKPHOS 74 09/11/2024    BILITOT 2.0 (H) 09/11/2024       Lab Results   Component Value Date    INR 1.5 (H) 09/11/2024    INR 1.7 (H) 09/10/2024    INR 2.1 (H) 09/08/2024       MELD 3.0: 25 at 9/11/2024  2:13 AM  MELD-Na: 27 at  9/11/2024  2:13 AM  Calculated from:  Serum Creatinine: 4.4 mg/dL (Using max of 3 mg/dL) at 9/11/2024  2:13 AM  Serum Sodium: 140 mmol/L (Using max of 137 mmol/L) at 9/11/2024  2:13 AM  Total Bilirubin: 2.0 mg/dL at 9/11/2024  2:13 AM  Serum Albumin: 2.8 g/dL at 9/11/2024  2:13 AM  INR(ratio): 1.5 at 9/11/2024  2:13 AM  Age at listing (hypothetical): 73 years  Sex: Male at 9/11/2024  2:13 AM      Assessment  Baldo Carney is a 73 y.o. male with history of stage 3 fibrosis and now likely cirrhosis from longstanding history of ischemic cardiomyopathy and chronic congestive heart failure (EF last 22%) who receives 2x per month paracentesis, COPD, HLD, CKD, and T2DM who presents with L sided chest pain and volume overload. He had a paracentesis on 8/12/24 with removal of 6L fluid and received albumin at that time. He last saw Dr. Wynne on 9/13/22 and has not followed up since then. Patient denies abdominal pain, N/V, jaundice, scleral icterus.     TTE 9/09/2024 - severely reduced ejection fraction, global hypokinesis, grade 2 diastolic dysfunction, moderate to severe aortic stenosis, moderate tricuspid regurgitation, and estimated PASP 74 mm of Hg.    MELD 3.0 score 27. Not a candidate for liver transplant evaluation.     Problem List:  Cirrhosis from ischemic cardiomyopathy and CHF  Elevated liver enzymes  ERICA c/f HRS    Plan  - Continue supportive care & diuresis per primary team/cardiology. On heparin gtt for LV thrombus.    - Explained to patient that he was not a candidate for liver transplant evaluation given multiple comorbidities & severe heart failure. Appreciate cardiology recommendations.   - Once seen by cardiology, would consider palliative care recs. Patient is amenable to this.    - please obtain daily CBC, BMP, LFT, INR  - Plan of care was discussed with primary team    Thank you for involving us in the care of Baldo Carney. Please call with any additional concerns or questions. Signing  delmi.     Jose Guadalupe Skaggs MD, PGY-VI  Gastroenterology Fellow  [facility]

## 2024-09-12 NOTE — ACP (ADVANCE CARE PLANNING)
Advance Care Planning   [address] [facility]       Patient Name: Baldo Carney  MRN: [id number]  Admission Date: 9/8/2024  Hospital Length of Stay: 4 days  Code Status: Full Code   Attending Provider: Marianela Bridges MD  Palliative Care Provider:   Primary Care Physician: Millie Hayes APRN,FNP-C  Principal Problem:Acute on chronic combined systolic and diastolic heart failure     Advance Care Planning     Date: 09/12/2024    Power of   I initiated the process of voluntary advance care planning today and explained the importance of this process to the patient.  I introduced the concept of advance directives to the patient, as well. Then the patient received detailed information about the importance of designating a Health Care Power of  (HCPOA). He was also instructed to communicate with this person about their wishes for future healthcare, should he become sick and lose decision-making capacity. The patient has previously appointed a HCPOA. After our discussion, the patient has decided to complete a HCPOA and has appointed his brother, health care agent:  Rigo Carney  & health care agent number:  [phone] . I encouraged him to communicate with this person about their wishes for future healthcare, should he become sick and lose decision-making capacity.      A total of 18 min was spent on advance care planning, goals of care discussion, emotional support, formulating and communicating prognosis and exploring burden/benefit of various approaches of treatment. This discussion occurred on a fully voluntary basis with the verbal consent of the patient and/or family.          LCSW will remain available for ongoing needs.     Nathalie Armstrong, [facility], [facility]-  Department of Palliative Medicine

## 2024-09-12 NOTE — ASSESSMENT & PLAN NOTE
Results for orders placed during the hospital encounter of 09/08/24    Echo    Interpretation Summary  Images from the original result were not included.      Left Ventricle: The left ventricle is mildly dilated measuring 5.8 cm. Normal wall thickness. Severe global hypokinesis present. There is severely reduced systolic function. Grade II diastolic dysfunction. Elevated left ventricular filling pressure. There is a layered, nonmobile thrombus in the apex measuring 1.3 x 2.5 cm.    Right Ventricle: Mild right ventricular enlargement. Systolic function is mildly reduced. Pacemaker lead present in the ventricle.    Left Atrium: Left atrium is severely dilated.    Right Atrium: Right atrium is mildly dilated.    Aortic Valve: There is moderate to severe stenosis. Aortic valve area by VTI is 0.80 cm². Aortic valve peak velocity is 3.43 m/s. Mean gradient is 28 mmHg. The dimensionless index is 0.24. There is mild aortic regurgitation.    Mitral Valve: There is mild regurgitation.    Tricuspid Valve: There is moderate regurgitation.    Pulmonary Artery: There is severe pulmonary hypertension. The estimated pulmonary artery systolic pressure is 74 mmHg.    IVC/SVC: Elevated venous pressure at 15 mmHg.    ECHO concerning for aortic prosthetic valve stenosis likely making it difficult to diuresis patient.      Urine output only about 1 liter in the past 24 hours on Lasix 120 mg IV BID.     Recommendations:   - Increase Lasix Drip to 40/hr after lasix 80mg IV rebolus   - Continue Diuril 250mg BID  - Strict I's and O's  - Continue Heparin given ERICA  - Continue hydralazine tid  - Continue isosorbide dinitrate TID  - Continue metoprolol succinate   - Check BMP BID to follow Cr and address accordingly   - Consider additional GDMT when able   - Avoid NSAID's and CCB's     We will place referral to Dr. Gee with [facility] to discuss valve options in the outpatient setting

## 2024-09-12 NOTE — CONSULTS
[facility]  Palliative Medicine  Consult Note    Patient Name: Baldo Carney  MRN: [id number]  Admission Date: 9/8/2024  Hospital Length of Stay: 4 days  Code Status: Full Code   Attending Provider: Marianela Bridges MD  Consulting Provider: Mel Washington NP  Primary Care Physician: Millie Hayes, APRN,FNP-C  Principal Problem:Acute on chronic combined systolic and diastolic heart failure    Patient information was obtained from patient, past medical records, and primary team.      Inpatient consult to Palliative Care  Consult performed by: Mel Washington NP  Consult ordered by: Adryan Beaulieu MD  Reason for consult: ACP/GOC discussions        Assessment/Plan:     Palliative Care  Palliative care encounter  Impression:  Baldo Carney is a 73 y.o. male with extensive cardiac history, HTN, CHF, CKD stage 3-4, T2DM with retinopathy and peripheral neuropathy, anemia, BPH, hypothyroidism, cirrhosis with ascites requiring bimonthly paracentesis, COPD, history of incarcerated inguinal hernia repair on 12/7/2023 initially presented to [facility] on 9/7/24 with complaints of left sided chest pain that started 4x hours PTA. While in OSH ED, patient with 5.3 K, 4,900 BNP, and elevated troponin. Patient was transferred to [facility] for higher level of care, and evaluation by cardiology and hepatology.    Patient is sitting up in chair. AAOx4, and amenable to Palliative Medicine. Palliative Medicine was consulted for Goals of Care and Advanced Care Planning discussions by primary team, Dr. Bridges.    Advance Care Planning    Date: 09/12/2024    Power of   I initiated the process of voluntary advance care planning today and explained the importance of this process to the patient.  Patient previously completed HCPOA documents in 2014. I re-introduced the concept of advance directives to the patient, as well. Then the patient received detailed information about the importance of designating a Health  Care Power of  (HCPOA). He was also instructed to communicate with this person about their wishes for future healthcare, should he become sick and lose decision-making capacity. The patient has previously appointed a HCPOA. After our discussion, the patient has decided to complete a new HCPOA and has appointed his  family , health care agent:  Libia Carney ([phone]) as primary choice and Sukhdeep Carney ([phone]) as his secondary choice . I encouraged him to communicate with this person about their wishes for future healthcare, should he become sick and lose decision-making capacity.               Life Limiting Diagnosis  CHF  - Cardiology Following  - Diuril BID  - Lasix gtt    ERICA  - Nephrology following    Cirrhosis from ischemic cardiomyopathy and CHF  - Hepatology following    Symptom Management  Pain  - Patient denies     Insomnia  - Denies    Dyspnea  - Denies    Ascites  - Reports getting paracentesis every 2 weeks.    Recommendations  - Continue management per primary team  - Patient and family would benefit from continued education and information regarding plan of care, prognosis, and what to expect in the future  - I will continue to follow for GOC discussions.    Thank you for consulting Palliative Medicine.        Thank you for your consult. I will follow-up with patient. Please contact us if you have any additional questions.    Subjective:     HPI:   As per chart review, Baldo Carney is a 73 y.o. male with extensive cardiac history, HTN, CAD s/p CABG (saphenous vein graft to right coronary artery) on 11/3/2014, s/p PCI with drug eluting stent placements in left main to left anterior descending artery and left main to lateral circumflex artery on 8/31/2022, history of aortic valve replacement with porcine bioprosthesis on 11/3/2014, ischemic cardiomyopathy with chronic biventricular systolic and diastolic HF, CKD stage 3-4, T2DM with retinopathy and peripheral  neuropathy, anemia, BPH, hypothyroidism, cirrhosis with ascites requiring bimonthly paracentesis, COPD, history of incarcerated inguinal hernia repair on 12/7/2023 initially presented to [facility] on 9/7/24 with complaints of left sided chest pain that started 4x hours PTA. While in OSH ED, patient with 5.3 K, 4,900 BNP, and elevated troponin. Patient was transferred to [facility] for higher level of care, and evaluation by cardiology and hepatology.    Palliative Medicine was consulted for Goals of Care and Advanced Care Planning discussions by primary team, Dr. Bridges.    Hospital Course:  No notes on file    Interval History: Palliative Medicine introduced to patient. Goals of care and advanced care planning discussions in progress.    Past Medical History:   Diagnosis Date    Asthma     Cardiomyopathy 10/21/2014    CHF (congestive heart failure)     Coronary artery disease     CRF (chronic renal failure)     Diabetes mellitus     Enlarged prostate     Hyperlipidemia     Hypertension     Neuropathy     Syncope 07/29/2024    Umbilical hernia without obstruction and without gangrene 10/13/2023       Past Surgical History:   Procedure Laterality Date    ANGIOGRAPHY OF INTERNAL MAMMARY VESSEL N/A 3/11/2022    Procedure: Angiogram Internal Mammary;  Surgeon: Hank Lujan MD;  Location: [facility] CATH/EP LAB;  Service: Cardiology;  Laterality: N/A;    CARDIAC CATHETERIZATION      CARDIAC VALVE SURGERY      CATHETERIZATION OF BOTH LEFT AND RIGHT HEART Left 3/11/2022    Procedure: CATHETERIZATION, HEART, BOTH LEFT AND RIGHT;  Surgeon: Hank Lujan MD;  Location: [facility] CATH/EP LAB;  Service: Cardiology;  Laterality: Left;    CORONARY ANGIOGRAPHY N/A 3/11/2022    Procedure: ANGIOGRAM, CORONARY ARTERY;  Surgeon: Hank Lujan MD;  Location: [facility] CATH/EP LAB;  Service: Cardiology;  Laterality: N/A;    CORONARY BYPASS GRAFT ANGIOGRAPHY  3/11/2022    Procedure: Bypass graft study;  Surgeon: Hank Lujan MD;  Location: [facility]  CATH/EP LAB;  Service: Cardiology;;    CYSTOSCOPY N/A 7/30/2019    Procedure: CYSTOSCOPY;  Surgeon: Spencer Nguyen MD;  Location: [facility] OR;  Service: Urology;  Laterality: N/A;    INSERTION OF INTRAVASCULAR MICROAXIAL BLOOD PUMP N/A 8/31/2022    Procedure: INSERTION, IMPELLA;  Surgeon: Zach Jensen MD;  Location: [facility] CATH LAB;  Service: Cardiology;  Laterality: N/A;    INSERTION, CATHETER, DIALYSIS, PERITONEAL N/A 12/7/2023    Procedure: INSERTION, CATHETER, DIALYSIS, PERITONEAL;  Surgeon: Greg Bone Jr., MD;  Location: [facility];  Service: General;  Laterality: N/A;  need PD catheter    PLACEMENT OF SWAN SEE CATHETER WITH IMAGING GUIDANCE  8/31/2022    Procedure: INSERTION, CATHETER, SWAN-SEE, WITH IMAGING GUIDANCE;  Surgeon: Zach Jensen MD;  Location: [facility] CATH LAB;  Service: Cardiology;;    REPAIR, HERNIA, INGUINAL, INCARCERATED, INITIAL, AGE 5 YEARS OR OLDER Right 12/7/2023    Procedure: REPAIR, HERNIA, INGUINAL, INCARCERATED, INITIAL;  Surgeon: Greg Bone Jr., MD;  Location: [facility];  Service: General;  Laterality: Right;    SHOULDER ARTHROSCOPY  1985    TRANSRECTAL BIOPSY OF PROSTATE WITH ULTRASOUND GUIDANCE N/A 7/30/2019    Procedure: BIOPSY, PROSTATE, RECTAL APPROACH, WITH US GUIDANCE;  Surgeon: Spencer Nguyen MD;  Location: [facility];  Service: Urology;  Laterality: N/A;  procedure not performed, pt unable to tolerate    TRANSRECTAL BIOPSY OF PROSTATE WITH ULTRASOUND GUIDANCE N/A 8/8/2019    Procedure: BIOPSY, PROSTATE, RECTAL APPROACH, WITH US GUIDANCE;  Surgeon: Spencer Nguyen MD;  Location: [facility] OR;  Service: Urology;  Laterality: N/A;    UMBILICAL HERNIA REPAIR N/A 12/7/2023    Procedure: REPAIR, HERNIA, UMBILICAL;  Surgeon: Greg Bone Jr., MD;  Location: [facility];  Service: General;  Laterality: N/A;       Review of patient's allergies indicates:   Allergen Reactions    Canagliflozin      Other reaction(s): been very dizzy        Medications:  Continuous Infusions:   furosemide (Lasix) 500 mg in 50 mL infusion (conc: 10 mg/mL)  30 mg/hr Intravenous Continuous 3 mL/hr at 24 30 mg/hr at 24    heparin (porcine) in D5W  0-40 Units/kg/hr Intravenous Continuous         Scheduled Meds:   albumin human 25%  25 g Intravenous Daily    aspirin  81 mg Oral Daily    hydrALAZINE  50 mg Oral TID    isosorbide dinitrate  20 mg Oral TID    levothyroxine  100 mcg Oral Before breakfast    metoprolol succinate  25 mg Oral Daily    mirtazapine  7.5 mg Oral QHS     PRN Meds:  Current Facility-Administered Medications:     dextrose 10%, 12.5 g, Intravenous, PRN    dextrose 10%, 25 g, Intravenous, PRN    glucagon (human recombinant), 1 mg, Intramuscular, PRN    glucose, 16 g, Oral, PRN    glucose, 24 g, Oral, PRN    heparin (PORCINE), 60 Units/kg (Adjusted), Intravenous, PRN    heparin (PORCINE), 30 Units/kg (Adjusted), Intravenous, PRN    insulin aspart U-100, 0-5 Units, Subcutaneous, QID (AC + HS) PRN    levalbuterol, 0.63 mg, Nebulization, Q6H PRN    Family History       Problem Relation (Age of Onset)    Diabetes Father, Sister, Brother    Heart attack Father    Heart disease Father, Brother    Hypertension Father    No Known Problems Mother, Maternal Grandmother, Maternal Grandfather, Paternal Grandmother, Paternal Grandfather, Maternal Aunt, Maternal Uncle, Paternal Aunt, Paternal Uncle          Tobacco Use    Smoking status: Former     Current packs/day: 0.00     Types: Cigarettes     Quit date: 1970     Years since quittin.2    Smokeless tobacco: Never   Substance and Sexual Activity    Alcohol use: Not Currently     Alcohol/week: 3.0 standard drinks of alcohol     Types: 3 Cans of beer per week    Drug use: No    Sexual activity: Not Currently     Partners: Female       Review of Systems   Constitutional:  Positive for fatigue.   HENT: Negative.     Eyes: Negative.    Respiratory:  Positive for shortness of breath.     Cardiovascular:  Positive for leg swelling.   Genitourinary: Negative.    Neurological:  Positive for weakness.     Objective:     Vital Signs (Most Recent):  Temp: 97.4 °F (36.3 °C) (09/12/24 0730)  Pulse: 60 (09/12/24 0730)  Resp: 18 (09/12/24 0441)  BP: (!) 115/53 (09/12/24 0730)  SpO2: 99 % (09/12/24 0730) Vital Signs (24h Range):  Temp:  [97.4 °F (36.3 °C)-98.1 °F (36.7 °C)] 97.4 °F (36.3 °C)  Pulse:  [60-62] 60  Resp:  [18] 18  SpO2:  [97 %-100 %] 99 %  BP: (106-132)/(51-69) 115/53     Weight: 67.8 kg (149 lb 9.3 oz)  Body mass index is 20.29 kg/m².       Physical Exam  Constitutional:       Appearance: He is ill-appearing.   HENT:      Head: Normocephalic and atraumatic.      Mouth/Throat:      Mouth: Mucous membranes are dry.   Eyes:      Extraocular Movements: Extraocular movements intact.   Cardiovascular:      Rate and Rhythm: Rhythm irregular.      Pulses: Normal pulses.   Pulmonary:      Effort: Pulmonary effort is normal.   Skin:     General: Skin is warm and dry.   Neurological:      Mental Status: He is alert and oriented to person, place, and time.            Review of Symptoms      Symptom Assessment (ESAS 0-10 Scale)  Pain:  0  Dyspnea:  0  Anxiety:  0  Nausea:  0  Depression:  0  Anorexia:  0  Fatigue:  0  Insomnia:  0  Restlessness:  0  Agitation:  0     CAM / Delirium:  Negative  Constipation:  Negative  Diarrhea:  Negative      Bowel Management Plan (BMP):  Yes      Pain Assessment:  OME in 24 hours:  0  Location(s):      Modified Roshan Scale:  0    Performance Status:  80    Living Arrangements:  Lives alone    Psychosocial/Cultural:   See Palliative Psychosocial Note: Yes  - .  - Lives alone  - Has 1 biological son, 4 granddaughters, 1 grandson. Patient reports that he does not have a strong relationship with his son.  - Patient has 5 sisters and 4 brothers. Patient reports strong relationship with siblings, nieces, nephews, and grand children.  - Retired [employer] of 42 years  in [place].  - Enjoys relaxing and resting in his down time.  **Primary  to Follow**  Palliative Care  Consult: No        Advance Care Planning  Advance Directives:   Living Will: Yes        Copy on chart: Yes    LaPOST: No    Do Not Resuscitate Status: No    Medical Power of : Yes    Agent's Name:  Libia Carney   Agent's Contact Number:  [phone]    Decision Making:  Patient answered questions  Goals of Care: The patient endorses that what is most important right now is to focus on remaining as independent as possible and extending life as long as possible, even it it means sacrificing quality. Goals of care discussions are on-going.     Accordingly, we have decided that the best plan to meet the patient's goals includes continuing with treatment         Significant Labs: All pertinent labs within the past 24 hours have been reviewed.  CBC:   Recent Labs   Lab 09/12/24  0709   WBC 4.70   HGB 9.6*   HCT 28.1*   MCV 85   PLT 73*     BMP:  Recent Labs   Lab 09/12/24  0726   *      K 3.9      CO2 21*   BUN 82*   CREATININE 4.2*   CALCIUM 8.1*   MG 2.2     LFT:  Lab Results   Component Value Date    AST 67 (H) 09/12/2024    ALKPHOS 71 09/12/2024    BILITOT 1.6 (H) 09/12/2024     Albumin:   Albumin   Date Value Ref Range Status   09/12/2024 3.1 (L) 3.5 - 5.2 g/dL Final   08/17/2021 pos  Final     Protein:   Total Protein   Date Value Ref Range Status   09/12/2024 6.4 6.0 - 8.4 g/dL Final     Lactic acid:   Lab Results   Component Value Date    LACTATE 1.7 07/30/2024    LACTATE 3.1 (H) 07/29/2024       Significant Imaging: I have reviewed all pertinent imaging results/findings within the past 24 hours.      US Retroperitoneal 9/10/24  Impression:  No significant sonographic abnormalities the kidneys.  Incomplete evaluation of bladder due to Suárez decompression.  Prostatomegaly.  Abdominopelvic ascites.    In my care of this patient with acute on chronic  severe illness with threat to life and/or bodily function, I am recommending goal-concordant care as noted above. I spent a significant amount of time reviewing external records/ recommendations of other providers, reviewing recent test results, and discussed care with other subspecialists involved    The above recommendations communicated directly to primary team on 9/12/24.    Additional 46min time spent on a voluntary advance care planning and /or goals of care discussion, providing emotional support, formulating, and communicating prognosis and exploring burden/benefit of various approaches of treatment.         Mel Washington NP  Palliative Medicine  [facility]

## 2024-09-12 NOTE — ASSESSMENT & PLAN NOTE
Patient with known Cirrhosis with Child's class Calculator for Child's score link- https://www.[employer].com/calc/340/child-myles-score-cirrhosis-mortality#creator-insights. Co-morbidities are present and inclusive of ascites.  MELD-Na score calculated; MELD 3.0: 23 at 9/12/2024  7:26 AM  MELD-Na: 24 at 9/12/2024  7:26 AM  Calculated from:  Serum Creatinine: 4.2 mg/dL (Using max of 3 mg/dL) at 9/12/2024  7:26 AM  Serum Sodium: 140 mmol/L (Using max of 137 mmol/L) at 9/12/2024  7:26 AM  Total Bilirubin: 1.6 mg/dL at 9/12/2024  7:26 AM  Serum Albumin: 3.1 g/dL at 9/12/2024  7:26 AM  INR(ratio): 1.3 at 9/12/2024  7:09 AM  Age at listing (hypothetical): 73 years  Sex: Male at 9/12/2024  7:26 AM    Appreciate Hepatology. 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 not a candidate for liver transplant evaluation given multiple comorbidities & severe heart failure   -Palliative care consulted for goal clarification

## 2024-09-12 NOTE — SUBJECTIVE & OBJECTIVE
Interval History: No acute events overnight. No chest pain, SOB, cough, or PND. He notes that he preferentially carries fluid in his abdomen and feels as if it is still distended.       Review of Systems   Cardiovascular:  Negative for chest pain, dyspnea on exertion, irregular heartbeat and paroxysmal nocturnal dyspnea.     Objective:     Vital Signs (Most Recent):  Temp: 97.4 °F (36.3 °C) (09/12/24 1130)  Pulse: 60 (09/12/24 1130)  Resp: 16 (09/12/24 0730)  BP: 117/61 (09/12/24 1130)  SpO2: 100 % (09/12/24 1130) Vital Signs (24h Range):  Temp:  [97.4 °F (36.3 °C)-97.5 °F (36.4 °C)] 97.4 °F (36.3 °C)  Pulse:  [60-62] 60  Resp:  [16-18] 16  SpO2:  [97 %-100 %] 100 %  BP: (106-132)/(51-69) 117/61     Weight: 67.8 kg (149 lb 9.3 oz)  Body mass index is 20.29 kg/m².    SpO2: 100 %         Intake/Output Summary (Last 24 hours) at 9/12/2024 1150  Last data filed at 9/12/2024 0505  Gross per 24 hour   Intake 531.1 ml   Output 1580 ml   Net -1048.9 ml       Lines/Drains/Airways       Drain  Duration                  Urethral Catheter 09/09/24 1643 16 Fr. 2 days              Peripheral Intravenous Line  Duration                  Peripheral IV - Single Lumen 09/11/24 1737 20 G Anterior;Left Forearm <1 day                     Physical Exam  Constitutional:       General: He is not in acute distress.     Appearance: He is not toxic-appearing.   HENT:      Right Ear: External ear normal.      Left Ear: External ear normal.   Eyes:      General:         Right eye: No discharge.         Left eye: No discharge.      Extraocular Movements: Extraocular movements intact.   Cardiovascular:      Rate and Rhythm: Normal rate and regular rhythm.      Heart sounds: No murmur heard.     No gallop.      Comments: JVP to Rt-mandible  Pulmonary:      Effort: Pulmonary effort is normal.      Breath sounds: Rales present.   Abdominal:      General: There is distension.   Musculoskeletal:      Cervical back: Normal range of motion.      Right  lower leg: No edema.      Left lower leg: No edema.   Skin:     General: Skin is warm and dry.   Neurological:      General: No focal deficit present.      Mental Status: He is alert and oriented to person, place, and time.          Significant Labs: All pertinent lab results from the last 24 hours have been reviewed. and   Recent Lab Results  (Last 5 results in the past 24 hours)        09/12/24  0942   09/12/24  0747   09/12/24  0726   09/12/24  0709   09/11/24  2202        Albumin     3.1           ALP     71           ALT     135           Anion Gap     11           PTT   41.8  Comment: Refer to local heparin nomogram for intensity/dose specific   therapeutic   range.               AST     67           BILIRUBIN TOTAL     1.6  Comment: For infants and newborns, interpretation of results should be based  on gestational age, weight and in agreement with clinical  observations.    Premature Infant recommended reference ranges:  Up to 24 hours.............<8.0 mg/dL  Up to 48 hours............<12.0 mg/dL  3-5 days..................<15.0 mg/dL  6-29 days.................<15.0 mg/dL             BUN     82           Calcium     8.1           Chloride     108           CO2     21           Creatinine     4.2           eGFR     14.2           Glucose     132           Hematocrit       28.1         Hemoglobin       9.6         INR       1.3  Comment: Coumadin Therapy:  2.0 - 3.0 for INR for all indicators except mechanical heart valves  and antiphospholipid syndromes which should use 2.5 - 3.5.           Magnesium      2.2           MCH       29.1         MCHC       34.2         MCV       85         MPV       SEE COMMENT  Comment: Result not available.         Phosphorus Level     4.1           Platelet Count       73         POCT Glucose 126         168       Potassium     3.9           PROTEIN TOTAL     6.4           PT       13.9         RBC       3.30         RDW       25.2         Sodium     140           WBC        4.70                                Significant Imaging:  Have been reviewed

## 2024-09-12 NOTE — SUBJECTIVE & OBJECTIVE
Interval History: NAEO. Denies any symptoms this morning. Good UOP with lasix gtt + Diuril 500mg dose yesterday. Renal function slowly improving.     Review of patient's allergies indicates:   Allergen Reactions    Canagliflozin      Other reaction(s): been very dizzy     Current Facility-Administered Medications   Medication Frequency    albumin human 25% bottle 25 g Daily    aspirin EC tablet 81 mg Daily    dextrose 10% bolus 125 mL 125 mL PRN    dextrose 10% bolus 250 mL 250 mL PRN    furosemide (Lasix) 500 mg in 50 mL infusion (conc: 10 mg/mL) Continuous    glucagon (human recombinant) injection 1 mg PRN    glucose chewable tablet 16 g PRN    glucose chewable tablet 24 g PRN    heparin 25,000 units in dextrose 5% (100 units/ml) IV bolus from bag HIGH INTENSITY nomogram - [facility] PRN    heparin 25,000 units in dextrose 5% (100 units/ml) IV bolus from bag HIGH INTENSITY nomogram - [facility] PRN    heparin 25,000 units in dextrose 5% 250 mL (100 units/mL) infusion HIGH INTENSITY nomogram - [facility] Continuous    hydrALAZINE tablet 50 mg TID    insulin aspart U-100 pen 0-5 Units QID (AC + HS) PRN    isosorbide dinitrate tablet 20 mg TID    levalbuterol nebulizer solution 0.63 mg Q6H PRN    levothyroxine tablet 100 mcg Before breakfast    metoprolol succinate (TOPROL-XL) 24 hr tablet 25 mg Daily    mirtazapine tablet 7.5 mg QHS       Objective:     Vital Signs (Most Recent):  Temp: 97.4 °F (36.3 °C) (09/12/24 1130)  Pulse: 60 (09/12/24 1130)  Resp: 20 (09/12/24 1130)  BP: 117/61 (09/12/24 1130)  SpO2: 100 % (09/12/24 1130) Vital Signs (24h Range):  Temp:  [97.4 °F (36.3 °C)-97.5 °F (36.4 °C)] 97.4 °F (36.3 °C)  Pulse:  [60-62] 60  Resp:  [16-20] 20  SpO2:  [97 %-100 %] 100 %  BP: (106-120)/(51-66) 117/61     Weight: 67.8 kg (149 lb 9.3 oz) (09/11/24 0928)  Body mass index is 20.29 kg/m².  Body surface area is 1.86 meters squared.    I/O last 3 completed shifts:  In: 1088 [P.O.:877; I.V.:162.6; IV Piggyback:48.4]  Out: 7662  [Urine:3655]     Physical Exam  Constitutional:       General: He is not in acute distress.     Appearance: Normal appearance. He is not ill-appearing.   HENT:      Head: Normocephalic.   Cardiovascular:      Rate and Rhythm: Normal rate and regular rhythm.      Pulses: Normal pulses.      Heart sounds: Normal heart sounds. No murmur heard.     No friction rub. No gallop.   Pulmonary:      Effort: Pulmonary effort is normal. No respiratory distress.      Breath sounds: Normal breath sounds.   Abdominal:      General: Abdomen is flat. Bowel sounds are normal. There is no distension.      Palpations: Abdomen is soft.      Tenderness: There is no abdominal tenderness.   Musculoskeletal:         General: No swelling.      Right lower le+ Edema present.      Left lower le+ Edema present.   Skin:     General: Skin is warm.      Coloration: Skin is not jaundiced.      Findings: No erythema or rash.   Neurological:      General: No focal deficit present.      Mental Status: He is alert and oriented to person, place, and time. Mental status is at baseline.   Psychiatric:         Mood and Affect: Mood normal.         Behavior: Behavior normal.         Thought Content: Thought content normal.          Significant Labs:  All labs within the past 24 hours have been reviewed.     Significant Imaging:  Labs: Reviewed

## 2024-09-12 NOTE — PLAN OF CARE
Patient is AAOx4.  Afebrile  RA  Telemetry monitoring remains in place.   AC&HS glucose monitoring  Patient is up with 1 assist.   Suárez remains in place.   Lasix gtt increased to 40mg/hr or 4cc/hr.   80 mg IV lasix push x1  250 mg IV chlorothiazide ordered.   Heparin gtt initiated this shift at 18 u/kg/hr with DW 67.9 kg. Next APTT scheduled at 2130.  Bed is in lowest position with wheels locked.   Bed alarm is set.   Call light remains within reach.   Instructed to call for assistance.   Plan of care is ongoing.

## 2024-09-12 NOTE — SUBJECTIVE & OBJECTIVE
Interval History: Palliative Medicine introduced to patient. Goals of care and advanced care planning discussions in progress.    Past Medical History:   Diagnosis Date    Asthma     Cardiomyopathy 10/21/2014    CHF (congestive heart failure)     Coronary artery disease     CRF (chronic renal failure)     Diabetes mellitus     Enlarged prostate     Hyperlipidemia     Hypertension     Neuropathy     Syncope 07/29/2024    Umbilical hernia without obstruction and without gangrene 10/13/2023       Past Surgical History:   Procedure Laterality Date    ANGIOGRAPHY OF INTERNAL MAMMARY VESSEL N/A 3/11/2022    Procedure: Angiogram Internal Mammary;  Surgeon: Hank Lujan MD;  Location: [facility] CATH/EP LAB;  Service: Cardiology;  Laterality: N/A;    CARDIAC CATHETERIZATION      CARDIAC VALVE SURGERY      CATHETERIZATION OF BOTH LEFT AND RIGHT HEART Left 3/11/2022    Procedure: CATHETERIZATION, HEART, BOTH LEFT AND RIGHT;  Surgeon: Hank Lujan MD;  Location: [facility] CATH/EP LAB;  Service: Cardiology;  Laterality: Left;    CORONARY ANGIOGRAPHY N/A 3/11/2022    Procedure: ANGIOGRAM, CORONARY ARTERY;  Surgeon: Hank Lujan MD;  Location: [facility] CATH/EP LAB;  Service: Cardiology;  Laterality: N/A;    CORONARY BYPASS GRAFT ANGIOGRAPHY  3/11/2022    Procedure: Bypass graft study;  Surgeon: Hank Lujan MD;  Location: [facility] CATH/EP LAB;  Service: Cardiology;;    CYSTOSCOPY N/A 7/30/2019    Procedure: CYSTOSCOPY;  Surgeon: Spencer Nguyen MD;  Location: [facility] OR;  Service: Urology;  Laterality: N/A;    INSERTION OF INTRAVASCULAR MICROAXIAL BLOOD PUMP N/A 8/31/2022    Procedure: INSERTION, IMPELLA;  Surgeon: Zach Jensen MD;  Location: [facility] CATH LAB;  Service: Cardiology;  Laterality: N/A;    INSERTION, CATHETER, DIALYSIS, PERITONEAL N/A 12/7/2023    Procedure: INSERTION, CATHETER, DIALYSIS, PERITONEAL;  Surgeon: Greg Bone Jr., MD;  Location: [facility] OR;  Service: General;  Laterality: N/A;  need PD catheter     PLACEMENT OF SWAN SEE CATHETER WITH IMAGING GUIDANCE  8/31/2022    Procedure: INSERTION, CATHETER, SWAN-SEE, WITH IMAGING GUIDANCE;  Surgeon: Zach Jensen MD;  Location: [facility] CATH LAB;  Service: Cardiology;;    REPAIR, HERNIA, INGUINAL, INCARCERATED, INITIAL, AGE 5 YEARS OR OLDER Right 12/7/2023    Procedure: REPAIR, HERNIA, INGUINAL, INCARCERATED, INITIAL;  Surgeon: Greg Bone Jr., MD;  Location: [facility] OR;  Service: General;  Laterality: Right;    SHOULDER ARTHROSCOPY  1985    TRANSRECTAL BIOPSY OF PROSTATE WITH ULTRASOUND GUIDANCE N/A 7/30/2019    Procedure: BIOPSY, PROSTATE, RECTAL APPROACH, WITH US GUIDANCE;  Surgeon: Spencer Nguyen MD;  Location: [facility] OR;  Service: Urology;  Laterality: N/A;  procedure not performed, pt unable to tolerate    TRANSRECTAL BIOPSY OF PROSTATE WITH ULTRASOUND GUIDANCE N/A 8/8/2019    Procedure: BIOPSY, PROSTATE, RECTAL APPROACH, WITH US GUIDANCE;  Surgeon: Spencer Nguyen MD;  Location: [facility] OR;  Service: Urology;  Laterality: N/A;    UMBILICAL HERNIA REPAIR N/A 12/7/2023    Procedure: REPAIR, HERNIA, UMBILICAL;  Surgeon: Greg Bone Jr., MD;  Location: [facility];  Service: General;  Laterality: N/A;       Review of patient's allergies indicates:   Allergen Reactions    Canagliflozin      Other reaction(s): been very dizzy       Medications:  Continuous Infusions:   furosemide (Lasix) 500 mg in 50 mL infusion (conc: 10 mg/mL)  30 mg/hr Intravenous Continuous 3 mL/hr at 09/11/24 2300 30 mg/hr at 09/11/24 2300    heparin (porcine) in D5W  0-40 Units/kg/hr Intravenous Continuous         Scheduled Meds:   albumin human 25%  25 g Intravenous Daily    aspirin  81 mg Oral Daily    hydrALAZINE  50 mg Oral TID    isosorbide dinitrate  20 mg Oral TID    levothyroxine  100 mcg Oral Before breakfast    metoprolol succinate  25 mg Oral Daily    mirtazapine  7.5 mg Oral QHS     PRN Meds:  Current Facility-Administered Medications:     dextrose 10%, 12.5 g,  Intravenous, PRN    dextrose 10%, 25 g, Intravenous, PRN    glucagon (human recombinant), 1 mg, Intramuscular, PRN    glucose, 16 g, Oral, PRN    glucose, 24 g, Oral, PRN    heparin (PORCINE), 60 Units/kg (Adjusted), Intravenous, PRN    heparin (PORCINE), 30 Units/kg (Adjusted), Intravenous, PRN    insulin aspart U-100, 0-5 Units, Subcutaneous, QID (AC + HS) PRN    levalbuterol, 0.63 mg, Nebulization, Q6H PRN    Family History       Problem Relation (Age of Onset)    Diabetes Father, Sister, Brother    Heart attack Father    Heart disease Father, Brother    Hypertension Father    No Known Problems Mother, Maternal Grandmother, Maternal Grandfather, Paternal Grandmother, Paternal Grandfather, Maternal Aunt, Maternal Uncle, Paternal Aunt, Paternal Uncle          Tobacco Use    Smoking status: Former     Current packs/day: 0.00     Types: Cigarettes     Quit date: 1970     Years since quittin.2    Smokeless tobacco: Never   Substance and Sexual Activity    Alcohol use: Not Currently     Alcohol/week: 3.0 standard drinks of alcohol     Types: 3 Cans of beer per week    Drug use: No    Sexual activity: Not Currently     Partners: Female       Review of Systems   Constitutional:  Positive for fatigue.   HENT: Negative.     Eyes: Negative.    Respiratory:  Positive for shortness of breath.    Cardiovascular:  Positive for leg swelling.   Genitourinary: Negative.    Neurological:  Positive for weakness.     Objective:     Vital Signs (Most Recent):  Temp: 97.4 °F (36.3 °C) (24 07)  Pulse: 60 (24 07)  Resp: 18 (24 0441)  BP: (!) 115/53 (24)  SpO2: 99 % (24 07) Vital Signs (24h Range):  Temp:  [97.4 °F (36.3 °C)-98.1 °F (36.7 °C)] 97.4 °F (36.3 °C)  Pulse:  [60-62] 60  Resp:  [18] 18  SpO2:  [97 %-100 %] 99 %  BP: (106-132)/(51-69) 115/53     Weight: 67.8 kg (149 lb 9.3 oz)  Body mass index is 20.29 kg/m².       Physical Exam  Constitutional:       Appearance: He is  ill-appearing.   HENT:      Head: Normocephalic and atraumatic.      Mouth/Throat:      Mouth: Mucous membranes are dry.   Eyes:      Extraocular Movements: Extraocular movements intact.   Cardiovascular:      Rate and Rhythm: Rhythm irregular.      Pulses: Normal pulses.   Pulmonary:      Effort: Pulmonary effort is normal.   Skin:     General: Skin is warm and dry.   Neurological:      Mental Status: He is alert and oriented to person, place, and time.            Review of Symptoms      Symptom Assessment (ESAS 0-10 Scale)  Pain:  0  Dyspnea:  0  Anxiety:  0  Nausea:  0  Depression:  0  Anorexia:  0  Fatigue:  0  Insomnia:  0  Restlessness:  0  Agitation:  0     CAM / Delirium:  Negative  Constipation:  Negative  Diarrhea:  Negative      Bowel Management Plan (BMP):  Yes      Pain Assessment:  OME in 24 hours:  0  Location(s):      Modified Roshan Scale:  0    Performance Status:  80    Living Arrangements:  Lives alone    Psychosocial/Cultural:   See Palliative Psychosocial Note: Yes  - .  - Lives alone  - Has 1 biological son, 4 granddaughters, 1 grandson. Patient reports that he does not have a strong relationship with his son.  - Patient has 5 sisters and 4 brothers. Patient reports strong relationship with siblings, nieces, nephews, and grand children.  - Retired [employer] of 42 years in [place].  - Enjoys relaxing and resting in his down time.  **Primary  to Follow**  Palliative Care  Consult: No        Advance Care Planning   Advance Directives:   Living Will: Yes        Copy on chart: Yes    LaPOST: No    Do Not Resuscitate Status: No    Medical Power of : Yes    Agent's Name:  Libia Carney   Agent's Contact Number:  [phone]    Decision Making:  Patient answered questions  Goals of Care: The patient endorses that what is most important right now is to focus on remaining as independent as possible and extending life as long as possible, even it it  means sacrificing quality. Goals of care discussions are on-going.     Accordingly, we have decided that the best plan to meet the patient's goals includes continuing with treatment         Significant Labs: All pertinent labs within the past 24 hours have been reviewed.  CBC:   Recent Labs   Lab 09/12/24  0709   WBC 4.70   HGB 9.6*   HCT 28.1*   MCV 85   PLT 73*     BMP:  Recent Labs   Lab 09/12/24  0726   *      K 3.9      CO2 21*   BUN 82*   CREATININE 4.2*   CALCIUM 8.1*   MG 2.2     LFT:  Lab Results   Component Value Date    AST 67 (H) 09/12/2024    ALKPHOS 71 09/12/2024    BILITOT 1.6 (H) 09/12/2024     Albumin:   Albumin   Date Value Ref Range Status   09/12/2024 3.1 (L) 3.5 - 5.2 g/dL Final   08/17/2021 pos  Final     Protein:   Total Protein   Date Value Ref Range Status   09/12/2024 6.4 6.0 - 8.4 g/dL Final     Lactic acid:   Lab Results   Component Value Date    LACTATE 1.7 07/30/2024    LACTATE 3.1 (H) 07/29/2024       Significant Imaging: I have reviewed all pertinent imaging results/findings within the past 24 hours.      US Retroperitoneal 9/10/24  Impression:  No significant sonographic abnormalities the kidneys.  Incomplete evaluation of bladder due to Suárez decompression.  Prostatomegaly.  Abdominopelvic ascites.

## 2024-09-12 NOTE — HPI
As per chart review, Baldo Carney is a 73 y.o. male with extensive cardiac history, HTN, CAD s/p CABG (saphenous vein graft to right coronary artery) on 11/3/2014, s/p PCI with drug eluting stent placements in left main to left anterior descending artery and left main to lateral circumflex artery on 8/31/2022, history of aortic valve replacement with porcine bioprosthesis on 11/3/2014, ischemic cardiomyopathy with chronic biventricular systolic and diastolic HF, CKD stage 3-4, T2DM with retinopathy and peripheral neuropathy, anemia, BPH, hypothyroidism, cirrhosis with ascites requiring bimonthly paracentesis, COPD, history of incarcerated inguinal hernia repair on 12/7/2023 initially presented to [facility] on 9/7/24 with complaints of left sided chest pain that started 4x hours PTA. While in OSH ED, patient with 5.3 K, 4,900 BNP, and elevated troponin. Patient was transferred to [facility] for higher level of care, and evaluation by cardiology and hepatology.    Palliative Medicine was consulted for Goals of Care and Advanced Care Planning discussions by primary team, Dr. Bridges.

## 2024-09-12 NOTE — PROGRESS NOTES
[facility] Medicine  Progress Note    Patient Name: Baldo Carney  MRN: [id number]  Patient Class: IP- Inpatient   Admission Date: 9/8/2024  Length of Stay: 4 days  Attending Physician: Marianela Bridges MD  Primary Care Provider: Millie Hayes APRN,FNP-C        Subjective:     Principal Problem:Acute on chronic combined systolic and diastolic heart failure        HPI:  Baldo Carney is a 73 year old Black man with former cigarette smoking (quit in 1970), hypertension, diabetes mellitus type 2 with retinopathy and peripheral neuropathy, coronary artery disease status post coronary artery bypass graft (saphenous vein graft to right coronary artery) on 11/3/2014, status post percutaneous coronary intervention with drug eluting stent placements in left main to left anterior descending artery and left main to lateral circumflex artery on 8/31/2022, history of aortic valve replacement with porcine bioprosthesis on 11/3/2014, ischemic cardiomyopathy with chronic biventricular systolic and diastolic heart failure, chronic kidney disease stage 3-4, anemia, benign prostatic hyperplasia, hypothyroidism, cirrhosis with ascites requiring bimonthly paracentesis, chronic obstructive pulmonary disease (COPD), history of incarcerated inguinal hernia repair on 12/7/2023. He lives in [place], [place].    He was hospitalized at [facility] from 8/12/2024 to 8/18/2024 for syncope, cystitis, hypoglycemia, COVID-19. He underwent paracentesis on admission with 6 liters of fluid removed and albumin given.    He was seen by his primary care nurse practitioner in clinic on 8/27/2024 and was prescribed mirtazapine for situational anxiety and omeprazole for heartburn.   He presented to [facility] Emergency Department on Saturday 9/7/2024 with left sided chest pain for 4 hours without radiation, associated with shortness of breath. He also had unchanged chronic bilateral  lower extremity edema. He was scheduled for paracentesis on Monday 9/9/2024. Labs showed elevated BNP (>4900 pg/mL) and troponin (0.196 ng/mL) and acute kidney injury (BUN 69 mg/dL and creatinine 4.8 mg/dL from 37 and 1.9 on 8/23/2024), suggestive of volume overload due to cirrhosis and heart failure. He was transferred to [facility] on 9/8/2024 and admitted to Hospital Medicine Team L.     Overview/Hospital Course:  Nephrology and Cardiology were consulted. Hepatology recommended IV albumin. Suárez catheter was placed for urine output measurement. Nephrology suspected cardiorenal syndrome and recommended giving IV furosemide 80 mg twice daily. It was ineffective. Hospital Medicine increased it to 120 mg twice daily and it still did not increase urine output. Echocardiogram showed a layered nonmobile left ventricular apical thrombus. He was put on therapeutic enoxaparin. Cardiology recommended furosemide drip. Nephrology agreed with furosemide drip and recommended a trial of IV albumin for 3 days. He diuresed better.     Continued on lasix gtt, appreciate cardiology recommendations. Lasix dose increased and diuril given. GDMT holding parameters added. Continued monitoring on heparin gtt. Not a candidate for ARNI/ MRA or SGLT2i due to renal disease.    Regarding severe aortic prosthetic valve stenosis recommendation for outpatient valve clinic follow up for possible valve in valve TAVR once LV thrombus resolves.      Palliative care evaluated and initiated GOC conversations.    Interval History:     NAEON. Seen at bedside today.  Blood pressures were borderline low this morning however no reported dizziness or lightheadedness.  Continued on Lasix drip.  We will optimize diuresis per Cardiology recommendations.  Also discussed  holding parameters on GDMT.      Review of Systems   Constitutional:  Negative for chills and fever.   HENT:  Negative for nosebleeds.    Eyes:  Negative for visual  disturbance.   Respiratory:  Negative for chest tightness and shortness of breath.    Cardiovascular:  Negative for chest pain.   Gastrointestinal:  Positive for abdominal distention. Negative for abdominal pain and constipation.   Genitourinary:         Suárez catheter in place   Neurological:  Negative for dizziness and light-headedness.   Psychiatric/Behavioral:  Negative for suicidal ideas. The patient is not nervous/anxious.      Objective:     Vital Signs (Most Recent):  Temp: 97.4 °F (36.3 °C) (09/12/24 1130)  Pulse: 60 (09/12/24 1130)  Resp: 20 (09/12/24 1130)  BP: 117/61 (09/12/24 1130)  SpO2: 100 % (09/12/24 1130) Vital Signs (24h Range):  Temp:  [97.4 °F (36.3 °C)-97.5 °F (36.4 °C)] 97.4 °F (36.3 °C)  Pulse:  [60-62] 60  Resp:  [16-20] 20  SpO2:  [97 %-100 %] 100 %  BP: (106-132)/(51-69) 117/61     Weight: 67.8 kg (149 lb 9.3 oz)  Body mass index is 20.29 kg/m².    Intake/Output Summary (Last 24 hours) at 9/12/2024 1401  Last data filed at 9/12/2024 0505  Gross per 24 hour   Intake 183.75 ml   Output 1580 ml   Net -1396.25 ml         Physical Exam  Vitals and nursing note reviewed.   Constitutional:       General: He is not in acute distress.     Appearance: Normal appearance. He is well-developed.      Interventions: He is not intubated.  HENT:      Head: Normocephalic and atraumatic.   Eyes:      Extraocular Movements: Extraocular movements intact.      Conjunctiva/sclera: Conjunctivae normal.   Cardiovascular:      Rate and Rhythm: Normal rate and regular rhythm.      Pulses: Normal pulses.      Heart sounds: Murmur heard.   Pulmonary:      Effort: Pulmonary effort is normal. No accessory muscle usage or respiratory distress. He is not intubated.      Breath sounds: Rales present.   Abdominal:      General: There is distension.      Tenderness: There is no abdominal tenderness.   Musculoskeletal:      Cervical back: Normal range of motion and neck supple.      Right lower leg: No edema.      Left lower  leg: No edema.   Skin:     General: Skin is warm and dry.   Neurological:      Mental Status: He is alert and oriented to person, place, and time. Mental status is at baseline.   Psychiatric:         Attention and Perception: Attention normal.         Mood and Affect: Mood and affect normal.         Behavior: Behavior normal. Behavior is cooperative.             Significant Labs: All pertinent labs within the past 24 hours have been reviewed.  Recent Labs   Lab 09/10/24  0613 09/11/24  0213 09/12/24  0726    140 140   K 4.3 3.8 3.9    108 108   CO2 15* 15* 21*   BUN 81* 77* 82*   CREATININE 4.6* 4.4* 4.2*   CALCIUM 8.3* 7.9* 8.1*   PROT 6.2 6.1 6.4   BILITOT 2.3* 2.0* 1.6*   ALKPHOS 78 74 71   * 178* 135*   * 99* 67*     Recent Labs   Lab 09/12/24  0709   WBC 4.70   RBC 3.30*   HGB 9.6*   HCT 28.1*   PLT 73*   MCV 85   MCH 29.1   MCHC 34.2        Significant Imaging: I have reviewed all pertinent imaging results/findings within the past 24 hours.    Assessment/Plan:      * Acute on chronic combined systolic and diastolic heart failure  He takes metoprolol succinate, hydralazine, isosorbide dinitrate, sacubitril-valsartan, empagliflozin.     -Giving home metoprolol, isosorbide dinitrate, hydralazine. Not on a diuretic at home.  -Gave a dose of IV chlorothiazide. Tried furosemide IV boluses, which were ineffective, then switched to furosemide drip by Cardiology. Appreciate Cardiology. Monitor electrolytes, intake/output. Can hold or wean GDMT but current goal is to optimize diuresis.     Continued on lasix gtt, appreciate cardiology recommendations. Lasix dose increased and diuril given. GDMT holding parameters added. Continued monitoring on heparin gtt. Not a candidate for ARNI/ MRA or SGLT2i due to renal disease.    Echo    Result Date: 9/9/2024  Images from the original result were not included.      Left Ventricle: The left ventricle is mildly dilated measuring 5.8 cm.   Normal wall  thickness. Severe global hypokinesis present. There is   severely reduced systolic function. Grade II diastolic dysfunction.   Elevated left ventricular filling pressure. There is a layered, nonmobile   thrombus in the apex measuring 1.3 x 2.5 cm.    Right Ventricle: Mild right ventricular enlargement. Systolic function   is mildly reduced. Pacemaker lead present in the ventricle.    Left Atrium: Left atrium is severely dilated.    Right Atrium: Right atrium is mildly dilated.    Aortic Valve: There is moderate to severe stenosis. Aortic valve area   by VTI is 0.80 cm². Aortic valve peak velocity is 3.43 m/s. Mean gradient   is 28 mmHg. The dimensionless index is 0.24. There is mild aortic   regurgitation.    Mitral Valve: There is mild regurgitation.    Tricuspid Valve: There is moderate regurgitation.    Pulmonary Artery: There is severe pulmonary hypertension. The estimated   pulmonary artery systolic pressure is 74 mmHg.    IVC/SVC: Elevated venous pressure at 15 mmHg.          Left ventricular apical thrombus  ECHO reported layered LV thrombus   Continue heparin gtt        Acute kidney injury superimposed on chronic kidney disease  ERICA is likely due to  Cardiorenal . Baseline creatinine is  1.7-2 . Most recent creatinine and eGFR are listed below.  Recent Labs     09/10/24  0613 09/11/24  0213 09/12/24  0726   CREATININE 4.6* 4.4* 4.2*   EGFRNORACEVR 12.7* 13.4* 14.2*      Plan  - ERICA is stable  - Avoid nephrotoxins and renally dose meds for GFR listed above  - Monitor urine output, serial BMP, and adjust therapy as needed  - appreciate nephrology recommendations.    Elevated troponin  Due to CHF.      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 without macular edema, without long-term current use of insulin  Patient's FSGs are controlled on current medication regimen.  Last A1c reviewed-   Lab Results   Component Value Date    HGBA1C 6.0 08/18/2024     Most recent fingerstick  glucose reviewed-   Recent Labs   Lab 09/12/24  0942 09/12/24  1326 09/12/24  1735 09/12/24  2124   POCTGLUCOSE 126* 140* 122* 126*     Current correctional scale  Low  Maintain anti-hyperglycemic dose as follows-   Antihyperglycemics (From admission, onward)      Start     Stop Route Frequency Ordered    09/08/24 0511  insulin aspart U-100 pen 0-5 Units         -- SubQ Before meals & nightly PRN 09/08/24 0411          Hold Oral hypoglycemics while patient is in the hospital.      Cirrhosis of liver with ascites  Patient with known Cirrhosis with Child's class Calculator for Child's score link- https://[email or web address].howsimple/calc/340/child-myles-score-cirrhosis-mortality#creator-insights. Co-morbidities are present and inclusive of ascites.  MELD-Na score calculated; MELD 3.0: 23 at 9/12/2024  7:26 AM  MELD-Na: 24 at 9/12/2024  7:26 AM  Calculated from:  Serum Creatinine: 4.2 mg/dL (Using max of 3 mg/dL) at 9/12/2024  7:26 AM  Serum Sodium: 140 mmol/L (Using max of 137 mmol/L) at 9/12/2024  7:26 AM  Total Bilirubin: 1.6 mg/dL at 9/12/2024  7:26 AM  Serum Albumin: 3.1 g/dL at 9/12/2024  7:26 AM  INR(ratio): 1.3 at 9/12/2024  7:09 AM  Age at listing (hypothetical): 73 years  Sex: Male at 9/12/2024  7:26 AM    Appreciate Hepatology. 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 not a candidate for liver transplant evaluation given multiple comorbidities & severe heart failure   -Palliative care consulted for goal clarification       Hypothyroidism  Stable. Continue home levothyroxine.      COPD (chronic obstructive pulmonary disease)  He takes fluticasone-vilanterol. Giving levalbuterol prn.    Hypertension    Chronic, controlled. Latest blood pressure and vitals reviewed-     Temp:  [97.4 °F (36.3 °C)-97.5 °F (36.4 °C)]   Pulse:  [60-62]   Resp:  [16-20]   BP: (106-132)/(51-69)   SpO2:  [97 %-100 %] .   Home meds for hypertension were reviewed and noted below.   Hypertension Medications               hydrALAZINE (APRESOLINE) 50 MG tablet  Take 1 tablet (50 mg total) by mouth 2 (two) times a day.    isosorbide dinitrate (ISORDIL) 20 MG tablet Take 20 mg by mouth 3 (three) times daily.    metoprolol succinate (TOPROL-XL) 25 MG 24 hr tablet Take 1 tablet (25 mg total) by mouth once daily.    sacubitriL-valsartan (ENTRESTO) 24-26 mg per tablet Take 1 tablet by mouth 2 (two) times daily.            While in the hospital, will manage blood pressure as follows; Adjust home antihypertensive regimen as follows- Continue home hydralazine, isosorbide dinitrate, metoprolol. Monitor BP.    Will utilize p.r.n. blood pressure medication only if patient's blood pressure greater than 180/110 and he develops symptoms such as worsening chest pain or shortness of breath.        VTE Risk Mitigation (From admission, onward)           Ordered     heparin 25,000 units in dextrose 5% 250 mL (100 units/mL) infusion HIGH INTENSITY nomogram - [facility]  Continuous        Question:  Begin at (units/kg/hr)  Answer:  18    09/12/24 0739     heparin 25,000 units in dextrose 5% (100 units/ml) IV bolus from bag HIGH INTENSITY nomogram - [facility]  As needed (PRN)        Question:  Heparin Infusion Adjustment (DO NOT MODIFY ANSWER)  Answer:  \\[email or web address]\epic\Images\Pharmacy\HeparinInfusions\heparin HIGH INTENSITY nomogram for [facility] [id number].pdf    09/12/24 0739     heparin 25,000 units in dextrose 5% (100 units/ml) IV bolus from bag HIGH INTENSITY nomogram - [facility]  As needed (PRN)        Question:  Heparin Infusion Adjustment (DO NOT MODIFY ANSWER)  Answer:  \[email or web address]\epic\Images\Pharmacy\HeparinInfusions\heparin HIGH INTENSITY nomogram for [facility] [id number].pdf    09/12/24 0739                    Discharge Planning   PAM: 9/16/2024     Code Status: Full Code   Is the patient medically ready for discharge?:     Reason for patient still in hospital (select all that apply): Patient trending condition, Laboratory test, Treatment, and Consult recommendations  Discharge Plan A: Home, Home with family, Home  Health                  Marianela Bridges MD  Department of Hospital Medicine   [facility]

## 2024-09-12 NOTE — ASSESSMENT & PLAN NOTE
Chronic, controlled. Latest blood pressure and vitals reviewed-     Temp:  [97.4 °F (36.3 °C)-97.5 °F (36.4 °C)]   Pulse:  [60-62]   Resp:  [16-20]   BP: (106-132)/(51-69)   SpO2:  [97 %-100 %] .   Home meds for hypertension were reviewed and noted below.   Hypertension Medications               hydrALAZINE (APRESOLINE) 50 MG tablet Take 1 tablet (50 mg total) by mouth 2 (two) times a day.    isosorbide dinitrate (ISORDIL) 20 MG tablet Take 20 mg by mouth 3 (three) times daily.    metoprolol succinate (TOPROL-XL) 25 MG 24 hr tablet Take 1 tablet (25 mg total) by mouth once daily.    sacubitriL-valsartan (ENTRESTO) 24-26 mg per tablet Take 1 tablet by mouth 2 (two) times daily.            While in the hospital, will manage blood pressure as follows; Adjust home antihypertensive regimen as follows- Continue home hydralazine, isosorbide dinitrate, metoprolol. Monitor BP.    Will utilize p.r.n. blood pressure medication only if patient's blood pressure greater than 180/110 and he develops symptoms such as worsening chest pain or shortness of breath.

## 2024-09-13 ENCOUNTER — EXTERNAL HOME HEALTH (OUTPATIENT)
Dept: HOME HEALTH SERVICES | Facility: HOSPITAL | Age: 74
End: 2024-09-13
Payer: MEDICARE

## 2024-09-13 LAB
ALBUMIN SERPL BCP-MCNC: 3.4 G/DL (ref 3.5–5.2)
ALP SERPL-CCNC: 66 U/L (ref 55–135)
ALT SERPL W/O P-5'-P-CCNC: 113 U/L (ref 10–44)
ANION GAP SERPL CALC-SCNC: 14 MMOL/L (ref 8–16)
APTT PPP: 132.9 SEC (ref 21–32)
APTT PPP: 65.5 SEC (ref 21–32)
APTT PPP: 68.9 SEC (ref 21–32)
APTT PPP: >150 SEC (ref 21–32)
AST SERPL-CCNC: 52 U/L (ref 10–40)
BILIRUB SERPL-MCNC: 1.7 MG/DL (ref 0.1–1)
BUN SERPL-MCNC: 83 MG/DL (ref 8–23)
CALCIUM SERPL-MCNC: 8.7 MG/DL (ref 8.7–10.5)
CHLORIDE SERPL-SCNC: 105 MMOL/L (ref 95–110)
CLINICAL BIOCHEMIST REVIEW: NORMAL
CO2 SERPL-SCNC: 22 MMOL/L (ref 23–29)
CREAT SERPL-MCNC: 3.7 MG/DL (ref 0.5–1.4)
ERYTHROCYTE [DISTWIDTH] IN BLOOD BY AUTOMATED COUNT: 25 % (ref 11.5–14.5)
EST. GFR  (NO RACE VARIABLE): 16.5 ML/MIN/1.73 M^2
GLUCOSE SERPL-MCNC: 87 MG/DL (ref 70–110)
HCT VFR BLD AUTO: 27 % (ref 40–54)
HGB BLD-MCNC: 9.4 G/DL (ref 14–18)
INR PPP: 1.2 (ref 0.8–1.2)
MCH RBC QN AUTO: 29.2 PG (ref 27–31)
MCHC RBC AUTO-ENTMCNC: 34.8 G/DL (ref 32–36)
MCV RBC AUTO: 84 FL (ref 82–98)
PLATELET # BLD AUTO: 75 K/UL (ref 150–450)
PLPETH BLD-MCNC: <10 NG/ML
PMV BLD AUTO: ABNORMAL FL (ref 9.2–12.9)
POCT GLUCOSE: 116 MG/DL (ref 70–110)
POCT GLUCOSE: 125 MG/DL (ref 70–110)
POCT GLUCOSE: 128 MG/DL (ref 70–110)
POCT GLUCOSE: 97 MG/DL (ref 70–110)
POPETH BLD-MCNC: <10 NG/ML
POTASSIUM SERPL-SCNC: 3.3 MMOL/L (ref 3.5–5.1)
PROT SERPL-MCNC: 6.7 G/DL (ref 6–8.4)
PROTHROMBIN TIME: 13.3 SEC (ref 9–12.5)
RBC # BLD AUTO: 3.22 M/UL (ref 4.6–6.2)
SODIUM SERPL-SCNC: 141 MMOL/L (ref 136–145)
WBC # BLD AUTO: 4.67 K/UL (ref 3.9–12.7)

## 2024-09-13 PROCEDURE — 36415 COLL VENOUS BLD VENIPUNCTURE: CPT | Mod: XB | Performed by: STUDENT IN AN ORGANIZED HEALTH CARE EDUCATION/TRAINING PROGRAM

## 2024-09-13 PROCEDURE — 85027 COMPLETE CBC AUTOMATED: CPT | Performed by: HOSPITALIST

## 2024-09-13 PROCEDURE — 85730 THROMBOPLASTIN TIME PARTIAL: CPT | Performed by: STUDENT IN AN ORGANIZED HEALTH CARE EDUCATION/TRAINING PROGRAM

## 2024-09-13 PROCEDURE — 25000003 PHARM REV CODE 250: Performed by: STUDENT IN AN ORGANIZED HEALTH CARE EDUCATION/TRAINING PROGRAM

## 2024-09-13 PROCEDURE — 36415 COLL VENOUS BLD VENIPUNCTURE: CPT | Mod: XB | Performed by: HOSPITALIST

## 2024-09-13 PROCEDURE — 25000003 PHARM REV CODE 250: Performed by: INTERNAL MEDICINE

## 2024-09-13 PROCEDURE — 63600175 PHARM REV CODE 636 W HCPCS: Performed by: STUDENT IN AN ORGANIZED HEALTH CARE EDUCATION/TRAINING PROGRAM

## 2024-09-13 PROCEDURE — 63600175 PHARM REV CODE 636 W HCPCS: Mod: JZ,JG | Performed by: HOSPITALIST

## 2024-09-13 PROCEDURE — 85610 PROTHROMBIN TIME: CPT | Performed by: HOSPITALIST

## 2024-09-13 PROCEDURE — 20600001 HC STEP DOWN PRIVATE ROOM

## 2024-09-13 PROCEDURE — P9047 ALBUMIN (HUMAN), 25%, 50ML: HCPCS | Mod: JZ,JG | Performed by: HOSPITALIST

## 2024-09-13 PROCEDURE — 80053 COMPREHEN METABOLIC PANEL: CPT | Performed by: STUDENT IN AN ORGANIZED HEALTH CARE EDUCATION/TRAINING PROGRAM

## 2024-09-13 PROCEDURE — 25000003 PHARM REV CODE 250: Performed by: HOSPITALIST

## 2024-09-13 PROCEDURE — 99233 SBSQ HOSP IP/OBS HIGH 50: CPT | Mod: ,,, | Performed by: INTERNAL MEDICINE

## 2024-09-13 PROCEDURE — 99232 SBSQ HOSP IP/OBS MODERATE 35: CPT | Mod: ,,, | Performed by: INTERNAL MEDICINE

## 2024-09-13 RX ADMIN — ISOSORBIDE DINITRATE 20 MG: 20 TABLET ORAL at 08:09

## 2024-09-13 RX ADMIN — HEPARIN SODIUM 10 UNITS/KG/HR: 10000 INJECTION, SOLUTION INTRAVENOUS at 09:09

## 2024-09-13 RX ADMIN — LEVOTHYROXINE SODIUM 100 MCG: 100 TABLET ORAL at 05:09

## 2024-09-13 RX ADMIN — ISOSORBIDE DINITRATE 20 MG: 20 TABLET ORAL at 02:09

## 2024-09-13 RX ADMIN — POTASSIUM BICARBONATE 50 MEQ: 978 TABLET, EFFERVESCENT ORAL at 12:09

## 2024-09-13 RX ADMIN — FUROSEMIDE 40 MG/HR: 10 INJECTION, SOLUTION INTRAMUSCULAR; INTRAVENOUS at 10:09

## 2024-09-13 RX ADMIN — ALBUMIN (HUMAN) 25 G: 12.5 SOLUTION INTRAVENOUS at 08:09

## 2024-09-13 RX ADMIN — CHLOROTHIAZIDE SODIUM 250 MG: 500 INJECTION, POWDER, LYOPHILIZED, FOR SOLUTION INTRAVENOUS at 04:09

## 2024-09-13 RX ADMIN — METOPROLOL SUCCINATE 25 MG: 25 TABLET, EXTENDED RELEASE ORAL at 08:09

## 2024-09-13 RX ADMIN — HYDRALAZINE HYDROCHLORIDE 50 MG: 50 TABLET ORAL at 08:09

## 2024-09-13 RX ADMIN — HEPARIN SODIUM 14 UNITS/KG/HR: 10000 INJECTION, SOLUTION INTRAVENOUS at 01:09

## 2024-09-13 RX ADMIN — CHLOROTHIAZIDE SODIUM 250 MG: 500 INJECTION, POWDER, LYOPHILIZED, FOR SOLUTION INTRAVENOUS at 05:09

## 2024-09-13 RX ADMIN — HYDRALAZINE HYDROCHLORIDE 50 MG: 50 TABLET ORAL at 02:09

## 2024-09-13 RX ADMIN — HEPARIN SODIUM 10 UNITS/KG/HR: 10000 INJECTION, SOLUTION INTRAVENOUS at 05:09

## 2024-09-13 RX ADMIN — ASPIRIN 81 MG: 81 TABLET, COATED ORAL at 08:09

## 2024-09-13 RX ADMIN — MIRTAZAPINE 7.5 MG: 7.5 TABLET, FILM COATED ORAL at 08:09

## 2024-09-13 NOTE — PROGRESS NOTES
[facility]  Cardiology  Progress Note    Patient Name: Baldo Carney  MRN: [id number]  Admission Date: 9/8/2024  Hospital Length of Stay: 5 days  Code Status: Full Code   Attending Physician: Marianela Bridges MD   Primary Care Physician: Millie Hayes, APRN,FNP-C  Expected Discharge Date: 9/16/2024  Principal Problem:Acute on chronic combined systolic and diastolic heart failure    Subjective:     Hospital Course:   No notes on file    Interval History: UOP 2.7L in 24 hrs. Net neg 1.7L. NO complaints this morning    ROS  Objective:     Vital Signs (Most Recent):  Temp: 97.7 °F (36.5 °C) (09/13/24 1123)  Pulse: 63 (09/13/24 1123)  Resp: 16 (09/13/24 1123)  BP: 129/69 (09/13/24 1123)  SpO2: 100 % (09/13/24 1123) Vital Signs (24h Range):  Temp:  [97.4 °F (36.3 °C)-97.7 °F (36.5 °C)] 97.7 °F (36.5 °C)  Pulse:  [60-63] 63  Resp:  [16-21] 16  SpO2:  [99 %-100 %] 100 %  BP: (108-130)/(53-69) 129/69     Weight: 67.8 kg (149 lb 9.3 oz)  Body mass index is 20.29 kg/m².     SpO2: 100 %         Intake/Output Summary (Last 24 hours) at 9/13/2024 1129  Last data filed at 9/13/2024 1007  Gross per 24 hour   Intake 702.84 ml   Output 3500 ml   Net -2797.16 ml       Lines/Drains/Airways       Drain  Duration                  Urethral Catheter 09/09/24 1643 16 Fr. 3 days              Peripheral Intravenous Line  Duration                  Peripheral IV - Single Lumen 09/11/24 1737 20 G Anterior;Left Forearm 1 day         Peripheral IV - Single Lumen 09/12/24 1807 22 G Anterior;Left;Proximal Forearm <1 day                       Physical Exam  Constitutional:       General: He is not in acute distress.     Appearance: He is not toxic-appearing.   HENT:      Right Ear: External ear normal.      Left Ear: External ear normal.   Eyes:      General:         Right eye: No discharge.         Left eye: No discharge.      Extraocular Movements: Extraocular movements intact.   Cardiovascular:      Rate and Rhythm:  Normal rate and regular rhythm.      Heart sounds: No murmur heard.     No gallop.      Comments: JVP   Pulmonary:      Effort: Pulmonary effort is normal.      Breath sounds: Rales present.   Abdominal:      General: There is distension.   Musculoskeletal:      Cervical back: Normal range of motion.      Right lower leg: No edema.      Left lower leg: No edema.   Skin:     General: Skin is warm and dry.   Neurological:      General: No focal deficit present.      Mental Status: He is alert and oriented to person, place, and time.            Significant Labs: CMP   Recent Labs   Lab 09/12/24  0726 09/13/24  0837    141   K 3.9 3.3*    105   CO2 21* 22*   * 87   BUN 82* 83*   CREATININE 4.2* 3.7*   CALCIUM 8.1* 8.7   PROT 6.4 6.7   ALBUMIN 3.1* 3.4*   BILITOT 1.6* 1.7*   ALKPHOS 71 66   AST 67* 52*   * 113*   ANIONGAP 11 14    and CBC   Recent Labs   Lab 09/12/24  0709 09/13/24  0605   WBC 4.70 4.67   HGB 9.6* 9.4*   HCT 28.1* 27.0*   PLT 73* 75*     Assessment and Plan:     * Acute on chronic combined systolic and diastolic heart failure  Results for orders placed during the hospital encounter of 09/08/24    Echo    Interpretation Summary  Images from the original result were not included.      Left Ventricle: The left ventricle is mildly dilated measuring 5.8 cm. Normal wall thickness. Severe global hypokinesis present. There is severely reduced systolic function. Grade II diastolic dysfunction. Elevated left ventricular filling pressure. There is a layered, nonmobile thrombus in the apex measuring 1.3 x 2.5 cm.    Right Ventricle: Mild right ventricular enlargement. Systolic function is mildly reduced. Pacemaker lead present in the ventricle.    Left Atrium: Left atrium is severely dilated.    Right Atrium: Right atrium is mildly dilated.    Aortic Valve: There is moderate to severe stenosis. Aortic valve area by VTI is 0.80 cm². Aortic valve peak velocity is 3.43 m/s. Mean gradient is 28  mmHg. The dimensionless index is 0.24. There is mild aortic regurgitation.    Mitral Valve: There is mild regurgitation.    Tricuspid Valve: There is moderate regurgitation.    Pulmonary Artery: There is severe pulmonary hypertension. The estimated pulmonary artery systolic pressure is 74 mmHg.    IVC/SVC: Elevated venous pressure at 15 mmHg.    ECHO concerning for aortic prosthetic valve stenosis likely making it difficult to diuresis patient.      Urine output only about 1 liter in the past 24 hours on Lasix 120 mg IV BID.     Recommendations:   - Continue Lasix Drip to 40/hr after lasix 80mg IV rebolus   - Continue Diuril 250mg BID  - Strict I's and O's  - Continue Heparin given ERICA  - Continue hydralazine tid  - Continue isosorbide dinitrate TID  - Continue metoprolol succinate   - Check BMP BID to follow Cr and address accordingly   - Consider additional GDMT when able   - Avoid NSAID's and CCB's     We will place referral to Dr. Gee with Interventional Clinic to discuss valve options in the outpatient setting        VTE Risk Mitigation (From admission, onward)           Ordered     heparin 25,000 units in dextrose 5% 250 mL (100 units/mL) infusion HIGH INTENSITY nomogram - [facility]  Continuous        Question:  Begin at (units/kg/hr)  Answer:  18    09/12/24 0739     heparin 25,000 units in dextrose 5% (100 units/ml) IV bolus from bag HIGH INTENSITY nomogram - [facility]  As needed (PRN)        Question:  Heparin Infusion Adjustment (DO NOT MODIFY ANSWER)  Answer:  \\[email or web address]\epic\Images\Pharmacy\HeparinInfusions\heparin HIGH INTENSITY nomogram for [facility] [id number].pdf    09/12/24 0739     heparin 25,000 units in dextrose 5% (100 units/ml) IV bolus from bag HIGH INTENSITY nomogram - [facility]  As needed (PRN)        Question:  Heparin Infusion Adjustment (DO NOT MODIFY ANSWER)  Answer:  \\[email or web address]\epic\Images\Pharmacy\HeparinInfusions\heparin HIGH INTENSITY nomogram for [facility] [id number].pdf    09/12/24 0739                     Jeremiah Colon MD  Cardiology  [facility]  Diuresis later possible TAVR. Anticoagulation.

## 2024-09-13 NOTE — SUBJECTIVE & OBJECTIVE
Interval History:     NAEON. Continued on lasix drip and diuril. Potassium replaced. Continued on heparin drip and currently therapeutic.       Review of Systems   Constitutional:  Negative for chills and fever.   HENT:  Negative for nosebleeds.    Eyes:  Negative for visual disturbance.   Respiratory:  Negative for chest tightness and shortness of breath.    Cardiovascular:  Negative for chest pain.   Gastrointestinal:  Positive for abdominal distention. Negative for abdominal pain and constipation.   Genitourinary:         Suárez catheter in place   Neurological:  Negative for dizziness and light-headedness.   Psychiatric/Behavioral:  Negative for suicidal ideas. The patient is not nervous/anxious.      Objective:     Vital Signs (Most Recent):  Temp: 97.6 °F (36.4 °C) (09/13/24 1528)  Pulse: 60 (09/13/24 1528)  Resp: 18 (09/13/24 1528)  BP: (!) 106/56 (09/13/24 1528)  SpO2: 100 % (09/13/24 1528) Vital Signs (24h Range):  Temp:  [97.5 °F (36.4 °C)-97.7 °F (36.5 °C)] 97.6 °F (36.4 °C)  Pulse:  [60-63] 60  Resp:  [16-21] 18  SpO2:  [99 %-100 %] 100 %  BP: (106-130)/(53-69) 106/56     Weight: 67.8 kg (149 lb 9.3 oz)  Body mass index is 20.29 kg/m².    Intake/Output Summary (Last 24 hours) at 9/13/2024 1821  Last data filed at 9/13/2024 1714  Gross per 24 hour   Intake 522.84 ml   Output 2400 ml   Net -1877.16 ml         Physical Exam  Vitals and nursing note reviewed.   Constitutional:       General: He is not in acute distress.     Appearance: Normal appearance. He is well-developed.      Interventions: He is not intubated.  HENT:      Head: Normocephalic and atraumatic.   Eyes:      Extraocular Movements: Extraocular movements intact.      Conjunctiva/sclera: Conjunctivae normal.   Cardiovascular:      Rate and Rhythm: Normal rate and regular rhythm.      Pulses: Normal pulses.      Heart sounds: Murmur heard.   Pulmonary:      Effort: Pulmonary effort is normal. No accessory muscle usage or respiratory distress. He  is not intubated.      Breath sounds: Rales present.   Abdominal:      General: There is distension.      Tenderness: There is no abdominal tenderness.   Musculoskeletal:      Cervical back: Normal range of motion and neck supple.      Right lower leg: No edema.      Left lower leg: No edema.   Skin:     General: Skin is warm and dry.   Neurological:      Mental Status: He is alert and oriented to person, place, and time. Mental status is at baseline.   Psychiatric:         Attention and Perception: Attention normal.         Mood and Affect: Mood and affect normal.         Behavior: Behavior normal. Behavior is cooperative.             Significant Labs: All pertinent labs within the past 24 hours have been reviewed.  Recent Labs   Lab 09/11/24  0213 09/12/24  0726 09/13/24  0837    140 141   K 3.8 3.9 3.3*    108 105   CO2 15* 21* 22*   BUN 77* 82* 83*   CREATININE 4.4* 4.2* 3.7*   CALCIUM 7.9* 8.1* 8.7   PROT 6.1 6.4 6.7   BILITOT 2.0* 1.6* 1.7*   ALKPHOS 74 71 66   * 135* 113*   AST 99* 67* 52*     Recent Labs   Lab 09/13/24  0605   WBC 4.67   RBC 3.22*   HGB 9.4*   HCT 27.0*   PLT 75*   MCV 84   MCH 29.2   MCHC 34.8        Significant Imaging: I have reviewed all pertinent imaging results/findings within the past 24 hours.

## 2024-09-13 NOTE — PLAN OF CARE
Patient AAO x4. VSS. SpO2 > 95% on room air. Afebrile. NSR w/ PVC's on tele. Dr. Wilcox notified about increase in PVC's. Said pt okay as long as he is asymptomatic. Lasix gtt infusing at 40 mg/hr (4 ml). Heparin gtt infusing at 14 u/kr/hr. Suárez output > 1 liter this shift. Pt standby assist. Bed locked and in lowest settings. Call bell in reach. Reminded to call for assistance.

## 2024-09-13 NOTE — ASSESSMENT & PLAN NOTE
Results for orders placed during the hospital encounter of 09/08/24    Echo    Interpretation Summary  Images from the original result were not included.      Left Ventricle: The left ventricle is mildly dilated measuring 5.8 cm. Normal wall thickness. Severe global hypokinesis present. There is severely reduced systolic function. Grade II diastolic dysfunction. Elevated left ventricular filling pressure. There is a layered, nonmobile thrombus in the apex measuring 1.3 x 2.5 cm.    Right Ventricle: Mild right ventricular enlargement. Systolic function is mildly reduced. Pacemaker lead present in the ventricle.    Left Atrium: Left atrium is severely dilated.    Right Atrium: Right atrium is mildly dilated.    Aortic Valve: There is moderate to severe stenosis. Aortic valve area by VTI is 0.80 cm². Aortic valve peak velocity is 3.43 m/s. Mean gradient is 28 mmHg. The dimensionless index is 0.24. There is mild aortic regurgitation.    Mitral Valve: There is mild regurgitation.    Tricuspid Valve: There is moderate regurgitation.    Pulmonary Artery: There is severe pulmonary hypertension. The estimated pulmonary artery systolic pressure is 74 mmHg.    IVC/SVC: Elevated venous pressure at 15 mmHg.    ECHO concerning for aortic prosthetic valve stenosis likely making it difficult to diuresis patient.      Urine output only about 1 liter in the past 24 hours on Lasix 120 mg IV BID.     Recommendations:   - Continue Lasix Drip to 40/hr after lasix 80mg IV rebolus   - Continue Diuril 250mg BID  - Strict I's and O's  - Continue Heparin given ERICA  - Continue hydralazine tid  - Continue isosorbide dinitrate TID  - Continue metoprolol succinate   - Check BMP BID to follow Cr and address accordingly   - Consider additional GDMT when able   - Avoid NSAID's and CCB's     We will place referral to Dr. Gee with [facility] to discuss valve options in the outpatient setting

## 2024-09-13 NOTE — SUBJECTIVE & OBJECTIVE
Interval History: UOP 2.7L in 24 hrs. Net neg 1.7L. NO complaints this morning    ROS  Objective:     Vital Signs (Most Recent):  Temp: 97.7 °F (36.5 °C) (09/13/24 1123)  Pulse: 63 (09/13/24 1123)  Resp: 16 (09/13/24 1123)  BP: 129/69 (09/13/24 1123)  SpO2: 100 % (09/13/24 1123) Vital Signs (24h Range):  Temp:  [97.4 °F (36.3 °C)-97.7 °F (36.5 °C)] 97.7 °F (36.5 °C)  Pulse:  [60-63] 63  Resp:  [16-21] 16  SpO2:  [99 %-100 %] 100 %  BP: (108-130)/(53-69) 129/69     Weight: 67.8 kg (149 lb 9.3 oz)  Body mass index is 20.29 kg/m².     SpO2: 100 %         Intake/Output Summary (Last 24 hours) at 9/13/2024 1129  Last data filed at 9/13/2024 1007  Gross per 24 hour   Intake 702.84 ml   Output 3500 ml   Net -2797.16 ml       Lines/Drains/Airways       Drain  Duration                  Urethral Catheter 09/09/24 1643 16 Fr. 3 days              Peripheral Intravenous Line  Duration                  Peripheral IV - Single Lumen 09/11/24 1737 20 G Anterior;Left Forearm 1 day         Peripheral IV - Single Lumen 09/12/24 1807 22 G Anterior;Left;Proximal Forearm <1 day                       Physical Exam  Constitutional:       General: He is not in acute distress.     Appearance: He is not toxic-appearing.   HENT:      Right Ear: External ear normal.      Left Ear: External ear normal.   Eyes:      General:         Right eye: No discharge.         Left eye: No discharge.      Extraocular Movements: Extraocular movements intact.   Cardiovascular:      Rate and Rhythm: Normal rate and regular rhythm.      Heart sounds: No murmur heard.     No gallop.      Comments: JVP   Pulmonary:      Effort: Pulmonary effort is normal.      Breath sounds: Rales present.   Abdominal:      General: There is distension.   Musculoskeletal:      Cervical back: Normal range of motion.      Right lower leg: No edema.      Left lower leg: No edema.   Skin:     General: Skin is warm and dry.   Neurological:      General: No focal deficit present.       Mental Status: He is alert and oriented to person, place, and time.            Significant Labs: CMP   Recent Labs   Lab 09/12/24  0726 09/13/24  0837    141   K 3.9 3.3*    105   CO2 21* 22*   * 87   BUN 82* 83*   CREATININE 4.2* 3.7*   CALCIUM 8.1* 8.7   PROT 6.4 6.7   ALBUMIN 3.1* 3.4*   BILITOT 1.6* 1.7*   ALKPHOS 71 66   AST 67* 52*   * 113*   ANIONGAP 11 14    and CBC   Recent Labs   Lab 09/12/24  0709 09/13/24  0605   WBC 4.70 4.67   HGB 9.6* 9.4*   HCT 28.1* 27.0*   PLT 73* 75*

## 2024-09-13 NOTE — ASSESSMENT & PLAN NOTE
ERICA is likely due to  Cardiorenal . Baseline creatinine is  1.7-2 . Most recent creatinine and eGFR are listed below.  Recent Labs     09/11/24  0213 09/12/24  0726 09/13/24  0837   CREATININE 4.4* 4.2* 3.7*   EGFRNORACEVR 13.4* 14.2* 16.5*        Plan  - ERICA is stable  - Avoid nephrotoxins and renally dose meds for GFR listed above  - Monitor urine output, serial BMP, and adjust therapy as needed  - appreciate nephrology recommendations.

## 2024-09-13 NOTE — ASSESSMENT & PLAN NOTE
Patient's FSGs are controlled on current medication regimen.  Last A1c reviewed-   Lab Results   Component Value Date    HGBA1C 6.0 08/18/2024     Most recent fingerstick glucose reviewed-   Recent Labs   Lab 09/12/24  0942 09/12/24  1326 09/12/24  1735 09/12/24  2124   POCTGLUCOSE 126* 140* 122* 126*     Current correctional scale  Low  Maintain anti-hyperglycemic dose as follows-   Antihyperglycemics (From admission, onward)      Start     Stop Route Frequency Ordered    09/08/24 0511  insulin aspart U-100 pen 0-5 Units         -- SubQ Before meals & nightly PRN 09/08/24 0411          Hold Oral hypoglycemics while patient is in the hospital.

## 2024-09-13 NOTE — ASSESSMENT & PLAN NOTE
"Nephrology consulted for "Hepatology suspects hepatorenal syndrome."  Baseline Cr ~1.9. Urine Na >20, however, potentially 2/2 ATN or unlisted diuretic use or CRS.     PLAN:    - Renal function slowly improving  - Recommend decreasing Lasix gtt to 20mg/hr   - Trend Cr/ Serial BMPs  - Replace electrolytes PRN, goal K/Phos/Mg 4/3/2  - Avoid nephrotoxic agents when feasible (NSAIDs, ACEi/ARB, IV radiocontrast, gadolinium, etc.)  - Avoid Fleet's and Brown Bomb Enemas given their propensity to induce hypermagnesemia  - Renally dose all meds to eGFR  - Goal MAP > 65 mmHg  - No acute indications for RRT at this time   "

## 2024-09-13 NOTE — ASSESSMENT & PLAN NOTE
Chronic, controlled. Latest blood pressure and vitals reviewed-     Temp:  [97.5 °F (36.4 °C)-97.7 °F (36.5 °C)]   Pulse:  [60-63]   Resp:  [16-21]   BP: (106-130)/(53-69)   SpO2:  [99 %-100 %] .   Home meds for hypertension were reviewed and noted below.   Hypertension Medications               hydrALAZINE (APRESOLINE) 50 MG tablet Take 1 tablet (50 mg total) by mouth 2 (two) times a day.    isosorbide dinitrate (ISORDIL) 20 MG tablet Take 20 mg by mouth 3 (three) times daily.    metoprolol succinate (TOPROL-XL) 25 MG 24 hr tablet Take 1 tablet (25 mg total) by mouth once daily.    sacubitriL-valsartan (ENTRESTO) 24-26 mg per tablet Take 1 tablet by mouth 2 (two) times daily.            While in the hospital, will manage blood pressure as follows; Adjust home antihypertensive regimen as follows- Continue home hydralazine, isosorbide dinitrate, metoprolol. Monitor BP.    Will utilize p.r.n. blood pressure medication only if patient's blood pressure greater than 180/110 and he develops symptoms such as worsening chest pain or shortness of breath.

## 2024-09-13 NOTE — ASSESSMENT & PLAN NOTE
Patient with known Cirrhosis with Child's class Calculator for Child's score link- https://www.[employer].com/calc/340/child-myles-score-cirrhosis-mortality#creator-insights. Co-morbidities are present and inclusive of ascites.  MELD-Na score calculated; MELD 3.0: 22 at 9/13/2024  8:37 AM  MELD-Na: 23 at 9/13/2024  8:37 AM  Calculated from:  Serum Creatinine: 3.7 mg/dL (Using max of 3 mg/dL) at 9/13/2024  8:37 AM  Serum Sodium: 141 mmol/L (Using max of 137 mmol/L) at 9/13/2024  8:37 AM  Total Bilirubin: 1.7 mg/dL at 9/13/2024  8:37 AM  Serum Albumin: 3.4 g/dL at 9/13/2024  8:37 AM  INR(ratio): 1.2 at 9/13/2024  6:05 AM  Age at listing (hypothetical): 73 years  Sex: Male at 9/13/2024  8:37 AM    Appreciate Hepatology. 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 not a candidate for liver transplant evaluation given multiple comorbidities & severe heart failure   -Palliative care consulted for goal clarification

## 2024-09-13 NOTE — ASSESSMENT & PLAN NOTE
Patient's FSGs are controlled on current medication regimen.  Last A1c reviewed-   Lab Results   Component Value Date    HGBA1C 6.0 08/18/2024     Most recent fingerstick glucose reviewed-   Recent Labs   Lab 09/12/24  2124 09/13/24  0845 09/13/24  1249 09/13/24  1712   POCTGLUCOSE 126* 97 128* 125*       Current correctional scale  Low  Maintain anti-hyperglycemic dose as follows-   Antihyperglycemics (From admission, onward)    Start     Stop Route Frequency Ordered    09/08/24 0511  insulin aspart U-100 pen 0-5 Units         -- SubQ Before meals & nightly PRN 09/08/24 0411        Hold Oral hypoglycemics while patient is in the hospital.

## 2024-09-13 NOTE — PROGRESS NOTES
"[facility] - [facility] Stepdown  Nephrology  Progress Note    Patient Name: Baldo Carney  MRN: [id number]  Admission Date: 9/8/2024  Hospital Length of Stay: 5 days  Attending Provider: Marianela Bridges MD   Primary Care Physician: Millie Hayes, APRN,FNP-C  Principal Problem:Acute on chronic combined systolic and diastolic heart failure    Subjective:     HPI: 73 year old male with a previous medical history of COPD, cirrhosis of the liver with bi monthly paracentesis, cardiomyopathy, CHF, aortic stenosis, CAD, HTN, HLD, BPH, chronic renal failure, and DMII who presented to the emergency room with left sided chest pain x 4 hours. S/p paracentesis 8/12/24 with 6L fluid removal and albumin replacement     Elevated troponin, BNP with ERICA on CKD suggest volume overload s/t decompensated cirrhosis and biventricular HFrEF 22%. Thrombocytopenia s/t chronic liver disease.     Nephrology consulted for "Hepatology suspects hepatorenal syndrome." Urine Na >20.     Interval History: NAEO. Denies any symptoms this morning. Renal function continues to improve on Lasix gtt and Diuril. Good UOP.      Review of patient's allergies indicates:   Allergen Reactions    Canagliflozin      Other reaction(s): been very dizzy     Current Facility-Administered Medications   Medication Frequency    aspirin EC tablet 81 mg Daily    chlorothiazide (DIURIL) 250 mg in D5W 50 mL IVPB Q12H    dextrose 10% bolus 125 mL 125 mL PRN    dextrose 10% bolus 250 mL 250 mL PRN    furosemide (Lasix) 500 mg in 50 mL infusion (conc: 10 mg/mL) Continuous    glucagon (human recombinant) injection 1 mg PRN    glucose chewable tablet 16 g PRN    glucose chewable tablet 24 g PRN    heparin 25,000 units in dextrose 5% (100 units/ml) IV bolus from bag HIGH INTENSITY nomogram - [facility] PRN    heparin 25,000 units in dextrose 5% (100 units/ml) IV bolus from bag HIGH INTENSITY nomogram - [facility] PRN    heparin 25,000 units in dextrose 5% 250 mL (100 units/mL) infusion " HIGH INTENSITY nomogram - [facility] Continuous    hydrALAZINE tablet 50 mg TID    insulin aspart U-100 pen 0-5 Units QID (AC + HS) PRN    isosorbide dinitrate tablet 20 mg TID    levalbuterol nebulizer solution 0.63 mg Q6H PRN    levothyroxine tablet 100 mcg Before breakfast    metoprolol succinate (TOPROL-XL) 24 hr tablet 25 mg Daily    mirtazapine tablet 7.5 mg QHS       Objective:     Vital Signs (Most Recent):  Temp: 97.7 °F (36.5 °C) (24 1123)  Pulse: 63 (24 1123)  Resp: 16 (24 1123)  BP: 129/69 (24 1123)  SpO2: 100 % (24) Vital Signs (24h Range):  Temp:  [97.5 °F (36.4 °C)-97.7 °F (36.5 °C)] 97.7 °F (36.5 °C)  Pulse:  [60-63] 63  Resp:  [16-21] 16  SpO2:  [99 %-100 %] 100 %  BP: (108-130)/(53-69) 129/69     Weight: 67.8 kg (149 lb 9.3 oz) (24 0928)  Body mass index is 20.29 kg/m².  Body surface area is 1.86 meters squared.    I/O last 3 completed shifts:  In: 942.8 [P.O.:920; I.V.:22.8]  Out: 3800 [Urine:3800]     Physical Exam  Constitutional:       General: He is not in acute distress.     Appearance: Normal appearance. He is not ill-appearing.   HENT:      Head: Normocephalic.   Cardiovascular:      Rate and Rhythm: Normal rate and regular rhythm.      Pulses: Normal pulses.      Heart sounds: Normal heart sounds. No murmur heard.     No friction rub. No gallop.   Pulmonary:      Effort: Pulmonary effort is normal. No respiratory distress.      Breath sounds: Normal breath sounds.   Abdominal:      General: Abdomen is flat. Bowel sounds are normal. There is no distension.      Palpations: Abdomen is soft.      Tenderness: There is no abdominal tenderness.   Musculoskeletal:         General: No swelling.      Right lower le+ Edema present.      Left lower le+ Edema present.   Skin:     General: Skin is warm.      Coloration: Skin is not jaundiced.      Findings: No erythema or rash.   Neurological:      General: No focal deficit present.      Mental Status: He is  "alert and oriented to person, place, and time. Mental status is at baseline.   Psychiatric:         Mood and Affect: Mood normal.         Behavior: Behavior normal.         Thought Content: Thought content normal.          Significant Labs:  All labs within the past 24 hours have been reviewed.     Significant Imaging:  Labs: Reviewed  Assessment/Plan:     Renal/  Acute kidney injury superimposed on chronic kidney disease  Nephrology consulted for "Hepatology suspects hepatorenal syndrome."  Baseline Cr ~1.9. Urine Na >20, however, potentially 2/2 ATN or unlisted diuretic use or CRS.     PLAN:    - Renal function slowly improving  - Recommend decreasing Lasix gtt to 20mg/hr   - Trend Cr/ Serial BMPs  - Replace electrolytes PRN, goal K/Phos/Mg 4/3/2  - Avoid nephrotoxic agents when feasible (NSAIDs, ACEi/ARB, IV radiocontrast, gadolinium, etc.)  - Avoid Fleet's and Brown Bomb Enemas given their propensity to induce hypermagnesemia  - Renally dose all meds to eGFR  - Goal MAP > 65 mmHg  - No acute indications for RRT at this time         Thank you for your consult. I will follow-up with patient. Please contact us if you have any additional questions.    Erica Lanier MD  Nephrology  [facility]  "

## 2024-09-13 NOTE — ASSESSMENT & PLAN NOTE
He takes metoprolol succinate, hydralazine, isosorbide dinitrate, sacubitril-valsartan, empagliflozin.     -Giving home metoprolol, isosorbide dinitrate, hydralazine. Not on a diuretic at home.  -Gave a dose of IV chlorothiazide. Tried furosemide IV boluses, which were ineffective, then switched to furosemide drip by Cardiology. Appreciate Cardiology. Monitor electrolytes, intake/output. Can hold or wean GDMT but current goal is to optimize diuresis.     Continued on lasix gtt, appreciate cardiology recommendations. Lasix dose increased and diuril given. GDMT holding parameters added. Continued monitoring on heparin gtt. Not a candidate for ARNI/ MRA or SGLT2i due to renal disease.    Echo    Result Date: 9/9/2024  Images from the original result were not included.      Left Ventricle: The left ventricle is mildly dilated measuring 5.8 cm.   Normal wall thickness. Severe global hypokinesis present. There is   severely reduced systolic function. Grade II diastolic dysfunction.   Elevated left ventricular filling pressure. There is a layered, nonmobile   thrombus in the apex measuring 1.3 x 2.5 cm.    Right Ventricle: Mild right ventricular enlargement. Systolic function   is mildly reduced. Pacemaker lead present in the ventricle.    Left Atrium: Left atrium is severely dilated.    Right Atrium: Right atrium is mildly dilated.    Aortic Valve: There is moderate to severe stenosis. Aortic valve area   by VTI is 0.80 cm². Aortic valve peak velocity is 3.43 m/s. Mean gradient   is 28 mmHg. The dimensionless index is 0.24. There is mild aortic   regurgitation.    Mitral Valve: There is mild regurgitation.    Tricuspid Valve: There is moderate regurgitation.    Pulmonary Artery: There is severe pulmonary hypertension. The estimated   pulmonary artery systolic pressure is 74 mmHg.    IVC/SVC: Elevated venous pressure at 15 mmHg.

## 2024-09-13 NOTE — PROGRESS NOTES
[facility]  Progress Note    Patient Name: Baldo Carney  MRN: [id number]  Patient Class: IP- Inpatient   Admission Date: 9/8/2024  Length of Stay: 5 days  Attending Physician: Marianela Bridges MD  Primary Care Provider: Millie Hayes APRN,FNP-C        Subjective:     Principal Problem:Acute on chronic combined systolic and diastolic heart failure        HPI:  Baldo Carney is a 73 year old Black man with former cigarette smoking (quit in 1970), hypertension, diabetes mellitus type 2 with retinopathy and peripheral neuropathy, coronary artery disease status post coronary artery bypass graft (saphenous vein graft to right coronary artery) on 11/3/2014, status post percutaneous coronary intervention with drug eluting stent placements in left main to left anterior descending artery and left main to lateral circumflex artery on 8/31/2022, history of aortic valve replacement with porcine bioprosthesis on 11/3/2014, ischemic cardiomyopathy with chronic biventricular systolic and diastolic heart failure, chronic kidney disease stage 3-4, anemia, benign prostatic hyperplasia, hypothyroidism, cirrhosis with ascites requiring bimonthly paracentesis, chronic obstructive pulmonary disease (COPD), history of incarcerated inguinal hernia repair on 12/7/2023. He lives in [place], [place].    He was hospitalized at [facility] from 8/12/2024 to 8/18/2024 for syncope, cystitis, hypoglycemia, COVID-19. He underwent paracentesis on admission with 6 liters of fluid removed and albumin given.    He was seen by his primary care nurse practitioner in clinic on 8/27/2024 and was prescribed mirtazapine for situational anxiety and omeprazole for heartburn.   He presented to [facility] Emergency Department on Saturday 9/7/2024 with left sided chest pain for 4 hours without radiation, associated with shortness of breath. He also had unchanged chronic bilateral  lower extremity edema. He was scheduled for paracentesis on Monday 9/9/2024. Labs showed elevated BNP (>4900 pg/mL) and troponin (0.196 ng/mL) and acute kidney injury (BUN 69 mg/dL and creatinine 4.8 mg/dL from 37 and 1.9 on 8/23/2024), suggestive of volume overload due to cirrhosis and heart failure. He was transferred to [facility] on 9/8/2024 and admitted to Hospital Medicine Team L.     Overview/Hospital Course:  Nephrology and Cardiology were consulted. Hepatology recommended IV albumin. Suárez catheter was placed for urine output measurement. Nephrology suspected cardiorenal syndrome and recommended giving IV furosemide 80 mg twice daily. It was ineffective. Hospital Medicine increased it to 120 mg twice daily and it still did not increase urine output. Echocardiogram showed a layered nonmobile left ventricular apical thrombus. He was put on therapeutic enoxaparin. Cardiology recommended furosemide drip. Nephrology agreed with furosemide drip and recommended a trial of IV albumin for 3 days. He diuresed better.     Continued on lasix gtt, appreciate cardiology recommendations. Lasix dose increased and diuril given. GDMT holding parameters added. Continued monitoring on heparin gtt. Not a candidate for ARNI/ MRA or SGLT2i due to renal disease.    Regarding severe aortic prosthetic valve stenosis recommendation for outpatient valve clinic follow up for possible valve in valve TAVR once LV thrombus resolves.      Palliative care evaluated and initiated C conversations.    Interval History:     NAEON. Continued on lasix drip and diuril. Potassium replaced. Continued on heparin drip and currently therapeutic.       Review of Systems   Constitutional:  Negative for chills and fever.   HENT:  Negative for nosebleeds.    Eyes:  Negative for visual disturbance.   Respiratory:  Negative for chest tightness and shortness of breath.    Cardiovascular:  Negative for chest pain.   Gastrointestinal:   Positive for abdominal distention. Negative for abdominal pain and constipation.   Genitourinary:         Suárez catheter in place   Neurological:  Negative for dizziness and light-headedness.   Psychiatric/Behavioral:  Negative for suicidal ideas. The patient is not nervous/anxious.      Objective:     Vital Signs (Most Recent):  Temp: 97.6 °F (36.4 °C) (09/13/24 1528)  Pulse: 60 (09/13/24 1528)  Resp: 18 (09/13/24 1528)  BP: (!) 106/56 (09/13/24 1528)  SpO2: 100 % (09/13/24 1528) Vital Signs (24h Range):  Temp:  [97.5 °F (36.4 °C)-97.7 °F (36.5 °C)] 97.6 °F (36.4 °C)  Pulse:  [60-63] 60  Resp:  [16-21] 18  SpO2:  [99 %-100 %] 100 %  BP: (106-130)/(53-69) 106/56     Weight: 67.8 kg (149 lb 9.3 oz)  Body mass index is 20.29 kg/m².    Intake/Output Summary (Last 24 hours) at 9/13/2024 1821  Last data filed at 9/13/2024 1714  Gross per 24 hour   Intake 522.84 ml   Output 2400 ml   Net -1877.16 ml         Physical Exam  Vitals and nursing note reviewed.   Constitutional:       General: He is not in acute distress.     Appearance: Normal appearance. He is well-developed.      Interventions: He is not intubated.  HENT:      Head: Normocephalic and atraumatic.   Eyes:      Extraocular Movements: Extraocular movements intact.      Conjunctiva/sclera: Conjunctivae normal.   Cardiovascular:      Rate and Rhythm: Normal rate and regular rhythm.      Pulses: Normal pulses.      Heart sounds: Murmur heard.   Pulmonary:      Effort: Pulmonary effort is normal. No accessory muscle usage or respiratory distress. He is not intubated.      Breath sounds: Rales present.   Abdominal:      General: There is distension.      Tenderness: There is no abdominal tenderness.   Musculoskeletal:      Cervical back: Normal range of motion and neck supple.      Right lower leg: No edema.      Left lower leg: No edema.   Skin:     General: Skin is warm and dry.   Neurological:      Mental Status: He is alert and oriented to person, place, and  time. Mental status is at baseline.   Psychiatric:         Attention and Perception: Attention normal.         Mood and Affect: Mood and affect normal.         Behavior: Behavior normal. Behavior is cooperative.             Significant Labs: All pertinent labs within the past 24 hours have been reviewed.  Recent Labs   Lab 09/11/24  0213 09/12/24  0726 09/13/24  0837    140 141   K 3.8 3.9 3.3*    108 105   CO2 15* 21* 22*   BUN 77* 82* 83*   CREATININE 4.4* 4.2* 3.7*   CALCIUM 7.9* 8.1* 8.7   PROT 6.1 6.4 6.7   BILITOT 2.0* 1.6* 1.7*   ALKPHOS 74 71 66   * 135* 113*   AST 99* 67* 52*     Recent Labs   Lab 09/13/24  0605   WBC 4.67   RBC 3.22*   HGB 9.4*   HCT 27.0*   PLT 75*   MCV 84   MCH 29.2   MCHC 34.8        Significant Imaging: I have reviewed all pertinent imaging results/findings within the past 24 hours.    Assessment/Plan:      * Acute on chronic combined systolic and diastolic heart failure  He takes metoprolol succinate, hydralazine, isosorbide dinitrate, sacubitril-valsartan, empagliflozin.     -Giving home metoprolol, isosorbide dinitrate, hydralazine. Not on a diuretic at home.  -Gave a dose of IV chlorothiazide. Tried furosemide IV boluses, which were ineffective, then switched to furosemide drip by Cardiology. Appreciate Cardiology. Monitor electrolytes, intake/output. Can hold or wean GDMT but current goal is to optimize diuresis.     Continued on lasix gtt, appreciate cardiology recommendations. Lasix dose increased and diuril given. GDMT holding parameters added. Continued monitoring on heparin gtt. Not a candidate for ARNI/ MRA or SGLT2i due to renal disease.    Echo    Result Date: 9/9/2024  Images from the original result were not included.      Left Ventricle: The left ventricle is mildly dilated measuring 5.8 cm.   Normal wall thickness. Severe global hypokinesis present. There is   severely reduced systolic function. Grade II diastolic dysfunction.   Elevated left  ventricular filling pressure. There is a layered, nonmobile   thrombus in the apex measuring 1.3 x 2.5 cm.    Right Ventricle: Mild right ventricular enlargement. Systolic function   is mildly reduced. Pacemaker lead present in the ventricle.    Left Atrium: Left atrium is severely dilated.    Right Atrium: Right atrium is mildly dilated.    Aortic Valve: There is moderate to severe stenosis. Aortic valve area   by VTI is 0.80 cm². Aortic valve peak velocity is 3.43 m/s. Mean gradient   is 28 mmHg. The dimensionless index is 0.24. There is mild aortic   regurgitation.    Mitral Valve: There is mild regurgitation.    Tricuspid Valve: There is moderate regurgitation.    Pulmonary Artery: There is severe pulmonary hypertension. The estimated   pulmonary artery systolic pressure is 74 mmHg.    IVC/SVC: Elevated venous pressure at 15 mmHg.          Left ventricular apical thrombus  ECHO reported layered LV thrombus   Continue heparin gtt        Acute kidney injury superimposed on chronic kidney disease  ERICA is likely due to  Cardiorenal . Baseline creatinine is  1.7-2 . Most recent creatinine and eGFR are listed below.  Recent Labs     09/11/24  0213 09/12/24  0726 09/13/24  0837   CREATININE 4.4* 4.2* 3.7*   EGFRNORACEVR 13.4* 14.2* 16.5*        Plan  - ERICA is stable  - Avoid nephrotoxins and renally dose meds for GFR listed above  - Monitor urine output, serial BMP, and adjust therapy as needed  - appreciate nephrology recommendations.    Elevated troponin  Due to CHF.      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 without macular edema, without long-term current use of insulin  Patient's FSGs are controlled on current medication regimen.  Last A1c reviewed-   Lab Results   Component Value Date    HGBA1C 6.0 08/18/2024     Most recent fingerstick glucose reviewed-   Recent Labs   Lab 09/12/24  2124 09/13/24  0845 09/13/24  1249 09/13/24  1712   POCTGLUCOSE 126* 97 128* 125*        Current correctional scale  Low  Maintain anti-hyperglycemic dose as follows-   Antihyperglycemics (From admission, onward)      Start     Stop Route Frequency Ordered    09/08/24 0511  insulin aspart U-100 pen 0-5 Units         -- SubQ Before meals & nightly PRN 09/08/24 0411          Hold Oral hypoglycemics while patient is in the hospital.      Cirrhosis of liver with ascites  Patient with known Cirrhosis with Child's class Calculator for Child's score link- https://www.[employer]/calc/340/child-myles-score-cirrhosis-mortality#creator-insights. Co-morbidities are present and inclusive of ascites.  MELD-Na score calculated; MELD 3.0: 22 at 9/13/2024  8:37 AM  MELD-Na: 23 at 9/13/2024  8:37 AM  Calculated from:  Serum Creatinine: 3.7 mg/dL (Using max of 3 mg/dL) at 9/13/2024  8:37 AM  Serum Sodium: 141 mmol/L (Using max of 137 mmol/L) at 9/13/2024  8:37 AM  Total Bilirubin: 1.7 mg/dL at 9/13/2024  8:37 AM  Serum Albumin: 3.4 g/dL at 9/13/2024  8:37 AM  INR(ratio): 1.2 at 9/13/2024  6:05 AM  Age at listing (hypothetical): 73 years  Sex: Male at 9/13/2024  8:37 AM    Appreciate Hepatology. 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 not a candidate for liver transplant evaluation given multiple comorbidities & severe heart failure   -Palliative care consulted for goal clarification       Hypothyroidism  Stable. Continue home levothyroxine.      COPD (chronic obstructive pulmonary disease)  He takes fluticasone-vilanterol. Giving levalbuterol prn.    Hypertension    Chronic, controlled. Latest blood pressure and vitals reviewed-     Temp:  [97.5 °F (36.4 °C)-97.7 °F (36.5 °C)]   Pulse:  [60-63]   Resp:  [16-21]   BP: (106-130)/(53-69)   SpO2:  [99 %-100 %] .   Home meds for hypertension were reviewed and noted below.   Hypertension Medications               hydrALAZINE (APRESOLINE) 50 MG tablet Take 1 tablet (50 mg total) by mouth 2 (two) times a day.    isosorbide dinitrate (ISORDIL) 20 MG tablet Take 20 mg by mouth 3  (three) times daily.    metoprolol succinate (TOPROL-XL) 25 MG 24 hr tablet Take 1 tablet (25 mg total) by mouth once daily.    sacubitriL-valsartan (ENTRESTO) 24-26 mg per tablet Take 1 tablet by mouth 2 (two) times daily.            While in the hospital, will manage blood pressure as follows; Adjust home antihypertensive regimen as follows- Continue home hydralazine, isosorbide dinitrate, metoprolol. Monitor BP.    Will utilize p.r.n. blood pressure medication only if patient's blood pressure greater than 180/110 and he develops symptoms such as worsening chest pain or shortness of breath.        VTE Risk Mitigation (From admission, onward)           Ordered     heparin 25,000 units in dextrose 5% 250 mL (100 units/mL) infusion HIGH INTENSITY nomogram - [facility]  Continuous        Question:  Begin at (units/kg/hr)  Answer:  18    09/12/24 0739     heparin 25,000 units in dextrose 5% (100 units/ml) IV bolus from bag HIGH INTENSITY nomogram - [facility]  As needed (PRN)        Question:  Heparin Infusion Adjustment (DO NOT MODIFY ANSWER)  Answer:  [email or web address]\epic\Images\Pharmacy\HeparinInfusions\heparin HIGH INTENSITY nomogram for [facility] [id number]    09/12/24 0739     heparin 25,000 units in dextrose 5% (100 units/ml) IV bolus from bag HIGH INTENSITY nomogram - [facility]  As needed (PRN)        Question:  Heparin Infusion Adjustment (DO NOT MODIFY ANSWER)  Answer:  [email or web address]\Images\Pharmacy\HeparinInfusions\heparin HIGH INTENSITY nomogram for [facility] [id number]    09/12/24 0739                    Discharge Planning   PAM: 9/16/2024     Code Status: Full Code   Is the patient medically ready for discharge?:     Reason for patient still in hospital (select all that apply): Patient trending condition, Laboratory test, Treatment, and Consult recommendations  Discharge Plan A: Home, Home with family, Home Health                  Marianela Bridges MD  Department of Hospital Medicine   [facility]

## 2024-09-13 NOTE — SUBJECTIVE & OBJECTIVE
Interval History: NAEO. Denies any symptoms this morning. Renal function continues to improve on Lasix gtt and Diuril. Good UOP.      Review of patient's allergies indicates:   Allergen Reactions    Canagliflozin      Other reaction(s): been very dizzy     Current Facility-Administered Medications   Medication Frequency    aspirin EC tablet 81 mg Daily    chlorothiazide (DIURIL) 250 mg in D5W 50 mL IVPB Q12H    dextrose 10% bolus 125 mL 125 mL PRN    dextrose 10% bolus 250 mL 250 mL PRN    furosemide (Lasix) 500 mg in 50 mL infusion (conc: 10 mg/mL) Continuous    glucagon (human recombinant) injection 1 mg PRN    glucose chewable tablet 16 g PRN    glucose chewable tablet 24 g PRN    heparin 25,000 units in dextrose 5% (100 units/ml) IV bolus from bag HIGH INTENSITY nomogram - [facility] PRN    heparin 25,000 units in dextrose 5% (100 units/ml) IV bolus from bag HIGH INTENSITY nomogram - [facility] PRN    heparin 25,000 units in dextrose 5% 250 mL (100 units/mL) infusion HIGH INTENSITY nomogram - [facility] Continuous    hydrALAZINE tablet 50 mg TID    insulin aspart U-100 pen 0-5 Units QID (AC + HS) PRN    isosorbide dinitrate tablet 20 mg TID    levalbuterol nebulizer solution 0.63 mg Q6H PRN    levothyroxine tablet 100 mcg Before breakfast    metoprolol succinate (TOPROL-XL) 24 hr tablet 25 mg Daily    mirtazapine tablet 7.5 mg QHS       Objective:     Vital Signs (Most Recent):  Temp: 97.7 °F (36.5 °C) (09/13/24 1123)  Pulse: 63 (09/13/24 1123)  Resp: 16 (09/13/24 1123)  BP: 129/69 (09/13/24 1123)  SpO2: 100 % (09/13/24 1123) Vital Signs (24h Range):  Temp:  [97.5 °F (36.4 °C)-97.7 °F (36.5 °C)] 97.7 °F (36.5 °C)  Pulse:  [60-63] 63  Resp:  [16-21] 16  SpO2:  [99 %-100 %] 100 %  BP: (108-130)/(53-69) 129/69     Weight: 67.8 kg (149 lb 9.3 oz) (09/11/24 0928)  Body mass index is 20.29 kg/m².  Body surface area is 1.86 meters squared.    I/O last 3 completed shifts:  In: 942.8 [P.O.:920; I.V.:22.8]  Out: 3800 [Urine:3800]     Physical  Exam  Constitutional:       General: He is not in acute distress.     Appearance: Normal appearance. He is not ill-appearing.   HENT:      Head: Normocephalic.   Cardiovascular:      Rate and Rhythm: Normal rate and regular rhythm.      Pulses: Normal pulses.      Heart sounds: Normal heart sounds. No murmur heard.     No friction rub. No gallop.   Pulmonary:      Effort: Pulmonary effort is normal. No respiratory distress.      Breath sounds: Normal breath sounds.   Abdominal:      General: Abdomen is flat. Bowel sounds are normal. There is no distension.      Palpations: Abdomen is soft.      Tenderness: There is no abdominal tenderness.   Musculoskeletal:         General: No swelling.      Right lower le+ Edema present.      Left lower le+ Edema present.   Skin:     General: Skin is warm.      Coloration: Skin is not jaundiced.      Findings: No erythema or rash.   Neurological:      General: No focal deficit present.      Mental Status: He is alert and oriented to person, place, and time. Mental status is at baseline.   Psychiatric:         Mood and Affect: Mood normal.         Behavior: Behavior normal.         Thought Content: Thought content normal.          Significant Labs:  All labs within the past 24 hours have been reviewed.     Significant Imaging:  Labs: Reviewed

## 2024-09-14 LAB
ALBUMIN SERPL BCP-MCNC: 3.3 G/DL (ref 3.5–5.2)
ALP SERPL-CCNC: 55 U/L (ref 55–135)
ALT SERPL W/O P-5'-P-CCNC: 88 U/L (ref 10–44)
ANION GAP SERPL CALC-SCNC: 17 MMOL/L (ref 8–16)
APTT PPP: 65.2 SEC (ref 21–32)
AST SERPL-CCNC: 42 U/L (ref 10–40)
BILIRUB SERPL-MCNC: 1.8 MG/DL (ref 0.1–1)
BUN SERPL-MCNC: 82 MG/DL (ref 8–23)
CALCIUM SERPL-MCNC: 8.7 MG/DL (ref 8.7–10.5)
CHLORIDE SERPL-SCNC: 99 MMOL/L (ref 95–110)
CO2 SERPL-SCNC: 22 MMOL/L (ref 23–29)
CREAT SERPL-MCNC: 3.5 MG/DL (ref 0.5–1.4)
ERYTHROCYTE [DISTWIDTH] IN BLOOD BY AUTOMATED COUNT: 25.2 % (ref 11.5–14.5)
EST. GFR  (NO RACE VARIABLE): 17.7 ML/MIN/1.73 M^2
GLUCOSE SERPL-MCNC: 99 MG/DL (ref 70–110)
HCT VFR BLD AUTO: 28.9 % (ref 40–54)
HGB BLD-MCNC: 9.7 G/DL (ref 14–18)
INR PPP: 1.2 (ref 0.8–1.2)
MAGNESIUM SERPL-MCNC: 2 MG/DL (ref 1.6–2.6)
MCH RBC QN AUTO: 29 PG (ref 27–31)
MCHC RBC AUTO-ENTMCNC: 33.6 G/DL (ref 32–36)
MCV RBC AUTO: 87 FL (ref 82–98)
PHOSPHATE SERPL-MCNC: 4.1 MG/DL (ref 2.7–4.5)
PLATELET # BLD AUTO: 82 K/UL (ref 150–450)
PMV BLD AUTO: ABNORMAL FL (ref 9.2–12.9)
POCT GLUCOSE: 124 MG/DL (ref 70–110)
POCT GLUCOSE: 140 MG/DL (ref 70–110)
POCT GLUCOSE: 153 MG/DL (ref 70–110)
POCT GLUCOSE: 90 MG/DL (ref 70–110)
POTASSIUM SERPL-SCNC: 3.4 MMOL/L (ref 3.5–5.1)
PROT SERPL-MCNC: 6.5 G/DL (ref 6–8.4)
PROTHROMBIN TIME: 13.1 SEC (ref 9–12.5)
RBC # BLD AUTO: 3.34 M/UL (ref 4.6–6.2)
SODIUM SERPL-SCNC: 138 MMOL/L (ref 136–145)
WBC # BLD AUTO: 4.63 K/UL (ref 3.9–12.7)

## 2024-09-14 PROCEDURE — 63600175 PHARM REV CODE 636 W HCPCS: Performed by: STUDENT IN AN ORGANIZED HEALTH CARE EDUCATION/TRAINING PROGRAM

## 2024-09-14 PROCEDURE — 85730 THROMBOPLASTIN TIME PARTIAL: CPT | Performed by: STUDENT IN AN ORGANIZED HEALTH CARE EDUCATION/TRAINING PROGRAM

## 2024-09-14 PROCEDURE — 20600001 HC STEP DOWN PRIVATE ROOM

## 2024-09-14 PROCEDURE — 25000003 PHARM REV CODE 250: Performed by: HOSPITALIST

## 2024-09-14 PROCEDURE — 85027 COMPLETE CBC AUTOMATED: CPT | Performed by: HOSPITALIST

## 2024-09-14 PROCEDURE — 36415 COLL VENOUS BLD VENIPUNCTURE: CPT | Performed by: HOSPITALIST

## 2024-09-14 PROCEDURE — 99232 SBSQ HOSP IP/OBS MODERATE 35: CPT | Mod: ,,, | Performed by: INTERNAL MEDICINE

## 2024-09-14 PROCEDURE — 80053 COMPREHEN METABOLIC PANEL: CPT | Performed by: STUDENT IN AN ORGANIZED HEALTH CARE EDUCATION/TRAINING PROGRAM

## 2024-09-14 PROCEDURE — 85610 PROTHROMBIN TIME: CPT | Performed by: HOSPITALIST

## 2024-09-14 PROCEDURE — 25000003 PHARM REV CODE 250: Performed by: INTERNAL MEDICINE

## 2024-09-14 PROCEDURE — 83735 ASSAY OF MAGNESIUM: CPT | Performed by: STUDENT IN AN ORGANIZED HEALTH CARE EDUCATION/TRAINING PROGRAM

## 2024-09-14 PROCEDURE — 84100 ASSAY OF PHOSPHORUS: CPT | Performed by: STUDENT IN AN ORGANIZED HEALTH CARE EDUCATION/TRAINING PROGRAM

## 2024-09-14 PROCEDURE — 25000003 PHARM REV CODE 250: Performed by: STUDENT IN AN ORGANIZED HEALTH CARE EDUCATION/TRAINING PROGRAM

## 2024-09-14 RX ORDER — TALC
6 POWDER (GRAM) TOPICAL NIGHTLY PRN
Status: DISCONTINUED | OUTPATIENT
Start: 2024-09-14 | End: 2024-09-19 | Stop reason: HOSPADM

## 2024-09-14 RX ADMIN — HYDRALAZINE HYDROCHLORIDE 50 MG: 50 TABLET ORAL at 03:09

## 2024-09-14 RX ADMIN — ISOSORBIDE DINITRATE 20 MG: 20 TABLET ORAL at 08:09

## 2024-09-14 RX ADMIN — POTASSIUM BICARBONATE 25 MEQ: 978 TABLET, EFFERVESCENT ORAL at 11:09

## 2024-09-14 RX ADMIN — METOPROLOL SUCCINATE 25 MG: 25 TABLET, EXTENDED RELEASE ORAL at 08:09

## 2024-09-14 RX ADMIN — CHLOROTHIAZIDE SODIUM 250 MG: 500 INJECTION, POWDER, LYOPHILIZED, FOR SOLUTION INTRAVENOUS at 04:09

## 2024-09-14 RX ADMIN — MIRTAZAPINE 7.5 MG: 7.5 TABLET, FILM COATED ORAL at 08:09

## 2024-09-14 RX ADMIN — FUROSEMIDE 40 MG/HR: 10 INJECTION, SOLUTION INTRAMUSCULAR; INTRAVENOUS at 11:09

## 2024-09-14 RX ADMIN — HYDRALAZINE HYDROCHLORIDE 50 MG: 50 TABLET ORAL at 08:09

## 2024-09-14 RX ADMIN — ASPIRIN 81 MG: 81 TABLET, COATED ORAL at 08:09

## 2024-09-14 RX ADMIN — ISOSORBIDE DINITRATE 20 MG: 20 TABLET ORAL at 03:09

## 2024-09-14 RX ADMIN — CHLOROTHIAZIDE SODIUM 250 MG: 500 INJECTION, POWDER, LYOPHILIZED, FOR SOLUTION INTRAVENOUS at 03:09

## 2024-09-14 RX ADMIN — LEVOTHYROXINE SODIUM 100 MCG: 100 TABLET ORAL at 04:09

## 2024-09-14 NOTE — ASSESSMENT & PLAN NOTE
Chronic, controlled. Latest blood pressure and vitals reviewed-     Temp:  [97.6 °F (36.4 °C)-98.5 °F (36.9 °C)]   Pulse:  [60-67]   Resp:  [17-20]   BP: (105-137)/(40-66)   SpO2:  [99 %-100 %] .   Home meds for hypertension were reviewed and noted below.   Hypertension Medications               hydrALAZINE (APRESOLINE) 50 MG tablet Take 1 tablet (50 mg total) by mouth 2 (two) times a day.    isosorbide dinitrate (ISORDIL) 20 MG tablet Take 20 mg by mouth 3 (three) times daily.    metoprolol succinate (TOPROL-XL) 25 MG 24 hr tablet Take 1 tablet (25 mg total) by mouth once daily.    sacubitriL-valsartan (ENTRESTO) 24-26 mg per tablet Take 1 tablet by mouth 2 (two) times daily.            While in the hospital, will manage blood pressure as follows; Adjust home antihypertensive regimen as follows- Continue home hydralazine, isosorbide dinitrate, metoprolol. Monitor BP.    Will utilize p.r.n. blood pressure medication only if patient's blood pressure greater than 180/110 and he develops symptoms such as worsening chest pain or shortness of breath.

## 2024-09-14 NOTE — PLAN OF CARE
[facility] OFFICE VISIT NOTE  Lore Stallings  [id number]  03/21/2024      Chief Complaint: Hip Pain (Right hip pain, chronic, rates 9/10), Abdominal Pain (Rates pain 10/10, gall bladder), and Follow-up      HPI    65 y.o. female     Currently self-pay without insurance. Insurance pending 4/2024.  Would like to defer all tests and referral until then. Will let office know of new insurance.      Acute concerns:    Chronic low back and right hip pain  - currently rated 9/10, no change in character   - associated with radiculopathy, worse with movement  - denies bowl/bladder incontinence, saddle anesthesia, unexplained fevers or weight loss  - MRI Lumbar Spine: lumbar degenerative disc disease, spondylosis, moderate L4-L5 and L5-S2 central canal stenosis    - [facility] referral denied due to insurance, would like new referral sent once insurance starts    - pain slightly improved with Flexeril 10 mg TID prn    RUQ abdominal pain, currently 10/10  - h/o choledocholithiasis, chronic pneumobilia s/p ERCP, sphincterectomy and stent placement with stent removal 11/2022  - also with large hiatal hernia and rectal mass with high grade dysplasia   - abdominal pain associated with frequent diarrhea, pain worse after eating, pain radiates to right shoulder  - also reports chronic intermittent blood per rectum- adamantly attributes this to her gallbladder and not known rectal mass  - seen by general surgery 10/2023 for evaluation of cholecystectomy (see office visit note for full detail) and patient would like second opinion for surgery, would like referral to be sent after obtaining new insurance      Pruritic lesion to left ankle  - onset few weeks ago  - previously red around circumference of lesion  - put unknown lotion on it without relief     Chronic conditions:  Osteoporosis- Fosamax 10 mg daily- prefers daily dose not weekly (initiated 2021 per note 6/2023), DEXA 7/2023 osteoporosis with increased bone mineral density  Thyroid  Patient is alert and orientedX4. VSS. Afebrile. Spo2 maintained@RA. NSR on Tele. Accucheck AC HS. HS MF=[id number].Scheduled medicines given as per MAR. Lasix gtt contd @4ml/hr. IV Diuril 250 mg BID. Heparin gtt contd @ 10 U/kg/hr, 9pm APTT therapeutic, hence next APTT with AM lab. Suárez's catheter in situ, urine clear yellow, 1525 ml output overnight. Stand by assist up to toilet. 1 BM this shift. Bed in low position, wheels locked, call light within patient's reach. Patient is aware of calling for assistance. Non skid socks on. No s/s of acute distress noted this shift, slept well.    "nodules, subclinical hyperthyroidism- US thyroid 7/2023- sub centimeter nodules, slight heterogenicity and increased vascularity- stable since 2021, TSH low and T4 nml 7/2023, informed refusal tx of subclinical hyperthyroidism  GERD, hiatal hernia- Protonix 40 mg daily  COPD- follows Dr. Gonzalez (Pulmonology), on albuterol prn and Symbicort   Chronic low back pain- as above  Rectal mass, high grade dysplasia- informed refusal further work up (3/2024), discussed at multiple visits by multiple doctors within multiple specialties, patient reports understanding of risks of not undergoing further work up and treatment and risk of delayed treatment     Choledocholithiasis s/p ERCP, sphincterectomy and stenting (stent removed 11/2022)  Vit  D deficiency- on vit D supplementation         Healthcare Maintenance  Breast cancer screening: ordered 3/2023- not completed due to pain, informed refusal for repeat order 3/2024  Colon cancer screening: colonoscopy 11/2021- 20 mm polyp transverse colon, two 3 mm sigmoid polyps, two 3 mm rectal polyps, 5 cm in length frond-like/villous and sessile non obstructing large mass/polyp in proximal rectum (noted to be high grade dysplasia in previous notes), recommend referral to colo-rectal surgeon and repeat colonoscopy in 1 year, prep poor; informed refusal further work up as noted above   Cervical cancer screening: PAP 11/2019 NL HPV neg. S/p DARINEL BSO 1/2020   Lung cancer screening: quit smoking 7/2022, LDCT 6/2023 Lung-RADS 2  Osteoporosis screening h/o of osteoporosis, DEXA as above   ASCVD risk: 6.3%      ROS:  As per HPI       PE:  Vitals:    03/21/24 1455 03/21/24 1511   BP: (!) 164/76 (!) 148/71   BP Location: Left arm Left arm   Patient Position: Sitting Sitting   BP Method: Large (Automatic) Large (Automatic)   Pulse: 87    Resp: 20    Temp: 97.9 °F (36.6 °C)    TempSrc: Oral    SpO2: 98%    Weight: 52.2 kg (115 lb)    Height: 5' 6" (1.676 m)         General: appears at baseline, in " no acute distress   Eye: no scleral icterus   Neck: no thyromegaly    Respiratory: clear to auscultation bilaterally, nonlabored respirations   Cardiovascular: regular rate and rhythm without murmurs   Gastrointestinal: soft, generalized tenderness worse over RUQ, non-distended, no rebound or guarding, bowel sounds present   Genitourinary: no suprapubic tenderness   Extremities: no edema in bilateral lower extremities  Musculoskeletal: tenderness to palpation over lumbar spine, right paraspinal region and right gluteal region, 5/5 strength with hip flexion b/l, sensation intact, FADIR positive, STARR negative, no trochanteric tenderness, gait wnl    Integumentary: circular patch with brown/red hyperpigmented border with overlying excoriation     Assessment:   1. Tinea corporis    2. Elevated blood pressure reading    3. Osteoporosis without current pathological fracture, unspecified osteoporosis type    4. Lumbar degenerative disc disease    5. Lumbar radiculopathy, chronic    6. Hiatal hernia    7. Choledocholithiasis with chronic cholecystitis        Plan:  - ketoconazole 2% cream BID x 14 days, hydroxyzine 25 mg prn- sedation precautions  - refill Fosamax 10 mg daily and flexeril 10 mg TID prn- sedation precautions  - MRI Lumbar WO, plan to send new referral to neurosurgery pending new insurance, red flag signs/symptoms discussed  - informed refusal discussion of rectal mass or referral for management; briefly discussed concern for malignancy, but patient adamantly changes topic    - patient would like referral sent to general surgeon affiliated with [facility] in [place] for second opinion regarding cholecystectomy, patient to notify office when insurance changes and requests referral to be placed at that time    Return to clinic in 4 weeks for follow up tinea, abdominal pain and lumbar radiculopathy, or sooner if needed.     Mandie Slater M.D. -III  [facility]

## 2024-09-14 NOTE — ASSESSMENT & PLAN NOTE
ERICA is likely due to  Cardiorenal . Baseline creatinine is  1.7-2 . Most recent creatinine and eGFR are listed below.  Recent Labs     09/12/24  0726 09/13/24  0837 09/14/24  0614   CREATININE 4.2* 3.7* 3.5*   EGFRNORACEVR 14.2* 16.5* 17.7*        Plan  - ERICA is stable  - Avoid nephrotoxins and renally dose meds for GFR listed above  - Monitor urine output, serial BMP, and adjust therapy as needed  - appreciate nephrology recommendations.

## 2024-09-14 NOTE — ASSESSMENT & PLAN NOTE
Results for orders placed during the hospital encounter of 09/08/24    Echo    Interpretation Summary  Images from the original result were not included.      Left Ventricle: The left ventricle is mildly dilated measuring 5.8 cm. Normal wall thickness. Severe global hypokinesis present. There is severely reduced systolic function. Grade II diastolic dysfunction. Elevated left ventricular filling pressure. There is a layered, nonmobile thrombus in the apex measuring 1.3 x 2.5 cm.    Right Ventricle: Mild right ventricular enlargement. Systolic function is mildly reduced. Pacemaker lead present in the ventricle.    Left Atrium: Left atrium is severely dilated.    Right Atrium: Right atrium is mildly dilated.    Aortic Valve: There is moderate to severe stenosis. Aortic valve area by VTI is 0.80 cm². Aortic valve peak velocity is 3.43 m/s. Mean gradient is 28 mmHg. The dimensionless index is 0.24. There is mild aortic regurgitation.    Mitral Valve: There is mild regurgitation.    Tricuspid Valve: There is moderate regurgitation.    Pulmonary Artery: There is severe pulmonary hypertension. The estimated pulmonary artery systolic pressure is 74 mmHg.    IVC/SVC: Elevated venous pressure at 15 mmHg.    ECHO concerning for aortic prosthetic valve stenosis likely making it difficult to diuresis patient.      Pt net negative 1.8 L in last 24hrs.  Cr/BUN stable. Will continue to diurese    Recommendations:   - Continue Lasix Drip to 40/hr   - Continue Diuril 250mg BID  - Strict I's and O's  - Continue Heparin given ERICA  - Continue hydralazine tid  - Continue isosorbide dinitrate TID  - Continue metoprolol succinate   - Check BMP BID to follow Cr and address accordingly   - Consider additional GDMT when able   - Avoid NSAID's and CCB's     We will place referral to Dr. Gee with [facility] to discuss valve options in the outpatient setting

## 2024-09-14 NOTE — PROGRESS NOTES
[facility]  Progress Note    Patient Name: Baldo Carney  MRN: [id number]  Patient Class: IP- Inpatient   Admission Date: 9/8/2024  Length of Stay: 6 days  Attending Physician: Marianela Bridges MD  Primary Care Provider: Millie Hayes APRN,FNP-C        Subjective:     Principal Problem:Acute on chronic combined systolic and diastolic heart failure        HPI:  Baldo Carney is a 73 year old Black man with former cigarette smoking (quit in 1970), hypertension, diabetes mellitus type 2 with retinopathy and peripheral neuropathy, coronary artery disease status post coronary artery bypass graft (saphenous vein graft to right coronary artery) on 11/3/2014, status post percutaneous coronary intervention with drug eluting stent placements in left main to left anterior descending artery and left main to lateral circumflex artery on 8/31/2022, history of aortic valve replacement with porcine bioprosthesis on 11/3/2014, ischemic cardiomyopathy with chronic biventricular systolic and diastolic heart failure, chronic kidney disease stage 3-4, anemia, benign prostatic hyperplasia, hypothyroidism, cirrhosis with ascites requiring bimonthly paracentesis, chronic obstructive pulmonary disease (COPD), history of incarcerated inguinal hernia repair on 12/7/2023. He lives in [place], [place].    He was hospitalized at [facility] from 8/12/2024 to 8/18/2024 for syncope, cystitis, hypoglycemia, COVID-19. He underwent paracentesis on admission with 6 liters of fluid removed and albumin given.    He was seen by his primary care nurse practitioner in clinic on 8/27/2024 and was prescribed mirtazapine for situational anxiety and omeprazole for heartburn.   He presented to [facility] Emergency Department on Saturday 9/7/2024 with left sided chest pain for 4 hours without radiation, associated with shortness of breath. He also had unchanged chronic bilateral  lower extremity edema. He was scheduled for paracentesis on Monday 9/9/2024. Labs showed elevated BNP (>4900 pg/mL) and troponin (0.196 ng/mL) and acute kidney injury (BUN 69 mg/dL and creatinine 4.8 mg/dL from 37 and 1.9 on 8/23/2024), suggestive of volume overload due to cirrhosis and heart failure. He was transferred to [facility] on 9/8/2024 and admitted to Hospital Medicine Team L.     Overview/Hospital Course:  Nephrology and Cardiology were consulted. Hepatology recommended IV albumin. Suárez catheter was placed for urine output measurement. Nephrology suspected cardiorenal syndrome and recommended giving IV furosemide 80 mg twice daily. It was ineffective. Hospital Medicine increased it to 120 mg twice daily and it still did not increase urine output. Echocardiogram showed a layered nonmobile left ventricular apical thrombus. He was put on therapeutic enoxaparin. Cardiology recommended furosemide drip. Nephrology agreed with furosemide drip and recommended a trial of IV albumin for 3 days. He diuresed better.     Continued on lasix gtt, appreciate cardiology recommendations. Lasix dose increased and diuril given. GDMT holding parameters added. Continued monitoring on heparin gtt. Not a candidate for ARNI/ MRA or SGLT2i due to renal disease.    Regarding severe aortic prosthetic valve stenosis recommendation for outpatient valve clinic follow up for possible valve in valve TAVR once LV thrombus resolves.      Palliative care evaluated and initiated C conversations.    Interval History:     NAEON. Continued on lasix drip and diuril. Potassium replaced. Continued on heparin drip and currently therapeutic.     He was a little apprehensive and wanted to leave for home. Discussed with girlfriend notified that patient has been using a walker to ambulate at home and sometimes has stumbled however no falls.  We will get physical therapy on board especially in light that he needs  anticoagulation at this time.    Spoke to his brother who is his closest family along with his nephew.  Brother said that patient has difficulty understanding his medical condition and is probably lacking insight and should stay.      Review of Systems   Constitutional:  Negative for chills and fever.   HENT:  Negative for nosebleeds.    Eyes:  Negative for visual disturbance.   Respiratory:  Negative for chest tightness and shortness of breath.    Cardiovascular:  Negative for chest pain.   Gastrointestinal:  Positive for abdominal distention. Negative for abdominal pain and constipation.   Genitourinary:         Suárez catheter in place   Neurological:  Negative for dizziness and light-headedness.   Psychiatric/Behavioral:  Negative for suicidal ideas. The patient is not nervous/anxious.      Objective:     Vital Signs (Most Recent):  Temp: 97.6 °F (36.4 °C) (09/14/24 1059)  Pulse: 60 (09/14/24 1444)  Resp: 20 (09/14/24 1059)  BP: (!) 112/57 (09/14/24 1105)  SpO2: 100 % (09/14/24 1059) Vital Signs (24h Range):  Temp:  [97.6 °F (36.4 °C)-98.5 °F (36.9 °C)] 97.6 °F (36.4 °C)  Pulse:  [60-67] 60  Resp:  [17-20] 20  SpO2:  [99 %-100 %] 100 %  BP: (105-137)/(40-66) 112/57     Weight: 63.3 kg (139 lb 8.8 oz)  Body mass index is 18.93 kg/m².    Intake/Output Summary (Last 24 hours) at 9/14/2024 1447  Last data filed at 9/14/2024 1105  Gross per 24 hour   Intake 671.54 ml   Output 2675 ml   Net -2003.46 ml         Physical Exam  Vitals and nursing note reviewed.   Constitutional:       General: He is not in acute distress.     Appearance: Normal appearance. He is well-developed.      Interventions: He is not intubated.  HENT:      Head: Normocephalic and atraumatic.   Eyes:      Extraocular Movements: Extraocular movements intact.      Conjunctiva/sclera: Conjunctivae normal.   Cardiovascular:      Rate and Rhythm: Normal rate and regular rhythm.      Pulses: Normal pulses.      Heart sounds: Murmur heard.   Pulmonary:       Effort: Pulmonary effort is normal. No accessory muscle usage or respiratory distress. He is not intubated.      Breath sounds: Rales present.   Abdominal:      General: There is distension.      Tenderness: There is no abdominal tenderness.   Musculoskeletal:      Cervical back: Normal range of motion and neck supple.      Right lower leg: No edema.      Left lower leg: No edema.   Skin:     General: Skin is warm and dry.   Neurological:      Mental Status: He is alert and oriented to person, place, and time. Mental status is at baseline.   Psychiatric:         Attention and Perception: Attention normal.         Mood and Affect: Mood and affect normal.         Behavior: Behavior normal. Behavior is cooperative.             Significant Labs: All pertinent labs within the past 24 hours have been reviewed.  Recent Labs   Lab 09/12/24  0726 09/13/24  0837 09/14/24  0614    141 138   K 3.9 3.3* 3.4*    105 99   CO2 21* 22* 22*   BUN 82* 83* 82*   CREATININE 4.2* 3.7* 3.5*   CALCIUM 8.1* 8.7 8.7   PROT 6.4 6.7 6.5   BILITOT 1.6* 1.7* 1.8*   ALKPHOS 71 66 55   * 113* 88*   AST 67* 52* 42*     Recent Labs   Lab 09/14/24  0614   WBC 4.63   RBC 3.34*   HGB 9.7*   HCT 28.9*   PLT 82*   MCV 87   MCH 29.0   MCHC 33.6        Significant Imaging: I have reviewed all pertinent imaging results/findings within the past 24 hours.    Assessment/Plan:      * Acute on chronic combined systolic and diastolic heart failure  He takes metoprolol succinate, hydralazine, isosorbide dinitrate, sacubitril-valsartan, empagliflozin.     -Giving home metoprolol, isosorbide dinitrate, hydralazine. Not on a diuretic at home.  -Gave a dose of IV chlorothiazide. Tried furosemide IV boluses, which were ineffective, then switched to furosemide drip by Cardiology. Appreciate Cardiology. Monitor electrolytes, intake/output. Can hold or wean GDMT but current goal is to optimize diuresis.     Continued on lasix gtt, appreciate cardiology  recommendations. Lasix dose increased and diuril given. GDMT holding parameters added. Continued monitoring on heparin gtt. Not a candidate for ARNI/ MRA or SGLT2i due to renal disease.    Echo    Result Date: 9/9/2024  Images from the original result were not included.      Left Ventricle: The left ventricle is mildly dilated measuring 5.8 cm.   Normal wall thickness. Severe global hypokinesis present. There is   severely reduced systolic function. Grade II diastolic dysfunction.   Elevated left ventricular filling pressure. There is a layered, nonmobile   thrombus in the apex measuring 1.3 x 2.5 cm.    Right Ventricle: Mild right ventricular enlargement. Systolic function   is mildly reduced. Pacemaker lead present in the ventricle.    Left Atrium: Left atrium is severely dilated.    Right Atrium: Right atrium is mildly dilated.    Aortic Valve: There is moderate to severe stenosis. Aortic valve area   by VTI is 0.80 cm². Aortic valve peak velocity is 3.43 m/s. Mean gradient   is 28 mmHg. The dimensionless index is 0.24. There is mild aortic   regurgitation.    Mitral Valve: There is mild regurgitation.    Tricuspid Valve: There is moderate regurgitation.    Pulmonary Artery: There is severe pulmonary hypertension. The estimated   pulmonary artery systolic pressure is 74 mmHg.    IVC/SVC: Elevated venous pressure at 15 mmHg.          Left ventricular apical thrombus  ECHO reported layered LV thrombus   Continue heparin gtt        Acute kidney injury superimposed on chronic kidney disease  ERICA is likely due to  Cardiorenal . Baseline creatinine is  1.7-2 . Most recent creatinine and eGFR are listed below.  Recent Labs     09/12/24  0726 09/13/24  0837 09/14/24  0614   CREATININE 4.2* 3.7* 3.5*   EGFRNORACEVR 14.2* 16.5* 17.7*        Plan  - ERICA is stable  - Avoid nephrotoxins and renally dose meds for GFR listed above  - Monitor urine output, serial BMP, and adjust therapy as needed  - appreciate nephrology  recommendations.    Elevated troponin  Due to CHF.      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 without macular edema, without long-term current use of insulin  Patient's FSGs are controlled on current medication regimen.  Last A1c reviewed-   Lab Results   Component Value Date    HGBA1C 6.0 08/18/2024     Most recent fingerstick glucose reviewed-   Recent Labs   Lab 09/13/24  1712 09/13/24  2023 09/14/24  0852 09/14/24  1241   POCTGLUCOSE 125* 116* 90 140*       Current correctional scale  Low  Maintain anti-hyperglycemic dose as follows-   Antihyperglycemics (From admission, onward)      Start     Stop Route Frequency Ordered    09/08/24 0511  insulin aspart U-100 pen 0-5 Units         -- SubQ Before meals & nightly PRN 09/08/24 0411          Hold Oral hypoglycemics while patient is in the hospital.      Cirrhosis of liver with ascites  Patient with known Cirrhosis with Child's class Calculator for Child's score link- [email or web address]/340/child-myles-score-cirrhosis-mortality#creator-insights. Co-morbidities are present and inclusive of ascites.  MELD-Na score calculated; MELD 3.0: 23 at 9/14/2024  6:14 AM  MELD-Na: 23 at 9/14/2024  6:14 AM  Calculated from:  Serum Creatinine: 3.5 mg/dL (Using max of 3 mg/dL) at 9/14/2024  6:14 AM  Serum Sodium: 138 mmol/L (Using max of 137 mmol/L) at 9/14/2024  6:14 AM  Total Bilirubin: 1.8 mg/dL at 9/14/2024  6:14 AM  Serum Albumin: 3.3 g/dL at 9/14/2024  6:14 AM  INR(ratio): 1.2 at 9/14/2024  6:14 AM  Age at listing (hypothetical): 73 years  Sex: Male at 9/14/2024  6:14 AM    Appreciate Hepatology. 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 not a candidate for liver transplant evaluation given multiple comorbidities & severe heart failure   -Palliative care consulted for goal clarification       Hypothyroidism  Stable. Continue home levothyroxine.      COPD (chronic obstructive pulmonary disease)  He takes fluticasone-vilanterol. Giving  levalbuterol prn.    Hypertension    Chronic, controlled. Latest blood pressure and vitals reviewed-     Temp:  [97.6 °F (36.4 °C)-98.5 °F (36.9 °C)]   Pulse:  [60-67]   Resp:  [17-20]   BP: (105-137)/(40-66)   SpO2:  [99 %-100 %] .   Home meds for hypertension were reviewed and noted below.   Hypertension Medications               hydrALAZINE (APRESOLINE) 50 MG tablet Take 1 tablet (50 mg total) by mouth 2 (two) times a day.    isosorbide dinitrate (ISORDIL) 20 MG tablet Take 20 mg by mouth 3 (three) times daily.    metoprolol succinate (TOPROL-XL) 25 MG 24 hr tablet Take 1 tablet (25 mg total) by mouth once daily.    sacubitriL-valsartan (ENTRESTO) 24-26 mg per tablet Take 1 tablet by mouth 2 (two) times daily.            While in the hospital, will manage blood pressure as follows; Adjust home antihypertensive regimen as follows- Continue home hydralazine, isosorbide dinitrate, metoprolol. Monitor BP.    Will utilize p.r.n. blood pressure medication only if patient's blood pressure greater than 180/110 and he develops symptoms such as worsening chest pain or shortness of breath.        VTE Risk Mitigation (From admission, onward)           Ordered     heparin 25,000 units in dextrose 5% 250 mL (100 units/mL) infusion HIGH INTENSITY nomogram - [facility]  Continuous        Question:  Begin at (units/kg/hr)  Answer:  18    09/12/24 0739     heparin 25,000 units in dextrose 5% (100 units/ml) IV bolus from bag HIGH INTENSITY nomogram - [facility]  As needed (PRN)        Question:  Heparin Infusion Adjustment (DO NOT MODIFY ANSWER)  Answer:  \\[email or web address]\epic\Images\Pharmacy\HeparinInfusions\heparin HIGH INTENSITY nomogram for [facility] [id number].pdf    09/12/24 0739     heparin 25,000 units in dextrose 5% (100 units/ml) IV bolus from bag HIGH INTENSITY nomogram - [facility]  As needed (PRN)        Question:  Heparin Infusion Adjustment (DO NOT MODIFY ANSWER)  Answer:  \\[email or web address]\epic\Images\Pharmacy\HeparinInfusions\heparin HIGH INTENSITY  nomogram for [facility] [id number]    09/12/24 0739                    Discharge Planning   PAM: 9/16/2024     Code Status: Full Code   Is the patient medically ready for discharge?:     Reason for patient still in hospital (select all that apply): Patient trending condition, Laboratory test, Treatment, and Consult recommendations  Discharge Plan A: Home, Home Health   Discharge Delays: None known at this time              Marianela Bridges MD  Department of Hospital Medicine   [facility]

## 2024-09-14 NOTE — PROGRESS NOTES
[facility] - [facility]plant Stepdown  Cardiology  Progress Note    Patient Name: Baldo Carney  MRN: [id number]  Admission Date: 9/8/2024  Hospital Length of Stay: 6 days  Code Status: Full Code   Attending Physician: Marianela Bridges MD   Primary Care Physician: Millie Hayes APRN,FNP-C  Expected Discharge Date: 9/16/2024  Principal Problem:Acute on chronic combined systolic and diastolic heart failure    Subjective:     Hospital Course:   No notes on file    Interval History: Pt diuresing well. Net negative 1.8L in last 24 hrs    ROS  Objective:     Vital Signs (Most Recent):  Temp: 98 °F (36.7 °C) (09/14/24 0753)  Pulse: 64 (09/14/24 1049)  Resp: 20 (09/14/24 0753)  BP: 137/63 (09/14/24 0808)  SpO2: 100 % (09/14/24 0753) Vital Signs (24h Range):  Temp:  [97.6 °F (36.4 °C)-98.5 °F (36.9 °C)] 98 °F (36.7 °C)  Pulse:  [60-67] 64  Resp:  [16-20] 20  SpO2:  [99 %-100 %] 100 %  BP: (106-137)/(48-69) 137/63     Weight: 63.3 kg (139 lb 8.8 oz)  Body mass index is 18.93 kg/m².     SpO2: 100 %         Intake/Output Summary (Last 24 hours) at 9/14/2024 1054  Last data filed at 9/14/2024 0808  Gross per 24 hour   Intake 671.54 ml   Output 2375 ml   Net -1703.46 ml       Lines/Drains/Airways       Drain  Duration                  Urethral Catheter 09/09/24 1643 16 Fr. 4 days              Peripheral Intravenous Line  Duration                  Peripheral IV - Single Lumen 09/11/24 1737 20 G Anterior;Left Forearm 2 days         Peripheral IV - Single Lumen 09/12/24 1807 22 G Anterior;Left;Proximal Forearm 1 day                       Physical Exam  Constitutional:       General: He is not in acute distress.     Appearance: He is not toxic-appearing.   HENT:      Right Ear: External ear normal.      Left Ear: External ear normal.   Eyes:      General:         Right eye: No discharge.         Left eye: No discharge.      Extraocular Movements: Extraocular movements intact.   Cardiovascular:      Rate and Rhythm: Normal rate and  regular rhythm.      Heart sounds: No murmur heard.     No gallop.      Comments: JVP to mandible  Pulmonary:      Effort: Pulmonary effort is normal.      Breath sounds: Rales present.   Abdominal:      General: There is distension.   Musculoskeletal:      Cervical back: Normal range of motion.      Right lower leg: No edema.      Left lower leg: No edema.   Skin:     General: Skin is warm and dry.   Neurological:      General: No focal deficit present.      Mental Status: He is alert and oriented to person, place, and time.            Significant Labs: CMP   Recent Labs   Lab 09/13/24  0837 09/14/24  0614    138   K 3.3* 3.4*    99   CO2 22* 22*   GLU 87 99   BUN 83* 82*   CREATININE 3.7* 3.5*   CALCIUM 8.7 8.7   PROT 6.7 6.5   ALBUMIN 3.4* 3.3*   BILITOT 1.7* 1.8*   ALKPHOS 66 55   AST 52* 42*   * 88*   ANIONGAP 14 17*       Assessment and Plan:     * Acute on chronic combined systolic and diastolic heart failure  Results for orders placed during the hospital encounter of 09/08/24    Echo    Interpretation Summary  Images from the original result were not included.      Left Ventricle: The left ventricle is mildly dilated measuring 5.8 cm. Normal wall thickness. Severe global hypokinesis present. There is severely reduced systolic function. Grade II diastolic dysfunction. Elevated left ventricular filling pressure. There is a layered, nonmobile thrombus in the apex measuring 1.3 x 2.5 cm.    Right Ventricle: Mild right ventricular enlargement. Systolic function is mildly reduced. Pacemaker lead present in the ventricle.    Left Atrium: Left atrium is severely dilated.    Right Atrium: Right atrium is mildly dilated.    Aortic Valve: There is moderate to severe stenosis. Aortic valve area by VTI is 0.80 cm². Aortic valve peak velocity is 3.43 m/s. Mean gradient is 28 mmHg. The dimensionless index is 0.24. There is mild aortic regurgitation.    Mitral Valve: There is mild regurgitation.     Tricuspid Valve: There is moderate regurgitation.    Pulmonary Artery: There is severe pulmonary hypertension. The estimated pulmonary artery systolic pressure is 74 mmHg.    IVC/SVC: Elevated venous pressure at 15 mmHg.    ECHO concerning for aortic prosthetic valve stenosis likely making it difficult to diuresis patient.      Pt net negative 1.8 L in last 24hrs.  Cr/BUN stable. Will continue to diurese    Recommendations:   - Continue Lasix Drip to 40/hr   - Continue Diuril 250mg BID  - Strict I's and O's  - Continue Heparin given ERICA  - Continue hydralazine tid  - Continue isosorbide dinitrate TID  - Continue metoprolol succinate   - Check BMP BID to follow Cr and address accordingly   - Consider additional GDMT when able   - Avoid NSAID's and CCB's     We will place referral to Dr. Gee with [facility] to discuss valve options in the outpatient setting        VTE Risk Mitigation (From admission, onward)           Ordered     heparin 25,000 units in dextrose 5% 250 mL (100 units/mL) infusion HIGH INTENSITY nomogram - [facility]  Continuous        Question:  Begin at (units/kg/hr)  Answer:  18    09/12/24 0739     heparin 25,000 units in dextrose 5% (100 units/ml) IV bolus from bag HIGH INTENSITY nomogram - [facility]  As needed (PRN)        Question:  Heparin Infusion Adjustment (DO NOT MODIFY ANSWER)  Answer:  \\[email or web address]\epic\Images\Pharmacy\HeparinInfusions\heparin HIGH INTENSITY nomogram for [facility] [id number].pdf    09/12/24 0739     heparin 25,000 units in dextrose 5% (100 units/ml) IV bolus from bag HIGH INTENSITY nomogram - [facility]  As needed (PRN)        Question:  Heparin Infusion Adjustment (DO NOT MODIFY ANSWER)  Answer:  \[email or web address]\epic\Images\Pharmacy\HeparinInfusions\heparin HIGH INTENSITY nomogram for [facility] [id number].pdf    09/12/24 0739                    Jeremiah Colon MD  Cardiology  [facility] - Transplant Stepdown  As above B/C 82/3.5.Will need high dose diuretic if he leaves AMA.

## 2024-09-14 NOTE — ASSESSMENT & PLAN NOTE
Patient's FSGs are controlled on current medication regimen.  Last A1c reviewed-   Lab Results   Component Value Date    HGBA1C 6.0 08/18/2024     Most recent fingerstick glucose reviewed-   Recent Labs   Lab 09/13/24  1712 09/13/24 2023 09/14/24  0852 09/14/24  1241   POCTGLUCOSE 125* 116* 90 140*       Current correctional scale  Low  Maintain anti-hyperglycemic dose as follows-   Antihyperglycemics (From admission, onward)    Start     Stop Route Frequency Ordered    09/08/24 0511  insulin aspart U-100 pen 0-5 Units         -- SubQ Before meals & nightly PRN 09/08/24 0411        Hold Oral hypoglycemics while patient is in the hospital.

## 2024-09-14 NOTE — PLAN OF CARE
Pt's d/c is pending medical stability. CM initiated Careport referral to [facility] for resumption of care after d/c. HH orders are pending.    NANCY SparksN, RN, [facility]  Transitional Care Manager  [phone]  [email or web address]    [facility] - Transplant Stepdown  Discharge Reassessment    Primary Care Provider: Millie Hayes APRN,FNP-C    Expected Discharge Date: 9/16/2024    Reassessment (most recent)       Discharge Reassessment - 09/14/24 0930          Discharge Reassessment    Assessment Type Discharge Planning Reassessment     Discharge Plan A Home;Home Health     Discharge Plan B Home Health     DME Needed Upon Discharge  none     Transition of Care Barriers None     Why the patient remains in the hospital Requires continued medical care        Post-Acute Status    Post-Acute Authorization Home Health     Home Health Status Referrals Sent     Discharge Delays None known at this time

## 2024-09-14 NOTE — SUBJECTIVE & OBJECTIVE
Interval History:     NAEON. Continued on lasix drip and diuril. Potassium replaced. Continued on heparin drip and currently therapeutic.     He was a little apprehensive and wanted to leave for home. Discussed with girlfriend notified that patient has been using a walker to ambulate at home and sometimes has stumbled however no falls.  We will get physical therapy on board especially in light that he needs anticoagulation at this time.    Spoke to his brother who is his closest family along with his nephew.  Brother said that patient has difficulty understanding his medical condition and is probably lacking insight and should stay.      Review of Systems   Constitutional:  Negative for chills and fever.   HENT:  Negative for nosebleeds.    Eyes:  Negative for visual disturbance.   Respiratory:  Negative for chest tightness and shortness of breath.    Cardiovascular:  Negative for chest pain.   Gastrointestinal:  Positive for abdominal distention. Negative for abdominal pain and constipation.   Genitourinary:         Suárez catheter in place   Neurological:  Negative for dizziness and light-headedness.   Psychiatric/Behavioral:  Negative for suicidal ideas. The patient is not nervous/anxious.      Objective:     Vital Signs (Most Recent):  Temp: 97.6 °F (36.4 °C) (09/14/24 1059)  Pulse: 60 (09/14/24 1444)  Resp: 20 (09/14/24 1059)  BP: (!) 112/57 (09/14/24 1105)  SpO2: 100 % (09/14/24 1059) Vital Signs (24h Range):  Temp:  [97.6 °F (36.4 °C)-98.5 °F (36.9 °C)] 97.6 °F (36.4 °C)  Pulse:  [60-67] 60  Resp:  [17-20] 20  SpO2:  [99 %-100 %] 100 %  BP: (105-137)/(40-66) 112/57     Weight: 63.3 kg (139 lb 8.8 oz)  Body mass index is 18.93 kg/m².    Intake/Output Summary (Last 24 hours) at 9/14/2024 1447  Last data filed at 9/14/2024 1105  Gross per 24 hour   Intake 671.54 ml   Output 2675 ml   Net -2003.46 ml         Physical Exam  Vitals and nursing note reviewed.   Constitutional:       General: He is not in acute  distress.     Appearance: Normal appearance. He is well-developed.      Interventions: He is not intubated.  HENT:      Head: Normocephalic and atraumatic.   Eyes:      Extraocular Movements: Extraocular movements intact.      Conjunctiva/sclera: Conjunctivae normal.   Cardiovascular:      Rate and Rhythm: Normal rate and regular rhythm.      Pulses: Normal pulses.      Heart sounds: Murmur heard.   Pulmonary:      Effort: Pulmonary effort is normal. No accessory muscle usage or respiratory distress. He is not intubated.      Breath sounds: Rales present.   Abdominal:      General: There is distension.      Tenderness: There is no abdominal tenderness.   Musculoskeletal:      Cervical back: Normal range of motion and neck supple.      Right lower leg: No edema.      Left lower leg: No edema.   Skin:     General: Skin is warm and dry.   Neurological:      Mental Status: He is alert and oriented to person, place, and time. Mental status is at baseline.   Psychiatric:         Attention and Perception: Attention normal.         Mood and Affect: Mood and affect normal.         Behavior: Behavior normal. Behavior is cooperative.             Significant Labs: All pertinent labs within the past 24 hours have been reviewed.  Recent Labs   Lab 09/12/24  0726 09/13/24  0837 09/14/24  0614    141 138   K 3.9 3.3* 3.4*    105 99   CO2 21* 22* 22*   BUN 82* 83* 82*   CREATININE 4.2* 3.7* 3.5*   CALCIUM 8.1* 8.7 8.7   PROT 6.4 6.7 6.5   BILITOT 1.6* 1.7* 1.8*   ALKPHOS 71 66 55   * 113* 88*   AST 67* 52* 42*     Recent Labs   Lab 09/14/24  0614   WBC 4.63   RBC 3.34*   HGB 9.7*   HCT 28.9*   PLT 82*   MCV 87   MCH 29.0   MCHC 33.6        Significant Imaging: I have reviewed all pertinent imaging results/findings within the past 24 hours.

## 2024-09-14 NOTE — ASSESSMENT & PLAN NOTE
Patient with known Cirrhosis with Child's class Calculator for Child's score link- https://www.[employer].com/calc/340/child-myles-score-cirrhosis-mortality#creator-insights. Co-morbidities are present and inclusive of ascites.  MELD-Na score calculated; MELD 3.0: 23 at 9/14/2024  6:14 AM  MELD-Na: 23 at 9/14/2024  6:14 AM  Calculated from:  Serum Creatinine: 3.5 mg/dL (Using max of 3 mg/dL) at 9/14/2024  6:14 AM  Serum Sodium: 138 mmol/L (Using max of 137 mmol/L) at 9/14/2024  6:14 AM  Total Bilirubin: 1.8 mg/dL at 9/14/2024  6:14 AM  Serum Albumin: 3.3 g/dL at 9/14/2024  6:14 AM  INR(ratio): 1.2 at 9/14/2024  6:14 AM  Age at listing (hypothetical): 73 years  Sex: Male at 9/14/2024  6:14 AM    Appreciate Hepatology. 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 not a candidate for liver transplant evaluation given multiple comorbidities & severe heart failure   -Palliative care consulted for goal clarification

## 2024-09-14 NOTE — SUBJECTIVE & OBJECTIVE
Interval History: Pt diuresing well. Net negative 1.8L in last 24 hrs    ROS  Objective:     Vital Signs (Most Recent):  Temp: 98 °F (36.7 °C) (09/14/24 0753)  Pulse: 64 (09/14/24 1049)  Resp: 20 (09/14/24 0753)  BP: 137/63 (09/14/24 0808)  SpO2: 100 % (09/14/24 0753) Vital Signs (24h Range):  Temp:  [97.6 °F (36.4 °C)-98.5 °F (36.9 °C)] 98 °F (36.7 °C)  Pulse:  [60-67] 64  Resp:  [16-20] 20  SpO2:  [99 %-100 %] 100 %  BP: (106-137)/(48-69) 137/63     Weight: 63.3 kg (139 lb 8.8 oz)  Body mass index is 18.93 kg/m².     SpO2: 100 %         Intake/Output Summary (Last 24 hours) at 9/14/2024 1054  Last data filed at 9/14/2024 0808  Gross per 24 hour   Intake 671.54 ml   Output 2375 ml   Net -1703.46 ml       Lines/Drains/Airways       Drain  Duration                  Urethral Catheter 09/09/24 1643 16 Fr. 4 days              Peripheral Intravenous Line  Duration                  Peripheral IV - Single Lumen 09/11/24 1737 20 G Anterior;Left Forearm 2 days         Peripheral IV - Single Lumen 09/12/24 1807 22 G Anterior;Left;Proximal Forearm 1 day                       Physical Exam  Constitutional:       General: He is not in acute distress.     Appearance: He is not toxic-appearing.   HENT:      Right Ear: External ear normal.      Left Ear: External ear normal.   Eyes:      General:         Right eye: No discharge.         Left eye: No discharge.      Extraocular Movements: Extraocular movements intact.   Cardiovascular:      Rate and Rhythm: Normal rate and regular rhythm.      Heart sounds: No murmur heard.     No gallop.      Comments: JVP to mandible  Pulmonary:      Effort: Pulmonary effort is normal.      Breath sounds: Rales present.   Abdominal:      General: There is distension.   Musculoskeletal:      Cervical back: Normal range of motion.      Right lower leg: No edema.      Left lower leg: No edema.   Skin:     General: Skin is warm and dry.   Neurological:      General: No focal deficit present.       Mental Status: He is alert and oriented to person, place, and time.            Significant Labs: CMP   Recent Labs   Lab 09/13/24  0837 09/14/24  0614    138   K 3.3* 3.4*    99   CO2 22* 22*   GLU 87 99   BUN 83* 82*   CREATININE 3.7* 3.5*   CALCIUM 8.7 8.7   PROT 6.7 6.5   ALBUMIN 3.4* 3.3*   BILITOT 1.7* 1.8*   ALKPHOS 66 55   AST 52* 42*   * 88*   ANIONGAP 14 17*

## 2024-09-14 NOTE — PLAN OF CARE
Problem: Diabetes Comorbidity  Goal: Blood Glucose Level Within Targeted Range  9/14/2024 1848 by Latisha Asif RN  Outcome: Progressing  9/14/2024 1845 by Latisha Asif RN  Outcome: Progressing     Problem: Acute Kidney Injury/Impairment  Goal: Fluid and Electrolyte Balance  9/14/2024 1848 by Latisha Asif RN  Outcome: Progressing  9/14/2024 1845 by Latisha Asif RN  Outcome: Progressing  Goal: Improved Oral Intake  9/14/2024 1848 by Latisha Asif RN  Outcome: Progressing  9/14/2024 1845 by Latisha Asif RN  Outcome: Progressing  Goal: Effective Renal Function  9/14/2024 1848 by Latisha Asif RN  Outcome: Progressing  9/14/2024 1845 by Latisha Asif RN  Outcome: Progressing   Mood irritable this shift. Poor insight into medical dx and treatment goals. Appetite is poor. Pt not motivated to get oob after much encouragement. Sleeping between care.

## 2024-09-15 LAB
ALBUMIN SERPL BCP-MCNC: 3.3 G/DL (ref 3.5–5.2)
ALP SERPL-CCNC: 60 U/L (ref 55–135)
ALT SERPL W/O P-5'-P-CCNC: 79 U/L (ref 10–44)
ANION GAP SERPL CALC-SCNC: 12 MMOL/L (ref 8–16)
APTT PPP: 63.1 SEC (ref 21–32)
AST SERPL-CCNC: 35 U/L (ref 10–40)
BILIRUB SERPL-MCNC: 1.7 MG/DL (ref 0.1–1)
BUN SERPL-MCNC: 79 MG/DL (ref 8–23)
CALCIUM SERPL-MCNC: 8.8 MG/DL (ref 8.7–10.5)
CHLORIDE SERPL-SCNC: 97 MMOL/L (ref 95–110)
CO2 SERPL-SCNC: 28 MMOL/L (ref 23–29)
CREAT SERPL-MCNC: 3.4 MG/DL (ref 0.5–1.4)
ERYTHROCYTE [DISTWIDTH] IN BLOOD BY AUTOMATED COUNT: 25.2 % (ref 11.5–14.5)
EST. GFR  (NO RACE VARIABLE): 18.3 ML/MIN/1.73 M^2
GLUCOSE SERPL-MCNC: 103 MG/DL (ref 70–110)
HCT VFR BLD AUTO: 29.7 % (ref 40–54)
HGB BLD-MCNC: 10.3 G/DL (ref 14–18)
INR PPP: 1.2 (ref 0.8–1.2)
MAGNESIUM SERPL-MCNC: 2 MG/DL (ref 1.6–2.6)
MCH RBC QN AUTO: 29.8 PG (ref 27–31)
MCHC RBC AUTO-ENTMCNC: 34.7 G/DL (ref 32–36)
MCV RBC AUTO: 86 FL (ref 82–98)
PHOSPHATE SERPL-MCNC: 3.9 MG/DL (ref 2.7–4.5)
PLATELET # BLD AUTO: 95 K/UL (ref 150–450)
PMV BLD AUTO: ABNORMAL FL (ref 9.2–12.9)
POCT GLUCOSE: 103 MG/DL (ref 70–110)
POCT GLUCOSE: 130 MG/DL (ref 70–110)
POCT GLUCOSE: 137 MG/DL (ref 70–110)
POCT GLUCOSE: 171 MG/DL (ref 70–110)
POTASSIUM SERPL-SCNC: 3.1 MMOL/L (ref 3.5–5.1)
PROT SERPL-MCNC: 6.8 G/DL (ref 6–8.4)
PROTHROMBIN TIME: 12.9 SEC (ref 9–12.5)
RBC # BLD AUTO: 3.46 M/UL (ref 4.6–6.2)
SODIUM SERPL-SCNC: 137 MMOL/L (ref 136–145)
WBC # BLD AUTO: 5.04 K/UL (ref 3.9–12.7)

## 2024-09-15 PROCEDURE — 25000003 PHARM REV CODE 250: Performed by: INTERNAL MEDICINE

## 2024-09-15 PROCEDURE — 80053 COMPREHEN METABOLIC PANEL: CPT | Performed by: STUDENT IN AN ORGANIZED HEALTH CARE EDUCATION/TRAINING PROGRAM

## 2024-09-15 PROCEDURE — 85730 THROMBOPLASTIN TIME PARTIAL: CPT | Performed by: STUDENT IN AN ORGANIZED HEALTH CARE EDUCATION/TRAINING PROGRAM

## 2024-09-15 PROCEDURE — 84100 ASSAY OF PHOSPHORUS: CPT | Performed by: STUDENT IN AN ORGANIZED HEALTH CARE EDUCATION/TRAINING PROGRAM

## 2024-09-15 PROCEDURE — 36415 COLL VENOUS BLD VENIPUNCTURE: CPT | Performed by: HOSPITALIST

## 2024-09-15 PROCEDURE — 25000003 PHARM REV CODE 250: Performed by: HOSPITALIST

## 2024-09-15 PROCEDURE — 97116 GAIT TRAINING THERAPY: CPT

## 2024-09-15 PROCEDURE — 85027 COMPLETE CBC AUTOMATED: CPT | Performed by: HOSPITALIST

## 2024-09-15 PROCEDURE — 97535 SELF CARE MNGMENT TRAINING: CPT

## 2024-09-15 PROCEDURE — 63600175 PHARM REV CODE 636 W HCPCS: Performed by: STUDENT IN AN ORGANIZED HEALTH CARE EDUCATION/TRAINING PROGRAM

## 2024-09-15 PROCEDURE — 85610 PROTHROMBIN TIME: CPT | Performed by: HOSPITALIST

## 2024-09-15 PROCEDURE — 97165 OT EVAL LOW COMPLEX 30 MIN: CPT

## 2024-09-15 PROCEDURE — 83735 ASSAY OF MAGNESIUM: CPT | Performed by: STUDENT IN AN ORGANIZED HEALTH CARE EDUCATION/TRAINING PROGRAM

## 2024-09-15 PROCEDURE — 20600001 HC STEP DOWN PRIVATE ROOM

## 2024-09-15 PROCEDURE — 97161 PT EVAL LOW COMPLEX 20 MIN: CPT

## 2024-09-15 PROCEDURE — 25000003 PHARM REV CODE 250: Performed by: STUDENT IN AN ORGANIZED HEALTH CARE EDUCATION/TRAINING PROGRAM

## 2024-09-15 PROCEDURE — 99232 SBSQ HOSP IP/OBS MODERATE 35: CPT | Mod: ,,, | Performed by: INTERNAL MEDICINE

## 2024-09-15 RX ORDER — POTASSIUM CHLORIDE 20 MEQ/1
40 TABLET, EXTENDED RELEASE ORAL EVERY 4 HOURS
Status: COMPLETED | OUTPATIENT
Start: 2024-09-15 | End: 2024-09-15

## 2024-09-15 RX ADMIN — POTASSIUM CHLORIDE 40 MEQ: 1500 TABLET, EXTENDED RELEASE ORAL at 02:09

## 2024-09-15 RX ADMIN — POTASSIUM CHLORIDE 40 MEQ: 1500 TABLET, EXTENDED RELEASE ORAL at 06:09

## 2024-09-15 RX ADMIN — METOPROLOL SUCCINATE 25 MG: 25 TABLET, EXTENDED RELEASE ORAL at 08:09

## 2024-09-15 RX ADMIN — ISOSORBIDE DINITRATE 20 MG: 20 TABLET ORAL at 08:09

## 2024-09-15 RX ADMIN — HEPARIN SODIUM 10 UNITS/KG/HR: 10000 INJECTION, SOLUTION INTRAVENOUS at 04:09

## 2024-09-15 RX ADMIN — ASPIRIN 81 MG: 81 TABLET, COATED ORAL at 08:09

## 2024-09-15 RX ADMIN — HYDRALAZINE HYDROCHLORIDE 50 MG: 50 TABLET ORAL at 08:09

## 2024-09-15 RX ADMIN — CHLOROTHIAZIDE SODIUM 250 MG: 500 INJECTION, POWDER, LYOPHILIZED, FOR SOLUTION INTRAVENOUS at 04:09

## 2024-09-15 RX ADMIN — FUROSEMIDE 40 MG/HR: 10 INJECTION, SOLUTION INTRAMUSCULAR; INTRAVENOUS at 08:09

## 2024-09-15 RX ADMIN — LEVOTHYROXINE SODIUM 100 MCG: 100 TABLET ORAL at 04:09

## 2024-09-15 RX ADMIN — ISOSORBIDE DINITRATE 20 MG: 20 TABLET ORAL at 02:09

## 2024-09-15 RX ADMIN — CHLOROTHIAZIDE SODIUM 250 MG: 500 INJECTION, POWDER, LYOPHILIZED, FOR SOLUTION INTRAVENOUS at 03:09

## 2024-09-15 RX ADMIN — FUROSEMIDE 40 MG/HR: 10 INJECTION, SOLUTION INTRAMUSCULAR; INTRAVENOUS at 12:09

## 2024-09-15 RX ADMIN — FUROSEMIDE 40 MG/HR: 10 INJECTION, SOLUTION INTRAMUSCULAR; INTRAVENOUS at 09:09

## 2024-09-15 RX ADMIN — HYDRALAZINE HYDROCHLORIDE 50 MG: 50 TABLET ORAL at 02:09

## 2024-09-15 RX ADMIN — MIRTAZAPINE 7.5 MG: 7.5 TABLET, FILM COATED ORAL at 08:09

## 2024-09-15 NOTE — ASSESSMENT & PLAN NOTE
Chronic, controlled. Latest blood pressure and vitals reviewed-     Temp:  [97.7 °F (36.5 °C)-99 °F (37.2 °C)]   Pulse:  [60-71]   Resp:  [17-18]   BP: (102-126)/(52-64)   SpO2:  [99 %-100 %] .   Home meds for hypertension were reviewed and noted below.   Hypertension Medications               hydrALAZINE (APRESOLINE) 50 MG tablet Take 1 tablet (50 mg total) by mouth 2 (two) times a day.    isosorbide dinitrate (ISORDIL) 20 MG tablet Take 20 mg by mouth 3 (three) times daily.    metoprolol succinate (TOPROL-XL) 25 MG 24 hr tablet Take 1 tablet (25 mg total) by mouth once daily.    sacubitriL-valsartan (ENTRESTO) 24-26 mg per tablet Take 1 tablet by mouth 2 (two) times daily.            While in the hospital, will manage blood pressure as follows; Adjust home antihypertensive regimen as follows- Continue home hydralazine, isosorbide dinitrate, metoprolol. Monitor BP.    Will utilize p.r.n. blood pressure medication only if patient's blood pressure greater than 180/110 and he develops symptoms such as worsening chest pain or shortness of breath.

## 2024-09-15 NOTE — PROGRESS NOTES
[facility] - [facility] Stepdown  Cardiology  Progress Note    Patient Name: Baldo Carney  MRN: [id number]  Admission Date: 9/8/2024  Hospital Length of Stay: 7 days  Code Status: Full Code   Attending Physician: Marianela Bridges MD   Primary Care Physician: Millie Hayes APRN,FNP-C  Expected Discharge Date: 9/16/2024  Principal Problem:Acute on chronic combined systolic and diastolic heart failure    Subjective:         Interval History: did well overnight. Still overloaded on exam     Review of Systems   Cardiovascular:  Positive for dyspnea on exertion and orthopnea.     Objective:     Vital Signs (Most Recent):  Temp: 97.8 °F (36.6 °C) (09/15/24 0722)  Pulse: 71 (09/15/24 1104)  Resp: 17 (09/15/24 0847)  BP: 126/61 (09/15/24 0847)  SpO2: 99 % (09/15/24 0722) Vital Signs (24h Range):  Temp:  [97.8 °F (36.6 °C)-99 °F (37.2 °C)] 97.8 °F (36.6 °C)  Pulse:  [60-71] 71  Resp:  [17-18] 17  SpO2:  [99 %] 99 %  BP: (102-126)/(52-64) 126/61     Weight: 60.9 kg (134 lb 2.4 oz)  Body mass index is 18.19 kg/m².     SpO2: 99 %         Intake/Output Summary (Last 24 hours) at 9/15/2024 1113  Last data filed at 9/15/2024 0955  Gross per 24 hour   Intake 880 ml   Output 3225 ml   Net -2345 ml       Lines/Drains/Airways       Drain  Duration                  Urethral Catheter 09/09/24 1643 16 Fr. 5 days              Peripheral Intravenous Line  Duration                  Peripheral IV - Single Lumen 09/11/24 1737 20 G Anterior;Left Forearm 3 days         Peripheral IV - Single Lumen 09/12/24 1807 22 G Anterior;Left;Proximal Forearm 2 days                       Physical Exam  Constitutional:       General: He is not in acute distress.     Appearance: He is not toxic-appearing.   HENT:      Right Ear: External ear normal.      Left Ear: External ear normal.   Eyes:      General:         Right eye: No discharge.         Left eye: No discharge.      Extraocular Movements: Extraocular movements intact.   Cardiovascular:      Rate  and Rhythm: Normal rate and regular rhythm.      Heart sounds: No murmur heard.     No gallop.      Comments: JVP to mandible  Pulmonary:      Effort: Pulmonary effort is normal.      Breath sounds: Rales present.   Abdominal:      General: There is distension.   Musculoskeletal:      Cervical back: Normal range of motion.      Right lower leg: No edema.      Left lower leg: No edema.   Skin:     General: Skin is warm and dry.   Neurological:      General: No focal deficit present.      Mental Status: He is alert and oriented to person, place, and time.            Assessment and Plan:         * Acute on chronic combined systolic and diastolic heart failure  Results for orders placed during the hospital encounter of 09/08/24    Echo    Interpretation Summary  Images from the original result were not included.      Left Ventricle: The left ventricle is mildly dilated measuring 5.8 cm. Normal wall thickness. Severe global hypokinesis present. There is severely reduced systolic function. Grade II diastolic dysfunction. Elevated left ventricular filling pressure. There is a layered, nonmobile thrombus in the apex measuring 1.3 x 2.5 cm.    Right Ventricle: Mild right ventricular enlargement. Systolic function is mildly reduced. Pacemaker lead present in the ventricle.    Left Atrium: Left atrium is severely dilated.    Right Atrium: Right atrium is mildly dilated.    Aortic Valve: There is moderate to severe stenosis. Aortic valve area by VTI is 0.80 cm². Aortic valve peak velocity is 3.43 m/s. Mean gradient is 28 mmHg. The dimensionless index is 0.24. There is mild aortic regurgitation.    Mitral Valve: There is mild regurgitation.    Tricuspid Valve: There is moderate regurgitation.    Pulmonary Artery: There is severe pulmonary hypertension. The estimated pulmonary artery systolic pressure is 74 mmHg.    IVC/SVC: Elevated venous pressure at 15 mmHg.    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      Pt net negative 1.8 L in last 24hrs.  Cr/BUN stable. Will continue to diurese    Recommendations:   - Continue Lasix Drip to 40/hr   - Continue Diuril 250mg BID  - Strict I's and O's  - Continue Heparin given ERICA  - Continue hydralazine tid  - Continue isosorbide dinitrate TID  - Continue metoprolol succinate   - Check BMP BID to follow Cr and address accordingly   - Consider additional GDMT when able   - Avoid NSAID's and CCB's     We will place referral to Dr. Gee with [facility] to discuss valve options in the outpatient setting        VTE Risk Mitigation (From admission, onward)           Ordered     heparin 25,000 units in dextrose 5% 250 mL (100 units/mL) infusion HIGH INTENSITY nomogram - [facility]  Continuous        Question:  Begin at (units/kg/hr)  Answer:  18    09/12/24 0739     heparin 25,000 units in dextrose 5% (100 units/ml) IV bolus from bag HIGH INTENSITY nomogram - [facility]  As needed (PRN)        Question:  Heparin Infusion Adjustment (DO NOT MODIFY ANSWER)  Answer:  \\[email or web address]\epic\Images\Pharmacy\HeparinInfusions\heparin HIGH INTENSITY nomogram for [facility] [id number].pdf    09/12/24 0739     heparin 25,000 units in dextrose 5% (100 units/ml) IV bolus from bag HIGH INTENSITY nomogram - [facility]  As needed (PRN)        Question:  Heparin Infusion Adjustment (DO NOT MODIFY ANSWER)  Answer:  [email or web address]\epic\Images\Pharmacy\HeparinInfusions\heparin HIGH INTENSITY nomogram for [facility] [id number].pdf    09/12/24 0739                    Luke Medleyor, MD  Cardiology  [facility] - Transplant Stepdown  As above

## 2024-09-15 NOTE — SUBJECTIVE & OBJECTIVE
Interval History:     NAEON. Continued on lasix drip and diuril. Continued on heparin drip and currently therapeutic.     Review of Systems   Constitutional:  Negative for chills and fever.   HENT:  Negative for nosebleeds.    Eyes:  Negative for visual disturbance.   Respiratory:  Negative for chest tightness and shortness of breath.    Cardiovascular:  Negative for chest pain.   Gastrointestinal:  Positive for abdominal distention. Negative for abdominal pain and constipation.   Genitourinary:         Suárez catheter in place   Neurological:  Negative for dizziness and light-headedness.   Psychiatric/Behavioral:  Negative for suicidal ideas. The patient is not nervous/anxious.      Objective:     Vital Signs (Most Recent):  Temp: 97.7 °F (36.5 °C) (09/15/24 1159)  Pulse: 61 (09/15/24 1519)  Resp: 17 (09/15/24 0847)  BP: (!) 111/58 (09/15/24 1500)  SpO2: 100 % (09/15/24 1159) Vital Signs (24h Range):  Temp:  [97.7 °F (36.5 °C)-99 °F (37.2 °C)] 97.7 °F (36.5 °C)  Pulse:  [60-71] 61  Resp:  [17-18] 17  SpO2:  [99 %-100 %] 100 %  BP: (102-126)/(52-64) 111/58     Weight: 60.9 kg (134 lb 2.4 oz)  Body mass index is 18.19 kg/m².    Intake/Output Summary (Last 24 hours) at 9/15/2024 1610  Last data filed at 9/15/2024 0955  Gross per 24 hour   Intake 600 ml   Output 3225 ml   Net -2625 ml         Physical Exam  Vitals and nursing note reviewed.   Constitutional:       General: He is not in acute distress.     Appearance: Normal appearance. He is well-developed.      Interventions: He is not intubated.  HENT:      Head: Normocephalic and atraumatic.   Eyes:      Extraocular Movements: Extraocular movements intact.      Conjunctiva/sclera: Conjunctivae normal.   Cardiovascular:      Rate and Rhythm: Normal rate and regular rhythm.      Pulses: Normal pulses.      Heart sounds: Murmur heard.   Pulmonary:      Effort: Pulmonary effort is normal. No accessory muscle usage or respiratory distress. He is not intubated.      Breath  sounds: Rales present.   Abdominal:      General: There is distension.      Tenderness: There is no abdominal tenderness.   Musculoskeletal:      Cervical back: Normal range of motion and neck supple.      Right lower leg: No edema.      Left lower leg: No edema.   Skin:     General: Skin is warm and dry.   Neurological:      Mental Status: He is alert and oriented to person, place, and time. Mental status is at baseline.   Psychiatric:         Attention and Perception: Attention normal.         Mood and Affect: Mood and affect normal.         Behavior: Behavior normal. Behavior is cooperative.         Significant Labs: All pertinent labs within the past 24 hours have been reviewed.  Recent Labs   Lab 09/13/24  0837 09/14/24  0614 09/15/24  0659    138 137   K 3.3* 3.4* 3.1*    99 97   CO2 22* 22* 28   BUN 83* 82* 79*   CREATININE 3.7* 3.5* 3.4*   CALCIUM 8.7 8.7 8.8   PROT 6.7 6.5 6.8   BILITOT 1.7* 1.8* 1.7*   ALKPHOS 66 55 60   * 88* 79*   AST 52* 42* 35     Recent Labs   Lab 09/15/24  0659   WBC 5.04   RBC 3.46*   HGB 10.3*   HCT 29.7*   PLT 95*   MCV 86   MCH 29.8   MCHC 34.7        Significant Imaging: I have reviewed all pertinent imaging results/findings within the past 24 hours.

## 2024-09-15 NOTE — PLAN OF CARE
PT evaluation completed- see note for details. PT POC and goals established.    Problem: Physical Therapy  Goal: Physical Therapy Goal  Description: Goals to be met by: 10/15/24     Patient will increase functional independence with mobility by performin. Supine to sit with Stand-by Assistance  2. Sit to stand transfer with Stand-by Assistance  3. Bed to chair transfer with Stand-by Assistance using LRAD  4. Gait  x 100 feet with Stand-by Assistance using LRAD.   51. Ascend/descend no stair with no Handrails Stand-by Assistance using LRAD.     Outcome: Progressing

## 2024-09-15 NOTE — PT/OT/SLP EVAL
Occupational Therapy   Co-Evaluation/Treatment     Name: Baldo Carney  MRN: [id number]  Admitting Diagnosis: Acute on chronic combined systolic and diastolic heart failure  Recent Surgery: * No surgery found *      Recommendations:     Discharge Recommendations: Low Intensity Therapy  Discharge Equipment Recommendations:  bath bench  Barriers to discharge:  None    Assessment:     Baldo Carney is a 73 y.o. male with a medical diagnosis of Acute on chronic combined systolic and diastolic heart failure.  He presents with the following performance deficits affecting function: weakness, impaired endurance, impaired self care skills, impaired functional mobility, gait instability, impaired balance, impaired cognition, decreased safety awareness.      Pt agreeable to therapy and tolerated the session well. Pt ambulated to the bathroom where he completed toileting with SBA and toilet transfer with Mod A. Pt completed oral care in standing at the sink before finishing the session in the chair. Pt would benefit from continued skilled acute OT services during this admission in order to maximize independence and safety with ADLs and functional mobility to ensure safe return to PLOF in the least restrictive environment. Patient currently demonstrates a need for low intensity therapy post acute for increased independence in ADLs and functional mobility.     Rehab Prognosis: Good; patient would benefit from acute skilled OT services to address these deficits and reach maximum level of function.       Plan:     Patient to be seen 4 x/week to address the above listed problems via self-care/home management, therapeutic activities, therapeutic exercises, neuromuscular re-education  Plan of Care Expires: 10/15/24  Plan of Care Reviewed with: patient    Subjective     Chief Complaint: Weakness   Patient/Family Comments/goals: To return to PLOF    Occupational Profile:  Living Environment: Pt lives alone in a [facility] with 1  FAY. Pt has a tub/shower combo.   Previous level of function: Independent with ADLs and used a RW for mobility occasionally.   Roles and Routines: Pt was receiving home health services   Equipment Used at Home: walker, rolling  Assistance upon Discharge: Pt will have assistance from multiple friends and family members     Pain/Comfort:  Pain Rating 1: 0/10  Pain Rating Post-Intervention 1: 0/10    Patients cultural, spiritual, Roman [employer] conflicts given the current situation: no    Objective:     Co-evaluation/treatment performed due to patient's multiple deficits requiring two skilled therapists to appropriately and safely assess patient's strength and endurance while facilitating functional tasks in addition to accommodating for patient's activity tolerance.     Communicated with: RN prior to session.  Patient found HOB elevated with peripheral IV, bed alarm upon OT entry to room.    General Precautions: Standard, fall  Orthopedic Precautions: N/A  Braces: N/A  Respiratory Status: Room air    Occupational Performance:    Bed Mobility:    Patient completed Rolling/Turning to Right with stand by assistance  Patient completed Scooting/Bridging with stand by assistance  Patient completed Supine to Sit with minimum assistance    Functional Mobility/Transfers:  Patient completed Sit <> Stand Transfer with contact guard assistance  with  rolling walker   Patient completed Toilet Transfer Step Transfer technique with moderate assistance with  rolling walker and grab bars  Functional Mobility: Pt engaging in functional mobility to simulate household/community distances with CGA and utilizing RW in order to maximize functional activity tolerance and standing balance required for engagement in occupations of choice.    Activities of Daily Living:  Grooming: stand by assistance : to complete oral care in standing at the sink   Toileting: stand by assistance : For a BM on the toilet with Pt able to complete his own hygiene in  sitting     Cognitive/Visual Perceptual:  Cognitive/Psychosocial Skills:     -       Oriented to: Person, Place, and Situation   -       Follows Commands/attention:Follows multistep  commands  -       Safety awareness/insight to disability: impaired   -       Mood/Affect/Coping skills/emotional control: Appropriate to situation  Visual/Perceptual:      -Intact visual field     Physical Exam:  Balance:  Static Sitting   stand by assistance   Dynamic Sitting   stand by assistance   Static Standing   stand by assistance   Dynamic Standing   contact guard assistance     Upper Extremity Function:     Left UE Right UE   UE Edema None noted None noted   UE ROM WFL WFL   UE Strength WFL WFL    Strength WFL WFL   Sensation    -       Intact    -       Intact   Fine Motor Skills:     -       Intact    -       Intact   Gross Motor Skills:   WFL   WFL       AMPAC 6 Click ADL:  AMPAC Total Score: 20    Treatment & Education:  Therapist provided facilitation and instruction of proper body mechanics and fall prevention strategies during tasks listed above.  Instructed patient to sit in bedside chair daily to increase OOB/activity tolerance.  Instructed patient to use call light to have nursing staff assist with needs/transfers.  Discussed OT POC and answered all questions within OT scope of practice.  Whiteboard updated       Patient left up in chair with all lines intact, call button in reach, and chair alarm on    GOALS:   Multidisciplinary Problems       Occupational Therapy Goals          Problem: Occupational Therapy    Goal Priority Disciplines Outcome Interventions   Occupational Therapy Goal     OT, PT/OT Progressing    Description: Goals to be met by: 9/29/24     Patient will increase functional independence with ADLs by performing:    UE Dressing with Supervision.  LE Dressing with Supervision.  Grooming while standing at sink with Supervision.  Toileting from toilet with Supervision for hygiene and clothing  management.   Toilet transfer to toilet with Supervision.                         History:     Past Medical History:   Diagnosis Date    Asthma     Cardiomyopathy 10/21/2014    CHF (congestive heart failure)     Coronary artery disease     CRF (chronic renal failure)     Diabetes mellitus     Enlarged prostate     Hyperlipidemia     Hypertension     Neuropathy     Syncope 07/29/2024    Umbilical hernia without obstruction and without gangrene 10/13/2023         Past Surgical History:   Procedure Laterality Date    ANGIOGRAPHY OF INTERNAL MAMMARY VESSEL N/A 3/11/2022    Procedure: Angiogram Internal Mammary;  Surgeon: Hank Lujan MD;  Location: [facility] CATH/EP LAB;  Service: Cardiology;  Laterality: N/A;    CARDIAC CATHETERIZATION      CARDIAC VALVE SURGERY      CATHETERIZATION OF BOTH LEFT AND RIGHT HEART Left 3/11/2022    Procedure: CATHETERIZATION, HEART, BOTH LEFT AND RIGHT;  Surgeon: Hank Lujan MD;  Location: [facility] CATH/EP LAB;  Service: Cardiology;  Laterality: Left;    CORONARY ANGIOGRAPHY N/A 3/11/2022    Procedure: ANGIOGRAM, CORONARY ARTERY;  Surgeon: Hank Lujan MD;  Location: [facility] CATH/EP LAB;  Service: Cardiology;  Laterality: N/A;    CORONARY BYPASS GRAFT ANGIOGRAPHY  3/11/2022    Procedure: Bypass graft study;  Surgeon: Hank Lujan MD;  Location: [facility] CATH/EP LAB;  Service: Cardiology;;    CYSTOSCOPY N/A 7/30/2019    Procedure: CYSTOSCOPY;  Surgeon: Spencer Nguyen MD;  Location: [facility] OR;  Service: Urology;  Laterality: N/A;    INSERTION OF INTRAVASCULAR MICROAXIAL BLOOD PUMP N/A 8/31/2022    Procedure: INSERTION, IMPELLA;  Surgeon: Zach Jensen MD;  Location: [facility] CATH LAB;  Service: Cardiology;  Laterality: N/A;    INSERTION, CATHETER, DIALYSIS, PERITONEAL N/A 12/7/2023    Procedure: INSERTION, CATHETER, DIALYSIS, PERITONEAL;  Surgeon: Greg Bone Jr., MD;  Location: [facility] OR;  Service: General;  Laterality: N/A;  need PD catheter    PLACEMENT OF SWAN SEE  CATHETER WITH IMAGING GUIDANCE  8/31/2022    Procedure: INSERTION, CATHETER, SWAN-SEE, WITH IMAGING GUIDANCE;  Surgeon: Zach Jensen MD;  Location: [facility] CATH LAB;  Service: Cardiology;;    REPAIR, HERNIA, INGUINAL, INCARCERATED, INITIAL, AGE 5 YEARS OR OLDER Right 12/7/2023    Procedure: REPAIR, HERNIA, INGUINAL, INCARCERATED, INITIAL;  Surgeon: Greg Bone Jr., MD;  Location: [facility] OR;  Service: General;  Laterality: Right;    SHOULDER ARTHROSCOPY  1985    TRANSRECTAL BIOPSY OF PROSTATE WITH ULTRASOUND GUIDANCE N/A 7/30/2019    Procedure: BIOPSY, PROSTATE, RECTAL APPROACH, WITH US GUIDANCE;  Surgeon: Spencer Nguyen MD;  Location: [facility] OR;  Service: Urology;  Laterality: N/A;  procedure not performed, pt unable to tolerate    TRANSRECTAL BIOPSY OF PROSTATE WITH ULTRASOUND GUIDANCE N/A 8/8/2019    Procedure: BIOPSY, PROSTATE, RECTAL APPROACH, WITH US GUIDANCE;  Surgeon: Spencer Nguyen MD;  Location: [facility] OR;  Service: Urology;  Laterality: N/A;    UMBILICAL HERNIA REPAIR N/A 12/7/2023    Procedure: REPAIR, HERNIA, UMBILICAL;  Surgeon: Greg Bone Jr., MD;  Location: [facility];  Service: General;  Laterality: N/A;       Time Tracking:     OT Date of Treatment: 09/15/24  OT Start Time: 1049  OT Stop Time: 1114  OT Total Time (min): 25 min    Billable Minutes:Evaluation 10  Self Care/Home Management 15    9/15/2024

## 2024-09-15 NOTE — PLAN OF CARE
Patient is alert and orientedX4. VSS. Afebrile. Spo2 maintained@RA. NSR on Tele. Accucheck AC HS. HS UF=[id number]. Scheduled medicines given as per MAR. Lasix gtt contd @4ml/hr. IV Diuril 250 mg BID. Heparin gtt contd @ 10 U/kg/hr, next APTT check  with AM lab. Suárez's catheter in situ, urine clear yellow, See I/O flowsheet for total urine output overnight. Stand by assist up to toilet. 1 BM this shift. Bed in low position, wheels locked, call light within patient's reach. Patient is aware of calling for assistance. Non skid socks. No s/s of acute distress noted.

## 2024-09-15 NOTE — ASSESSMENT & PLAN NOTE
ERICA is likely due to  Cardiorenal . Baseline creatinine is  1.7-2 . Most recent creatinine and eGFR are listed below.  Recent Labs     09/13/24  0837 09/14/24  0614 09/15/24  0659   CREATININE 3.7* 3.5* 3.4*   EGFRNORACEVR 16.5* 17.7* 18.3*        Plan  - ERICA is stable  - Avoid nephrotoxins and renally dose meds for GFR listed above  - Monitor urine output, serial BMP, and adjust therapy as needed  - appreciate nephrology recommendations.

## 2024-09-15 NOTE — PLAN OF CARE
Problem: Adult Inpatient Plan of Care  Goal: Plan of Care Review  Outcome: Progressing  Goal: Patient-Specific Goal (Individualized)  Outcome: Progressing  Goal: Absence of Hospital-Acquired Illness or Injury  Outcome: Progressing  Goal: Optimal Comfort and Wellbeing  Outcome: Progressing  Goal: Readiness for Transition of Care  Outcome: Progressing     Problem: Diabetes Comorbidity  Goal: Blood Glucose Level Within Targeted Range  Outcome: Progressing   Progressing towards goals of care. Mood is improving and pt more accepting of care today. Up and oob this shift to recliner. Ambulated to  with standby assist. Continues to diuresis. Family visit today.

## 2024-09-15 NOTE — PROGRESS NOTES
Berny [facility] - Transplant [facility] Medicine  Progress Note    Patient Name: Baldo Carney  MRN: [id number]  Patient Class: IP- Inpatient   Admission Date: 9/8/2024  Length of Stay: 7 days  Attending Physician: Marianela Bridges MD  Primary Care Provider: Millie Hayes APRN,FNP-C        Subjective:     Principal Problem:Acute on chronic combined systolic and diastolic heart failure        HPI:  Baldo Carney is a 73 year old Black man with former cigarette smoking (quit in 1970), hypertension, diabetes mellitus type 2 with retinopathy and peripheral neuropathy, coronary artery disease status post coronary artery bypass graft (saphenous vein graft to right coronary artery) on 11/3/2014, status post percutaneous coronary intervention with drug eluting stent placements in left main to left anterior descending artery and left main to lateral circumflex artery on 8/31/2022, history of aortic valve replacement with porcine bioprosthesis on 11/3/2014, ischemic cardiomyopathy with chronic biventricular systolic and diastolic heart failure, chronic kidney disease stage 3-4, anemia, benign prostatic hyperplasia, hypothyroidism, cirrhosis with ascites requiring bimonthly paracentesis, chronic obstructive pulmonary disease (COPD), history of incarcerated inguinal hernia repair on 12/7/2023. He lives in [place], [place].    He was hospitalized at [facility] from 8/12/2024 to 8/18/2024 for syncope, cystitis, hypoglycemia, COVID-19. He underwent paracentesis on admission with 6 liters of fluid removed and albumin given.    He was seen by his primary care nurse practitioner in clinic on 8/27/2024 and was prescribed mirtazapine for situational anxiety and omeprazole for heartburn.   He presented to [facility] Emergency Department on Saturday 9/7/2024 with left sided chest pain for 4 hours without radiation, associated with shortness of breath. He also had unchanged chronic bilateral  lower extremity edema. He was scheduled for paracentesis on Monday 9/9/2024. Labs showed elevated BNP (>4900 pg/mL) and troponin (0.196 ng/mL) and acute kidney injury (BUN 69 mg/dL and creatinine 4.8 mg/dL from 37 and 1.9 on 8/23/2024), suggestive of volume overload due to cirrhosis and heart failure. He was transferred to [facility] on 9/8/2024 and admitted to Hospital Medicine Team L.     Overview/Hospital Course:  Nephrology and Cardiology were consulted. Hepatology recommended IV albumin. Suárez catheter was placed for urine output measurement. Nephrology suspected cardiorenal syndrome and recommended giving IV furosemide 80 mg twice daily. It was ineffective. Hospital Medicine increased it to 120 mg twice daily and it still did not increase urine output. Echocardiogram showed a layered nonmobile left ventricular apical thrombus. He was put on therapeutic enoxaparin. Cardiology recommended furosemide drip. Nephrology agreed with furosemide drip and recommended a trial of IV albumin for 3 days. He diuresed better.     Continued on lasix gtt, appreciate cardiology recommendations. Lasix dose increased and diuril given. GDMT holding parameters added. Continued monitoring on heparin gtt. Not a candidate for ARNI/ MRA or SGLT2i due to renal disease.    Regarding severe aortic prosthetic valve stenosis recommendation for outpatient valve clinic follow up for possible valve in valve TAVR once LV thrombus resolves.      Palliative care evaluated and initiated C conversations.    Interval History:     NAEON. Continued on lasix drip and diuril. Continued on heparin drip and currently therapeutic.     Review of Systems   Constitutional:  Negative for chills and fever.   HENT:  Negative for nosebleeds.    Eyes:  Negative for visual disturbance.   Respiratory:  Negative for chest tightness and shortness of breath.    Cardiovascular:  Negative for chest pain.   Gastrointestinal:  Positive for  abdominal distention. Negative for abdominal pain and constipation.   Genitourinary:         Suárez catheter in place   Neurological:  Negative for dizziness and light-headedness.   Psychiatric/Behavioral:  Negative for suicidal ideas. The patient is not nervous/anxious.      Objective:     Vital Signs (Most Recent):  Temp: 97.7 °F (36.5 °C) (09/15/24 1159)  Pulse: 61 (09/15/24 1519)  Resp: 17 (09/15/24 0847)  BP: (!) 111/58 (09/15/24 1500)  SpO2: 100 % (09/15/24 1159) Vital Signs (24h Range):  Temp:  [97.7 °F (36.5 °C)-99 °F (37.2 °C)] 97.7 °F (36.5 °C)  Pulse:  [60-71] 61  Resp:  [17-18] 17  SpO2:  [99 %-100 %] 100 %  BP: (102-126)/(52-64) 111/58     Weight: 60.9 kg (134 lb 2.4 oz)  Body mass index is 18.19 kg/m².    Intake/Output Summary (Last 24 hours) at 9/15/2024 1610  Last data filed at 9/15/2024 0955  Gross per 24 hour   Intake 600 ml   Output 3225 ml   Net -2625 ml         Physical Exam  Vitals and nursing note reviewed.   Constitutional:       General: He is not in acute distress.     Appearance: Normal appearance. He is well-developed.      Interventions: He is not intubated.  HENT:      Head: Normocephalic and atraumatic.   Eyes:      Extraocular Movements: Extraocular movements intact.      Conjunctiva/sclera: Conjunctivae normal.   Cardiovascular:      Rate and Rhythm: Normal rate and regular rhythm.      Pulses: Normal pulses.      Heart sounds: Murmur heard.   Pulmonary:      Effort: Pulmonary effort is normal. No accessory muscle usage or respiratory distress. He is not intubated.      Breath sounds: Rales present.   Abdominal:      General: There is distension.      Tenderness: There is no abdominal tenderness.   Musculoskeletal:      Cervical back: Normal range of motion and neck supple.      Right lower leg: No edema.      Left lower leg: No edema.   Skin:     General: Skin is warm and dry.   Neurological:      Mental Status: He is alert and oriented to person, place, and time. Mental status is at  baseline.   Psychiatric:         Attention and Perception: Attention normal.         Mood and Affect: Mood and affect normal.         Behavior: Behavior normal. Behavior is cooperative.         Significant Labs: All pertinent labs within the past 24 hours have been reviewed.  Recent Labs   Lab 09/13/24  0837 09/14/24  0614 09/15/24  0659    138 137   K 3.3* 3.4* 3.1*    99 97   CO2 22* 22* 28   BUN 83* 82* 79*   CREATININE 3.7* 3.5* 3.4*   CALCIUM 8.7 8.7 8.8   PROT 6.7 6.5 6.8   BILITOT 1.7* 1.8* 1.7*   ALKPHOS 66 55 60   * 88* 79*   AST 52* 42* 35     Recent Labs   Lab 09/15/24  0659   WBC 5.04   RBC 3.46*   HGB 10.3*   HCT 29.7*   PLT 95*   MCV 86   MCH 29.8   MCHC 34.7        Significant Imaging: I have reviewed all pertinent imaging results/findings within the past 24 hours.    Assessment/Plan:      * Acute on chronic combined systolic and diastolic heart failure  He takes metoprolol succinate, hydralazine, isosorbide dinitrate, sacubitril-valsartan, empagliflozin.     -Giving home metoprolol, isosorbide dinitrate, hydralazine. Not on a diuretic at home.  -Gave a dose of IV chlorothiazide. Tried furosemide IV boluses, which were ineffective, then switched to furosemide drip by Cardiology. Appreciate Cardiology. Monitor electrolytes, intake/output. Can hold or wean GDMT but current goal is to optimize diuresis.     Continued on lasix gtt, appreciate cardiology recommendations. Lasix dose increased and diuril given. GDMT holding parameters added. Continued monitoring on heparin gtt. Not a candidate for ARNI/ MRA or SGLT2i due to renal disease.    Echo    Result Date: 9/9/2024  Images from the original result were not included.      Left Ventricle: The left ventricle is mildly dilated measuring 5.8 cm.   Normal wall thickness. Severe global hypokinesis present. There is   severely reduced systolic function. Grade II diastolic dysfunction.   Elevated left ventricular filling pressure. There is a  layered, nonmobile   thrombus in the apex measuring 1.3 x 2.5 cm.    Right Ventricle: Mild right ventricular enlargement. Systolic function   is mildly reduced. Pacemaker lead present in the ventricle.    Left Atrium: Left atrium is severely dilated.    Right Atrium: Right atrium is mildly dilated.    Aortic Valve: There is moderate to severe stenosis. Aortic valve area   by VTI is 0.80 cm². Aortic valve peak velocity is 3.43 m/s. Mean gradient   is 28 mmHg. The dimensionless index is 0.24. There is mild aortic   regurgitation.    Mitral Valve: There is mild regurgitation.    Tricuspid Valve: There is moderate regurgitation.    Pulmonary Artery: There is severe pulmonary hypertension. The estimated   pulmonary artery systolic pressure is 74 mmHg.    IVC/SVC: Elevated venous pressure at 15 mmHg.          Left ventricular apical thrombus  ECHO reported layered LV thrombus   Continue heparin gtt        Acute kidney injury superimposed on chronic kidney disease  ERICA is likely due to  Cardiorenal . Baseline creatinine is  1.7-2 . Most recent creatinine and eGFR are listed below.  Recent Labs     09/13/24  0837 09/14/24  0614 09/15/24  0659   CREATININE 3.7* 3.5* 3.4*   EGFRNORACEVR 16.5* 17.7* 18.3*        Plan  - ERICA is stable  - Avoid nephrotoxins and renally dose meds for GFR listed above  - Monitor urine output, serial BMP, and adjust therapy as needed  - appreciate nephrology recommendations.    Elevated troponin  Due to CHF.      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 without macular edema, without long-term current use of insulin  Patient's FSGs are controlled on current medication regimen.  Last A1c reviewed-   Lab Results   Component Value Date    HGBA1C 6.0 08/18/2024     Most recent fingerstick glucose reviewed-   Recent Labs   Lab 09/14/24  1815 09/14/24  2046 09/15/24  0845 09/15/24  1302   POCTGLUCOSE 124* 153* 103 171*       Current correctional scale  Low  Maintain  anti-hyperglycemic dose as follows-   Antihyperglycemics (From admission, onward)      Start     Stop Route Frequency Ordered    09/08/24 0511  insulin aspart U-100 pen 0-5 Units         -- SubQ Before meals & nightly PRN 09/08/24 0411          Hold Oral hypoglycemics while patient is in the hospital.      Cirrhosis of liver with ascites  Patient with known Cirrhosis with Child's class Calculator for Child's score link- https://www.Go[employer].com/calc/340/child-myles-score-cirrhosis-mortality#creator-insights. Co-morbidities are present and inclusive of ascites.  MELD-Na score calculated; MELD 3.0: 22 at 9/15/2024  6:59 AM  MELD-Na: 22 at 9/15/2024  6:59 AM  Calculated from:  Serum Creatinine: 3.4 mg/dL (Using max of 3 mg/dL) at 9/15/2024  6:59 AM  Serum Sodium: 137 mmol/L at 9/15/2024  6:59 AM  Total Bilirubin: 1.7 mg/dL at 9/15/2024  6:59 AM  Serum Albumin: 3.3 g/dL at 9/15/2024  6:59 AM  INR(ratio): 1.2 at 9/15/2024  6:59 AM  Age at listing (hypothetical): 73 years  Sex: Male at 9/15/2024  6:59 AM    Appreciate Hepatology. 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 not a candidate for liver transplant evaluation given multiple comorbidities & severe heart failure   -Palliative care consulted for goal clarification       Hypothyroidism  Stable. Continue home levothyroxine.      COPD (chronic obstructive pulmonary disease)  He takes fluticasone-vilanterol. Giving levalbuterol prn.    Hypertension    Chronic, controlled. Latest blood pressure and vitals reviewed-     Temp:  [97.7 °F (36.5 °C)-99 °F (37.2 °C)]   Pulse:  [60-71]   Resp:  [17-18]   BP: (102-126)/(52-64)   SpO2:  [99 %-100 %] .   Home meds for hypertension were reviewed and noted below.   Hypertension Medications               hydrALAZINE (APRESOLINE) 50 MG tablet Take 1 tablet (50 mg total) by mouth 2 (two) times a day.    isosorbide dinitrate (ISORDIL) 20 MG tablet Take 20 mg by mouth 3 (three) times daily.    metoprolol succinate (TOPROL-XL) 25 MG 24 hr tablet Take 1  tablet (25 mg total) by mouth once daily.    sacubitriL-valsartan (ENTRESTO) 24-26 mg per tablet Take 1 tablet by mouth 2 (two) times daily.            While in the hospital, will manage blood pressure as follows; Adjust home antihypertensive regimen as follows- Continue home hydralazine, isosorbide dinitrate, metoprolol. Monitor BP.    Will utilize p.r.n. blood pressure medication only if patient's blood pressure greater than 180/110 and he develops symptoms such as worsening chest pain or shortness of breath.        VTE Risk Mitigation (From admission, onward)           Ordered     heparin 25,000 units in dextrose 5% 250 mL (100 units/mL) infusion HIGH INTENSITY nomogram - [facility]  Continuous        Question:  Begin at (units/kg/hr)  Answer:  18    09/12/24 0739     heparin 25,000 units in dextrose 5% (100 units/ml) IV bolus from bag HIGH INTENSITY nomogram - [facility]  As needed (PRN)        Question:  Heparin Infusion Adjustment (DO NOT MODIFY ANSWER)  Answer:  [email or web address]\epic\Images\Pharmacy\HeparinInfusions\heparin HIGH INTENSITY nomogram for [facility] [id number].[email or web address]    09/12/24 0739     heparin 25,000 units in dextrose 5% (100 units/ml) IV bolus from bag HIGH INTENSITY nomogram - [facility]  As needed (PRN)        Question:  Heparin Infusion Adjustment (DO NOT MODIFY ANSWER)  Answer:  [email or web address]\epic\Images\Pharmacy\HeparinInfusions\heparin HIGH INTENSITY nomogram for [facility] [id number].pdf    09/12/24 0739                    Discharge Planning   PAM: 9/17/2024     Code Status: Full Code   Is the patient medically ready for discharge?:     Reason for patient still in hospital (select all that apply): Patient trending condition, Laboratory test, Treatment, and Consult recommendations  Discharge Plan A: Home, Home Health   Discharge Delays: None known at this time              Marianela Bridges MD  Department of Hospital Medicine   [facility] - Transplant Stepdown

## 2024-09-15 NOTE — PLAN OF CARE
Problem: Occupational Therapy  Goal: Occupational Therapy Goal  Description: Goals to be met by: 9/29/24     Patient will increase functional independence with ADLs by performing:    UE Dressing with Supervision.  LE Dressing with Supervision.  Grooming while standing at sink with Supervision.  Toileting from toilet with Supervision for hygiene and clothing management.   Toilet transfer to toilet with Supervision.    Outcome: Progressing

## 2024-09-15 NOTE — PT/OT/SLP EVAL
Physical Therapy Co-Evaluation and Co-Treatment    Patient Name:  Baldo Carney   MRN:  [id number]  Admit Date: 9/8/2024  Admitting Diagnosis:  Acute on chronic combined systolic and diastolic heart failure   Length of Stay: 7 days  Recent Surgery: * No surgery found *      Recommendations:     Discharge Recommendations:  Low Intensity Therapy     Discharge Equipment Recommendations: bath bench   Barriers to discharge: None    Plan:     During this hospitalization, patient to be seen 4 x/week to address the identified rehab impairments via gait training, therapeutic activities, therapeutic exercises, neuromuscular re-education and progress towards the established goals.  Plan of Care Expires:  10/15/24  Plan of Care Reviewed with: patient    Assessment:     Baldo Carney is a 73 y.o. male admitted with a medical diagnosis of Acute on chronic combined systolic and diastolic heart failure. Pt supine agreeable to therapy session. Pt with instability and increasing knee flexion with fatigue during short distance gait trials with RW in the room. Pt did require increased assistance with sit to stand transfer from toilet vs EOB this date. Pt remains limited by generalized weakness, impaired balance, decreased endurance and decreased activity tolerance.   Patient would benefit from skilled therapy services to maximize safety and independence, increase activity tolerance, decrease fall risk, decrease caregiver burden, improve QOL, improve patient's functional mobility, and decrease risk of contractures and pressure sores. Patient currently demonstrates a need for low intensity therapy on a scheduled basis secondary to a decline in functional status due to illness    Problem List: weakness, impaired endurance, impaired self care skills, impaired functional mobility, gait instability, impaired balance, decreased lower extremity function, decreased upper extremity function, decreased safety awareness, impaired  "cardiopulmonary response to activity.  Rehab Prognosis: Good; patient would benefit from acute skilled PT services to address these deficits and reach maximum level of function.      Goals:   Multidisciplinary Problems       Physical Therapy Goals          Problem: Physical Therapy    Goal Priority Disciplines Outcome Goal Variances Interventions   Physical Therapy Goal     PT, PT/OT Progressing     Description: Goals to be met by: 10/15/24     Patient will increase functional independence with mobility by performin. Supine to sit with Stand-by Assistance  2. Sit to stand transfer with Stand-by Assistance  3. Bed to chair transfer with Stand-by Assistance using LRAD  4. Gait  x 100 feet with Stand-by Assistance using LRAD.   51. Ascend/descend no stair with no Handrails Stand-by Assistance using LRAD.                          Subjective     RN notified prior to session. No one present upon PT entrance into room. Patient agreeable to PT evaluation.    Chief Complaint: "I dont think I can walk more around the room"  Patient/Family Comments/goals: go home  Pain/Comfort:  Pain Rating 1: 0/10  Pain Rating Post-Intervention 1: 0/10    Social History:  Residence: lives alone 1-story house with 1 FAY and no HR. Pt's bathroom has a tub shower.  Support available:  friend, nephew, brother  Equipment Owned (not using): walker, rolling  Equipment Used: None  Prior level of function: modified independent; pt reports using RW in the morning when he firsts wakes up then using no AD; ind with ADLs       Patient reports they will have assistance from friends and family upon discharge.    Objective:     Additional staff present: OT; OT for co-evaluation due to suspected patient need for two skilled therapists to appropriately assess patient's functional deficits as well as ensure patient safety, accommodate for limited activity tolerance, and provide appropriate, skilled assistance to maximize functional potential during " evaluation.    Patient found supine with: telemetry, nguyen catheter, peripheral IV     General Precautions: Standard, Cardiac fall   Orthopedic Precautions:N/A   Braces: N/A   Body mass index is 18.19 kg/m².  Oxygen Device: Room Air  Vitals: BP (!) 109/59   Pulse 64   Temp 97.7 °F (36.5 °C) (Oral)   Resp 17   Ht 6' (1.829 m)   Wt 60.9 kg (134 lb 2.4 oz)   SpO2 100%   BMI 18.19 kg/m²       Exams:    Cognition:  Oriented X 3, Alert, and Cooperative  Command following: Follows multistep verbal commands  Communication: clear/fluent    Sensation:   Light touch sensation: Intact BLEs    Gross Motor Coordination: No deficits noted during functional mobility tasks     Edema/Skin Integrity: None noted; Visible skin intact    Postural examination/scapula alignment: Rounded shoulder and Head forward    Lower Extremity Range of Motion:  Right Lower Extremity: WFL  Left Lower Extremity: WFL    Lower Extremity Strength:  Right Lower Extremity: WFL  Left Lower Extremity: WFL      Functional Mobility:    Bed Mobility:  Scooting with minimum assistance  Supine > Sit with contact guard assistance    Transfers:   Sit <> Stand Transfer: Contact Guard Assistance x 1 trials from EOB with RW; mod A with RW from toilet              Gait:  Distance: 10' x2 trials   Assistance level: Contact Guard Assistance  Assistive Device: rolling walker  Gait Deviation(s): unsteady gait, decreased step length, narrow base of support, flexed posture, and decreased ira  all lines remained intact throughout ambulation trial  Comments: Pt with NBOS, ER of bilateral feet, and increasing knee flexion during Bilateral stance phases with fatigue. Pt required verbal cues for upright stance, RW management and B knee extension for increased stability.     Balance:  Sitting Balance  Static: stand by assistance  Dynamic: stand by assistance  Standing:  Static: contact guard assistance  Dynamic: contact guard assistance    Outcome Measures:  AM-PAC 6 CLICK  MOBILITY  Turning over in bed (including adjusting bedclothes, sheets and blankets)?: 3  Sitting down on and standing up from a chair with arms (e.g., wheelchair, bedside commode, etc.): 2  Moving from lying on back to sitting on the side of the bed?: 3  Moving to and from a bed to a chair (including a wheelchair)?: 3  Need to walk in hospital room?: 3  Climbing 3-5 steps with a railing?: 2  Basic Mobility Total Score: 16     Education:  Time provided for education, counseling and discussion of health disposition in regards to patient's current status  All questions answered within PT scope of practice and to patient's satisfaction  PT role in POC to address current functional deficits  Call nursing/pct to transfer to chair/use bathroom. Pt stated understanding.    Patient left up in chair with all lines intact, call button in reach, and chair alarm on.      History:     Past Medical History:   Diagnosis Date    Asthma     Cardiomyopathy 10/21/2014    CHF (congestive heart failure)     Coronary artery disease     CRF (chronic renal failure)     Diabetes mellitus     Enlarged prostate     Hyperlipidemia     Hypertension     Neuropathy     Syncope 07/29/2024    Umbilical hernia without obstruction and without gangrene 10/13/2023       Past Surgical History:   Procedure Laterality Date    ANGIOGRAPHY OF INTERNAL MAMMARY VESSEL N/A 3/11/2022    Procedure: Angiogram Internal Mammary;  Surgeon: Hank Lujan MD;  Location: [facility] CATH/EP LAB;  Service: Cardiology;  Laterality: N/A;    CARDIAC CATHETERIZATION      CARDIAC VALVE SURGERY      CATHETERIZATION OF BOTH LEFT AND RIGHT HEART Left 3/11/2022    Procedure: CATHETERIZATION, HEART, BOTH LEFT AND RIGHT;  Surgeon: Hank Lujan MD;  Location: [facility] CATH/EP LAB;  Service: Cardiology;  Laterality: Left;    CORONARY ANGIOGRAPHY N/A 3/11/2022    Procedure: ANGIOGRAM, CORONARY ARTERY;  Surgeon: Hank Lujan MD;  Location: [facility] CATH/EP LAB;  Service: Cardiology;   Laterality: N/A;    CORONARY BYPASS GRAFT ANGIOGRAPHY  3/11/2022    Procedure: Bypass graft study;  Surgeon: Hank Lujan MD;  Location: [facility] CATH/EP LAB;  Service: Cardiology;;    CYSTOSCOPY N/A 7/30/2019    Procedure: CYSTOSCOPY;  Surgeon: Spencer Nguyen MD;  Location: [facility] OR;  Service: Urology;  Laterality: N/A;    INSERTION OF INTRAVASCULAR MICROAXIAL BLOOD PUMP N/A 8/31/2022    Procedure: INSERTION, IMPELLA;  Surgeon: Zach Jensen MD;  Location: [facility] CATH LAB;  Service: Cardiology;  Laterality: N/A;    INSERTION, CATHETER, DIALYSIS, PERITONEAL N/A 12/7/2023    Procedure: INSERTION, CATHETER, DIALYSIS, PERITONEAL;  Surgeon: Greg Bone Jr., MD;  Location: [facility];  Service: General;  Laterality: N/A;  need PD catheter    PLACEMENT OF SWAN SEE CATHETER WITH IMAGING GUIDANCE  8/31/2022    Procedure: INSERTION, CATHETER, SWAN-SEE, WITH IMAGING GUIDANCE;  Surgeon: Zach Jensen MD;  Location: [facility] CATH LAB;  Service: Cardiology;;    REPAIR, HERNIA, INGUINAL, INCARCERATED, INITIAL, AGE 5 YEARS OR OLDER Right 12/7/2023    Procedure: REPAIR, HERNIA, INGUINAL, INCARCERATED, INITIAL;  Surgeon: Greg Bone Jr., MD;  Location: [facility];  Service: General;  Laterality: Right;    SHOULDER ARTHROSCOPY  1985    TRANSRECTAL BIOPSY OF PROSTATE WITH ULTRASOUND GUIDANCE N/A 7/30/2019    Procedure: BIOPSY, PROSTATE, RECTAL APPROACH, WITH US GUIDANCE;  Surgeon: Spencer Nguyen MD;  Location: [facility];  Service: Urology;  Laterality: N/A;  procedure not performed, pt unable to tolerate    TRANSRECTAL BIOPSY OF PROSTATE WITH ULTRASOUND GUIDANCE N/A 8/8/2019    Procedure: BIOPSY, PROSTATE, RECTAL APPROACH, WITH US GUIDANCE;  Surgeon: Spencer Nguyen MD;  Location: [facility] OR;  Service: Urology;  Laterality: N/A;    UMBILICAL HERNIA REPAIR N/A 12/7/2023    Procedure: REPAIR, HERNIA, UMBILICAL;  Surgeon: Greg Bone Jr., MD;  Location: [facility];  Service: General;  Laterality: N/A;        Family History   Problem Relation Name Age of Onset    No Known Problems Mother      Diabetes Father      Hypertension Father      Heart attack Father      Heart disease Father      Diabetes Sister 5     Heart disease Brother 4     Diabetes Brother 4     No Known Problems Maternal Grandmother      No Known Problems Maternal Grandfather      No Known Problems Paternal Grandmother      No Known Problems Paternal Grandfather      No Known Problems Maternal Aunt      No Known Problems Maternal Uncle      No Known Problems Paternal Aunt      No Known Problems Paternal Uncle      Anemia Neg Hx      Arrhythmia Neg Hx      Asthma Neg Hx      Clotting disorder Neg Hx      Fainting Neg Hx      Heart failure Neg Hx      Hyperlipidemia Neg Hx      Glaucoma Neg Hx         Social History     Socioeconomic History    Marital status: Single   Tobacco Use    Smoking status: Former     Current packs/day: 0.00     Types: Cigarettes     Quit date: 1970     Years since quittin.2    Smokeless tobacco: Never   Substance and Sexual Activity    Alcohol use: Not Currently     Alcohol/week: 3.0 standard drinks of alcohol     Types: 3 Cans of beer per week    Drug use: No    Sexual activity: Not Currently     Partners: Female     Social Determinants of Health     Financial Resource Strain: Low Risk  (2024)    Overall Financial Resource Strain (CARDIA)     Difficulty of Paying Living Expenses: Not hard at all   Food Insecurity: No Food Insecurity (2024)    Hunger Vital Sign     Worried About Running Out of Food in the Last Year: Never true     Ran Out of Food in the Last Year: Never true   Transportation Needs: No Transportation Needs (2024)    TRANSPORTATION NEEDS     Transportation : No   Physical Activity: Inactive (2024)    Exercise Vital Sign     Days of Exercise per Week: 0 days     Minutes of Exercise per Session: 0 min   Stress: Patient Declined (2024)    [employer] -  Occupational Stress Questionnaire     Feeling of Stress : Patient declined   Housing Stability: Low Risk  (9/9/2024)    Housing Stability Vital Sign     Unable to Pay for Housing in the Last Year: No     Homeless in the Last Year: No       Time Tracking:     PT Received On: 09/15/24  PT Start Time: 1049     PT Stop Time: 1114  PT Total Time (min): 25 min     Billable Minutes: Evaluation 10 minutes and Gait Training 10 minutes    9/15/2024

## 2024-09-15 NOTE — ASSESSMENT & PLAN NOTE
Patient with known Cirrhosis with Child's class Calculator for Child's score link- https://www.[employer].com/calc/340/child-myles-score-cirrhosis-mortality#creator-insights. Co-morbidities are present and inclusive of ascites.  MELD-Na score calculated; MELD 3.0: 22 at 9/15/2024  6:59 AM  MELD-Na: 22 at 9/15/2024  6:59 AM  Calculated from:  Serum Creatinine: 3.4 mg/dL (Using max of 3 mg/dL) at 9/15/2024  6:59 AM  Serum Sodium: 137 mmol/L at 9/15/2024  6:59 AM  Total Bilirubin: 1.7 mg/dL at 9/15/2024  6:59 AM  Serum Albumin: 3.3 g/dL at 9/15/2024  6:59 AM  INR(ratio): 1.2 at 9/15/2024  6:59 AM  Age at listing (hypothetical): 73 years  Sex: Male at 9/15/2024  6:59 AM    Appreciate Hepatology. 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 not a candidate for liver transplant evaluation given multiple comorbidities & severe heart failure   -Palliative care consulted for goal clarification

## 2024-09-15 NOTE — SUBJECTIVE & OBJECTIVE
Interval History: did well overnight. Still overloaded on exam     Review of Systems   Cardiovascular:  Positive for dyspnea on exertion and orthopnea.     Objective:     Vital Signs (Most Recent):  Temp: 97.8 °F (36.6 °C) (09/15/24 0722)  Pulse: 71 (09/15/24 1104)  Resp: 17 (09/15/24 0847)  BP: 126/61 (09/15/24 0847)  SpO2: 99 % (09/15/24 0722) Vital Signs (24h Range):  Temp:  [97.8 °F (36.6 °C)-99 °F (37.2 °C)] 97.8 °F (36.6 °C)  Pulse:  [60-71] 71  Resp:  [17-18] 17  SpO2:  [99 %] 99 %  BP: (102-126)/(52-64) 126/61     Weight: 60.9 kg (134 lb 2.4 oz)  Body mass index is 18.19 kg/m².     SpO2: 99 %         Intake/Output Summary (Last 24 hours) at 9/15/2024 1113  Last data filed at 9/15/2024 0955  Gross per 24 hour   Intake 880 ml   Output 3225 ml   Net -2345 ml       Lines/Drains/Airways       Drain  Duration                  Urethral Catheter 09/09/24 1643 16 Fr. 5 days              Peripheral Intravenous Line  Duration                  Peripheral IV - Single Lumen 09/11/24 1737 20 G Anterior;Left Forearm 3 days         Peripheral IV - Single Lumen 09/12/24 1807 22 G Anterior;Left;Proximal Forearm 2 days                       Physical Exam  Constitutional:       General: He is not in acute distress.     Appearance: He is not toxic-appearing.   HENT:      Right Ear: External ear normal.      Left Ear: External ear normal.   Eyes:      General:         Right eye: No discharge.         Left eye: No discharge.      Extraocular Movements: Extraocular movements intact.   Cardiovascular:      Rate and Rhythm: Normal rate and regular rhythm.      Heart sounds: No murmur heard.     No gallop.      Comments: JVP to mandible  Pulmonary:      Effort: Pulmonary effort is normal.      Breath sounds: Rales present.   Abdominal:      General: There is distension.   Musculoskeletal:      Cervical back: Normal range of motion.      Right lower leg: No edema.      Left lower leg: No edema.   Skin:     General: Skin is warm and dry.    Neurological:      General: No focal deficit present.      Mental Status: He is alert and oriented to person, place, and time.

## 2024-09-15 NOTE — ASSESSMENT & PLAN NOTE
Patient's FSGs are controlled on current medication regimen.  Last A1c reviewed-   Lab Results   Component Value Date    HGBA1C 6.0 08/18/2024     Most recent fingerstick glucose reviewed-   Recent Labs   Lab 09/14/24  1815 09/14/24  2046 09/15/24  0845 09/15/24  1302   POCTGLUCOSE 124* 153* 103 171*       Current correctional scale  Low  Maintain anti-hyperglycemic dose as follows-   Antihyperglycemics (From admission, onward)    Start     Stop Route Frequency Ordered    09/08/24 0511  insulin aspart U-100 pen 0-5 Units         -- SubQ Before meals & nightly PRN 09/08/24 0411        Hold Oral hypoglycemics while patient is in the hospital.

## 2024-09-16 ENCOUNTER — DOCUMENT SCAN (OUTPATIENT)
Dept: HOME HEALTH SERVICES | Facility: HOSPITAL | Age: 74
End: 2024-09-16
Payer: MEDICARE

## 2024-09-16 LAB
ALBUMIN SERPL BCP-MCNC: 3.2 G/DL (ref 3.5–5.2)
ALP SERPL-CCNC: 57 U/L (ref 55–135)
ALT SERPL W/O P-5'-P-CCNC: 64 U/L (ref 10–44)
ANION GAP SERPL CALC-SCNC: 14 MMOL/L (ref 8–16)
APTT PPP: 50.7 SEC (ref 21–32)
AST SERPL-CCNC: 28 U/L (ref 10–40)
BILIRUB SERPL-MCNC: 1.8 MG/DL (ref 0.1–1)
BUN SERPL-MCNC: 80 MG/DL (ref 8–23)
CALCIUM SERPL-MCNC: 9.1 MG/DL (ref 8.7–10.5)
CHLORIDE SERPL-SCNC: 94 MMOL/L (ref 95–110)
CO2 SERPL-SCNC: 26 MMOL/L (ref 23–29)
CREAT SERPL-MCNC: 3.5 MG/DL (ref 0.5–1.4)
ERYTHROCYTE [DISTWIDTH] IN BLOOD BY AUTOMATED COUNT: 24.8 % (ref 11.5–14.5)
EST. GFR  (NO RACE VARIABLE): 17.7 ML/MIN/1.73 M^2
GLUCOSE SERPL-MCNC: 103 MG/DL (ref 70–110)
HCT VFR BLD AUTO: 28.6 % (ref 40–54)
HGB BLD-MCNC: 10.3 G/DL (ref 14–18)
INR PPP: 1.2 (ref 0.8–1.2)
MAGNESIUM SERPL-MCNC: 1.9 MG/DL (ref 1.6–2.6)
MCH RBC QN AUTO: 30.7 PG (ref 27–31)
MCHC RBC AUTO-ENTMCNC: 36 G/DL (ref 32–36)
MCV RBC AUTO: 85 FL (ref 82–98)
OHS QRS DURATION: 172 MS
OHS QTC CALCULATION: 578 MS
PHOSPHATE SERPL-MCNC: 3 MG/DL (ref 2.7–4.5)
PLATELET # BLD AUTO: 111 K/UL (ref 150–450)
PMV BLD AUTO: ABNORMAL FL (ref 9.2–12.9)
POCT GLUCOSE: 138 MG/DL (ref 70–110)
POCT GLUCOSE: 145 MG/DL (ref 70–110)
POCT GLUCOSE: 148 MG/DL (ref 70–110)
POCT GLUCOSE: 182 MG/DL (ref 70–110)
POTASSIUM SERPL-SCNC: 3.6 MMOL/L (ref 3.5–5.1)
PROT SERPL-MCNC: 7 G/DL (ref 6–8.4)
PROTHROMBIN TIME: 12.6 SEC (ref 9–12.5)
RBC # BLD AUTO: 3.36 M/UL (ref 4.6–6.2)
SODIUM SERPL-SCNC: 134 MMOL/L (ref 136–145)
WBC # BLD AUTO: 5.52 K/UL (ref 3.9–12.7)

## 2024-09-16 PROCEDURE — 84100 ASSAY OF PHOSPHORUS: CPT | Performed by: STUDENT IN AN ORGANIZED HEALTH CARE EDUCATION/TRAINING PROGRAM

## 2024-09-16 PROCEDURE — 85730 THROMBOPLASTIN TIME PARTIAL: CPT | Performed by: STUDENT IN AN ORGANIZED HEALTH CARE EDUCATION/TRAINING PROGRAM

## 2024-09-16 PROCEDURE — 25000003 PHARM REV CODE 250: Performed by: INTERNAL MEDICINE

## 2024-09-16 PROCEDURE — 63600175 PHARM REV CODE 636 W HCPCS: Performed by: STUDENT IN AN ORGANIZED HEALTH CARE EDUCATION/TRAINING PROGRAM

## 2024-09-16 PROCEDURE — 99233 SBSQ HOSP IP/OBS HIGH 50: CPT | Mod: GC,,, | Performed by: INTERNAL MEDICINE

## 2024-09-16 PROCEDURE — 97530 THERAPEUTIC ACTIVITIES: CPT

## 2024-09-16 PROCEDURE — 80053 COMPREHEN METABOLIC PANEL: CPT | Performed by: STUDENT IN AN ORGANIZED HEALTH CARE EDUCATION/TRAINING PROGRAM

## 2024-09-16 PROCEDURE — 25000003 PHARM REV CODE 250: Performed by: HOSPITALIST

## 2024-09-16 PROCEDURE — 99232 SBSQ HOSP IP/OBS MODERATE 35: CPT | Mod: ,,, | Performed by: INTERNAL MEDICINE

## 2024-09-16 PROCEDURE — 85610 PROTHROMBIN TIME: CPT | Performed by: HOSPITALIST

## 2024-09-16 PROCEDURE — 20600001 HC STEP DOWN PRIVATE ROOM

## 2024-09-16 PROCEDURE — 36415 COLL VENOUS BLD VENIPUNCTURE: CPT | Performed by: HOSPITALIST

## 2024-09-16 PROCEDURE — 83735 ASSAY OF MAGNESIUM: CPT | Performed by: STUDENT IN AN ORGANIZED HEALTH CARE EDUCATION/TRAINING PROGRAM

## 2024-09-16 PROCEDURE — 25000003 PHARM REV CODE 250: Performed by: STUDENT IN AN ORGANIZED HEALTH CARE EDUCATION/TRAINING PROGRAM

## 2024-09-16 PROCEDURE — 85027 COMPLETE CBC AUTOMATED: CPT | Performed by: HOSPITALIST

## 2024-09-16 RX ADMIN — ISOSORBIDE DINITRATE 20 MG: 20 TABLET ORAL at 08:09

## 2024-09-16 RX ADMIN — FUROSEMIDE 40 MG/HR: 10 INJECTION, SOLUTION INTRAMUSCULAR; INTRAVENOUS at 05:09

## 2024-09-16 RX ADMIN — LEVOTHYROXINE SODIUM 100 MCG: 100 TABLET ORAL at 04:09

## 2024-09-16 RX ADMIN — FUROSEMIDE 40 MG/HR: 10 INJECTION, SOLUTION INTRAMUSCULAR; INTRAVENOUS at 04:09

## 2024-09-16 RX ADMIN — HYDRALAZINE HYDROCHLORIDE 50 MG: 50 TABLET ORAL at 08:09

## 2024-09-16 RX ADMIN — MIRTAZAPINE 7.5 MG: 7.5 TABLET, FILM COATED ORAL at 08:09

## 2024-09-16 RX ADMIN — ASPIRIN 81 MG: 81 TABLET, COATED ORAL at 08:09

## 2024-09-16 RX ADMIN — HEPARIN SODIUM 10 UNITS/KG/HR: 10000 INJECTION, SOLUTION INTRAVENOUS at 06:09

## 2024-09-16 RX ADMIN — ISOSORBIDE DINITRATE 20 MG: 20 TABLET ORAL at 03:09

## 2024-09-16 RX ADMIN — CHLOROTHIAZIDE SODIUM 250 MG: 500 INJECTION, POWDER, LYOPHILIZED, FOR SOLUTION INTRAVENOUS at 04:09

## 2024-09-16 RX ADMIN — CHLOROTHIAZIDE SODIUM 250 MG: 500 INJECTION, POWDER, LYOPHILIZED, FOR SOLUTION INTRAVENOUS at 03:09

## 2024-09-16 RX ADMIN — HYDRALAZINE HYDROCHLORIDE 50 MG: 50 TABLET ORAL at 03:09

## 2024-09-16 RX ADMIN — METOPROLOL SUCCINATE 25 MG: 25 TABLET, EXTENDED RELEASE ORAL at 08:09

## 2024-09-16 NOTE — PT/OT/SLP PROGRESS
Occupational Therapy   Treatment    Name: Baldo Carney  MRN: [id number]  Admitting Diagnosis:  Acute on chronic combined systolic and diastolic heart failure       Recommendations:     Discharge Recommendations: Low Intensity Therapy  Discharge Equipment Recommendations:  bath bench  Barriers to discharge:  None    Assessment:     Baldo Carney is a 73 y.o. male with a medical diagnosis of Acute on chronic combined systolic and diastolic heart failure.  He presents with performance deficits affecting function are weakness, impaired balance, impaired self care skills, impaired functional mobility, gait instability, decreased lower extremity function, impaired cardiopulmonary response to activity. Pt agreeable to therapy. He was able to stand and take some side steps with RW.  Pt will continue to benefit from skilled OT services to address impairments listed above to maximize independence with ADLs and functional mobility to ensure safe return to PLOF.     Rehab Prognosis:  Good; patient would benefit from acute skilled OT services to address these deficits and reach maximum level of function.       Plan:     Patient to be seen 4 x/week to address the above listed problems via self-care/home management, therapeutic activities, therapeutic exercises, neuromuscular re-education  Plan of Care Expires: 10/15/24  Plan of Care Reviewed with: patient    Subjective     Chief Complaint: none  Patient/Family Comments/goals: get out of here  Pain/Comfort:  Pain Rating 1: 0/10  Pain Rating Post-Intervention 1: 0/10    Objective:     Communicated with: RN prior to session.  Patient found HOB elevated with telemetry, bed alarm, nguyen catheter, peripheral IV upon OT entry to room.    General Precautions: Standard, fall    Orthopedic Precautions:N/A  Braces: N/A  Respiratory Status: Room air     Occupational Performance:     Bed Mobility:    Patient completed Rolling/Turning to Left with  stand by assistance  Patient  completed Scooting/Bridging with contact guard assistance  Patient completed Supine to Sit with minimum assistance  Patient completed Sit to Supine with contact guard assistance     Functional Mobility/Transfers:  Patient completed Sit <> Stand Transfer with moderate assistance  with  rolling walker. Verbal cues needed for hand placement  Functional Mobility: Pt performed 1x10 standing marches with CGA and RW. Pt then took ~6 lateral steps to the L with CGA and RW. Declined further mobility    Activities of Daily Living:  Declined grooming and toileting     [facility] 6 Click ADL: 20    Treatment & Education:  Pt educated on   Role of OT and OT POC  Safe transfer techniques and proper body mechanics for fall prevention and improved independence with functional transfers  Importance of OOB/EOB activities and therex to increase endurance and tolerance for increased participation in daily ADLs    Utilizing the call bell to request for assistance with all functional mobility to ensure safety during hospital stay.    Pt verbalized understanding and all questions were addressed within the scope of OT.     Patient left HOB elevated with all lines intact, call button in reach, and bed alarm on    GOALS:   Multidisciplinary Problems       Occupational Therapy Goals          Problem: Occupational Therapy    Goal Priority Disciplines Outcome Interventions   Occupational Therapy Goal     OT, PT/OT Progressing    Description: Goals to be met by: 9/29/24     Patient will increase functional independence with ADLs by performing:    UE Dressing with Supervision.  LE Dressing with Supervision.  Grooming while standing at sink with Supervision.  Toileting from toilet with Supervision for hygiene and clothing management.   Toilet transfer to toilet with Supervision.                         Time Tracking:     OT Date of Treatment: 09/16/24  OT Start Time: 1420  OT Stop Time: 1430  OT Total Time (min): 10 min    Billable Minutes:Therapeutic  Activity 10    OT/JORGE: OT          9/16/2024

## 2024-09-16 NOTE — PT/OT/SLP PROGRESS
Occupational Therapy      Patient Name:  Baldo Carney   MRN:  [id number]    Patient not seen today secondary to Other (Comment). Pt politely declining therapy in PM. Stated he walked with PT earlier and wants to rest after just finishing his lunch. Will follow-up as appropriate.    9/16/2024

## 2024-09-16 NOTE — PLAN OF CARE
[facility] - Transplant Stepdown  Discharge Reassessment    Primary Care Provider: Millie Hayes, APRN,FNP-C    Expected Discharge Date: 9/24/2024    Reassessment (most recent)       Discharge Reassessment - 09/16/24 1424          Discharge Reassessment    Assessment Type Discharge Planning Reassessment     Discharge Plan A Home;Home with family     Discharge Plan B Home Health     DME Needed Upon Discharge  other (see comments)   TBD    Transition of Care Barriers None     Why the patient remains in the hospital Requires continued medical care        Post-Acute Status    Post-Acute Authorization Home Health     Home Health Status Referrals Sent                     The patient is current with [facility].     Discharge Plan A and Plan B have been determined by review of patient's clinical status, future medical and therapeutic needs, and coverage/benefits for post-acute care in coordination with multidisciplinary team members.      Janeen Goodrich LCSW  Case Management [facility]

## 2024-09-16 NOTE — ASSESSMENT & PLAN NOTE
Patient's FSGs are controlled on current medication regimen.  Last A1c reviewed-   Lab Results   Component Value Date    HGBA1C 6.0 08/18/2024     Most recent fingerstick glucose reviewed-   Recent Labs   Lab 09/15/24  2021 09/16/24  0822 09/16/24  1254 09/16/24  1722   POCTGLUCOSE 130* 138* 182* 145*       Current correctional scale  Low  Maintain anti-hyperglycemic dose as follows-   Antihyperglycemics (From admission, onward)    Start     Stop Route Frequency Ordered    09/08/24 0511  insulin aspart U-100 pen 0-5 Units         -- SubQ Before meals & nightly PRN 09/08/24 0411        Hold Oral hypoglycemics while patient is in the hospital.     sensory intact

## 2024-09-16 NOTE — PROGRESS NOTES
[facility]  Cardiology  Progress Note    Patient Name: Baldo Carney  MRN: [id number]  Admission Date: 9/8/2024  Hospital Length of Stay: 8 days  Code Status: Full Code   Attending Physician: Marianela Bridges MD   Primary Care Physician: Millie Hayes, APRN,FNP-C  Expected Discharge Date: 9/24/2024  Principal Problem:Acute on chronic combined systolic and diastolic heart failure    Subjective:     Interval History: Diuresing well though still hypervolemic. No chest pain, palpitations, SOB, cough.     ROS  Objective:     Vital Signs (Most Recent):  Temp: 97.5 °F (36.4 °C) (09/16/24 1148)  Pulse: 64 (09/16/24 1307)  Resp: 16 (09/16/24 1148)  BP: (!) 115/57 (09/16/24 1148)  SpO2: 98 % (09/16/24 1148) Vital Signs (24h Range):  Temp:  [97.5 °F (36.4 °C)-98.7 °F (37.1 °C)] 97.5 °F (36.4 °C)  Pulse:  [61-65] 64  Resp:  [16-18] 16  SpO2:  [98 %-100 %] 98 %  BP: (109-121)/(56-71) 115/57     Weight: 60 kg (132 lb 4.4 oz)  Body mass index is 17.94 kg/m².     SpO2: 98 %         Intake/Output Summary (Last 24 hours) at 9/16/2024 1404  Last data filed at 9/16/2024 1146  Gross per 24 hour   Intake 460 ml   Output 2500 ml   Net -2040 ml       Lines/Drains/Airways       Drain  Duration                  Urethral Catheter 09/09/24 1643 16 Fr. 6 days              Peripheral Intravenous Line  Duration                  Peripheral IV - Single Lumen 09/12/24 1807 22 G Anterior;Left;Proximal Forearm 3 days         Peripheral IV - Single Lumen 09/16/24 0628 22 G Right;Posterior Wrist <1 day                       Physical Exam  Constitutional:       General: He is not in acute distress.     Appearance: He is not toxic-appearing.   HENT:      Right Ear: External ear normal.      Left Ear: External ear normal.   Eyes:      General:         Right eye: No discharge.         Left eye: No discharge.      Extraocular Movements: Extraocular movements intact.   Cardiovascular:      Rate and Rhythm: Normal rate and regular  rhythm.      Pulses: Normal pulses.      Heart sounds:      No gallop.      Comments: JV pulsations visualized 1/2 between Rt-clavicle and mandible  Pulmonary:      Effort: Pulmonary effort is normal.      Breath sounds: No rales.   Abdominal:      General: There is distension.   Musculoskeletal:      Cervical back: Normal range of motion.      Right lower leg: No edema.      Left lower leg: No edema.   Skin:     General: Skin is warm and dry.   Neurological:      General: No focal deficit present.      Mental Status: He is alert and oriented to person, place, and time.            Significant Labs: All pertinent lab results from the last 24 hours have been reviewed. and   Recent Lab Results  (Last 5 results in the past 24 hours)        09/16/24  1254   09/16/24  0822   09/16/24  0641   09/15/24  2021   09/15/24  1748        Albumin     3.2           ALP     57           ALT     64           Anion Gap     14           PTT     50.7  Comment: Refer to local heparin nomogram for intensity/dose specific   therapeutic   range.             AST     28           BILIRUBIN TOTAL     1.8  Comment: For infants and newborns, interpretation of results should be based  on gestational age, weight and in agreement with clinical  observations.    Premature Infant recommended reference ranges:  Up to 24 hours.............<8.0 mg/dL  Up to 48 hours............<12.0 mg/dL  3-5 days..................<15.0 mg/dL  6-29 days.................<15.0 mg/dL             BUN     80           Calcium     9.1           Chloride     94           CO2     26           Creatinine     3.5           eGFR     17.7           Glucose     103           Hematocrit     28.6           Hemoglobin     10.3           INR     1.2  Comment: Coumadin Therapy:  2.0 - 3.0 for INR for all indicators except mechanical heart valves  and antiphospholipid syndromes which should use 2.5 - 3.5.             Magnesium      1.9           MCH     30.7           MCHC     36.0            MCV     85           MPV     SEE COMMENT  Comment: Result not available.           Phosphorus Level     3.0           Platelet Count     111           POCT Glucose 182   138     130   137       Potassium     3.6           PROTEIN TOTAL     7.0           PT     12.6           RBC     3.36           RDW     24.8           Sodium     134           WBC     5.52                                  Assessment and Plan:     * Acute on chronic combined systolic and diastolic heart failure  Results for orders placed during the hospital encounter of 09/08/24    Echo    Interpretation Summary  Images from the original result were not included.      Left Ventricle: The left ventricle is mildly dilated measuring 5.8 cm. Normal wall thickness. Severe global hypokinesis present. There is severely reduced systolic function. Grade II diastolic dysfunction. Elevated left ventricular filling pressure. There is a layered, nonmobile thrombus in the apex measuring 1.3 x 2.5 cm.    Right Ventricle: Mild right ventricular enlargement. Systolic function is mildly reduced. Pacemaker lead present in the ventricle.    Left Atrium: Left atrium is severely dilated.    Right Atrium: Right atrium is mildly dilated.    Aortic Valve: There is moderate to severe stenosis. Aortic valve area by VTI is 0.80 cm². Aortic valve peak velocity is 3.43 m/s. Mean gradient is 28 mmHg. The dimensionless index is 0.24. There is mild aortic regurgitation.    Mitral Valve: There is mild regurgitation.    Tricuspid Valve: There is moderate regurgitation.    Pulmonary Artery: There is severe pulmonary hypertension. The estimated pulmonary artery systolic pressure is 74 mmHg.    IVC/SVC: Elevated venous pressure at 15 mmHg.    ECHO concerning for aortic prosthetic valve stenosis likely making it difficult to diuresis patient.      Pt net negative 1.8 L in last 24hrs.  Cr/BUN stable. Will continue to diurese    Recommendations:   - Continue Lasix Drip at 40/hr   -  Increase Diuril to 500mg BID  - Strict I's and O's  - Continue Heparin given ERICA  - Continue hydralazine tid  - Continue isosorbide dinitrate TID  - Continue metoprolol succinate   - Check BMP BID to follow Cr and address accordingly   - Consider additional GDMT when able   - Avoid NSAID's and CCB's     We will place referral to Dr. Gee with [facility] to discuss valve options in the outpatient setting        VTE Risk Mitigation (From admission, onward)           Ordered     heparin 25,000 units in dextrose 5% 250 mL (100 units/mL) infusion HIGH INTENSITY nomogram - [facility]  Continuous        Question:  Begin at (units/kg/hr)  Answer:  18    09/12/24 0739     heparin 25,000 units in dextrose 5% (100 units/ml) IV bolus from bag HIGH INTENSITY nomogram - [facility]  As needed (PRN)        Question:  Heparin Infusion Adjustment (DO NOT MODIFY ANSWER)  Answer:  [email or web address]\epic\Images\Pharmacy\HeparinInfusions\heparin HIGH INTENSITY nomogram for [facility] [id number].pdf    09/12/24 0739     heparin 25,000 units in dextrose 5% (100 units/ml) IV bolus from bag HIGH INTENSITY nomogram - [facility]  As needed (PRN)        Question:  Heparin Infusion Adjustment (DO NOT MODIFY ANSWER)  Answer:  [email or web address]\epic\Images\Pharmacy\HeparinInfusions\heparin HIGH INTENSITY nomogram for [facility] [id number].pdf    09/12/24 0739                    Spencer Broderick, DO  Internal Medicine Intern   Cardiology  [facility]  As above

## 2024-09-16 NOTE — PROGRESS NOTES
[facility] Medicine  Progress Note    Patient Name: Baldo Carney  MRN: [id number]  Patient Class: IP- Inpatient   Admission Date: 9/8/2024  Length of Stay: 8 days  Attending Physician: Marianela Bridges MD  Primary Care Provider: Millie Hayes APRN,FNP-C        Subjective:     Principal Problem:Acute on chronic combined systolic and diastolic heart failure        HPI:  Baldo Carney is a 73 year old Black man with former cigarette smoking (quit in 1970), hypertension, diabetes mellitus type 2 with retinopathy and peripheral neuropathy, coronary artery disease status post coronary artery bypass graft (saphenous vein graft to right coronary artery) on 11/3/2014, status post percutaneous coronary intervention with drug eluting stent placements in left main to left anterior descending artery and left main to lateral circumflex artery on 8/31/2022, history of aortic valve replacement with porcine bioprosthesis on 11/3/2014, ischemic cardiomyopathy with chronic biventricular systolic and diastolic heart failure, chronic kidney disease stage 3-4, anemia, benign prostatic hyperplasia, hypothyroidism, cirrhosis with ascites requiring bimonthly paracentesis, chronic obstructive pulmonary disease (COPD), history of incarcerated inguinal hernia repair on 12/7/2023. He lives in [place], [place].    He was hospitalized at [facility] from 8/12/2024 to 8/18/2024 for syncope, cystitis, hypoglycemia, COVID-19. He underwent paracentesis on admission with 6 liters of fluid removed and albumin given.    He was seen by his primary care nurse practitioner in clinic on 8/27/2024 and was prescribed mirtazapine for situational anxiety and omeprazole for heartburn.   He presented to [facility] Emergency Department on Saturday 9/7/2024 with left sided chest pain for 4 hours without radiation, associated with shortness of breath. He also had unchanged chronic bilateral  lower extremity edema. He was scheduled for paracentesis on Monday 9/9/2024. Labs showed elevated BNP (>4900 pg/mL) and troponin (0.196 ng/mL) and acute kidney injury (BUN 69 mg/dL and creatinine 4.8 mg/dL from 37 and 1.9 on 8/23/2024), suggestive of volume overload due to cirrhosis and heart failure. He was transferred to [facility] on 9/8/2024 and admitted to Hospital Medicine Team L.     Overview/Hospital Course:  Nephrology and Cardiology were consulted. Hepatology recommended IV albumin. Suárez catheter was placed for urine output measurement. Nephrology suspected cardiorenal syndrome and recommended giving IV furosemide 80 mg twice daily. It was ineffective. Hospital Medicine increased it to 120 mg twice daily and it still did not increase urine output. Echocardiogram showed a layered nonmobile left ventricular apical thrombus. He was put on therapeutic enoxaparin. Cardiology recommended furosemide drip. Nephrology agreed with furosemide drip and recommended a trial of IV albumin for 3 days. He diuresed better.     Continued on lasix gtt, appreciate cardiology recommendations. Lasix dose increased and diuril given. GDMT holding parameters added. Continued monitoring on heparin gtt. Not a candidate for ARNI/ MRA or SGLT2i due to renal disease.    Regarding severe aortic prosthetic valve stenosis recommendation for outpatient valve clinic follow up for possible valve in valve TAVR once LV thrombus resolves. Will discuss regarding anticoagulation transition to oral.      Palliative care evaluated and initiated C conversations.    Interval History:     NAEON. Continued on lasix drip and diuril. Continued on heparin drip and currently therapeutic.     Will decrease lasix drip rate per nephrology recommendations on POCUS. Will coordinate with cardiology    Review of Systems   Constitutional:  Negative for chills and fever.   HENT:  Negative for nosebleeds.    Eyes:  Negative for visual  disturbance.   Respiratory:  Negative for chest tightness and shortness of breath.    Cardiovascular:  Negative for chest pain.   Gastrointestinal:  Positive for abdominal distention. Negative for abdominal pain and constipation.   Genitourinary:         Suárez catheter in place   Neurological:  Negative for dizziness and light-headedness.   Psychiatric/Behavioral:  Negative for suicidal ideas. The patient is not nervous/anxious.      Objective:     Vital Signs (Most Recent):  Temp: 98 °F (36.7 °C) (09/16/24 1559)  Pulse: 60 (09/16/24 1658)  Resp: 16 (09/16/24 1559)  BP: 135/64 (09/16/24 1559)  SpO2: 100 % (09/16/24 1559) Vital Signs (24h Range):  Temp:  [97.5 °F (36.4 °C)-98.7 °F (37.1 °C)] 98 °F (36.7 °C)  Pulse:  [60-65] 60  Resp:  [16-18] 16  SpO2:  [98 %-100 %] 100 %  BP: (109-135)/(56-71) 135/64     Weight: 60 kg (132 lb 4.4 oz)  Body mass index is 17.94 kg/m².    Intake/Output Summary (Last 24 hours) at 9/16/2024 1742  Last data filed at 9/16/2024 1146  Gross per 24 hour   Intake 460 ml   Output 2700 ml   Net -2240 ml         Physical Exam  Vitals and nursing note reviewed.   Constitutional:       General: He is not in acute distress.     Appearance: Normal appearance. He is well-developed.      Interventions: He is not intubated.  HENT:      Head: Normocephalic and atraumatic.   Eyes:      Extraocular Movements: Extraocular movements intact.      Conjunctiva/sclera: Conjunctivae normal.   Cardiovascular:      Rate and Rhythm: Normal rate and regular rhythm.      Pulses: Normal pulses.      Heart sounds: Murmur heard.   Pulmonary:      Effort: Pulmonary effort is normal. No accessory muscle usage or respiratory distress. He is not intubated.      Breath sounds: Rales present.   Abdominal:      General: There is distension.      Tenderness: There is no abdominal tenderness.   Musculoskeletal:      Cervical back: Normal range of motion and neck supple.      Right lower leg: No edema.      Left lower leg: No  edema.   Skin:     General: Skin is warm and dry.   Neurological:      Mental Status: He is alert and oriented to person, place, and time. Mental status is at baseline.   Psychiatric:         Attention and Perception: Attention normal.         Mood and Affect: Mood and affect normal.         Behavior: Behavior normal. Behavior is cooperative.         Significant Labs: All pertinent labs within the past 24 hours have been reviewed.  Recent Labs   Lab 09/14/24  0614 09/15/24  0659 09/16/24  0641    137 134*   K 3.4* 3.1* 3.6   CL 99 97 94*   CO2 22* 28 26   BUN 82* 79* 80*   CREATININE 3.5* 3.4* 3.5*   CALCIUM 8.7 8.8 9.1   PROT 6.5 6.8 7.0   BILITOT 1.8* 1.7* 1.8*   ALKPHOS 55 60 57   ALT 88* 79* 64*   AST 42* 35 28     Recent Labs   Lab 09/16/24  0641   WBC 5.52   RBC 3.36*   HGB 10.3*   HCT 28.6*   *   MCV 85   MCH 30.7   MCHC 36.0        Significant Imaging: I have reviewed all pertinent imaging results/findings within the past 24 hours.    Assessment/Plan:      * Acute on chronic combined systolic and diastolic heart failure  He takes metoprolol succinate, hydralazine, isosorbide dinitrate, sacubitril-valsartan, empagliflozin.     -Giving home metoprolol, isosorbide dinitrate, hydralazine. Not on a diuretic at home.  -Gave a dose of IV chlorothiazide. Tried furosemide IV boluses, which were ineffective, then switched to furosemide drip by Cardiology. Appreciate Cardiology. Monitor electrolytes, intake/output. Can hold or wean GDMT but current goal is to optimize diuresis.     Continued on lasix gtt, appreciate cardiology recommendations. GDMT holding parameters added. Continued monitoring on heparin gtt. Not a candidate for ARNI/ MRA or SGLT2i due to renal disease. Will coordiante dose of lasix and diuril with cardiology     Echo    Result Date: 9/9/2024  Images from the original result were not included.      Left Ventricle: The left ventricle is mildly dilated measuring 5.8 cm.   Normal wall  thickness. Severe global hypokinesis present. There is   severely reduced systolic function. Grade II diastolic dysfunction.   Elevated left ventricular filling pressure. There is a layered, nonmobile   thrombus in the apex measuring 1.3 x 2.5 cm.    Right Ventricle: Mild right ventricular enlargement. Systolic function   is mildly reduced. Pacemaker lead present in the ventricle.    Left Atrium: Left atrium is severely dilated.    Right Atrium: Right atrium is mildly dilated.    Aortic Valve: There is moderate to severe stenosis. Aortic valve area   by VTI is 0.80 cm². Aortic valve peak velocity is 3.43 m/s. Mean gradient   is 28 mmHg. The dimensionless index is 0.24. There is mild aortic   regurgitation.    Mitral Valve: There is mild regurgitation.    Tricuspid Valve: There is moderate regurgitation.    Pulmonary Artery: There is severe pulmonary hypertension. The estimated   pulmonary artery systolic pressure is 74 mmHg.    IVC/SVC: Elevated venous pressure at 15 mmHg.          Left ventricular apical thrombus  ECHO reported layered LV thrombus   Continue heparin gtt        Acute kidney injury superimposed on chronic kidney disease  ERICA is likely due to  Cardiorenal . Baseline creatinine is  1.7-2 . Most recent creatinine and eGFR are listed below.  Recent Labs     09/14/24  0614 09/15/24  0659 09/16/24  0641   CREATININE 3.5* 3.4* 3.5*   EGFRNORACEVR 17.7* 18.3* 17.7*        Plan  - ERICA is stable  - Avoid nephrotoxins and renally dose meds for GFR listed above  - Monitor urine output, serial BMP, and adjust therapy as needed  - appreciate nephrology recommendations.    Elevated troponin  Due to CHF.      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 without macular edema, without long-term current use of insulin  Patient's FSGs are controlled on current medication regimen.  Last A1c reviewed-   Lab Results   Component Value Date    HGBA1C 6.0 08/18/2024     Most recent  fingerstick glucose reviewed-   Recent Labs   Lab 09/15/24  2021 09/16/24  0822 09/16/24  1254 09/16/24  1722   POCTGLUCOSE 130* 138* 182* 145*       Current correctional scale  Low  Maintain anti-hyperglycemic dose as follows-   Antihyperglycemics (From admission, onward)      Start     Stop Route Frequency Ordered    09/08/24 0511  insulin aspart U-100 pen 0-5 Units         -- SubQ Before meals & nightly PRN 09/08/24 0411          Hold Oral hypoglycemics while patient is in the hospital.      Cirrhosis of liver with ascites  Patient with known Cirrhosis with Child's class Calculator for Child's score link- https://www.[employer].Colppy/calc/340/child-myles-score-cirrhosis-mortality#creator-insights. Co-morbidities are present and inclusive of ascites.  MELD-Na score calculated; MELD 3.0: 25 at 9/16/2024  6:41 AM  MELD-Na: 25 at 9/16/2024  6:41 AM  Calculated from:  Serum Creatinine: 3.5 mg/dL (Using max of 3 mg/dL) at 9/16/2024  6:41 AM  Serum Sodium: 134 mmol/L at 9/16/2024  6:41 AM  Total Bilirubin: 1.8 mg/dL at 9/16/2024  6:41 AM  Serum Albumin: 3.2 g/dL at 9/16/2024  6:41 AM  INR(ratio): 1.2 at 9/16/2024  6:41 AM  Age at listing (hypothetical): 73 years  Sex: Male at 9/16/2024  6:41 AM    Appreciate Hepatology. 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 not a candidate for liver transplant evaluation given multiple comorbidities & severe heart failure   -Palliative care consulted for goal clarification       Hypothyroidism  Stable. Continue home levothyroxine.      COPD (chronic obstructive pulmonary disease)  He takes fluticasone-vilanterol. Giving levalbuterol prn.    Hypertension    Chronic, controlled. Latest blood pressure and vitals reviewed-     Temp:  [97.5 °F (36.4 °C)-98.7 °F (37.1 °C)]   Pulse:  [60-65]   Resp:  [16-18]   BP: (109-135)/(56-71)   SpO2:  [98 %-100 %] .   Home meds for hypertension were reviewed and noted below.   Hypertension Medications               hydrALAZINE (APRESOLINE) 50 MG tablet Take 1 tablet  (50 mg total) by mouth 2 (two) times a day.    isosorbide dinitrate (ISORDIL) 20 MG tablet Take 20 mg by mouth 3 (three) times daily.    metoprolol succinate (TOPROL-XL) 25 MG 24 hr tablet Take 1 tablet (25 mg total) by mouth once daily.    sacubitriL-valsartan (ENTRESTO) 24-26 mg per tablet Take 1 tablet by mouth 2 (two) times daily.            While in the hospital, will manage blood pressure as follows; Adjust home antihypertensive regimen as follows- Continue home hydralazine, isosorbide dinitrate, metoprolol. Monitor BP.    Will utilize p.r.n. blood pressure medication only if patient's blood pressure greater than 180/110 and he develops symptoms such as worsening chest pain or shortness of breath.        VTE Risk Mitigation (From admission, onward)           Ordered     heparin 25,000 units in dextrose 5% 250 mL (100 units/mL) infusion HIGH INTENSITY nomogram - [facility]  Continuous        Question:  Begin at (units/kg/hr)  Answer:  18    09/12/24 0739     heparin 25,000 units in dextrose 5% (100 units/ml) IV bolus from bag HIGH INTENSITY nomogram - [facility]  As needed (PRN)        Question:  Heparin Infusion Adjustment (DO NOT MODIFY ANSWER)  Answer:  [email or web address]\epic\Images\Pharmacy\HeparinInfusions\heparin HIGH INTENSITY nomogram for [facility] [id number].pdf    09/12/24 0739     heparin 25,000 units in dextrose 5% (100 units/ml) IV bolus from bag HIGH INTENSITY nomogram - [facility]  As needed (PRN)        Question:  Heparin Infusion Adjustment (DO NOT MODIFY ANSWER)  Answer:  [email or web address]\epic\Images\Pharmacy\HeparinInfusions\heparin HIGH INTENSITY nomogram for [facility] [id number].pdf    09/12/24 0739                    Discharge Planning   PAM: 9/24/2024     Code Status: Full Code   Is the patient medically ready for discharge?:     Reason for patient still in hospital (select all that apply): Patient trending condition, Laboratory test, Treatment, and Consult recommendations  Discharge Plan A: Home, Home with family   Discharge Delays:  None known at this time              Marianela Bridges MD  Department of Hospital Medicine   [facility]

## 2024-09-16 NOTE — ASSESSMENT & PLAN NOTE
ERICA is likely due to  Cardiorenal . Baseline creatinine is  1.7-2 . Most recent creatinine and eGFR are listed below.  Recent Labs     09/14/24  0614 09/15/24  0659 09/16/24  0641   CREATININE 3.5* 3.4* 3.5*   EGFRNORACEVR 17.7* 18.3* 17.7*        Plan  - ERICA is stable  - Avoid nephrotoxins and renally dose meds for GFR listed above  - Monitor urine output, serial BMP, and adjust therapy as needed  - appreciate nephrology recommendations.

## 2024-09-16 NOTE — ASSESSMENT & PLAN NOTE
Chronic, controlled. Latest blood pressure and vitals reviewed-     Temp:  [97.5 °F (36.4 °C)-98.7 °F (37.1 °C)]   Pulse:  [60-65]   Resp:  [16-18]   BP: (109-135)/(56-71)   SpO2:  [98 %-100 %] .   Home meds for hypertension were reviewed and noted below.   Hypertension Medications               hydrALAZINE (APRESOLINE) 50 MG tablet Take 1 tablet (50 mg total) by mouth 2 (two) times a day.    isosorbide dinitrate (ISORDIL) 20 MG tablet Take 20 mg by mouth 3 (three) times daily.    metoprolol succinate (TOPROL-XL) 25 MG 24 hr tablet Take 1 tablet (25 mg total) by mouth once daily.    sacubitriL-valsartan (ENTRESTO) 24-26 mg per tablet Take 1 tablet by mouth 2 (two) times daily.            While in the hospital, will manage blood pressure as follows; Adjust home antihypertensive regimen as follows- Continue home hydralazine, isosorbide dinitrate, metoprolol. Monitor BP.    Will utilize p.r.n. blood pressure medication only if patient's blood pressure greater than 180/110 and he develops symptoms such as worsening chest pain or shortness of breath.

## 2024-09-16 NOTE — SUBJECTIVE & OBJECTIVE
Interval History: Diuresing well though still hypervolemic. No chest pain, palpitations, SOB, cough.     ROS  Objective:     Vital Signs (Most Recent):  Temp: 97.5 °F (36.4 °C) (09/16/24 1148)  Pulse: 64 (09/16/24 1307)  Resp: 16 (09/16/24 1148)  BP: (!) 115/57 (09/16/24 1148)  SpO2: 98 % (09/16/24 1148) Vital Signs (24h Range):  Temp:  [97.5 °F (36.4 °C)-98.7 °F (37.1 °C)] 97.5 °F (36.4 °C)  Pulse:  [61-65] 64  Resp:  [16-18] 16  SpO2:  [98 %-100 %] 98 %  BP: (109-121)/(56-71) 115/57     Weight: 60 kg (132 lb 4.4 oz)  Body mass index is 17.94 kg/m².     SpO2: 98 %         Intake/Output Summary (Last 24 hours) at 9/16/2024 1404  Last data filed at 9/16/2024 1146  Gross per 24 hour   Intake 460 ml   Output 2500 ml   Net -2040 ml       Lines/Drains/Airways       Drain  Duration                  Urethral Catheter 09/09/24 1643 16 Fr. 6 days              Peripheral Intravenous Line  Duration                  Peripheral IV - Single Lumen 09/12/24 1807 22 G Anterior;Left;Proximal Forearm 3 days         Peripheral IV - Single Lumen 09/16/24 0628 22 G Right;Posterior Wrist <1 day                       Physical Exam  Constitutional:       General: He is not in acute distress.     Appearance: He is not toxic-appearing.   HENT:      Right Ear: External ear normal.      Left Ear: External ear normal.   Eyes:      General:         Right eye: No discharge.         Left eye: No discharge.      Extraocular Movements: Extraocular movements intact.   Cardiovascular:      Rate and Rhythm: Normal rate and regular rhythm.      Pulses: Normal pulses.      Heart sounds:      No gallop.      Comments: JV pulsations visualized 1/2 between Rt-clavicle and mandible  Pulmonary:      Effort: Pulmonary effort is normal.      Breath sounds: No rales.   Abdominal:      General: There is distension.   Musculoskeletal:      Cervical back: Normal range of motion.      Right lower leg: No edema.      Left lower leg: No edema.   Skin:     General: Skin  is warm and dry.   Neurological:      General: No focal deficit present.      Mental Status: He is alert and oriented to person, place, and time.            Significant Labs: All pertinent lab results from the last 24 hours have been reviewed. and   Recent Lab Results  (Last 5 results in the past 24 hours)        09/16/24  1254   09/16/24  0822   09/16/24  0641   09/15/24  2021   09/15/24  1748        Albumin     3.2           ALP     57           ALT     64           Anion Gap     14           PTT     50.7  Comment: Refer to local heparin nomogram for intensity/dose specific   therapeutic   range.             AST     28           BILIRUBIN TOTAL     1.8  Comment: For infants and newborns, interpretation of results should be based  on gestational age, weight and in agreement with clinical  observations.    Premature Infant recommended reference ranges:  Up to 24 hours.............<8.0 mg/dL  Up to 48 hours............<12.0 mg/dL  3-5 days..................<15.0 mg/dL  6-29 days.................<15.0 mg/dL             BUN     80           Calcium     9.1           Chloride     94           CO2     26           Creatinine     3.5           eGFR     17.7           Glucose     103           Hematocrit     28.6           Hemoglobin     10.3           INR     1.2  Comment: Coumadin Therapy:  2.0 - 3.0 for INR for all indicators except mechanical heart valves  and antiphospholipid syndromes which should use 2.5 - 3.5.             Magnesium      1.9           MCH     30.7           MCHC     36.0           MCV     85           MPV     SEE COMMENT  Comment: Result not available.           Phosphorus Level     3.0           Platelet Count     111           POCT Glucose 182   138     130   137       Potassium     3.6           PROTEIN TOTAL     7.0           PT     12.6           RBC     3.36           RDW     24.8           Sodium     134           WBC     5.52

## 2024-09-16 NOTE — ASSESSMENT & PLAN NOTE
He takes metoprolol succinate, hydralazine, isosorbide dinitrate, sacubitril-valsartan, empagliflozin.     -Giving home metoprolol, isosorbide dinitrate, hydralazine. Not on a diuretic at home.  -Gave a dose of IV chlorothiazide. Tried furosemide IV boluses, which were ineffective, then switched to furosemide drip by Cardiology. Appreciate Cardiology. Monitor electrolytes, intake/output. Can hold or wean GDMT but current goal is to optimize diuresis.     Continued on lasix gtt, appreciate cardiology recommendations. GDMT holding parameters added. Continued monitoring on heparin gtt. Not a candidate for ARNI/ MRA or SGLT2i due to renal disease. Will coordiante dose of lasix and diuril with cardiology     Echo    Result Date: 9/9/2024  Images from the original result were not included.      Left Ventricle: The left ventricle is mildly dilated measuring 5.8 cm.   Normal wall thickness. Severe global hypokinesis present. There is   severely reduced systolic function. Grade II diastolic dysfunction.   Elevated left ventricular filling pressure. There is a layered, nonmobile   thrombus in the apex measuring 1.3 x 2.5 cm.    Right Ventricle: Mild right ventricular enlargement. Systolic function   is mildly reduced. Pacemaker lead present in the ventricle.    Left Atrium: Left atrium is severely dilated.    Right Atrium: Right atrium is mildly dilated.    Aortic Valve: There is moderate to severe stenosis. Aortic valve area   by VTI is 0.80 cm². Aortic valve peak velocity is 3.43 m/s. Mean gradient   is 28 mmHg. The dimensionless index is 0.24. There is mild aortic   regurgitation.    Mitral Valve: There is mild regurgitation.    Tricuspid Valve: There is moderate regurgitation.    Pulmonary Artery: There is severe pulmonary hypertension. The estimated   pulmonary artery systolic pressure is 74 mmHg.    IVC/SVC: Elevated venous pressure at 15 mmHg.

## 2024-09-16 NOTE — PLAN OF CARE
Patient is AAOx4.  Afebrile  RA  Telemetry monitoring remains in place.   AC&HS glucose monitoring  Heparin gtt continued at 10u/kg/hr or 6.8 ml/hr with DW 67.9 kg. AM APTT therapeutic  Furosemide gtt continued at 40 mg/hr or 4cc/hr.   Patient is up with one person assist.   Suárez remains in place. Urine was pink tinged at beginning of shift, but became more yellow as shift went on.   Bed alarm is set.   Call light is within patient's reach.   Instructed to call for assistance. Fall risk reviewed.   Bed is in lowest position with wheels locked.   Plan of care is ongoing.

## 2024-09-16 NOTE — ASSESSMENT & PLAN NOTE
Patient with known Cirrhosis with Child's class Calculator for Child's score link- [email or web address]/[email or web address]/child-myles-score-cirrhosis-mortality#creator-insights. Co-morbidities are present and inclusive of ascites.  MELD-Na score calculated; MELD 3.0: 25 at 9/16/2024  6:41 AM  MELD-Na: 25 at 9/16/2024  6:41 AM  Calculated from:  Serum Creatinine: 3.5 mg/dL (Using max of 3 mg/dL) at 9/16/2024  6:41 AM  Serum Sodium: 134 mmol/L at 9/16/2024  6:41 AM  Total Bilirubin: 1.8 mg/dL at 9/16/2024  6:41 AM  Serum Albumin: 3.2 g/dL at 9/16/2024  6:41 AM  INR(ratio): 1.2 at 9/16/2024  6:41 AM  Age at listing (hypothetical): 73 years  Sex: Male at 9/16/2024  6:41 AM    Appreciate Hepatology. 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 not a candidate for liver transplant evaluation given multiple comorbidities & severe heart failure   -Palliative care consulted for goal clarification

## 2024-09-16 NOTE — PROGRESS NOTES
"[facility] - Transplant Stepdown  Nephrology  Progress Note    Patient Name: Baldo Carney  MRN: [id number]  Admission Date: 9/8/2024  Hospital Length of Stay: 8 days  Attending Provider: Marianela Bridges MD   Primary Care Physician: Millie Hayes, APRN,FNP-C  Principal Problem:Acute on chronic combined systolic and diastolic heart failure    Subjective:     HPI: 73 year old male with a previous medical history of COPD, cirrhosis of the liver with bi monthly paracentesis, cardiomyopathy, CHF, aortic stenosis, CAD, HTN, HLD, BPH, chronic renal failure, and DMII who presented to the emergency room with left sided chest pain x 4 hours. S/p paracentesis 8/12/24 with 6L fluid removal and albumin replacement     Elevated troponin, BNP with ERICA on CKD suggest volume overload s/t decompensated cirrhosis and biventricular HFrEF 22%. Thrombocytopenia s/t chronic liver disease.     Nephrology consulted for "Hepatology suspects hepatorenal syndrome." Urine Na >20.     Interval History: Had some bloody UOP overnight. Hgb stable. Renal function stable.     Review of patient's allergies indicates:   Allergen Reactions    Canagliflozin      Other reaction(s): been very dizzy     Current Facility-Administered Medications   Medication Frequency    aspirin EC tablet 81 mg Daily    chlorothiazide (DIURIL) 250 mg in D5W 50 mL IVPB Q12H    dextrose 10% bolus 125 mL 125 mL PRN    dextrose 10% bolus 250 mL 250 mL PRN    furosemide (Lasix) 500 mg in 50 mL infusion (conc: 10 mg/mL) Continuous    glucagon (human recombinant) injection 1 mg PRN    glucose chewable tablet 16 g PRN    glucose chewable tablet 24 g PRN    heparin 25,000 units in dextrose 5% (100 units/ml) IV bolus from bag HIGH INTENSITY nomogram - [facility] PRN    heparin 25,000 units in dextrose 5% (100 units/ml) IV bolus from bag HIGH INTENSITY nomogram - [facility] PRN    heparin 25,000 units in dextrose 5% 250 mL (100 units/mL) infusion HIGH INTENSITY nomogram - [facility] Continuous    " hydrALAZINE tablet 50 mg TID    insulin aspart U-100 pen 0-5 Units QID (AC + HS) PRN    isosorbide dinitrate tablet 20 mg TID    levalbuterol nebulizer solution 0.63 mg Q6H PRN    levothyroxine tablet 100 mcg Before breakfast    melatonin tablet 6 mg Nightly PRN    metoprolol succinate (TOPROL-XL) 24 hr tablet 25 mg Daily    mirtazapine tablet 7.5 mg QHS       Objective:     Vital Signs (Most Recent):  Temp: 97.5 °F (36.4 °C) (24 1148)  Pulse: 64 (24 1307)  Resp: 16 (24 1148)  BP: (!) 115/57 (24 1148)  SpO2: 98 % (24 1148) Vital Signs (24h Range):  Temp:  [97.5 °F (36.4 °C)-98.7 °F (37.1 °C)] 97.5 °F (36.4 °C)  Pulse:  [61-65] 64  Resp:  [16-18] 16  SpO2:  [98 %-100 %] 98 %  BP: (109-121)/(56-71) 115/57     Weight: 60 kg (132 lb 4.4 oz) (24 0615)  Body mass index is 17.94 kg/m².  Body surface area is 1.75 meters squared.    I/O last 3 completed shifts:  In: 580 [P.O.:580]  Out: 4250 [Urine:4250]     Physical Exam  Constitutional:       General: He is not in acute distress.     Appearance: Normal appearance. He is not ill-appearing.   HENT:      Head: Normocephalic.   Cardiovascular:      Rate and Rhythm: Normal rate and regular rhythm.      Pulses: Normal pulses.      Heart sounds: Normal heart sounds. No murmur heard.     No friction rub. No gallop.   Pulmonary:      Effort: Pulmonary effort is normal. No respiratory distress.      Breath sounds: Normal breath sounds.   Abdominal:      General: Abdomen is flat. Bowel sounds are normal. There is no distension.      Palpations: Abdomen is soft.      Tenderness: There is no abdominal tenderness.   Musculoskeletal:         General: No swelling.      Right lower le+ Edema present.      Left lower le+ Edema present.   Skin:     General: Skin is warm.      Coloration: Skin is not jaundiced.      Findings: No erythema or rash.   Neurological:      General: No focal deficit present.      Mental Status: He is alert and oriented to  "person, place, and time. Mental status is at baseline.   Psychiatric:         Mood and Affect: Mood normal.         Behavior: Behavior normal.         Thought Content: Thought content normal.          Significant Labs:  All labs within the past 24 hours have been reviewed.     Significant Imaging:  Labs: Reviewed  Assessment/Plan:     Renal/  Acute kidney injury superimposed on chronic kidney disease  Nephrology consulted for "Hepatology suspects hepatorenal syndrome."  Baseline Cr ~1.9. Urine Na >20, however, potentially 2/2 ATN or unlisted diuretic use or CRS.     PLAN:    - Renal function slowly improving  - Recommend decreasing Lasix gtt to 20mg/hr   - Trend Cr/ Serial BMPs  - Replace electrolytes PRN, goal K/Phos/Mg 4/3/2  - Avoid nephrotoxic agents when feasible (NSAIDs, ACEi/ARB, IV radiocontrast, gadolinium, etc.)  - Avoid Fleet's and Brown Bomb Enemas given their propensity to induce hypermagnesemia  - Renally dose all meds to eGFR  - Goal MAP > 65 mmHg  - No acute indications for RRT at this time         Thank you for your consult. I will follow-up with patient. Please contact us if you have any additional questions.    Erica Lanier MD  Nephrology  [facility] - [facility] Step[facility]  "

## 2024-09-16 NOTE — ASSESSMENT & PLAN NOTE
Results for orders placed during the hospital encounter of 09/08/24    Echo    Interpretation Summary  Images from the original result were not included.      Left Ventricle: The left ventricle is mildly dilated measuring 5.8 cm. Normal wall thickness. Severe global hypokinesis present. There is severely reduced systolic function. Grade II diastolic dysfunction. Elevated left ventricular filling pressure. There is a layered, nonmobile thrombus in the apex measuring 1.3 x 2.5 cm.    Right Ventricle: Mild right ventricular enlargement. Systolic function is mildly reduced. Pacemaker lead present in the ventricle.    Left Atrium: Left atrium is severely dilated.    Right Atrium: Right atrium is mildly dilated.    Aortic Valve: There is moderate to severe stenosis. Aortic valve area by VTI is 0.80 cm². Aortic valve peak velocity is 3.43 m/s. Mean gradient is 28 mmHg. The dimensionless index is 0.24. There is mild aortic regurgitation.    Mitral Valve: There is mild regurgitation.    Tricuspid Valve: There is moderate regurgitation.    Pulmonary Artery: There is severe pulmonary hypertension. The estimated pulmonary artery systolic pressure is 74 mmHg.    IVC/SVC: Elevated venous pressure at 15 mmHg.    ECHO concerning for aortic prosthetic valve stenosis likely making it difficult to diuresis patient.      Pt net negative 1.8 L in last 24hrs.  Cr/BUN stable. Will continue to diurese    Recommendations:   - Continue Lasix Drip at 40/hr   - Increase Diuril to 500mg BID  - Strict I's and O's  - Continue Heparin given ERICA  - Continue hydralazine tid  - Continue isosorbide dinitrate TID  - Continue metoprolol succinate   - Check BMP BID to follow Cr and address accordingly   - Consider additional GDMT when able   - Avoid NSAID's and CCB's     We will place referral to Dr. Gee with [facility] to discuss valve options in the outpatient setting

## 2024-09-16 NOTE — SUBJECTIVE & OBJECTIVE
Interval History:     NAEON. Continued on lasix drip and diuril. Continued on heparin drip and currently therapeutic.     Will decrease lasix drip rate per nephrology recommendations on POCUS. Will coordinate with cardiology    Review of Systems   Constitutional:  Negative for chills and fever.   HENT:  Negative for nosebleeds.    Eyes:  Negative for visual disturbance.   Respiratory:  Negative for chest tightness and shortness of breath.    Cardiovascular:  Negative for chest pain.   Gastrointestinal:  Positive for abdominal distention. Negative for abdominal pain and constipation.   Genitourinary:         Suárez catheter in place   Neurological:  Negative for dizziness and light-headedness.   Psychiatric/Behavioral:  Negative for suicidal ideas. The patient is not nervous/anxious.      Objective:     Vital Signs (Most Recent):  Temp: 98 °F (36.7 °C) (09/16/24 1559)  Pulse: 60 (09/16/24 1658)  Resp: 16 (09/16/24 1559)  BP: 135/64 (09/16/24 1559)  SpO2: 100 % (09/16/24 1559) Vital Signs (24h Range):  Temp:  [97.5 °F (36.4 °C)-98.7 °F (37.1 °C)] 98 °F (36.7 °C)  Pulse:  [60-65] 60  Resp:  [16-18] 16  SpO2:  [98 %-100 %] 100 %  BP: (109-135)/(56-71) 135/64     Weight: 60 kg (132 lb 4.4 oz)  Body mass index is 17.94 kg/m².    Intake/Output Summary (Last 24 hours) at 9/16/2024 1742  Last data filed at 9/16/2024 1146  Gross per 24 hour   Intake 460 ml   Output 2700 ml   Net -2240 ml         Physical Exam  Vitals and nursing note reviewed.   Constitutional:       General: He is not in acute distress.     Appearance: Normal appearance. He is well-developed.      Interventions: He is not intubated.  HENT:      Head: Normocephalic and atraumatic.   Eyes:      Extraocular Movements: Extraocular movements intact.      Conjunctiva/sclera: Conjunctivae normal.   Cardiovascular:      Rate and Rhythm: Normal rate and regular rhythm.      Pulses: Normal pulses.      Heart sounds: Murmur heard.   Pulmonary:      Effort: Pulmonary  effort is normal. No accessory muscle usage or respiratory distress. He is not intubated.      Breath sounds: Rales present.   Abdominal:      General: There is distension.      Tenderness: There is no abdominal tenderness.   Musculoskeletal:      Cervical back: Normal range of motion and neck supple.      Right lower leg: No edema.      Left lower leg: No edema.   Skin:     General: Skin is warm and dry.   Neurological:      Mental Status: He is alert and oriented to person, place, and time. Mental status is at baseline.   Psychiatric:         Attention and Perception: Attention normal.         Mood and Affect: Mood and affect normal.         Behavior: Behavior normal. Behavior is cooperative.         Significant Labs: All pertinent labs within the past 24 hours have been reviewed.  Recent Labs   Lab 09/14/24  0614 09/15/24  0659 09/16/24  0641    137 134*   K 3.4* 3.1* 3.6   CL 99 97 94*   CO2 22* 28 26   BUN 82* 79* 80*   CREATININE 3.5* 3.4* 3.5*   CALCIUM 8.7 8.8 9.1   PROT 6.5 6.8 7.0   BILITOT 1.8* 1.7* 1.8*   ALKPHOS 55 60 57   ALT 88* 79* 64*   AST 42* 35 28     Recent Labs   Lab 09/16/24  0641   WBC 5.52   RBC 3.36*   HGB 10.3*   HCT 28.6*   *   MCV 85   MCH 30.7   MCHC 36.0        Significant Imaging: I have reviewed all pertinent imaging results/findings within the past 24 hours.

## 2024-09-16 NOTE — SUBJECTIVE & OBJECTIVE
Interval History: Had some bloody UOP overnight. Hgb stable. Renal function stable.     Review of patient's allergies indicates:   Allergen Reactions    Canagliflozin      Other reaction(s): been very dizzy     Current Facility-Administered Medications   Medication Frequency    aspirin EC tablet 81 mg Daily    chlorothiazide (DIURIL) 250 mg in D5W 50 mL IVPB Q12H    dextrose 10% bolus 125 mL 125 mL PRN    dextrose 10% bolus 250 mL 250 mL PRN    furosemide (Lasix) 500 mg in 50 mL infusion (conc: 10 mg/mL) Continuous    glucagon (human recombinant) injection 1 mg PRN    glucose chewable tablet 16 g PRN    glucose chewable tablet 24 g PRN    heparin 25,000 units in dextrose 5% (100 units/ml) IV bolus from bag HIGH INTENSITY nomogram - [facility] PRN    heparin 25,000 units in dextrose 5% (100 units/ml) IV bolus from bag HIGH INTENSITY nomogram - [facility] PRN    heparin 25,000 units in dextrose 5% 250 mL (100 units/mL) infusion HIGH INTENSITY nomogram - [facility] Continuous    hydrALAZINE tablet 50 mg TID    insulin aspart U-100 pen 0-5 Units QID (AC + HS) PRN    isosorbide dinitrate tablet 20 mg TID    levalbuterol nebulizer solution 0.63 mg Q6H PRN    levothyroxine tablet 100 mcg Before breakfast    melatonin tablet 6 mg Nightly PRN    metoprolol succinate (TOPROL-XL) 24 hr tablet 25 mg Daily    mirtazapine tablet 7.5 mg QHS       Objective:     Vital Signs (Most Recent):  Temp: 97.5 °F (36.4 °C) (09/16/24 1148)  Pulse: 64 (09/16/24 1307)  Resp: 16 (09/16/24 1148)  BP: (!) 115/57 (09/16/24 1148)  SpO2: 98 % (09/16/24 1148) Vital Signs (24h Range):  Temp:  [97.5 °F (36.4 °C)-98.7 °F (37.1 °C)] 97.5 °F (36.4 °C)  Pulse:  [61-65] 64  Resp:  [16-18] 16  SpO2:  [98 %-100 %] 98 %  BP: (109-121)/(56-71) 115/57     Weight: 60 kg (132 lb 4.4 oz) (09/16/24 0615)  Body mass index is 17.94 kg/m².  Body surface area is 1.75 meters squared.    I/O last 3 completed shifts:  In: 580 [P.O.:580]  Out: 4250 [Urine:4250]     Physical Exam  Constitutional:        General: He is not in acute distress.     Appearance: Normal appearance. He is not ill-appearing.   HENT:      Head: Normocephalic.   Cardiovascular:      Rate and Rhythm: Normal rate and regular rhythm.      Pulses: Normal pulses.      Heart sounds: Normal heart sounds. No murmur heard.     No friction rub. No gallop.   Pulmonary:      Effort: Pulmonary effort is normal. No respiratory distress.      Breath sounds: Normal breath sounds.   Abdominal:      General: Abdomen is flat. Bowel sounds are normal. There is no distension.      Palpations: Abdomen is soft.      Tenderness: There is no abdominal tenderness.   Musculoskeletal:         General: No swelling.      Right lower le+ Edema present.      Left lower le+ Edema present.   Skin:     General: Skin is warm.      Coloration: Skin is not jaundiced.      Findings: No erythema or rash.   Neurological:      General: No focal deficit present.      Mental Status: He is alert and oriented to person, place, and time. Mental status is at baseline.   Psychiatric:         Mood and Affect: Mood normal.         Behavior: Behavior normal.         Thought Content: Thought content normal.          Significant Labs:  All labs within the past 24 hours have been reviewed.     Significant Imaging:  Labs: Reviewed

## 2024-09-17 LAB
ALBUMIN SERPL BCP-MCNC: 3.2 G/DL (ref 3.5–5.2)
ALP SERPL-CCNC: 58 U/L (ref 55–135)
ALT SERPL W/O P-5'-P-CCNC: 57 U/L (ref 10–44)
ANION GAP SERPL CALC-SCNC: 14 MMOL/L (ref 8–16)
APTT PPP: 56.8 SEC (ref 21–32)
AST SERPL-CCNC: 28 U/L (ref 10–40)
BILIRUB SERPL-MCNC: 1.6 MG/DL (ref 0.1–1)
BUN SERPL-MCNC: 83 MG/DL (ref 8–23)
CALCIUM SERPL-MCNC: 9.2 MG/DL (ref 8.7–10.5)
CHLORIDE SERPL-SCNC: 92 MMOL/L (ref 95–110)
CO2 SERPL-SCNC: 30 MMOL/L (ref 23–29)
CREAT SERPL-MCNC: 3.2 MG/DL (ref 0.5–1.4)
ERYTHROCYTE [DISTWIDTH] IN BLOOD BY AUTOMATED COUNT: 25.1 % (ref 11.5–14.5)
EST. GFR  (NO RACE VARIABLE): 19.7 ML/MIN/1.73 M^2
GLUCOSE SERPL-MCNC: 121 MG/DL (ref 70–110)
HCT VFR BLD AUTO: 31.9 % (ref 40–54)
HGB BLD-MCNC: 10.6 G/DL (ref 14–18)
INR PPP: 1.1 (ref 0.8–1.2)
MAGNESIUM SERPL-MCNC: 2 MG/DL (ref 1.6–2.6)
MCH RBC QN AUTO: 28.7 PG (ref 27–31)
MCHC RBC AUTO-ENTMCNC: 33.2 G/DL (ref 32–36)
MCV RBC AUTO: 86 FL (ref 82–98)
PHOSPHATE SERPL-MCNC: 3.3 MG/DL (ref 2.7–4.5)
PLATELET # BLD AUTO: 130 K/UL (ref 150–450)
PMV BLD AUTO: ABNORMAL FL (ref 9.2–12.9)
POCT GLUCOSE: 135 MG/DL (ref 70–110)
POCT GLUCOSE: 136 MG/DL (ref 70–110)
POCT GLUCOSE: 174 MG/DL (ref 70–110)
POTASSIUM SERPL-SCNC: 2.7 MMOL/L (ref 3.5–5.1)
PROT SERPL-MCNC: 7.1 G/DL (ref 6–8.4)
PROTHROMBIN TIME: 12.3 SEC (ref 9–12.5)
RBC # BLD AUTO: 3.69 M/UL (ref 4.6–6.2)
SODIUM SERPL-SCNC: 136 MMOL/L (ref 136–145)
WBC # BLD AUTO: 5.25 K/UL (ref 3.9–12.7)

## 2024-09-17 PROCEDURE — 25000003 PHARM REV CODE 250: Performed by: STUDENT IN AN ORGANIZED HEALTH CARE EDUCATION/TRAINING PROGRAM

## 2024-09-17 PROCEDURE — 85027 COMPLETE CBC AUTOMATED: CPT | Performed by: HOSPITALIST

## 2024-09-17 PROCEDURE — 36415 COLL VENOUS BLD VENIPUNCTURE: CPT | Performed by: HOSPITALIST

## 2024-09-17 PROCEDURE — 25000003 PHARM REV CODE 250: Performed by: HOSPITALIST

## 2024-09-17 PROCEDURE — 83735 ASSAY OF MAGNESIUM: CPT | Performed by: STUDENT IN AN ORGANIZED HEALTH CARE EDUCATION/TRAINING PROGRAM

## 2024-09-17 PROCEDURE — 97116 GAIT TRAINING THERAPY: CPT

## 2024-09-17 PROCEDURE — 80053 COMPREHEN METABOLIC PANEL: CPT | Performed by: STUDENT IN AN ORGANIZED HEALTH CARE EDUCATION/TRAINING PROGRAM

## 2024-09-17 PROCEDURE — 85610 PROTHROMBIN TIME: CPT | Performed by: HOSPITALIST

## 2024-09-17 PROCEDURE — 97535 SELF CARE MNGMENT TRAINING: CPT

## 2024-09-17 PROCEDURE — 63600175 PHARM REV CODE 636 W HCPCS: Performed by: STUDENT IN AN ORGANIZED HEALTH CARE EDUCATION/TRAINING PROGRAM

## 2024-09-17 PROCEDURE — 85730 THROMBOPLASTIN TIME PARTIAL: CPT | Performed by: STUDENT IN AN ORGANIZED HEALTH CARE EDUCATION/TRAINING PROGRAM

## 2024-09-17 PROCEDURE — 97530 THERAPEUTIC ACTIVITIES: CPT

## 2024-09-17 PROCEDURE — 25000003 PHARM REV CODE 250: Performed by: INTERNAL MEDICINE

## 2024-09-17 PROCEDURE — 99232 SBSQ HOSP IP/OBS MODERATE 35: CPT | Mod: ,,, | Performed by: INTERNAL MEDICINE

## 2024-09-17 PROCEDURE — 20600001 HC STEP DOWN PRIVATE ROOM

## 2024-09-17 PROCEDURE — 84100 ASSAY OF PHOSPHORUS: CPT | Performed by: STUDENT IN AN ORGANIZED HEALTH CARE EDUCATION/TRAINING PROGRAM

## 2024-09-17 RX ORDER — TORSEMIDE 20 MG/1
80 TABLET ORAL 2 TIMES DAILY
Status: DISCONTINUED | OUTPATIENT
Start: 2024-09-17 | End: 2024-09-19 | Stop reason: HOSPADM

## 2024-09-17 RX ORDER — POTASSIUM CHLORIDE 20 MEQ/1
40 TABLET, EXTENDED RELEASE ORAL ONCE
Status: COMPLETED | OUTPATIENT
Start: 2024-09-17 | End: 2024-09-17

## 2024-09-17 RX ORDER — POTASSIUM CHLORIDE 7.45 MG/ML
10 INJECTION INTRAVENOUS
Status: COMPLETED | OUTPATIENT
Start: 2024-09-17 | End: 2024-09-17

## 2024-09-17 RX ORDER — MUPIROCIN 20 MG/G
OINTMENT TOPICAL 2 TIMES DAILY
Status: DISCONTINUED | OUTPATIENT
Start: 2024-09-17 | End: 2024-09-19 | Stop reason: HOSPADM

## 2024-09-17 RX ADMIN — ASPIRIN 81 MG: 81 TABLET, COATED ORAL at 08:09

## 2024-09-17 RX ADMIN — ISOSORBIDE DINITRATE 20 MG: 20 TABLET ORAL at 09:09

## 2024-09-17 RX ADMIN — HYDRALAZINE HYDROCHLORIDE 50 MG: 50 TABLET ORAL at 08:09

## 2024-09-17 RX ADMIN — POTASSIUM CHLORIDE 10 MEQ: 7.46 INJECTION, SOLUTION INTRAVENOUS at 08:09

## 2024-09-17 RX ADMIN — CHLOROTHIAZIDE SODIUM 250 MG: 500 INJECTION, POWDER, LYOPHILIZED, FOR SOLUTION INTRAVENOUS at 03:09

## 2024-09-17 RX ADMIN — APIXABAN 2.5 MG: 2.5 TABLET, FILM COATED ORAL at 09:09

## 2024-09-17 RX ADMIN — HYDRALAZINE HYDROCHLORIDE 50 MG: 50 TABLET ORAL at 04:09

## 2024-09-17 RX ADMIN — LEVOTHYROXINE SODIUM 100 MCG: 100 TABLET ORAL at 05:09

## 2024-09-17 RX ADMIN — POTASSIUM CHLORIDE 10 MEQ: 7.46 INJECTION, SOLUTION INTRAVENOUS at 01:09

## 2024-09-17 RX ADMIN — METOPROLOL SUCCINATE 25 MG: 25 TABLET, EXTENDED RELEASE ORAL at 08:09

## 2024-09-17 RX ADMIN — FUROSEMIDE 40 MG/HR: 10 INJECTION, SOLUTION INTRAMUSCULAR; INTRAVENOUS at 03:09

## 2024-09-17 RX ADMIN — HYDRALAZINE HYDROCHLORIDE 50 MG: 50 TABLET ORAL at 09:09

## 2024-09-17 RX ADMIN — POTASSIUM CHLORIDE 40 MEQ: 1500 TABLET, EXTENDED RELEASE ORAL at 08:09

## 2024-09-17 RX ADMIN — ISOSORBIDE DINITRATE 20 MG: 20 TABLET ORAL at 04:09

## 2024-09-17 RX ADMIN — ISOSORBIDE DINITRATE 20 MG: 20 TABLET ORAL at 08:09

## 2024-09-17 RX ADMIN — TORSEMIDE 80 MG: 20 TABLET ORAL at 09:09

## 2024-09-17 RX ADMIN — MUPIROCIN: 20 OINTMENT TOPICAL at 08:09

## 2024-09-17 RX ADMIN — MIRTAZAPINE 7.5 MG: 7.5 TABLET, FILM COATED ORAL at 09:09

## 2024-09-17 RX ADMIN — APIXABAN 2.5 MG: 2.5 TABLET, FILM COATED ORAL at 10:09

## 2024-09-17 RX ADMIN — POTASSIUM CHLORIDE 10 MEQ: 7.46 INJECTION, SOLUTION INTRAVENOUS at 10:09

## 2024-09-17 NOTE — PT/OT/SLP PROGRESS
Physical Therapy Treatment    Patient Name:  Baldo Carney   MRN:  [id number]    Recommendations:     Discharge Recommendations: Low Intensity Therapy  Discharge Equipment Recommendations: bath bench  Barriers to discharge: None    Assessment:     Baldo Carney is a 73 y.o. male admitted with a medical diagnosis of Acute on chronic combined systolic and diastolic heart failure.  He presents with the following impairments/functional limitations: weakness, impaired endurance, impaired self care skills, impaired functional mobility, gait instability, impaired balance, decreased safety awareness Pt. cooperative and tolerated treatment well. Pt. progressing with mobility with RW.    Rehab Prognosis: Good; patient would benefit from acute skilled PT services to address these deficits and reach maximum level of function.    Recent Surgery: * No surgery found *      Plan:     During this hospitalization, patient to be seen 4 x/week to address the identified rehab impairments via gait training, therapeutic activities, therapeutic exercises, neuromuscular re-education and progress toward the following goals:    Plan of Care Expires:  10/15/24    Subjective     Chief Complaint: no new c/o  Patient/Family Comments/goals: pt. Agreeable to PT  Pain/Comfort:  Pain Rating 1: 0/10  Pain Rating Post-Intervention 1: 0/10      Objective:     Communicated with nursing prior to session.  Patient found supine with bed alarm, nguyen catheter, telemetry, peripheral IV upon PT entry to room.     General Precautions: Standard, fall  Orthopedic Precautions: N/A  Braces: N/A  Respiratory Status: Room air     Functional Mobility:  Bed Mobility:     Rolling Right: stand by assistance  Scooting: stand by assistance  Supine to Sit: minimum assistance  Sit to Supine: stand by assistance  Transfers:     Sit to Stand:  contact guard assistance with rolling walker  Gait: 100' with RW and slow, steady gait without LOB.  Balance:  fair      AM-PAC 6 CLICK MOBILITY  Turning over in bed (including adjusting bedclothes, sheets and blankets)?: 3  Sitting down on and standing up from a chair with arms (e.g., wheelchair, bedside commode, etc.): 3  Moving from lying on back to sitting on the side of the bed?: 3  Moving to and from a bed to a chair (including a wheelchair)?: 3  Need to walk in hospital room?: 3  Climbing 3-5 steps with a railing?: 3  Basic Mobility Total Score: 18       Treatment & Education:  Discussed pt.'s progress, goals, and POC.    Patient left supine with all lines intact, call button in reach, bed alarm on, and nursing notified..    GOALS:   Multidisciplinary Problems       Physical Therapy Goals          Problem: Physical Therapy    Goal Priority Disciplines Outcome Goal Variances Interventions   Physical Therapy Goal     PT, PT/OT Progressing     Description: Goals to be met by: 10/15/24     Patient will increase functional independence with mobility by performin. Supine to sit with Stand-by Assistance  2. Sit to stand transfer with Stand-by Assistance  3. Bed to chair transfer with Stand-by Assistance using LRAD  4. Gait  x 100 feet with Stand-by Assistance using LRAD.   51. Ascend/descend no stair with no Handrails Stand-by Assistance using LRAD.                          Time Tracking:     PT Received On: 24  PT Start Time: 1336     PT Stop Time: 1349  PT Total Time (min): 13 min     Billable Minutes: Gait Training 13    Treatment Type: Treatment  PT/PTA: PT     Number of PTA visits since last PT visit: 0     2024

## 2024-09-17 NOTE — PLAN OF CARE
Nephrology Chart Review      Intake/Output Summary (Last 24 hours) at 9/17/2024 1135  Last data filed at 9/17/2024 1013  Gross per 24 hour   Intake 1379.63 ml   Output 3275 ml   Net -1895.37 ml       Vitals:    09/17/24 0659 09/17/24 0818 09/17/24 1103 09/17/24 1114   BP:  116/62  112/61   BP Location:       Patient Position:       Pulse: (!) 59 60 65 67   Resp:  18  18   Temp:  98 °F (36.7 °C)  97.8 °F (36.6 °C)   TempSrc:  Oral  Oral   SpO2:  99%  98%   Weight:       Height:           Recent Labs   Lab 09/15/24  0659 09/16/24  0641 09/17/24  0616    134* 136   K 3.1* 3.6 2.7*   CL 97 94* 92*   CO2 28 26 30*   BUN 79* 80* 83*   CREATININE 3.4* 3.5* 3.2*   CALCIUM 8.8 9.1 9.2   PHOS 3.9 3.0 3.3     Renal function mildly improved. Recommend continue diuresing with Lasix gtt, decrease rate to 20mg/hr. Replete electrolytes as needed.       No other related issues identified. Please call Nephrology as needed; We will continue to follow.

## 2024-09-17 NOTE — NURSING TRANSFER
"Attempted to call report to Aravind on [facility]. RN stated "I am going to lunch and nobody told me I was getting a pt". Will attempt again.  "

## 2024-09-17 NOTE — SUBJECTIVE & OBJECTIVE
Interval History: No acute events overnight. Pt appears euvolemic today. No complaints at this time.     ROS  Objective:     Vital Signs (Most Recent):  Temp: 97.8 °F (36.6 °C) (09/17/24 1114)  Pulse: 67 (09/17/24 1114)  Resp: 18 (09/17/24 1114)  BP: 112/61 (09/17/24 1114)  SpO2: 98 % (09/17/24 1114) Vital Signs (24h Range):  Temp:  [97.4 °F (36.3 °C)-98.5 °F (36.9 °C)] 97.8 °F (36.6 °C)  Pulse:  [59-67] 67  Resp:  [16-18] 18  SpO2:  [98 %-100 %] 98 %  BP: (112-135)/(61-64) 112/61     Weight: 59.6 kg (131 lb 7.4 oz)  Body mass index is 17.83 kg/m².     SpO2: 98 %         Intake/Output Summary (Last 24 hours) at 9/17/2024 1409  Last data filed at 9/17/2024 1013  Gross per 24 hour   Intake 1139.63 ml   Output 2575 ml   Net -1435.37 ml       Lines/Drains/Airways       Drain  Duration                  Urethral Catheter 09/09/24 1643 16 Fr. 7 days              Peripheral Intravenous Line  Duration                  Peripheral IV - Single Lumen 09/12/24 1807 22 G Anterior;Left;Proximal Forearm 4 days         Peripheral IV - Single Lumen 09/16/24 0628 22 G Right;Posterior Wrist 1 day                       Physical Exam  Constitutional:       General: He is not in acute distress.     Appearance: He is not toxic-appearing.   HENT:      Right Ear: External ear normal.      Left Ear: External ear normal.   Eyes:      General:         Right eye: No discharge.         Left eye: No discharge.      Extraocular Movements: Extraocular movements intact.   Cardiovascular:      Rate and Rhythm: Normal rate and regular rhythm.      Pulses: Normal pulses.      Heart sounds:      No gallop.   Pulmonary:      Effort: Pulmonary effort is normal.      Breath sounds: No rales.   Abdominal:      General: There is no distension.   Musculoskeletal:      Cervical back: Normal range of motion.      Right lower leg: No edema.      Left lower leg: No edema.   Skin:     General: Skin is warm and dry.   Neurological:      General: No focal deficit  present.      Mental Status: He is alert and oriented to person, place, and time.            Significant Labs: All pertinent lab results from the last 24 hours have been reviewed. and   Recent Lab Results  (Last 5 results in the past 24 hours)        09/17/24  1331   09/17/24  0617   09/17/24  0616   09/16/24  2040   09/16/24  1722        Albumin     3.2           ALP     58           ALT     57           Anion Gap     14           PTT   56.8  Comment: Refer to local heparin nomogram for intensity/dose specific   therapeutic   range.               AST     28           BILIRUBIN TOTAL     1.6  Comment: For infants and newborns, interpretation of results should be based  on gestational age, weight and in agreement with clinical  observations.    Premature Infant recommended reference ranges:  Up to 24 hours.............<8.0 mg/dL  Up to 48 hours............<12.0 mg/dL  3-5 days..................<15.0 mg/dL  6-29 days.................<15.0 mg/dL             BUN     83           Calcium     9.2           Chloride     92           CO2     30           Creatinine     3.2           eGFR     19.7           Glucose     121           Hematocrit   31.9             Hemoglobin   10.6             INR   1.1  Comment: Coumadin Therapy:  2.0 - 3.0 for INR for all indicators except mechanical heart valves  and antiphospholipid syndromes which should use 2.5 - 3.5.               Magnesium      2.0           MCH   28.7             MCHC   33.2             MCV   86             MPV   SEE COMMENT  Comment: Result not available.             Phosphorus Level     3.3           Platelet Count   130             POCT Glucose 174       148   145       Potassium     2.7  Comment: *Critical value notification by _fb_ with confirmation of receipt to  _allan arguellorn__ at  Date_9/17___Time_8:13___             PROTEIN TOTAL     7.1           PT   12.3             RBC   3.69             RDW   25.1             Sodium     136           WBC   5.25

## 2024-09-17 NOTE — ASSESSMENT & PLAN NOTE
Results for orders placed during the hospital encounter of 09/08/24    Echo    Interpretation Summary  Images from the original result were not included.      Left Ventricle: The left ventricle is mildly dilated measuring 5.8 cm. Normal wall thickness. Severe global hypokinesis present. There is severely reduced systolic function. Grade II diastolic dysfunction. Elevated left ventricular filling pressure. There is a layered, nonmobile thrombus in the apex measuring 1.3 x 2.5 cm.    Right Ventricle: Mild right ventricular enlargement. Systolic function is mildly reduced. Pacemaker lead present in the ventricle.    Left Atrium: Left atrium is severely dilated.    Right Atrium: Right atrium is mildly dilated.    Aortic Valve: There is moderate to severe stenosis. Aortic valve area by VTI is 0.80 cm². Aortic valve peak velocity is 3.43 m/s. Mean gradient is 28 mmHg. The dimensionless index is 0.24. There is mild aortic regurgitation.    Mitral Valve: There is mild regurgitation.    Tricuspid Valve: There is moderate regurgitation.    Pulmonary Artery: There is severe pulmonary hypertension. The estimated pulmonary artery systolic pressure is 74 mmHg.    IVC/SVC: Elevated venous pressure at 15 mmHg.    ECHO concerning for aortic prosthetic valve stenosis likely making it difficult to diuresis patient.      Pt net negative 1.8 L in last 24hrs.  Cr/BUN stable. Will continue to diurese    Recommendations:   - Start torsemide 80mg BID  - Start eliquis 2.5mg BID   - Need to replace potassium  - Strict I's and O's  - Continue Heparin given ERICA  - Continue hydralazine tid  - Continue isosorbide dinitrate TID  - Continue metoprolol succinate   - Check BMP BID to follow Cr and address accordingly   - Consider additional GDMT when able   - Avoid NSAID's and CCB's     We will place referral to Dr. Gee with [facility] to discuss valve options in the outpatient setting

## 2024-09-17 NOTE — PLAN OF CARE
Pt aaox4 vswnl and no c/o pain. Bed in low position and callbell within reach. Pt ambulates with 1 assist. Heparin gtt infusing at 10u/kg/hr  with next ptt due am.  Lasix gtt infusing at 40mg/hr. Good uop Suárez patent/intact with pink tinged urine. Bed alarm maintained. Telemetry maintained NSR. Accuck at [phone]. Pt refused his Hydralazine and Isosorbide bkh=872. Diuril continued. Echo on 9/09 showed left ventricular thrombus.

## 2024-09-17 NOTE — PT/OT/SLP PROGRESS
"Occupational Therapy   Treatment    Name: Baldo Carney  MRN: [id number]  Admitting Diagnosis:  Acute on chronic combined systolic and diastolic heart failure       Recommendations:     Discharge Recommendations: Low Intensity Therapy  Discharge Equipment Recommendations:  bath bench  Barriers to discharge:  None    Assessment:     Baldo Carney is a 73 y.o. male with a medical diagnosis of Acute on chronic combined systolic and diastolic heart failure.  He presents with performance deficits affecting function are weakness, impaired endurance, impaired self care skills, impaired functional mobility, gait instability, decreased lower extremity function, decreased upper extremity function, decreased safety awareness. Pt tolerated tx well, with good effort and motivation throughout. He was able to transfer into commode and then walk around room to sit on bedside chair with CGA and RW. Pt will continue to benefit from skilled OT services to address impairments listed above to maximize independence with ADLs and functional mobility to ensure safe return to PLOF.     Rehab Prognosis:  Good; patient would benefit from acute skilled OT services to address these deficits and reach maximum level of function.       Plan:     Patient to be seen 4 x/week to address the above listed problems via self-care/home management, therapeutic activities, therapeutic exercises, neuromuscular re-education  Plan of Care Expires: 10/15/24  Plan of Care Reviewed with: patient    Subjective     Chief Complaint: none  Patient/Family Comments/goals: "You're not coming back?"  Pain/Comfort:  Pain Rating 1: 0/10  Pain Rating Post-Intervention 1: 0/10    Objective:     Communicated with: RN prior to session.  Patient found HOB elevated with bed alarm, telemetry, nguyen catheter, peripheral IV upon OT entry to room.    General Precautions: Standard, fall    Orthopedic Precautions:N/A  Braces: N/A  Respiratory Status: Room air   "   Occupational Performance:     Bed Mobility:    Patient completed Scooting/Bridging with contact guard assistance  Patient completed Supine to Sit with contact guard assistance and with side rail     Functional Mobility/Transfers:  Patient completed Sit <> Stand Transfer with contact guard assistance  with  rolling walker   Patient completed BSC <> Chair Transfer using Step Transfer technique with contact guard assistance with rolling walker  Patient completed Toilet Transfer Step Transfer technique with contact guard assistance with  rolling walker  Functional Mobility: Pt ambulated around the room ~15 ft with CGA and RW to simulate home distances and maximize functional endurance required for community mobility.    Activities of Daily Living:  Upper Body Dressing: minimum assistance affixing gown  Toileting: stand by assistance pt able to wipe thony area while seated      AMPAC 6 Click ADL: 20    Treatment & Education:  Pt educated on   Role of OT and OT POC  Safe transfer techniques and proper body mechanics for fall prevention and improved independence with functional transfers  Importance of OOB activities to increase endurance and tolerance for increased participation in daily ADLs    Utilizing the call bell to request for assistance with all functional mobility to ensure safety during hospital stay.    Pt verbalized understanding and all questions were addressed within the scope of OT.     Patient left up in chair with all lines intact, call button in reach, and RN notified    GOALS:   Multidisciplinary Problems       Occupational Therapy Goals          Problem: Occupational Therapy    Goal Priority Disciplines Outcome Interventions   Occupational Therapy Goal     OT, PT/OT Progressing    Description: Goals to be met by: 9/29/24     Patient will increase functional independence with ADLs by performing:    UE Dressing with Supervision.  LE Dressing with Supervision.  Grooming while standing at sink with  Supervision.  Toileting from toilet with Supervision for hygiene and clothing management.   Toilet transfer to toilet with Supervision.                         Time Tracking:     OT Date of Treatment: 09/17/24  OT Start Time: 1108  OT Stop Time: 1131  OT Total Time (min): 23 min    Billable Minutes:Self Care/Home Management 10  Therapeutic Activity 13    OT/JORGE: OT          9/17/2024

## 2024-09-17 NOTE — PROGRESS NOTES
[facility] - [facility] Stepdown  Cardiology  Progress Note    Patient Name: Baldo Carney  MRN: [id number]  Admission Date: 9/8/2024  Hospital Length of Stay: 9 days  Code Status: Full Code   Attending Physician: Marianela Bridges MD   Primary Care Physician: Millie Hayes APRN,FNP-C  Expected Discharge Date: 9/24/2024  Principal Problem:Acute on chronic combined systolic and diastolic heart failure    Subjective:     Interval History: No acute events overnight. Pt appears euvolemic today. No complaints at this time.     ROS  Objective:     Vital Signs (Most Recent):  Temp: 97.8 °F (36.6 °C) (09/17/24 1114)  Pulse: 67 (09/17/24 1114)  Resp: 18 (09/17/24 1114)  BP: 112/61 (09/17/24 1114)  SpO2: 98 % (09/17/24 1114) Vital Signs (24h Range):  Temp:  [97.4 °F (36.3 °C)-98.5 °F (36.9 °C)] 97.8 °F (36.6 °C)  Pulse:  [59-67] 67  Resp:  [16-18] 18  SpO2:  [98 %-100 %] 98 %  BP: (112-135)/(61-64) 112/61     Weight: 59.6 kg (131 lb 7.4 oz)  Body mass index is 17.83 kg/m².     SpO2: 98 %         Intake/Output Summary (Last 24 hours) at 9/17/2024 1409  Last data filed at 9/17/2024 1013  Gross per 24 hour   Intake 1139.63 ml   Output 2575 ml   Net -1435.37 ml       Lines/Drains/Airways       Drain  Duration                  Urethral Catheter 09/09/24 1643 16 Fr. 7 days              Peripheral Intravenous Line  Duration                  Peripheral IV - Single Lumen 09/12/24 1807 22 G Anterior;Left;Proximal Forearm 4 days         Peripheral IV - Single Lumen 09/16/24 0628 22 G Right;Posterior Wrist 1 day                       Physical Exam  Constitutional:       General: He is not in acute distress.     Appearance: He is not toxic-appearing.   HENT:      Right Ear: External ear normal.      Left Ear: External ear normal.   Eyes:      General:         Right eye: No discharge.         Left eye: No discharge.      Extraocular Movements: Extraocular movements intact.   Cardiovascular:      Rate and Rhythm: Normal rate and regular  rhythm.      Pulses: Normal pulses.      Heart sounds:      No gallop.   Pulmonary:      Effort: Pulmonary effort is normal.      Breath sounds: No rales.   Abdominal:      General: There is no distension.   Musculoskeletal:      Cervical back: Normal range of motion.      Right lower leg: No edema.      Left lower leg: No edema.   Skin:     General: Skin is warm and dry.   Neurological:      General: No focal deficit present.      Mental Status: He is alert and oriented to person, place, and time.            Significant Labs: All pertinent lab results from the last 24 hours have been reviewed. and   Recent Lab Results  (Last 5 results in the past 24 hours)        09/17/24  1331   09/17/24  0617   09/17/24  0616   09/16/24  2040   09/16/24  1722        Albumin     3.2           ALP     58           ALT     57           Anion Gap     14           PTT   56.8  Comment: Refer to local heparin nomogram for intensity/dose specific   therapeutic   range.               AST     28           BILIRUBIN TOTAL     1.6  Comment: For infants and newborns, interpretation of results should be based  on gestational age, weight and in agreement with clinical  observations.    Premature Infant recommended reference ranges:  Up to 24 hours.............<8.0 mg/dL  Up to 48 hours............<12.0 mg/dL  3-5 days..................<15.0 mg/dL  6-29 days.................<15.0 mg/dL             BUN     83           Calcium     9.2           Chloride     92           CO2     30           Creatinine     3.2           eGFR     19.7           Glucose     121           Hematocrit   31.9             Hemoglobin   10.6             INR   1.1  Comment: Coumadin Therapy:  2.0 - 3.0 for INR for all indicators except mechanical heart valves  and antiphospholipid syndromes which should use 2.5 - 3.5.               Magnesium      2.0           MCH   28.7             MCHC   33.2             MCV   86             MPV   SEE COMMENT  Comment: Result not  available.             Phosphorus Level     3.3           Platelet Count   130             POCT Glucose 174       148   145       Potassium     2.7  Comment: *Critical value notification by _fb_ with confirmation of receipt to  _allan arguellorn__ at  Date_9/17___Time_8:13___             PROTEIN TOTAL     7.1           PT   12.3             RBC   3.69             RDW   25.1             Sodium     136           WBC   5.25                                  Assessment and Plan:     * Acute on chronic combined systolic and diastolic heart failure  Results for orders placed during the hospital encounter of 09/08/24    Echo    Interpretation Summary  Images from the original result were not included.      Left Ventricle: The left ventricle is mildly dilated measuring 5.8 cm. Normal wall thickness. Severe global hypokinesis present. There is severely reduced systolic function. Grade II diastolic dysfunction. Elevated left ventricular filling pressure. There is a layered, nonmobile thrombus in the apex measuring 1.3 x 2.5 cm.    Right Ventricle: Mild right ventricular enlargement. Systolic function is mildly reduced. Pacemaker lead present in the ventricle.    Left Atrium: Left atrium is severely dilated.    Right Atrium: Right atrium is mildly dilated.    Aortic Valve: There is moderate to severe stenosis. Aortic valve area by VTI is 0.80 cm². Aortic valve peak velocity is 3.43 m/s. Mean gradient is 28 mmHg. The dimensionless index is 0.24. There is mild aortic regurgitation.    Mitral Valve: There is mild regurgitation.    Tricuspid Valve: There is moderate regurgitation.    Pulmonary Artery: There is severe pulmonary hypertension. The estimated pulmonary artery systolic pressure is 74 mmHg.    IVC/SVC: Elevated venous pressure at 15 mmHg.    ECHO concerning for aortic prosthetic valve stenosis likely making it difficult to diuresis patient.      Pt net negative 1.8 L in last 24hrs.  Cr/BUN stable. Will continue to  bahman    Recommendations:   - Start torsemide 80mg BID  - Start eliquis 2.5mg BID   - Need to replace potassium  - Strict I's and O's  - Continue Heparin given ERICA  - Continue hydralazine tid  - Continue isosorbide dinitrate TID  - Continue metoprolol succinate   - Check BMP BID to follow Cr and address accordingly   - Consider additional GDMT when able   - Avoid NSAID's and CCB's     We will place referral to Dr. Gee with [facility] to discuss valve options in the outpatient setting        VTE Risk Mitigation (From admission, onward)           Ordered     apixaban tablet 2.5 mg  2 times daily         09/17/24 0938                    Spencer Broderick, DO  Internal Medicine Intern   Cardiology  [facility] - Trans[facility] Step[facility]  As above - high dose oral diuretic and either low dose Eliquis based on Cr and body size or Warfarin ( to INR 2-3goal 2.5)

## 2024-09-18 ENCOUNTER — DOCUMENT SCAN (OUTPATIENT)
Dept: HOME HEALTH SERVICES | Facility: HOSPITAL | Age: 74
End: 2024-09-18
Payer: MEDICARE

## 2024-09-18 PROBLEM — E87.6 HYPOKALEMIA: Status: ACTIVE | Noted: 2024-09-18

## 2024-09-18 PROBLEM — E44.0 MODERATE MALNUTRITION: Status: ACTIVE | Noted: 2024-09-18

## 2024-09-18 PROBLEM — R53.81 DEBILITY: Status: ACTIVE | Noted: 2024-09-18

## 2024-09-18 LAB
ALBUMIN SERPL BCP-MCNC: 3.2 G/DL (ref 3.5–5.2)
ALP SERPL-CCNC: 57 U/L (ref 55–135)
ALT SERPL W/O P-5'-P-CCNC: 54 U/L (ref 10–44)
ANION GAP SERPL CALC-SCNC: 12 MMOL/L (ref 8–16)
AST SERPL-CCNC: 36 U/L (ref 10–40)
BILIRUB SERPL-MCNC: 1.6 MG/DL (ref 0.1–1)
BUN SERPL-MCNC: 80 MG/DL (ref 8–23)
CALCIUM SERPL-MCNC: 9.5 MG/DL (ref 8.7–10.5)
CHLORIDE SERPL-SCNC: 93 MMOL/L (ref 95–110)
CO2 SERPL-SCNC: 29 MMOL/L (ref 23–29)
CREAT SERPL-MCNC: 3.1 MG/DL (ref 0.5–1.4)
ERYTHROCYTE [DISTWIDTH] IN BLOOD BY AUTOMATED COUNT: 24.8 % (ref 11.5–14.5)
EST. GFR  (NO RACE VARIABLE): 20.4 ML/MIN/1.73 M^2
GLUCOSE SERPL-MCNC: 100 MG/DL (ref 70–110)
HCT VFR BLD AUTO: 31.8 % (ref 40–54)
HGB BLD-MCNC: 10.9 G/DL (ref 14–18)
INR PPP: 1.1 (ref 0.8–1.2)
MAGNESIUM SERPL-MCNC: 1.9 MG/DL (ref 1.6–2.6)
MCH RBC QN AUTO: 29.5 PG (ref 27–31)
MCHC RBC AUTO-ENTMCNC: 34.3 G/DL (ref 32–36)
MCV RBC AUTO: 86 FL (ref 82–98)
PHOSPHATE SERPL-MCNC: 3 MG/DL (ref 2.7–4.5)
PLATELET # BLD AUTO: 135 K/UL (ref 150–450)
PMV BLD AUTO: ABNORMAL FL (ref 9.2–12.9)
POCT GLUCOSE: 129 MG/DL (ref 70–110)
POCT GLUCOSE: 129 MG/DL (ref 70–110)
POCT GLUCOSE: 154 MG/DL (ref 70–110)
POTASSIUM SERPL-SCNC: 3.3 MMOL/L (ref 3.5–5.1)
PROT SERPL-MCNC: 7.3 G/DL (ref 6–8.4)
PROTHROMBIN TIME: 11.8 SEC (ref 9–12.5)
RBC # BLD AUTO: 3.69 M/UL (ref 4.6–6.2)
SODIUM SERPL-SCNC: 134 MMOL/L (ref 136–145)
WBC # BLD AUTO: 6.11 K/UL (ref 3.9–12.7)

## 2024-09-18 PROCEDURE — 36415 COLL VENOUS BLD VENIPUNCTURE: CPT | Performed by: HOSPITALIST

## 2024-09-18 PROCEDURE — 99497 ADVNCD CARE PLAN 30 MIN: CPT | Mod: ,,,

## 2024-09-18 PROCEDURE — 20600001 HC STEP DOWN PRIVATE ROOM

## 2024-09-18 PROCEDURE — 80053 COMPREHEN METABOLIC PANEL: CPT | Performed by: STUDENT IN AN ORGANIZED HEALTH CARE EDUCATION/TRAINING PROGRAM

## 2024-09-18 PROCEDURE — 85610 PROTHROMBIN TIME: CPT | Performed by: HOSPITALIST

## 2024-09-18 PROCEDURE — 85027 COMPLETE CBC AUTOMATED: CPT | Performed by: HOSPITALIST

## 2024-09-18 PROCEDURE — 99233 SBSQ HOSP IP/OBS HIGH 50: CPT | Mod: ,,,

## 2024-09-18 PROCEDURE — 84100 ASSAY OF PHOSPHORUS: CPT | Performed by: STUDENT IN AN ORGANIZED HEALTH CARE EDUCATION/TRAINING PROGRAM

## 2024-09-18 PROCEDURE — 25000003 PHARM REV CODE 250: Performed by: STUDENT IN AN ORGANIZED HEALTH CARE EDUCATION/TRAINING PROGRAM

## 2024-09-18 PROCEDURE — 99231 SBSQ HOSP IP/OBS SF/LOW 25: CPT | Mod: ,,, | Performed by: INTERNAL MEDICINE

## 2024-09-18 PROCEDURE — 97110 THERAPEUTIC EXERCISES: CPT

## 2024-09-18 PROCEDURE — 25000003 PHARM REV CODE 250: Performed by: HOSPITALIST

## 2024-09-18 PROCEDURE — 83735 ASSAY OF MAGNESIUM: CPT | Performed by: STUDENT IN AN ORGANIZED HEALTH CARE EDUCATION/TRAINING PROGRAM

## 2024-09-18 PROCEDURE — 25000003 PHARM REV CODE 250: Performed by: INTERNAL MEDICINE

## 2024-09-18 RX ORDER — POTASSIUM CHLORIDE 20 MEQ/1
40 TABLET, EXTENDED RELEASE ORAL EVERY 4 HOURS
Status: COMPLETED | OUTPATIENT
Start: 2024-09-18 | End: 2024-09-18

## 2024-09-18 RX ADMIN — APIXABAN 2.5 MG: 2.5 TABLET, FILM COATED ORAL at 09:09

## 2024-09-18 RX ADMIN — ISOSORBIDE DINITRATE 20 MG: 20 TABLET ORAL at 08:09

## 2024-09-18 RX ADMIN — ISOSORBIDE DINITRATE 20 MG: 20 TABLET ORAL at 03:09

## 2024-09-18 RX ADMIN — POTASSIUM CHLORIDE 40 MEQ: 1500 TABLET, EXTENDED RELEASE ORAL at 10:09

## 2024-09-18 RX ADMIN — TORSEMIDE 80 MG: 20 TABLET ORAL at 08:09

## 2024-09-18 RX ADMIN — MUPIROCIN: 20 OINTMENT TOPICAL at 09:09

## 2024-09-18 RX ADMIN — ASPIRIN 81 MG: 81 TABLET, COATED ORAL at 08:09

## 2024-09-18 RX ADMIN — LEVOTHYROXINE SODIUM 100 MCG: 100 TABLET ORAL at 07:09

## 2024-09-18 RX ADMIN — APIXABAN 2.5 MG: 2.5 TABLET, FILM COATED ORAL at 08:09

## 2024-09-18 RX ADMIN — MIRTAZAPINE 7.5 MG: 7.5 TABLET, FILM COATED ORAL at 09:09

## 2024-09-18 RX ADMIN — TORSEMIDE 80 MG: 20 TABLET ORAL at 09:09

## 2024-09-18 RX ADMIN — HYDRALAZINE HYDROCHLORIDE 50 MG: 50 TABLET ORAL at 09:09

## 2024-09-18 RX ADMIN — POTASSIUM CHLORIDE 40 MEQ: 1500 TABLET, EXTENDED RELEASE ORAL at 01:09

## 2024-09-18 RX ADMIN — ISOSORBIDE DINITRATE 20 MG: 20 TABLET ORAL at 09:09

## 2024-09-18 RX ADMIN — HYDRALAZINE HYDROCHLORIDE 50 MG: 50 TABLET ORAL at 08:09

## 2024-09-18 RX ADMIN — HYDRALAZINE HYDROCHLORIDE 50 MG: 50 TABLET ORAL at 03:09

## 2024-09-18 RX ADMIN — METOPROLOL SUCCINATE 25 MG: 25 TABLET, EXTENDED RELEASE ORAL at 08:09

## 2024-09-18 NOTE — ASSESSMENT & PLAN NOTE
He takes metoprolol succinate, hydralazine, isosorbide dinitrate, sacubitril-valsartan, empagliflozin.     -Giving home metoprolol, isosorbide dinitrate, hydralazine. Not on a diuretic at home.  -Gave a dose of IV chlorothiazide. Tried furosemide IV boluses, which were ineffective, then switched to furosemide drip by Cardiology. Appreciate Cardiology. Monitor electrolytes, intake/output. Can hold or wean GDMT but current goal is to optimize diuresis.     Continued on lasix gtt, appreciate cardiology recommendations. GDMT holding parameters added. Continued monitoring on heparin gtt. Not a candidate for ARNI/ MRA or SGLT2i due to renal disease.  Lasix drip transitioned to oral torsemide on 09/17/2024     Echo    Result Date: 9/9/2024  Images from the original result were not included.      Left Ventricle: The left ventricle is mildly dilated measuring 5.8 cm.   Normal wall thickness. Severe global hypokinesis present. There is   severely reduced systolic function. Grade II diastolic dysfunction.   Elevated left ventricular filling pressure. There is a layered, nonmobile   thrombus in the apex measuring 1.3 x 2.5 cm.    Right Ventricle: Mild right ventricular enlargement. Systolic function   is mildly reduced. Pacemaker lead present in the ventricle.    Left Atrium: Left atrium is severely dilated.    Right Atrium: Right atrium is mildly dilated.    Aortic Valve: There is moderate to severe stenosis. Aortic valve area   by VTI is 0.80 cm². Aortic valve peak velocity is 3.43 m/s. Mean gradient   is 28 mmHg. The dimensionless index is 0.24. There is mild aortic   regurgitation.    Mitral Valve: There is mild regurgitation.    Tricuspid Valve: There is moderate regurgitation.    Pulmonary Artery: There is severe pulmonary hypertension. The estimated   pulmonary artery systolic pressure is 74 mmHg.    IVC/SVC: Elevated venous pressure at 15 mmHg.

## 2024-09-18 NOTE — ASSESSMENT & PLAN NOTE
Chronic, controlled. Latest blood pressure and vitals reviewed-     Temp:  [97.6 °F (36.4 °C)-98.6 °F (37 °C)]   Pulse:  [60-71]   Resp:  [16-18]   BP: (107-133)/(56-67)   SpO2:  [95 %-99 %] .   Home meds for hypertension were reviewed and noted below.   Hypertension Medications               hydrALAZINE (APRESOLINE) 50 MG tablet Take 1 tablet (50 mg total) by mouth 2 (two) times a day.    isosorbide dinitrate (ISORDIL) 20 MG tablet Take 20 mg by mouth 3 (three) times daily.    metoprolol succinate (TOPROL-XL) 25 MG 24 hr tablet Take 1 tablet (25 mg total) by mouth once daily.    sacubitriL-valsartan (ENTRESTO) 24-26 mg per tablet Take 1 tablet by mouth 2 (two) times daily.            While in the hospital, will manage blood pressure as follows; Adjust home antihypertensive regimen as follows- Continue home hydralazine, isosorbide dinitrate, metoprolol. Monitor BP.    Will utilize p.r.n. blood pressure medication only if patient's blood pressure greater than 180/110 and he develops symptoms such as worsening chest pain or shortness of breath.

## 2024-09-18 NOTE — ASSESSMENT & PLAN NOTE
Results for orders placed during the hospital encounter of 09/08/24    Echo    Interpretation Summary  Images from the original result were not included.      Left Ventricle: The left ventricle is mildly dilated measuring 5.8 cm. Normal wall thickness. Severe global hypokinesis present. There is severely reduced systolic function. Grade II diastolic dysfunction. Elevated left ventricular filling pressure. There is a layered, nonmobile thrombus in the apex measuring 1.3 x 2.5 cm.    Right Ventricle: Mild right ventricular enlargement. Systolic function is mildly reduced. Pacemaker lead present in the ventricle.    Left Atrium: Left atrium is severely dilated.    Right Atrium: Right atrium is mildly dilated.    Aortic Valve: There is moderate to severe stenosis. Aortic valve area by VTI is 0.80 cm². Aortic valve peak velocity is 3.43 m/s. Mean gradient is 28 mmHg. The dimensionless index is 0.24. There is mild aortic regurgitation.    Mitral Valve: There is mild regurgitation.    Tricuspid Valve: There is moderate regurgitation.    Pulmonary Artery: There is severe pulmonary hypertension. The estimated pulmonary artery systolic pressure is 74 mmHg.    IVC/SVC: Elevated venous pressure at 15 mmHg.    ECHO concerning for aortic prosthetic valve stenosis likely making it difficult to diuresis patient.      Pt net negative 1.8 L in last 24hrs.  Cr/BUN stable. Will continue to diurese    Recommendations:   - Start torsemide 80mg BID  - Continue eliquis 2.5mg BID   - Strict I's and O's  - Continue Heparin given ERICA  - Continue hydralazine tid  - Continue isosorbide dinitrate TID  - Continue metoprolol succinate   - Check BMP BID to follow Cr and address accordingly   - Consider additional GDMT when able   - Avoid NSAID's and CCB's   - replete electrolytes to keep K >= 4, Mg >= 2, Phos >= 3     We will place referral to Dr. Gee with [facility] to discuss valve options in the outpatient setting

## 2024-09-18 NOTE — SUBJECTIVE & OBJECTIVE
Interval History:     NAEON.  On torsemide 80 mg twice daily.  Continued on Eliquis.  Labs unremarkable.  Suárez catheter was removed this morning.    Review of Systems   Constitutional:  Negative for chills and fever.   HENT:  Negative for nosebleeds.    Eyes:  Negative for visual disturbance.   Respiratory:  Negative for chest tightness and shortness of breath.    Cardiovascular:  Negative for chest pain.   Gastrointestinal:  Positive for abdominal distention. Negative for abdominal pain and constipation.   Genitourinary:  Negative for dysuria.   Neurological:  Negative for dizziness and light-headedness.   Psychiatric/Behavioral:  Negative for suicidal ideas. The patient is not nervous/anxious.      Objective:     Vital Signs (Most Recent):  Temp: 98.4 °F (36.9 °C) (09/18/24 1151)  Pulse: 60 (09/18/24 1500)  Resp: 18 (09/18/24 1437)  BP: 133/64 (09/18/24 1437)  SpO2: 98 % (09/18/24 1437) Vital Signs (24h Range):  Temp:  [97.6 °F (36.4 °C)-98.6 °F (37 °C)] 98.4 °F (36.9 °C)  Pulse:  [60-71] 60  Resp:  [16-18] 18  SpO2:  [95 %-99 %] 98 %  BP: (107-133)/(56-67) 133/64     Weight: 59.6 kg (131 lb 6.3 oz)  Body mass index is 17.82 kg/m².  No intake or output data in the 24 hours ending 09/18/24 1741        Physical Exam  Vitals and nursing note reviewed.   Constitutional:       General: He is not in acute distress.     Appearance: Normal appearance. He is well-developed.      Interventions: He is not intubated.  HENT:      Head: Normocephalic and atraumatic.   Eyes:      Extraocular Movements: Extraocular movements intact.      Conjunctiva/sclera: Conjunctivae normal.   Cardiovascular:      Rate and Rhythm: Normal rate and regular rhythm.      Pulses: Normal pulses.      Heart sounds: Murmur heard.   Pulmonary:      Effort: Pulmonary effort is normal. No accessory muscle usage or respiratory distress. He is not intubated.      Breath sounds: Rales present.   Abdominal:      General: There is distension.       Tenderness: There is no abdominal tenderness.   Musculoskeletal:      Cervical back: Normal range of motion and neck supple.      Right lower leg: No edema.      Left lower leg: No edema.   Skin:     General: Skin is warm and dry.   Neurological:      Mental Status: He is alert. Mental status is at baseline.      Comments: Oriented x 2     Psychiatric:         Attention and Perception: Attention normal.         Mood and Affect: Mood and affect normal.         Behavior: Behavior normal. Behavior is cooperative.         Significant Labs: All pertinent labs within the past 24 hours have been reviewed.  Recent Labs   Lab 09/16/24  0641 09/17/24  0616 09/18/24  0640   * 136 134*   K 3.6 2.7* 3.3*   CL 94* 92* 93*   CO2 26 30* 29   BUN 80* 83* 80*   CREATININE 3.5* 3.2* 3.1*   CALCIUM 9.1 9.2 9.5   PROT 7.0 7.1 7.3   BILITOT 1.8* 1.6* 1.6*   ALKPHOS 57 58 57   ALT 64* 57* 54*   AST 28 28 36     Recent Labs   Lab 09/18/24  0640   WBC 6.11   RBC 3.69*   HGB 10.9*   HCT 31.8*   *   MCV 86   MCH 29.5   MCHC 34.3        Significant Imaging: I have reviewed all pertinent imaging results/findings within the past 24 hours.

## 2024-09-18 NOTE — ASSESSMENT & PLAN NOTE
He takes metoprolol succinate, hydralazine, isosorbide dinitrate, sacubitril-valsartan, empagliflozin.     -Giving home metoprolol, isosorbide dinitrate, hydralazine. Not on a diuretic at home.  -Gave a dose of IV chlorothiazide. Tried furosemide IV boluses, which were ineffective, then switched to furosemide drip by Cardiology. Appreciate Cardiology. Monitor electrolytes, intake/output. Can hold or wean GDMT but current goal is to optimize diuresis.     Continued on lasix gtt, appreciate cardiology recommendations. GDMT holding parameters added. Continued monitoring on heparin gtt. Not a candidate for ARNI/ MRA or SGLT2i due to renal disease.  Lasix drip transitioned to oral torsemide on 09/17/2024     Echo    Result Date: 9/9/2024  Images from the original result were not included.      Left Ventricle: The left ventricle is mildly dilated measuring 5.8 cm.   Normal wall thickness. Severe global hypokinesis present. There is   severely reduced systolic function. Grade II diastolic dysfunction.   Elevated left ventricular filling pressure. There is a layered, nonmobile   thrombus in the apex measuring 1.3 x 2.5 cm.    Right Ventricle: Mild right ventricular enlargement. Systolic function   is mildly reduced. Pacemaker lead present in the ventricle.    Left Atrium: Left atrium is severely dilated.    Right Atrium: Right atrium is mildly dilated.    Aortic Valve: There is moderate to severe stenosis. Aortic valve area   by VTI is 0.80 cm². Aortic valve peak velocity is 3.43 m/s. Mean gradient   is 28 mmHg. The dimensionless index is 0.24. There is mild aortic   regurgitation.    Mitral Valve: There is mild regurgitation.    Tricuspid Valve: There is moderate regurgitation.    Pulmonary Artery: There is severe pulmonary hypertension. The estimated   pulmonary artery systolic pressure is 74 mmHg.    IVC/SVC: Elevated venous pressure at 15 mmHg.

## 2024-09-18 NOTE — SUBJECTIVE & OBJECTIVE
Interval History: No acute events overnight. Pt appears euvolemic today. No complaints at this time. Primary team will trial PO diuretics.     ROS  Objective:     Vital Signs (Most Recent):  Temp: 98.4 °F (36.9 °C) (09/18/24 1151)  Pulse: 66 (09/18/24 1151)  Resp: 16 (09/18/24 1151)  BP: (!) 123/56 (09/18/24 1151)  SpO2: 99 % (09/18/24 1151) Vital Signs (24h Range):  Temp:  [97.6 °F (36.4 °C)-98.6 °F (37 °C)] 98.4 °F (36.9 °C)  Pulse:  [62-71] 66  Resp:  [16-18] 16  SpO2:  [95 %-99 %] 99 %  BP: (107-133)/(56-67) 123/56     Weight: 59.6 kg (131 lb 6.3 oz)  Body mass index is 17.82 kg/m².     SpO2: 99 %       No intake or output data in the 24 hours ending 09/18/24 1227    Lines/Drains/Airways       Peripheral Intravenous Line  Duration                  Peripheral IV - Single Lumen 09/16/24 0628 22 G Right;Posterior Wrist 2 days                       Physical Exam  Constitutional:       General: He is not in acute distress.     Appearance: He is not toxic-appearing.   HENT:      Right Ear: External ear normal.      Left Ear: External ear normal.   Eyes:      General:         Right eye: No discharge.         Left eye: No discharge.      Extraocular Movements: Extraocular movements intact.   Cardiovascular:      Rate and Rhythm: Normal rate and regular rhythm.      Pulses: Normal pulses.      Heart sounds:      No gallop.      Comments: No jvd   Pulmonary:      Effort: Pulmonary effort is normal.      Breath sounds: No rales.   Abdominal:      General: There is no distension.   Musculoskeletal:      Cervical back: Normal range of motion.      Right lower leg: No edema.      Left lower leg: No edema.   Skin:     General: Skin is warm and dry.   Neurological:      General: No focal deficit present.      Mental Status: He is alert and oriented to person, place, and time.            Significant Labs: All pertinent lab results from the last 24 hours have been reviewed. and   Recent Lab Results  (Last 5 results in the past 24  hours)        09/18/24  1154   09/18/24  0737   09/18/24  0640   09/17/24  1955   09/17/24  1727        Albumin     3.2           ALP     57           ALT     54           Anion Gap     12           AST     36           BILIRUBIN TOTAL     1.6  Comment: For infants and newborns, interpretation of results should be based  on gestational age, weight and in agreement with clinical  observations.    Premature Infant recommended reference ranges:  Up to 24 hours.............<8.0 mg/dL  Up to 48 hours............<12.0 mg/dL  3-5 days..................<15.0 mg/dL  6-29 days.................<15.0 mg/dL             BUN     80           Calcium     9.5           Chloride     93           CO2     29           Creatinine     3.1           eGFR     20.4           Glucose     100           Hematocrit     31.8           Hemoglobin     10.9           INR     1.1  Comment: Coumadin Therapy:  2.0 - 3.0 for INR for all indicators except mechanical heart valves  and antiphospholipid syndromes which should use 2.5 - 3.5.             Magnesium      1.9           MCH     29.5           MCHC     34.3           MCV     86           MPV     SEE COMMENT  Comment: Result not available.           Phosphorus Level     3.0           Platelet Count     135           POCT Glucose 154   129     136   135       Potassium     3.3           PROTEIN TOTAL     7.3           PT     11.8           RBC     3.69           RDW     24.8           Sodium     134           WBC     6.11

## 2024-09-18 NOTE — ASSESSMENT & PLAN NOTE
Patient's FSGs are controlled on current medication regimen.  Last A1c reviewed-   Lab Results   Component Value Date    HGBA1C 6.0 08/18/2024     Most recent fingerstick glucose reviewed-   Recent Labs   Lab 09/17/24 1955 09/18/24  0737 09/18/24  1154   POCTGLUCOSE 136* 129* 154*       Current correctional scale  Low  Maintain anti-hyperglycemic dose as follows-   Antihyperglycemics (From admission, onward)    Start     Stop Route Frequency Ordered    09/08/24 0511  insulin aspart U-100 pen 0-5 Units         -- SubQ Before meals & nightly PRN 09/08/24 0411        Hold Oral hypoglycemics while patient is in the hospital.

## 2024-09-18 NOTE — CARE UPDATE
I have reviewed the chart of Baldo Carney and collaborated with Marianela Bridges MD in the care of the patient who is hospitalized for the following:    Active Hospital Problems    Diagnosis    *Acute on chronic combined systolic and diastolic heart failure    Hypokalemia    Palliative care encounter    Left ventricular apical thrombus    Acute kidney injury superimposed on chronic kidney disease    Hyponatremia    Elevated troponin    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 without macular edema, without long-term current use of insulin    Cirrhosis of liver with ascites    Hypothyroidism    COPD (chronic obstructive pulmonary disease)    Hypertension          I have reviewed Baldo Carney with the multidisciplinary team during discharge huddle.       Tali Langley PA-C  [facility]

## 2024-09-18 NOTE — PROGRESS NOTES
[facility] - Telemetry Stepdown  Cardiology  Progress Note    Patient Name: Baldo Carney  MRN: [id number]  Admission Date: 9/8/2024  Hospital Length of Stay: 10 days  Code Status: Full Code   Attending Physician: Marianela Bridges MD   Primary Care Physician: Millie Hayes, APRN,FNP-C  Expected Discharge Date: 9/20/2024  Principal Problem:Acute on chronic combined systolic and diastolic heart failure    Subjective:     Interval History: No acute events overnight. Pt appears euvolemic today. No complaints at this time. Primary team will trial PO diuretics.     ROS  Objective:     Vital Signs (Most Recent):  Temp: 98.4 °F (36.9 °C) (09/18/24 1151)  Pulse: 66 (09/18/24 1151)  Resp: 16 (09/18/24 1151)  BP: (!) 123/56 (09/18/24 1151)  SpO2: 99 % (09/18/24 1151) Vital Signs (24h Range):  Temp:  [97.6 °F (36.4 °C)-98.6 °F (37 °C)] 98.4 °F (36.9 °C)  Pulse:  [62-71] 66  Resp:  [16-18] 16  SpO2:  [95 %-99 %] 99 %  BP: (107-133)/(56-67) 123/56     Weight: 59.6 kg (131 lb 6.3 oz)  Body mass index is 17.82 kg/m².     SpO2: 99 %       No intake or output data in the 24 hours ending 09/18/24 1227    Lines/Drains/Airways       Peripheral Intravenous Line  Duration                  Peripheral IV - Single Lumen 09/16/24 0628 22 G Right;Posterior Wrist 2 days                       Physical Exam  Constitutional:       General: He is not in acute distress.     Appearance: He is not toxic-appearing.   HENT:      Right Ear: External ear normal.      Left Ear: External ear normal.   Eyes:      General:         Right eye: No discharge.         Left eye: No discharge.      Extraocular Movements: Extraocular movements intact.   Cardiovascular:      Rate and Rhythm: Normal rate and regular rhythm.      Pulses: Normal pulses.      Heart sounds:      No gallop.      Comments: No jvd   Pulmonary:      Effort: Pulmonary effort is normal.      Breath sounds: No rales.   Abdominal:      General: There is no distension.   Musculoskeletal:       Cervical back: Normal range of motion.      Right lower leg: No edema.      Left lower leg: No edema.   Skin:     General: Skin is warm and dry.   Neurological:      General: No focal deficit present.      Mental Status: He is alert and oriented to person, place, and time.            Significant Labs: All pertinent lab results from the last 24 hours have been reviewed. and   Recent Lab Results  (Last 5 results in the past 24 hours)        09/18/24  1154   09/18/24  0737   09/18/24  0640   09/17/24  1955   09/17/24  1727        Albumin     3.2           ALP     57           ALT     54           Anion Gap     12           AST     36           BILIRUBIN TOTAL     1.6  Comment: For infants and newborns, interpretation of results should be based  on gestational age, weight and in agreement with clinical  observations.    Premature Infant recommended reference ranges:  Up to 24 hours.............<8.0 mg/dL  Up to 48 hours............<12.0 mg/dL  3-5 days..................<15.0 mg/dL  6-29 days.................<15.0 mg/dL             BUN     80           Calcium     9.5           Chloride     93           CO2     29           Creatinine     3.1           eGFR     20.4           Glucose     100           Hematocrit     31.8           Hemoglobin     10.9           INR     1.1  Comment: Coumadin Therapy:  2.0 - 3.0 for INR for all indicators except mechanical heart valves  and antiphospholipid syndromes which should use 2.5 - 3.5.             Magnesium      1.9           MCH     29.5           MCHC     34.3           MCV     86           MPV     SEE COMMENT  Comment: Result not available.           Phosphorus Level     3.0           Platelet Count     135           POCT Glucose 154   129     136   135       Potassium     3.3           PROTEIN TOTAL     7.3           PT     11.8           RBC     3.69           RDW     24.8           Sodium     134           WBC     6.11                                  Assessment and Plan:      * Acute on chronic combined systolic and diastolic heart failure  Results for orders placed during the hospital encounter of 09/08/24    Echo    Interpretation Summary  Images from the original result were not included.      Left Ventricle: The left ventricle is mildly dilated measuring 5.8 cm. Normal wall thickness. Severe global hypokinesis present. There is severely reduced systolic function. Grade II diastolic dysfunction. Elevated left ventricular filling pressure. There is a layered, nonmobile thrombus in the apex measuring 1.3 x 2.5 cm.    Right Ventricle: Mild right ventricular enlargement. Systolic function is mildly reduced. Pacemaker lead present in the ventricle.    Left Atrium: Left atrium is severely dilated.    Right Atrium: Right atrium is mildly dilated.    Aortic Valve: There is moderate to severe stenosis. Aortic valve area by VTI is 0.80 cm². Aortic valve peak velocity is 3.43 m/s. Mean gradient is 28 mmHg. The dimensionless index is 0.24. There is mild aortic regurgitation.    Mitral Valve: There is mild regurgitation.    Tricuspid Valve: There is moderate regurgitation.    Pulmonary Artery: There is severe pulmonary hypertension. The estimated pulmonary artery systolic pressure is 74 mmHg.    IVC/SVC: Elevated venous pressure at 15 mmHg.    ECHO concerning for aortic prosthetic valve stenosis likely making it difficult to diuresis patient.      Pt appears euvolemic on exam. Will need to transition to PO diuretics.     Recommendations:   - Start torsemide 80mg BID  - Continue eliquis 2.5mg BID   - Strict I's and O's  - Continue Heparin given ERICA  - Continue hydralazine tid  - Continue isosorbide dinitrate TID  - Continue metoprolol succinate   - Check BMP BID to follow Cr and address accordingly   - Consider additional GDMT when able   - Avoid NSAID's and CCB's   - replete electrolytes to keep K >= 4, Mg >= 2, Phos >= 3     We will place referral to Dr. Gee with [facility] to  discuss valve options in the outpatient setting    Cardiology will sign off. Thank you for this consult.     VTE Risk Mitigation (From admission, onward)           Ordered     apixaban tablet 2.5 mg  2 times daily         09/17/24 0959                    Spencer Broderick, DO  Internal Medicine Intern   Cardiology  [facility] - Telemetry Stepdown  As above

## 2024-09-18 NOTE — ASSESSMENT & PLAN NOTE
Patient's FSGs are controlled on current medication regimen.  Last A1c reviewed-   Lab Results   Component Value Date    HGBA1C 6.0 08/18/2024     Most recent fingerstick glucose reviewed-   Recent Labs   Lab 09/16/24  2040 09/17/24  1331 09/17/24  1727 09/17/24 1955   POCTGLUCOSE 148* 174* 135* 136*       Current correctional scale  Low  Maintain anti-hyperglycemic dose as follows-   Antihyperglycemics (From admission, onward)    Start     Stop Route Frequency Ordered    09/08/24 0511  insulin aspart U-100 pen 0-5 Units         -- SubQ Before meals & nightly PRN 09/08/24 0411        Hold Oral hypoglycemics while patient is in the hospital.

## 2024-09-18 NOTE — ASSESSMENT & PLAN NOTE
Patient has a walker at home however family reported he had tumbling however no reported fall.    Was evaluated by Physical therapy and has been recommended low intensity therapy along with DME equipment including bath bench.

## 2024-09-18 NOTE — ASSESSMENT & PLAN NOTE
Patient with known Cirrhosis with Child's class Calculator for Child's score link- https://www.[employer].com/calc/340/child-myles-score-cirrhosis-mortality#creator-insights. Co-morbidities are present and inclusive of ascites.  MELD-Na score calculated; MELD 3.0: 23 at 9/18/2024  6:40 AM  MELD-Na: 22 at 9/18/2024  6:40 AM  Calculated from:  Serum Creatinine: 3.1 mg/dL (Using max of 3 mg/dL) at 9/18/2024  6:40 AM  Serum Sodium: 134 mmol/L at 9/18/2024  6:40 AM  Total Bilirubin: 1.6 mg/dL at 9/18/2024  6:40 AM  Serum Albumin: 3.2 g/dL at 9/18/2024  6:40 AM  INR(ratio): 1.1 at 9/18/2024  6:40 AM  Age at listing (hypothetical): 73 years  Sex: Male at 9/18/2024  6:40 AM    Appreciate Hepatology. 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 not a candidate for liver transplant evaluation given multiple comorbidities & severe heart failure   -Palliative care consulted for goal clarification

## 2024-09-18 NOTE — ASSESSMENT & PLAN NOTE
Patient with known Cirrhosis with Child's class Calculator for Child's score link- https://www.[employer].com/calc/340/child-myles-score-cirrhosis-mortality#creator-insights. Co-morbidities are present and inclusive of ascites.  MELD-Na score calculated; MELD 3.0: 22 at 9/17/2024  6:17 AM  MELD-Na: 21 at 9/17/2024  6:17 AM  Calculated from:  Serum Creatinine: 3.2 mg/dL (Using max of 3 mg/dL) at 9/17/2024  6:16 AM  Serum Sodium: 136 mmol/L at 9/17/2024  6:16 AM  Total Bilirubin: 1.6 mg/dL at 9/17/2024  6:16 AM  Serum Albumin: 3.2 g/dL at 9/17/2024  6:16 AM  INR(ratio): 1.1 at 9/17/2024  6:17 AM  Age at listing (hypothetical): 73 years  Sex: Male at 9/17/2024  6:17 AM    Appreciate Hepatology. 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 not a candidate for liver transplant evaluation given multiple comorbidities & severe heart failure   -Palliative care consulted for goal clarification

## 2024-09-18 NOTE — SUBJECTIVE & OBJECTIVE
Interval History:     NAEON.  Lasix drip discontinued and transitioned to torsemide twice daily.  Also discuss with Cardiology and transition heparin drip to oral Eliquis twice daily.    Review of Systems   Constitutional:  Negative for chills and fever.   HENT:  Negative for nosebleeds.    Eyes:  Negative for visual disturbance.   Respiratory:  Negative for chest tightness and shortness of breath.    Cardiovascular:  Negative for chest pain.   Gastrointestinal:  Positive for abdominal distention. Negative for abdominal pain and constipation.   Genitourinary:         Suárez catheter in place   Neurological:  Negative for dizziness and light-headedness.   Psychiatric/Behavioral:  Negative for suicidal ideas. The patient is not nervous/anxious.      Objective:     Vital Signs (Most Recent):  Temp: 98 °F (36.7 °C) (09/17/24 1952)  Pulse: 65 (09/17/24 1952)  Resp: 16 (09/17/24 1952)  BP: 114/63 (09/17/24 1952)  SpO2: 95 % (09/17/24 1952) Vital Signs (24h Range):  Temp:  [97.4 °F (36.3 °C)-98.5 °F (36.9 °C)] 98 °F (36.7 °C)  Pulse:  [59-67] 65  Resp:  [16-18] 16  SpO2:  [95 %-100 %] 95 %  BP: (112-123)/(60-63) 114/63     Weight: 59.6 kg (131 lb 7.4 oz)  Body mass index is 17.83 kg/m².    Intake/Output Summary (Last 24 hours) at 9/17/2024 2006  Last data filed at 9/17/2024 1013  Gross per 24 hour   Intake 899.63 ml   Output 2175 ml   Net -1275.37 ml         Physical Exam  Vitals and nursing note reviewed.   Constitutional:       General: He is not in acute distress.     Appearance: Normal appearance. He is well-developed.      Interventions: He is not intubated.  HENT:      Head: Normocephalic and atraumatic.   Eyes:      Extraocular Movements: Extraocular movements intact.      Conjunctiva/sclera: Conjunctivae normal.   Cardiovascular:      Rate and Rhythm: Normal rate and regular rhythm.      Pulses: Normal pulses.      Heart sounds: Murmur heard.   Pulmonary:      Effort: Pulmonary effort is normal. No accessory muscle  usage or respiratory distress. He is not intubated.      Breath sounds: Rales present.   Abdominal:      General: There is distension.      Tenderness: There is no abdominal tenderness.   Musculoskeletal:      Cervical back: Normal range of motion and neck supple.      Right lower leg: No edema.      Left lower leg: No edema.   Skin:     General: Skin is warm and dry.   Neurological:      Mental Status: He is alert. Mental status is at baseline.      Comments: Oriented x 2     Psychiatric:         Attention and Perception: Attention normal.         Mood and Affect: Mood and affect normal.         Behavior: Behavior normal. Behavior is cooperative.         Significant Labs: All pertinent labs within the past 24 hours have been reviewed.  Recent Labs   Lab 09/15/24  0659 09/16/24  0641 09/17/24  0616    134* 136   K 3.1* 3.6 2.7*   CL 97 94* 92*   CO2 28 26 30*   BUN 79* 80* 83*   CREATININE 3.4* 3.5* 3.2*   CALCIUM 8.8 9.1 9.2   PROT 6.8 7.0 7.1   BILITOT 1.7* 1.8* 1.6*   ALKPHOS 60 57 58   ALT 79* 64* 57*   AST 35 28 28     Recent Labs   Lab 09/17/24  0617   WBC 5.25   RBC 3.69*   HGB 10.6*   HCT 31.9*   *   MCV 86   MCH 28.7   MCHC 33.2        Significant Imaging: I have reviewed all pertinent imaging results/findings within the past 24 hours.

## 2024-09-18 NOTE — PROGRESS NOTES
[facility] - Telemetry [facility] Medicine  Progress Note    Patient Name: Baldo Careny  MRN: [id number]  Patient Class: IP- Inpatient   Admission Date: 9/8/2024  Length of Stay: 9 days  Attending Physician: Marianela Bridges MD  Primary Care Provider: Millie Hayes APRN,FNP-C        Subjective:     Principal Problem:Acute on chronic combined systolic and diastolic heart failure        HPI:  Baldo Carney is a 73 year old Black man with former cigarette smoking (quit in 1970), hypertension, diabetes mellitus type 2 with retinopathy and peripheral neuropathy, coronary artery disease status post coronary artery bypass graft (saphenous vein graft to right coronary artery) on 11/3/2014, status post percutaneous coronary intervention with drug eluting stent placements in left main to left anterior descending artery and left main to lateral circumflex artery on 8/31/2022, history of aortic valve replacement with porcine bioprosthesis on 11/3/2014, ischemic cardiomyopathy with chronic biventricular systolic and diastolic heart failure, chronic kidney disease stage 3-4, anemia, benign prostatic hyperplasia, hypothyroidism, cirrhosis with ascites requiring bimonthly paracentesis, chronic obstructive pulmonary disease (COPD), history of incarcerated inguinal hernia repair on 12/7/2023. He lives in [place], [place].    He was hospitalized at [facility] from 8/12/2024 to 8/18/2024 for syncope, cystitis, hypoglycemia, COVID-19. He underwent paracentesis on admission with 6 liters of fluid removed and albumin given.    He was seen by his primary care nurse practitioner in clinic on 8/27/2024 and was prescribed mirtazapine for situational anxiety and omeprazole for heartburn.   He presented to [facility] Emergency Department on Saturday 9/7/2024 with left sided chest pain for 4 hours without radiation, associated with shortness of breath. He also had unchanged chronic bilateral  lower extremity edema. He was scheduled for paracentesis on Monday 9/9/2024. Labs showed elevated BNP (>4900 pg/mL) and troponin (0.196 ng/mL) and acute kidney injury (BUN 69 mg/dL and creatinine 4.8 mg/dL from 37 and 1.9 on 8/23/2024), suggestive of volume overload due to cirrhosis and heart failure. He was transferred to [facility] on 9/8/2024 and admitted to Hospital Medicine Team L.     Overview/Hospital Course:  Nephrology and Cardiology were consulted. Hepatology recommended IV albumin. Suárez catheter was placed for urine output measurement. Nephrology suspected cardiorenal syndrome and recommended giving IV furosemide 80 mg twice daily. It was ineffective. Hospital Medicine increased it to 120 mg twice daily and it still did not increase urine output. Echocardiogram showed a layered nonmobile left ventricular apical thrombus. He was put on therapeutic enoxaparin. Cardiology recommended furosemide drip. Nephrology agreed with furosemide drip and recommended a trial of IV albumin for 3 days. He diuresed better.     Continued on lasix gtt and Diuril, appreciate cardiology recommendations. Not a candidate for ARNI/ MRA or SGLT2i due to renal disease.  Lasix drip transitioned to p.o. torsemide on 09/17/2024    Regarding severe aortic prosthetic valve stenosis recommendation for outpatient valve clinic follow up for possible valve in valve TAVR once LV thrombus resolves.  Heparin drip transitioned to Eliquis 2.5 mg b.i.d. (reduced dose for age and renal function close)     Palliative care evaluated and initiated GOC conversations.    Interval History:     NAEON.  Lasix drip discontinued and transitioned to torsemide twice daily.  Also discuss with Cardiology and transition heparin drip to oral Eliquis twice daily.    Review of Systems   Constitutional:  Negative for chills and fever.   HENT:  Negative for nosebleeds.    Eyes:  Negative for visual disturbance.   Respiratory:  Negative for  chest tightness and shortness of breath.    Cardiovascular:  Negative for chest pain.   Gastrointestinal:  Positive for abdominal distention. Negative for abdominal pain and constipation.   Genitourinary:         Suárez catheter in place   Neurological:  Negative for dizziness and light-headedness.   Psychiatric/Behavioral:  Negative for suicidal ideas. The patient is not nervous/anxious.      Objective:     Vital Signs (Most Recent):  Temp: 98 °F (36.7 °C) (09/17/24 1952)  Pulse: 65 (09/17/24 1952)  Resp: 16 (09/17/24 1952)  BP: 114/63 (09/17/24 1952)  SpO2: 95 % (09/17/24 1952) Vital Signs (24h Range):  Temp:  [97.4 °F (36.3 °C)-98.5 °F (36.9 °C)] 98 °F (36.7 °C)  Pulse:  [59-67] 65  Resp:  [16-18] 16  SpO2:  [95 %-100 %] 95 %  BP: (112-123)/(60-63) 114/63     Weight: 59.6 kg (131 lb 7.4 oz)  Body mass index is 17.83 kg/m².    Intake/Output Summary (Last 24 hours) at 9/17/2024 2006  Last data filed at 9/17/2024 1013  Gross per 24 hour   Intake 899.63 ml   Output 2175 ml   Net -1275.37 ml         Physical Exam  Vitals and nursing note reviewed.   Constitutional:       General: He is not in acute distress.     Appearance: Normal appearance. He is well-developed.      Interventions: He is not intubated.  HENT:      Head: Normocephalic and atraumatic.   Eyes:      Extraocular Movements: Extraocular movements intact.      Conjunctiva/sclera: Conjunctivae normal.   Cardiovascular:      Rate and Rhythm: Normal rate and regular rhythm.      Pulses: Normal pulses.      Heart sounds: Murmur heard.   Pulmonary:      Effort: Pulmonary effort is normal. No accessory muscle usage or respiratory distress. He is not intubated.      Breath sounds: Rales present.   Abdominal:      General: There is distension.      Tenderness: There is no abdominal tenderness.   Musculoskeletal:      Cervical back: Normal range of motion and neck supple.      Right lower leg: No edema.      Left lower leg: No edema.   Skin:     General: Skin is warm  and dry.   Neurological:      Mental Status: He is alert. Mental status is at baseline.      Comments: Oriented x 2     Psychiatric:         Attention and Perception: Attention normal.         Mood and Affect: Mood and affect normal.         Behavior: Behavior normal. Behavior is cooperative.         Significant Labs: All pertinent labs within the past 24 hours have been reviewed.  Recent Labs   Lab 09/15/24  0659 09/16/24  0641 09/17/24  0616    134* 136   K 3.1* 3.6 2.7*   CL 97 94* 92*   CO2 28 26 30*   BUN 79* 80* 83*   CREATININE 3.4* 3.5* 3.2*   CALCIUM 8.8 9.1 9.2   PROT 6.8 7.0 7.1   BILITOT 1.7* 1.8* 1.6*   ALKPHOS 60 57 58   ALT 79* 64* 57*   AST 35 28 28     Recent Labs   Lab 09/17/24  0617   WBC 5.25   RBC 3.69*   HGB 10.6*   HCT 31.9*   *   MCV 86   MCH 28.7   MCHC 33.2        Significant Imaging: I have reviewed all pertinent imaging results/findings within the past 24 hours.    Assessment/Plan:      * Acute on chronic combined systolic and diastolic heart failure  He takes metoprolol succinate, hydralazine, isosorbide dinitrate, sacubitril-valsartan, empagliflozin.     -Giving home metoprolol, isosorbide dinitrate, hydralazine. Not on a diuretic at home.  -Gave a dose of IV chlorothiazide. Tried furosemide IV boluses, which were ineffective, then switched to furosemide drip by Cardiology. Appreciate Cardiology. Monitor electrolytes, intake/output. Can hold or wean GDMT but current goal is to optimize diuresis.     Continued on lasix gtt, appreciate cardiology recommendations. GDMT holding parameters added. Continued monitoring on heparin gtt. Not a candidate for ARNI/ MRA or SGLT2i due to renal disease.  Lasix drip transitioned to oral torsemide on 09/17/2024     Echo    Result Date: 9/9/2024  Images from the original result were not included.      Left Ventricle: The left ventricle is mildly dilated measuring 5.8 cm.   Normal wall thickness. Severe global hypokinesis present. There is    severely reduced systolic function. Grade II diastolic dysfunction.   Elevated left ventricular filling pressure. There is a layered, nonmobile   thrombus in the apex measuring 1.3 x 2.5 cm.    Right Ventricle: Mild right ventricular enlargement. Systolic function   is mildly reduced. Pacemaker lead present in the ventricle.    Left Atrium: Left atrium is severely dilated.    Right Atrium: Right atrium is mildly dilated.    Aortic Valve: There is moderate to severe stenosis. Aortic valve area   by VTI is 0.80 cm². Aortic valve peak velocity is 3.43 m/s. Mean gradient   is 28 mmHg. The dimensionless index is 0.24. There is mild aortic   regurgitation.    Mitral Valve: There is mild regurgitation.    Tricuspid Valve: There is moderate regurgitation.    Pulmonary Artery: There is severe pulmonary hypertension. The estimated   pulmonary artery systolic pressure is 74 mmHg.    IVC/SVC: Elevated venous pressure at 15 mmHg.          Left ventricular apical thrombus  ECHO reported layered LV thrombus   Continue heparin gtt  Heparin drip transitioned to oral Eliquis on 09/17/2024        Acute kidney injury superimposed on chronic kidney disease  ERICA is likely due to  Cardiorenal . Baseline creatinine is  1.7-2 . Most recent creatinine and eGFR are listed below.  Recent Labs     09/15/24  0659 09/16/24  0641 09/17/24  0616   CREATININE 3.4* 3.5* 3.2*   EGFRNORACEVR 18.3* 17.7* 19.7*        Plan  - ERICA is stable  - Avoid nephrotoxins and renally dose meds for GFR listed above  - Monitor urine output, serial BMP, and adjust therapy as needed  - appreciate nephrology recommendations.    Elevated troponin  Due to CHF.      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 without macular edema, without long-term current use of insulin  Patient's FSGs are controlled on current medication regimen.  Last A1c reviewed-   Lab Results   Component Value Date    HGBA1C 6.0 08/18/2024     Most recent  fingerstick glucose reviewed-   Recent Labs   Lab 09/16/24  2040 09/17/24  1331 09/17/24  1727 09/17/24  1955   POCTGLUCOSE 148* 174* 135* 136*       Current correctional scale  Low  Maintain anti-hyperglycemic dose as follows-   Antihyperglycemics (From admission, onward)      Start     Stop Route Frequency Ordered    09/08/24 0511  insulin aspart U-100 pen 0-5 Units         -- SubQ Before meals & nightly PRN 09/08/24 0411          Hold Oral hypoglycemics while patient is in the hospital.      Cirrhosis of liver with ascites  Patient with known Cirrhosis with Child's class Calculator for Child's score link- https://[email or web address]/calc/340/child-myles-score-cirrhosis-mortality#creator-insights. Co-morbidities are present and inclusive of ascites.  MELD-Na score calculated; MELD 3.0: 22 at 9/17/2024  6:17 AM  MELD-Na: 21 at 9/17/2024  6:17 AM  Calculated from:  Serum Creatinine: 3.2 mg/dL (Using max of 3 mg/dL) at 9/17/2024  6:16 AM  Serum Sodium: 136 mmol/L at 9/17/2024  6:16 AM  Total Bilirubin: 1.6 mg/dL at 9/17/2024  6:16 AM  Serum Albumin: 3.2 g/dL at 9/17/2024  6:16 AM  INR(ratio): 1.1 at 9/17/2024  6:17 AM  Age at listing (hypothetical): 73 years  Sex: Male at 9/17/2024  6:17 AM    Appreciate Hepatology. 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 not a candidate for liver transplant evaluation given multiple comorbidities & severe heart failure   -Palliative care consulted for goal clarification       Hypothyroidism  Stable. Continue home levothyroxine.      COPD (chronic obstructive pulmonary disease)  He takes fluticasone-vilanterol. Giving levalbuterol prn.    Hypertension    Chronic, controlled. Latest blood pressure and vitals reviewed-     Temp:  [97.4 °F (36.3 °C)-98.5 °F (36.9 °C)]   Pulse:  [59-67]   Resp:  [16-18]   BP: (112-123)/(60-63)   SpO2:  [95 %-100 %] .   Home meds for hypertension were reviewed and noted below.   Hypertension Medications               hydrALAZINE (APRESOLINE) 50 MG tablet Take 1 tablet  (50 mg total) by mouth 2 (two) times a day.    isosorbide dinitrate (ISORDIL) 20 MG tablet Take 20 mg by mouth 3 (three) times daily.    metoprolol succinate (TOPROL-XL) 25 MG 24 hr tablet Take 1 tablet (25 mg total) by mouth once daily.    sacubitriL-valsartan (ENTRESTO) 24-26 mg per tablet Take 1 tablet by mouth 2 (two) times daily.            While in the hospital, will manage blood pressure as follows; Adjust home antihypertensive regimen as follows- Continue home hydralazine, isosorbide dinitrate, metoprolol. Monitor BP.    Will utilize p.r.n. blood pressure medication only if patient's blood pressure greater than 180/110 and he develops symptoms such as worsening chest pain or shortness of breath.        VTE Risk Mitigation (From admission, onward)           Ordered     apixaban tablet 2.5 mg  2 times daily         09/17/24 0938                    Discharge Planning   PAM: 9/24/2024     Code Status: Full Code   Is the patient medically ready for discharge?:     Reason for patient still in hospital (select all that apply): Patient trending condition, Laboratory test, Treatment, and Consult recommendations  Discharge Plan A: Home, Home with family   Discharge Delays: None known at this time              Marianela Bridges MD  Department of Hospital Medicine   [facility] - Telemetry Stepdown

## 2024-09-18 NOTE — ASSESSMENT & PLAN NOTE
ERICA is likely due to  Cardiorenal . Baseline creatinine is  1.7-2 . Most recent creatinine and eGFR are listed below.  Recent Labs     09/15/24  0659 09/16/24  0641 09/17/24  0616   CREATININE 3.4* 3.5* 3.2*   EGFRNORACEVR 18.3* 17.7* 19.7*        Plan  - ERICA is stable  - Avoid nephrotoxins and renally dose meds for GFR listed above  - Monitor urine output, serial BMP, and adjust therapy as needed  - appreciate nephrology recommendations.

## 2024-09-18 NOTE — PT/OT/SLP PROGRESS
"Occupational Therapy   Treatment    Name: Baldo Carney  MRN: [id number]  Admitting Diagnosis:  Acute on chronic combined systolic and diastolic heart failure       Recommendations:     Discharge Recommendations: Low Intensity Therapy  Discharge Equipment Recommendations:  bath bench  Barriers to discharge:  None    Assessment:     Baldo Carney is a 73 y.o. male with a medical diagnosis of Acute on chronic combined systolic and diastolic heart failure.  He presents with limited motivation how ever able to engage with EOB therapeutic exercises with provided encouragement and education. Pt is currently not at his PLOF and would greatly benefit from continued skilled OT intervention in efforts to participate in meaningful occupations of choice at the highest level of independence.      Performance deficits affecting function are weakness, impaired endurance, impaired self care skills, impaired functional mobility, gait instability, impaired balance, decreased safety awareness, decreased upper extremity function, decreased lower extremity function.     Rehab Prognosis:  Fair; patient would benefit from acute skilled OT services to address these deficits and reach maximum level of function.       Plan:     Patient to be seen 4 x/week to address the above listed problems via self-care/home management, therapeutic activities, therapeutic exercises, neuromuscular re-education  Plan of Care Expires: 10/15/24  Plan of Care Reviewed with: patient    Subjective     Chief Complaint: "wow you really don't give up do you?"  Patient/Family Comments/goals: pt unwilling to participate in OOB activities   Pain/Comfort:  Pain Rating 1: 0/10  Pain Rating Post-Intervention 1: 0/10    Objective:     Communicated with: RN prior to session.  Patient found supine with bed alarm, telemetry upon OT entry to room.    General Precautions: Standard, fall    Orthopedic Precautions:N/A  Braces: N/A  Respiratory Status: Room air   "   Occupational Performance:     Bed Mobility:  HOB elevated  Patient completed Rolling/Turning to Left with  modified independence  Patient completed Scooting/Bridging with modified independence  Patient completed Supine to Sit with modified independence  Patient completed Sit to Supine with modified independence     Functional Mobility/Transfers:  Deferred 2/2 pt refusing     Activities of Daily Living:  Pt declined       [facility] 6 Click ADL: 20    Treatment & Education:  Pt participated in BUE AROM shoulder flexion/ext, shd ab/adduction, and elbow flex/ext 10x each seated EOB.     Patient left sitting edge of bed with all lines intact and call button in reach    GOALS:   Multidisciplinary Problems       Occupational Therapy Goals          Problem: Occupational Therapy    Goal Priority Disciplines Outcome Interventions   Occupational Therapy Goal     OT, PT/OT Progressing    Description: Goals to be met by: 9/29/24     Patient will increase functional independence with ADLs by performing:    UE Dressing with Supervision.  LE Dressing with Supervision.  Grooming while standing at sink with Supervision.  Toileting from toilet with Supervision for hygiene and clothing management.   Toilet transfer to toilet with Supervision.                         Time Tracking:     OT Date of Treatment: 09/18/24  OT Start Time: 1150  OT Stop Time: 1159  OT Total Time (min): 9 min    Billable Minutes:Therapeutic Exercise 9    OT/JORGE: OT          9/18/2024

## 2024-09-18 NOTE — ASSESSMENT & PLAN NOTE
ECHO reported layered LV thrombus   Continue heparin gtt  Heparin drip transitioned to oral Eliquis on 09/17/2024

## 2024-09-18 NOTE — ASSESSMENT & PLAN NOTE
Chronic, controlled. Latest blood pressure and vitals reviewed-     Temp:  [97.4 °F (36.3 °C)-98.5 °F (36.9 °C)]   Pulse:  [59-67]   Resp:  [16-18]   BP: (112-123)/(60-63)   SpO2:  [95 %-100 %] .   Home meds for hypertension were reviewed and noted below.   Hypertension Medications               hydrALAZINE (APRESOLINE) 50 MG tablet Take 1 tablet (50 mg total) by mouth 2 (two) times a day.    isosorbide dinitrate (ISORDIL) 20 MG tablet Take 20 mg by mouth 3 (three) times daily.    metoprolol succinate (TOPROL-XL) 25 MG 24 hr tablet Take 1 tablet (25 mg total) by mouth once daily.    sacubitriL-valsartan (ENTRESTO) 24-26 mg per tablet Take 1 tablet by mouth 2 (two) times daily.            While in the hospital, will manage blood pressure as follows; Adjust home antihypertensive regimen as follows- Continue home hydralazine, isosorbide dinitrate, metoprolol. Monitor BP.    Will utilize p.r.n. blood pressure medication only if patient's blood pressure greater than 180/110 and he develops symptoms such as worsening chest pain or shortness of breath.

## 2024-09-18 NOTE — ASSESSMENT & PLAN NOTE
Malnutrition Type:  Context: acute illness or injury  Level: moderate    Related to (etiology):   Inability to consume sufficient energy     Signs and Symptoms (as evidenced by):   Inadequate energy intake, NFPE (9/11)    Malnutrition Characteristic Summary:  Energy Intake (Malnutrition): less than 75% for greater than 7 days  Subcutaneous Fat (Malnutrition): moderate depletion  Muscle Mass (Malnutrition): moderate depletion    Interventions/Recommendations (treatment strategy):  Collaboration of nutrition care w/ other providers    Nutrition Diagnosis Status:   New

## 2024-09-18 NOTE — ASSESSMENT & PLAN NOTE
Nutrition consulted. Most recent weight and BMI monitored-     Measurements:  Wt Readings from Last 1 Encounters:   09/18/24 59.6 kg (131 lb 6.3 oz)   Body mass index is 17.82 kg/m².    Patient has been screened and assessed by RD.    Malnutrition Type:  Context: acute illness or injury  Level: moderate    Malnutrition Characteristic Summary:  Energy Intake (Malnutrition): less than 75% for greater than 7 days  Subcutaneous Fat (Malnutrition): moderate depletion  Muscle Mass (Malnutrition): moderate depletion    Interventions/Recommendations (treatment strategy):  1.Continue Diabetic diet + Boost Glucose Control/Beneprotein ONS - encourage PO intake as tolerated.  ERICA noted; Modify diet to Renal & ONS to Novasource if necessary.  RD to monitor & follow-up.

## 2024-09-18 NOTE — PROGRESS NOTES
[facility] - Telemetry [facility] Medicine  Progress Note    Patient Name: Baldo Carney  MRN: [id number]  Patient Class: IP- Inpatient   Admission Date: 9/8/2024  Length of Stay: 10 days  Attending Physician: Marianela Bridges MD  Primary Care Provider: Millie Hayes APRN,FNP-C        Subjective:     Principal Problem:Acute on chronic combined systolic and diastolic heart failure        HPI:  Baldo Carney is a 73 year old Black man with former cigarette smoking (quit in 1970), hypertension, diabetes mellitus type 2 with retinopathy and peripheral neuropathy, coronary artery disease status post coronary artery bypass graft (saphenous vein graft to right coronary artery) on 11/3/2014, status post percutaneous coronary intervention with drug eluting stent placements in left main to left anterior descending artery and left main to lateral circumflex artery on 8/31/2022, history of aortic valve replacement with porcine bioprosthesis on 11/3/2014, ischemic cardiomyopathy with chronic biventricular systolic and diastolic heart failure, chronic kidney disease stage 3-4, anemia, benign prostatic hyperplasia, hypothyroidism, cirrhosis with ascites requiring bimonthly paracentesis, chronic obstructive pulmonary disease (COPD), history of incarcerated inguinal hernia repair on 12/7/2023. He lives in [place], [place].    He was hospitalized at [facility] from 8/12/2024 to 8/18/2024 for syncope, cystitis, hypoglycemia, COVID-19. He underwent paracentesis on admission with 6 liters of fluid removed and albumin given.    He was seen by his primary care nurse practitioner in clinic on 8/27/2024 and was prescribed mirtazapine for situational anxiety and omeprazole for heartburn.   He presented to [facility] Emergency Department on Saturday 9/7/2024 with left sided chest pain for 4 hours without radiation, associated with shortness of breath. He also had unchanged chronic bilateral  lower extremity edema. He was scheduled for paracentesis on Monday 9/9/2024. Labs showed elevated BNP (>4900 pg/mL) and troponin (0.196 ng/mL) and acute kidney injury (BUN 69 mg/dL and creatinine 4.8 mg/dL from 37 and 1.9 on 8/23/2024), suggestive of volume overload due to cirrhosis and heart failure. He was transferred to [facility] on 9/8/2024 and admitted to Hospital Medicine Team L.     Overview/Hospital Course:  Nephrology and Cardiology were consulted. Hepatology recommended IV albumin. Suárez catheter was placed for urine output measurement. Nephrology suspected cardiorenal syndrome and recommended giving IV furosemide 80 mg twice daily. It was ineffective. Hospital Medicine increased it to 120 mg twice daily and it still did not increase urine output. Echocardiogram showed a layered nonmobile left ventricular apical thrombus. He was put on therapeutic enoxaparin. Cardiology recommended furosemide drip. Nephrology agreed with furosemide drip and recommended a trial of IV albumin for 3 days. He diuresed better.     Continued on lasix gtt and Diuril, appreciate cardiology recommendations. Not a candidate for ARNI/ MRA or SGLT2i due to renal disease.  Lasix drip transitioned to p.o. torsemide on 09/17/2024    Regarding severe aortic prosthetic valve stenosis recommendation for outpatient valve clinic follow up for possible valve in valve TAVR once LV thrombus resolves.  Heparin drip transitioned to Eliquis 2.5 mg b.i.d. (reduced dose for age and renal function close)     Palliative care evaluated and initiated GOC conversations.    Assessed by PT and OT and has been recommended low intensity therapy with about bench and DME recommendations    Interval History:     NAEON.  On torsemide 80 mg twice daily.  Continued on Eliquis.  Labs unremarkable.  Suárez catheter was removed this morning.    Review of Systems   Constitutional:  Negative for chills and fever.   HENT:  Negative for nosebleeds.     Eyes:  Negative for visual disturbance.   Respiratory:  Negative for chest tightness and shortness of breath.    Cardiovascular:  Negative for chest pain.   Gastrointestinal:  Positive for abdominal distention. Negative for abdominal pain and constipation.   Genitourinary:  Negative for dysuria.   Neurological:  Negative for dizziness and light-headedness.   Psychiatric/Behavioral:  Negative for suicidal ideas. The patient is not nervous/anxious.      Objective:     Vital Signs (Most Recent):  Temp: 98.4 °F (36.9 °C) (09/18/24 1151)  Pulse: 60 (09/18/24 1500)  Resp: 18 (09/18/24 1437)  BP: 133/64 (09/18/24 1437)  SpO2: 98 % (09/18/24 1437) Vital Signs (24h Range):  Temp:  [97.6 °F (36.4 °C)-98.6 °F (37 °C)] 98.4 °F (36.9 °C)  Pulse:  [60-71] 60  Resp:  [16-18] 18  SpO2:  [95 %-99 %] 98 %  BP: (107-133)/(56-67) 133/64     Weight: 59.6 kg (131 lb 6.3 oz)  Body mass index is 17.82 kg/m².  No intake or output data in the 24 hours ending 09/18/24 1741        Physical Exam  Vitals and nursing note reviewed.   Constitutional:       General: He is not in acute distress.     Appearance: Normal appearance. He is well-developed.      Interventions: He is not intubated.  HENT:      Head: Normocephalic and atraumatic.   Eyes:      Extraocular Movements: Extraocular movements intact.      Conjunctiva/sclera: Conjunctivae normal.   Cardiovascular:      Rate and Rhythm: Normal rate and regular rhythm.      Pulses: Normal pulses.      Heart sounds: Murmur heard.   Pulmonary:      Effort: Pulmonary effort is normal. No accessory muscle usage or respiratory distress. He is not intubated.      Breath sounds: Rales present.   Abdominal:      General: There is distension.      Tenderness: There is no abdominal tenderness.   Musculoskeletal:      Cervical back: Normal range of motion and neck supple.      Right lower leg: No edema.      Left lower leg: No edema.   Skin:     General: Skin is warm and dry.   Neurological:      Mental  Status: He is alert. Mental status is at baseline.      Comments: Oriented x 2     Psychiatric:         Attention and Perception: Attention normal.         Mood and Affect: Mood and affect normal.         Behavior: Behavior normal. Behavior is cooperative.         Significant Labs: All pertinent labs within the past 24 hours have been reviewed.  Recent Labs   Lab 09/16/24  0641 09/17/24  0616 09/18/24  0640   * 136 134*   K 3.6 2.7* 3.3*   CL 94* 92* 93*   CO2 26 30* 29   BUN 80* 83* 80*   CREATININE 3.5* 3.2* 3.1*   CALCIUM 9.1 9.2 9.5   PROT 7.0 7.1 7.3   BILITOT 1.8* 1.6* 1.6*   ALKPHOS 57 58 57   ALT 64* 57* 54*   AST 28 28 36     Recent Labs   Lab 09/18/24  0640   WBC 6.11   RBC 3.69*   HGB 10.9*   HCT 31.8*   *   MCV 86   MCH 29.5   MCHC 34.3        Significant Imaging: I have reviewed all pertinent imaging results/findings within the past 24 hours.    Assessment/Plan:      * Acute on chronic combined systolic and diastolic heart failure  He takes metoprolol succinate, hydralazine, isosorbide dinitrate, sacubitril-valsartan, empagliflozin.     -Giving home metoprolol, isosorbide dinitrate, hydralazine. Not on a diuretic at home.  -Gave a dose of IV chlorothiazide. Tried furosemide IV boluses, which were ineffective, then switched to furosemide drip by Cardiology. Appreciate Cardiology. Monitor electrolytes, intake/output. Can hold or wean GDMT but current goal is to optimize diuresis.     Continued on lasix gtt, appreciate cardiology recommendations. GDMT holding parameters added. Continued monitoring on heparin gtt. Not a candidate for ARNI/ MRA or SGLT2i due to renal disease.  Lasix drip transitioned to oral torsemide on 09/17/2024     Echo    Result Date: 9/9/2024  Images from the original result were not included.      Left Ventricle: The left ventricle is mildly dilated measuring 5.8 cm.   Normal wall thickness. Severe global hypokinesis present. There is   severely reduced systolic function.  Grade II diastolic dysfunction.   Elevated left ventricular filling pressure. There is a layered, nonmobile   thrombus in the apex measuring 1.3 x 2.5 cm.    Right Ventricle: Mild right ventricular enlargement. Systolic function   is mildly reduced. Pacemaker lead present in the ventricle.    Left Atrium: Left atrium is severely dilated.    Right Atrium: Right atrium is mildly dilated.    Aortic Valve: There is moderate to severe stenosis. Aortic valve area   by VTI is 0.80 cm². Aortic valve peak velocity is 3.43 m/s. Mean gradient   is 28 mmHg. The dimensionless index is 0.24. There is mild aortic   regurgitation.    Mitral Valve: There is mild regurgitation.    Tricuspid Valve: There is moderate regurgitation.    Pulmonary Artery: There is severe pulmonary hypertension. The estimated   pulmonary artery systolic pressure is 74 mmHg.    IVC/SVC: Elevated venous pressure at 15 mmHg.          Debility  Patient has a walker at home however family reported he had tumbling however no reported fall.    Was evaluated by Physical therapy and has been recommended low intensity therapy along with DME equipment including bath bench.    Moderate malnutrition  Nutrition consulted. Most recent weight and BMI monitored-     Measurements:  Wt Readings from Last 1 Encounters:   09/18/24 59.6 kg (131 lb 6.3 oz)   Body mass index is 17.82 kg/m².    Patient has been screened and assessed by RD.    Malnutrition Type:  Context: acute illness or injury  Level: moderate    Malnutrition Characteristic Summary:  Energy Intake (Malnutrition): less than 75% for greater than 7 days  Subcutaneous Fat (Malnutrition): moderate depletion  Muscle Mass (Malnutrition): moderate depletion    Interventions/Recommendations (treatment strategy):  1.Continue Diabetic diet + Boost Glucose Control/Beneprotein ONS - encourage PO intake as tolerated.  ERICA noted; Modify diet to Renal & ONS to Novasource if necessary.  RD to monitor & follow-up.      Left  ventricular apical thrombus  ECHO reported layered LV thrombus   Continue heparin gtt  Heparin drip transitioned to oral Eliquis on 09/17/2024        Acute kidney injury superimposed on chronic kidney disease  ERICA is likely due to  Cardiorenal . Baseline creatinine is  1.7-2 . Most recent creatinine and eGFR are listed below.  Recent Labs     09/16/24  0641 09/17/24  0616 09/18/24  0640   CREATININE 3.5* 3.2* 3.1*   EGFRNORACEVR 17.7* 19.7* 20.4*      Plan  - ERICA is stable  - Avoid nephrotoxins and renally dose meds for GFR listed above  - Monitor urine output, serial BMP, and adjust therapy as needed  - appreciate nephrology recommendations.    Elevated troponin  Due to CHF.      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 without macular edema, without long-term current use of insulin  Patient's FSGs are controlled on current medication regimen.  Last A1c reviewed-   Lab Results   Component Value Date    HGBA1C 6.0 08/18/2024     Most recent fingerstick glucose reviewed-   Recent Labs   Lab 09/17/24  1955 09/18/24  0737 09/18/24  1154   POCTGLUCOSE 136* 129* 154*       Current correctional scale  Low  Maintain anti-hyperglycemic dose as follows-   Antihyperglycemics (From admission, onward)      Start     Stop Route Frequency Ordered    09/08/24 0511  insulin aspart U-100 pen 0-5 Units         -- SubQ Before meals & nightly PRN 09/08/24 0411          Hold Oral hypoglycemics while patient is in the hospital.      Cirrhosis of liver with ascites  Patient with known Cirrhosis with Child's class Calculator for Child's score link- https://www.mdcalc.com/calc/340/child-myles-score-cirrhosis-mortality#creator-insights. Co-morbidities are present and inclusive of ascites.  MELD-Na score calculated; MELD 3.0: 23 at 9/18/2024  6:40 AM  MELD-Na: 22 at 9/18/2024  6:40 AM  Calculated from:  Serum Creatinine: 3.1 mg/dL (Using max of 3 mg/dL) at 9/18/2024  6:40 AM  Serum Sodium: 134 mmol/L at  9/18/2024  6:40 AM  Total Bilirubin: 1.6 mg/dL at 9/18/2024  6:40 AM  Serum Albumin: 3.2 g/dL at 9/18/2024  6:40 AM  INR(ratio): 1.1 at 9/18/2024  6:40 AM  Age at listing (hypothetical): 73 years  Sex: Male at 9/18/2024  6:40 AM    Appreciate Hepatology. 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 not a candidate for liver transplant evaluation given multiple comorbidities & severe heart failure   -Palliative care consulted for goal clarification       Hypothyroidism  Stable. Continue home levothyroxine.      COPD (chronic obstructive pulmonary disease)  He takes fluticasone-vilanterol. Giving levalbuterol prn.    Hypertension    Chronic, controlled. Latest blood pressure and vitals reviewed-     Temp:  [97.6 °F (36.4 °C)-98.6 °F (37 °C)]   Pulse:  [60-71]   Resp:  [16-18]   BP: (107-133)/(56-67)   SpO2:  [95 %-99 %] .   Home meds for hypertension were reviewed and noted below.   Hypertension Medications               hydrALAZINE (APRESOLINE) 50 MG tablet Take 1 tablet (50 mg total) by mouth 2 (two) times a day.    isosorbide dinitrate (ISORDIL) 20 MG tablet Take 20 mg by mouth 3 (three) times daily.    metoprolol succinate (TOPROL-XL) 25 MG 24 hr tablet Take 1 tablet (25 mg total) by mouth once daily.    sacubitriL-valsartan (ENTRESTO) 24-26 mg per tablet Take 1 tablet by mouth 2 (two) times daily.            While in the hospital, will manage blood pressure as follows; Adjust home antihypertensive regimen as follows- Continue home hydralazine, isosorbide dinitrate, metoprolol. Monitor BP.    Will utilize p.r.n. blood pressure medication only if patient's blood pressure greater than 180/110 and he develops symptoms such as worsening chest pain or shortness of breath.        VTE Risk Mitigation (From admission, onward)           Ordered     apixaban tablet 2.5 mg  2 times daily         09/17/24 0938                    Discharge Planning   PAM: 9/20/2024     Code Status: Full Code   Is the patient medically ready for discharge?:      Reason for patient still in hospital (select all that apply): Patient trending condition, Laboratory test, Treatment, and Consult recommendations  Discharge Plan A: Home, Home with family   Discharge Delays: None known at this time              Marianela Bridges MD  Department of Hospital Medicine   [facility] - Telemetry Stepdown

## 2024-09-18 NOTE — PROGRESS NOTES
[facility] - Telemetry Stepdown  Adult Nutrition  Progress Note    SUMMARY       Recommendations    Continue Diabetic diet + Boost Glucose Control/Beneprotein ONS - encourage PO intake as tolerated.  ERICA noted; Modify diet to Renal & ONS to Novasource if necessary.  RD to monitor & follow-up.    Goals: Meet % EEN, EPN by RD f/u date  Nutrition Goal Status: goal not met  Communication of RD Recs: reviewed with RN    Assessment and Plan    Moderate malnutrition    Malnutrition Type:  Context: acute illness or injury  Level: moderate    Related to (etiology):   Inability to consume sufficient energy     Signs and Symptoms (as evidenced by):   Inadequate energy intake, NFPE (9/11)    Malnutrition Characteristic Summary:  Energy Intake (Malnutrition): less than 75% for greater than 7 days  Subcutaneous Fat (Malnutrition): moderate depletion  Muscle Mass (Malnutrition): moderate depletion    Interventions/Recommendations (treatment strategy):  Collaboration of nutrition care w/ other providers    Nutrition Diagnosis Status:   New     Malnutrition Assessment    Malnutrition Context: acute illness or injury  Malnutrition Level: moderate    Energy Intake (Malnutrition): less than 75% for greater than 7 days  Subcutaneous Fat (Malnutrition): moderate depletion  Muscle Mass (Malnutrition): moderate depletion     Reason for Assessment    Reason For Assessment: RD follow-up  Diagnosis: other (see comments) (HF)  Relevant Medical History: HLD, HTN  Interdisciplinary Rounds: did not attend    General Information Comments: Pt continues w/ decreased appetite - ONS ordered & PO intake being encouraged. +ERICA. Noted palliative care following. RD feels pt now meets criteria for moderate malnutrition; please see PES statement for details - NFPE complete 9/11.  Nutrition Discharge Planning: Adequate PO intake    Nutrition/Diet History    Spiritual, Cultural Beliefs, Christianity Practices, Values that Affect Care: no  Food Allergies:  NKFA  Factors Affecting Nutritional Intake: decreased appetite    Anthropometrics    Temp: 98.6 °F (37 °C)  Height Method: Stated  Height: 6' (182.9 cm)  Height (inches): 72 in  Weight Method: Standard Scale  Weight: 59.6 kg (131 lb 6.3 oz)  Weight (lb): 131.4 lb  Ideal Body Weight (IBW), Male: 178 lb  % Ideal Body Weight, Male (lb): 73.82 %  BMI (Calculated): 17.8  BMI Grade: 17 - 18.4 protein-energy malnutrition grade I  Usual Body Weight (UBW), k kg (Some likely fluid related)  % Usual Body Weight: 89.14  % Weight Change From Usual Weight: -11.05 %    Lab/Procedures/Meds    Pertinent Labs Reviewed: reviewed  Pertinent Labs Comments: Creat 3.1, GFR 20.4, A1C 6  Pertinent Medications Reviewed: reviewed  Pertinent Medications Comments: -    Estimated/Assessed Needs    Weight Used For Calorie Calculations: 59.6 kg (131 lb 6.3 oz)    Energy Calorie Requirements (kcal): 1788 kcal/d  Energy Need Method: Kcal/kg (30 kcal/kg)    Protein Requirements: 72 g/d (1.2 g/kg)  Weight Used For Protein Calculations: 59.6 kg (131 lb 6.3 oz)    Estimated Fluid Requirement Method: other (see comments) (Per MD)  RDA Method (mL): 1788    Nutrition Prescription Ordered    Current Diet Order: 1500 kcal ADA  Oral Nutrition Supplement: Boost Glucose Control/Beneprotein    Evaluation of Received Nutrient/Fluid Intake    I/O: -13.9L since admit    Comments: LBM:     Tolerance: tolerating    Nutrition Risk    Level of Risk/Frequency of Follow-up:  (1x/week)     Monitor and Evaluation    Food and Nutrient Intake: food and beverage intake, energy intake  Food and Nutrient Adminstration: diet order  Knowledge/Beliefs/Attitudes: beliefs and attitudes, food and nutrition knowledge/skill  Physical Activity and Function: nutrition-related ADLs and IADLs  Anthropometric Measurements: weight, weight change  Biochemical Data, Medical Tests and Procedures: glucose/endocrine profile, lipid profile, inflammatory profile, gastrointestinal profile,  electrolyte and renal panel  Nutrition-Focused Physical Findings: overall appearance     Nutrition Follow-Up    RD Follow-up?: Yes

## 2024-09-18 NOTE — ASSESSMENT & PLAN NOTE
ERICA is likely due to  Cardiorenal . Baseline creatinine is  1.7-2 . Most recent creatinine and eGFR are listed below.  Recent Labs     09/16/24  0641 09/17/24  0616 09/18/24  0640   CREATININE 3.5* 3.2* 3.1*   EGFRNORACEVR 17.7* 19.7* 20.4*      Plan  - ERICA is stable  - Avoid nephrotoxins and renally dose meds for GFR listed above  - Monitor urine output, serial BMP, and adjust therapy as needed  - appreciate nephrology recommendations.

## 2024-09-18 NOTE — PLAN OF CARE
Problem: Adult Inpatient Plan of Care  Goal: Plan of Care Review  Outcome: Progressing  Goal: Patient-Specific Goal (Individualized)  Outcome: Progressing  Goal: Absence of Hospital-Acquired Illness or Injury  Outcome: Progressing  Goal: Optimal Comfort and Wellbeing  Outcome: Progressing  Goal: Readiness for Transition of Care  Outcome: Progressing     Problem: Diabetes Comorbidity  Goal: Blood Glucose Level Within Targeted Range  Outcome: Progressing     Problem: Acute Kidney Injury/Impairment  Goal: Fluid and Electrolyte Balance  Outcome: Progressing  Goal: Improved Oral Intake  Outcome: Progressing  Goal: Effective Renal Function  Outcome: Progressing     Problem: Infection  Goal: Absence of Infection Signs and Symptoms  Outcome: Progressing     Problem: Fall Injury Risk  Goal: Absence of Fall and Fall-Related Injury  Outcome: Progressing     Problem: Skin Injury Risk Increased  Goal: Skin Health and Integrity  Outcome: Progressing     Problem: Coping Ineffective  Goal: Effective Coping  Outcome: Progressing

## 2024-09-19 VITALS
OXYGEN SATURATION: 100 % | BODY MASS INDEX: 17.79 KG/M2 | SYSTOLIC BLOOD PRESSURE: 124 MMHG | DIASTOLIC BLOOD PRESSURE: 63 MMHG | RESPIRATION RATE: 18 BRPM | HEIGHT: 72 IN | HEART RATE: 69 BPM | TEMPERATURE: 97 F | WEIGHT: 131.38 LBS

## 2024-09-19 LAB
ALBUMIN SERPL BCP-MCNC: 3.2 G/DL (ref 3.5–5.2)
ALP SERPL-CCNC: 57 U/L (ref 55–135)
ALT SERPL W/O P-5'-P-CCNC: 49 U/L (ref 10–44)
ANION GAP SERPL CALC-SCNC: 14 MMOL/L (ref 8–16)
AST SERPL-CCNC: 34 U/L (ref 10–40)
BILIRUB SERPL-MCNC: 1.6 MG/DL (ref 0.1–1)
BUN SERPL-MCNC: 85 MG/DL (ref 8–23)
CALCIUM SERPL-MCNC: 9.3 MG/DL (ref 8.7–10.5)
CHLORIDE SERPL-SCNC: 93 MMOL/L (ref 95–110)
CO2 SERPL-SCNC: 25 MMOL/L (ref 23–29)
CREAT SERPL-MCNC: 3.3 MG/DL (ref 0.5–1.4)
ERYTHROCYTE [DISTWIDTH] IN BLOOD BY AUTOMATED COUNT: 25.1 % (ref 11.5–14.5)
EST. GFR  (NO RACE VARIABLE): 19 ML/MIN/1.73 M^2
GLUCOSE SERPL-MCNC: 98 MG/DL (ref 70–110)
HCT VFR BLD AUTO: 32.1 % (ref 40–54)
HGB BLD-MCNC: 11.1 G/DL (ref 14–18)
INR PPP: 1.1 (ref 0.8–1.2)
MAGNESIUM SERPL-MCNC: 2 MG/DL (ref 1.6–2.6)
MCH RBC QN AUTO: 29.5 PG (ref 27–31)
MCHC RBC AUTO-ENTMCNC: 34.6 G/DL (ref 32–36)
MCV RBC AUTO: 85 FL (ref 82–98)
PHOSPHATE SERPL-MCNC: 2.9 MG/DL (ref 2.7–4.5)
PLATELET # BLD AUTO: 157 K/UL (ref 150–450)
PMV BLD AUTO: ABNORMAL FL (ref 9.2–12.9)
POCT GLUCOSE: 94 MG/DL (ref 70–110)
POTASSIUM SERPL-SCNC: 3.9 MMOL/L (ref 3.5–5.1)
PROT SERPL-MCNC: 7.3 G/DL (ref 6–8.4)
PROTHROMBIN TIME: 11.8 SEC (ref 9–12.5)
RBC # BLD AUTO: 3.76 M/UL (ref 4.6–6.2)
SODIUM SERPL-SCNC: 132 MMOL/L (ref 136–145)
WBC # BLD AUTO: 6.27 K/UL (ref 3.9–12.7)

## 2024-09-19 PROCEDURE — 83735 ASSAY OF MAGNESIUM: CPT | Performed by: STUDENT IN AN ORGANIZED HEALTH CARE EDUCATION/TRAINING PROGRAM

## 2024-09-19 PROCEDURE — 80053 COMPREHEN METABOLIC PANEL: CPT | Performed by: STUDENT IN AN ORGANIZED HEALTH CARE EDUCATION/TRAINING PROGRAM

## 2024-09-19 PROCEDURE — 25000003 PHARM REV CODE 250: Performed by: HOSPITALIST

## 2024-09-19 PROCEDURE — 85027 COMPLETE CBC AUTOMATED: CPT | Performed by: HOSPITALIST

## 2024-09-19 PROCEDURE — 36415 COLL VENOUS BLD VENIPUNCTURE: CPT | Performed by: HOSPITALIST

## 2024-09-19 PROCEDURE — 25000003 PHARM REV CODE 250: Performed by: INTERNAL MEDICINE

## 2024-09-19 PROCEDURE — 25000003 PHARM REV CODE 250: Performed by: STUDENT IN AN ORGANIZED HEALTH CARE EDUCATION/TRAINING PROGRAM

## 2024-09-19 PROCEDURE — 84100 ASSAY OF PHOSPHORUS: CPT | Performed by: STUDENT IN AN ORGANIZED HEALTH CARE EDUCATION/TRAINING PROGRAM

## 2024-09-19 PROCEDURE — 85610 PROTHROMBIN TIME: CPT | Performed by: HOSPITALIST

## 2024-09-19 RX ORDER — TORSEMIDE 20 MG/1
80 TABLET ORAL 2 TIMES DAILY
Qty: 240 TABLET | Refills: 11 | Status: ON HOLD | OUTPATIENT
Start: 2024-09-19 | End: 2025-09-19

## 2024-09-19 RX ORDER — HYDRALAZINE HYDROCHLORIDE 50 MG/1
50 TABLET, FILM COATED ORAL 3 TIMES DAILY
Qty: 270 TABLET | Refills: 3 | Status: ON HOLD | OUTPATIENT
Start: 2024-09-19 | End: 2025-09-19

## 2024-09-19 RX ADMIN — LEVOTHYROXINE SODIUM 100 MCG: 100 TABLET ORAL at 07:09

## 2024-09-19 RX ADMIN — HYDRALAZINE HYDROCHLORIDE 50 MG: 50 TABLET ORAL at 08:09

## 2024-09-19 RX ADMIN — METOPROLOL SUCCINATE 25 MG: 25 TABLET, EXTENDED RELEASE ORAL at 08:09

## 2024-09-19 RX ADMIN — TORSEMIDE 80 MG: 20 TABLET ORAL at 08:09

## 2024-09-19 RX ADMIN — ISOSORBIDE DINITRATE 20 MG: 20 TABLET ORAL at 08:09

## 2024-09-19 RX ADMIN — ASPIRIN 81 MG: 81 TABLET, COATED ORAL at 08:09

## 2024-09-19 RX ADMIN — APIXABAN 2.5 MG: 2.5 TABLET, FILM COATED ORAL at 08:09

## 2024-09-19 RX ADMIN — MUPIROCIN: 20 OINTMENT TOPICAL at 08:09

## 2024-09-19 NOTE — PLAN OF CARE
[facility] - Telemetry Stepdown      HOME HEALTH ORDERS  FACE TO FACE ENCOUNTER    Patient Name: Baldo Carney  YOB: 1950    PCP: Millie Hayes APRN, FNP-C   PCP Address: [address] / [place] [address]  PCP Phone Number: [phone]  PCP Fax: [phone]    Encounter Date: 9/8/24    Admit to Home Health    Diagnoses:  Active Hospital Problems    Diagnosis  POA    *Acute on chronic combined systolic and diastolic heart failure [I50.43]  Yes    Hypokalemia [E87.6]  No    Moderate malnutrition [E44.0]  Yes    Debility [R53.81]  Yes    Palliative care encounter [Z51.5]  Not Applicable    Left ventricular apical thrombus [I51.3]  Yes    Acute kidney injury superimposed on chronic kidney disease [N17.9, N18.9]  Yes    Hyponatremia [E87.1]  Yes    Elevated troponin [R79.89]  Yes    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 without macular edema, without long-term current use of insulin [E11.3593]  Yes     Chronic    Cirrhosis of liver with ascites [K74.60, R18.8]  Yes     Chronic    Hypothyroidism [E03.9]  Yes     Chronic    COPD (chronic obstructive pulmonary disease) [J44.9]  Yes     Chronic    Hypertension [I10]  Yes     Chronic      Resolved Hospital Problems    Diagnosis Date Resolved POA    Diabetes mellitus type 2 without retinopathy [E11.9] 09/08/2024 Yes       Follow Up Appointments:  Future Appointments   Date Time Provider Department Center   9/23/2024 10:00 AM SMEH IR1 SMEH INTRAD [facility]   9/24/2024 10:30 AM LAB, APPOINTMENT [place] LAB [facility]   9/24/2024 11:00 AM Gregoria Delacruz PA-C [facility]   9/24/2024 11:00 AM [facility] HEART FAILURE NURSE [facility]   10/7/2024 10:00 AM SME[facility] IR1 [facility] INTRAD [facility]   10/21/2024 10:00 AM S[facility] IR1 [facility] INTRAD [facility]   11/4/2024 10:00 AM SMEH IR1 [facility] INTRAD [facility]   11/26/2024  9:00 AM Millie Hayes APRN,FNP-C [facility]        Allergies:  Review of patient's allergies indicates:   Allergen Reactions    Canagliflozin      Other reaction(s): been very dizzy       Medications: Review discharge medications with patient and family and provide education.    Current Facility-Administered Medications   Medication Dose Route Frequency Provider Last Rate Last Admin    apixaban tablet 2.5 mg  2.5 mg Oral BID Marianela Bridges MD   2.5 mg at 09/19/24 0834    aspirin EC tablet 81 mg  81 mg Oral Daily Dejon Sparrow MD   81 mg at 09/19/24 0834    dextrose 10% bolus 125 mL 125 mL  12.5 g Intravenous PRN Dejon Sparrow MD        dextrose 10% bolus 250 mL 250 mL  25 g Intravenous PRN Dejon Sparrow MD        glucagon (human recombinant) injection 1 mg  1 mg Intramuscular PRN Dejon Sparrow MD        glucose chewable tablet 16 g  16 g Oral PRN Dejon Sparrow MD        glucose chewable tablet 24 g  24 g Oral PRN Dejon Sparrow MD        hydrALAZINE tablet 50 mg  50 mg Oral TID Adryan Beaulieu MD   50 mg at 09/19/24 0834    insulin aspart U-100 pen 0-5 Units  0-5 Units Subcutaneous QID (AC + HS) PRN Dejon Sparrow MD        isosorbide dinitrate tablet 20 mg  20 mg Oral TID Dejon Sparrow MD   20 mg at 09/19/24 0834    levalbuterol nebulizer solution 0.63 mg  0.63 mg Nebulization Q6H PRN Dejon Sparrow MD        levothyroxine tablet 100 mcg  100 mcg Oral Before breakfast Dejon Sparrow MD   100 mcg at 09/19/24 0700    melatonin tablet 6 mg  6 mg Oral Nightly PRN Sachin Vincent MD        metoprolol succinate (TOPROL-XL) 24 hr tablet 25 mg  25 mg Oral Daily Dejon Sparrow MD   25 mg at 09/19/24 0834    mirtazapine tablet 7.5 mg  7.5 mg Oral QHS Dejon Sparrow MD   7.5 mg at 09/18/24 2144    mupirocin 2 % ointment   Nasal BID Marianela Bridges MD   Given at 09/19/24 0834    torsemide tablet 80 mg  80 mg Oral BID Marianela Bridges MD   80 mg at 09/19/24 0834        Medication List        START taking these medications       apixaban 2.5 mg Tab  Commonly known as: ELIQUIS  Take 1 tablet (2.5 mg total) by mouth 2 (two) times daily.     torsemide 40 mg Tab  Take 80 mg by mouth 2 (two) times a day.            CHANGE how you take these medications      hydrALAZINE 50 MG tablet  Commonly known as: APRESOLINE  Take 1 tablet (50 mg total) by mouth 3 (three) times daily.  What changed: when to take this            CONTINUE taking these medications      aspirin 81 MG EC tablet  Commonly known as: ECOTRIN  Take 81 mg by mouth once daily.     atorvastatin 40 MG tablet  Commonly known as: LIPITOR  Take 1 tablet (40 mg total) by mouth once daily.     cyanocobalamin 1,000 mcg/mL injection  Inject 1 mL (1,000 mcg total) into the muscle every 30 days.     fluticasone furoate-vilanteroL 100-25 mcg/dose diskus inhaler  Commonly known as: BREO ELLIPTA  Inhale 1 puff into the lungs once daily. (DAILY CONTROLLER)     fluticasone propionate 50 mcg/actuation nasal spray  Commonly known as: FLONASE  USE 1 SPRAY (50 MCG TOTAL) IN EACH NOSTRIL ONCE DAILY     gabapentin 300 MG capsule  Commonly known as: NEURONTIN  Take 1 capsule (300 mg total) by mouth every evening.     isosorbide dinitrate 20 MG tablet  Commonly known as: ISORDIL  Take 20 mg by mouth 3 (three) times daily.     levalbuterol 0.63 mg/3 mL nebulizer solution  Commonly known as: XOPENEX  Inhale 0.63 mg into the lungs every 6 (six) hours as needed for Shortness of Breath.     levothyroxine 100 MCG tablet  Commonly known as: SYNTHROID  Take 1 tablet (100 mcg total) by mouth before breakfast. With no other meds or food     linaCLOtide 72 mcg Cap capsule  Commonly known as: LINZESS  Take 1 capsule (72 mcg total) by mouth before breakfast.     metoprolol succinate 25 MG 24 hr tablet  Commonly known as: TOPROL-XL  Take 1 tablet (25 mg total) by mouth once daily.     mirtazapine 7.5 MG Tab  Commonly known as: REMERON  Take 1 tablet (7.5 mg total) by mouth every evening.     omeprazole 40 MG  capsule  Commonly known as: PRILOSEC  Take 1 capsule (40 mg total) by mouth once daily.     VERQUVO 5 mg Tab  Generic drug: vericiguat  Take 5 mg by mouth once daily.            STOP taking these medications      empagliflozin 25 mg tablet  Commonly known as: JARDIANCE     ENTRESTO 24-26 mg per tablet  Generic drug: sacubitriL-valsartan                I have seen and examined this patient within the last 30 days. My clinical findings that support the need for the home health skilled services and home bound status are the following:no   Weakness/numbness causing balance and gait disturbance due to Heart Failure making it taxing to leave home.     Diet:   diabetic diet 1800 calorie and 2 gram sodium diet    Labs:  none    Referrals/ Consults  Physical Therapy to evaluate and treat. Evaluate for home safety and equipment needs; Establish/upgrade home exercise program. Perform / instruct on therapeutic exercises, gait training, transfer training, and Range of Motion.  Occupational Therapy to evaluate and treat. Evaluate home environment for safety and equipment needs. Perform/Instruct on transfers, ADL training, ROM, and therapeutic exercises.  Aide to provide assistance with personal care, ADLs, and vital signs.    Activities:   activity as tolerated    Nursing:   Agency to admit patient within 24 hours of hospital discharge unless specified on physician order or at patient request    SN to complete comprehensive assessment including routine vital signs. Instruct on disease process and s/s of complications to report to MD. Review/verify medication list sent home with the patient at time of discharge  and instruct patient/caregiver as needed. Frequency may be adjusted depending on start of care date.     Skilled nurse to perform up to 3 visits PRN for symptoms related to diagnosis    Notify MD if SBP > 160 or < 90; DBP > 90 or < 50; HR > 120 or < 50; Temp > 101; O2 < 88%    Ok to schedule additional visits based on  staff availability and patient request on consecutive days within the home health episode.    When multiple disciplines ordered:    Start of Care occurs on Sunday - Wednesday schedule remaining discipline evaluations as ordered on separate consecutive days following the start of care.    Thursday SOC -schedule subsequent evaluations Friday and Monday the following week.     Friday - Saturday SOC - schedule subsequent discipline evaluations on consecutive days starting Monday of the following week.    For all post-discharge communication and subsequent orders please contact patient's primary care physician. If unable to reach primary care physician or do not receive response within 30 minutes, please contact primary care physician for clinical staff order clarification    Miscellaneous   Heart Failure:      SN to instruct on the following:    Instruct on the definition of CHF.   Instruct on the signs/sympoms of CHF to be reported.   Instruct on and monitor daily weights.   Instruct on factors that cause exacerbation.   Instruct on action, dose, schedule, and side effects of medications.   Instruct on diet as prescribed.   Instruct on activity allowed.   Instruct on life-style modifications for life long management of CHF   SN to assess compliance with daily weights, diet, medications, fluid retention,    safety precautions, activities permitted and life-style modifications.   Additional 1-2 SN visits per week as needed for signs and symptoms     of CHF exacerbation.      For Weight Gain > 2-3 lbs in 1 day or 4-6 lbs over 1 week notify PCP:  CHF DIURETIC SLIDING SCALE     Skilled nurse visits daily to instruct and monitor medication adherence until target weight. Then resume prior order frequency.     If weight gain exceeds 5 lbs over target weight, call MD  If weight gain of 3-4 lbs over target weight, then:     Increase Torsemide - Current dose (mg/day) to 160 mg double dose  and frequency of twice daily until  target weight achieved or maximum 3 days.     If already on max oral daily dose, see IV diuretic instructions.    After 3 days of increased oral diuretic dose, get BMP. If patient is on increased oral diuretic dose greater than 5 days, repeat BMP at day 7 of increased dose.     Potassium supplementation   Scr > 1.5 mg/dl  Scr  1.5 mg/dl    K < 3.0 - NOTIFY MD and 40 mEq bid  40 mEq tid   K- 3.1-3.3  20 mEq bid  20 mEq tid    K 3.4-3.7  10 mEq bid  10 mEq tid      If target weight not reached after 5 days of increased oral diuretic, proceed to IV diuretic with daily patient contact to include face-to-face visit and telephone encounters.       Home Health Aide:  Nursing Weekly, Physical Therapy Three times weekly, Occupational Therapy Three times weekly, and Home Health Aide Three times weekly    Wound Care Orders  no    I certify that this patient is confined to his home and needs intermittent skilled nursing care, physical therapy, and occupational therapy.        Adryan Beaulieu MD  [facility]  09/19/2024

## 2024-09-19 NOTE — PLAN OF CARE
Pt liaison Shanda sent a message to clinic nurse at pt's primary care office to contact pt to schedule his hospital f/u visit.

## 2024-09-19 NOTE — PLAN OF CARE
Patient is to discharge today home and resume with [facility]. CM sent orders and updated clinical via careport to [facility].      Joanie Alex RN     [phone]

## 2024-09-19 NOTE — NURSING
Nurses Note -- 4 Eyes      9/8/2024   6:16 AM      Skin assessed during: Admit      [x] No Altered Skin Integrity Present    []Prevention Measures Documented      [] Yes- Altered Skin Integrity Present or Discovered   [] LDA Added if Not in Epic (Describe Wound)   [] New Altered Skin Integrity was Present on Admit and Documented in LDA   [] Wound Image Taken    Wound Care Consulted? No    Attending Nurse:  Latasha Sy RN/Staff Member:  BARNEY Spivey RN         
Patient continues to ambulate to bathroom and not using urinal for strict I&O. Patient educated on the importance of using urinal, patient verbalized understanding.  
Patient is requesting for getting a discharge approval asap, told the RN that he wants to get rid of the nguyen's catheter and heparin and lasix drip, agitated this am, tried to get out of the bed. Bed alarm on. Patient explained on the importance of compliance to medical treatment. Replied the RN, he doesn't want to stay here any longer. Patient is explained again that he could talk to his doctor in am rounds .  
Patient refused to take standing scale body weight, told the RN was too tired and don't want to get up from the bed at the moment.  
Pt arrived to the unit.  Notified Dr. PERCY Stapleton of pts arrival to the unit.  Pt connected to bedside monitor.  
Pt attempting to pay for DC medications via phone. Pt asking nurse to get patients wallet. Personal belongings bag located in closet in room. Nurse handed patient jeans out of bag. Pt not able to locate wallet in jeans. In jeans are 18 USD cash, inhaler, cough drop and folded piece of paper. Nurse calling Transplant stepdown Unit. Nurse informed by unit charge and nurse that was present for the transfer  that wallet was inside of jeans and sent with patient during transfer.   
Pt transferred to [id number]. Chart and personal belongings sent with pt.  
Valente Teixeira   Phone number: [phone]   Call and update for any significant changes in medical interventions   
No abnormal movements

## 2024-09-19 NOTE — PROGRESS NOTES
Pt A&Ox4. VSS. Afebrile. Meds given per MAR. No adverse side effects noted. All discharge instructions, medications and appointments reviewed with patient. Patient stating understanding. Pt denying any unmet needs. Provided pt with Paper Scrub top. Pt noted to be missing wallet. Pt. Escorted off unit via wheelchair by family.

## 2024-09-19 NOTE — DISCHARGE SUMMARY
[facility] - Telemetry [facility] Medicine  Discharge Summary      Patient Name: Baldo Carney  MRN: [id number]  SHAHNAZ: [id number]  Patient Class: IP- Inpatient  Admission Date: 9/8/2024  Hospital Length of Stay: 11 days  Discharge Date and Time: 9/19/2024  3:52 PM  Attending Physician: Adryan Beaulieu MD   Discharging Provider: Adryan Beaulieu MD  Primary Care Provider: Millie Hayes APRN,TED-C  [facility] Medicine Team: [facility] Adryan Beaulieu MD  Primary Care Team: [facility]    HPI:   Baldo Carney is a 73 year old Black man with former cigarette smoking (quit in 1970), hypertension, diabetes mellitus type 2 with retinopathy and peripheral neuropathy, coronary artery disease status post coronary artery bypass graft (saphenous vein graft to right coronary artery) on 11/3/2014, status post percutaneous coronary intervention with drug eluting stent placements in left main to left anterior descending artery and left main to lateral circumflex artery on 8/31/2022, history of aortic valve replacement with porcine bioprosthesis on 11/3/2014, ischemic cardiomyopathy with chronic biventricular systolic and diastolic heart failure, chronic kidney disease stage 3-4, anemia, benign prostatic hyperplasia, hypothyroidism, cirrhosis with ascites requiring bimonthly paracentesis, chronic obstructive pulmonary disease (COPD), history of incarcerated inguinal hernia repair on 12/7/2023. He lives in [place], [place].    He was hospitalized at [facility] from 8/12/2024 to 8/18/2024 for syncope, cystitis, hypoglycemia, COVID-19. He underwent paracentesis on admission with 6 liters of fluid removed and albumin given.    He was seen by his primary care nurse practitioner in clinic on 8/27/2024 and was prescribed mirtazapine for situational anxiety and omeprazole for heartburn.   He presented to [facility] Emergency Department on Saturday 9/7/2024 with left sided chest  pain for 4 hours without radiation, associated with shortness of breath. He also had unchanged chronic bilateral lower extremity edema. He was scheduled for paracentesis on Monday 9/9/2024. Labs showed elevated BNP (>4900 pg/mL) and troponin (0.196 ng/mL) and acute kidney injury (BUN 69 mg/dL and creatinine 4.8 mg/dL from 37 and 1.9 on 8/23/2024), suggestive of volume overload due to cirrhosis and heart failure. He was transferred to [facility] [place] on 9/8/2024 and admitted to Hospital Medicine Team L.     * No surgery found *      Hospital Course:   Nephrology and Cardiology were consulted. Hepatology recommended IV albumin. Suárez catheter was placed for urine output measurement. Nephrology suspected cardiorenal syndrome and recommended giving IV furosemide 80 mg twice daily. It was ineffective. Hospital Medicine increased it to 120 mg twice daily and it still did not increase urine output. Echocardiogram showed a layered nonmobile left ventricular apical thrombus. He was put on therapeutic enoxaparin. Cardiology recommended furosemide drip. Nephrology agreed with furosemide drip and recommended a trial of IV albumin for 3 days. He diuresed better. Furosemide dose was increased and he was given chlorothiazide. Enoxaparin was changed to heparin drip due to renal insufficiency. He was not a candidate for ARNI/MRA or SGLT2 inhibitor due to renal insufficiency. Cardiology recommended outpatient valve clinic follow up for possible valve in valve transcatheter aortic valve replacement once his left ventricular thrombus resolves. Palliative Care was consulted per Hepatology recommendations. Heparin drip was transitioned to apixaban 2.5 mg twice daily. He was put on torsemide 80 mg twice daily on 9/17/2024. Physical and Occupational Therapy recommended low intensity therapy and bath bench. He was discharged home with home health on 9/19/2024.      Goals of Care Treatment Preferences:  Code Status: Full  Code    Health care agent: Libia Carney  [facility] care agent number: [phone]    Living Will: Yes     What is most important right now is to focus on remaining as independent as possible, symptom/pain control, extending life as long as possible, even it it means sacrificing quality, curative/life-prolongation (regardless of treatment burdens).  Accordingly, we have decided that the best plan to meet the patient's goals includes continuing with treatment.      SDOH Screening:  The patient was screened for utility difficulties, food insecurity, transport difficulties, housing insecurity, and interpersonal safety and there were no concerns identified this admission.     Consults:   Consults (From admission, onward)          Status Ordering Provider     Inpatient consult to Midline team  Once        Provider:  (Not yet assigned)    Completed RAJESH DEJESUS     Inpatient consult to Palliative Care  Once        Provider:  (Not yet assigned)    Completed ABADCO JAKE A     Inpatient consult to Registered Dietitian/Nutritionist  Once        Provider:  (Not yet assigned)    Completed ABADCO, JAKE A     Inpatient consult to Cardiology  Once        Provider:  (Not yet assigned)    Completed ABADCO JAKE A     Inpatient consult to Nephrology  Once        Provider:  (Not yet assigned)    Completed ABADCO, JAKE A     Inpatient consult to Hepatology  Once        Provider:  (Not yet assigned)    Completed JOEL BRUNO            Final Active Diagnoses:    Diagnosis Date Noted POA    PRINCIPAL PROBLEM:  Acute on chronic combined systolic and diastolic heart failure [I50.43] 10/26/2021 Yes    Hypokalemia [E87.6] 09/18/2024 No    Moderate malnutrition [E44.0] 09/18/2024 Yes    Debility [R53.81] 09/18/2024 Yes    Palliative care encounter [Z51.5] 09/12/2024 Not Applicable    Left ventricular apical thrombus [I51.3] 09/09/2024 Yes    Acute kidney injury superimposed on chronic kidney disease [N17.9, N18.9] 09/08/2024  Yes    Hyponatremia [E87.1] 08/17/2024 Yes    Elevated troponin [R79.89] 07/29/2024 Yes    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 without macular edema, without long-term current use of insulin [E11.3593] 07/29/2024 Yes     Chronic    Cirrhosis of liver with ascites [K74.60, R18.8] 09/14/2023 Yes     Chronic    Hypothyroidism [E03.9] 08/31/2023 Yes     Chronic    COPD (chronic obstructive pulmonary disease) [J44.9] 08/29/2020 Yes     Chronic    Hypertension [I10] 09/19/2014 Yes     Chronic      Problems Resolved During this Admission:    Diagnosis Date Noted Date Resolved POA    Diabetes mellitus type 2 without retinopathy [E11.9] 09/19/2014 09/08/2024 Yes       Discharged Condition: good    Disposition: Home-Health Care [facility] [place] [facility]    Follow Up:   Follow-up Information       LAB, APPOINTMENT [place] Follow up on 9/24/2024.    Specialty: Lab  Why: 10:30 AM             Gregoria Delacruz PA-C Follow up on 9/24/2024.    Specialties: Transplant, Cardiology  Why: 11 AM  Contact information:  [address] [place] [id number]  [phone]                           Patient Instructions:      BATH/SHOWER CHAIR FOR HOME USE     Order Specific Question Answer Comments   Height: 6' (1.829 m)    Weight: 59.6 kg (131 lb 6.3 oz)    Does patient have medical equipment at home? walker, rolling    Length of need (1-99 months): 99    Type: With back      Ambulatory referral/consult to Interventional Cardiology   Standing Status: Future   Referral Priority: Routine Referral Type: Consultation   Referral Reason: Specialty Services Required   Requested Specialty: Interventional Cardiology   Number of Visits Requested: 1     Diet diabetic     Notify your health care provider if you experience any of the following:  difficulty breathing or increased cough     Notify your health care provider if you experience any of the following:  persistent dizziness, light-headedness, or visual disturbances      Activity as tolerated       Significant Diagnostic Studies:   Transthoracic echo complete 9/09/24:     Left Ventricle: The left ventricle is mildly dilated measuring 5.8 cm. Normal wall thickness. Severe global hypokinesis present. There is severely reduced systolic function. Grade II diastolic dysfunction. Elevated left ventricular filling pressure. There is a layered, nonmobile thrombus in the apex measuring 1.3 x 2.5 cm.    Right Ventricle: Mild right ventricular enlargement. Systolic function is mildly reduced. Pacemaker lead present in the ventricle.    Left Atrium: Left atrium is severely dilated.    Right Atrium: Right atrium is mildly dilated.    Aortic Valve: There is moderate to severe stenosis. Aortic valve area by VTI is 0.80 cm². Aortic valve peak velocity is 3.43 m/s. Mean gradient is 28 mmHg. The dimensionless index is 0.24. There is mild aortic regurgitation.    Mitral Valve: There is mild regurgitation.    Tricuspid Valve: There is moderate regurgitation.    Pulmonary Artery: There is severe pulmonary hypertension. The estimated pulmonary artery systolic pressure is 74 mmHg.    IVC/SVC: Elevated venous pressure at 15 mmHg.  US Retroperitoneal Complete 9/10/24: FINDINGS:   Right kidney: The right kidney measures 8.4 cm. No cortical thinning or loss of corticomedullary distinction. Resistive index measures 0.71.  No mass. No renal stone. No hydronephrosis.   Left kidney: The left kidney measures 9.1 cm. No cortical thinning or loss of corticomedullary distinction. Resistive index measures 0.61.  No mass. No renal stone. No hydronephrosis.   Splenic resistive index measures 0.78.   Suárez catheter present with decompression of the bladder.  Prostate is enlarged measuring 5.1 x 5.6 x 5.1 cm.   Large volume ascites.   Impression:  No significant sonographic abnormalities the kidneys.   Incomplete evaluation of bladder due to Suárez decompression.   Prostatomegaly.   Abdominopelvic ascites.   X-Ray  Chest AP Portable 9/14/24: FINDINGS:   There is a cardiac pacer/AICD, unchanged from prior.  There are median sternotomy wires.  The lungs are well expanded and clear.  No focal opacities are seen.  The pleural spaces are clear.  The cardiac silhouette is unremarkable.  Osseous structures are intact.   Impression:  No acute abnormality.     Medications:  Reconciled Home Medications:      Medication List        START taking these medications      apixaban 2.5 mg Tab  Commonly known as: ELIQUIS  Take 1 tablet (2.5 mg total) by mouth 2 (two) times daily.     torsemide 40 mg Tab  Take 80 mg by mouth 2 (two) times a day.            CHANGE how you take these medications      hydrALAZINE 50 MG tablet  Commonly known as: APRESOLINE  Take 1 tablet (50 mg total) by mouth 3 (three) times daily.  What changed: when to take this            CONTINUE taking these medications      aspirin 81 MG EC tablet  Commonly known as: ECOTRIN  Take 81 mg by mouth once daily.     atorvastatin 40 MG tablet  Commonly known as: LIPITOR  Take 1 tablet (40 mg total) by mouth once daily.     cyanocobalamin 1,000 mcg/mL injection  Inject 1 mL (1,000 mcg total) into the muscle every 30 days.     fluticasone furoate-vilanteroL 100-25 mcg/dose diskus inhaler  Commonly known as: BREO ELLIPTA  Inhale 1 puff into the lungs once daily. (DAILY CONTROLLER)     fluticasone propionate 50 mcg/actuation nasal spray  Commonly known as: FLONASE  USE 1 SPRAY (50 MCG TOTAL) IN EACH NOSTRIL ONCE DAILY     gabapentin 300 MG capsule  Commonly known as: NEURONTIN  Take 1 capsule (300 mg total) by mouth every evening.     isosorbide dinitrate 20 MG tablet  Commonly known as: ISORDIL  Take 20 mg by mouth 3 (three) times daily.     levalbuterol 0.63 mg/3 mL nebulizer solution  Commonly known as: XOPENEX  Inhale 0.63 mg into the lungs every 6 (six) hours as needed for Shortness of Breath.     levothyroxine 100 MCG tablet  Commonly known as: SYNTHROID  Take 1 tablet (100 mcg  total) by mouth before breakfast. With no other meds or food     linaCLOtide 72 mcg Cap capsule  Commonly known as: LINZESS  Take 1 capsule (72 mcg total) by mouth before breakfast.     metoprolol succinate 25 MG 24 hr tablet  Commonly known as: TOPROL-XL  Take 1 tablet (25 mg total) by mouth once daily.     mirtazapine 7.5 MG Tab  Commonly known as: REMERON  Take 1 tablet (7.5 mg total) by mouth every evening.     omeprazole 40 MG capsule  Commonly known as: PRILOSEC  Take 1 capsule (40 mg total) by mouth once daily.     VERQUVO 5 mg Tab  Generic drug: vericiguat  Take 5 mg by mouth once daily.            STOP taking these medications      empagliflozin 25 mg tablet  Commonly known as: JARDIANCE     ENTRESTO 24-26 mg per tablet  Generic drug: sacubitriL-valsartan              Indwelling Lines/Drains at time of discharge: none      Time spent on the discharge of patient: 35 minutes         Adryan Beaulieu MD  Department of Hospital Medicine  [facility] - Telemetry Stepdown

## 2024-09-19 NOTE — ASSESSMENT & PLAN NOTE
Impression:  Baldo Carney is a 73 y.o. male with extensive cardiac history, HTN, CHF, CKD stage 3-4, T2DM with retinopathy and peripheral neuropathy, anemia, BPH, hypothyroidism, cirrhosis with ascites requiring bimonthly paracentesis, COPD, history of incarcerated inguinal hernia repair on 12/7/2023 initially presented to [facility] on 9/7/24 with complaints of left sided chest pain that started 4x hours PTA. While in OSH ED, patient with 5.3 K, 4,900 BNP, and elevated troponin. Patient was transferred to [facility] for higher level of care, and evaluation by cardiology and hepatology.    Patient is sitting up in bed. AAOx4, and amenable to Palliative Medicine. Palliative Medicine was consulted for Goals of Care and Advanced Care Planning discussions by primary team, Dr. Bridges.      Advance Care Planning     Date: 09/18/2024    [facility]  I engaged the patient in a voluntary conversation about advance care planning and we specifically addressed what the goals of care would be moving forward, in light of the patient's change in clinical status, specifically patient's liver disease and CHF.  We did not specifically address the patient's likely prognosis, which is fair .  We explored the patient's values and preferences for future care.  The patient endorses that what is most important right now is to focus on remaining as independent as possible, symptom/pain control, extending life as long as possible, even it it means sacrificing quality, and curative/life-prolongation (regardless of treatment burdens)    Accordingly, we have decided that the best plan to meet the patient's goals includes continuing with treatment          ACP Reviewed/No Changes  Voluntary advance care planning discussion had today with patient. Previously completed HCPOA in electronic medical record is current, no changes made.                 Life Limiting Diagnosis  CHF  - Cardiology Following  - torsemide 80mg BID    ERICA  - Nephrology  following    Cirrhosis from ischemic cardiomyopathy and CHF  - Hepatology following    Symptom Management  Pain  - Patient denies     Insomnia  - Denies    Dyspnea  - Denies    Ascites  - Reports getting paracentesis every 2 weeks.    Recommendations  - Continue management per primary team  - Patient and family would benefit from continued education and information regarding plan of care, prognosis, and what to expect in the future  - Palliative will sign off at this time. Please reach out for any further Palliative Medicine needs.   - Will assist with connecting patient with Palliative Medicine Clinic resources.    Thank you for consulting Palliative Medicine.

## 2024-09-19 NOTE — PROGRESS NOTES
[facility] - Telemetry Stepdown  Palliative Medicine  Progress Note    Patient Name: Baldo Carney  MRN: [id number]  Admission Date: 9/8/2024  Hospital Length of Stay: 10 days  Code Status: Full Code   Attending Provider: Marianela Bridges MD  Consulting Provider: Mel Washington NP  Primary Care Physician: Millie Hayes, APRN,FNP-C  Principal Problem:Acute on chronic combined systolic and diastolic heart failure    Patient information was obtained from patient, past medical records, and primary team.      Assessment/Plan:     Palliative Care  Palliative care encounter  Impression:  Baldo Carney is a 73 y.o. male with extensive cardiac history, HTN, CHF, CKD stage 3-4, T2DM with retinopathy and peripheral neuropathy, anemia, BPH, hypothyroidism, cirrhosis with ascites requiring bimonthly paracentesis, COPD, history of incarcerated inguinal hernia repair on 12/7/2023 initially presented to [facility] on 9/7/24 with complaints of left sided chest pain that started 4x hours PTA. While in OSH ED, patient with 5.3 K, 4,900 BNP, and elevated troponin. Patient was transferred to [facility] for higher level of care, and evaluation by cardiology and hepatology.    Patient is sitting up in bed. AAOx4, and amenable to Palliative Medicine. Palliative Medicine was consulted for Goals of Care and Advanced Care Planning discussions by primary team, Dr. Bridges.      Advance Care Planning    Date: 09/18/2024    [facility]  I engaged the patient in a voluntary conversation about advance care planning and we specifically addressed what the goals of care would be moving forward, in light of the patient's change in clinical status, specifically patient's liver disease and CHF.  We did not specifically address the patient's likely prognosis, which is fair .  We explored the patient's values and preferences for future care.  The patient endorses that what is most important right now is to focus on remaining as independent as possible,  symptom/pain control, extending life as long as possible, even it it means sacrificing quality, and curative/life-prolongation (regardless of treatment burdens)    Accordingly, we have decided that the best plan to meet the patient's goals includes continuing with treatment          ACP Reviewed/No Changes  Voluntary advance care planning discussion had today with patient. Previously completed HCPOA in electronic medical record is current, no changes made.                 Life Limiting Diagnosis  CHF  - Cardiology Following  - torsemide 80mg BID    ERICA  - Nephrology following    Cirrhosis from ischemic cardiomyopathy and CHF  - Hepatology following    Symptom Management  Pain  - Patient denies     Insomnia  - Denies    Dyspnea  - Denies    Ascites  - Reports getting paracentesis every 2 weeks.    Recommendations  - Continue management per primary team  - Patient and family would benefit from continued education and information regarding plan of care, prognosis, and what to expect in the future  - Palliative will sign off at this time. Please reach out for any further Palliative Medicine needs.   - Will assist with connecting patient with Palliative Medicine Clinic resources.    Thank you for consulting Palliative Medicine.        I will sign off. Please contact us if you have any additional questions.    Subjective:     Chief Complaint:   Chief Complaint   Patient presents with    Shortness of Breath       HPI:   As per chart review, Baldo Carney is a 73 y.o. male with extensive cardiac history, HTN, CAD s/p CABG (saphenous vein graft to right coronary artery) on 11/3/2014, s/p PCI with drug eluting stent placements in left main to left anterior descending artery and left main to lateral circumflex artery on 8/31/2022, history of aortic valve replacement with porcine bioprosthesis on 11/3/2014, ischemic cardiomyopathy with chronic biventricular systolic and diastolic HF, CKD stage 3-4, T2DM with retinopathy  and peripheral neuropathy, anemia, BPH, hypothyroidism, cirrhosis with ascites requiring bimonthly paracentesis, COPD, history of incarcerated inguinal hernia repair on 12/7/2023 initially presented to [facility] on 9/7/24 with complaints of left sided chest pain that started 4x hours PTA. While in OSH ED, patient with 5.3 K, 4,900 BNP, and elevated troponin. Patient was transferred to [facility] for higher level of care, and evaluation by cardiology and hepatology.    Palliative Medicine was consulted for Goals of Care and Advanced Care Planning discussions by primary team, Dr. Bridges.    Hospital Course:  No notes on file    Interval History: Palliative Medicine follow up. Goals of care addressed. Code status reviewed.    Past Medical History:   Diagnosis Date    Asthma     Cardiomyopathy 10/21/2014    CHF (congestive heart failure)     Coronary artery disease     CRF (chronic renal failure)     Diabetes mellitus     Enlarged prostate     Hyperlipidemia     Hypertension     Neuropathy     Syncope 07/29/2024    Umbilical hernia without obstruction and without gangrene 10/13/2023       Past Surgical History:   Procedure Laterality Date    ANGIOGRAPHY OF INTERNAL MAMMARY VESSEL N/A 3/11/2022    Procedure: Angiogram Internal Mammary;  Surgeon: Hank Lujan MD;  Location: [facility] CATH/EP LAB;  Service: Cardiology;  Laterality: N/A;    CARDIAC CATHETERIZATION      CARDIAC VALVE SURGERY      CATHETERIZATION OF BOTH LEFT AND RIGHT HEART Left 3/11/2022    Procedure: CATHETERIZATION, HEART, BOTH LEFT AND RIGHT;  Surgeon: Hank Lujan MD;  Location: [facility] CATH/EP LAB;  Service: Cardiology;  Laterality: Left;    CORONARY ANGIOGRAPHY N/A 3/11/2022    Procedure: ANGIOGRAM, CORONARY ARTERY;  Surgeon: Hank Lujan MD;  Location: [facility] CATH/EP LAB;  Service: Cardiology;  Laterality: N/A;    CORONARY BYPASS GRAFT ANGIOGRAPHY  3/11/2022    Procedure: Bypass graft study;  Surgeon: Hank Lujan MD;  Location: [facility] CATH/EP LAB;   Service: Cardiology;;    CYSTOSCOPY N/A 7/30/2019    Procedure: CYSTOSCOPY;  Surgeon: Spencer Nguyen MD;  Location: [facility] OR;  Service: Urology;  Laterality: N/A;    INSERTION OF INTRAVASCULAR MICROAXIAL BLOOD PUMP N/A 8/31/2022    Procedure: INSERTION, IMPELLA;  Surgeon: Zach Jensen MD;  Location: [facility] CATH LAB;  Service: Cardiology;  Laterality: N/A;    INSERTION, CATHETER, DIALYSIS, PERITONEAL N/A 12/7/2023    Procedure: INSERTION, CATHETER, DIALYSIS, PERITONEAL;  Surgeon: Greg Bone Jr., MD;  Location: [facility];  Service: General;  Laterality: N/A;  need PD catheter    PLACEMENT OF SWAN SEE CATHETER WITH IMAGING GUIDANCE  8/31/2022    Procedure: INSERTION, CATHETER, SWAN-SEE, WITH IMAGING GUIDANCE;  Surgeon: Zach Jensen MD;  Location: [facility] CATH LAB;  Service: Cardiology;;    REPAIR, HERNIA, INGUINAL, INCARCERATED, INITIAL, AGE 5 YEARS OR OLDER Right 12/7/2023    Procedure: REPAIR, HERNIA, INGUINAL, INCARCERATED, INITIAL;  Surgeon: Greg Bone Jr., MD;  Location: [facility];  Service: General;  Laterality: Right;    SHOULDER ARTHROSCOPY  1985    TRANSRECTAL BIOPSY OF PROSTATE WITH ULTRASOUND GUIDANCE N/A 7/30/2019    Procedure: BIOPSY, PROSTATE, RECTAL APPROACH, WITH US GUIDANCE;  Surgeon: Spencer Nguyen MD;  Location: [facility];  Service: Urology;  Laterality: N/A;  procedure not performed, pt unable to tolerate    TRANSRECTAL BIOPSY OF PROSTATE WITH ULTRASOUND GUIDANCE N/A 8/8/2019    Procedure: BIOPSY, PROSTATE, RECTAL APPROACH, WITH US GUIDANCE;  Surgeon: Spencer Nguyen MD;  Location: [facility] OR;  Service: Urology;  Laterality: N/A;    UMBILICAL HERNIA REPAIR N/A 12/7/2023    Procedure: REPAIR, HERNIA, UMBILICAL;  Surgeon: Greg Bone Jr., MD;  Location: [facility];  Service: General;  Laterality: N/A;       Review of patient's allergies indicates:   Allergen Reactions    Canagliflozin      Other reaction(s): been very dizzy       Medications:  Continuous  Infusions:      Scheduled Meds:   apixaban  2.5 mg Oral BID    aspirin  81 mg Oral Daily    hydrALAZINE  50 mg Oral TID    isosorbide dinitrate  20 mg Oral TID    levothyroxine  100 mcg Oral Before breakfast    metoprolol succinate  25 mg Oral Daily    mirtazapine  7.5 mg Oral QHS    mupirocin   Nasal BID    torsemide  80 mg Oral BID     PRN Meds:  Current Facility-Administered Medications:     dextrose 10%, 12.5 g, Intravenous, PRN    dextrose 10%, 25 g, Intravenous, PRN    glucagon (human recombinant), 1 mg, Intramuscular, PRN    glucose, 16 g, Oral, PRN    glucose, 24 g, Oral, PRN    insulin aspart U-100, 0-5 Units, Subcutaneous, QID (AC + HS) PRN    levalbuterol, 0.63 mg, Nebulization, Q6H PRN    melatonin, 6 mg, Oral, Nightly PRN    Family History       Problem Relation (Age of Onset)    Diabetes Father, Sister, Brother    Heart attack Father    Heart disease Father, Brother    Hypertension Father    No Known Problems Mother, Maternal Grandmother, Maternal Grandfather, Paternal Grandmother, Paternal Grandfather, Maternal Aunt, Maternal Uncle, Paternal Aunt, Paternal Uncle          Tobacco Use    Smoking status: Former     Current packs/day: 0.00     Types: Cigarettes     Quit date: 1970     Years since quittin.2    Smokeless tobacco: Never   Substance and Sexual Activity    Alcohol use: Not Currently     Alcohol/week: 3.0 standard drinks of alcohol     Types: 3 Cans of beer per week    Drug use: No    Sexual activity: Not Currently     Partners: Female       Review of Systems   Constitutional:  Positive for fatigue.   HENT: Negative.     Eyes: Negative.    Respiratory:  Positive for shortness of breath.    Cardiovascular:  Positive for leg swelling.   Genitourinary: Negative.    Neurological:  Positive for weakness.     Objective:     Vital Signs (Most Recent):  Temp: 98.4 °F (36.9 °C) (24 1151)  Pulse: 60 (24 1500)  Resp: 18 (24 1437)  BP: 133/64 (24 1437)  SpO2: 98 %  (09/18/24 1437) Vital Signs (24h Range):  Temp:  [97.6 °F (36.4 °C)-98.6 °F (37 °C)] 98.4 °F (36.9 °C)  Pulse:  [60-71] 60  Resp:  [16-18] 18  SpO2:  [95 %-99 %] 98 %  BP: (107-133)/(56-67) 133/64     Weight: 59.6 kg (131 lb 6.3 oz)  Body mass index is 17.82 kg/m².       Physical Exam  Constitutional:       Appearance: He is ill-appearing.   HENT:      Head: Normocephalic and atraumatic.      Mouth/Throat:      Mouth: Mucous membranes are dry.   Eyes:      Extraocular Movements: Extraocular movements intact.   Cardiovascular:      Rate and Rhythm: Rhythm irregular.      Pulses: Normal pulses.   Pulmonary:      Effort: Pulmonary effort is normal.   Skin:     General: Skin is warm and dry.   Neurological:      Mental Status: He is alert and oriented to person, place, and time.            Review of Symptoms      Symptom Assessment (ESAS 0-10 Scale)  Pain:  0  Dyspnea:  0  Anxiety:  0  Nausea:  0  Depression:  0  Anorexia:  0  Fatigue:  0  Insomnia:  0  Restlessness:  0  Agitation:  0     CAM / Delirium:  Negative  Constipation:  Negative  Diarrhea:  Negative      Bowel Management Plan (BMP):  Yes      Pain Assessment:  OME in 24 hours:  0  Location(s):      Modified Roshan Scale:  0    Performance Status:  80    Living Arrangements:  Lives alone    Psychosocial/Cultural:   See Palliative Psychosocial Note: Yes  - .  - Lives alone  - Has 1 biological son, 4 granddaughters, 1 grandson. Patient reports that he does not have a strong relationship with his son.  - Patient has 5 sisters and 4 brothers. Patient reports strong relationship with siblings, nieces, nephews, and grand children.  - Retired [employer] of 42 years in [place].  - Enjoys relaxing and resting in his down time.  **Primary  to Follow**  Palliative Care  Consult: No        Advance Care Planning  Advance Directives:   Living Will: Yes        Copy on chart: Yes    LaPOST: No    Do Not Resuscitate Status: No    Medical  Power of : Yes    Agent's Name:  Libia Carney   Agent's Contact Number:  [phone]    Decision Making:  Patient answered questions  Goals of Care: The patient endorses that what is most important right now is to focus on remaining as independent as possible and extending life as long as possible, even it it means sacrificing quality.     Accordingly, we have decided that the best plan to meet the patient's goals includes continuing with treatment         Significant Labs: All pertinent labs within the past 24 hours have been reviewed.  CBC:   Recent Labs   Lab 09/18/24  0640   WBC 6.11   HGB 10.9*   HCT 31.8*   MCV 86   *     BMP:  Recent Labs   Lab 09/18/24  0640      *   K 3.3*   CL 93*   CO2 29   BUN 80*   CREATININE 3.1*   CALCIUM 9.5   MG 1.9     LFT:  Lab Results   Component Value Date    AST 36 09/18/2024    ALKPHOS 57 09/18/2024    BILITOT 1.6 (H) 09/18/2024     Albumin:   Albumin   Date Value Ref Range Status   09/18/2024 3.2 (L) 3.5 - 5.2 g/dL Final   08/17/2021 pos  Final     Protein:   Total Protein   Date Value Ref Range Status   09/18/2024 7.3 6.0 - 8.4 g/dL Final     Lactic acid:   Lab Results   Component Value Date    LACTATE 1.7 07/30/2024    LACTATE 3.1 (H) 07/29/2024       Significant Imaging: I have reviewed all pertinent imaging results/findings within the past 24 hours.      Chest X-ray 9/14/24  Impression:  No acute abnormality.    US Retroperitoneal 9/10/24  Impression:  No significant sonographic abnormalities the kidneys.  Incomplete evaluation of bladder due to Suárez decompression.  Prostatomegaly.  Abdominopelvic ascites.      In my care of this patient with acute on chronic severe illness with threat to life and/or bodily function, I am recommending goal-concordant care as noted above. I spent a significant amount of time reviewing external records/ recommendations of other providers, reviewing recent test results, and discussed care with other  subspecialists involved    The above recommendations communicated directly to primary team on 9/18/24    Additional 20min time spent on a voluntary advance care planning and /or goals of care discussion, providing emotional support, formulating, and communicating prognosis and exploring burden/benefit of various approaches of treatment.     Mel Washington NP  Palliative Medicine  [facility] - Telemetry Stepdown

## 2024-09-19 NOTE — SUBJECTIVE & OBJECTIVE
Interval History: Palliative Medicine follow up. Goals of care addressed. Code status reviewed.    Past Medical History:   Diagnosis Date    Asthma     Cardiomyopathy 10/21/2014    CHF (congestive heart failure)     Coronary artery disease     CRF (chronic renal failure)     Diabetes mellitus     Enlarged prostate     Hyperlipidemia     Hypertension     Neuropathy     Syncope 07/29/2024    Umbilical hernia without obstruction and without gangrene 10/13/2023       Past Surgical History:   Procedure Laterality Date    ANGIOGRAPHY OF INTERNAL MAMMARY VESSEL N/A 3/11/2022    Procedure: Angiogram Internal Mammary;  Surgeon: Hank Lujan MD;  Location: [facility] CATH/EP LAB;  Service: Cardiology;  Laterality: N/A;    CARDIAC CATHETERIZATION      CARDIAC VALVE SURGERY      CATHETERIZATION OF BOTH LEFT AND RIGHT HEART Left 3/11/2022    Procedure: CATHETERIZATION, HEART, BOTH LEFT AND RIGHT;  Surgeon: Hank Lujan MD;  Location: [facility] CATH/EP LAB;  Service: Cardiology;  Laterality: Left;    CORONARY ANGIOGRAPHY N/A 3/11/2022    Procedure: ANGIOGRAM, CORONARY ARTERY;  Surgeon: Hank Lujan MD;  Location: [facility] CATH/EP LAB;  Service: Cardiology;  Laterality: N/A;    CORONARY BYPASS GRAFT ANGIOGRAPHY  3/11/2022    Procedure: Bypass graft study;  Surgeon: Hank Lujan MD;  Location: [facility] CATH/EP LAB;  Service: Cardiology;;    CYSTOSCOPY N/A 7/30/2019    Procedure: CYSTOSCOPY;  Surgeon: Spencer Nguyen MD;  Location[facility] OR;  Service: Urology;  Laterality: N/A;    INSERTION OF INTRAVASCULAR MICROAXIAL BLOOD PUMP N/A 8/31/2022    Procedure: INSERTION, IMPELLA;  Surgeon: Zach Jensen MD;  Location: [facility] CATH LAB;  Service: Cardiology;  Laterality: N/A;    INSERTION, CATHETER, DIALYSIS, PERITONEAL N/A 12/7/2023    Procedure: INSERTION, CATHETER, DIALYSIS, PERITONEAL;  Surgeon: Greg Bone Jr., MD;  Location: [facility] OR;  Service: General;  Laterality: N/A;  need PD catheter    PLACEMENT OF SWAN SEE  CATHETER WITH IMAGING GUIDANCE  8/31/2022    Procedure: INSERTION, CATHETER, SWAN-SEE, WITH IMAGING GUIDANCE;  Surgeon: Zach Jensen MD;  Location: [facility] CATH LAB;  Service: Cardiology;;    REPAIR, HERNIA, INGUINAL, INCARCERATED, INITIAL, AGE 5 YEARS OR OLDER Right 12/7/2023    Procedure: REPAIR, HERNIA, INGUINAL, INCARCERATED, INITIAL;  Surgeon: Greg Bone Jr., MD;  Location: [facility] OR;  Service: General;  Laterality: Right;    SHOULDER ARTHROSCOPY  1985    TRANSRECTAL BIOPSY OF PROSTATE WITH ULTRASOUND GUIDANCE N/A 7/30/2019    Procedure: BIOPSY, PROSTATE, RECTAL APPROACH, WITH US GUIDANCE;  Surgeon: Spencer Nguyen MD;  Location: [facility] OR;  Service: Urology;  Laterality: N/A;  procedure not performed, pt unable to tolerate    TRANSRECTAL BIOPSY OF PROSTATE WITH ULTRASOUND GUIDANCE N/A 8/8/2019    Procedure: BIOPSY, PROSTATE, RECTAL APPROACH, WITH US GUIDANCE;  Surgeon: Spencer Nguyen MD;  Location: [facility] OR;  Service: Urology;  Laterality: N/A;    UMBILICAL HERNIA REPAIR N/A 12/7/2023    Procedure: REPAIR, HERNIA, UMBILICAL;  Surgeon: Greg Bone Jr., MD;  Location: [facility];  Service: General;  Laterality: N/A;       Review of patient's allergies indicates:   Allergen Reactions    Canagliflozin      Other reaction(s): been very dizzy       Medications:  Continuous Infusions:      Scheduled Meds:   apixaban  2.5 mg Oral BID    aspirin  81 mg Oral Daily    hydrALAZINE  50 mg Oral TID    isosorbide dinitrate  20 mg Oral TID    levothyroxine  100 mcg Oral Before breakfast    metoprolol succinate  25 mg Oral Daily    mirtazapine  7.5 mg Oral QHS    mupirocin   Nasal BID    torsemide  80 mg Oral BID     PRN Meds:  Current Facility-Administered Medications:     dextrose 10%, 12.5 g, Intravenous, PRN    dextrose 10%, 25 g, Intravenous, PRN    glucagon (human recombinant), 1 mg, Intramuscular, PRN    glucose, 16 g, Oral, PRN    glucose, 24 g, Oral, PRN    insulin aspart U-100, 0-5 Units,  Subcutaneous, QID (AC + HS) PRN    levalbuterol, 0.63 mg, Nebulization, Q6H PRN    melatonin, 6 mg, Oral, Nightly PRN    Family History       Problem Relation (Age of Onset)    Diabetes Father, Sister, Brother    Heart attack Father    Heart disease Father, Brother    Hypertension Father    No Known Problems Mother, Maternal Grandmother, Maternal Grandfather, Paternal Grandmother, Paternal Grandfather, Maternal Aunt, Maternal Uncle, Paternal Aunt, Paternal Uncle          Tobacco Use    Smoking status: Former     Current packs/day: 0.00     Types: Cigarettes     Quit date: 1970     Years since quittin.2    Smokeless tobacco: Never   Substance and Sexual Activity    Alcohol use: Not Currently     Alcohol/week: 3.0 standard drinks of alcohol     Types: 3 Cans of beer per week    Drug use: No    Sexual activity: Not Currently     Partners: Female       Review of Systems   Constitutional:  Positive for fatigue.   HENT: Negative.     Eyes: Negative.    Respiratory:  Positive for shortness of breath.    Cardiovascular:  Positive for leg swelling.   Genitourinary: Negative.    Neurological:  Positive for weakness.     Objective:     Vital Signs (Most Recent):  Temp: 98.4 °F (36.9 °C) (24 1151)  Pulse: 60 (24 1500)  Resp: 18 (24 1437)  BP: 133/64 (24 1437)  SpO2: 98 % (24 1437) Vital Signs (24h Range):  Temp:  [97.6 °F (36.4 °C)-98.6 °F (37 °C)] 98.4 °F (36.9 °C)  Pulse:  [60-71] 60  Resp:  [16-18] 18  SpO2:  [95 %-99 %] 98 %  BP: (107-133)/(56-67) 133/64     Weight: 59.6 kg (131 lb 6.3 oz)  Body mass index is 17.82 kg/m².       Physical Exam  Constitutional:       Appearance: He is ill-appearing.   HENT:      Head: Normocephalic and atraumatic.      Mouth/Throat:      Mouth: Mucous membranes are dry.   Eyes:      Extraocular Movements: Extraocular movements intact.   Cardiovascular:      Rate and Rhythm: Rhythm irregular.      Pulses: Normal pulses.   Pulmonary:      Effort: Pulmonary  effort is normal.   Skin:     General: Skin is warm and dry.   Neurological:      Mental Status: He is alert and oriented to person, place, and time.            Review of Symptoms      Symptom Assessment (ESAS 0-10 Scale)  Pain:  0  Dyspnea:  0  Anxiety:  0  Nausea:  0  Depression:  0  Anorexia:  0  Fatigue:  0  Insomnia:  0  Restlessness:  0  Agitation:  0     CAM / Delirium:  Negative  Constipation:  Negative  Diarrhea:  Negative      Bowel Management Plan (BMP):  Yes      Pain Assessment:  OME in 24 hours:  0  Location(s):      Modified Roshan Scale:  0    Performance Status:  80    Living Arrangements:  Lives alone    Psychosocial/Cultural:   See Palliative Psychosocial Note: Yes  - .  - Lives alone  - Has 1 biological son, 4 granddaughters, 1 grandson. Patient reports that he does not have a strong relationship with his son.  - Patient has 5 sisters and 4 brothers. Patient reports strong relationship with siblings, nieces, nephews, and grand children.  - Retired [employer]or of 42 years in [place].  - Enjoys relaxing and resting in his down time.  **Primary  to Follow**  Palliative Care  Consult: No        Advance Care Planning   Advance Directives:   Living Will: Yes        Copy on chart: Yes    LaPOST: No    Do Not Resuscitate Status: No    Medical Power of : Yes    Agent's Name:  Libia Carney   Agent's Contact Number:  [phone]    Decision Making:  Patient answered questions  Goals of Care: The patient endorses that what is most important right now is to focus on remaining as independent as possible and extending life as long as possible, even it it means sacrificing quality.     Accordingly, we have decided that the best plan to meet the patient's goals includes continuing with treatment         Significant Labs: All pertinent labs within the past 24 hours have been reviewed.  CBC:   Recent Labs   Lab 09/18/24  0640   WBC 6.11   HGB 10.9*   HCT 31.8*    MCV 86   *     BMP:  Recent Labs   Lab 09/18/24  0640      *   K 3.3*   CL 93*   CO2 29   BUN 80*   CREATININE 3.1*   CALCIUM 9.5   MG 1.9     LFT:  Lab Results   Component Value Date    AST 36 09/18/2024    ALKPHOS 57 09/18/2024    BILITOT 1.6 (H) 09/18/2024     Albumin:   Albumin   Date Value Ref Range Status   09/18/2024 3.2 (L) 3.5 - 5.2 g/dL Final   08/17/2021 pos  Final     Protein:   Total Protein   Date Value Ref Range Status   09/18/2024 7.3 6.0 - 8.4 g/dL Final     Lactic acid:   Lab Results   Component Value Date    LACTATE 1.7 07/30/2024    LACTATE 3.1 (H) 07/29/2024       Significant Imaging: I have reviewed all pertinent imaging results/findings within the past 24 hours.      Chest X-ray 9/14/24  Impression:  No acute abnormality.    US Retroperitoneal 9/10/24  Impression:  No significant sonographic abnormalities the kidneys.  Incomplete evaluation of bladder due to Suárez decompression.  Prostatomegaly.  Abdominopelvic ascites.

## 2024-09-20 NOTE — PLAN OF CARE
[facility] - Telemetry Stepdown  Discharge Final Note    Primary Care Provider: Millie Hayes APRN, FNP-C    Expected Discharge Date: 9/19/2024    Patient discharged 9/19/2024 home to resume with [facility]. Family provided transportation home.      Lab, Appointment [place]   Specialty: Lab       Next Steps: Follow up on 9/24/2024   Instructions: 10:30 AM   Gregoria Delacruz PA-C   Specialty: Transplant, Cardiology    [address] [facility] [address]   Phone: [phone]      Next Steps: Follow up on 9/24/2024   Instructions: 11 AM   Millie Hayes APRN, FNP-C   Specialty: Family Medicine   Relationship: PCP - General    [address]  [facility] [address]   Phone: [phone]      Next Steps: Follow up   Instructions: Pt [place] sent a message to clinic nurse at pt's primary care office to contact pt to schedule his hospital f/u visit.     Future Appointments   Date Time Provider Department Center   9/23/2024 10:00 AM SME[facility] IR1 SMEH INTRAD [place] [facility]   9/24/2024 10:30 AM LAB, APPOINTMENT [facility] LAB VNP [facility]   9/24/2024 11:00 AM Gregoria Delacruz PA-C [facility]   9/24/2024 11:00 AM [facility] HEART FAILURE NURSE [facility]   10/7/2024 10:00 AM SME[facility] IR1 SMEH INTRAD [place]   10/21/2024 10:00 AM [facility] IR1 SMEH INTRAD [place] [facility]   10/31/2024  8:00 AM Carmelita Aguilera MD [facility] PAL MED [facility]   11/4/2024 10:00 AM SMEH IR1 SMEH INTRAD [place] [facility]   11/26/2024  9:00 AM Millie Hayes APRN, FNP-C [facility] O at [facility]         Final Discharge Note (most recent)       Final Note - 09/20/24 0831          Final Note    Assessment Type Final Discharge Note     Anticip[place] Disposition [place] [facility]     [place]ospital Resources/Appts/Education Provided Appointments scheduled and added to AVS        Post-Acute Status    Post-Acute Authorization Home Health     Home Health Status Set-up Complete/Auth obtained     Discharge Delays None  known at this time                     Important Message from Medicare  Important Message from Medicare regarding Discharge Appeal Rights: Given to patient/caregiver, Explained to patient/caregiver, Signed/date by patient/caregiver     Date IMM was signed: 09/19/24  Time IMM was signed: 1054    Contact Info       Lab, Appointment [place]   Specialty: Lab        Next Steps: Follow up on 9/24/2024    Instructions: 10:30 AM    Gregoria Delacruz PATomC   Specialty: Transplant, Cardiology    [address] [facility] [address]   Phone: [phone]       Next Steps: Follow up on 9/24/2024    Instructions: 11 AM    Millie Haeys APRN,FNP-C   Specialty: Family Medicine   Relationship: PCP - General    [address]  [facility] [address]   Phone: [phone]       Next Steps: Follow up    Instructions: Pt shandraon Shanda sent a message to clinic nurse at pt's primary care office to contact pt to schedule his hospital f/u visit.          Joanie Alex RN     [phone]

## 2024-09-23 ENCOUNTER — TELEPHONE (OUTPATIENT)
Dept: CARDIOLOGY | Facility: CLINIC | Age: 74
End: 2024-09-23
Payer: MEDICARE

## 2024-09-23 ENCOUNTER — HOSPITAL ENCOUNTER (OUTPATIENT)
Dept: INTERVENTIONAL RADIOLOGY/VASCULAR | Facility: HOSPITAL | Age: 74
Discharge: HOME | End: 2024-09-23
Attending: INTERNAL MEDICINE
Payer: MEDICARE

## 2024-09-23 DIAGNOSIS — R18.8 OTHER ASCITES: ICD-10-CM

## 2024-09-23 PROCEDURE — 76705 ECHO EXAM OF ABDOMEN: CPT | Mod: 26,,, | Performed by: RADIOLOGY

## 2024-09-23 PROCEDURE — 76705 ECHO EXAM OF ABDOMEN: CPT | Mod: TC

## 2024-09-24 ENCOUNTER — TELEPHONE (OUTPATIENT)
Dept: FAMILY MEDICINE | Facility: CLINIC | Age: 74
End: 2024-09-24
Payer: MEDICARE

## 2024-09-24 NOTE — TELEPHONE ENCOUNTER
----- Message from Shanda Morris sent at 9/19/2024  8:52 AM CDT -----  Pt needs a hospital follow up appt. He will be DC'D today. Pt #[phone]

## 2024-09-25 PROBLEM — E16.2 HYPOGLYCEMIA: Status: ACTIVE | Noted: 2024-09-25

## 2024-09-25 PROBLEM — I51.3 LV (LEFT VENTRICULAR) MURAL THROMBUS: Status: ACTIVE | Noted: 2024-09-25

## 2024-09-25 NOTE — Clinical Note
Diagnosis: Hypoglycemia [014804]   Future Attending Provider: BETZY EDWARD [[id number]]   Place in Observation: [facility] [[id number]]   Special Needs:: No Special Needs [1]

## 2024-09-25 NOTE — ED PROVIDER NOTES
Encounter Date: 9/25/2024       History     Chief Complaint   Patient presents with    Hypoglycemia     Per EMS, patient was found on floor with CBG of 40 when EMS came to patient's house. Given an amp of D50. CBG currently 250     73-year-old male with a past medical history of heart failure, CAD, chronic renal failure, diabetes mellitus, hypertension, and hyperlipidemia presents for evaluation after being found down in his home today.  EMS reports that the patient was found down today by by them after family has been unable to get in touch with him for 2 days.  EMS reports that the patient was last seen normal 2 days ago.  The patient was found to have a glucose of 40 mg/dL by them.  The patient was treated by EMS prior to arrival with 1 amp of 50% dextrose, with improvement in his mental status to normal, and that a repeat blood glucose was noted to be 256 mg/dL by them.  The patient denies any associated headache, chest pain, neck pain, back pain, hip pain, arm pain, leg pain, abdominal pain, or trouble breathing at present.  The patient reports to be that he forgot to take his sugar pill but did take his home diabetes medications.  He is unsure of the day today and believes that it is Tuesday when it is actually Wednesday.      Review of patient's allergies indicates:   Allergen Reactions    Canagliflozin      Other reaction(s): been very dizzy     Past Medical History:   Diagnosis Date    Asthma     Cardiomyopathy 10/21/2014    CHF (congestive heart failure)     Coronary artery disease     CRF (chronic renal failure)     Diabetes mellitus     Enlarged prostate     Hyperlipidemia     Hypertension     Neuropathy     Syncope 07/29/2024    Umbilical hernia without obstruction and without gangrene 10/13/2023     Past Surgical History:   Procedure Laterality Date    ANGIOGRAPHY OF INTERNAL MAMMARY VESSEL N/A 3/11/2022    Procedure: Angiogram Internal Mammary;  Surgeon: Hank Lujan MD;  Location: [facility] CATH/EP  LAB;  Service: Cardiology;  Laterality: N/A;    CARDIAC CATHETERIZATION      CARDIAC VALVE SURGERY      CATHETERIZATION OF BOTH LEFT AND RIGHT HEART Left 3/11/2022    Procedure: CATHETERIZATION, HEART, BOTH LEFT AND RIGHT;  Surgeon: Hank Lujan MD;  Location: [facility] CATH/EP LAB;  Service: Cardiology;  Laterality: Left;    CORONARY ANGIOGRAPHY N/A 3/11/2022    Procedure: ANGIOGRAM, CORONARY ARTERY;  Surgeon: Hank Lujan MD;  Location: [facility] CATH/EP LAB;  Service: Cardiology;  Laterality: N/A;    CORONARY BYPASS GRAFT ANGIOGRAPHY  3/11/2022    Procedure: Bypass graft study;  Surgeon: Hank Lujan MD;  Location: [facility] CATH/EP LAB;  Service: Cardiology;;    CYSTOSCOPY N/A 7/30/2019    Procedure: CYSTOSCOPY;  Surgeon: Spencer Nguyen MD;  Location: [facility];  Service: Urology;  Laterality: N/A;    INSERTION OF INTRAVASCULAR MICROAXIAL BLOOD PUMP N/A 8/31/2022    Procedure: INSERTION, IMPELLA;  Surgeon: Zach Jensen MD;  Location: [facility] CATH LAB;  Service: Cardiology;  Laterality: N/A;    INSERTION, CATHETER, DIALYSIS, PERITONEAL N/A 12/7/2023    Procedure: INSERTION, CATHETER, DIALYSIS, PERITONEAL;  Surgeon: Greg Bone Jr., MD;  Location: [facility];  Service: General;  Laterality: N/A;  need PD catheter    PLACEMENT OF SWAN SEE CATHETER WITH IMAGING GUIDANCE  8/31/2022    Procedure: INSERTION, CATHETER, SWAN-SEE, WITH IMAGING GUIDANCE;  Surgeon: Zach Jensen MD;  Location: [facility] CATH LAB;  Service: Cardiology;;    REPAIR, HERNIA, INGUINAL, INCARCERATED, INITIAL, AGE 5 YEARS OR OLDER Right 12/7/2023    Procedure: REPAIR, HERNIA, INGUINAL, INCARCERATED, INITIAL;  Surgeon: Greg Bone Jr., MD;  Location: [facility];  Service: General;  Laterality: Right;    SHOULDER ARTHROSCOPY  1985    TRANSRECTAL BIOPSY OF PROSTATE WITH ULTRASOUND GUIDANCE N/A 7/30/2019    Procedure: BIOPSY, PROSTATE, RECTAL APPROACH, WITH US GUIDANCE;  Surgeon: Spencer Nguyen MD;  Location: [facility];   Service: Urology;  Laterality: N/A;  procedure not performed, pt unable to tolerate    TRANSRECTAL BIOPSY OF PROSTATE WITH ULTRASOUND GUIDANCE N/A 2019    Procedure: BIOPSY, PROSTATE, RECTAL APPROACH, WITH US GUIDANCE;  Surgeon: Spencer Nguyen MD;  Location: [facility] OR;  Service: Urology;  Laterality: N/A;    UMBILICAL HERNIA REPAIR N/A 2023    Procedure: REPAIR, HERNIA, UMBILICAL;  Surgeon: Greg Bone Jr., MD;  Location: [facility] OR;  Service: General;  Laterality: N/A;     Family History   Problem Relation Name Age of Onset    No Known Problems Mother      Diabetes Father      Hypertension Father      Heart attack Father      Heart disease Father      Diabetes Sister 5     Heart disease Brother 4     Diabetes Brother 4     No Known Problems Maternal Grandmother      No Known Problems Maternal Grandfather      No Known Problems Paternal Grandmother      No Known Problems Paternal Grandfather      No Known Problems Maternal Aunt      No Known Problems Maternal Uncle      No Known Problems Paternal Aunt      No Known Problems Paternal Uncle      Anemia Neg Hx      Arrhythmia Neg Hx      Asthma Neg Hx      Clotting disorder Neg Hx      Fainting Neg Hx      Heart failure Neg Hx      Hyperlipidemia Neg Hx      Glaucoma Neg Hx       Social History     Tobacco Use    Smoking status: Former     Current packs/day: 0.00     Types: Cigarettes     Quit date: 1970     Years since quittin.3    Smokeless tobacco: Never   Substance Use Topics    Alcohol use: Not Currently     Alcohol/week: 3.0 standard drinks of alcohol     Types: 3 Cans of beer per week    Drug use: No     Review of Systems   Constitutional:  Negative for chills, diaphoresis, fatigue and fever.   HENT:  Negative for congestion and rhinorrhea.    Respiratory:  Negative for cough and shortness of breath.    Cardiovascular:  Negative for chest pain.   Gastrointestinal:  Negative for abdominal pain, diarrhea, nausea and vomiting.    Genitourinary:  Negative for dysuria, frequency and testicular pain.   Musculoskeletal:  Negative for gait problem.   Skin:  Negative for color change.   Neurological:  Negative for dizziness and numbness.   Psychiatric/Behavioral:  Positive for confusion. Negative for agitation.        Physical Exam     Initial Vitals [09/25/24 1729]   BP Pulse Resp Temp SpO2   (!) 159/74 75 17 97.6 °F (36.4 °C) 99 %      MAP       --         Physical Exam    Nursing note and vitals reviewed.  Constitutional: He appears well-developed and well-nourished.   HENT:   Head: Normocephalic and atraumatic.   Eyes: EOM are normal. Pupils are equal, round, and reactive to light.   Neck: Neck supple.   Cardiovascular:  Normal rate and regular rhythm.           Pulmonary/Chest: Breath sounds normal.   Abdominal: Abdomen is soft. Bowel sounds are normal. He exhibits no distension. There is no abdominal tenderness. There is no rebound.   Musculoskeletal:         General: Normal range of motion.      Cervical back: Neck supple.     Neurological: He is alert and oriented to person, place, and time.   Skin: Skin is warm and dry.   Psychiatric: He has a normal mood and affect.         ED Course   Critical Care    Date/Time: 9/25/2024 7:47 PM    Performed by: Rich Uriostegui MD  Authorized by: Rich Uriostegui MD  Direct patient critical care time: 15 minutes  Additional history critical care time: 11 minutes  Ordering / reviewing critical care time: 12 minutes  Documentation critical care time: 10 minutes  Total critical care time (exclusive of procedural time) : 48 minutes  Critical care was time spent personally by me on the following activities: development of treatment plan with patient or surrogate, evaluation of patient's response to treatment, examination of patient, obtaining history from patient or surrogate, ordering and performing treatments and interventions, ordering and review of laboratory studies and ordering and review of  radiographic studies.        Labs Reviewed   COMPREHENSIVE METABOLIC PANEL - Abnormal       Result Value    Sodium 138      Potassium 3.3 (*)     Chloride 95      CO2 29      Glucose 76      BUN 65 (*)     Creatinine 2.4 (*)     Calcium 9.4      Total Protein 8.4      Albumin 3.8      Total Bilirubin 1.6 (*)     Alkaline Phosphatase 58      AST 34      ALT 36      eGFR 27.8 (*)     Anion Gap 14     CBC W/ AUTO DIFFERENTIAL - Abnormal    WBC 9.71      RBC 3.81 (*)     Hemoglobin 11.3 (*)     Hematocrit 34.7 (*)     MCV 88      MCH 29.7      MCHC 32.6      RDW 23.8 (*)     Platelets 234      MPV 10.9      Immature Granulocytes 0.4      Gran # (ANC) 9.0 (*)     Immature Grans (Abs) 0.04      Lymph # 0.3 (*)     Mono # 0.3      Eos # 0.0      Baso # 0.02      nRBC 0      Gran % 92.9 (*)     Lymph % 2.9 (*)     Mono % 3.3 (*)     Eosinophil % 0.3      Basophil % 0.2      Differential Method Automated     URINALYSIS, REFLEX TO URINE CULTURE - Abnormal    Specimen UA Urine, Clean Catch      Color, UA Yellow      Appearance, UA Clear      pH, UA 5.0      Specific Gravity, UA 1.015      Protein, UA Trace (*)     Glucose, UA Negative      Ketones, UA Negative      Bilirubin (UA) Negative      Occult Blood UA Negative      Nitrite, UA Negative      Urobilinogen, UA Negative      Leukocytes, UA Negative      Narrative:     Specimen Source->Urine   POCT GLUCOSE - Abnormal    POCT Glucose 152 (*)    MISCELLANEOUS SENDOUT TEST, NON-BLOOD   POCT GLUCOSE    POCT Glucose 92     POCT GLUCOSE MONITORING CONTINUOUS          Imaging Results              CT Head Without Contrast (Final result)  Result time 09/25/24 18:39:36      Final result by Rebecca Sin MD (09/25/24 18:39:36)                   Impression:      No acute abnormality.      Electronically signed by: Rebecca Sin  Date:    09/25/2024  Time:    18:39               Narrative:    EXAMINATION:  CT HEAD WITHOUT CONTRAST    CLINICAL HISTORY:  Syncope,  recurrent;    TECHNIQUE:  Low dose axial CT images obtained throughout the head without intravenous contrast. Sagittal and coronal reconstructions were performed.    COMPARISON:  None.    FINDINGS:  Intracranial compartment:    Ventricles and sulci are normal in size for age without evidence of hydrocephalus. No extra-axial blood or fluid collections.    Moderate chronic microvascular ischemia.  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  No parenchymal mass, hemorrhage, edema or major vascular distribution infarct.    Skull/extracranial contents (limited evaluation): No fracture. Mastoid air cells and paranasal sinuses are essentially clear.                                       Medications   apixaban tablet 2.5 mg (has no administration in time range)   aspirin EC tablet 81 mg (has no administration in time range)   atorvastatin tablet 40 mg (has no administration in time range)   hydrALAZINE tablet 50 mg (has no administration in time range)   isosorbide dinitrate tablet 20 mg (has no administration in time range)   levalbuterol nebulizer solution 0.63 mg (has no administration in time range)   metoprolol succinate (TOPROL-XL) 24 hr tablet 25 mg (has no administration in time range)   pantoprazole EC tablet 40 mg (has no administration in time range)   torsemide tablet 80 mg (has no administration in time range)   melatonin tablet 6 mg (has no administration in time range)   ondansetron injection 4 mg (has no administration in time range)   acetaminophen tablet 650 mg (has no administration in time range)   aluminum-magnesium hydroxide-simethicone 200-200-20 mg/5 mL suspension 30 mL (has no administration in time range)   acetaminophen tablet 650 mg (has no administration in time range)   naloxone 0.4 mg/mL injection 0.02 mg (has no administration in time range)   potassium bicarbonate disintegrating tablet 50 mEq (has no administration in time range)   potassium bicarbonate disintegrating tablet 35 mEq  (has no administration in time range)   potassium bicarbonate disintegrating tablet 60 mEq (has no administration in time range)   magnesium oxide tablet 800 mg (has no administration in time range)   magnesium oxide tablet 800 mg (has no administration in time range)   potassium, sodium phosphates 280-160-250 mg packet 2 packet (has no administration in time range)   potassium, sodium phosphates 280-160-250 mg packet 2 packet (has no administration in time range)   potassium, sodium phosphates 280-160-250 mg packet 2 packet (has no administration in time range)   budesonide nebulizer solution 0.5 mg (has no administration in time range)   arformoteroL nebulizer solution 15 mcg (has no administration in time range)   dextrose 10 % infusion (has no administration in time range)   D5W infusion 1,000 mL (1,000 mLs Intravenous New Bag 9/25/24 1943)     Medical Decision Making  70-year-old male presents after being found down with hypoglycemia.    Initial differential diagnosis included but not limited to medication reaction, medication overdose, and acute renal failure.    Amount and/or Complexity of Data Reviewed  Labs: ordered.  Radiology: ordered.    Risk  Prescription drug management.  Risk Details: The patient was emergently evaluated in the emergency department, his evaluation was significant for an elderly male with a normal neurologic exam noted.  The patient was noted to have recurrence in his hypoglycemia after EMS it was 50% dextrose, and he was fed a meal here.  His CT scan of his head showed no acute abnormalities per Radiology.  The patient's labs showed no acute processes.  The patient again had recurrence of his hypoglycemia despite being fed here and was started on an IV dextrose drip.  Because of his recurrent hypoglycemia, I will admit him to the hospitalist service for further care.  The case was discussed with the hospitalist on-call, Dr. Riggs.  He has accepted the patient for admission.                                       Clinical Impression:  Final diagnoses:  [E16.2] Hypoglycemia (Primary)          ED Disposition Condition    Observation                 Rich Uriostegui MD  09/25/24 8812

## 2024-09-26 NOTE — ASSESSMENT & PLAN NOTE
As mentioned in HPI  C peptide levels high but serum random & pro insulin levels on normal range so possibility of insulinoma is rare ?  MEN type 1 very unlikely , will send serum PTH/Prolactin levels  Doesn't look like he has cushingoid features , will check serum cortisol and 24 urinary cortisol  Started on 10% dextrose for hypoglycemia  CT Abdomen W contrast done in November 2023 reviewed  Primary team in AM can go through images with Radiologist  for a closer look to r/o pancreatic tumors  Can get in touch in with Endocrinologist  oncall in [place] tomorrow AM f  Chronic kidney disease itself will cause rise in C peptide levels       Latest Reference Range & Units 08/15/24 08:05   C-Peptide 1.1 - 4.4 ng/mL 14.1 (H)   (H): Data is abnormally high

## 2024-09-26 NOTE — ASSESSMENT & PLAN NOTE
Presently not on any meds   Pt says he takes metformin and should not be on this  Metformin per se wont cause hypoglycemia    Latest Reference Range & Units 09/15/23 03:26 08/15/24 05:11 08/18/24 05:07   Hemoglobin A1C External 4.5 - 6.2 % 5.4 5.5 6.0

## 2024-09-26 NOTE — PROGRESS NOTES
[facility] - Emergency Dept  Hospital Medicine  Progress Note    Patient Name: Baldo Carney  MRN: [id number]  Patient Class: IP- Inpatient   Admission Date: 9/25/2024  Length of Stay: 0 days  Attending Physician: Wallace Phan DO  Primary Care Provider: Millie Hayes APRN,FNP-C        Subjective:     Principal Problem:Hypoglycemia        HPI:  73 year old pt getting admitted with persistent hypoglycemia  Pt lives alone and he remember yesterday when he was in kitchen he felt bad, but doesn't remember anything after this   Family member found pt on the ground today and was escorted to ER  In ER he was profoundly hypoglycemic and was given multiple doses of dextrose 5 and got admitted  He was admitted with same c/o on August 2024 and C peptide levels were high when checked  Random insulin levels and pro insulin levels were normal  He said he wiil leave AMA if not discharged as per DC summary   Pts Hb A1C is persistently low as per records  He said he takes Metformin but his home med list didn't shows this  He just came home 1  week ago after he was admitted in [facility] for CHF flare and fluid overload from CKD    Overview/Hospital Course:  73 year old pt getting admitted with persistent hypoglycemia upon going for possibly pseudohypoglycemia, insulin secreting tumor, malignancy.  Ordered CT chest abdomen pelvis today, tele consult with endocrinology and recommends rechecks in earlobe or palm and if true hypoglycemia we collected stat C-peptide, proinsulin, insulin, BH B, BNP during hypoglycemic episode.  Has been doing Accu-Cheks Q 2 hours with D50 amps and resuming D10 infusion.  He is mostly asymptomatic during this which is good but etiology is unclear so far.  He does admit to unintended weight loss over the last 2-4 weeks but denies night sweats or fevers.  Transferred to ICU for frequent hypoglycemia, consider glucagon    Interval History:  Patient seen and examined, no acute  complaints    Review of Systems   Constitutional:  Negative for chills and fever.   Respiratory:  Negative for chest tightness, shortness of breath and wheezing.    Cardiovascular:  Negative for chest pain and palpitations.   Gastrointestinal:  Negative for constipation, diarrhea, nausea and vomiting.   Genitourinary:  Negative for dysuria and hematuria.   Musculoskeletal:  Negative for gait problem.   Neurological:  Negative for dizziness, speech difficulty and numbness.     Objective:     Vital Signs (Most Recent):  Temp: 97.9 °F (36.6 °C) (09/26/24 1100)  Pulse: 71 (09/26/24 1530)  Resp: 18 (09/26/24 1530)  BP: (!) 103/52 (09/26/24 1530)  SpO2: 100 % (09/26/24 1530) Vital Signs (24h Range):  Temp:  [97.5 °F (36.4 °C)-97.9 °F (36.6 °C)] 97.9 °F (36.6 °C)  Pulse:  [64-78] 71  Resp:  [13-22] 18  SpO2:  [99 %-100 %] 100 %  BP: (101-159)/(52-79) 103/52     Weight: 59.6 kg (131 lb 6.2 oz)  Body mass index is 17.82 kg/m².    Intake/Output Summary (Last 24 hours) at 9/26/2024 1604  Last data filed at 9/26/2024 0937  Gross per 24 hour   Intake 1250 ml   Output 250 ml   Net 1000 ml         Physical Exam  Vitals and nursing note reviewed.   Constitutional:       General: He is not in acute distress.     Comments: Cachectic appearing   Cardiovascular:      Rate and Rhythm: Normal rate and regular rhythm.      Heart sounds: Normal heart sounds.   Pulmonary:      Breath sounds: No wheezing, rhonchi or rales.   Abdominal:      General: There is no distension.      Palpations: Abdomen is soft.      Tenderness: There is no abdominal tenderness.   Skin:     General: Skin is warm and dry.   Neurological:      General: No focal deficit present.      Mental Status: He is alert and oriented to person, place, and time.   Psychiatric:         Mood and Affect: Mood normal.         Behavior: Behavior normal.             Significant Labs: All pertinent labs within the past 24 hours have been reviewed.  Recent Lab Results  (Last 5 results  in the past 24 hours)        09/26/24  1531   09/26/24  1505   09/26/24  1449   09/26/24  1448   09/26/24  1208        Anion Gap   8             Beta-Hydroxybutyrate   0.1             BUN   62             Calcium   8.6             Chloride   93             CO2   32             Creatinine   2.5             eGFR   26.5             Glucose   21  Comment: Critical result GLU 21 mg/dL called to and read back by Jory Fowler [facility],  RN. at 26-Sep-2024 15:46 by [facility].  Result Rechecked               POCT Glucose 93     32   31   73       Potassium   4.0             Sodium   133                                    Significant Imaging: I have reviewed all pertinent imaging results/findings within the past 24 hours.  Imaging Results              CT Chest Abdomen Pelvis Without Contrast (XPD) (Final result)  Result time 09/26/24 14:56:07      Final result by Gee Lozano MD (09/26/24 14:56:07)                   Impression:      1. No acute findings, given limitations from lack of IV contrast.  2. Cardiomegaly, with aortic and coronary arterial calcifications.  3. Trace right pleural effusion.  4. Moderate to large volume of ascites.  5. A 2 mm nonobstructing left renal calculus.  6. Markedly enlarged prostate gland, with nodular prostate tissue protruding into the base of the urinary bladder.      Electronically signed by: Gee Lozano  Date:    09/26/2024  Time:    14:56               Narrative:    EXAMINATION:  CT CHEST ABDOMEN PELVIS WITHOUT CONTRAST(XPD)    CLINICAL HISTORY:  Weight loss, unintended;    TECHNIQUE:  Axial imaging through the chest, abdomen and pelvis was performed without IV contrast.  Lack of IV contrast limits evaluation of the mediastinum, vasculature, solid organs and hollow viscera.    COMPARISON:  Multiple prior exams.    FINDINGS:  CT CHEST: The heart is enlarged, with extensive coronary arterial calcifications, and no pericardial effusion.  Diffuse calcified plaque involves normal-caliber  thoracic aorta, with the central pulmonary arteries enlarged.  No enlarged mediastinal lymph nodes.    There are scattered lucencies reflecting pulmonary emphysema, with linear and bandlike subsegmental atelectasis in the lower lungs.  The central airways are patent, with trace low-density right pleural effusion.  No evidence of interstitial pulmonary edema. There are no chest wall soft tissue masses or enlarged axillary lymph nodes.  No acute fractures or destructive osseous lesions.    CT ABDOMEN: There is moderate to large volume of low-density ascites.  The unenhanced liver, spleen, pancreas, adrenal glands and kidneys are unremarkable, with a 2 mm nonobstructing left renal calculus.  No ureteral calculi or hydronephrosis.    Diffuse calcified plaque involves normal-caliber abdominal aorta and iliac arteries.  There is no bowel obstruction or intraperitoneal free air.  No ventral abdominal hernia.    CT PELVIS: There is low-density pelvic ascites.  The prostate gland is markedly enlarged, with nodular tissue protruding into the base of the urinary bladder.  No distal ureteral or bladder calculi.  There are scattered pelvic arterial vascular calcifications, with right inguinal hernia and apparent changes of hernia repair.    There are no enlarged iliac chain or inguinal lymph nodes.  There are diffuse arterial vascular calcifications.  No acute fractures or destructive osseous lesions.                                       CT Head Without Contrast (Final result)  Result time 09/25/24 18:39:36      Final result by Rebecca Sin MD (09/25/24 18:39:36)                   Impression:      No acute abnormality.      Electronically signed by: Rebecca Sin  Date:    09/25/2024  Time:    18:39               Narrative:    EXAMINATION:  CT HEAD WITHOUT CONTRAST    CLINICAL HISTORY:  Syncope, recurrent;    TECHNIQUE:  Low dose axial CT images obtained throughout the head without intravenous contrast. Sagittal  and coronal reconstructions were performed.    COMPARISON:  None.    FINDINGS:  Intracranial compartment:    Ventricles and sulci are normal in size for age without evidence of hydrocephalus. No extra-axial blood or fluid collections.    Moderate chronic microvascular ischemia.  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  No parenchymal mass, hemorrhage, edema or major vascular distribution infarct.    Skull/extracranial contents (limited evaluation): No fracture. Mastoid air cells and paranasal sinuses are essentially clear.                                        Assessment/Plan:      * Hypoglycemia  As mentioned in HPI  C peptide levels high but serum random & pro insulin levels on normal range so possibility of insulinoma is rare ?  MEN type 1 very unlikely , will send serum PTH/Prolactin levels  Doesn't look like he has cushingoid features , will check serum cortisol and 24 urinary cortisol  Started on 10% dextrose for hypoglycemia  CT Abdomen W contrast done in November 2023 reviewed  -given unintended weight loss and persistent hypoglycemia ordered CT chest abdomen pelvis to look for malignancy  -tele consult with endocrinology and we are double checking glucoses in multiple sites to rule out pseudohypoglycemia  -pending BNP, bhp, insulin, proinsulin, C-peptide during a hypoglycemic episode per endocrinology.  -continue D50 amps per protocol and D10 infusion  -transferred to ICU for frequent glucose monitoring     Latest Reference Range & Units 08/15/24 08:05   C-Peptide 1.1 - 4.4 ng/mL 14.1 (H)   (H): Data is abnormally high        LV (left ventricular) mural thrombus  He was started on NOAC for this condition dew weeks ago      Debility  Chronic issue     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 without macular edema, without long-term current use of insulin  Presently not on any meds   Pt says he takes metformin and should not be on this  Metformin per se wont  cause hypoglycemia    Latest Reference Range & Units 09/15/23 03:26 08/15/24 05:11 08/18/24 05:07   Hemoglobin A1C External 4.5 - 6.2 % 5.4 5.5 6.0       CRF (chronic renal failure)   Latest Reference Range & Units 09/13/24 08:37 09/14/24 06:14 09/15/24 06:59 09/16/24 06:41 09/17/24 06:16 09/18/24 06:40 09/19/24 05:21 09/25/24 19:04   Creatinine 0.5 - 1.4 mg/dL 3.7 (H) 3.5 (H) 3.4 (H) 3.5 (H) 3.2 (H) 3.1 (H) 3.3 (H) 2.4 (H)       Cirrhosis of liver with ascites  In need of Bimonthly paracentesis     MELD-Na score calculated; MELD 3.0: 25 at 9/19/2024  5:21 AM  MELD-Na: 24 at 9/19/2024  5:21 AM  Calculated from:  Serum Creatinine: 3.3 mg/dL (Using max of 3 mg/dL) at 9/19/2024  5:21 AM  Serum Sodium: 132 mmol/L at 9/19/2024  5:21 AM  Total Bilirubin: 1.6 mg/dL at 9/19/2024  5:21 AM  Serum Albumin: 3.2 g/dL at 9/19/2024  5:21 AM  INR(ratio): 1.1 at 9/19/2024  5:21 AM  Age at listing (hypothetical): 73 years  Sex: Male at 9/19/2024  5:21 AM          COPD (chronic obstructive pulmonary disease)  Chronic stable issue     Chronic combined systolic and diastolic heart failure  Maintain high dose torsemide      Peripheral polyneuropathy  Aware       S/P CABG (coronary artery bypass graft)  In 2014      CAD (coronary artery disease)  Status post percutaneous coronary intervention with drug eluting stent placements in left main to left anterior descending artery and left main to lateral circumflex artery on 8/31/2022     Aortic stenosis status post AVR  Aware      Hypertension  Chronic, controlled. Latest blood pressure and vitals reviewed-     Temp:  [97.6 °F (36.4 °C)]   Pulse:  [75]   Resp:  [17]   BP: (159)/(74)   SpO2:  [99 %] .   Home meds for hypertension were reviewed and noted below.   Hypertension Medications               hydrALAZINE (APRESOLINE) 50 MG tablet Take 1 tablet (50 mg total) by mouth 3 (three) times daily.    isosorbide dinitrate (ISORDIL) 20 MG tablet Take 20 mg by mouth 3 (three) times daily.     metoprolol succinate (TOPROL-XL) 25 MG 24 hr tablet Take 1 tablet (25 mg total) by mouth once daily.    torsemide (DEMADEX) 20 MG Tab Take 4 tablets (80 mg total) by mouth 2 (two) times a day.            While in the hospital, will manage blood pressure as follows; Continue home antihypertensive regimen    Will utilize p.r.n. blood pressure medication only if patient's blood pressure greater than 140/90 and he develops symptoms such as worsening chest pain or shortness of breath.      VTE Risk Mitigation (From admission, onward)           Ordered     apixaban tablet 2.5 mg  2 times daily         09/25/24 2017     IP VTE HIGH RISK PATIENT  Once         09/25/24 2017     Place sequential compression device  Until discontinued         09/25/24 2017                    Discharge Planning   APM: 9/28/2024     Code Status: Full Code   Is the patient medically ready for discharge?:     Reason for patient still in hospital (select all that apply): Patient unstable, Laboratory test, Treatment, Imaging, Consult recommendations, and Pending disposition  Discharge Plan A: Home with family                  Wallace Phan DO  Department of Hospital Medicine   [facility] - Emergency Dept

## 2024-09-26 NOTE — CARE UPDATE
Baldo Carney has warranted treatment spanning two or more midnights of hospital level care for the management of  Hypoglycemia . He continues to require IV fluids, daily labs, monitoring of vital signs, medication adjustments, and further evaluation by consultants. His condition is also complicated by the following comorbidities: CAD, COPD, HTN, DM Type II, Ischemic Cardiomyopathy and Chronic Kidney Disease.

## 2024-09-26 NOTE — CARE UPDATE
09/25/24 2055   Patient Assessment/Suction   Level of Consciousness (AVPU) alert   Respiratory Effort Normal;Unlabored   Expansion/Accessory Muscles/Retractions no use of accessory muscles   All Lung Fields Breath Sounds Anterior:;diminished;clear   Rhythm/Pattern, Respiratory no shortness of breath reported;depth regular;pattern regular   Cough Frequency no cough   PRE-TX-O2   Device (Oxygen Therapy) room air   SpO2 99 %   Pulse Oximetry Type Continuous   $ Pulse Oximetry - Multiple Charge Pulse Oximetry - Multiple   Pulse 77   Resp 17   /79   Aerosol Therapy   $ Aerosol Therapy Charges Aerosol Treatment   Daily Review of Necessity (SVN) completed   Respiratory Treatment Status (SVN) given   Treatment Route (SVN) mask   Patient Position HOB elevated   Post Treatment Assessment (SVN) increased aeration   Signs of Intolerance (SVN) none   Education   $ Education Bronchodilator;Oxygen;15 min   Tobacco Cessation Intervention   Do you use any type of tobacco product? No  (hx use.)   Are you interested in quitting use of tobacco products? Not interested   Are you interested in Nicotine Replacement for symptom relief? No   Respiratory Evaluation   $ Care Plan Tech Time 15 min   $ Respiratory Evaluation Complete   Evaluation For New Orders   Admitting Diagnosis hypoglycemia   Home Oxygen   Has Home Oxygen? No   Home Aerosol, MDI, DPI, and Other Treatments/Therapies   Home Respiratory Therapy Per Patient/Review of Chart Yes   Aerosol Home Meds/Freq levalbuterol (XOPENEX) 0.63 mg/3 mL nebulizer solution   DPI Home Meds/Freq fluticasone furoate-vilanteroL (BREO ELLIPTA) 100-25 mcg/dose diskus inhaler   Oxygen Care Plan   Oxygen Care Plan Per Protocol   SPO2 Goal (%) 92% non-cardiac   Rationale SpO2 is <92% (Non-cardiac Pt.)   Bronchodilator Care Plan   Bronchodilator Care Plan Aerosol;DPI   Aerosol Meds w/ frequency   (levalbuterol (XOPENEX) 0.63 mg/3 mL nebulizer solution)   DPI Meds w/ frequency   (fluticasone  furoate-vilanteroL (BREO ELLIPTA) 100-25 mcg/dose diskus inhaler)   Rationale Maintain home respiratory medicine   Atelectasis Care Plan   Rationale No Rational Found   Airway Clearance Care Plan   Rationale No rationale found

## 2024-09-26 NOTE — HPI
73 year old pt getting admitted with persistent hypoglycemia  Pt lives alone and he remember yesterday when he was in kitchen he felt bad, but doesn't remember anything after this   Family member found pt on the ground today and was escorted to ER  In ER he was profoundly hypoglycemic and was given multiple doses of dextrose 5 and got admitted  He was admitted with same c/o on August 2024 and C peptide levels were high when checked  Random insulin levels and pro insulin levels were normal  He said he wiil leave AMA if not discharged as per DC summary   Pts Hb A1C is persistently low as per records  He said he takes Metformin but his home med list didn't shows this  He just came home 1  week ago after he was admitted in [facility] for CHF flare and fluid overload from CKD

## 2024-09-26 NOTE — ASSESSMENT & PLAN NOTE
Chronic, controlled. Latest blood pressure and vitals reviewed-     Temp:  [97.6 °F (36.4 °C)]   Pulse:  [75]   Resp:  [17]   BP: (159)/(74)   SpO2:  [99 %] .   Home meds for hypertension were reviewed and noted below.   Hypertension Medications               hydrALAZINE (APRESOLINE) 50 MG tablet Take 1 tablet (50 mg total) by mouth 3 (three) times daily.    isosorbide dinitrate (ISORDIL) 20 MG tablet Take 20 mg by mouth 3 (three) times daily.    metoprolol succinate (TOPROL-XL) 25 MG 24 hr tablet Take 1 tablet (25 mg total) by mouth once daily.    torsemide (DEMADEX) 20 MG Tab Take 4 tablets (80 mg total) by mouth 2 (two) times a day.            While in the hospital, will manage blood pressure as follows; Continue home antihypertensive regimen    Will utilize p.r.n. blood pressure medication only if patient's blood pressure greater than 140/90 and he develops symptoms such as worsening chest pain or shortness of breath.

## 2024-09-26 NOTE — H&P
Formerly [facility], [facility] - Emergency Dept  Hospital Medicine  History & Physical    Patient Name: Baldo Carney  MRN: [id number]  Patient Class: OP- Observation  Admission Date: 9/25/2024  Attending Physician: Gregory Riggs MD   Primary Care Provider: Millie Hayes, APRN,FNP-C         Patient information was obtained from patient, past medical records, and ER records.     Subjective:     Principal Problem:Hypoglycemia    Chief Complaint:   Chief Complaint   Patient presents with    Hypoglycemia     Per EMS, patient was found on floor with CBG of 40 when EMS came to patient's house. Given an amp of D50. CBG currently 250        HPI: 73 year old pt getting admitted with persistent hypoglycemia  Pt lives alone and he remember yesterday when he was in kitchen he felt bad, but doesn't remember anything after this   Family member found pt on the ground today and was escorted to ER  In ER he was profoundly hypoglycemic and was given multiple doses of dextrose 5 and got admitted  He was admitted with same c/o on August 2024 and C peptide levels were high when checked  Random insulin levels and pro insulin levels were normal  He said he wiil leave AMA if not discharged as per DC summary   Pts Hb A1C is persistently low as per records  He said he takes Metformin but his home med list didn't shows this  He just came home 1  week ago after he was admitted in [facility] for CHF flare and fluid overload from CKD    Past Medical History:   Diagnosis Date    Asthma     Cardiomyopathy 10/21/2014    CHF (congestive heart failure)     Coronary artery disease     CRF (chronic renal failure)     Diabetes mellitus     Enlarged prostate     Hyperlipidemia     Hypertension     Neuropathy     Syncope 07/29/2024    Umbilical hernia without obstruction and without gangrene 10/13/2023       Past Surgical History:   Procedure Laterality Date    ANGIOGRAPHY OF INTERNAL MAMMARY VESSEL N/A 3/11/2022    Procedure: Angiogram Internal  Mammary;  Surgeon: Hank Lujan MD;  Location: [facility] CATH/EP LAB;  Service: Cardiology;  Laterality: N/A;    CARDIAC CATHETERIZATION      CARDIAC VALVE SURGERY      CATHETERIZATION OF BOTH LEFT AND RIGHT HEART Left 3/11/2022    Procedure: CATHETERIZATION, HEART, BOTH LEFT AND RIGHT;  Surgeon: Hank Lujan MD;  Location: [facility] CATH/EP LAB;  Service: Cardiology;  Laterality: Left;    CORONARY ANGIOGRAPHY N/A 3/11/2022    Procedure: ANGIOGRAM, CORONARY ARTERY;  Surgeon: Hank Lujan MD;  Location: [facility] CATH/EP LAB;  Service: Cardiology;  Laterality: N/A;    CORONARY BYPASS GRAFT ANGIOGRAPHY  3/11/2022    Procedure: Bypass graft study;  Surgeon: Hank Lujan MD;  Location: [facility] CATH/EP LAB;  Service: Cardiology;;    CYSTOSCOPY N/A 7/30/2019    Procedure: CYSTOSCOPY;  Surgeon: Spencer Nguyen MD;  Location: [facility];  Service: Urology;  Laterality: N/A;    INSERTION OF INTRAVASCULAR MICROAXIAL BLOOD PUMP N/A 8/31/2022    Procedure: INSERTION, IMPELLA;  Surgeon: Zach Jenesn MD;  Location: [facility] CATH LAB;  Service: Cardiology;  Laterality: N/A;    INSERTION, CATHETER, DIALYSIS, PERITONEAL N/A 12/7/2023    Procedure: INSERTION, CATHETER, DIALYSIS, PERITONEAL;  Surgeon: Greg Bone Jr., MD;  Location: [facility];  Service: General;  Laterality: N/A;  need PD catheter    PLACEMENT OF SWAN SEE CATHETER WITH IMAGING GUIDANCE  8/31/2022    Procedure: INSERTION, CATHETER, SWAN-SEE, WITH IMAGING GUIDANCE;  Surgeon: Zach Jensen MD;  Location: [facility] CATH LAB;  Service: Cardiology;;    REPAIR, HERNIA, INGUINAL, INCARCERATED, INITIAL, AGE 5 YEARS OR OLDER Right 12/7/2023    Procedure: REPAIR, HERNIA, INGUINAL, INCARCERATED, INITIAL;  Surgeon: Greg Bone Jr., MD;  Location: [facility];  Service: General;  Laterality: Right;    SHOULDER ARTHROSCOPY  1985    TRANSRECTAL BIOPSY OF PROSTATE WITH ULTRASOUND GUIDANCE N/A 7/30/2019    Procedure: BIOPSY, PROSTATE, RECTAL APPROACH, WITH US  GUIDANCE;  Surgeon: Spencer Nguyen MD;  Location: [facility] OR;  Service: Urology;  Laterality: N/A;  procedure not performed, pt unable to tolerate    TRANSRECTAL BIOPSY OF PROSTATE WITH ULTRASOUND GUIDANCE N/A 8/8/2019    Procedure: BIOPSY, PROSTATE, RECTAL APPROACH, WITH US GUIDANCE;  Surgeon: Spencer Nguyen MD;  Location: [facility] OR;  Service: Urology;  Laterality: N/A;    UMBILICAL HERNIA REPAIR N/A 12/7/2023    Procedure: REPAIR, HERNIA, UMBILICAL;  Surgeon: Greg Bone Jr., MD;  Location: [facility] OR;  Service: General;  Laterality: N/A;       Review of patient's allergies indicates:   Allergen Reactions    Canagliflozin      Other reaction(s): been very dizzy       No current facility-administered medications on file prior to encounter.     Current Outpatient Medications on File Prior to Encounter   Medication Sig    apixaban (ELIQUIS) 2.5 mg Tab Take 1 tablet (2.5 mg total) by mouth 2 (two) times daily.    aspirin (ECOTRIN) 81 MG EC tablet Take 81 mg by mouth once daily.    atorvastatin (LIPITOR) 40 MG tablet Take 1 tablet (40 mg total) by mouth once daily.    cyanocobalamin 1,000 mcg/mL injection Inject 1 mL (1,000 mcg total) into the muscle every 30 days.    fluticasone furoate-vilanteroL (BREO ELLIPTA) 100-25 mcg/dose diskus inhaler Inhale 1 puff into the lungs once daily. (DAILY CONTROLLER)    fluticasone propionate (FLONASE) 50 mcg/actuation nasal spray USE 1 SPRAY (50 MCG TOTAL) IN EACH NOSTRIL ONCE DAILY    gabapentin (NEURONTIN) 300 MG capsule Take 1 capsule (300 mg total) by mouth every evening.    hydrALAZINE (APRESOLINE) 50 MG tablet Take 1 tablet (50 mg total) by mouth 3 (three) times daily.    isosorbide dinitrate (ISORDIL) 20 MG tablet Take 20 mg by mouth 3 (three) times daily.    levalbuterol (XOPENEX) 0.63 mg/3 mL nebulizer solution Inhale 0.63 mg into the lungs every 6 (six) hours as needed for Shortness of Breath.    levothyroxine (SYNTHROID) 100 MCG tablet Take 1 tablet (100 mcg  total) by mouth before breakfast. With no other meds or food    linaCLOtide (LINZESS) 72 mcg Cap capsule Take 1 capsule (72 mcg total) by mouth before breakfast.    metoprolol succinate (TOPROL-XL) 25 MG 24 hr tablet Take 1 tablet (25 mg total) by mouth once daily.    mirtazapine (REMERON) 7.5 MG Tab Take 1 tablet (7.5 mg total) by mouth every evening.    omeprazole (PRILOSEC) 40 MG capsule Take 1 capsule (40 mg total) by mouth once daily.    torsemide (DEMADEX) 20 MG Tab Take 4 tablets (80 mg total) by mouth 2 (two) times a day.    VERQUVO 5 mg Tab Take 5 mg by mouth once daily.     Family History       Problem Relation (Age of Onset)    Diabetes Father, Sister, Brother    Heart attack Father    Heart disease Father, Brother    Hypertension Father    No Known Problems Mother, Maternal Grandmother, Maternal Grandfather, Paternal Grandmother, Paternal Grandfather, Maternal Aunt, Maternal Uncle, Paternal Aunt, Paternal Uncle          Tobacco Use    Smoking status: Former     Current packs/day: 0.00     Types: Cigarettes     Quit date: 1970     Years since quittin.3    Smokeless tobacco: Never   Substance and Sexual Activity    Alcohol use: Not Currently     Alcohol/week: 3.0 standard drinks of alcohol     Types: 3 Cans of beer per week    Drug use: No    Sexual activity: Not Currently     Partners: Female     Review of Systems   Constitutional:  Positive for fatigue. Negative for activity change and appetite change.   HENT:  Negative for congestion and dental problem.    Eyes:  Negative for discharge and itching.   Respiratory:  Negative for shortness of breath.    Cardiovascular:  Negative for chest pain.   Gastrointestinal:  Negative for abdominal distention and abdominal pain.   Endocrine: Negative for cold intolerance.   Genitourinary:  Negative for difficulty urinating and dysuria.   Musculoskeletal:  Negative for arthralgias and back pain.   Skin:  Negative for color change.   Neurological:  Negative  for dizziness and facial asymmetry.   Hematological:  Negative for adenopathy.   Psychiatric/Behavioral:  Negative for agitation and behavioral problems.      Objective:     Vital Signs (Most Recent):  Temp: 97.6 °F (36.4 °C) (09/25/24 1729)  Pulse: 75 (09/25/24 1729)  Resp: 17 (09/25/24 1729)  BP: (!) 159/74 (09/25/24 1729)  SpO2: 99 % (09/25/24 1729) Vital Signs (24h Range):  Temp:  [97.6 °F (36.4 °C)] 97.6 °F (36.4 °C)  Pulse:  [75] 75  Resp:  [17] 17  SpO2:  [99 %] 99 %  BP: (159)/(74) 159/74     Weight: 59.6 kg (131 lb 6.2 oz)  Body mass index is 17.82 kg/m².     Physical Exam  Vitals and nursing note reviewed.   Constitutional:       Appearance: He is well-developed.   HENT:      Head: Atraumatic.      Right Ear: External ear normal.      Left Ear: External ear normal.      Nose: Nose normal.   Eyes:      Pupils: Pupils are equal, round, and reactive to light.   Cardiovascular:      Rate and Rhythm: Normal rate and regular rhythm.   Pulmonary:      Effort: Pulmonary effort is normal.      Breath sounds: Normal breath sounds.   Abdominal:      General: Bowel sounds are normal.      Palpations: Abdomen is soft.   Musculoskeletal:         General: Normal range of motion.      Cervical back: Full passive range of motion without pain and normal range of motion.   Skin:     General: Skin is warm.   Neurological:      Mental Status: He is alert and oriented to person, place, and time.              CRANIAL NERVES     CN III, IV, VI   Pupils are equal, round, and reactive to light.       Significant Labs: All pertinent labs within the past 24 hours have been reviewed.  CBC:   Recent Labs   Lab 09/25/24  1904   WBC 9.71   HGB 11.3*   HCT 34.7*        CMP:   Recent Labs   Lab 09/25/24  1904      K 3.3*   CL 95   CO2 29   GLU 76   BUN 65*   CREATININE 2.4*   CALCIUM 9.4   PROT 8.4   ALBUMIN 3.8   BILITOT 1.6*   ALKPHOS 58   AST 34   ALT 36   ANIONGAP 14       Significant Imaging: I have reviewed all pertinent  imaging results/findings within the past 24 hours.    Assessment/Plan:     * Hypoglycemia  As mentioned in HPI  C peptide levels high but serum random & pro insulin levels on normal range so possibility of insulinoma is rare ?  MEN type 1 very unlikely , will send serum PTH/Prolactin levels  Doesn't look like he has cushingoid features , will check serum cortisol and 24 urinary cortisol  Started on 10% dextrose for hypoglycemia  CT Abdomen W contrast done in November 2023 reviewed  Primary team in AM can go through images with Radiologist  for a closer look to r/o pancreatic tumors  Can get in touch in with Endocrinologist  oncall in [place] tomorrow AM f  Chronic kidney disease itself will cause rise in C peptide levels       Latest Reference Range & Units 08/15/24 08:05   C-Peptide 1.1 - 4.4 ng/mL 14.1 (H)   (H): Data is abnormally high        LV (left ventricular) mural thrombus  He was started on NOAC for this condition dew weeks ago      Debility  Chronic issue     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 without macular edema, without long-term current use of insulin  Presently not on any meds   Pt says he takes metformin and should not be on this  Metformin per se wont cause hypoglycemia    Latest Reference Range & Units 09/15/23 03:26 08/15/24 05:11 08/18/24 05:07   Hemoglobin A1C External 4.5 - 6.2 % 5.4 5.5 6.0       CRF (chronic renal failure)   Latest Reference Range & Units 09/13/24 08:37 09/14/24 06:14 09/15/24 06:59 09/16/24 06:41 09/17/24 06:16 09/18/24 06:40 09/19/24 05:21 09/25/24 19:04   Creatinine 0.5 - 1.4 mg/dL 3.7 (H) 3.5 (H) 3.4 (H) 3.5 (H) 3.2 (H) 3.1 (H) 3.3 (H) 2.4 (H)       Cirrhosis of liver with ascites  In need of Bimonthly paracentesis     MELD-Na score calculated; MELD 3.0: 25 at 9/19/2024  5:21 AM  MELD-Na: 24 at 9/19/2024  5:21 AM  Calculated from:  Serum Creatinine: 3.3 mg/dL (Using max of 3 mg/dL) at 9/19/2024  5:21 AM  Serum Sodium: 132 mmol/L  at 9/19/2024  5:21 AM  Total Bilirubin: 1.6 mg/dL at 9/19/2024  5:21 AM  Serum Albumin: 3.2 g/dL at 9/19/2024  5:21 AM  INR(ratio): 1.1 at 9/19/2024  5:21 AM  Age at listing (hypothetical): 73 years  Sex: Male at 9/19/2024  5:21 AM          COPD (chronic obstructive pulmonary disease)  Chronic stable issue     Chronic combined systolic and diastolic heart failure  Maintain high dose torsemide      Peripheral polyneuropathy  Aware       S/P CABG (coronary artery bypass graft)  In 2014      CAD (coronary artery disease)  Status post percutaneous coronary intervention with drug eluting stent placements in left main to left anterior descending artery and left main to lateral circumflex artery on 8/31/2022     Aortic stenosis status post AVR  Aware      Hypertension  Chronic, controlled. Latest blood pressure and vitals reviewed-     Temp:  [97.6 °F (36.4 °C)]   Pulse:  [75]   Resp:  [17]   BP: (159)/(74)   SpO2:  [99 %] .   Home meds for hypertension were reviewed and noted below.   Hypertension Medications               hydrALAZINE (APRESOLINE) 50 MG tablet Take 1 tablet (50 mg total) by mouth 3 (three) times daily.    isosorbide dinitrate (ISORDIL) 20 MG tablet Take 20 mg by mouth 3 (three) times daily.    metoprolol succinate (TOPROL-XL) 25 MG 24 hr tablet Take 1 tablet (25 mg total) by mouth once daily.    torsemide (DEMADEX) 20 MG Tab Take 4 tablets (80 mg total) by mouth 2 (two) times a day.            While in the hospital, will manage blood pressure as follows; Continue home antihypertensive regimen    Will utilize p.r.n. blood pressure medication only if patient's blood pressure greater than 140/90 and he develops symptoms such as worsening chest pain or shortness of breath.      VTE Risk Mitigation (From admission, onward)           Ordered     apixaban tablet 2.5 mg  2 times daily         09/25/24 2017     IP VTE HIGH RISK PATIENT  Once         09/25/24 2017     Place sequential compression device  Until  discontinued         09/25/24 2017                       On 09/25/2024, patient should be placed in hospital observation services under my care.        Automatic Inhaler to Nebulizer Interchange    fluticasone/vilanterol (Breo Ellipta) 100 mcg/25 mcg changed to budesonide 0.5 mg twice daily AND arformoterol 15 mcg twice daily per [facility] Automatic Therapeutic Substitutions Protocol.    Please contact pharmacy at extension [phone] with any questions.     Thank you,   Rocky Riggs MD  Department of Hospital Medicine  [facility] - Emergency Dept

## 2024-09-26 NOTE — PLAN OF CARE
[facility] - Emergency Dept  Initial Discharge Assessment    Assessment completed by chart review. Pt was discharged on 9/9, and all information on FaceSheet confirmed, including demographics, PCP, pharmacy and insurance. Pt has not addressed advance directives has addressed advance directives and is POA. He currently lives in [place] alone.  His PCP is Millie Hayes MD.  He denies any HH/HD/Blood Thinners but he does use a walker. MELLY GRIFFITHS (Relative)  [phone] (Mobile)  will transport back home after discharge. He had a recent hospitalization and readmission is complete. Plan for discharge is to return home. Case Management to continue to follow for discharge planning needs.          Primary Care Provider: Millie Hayes APRN,PATRICIO-LINA    Admission Diagnosis: Hypoglycemia [E16.2]    Admission Date: 9/25/2024  Expected Discharge Date: 9/28/2024    Transition of Care Barriers: None    Payor: [employer]ET[employer] MANAGED MEDICARE / Plan: AETNA MEDICARE PLAN HMO / Product Type: Medicare Advantage /     Extended Emergency Contact Information  Primary Emergency Contact: MELLY GRIFFITHS  Mobile Phone: [phone]  Relation: Relative  Preferred language: English   needed? No  Secondary Emergency Contact: Blanca Lombardo  Mobile Phone: [phone]  Relation: Sister   needed? No    Discharge Plan A: Home with family  Discharge Plan B: Home Health      [facility]pharmacy #[id number] - [place], [place] - [address]  [address] [address]  Phone: [phone] Fax: [phone]    [facility] - [place] - [address] AT Portal to Registered [facility] Sites  [facility]  [place] [address]  Phone: [phone] Fax: [phone]    [facility] [place] - [facility] [place] - [address] [place] [address]  [address] [place] [address] [place] [address]  Phone: [phone] Fax: [phone]      Initial Assessment (most recent)       Adult Discharge Assessment -  09/26/24 1326          Discharge Assessment    Assessment Type Discharge Planning Assessment     Confirmed/corrected address, phone number and insurance Yes     Confirmed Demographics Correct on Facesheet     Source of Information health record     Does patient/caregiver understand observation status Yes     Reason For Admission Hypoglycemia     People in Home alone     Do you expect to return to your current living situation? Yes     Do you have help at home or someone to help you manage your care at home? Yes     Who are your caregiver(s) and their phone number(s)? MELLY GRIFFITHS (Relative)  [phone] (Mobile)     Prior to hospitilization cognitive status: No Deficits     Current cognitive status: No Deficits     Walking or Climbing Stairs Difficulty yes     Walking or Climbing Stairs ambulation difficulty, requires equipment     Dressing/Bathing Difficulty no     Home Accessibility wheelchair accessible     Home Layout Able to live on 1st floor     Equipment Currently Used at Home walker, standard     Readmission within 30 days? Yes     Patient currently being followed by outpatient case management? No     Do you currently have service(s) that help you manage your care at home? No     Do you take prescription medications? Yes     Do you have prescription coverage? Yes     Who is going to help you get home at discharge? AETNA MANAGED MEDICARE - AETNA MEDICARE PLAN HMO     How do you get to doctors appointments? family or friend will provide     Are you on dialysis? No     Do you take coumadin? No     Discharge Plan A Home with family     Discharge Plan B Home Health     DME Needed Upon Discharge  none     Transition of Care Barriers None        Physical Activity    On average, how many days per week do you engage in moderate to strenuous exercise (like a brisk walk)? 0 days     On average, how many minutes do you engage in exercise at this level? 0 min        Financial Resource Strain    How hard is it for you  to pay for the very basics like food, housing, medical care, and heating? Not hard at all        Housing Stability    In the last 12 months, was there a time when you were not able to pay the mortgage or rent on time? No     At any time in the past 12 months, were you homeless or living in a shelter (including now)? No        Transportation Needs    Has the lack of transportation kept you from medical appointments, meetings, work or from getting things needed for daily living? No        Food Insecurity    Within the past 12 months, you worried that your food would run out before you got the money to buy more. Never true     Within the past 12 months, the food you bought just didn't last and you didn't have money to get more. Never true        Stress    Do you feel stress - tense, restless, nervous, or anxious, or unable to sleep at night because your mind is troubled all the time - these days? Not at all        Social Isolation    How often do you feel lonely or isolated from those around you?  Never        Alcohol Use    Q1: How often do you have a drink containing alcohol? Never     Q2: How many drinks containing alcohol do you have on a typical day when you are drinking? Patient does not drink     Q3: How often do you have six or more drinks on one occasion? Never        Utilities    In the past 12 months has the electric, gas, oil, or water company threatened to shut off services in your home? No        Health Literacy    How often do you need to have someone help you when you read instructions, pamphlets, or other written material from your doctor or pharmacy? Never

## 2024-09-26 NOTE — CARE UPDATE
09/26/24 1719   Patient Assessment/Suction   Level of Consciousness (AVPU) alert   Respiratory Effort Normal;Unlabored   Expansion/Accessory Muscles/Retractions no use of accessory muscles   All Lung Fields Breath Sounds clear;diminished   Rhythm/Pattern, Respiratory unlabored   Cough Frequency no cough   PRE-TX-O2   Device (Oxygen Therapy) room air   SpO2 100 %   Pulse Oximetry Type Continuous   $ Pulse Oximetry - Multiple Charge Pulse Oximetry - Multiple   Pulse 71   Resp 13   Aerosol Therapy   $ Aerosol Therapy Charges PRN treatment not required   Respiratory Treatment Status (SVN) PRN treatment not required   Education   $ Education Other (see comment);15 min  (sats)

## 2024-09-26 NOTE — ASSESSMENT & PLAN NOTE
As mentioned in HPI  C peptide levels high but serum random & pro insulin levels on normal range so possibility of insulinoma is rare ?  MEN type 1 very unlikely , will send serum PTH/Prolactin levels  Doesn't look like he has cushingoid features , will check serum cortisol and 24 urinary cortisol  Started on 10% dextrose for hypoglycemia  CT Abdomen W contrast done in November 2023 reviewed  -given unintended weight loss and persistent hypoglycemia ordered CT chest abdomen pelvis to look for malignancy  -tele consult with endocrinology and we are double checking glucoses in multiple sites to rule out pseudohypoglycemia  -pending BNP, bhp, insulin, proinsulin, C-peptide during a hypoglycemic episode per endocrinology.  -continue D50 amps per protocol and D10 infusion  -transferred to ICU for frequent glucose monitoring     Latest Reference Range & Units 08/15/24 08:05   C-Peptide 1.1 - 4.4 ng/mL 14.1 (H)   (H): Data is abnormally high

## 2024-09-26 NOTE — CARE UPDATE
09/26/24 0735   Patient Assessment/Suction   Level of Consciousness (AVPU) alert   Respiratory Effort Normal;Unlabored   Expansion/Accessory Muscles/Retractions no use of accessory muscles;no retractions;expansion symmetric   All Lung Fields Breath Sounds diminished   Rhythm/Pattern, Respiratory unlabored;pattern regular;depth regular;chest wiggle adequate;no shortness of breath reported   Cough Frequency no cough   PRE-TX-O2   Device (Oxygen Therapy) room air   SpO2 99 %   Pulse Oximetry Type Continuous   $ Pulse Oximetry - Multiple Charge Pulse Oximetry - Multiple   Pulse 64   Resp 17   Aerosol Therapy   $ Aerosol Therapy Charges Aerosol Treatment   Daily Review of Necessity (SVN) completed   Respiratory Treatment Status (SVN) given   Treatment Route (SVN) oxygen;mask   Patient Position HOB elevated   Post Treatment Assessment (SVN) increased aeration   Signs of Intolerance (SVN) none   Breath Sounds Post-Respiratory Treatment   Throughout All Fields Post-Treatment All Fields   Throughout All Fields Post-Treatment aeration increased   Post-treatment Heart Rate (beats/min) 66   Post-treatment Resp Rate (breaths/min) 18   Education   $ Education Bronchodilator;15 min

## 2024-09-26 NOTE — ASSESSMENT & PLAN NOTE
Status post percutaneous coronary intervention with drug eluting stent placements in left main to left anterior descending artery and left main to lateral circumflex artery on 8/31/2022

## 2024-09-26 NOTE — PLAN OF CARE
Called by the transfer center to speak to ED physician at [facility]  for this pt who presented with hypoglycemia. Has T2DM and reports metformin only, but no other DM meds. We do not even see metformin on a recent med fill history. EMS reports hypoglycemia in the field and since arriving to the ED he has had several POC in the 20s. ED physician and nurse state he was not symptomatic when POC glucose was in the 20s which makes me wonder if this is pseudohypoglycemia. He should be symptomatic if his BG was truly in the 20s.    Must first meet Whipple's Triad which is:   - typical/classic symptoms of hypoglycemia  - low glucose at the time of symptoms (less than 55)  - Resolution of symptoms after treating the low      - Check fingerstick glucose every 1-2 hours initially (if maintaining normal BG can space out accuchecks), if glucose is less than 55, please repeat POC glucose in a different area of the patient body (ear lobe, upper arm, forearm)  - If glucose less than 55 on 2 simultaneous finger sticks or labs, check STAT c-peptide, insulin level, proinsulin level, renal function panel, beta hydroxybutyrate  - you can send an oral hypoglycemic agent screen now - this can be drawn at any time and typically takes several days to result.  - Check STAT labs prior to treating with glucose if patient is stable (Though if patient is obtunded or hemodynamically unstable, then treat hypoglycemia immediately). Otherwise the administration of glucose may decrease the accuracy of the labs    - After labs are obtained, treat hypoglycemia with IV Dextrose bolus per protocol  - If severe hypoglycemia continues, give subcutaneous or intramuscular glucagon (1 mg) order 1x and continue to monitor glucose per hypoglycemia protocol  - If persistent hypoglycemia persists despite above treatment, notify primary team to start D5 1/2 Normal Saline vs D10 drip    Please feel free to call me back with any additional questions or  endocrine concerns.    Joyce Khan MD  Endocrinology

## 2024-09-26 NOTE — HOSPITAL COURSE
73 year old pt getting admitted with persistent hypoglycemia upon going for possibly pseudohypoglycemia, insulin secreting tumor, malignancy.  Ordered CT chest abdomen pelvis today, tele consult with endocrinology and recommends rechecks in earlobe or palm and if true hypoglycemia we collected stat C-peptide, proinsulin, insulin, BH B, BNP during hypoglycemic episode.  Has been doing Accu-Cheks Q 2 hours with D50 amps and resuming D10 infusion.  He is mostly asymptomatic during this which is good but etiology is unclear so far.  He does admit to unintended weight loss over the last 2-4 weeks but denies night sweats or fevers.  Transferred to ICU for frequent hypoglycemia.  He was placed on D10 infusion was ultimately able to be weaned off.  His sugars were then approaching the 200s and taper down to the 150s and 120s after this was discontinued.  He had no additional hypoglycemic events.  Plan was discussed again with the endocrinologist at [facility], Dr. Khan who generously consulted on this patient.  Given the elevated C-peptide and insulin that was drawn prior to the resuscitation when patient was hypoglycemic this suggests that this was due to secreted got which she was taking prior to his admission, glimepiride.  The plan was discussed with the patient in detail to discontinue this medication.  It is also written patient instructions.  Patient was to follow up with his primary care physician and endocrinologist within 1 week of discharge.  Okay to continue metformin.  Patient was seen and examined on the day of discharge and was in stable condition, hemodynamically stable, without any symptoms, chest pain, dyspnea, lightheadedness or dizziness.

## 2024-09-26 NOTE — PROGRESS NOTES
Automatic Inhaler to Nebulizer Interchange    fluticasone/vilanterol (Breo Ellipta) 100 mcg/25 mcg changed to budesonide 0.5 mg twice daily AND arformoterol 15 mcg twice daily per [facility] Automatic Therapeutic Substitutions Protocol.    Please contact pharmacy at extension [phone] with any questions.     Thank you,   Rocky Corrigan

## 2024-09-26 NOTE — ASSESSMENT & PLAN NOTE
Latest Reference Range & Units 09/13/24 08:37 09/14/24 06:14 09/15/24 06:59 09/16/24 06:41 09/17/24 06:16 09/18/24 06:40 09/19/24 05:21 09/25/24 19:04   Creatinine 0.5 - 1.4 mg/dL 3.7 (H) 3.5 (H) 3.4 (H) 3.5 (H) 3.2 (H) 3.1 (H) 3.3 (H) 2.4 (H)

## 2024-09-26 NOTE — SUBJECTIVE & OBJECTIVE
Past Medical History:   Diagnosis Date    Asthma     Cardiomyopathy 10/21/2014    CHF (congestive heart failure)     Coronary artery disease     CRF (chronic renal failure)     Diabetes mellitus     Enlarged prostate     Hyperlipidemia     Hypertension     Neuropathy     Syncope 07/29/2024    Umbilical hernia without obstruction and without gangrene 10/13/2023       Past Surgical History:   Procedure Laterality Date    ANGIOGRAPHY OF INTERNAL MAMMARY VESSEL N/A 3/11/2022    Procedure: Angiogram Internal Mammary;  Surgeon: Hank Lujan MD;  Location: [facility] CATH/EP LAB;  Service: Cardiology;  Laterality: N/A;    CARDIAC CATHETERIZATION      CARDIAC VALVE SURGERY      CATHETERIZATION OF BOTH LEFT AND RIGHT HEART Left 3/11/2022    Procedure: CATHETERIZATION, HEART, BOTH LEFT AND RIGHT;  Surgeon: Hank Lujan MD;  Location: [facility] CATH/EP LAB;  Service: Cardiology;  Laterality: Left;    CORONARY ANGIOGRAPHY N/A 3/11/2022    Procedure: ANGIOGRAM, CORONARY ARTERY;  Surgeon: Hank Lujan MD;  Location: [facility] CATH/EP LAB;  Service: Cardiology;  Laterality: N/A;    CORONARY BYPASS GRAFT ANGIOGRAPHY  3/11/2022    Procedure: Bypass graft study;  Surgeon: Hank Lujan MD;  Location: [facility] CATH/EP LAB;  Service: Cardiology;;    CYSTOSCOPY N/A 7/30/2019    Procedure: CYSTOSCOPY;  Surgeon: Spencer Nguyen MD;  Location: [facility] OR;  Service: Urology;  Laterality: N/A;    INSERTION OF INTRAVASCULAR MICROAXIAL BLOOD PUMP N/A 8/31/2022    Procedure: INSERTION, IMPELLA;  Surgeon: Zach Jensen MD;  Location: [facility] CATH LAB;  Service: Cardiology;  Laterality: N/A;    INSERTION, CATHETER, DIALYSIS, PERITONEAL N/A 12/7/2023    Procedure: INSERTION, CATHETER, DIALYSIS, PERITONEAL;  Surgeon: Greg Bone Jr., MD;  Location: [facility] OR;  Service: General;  Laterality: N/A;  need PD catheter    PLACEMENT OF SWAN SEE CATHETER WITH IMAGING GUIDANCE  8/31/2022    Procedure: INSERTION, CATHETER, SWAN-SEE, WITH IMAGING  GUIDANCE;  Surgeon: Zach Jensen MD;  Location: [facility] CATH LAB;  Service: Cardiology;;    REPAIR, HERNIA, INGUINAL, INCARCERATED, INITIAL, AGE 5 YEARS OR OLDER Right 12/7/2023    Procedure: REPAIR, HERNIA, INGUINAL, INCARCERATED, INITIAL;  Surgeon: Greg Bone Jr., MD;  Location: [facility] OR;  Service: General;  Laterality: Right;    SHOULDER ARTHROSCOPY  1985    TRANSRECTAL BIOPSY OF PROSTATE WITH ULTRASOUND GUIDANCE N/A 7/30/2019    Procedure: BIOPSY, PROSTATE, RECTAL APPROACH, WITH US GUIDANCE;  Surgeon: Spencer Nguyen MD;  Location: [facility] OR;  Service: Urology;  Laterality: N/A;  procedure not performed, pt unable to tolerate    TRANSRECTAL BIOPSY OF PROSTATE WITH ULTRASOUND GUIDANCE N/A 8/8/2019    Procedure: BIOPSY, PROSTATE, RECTAL APPROACH, WITH US GUIDANCE;  Surgeon: Spencer Nguyen MD;  Location: [facility] OR;  Service: Urology;  Laterality: N/A;    UMBILICAL HERNIA REPAIR N/A 12/7/2023    Procedure: REPAIR, HERNIA, UMBILICAL;  Surgeon: Greg Bone Jr., MD;  Location: [facility];  Service: General;  Laterality: N/A;       Review of patient's allergies indicates:   Allergen Reactions    Canagliflozin      Other reaction(s): been very dizzy       No current facility-administered medications on file prior to encounter.     Current Outpatient Medications on File Prior to Encounter   Medication Sig    apixaban (ELIQUIS) 2.5 mg Tab Take 1 tablet (2.5 mg total) by mouth 2 (two) times daily.    aspirin (ECOTRIN) 81 MG EC tablet Take 81 mg by mouth once daily.    atorvastatin (LIPITOR) 40 MG tablet Take 1 tablet (40 mg total) by mouth once daily.    cyanocobalamin 1,000 mcg/mL injection Inject 1 mL (1,000 mcg total) into the muscle every 30 days.    fluticasone furoate-vilanteroL (BREO ELLIPTA) 100-25 mcg/dose diskus inhaler Inhale 1 puff into the lungs once daily. (DAILY CONTROLLER)    fluticasone propionate (FLONASE) 50 mcg/actuation nasal spray USE 1 SPRAY (50 MCG TOTAL) IN EACH NOSTRIL ONCE  DAILY    gabapentin (NEURONTIN) 300 MG capsule Take 1 capsule (300 mg total) by mouth every evening.    hydrALAZINE (APRESOLINE) 50 MG tablet Take 1 tablet (50 mg total) by mouth 3 (three) times daily.    isosorbide dinitrate (ISORDIL) 20 MG tablet Take 20 mg by mouth 3 (three) times daily.    levalbuterol (XOPENEX) 0.63 mg/3 mL nebulizer solution Inhale 0.63 mg into the lungs every 6 (six) hours as needed for Shortness of Breath.    levothyroxine (SYNTHROID) 100 MCG tablet Take 1 tablet (100 mcg total) by mouth before breakfast. With no other meds or food    linaCLOtide (LINZESS) 72 mcg Cap capsule Take 1 capsule (72 mcg total) by mouth before breakfast.    metoprolol succinate (TOPROL-XL) 25 MG 24 hr tablet Take 1 tablet (25 mg total) by mouth once daily.    mirtazapine (REMERON) 7.5 MG Tab Take 1 tablet (7.5 mg total) by mouth every evening.    omeprazole (PRILOSEC) 40 MG capsule Take 1 capsule (40 mg total) by mouth once daily.    torsemide (DEMADEX) 20 MG Tab Take 4 tablets (80 mg total) by mouth 2 (two) times a day.    VERQUVO 5 mg Tab Take 5 mg by mouth once daily.     Family History       Problem Relation (Age of Onset)    Diabetes Father, Sister, Brother    Heart attack Father    Heart disease Father, Brother    Hypertension Father    No Known Problems Mother, Maternal Grandmother, Maternal Grandfather, Paternal Grandmother, Paternal Grandfather, Maternal Aunt, Maternal Uncle, Paternal Aunt, Paternal Uncle          Tobacco Use    Smoking status: Former     Current packs/day: 0.00     Types: Cigarettes     Quit date: 1970     Years since quittin.3    Smokeless tobacco: Never   Substance and Sexual Activity    Alcohol use: Not Currently     Alcohol/week: 3.0 standard drinks of alcohol     Types: 3 Cans of beer per week    Drug use: No    Sexual activity: Not Currently     Partners: Female     Review of Systems   Constitutional:  Positive for fatigue. Negative for activity change and appetite change.    HENT:  Negative for congestion and dental problem.    Eyes:  Negative for discharge and itching.   Respiratory:  Negative for shortness of breath.    Cardiovascular:  Negative for chest pain.   Gastrointestinal:  Negative for abdominal distention and abdominal pain.   Endocrine: Negative for cold intolerance.   Genitourinary:  Negative for difficulty urinating and dysuria.   Musculoskeletal:  Negative for arthralgias and back pain.   Skin:  Negative for color change.   Neurological:  Negative for dizziness and facial asymmetry.   Hematological:  Negative for adenopathy.   Psychiatric/Behavioral:  Negative for agitation and behavioral problems.      Objective:     Vital Signs (Most Recent):  Temp: 97.6 °F (36.4 °C) (09/25/24 1729)  Pulse: 75 (09/25/24 1729)  Resp: 17 (09/25/24 1729)  BP: (!) 159/74 (09/25/24 1729)  SpO2: 99 % (09/25/24 1729) Vital Signs (24h Range):  Temp:  [97.6 °F (36.4 °C)] 97.6 °F (36.4 °C)  Pulse:  [75] 75  Resp:  [17] 17  SpO2:  [99 %] 99 %  BP: (159)/(74) 159/74     Weight: 59.6 kg (131 lb 6.2 oz)  Body mass index is 17.82 kg/m².     Physical Exam  Vitals and nursing note reviewed.   Constitutional:       Appearance: He is well-developed.   HENT:      Head: Atraumatic.      Right Ear: External ear normal.      Left Ear: External ear normal.      Nose: Nose normal.   Eyes:      Pupils: Pupils are equal, round, and reactive to light.   Cardiovascular:      Rate and Rhythm: Normal rate and regular rhythm.   Pulmonary:      Effort: Pulmonary effort is normal.      Breath sounds: Normal breath sounds.   Abdominal:      General: Bowel sounds are normal.      Palpations: Abdomen is soft.   Musculoskeletal:         General: Normal range of motion.      Cervical back: Full passive range of motion without pain and normal range of motion.   Skin:     General: Skin is warm.   Neurological:      Mental Status: He is alert and oriented to person, place, and time.              CRANIAL NERVES     CN III,  IV, VI   Pupils are equal, round, and reactive to light.       Significant Labs: All pertinent labs within the past 24 hours have been reviewed.  CBC:   Recent Labs   Lab 09/25/24 1904   WBC 9.71   HGB 11.3*   HCT 34.7*        CMP:   Recent Labs   Lab 09/25/24 1904      K 3.3*   CL 95   CO2 29   GLU 76   BUN 65*   CREATININE 2.4*   CALCIUM 9.4   PROT 8.4   ALBUMIN 3.8   BILITOT 1.6*   ALKPHOS 58   AST 34   ALT 36   ANIONGAP 14       Significant Imaging: I have reviewed all pertinent imaging results/findings within the past 24 hours.

## 2024-09-26 NOTE — ASSESSMENT & PLAN NOTE
In need of Bimonthly paracentesis     MELD-Na score calculated; MELD 3.0: 25 at 9/19/2024  5:21 AM  MELD-Na: 24 at 9/19/2024  5:21 AM  Calculated from:  Serum Creatinine: 3.3 mg/dL (Using max of 3 mg/dL) at 9/19/2024  5:21 AM  Serum Sodium: 132 mmol/L at 9/19/2024  5:21 AM  Total Bilirubin: 1.6 mg/dL at 9/19/2024  5:21 AM  Serum Albumin: 3.2 g/dL at 9/19/2024  5:21 AM  INR(ratio): 1.1 at 9/19/2024  5:21 AM  Age at listing (hypothetical): 73 years  Sex: Male at 9/19/2024  5:21 AM

## 2024-09-26 NOTE — SUBJECTIVE & OBJECTIVE
Interval History:  Patient seen and examined, no acute complaints    Review of Systems   Constitutional:  Negative for chills and fever.   Respiratory:  Negative for chest tightness, shortness of breath and wheezing.    Cardiovascular:  Negative for chest pain and palpitations.   Gastrointestinal:  Negative for constipation, diarrhea, nausea and vomiting.   Genitourinary:  Negative for dysuria and hematuria.   Musculoskeletal:  Negative for gait problem.   Neurological:  Negative for dizziness, speech difficulty and numbness.     Objective:     Vital Signs (Most Recent):  Temp: 97.9 °F (36.6 °C) (09/26/24 1100)  Pulse: 71 (09/26/24 1530)  Resp: 18 (09/26/24 1530)  BP: (!) 103/52 (09/26/24 1530)  SpO2: 100 % (09/26/24 1530) Vital Signs (24h Range):  Temp:  [97.5 °F (36.4 °C)-97.9 °F (36.6 °C)] 97.9 °F (36.6 °C)  Pulse:  [64-78] 71  Resp:  [13-22] 18  SpO2:  [99 %-100 %] 100 %  BP: (101-159)/(52-79) 103/52     Weight: 59.6 kg (131 lb 6.2 oz)  Body mass index is 17.82 kg/m².    Intake/Output Summary (Last 24 hours) at 9/26/2024 1604  Last data filed at 9/26/2024 0937  Gross per 24 hour   Intake 1250 ml   Output 250 ml   Net 1000 ml         Physical Exam  Vitals and nursing note reviewed.   Constitutional:       General: He is not in acute distress.     Comments: Cachectic appearing   Cardiovascular:      Rate and Rhythm: Normal rate and regular rhythm.      Heart sounds: Normal heart sounds.   Pulmonary:      Breath sounds: No wheezing, rhonchi or rales.   Abdominal:      General: There is no distension.      Palpations: Abdomen is soft.      Tenderness: There is no abdominal tenderness.   Skin:     General: Skin is warm and dry.   Neurological:      General: No focal deficit present.      Mental Status: He is alert and oriented to person, place, and time.   Psychiatric:         Mood and Affect: Mood normal.         Behavior: Behavior normal.             Significant Labs: All pertinent labs within the past 24 hours  have been reviewed.  Recent Lab Results  (Last 5 results in the past 24 hours)        09/26/24  1531   09/26/24  1505   09/26/24  1449   09/26/24  1448   09/26/24  1208        Anion Gap   8             Beta-Hydroxybutyrate   0.1             BUN   62             Calcium   8.6             Chloride   93             CO2   32             Creatinine   2.5             eGFR   26.5             Glucose   21  Comment: Critical result GLU 21 mg/dL called to and read back by J[facility],  RN. at 26-Sep-2024 15:46 by [facility] HospitalLuis A.  Result Rechecked               POCT Glucose 93     32   31   73       Potassium   4.0             Sodium   133                                    Significant Imaging: I have reviewed all pertinent imaging results/findings within the past 24 hours.  Imaging Results              CT Chest Abdomen Pelvis Without Contrast (XPD) (Final result)  Result time 09/26/24 14:56:07      Final result by Gee Lozano MD (09/26/24 14:56:07)                   Impression:      1. No acute findings, given limitations from lack of IV contrast.  2. Cardiomegaly, with aortic and coronary arterial calcifications.  3. Trace right pleural effusion.  4. Moderate to large volume of ascites.  5. A 2 mm nonobstructing left renal calculus.  6. Markedly enlarged prostate gland, with nodular prostate tissue protruding into the base of the urinary bladder.      Electronically signed by: Gee Lozano  Date:    09/26/2024  Time:    14:56               Narrative:    EXAMINATION:  CT CHEST ABDOMEN PELVIS WITHOUT CONTRAST(XPD)    CLINICAL HISTORY:  Weight loss, unintended;    TECHNIQUE:  Axial imaging through the chest, abdomen and pelvis was performed without IV contrast.  Lack of IV contrast limits evaluation of the mediastinum, vasculature, solid organs and hollow viscera.    COMPARISON:  Multiple prior exams.    FINDINGS:  CT CHEST: The heart is enlarged, with extensive coronary arterial calcifications, and no pericardial  effusion.  Diffuse calcified plaque involves normal-caliber thoracic aorta, with the central pulmonary arteries enlarged.  No enlarged mediastinal lymph nodes.    There are scattered lucencies reflecting pulmonary emphysema, with linear and bandlike subsegmental atelectasis in the lower lungs.  The central airways are patent, with trace low-density right pleural effusion.  No evidence of interstitial pulmonary edema. There are no chest wall soft tissue masses or enlarged axillary lymph nodes.  No acute fractures or destructive osseous lesions.    CT ABDOMEN: There is moderate to large volume of low-density ascites.  The unenhanced liver, spleen, pancreas, adrenal glands and kidneys are unremarkable, with a 2 mm nonobstructing left renal calculus.  No ureteral calculi or hydronephrosis.    Diffuse calcified plaque involves normal-caliber abdominal aorta and iliac arteries.  There is no bowel obstruction or intraperitoneal free air.  No ventral abdominal hernia.    CT PELVIS: There is low-density pelvic ascites.  The prostate gland is markedly enlarged, with nodular tissue protruding into the base of the urinary bladder.  No distal ureteral or bladder calculi.  There are scattered pelvic arterial vascular calcifications, with right inguinal hernia and apparent changes of hernia repair.    There are no enlarged iliac chain or inguinal lymph nodes.  There are diffuse arterial vascular calcifications.  No acute fractures or destructive osseous lesions.                                       CT Head Without Contrast (Final result)  Result time 09/25/24 18:39:36      Final result by Rebecca Sin MD (09/25/24 18:39:36)                   Impression:      No acute abnormality.      Electronically signed by: Rebecca Sin  Date:    09/25/2024  Time:    18:39               Narrative:    EXAMINATION:  CT HEAD WITHOUT CONTRAST    CLINICAL HISTORY:  Syncope, recurrent;    TECHNIQUE:  Low dose axial CT images obtained  throughout the head without intravenous contrast. Sagittal and coronal reconstructions were performed.    COMPARISON:  None.    FINDINGS:  Intracranial compartment:    Ventricles and sulci are normal in size for age without evidence of hydrocephalus. No extra-axial blood or fluid collections.    Moderate chronic microvascular ischemia.  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  No parenchymal mass, hemorrhage, edema or major vascular distribution infarct.    Skull/extracranial contents (limited evaluation): No fracture. Mastoid air cells and paranasal sinuses are essentially clear.

## 2024-09-27 NOTE — PROGRESS NOTES
[facility] Medicine  Progress Note    Patient Name: Baldo Carney  MRN: [id number]  Patient Class: IP- Inpatient   Admission Date: 9/25/2024  Length of Stay: 1 days  Attending Physician: Crys Burnham MD  Primary Care Provider: Millie Hayes APRN,FNP-C        Subjective:     Principal Problem:Hypoglycemia        HPI:  73 year old pt getting admitted with persistent hypoglycemia  Pt lives alone and he remember yesterday when he was in kitchen he felt bad, but doesn't remember anything after this   Family member found pt on the ground today and was escorted to ER  In ER he was profoundly hypoglycemic and was given multiple doses of dextrose 5 and got admitted  He was admitted with same c/o on August 2024 and C peptide levels were high when checked  Random insulin levels and pro insulin levels were normal  He said he wiil leave AMA if not discharged as per DC summary   Pts Hb A1C is persistently low as per records  He said he takes Metformin but his home med list didn't shows this  He just came home 1  week ago after he was admitted in [facility] for CHF flare and fluid overload from CKD    Overview/Hospital Course:  73 year old pt getting admitted with persistent hypoglycemia upon going for possibly pseudohypoglycemia, insulin secreting tumor, malignancy.  Ordered CT chest abdomen pelvis today, tele consult with endocrinology and recommends rechecks in earlobe or palm and if true hypoglycemia we collected stat C-peptide, proinsulin, insulin, BH B, BNP during hypoglycemic episode.  Has been doing Accu-Cheks Q 2 hours with D50 amps and resuming D10 infusion.  He is mostly asymptomatic during this which is good but etiology is unclear so far.  He does admit to unintended weight loss over the last 2-4 weeks but denies night sweats or fevers.  Transferred to ICU for frequent hypoglycemia, consider glucagon.    Glucose in 90-100s range.         Interval History:  Patient seen and  examined, no acute complaints    Review of Systems   Constitutional:  Negative for chills and fever.   Respiratory:  Negative for chest tightness, shortness of breath and wheezing.    Cardiovascular:  Negative for chest pain and palpitations.   Gastrointestinal:  Negative for constipation, diarrhea, nausea and vomiting.   Genitourinary:  Negative for dysuria and hematuria.   Musculoskeletal:  Negative for gait problem.   Neurological:  Negative for dizziness, speech difficulty and numbness.     Objective:     Vital Signs (Most Recent):  Temp: 98.3 °F (36.8 °C) (09/27/24 1200)  Pulse: 83 (09/27/24 1300)  Resp: 16 (09/27/24 1300)  BP: (!) 108/58 (09/27/24 1300)  SpO2: 99 % (09/27/24 1300) Vital Signs (24h Range):  Temp:  [97.5 °F (36.4 °C)-98.3 °F (36.8 °C)] 98.3 °F (36.8 °C)  Pulse:  [60-93] 83  Resp:  [11-66] 16  SpO2:  [96 %-100 %] 99 %  BP: ()/(46-68) 108/58     Weight: 66.3 kg (146 lb 2.6 oz)  Body mass index is 19.82 kg/m².    Intake/Output Summary (Last 24 hours) at 9/27/2024 1519  Last data filed at 9/27/2024 1230  Gross per 24 hour   Intake 2341.66 ml   Output 1050 ml   Net 1291.66 ml         Physical Exam  Vitals and nursing note reviewed.   Constitutional:       General: He is not in acute distress.     Comments: Cachectic appearing   Cardiovascular:      Rate and Rhythm: Normal rate and regular rhythm.      Heart sounds: Normal heart sounds.   Pulmonary:      Breath sounds: No wheezing, rhonchi or rales.   Abdominal:      General: There is no distension.      Palpations: Abdomen is soft.      Tenderness: There is no abdominal tenderness.   Skin:     General: Skin is warm and dry.   Neurological:      General: No focal deficit present.      Mental Status: He is alert and oriented to person, place, and time.   Psychiatric:         Mood and Affect: Mood normal.         Behavior: Behavior normal.             Significant Labs: All pertinent labs within the past 24 hours have been reviewed.  Recent Lab  Results  (Last 5 results in the past 24 hours)        09/27/24  1446   09/27/24  1439   09/27/24  1251   09/27/24  1230   09/27/24  1049        [facility] Test Code:       [id number]         [facility] Test Name:       Cortisol, Urinary Free         POCT Glucose 49   49   109     107                              Significant Imaging: I have reviewed all pertinent imaging results/findings within the past 24 hours.  Imaging Results              CT Chest Abdomen Pelvis Without Contrast (XPD) (Final result)  Result time 09/26/24 14:56:07      Final result by Gee Lozano MD (09/26/24 14:56:07)                   Impression:      1. No acute findings, given limitations from lack of IV contrast.  2. Cardiomegaly, with aortic and coronary arterial calcifications.  3. Trace right pleural effusion.  4. Moderate to large volume of ascites.  5. A 2 mm nonobstructing left renal calculus.  6. Markedly enlarged prostate gland, with nodular prostate tissue protruding into the base of the urinary bladder.      Electronically signed by: Gee Lozano  Date:    09/26/2024  Time:    14:56               Narrative:    EXAMINATION:  CT CHEST ABDOMEN PELVIS WITHOUT CONTRAST(XPD)    CLINICAL HISTORY:  Weight loss, unintended;    TECHNIQUE:  Axial imaging through the chest, abdomen and pelvis was performed without IV contrast.  Lack of IV contrast limits evaluation of the mediastinum, vasculature, solid organs and hollow viscera.    COMPARISON:  Multiple prior exams.    FINDINGS:  CT CHEST: The heart is enlarged, with extensive coronary arterial calcifications, and no pericardial effusion.  Diffuse calcified plaque involves normal-caliber thoracic aorta, with the central pulmonary arteries enlarged.  No enlarged mediastinal lymph nodes.    There are scattered lucencies reflecting pulmonary emphysema, with linear and bandlike subsegmental atelectasis in the lower lungs.  The central airways are patent, with trace low-density right pleural effusion.   No evidence of interstitial pulmonary edema. There are no chest wall soft tissue masses or enlarged axillary lymph nodes.  No acute fractures or destructive osseous lesions.    CT ABDOMEN: There is moderate to large volume of low-density ascites.  The unenhanced liver, spleen, pancreas, adrenal glands and kidneys are unremarkable, with a 2 mm nonobstructing left renal calculus.  No ureteral calculi or hydronephrosis.    Diffuse calcified plaque involves normal-caliber abdominal aorta and iliac arteries.  There is no bowel obstruction or intraperitoneal free air.  No ventral abdominal hernia.    CT PELVIS: There is low-density pelvic ascites.  The prostate gland is markedly enlarged, with nodular tissue protruding into the base of the urinary bladder.  No distal ureteral or bladder calculi.  There are scattered pelvic arterial vascular calcifications, with right inguinal hernia and apparent changes of hernia repair.    There are no enlarged iliac chain or inguinal lymph nodes.  There are diffuse arterial vascular calcifications.  No acute fractures or destructive osseous lesions.                                       CT Head Without Contrast (Final result)  Result time 09/25/24 18:39:36      Final result by Rebecca Sin MD (09/25/24 18:39:36)                   Impression:      No acute abnormality.      Electronically signed by: Rebecca Sin  Date:    09/25/2024  Time:    18:39               Narrative:    EXAMINATION:  CT HEAD WITHOUT CONTRAST    CLINICAL HISTORY:  Syncope, recurrent;    TECHNIQUE:  Low dose axial CT images obtained throughout the head without intravenous contrast. Sagittal and coronal reconstructions were performed.    COMPARISON:  None.    FINDINGS:  Intracranial compartment:    Ventricles and sulci are normal in size for age without evidence of hydrocephalus. No extra-axial blood or fluid collections.    Moderate chronic microvascular ischemia.  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  No parenchymal mass, hemorrhage, edema or major vascular distribution infarct.    Skull/extracranial contents (limited evaluation): No fracture. Mastoid air cells and paranasal sinuses are essentially clear.                                        Assessment/Plan:      * Hypoglycemia  As mentioned in HPI  C peptide levels high but serum random & pro insulin levels on normal range so possibility of insulinoma is rare ?  MEN type 1 very unlikely , will send serum PTH/Prolactin levels  Doesn't look like he has cushingoid features , will check serum cortisol and 24 urinary cortisol  Started on 10% dextrose for hypoglycemia  CT Abdomen W contrast done in November 2023 reviewed  -given unintended weight loss and persistent hypoglycemia ordered CT chest abdomen pelvis to look for malignancy  -tele consult with endocrinology and we are double checking glucoses in multiple sites to rule out pseudohypoglycemia  -pending BNP, bhp, insulin, proinsulin, C-peptide during a hypoglycemic episode per endocrinology.  -continue D50 amps per protocol and D10 infusion  -transferred to ICU for frequent glucose monitoring     Latest Reference Range & Units 08/15/24 08:05   C-Peptide 1.1 - 4.4 ng/mL 14.1 (H)   (H): Data is abnormally high        LV (left ventricular) mural thrombus  He was started on NOAC for this condition dew weeks ago      Debility  Chronic issue     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 without macular edema, without long-term current use of insulin  Presently not on any meds   Pt says he takes metformin and should not be on this  Metformin per se wont cause hypoglycemia    Latest Reference Range & Units 09/15/23 03:26 08/15/24 05:11 08/18/24 05:07   Hemoglobin A1C External 4.5 - 6.2 % 5.4 5.5 6.0       CRF (chronic renal failure)   Latest Reference Range & Units 09/13/24 08:37 09/14/24 06:14 09/15/24 06:59 09/16/24 06:41 09/17/24 06:16 09/18/24 06:40  09/19/24 05:21 09/25/24 19:04   Creatinine 0.5 - 1.4 mg/dL 3.7 (H) 3.5 (H) 3.4 (H) 3.5 (H) 3.2 (H) 3.1 (H) 3.3 (H) 2.4 (H)       Cirrhosis of liver with ascites  In need of Bimonthly paracentesis     MELD-Na score calculated; MELD 3.0: 25 at 9/19/2024  5:21 AM  MELD-Na: 24 at 9/19/2024  5:21 AM  Calculated from:  Serum Creatinine: 3.3 mg/dL (Using max of 3 mg/dL) at 9/19/2024  5:21 AM  Serum Sodium: 132 mmol/L at 9/19/2024  5:21 AM  Total Bilirubin: 1.6 mg/dL at 9/19/2024  5:21 AM  Serum Albumin: 3.2 g/dL at 9/19/2024  5:21 AM  INR(ratio): 1.1 at 9/19/2024  5:21 AM  Age at listing (hypothetical): 73 years  Sex: Male at 9/19/2024  5:21 AM          COPD (chronic obstructive pulmonary disease)  Chronic stable issue     Chronic combined systolic and diastolic heart failure  Maintain high dose torsemide      Peripheral polyneuropathy  Aware       S/P CABG (coronary artery bypass graft)  In 2014      CAD (coronary artery disease)  Status post percutaneous coronary intervention with drug eluting stent placements in left main to left anterior descending artery and left main to lateral circumflex artery on 8/31/2022     Aortic stenosis status post AVR  Aware      Hypertension  Chronic, controlled. Latest blood pressure and vitals reviewed-     Temp:  [97.6 °F (36.4 °C)]   Pulse:  [75]   Resp:  [17]   BP: (159)/(74)   SpO2:  [99 %] .   Home meds for hypertension were reviewed and noted below.   Hypertension Medications               hydrALAZINE (APRESOLINE) 50 MG tablet Take 1 tablet (50 mg total) by mouth 3 (three) times daily.    isosorbide dinitrate (ISORDIL) 20 MG tablet Take 20 mg by mouth 3 (three) times daily.    metoprolol succinate (TOPROL-XL) 25 MG 24 hr tablet Take 1 tablet (25 mg total) by mouth once daily.    torsemide (DEMADEX) 20 MG Tab Take 4 tablets (80 mg total) by mouth 2 (two) times a day.            While in the hospital, will manage blood pressure as follows; Continue home antihypertensive  regimen    Will utilize p.r.n. blood pressure medication only if patient's blood pressure greater than 140/90 and he develops symptoms such as worsening chest pain or shortness of breath.      VTE Risk Mitigation (From admission, onward)           Ordered     apixaban tablet 2.5 mg  2 times daily         09/25/24 2017     IP VTE HIGH RISK PATIENT  Once         09/25/24 2017     Place sequential compression device  Until discontinued         09/25/24 2017                    Discharge Planning   PAM:      Code Status: Full Code   Is the patient medically ready for discharge?:     Reason for patient still in hospital (select all that apply): Patient trending condition  Discharge Plan A: Home Health            Critical care time spent on the evaluation and treatment of severe organ dysfunction, review of pertinent labs and imaging studies, discussions with consulting providers and discussions with patient/family: 30 minutes.      Crys Burnham MD  Department of Hospital Medicine   [facility]

## 2024-09-27 NOTE — NURSING
Nurses Note -- 4 Eyes      9/27/2024   8:00 AM      Skin assessed during: Q Shift Change      [x] No Altered Skin Integrity Present    []Prevention Measures Documented      [] Yes- Altered Skin Integrity Present or Discovered   [] LDA Added if Not in Epic (Describe Wound)   [] New Altered Skin Integrity was Present on Admit and Documented in LDA   [] Wound Image Taken    Wound Care Consulted? no    Attending Nurse:  Shanda Sy RN/Staff Member:  liss

## 2024-09-27 NOTE — SUBJECTIVE & OBJECTIVE
Interval History:  Patient seen and examined, no acute complaints    Review of Systems   Constitutional:  Negative for chills and fever.   Respiratory:  Negative for chest tightness, shortness of breath and wheezing.    Cardiovascular:  Negative for chest pain and palpitations.   Gastrointestinal:  Negative for constipation, diarrhea, nausea and vomiting.   Genitourinary:  Negative for dysuria and hematuria.   Musculoskeletal:  Negative for gait problem.   Neurological:  Negative for dizziness, speech difficulty and numbness.     Objective:     Vital Signs (Most Recent):  Temp: 98.3 °F (36.8 °C) (09/27/24 1200)  Pulse: 83 (09/27/24 1300)  Resp: 16 (09/27/24 1300)  BP: (!) 108/58 (09/27/24 1300)  SpO2: 99 % (09/27/24 1300) Vital Signs (24h Range):  Temp:  [97.5 °F (36.4 °C)-98.3 °F (36.8 °C)] 98.3 °F (36.8 °C)  Pulse:  [60-93] 83  Resp:  [11-66] 16  SpO2:  [96 %-100 %] 99 %  BP: ()/(46-68) 108/58     Weight: 66.3 kg (146 lb 2.6 oz)  Body mass index is 19.82 kg/m².    Intake/Output Summary (Last 24 hours) at 9/27/2024 1519  Last data filed at 9/27/2024 1230  Gross per 24 hour   Intake 2341.66 ml   Output 1050 ml   Net 1291.66 ml         Physical Exam  Vitals and nursing note reviewed.   Constitutional:       General: He is not in acute distress.     Comments: Cachectic appearing   Cardiovascular:      Rate and Rhythm: Normal rate and regular rhythm.      Heart sounds: Normal heart sounds.   Pulmonary:      Breath sounds: No wheezing, rhonchi or rales.   Abdominal:      General: There is no distension.      Palpations: Abdomen is soft.      Tenderness: There is no abdominal tenderness.   Skin:     General: Skin is warm and dry.   Neurological:      General: No focal deficit present.      Mental Status: He is alert and oriented to person, place, and time.   Psychiatric:         Mood and Affect: Mood normal.         Behavior: Behavior normal.             Significant Labs: All pertinent labs within the past 24 hours  have been reviewed.  Recent Lab Results  (Last 5 results in the past 24 hours)        09/27/24  1446   09/27/24  1439   09/27/24  1251   09/27/24  1230   09/27/24  1049        [facility] Test Code:       [id number]         [facility] Test Name:       Cortisol, Urinary Free         POCT Glucose 49   49   109     107                              Significant Imaging: I have reviewed all pertinent imaging results/findings within the past 24 hours.  Imaging Results              CT Chest Abdomen Pelvis Without Contrast (XPD) (Final result)  Result time 09/26/24 14:56:07      Final result by Gee Lozano MD (09/26/24 14:56:07)                   Impression:      1. No acute findings, given limitations from lack of IV contrast.  2. Cardiomegaly, with aortic and coronary arterial calcifications.  3. Trace right pleural effusion.  4. Moderate to large volume of ascites.  5. A 2 mm nonobstructing left renal calculus.  6. Markedly enlarged prostate gland, with nodular prostate tissue protruding into the base of the urinary bladder.      Electronically signed by: Gee oLzano  Date:    09/26/2024  Time:    14:56               Narrative:    EXAMINATION:  CT CHEST ABDOMEN PELVIS WITHOUT CONTRAST(XPD)    CLINICAL HISTORY:  Weight loss, unintended;    TECHNIQUE:  Axial imaging through the chest, abdomen and pelvis was performed without IV contrast.  Lack of IV contrast limits evaluation of the mediastinum, vasculature, solid organs and hollow viscera.    COMPARISON:  Multiple prior exams.    FINDINGS:  CT CHEST: The heart is enlarged, with extensive coronary arterial calcifications, and no pericardial effusion.  Diffuse calcified plaque involves normal-caliber thoracic aorta, with the central pulmonary arteries enlarged.  No enlarged mediastinal lymph nodes.    There are scattered lucencies reflecting pulmonary emphysema, with linear and bandlike subsegmental atelectasis in the lower lungs.  The central airways are patent, with trace  low-density right pleural effusion.  No evidence of interstitial pulmonary edema. There are no chest wall soft tissue masses or enlarged axillary lymph nodes.  No acute fractures or destructive osseous lesions.    CT ABDOMEN: There is moderate to large volume of low-density ascites.  The unenhanced liver, spleen, pancreas, adrenal glands and kidneys are unremarkable, with a 2 mm nonobstructing left renal calculus.  No ureteral calculi or hydronephrosis.    Diffuse calcified plaque involves normal-caliber abdominal aorta and iliac arteries.  There is no bowel obstruction or intraperitoneal free air.  No ventral abdominal hernia.    CT PELVIS: There is low-density pelvic ascites.  The prostate gland is markedly enlarged, with nodular tissue protruding into the base of the urinary bladder.  No distal ureteral or bladder calculi.  There are scattered pelvic arterial vascular calcifications, with right inguinal hernia and apparent changes of hernia repair.    There are no enlarged iliac chain or inguinal lymph nodes.  There are diffuse arterial vascular calcifications.  No acute fractures or destructive osseous lesions.                                       CT Head Without Contrast (Final result)  Result time 09/25/24 18:39:36      Final result by Rebecca Sin MD (09/25/24 18:39:36)                   Impression:      No acute abnormality.      Electronically signed by: Rebecca Sin  Date:    09/25/2024  Time:    18:39               Narrative:    EXAMINATION:  CT HEAD WITHOUT CONTRAST    CLINICAL HISTORY:  Syncope, recurrent;    TECHNIQUE:  Low dose axial CT images obtained throughout the head without intravenous contrast. Sagittal and coronal reconstructions were performed.    COMPARISON:  None.    FINDINGS:  Intracranial compartment:    Ventricles and sulci are normal in size for age without evidence of hydrocephalus. No extra-axial blood or fluid collections.    Moderate chronic microvascular ischemia.   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  No parenchymal mass, hemorrhage, edema or major vascular distribution infarct.    Skull/extracranial contents (limited evaluation): No fracture. Mastoid air cells and paranasal sinuses are essentially clear.

## 2024-09-27 NOTE — CARE UPDATE
09/26/24 1942   Patient Assessment/Suction   Level of Consciousness (AVPU) alert   Respiratory Effort Normal;Unlabored   Expansion/Accessory Muscles/Retractions no use of accessory muscles   All Lung Fields Breath Sounds diminished;clear   Rhythm/Pattern, Respiratory unlabored;pattern regular;depth regular   Cough Frequency no cough   PRE-TX-O2   Device (Oxygen Therapy) room air   SpO2 100 %   Pulse Oximetry Type Continuous   $ Pulse Oximetry - Multiple Charge Pulse Oximetry - Multiple   Pulse 71   Resp 18   Aerosol Therapy   $ Aerosol Therapy Charges Aerosol Treatment   Daily Review of Necessity (SVN) completed   Respiratory Treatment Status (SVN) given   Treatment Route (SVN) mask;oxygen   Patient Position HOB elevated   Post Treatment Assessment (SVN) increased aeration   Signs of Intolerance (SVN) none   Breath Sounds Post-Respiratory Treatment   Throughout All Fields Post-Treatment All Fields   Throughout All Fields Post-Treatment aeration increased   Post-treatment Heart Rate (beats/min) 69   Post-treatment Resp Rate (breaths/min) 17   Education   $ Education Bronchodilator;15 min

## 2024-09-27 NOTE — PLAN OF CARE
Daquan Farley pt is active with [facility].    09/27/24 2616   Discharge Reassessment   Assessment Type Discharge Planning Reassessment   Discharge Plan A Home Health   Discharge Plan B Home Health

## 2024-09-27 NOTE — PLAN OF CARE
Problem: Adult Inpatient Plan of Care  Goal: Plan of Care Review  Outcome: Progressing  Goal: Patient-Specific Goal (Individualized)  Outcome: Progressing     Problem: Fluid Volume Excess  Goal: Fluid Balance  Outcome: Progressing     Problem: Syncope  Goal: Absence of Syncopal Symptoms  Outcome: Progressing   Pt arrived from ED via stretcher at 1655. Pt alert and talking.   During shift change aprox 1845 BG taken. <20 x's 2. D50 given.   Around 1900 pt's BP dropped after 3 readings to 78/47 map of 57 and 77/46 map 57. MD made aware. Order for NS bolus and drip given from primary MD.   BP now 107/57 map 76.   BG rechecked at 2000. <47. D50 IVP given again. Pt did not know about low blood sugars. He was very lethargic but arousalable , became more alert after D 50 given    Pt had not voided from 1200 to 1900. Bladder scan done. > 283 ml in bladder. MD made aware. Order for in out cath given. Pt wanted to attempt to void instead. Pt abl;e to void 175 ml at 1930.

## 2024-09-27 NOTE — CARE UPDATE
09/27/24 0725   PRE-TX-O2   Device (Oxygen Therapy) room air   Pulse 69   Resp 19   Aerosol Therapy   $ Aerosol Therapy Charges Aerosol Treatment   Daily Review of Necessity (SVN) completed   Respiratory Treatment Status (SVN) given   Treatment Route (SVN) mask;oxygen   Patient Position HOB elevated   Post Treatment Assessment (SVN) breath sounds unchanged   Signs of Intolerance (SVN) none   Breath Sounds Post-Respiratory Treatment   Throughout All Fields Post-Treatment All Fields   Throughout All Fields Post-Treatment no change   Post-treatment Heart Rate (beats/min) 67   Post-treatment Resp Rate (breaths/min) 14

## 2024-09-27 NOTE — PLAN OF CARE
"  Problem: Adult Inpatient Plan of Care  Goal: Plan of Care Review  Outcome: Progressing  Goal: Patient-Specific Goal (Individualized)  Outcome: Progressing     Problem: Diabetes Comorbidity  Goal: Blood Glucose Level Within Targeted Range  Outcome: Progressing     Problem: Acute Kidney Injury/Impairment  Goal: Fluid and Electrolyte Balance  Outcome: Progressing  Goal: Improved Oral Intake  Outcome: Progressing     Problem: Glycemic Control Impaired  Goal: Blood Glucose Level Within Targeted Range  Outcome: Progressing     Problem: Fall Injury Risk  Goal: Absence of Fall and Fall-Related Injury  Outcome: Progressing   Blood sugars monitored per orders. D50 given IVP as needed. Pt's nephew came by for an hour. He got an update after getting an OK from pt.  Earlier after the 24 hour urine was sent off, pt needed to use the restroom for a BM. Toilet 10 ft away from bed. Pt stood up with nurse at side, pt wobbled stating "whoa". Admitted to being dizzy. Pt stood for a few seconds to see if dizziness subsided. It did some. Was able to transfer pt safely to toilet. Instructed not to get up. Assisted pt back to bed w/o incident. Bed alarm back on.  New IV to right forearm using doppler after two RN's attempted twice each.  "

## 2024-09-28 NOTE — CARE UPDATE
09/28/24 0709   Patient Assessment/Suction   Level of Consciousness (AVPU) alert   Respiratory Effort Unlabored   Expansion/Accessory Muscles/Retractions no use of accessory muscles   All Lung Fields Breath Sounds clear   Rhythm/Pattern, Respiratory unlabored;pattern regular;depth regular   PRE-TX-O2   Device (Oxygen Therapy) room air   SpO2 100 %   Pulse Oximetry Type Continuous   $ Pulse Oximetry - Multiple Charge Pulse Oximetry - Multiple   Pulse 68   Resp 18   Positioning   Body Position sitting up in bed   Head of Bed (HOB) Positioning HOB lowered   Aerosol Therapy   $ Aerosol Therapy Charges Aerosol Treatment   Daily Review of Necessity (SVN) completed   Respiratory Treatment Status (SVN) given   Treatment Route (SVN) mask   Patient Position HOB elevated   Post Treatment Assessment (SVN) breath sounds unchanged   Signs of Intolerance (SVN) none   Breath Sounds Post-Respiratory Treatment   Throughout All Fields Post-Treatment All Fields   Throughout All Fields Post-Treatment Anterior:;Posterior:;Lateral:   Post-treatment Heart Rate (beats/min) 67   Post-treatment Resp Rate (breaths/min) 16   Respiratory Interventions   Cough And Deep Breathing done with encouragement

## 2024-09-28 NOTE — PLAN OF CARE
Goals to be met by: 10/26/24     Patient will increase functional independence with mobility by performin. Supine to sit with MI  2. Sit to stand transfer with MI  3. Bed to chair transfer with MI using Rolling Walker  4. Gait  x 500 feet with Supervision using Rolling Walker.

## 2024-09-28 NOTE — ASSESSMENT & PLAN NOTE
In need of Bimonthly paracentesis     MELD-Na score calculated; MELD 3.0: 25 at 9/19/2024  5:21 AM  MELD-Na: 24 at 9/19/2024  5:21 AM  Calculated from:  Serum Creatinine: 3.3 mg/dL (Using max of 3 mg/dL) at 9/19/2024  5:21 AM  Serum Sodium: 132 mmol/L at 9/19/2024  5:21 AM  Total Bilirubin: 1.6 mg/dL at 9/19/2024  5:21 AM  Serum Albumin: 3.2 g/dL at 9/19/2024  5:21 AM  INR(ratio): 1.1 at 9/19/2024  5:21 AM  Age at listing (hypothetical): 73 years  Sex: Male at 9/19/2024  5:21 AM      Possible cause of hypoglycemia too

## 2024-09-28 NOTE — NURSING
Orthostatic BP's done before giving hydralazine and isosorbide. See flowsheet Vital signs. At first when sitting up pt stated some slight dizziness. Denied dizziness upon standing..

## 2024-09-28 NOTE — PT/OT/SLP EVAL
Physical Therapy Evaluation    Patient Name:  Baldo Carney   MRN:  [id number]    Recommendations:     Discharge Recommendations: Low Intensity Therapy   Discharge Equipment Recommendations: none   Barriers to discharge:  medical issues    Assessment:     Baldo Carney is a 73 y.o. male admitted with a medical diagnosis of Hypoglycemia.  He presents with the following impairments/functional limitations: impaired functional mobility, gait instability, impaired balance, impaired cardiopulmonary response to activity.    Pt found HOB elevated and agreeable to working with PT. Pt A & O x  4 and has the following co-morbidities: HTN, DM, Hx CABG & AVR 2014; COPD, LV Thrombus.  Pt tolerated session well with negative orthostatics and required only CGA for safe mobilization during session today. Pt would benefit from acute PT during hospitalization to increase strength, endurance and safety with mobility and would benefit from Low Intensity Therapy upon  discharge home.      Rehab Prognosis: Good and Fair; patient would benefit from acute skilled PT services to address these deficits and reach maximum level of function.    Recent Surgery: * No surgery found *      Plan:     During this hospitalization, patient to be seen 6 x/week to address the identified rehab impairments via gait training, therapeutic activities, therapeutic exercises and progress toward the following goals:    Plan of Care Expires:  10/26/24    Subjective     Chief Complaint: mildly lightheaded after gait trial(300')  Patient/Family Comments/goals: home with siblings to assist as needed  Pain/Comfort:  Pain Rating 1: 0/10  Pain Rating Post-Intervention 1: 0/10    Patients cultural, spiritual, Mandaen conflicts given the current situation:      Living Environment:  Pt lives alone in a [facility] with no steps to enter.  Prior to admission, patients level of function was MI with rolling walker & drives.  Equipment used at home: walker, rolling.   DME owned (not currently used): none.  Upon discharge, patient will have assistance from his siblings as needed.    Objective:     Communicated with J CARLOS Land prior to and after session.  Patient found HOB elevated with bed alarm, blood pressure cuff, peripheral IV, pulse ox (continuous), telemetry  upon PT entry to room.    General Precautions: Standard, fall  Orthopedic Precautions:N/A   Braces: N/A  Respiratory Status: Room air    Exams:  Cognitive Exam:  Patient is oriented to Person, Place, and Time  RLE ROM: WFL  RLE Strength: WFL  LLE ROM: WFL  LLE Strength: WFL    Functional Mobility:  Bed Mobility:   HOB 45 degrees 116/60 HR 83  Scooting: stand by assistance  Supine to Sit: stand by assistance; BP sitting /54 HR 85  Sit to Supine: stand by assistance  Transfers:     Sit to Stand:  stand by assistance and contact guard assistance with rolling walker; BP standing 106/54 HR 76 (no symptoms)  Gait: x 300' with rolling walker and CGA for safety with pt reporting only mildly lightheaded after gt trial with standing /57 HR 77      AM-PAC 6 CLICK MOBILITY  Total Score:20       Treatment & Education:  Pt was educated on the following: call light use, importance of OOB activity and functional mobility to negate the negative effects of prolonged bed rest during this hospitalization, safe transfers/ambulation and discharge planning recommendations/options.      Patient left HOB elevated with all lines intact, call button in reach, and RN present.    GOALS:   Multidisciplinary Problems       Physical Therapy Goals          Problem: Physical Therapy    Goal Priority Disciplines Outcome Interventions   Physical Therapy Goal     PT, PT/OT     Description: Goals to be met by: 10/26/24     Patient will increase functional independence with mobility by performin. Supine to sit with MI  2. Sit to stand transfer with MI  3. Bed to chair transfer with MI using Rolling Walker  4. Gait  x 500 feet with  Supervision using Rolling Walker.                              History:     Past Medical History:   Diagnosis Date    Asthma     Cardiomyopathy 10/21/2014    CHF (congestive heart failure)     Coronary artery disease     CRF (chronic renal failure)     Diabetes mellitus     Enlarged prostate     Hyperlipidemia     Hypertension     Neuropathy     Syncope 07/29/2024    Umbilical hernia without obstruction and without gangrene 10/13/2023       Past Surgical History:   Procedure Laterality Date    ANGIOGRAPHY OF INTERNAL MAMMARY VESSEL N/A 3/11/2022    Procedure: Angiogram Internal Mammary;  Surgeon: Hank Lujan MD;  Location: [facility] CATH/EP LAB;  Service: Cardiology;  Laterality: N/A;    CARDIAC CATHETERIZATION      CARDIAC VALVE SURGERY      CATHETERIZATION OF BOTH LEFT AND RIGHT HEART Left 3/11/2022    Procedure: CATHETERIZATION, HEART, BOTH LEFT AND RIGHT;  Surgeon: Hank Lujan MD;  Location: [facility] CATH/EP LAB;  Service: Cardiology;  Laterality: Left;    CORONARY ANGIOGRAPHY N/A 3/11/2022    Procedure: ANGIOGRAM, CORONARY ARTERY;  Surgeon: Hank Lujan MD;  Location: [facility] CATH/EP LAB;  Service: Cardiology;  Laterality: N/A;    CORONARY BYPASS GRAFT ANGIOGRAPHY  3/11/2022    Procedure: Bypass graft study;  Surgeon: Hank Lujan MD;  Location: [facility] CATH/EP LAB;  Service: Cardiology;;    CYSTOSCOPY N/A 7/30/2019    Procedure: CYSTOSCOPY;  Surgeon: Spencer Nguyen MD;  Location: [facility] OR;  Service: Urology;  Laterality: N/A;    INSERTION OF INTRAVASCULAR MICROAXIAL BLOOD PUMP N/A 8/31/2022    Procedure: INSERTION, IMPELLA;  Surgeon: Zach Jensen MD;  Location: [facility] CATH LAB;  Service: Cardiology;  Laterality: N/A;    INSERTION, CATHETER, DIALYSIS, PERITONEAL N/A 12/7/2023    Procedure: INSERTION, CATHETER, DIALYSIS, PERITONEAL;  Surgeon: Greg Bone Jr., MD;  Location: [facility] OR;  Service: General;  Laterality: N/A;  need PD catheter    PLACEMENT OF SWAN SEE CATHETER WITH IMAGING  GUIDANCE  8/31/2022    Procedure: INSERTION, CATHETER, SWAN-SEE, WITH IMAGING GUIDANCE;  Surgeon: Zach Jensen MD;  Location: [facility] CATH LAB;  Service: Cardiology;;    REPAIR, HERNIA, INGUINAL, INCARCERATED, INITIAL, AGE 5 YEARS OR OLDER Right 12/7/2023    Procedure: REPAIR, HERNIA, INGUINAL, INCARCERATED, INITIAL;  Surgeon: Greg Bone Jr., MD;  Location: [facility];  Service: General;  Laterality: Right;    SHOULDER ARTHROSCOPY  1985    TRANSRECTAL BIOPSY OF PROSTATE WITH ULTRASOUND GUIDANCE N/A 7/30/2019    Procedure: BIOPSY, PROSTATE, RECTAL APPROACH, WITH US GUIDANCE;  Surgeon: Spencer Nguyen MD;  Location: [facility] OR;  Service: Urology;  Laterality: N/A;  procedure not performed, pt unable to tolerate    TRANSRECTAL BIOPSY OF PROSTATE WITH ULTRASOUND GUIDANCE N/A 8/8/2019    Procedure: BIOPSY, PROSTATE, RECTAL APPROACH, WITH US GUIDANCE;  Surgeon: Spencer Nguyen MD;  Location: [facility] OR;  Service: Urology;  Laterality: N/A;    UMBILICAL HERNIA REPAIR N/A 12/7/2023    Procedure: REPAIR, HERNIA, UMBILICAL;  Surgeon: Greg Bone Jr., MD;  Location: [facility];  Service: General;  Laterality: N/A;       Time Tracking:     PT Received On: 09/28/24  PT Start Time: 1208     PT Stop Time: 1233  PT Total Time (min): 25 min     Billable Minutes: Evaluation 10 and Gait Training 15      09/28/2024

## 2024-09-28 NOTE — PROGRESS NOTES
[facility] Medicine  Progress Note    Patient Name: Baldo Carney  MRN: [id number]  Patient Class: IP- Inpatient   Admission Date: 9/25/2024  Length of Stay: 2 days  Attending Physician: Crys Burnham MD  Primary Care Provider: Millie Hayes APRN,FNP-C        Subjective:     Principal Problem:Hypoglycemia        HPI:  73 year old pt getting admitted with persistent hypoglycemia  Pt lives alone and he remember yesterday when he was in kitchen he felt bad, but doesn't remember anything after this   Family member found pt on the ground today and was escorted to ER  In ER he was profoundly hypoglycemic and was given multiple doses of dextrose 5 and got admitted  He was admitted with same c/o on August 2024 and C peptide levels were high when checked  Random insulin levels and pro insulin levels were normal  He said he wiil leave AMA if not discharged as per DC summary   Pts Hb A1C is persistently low as per records  He said he takes Metformin but his home med list didn't shows this  He just came home 1  week ago after he was admitted in [facility] for CHF flare and fluid overload from CKD    Overview/Hospital Course:  73 year old pt getting admitted with persistent hypoglycemia upon going for possibly pseudohypoglycemia, insulin secreting tumor, malignancy.  Ordered CT chest abdomen pelvis today, tele consult with endocrinology and recommends rechecks in earlobe or palm and if true hypoglycemia we collected stat C-peptide, proinsulin, insulin, BH B, BNP during hypoglycemic episode.  Has been doing Accu-Cheks Q 2 hours with D50 amps and resuming D10 infusion.  He is mostly asymptomatic during this which is good but etiology is unclear so far.  He does admit to unintended weight loss over the last 2-4 weeks but denies night sweats or fevers.  Transferred to ICU for frequent hypoglycemia, consider glucagon.    Glucose in 90-100s range.         Interval History:   Glucose has been  "stable . Patient is eating   CT abdomen with no tumour and only left renal calculus . Cortisol normal .  Insulin , Beta hydroxy and c peptide appear from oral hypogylcemic vs autoimmune     Review of Systems  Objective:     Vital Signs (Most Recent):  Temp: 97.6 °F (36.4 °C) (09/28/24 1137)  Pulse: 74 (09/28/24 1320)  Resp: 19 (09/28/24 1320)  BP: (!) 105/59 (09/28/24 1310)  SpO2: 100 % (09/28/24 1320) Vital Signs (24h Range):  Temp:  [97.6 °F (36.4 °C)-98.8 °F (37.1 °C)] 97.6 °F (36.4 °C)  Pulse:  [64-88] 74  Resp:  [7-34] 19  SpO2:  [98 %-100 %] 100 %  BP: ()/(54-83) 105/59     Weight: 66.3 kg (146 lb 2.6 oz)  Body mass index is 19.82 kg/m².    Intake/Output Summary (Last 24 hours) at 9/28/2024 1433  Last data filed at 9/28/2024 1152  Gross per 24 hour   Intake 4330 ml   Output 1725 ml   Net 2605 ml         Physical Exam        Significant Labs: All pertinent labs within the past 24 hours have been reviewed.  CMP:   Recent Labs   Lab 09/26/24  1505 09/26/24  2300 09/27/24  0402 09/27/24  0745 09/28/24  0324   *  --  132*  --  131*   K 4.0  --  3.4*  --  4.1   CL 93*  --  95  --  96   CO2 32*  --  28  --  28   GLU 21*   < > 13* 31* 108   BUN 62*  --  63*  --  64*   CREATININE 2.5*  --  2.5*  --  2.5*   CALCIUM 8.6*  --  8.2*  --  8.1*   PROT  --   --  6.8  --  6.3   ALBUMIN  --   --  3.3*  --  3.1*   BILITOT  --   --  1.3*  --  1.0   ALKPHOS  --   --  43*  --  47*   AST  --   --  27  --  25   ALT  --   --  26  --  24   ANIONGAP 8  --  9  --  7*    < > = values in this interval not displayed.     Cardiac Markers: No results for input(s): "CKMB", "MYOGLOBIN", "BNP", "TROPISTAT" in the last 48 hours.  Coagulation: No results for input(s): "PT", "INR", "APTT" in the last 48 hours.    Significant Imaging: I have reviewed all pertinent imaging results/findings within the past 24 hours.    Assessment/Plan:      * Hypoglycemia    Decreased the rate of  10% dextrose for hypoglycemia  CT Abdomen W contrast done  " and repeat CT chest abdomen pelvis did not show malignancy or insulinoma feature    insulin,  C-peptide levels indeterminate . Possible from oral hypoglycemic agent ( was on metformin ) and possible autoimmune   Possible from CKD and cirrhosis too  Soon downgrade      Latest Reference Range & Units 08/15/24 08:05   C-Peptide 1.1 - 4.4 ng/mL 14.1 (H)   (H): Data is abnormally high        LV (left ventricular) mural thrombus  He was started on NOAC       Debility  Chronic issue     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 without macular edema, without long-term current use of insulin  Presently not on any meds   Pt says he takes metformin and should not be on this  Metformin per se wont cause hypoglycemia    Latest Reference Range & Units 09/15/23 03:26 08/15/24 05:11 08/18/24 05:07   Hemoglobin A1C External 4.5 - 6.2 % 5.4 5.5 6.0       CRF (chronic renal failure)   Latest Reference Range & Units 09/13/24 08:37 09/14/24 06:14 09/15/24 06:59 09/16/24 06:41 09/17/24 06:16 09/18/24 06:40 09/19/24 05:21 09/25/24 19:04   Creatinine 0.5 - 1.4 mg/dL 3.7 (H) 3.5 (H) 3.4 (H) 3.5 (H) 3.2 (H) 3.1 (H) 3.3 (H) 2.4 (H)       Cirrhosis of liver with ascites  In need of Bimonthly paracentesis     MELD-Na score calculated; MELD 3.0: 25 at 9/19/2024  5:21 AM  MELD-Na: 24 at 9/19/2024  5:21 AM  Calculated from:  Serum Creatinine: 3.3 mg/dL (Using max of 3 mg/dL) at 9/19/2024  5:21 AM  Serum Sodium: 132 mmol/L at 9/19/2024  5:21 AM  Total Bilirubin: 1.6 mg/dL at 9/19/2024  5:21 AM  Serum Albumin: 3.2 g/dL at 9/19/2024  5:21 AM  INR(ratio): 1.1 at 9/19/2024  5:21 AM  Age at listing (hypothetical): 73 years  Sex: Male at 9/19/2024  5:21 AM      Possible cause of hypoglycemia too    COPD (chronic obstructive pulmonary disease)  Chronic stable issue     Chronic combined systolic and diastolic heart failure  Maintain high dose torsemide      Peripheral polyneuropathy  Aware       S/P CABG (coronary  artery bypass graft)  In 2014      CAD (coronary artery disease)  Status post percutaneous coronary intervention with drug eluting stent placements in left main to left anterior descending artery and left main to lateral circumflex artery on 8/31/2022     Aortic stenosis status post AVR  Aware      Hypertension  Chronic, controlled. Latest blood pressure and vitals reviewed-     Temp:  [97.6 °F (36.4 °C)-98.8 °F (37.1 °C)]   Pulse:  [64-88]   Resp:  [7-34]   BP: ()/(54-83)   SpO2:  [98 %-100 %] .   Home meds for hypertension were reviewed and noted below.   Hypertension Medications               hydrALAZINE (APRESOLINE) 50 MG tablet Take 1 tablet (50 mg total) by mouth 3 (three) times daily.    isosorbide dinitrate (ISORDIL) 20 MG tablet Take 20 mg by mouth 3 (three) times daily.    metoprolol succinate (TOPROL-XL) 25 MG 24 hr tablet Take 1 tablet (25 mg total) by mouth once daily.    torsemide (DEMADEX) 20 MG Tab Take 4 tablets (80 mg total) by mouth 2 (two) times a day.            While in the hospital, will manage blood pressure as follows; Continue home antihypertensive regimen    Will utilize p.r.n. blood pressure medication only if patient's blood pressure greater than 140/90 and he develops symptoms such as worsening chest pain or shortness of breath.      VTE Risk Mitigation (From admission, onward)           Ordered     apixaban tablet 2.5 mg  2 times daily         09/25/24 2017     IP VTE HIGH RISK PATIENT  Once         09/25/24 2017     Place sequential compression device  Until discontinued         09/25/24 2017                    Discharge Planning   PAM:      Code Status: Full Code   Is the patient medically ready for discharge?:     Reason for patient still in hospital (select all that apply): Treatment  Discharge Plan A: Home Health            Critical care time spent on the evaluation and treatment of severe organ dysfunction, review of pertinent labs and imaging studies, discussions with  consulting providers and discussions with patient/family: 32 minutes.      Seng Alejandra MD  Department of Hospital Medicine   [facility]

## 2024-09-28 NOTE — CARE UPDATE
09/27/24 1917   Patient Assessment/Suction   Level of Consciousness (AVPU) alert   Respiratory Effort Normal;Unlabored   Expansion/Accessory Muscles/Retractions no use of accessory muscles   All Lung Fields Breath Sounds clear   Rhythm/Pattern, Respiratory unlabored;pattern regular   Cough Frequency no cough   PRE-TX-O2   Device (Oxygen Therapy) room air   SpO2 100 %   Pulse Oximetry Type Continuous   $ Pulse Oximetry - Multiple Charge Pulse Oximetry - Multiple   Pulse 79   Resp 17   Aerosol Therapy   $ Aerosol Therapy Charges Aerosol Treatment   Daily Review of Necessity (SVN) completed   Respiratory Treatment Status (SVN) given   Treatment Route (SVN) mask;oxygen   Patient Position HOB elevated   Post Treatment Assessment (SVN) breath sounds unchanged   Signs of Intolerance (SVN) none   Breath Sounds Post-Respiratory Treatment   Throughout All Fields Post-Treatment All Fields   Throughout All Fields Post-Treatment no change   Post-treatment Heart Rate (beats/min) 74   Post-treatment Resp Rate (breaths/min) 18   Education   $ Education Bronchodilator;15 min

## 2024-09-28 NOTE — ASSESSMENT & PLAN NOTE
Chronic, controlled. Latest blood pressure and vitals reviewed-     Temp:  [97.6 °F (36.4 °C)-98.8 °F (37.1 °C)]   Pulse:  [64-88]   Resp:  [7-34]   BP: ()/(54-83)   SpO2:  [98 %-100 %] .   Home meds for hypertension were reviewed and noted below.   Hypertension Medications               hydrALAZINE (APRESOLINE) 50 MG tablet Take 1 tablet (50 mg total) by mouth 3 (three) times daily.    isosorbide dinitrate (ISORDIL) 20 MG tablet Take 20 mg by mouth 3 (three) times daily.    metoprolol succinate (TOPROL-XL) 25 MG 24 hr tablet Take 1 tablet (25 mg total) by mouth once daily.    torsemide (DEMADEX) 20 MG Tab Take 4 tablets (80 mg total) by mouth 2 (two) times a day.            While in the hospital, will manage blood pressure as follows; Continue home antihypertensive regimen    Will utilize p.r.n. blood pressure medication only if patient's blood pressure greater than 140/90 and he develops symptoms such as worsening chest pain or shortness of breath.

## 2024-09-28 NOTE — ASSESSMENT & PLAN NOTE
Decreased the rate of  10% dextrose for hypoglycemia  CT Abdomen W contrast done  and repeat CT chest abdomen pelvis did not show malignancy or insulinoma feature    insulin,  C-peptide levels indeterminate . Possible from oral hypoglycemic agent ( was on metformin ) and possible autoimmune   Possible from CKD and cirrhosis too  Soon downgrade      Latest Reference Range & Units 08/15/24 08:05   C-Peptide 1.1 - 4.4 ng/mL 14.1 (H)   (H): Data is abnormally high

## 2024-09-28 NOTE — PLAN OF CARE
Problem: Adult Inpatient Plan of Care  Goal: Plan of Care Review  Outcome: Progressing  Goal: Optimal Comfort and Wellbeing  Outcome: Progressing     Problem: Diabetes Comorbidity  Goal: Blood Glucose Level Within Targeted Range  Outcome: Progressing     Problem: Acute Kidney Injury/Impairment  Goal: Fluid and Electrolyte Balance  Outcome: Progressing  Goal: Effective Renal Function  Outcome: Progressing     Problem: Glycemic Control Impaired  Goal: Minimize Risk of Hypoglycemia  Outcome: Progressing     Problem: Syncope  Goal: Absence of Syncopal Symptoms  Outcome: Progressing     Problem: Fall Injury Risk  Goal: Absence of Fall and Fall-Related Injury  Outcome: Progressing  Orthostatic BP's this am after metoprolol given. Repeat orthostatic BP's after lunch after hydralazine and isosorbide given. Pt sat up on side of bed after first orthostatic BP's and brushed his teeth. Became cold and returned to bed.

## 2024-09-28 NOTE — NURSING
Nurses Note -- 4 Eyes      9/28/2024   8:10 AM      Skin assessed during: Q Shift Change      [x] No Altered Skin Integrity Present    []Prevention Measures Documented      [] Yes- Altered Skin Integrity Present or Discovered   [] LDA Added if Not in Epic (Describe Wound)   [] New Altered Skin Integrity was Present on Admit and Documented in LDA   [] Wound Image Taken    Wound Care Consulted? No    Attending Nurse:  Shanda Sy RN/Staff Member:  liss

## 2024-09-28 NOTE — SUBJECTIVE & OBJECTIVE
"Interval History:   Glucose has been stable . Patient is eating   CT abdomen with no tumour and only left renal calculus . Cortisol normal .  Insulin , Beta hydroxy and c peptide appear from oral hypogylcemic vs autoimmune     Review of Systems  Objective:     Vital Signs (Most Recent):  Temp: 97.6 °F (36.4 °C) (09/28/24 1137)  Pulse: 74 (09/28/24 1320)  Resp: 19 (09/28/24 1320)  BP: (!) 105/59 (09/28/24 1310)  SpO2: 100 % (09/28/24 1320) Vital Signs (24h Range):  Temp:  [97.6 °F (36.4 °C)-98.8 °F (37.1 °C)] 97.6 °F (36.4 °C)  Pulse:  [64-88] 74  Resp:  [7-34] 19  SpO2:  [98 %-100 %] 100 %  BP: ()/(54-83) 105/59     Weight: 66.3 kg (146 lb 2.6 oz)  Body mass index is 19.82 kg/m².    Intake/Output Summary (Last 24 hours) at 9/28/2024 1433  Last data filed at 9/28/2024 1152  Gross per 24 hour   Intake 4330 ml   Output 1725 ml   Net 2605 ml         Physical Exam        Significant Labs: All pertinent labs within the past 24 hours have been reviewed.  CMP:   Recent Labs   Lab 09/26/24  1505 09/26/24  2300 09/27/24  0402 09/27/24  0745 09/28/24  0324   *  --  132*  --  131*   K 4.0  --  3.4*  --  4.1   CL 93*  --  95  --  96   CO2 32*  --  28  --  28   GLU 21*   < > 13* 31* 108   BUN 62*  --  63*  --  64*   CREATININE 2.5*  --  2.5*  --  2.5*   CALCIUM 8.6*  --  8.2*  --  8.1*   PROT  --   --  6.8  --  6.3   ALBUMIN  --   --  3.3*  --  3.1*   BILITOT  --   --  1.3*  --  1.0   ALKPHOS  --   --  43*  --  47*   AST  --   --  27  --  25   ALT  --   --  26  --  24   ANIONGAP 8  --  9  --  7*    < > = values in this interval not displayed.     Cardiac Markers: No results for input(s): "CKMB", "MYOGLOBIN", "BNP", "TROPISTAT" in the last 48 hours.  Coagulation: No results for input(s): "PT", "INR", "APTT" in the last 48 hours.    Significant Imaging: I have reviewed all pertinent imaging results/findings within the past 24 hours.  "

## 2024-09-29 PROBLEM — E16.2 HYPOGLYCEMIA: Status: RESOLVED | Noted: 2024-09-25 | Resolved: 2024-09-29

## 2024-09-29 NOTE — CARE UPDATE
09/29/24 0752   Patient Assessment/Suction   Level of Consciousness (AVPU) alert   Respiratory Effort Normal;Unlabored   Expansion/Accessory Muscles/Retractions no use of accessory muscles   PRE-TX-O2   Device (Oxygen Therapy) room air   SpO2 100 %   Pulse Oximetry Type Continuous   $ Pulse Oximetry - Multiple Charge Pulse Oximetry - Multiple   Pulse 70   Resp 16   Aerosol Therapy   $ Aerosol Therapy Charges Refused

## 2024-09-29 NOTE — PLAN OF CARE
Patient cleared for discharge from case management standpoint.    Pt will return home.    Chart and discharge orders reviewed.  Patient discharged home with no further case management needs.           09/29/24 1056   Final Note   Assessment Type Final Discharge Note   Anticipated Discharge Disposition Home   What phone number can be called within the next 1-3 days to see how you are doing after discharge? [phone]   Post-Acute Status   Discharge Delays None known at this time

## 2024-09-29 NOTE — ASSESSMENT & PLAN NOTE
Given patient's elevated insulin and C-peptide, likely secondary to secreted dog which patient was taking, patient will discontinue glimepiride on discharge.  This was discussed with him and he demonstrates understanding.  Also written discharge instructions.  This case was discussed with Dr. Khan at [facility].  We are appreciative of her expertise.     Latest Reference Range & Units 08/15/24 08:05   C-Peptide 1.1 - 4.4 ng/mL 14.1 (H)   (H): Data is abnormally high

## 2024-09-29 NOTE — DISCHARGE SUMMARY
Former[facility], later [facility]  Discharge Summary      Patient Name: Baldo Carney  MRN: [id number]  SHAHNAZ: [id number]  Patient Class: IP- Inpatient  Admission Date: 9/25/2024  Hospital Length of Stay: 3 days  Discharge Date and Time:  09/29/2024 9:53 AM  Attending Physician: Salena Go MD   Discharging Provider: Salena Go MD  Primary Care Provider: Millie Hayes APRN,TEDP-C    Primary Care Team: Networked reference to record PCT     HPI:   73 year old pt getting admitted with persistent hypoglycemia  Pt lives alone and he remember yesterday when he was in kitchen he felt bad, but doesn't remember anything after this   Family member found pt on the ground today and was escorted to ER  In ER he was profoundly hypoglycemic and was given multiple doses of dextrose 5 and got admitted  He was admitted with same c/o on August 2024 and C peptide levels were high when checked  Random insulin levels and pro insulin levels were normal  He said he wiil leave AMA if not discharged as per DC summary   Pts Hb A1C is persistently low as per records  He said he takes Metformin but his home med list didn't shows this  He just came home 1  week ago after he was admitted in [facility] for CHF flare and fluid overload from CKD    * No surgery found *      Hospital Course:   73 year old pt getting admitted with persistent hypoglycemia upon going for possibly pseudohypoglycemia, insulin secreting tumor, malignancy.  Ordered CT chest abdomen pelvis today, tele consult with endocrinology and recommends rechecks in earlobe or palm and if true hypoglycemia we collected stat C-peptide, proinsulin, insulin, BH B, BNP during hypoglycemic episode.  Has been doing Accu-Cheks Q 2 hours with D50 amps and resuming D10 infusion.  He is mostly asymptomatic during this which is good but etiology is unclear so far.  He does admit to unintended weight loss over the last 2-4 weeks but denies night sweats or fevers.   Transferred to ICU for frequent hypoglycemia.  He was placed on D10 infusion was ultimately able to be weaned off.  His sugars were then approaching the 200s and taper down to the 150s and 120s after this was discontinued.  He had no additional hypoglycemic events.  Plan was discussed again with the endocrinologist at [facility], Dr. Khan who generously consulted on this patient.  Given the elevated C-peptide and insulin that was drawn prior to the resuscitation when patient was hypoglycemic this suggests that this was due to secreted got which she was taking prior to his admission, glimepiride.  The plan was discussed with the patient in detail to discontinue this medication.  It is also written patient instructions.  Patient was to follow up with his primary care physician and endocrinologist within 1 week of discharge.  Okay to continue metformin.  Patient was seen and examined on the day of discharge and was in stable condition, hemodynamically stable, without any symptoms, chest pain, dyspnea, lightheadedness or dizziness.         Goals of Care Treatment Preferences:  Code Status: Full Code    Health care agent: Libia Carney  [facility] care agent number: [phone]    Living Will: Yes     What is most important right now is to focus on remaining as independent as possible, symptom/pain control, extending life as long as possible, even it it means sacrificing quality, curative/life-prolongation (regardless of treatment burdens).  Accordingly, we have decided that the best plan to meet the patient's goals includes continuing with treatment.      SDOH Screening:  The patient was screened for utility difficulties, food insecurity, transport difficulties, housing insecurity, and interpersonal safety and there were no concerns identified this admission.     Consults:     Neuro  Peripheral polyneuropathy  Aware       Pulmonary  COPD (chronic obstructive pulmonary disease)  Chronic stable issue   Continue  home inhalers on discharge    Cardiac/Vascular  Chronic combined systolic and diastolic heart failure  Resume home torsemide and hydralazine, patient also on isosorbide dinitrate, regularly follows with Cardiology      S/P CABG (coronary artery bypass graft)  In 2014  Continue aspirin and statin      CAD (coronary artery disease)  Status post percutaneous coronary intervention with drug eluting stent placements in left main to left anterior descending artery and left main to lateral circumflex artery on 8/31/2022     Continue aspirin and statin    Aortic stenosis status post AVR  Continue Eliquis    Hypertension  Chronic, controlled. Latest blood pressure and vitals reviewed-     Temp:  [97 °F (36.1 °C)-98.1 °F (36.7 °C)]   Pulse:  [65-88]   Resp:  [10-35]   BP: ()/(50-83)   SpO2:  [97 %-100 %] .   Home meds for hypertension were reviewed and noted below.   Hypertension Medications               hydrALAZINE (APRESOLINE) 50 MG tablet Take 1 tablet (50 mg total) by mouth 3 (three) times daily.    isosorbide dinitrate (ISORDIL) 20 MG tablet Take 20 mg by mouth 3 (three) times daily.    metoprolol succinate (TOPROL-XL) 25 MG 24 hr tablet Take 1 tablet (25 mg total) by mouth once daily.    torsemide (DEMADEX) 20 MG Tab Take 4 tablets (80 mg total) by mouth 2 (two) times a day.            Continue home regimen on discharge    Endocrine  * Hypoglycemia-resolved as of 9/29/2024  Given patient's elevated insulin and C-peptide, likely secondary to secreted dog which patient was taking, patient will discontinue glimepiride on discharge.  This was discussed with him and he demonstrates understanding.  Also written discharge instructions.  This case was discussed with Dr. Kahn at [facility].  We are appreciative of her expertise.     Latest Reference Range & Units 08/15/24 08:05   C-Peptide 1.1 - 4.4 ng/mL 14.1 (H)   (H): Data is abnormally high          Final Active Diagnoses:    Diagnosis Date Noted POA    LV (left  ventricular) mural thrombus [I51.3] 09/25/2024 Yes    Debility [R53.81] 09/18/2024 Yes    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 without macular edema, without long-term current use of insulin [E11.3593] 07/29/2024 Yes     Chronic    CRF (chronic renal failure) [N18.9] 09/28/2023 Yes     Chronic    Cirrhosis of liver with ascites [K74.60, R18.8] 09/14/2023 Yes     Chronic    Chronic combined systolic and diastolic heart failure [I50.42] 08/29/2020 Yes     Chronic    COPD (chronic obstructive pulmonary disease) [J44.9] 08/29/2020 Yes     Chronic    Peripheral polyneuropathy [G62.9] 04/11/2019 Yes     Chronic    S/P CABG (coronary artery bypass graft) [Z95.1] 11/03/2014 Not Applicable     Chronic    CAD (coronary artery disease) [I25.10] 11/03/2014 Yes     Chronic    Aortic stenosis status post AVR [I35.0] 10/21/2014 Yes     Chronic    Hypertension [I10] 09/19/2014 Yes     Chronic      Problems Resolved During this Admission:    Diagnosis Date Noted Date Resolved POA    PRINCIPAL PROBLEM:  Hypoglycemia [E16.2] 09/25/2024 09/29/2024 Yes       Discharged Condition: stable    Disposition: Home or Self Care    Follow Up:   Follow-up Information       Millie Hayes, APRN,FNP-C Follow up in 1 week(s).    Specialty: Family Medicine  Why: recommend BMP and Magnesium drawn in 1 week and address that we discontinued your Glimeperide(Ameryl) due to low blood sugar  Contact information:  [facility] [address]  [phone]                           Patient Instructions:      Diet diabetic     Diet Cardiac     Notify your health care provider if you experience any of the following:  persistent nausea and vomiting or diarrhea     Notify your health care provider if you experience any of the following:  persistent dizziness, light-headedness, or visual disturbances     Notify your health care provider if you experience any of the following:  increased confusion or weakness      Activity as tolerated       Significant Diagnostic Studies: Labs: BMP:   Recent Labs   Lab 09/28/24  0324 09/29/24  0343    154*   * 132*   K 4.1 3.8   CL 96 98   CO2 28 25   BUN 64* 59*   CREATININE 2.5* 2.4*   CALCIUM 8.1* 8.2*   MG 1.7 1.7    and CMP   Recent Labs   Lab 09/28/24  0324 09/29/24  0343   * 132*   K 4.1 3.8   CL 96 98   CO2 28 25    154*   BUN 64* 59*   CREATININE 2.5* 2.4*   CALCIUM 8.1* 8.2*   PROT 6.3 6.7   ALBUMIN 3.1* 3.2*   BILITOT 1.0 1.2*   ALKPHOS 47* 49*   AST 25 23   ALT 24 25   ANIONGAP 7* 9       Pending Diagnostic Studies:       Procedure Component Value Units Date/Time    Hypoglycemic Agent Screen [[id number]]     Order Status: Sent Lab Status: No result     Specimen: Blood     Proinsulin [[id number]]     Order Status: Sent Lab Status: No result     Specimen: Blood            Medications:  Reconciled Home Medications:      Medication List        CONTINUE taking these medications      aspirin 81 MG EC tablet  Commonly known as: ECOTRIN  Take 81 mg by mouth once daily.     atorvastatin 40 MG tablet  Commonly known as: LIPITOR  Take 1 tablet (40 mg total) by mouth once daily.     cyanocobalamin 1,000 mcg/mL injection  Inject 1 mL (1,000 mcg total) into the muscle every 30 days.     ELIQUIS 2.5 mg Tab  Generic drug: apixaban  Take 1 tablet (2.5 mg total) by mouth 2 (two) times daily.     fluticasone furoate-vilanteroL 100-25 mcg/dose diskus inhaler  Commonly known as: BREO ELLIPTA  Inhale 1 puff into the lungs once daily. (DAILY CONTROLLER)     fluticasone propionate 50 mcg/actuation nasal spray  Commonly known as: FLONASE  1 spray (50 mcg total) by Each Nostril route daily as needed for Allergies.     gabapentin 300 MG capsule  Commonly known as: NEURONTIN  Take 1 capsule (300 mg total) by mouth every evening.     hydrALAZINE 50 MG tablet  Commonly known as: APRESOLINE  Take 1 tablet (50 mg total) by mouth every 12 (twelve) hours.     isosorbide dinitrate 20  MG tablet  Commonly known as: ISORDIL  Take 20 mg by mouth 2 (two) times daily.     levalbuterol 0.63 mg/3 mL nebulizer solution  Commonly known as: XOPENEX  Inhale 0.63 mg into the lungs every 6 (six) hours as needed for Shortness of Breath.     levothyroxine 100 MCG tablet  Commonly known as: SYNTHROID  Take 1 tablet (100 mcg total) by mouth before breakfast. With no other meds or food     linaCLOtide 72 mcg Cap capsule  Commonly known as: LINZESS  Take 1 capsule (72 mcg total) by mouth daily as needed (Constipation).     metoprolol succinate 25 MG 24 hr tablet  Commonly known as: TOPROL-XL  Take 1 tablet (25 mg total) by mouth once daily.     mirtazapine 7.5 MG Tab  Commonly known as: REMERON  Take 1 tablet (7.5 mg total) by mouth every evening.     torsemide 20 MG Tab  Commonly known as: DEMADEX  Take 4 tablets (80 mg total) by mouth 2 (two) times a day.            STOP taking these medications      VERQUVO 5 mg Tab  Generic drug: vericiguat            ASK your doctor about these medications      omeprazole 40 MG capsule  Commonly known as: PRILOSEC  Take 1 capsule (40 mg total) by mouth once daily.              Indwelling Lines/Drains at time of discharge:   Lines/Drains/Airways       None                   Time spent on the discharge of patient: 35 minutes    Critical care time spent on the evaluation and treatment of severe organ dysfunction, review of pertinent labs and imaging studies, discussions with consulting providers and discussions with patient/family: 35 minutes.     Salena Go MD  Department of Hospital Medicine  [facility] hospital

## 2024-09-29 NOTE — ASSESSMENT & PLAN NOTE
Status post percutaneous coronary intervention with drug eluting stent placements in left main to left anterior descending artery and left main to lateral circumflex artery on 8/31/2022     Continue aspirin and statin

## 2024-09-29 NOTE — PLAN OF CARE
Pt appropriated for discharge.  Instructions discussed.  Able to repeat back with understanding.  Medications and changes made discussed and pt verbalized understanding.  IVs removed sites covered and tips in tact.  Questions and concerns addressed.

## 2024-09-29 NOTE — ASSESSMENT & PLAN NOTE
Resume home torsemide and hydralazine, patient also on isosorbide dinitrate, regularly follows with Cardiology

## 2024-09-29 NOTE — CARE UPDATE
09/28/24 1922   Patient Assessment/Suction   Level of Consciousness (AVPU) alert   Respiratory Effort Normal;Unlabored   Expansion/Accessory Muscles/Retractions no use of accessory muscles   All Lung Fields Breath Sounds clear   Rhythm/Pattern, Respiratory unlabored;pattern regular   Cough Frequency no cough   PRE-TX-O2   Device (Oxygen Therapy) room air   SpO2 100 %   Pulse Oximetry Type Continuous   $ Pulse Oximetry - Multiple Charge Pulse Oximetry - Multiple   Pulse 81   Resp 16   Aerosol Therapy   $ Aerosol Therapy Charges Aerosol Treatment   Daily Review of Necessity (SVN) completed   Respiratory Treatment Status (SVN) given   Treatment Route (SVN) mask;oxygen   Patient Position HOB elevated   Post Treatment Assessment (SVN) breath sounds unchanged   Signs of Intolerance (SVN) none   Breath Sounds Post-Respiratory Treatment   Throughout All Fields Post-Treatment All Fields   Throughout All Fields Post-Treatment no change   Post-treatment Heart Rate (beats/min) 81   Post-treatment Resp Rate (breaths/min) 16   Education   $ Education Bronchodilator;15 min

## 2024-09-29 NOTE — ASSESSMENT & PLAN NOTE
Chronic, controlled. Latest blood pressure and vitals reviewed-     Temp:  [97 °F (36.1 °C)-98.1 °F (36.7 °C)]   Pulse:  [65-88]   Resp:  [10-35]   BP: ()/(50-83)   SpO2:  [97 %-100 %] .   Home meds for hypertension were reviewed and noted below.   Hypertension Medications               hydrALAZINE (APRESOLINE) 50 MG tablet Take 1 tablet (50 mg total) by mouth 3 (three) times daily.    isosorbide dinitrate (ISORDIL) 20 MG tablet Take 20 mg by mouth 3 (three) times daily.    metoprolol succinate (TOPROL-XL) 25 MG 24 hr tablet Take 1 tablet (25 mg total) by mouth once daily.    torsemide (DEMADEX) 20 MG Tab Take 4 tablets (80 mg total) by mouth 2 (two) times a day.            Continue home regimen on discharge

## 2024-09-29 NOTE — DISCHARGE INSTRUCTIONS
You were admitted for low blood sugar.  Studies were done that suggested this was due to your body's own over production of insulin which is caused by 1 of the medications that you are taking hold glimepiride or Amaryl.  We had consulted endocrinologist at the main campus to help us with this diagnosis and confirm our suspicions.  As such, we are discontinuing this.  Please stop taking this medication.  Please follow up with your primary care physician and endocrinologist within 1 week of discharge.  Please discuss your diabetes medications further.  In her medication reconciliation we did not see metformin but it is okay to continue taking this.  Thank you for allowing us to participate in your care.

## 2024-09-30 ENCOUNTER — TELEPHONE (OUTPATIENT)
Dept: FAMILY MEDICINE | Facility: CLINIC | Age: 74
End: 2024-09-30
Payer: MEDICARE

## 2024-09-30 DIAGNOSIS — I51.3 VENTRICULAR MURAL THROMBUS: Primary | ICD-10-CM

## 2024-09-30 NOTE — PLAN OF CARE
Chart reviewed and orders for HH sent to [facility] who will see pt 10/1/24.  Pt discharged home with HH       09/30/24 1904   Final Note   Assessment Type Final Discharge Note   Anticipated Discharge Disposition Home-Health   Post-Acute Status   Post-Acute Authorization Home Health   Home Health Status Set-up Complete/Auth obtained   Discharge Delays None known at this time

## 2024-10-01 ENCOUNTER — PATIENT OUTREACH (OUTPATIENT)
Dept: FAMILY MEDICINE | Facility: CLINIC | Age: 74
End: 2024-10-01
Payer: MEDICARE

## 2024-10-01 NOTE — TELEPHONE ENCOUNTER
Discharge Information     Discharge Date:   09/29/24    Primary Discharge Diagnosis:  Hypoglycemia      Discharge Summary:  Reviewed      Medication & Order Review     Were medication changes made or new medications added?   Yes    If so, has the patient filled the prescriptions?  Yes     Was Home Health ordered? Yes    If so, has Home Health contacted patient and/or initiated services?  Yes    Name of Home Health Agency? N/A    Durable Medical Equipment ordered?  No     If so, has the DME provider contacted patient and delivered equipment?  N/A    Follow Up               Any problems since discharge? No    How is the patient feeling since returning home? A bit better, but would like to see Aunjel ASAP.      Have you set up recommended follow up appointments?  Scheduled appointment with PCP for 10/02/24 at 10AM    Schedule Hospital Follow-up appointment within 7-14 days (preferably 7).             Melia Cervantes

## 2024-10-01 NOTE — TELEPHONE ENCOUNTER
Attempted to call patient this afternoon, to get him scheduled for a HFU, along with getting a TCC - patient didn't answer, LVM for him to call the office back.

## 2024-10-01 NOTE — TELEPHONE ENCOUNTER
----- Message from Alexandra sent at 9/30/2024  9:33 AM CDT -----  Admitted into [facility] 2-3 days ago and was discharged yesterday. And would like to have an hospital f/u for today.   [phone]

## 2024-10-01 NOTE — TELEPHONE ENCOUNTER
----- Message from Carolina sent at 10/1/2024 12:16 PM CDT -----  Pt is calling back to get called   [phone]

## 2024-10-07 NOTE — PROGRESS NOTES
SUBJECTIVE:    Patient ID: Baldo Carney is a 74 y.o. male.    Chief Complaint: Hospital Follow Up ([facility]U - went in on Friday and left Sunday - Hypoglycemia - still gets dizzy at times )    Pt presents for hospital d/c follow-up related to hypoglycemia. He has been inpatient several times over the past few months for syncope & confusion.     Continues to have weekly paracentesis.      Discussed outstanding health maintenance - diabetic eye exam performed this year via [facility]    Fatigue  This is a recurrent problem. The current episode started more than 1 month ago. The problem occurs daily. The problem has been waxing and waning. Associated symptoms include abdominal pain, anorexia, fatigue, myalgias and weakness. Pertinent negatives include no arthralgias, chest pain, congestion, coughing, fever, headaches, joint swelling, nausea, neck pain, rash, sore throat or vomiting. The symptoms are aggravated by exertion, standing and walking. He has tried rest, sleep, position changes, relaxation and lying down (B12) for the symptoms. The treatment provided mild relief.   Constipation  This is a recurrent problem. The current episode started more than 1 month ago. The problem has been waxing and waning since onset. His stool frequency is 2 to 3 times per week. The stool is described as firm and formed. The patient is not on a high fiber diet. He Does not exercise regularly. There has Been adequate water intake. Associated symptoms include abdominal pain, anorexia, bloating and weight loss. Pertinent negatives include no back pain, diarrhea, difficulty urinating, fever, nausea or vomiting. Risk factors include recent illness. He has tried stool softeners and laxatives for the symptoms. The treatment provided no relief. His past medical history is significant for metabolic disease.   Diabetes  He presents for his follow-up diabetic visit. He has type 2 diabetes mellitus. No MedicAlert identification noted. His  disease course has been stable. Hypoglycemia symptoms include confusion, dizziness and nervousness/anxiousness. Pertinent negatives for hypoglycemia include no headaches or pallor. Associated symptoms include fatigue, weakness and weight loss. Pertinent negatives for diabetes include no chest pain, no polydipsia and no polyuria. Hypoglycemia complications include blackouts, hospitalization, required assistance and required glucagon injection. Diabetic complications include heart disease, impotence and peripheral neuropathy. Risk factors for coronary artery disease include hypertension, male sex, diabetes mellitus, sedentary lifestyle, dyslipidemia and family history. Current diabetic treatment includes oral agent (monotherapy) (pt unsure what he is currently taking). His weight is decreasing steadily. He is following a generally healthy diet. When asked about meal planning, he reported none. He has not had a previous visit with a dietitian. He never participates in exercise. An ACE inhibitor/angiotensin II receptor blocker is not being taken. Eye exam is current.       Admit Date: 9/25/2024  Discharge Date: 9/29/2024  Discharge Facility: Hospital      Family and/or Caretaker present at visit? No  Medication Reconciliation:  No medication changes.   New Prescriptions filled after discharge: not applicable  Discharge summary reviewed:  yes  Diagnostic tests reviewed/disposition: No diagnosic tests pending after this hospitalization  Disease/illness education: CHF, DM, anemia  Follow up appointments scheduled:  yes              with Cardiology   Follow up labs/tests ordered:   yes  Home Health ordered on discharge: Patient does have home health established prior to this hospital stay.   Home Health company name: [facility]  Establishment or re-establishment of referral orders for community resources: No other necessary community resources  DME ordered at discharge:   no  How patient is feeling since discharge from the  hospital?  remains fatigued but dizziness is improving  Discussion with other health care providers: No discussion with other health care providers necessary  Patient follow up phone call documented on separate encounter.    No results displayed because visit has over 200 results.      No results displayed because visit has over 200 results.      Admission on 09/07/2024, Discharged on 09/08/2024   Component Date Value Ref Range Status    QRS Duration 09/07/2024 172  ms Final    [facility] QTC Calculation 09/07/2024 578  ms Final    WBC 09/07/2024 5.65  3.90 - 12.70 K/uL Final    RBC 09/07/2024 3.79 (L)  4.60 - 6.20 M/uL Final    Hemoglobin 09/07/2024 11.0 (L)  14.0 - 18.0 g/dL Final    Hematocrit 09/07/2024 33.2 (L)  40.0 - 54.0 % Final    MCV 09/07/2024 88  82 - 98 fL Final    MCH 09/07/2024 29.0  27.0 - 31.0 pg Final    MCHC 09/07/2024 33.1  32.0 - 36.0 g/dL Final    RDW 09/07/2024 23.3 (H)  11.5 - 14.5 % Final    Platelets 09/07/2024 62 (L)  150 - 450 K/uL Final    MPV 09/07/2024 SEE COMMENT  9.2 - 12.9 fL Final    Immature Granulocytes 09/07/2024 0.7 (H)  0.0 - 0.5 % Final    Gran # (ANC) 09/07/2024 4.2  1.8 - 7.7 K/uL Final    Immature Grans (Abs) 09/07/2024 0.04  0.00 - 0.04 K/uL Final    Lymph # 09/07/2024 0.8 (L)  1.0 - 4.8 K/uL Final    Mono # 09/07/2024 0.5  0.3 - 1.0 K/uL Final    Eos # 09/07/2024 0.1  0.0 - 0.5 K/uL Final    Baso # 09/07/2024 0.06  0.00 - 0.20 K/uL Final    nRBC 09/07/2024 2 (A)  0 /100 WBC Final    Gran % 09/07/2024 74.7 (H)  38.0 - 73.0 % Final    Lymph % 09/07/2024 14.3 (L)  18.0 - 48.0 % Final    Mono % 09/07/2024 8.1  4.0 - 15.0 % Final    Eosinophil % 09/07/2024 1.1  0.0 - 8.0 % Final    Basophil % 09/07/2024 1.1  0.0 - 1.9 % Final    Platelet Estimate 09/07/2024 Decreased (A)   Final    Aniso 09/07/2024 Moderate   Final    Poik 09/07/2024 Moderate   Final    Poly 09/07/2024 Occasional   Final    Ovalocytes 09/07/2024 Occasional   Final    Target Cells 09/07/2024 Occasional   Final     Meghan Cells 09/07/2024 Occasional   Final    Schistocytes 09/07/2024 Present   Final    Fragmented Cells 09/07/2024 Occasional   Final    Differential Method 09/07/2024 Automated   Final    Sodium 09/07/2024 140  136 - 145 mmol/L Final    Potassium 09/07/2024 5.3 (H)  3.5 - 5.1 mmol/L Final    Chloride 09/07/2024 110  95 - 110 mmol/L Final    CO2 09/07/2024 12 (L)  23 - 29 mmol/L Final    Glucose 09/07/2024 143 (H)  70 - 110 mg/dL Final    BUN 09/07/2024 69 (H)  8 - 23 mg/dL Final    Creatinine 09/07/2024 4.8 (H)  0.5 - 1.4 mg/dL Final    Calcium 09/07/2024 8.3 (L)  8.7 - 10.5 mg/dL Final    Total Protein 09/07/2024 6.6  6.0 - 8.4 g/dL Final    Albumin 09/07/2024 3.0 (L)  3.5 - 5.2 g/dL Final    Total Bilirubin 09/07/2024 2.7 (H)  0.1 - 1.0 mg/dL Final    Alkaline Phosphatase 09/07/2024 90  55 - 135 U/L Final    AST 09/07/2024 227 (H)  10 - 40 U/L Final    ALT 09/07/2024 330 (H)  10 - 44 U/L Final    eGFR 09/07/2024 12 (A)  >60 mL/min/1.73 m^2 Final    Anion Gap 09/07/2024 18 (H)  8 - 16 mmol/L Final    Troponin I 09/07/2024 0.196 (HH)  0.000 - 0.026 ng/mL Final    BNP 09/07/2024 >4,900 (H)  0 - 99 pg/mL Final    Magnesium 09/07/2024 2.2  1.6 - 2.6 mg/dL Final    aPTT 09/07/2024 44.4 (H)  21.0 - 32.0 sec Final    Prothrombin Time 09/07/2024 20.5 (H)  9.0 - 12.5 sec Final    INR 09/07/2024 2.0 (H)  0.8 - 1.2 Final   Hospital Outpatient Visit on 08/26/2024   Component Date Value Ref Range Status    Body Fluid Type 08/26/2024 Ascites   Final    Fluid Appearance 08/26/2024 Clear   Final    Fluid Color 08/26/2024 Yellow   Final    WBC, Body Fluid 08/26/2024 140  /cu mm Final    Segs, Fluid 08/26/2024 7  % Final    Lymphs, Fluid 08/26/2024 24  % Final    Monocytes/Macrophages, Fluid 08/26/2024 69  % Final   Lab Visit on 08/23/2024   Component Date Value Ref Range Status    WBC 08/23/2024 5.55  3.90 - 12.70 K/uL Final    RBC 08/23/2024 2.95 (L)  4.60 - 6.20 M/uL Final    Hemoglobin 08/23/2024 8.1 (L)  14.0 - 18.0 g/dL Final     Hematocrit 08/23/2024 25.1 (L)  40.0 - 54.0 % Final    MCV 08/23/2024 85  82 - 98 fL Final    MCH 08/23/2024 27.5  27.0 - 31.0 pg Final    MCHC 08/23/2024 32.3  32.0 - 36.0 g/dL Final    RDW 08/23/2024 18.9 (H)  11.5 - 14.5 % Final    Platelets 08/23/2024 213  150 - 450 K/uL Final    MPV 08/23/2024 10.6  9.2 - 12.9 fL Final    Immature Granulocytes 08/23/2024 0.4  0.0 - 0.5 % Final    Gran # (ANC) 08/23/2024 4.1  1.8 - 7.7 K/uL Final    Immature Grans (Abs) 08/23/2024 0.02  0.00 - 0.04 K/uL Final    Lymph # 08/23/2024 0.7 (L)  1.0 - 4.8 K/uL Final    Mono # 08/23/2024 0.5  0.3 - 1.0 K/uL Final    Eos # 08/23/2024 0.2  0.0 - 0.5 K/uL Final    Baso # 08/23/2024 0.04  0.00 - 0.20 K/uL Final    nRBC 08/23/2024 0  0 /100 WBC Final    Gran % 08/23/2024 74.6 (H)  38.0 - 73.0 % Final    Lymph % 08/23/2024 11.7 (L)  18.0 - 48.0 % Final    Mono % 08/23/2024 8.5  4.0 - 15.0 % Final    Eosinophil % 08/23/2024 4.1  0.0 - 8.0 % Final    Basophil % 08/23/2024 0.7  0.0 - 1.9 % Final    Differential Method 08/23/2024 Automated   Final    Sodium 08/23/2024 141  136 - 145 mmol/L Final    Potassium 08/23/2024 3.9  3.5 - 5.1 mmol/L Final    Chloride 08/23/2024 112 (H)  95 - 110 mmol/L Final    CO2 08/23/2024 23  23 - 29 mmol/L Final    Glucose 08/23/2024 76  70 - 110 mg/dL Final    BUN 08/23/2024 37 (H)  8 - 23 mg/dL Final    Creatinine 08/23/2024 1.9 (H)  0.5 - 1.4 mg/dL Final    Calcium 08/23/2024 8.2 (L)  8.7 - 10.5 mg/dL Final    Total Protein 08/23/2024 6.1  6.0 - 8.4 g/dL Final    Albumin 08/23/2024 2.5 (L)  3.5 - 5.2 g/dL Final    Total Bilirubin 08/23/2024 0.4  0.1 - 1.0 mg/dL Final    Alkaline Phosphatase 08/23/2024 53 (L)  55 - 135 U/L Final    AST 08/23/2024 19  10 - 40 U/L Final    ALT 08/23/2024 12  10 - 44 U/L Final    eGFR 08/23/2024 37 (A)  >60 mL/min/1.73 m^2 Final    Anion Gap 08/23/2024 6 (L)  8 - 16 mmol/L Final   No results displayed because visit has over 200 results.      No results displayed because visit has  over 200 results.      Hospital Outpatient Visit on 07/15/2024   Component Date Value Ref Range Status    Body Fluid Type 07/15/2024 Ascites   Final    Fluid Appearance 07/15/2024 Clear   Final    Fluid Color 07/15/2024 Yellow   Final    WBC, Body Fluid 07/15/2024 99  /cu mm Final    Segs, Fluid 07/15/2024 1  % Final    Lymphs, Fluid 07/15/2024 52  % Final    Monocytes/Macrophages, Fluid 07/15/2024 46  % Final    Mesothelial Cells, Fluid 07/15/2024 1  % Final   Hospital Outpatient Visit on 07/01/2024   Component Date Value Ref Range Status    Body Fluid Type 07/01/2024 Abdominal Fluid   Final    Fluid Appearance 07/01/2024 Clear   Final    Fluid Color 07/01/2024 Yellow   Final    WBC, Body Fluid 07/01/2024 108  /cu mm Final    Segs, Fluid 07/01/2024 11  % Final    Lymphs, Fluid 07/01/2024 40  % Final    Monocytes/Macrophages, Fluid 07/01/2024 47  % Final    Mesothelial Cells, Fluid 07/01/2024 2  % Final    WBC 07/01/2024 4.27  3.90 - 12.70 K/uL Final    RBC 07/01/2024 3.86 (L)  4.60 - 6.20 M/uL Final    Hemoglobin 07/01/2024 10.8 (L)  14.0 - 18.0 g/dL Final    Hematocrit 07/01/2024 33.8 (L)  40.0 - 54.0 % Final    MCV 07/01/2024 88  82 - 98 fL Final    MCH 07/01/2024 28.0  27.0 - 31.0 pg Final    MCHC 07/01/2024 32.0  32.0 - 36.0 g/dL Final    RDW 07/01/2024 20.1 (H)  11.5 - 14.5 % Final    Platelets 07/01/2024 200  150 - 450 K/uL Final    MPV 07/01/2024 11.3  9.2 - 12.9 fL Final    Immature Granulocytes 07/01/2024 0.2  0.0 - 0.5 % Final    Gran # (ANC) 07/01/2024 3.3  1.8 - 7.7 K/uL Final    Immature Grans (Abs) 07/01/2024 0.01  0.00 - 0.04 K/uL Final    Lymph # 07/01/2024 0.5 (L)  1.0 - 4.8 K/uL Final    Mono # 07/01/2024 0.3  0.3 - 1.0 K/uL Final    Eos # 07/01/2024 0.1  0.0 - 0.5 K/uL Final    Baso # 07/01/2024 0.06  0.00 - 0.20 K/uL Final    nRBC 07/01/2024 0  0 /100 WBC Final    Gran % 07/01/2024 77.8 (H)  38.0 - 73.0 % Final    Lymph % 07/01/2024 12.6 (L)  18.0 - 48.0 % Final    Mono % 07/01/2024 6.6  4.0 -  15.0 % Final    Eosinophil % 07/01/2024 1.4  0.0 - 8.0 % Final    Basophil % 07/01/2024 1.4  0.0 - 1.9 % Final    Differential Method 07/01/2024 Automated   Final    Prothrombin Time 07/01/2024 12.4  9.0 - 12.5 sec Final    INR 07/01/2024 1.2  0.8 - 1.2 Final   Hospital Outpatient Visit on 06/17/2024   Component Date Value Ref Range Status    Body Fluid Type 06/17/2024 Ascites   Final    Fluid Appearance 06/17/2024 Clear   Final    Fluid Color 06/17/2024 Yellow   Final    WBC, Body Fluid 06/17/2024 128  /cu mm Final    Segs, Fluid 06/17/2024 7  % Final    Lymphs, Fluid 06/17/2024 37  % Final    Monocytes/Macrophages, Fluid 06/17/2024 48  % Final    Mesothelial Cells, Fluid 06/17/2024 8  % Final   There may be more visits with results that are not included.       Past Medical History:   Diagnosis Date    Asthma     Cardiomyopathy 10/21/2014    CHF (congestive heart failure)     Coronary artery disease     CRF (chronic renal failure)     Diabetes mellitus     Enlarged prostate     Hyperlipidemia     Hypertension     Neuropathy     Syncope 07/29/2024    Umbilical hernia without obstruction and without gangrene 10/13/2023     Past Surgical History:   Procedure Laterality Date    ANGIOGRAPHY OF INTERNAL MAMMARY VESSEL N/A 3/11/2022    Procedure: Angiogram Internal Mammary;  Surgeon: Hank Lujan MD;  Location: [facility] CATH/EP LAB;  Service: Cardiology;  Laterality: N/A;    CARDIAC CATHETERIZATION      CARDIAC VALVE SURGERY      CATHETERIZATION OF BOTH LEFT AND RIGHT HEART Left 3/11/2022    Procedure: CATHETERIZATION, HEART, BOTH LEFT AND RIGHT;  Surgeon: Hank Lujan MD;  Location: [facility] CATH/EP LAB;  Service: Cardiology;  Laterality: Left;    CORONARY ANGIOGRAPHY N/A 3/11/2022    Procedure: ANGIOGRAM, CORONARY ARTERY;  Surgeon: Hank Lujan MD;  Location: [facility] CATH/EP LAB;  Service: Cardiology;  Laterality: N/A;    CORONARY BYPASS GRAFT ANGIOGRAPHY  3/11/2022    Procedure: Bypass graft study;  Surgeon: Hank DENSON  MD Tai;  Location: [facility] CATH/EP LAB;  Service: Cardiology;;    CYSTOSCOPY N/A 7/30/2019    Procedure: CYSTOSCOPY;  Surgeon: Spencer Nguyen MD;  Location: [facility];  Service: Urology;  Laterality: N/A;    INSERTION OF INTRAVASCULAR MICROAXIAL BLOOD PUMP N/A 8/31/2022    Procedure: INSERTION, IMPELLA;  Surgeon: Zach Jensen MD;  Location: [facility] CATH LAB;  Service: Cardiology;  Laterality: N/A;    INSERTION, CATHETER, DIALYSIS, PERITONEAL N/A 12/7/2023    Procedure: INSERTION, CATHETER, DIALYSIS, PERITONEAL;  Surgeon: Greg Bone Jr., MD;  Location: [facility];  Service: General;  Laterality: N/A;  need PD catheter    PLACEMENT OF SWAN SEE CATHETER WITH IMAGING GUIDANCE  8/31/2022    Procedure: INSERTION, CATHETER, SWAN-SEE, WITH IMAGING GUIDANCE;  Surgeon: Zach Jensen MD;  Location: [facility] CATH LAB;  Service: Cardiology;;    REPAIR, HERNIA, INGUINAL, INCARCERATED, INITIAL, AGE 5 YEARS OR OLDER Right 12/7/2023    Procedure: REPAIR, HERNIA, INGUINAL, INCARCERATED, INITIAL;  Surgeon: Greg Bone Jr., MD;  Location: [facility];  Service: General;  Laterality: Right;    SHOULDER ARTHROSCOPY  1985    TRANSRECTAL BIOPSY OF PROSTATE WITH ULTRASOUND GUIDANCE N/A 7/30/2019    Procedure: BIOPSY, PROSTATE, RECTAL APPROACH, WITH US GUIDANCE;  Surgeon: Spencer Nguyen MD;  Location: [facility];  Service: Urology;  Laterality: N/A;  procedure not performed, pt unable to tolerate    TRANSRECTAL BIOPSY OF PROSTATE WITH ULTRASOUND GUIDANCE N/A 8/8/2019    Procedure: BIOPSY, PROSTATE, RECTAL APPROACH, WITH US GUIDANCE;  Surgeon: Spencer Nguyen MD;  Location: [facility] OR;  Service: Urology;  Laterality: N/A;    UMBILICAL HERNIA REPAIR N/A 12/7/2023    Procedure: REPAIR, HERNIA, UMBILICAL;  Surgeon: Greg Bone Jr., MD;  Location: [facility];  Service: General;  Laterality: N/A;     Family History   Problem Relation Name Age of Onset    No Known Problems Mother      Diabetes Father       Hypertension Father      Heart attack Father      Heart disease Father      Diabetes Sister 5     Heart disease Brother 4     Diabetes Brother 4     No Known Problems Maternal Grandmother      No Known Problems Maternal Grandfather      No Known Problems Paternal Grandmother      No Known Problems Paternal Grandfather      No Known Problems Maternal Aunt      No Known Problems Maternal Uncle      No Known Problems Paternal Aunt      No Known Problems Paternal Uncle      Anemia Neg Hx      Arrhythmia Neg Hx      Asthma Neg Hx      Clotting disorder Neg Hx      Fainting Neg Hx      Heart failure Neg Hx      Hyperlipidemia Neg Hx      Glaucoma Neg Hx         Marital Status: Single  Alcohol History:  reports that he does not currently use alcohol after a past usage of about 3.0 standard drinks of alcohol per week.  Tobacco History:  reports that he quit smoking about 54 years ago. His smoking use included cigarettes. He has never used smokeless tobacco.  Drug History:  reports no history of drug use.    Review of patient's allergies indicates:   Allergen Reactions    Canagliflozin      Other reaction(s): been very dizzy       Current Outpatient Medications:     aspirin (ECOTRIN) 81 MG EC tablet, Take 81 mg by mouth once daily., Disp: , Rfl:     cyanocobalamin 1,000 mcg/mL injection, Inject 1 mL (1,000 mcg total) into the muscle every 30 days., Disp: 3 mL, Rfl: 1    fluticasone furoate-vilanteroL (BREO ELLIPTA) 100-25 mcg/dose diskus inhaler, Inhale 1 puff into the lungs once daily. (DAILY CONTROLLER), Disp: 3 each, Rfl: 1    fluticasone propionate (FLONASE) 50 mcg/actuation nasal spray, 1 spray (50 mcg total) by Each Nostril route daily as needed for Allergies., Disp: , Rfl:     gabapentin (NEURONTIN) 300 MG capsule, Take 1 capsule (300 mg total) by mouth every evening., Disp: 90 capsule, Rfl: 1    hydrALAZINE (APRESOLINE) 50 MG tablet, Take 1 tablet (50 mg total) by mouth every 12 (twelve) hours., Disp: 180 tablet,  Rfl: 3    levalbuterol (XOPENEX) 0.63 mg/3 mL nebulizer solution, Inhale 0.63 mg into the lungs every 6 (six) hours as needed for Shortness of Breath., Disp: , Rfl:     levothyroxine (SYNTHROID) 100 MCG tablet, Take 1 tablet (100 mcg total) by mouth before breakfast. With no other meds or food, Disp: 90 tablet, Rfl: 1    metoprolol succinate (TOPROL-XL) 25 MG 24 hr tablet, Take 1 tablet (25 mg total) by mouth once daily., Disp: 90 tablet, Rfl: 1    mirtazapine (REMERON) 7.5 MG Tab, Take 1 tablet (7.5 mg total) by mouth every evening., Disp: 30 tablet, Rfl: 5    omeprazole (PRILOSEC) 40 MG capsule, Take 1 capsule (40 mg total) by mouth once daily., Disp: 90 capsule, Rfl: 1    apixaban (ELIQUIS) 2.5 mg Tab, Take 1 tablet (2.5 mg total) by mouth 2 (two) times daily., Disp: 180 tablet, Rfl: 1    atorvastatin (LIPITOR) 40 MG tablet, Take 1 tablet (40 mg total) by mouth once daily., Disp: 90 tablet, Rfl: 1    isosorbide dinitrate (ISORDIL) 20 MG tablet, Take 1 tablet (20 mg total) by mouth 2 (two) times daily., Disp: 180 tablet, Rfl: 1    linaCLOtide (LINZESS) 72 mcg Cap capsule, Take 1 capsule (72 mcg total) by mouth daily as needed (Constipation)., Disp: 90 capsule, Rfl: 1    torsemide 40 mg Tab, Take 80 mg by mouth 2 (two) times a day., Disp: 360 tablet, Rfl: 0    Review of Systems   Constitutional:  Positive for fatigue and weight loss. Negative for activity change, appetite change and fever.   HENT:  Negative for congestion, ear pain, hearing loss, postnasal drip, sinus pressure, sinus pain, sneezing and sore throat.    Eyes:  Negative for photophobia and pain.   Respiratory:  Positive for shortness of breath. Negative for cough, chest tightness and wheezing.    Cardiovascular:  Negative for chest pain and leg swelling.   Gastrointestinal:  Positive for abdominal pain, anorexia, bloating and constipation. Negative for abdominal distention, blood in stool, diarrhea, nausea and vomiting.   Endocrine: Negative for cold  intolerance, heat intolerance, polydipsia and polyuria.   Genitourinary:  Positive for impotence. Negative for difficulty urinating, dysuria, flank pain, frequency, hematuria and urgency.   Musculoskeletal:  Positive for myalgias. Negative for arthralgias, back pain, joint swelling and neck pain.   Skin:  Negative for pallor and rash.   Allergic/Immunologic: Negative for environmental allergies and food allergies.   Neurological:  Positive for dizziness, syncope, weakness and light-headedness. Negative for headaches.   Hematological:  Does not bruise/bleed easily.   Psychiatric/Behavioral:  Positive for confusion. Negative for decreased concentration and sleep disturbance. The patient is nervous/anxious.         Objective:      Vitals:    10/02/24 0921   BP: 124/68   Pulse: 61   SpO2: 99%   Weight: 70.5 kg (155 lb 8 oz)   Height: 6' (1.829 m)     Physical Exam  Vitals and nursing note reviewed.   Constitutional:       General: He is not in acute distress.     Appearance: Normal appearance. He is well-developed, well-groomed and normal weight.      Comments: 14# loss since 8/27/24 visit   HENT:      Head: Normocephalic and atraumatic.      Right Ear: Hearing normal.      Left Ear: Hearing normal.      Nose: Nose normal. No rhinorrhea.   Eyes:      General: Lids are normal.         Right eye: No discharge.         Left eye: No discharge.      Conjunctiva/sclera: Conjunctivae normal.      Right eye: Right conjunctiva is not injected.      Left eye: Left conjunctiva is not injected.      Pupils: Pupils are equal, round, and reactive to light. Pupils are equal.      Right eye: Pupil is round and reactive.      Left eye: Pupil is round and reactive.   Neck:      Thyroid: No thyromegaly.      Vascular: No JVD.      Trachea: Trachea normal. No tracheal deviation.   Cardiovascular:      Rate and Rhythm: Normal rate and regular rhythm.      Pulses:           Radial pulses are 2+ on the right side and 2+ on the left side.         Posterior tibial pulses are 2+ on the right side and 2+ on the left side.      Heart sounds: Normal heart sounds. No murmur heard.     No friction rub. No gallop.   Pulmonary:      Effort: Pulmonary effort is normal. No respiratory distress.      Breath sounds: Normal breath sounds. No stridor. No decreased breath sounds, wheezing, rhonchi or rales.   Abdominal:      General: Bowel sounds are normal. There is no distension.      Palpations: Abdomen is soft. Abdomen is not rigid.      Tenderness: There is no abdominal tenderness. There is no guarding.   Musculoskeletal:         General: Normal range of motion.      Cervical back: Normal range of motion and neck supple.   Lymphadenopathy:      Cervical: No cervical adenopathy.   Skin:     General: Skin is warm and dry.      Capillary Refill: Capillary refill takes less than 2 seconds.      Coloration: Skin is not pale.      Findings: No lesion or rash.   Neurological:      Mental Status: He is alert and oriented to person, place, and time.      GCS: GCS eye subscore is 4. GCS verbal subscore is 5. GCS motor subscore is 6.      Cranial Nerves: Cranial nerves 2-12 are intact.      Sensory: Sensation is intact.      Motor: Motor function is intact. No atrophy.      Coordination: Coordination is intact. Coordination normal.      Gait: Gait is intact. Gait normal.   Psychiatric:         Attention and Perception: Attention and perception normal. He is attentive.         Mood and Affect: Mood and affect normal.         Speech: Speech normal.         Behavior: Behavior normal.         Thought Content: Thought content normal.         Cognition and Memory: Cognition normal.         Judgment: Judgment normal.         Assessment:       1. Hospital discharge follow-up    2. Acute on chronic combined systolic and diastolic heart failure    3. Mixed hyperlipidemia    4. Chronic constipation    5. Need for influenza vaccination         Plan:       Hospital discharge follow-up  -      Comprehensive Metabolic Panel; Future; Expected date: 10/02/2024  -     Magnesium; Future; Expected date: 10/02/2024  -     torsemide 40 mg Tab; Take 80 mg by mouth 2 (two) times a day.  Dispense: 360 tablet; Refill: 0  -     apixaban (ELIQUIS) 2.5 mg Tab; Take 1 tablet (2.5 mg total) by mouth 2 (two) times daily.  Dispense: 180 tablet; Refill: 1  -     atorvastatin (LIPITOR) 40 MG tablet; Take 1 tablet (40 mg total) by mouth once daily.  Dispense: 90 tablet; Refill: 1  -     isosorbide dinitrate (ISORDIL) 20 MG tablet; Take 1 tablet (20 mg total) by mouth 2 (two) times daily.  Dispense: 180 tablet; Refill: 1    Acute on chronic combined systolic and diastolic heart failure  -     torsemide 40 mg Tab; Take 80 mg by mouth 2 (two) times a day.  Dispense: 360 tablet; Refill: 0  -     isosorbide dinitrate (ISORDIL) 20 MG tablet; Take 1 tablet (20 mg total) by mouth 2 (two) times daily.  Dispense: 180 tablet; Refill: 1    Mixed hyperlipidemia  -     atorvastatin (LIPITOR) 40 MG tablet; Take 1 tablet (40 mg total) by mouth once daily.  Dispense: 90 tablet; Refill: 1    Chronic constipation  -     linaCLOtide (LINZESS) 72 mcg Cap capsule; Take 1 capsule (72 mcg total) by mouth daily as needed (Constipation).  Dispense: 90 capsule; Refill: 1    Need for influenza vaccination  -     influenza (adjuvanted) (Fluad) 45 mcg/0.5 mL IM vaccine (> or = 66 yo) 0.5 mL                Follow up if symptoms worsen or fail to improve.

## 2024-10-07 NOTE — ED PROVIDER NOTES
Chief complaint:  Hypoglycemia      HPI:  Baldo Carney is a 74 y.o. male with hx htn, CMO, CHF, CAD s/p CABG, CKD, congestive hepatopathy with periodic outpatient paracentesis presenting with hypoglycemia.  Recent admission with discharge approximately one-week ago for similar issue with glimepiride discontinued and recommended to continue metformin.  EMS reported patient found by family for planned outpatient paracentesis this morning with confusion and glucose of 35 on EMS check.  They did administer D10 bolus with correction to 85 but subsequent recurrent hypoglycemia to around 85 on ED arrival.  Patient endorses normal food intake.  He claims compliance with prescribed medical regimen including discontinuing glimepiride.  He felt well going to bed last evening.  He has no current complaints.    ROS: As per HPI and below:  No headache, vomiting, diarrhea, chest pain, dyspnea, cough, fever, abdominal pain, dysuria, swelling, seizure, focal numbness or weakness.    Review of patient's allergies indicates:   Allergen Reactions    Canagliflozin      Other reaction(s): been very dizzy       Patient's Medications   New Prescriptions    No medications on file   Previous Medications    APIXABAN (ELIQUIS) 2.5 MG TAB    Take 1 tablet (2.5 mg total) by mouth 2 (two) times daily.    ASPIRIN (ECOTRIN) 81 MG EC TABLET    Take 81 mg by mouth once daily.    ATORVASTATIN (LIPITOR) 40 MG TABLET    Take 1 tablet (40 mg total) by mouth once daily.    CYANOCOBALAMIN 1,000 MCG/ML INJECTION    Inject 1 mL (1,000 mcg total) into the muscle every 30 days.    FLUTICASONE FUROATE-VILANTEROL (BREO ELLIPTA) 100-25 MCG/DOSE DISKUS INHALER    Inhale 1 puff into the lungs once daily. (DAILY CONTROLLER)    FLUTICASONE PROPIONATE (FLONASE) 50 MCG/ACTUATION NASAL SPRAY    1 spray (50 mcg total) by Each Nostril route daily as needed for Allergies.    GABAPENTIN (NEURONTIN) 300 MG CAPSULE    Take 1 capsule (300 mg total) by mouth every  evening.    HYDRALAZINE (APRESOLINE) 50 MG TABLET    Take 1 tablet (50 mg total) by mouth every 12 (twelve) hours.    ISOSORBIDE DINITRATE (ISORDIL) 20 MG TABLET    Take 1 tablet (20 mg total) by mouth 2 (two) times daily.    LEVALBUTEROL (XOPENEX) 0.63 MG/3 ML NEBULIZER SOLUTION    Inhale 0.63 mg into the lungs every 6 (six) hours as needed for Shortness of Breath.    LEVOTHYROXINE (SYNTHROID) 100 MCG TABLET    Take 1 tablet (100 mcg total) by mouth before breakfast. With no other meds or food    LINACLOTIDE (LINZESS) 72 MCG CAP CAPSULE    Take 1 capsule (72 mcg total) by mouth daily as needed (Constipation).    METOPROLOL SUCCINATE (TOPROL-XL) 25 MG 24 HR TABLET    Take 1 tablet (25 mg total) by mouth once daily.    MIRTAZAPINE (REMERON) 7.5 MG TAB    Take 1 tablet (7.5 mg total) by mouth every evening.    OMEPRAZOLE (PRILOSEC) 40 MG CAPSULE    Take 1 capsule (40 mg total) by mouth once daily.    TORSEMIDE 40 MG TAB    Take 80 mg by mouth 2 (two) times a day.   Modified Medications    No medications on file   Discontinued Medications    No medications on file       PMH:  As per HPI and below:  Past Medical History:   Diagnosis Date    Asthma     Cardiomyopathy 10/21/2014    CHF (congestive heart failure)     Coronary artery disease     CRF (chronic renal failure)     Diabetes mellitus     Enlarged prostate     Hyperlipidemia     Hypertension     Neuropathy     Syncope 07/29/2024    Umbilical hernia without obstruction and without gangrene 10/13/2023     Past Surgical History:   Procedure Laterality Date    ANGIOGRAPHY OF INTERNAL MAMMARY VESSEL N/A 3/11/2022    Procedure: Angiogram Internal Mammary;  Surgeon: Hank Lujan MD;  Location[facility] CATH[facility]EP LAB;  Service: Cardiology;  Laterality: N/A;    CARDIAC CATHETERIZATION      CARDIAC VALVE SURGERY      CATHETERIZATION OF BOTH LEFT AND RIGHT HEART Left 3/11/2022    Procedure: CATHETERIZATION, HEART, BOTH LEFT AND RIGHT;  Surgeon: Hank Lujan MD;  Location:  [facility] CATH/EP LAB;  Service: Cardiology;  Laterality: Left;    CORONARY ANGIOGRAPHY N/A 3/11/2022    Procedure: ANGIOGRAM, CORONARY ARTERY;  Surgeon: Hank Lujan MD;  Location: [facility] CATH/EP LAB;  Service: Cardiology;  Laterality: N/A;    CORONARY BYPASS GRAFT ANGIOGRAPHY  3/11/2022    Procedure: Bypass graft study;  Surgeon: Hank Lujan MD;  Location: [facility] CATH/EP LAB;  Service: Cardiology;;    CYSTOSCOPY N/A 7/30/2019    Procedure: CYSTOSCOPY;  Surgeon: Spencer Nguyen MD;  Location: [facility] OR;  Service: Urology;  Laterality: N/A;    INSERTION OF INTRAVASCULAR MICROAXIAL BLOOD PUMP N/A 8/31/2022    Procedure: INSERTION, IMPELLA;  Surgeon: Zach Jensen MD;  Location: [facility] CATH LAB;  Service: Cardiology;  Laterality: N/A;    INSERTION, CATHETER, DIALYSIS, PERITONEAL N/A 12/7/2023    Procedure: INSERTION, CATHETER, DIALYSIS, PERITONEAL;  Surgeon: Greg Bone Jr., MD;  Location: [facility];  Service: General;  Laterality: N/A;  need PD catheter    PLACEMENT OF SWAN SEE CATHETER WITH IMAGING GUIDANCE  8/31/2022    Procedure: INSERTION, CATHETER, SWAN-SEE, WITH IMAGING GUIDANCE;  Surgeon: Zach Jensen MD;  Location: [facility] CATH LAB;  Service: Cardiology;;    REPAIR, HERNIA, INGUINAL, INCARCERATED, INITIAL, AGE 5 YEARS OR OLDER Right 12/7/2023    Procedure: REPAIR, HERNIA, INGUINAL, INCARCERATED, INITIAL;  Surgeon: Greg Bone Jr., MD;  Location: [facility];  Service: General;  Laterality: Right;    SHOULDER ARTHROSCOPY  1985    TRANSRECTAL BIOPSY OF PROSTATE WITH ULTRASOUND GUIDANCE N/A 7/30/2019    Procedure: BIOPSY, PROSTATE, RECTAL APPROACH, WITH US GUIDANCE;  Surgeon: Spencer Nguyen MD;  [facility]  Service: Urology;  Laterality: N/A;  procedure not performed, pt unable to tolerate    TRANSRECTAL BIOPSY OF PROSTATE WITH ULTRASOUND GUIDANCE N/A 8/8/2019    Procedure: BIOPSY, PROSTATE, RECTAL APPROACH, WITH US GUIDANCE;  Surgeon: Spencer Nguyen MD;  Location: [facility]  OR;  Service: Urology;  Laterality: N/A;    UMBILICAL HERNIA REPAIR N/A 2023    Procedure: REPAIR, HERNIA, UMBILICAL;  Surgeon: Greg Bone Jr., MD;  Location: [facility] OR;  Service: General;  Laterality: N/A;       Social History     Socioeconomic History    Marital status: Single   Tobacco Use    Smoking status: Former     Current packs/day: 0.00     Types: Cigarettes     Quit date: 1970     Years since quittin.3    Smokeless tobacco: Never   Substance and Sexual Activity    Alcohol use: Not Currently     Alcohol/week: 3.0 standard drinks of alcohol     Types: 3 Cans of beer per week    Drug use: No    Sexual activity: Not Currently     Partners: Female     Social Drivers of Health     Financial Resource Strain: Low Risk  (2024)    Overall Financial Resource Strain (CARDIA)     Difficulty of Paying Living Expenses: Not hard at all   Food Insecurity: No Food Insecurity (2024)    Hunger Vital Sign     Worried About Running Out of Food in the Last Year: Never true     Ran Out of Food in the Last Year: Never true   Transportation Needs: No Transportation Needs (2024)    TRANSPORTATION NEEDS     Transportation : No   Physical Activity: Inactive (2024)    Exercise Vital Sign     Days of Exercise per Week: 0 days     Minutes of Exercise per Session: 0 min   Stress: No Stress Concern Present (2024)    [employer] [place] [employer] - Occupational Stress Questionnaire     Feeling of Stress : Not at all   Housing Stability: Low Risk  (2024)    Housing Stability Vital Sign     Unable to Pay for Housing in the Last Year: No     Homeless in the Last Year: No       Family History   Problem Relation Name Age of Onset    No Known Problems Mother      Diabetes Father      Hypertension Father      Heart attack Father      Heart disease Father      Diabetes Sister 5     Heart disease Brother 4     Diabetes Brother 4     No Known Problems Maternal Grandmother      No Known  Problems Maternal Grandfather      No Known Problems Paternal Grandmother      No Known Problems Paternal Grandfather      No Known Problems Maternal Aunt      No Known Problems Maternal Uncle      No Known Problems Paternal Aunt      No Known Problems Paternal Uncle      Anemia Neg Hx      Arrhythmia Neg Hx      Asthma Neg Hx      Clotting disorder Neg Hx      Fainting Neg Hx      Heart failure Neg Hx      Hyperlipidemia Neg Hx      Glaucoma Neg Hx         Physical Exam:    Vitals:    10/07/24 1140   BP: (!) 129/59   Pulse: 85   Resp: 18   Temp: 98.2 °F (36.8 °C)     GENERAL:  No apparent distress.  Alert.    HEENT:  Moist mucous membranes.  Normocephalic and atraumatic.    NECK:  No swelling.  Midline trachea.   CARDIOVASCULAR:  Regular rate and rhythm.  2+ radial pulses.    PULMONARY:  Lungs clear to auscultation bilaterally.  No wheezes, rales, or rhonci.    ABDOMEN:  Mild distention, soft, nontender.   EXTREMITIES:  Warm and well perfused.  Brisk capillary refill.    NEUROLOGICAL:  Normal mental status.  Appropriate and conversant.  5/5 strength with equal sensation to light touch bilaterally.  Cranial nerves 3-12 intact  SKIN:  No rashes or ecchymoses.    BACK:  Atraumatic.  No CVA tenderness to palpation.      Labs Reviewed   CBC W/ AUTO DIFFERENTIAL - Abnormal       Result Value    WBC 7.74      RBC 2.94 (*)     Hemoglobin 8.9 (*)     Hematocrit 27.6 (*)     MCV 94      MCH 30.3      MCHC 32.2      RDW 21.8 (*)     Platelets 228      MPV 11.9      Immature Granulocytes 0.4      Gran # (ANC) 7.0      Immature Grans (Abs) 0.03      Lymph # 0.4 (*)     Mono # 0.3      Eos # 0.0      Baso # 0.04      nRBC 0      Gran % 90.2 (*)     Lymph % 5.3 (*)     Mono % 3.5 (*)     Eosinophil % 0.1      Basophil % 0.5      Differential Method Automated     COMPREHENSIVE METABOLIC PANEL - Abnormal    Sodium 137      Potassium 4.5      Chloride 101      CO2 27      Glucose 57 (*)     BUN 67 (*)     Creatinine 2.9 (*)      Calcium 8.9      Total Protein 7.8      Albumin 3.6      Total Bilirubin 1.0      Alkaline Phosphatase 55      AST 21      ALT 13      eGFR 22.0 (*)     Anion Gap 9     POCT GLUCOSE    POCT Glucose 82     POCT GLUCOSE    POCT Glucose 94     POCT GLUCOSE    POCT Glucose 82     POCT GLUCOSE MONITORING CONTINUOUS       Current Discharge Medication List        CONTINUE these medications which have NOT CHANGED    Details   apixaban (ELIQUIS) 2.5 mg Tab Take 1 tablet (2.5 mg total) by mouth 2 (two) times daily.  Qty: 180 tablet, Refills: 1    Associated Diagnoses: Hospital discharge follow-up      aspirin (ECOTRIN) 81 MG EC tablet Take 81 mg by mouth once daily.      atorvastatin (LIPITOR) 40 MG tablet Take 1 tablet (40 mg total) by mouth once daily.  Qty: 90 tablet, Refills: 1    Associated Diagnoses: Mixed hyperlipidemia; Hospital discharge follow-up      cyanocobalamin 1,000 mcg/mL injection Inject 1 mL (1,000 mcg total) into the muscle every 30 days.  Qty: 3 mL, Refills: 1    Associated Diagnoses: Low vitamin B12 level      fluticasone furoate-vilanteroL (BREO ELLIPTA) 100-25 mcg/dose diskus inhaler Inhale 1 puff into the lungs once daily. (DAILY CONTROLLER)  Qty: 3 each, Refills: 1    Associated Diagnoses: Chronic obstructive pulmonary disease, unspecified COPD type      fluticasone propionate (FLONASE) 50 mcg/actuation nasal spray 1 spray (50 mcg total) by Each Nostril route daily as needed for Allergies.    Associated Diagnoses: Seasonal allergies      gabapentin (NEURONTIN) 300 MG capsule Take 1 capsule (300 mg total) by mouth every evening.  Qty: 90 capsule, Refills: 1    Associated Diagnoses: Type 2 diabetes mellitus without complication, without long-term current use of insulin      hydrALAZINE (APRESOLINE) 50 MG tablet Take 1 tablet (50 mg total) by mouth every 12 (twelve) hours.  Qty: 180 tablet, Refills: 3    Comments: .  Associated Diagnoses: Chronic systolic heart failure      isosorbide dinitrate  (ISORDIL) 20 MG tablet Take 1 tablet (20 mg total) by mouth 2 (two) times daily.  Qty: 180 tablet, Refills: 1    Associated Diagnoses: Acute on chronic combined systolic and diastolic heart failure; Hospital discharge follow-up      levalbuterol (XOPENEX) 0.63 mg/3 mL nebulizer solution Inhale 0.63 mg into the lungs every 6 (six) hours as needed for Shortness of Breath.      levothyroxine (SYNTHROID) 100 MCG tablet Take 1 tablet (100 mcg total) by mouth before breakfast. With no other meds or food  Qty: 90 tablet, Refills: 1    Associated Diagnoses: Hypothyroidism, unspecified type      linaCLOtide (LINZESS) 72 mcg Cap capsule Take 1 capsule (72 mcg total) by mouth daily as needed (Constipation).  Qty: 90 capsule, Refills: 1    Associated Diagnoses: Chronic constipation      metoprolol succinate (TOPROL-XL) 25 MG 24 hr tablet Take 1 tablet (25 mg total) by mouth once daily.  Qty: 90 tablet, Refills: 1    Comments: .  Associated Diagnoses: Primary hypertension      mirtazapine (REMERON) 7.5 MG Tab Take 1 tablet (7.5 mg total) by mouth every evening.  Qty: 30 tablet, Refills: 5    Comments: New med  Associated Diagnoses: Situational anxiety      omeprazole (PRILOSEC) 40 MG capsule Take 1 capsule (40 mg total) by mouth once daily.  Qty: 90 capsule, Refills: 1    Associated Diagnoses: Heartburn      torsemide 40 mg Tab Take 80 mg by mouth 2 (two) times a day.  Qty: 360 tablet, Refills: 0    Comments: CHANGED TO 40 MG PILLS INSTEAD OF 20 MG PILLS  Associated Diagnoses: Acute on chronic combined systolic and diastolic heart failure; Hospital discharge follow-up             Orders Placed This Encounter   Procedures    CBC Auto Differential    Comprehensive Metabolic Panel    Insert peripheral IV       Imaging Results    None         ED Course as of 10/07/24 1418   Mon Oct 07, 2024   1348 Patient is euglycemic, asymptomatic on recheck after eating.  He feels well and wishes to go home.  Laboratory results reviewed with  patient. [MR]      ED Course User Index  [MR] Zackary Quan MD       MDM:    74 y.o. male with recurrent hypoglycemia this patient with previous history of the same.  Did have medication adjustment including discontinuation of sulfonylurea.  There is no immediate apparent precipitating cause with additional IV dextrose and p.o. intake planned with planned for short-term monitoring of blood glucose.  Additional lab sent to exclude obvious end-organ dysfunction such as new hepatic or renal dysfunction.  There is no sign of infectious precipitant.  Patient given IV and p.o. glucose with euglycemia and no further symptoms.  I did encourage follow-up to recheck his regimen and have family closely observe at home.  I did offer admission for longer-term observation of glucose to ensure no further hypoglycemia, but he states he feels well and declines.  Detailed return precautions reviewed.    Diagnoses:    1. Hypoglycemia       Zackary Quan MD  10/07/24 0052

## 2024-10-07 NOTE — ED NOTES
"Pt presents to ED via EMS for "hypoglycemia, pt was suppoised to have paracentesis done today outpatient but family found him unresponsive and to be hypoglycemic" pt A&Ox4 upon arrival to ED. CBG 82. LEOLA ESCAMILLA  "

## 2024-10-07 NOTE — DISCHARGE INSTRUCTIONS
Have family observe at home, eat regular meals, check your blood sugar regularly, and return for any low blood sugar or other new or worsening symptoms.

## 2024-10-07 NOTE — TELEPHONE ENCOUNTER
Attempted to call Sukhdeep, phone was going straight to voice-mail, mentioned to give the office a call back.

## 2024-10-07 NOTE — TELEPHONE ENCOUNTER
----- Message from Shanda sent at 10/7/2024 10:36 AM CDT -----  Contact: Sukhdeep  Pt is being taken to the [facility] emergency room. Blood sugar issues FYI. Please advise. Sukhdeep #[phone]

## 2024-10-09 ENCOUNTER — TELEPHONE (OUTPATIENT)
Dept: FAMILY MEDICINE | Facility: CLINIC | Age: 74
End: 2024-10-09
Payer: MEDICARE

## 2024-10-09 NOTE — TELEPHONE ENCOUNTER
----- Message from Alexandra sent at 10/9/2024  9:33 AM CDT -----  Contact: harish Bautistasstephanie [facility] pt  yesterday on 10/08  [phone]

## 2024-10-09 NOTE — TELEPHONE ENCOUNTER
Spoke with Dennys the Home Health nurse. She confirmed that patient passed away yesterday (10/08). Family member found him dead at home yesterday when going to check on him. Informed her that I would inform the provider.

## 2024-10-31 ENCOUNTER — DOCUMENTATION ONLY (OUTPATIENT)
Dept: FAMILY MEDICINE | Facility: CLINIC | Age: 74
End: 2024-10-31
Payer: MEDICARE

## 2025-02-28 NOTE — ASSESSMENT & PLAN NOTE
Caller: Kristen Krause    Relationship: Emergency Contact    Best call back number:.[phone]    Caller requesting test results: WIFE, KRISTEN    What test was performed: LUMBAR PUNCTURE AND BONE BIOPSY      Where was the test performed: [facility] WHILE HE WAS INPATIENT     Impression:  Baldo Carney is a 73 y.o. male with extensive cardiac history, HTN, CHF, CKD stage 3-4, T2DM with retinopathy and peripheral neuropathy, anemia, BPH, hypothyroidism, cirrhosis with ascites requiring bimonthly paracentesis, COPD, history of incarcerated inguinal hernia repair on 12/7/2023 initially presented to [facility] on 9/7/24 with complaints of left sided chest pain that started 4x hours PTA. While in OSH ED, patient with 5.3 K, 4,900 BNP, and elevated troponin. Patient was transferred to [facility] for higher level of care, and evaluation by cardiology and hepatology.    Patient is sitting up in chair. AAOx4, and amenable to Palliative Medicine. Palliative Medicine was consulted for Goals of Care and Advanced Care Planning discussions by primary team, Dr. Bridges.    Advance Care Planning     Date: 09/12/2024    Power of   I initiated the process of voluntary advance care planning today and explained the importance of this process to the patient.  Patient previously completed HCPOA documents in 2014. I re-introduced the concept of advance directives to the patient, as well. Then the patient received detailed information about the importance of designating a Health Care Power of  (HCPOA). He was also instructed to communicate with this person about their wishes for future healthcare, should he become sick and lose decision-making capacity. The patient has previously appointed a HCPOA. After our discussion, the patient has decided to complete a new HCPOA and has appointed his  family , health care agent:  Libia Carney ([phone]) as primary choice and Sukhdeep Carney ([phone]) as his secondary choice . I encouraged him to communicate with this person about their wishes for future healthcare, should he become sick and lose decision-making capacity.               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    Recommendations  - Continue management per primary team  - Patient and family would benefit from continued education and information regarding plan of care, prognosis, and what to expect in the future  - I will continue to follow for GOC discussions.    Thank you for consulting Palliative Medicine.

## (undated) DEVICE — SHEET DRAPE MEDIUM

## (undated) DEVICE — GUN BIOPSY 18GA MONOPLY

## (undated) DEVICE — UNDERGLOVE BIOGEL PI MICRO BLUE SZ 7.5

## (undated) DEVICE — MICROCATHETER MAMBA FLEX 135CM

## (undated) DEVICE — CONDOMS NON-STERILE 2.6X30CM

## (undated) DEVICE — CATH IMPELLA CP 14F 4.7X65MM

## (undated) DEVICE — SEE MEDLINE ITEM 156894

## (undated) DEVICE — SUTURE VICRYL 2-0 27 SH VCP417H

## (undated) DEVICE — GUIDE BIOPSY BIPLANAR 18G

## (undated) DEVICE — SUTURE SILK 2-0 18 A185H

## (undated) DEVICE — KIT CUSTOM MANIFOLD

## (undated) DEVICE — INFLATOR ENCORE 26 BLLN INFL

## (undated) DEVICE — SUTURE MONOCRYL 4-0 PS-2 27 MCP426H

## (undated) DEVICE — GUIDE LAUNCHER 8FR EBU 3.5

## (undated) DEVICE — NEEDLE SAFETY ECLIPSE 25G 1-1/2IN 305767

## (undated) DEVICE — CATHETER DIAGNOSTIC DXTERITY 6F PIGST

## (undated) DEVICE — SPIKE CONTRAST CONTROLLER

## (undated) DEVICE — INTRODUCER RX PSI KIT 8.5 FR

## (undated) DEVICE — SPONGE XRAY DETECTABLE 4X4

## (undated) DEVICE — SPONGE GAUZE 2S 4X4 STERILE NON21424

## (undated) DEVICE — SUTURE SILK 3-0 18 A184H

## (undated) DEVICE — DRAPE LAPAROTOMY TRANSVERSE 89281

## (undated) DEVICE — GLOVE SURG ULTRA TOUCH 7

## (undated) DEVICE — SCRUB 10% POVIDONE IODINE 4OZ

## (undated) DEVICE — SYR 10CC LUER LOCK

## (undated) DEVICE — Device

## (undated) DEVICE — DEVICE PERCLOSE SUT CLSR 6FR

## (undated) DEVICE — CATH NC QUANTUM APEX MR 3.5X15

## (undated) DEVICE — SUTURE ETHIBOND 0 CR/CT-2 8-18

## (undated) DEVICE — CATH EMERGE MR 12 X 4.00

## (undated) DEVICE — GUIDEWIRE ROTAWIRE 330X0.014CM

## (undated) DEVICE — SOLUTION IRRI NS BOTTLE 1000ML R5200-01

## (undated) DEVICE — SHEATH INTRODUCER 8FR 11CM

## (undated) DEVICE — SHEATH INTRODUCER PRECISION ACCESS 6FX10

## (undated) DEVICE — GUIDEWIRE EMERALD 150CM PTFE

## (undated) DEVICE — URINAL MALE WITH LID DISP

## (undated) DEVICE — CATHETER DIAGNOSTIC DXTERITY 6FR JL 4

## (undated) DEVICE — SUTURE MONOCRYL 4-0 PS-2 Y496G

## (undated) DEVICE — KIT MICROINTRO 4F .018X40X7CM

## (undated) DEVICE — CATH IMA INFINITI 4FRX100CM

## (undated) DEVICE — ADHESIVE TISSUE EXOFIN

## (undated) DEVICE — GUIDEWIRE STF .035X180CM ANG

## (undated) DEVICE — OMNIPAQUE 350 200ML

## (undated) DEVICE — PROTECTION STATION PLUS

## (undated) DEVICE — NDL HYPODERMIC BLUNT 18G 1.5IN

## (undated) DEVICE — SPONGE GAUZE 16PLY 4X4

## (undated) DEVICE — PAD BOVIE ADULT

## (undated) DEVICE — CATH SWAN GANZ 8FR HEP FREE

## (undated) DEVICE — ADHESIVE DERMABOND SKIN DNX12

## (undated) DEVICE — DRAPE LAPAROTOMY 72X100X124 89228

## (undated) DEVICE — CATH EMERGE MR 20 X 3.00

## (undated) DEVICE — CATH ALII 4FR INFINITY

## (undated) DEVICE — BURR ADVANCER ROTAPRO 1.5X135

## (undated) DEVICE — GUIDEWIRE SION BLUE STR 190CM

## (undated) DEVICE — COVER TRANSDUCER LATEX N/STERI

## (undated) DEVICE — DRAIN PENROSE STERILE 12X1/4

## (undated) DEVICE — CATHETER DIAGNOSTIC DXTERITY 6FR JR 4.0

## (undated) DEVICE — DRESSING TEGADERM 2 1/3 X 2 3/4 TD1002

## (undated) DEVICE — GUIDE WIRE BMW 014 X190

## (undated) DEVICE — CATH EMERGE MR 15 X 3.00

## (undated) DEVICE — TIP BOVIE TEFLON E1450X

## (undated) DEVICE — SET CYSTO IRRIGATION UNIV SPIK

## (undated) DEVICE — NDL SPINAL 22GX7 SPINOCAN

## (undated) DEVICE — GLOVE SURGEONS ULTRA TOUCH 6.5

## (undated) DEVICE — GUIDEWIRE FIXED CORE 3MM JTIP 25X260

## (undated) DEVICE — KIT PROBE COVER WITH GEL

## (undated) DEVICE — GLOVE BIOGEL PI  GOLD SZ 7

## (undated) DEVICE — CATH EMERGE MR 15 X 3.50

## (undated) DEVICE — SYRINGE HYPODERMC LL 22G 1.5 ECLIPSE 30

## (undated) DEVICE — SHEATH INTRODUCER 6FR 11CM

## (undated) DEVICE — CATH 4 FR OMNI FLUSH 65CM

## (undated) DEVICE — SUTURE VICRYL 3-0 SH 27 VCP416H

## (undated) DEVICE — CATHETER DIAGNOSTIC DXTERITY 6FR JL 5.0

## (undated) DEVICE — CATH EAGLE EYE PLATINUM

## (undated) DEVICE — GUIDEWIRE DOUBLE ENDED .035 DIA. 150CML

## (undated) DEVICE — WATER STERILE INJ 500ML BAG

## (undated) DEVICE — TRAY PICC CENTRAL LINE DRSNG

## (undated) DEVICE — SEE MEDLINE ITEM 152651

## (undated) DEVICE — LUBRICANT SURGILUBE 2 OZ

## (undated) DEVICE — TRAY GENERAL SURGERY

## (undated) DEVICE — DRAPE MINOR PROCEDURE

## (undated) DEVICE — DERMABOND HVD MINI  DHVM12

## (undated) DEVICE — SHEATH PINNACLE 8FRX10CM W/GUIDEWIRE